# Patient Record
Sex: MALE | Race: WHITE | NOT HISPANIC OR LATINO | Employment: OTHER | URBAN - NONMETROPOLITAN AREA
[De-identification: names, ages, dates, MRNs, and addresses within clinical notes are randomized per-mention and may not be internally consistent; named-entity substitution may affect disease eponyms.]

---

## 2020-06-30 ENCOUNTER — HOSPITAL ENCOUNTER (EMERGENCY)
Facility: HOSPITAL | Age: 55
Discharge: HOME | End: 2020-06-30
Attending: EMERGENCY MEDICINE | Admitting: EMERGENCY MEDICINE
Payer: COMMERCIAL

## 2020-06-30 ENCOUNTER — APPOINTMENT (EMERGENCY)
Dept: RADIOLOGY | Facility: HOSPITAL | Age: 55
End: 2020-06-30

## 2020-06-30 VITALS
OXYGEN SATURATION: 97 % | TEMPERATURE: 97.3 F | HEIGHT: 70 IN | DIASTOLIC BLOOD PRESSURE: 96 MMHG | BODY MASS INDEX: 28.44 KG/M2 | SYSTOLIC BLOOD PRESSURE: 147 MMHG | RESPIRATION RATE: 18 BRPM | WEIGHT: 198.63 LBS | HEART RATE: 85 BPM

## 2020-06-30 DIAGNOSIS — M79.645 FINGER PAIN, LEFT: Primary | ICD-10-CM

## 2020-06-30 DIAGNOSIS — I73.00 RAYNAUD'S DISEASE: ICD-10-CM

## 2020-06-30 DIAGNOSIS — F17.200 SMOKING ADDICTION: ICD-10-CM

## 2020-06-30 DIAGNOSIS — M79.644 FINGER PAIN, RIGHT: ICD-10-CM

## 2020-06-30 PROCEDURE — 99283 EMERGENCY DEPT VISIT LOW MDM: CPT

## 2020-06-30 PROCEDURE — 73130 X-RAY EXAM OF HAND: CPT

## 2020-06-30 PROCEDURE — 99282 EMERGENCY DEPT VISIT SF MDM: CPT | Performed by: EMERGENCY MEDICINE

## 2020-06-30 RX ORDER — METOPROLOL SUCCINATE 25 MG/1
25 TABLET, EXTENDED RELEASE ORAL DAILY
COMMUNITY
Start: 2016-08-10

## 2020-06-30 RX ORDER — CAPSAICIN 0.025 %
CREAM (GRAM) TOPICAL
COMMUNITY
End: 2021-10-25 | Stop reason: CLARIF

## 2020-06-30 RX ORDER — HYDROXYZINE HYDROCHLORIDE 25 MG/1
TABLET, FILM COATED ORAL
COMMUNITY

## 2020-06-30 RX ORDER — LISINOPRIL 5 MG/1
5 TABLET ORAL DAILY
COMMUNITY
Start: 2018-01-08

## 2020-06-30 RX ORDER — GABAPENTIN 300 MG/1
CAPSULE ORAL
COMMUNITY
Start: 2017-02-07

## 2020-06-30 RX ORDER — CYCLOBENZAPRINE HCL 10 MG
TABLET ORAL
COMMUNITY
End: 2021-10-25 | Stop reason: CLARIF

## 2020-06-30 RX ORDER — GABAPENTIN 800 MG/1
TABLET ORAL
COMMUNITY
Start: 2018-01-08 | End: 2021-10-25 | Stop reason: CLARIF

## 2020-06-30 RX ORDER — AMITRIPTYLINE HYDROCHLORIDE 25 MG/1
25 TABLET, FILM COATED ORAL
COMMUNITY

## 2020-06-30 RX ORDER — ALBUTEROL SULFATE 2.5 MG/3ML
2.5 SOLUTION RESPIRATORY (INHALATION) EVERY 4 HOURS PRN
COMMUNITY

## 2020-06-30 RX ORDER — OMEPRAZOLE 40 MG/1
40 CAPSULE, DELAYED RELEASE ORAL DAILY
COMMUNITY

## 2020-06-30 RX ORDER — ALLOPURINOL 300 MG/1
300 TABLET ORAL DAILY
COMMUNITY

## 2020-06-30 RX ORDER — ALBUTEROL SULFATE 90 UG/1
2 AEROSOL, METERED RESPIRATORY (INHALATION) EVERY 4 HOURS PRN
COMMUNITY

## 2020-06-30 NOTE — ED PROVIDER NOTES
History  Chief Complaint   Patient presents with    Finger Pain     pt has calloused fingertips and picking at them  c/o bilateral index fingertip pain for past 2 weeks  denies fevers/travel     Chronic finger tip calluses, recently seen at [facility] emergency department and given topical antibacterial ointment for probable local infection, he thinks they are wrong  Notes he has been using a magnet and it feels like it tracks to his finger tips, is concerned maybe a metal foreign body  States he has history of Raynaud's with worsening circulation at fingers diffusely, instructed not to smoke cigarettes, but continues      Hand Pain   Location:  Left and right 2nd finger tips  Quality:  Sore  Severity:  Mild  Onset quality:  Gradual  Duration: Chronically  Timing:  Constant  Progression:  Worsening  Chronicity:  Chronic  Worsened by:  Picking, touching  Associated symptoms: no abdominal pain, no chest pain, no fever and no shortness of breath        Prior to Admission Medications   Prescriptions Last Dose Informant Patient Reported? Taking?    albuterol (2 5 mg/3 mL) 0 083 % nebulizer solution   Yes Yes   albuterol (Proventil HFA) 90 mcg/act inhaler   Yes Yes   Sig: Inhale   allopurinol (ZYLOPRIM) 300 mg tablet   Yes Yes   Sig: Take by mouth daily   amitriptyline (ELAVIL) 25 mg tablet   Yes Yes   Sig: Take by mouth   capsaicin (ZOSTRIX) 0 025 % cream   Yes Yes   cyclobenzaprine (FLEXERIL) 10 mg tablet   Yes Yes   Sig: Take by mouth   gabapentin (NEURONTIN) 300 mg capsule   Yes Yes   Sig: Take by mouth   gabapentin (NEURONTIN) 800 mg tablet   Yes Yes   Sig: Take by mouth   hydrOXYzine HCL (ATARAX) 25 mg tablet   Yes Yes   Sig: Take by mouth   lisinopril (ZESTRIL) 5 mg tablet   Yes Yes   Sig: Take 5 mg by mouth daily   metoprolol succinate (TOPROL-XL) 25 mg 24 hr tablet   Yes Yes   Sig: Take 25 mg by mouth daily   omeprazole (PriLOSEC) 40 MG capsule   Yes No   Sig: Take 40 mg by mouth daily Facility-Administered Medications: None       Past Medical History:   Diagnosis Date    Raynaud's disease        History reviewed  No pertinent surgical history  History reviewed  No pertinent family history  I have reviewed and agree with the history as documented  E-Cigarette/Vaping    E-Cigarette Use Never User      E-Cigarette/Vaping Substances     Social History     Tobacco Use    Smoking status: Current Every Day Smoker     Packs/day: 1 00     Types: Cigarettes    Smokeless tobacco: Never Used   Substance Use Topics    Alcohol use: Not Currently    Drug use: Yes     Types: Marijuana       Review of Systems   Constitutional: Negative for fever  Respiratory: Negative for shortness of breath  Cardiovascular: Negative for chest pain  Gastrointestinal: Negative for abdominal pain  All other systems reviewed and are negative  Physical Exam  Physical Exam   Constitutional:   Pleasant, comfortable-appearing   Eyes: Conjunctivae are normal    Neck: Neck supple  Cardiovascular: Normal rate and regular rhythm  Intact radial pulses bilaterally   Pulmonary/Chest: Effort normal and breath sounds normal    Abdominal: He exhibits no distension  Musculoskeletal:   Intact range of motion at wrists and hands/fingers bilaterally, intact strength   Skin: Skin is warm and dry  Hypopigmented patches/vitiligo type changes at face and extremities exposed    Bilateral 2nd fingertips calloused, thickened skin with central debrided areas without obvious foreign body are locally tender without cellulitic or purulent changes, all nail beds blanched, no capillary refill, finger pads minimal but better blanching/refill   Nursing note and vitals reviewed                Vital Signs  ED Triage Vitals [06/30/20 0930]   Temperature Pulse Respirations Blood Pressure SpO2   (!) 97 3 °F (36 3 °C) 86 18 165/97 97 %      Temp Source Heart Rate Source Patient Position - Orthostatic VS BP Location FiO2 (%) Temporal Monitor Lying Right arm --      Pain Score       5           Vitals:    06/30/20 0930 06/30/20 0945   BP: 165/97 165/97   Pulse: 86 78   Patient Position - Orthostatic VS: Lying          Visual Acuity      ED Medications  Medications - No data to display    Diagnostic Studies  Results Reviewed     None                 XR hand 3+ vw left    (Results Pending)   XR hand 3+ vw right    (Results Pending)              Procedures  Procedures         ED Course  ED Course as of Jun 30 1057   Tue Jun 30, 2020   1050 X-rays without obvious foreign bodies at distal 2nd digits bilaterally   We discussed close outpatient follow-up with family physician and agreeable, will discuss rheumatology evaluation as well as smoking cessation, daughter present          US AUDIT      Most Recent Value   Initial Alcohol Screen: US AUDIT-C    1  How often do you have a drink containing alcohol?  0 Filed at: 06/30/2020 0956   2  How many drinks containing alcohol do you have on a typical day you are drinking? 0 Filed at: 06/30/2020 0956   3a  Male UNDER 65: How often do you have five or more drinks on one occasion? 0 Filed at: 06/30/2020 0956   Audit-C Score  0 Filed at: 06/30/2020 9790                  SILVIA/DAST-10      Most Recent Value   How many times in the past year have you    Used an illegal drug or used a prescription medication for non-medical reasons? Monthly Filed at: 06/30/2020 0955   In the past 12 months      1  Have you used drugs other than those required for medical reasons? 0 Filed at: 06/30/2020 0955   2  Do you use more than one drug at a time? 0 Filed at: 06/30/2020 0955   3  Have you had medical problems as a result of your drug use (e g , memory loss, hepatitis, convulsions, bleeding, etc )? 0 Filed at: 06/30/2020 0955   4  Have you had "blackouts" or "flashbacks" as a result of drug use? YesNo  0 Filed at: 06/30/2020 0955   5  Do you ever feel bad or guilty about your drug use?   0 Filed at: 06/30/2020 0955   6  Does your spouse (or parent) ever complain about your involvement with drugs? 0 Filed at: 06/30/2020 0955   7  Have you neglected your family because of your use of drugs? 0 Filed at: 06/30/2020 0955   8  Have you engaged in illegal activities in order to obtain drugs? 0 Filed at: 06/30/2020 0955   9  Have you ever experienced withdrawal symptoms (felt sick) when you stopped taking drugs? 0 Filed at: 06/30/2020 0955   10  Are you always able to stop using drugs when you want to?  0 Filed at: 06/30/2020 0955   DAST-10 Score  0 Filed at: 06/30/2020 6841                                MDM      Disposition  Final diagnoses:   Finger pain, left   Finger pain, right   Raynaud's disease   Smoking addiction     Time reflects when diagnosis was documented in both MDM as applicable and the Disposition within this note     Time User Action Codes Description Comment    6/30/2020 10:53 AM Mary Hoot Add [T49 839] Finger pain, left     6/30/2020 10:53 AM Mary Hoot Add [K99 506] Finger pain, right     6/30/2020 10:53 AM Mary Hoot Add [I73 00] Raynaud's disease     6/30/2020 10:53 AM Mary Hoot Add [F17 200] Smoking addiction       ED Disposition     ED Disposition Condition Date/Time Comment    Discharge Stable Tue Jun 30, 2020 10:53 AM [facility] discharge to home/self care  Follow-up Information    None         Patient's Medications   Discharge Prescriptions    No medications on file     No discharge procedures on file      PDMP Review     None          ED Provider  Electronically Signed by           Salud Starr DO  06/30/20 1057

## 2020-06-30 NOTE — DISCHARGE INSTRUCTIONS
Return immediately if worse or any symptoms  Please discuss rheumatology evaluation with your family physician, call today for appointment within the next week

## 2020-07-29 ENCOUNTER — HOSPITAL ENCOUNTER (EMERGENCY)
Facility: HOSPITAL | Age: 55
Discharge: HOME | End: 2020-07-29
Attending: EMERGENCY MEDICINE | Admitting: EMERGENCY MEDICINE
Payer: COMMERCIAL

## 2020-07-29 VITALS
HEART RATE: 92 BPM | RESPIRATION RATE: 18 BRPM | WEIGHT: 199.96 LBS | BODY MASS INDEX: 28.69 KG/M2 | SYSTOLIC BLOOD PRESSURE: 170 MMHG | OXYGEN SATURATION: 99 % | TEMPERATURE: 98.4 F | DIASTOLIC BLOOD PRESSURE: 96 MMHG

## 2020-07-29 VITALS
DIASTOLIC BLOOD PRESSURE: 93 MMHG | WEIGHT: 199.96 LBS | SYSTOLIC BLOOD PRESSURE: 146 MMHG | TEMPERATURE: 97.8 F | OXYGEN SATURATION: 97 % | RESPIRATION RATE: 18 BRPM | BODY MASS INDEX: 28.69 KG/M2 | HEART RATE: 82 BPM

## 2020-07-29 DIAGNOSIS — H10.9 CONJUNCTIVITIS: Primary | ICD-10-CM

## 2020-07-29 DIAGNOSIS — F32.A DEPRESSION: ICD-10-CM

## 2020-07-29 DIAGNOSIS — S05.02XA ABRASION OF LEFT CORNEA, INITIAL ENCOUNTER: Primary | ICD-10-CM

## 2020-07-29 PROCEDURE — 99284 EMERGENCY DEPT VISIT MOD MDM: CPT | Performed by: EMERGENCY MEDICINE

## 2020-07-29 PROCEDURE — 99284 EMERGENCY DEPT VISIT MOD MDM: CPT | Performed by: PHYSICIAN ASSISTANT

## 2020-07-29 PROCEDURE — 99283 EMERGENCY DEPT VISIT LOW MDM: CPT

## 2020-07-29 RX ORDER — TETRACAINE HYDROCHLORIDE 5 MG/ML
1 SOLUTION OPHTHALMIC ONCE
Status: COMPLETED | OUTPATIENT
Start: 2020-07-29 | End: 2020-07-29

## 2020-07-29 RX ORDER — ERYTHROMYCIN 5 MG/G
0.5 OINTMENT OPHTHALMIC ONCE
Status: COMPLETED | OUTPATIENT
Start: 2020-07-29 | End: 2020-07-29

## 2020-07-29 RX ADMIN — ERYTHROMYCIN 0.5 INCH: 5 OINTMENT OPHTHALMIC at 06:53

## 2020-07-29 RX ADMIN — FLUORESCEIN SODIUM 1 STRIP: 1 STRIP OPHTHALMIC at 12:49

## 2020-07-29 RX ADMIN — FLUORESCEIN SODIUM 1 STRIP: 1 STRIP OPHTHALMIC at 06:18

## 2020-07-29 RX ADMIN — TETRACAINE HYDROCHLORIDE 1 DROP: 5 SOLUTION OPHTHALMIC at 12:48

## 2020-07-29 NOTE — ED PROVIDER NOTES
History  Chief Complaint   Patient presents with    Eye Pain     patient was just seen here this am and discharged, he came back thinking there is something in his eye  He slept outside last night and woke this am with pain and eye swelling  54-year-old male presents to the emergency department for evaluation of left eye discomfort  Patient was seen this morning for the same diagnosed with conjunctivitis and prescribed erythromycin ophthalmology appointment  Patient has ophthalmology appointment tomorrow  Patient reports he continues to feels as if something is in the eye  Patient notes he slept outside last evening due to being concerned there is bed bugs in his house  Patient feels is if there is a bed bug in his eye  He denies any visual changes  He reports eye discomfort and foreign body sensation  He reports purulent drainage from the eye  Patient denies fevers or chills  Patient reports he has been using the ointment as prescribed  Patient denies wearing contacts  Patient admits to light sensitivity  Note patient's daughter and patient became upset  Family and patient note recent family deaths including death of patient's twin brother  Patient is tearful discussing this  He denies any suicidal homicidal ideations  Patient denies any IV drug use admits to smoke marijuana denies any alcohol use  Prior to Admission Medications   Prescriptions Last Dose Informant Patient Reported? Taking?    albuterol (2 5 mg/3 mL) 0 083 % nebulizer solution   Yes No   albuterol (Proventil HFA) 90 mcg/act inhaler   Yes No   Sig: Inhale   allopurinol (ZYLOPRIM) 300 mg tablet   Yes No   Sig: Take by mouth daily   amitriptyline (ELAVIL) 25 mg tablet   Yes No   Sig: Take by mouth   capsaicin (ZOSTRIX) 0 025 % cream   Yes No   cyclobenzaprine (FLEXERIL) 10 mg tablet   Yes No   Sig: Take by mouth   gabapentin (NEURONTIN) 300 mg capsule   Yes No   Sig: Take by mouth   gabapentin (NEURONTIN) 800 mg tablet Yes No   Sig: Take by mouth   hydrOXYzine HCL (ATARAX) 25 mg tablet   Yes No   Sig: Take by mouth   lisinopril (ZESTRIL) 5 mg tablet   Yes No   Sig: Take 5 mg by mouth daily   metoprolol succinate (TOPROL-XL) 25 mg 24 hr tablet   Yes No   Sig: Take 25 mg by mouth daily   omeprazole (PriLOSEC) 40 MG capsule   Yes No   Sig: Take 40 mg by mouth daily      Facility-Administered Medications: None       Past Medical History:   Diagnosis Date    Emphysema of lung ([facility] [address] )     Gout     Neuropathy     Raynaud's disease        History reviewed  No pertinent surgical history  History reviewed  No pertinent family history  I have reviewed and agree with the history as documented  E-Cigarette/Vaping    E-Cigarette Use Never User      E-Cigarette/Vaping Substances     Social History     Tobacco Use    Smoking status: Current Every Day Smoker     Packs/day: 1 00     Types: Cigarettes    Smokeless tobacco: Never Used   Substance Use Topics    Alcohol use: Not Currently    Drug use: Yes     Types: Marijuana       Review of Systems   Constitutional: Negative  HENT: Negative  Eyes: Positive for photophobia, pain, discharge and redness  Negative for itching and visual disturbance  Respiratory: Negative  Cardiovascular: Negative  Gastrointestinal: Negative  Genitourinary: Negative  Musculoskeletal: Negative  Skin: Negative  Neurological: Negative  Psychiatric/Behavioral: Negative for agitation, behavioral problems, confusion, decreased concentration, dysphoric mood, hallucinations, self-injury, sleep disturbance and suicidal ideas  The patient is not nervous/anxious and is not hyperactive  Depressed mood   All other systems reviewed and are negative  Physical Exam  Physical Exam   Constitutional: He is oriented to person, place, and time  He appears well-developed and well-nourished  No distress  HENT:   Head: Normocephalic and atraumatic     Right Ear: External ear normal    Left Ear: External ear normal    Nose: Nose normal    Mouth/Throat: Oropharynx is clear and moist  No oropharyngeal exudate  Eyes: Pupils are equal, round, and reactive to light  Conjunctivae and lids are normal  Right eye exhibits no discharge  Left eye exhibits discharge (Purulent)  Right eye exhibits normal extraocular motion (no pain) and no nystagmus  Left eye exhibits normal extraocular motion (no pain) and no nystagmus  Slit lamp exam:       The left eye shows corneal abrasion  Anterior chamber clear   Neck: Normal range of motion  Neck supple  Lymphadenopathy:     He has no cervical adenopathy  Neurological: He is alert and oriented to person, place, and time  GCS eye subscore is 4  GCS verbal subscore is 5  GCS motor subscore is 6  Skin: Skin is warm and dry  Capillary refill takes less than 2 seconds  No erythema  Psychiatric: His speech is normal and behavior is normal  Judgment and thought content normal  His mood appears not anxious  His affect is not angry and not blunt  He is not actively hallucinating  Cognition and memory are normal  He exhibits a depressed mood  Tearful, depressed He is attentive  Nursing note and vitals reviewed        Vital Signs  ED Triage Vitals [07/29/20 1209]   Temperature Pulse Respirations Blood Pressure SpO2   98 4 °F (36 9 °C) 92 18 170/96 99 %      Temp Source Heart Rate Source Patient Position - Orthostatic VS BP Location FiO2 (%)   Temporal Monitor Sitting Right arm --      Pain Score       3           Vitals:    07/29/20 1209   BP: 170/96   Pulse: 92   Patient Position - Orthostatic VS: Sitting         Visual Acuity      ED Medications  Medications   fluorescein sodium sterile ophthalmic strip 1 strip (1 strip Left Eye Given 7/29/20 1249)   tetracaine 0 5 % ophthalmic solution 1 drop (1 drop Left Eye Given 7/29/20 1248)       Diagnostic Studies  Results Reviewed     None                 No orders to display              Procedures  Procedures ED Course       US AUDIT      Most Recent Value   Initial Alcohol Screen: US AUDIT-C    1  How often do you have a drink containing alcohol?  0 Filed at: 07/29/2020 1211   2  How many drinks containing alcohol do you have on a typical day you are drinking? 0 Filed at: 07/29/2020 1211   3a  Male UNDER 65: How often do you have five or more drinks on one occasion? 0 Filed at: 07/29/2020 1211   3b  FEMALE Any Age, or MALE 65+: How often do you have 4 or more drinks on one occassion? 0 Filed at: 07/29/2020 1211   Audit-C Score  0 Filed at: 07/29/2020 1211                  SILVIA/DAST-10      Most Recent Value   How many times in the past year have you    Used an illegal drug or used a prescription medication for non-medical reasons? Once or Twice [last used yesterday ] Filed at: 07/29/2020 1211   In the past 12 months      1  Have you used drugs other than those required for medical reasons? 0 Filed at: 07/29/2020 1211   2  Do you use more than one drug at a time? 0 Filed at: 07/29/2020 1211   3  Have you had medical problems as a result of your drug use (e g , memory loss, hepatitis, convulsions, bleeding, etc )? 0 Filed at: 07/29/2020 1211   4  Have you had "blackouts" or "flashbacks" as a result of drug use? YesNo  0 Filed at: 07/29/2020 1211   5  Do you ever feel bad or guilty about your drug use? 0 Filed at: 07/29/2020 1211   6  Does your spouse (or parent) ever complain about your involvement with drugs? 0 Filed at: 07/29/2020 1211   7  Have you neglected your family because of your use of drugs? 0 Filed at: 07/29/2020 1211   8  Have you engaged in illegal activities in order to obtain drugs? 0 Filed at: 07/29/2020 1211   9  Have you ever experienced withdrawal symptoms (felt sick) when you stopped taking drugs? 0 Filed at: 07/29/2020 1211   10   Are you always able to stop using drugs when you want to?  0 Filed at: 07/29/2020 1211   DAST-10 Score  0 Filed at: 07/29/2020 1216 MDM  Number of Diagnoses or Management Options  Abrasion of left cornea, initial encounter: new and requires workup  Depression: new and does not require workup  Diagnosis management comments: ddx includes but not limited to:  Corneal abrasion, foreign body, conjunctivitis  Vitals and medical record reviewed  Patient had corneal abrasion noted on woods lamp exam   There is no foreign body noted on exam   Unable to successfully flip eyelid  Patient was started on erythromycin ophthalmology ointment this morning which he reports he is using  He has an ophthalmology appointment scheduled for tomorrow  Patient was tearful and appeared depressed  He had no suicidal or homicidal ideations  Psychology phone number provided to patient  Patient and family were educated on results, findings, symptomatic treatment and symptoms that require prompt return to the ED for further evaluation and verbalized understanding  Patient remained well emergency department and was discharged home  Disposition  Final diagnoses:   Abrasion of left cornea, initial encounter   Depression     Time reflects when diagnosis was documented in both MDM as applicable and the Disposition within this note     Time User Action Codes Description Comment    7/29/2020  1:30 PM Robin Santos [[id number]  [id number]] Abrasion of left cornea, initial encounter     7/29/2020  1:31 PM Rice Fillers Add [F32 9] Depression       ED Disposition     ED Disposition Condition Date/Time Comment    Discharge Stable Wed Jul 29, 2020  1:30 PM [facility] discharge to home/self care              Follow-up Information     Follow up With Specialties Details Why [address] Referral Cardiology, Gastroenterology, General Surgery, Anesthesiology, Hematology and Oncology, Urology, Emergency Medicine, Hematology, Oncology In 1 week  [address] [facility] [address], [address]      Benigno Godinez MD Psychiatry, Behavioral Health In 3 days  [address] [facility]      Belinda Peguero MD Ophthalmology In 3 days  [address]  [facility]  [address] [address]  [phone]            Discharge Medication List as of 7/29/2020  1:34 PM      CONTINUE these medications which have NOT CHANGED    Details   albuterol (2 5 mg/3 mL) 0 083 % nebulizer solution Historical Med      albuterol (Proventil HFA) 90 mcg/act inhaler Inhale, Historical Med      allopurinol (ZYLOPRIM) 300 mg tablet Take by mouth daily, Historical Med      amitriptyline (ELAVIL) 25 mg tablet Take by mouth, Historical Med      capsaicin (ZOSTRIX) 0 025 % cream Historical Med      cyclobenzaprine (FLEXERIL) 10 mg tablet Take by mouth, Historical Med      gabapentin (NEURONTIN) 300 mg capsule Take by mouth, Starting Tue 2/7/2017, Historical Med      gabapentin (NEURONTIN) 800 mg tablet Take by mouth, Starting Mon 1/8/2018, Historical Med      hydrOXYzine HCL (ATARAX) 25 mg tablet Take by mouth, Historical Med      lisinopril (ZESTRIL) 5 mg tablet Take 5 mg by mouth daily, Starting Mon 1/8/2018, Historical Med      metoprolol succinate (TOPROL-XL) 25 mg 24 hr tablet Take 25 mg by mouth daily, Starting Wed 8/10/2016, Historical Med      omeprazole (PriLOSEC) 40 MG capsule Take 40 mg by mouth daily, Historical Med           No discharge procedures on file      PDMP Review     None          ED Provider  Electronically Signed by           Alison Galindo PA-C  07/29/20 0115

## 2020-07-29 NOTE — ED ATTENDING ATTESTATION
7/29/2020  Belinda Mackay DO, saw and evaluated the patient  I have discussed the patient with the resident/non-physician practitioner and agree with the resident's/non-physician practitioner's findings, Plan of Care, and MDM as documented in the resident's/non-physician practitioner's note, except where noted  All available labs and Radiology studies were reviewed  I was present for key portions of any procedure(s) performed by the resident/non-physician practitioner and I was immediately available to provide assistance  At this point I agree with the current assessment done in the Emergency Department  I have conducted an independent evaluation of this patient a history and physical is as follows:    ED Course      Patient was seen and evaluated with the physician's assistant  Patient was seen earlier in the emergency department for conjunctivitis  Patient returns today with increased discomfort in the eye  PA evaluated the eye with me  We did attempt to flip the tarsal plate but patient was not cooperative with this and was subsequently difficult to perform  His pupils equal and reactive to light  We did identify an increased fluorescein uptake extending from approximately the 10 o'clock to 1 o'clock position on the superior aspect of the OS  There was an ancillary complaint of the patient having some degree of depression and paranoia  We discussed this with the patient and his family  The patient denied any active suicidality  We offered the patient opportunity to have crisis evaluation he declined  This was further discussed with the patient's daughter she was not seeking to involuntarily commit the patient  Patient was subsequently discharged home has appointment for follow-up with ophthalmology tomorrow  He was advised to continue with the appointment that he was prescribed earlier  Please see physician's assistant's note for further information        Critical Care Time  Procedures

## 2020-07-29 NOTE — DISCHARGE INSTRUCTIONS
PLEASE USE THE OINTMENT AS PRESCRIBED TO YOU EARLIER THIS MORNING FOR YOUR EYE DISCOMFORT  PLEASE KEEP AND MAKE SURE TO ATTEND YOUR APPOINTMENT WITH THE EYE DOCTOR TOMORROW  THERE IS A NUMBER PROVIDED ABOVE FOR A PSYCHIATRIST FOR FUTURE COUNSELING  PLEASE CALL THIS NUMBER TO MAKE AN APPOINTMENT

## 2020-07-29 NOTE — ED PROVIDER NOTES
History  Chief Complaint   Patient presents with    Eye Pain     Patient stated he fell asleep outside and woke up with left eye pain and swelling  Left eye irritation after sleeping outside on the porch overnight, notes he has reflux and and sleeps upright in chair well  Unaware of foreign body impact  Does not were contact lenses  No vision loss      Eye Pain   Timing:  Constant  Progression:  Unchanged  Chronicity:  New  Associated symptoms: no congestion, no cough, no fever, no headaches, no rash and no sore throat        Prior to Admission Medications   Prescriptions Last Dose Informant Patient Reported? Taking? albuterol (2 5 mg/3 mL) 0 083 % nebulizer solution   Yes No   albuterol (Proventil HFA) 90 mcg/act inhaler   Yes No   Sig: Inhale   allopurinol (ZYLOPRIM) 300 mg tablet   Yes No   Sig: Take by mouth daily   amitriptyline (ELAVIL) 25 mg tablet   Yes No   Sig: Take by mouth   capsaicin (ZOSTRIX) 0 025 % cream   Yes No   cyclobenzaprine (FLEXERIL) 10 mg tablet   Yes No   Sig: Take by mouth   gabapentin (NEURONTIN) 300 mg capsule   Yes No   Sig: Take by mouth   gabapentin (NEURONTIN) 800 mg tablet   Yes No   Sig: Take by mouth   hydrOXYzine HCL (ATARAX) 25 mg tablet   Yes No   Sig: Take by mouth   lisinopril (ZESTRIL) 5 mg tablet   Yes No   Sig: Take 5 mg by mouth daily   metoprolol succinate (TOPROL-XL) 25 mg 24 hr tablet   Yes No   Sig: Take 25 mg by mouth daily   omeprazole (PriLOSEC) 40 MG capsule   Yes No   Sig: Take 40 mg by mouth daily      Facility-Administered Medications: None       Past Medical History:   Diagnosis Date    Raynaud's disease        History reviewed  No pertinent surgical history  History reviewed  No pertinent family history  I have reviewed and agree with the history as documented      E-Cigarette/Vaping    E-Cigarette Use Never User      E-Cigarette/Vaping Substances     Social History     Tobacco Use    Smoking status: Current Every Day Smoker     Packs/day: 1 00 Types: Cigarettes    Smokeless tobacco: Never Used   Substance Use Topics    Alcohol use: Not Currently    Drug use: Yes     Types: Marijuana       Review of Systems   Constitutional: Negative for fever  HENT: Negative for congestion and sore throat  Eyes: Positive for pain  Respiratory: Negative for cough  Skin: Negative for rash  Neurological: Negative for headaches  Physical Exam  Physical Exam   Constitutional: He is oriented to person, place, and time  Alert, comfortable appearing   HENT:   Head: Normocephalic and atraumatic  Eyes: Pupils are equal, round, and reactive to light  EOM are normal    Left eye vision 20/30    Left eye conjunctival injection with mucoid discharge, no foreign body, anterior chamber clear, no fluorescein uptake at cornea, no foreign body in upper lower eyelid   Neurological: He is alert and oriented to person, place, and time  Skin: Skin is warm and dry  Psychiatric: He has a normal mood and affect  His behavior is normal    Nursing note and vitals reviewed  Vital Signs  ED Triage Vitals [07/29/20 0614]   Temperature Pulse Respirations Blood Pressure SpO2   97 8 °F (36 6 °C) 85 18 147/85 98 %      Temp Source Heart Rate Source Patient Position - Orthostatic VS BP Location FiO2 (%)   Temporal Monitor Lying Right arm --      Pain Score       4           Vitals:    07/29/20 0614   BP: 147/85   Pulse: 85   Patient Position - Orthostatic VS: Lying         Visual Acuity      ED Medications  Medications   erythromycin (ILOTYCIN) 0 5 % ophthalmic ointment 0 5 inch (has no administration in time range)   fluorescein sodium sterile ophthalmic strip 1 strip (1 strip Left Eye Given 7/29/20 2782)       Diagnostic Studies  Results Reviewed     None                 No orders to display              Procedures  Procedures         ED Course       US AUDIT      Most Recent Value   Initial Alcohol Screen: US AUDIT-C    1   How often do you have a drink containing alcohol?  0 Filed at: 07/29/2020 0612   2  How many drinks containing alcohol do you have on a typical day you are drinking? 0 Filed at: 07/29/2020 0612   3a  Male UNDER 65: How often do you have five or more drinks on one occasion? 0 Filed at: 07/29/2020 0612   Audit-C Score  0 Filed at: 07/29/2020 4389                  SILVIA/DAST-10      Most Recent Value   How many times in the past year have you    Used an illegal drug or used a prescription medication for non-medical reasons? Never Filed at: 07/29/2020 8087                                MDM      Disposition  Final diagnoses:   Conjunctivitis     Time reflects when diagnosis was documented in both MDM as applicable and the Disposition within this note     Time User Action Codes Description Comment    7/29/2020  6:32 AM Raymundo Hives Add [H10 9] Conjunctivitis       ED Disposition     ED Disposition Condition Date/Time Comment    Discharge Stable Wed Jul 29, 2020  6:32 AM Domenica Almonte discharge to home/self care  Follow-up Information     Follow up With Specialties Details Why Marcia Goldmann, MD Ophthalmology Schedule an appointment as soon as possible for a visit in 1 day  [address]  [facility] [employer]  [address] [id number]  [address] [facility] Ophthalmology Schedule an appointment as soon as possible for a visit in 1 day  [address]  [address]  [phone]            Patient's Medications   Discharge Prescriptions    No medications on file     No discharge procedures on file      PDMP Review     None          ED Provider  Electronically Signed by           Yo Tinoco DO  07/29/20 1640

## 2020-07-29 NOTE — ED NOTES
While in room 5, woman heard yelling in pt's room, woman then at nurses desk asking to speak to physician because she believes the pt is using drugs, SUZIE Nice PA-C aware of same       Cyndie Vazquez RN  07/29/20 7195

## 2020-07-29 NOTE — ED NOTES
This RN in pt's room to scan medications for provider, asked pt if everything is ok and that I had heard yelling, pt states "no it's not!", pt began yelling and threw his sunglasses across room and yells "they all think [place] [employer] [place] Just send me to the n[place]ouse!", I asked pt to please not yell and that I am trying to help him, asked pt if there is anything I can do to help his situation, pt states "no"       Monserrat Staton, RN  07/29/20 0519

## 2020-07-29 NOTE — ED NOTES
Erythromycin not stocked in [address] [facility] Supervisor called in order to provide medication        Vicki Riggins [facility]  07/29/20 3009

## 2020-07-29 NOTE — DISCHARGE INSTRUCTIONS
Please call and arrange to see ophthalmologist with 1-2 days  Erythromycin ointment ribbon and lower lid every 6 hours and small amount as needed for comfort  Return immediately if worse or any new symptoms

## 2020-07-29 NOTE — ED NOTES
Patient's ride home is Mariela Christie, requesting to be called at [phone] when patient is discharged  Patient agreeable        Jeff Carter, [place]  07/29/20 0138

## 2021-04-02 DIAGNOSIS — I73.00 RAYNAUD'S SYNDROME WITHOUT GANGRENE: ICD-10-CM

## 2021-04-02 DIAGNOSIS — R06.00 DYSPNEA, UNSPECIFIED: ICD-10-CM

## 2021-04-02 DIAGNOSIS — J84.9 INTERSTITIAL PULMONARY DISEASE, UNSPECIFIED (HCC): ICD-10-CM

## 2021-04-02 DIAGNOSIS — R05.9 COUGH: ICD-10-CM

## 2021-04-02 DIAGNOSIS — M34.9 SYSTEMIC SCLEROSIS, UNSPECIFIED (HCC): ICD-10-CM

## 2021-04-02 DIAGNOSIS — F17.200 NICOTINE DEPENDENCE, UNSPECIFIED, UNCOMPLICATED: ICD-10-CM

## 2021-04-08 ENCOUNTER — TRANSCRIBE ORDERS (OUTPATIENT)
Dept: ADMINISTRATIVE | Facility: HOSPITAL | Age: 56
End: 2021-04-08

## 2021-04-08 DIAGNOSIS — R05.9 COUGH: ICD-10-CM

## 2021-04-08 DIAGNOSIS — I50.32 CHRONIC DIASTOLIC (CONGESTIVE) HEART FAILURE (HCC): Primary | ICD-10-CM

## 2021-04-08 DIAGNOSIS — I73.00 RAYNAUD'S SYNDROME WITHOUT GANGRENE: ICD-10-CM

## 2021-04-08 DIAGNOSIS — M34.9 SYSTEMIC SCLEROSIS, UNSPECIFIED (HCC): ICD-10-CM

## 2021-04-08 DIAGNOSIS — R06.00 DYSPNEA, UNSPECIFIED: ICD-10-CM

## 2021-04-08 DIAGNOSIS — J84.9 INTERSTITIAL PULMONARY DISEASE, UNSPECIFIED (HCC): ICD-10-CM

## 2021-04-08 DIAGNOSIS — F17.200 NICOTINE DEPENDENCE, UNSPECIFIED, UNCOMPLICATED: ICD-10-CM

## 2021-10-21 ENCOUNTER — HOSPITAL ENCOUNTER (EMERGENCY)
Facility: HOSPITAL | Age: 56
Discharge: HOME | End: 2021-10-21
Attending: EMERGENCY MEDICINE | Admitting: EMERGENCY MEDICINE
Payer: COMMERCIAL

## 2021-10-21 VITALS
OXYGEN SATURATION: 98 % | WEIGHT: 288 LBS | RESPIRATION RATE: 16 BRPM | DIASTOLIC BLOOD PRESSURE: 104 MMHG | BODY MASS INDEX: 40.32 KG/M2 | TEMPERATURE: 98.1 F | SYSTOLIC BLOOD PRESSURE: 150 MMHG | HEART RATE: 85 BPM | HEIGHT: 71 IN

## 2021-10-21 DIAGNOSIS — M34.9 SCLERODERMA (HCC): Primary | ICD-10-CM

## 2021-10-21 PROCEDURE — 99283 EMERGENCY DEPT VISIT LOW MDM: CPT

## 2021-10-21 PROCEDURE — 99284 EMERGENCY DEPT VISIT MOD MDM: CPT | Performed by: EMERGENCY MEDICINE

## 2021-10-21 RX ORDER — IBUPROFEN 600 MG/1
600 TABLET ORAL EVERY 6 HOURS PRN
Qty: 30 TABLET | Refills: 0 | Status: SHIPPED | OUTPATIENT
Start: 2021-10-21

## 2021-10-21 RX ORDER — OXYCODONE HYDROCHLORIDE AND ACETAMINOPHEN 5; 325 MG/1; MG/1
1 TABLET ORAL ONCE
Status: COMPLETED | OUTPATIENT
Start: 2021-10-21 | End: 2021-10-21

## 2021-10-21 RX ORDER — PREDNISONE 10 MG/1
TABLET ORAL
Qty: 30 TABLET | Refills: 0 | OUTPATIENT
Start: 2021-10-21 | End: 2021-10-25

## 2021-10-21 RX ADMIN — OXYCODONE AND ACETAMINOPHEN 1 TABLET: 5; 325 TABLET ORAL at 18:47

## 2021-10-21 RX ADMIN — PREDNISONE 50 MG: 20 TABLET ORAL at 18:47

## 2021-10-22 ENCOUNTER — HOSPITAL ENCOUNTER (EMERGENCY)
Facility: HOSPITAL | Age: 56
Discharge: HOME | End: 2021-10-22
Attending: EMERGENCY MEDICINE | Admitting: EMERGENCY MEDICINE
Payer: COMMERCIAL

## 2021-10-22 VITALS
RESPIRATION RATE: 18 BRPM | WEIGHT: 288 LBS | TEMPERATURE: 98.3 F | DIASTOLIC BLOOD PRESSURE: 80 MMHG | OXYGEN SATURATION: 99 % | HEART RATE: 85 BPM | BODY MASS INDEX: 40.17 KG/M2 | SYSTOLIC BLOOD PRESSURE: 165 MMHG

## 2021-10-22 DIAGNOSIS — M34.9 SCLERODERMA (HCC): Primary | ICD-10-CM

## 2021-10-22 PROCEDURE — 96372 THER/PROPH/DIAG INJ SC/IM: CPT

## 2021-10-22 PROCEDURE — 99284 EMERGENCY DEPT VISIT MOD MDM: CPT | Performed by: EMERGENCY MEDICINE

## 2021-10-22 PROCEDURE — 99283 EMERGENCY DEPT VISIT LOW MDM: CPT

## 2021-10-22 RX ORDER — KETOROLAC TROMETHAMINE 30 MG/ML
30 INJECTION, SOLUTION INTRAMUSCULAR; INTRAVENOUS ONCE
Status: COMPLETED | OUTPATIENT
Start: 2021-10-22 | End: 2021-10-22

## 2021-10-22 RX ORDER — OXYCODONE HYDROCHLORIDE AND ACETAMINOPHEN 5; 325 MG/1; MG/1
1 TABLET ORAL ONCE
Status: COMPLETED | OUTPATIENT
Start: 2021-10-22 | End: 2021-10-22

## 2021-10-22 RX ADMIN — OXYCODONE HYDROCHLORIDE AND ACETAMINOPHEN 1 TABLET: 5; 325 TABLET ORAL at 13:45

## 2021-10-22 RX ADMIN — KETOROLAC TROMETHAMINE 30 MG: 30 INJECTION, SOLUTION INTRAMUSCULAR at 13:44

## 2021-10-23 ENCOUNTER — HOSPITAL ENCOUNTER (EMERGENCY)
Facility: HOSPITAL | Age: 56
Discharge: HOME | End: 2021-10-23
Attending: EMERGENCY MEDICINE | Admitting: EMERGENCY MEDICINE
Payer: COMMERCIAL

## 2021-10-23 ENCOUNTER — APPOINTMENT (EMERGENCY)
Dept: RADIOLOGY | Facility: HOSPITAL | Age: 56
End: 2021-10-23
Payer: COMMERCIAL

## 2021-10-23 VITALS
TEMPERATURE: 97 F | DIASTOLIC BLOOD PRESSURE: 82 MMHG | BODY MASS INDEX: 22.53 KG/M2 | HEIGHT: 71 IN | HEART RATE: 88 BPM | RESPIRATION RATE: 16 BRPM | SYSTOLIC BLOOD PRESSURE: 150 MMHG | OXYGEN SATURATION: 98 % | WEIGHT: 160.94 LBS

## 2021-10-23 VITALS
OXYGEN SATURATION: 98 % | TEMPERATURE: 97.8 F | BODY MASS INDEX: 22.4 KG/M2 | HEIGHT: 71 IN | SYSTOLIC BLOOD PRESSURE: 154 MMHG | HEART RATE: 74 BPM | WEIGHT: 160 LBS | RESPIRATION RATE: 16 BRPM | DIASTOLIC BLOOD PRESSURE: 92 MMHG

## 2021-10-23 DIAGNOSIS — M86.9 OSTEOMYELITIS (HCC): Primary | ICD-10-CM

## 2021-10-23 DIAGNOSIS — M34.9 SCLERODERMA (HCC): ICD-10-CM

## 2021-10-23 DIAGNOSIS — M79.641 RIGHT HAND PAIN: ICD-10-CM

## 2021-10-23 DIAGNOSIS — R79.82 ELEVATED C-REACTIVE PROTEIN (CRP): ICD-10-CM

## 2021-10-23 DIAGNOSIS — R70.0 ELEVATED ERYTHROCYTE SEDIMENTATION RATE: ICD-10-CM

## 2021-10-23 LAB
ALBUMIN SERPL BCP-MCNC: 3.7 G/DL (ref 3.5–5)
ALP SERPL-CCNC: 125 U/L (ref 46–116)
ALT SERPL W P-5'-P-CCNC: 19 U/L (ref 12–78)
ANION GAP SERPL CALCULATED.3IONS-SCNC: 6 MMOL/L (ref 4–13)
AST SERPL W P-5'-P-CCNC: 18 U/L (ref 5–45)
BASOPHILS # BLD AUTO: 0.03 THOUSANDS/ΜL (ref 0–0.1)
BASOPHILS NFR BLD AUTO: 0 % (ref 0–1)
BILIRUB SERPL-MCNC: 0.29 MG/DL (ref 0.2–1)
BUN SERPL-MCNC: 18 MG/DL (ref 5–25)
CALCIUM SERPL-MCNC: 8.7 MG/DL (ref 8.3–10.1)
CHLORIDE SERPL-SCNC: 97 MMOL/L (ref 100–108)
CO2 SERPL-SCNC: 29 MMOL/L (ref 21–32)
CREAT SERPL-MCNC: 0.94 MG/DL (ref 0.6–1.3)
CRP SERPL QL: 12 MG/L
EOSINOPHIL # BLD AUTO: 0.07 THOUSAND/ΜL (ref 0–0.61)
EOSINOPHIL NFR BLD AUTO: 1 % (ref 0–6)
ERYTHROCYTE [DISTWIDTH] IN BLOOD BY AUTOMATED COUNT: 14.6 % (ref 11.6–15.1)
ERYTHROCYTE [SEDIMENTATION RATE] IN BLOOD: 38 MM/HOUR (ref 0–19)
GFR SERPL CREATININE-BSD FRML MDRD: 90 ML/MIN/1.73SQ M
GLUCOSE SERPL-MCNC: 136 MG/DL (ref 65–140)
HCT VFR BLD AUTO: 40.2 % (ref 36.5–49.3)
HGB BLD-MCNC: 13.2 G/DL (ref 12–17)
IMM GRANULOCYTES # BLD AUTO: 0.05 THOUSAND/UL (ref 0–0.2)
IMM GRANULOCYTES NFR BLD AUTO: 1 % (ref 0–2)
LACTATE SERPL-SCNC: 1.1 MMOL/L (ref 0.5–2)
LYMPHOCYTES # BLD AUTO: 1.62 THOUSANDS/ΜL (ref 0.6–4.47)
LYMPHOCYTES NFR BLD AUTO: 15 % (ref 14–44)
MCH RBC QN AUTO: 29.4 PG (ref 26.8–34.3)
MCHC RBC AUTO-ENTMCNC: 32.8 G/DL (ref 31.4–37.4)
MCV RBC AUTO: 90 FL (ref 82–98)
MONOCYTES # BLD AUTO: 1.16 THOUSAND/ΜL (ref 0.17–1.22)
MONOCYTES NFR BLD AUTO: 11 % (ref 4–12)
NEUTROPHILS # BLD AUTO: 8.01 THOUSANDS/ΜL (ref 1.85–7.62)
NEUTS SEG NFR BLD AUTO: 72 % (ref 43–75)
NRBC BLD AUTO-RTO: 0 /100 WBCS
PLATELET # BLD AUTO: 367 THOUSANDS/UL (ref 149–390)
PMV BLD AUTO: 9 FL (ref 8.9–12.7)
POTASSIUM SERPL-SCNC: 4.1 MMOL/L (ref 3.5–5.3)
PROT SERPL-MCNC: 7.7 G/DL (ref 6.4–8.2)
RBC # BLD AUTO: 4.49 MILLION/UL (ref 3.88–5.62)
SODIUM SERPL-SCNC: 132 MMOL/L (ref 136–145)
URATE SERPL-MCNC: 2.4 MG/DL (ref 4.2–8)
WBC # BLD AUTO: 10.94 THOUSAND/UL (ref 4.31–10.16)

## 2021-10-23 PROCEDURE — 85025 COMPLETE CBC W/AUTO DIFF WBC: CPT | Performed by: EMERGENCY MEDICINE

## 2021-10-23 PROCEDURE — 99285 EMERGENCY DEPT VISIT HI MDM: CPT | Performed by: EMERGENCY MEDICINE

## 2021-10-23 PROCEDURE — 86140 C-REACTIVE PROTEIN: CPT | Performed by: EMERGENCY MEDICINE

## 2021-10-23 PROCEDURE — 96365 THER/PROPH/DIAG IV INF INIT: CPT

## 2021-10-23 PROCEDURE — 96367 TX/PROPH/DG ADDL SEQ IV INF: CPT

## 2021-10-23 PROCEDURE — 96375 TX/PRO/DX INJ NEW DRUG ADDON: CPT

## 2021-10-23 PROCEDURE — 73130 X-RAY EXAM OF HAND: CPT

## 2021-10-23 PROCEDURE — 85652 RBC SED RATE AUTOMATED: CPT | Performed by: EMERGENCY MEDICINE

## 2021-10-23 PROCEDURE — 36415 COLL VENOUS BLD VENIPUNCTURE: CPT | Performed by: EMERGENCY MEDICINE

## 2021-10-23 PROCEDURE — 96374 THER/PROPH/DIAG INJ IV PUSH: CPT

## 2021-10-23 PROCEDURE — 96366 THER/PROPH/DIAG IV INF ADDON: CPT

## 2021-10-23 PROCEDURE — 99284 EMERGENCY DEPT VISIT MOD MDM: CPT | Performed by: EMERGENCY MEDICINE

## 2021-10-23 PROCEDURE — 87040 BLOOD CULTURE FOR BACTERIA: CPT | Performed by: EMERGENCY MEDICINE

## 2021-10-23 PROCEDURE — 80053 COMPREHEN METABOLIC PANEL: CPT | Performed by: EMERGENCY MEDICINE

## 2021-10-23 PROCEDURE — 84550 ASSAY OF BLOOD/URIC ACID: CPT | Performed by: EMERGENCY MEDICINE

## 2021-10-23 PROCEDURE — 83605 ASSAY OF LACTIC ACID: CPT | Performed by: EMERGENCY MEDICINE

## 2021-10-23 PROCEDURE — 99284 EMERGENCY DEPT VISIT MOD MDM: CPT

## 2021-10-23 RX ORDER — DEXAMETHASONE SODIUM PHOSPHATE 4 MG/ML
10 INJECTION, SOLUTION INTRA-ARTICULAR; INTRALESIONAL; INTRAMUSCULAR; INTRAVENOUS; SOFT TISSUE ONCE
Status: COMPLETED | OUTPATIENT
Start: 2021-10-23 | End: 2021-10-23

## 2021-10-23 RX ORDER — CEPHALEXIN 500 MG/1
500 CAPSULE ORAL EVERY 12 HOURS SCHEDULED
Qty: 14 CAPSULE | Refills: 0 | Status: SHIPPED | OUTPATIENT
Start: 2021-10-23 | End: 2021-10-30

## 2021-10-23 RX ORDER — OXYCODONE HYDROCHLORIDE AND ACETAMINOPHEN 5; 325 MG/1; MG/1
1 TABLET ORAL ONCE
Status: COMPLETED | OUTPATIENT
Start: 2021-10-23 | End: 2021-10-23

## 2021-10-23 RX ORDER — OXYCODONE HYDROCHLORIDE AND ACETAMINOPHEN 5; 325 MG/1; MG/1
1 TABLET ORAL EVERY 4 HOURS PRN
Qty: 6 TABLET | Refills: 0 | OUTPATIENT
Start: 2021-10-23 | End: 2021-10-25

## 2021-10-23 RX ORDER — KETOROLAC TROMETHAMINE 30 MG/ML
15 INJECTION, SOLUTION INTRAMUSCULAR; INTRAVENOUS ONCE
Status: COMPLETED | OUTPATIENT
Start: 2021-10-23 | End: 2021-10-23

## 2021-10-23 RX ORDER — CEFEPIME HYDROCHLORIDE 2 G/50ML
2000 INJECTION, SOLUTION INTRAVENOUS ONCE
Status: COMPLETED | OUTPATIENT
Start: 2021-10-23 | End: 2021-10-23

## 2021-10-23 RX ORDER — MORPHINE SULFATE 4 MG/ML
4 INJECTION, SOLUTION INTRAMUSCULAR; INTRAVENOUS ONCE
Status: COMPLETED | OUTPATIENT
Start: 2021-10-23 | End: 2021-10-23

## 2021-10-23 RX ADMIN — DEXAMETHASONE SODIUM PHOSPHATE 10 MG: 4 INJECTION, SOLUTION INTRAMUSCULAR; INTRAVENOUS at 09:22

## 2021-10-23 RX ADMIN — MORPHINE SULFATE 4 MG: 4 INJECTION INTRAVENOUS at 11:02

## 2021-10-23 RX ADMIN — SODIUM CHLORIDE 1000 ML: 0.9 INJECTION, SOLUTION INTRAVENOUS at 11:02

## 2021-10-23 RX ADMIN — KETOROLAC TROMETHAMINE 15 MG: 30 INJECTION, SOLUTION INTRAMUSCULAR at 10:32

## 2021-10-23 RX ADMIN — OXYCODONE HYDROCHLORIDE AND ACETAMINOPHEN 1 TABLET: 5; 325 TABLET ORAL at 09:17

## 2021-10-23 RX ADMIN — CEFEPIME HYDROCHLORIDE 2000 MG: 2 INJECTION, SOLUTION INTRAVENOUS at 11:07

## 2021-10-23 RX ADMIN — VANCOMYCIN HYDROCHLORIDE 1750 MG: 1 INJECTION, POWDER, LYOPHILIZED, FOR SOLUTION INTRAVENOUS at 11:45

## 2021-10-24 ENCOUNTER — APPOINTMENT (EMERGENCY)
Dept: CT IMAGING | Facility: HOSPITAL | Age: 56
End: 2021-10-24
Payer: COMMERCIAL

## 2021-10-24 ENCOUNTER — HOSPITAL ENCOUNTER (OUTPATIENT)
Facility: HOSPITAL | Age: 56
Setting detail: OBSERVATION
Discharge: HOME | End: 2021-10-25
Attending: EMERGENCY MEDICINE | Admitting: FAMILY MEDICINE
Payer: COMMERCIAL

## 2021-10-24 DIAGNOSIS — R55 NEAR SYNCOPE: ICD-10-CM

## 2021-10-24 DIAGNOSIS — M86.9 OSTEOMYELITIS (HCC): ICD-10-CM

## 2021-10-24 DIAGNOSIS — E87.1 HYPONATREMIA: ICD-10-CM

## 2021-10-24 DIAGNOSIS — L08.9 FINGER INFECTION: ICD-10-CM

## 2021-10-24 DIAGNOSIS — R29.90 STROKE-LIKE SYMPTOMS: ICD-10-CM

## 2021-10-24 DIAGNOSIS — R77.8 ELEVATED TROPONIN: ICD-10-CM

## 2021-10-24 DIAGNOSIS — R42 DIZZINESS: Primary | ICD-10-CM

## 2021-10-24 DIAGNOSIS — D72.829 LEUKOCYTOSIS: ICD-10-CM

## 2021-10-24 LAB
ANION GAP SERPL CALCULATED.3IONS-SCNC: 7 MMOL/L (ref 4–13)
APTT PPP: 28 SECONDS (ref 23–37)
ATRIAL RATE: 82 BPM
BUN SERPL-MCNC: 17 MG/DL (ref 5–25)
CALCIUM SERPL-MCNC: 8.8 MG/DL (ref 8.3–10.1)
CHLORIDE SERPL-SCNC: 97 MMOL/L (ref 100–108)
CO2 SERPL-SCNC: 27 MMOL/L (ref 21–32)
CREAT SERPL-MCNC: 1.01 MG/DL (ref 0.6–1.3)
ERYTHROCYTE [DISTWIDTH] IN BLOOD BY AUTOMATED COUNT: 14.6 % (ref 11.6–15.1)
GFR SERPL CREATININE-BSD FRML MDRD: 83 ML/MIN/1.73SQ M
GLUCOSE SERPL-MCNC: 113 MG/DL (ref 65–140)
GLUCOSE SERPL-MCNC: 115 MG/DL (ref 65–140)
HCT VFR BLD AUTO: 39.5 % (ref 36.5–49.3)
HGB BLD-MCNC: 13.2 G/DL (ref 12–17)
INR PPP: 0.85 (ref 0.84–1.19)
MCH RBC QN AUTO: 30 PG (ref 26.8–34.3)
MCHC RBC AUTO-ENTMCNC: 33.4 G/DL (ref 31.4–37.4)
MCV RBC AUTO: 90 FL (ref 82–98)
P AXIS: 64 DEGREES
PLATELET # BLD AUTO: 381 THOUSANDS/UL (ref 149–390)
PMV BLD AUTO: 8.8 FL (ref 8.9–12.7)
POTASSIUM SERPL-SCNC: 4.6 MMOL/L (ref 3.5–5.3)
PR INTERVAL: 176 MS
PROTHROMBIN TIME: 11.6 SECONDS (ref 11.6–14.5)
QRS AXIS: 64 DEGREES
QRSD INTERVAL: 172 MS
QT INTERVAL: 446 MS
QTC INTERVAL: 521 MS
RBC # BLD AUTO: 4.4 MILLION/UL (ref 3.88–5.62)
SODIUM SERPL-SCNC: 131 MMOL/L (ref 136–145)
T WAVE AXIS: 16 DEGREES
TROPONIN I SERPL-MCNC: 0.06 NG/ML
VENTRICULAR RATE: 82 BPM
WBC # BLD AUTO: 12.26 THOUSAND/UL (ref 4.31–10.16)

## 2021-10-24 PROCEDURE — 70496 CT ANGIOGRAPHY HEAD: CPT

## 2021-10-24 PROCEDURE — 36415 COLL VENOUS BLD VENIPUNCTURE: CPT | Performed by: PHYSICIAN ASSISTANT

## 2021-10-24 PROCEDURE — 99285 EMERGENCY DEPT VISIT HI MDM: CPT

## 2021-10-24 PROCEDURE — 93005 ELECTROCARDIOGRAM TRACING: CPT

## 2021-10-24 PROCEDURE — G0426 INPT/ED TELECONSULT50: HCPCS | Performed by: PSYCHIATRY & NEUROLOGY

## 2021-10-24 PROCEDURE — 85730 THROMBOPLASTIN TIME PARTIAL: CPT | Performed by: PHYSICIAN ASSISTANT

## 2021-10-24 PROCEDURE — 82948 REAGENT STRIP/BLOOD GLUCOSE: CPT

## 2021-10-24 PROCEDURE — 85027 COMPLETE CBC AUTOMATED: CPT | Performed by: PHYSICIAN ASSISTANT

## 2021-10-24 PROCEDURE — 84484 ASSAY OF TROPONIN QUANT: CPT | Performed by: PHYSICIAN ASSISTANT

## 2021-10-24 PROCEDURE — 99220 PR INITIAL OBSERVATION CARE/DAY 70 MINUTES: CPT | Performed by: NURSE PRACTITIONER

## 2021-10-24 PROCEDURE — 99285 EMERGENCY DEPT VISIT HI MDM: CPT | Performed by: PHYSICIAN ASSISTANT

## 2021-10-24 PROCEDURE — 85610 PROTHROMBIN TIME: CPT | Performed by: PHYSICIAN ASSISTANT

## 2021-10-24 PROCEDURE — 80048 BASIC METABOLIC PNL TOTAL CA: CPT | Performed by: PHYSICIAN ASSISTANT

## 2021-10-24 PROCEDURE — 70498 CT ANGIOGRAPHY NECK: CPT

## 2021-10-24 RX ORDER — ALLOPURINOL 100 MG/1
300 TABLET ORAL DAILY
Status: DISCONTINUED | OUTPATIENT
Start: 2021-10-25 | End: 2021-10-25 | Stop reason: HOSPADM

## 2021-10-24 RX ORDER — OXYCODONE HYDROCHLORIDE AND ACETAMINOPHEN 5; 325 MG/1; MG/1
1 TABLET ORAL EVERY 4 HOURS PRN
Status: DISCONTINUED | OUTPATIENT
Start: 2021-10-24 | End: 2021-10-25 | Stop reason: HOSPADM

## 2021-10-24 RX ORDER — GABAPENTIN 300 MG/1
600 CAPSULE ORAL 3 TIMES DAILY
Status: DISCONTINUED | OUTPATIENT
Start: 2021-10-24 | End: 2021-10-25 | Stop reason: HOSPADM

## 2021-10-24 RX ORDER — PANTOPRAZOLE SODIUM 40 MG/1
40 TABLET, DELAYED RELEASE ORAL
Status: DISCONTINUED | OUTPATIENT
Start: 2021-10-25 | End: 2021-10-25 | Stop reason: HOSPADM

## 2021-10-24 RX ORDER — LISINOPRIL 2.5 MG/1
5 TABLET ORAL DAILY
Status: DISCONTINUED | OUTPATIENT
Start: 2021-10-25 | End: 2021-10-25 | Stop reason: HOSPADM

## 2021-10-24 RX ORDER — METOPROLOL SUCCINATE 50 MG/1
25 TABLET, EXTENDED RELEASE ORAL DAILY
Status: DISCONTINUED | OUTPATIENT
Start: 2021-10-25 | End: 2021-10-25 | Stop reason: HOSPADM

## 2021-10-24 RX ORDER — CEPHALEXIN 250 MG/1
500 CAPSULE ORAL EVERY 12 HOURS SCHEDULED
Status: DISCONTINUED | OUTPATIENT
Start: 2021-10-24 | End: 2021-10-25

## 2021-10-24 RX ADMIN — CEPHALEXIN 500 MG: 250 CAPSULE ORAL at 23:11

## 2021-10-24 RX ADMIN — GABAPENTIN 600 MG: 300 CAPSULE ORAL at 23:11

## 2021-10-24 RX ADMIN — OXYCODONE HYDROCHLORIDE AND ACETAMINOPHEN 1 TABLET: 5; 325 TABLET ORAL at 23:11

## 2021-10-24 RX ADMIN — IOHEXOL 85 ML: 350 INJECTION, SOLUTION INTRAVENOUS at 19:47

## 2021-10-24 NOTE — ED PROVIDER NOTES
History  Chief Complaint   Patient presents with    Dizziness     Patient stated "I just dont feel right"  Patient complaing of dizziness and nausea  64year old male presented to the the ED for evaluation  States approximately 1 hour prior to arrival he had sudden onset of feeling dizzy and lightheaded  Reports he became nauseated and continues with mild double vision  He denies any headache  He denies any weakness or numbness  Reports speech seemed slurred  He denies facial droop  He denies any head trauma or injury  Denies history of anything similar  Denies history of TIA or CVA  Reports he recently started taking steroid for bone infection in the right hand  Denies any other new medications       History provided by:  Patient  CVA/TIA-like Symptoms  Presenting symptoms: visual change    Presenting symptoms: no change in level of consciousness, no confusion, no headaches, no disturbances in coordination, no language symptoms, no loss of balance, no focal sensory loss and no weakness    Date/time of last known well:  10/24/2021 6:00 PM  Onset quality:  Sudden  Timing:  Constant  Progression:  Partially resolved  Similar to previous episodes: no    Associated symptoms: dizziness    Associated symptoms: no chest pain, no trouble swallowing, no facial pain, no fall, no fever, no hearing loss, no bladder incontinence, no nausea, no neck pain, no paresthesias, no seizures, no tinnitus, no vertigo and no vomiting        Prior to Admission Medications   Prescriptions Last Dose Informant Patient Reported? Taking?    Diclofenac Sodium (VOLTAREN) 1 %   No No   Sig: Apply 2 g topically 4 (four) times a day   albuterol (2 5 mg/3 mL) 0 083 % nebulizer solution   Yes No   albuterol (Proventil HFA) 90 mcg/act inhaler   Yes No   Sig: Inhale   allopurinol (ZYLOPRIM) 300 mg tablet   Yes No   Sig: Take by mouth daily   amitriptyline (ELAVIL) 25 mg tablet   Yes No   Sig: Take by mouth   capsaicin (ZOSTRIX) 0 025 % cream Yes No   cephalexin (KEFLEX) 500 mg capsule   No No   Sig: Take 1 capsule (500 mg total) by mouth every 12 (twelve) hours for 7 days   cyclobenzaprine (FLEXERIL) 10 mg tablet   Yes No   Sig: Take by mouth   gabapentin (NEURONTIN) 300 mg capsule   Yes No   Sig: Take by mouth   gabapentin (NEURONTIN) 800 mg tablet   Yes No   Sig: Take by mouth   hydrOXYzine HCL (ATARAX) 25 mg tablet   Yes No   Sig: Take by mouth   ibuprofen (MOTRIN) 600 mg tablet   No No   Sig: Take 1 tablet (600 mg total) by mouth every 6 (six) hours as needed for moderate pain   lisinopril (ZESTRIL) 5 mg tablet   Yes No   Sig: Take 5 mg by mouth daily   metoprolol succinate (TOPROL-XL) 25 mg 24 hr tablet   Yes No   Sig: Take 25 mg by mouth daily   omeprazole (PriLOSEC) 40 MG capsule   Yes No   Sig: Take 40 mg by mouth daily   oxyCODONE-acetaminophen (Percocet) 5-325 mg per tablet   No No   Sig: Take 1 tablet by mouth every 4 (four) hours as needed for moderate pain for up to 10 daysMax Daily Amount: 6 tablets   predniSONE 10 mg tablet   No No   Sig: Take 4 tablets (40 mg total) by mouth daily for 3 days, THEN 3 tablets (30 mg total) daily for 3 days, THEN 2 tablets (20 mg total) daily for 3 days, THEN 1 tablet (10 mg total) daily for 3 days  Facility-Administered Medications: None       Past Medical History:   Diagnosis Date    Emphysema of lung ([facility] )     Gout     Neuropathy     Raynaud's disease     Scleroderma ([facility] )        History reviewed  No pertinent surgical history  History reviewed  No pertinent family history  I have reviewed and agree with the history as documented      E-Cigarette/Vaping    E-Cigarette Use Never User      E-Cigarette/Vaping Substances    Nicotine No     THC No     CBD No     Flavoring No      Social History     Tobacco Use    Smoking status: Current Every Day Smoker     Packs/day: 1 00     Types: Cigarettes    Smokeless tobacco: Never Used   Vaping Use    Vaping Use: Never used   Substance Use Topics  Alcohol use: Not Currently    Drug use: Yes     Types: Marijuana       Review of Systems   Constitutional: Negative  Negative for fever  HENT: Negative  Negative for hearing loss, tinnitus and trouble swallowing  Eyes: Positive for visual disturbance  Respiratory: Negative  Cardiovascular: Negative  Negative for chest pain  Gastrointestinal: Negative  Negative for nausea and vomiting  Genitourinary: Negative  Negative for bladder incontinence  Musculoskeletal: Negative  Negative for neck pain  Skin: Negative  Neurological: Positive for dizziness and speech difficulty  Negative for vertigo, seizures, weakness, headaches, disturbances in coordination, paresthesias and loss of balance  Psychiatric/Behavioral: Negative  Negative for confusion  All other systems reviewed and are negative  Physical Exam  Physical Exam  Vitals and nursing note reviewed  Constitutional:       General: He is not in acute distress  Appearance: Normal appearance  He is normal weight  He is not ill-appearing, toxic-appearing or diaphoretic  HENT:      Head: Normocephalic and atraumatic  Nose: Nose normal       Mouth/Throat:      Mouth: Mucous membranes are moist    Eyes:      Extraocular Movements: Extraocular movements intact  Conjunctiva/sclera: Conjunctivae normal       Pupils: Pupils are equal, round, and reactive to light  Cardiovascular:      Rate and Rhythm: Normal rate and regular rhythm  Pulses:           Radial pulses are 2+ on the right side and 2+ on the left side  Pulmonary:      Effort: Pulmonary effort is normal  No respiratory distress  Breath sounds: Normal breath sounds  No stridor  No wheezing, rhonchi or rales  Chest:      Chest wall: No tenderness  Abdominal:      General: Abdomen is flat  Bowel sounds are normal       Palpations: Abdomen is soft  Musculoskeletal:         General: Swelling and tenderness present  No deformity   Normal range of motion  Cervical back: Normal range of motion and neck supple  Comments: Rt 2nd finger is swollen with mild tenderess   Skin:     General: Skin is warm and dry  Capillary Refill: Capillary refill takes less than 2 seconds  Coloration: Skin is not jaundiced or pale  Findings: No bruising, erythema or rash  Neurological:      General: No focal deficit present  Mental Status: He is alert and oriented to person, place, and time  GCS: GCS eye subscore is 4  GCS verbal subscore is 5  GCS motor subscore is 6  Sensory: Sensation is intact  Motor: Motor function is intact  No weakness  Coordination: Coordination is intact  Deep Tendon Reflexes: Reflexes are normal and symmetric     Psychiatric:         Mood and Affect: Mood normal          Behavior: Behavior normal          Vital Signs  ED Triage Vitals [10/24/21 1856]   Temperature Pulse Respirations Blood Pressure SpO2   97 8 °F (36 6 °C) 85 18 123/78 96 %      Temp Source Heart Rate Source Patient Position - Orthostatic VS BP Location FiO2 (%)   Temporal Monitor Sitting Right arm --      Pain Score       Worst Possible Pain           Vitals:    10/24/21 1940 10/24/21 1955 10/24/21 2010 10/24/21 2101   BP: 131/74 136/79 125/79 120/76   Pulse: 94 87 81 74   Patient Position - Orthostatic VS:    Lying         Visual Acuity  Visual Acuity      Most Recent Value   L Pupil Size (mm)  3   R Pupil Size (mm)  3   L Pupil Shape  Round   R Pupil Shape  Round          ED Medications  Medications   allopurinol (ZYLOPRIM) tablet 300 mg (has no administration in time range)   cephalexin (KEFLEX) capsule 500 mg (500 mg Oral Given 10/24/21 2311)   oxyCODONE-acetaminophen (PERCOCET) 5-325 mg per tablet 1 tablet (1 tablet Oral Given 10/24/21 2311)   pantoprazole (PROTONIX) EC tablet 40 mg (has no administration in time range)   metoprolol succinate (TOPROL-XL) 24 hr tablet 25 mg (has no administration in time range)   lisinopril (ZESTRIL) tablet 5 mg (has no administration in time range)   gabapentin (NEURONTIN) capsule 600 mg (600 mg Oral Given 10/24/21 2311)   iohexol (OMNIPAQUE) 350 MG/ML injection (SINGLE-DOSE) 85 mL (85 mL Intravenous Given 10/24/21 1947)       Diagnostic Studies  Results Reviewed     Procedure Component Value Units Date/Time    Troponin I [[id number]]  (Abnormal) Collected: 10/24/21 1928    Lab Status: Final result Specimen: Blood from Arm, Right Updated: 10/24/21 2003     Troponin I 0 06 ng/mL     Basic metabolic panel [[id number]]  (Abnormal) Collected: 10/24/21 1928    Lab Status: Final result Specimen: Blood from Arm, Right Updated: 10/24/21 1958     Sodium 131 mmol/L      Potassium 4 6 mmol/L      Chloride 97 mmol/L      CO2 27 mmol/L      ANION GAP 7 mmol/L      BUN 17 mg/dL      Creatinine 1 01 mg/dL      Glucose 115 mg/dL      Calcium 8 8 mg/dL      eGFR 83 ml/min/1 73sq m     Narrative:      [place] guidelines for Chronic Kidney Disease (CKD):     Stage 1 with normal or high GFR (GFR > 90 mL/min/1 73 square meters)    Stage 2 Mild CKD (GFR = 60-89 mL/min/1 73 square meters)    Stage 3A Moderate CKD (GFR = 45-59 mL/min/1 73 square meters)    Stage 3B Moderate CKD (GFR = 30-44 mL/min/1 73 square meters)    Stage 4 Severe CKD (GFR = 15-29 mL/min/1 73 square meters)    Stage 5 End Stage CKD (GFR <15 mL/min/1 73 square meters)  Note: GFR calculation is accurate only with a steady state creatinine    Protime-INR [[id number]]  (Normal) Collected: 10/24/21 1928    Lab Status: Final result Specimen: Blood from Arm, Right Updated: 10/24/21 1946     Protime 11 6 seconds      INR 0 85    APTT [[id number]]  (Normal) Collected: 10/24/21 1928    Lab Status: Final result Specimen: Blood from Arm, Right Updated: 10/24/21 1946     PTT 28 seconds     CBC and Platelet [[id number]]  (Abnormal) Collected: 10/24/21 1928    Lab Status: Final result Specimen: Blood from Arm, Right Updated: 10/24/21 1932 WBC 12 26 Thousand/uL      RBC 4 40 Million/uL      Hemoglobin 13 2 g/dL      Hematocrit 39 5 %      MCV 90 fL      MCH 30 0 pg      MCHC 33 4 g/dL      RDW 14 6 %      Platelets 504 Thousands/uL      MPV 8 8 fL     Fingerstick Glucose (POCT) [[id number]]  (Normal) Collected: 10/24/21 1927    Lab Status: Final result Updated: 10/24/21 1929     POC Glucose 113 mg/dl                  CTA stroke alert (head/neck)   Final Result by Benjy Subramanian MD (10/24 1958)      No evidence of hemodynamic significant stenosis, aneurysm or dissection  I personally discussed this study with neurologist on 10/24/2021 at 7:49 PM                         Workstation performed: [id number]         CT stroke alert brain   Final Result by Benjy Subramanian MD (10/24 1949)      No acute intracranial abnormality  I personally discussed this study with neurologist on 10/24/2021 at 7:49 PM                 Workstation performed: [id number]                    Procedures  ECG 12 Lead Documentation Only    Date/Time: 10/24/2021 8:01 PM  Performed by: Celso Valente PA-C  Authorized by: Ceslo Valente PA-C     Patient location:  ED  Interpretation:     Interpretation: non-specific    Rate:     ECG rate:  82    ECG rate assessment: normal    Rhythm:     Rhythm: sinus rhythm    Ectopy:     Ectopy: none    QRS:     QRS axis:  Normal    QRS intervals:  Normal  Conduction:     Conduction: abnormal      Abnormal conduction: complete RBBB    ST segments:     ST segments:  Normal  T waves:     T waves: normal               ED Course  ED Course as of Oct 24 2332   Sun Oct 24, 2021   1928 Discussed with Neuro who recommended proceed with stroke workup      1933 WBC(!): 12 26 1943 Patient returned for CT scan  well device set up and neurology made aware      1952 CT head: No acute intracranial abnormality        2002 CTA: No evidence of hemodynamic significant stenosis, aneurysm or dissection      2002 Dr Jonathan Riddle feels symptoms unrelated to stroke  Recommends DC stroke pathway      2007 Troponin I(!): 0 06   2008 Sodium(!): 131   2008 WBC(!): 12 26   2012 Discussed results and findings with patient  He is agreeable to admission  Discussed with Medicine who accepted patient for admission                    Stroke Assessment     Row Name 10/24/21 1921             NIH Stroke Scale    Interval  Baseline      Level of Consciousness (1a )  0      LOC Questions (1b )  0      LOC Commands (1c )  0      Best Gaze (2 )  0      Visual (3 )  0      Facial Palsy (4 )  0      Motor Arm, Left (5a )  0      Motor Arm, Right (5b )  0      Motor Leg, Left (6a )  0      Motor Leg, Right (6b )  0      Limb Ataxia (7 )  0      Sensory (8 )  0      Best Language (9 )  0      Dysarthria (10 )  1      Extinction and Inattention (11 ) (Formerly Neglect)  0      Total  1                    SBIRT 22yo+      Most Recent Value   SBIRT (23 yo +)   In order to provide better care to our patients, we are screening all of our patients for alcohol and drug use  Would it be okay to ask you these screening questions? No Filed at: 10/24/2021 2101                    MDM  Number of Diagnoses or Management Options  Dizziness: new and requires workup  Elevated troponin: new and requires workup  Finger infection: new and requires workup  Hyponatremia: new and requires workup  Leukocytosis: new and requires workup  Near syncope: new and requires workup  Diagnosis management comments: 64year old male presented to the ED for evaluation near syncopal episode dizziness, lightheadedness, blurred vision prior to arrival   Vitals and medical record reviewed  On arrival stroke alert was called  Imaging was relatively unremarkable and reviewed by Neurology  Recommended DC stroke alert  However patient had increased white blood count from yesterday after being seen and diagnosed with osteomyelitis of the right 2nd digit and started on Keflex    Additionally he was found to be hyponatremic with elevated troponin  EKG was nonischemic  Patient denies any chest pain  Discussed all results and findings with the patient he is agreeable to admission  Patient was accepted by the medicine service  Amount and/or Complexity of Data Reviewed  Clinical lab tests: ordered and reviewed  Tests in the radiology section of CPT®: ordered and reviewed  Review and summarize past medical records: yes  Discuss the patient with other providers: yes  Independent visualization of images, tracings, or specimens: yes        Disposition  Final diagnoses:   Dizziness   Near syncope   Hyponatremia   Leukocytosis   Elevated troponin   Finger infection     Time reflects when diagnosis was documented in both MDM as applicable and the Disposition within this note     Time User Action Codes Description Comment    10/24/2021  7:16 PM Ruther Holding Add [R42] Dizziness     10/24/2021  8:24 PM Ruther Holding Add [R55] Near syncope     10/24/2021  8:24 PM Ruther Holding Add [E87 1] Hyponatremia     10/24/2021  8:24 PM Ruther Holding Add [D72 829] Leukocytosis     10/24/2021  8:24 PM Ruther Holding Add [R77 8] Elevated troponin     10/24/2021  8:24 PM Ruther Holding Add [L08 9] Finger infection       ED Disposition     ED Disposition Condition Date/Time Comment    Admit Stable Sun Oct 24, 2021  8:24 PM Case was discussed with Sandy Cleaning NP and the patient's admission status was agreed to be Admission Status: observation status to the service of Dr Rangel Gunn   Follow-up Information    None         Patient's Medications   Discharge Prescriptions    No medications on file     No discharge procedures on file      PDMP Review       Value Time User    PDMP Reviewed  Yes 10/23/2021  1:01 PM Candy Bonilla MD          ED Provider  Electronically Signed by           Obinna Cm PA-C  10/24/21 7269

## 2021-10-24 NOTE — ED ATTENDING ATTESTATION
10/24/2021  IMegan MD, saw and evaluated the patient  I have discussed the patient with the resident/non-physician practitioner and agree with the resident's/non-physician practitioner's findings, Plan of Care, and MDM as documented in the resident's/non-physician practitioner's note, except where noted  All available labs and Radiology studies were reviewed  I was present for key portions of any procedure(s) performed by the resident/non-physician practitioner and I was immediately available to provide assistance  At this point I agree with the current assessment done in the Emergency Department  I have conducted an independent evaluation of this patient a history and physical is as follows:    ED Course      Patient states he developed sudden onset of lightheadedness and his speech was slightly slurred  Symptoms seem to be resolving  Vision was blurry  That seems to be resolving but not back completely to normal     On exam patient awake alert  Motor strength is 5/5 in all 4  Heart is a regular rate and rhythm  Lungs are clear to auscultation  Abdomen soft nontender good bowel sounds        Critical Care Time  Procedures

## 2021-10-24 NOTE — TELEMEDICINE
TeleConsultation - Stroke   Jojo Kahn 64 y o  male MRN: [id number]  Unit/Bed#: ED 12 Encounter: [id number]        REQUIRED DOCUMENTATION:     1  This service was provided via Telemedicine  2  Provider located at home  3  TeleMed provider: Yisel Taylor DO   4  Identify all parties in room with patient during tele consult:  Patient only  5  Patient was then informed that this was a Telemedicine visit and that the exam was being conducted confidentially over secure lines  My office door was closed  No one else was in the room  Patient acknowledged consent and understanding of privacy and security of the Telemedicine visit, and gave us permission to have the assistant stay in the room in order to assist with the history and to conduct the exam   I informed the patient that I have reviewed their record in Epic and presented the opportunity for them to ask any questions regarding the visit today  The patient agreed to participate  Assessment/Plan   Assessment:Plan:   Suspect mild encephalopathy, likely related to finger osteomyelitis, possibly sepsis  - recommend additional toxic/metabolic/infectious screen     -blood cultures  - empiric IV antibiotics  Low suspicion for vascular etiology  - can discontinue stroke workup       TPA Decision: Patient not a TPA candidate  Symptoms resolved/clearly non disabling  low suspicion for vascular etiology    Recommendations for outpatient neurological follow up have yet to be determined  History of Present Illness     Reason for Consult / Principal Problem:  Stroke  Hx and PE limited by:  Not applicable  Patient last known well:  18 30  Stroke alert called:  19:24  Neurology time of arrival:  Immediate  HPI: Jojo Kahn is a 64 y o  right handed male with emphysema , scleroderma and neuropathy who presents with lightheadedness/dizziness, dysarthria and blurred vision starting around 630   Lightheaded, dizziness, blurry vision and dysarthria without any focal numbness tingling heaviness or weakness, dysarthria has resolved  The blurry vision is intermittent  Patient denies overt movement or vertigo  No real focal complaints  Admits to intermittent nausea and cold sweats (chills?)  - patient currently normotensive with systolics in the 340Y   - CT head was unremarkable for any acute contributory pathology  - CTA head and neck    Consult to Neurology  Consult performed by: Rosi Harrison DO  Consult ordered by: Melissa Santiago PA-C          Review of Systems   Constitutional: Positive for chills, diaphoresis and fatigue  HENT: Negative for hearing loss  Eyes: Negative for photophobia and visual disturbance  Respiratory: Negative for wheezing  Cardiovascular: Negative for chest pain and palpitations  Gastrointestinal: Positive for nausea  Genitourinary: Negative for dysuria and urgency  Neurological: Positive for speech difficulty and light-headedness  Negative for weakness, numbness and headaches  All other systems reviewed and are negative  Historical Information   Past Medical History:   Diagnosis Date    [facility]     Gout     Neuropathy     Raynaud's disease     Scleroderma ([facility] )      History reviewed  No pertinent surgical history  Social History   Social History     Substance and Sexual Activity   Alcohol Use Not Currently     Social History     Substance and Sexual Activity   Drug Use Yes    Types: Marijuana     E-Cigarette/Vaping    E-Cigarette Use Never User      E-Cigarette/Vaping Substances    Nicotine No     THC No     CBD No     Flavoring No      Social History     Tobacco Use   Smoking Status Current Every Day Smoker    Packs/day: 1 00    Types: Cigarettes   Smokeless Tobacco Never Used     Family History: History reviewed  No pertinent family history  Review of previous medical records was  completed       Meds/Allergies   current meds:   No current facility-administered medications for this encounter  Allergies   Allergen Reactions    Aspirin        Objective   Vitals:Blood pressure 126/84, pulse 87, temperature 97 8 °F (36 6 °C), temperature source Temporal, resp  rate 20, SpO2 97 %  ,There is no height or weight on file to calculate BMI  No intake or output data in the 24 hours ending 10/24/21 1939    Invasive Devices: Invasive Devices     Peripheral Intravenous Line            Peripheral IV 10/24/21 Right Antecubital <1 day                Physical Exam  Vitals reviewed  Constitutional:       General: He is not in acute distress  Appearance: Normal appearance  He is well-developed and normal weight  He is not ill-appearing, toxic-appearing or diaphoretic  HENT:      Head: Normocephalic and atraumatic  Eyes:      General: No scleral icterus  Right eye: No discharge  Left eye: No discharge  Extraocular Movements: Extraocular movements intact  Conjunctiva/sclera: Conjunctivae normal    Pulmonary:      Effort: Pulmonary effort is normal  No respiratory distress  Musculoskeletal:         General: Normal range of motion  Skin:     Coloration: Skin is not jaundiced or pale  Neurological:      Mental Status: He is alert  Psychiatric:         Mood and Affect: Mood normal          Behavior: Behavior normal        Neurologic Exam     Mental Status   Awake alert oriented x3  Attention appears normal and intact  Language is fluent, comprehension appears to be intact  No gross evidence of aphasia  Patient is interacting appropriately  Cranial Nerves   Pupils are equal and round  Extraocular muscles intact, gaze conjugate  Face appears symmetric with respect to motor  Tongue is midline  Shoulder shrug is symmetric  Motor Exam Patient moves all 4 extremities equally without drift    Bulk appears normal     Sensory Exam   Patient acknowledges sensation equally in all 4 extremities     Gait, Coordination, and Reflexes  no gross ataxia in any limb   Finger-to-nose was intact and symmetric  Gait was deferred       NIHSS:  1a Level of Consciousness: 0 = Alert   1b  LOC Questions: 0 = Answers both correctly   1c  LOC Commands: 0 = Obeys both correctly   2  Best Gaze: 0 = Normal   3  Visual: 0 = No visual field loss   4  Facial Palsy: 0=Normal symmetric movement   5a  Motor Right Arm: 0=No drift, limb holds 90 (or 45) degrees for full 10 seconds   5b  Motor Left Arm: 0=No drift, limb holds 90 (or 45) degrees for full 10 seconds   6a  Motor Right Le=No drift, limb holds 90 (or 45) degrees for full 10 seconds   6b  Motor Left Le=No drift, limb holds 90 (or 45) degrees for full 10 seconds   7  Limb Ataxia:  0=Absent   8  Sensory: 0=Normal; no sensory loss   9  Best Language:  0=No aphasia, normal   10  Dysarthria: 0=Normal articulation   11  Extinction and Inattention (formerly Neglect): 0=No abnormality   Total Score: 0     Time NIHSS was completed:  8:45 p m  Modified [place] Score:  0 (No baseline symptoms/disability)    Lab Results: I have personally reviewed pertinent reports      Imaging Studies: I have personally reviewed pertinent films in PACS I personally reviewed radiology reports as well   EKG, Pathology, and Other Studies: I have personally reviewed pertinent films in PACS  VTE Prophylaxis: Sequential compression device Bernice Florentino)     Code Status: No Order  Advance Directive and Living Will:      Power of :    POLST:

## 2021-10-25 ENCOUNTER — APPOINTMENT (OUTPATIENT)
Dept: MRI IMAGING | Facility: HOSPITAL | Age: 56
End: 2021-10-25
Payer: COMMERCIAL

## 2021-10-25 VITALS
HEIGHT: 71 IN | BODY MASS INDEX: 22.41 KG/M2 | OXYGEN SATURATION: 95 % | DIASTOLIC BLOOD PRESSURE: 90 MMHG | HEART RATE: 71 BPM | TEMPERATURE: 98 F | RESPIRATION RATE: 16 BRPM | SYSTOLIC BLOOD PRESSURE: 145 MMHG | WEIGHT: 160.05 LBS

## 2021-10-25 PROBLEM — G93.40 ACUTE ENCEPHALOPATHY: Status: ACTIVE | Noted: 2021-10-25

## 2021-10-25 PROBLEM — R29.90 STROKE-LIKE SYMPTOMS: Status: ACTIVE | Noted: 2021-10-25

## 2021-10-25 PROBLEM — R55 NEAR SYNCOPE: Status: ACTIVE | Noted: 2021-10-25

## 2021-10-25 PROBLEM — R79.89 ELEVATED TROPONIN: Status: ACTIVE | Noted: 2021-10-25

## 2021-10-25 PROBLEM — R77.8 ELEVATED TROPONIN: Status: ACTIVE | Noted: 2021-10-25

## 2021-10-25 PROBLEM — G93.41 ACUTE METABOLIC ENCEPHALOPATHY: Status: ACTIVE | Noted: 2021-10-25

## 2021-10-25 PROBLEM — M34.9 SCLERODERMA (HCC): Status: ACTIVE | Noted: 2021-10-25

## 2021-10-25 PROBLEM — M86.9 OSTEOMYELITIS (HCC): Status: ACTIVE | Noted: 2021-10-25

## 2021-10-25 LAB
CHOLEST SERPL-MCNC: 151 MG/DL (ref 50–200)
EST. AVERAGE GLUCOSE BLD GHB EST-MCNC: 123 MG/DL
HBA1C MFR BLD: 5.9 %
HDLC SERPL-MCNC: 57 MG/DL
LDLC SERPL CALC-MCNC: 72 MG/DL (ref 0–100)
TRIGL SERPL-MCNC: 110 MG/DL
TROPONIN I SERPL-MCNC: 0.04 NG/ML
TROPONIN I SERPL-MCNC: 0.04 NG/ML

## 2021-10-25 PROCEDURE — 99217 PR OBSERVATION CARE DISCHARGE MANAGEMENT: CPT | Performed by: INTERNAL MEDICINE

## 2021-10-25 PROCEDURE — 80061 LIPID PANEL: CPT | Performed by: NURSE PRACTITIONER

## 2021-10-25 PROCEDURE — 84484 ASSAY OF TROPONIN QUANT: CPT | Performed by: NURSE PRACTITIONER

## 2021-10-25 PROCEDURE — 36415 COLL VENOUS BLD VENIPUNCTURE: CPT | Performed by: NURSE PRACTITIONER

## 2021-10-25 PROCEDURE — G1004 CDSM NDSC: HCPCS

## 2021-10-25 PROCEDURE — NC001 PR NO CHARGE: Performed by: INTERNAL MEDICINE

## 2021-10-25 PROCEDURE — 83036 HEMOGLOBIN GLYCOSYLATED A1C: CPT | Performed by: NURSE PRACTITIONER

## 2021-10-25 PROCEDURE — 73218 MRI UPPER EXTREMITY W/O DYE: CPT

## 2021-10-25 RX ORDER — OXYCODONE HYDROCHLORIDE 10 MG/1
10 TABLET ORAL EVERY 6 HOURS PRN
Qty: 15 TABLET | Refills: 0 | Status: SHIPPED | OUTPATIENT
Start: 2021-10-25

## 2021-10-25 RX ORDER — OXYCODONE HYDROCHLORIDE 10 MG/1
10 TABLET ORAL EVERY 4 HOURS PRN
Status: DISCONTINUED | OUTPATIENT
Start: 2021-10-25 | End: 2021-10-25 | Stop reason: HOSPADM

## 2021-10-25 RX ORDER — ATORVASTATIN CALCIUM 40 MG/1
40 TABLET, FILM COATED ORAL EVERY EVENING
Status: DISCONTINUED | OUTPATIENT
Start: 2021-10-25 | End: 2021-10-25 | Stop reason: HOSPADM

## 2021-10-25 RX ORDER — AMITRIPTYLINE HYDROCHLORIDE 25 MG/1
25 TABLET, FILM COATED ORAL
Status: DISCONTINUED | OUTPATIENT
Start: 2021-10-25 | End: 2021-10-25 | Stop reason: HOSPADM

## 2021-10-25 RX ORDER — PREDNISONE 10 MG/1
10 TABLET ORAL DAILY
Status: DISCONTINUED | OUTPATIENT
Start: 2021-10-30 | End: 2021-10-25

## 2021-10-25 RX ORDER — PREDNISONE 20 MG/1
20 TABLET ORAL DAILY
Status: DISCONTINUED | OUTPATIENT
Start: 2021-10-27 | End: 2021-10-25

## 2021-10-25 RX ORDER — SODIUM CHLORIDE 9 MG/ML
75 INJECTION, SOLUTION INTRAVENOUS CONTINUOUS
Status: DISCONTINUED | OUTPATIENT
Start: 2021-10-25 | End: 2021-10-25 | Stop reason: HOSPADM

## 2021-10-25 RX ORDER — HYDROMORPHONE HCL/PF 1 MG/ML
0.5 SYRINGE (ML) INJECTION EVERY 4 HOURS PRN
Status: DISCONTINUED | OUTPATIENT
Start: 2021-10-25 | End: 2021-10-25 | Stop reason: HOSPADM

## 2021-10-25 RX ADMIN — LISINOPRIL 5 MG: 2.5 TABLET ORAL at 08:33

## 2021-10-25 RX ADMIN — GABAPENTIN 600 MG: 300 CAPSULE ORAL at 08:34

## 2021-10-25 RX ADMIN — PIPERACILLIN SODIUM AND TAZOBACTAM SODIUM 3.38 G: 36; 4.5 INJECTION, POWDER, FOR SOLUTION INTRAVENOUS at 08:36

## 2021-10-25 RX ADMIN — OXYCODONE HYDROCHLORIDE AND ACETAMINOPHEN 1 TABLET: 5; 325 TABLET ORAL at 08:35

## 2021-10-25 RX ADMIN — AMITRIPTYLINE HYDROCHLORIDE 25 MG: 25 TABLET, FILM COATED ORAL at 02:40

## 2021-10-25 RX ADMIN — PANTOPRAZOLE SODIUM 40 MG: 40 TABLET, DELAYED RELEASE ORAL at 05:37

## 2021-10-25 RX ADMIN — PIPERACILLIN SODIUM AND TAZOBACTAM SODIUM 3.38 G: 36; 4.5 INJECTION, POWDER, FOR SOLUTION INTRAVENOUS at 02:40

## 2021-10-25 RX ADMIN — PIPERACILLIN SODIUM AND TAZOBACTAM SODIUM 3.38 G: 36; 4.5 INJECTION, POWDER, FOR SOLUTION INTRAVENOUS at 14:51

## 2021-10-25 RX ADMIN — SODIUM CHLORIDE 75 ML/HR: 0.9 INJECTION, SOLUTION INTRAVENOUS at 01:01

## 2021-10-25 RX ADMIN — HYDROMORPHONE HYDROCHLORIDE 0.5 MG: 1 INJECTION, SOLUTION INTRAMUSCULAR; INTRAVENOUS; SUBCUTANEOUS at 00:57

## 2021-10-25 RX ADMIN — METOPROLOL SUCCINATE 25 MG: 50 TABLET, EXTENDED RELEASE ORAL at 08:35

## 2021-10-25 RX ADMIN — ATORVASTATIN CALCIUM 40 MG: 40 TABLET, FILM COATED ORAL at 00:57

## 2021-10-25 RX ADMIN — ALLOPURINOL 300 MG: 100 TABLET ORAL at 08:34

## 2021-10-25 NOTE — ED NOTES
Pt requesting to leave by 1500, pt made aware MRI results were still pending, Dr Alamo made aware     Bam Karu, RN  10/25/21 4990

## 2021-10-25 NOTE — CONSULTS
Consult received for stroke pathway, per neurology note mild encephlopathy likely due to osteomyelitis and possible sepsis, can discontinue stroke workup  Reviewed pt's labs, lipid panel WNL though A1C of 5 9  Reports good appetite currently and at baseline, eats 3 meals per day  Pt tries to avoid added sugars, "Unless it is already in the food " Says that he has a family hx of diabetes who have  from this  Discussed general healthful diet including whole grains/lean meats/fruits/vegetables to prevent progression to DM noting A1C of 5 9  Pt agreeable to adjusting food types though not portion sizes  General healthful diet ed information provided along with RD contact information  Can continue diet as ordered  During discussion, pt stated, "I'm going home today whether they want me to or not " Relayed to RN  Thank you for the consult

## 2021-10-25 NOTE — UTILIZATION REVIEW
Initial Clinical Review    Admission: Date/Time/Statement:   Admission Orders (From admission, onward)     Ordered        10/24/21 2025  Place in Observation  Once                   Orders Placed This Encounter   Procedures    Place in Observation     Standing Status:   Standing     Number of Occurrences:   1     Order Specific Question:   Level of Care     Answer:   Med Surg [16]     ED Arrival Information     Expected Arrival Acuity    - 10/24/2021 18:05 Urgent         Means of arrival Escorted by Service Admission type    Walk-In Family Member Hospitalist Urgent         Arrival complaint    Dizziness        Chief Complaint   Patient presents with    Dizziness     Patient stated "I just dont feel right"  Patient complaing of dizziness and nausea  Initial Presentation:64year old male to the ED from home with complaints of sudden onset dizziness, lightheadedness which started an hour prior  Admitted under observation for near syncope, osteomyelitis, elevated troponin  CTa and CT head show no acute abnormality  Check ECHO  Stroke alert in the ED  As per Neurology note, suspect mild encephalopathy likely related to finger osteomyelitis, possible sepsis  Blood cultures pending  Started on IV abx  GCS 15, NIHSS 0  Seen in ED 10/21, 22 and 23  Diagnosed with osteomyelitis of right index finger 10/23 and started on ab  Blood cultures pending  CHeck MRI hand  Troponin 0 06  Continue to trend  IV fluids started  Sodium 131  Recheck BMP, trend sodium  Date:  10/25    Day 2:    Possible OStemyelitis to right index finger  Continue with IV abx  WBC increased  Passed bedside dysphagia assessment  REgular diet       ED Triage Vitals [10/24/21 1856]   Temperature Pulse Respirations Blood Pressure SpO2   97 8 °F (36 6 °C) 85 18 123/78 96 %      Temp Source Heart Rate Source Patient Position - Orthostatic VS BP Location FiO2 (%)   Temporal Monitor Sitting Right arm --      Pain Score       Worst Possible Pain          Wt Readings from Last 1 Encounters:   10/24/21 72 6 kg (160 lb 0 9 oz)     Additional Vital Signs:   Time  Temp  Pulse  Resp  BP  MAP (mmHg)  SpO2  O2 Device  Patient Position - Orthostatic VS   10/25/21 0719  98 °F (36 7 °C)  71  16  134/91  110  95 %  None (Room air)  Sitting   10/25/21 0054  --  72  16  107/72  --  95 %  None (Room air)  Lying   10/24/21 2101  98 3 °F (36 8 °C)  74  20  120/76  --  95 %  --  Lying   10/24/21 2010  --  81  20  125/79  --  95 %  --  --   10/24/21 1955  --  87  20  136/79  --  97 %  --  --   10/24/21 1940  --  94  20  131/74  --  97 %  --  --   10/24/21 1935  --  87  20  126/84  --  97 %  --  --   10/24/21 1920  --  81  20  121/84  --  97 %  --  --   10/24/21 1856  97 8 °F (36 6 °C)  85  18  123/78               Pertinent Labs/Diagnostic Test Results:   10/24 EKG:Normal sinus rhythm  Right bundle branch block  Nonspecific T wave changes  ST elevation consider inferior injury or acute infarct  Abnormal ECG  No previous ECGs available  10/24 CTA head/neck:  No evidence of hemodynamic significant stenosis, aneurysm or dissection  10/24 CT head: No acute intracranial abnormality  10/23 Xray right hand: Abnormal osteolysis at the distal tuft of the 2nd digit with soft tissue gas suspicious for osteomyelitis     Results from last 7 days   Lab Units 10/24/21  1928 10/23/21  0924   WBC Thousand/uL 12 26* 10 94*   HEMOGLOBIN g/dL 13 2 13 2   HEMATOCRIT % 39 5 40 2   PLATELETS Thousands/uL 381 367   NEUTROS ABS Thousands/µL  --  8 01*         Results from last 7 days   Lab Units 10/24/21  1928 10/23/21  0924   SODIUM mmol/L 131* 132*   POTASSIUM mmol/L 4 6 4 1   CHLORIDE mmol/L 97* 97*   CO2 mmol/L 27 29   ANION GAP mmol/L 7 6   BUN mg/dL 17 18   CREATININE mg/dL 1 01 0 94   EGFR ml/min/1 73sq m 83 90   CALCIUM mg/dL 8 8 8 7     Results from last 7 days   Lab Units 10/23/21  0924   AST U/L 18   ALT U/L 19   ALK PHOS U/L 125*   TOTAL PROTEIN g/dL 7 7   ALBUMIN g/dL 3 7   TOTAL BILIRUBIN mg/dL 0 29     Results from last 7 days   Lab Units 10/24/21  1927   POC GLUCOSE mg/dl 113     Results from last 7 days   Lab Units 10/24/21  1928 10/23/21  0924   GLUCOSE RANDOM mg/dL 115 136                 Results from last 7 days   Lab Units 10/25/21  0243 10/25/21  0101 10/24/21  1928   TROPONIN I ng/mL 0 04 0 04 0 06*         Results from last 7 days   Lab Units 10/24/21  1928   PROTIME seconds 11 6   INR  0 85   PTT seconds 28             Results from last 7 days   Lab Units 10/23/21  1106   LACTIC ACID mmol/L 1 1       Results from last 7 days   Lab Units 10/23/21  0924   CRP mg/L 12 0*   SED RATE mm/hour 38*     Results from last 7 days   Lab Units 10/23/21  1106   BLOOD CULTURE  No Growth at 24 hrs  No Growth at 24 hrs             ED Treatment:   Medication Administration from 10/24/2021 1805 to 10/25/2021 0801       Date/Time Order Dose Route Action     10/24/2021 2311 cephalexin (KEFLEX) capsule 500 mg 500 mg Oral Given     10/24/2021 2311 oxyCODONE-acetaminophen (PERCOCET) 5-325 mg per tablet 1 tablet 1 tablet Oral Given     10/25/2021 0537 pantoprazole (PROTONIX) EC tablet 40 mg 40 mg Oral Given     10/24/2021 2311 gabapentin (NEURONTIN) capsule 600 mg 600 mg Oral Given     10/25/2021 0240 amitriptyline (ELAVIL) tablet 25 mg 25 mg Oral Given     10/25/2021 0057 atorvastatin (LIPITOR) tablet 40 mg 40 mg Oral Given     10/25/2021 0101 sodium chloride 0 9 % infusion 75 mL/hr Intravenous New Bag     10/25/2021 0057 HYDROmorphone (DILAUDID) injection 0 5 mg 0 5 mg Intravenous Given     10/25/2021 0240 piperacillin-tazobactam (ZOSYN) 3 375 g in sodium chloride 0 9 % 100 mL IVPB 3 375 g Intravenous New Bag        Past Medical History:   Diagnosis Date    Emphysema of lung ([address] )     Gout     Neuropathy     Raynaud's disease     Scleroderma ([facility])        Admitting Diagnosis: Dizziness [R42]  Age/Sex: 64 y o  male  Admission Orders:  Neuro checks: Every 1 hour x 4 hours, then every 2 hours x 8 hours, then every 4 hours x 72 hours  NIHSS every 24 hours  Vitals every 4 hours  Tele  MRI  HGB A1c  Speech eval, pt/ot  Scheduled Medications:  allopurinol, 300 mg, Oral, Daily  amitriptyline, 25 mg, Oral, HS  atorvastatin, 40 mg, Oral, QPM  gabapentin, 600 mg, Oral, TID  lisinopril, 5 mg, Oral, Daily  metoprolol succinate, 25 mg, Oral, Daily  pantoprazole, 40 mg, Oral, Early Morning  piperacillin-tazobactam, 3 375 g, Intravenous, Q6H      Continuous IV Infusions:  sodium chloride, 75 mL/hr, Intravenous, Continuous      PRN Meds:  HYDROmorphone, 0 5 mg, Intravenous, Q4H PRN  oxyCODONE, 10 mg, Oral, Q4H PRN  oxyCODONE-acetaminophen, 1 tablet, Oral, Q4H PRN        IP CONSULT TO NEUROLOGY  IP CONSULT TO NEUROLOGY  IP CONSULT TO CASE MANAGEMENT  IP CONSULT TO NUTRITION SERVICES    Network Utilization Review Department  ATTENTION: Please call with any questions or concerns to [phone] and carefully listen to the prompts so that you are directed to the right person  All voicemails are confidential   [facility] all requests for admission clinical reviews, approved or denied determinations and any other requests to dedicated fax number below belonging to the campus where the patient is receiving treatment   List of dedicated fax numbers for the Facilities:  [address] DENIALS (Administrative/Medical Necessity) [phone]   [address] (Maternity/NICU/Pediatrics) [phone]   [address] [place] [address] [address] [facility] [phone]   [place] [address] [phone]

## 2021-10-25 NOTE — ASSESSMENT & PLAN NOTE
· Non MI elevation of troponin    Results from last 7 days   Lab Units 10/25/21  0243 10/25/21  0101 10/24/21  1928   TROPONIN I ng/mL 0 04 0 04 0 06*

## 2021-10-25 NOTE — ED NOTES
Patient rang stating that finger was having increasing pain  Patient also stated it was bleeding  On assessing patient's finger, newly found thick, purulent discharge is draining from nail bed  Cleaned patient with sterile aquilino, and neena texted Franklin De La Rosa of the same  New orders to be placed for pain medicine        Karlie Ponce RN  10/25/21 7644

## 2021-10-25 NOTE — ASSESSMENT & PLAN NOTE
· Mild hyponatremia continue NSS recheck in a m      Results from last 7 days   Lab Units 10/24/21  1928 10/23/21  0924   SODIUM mmol/L 131* 132*

## 2021-10-25 NOTE — ASSESSMENT & PLAN NOTE
· Near syncope which patient reports has not been feeling well since finger infection  · Will DC telemetry

## 2021-10-25 NOTE — H&P
[address]  H&P- Mandy Hollins 1965, 64 y o  male MRN: [id number]  Unit/Bed#: ED 12 Encounter: [id number]  Primary Care Provider: Mahsa De Dios Referral   Date and time admitted to hospital: 10/24/2021  7:04 PM    * Near syncope  Assessment & Plan  · Presented for near-syncope, dizziness, "not feeling good "  · CT head stroke alert:  No acute intracranial abnormality  · CTA head neck stroke alert: No evidence of hemodynamic significant stenosis, aneurysm or dissection   · Telemetry observation  · Echocardiogram  · PT OT consult  · Low-salt cardiac diet  · Neurology consultation appreciated-per neurologist may discontinue stroke workup  · Patient is allergic to aspirin      Osteomyelitis ([facility] )  Assessment & Plan  · Right index finger osteomyelitis  · Was seen in the ER 10/23/2021 and discharged-per record review recommendations from consulting physician were outpatient MRI, Keflex, pain meds  · Will escalate antibiotics to Zosyn in the context of worsening infection as evidenced by new purulent drainage  · He was to follow-up with hand surgery outpatient-additional intervention pending clinical course  · MRI of hand  · Blood cultures collected 10/23/2021 at previous ER visit are pending    Acute encephalopathy  Assessment & Plan  · Per neurology treat suspected underlying cause of finger osteomyelitis  · Serial assessments    Elevated troponin  Assessment & Plan  · Troponin 0 06  · Trend troponins  · Telemetry monitoring  · Monitor for chest pain    Scleroderma ([facility] )  Assessment & Plan  · Was prescribed prednisone taper at ER visit-will discontinue this due to worsening infection  · Continue Neurontin    Hyponatremia  Assessment & Plan  · Sodium 131  · Normal saline at 75 mL/hr  · Recheck metabolic panel and trend sodium    VTE Prophylaxis: Pharmacologic VTE Prophylaxis contraindicated due to VTE score 2  / reason for no mechanical VTE prophylaxis VTE score 2   Code Status:  Full  POLST: POLST form is not discussed and not completed at this time  Discussion with family:  Patient    Anticipated Length of Stay:  Patient will be admitted on an Observation basis with an anticipated length of stay of  less than 2 midnights  Justification for Hospital Stay:  Per plan above    Total Time for Visit, including Counseling / Coordination of Care: 45 minutes  Greater than 50% of this total time spent on direct patient counseling and coordination of care  Chief Complaint:   Near-syncope, dizziness, "not feeling right"    History of Present Illness:    Ting Conner is a 64 y o  male who presents with near-syncope and dizziness He had been seen a few days ago for right 2nd finger infection, he was started on prednisone and Keflex  He presented today for, "just not feeling right " He also reports nausea and visual changes  The symptoms were of sudden onset and constant  He did not note any aggravating or relieving factors  Denies fever or chills, congestion, shortness of breath, chest pain, abdominal pain, vomiting, diarrhea, back pain, flank pain, dysuria, or focal weakness  Stroke alert was called in the ER, imaging was negative for acute findings, Neurology was consulted and feels that it is not vascular in nature and stroke workup could be discontinued  Per neurology note, he believes it is more likely due to infectious/toxic/metabolic etiology  Review of Systems:    Review of Systems   Constitutional: Positive for fatigue  Negative for chills and fever  Eyes: Positive for visual disturbance  Respiratory: Negative for cough and shortness of breath  Cardiovascular: Negative for chest pain, palpitations and leg swelling  Gastrointestinal: Positive for nausea  Negative for abdominal distention, abdominal pain, blood in stool, constipation, diarrhea and vomiting  Genitourinary: Negative for dysuria and flank pain     Musculoskeletal: Negative for arthralgias and myalgias  Skin: Negative for pallor and rash  Neurological: Positive for dizziness and syncope  Negative for seizures, speech difficulty, weakness, numbness and headaches  All other systems reviewed and are negative  Past Medical and Surgical History:     Past Medical History:   Diagnosis Date    Emphysema of lung ([facility] )     Gout     Neuropathy     Raynaud's disease     Scleroderma ([facility] )        History reviewed  No pertinent surgical history  Meds/Allergies:    Prior to Admission medications    Medication Sig Start Date End Date Taking?  Authorizing Provider   albuterol (2 5 mg/3 mL) 0 083 % nebulizer solution     Historical Provider, MD   albuterol (Proventil HFA) 90 mcg/act inhaler Inhale    Historical Provider, MD   allopurinol (ZYLOPRIM) 300 mg tablet Take by mouth daily    Historical Provider, MD   amitriptyline (ELAVIL) 25 mg tablet Take by mouth    Historical Provider, MD   capsaicin (ZOSTRIX) 0 025 % cream     Historical Provider, MD   cephalexin (KEFLEX) 500 mg capsule Take 1 capsule (500 mg total) by mouth every 12 (twelve) hours for 7 days 10/23/21 10/30/21  Lois Vela MD   cyclobenzaprine (FLEXERIL) 10 mg tablet Take by mouth    Historical Provider, MD   Diclofenac Sodium (VOLTAREN) 1 % Apply 2 g topically 4 (four) times a day 10/22/21   Corie Torres MD   gabapentin (NEURONTIN) 300 mg capsule Take by mouth 2/7/17   Historical Provider, MD   gabapentin (NEURONTIN) 800 mg tablet Take by mouth 1/8/18   Historical Provider, MD   hydrOXYzine HCL (ATARAX) 25 mg tablet Take by mouth    Historical Provider, MD   ibuprofen (MOTRIN) 600 mg tablet Take 1 tablet (600 mg total) by mouth every 6 (six) hours as needed for moderate pain 10/21/21   Corie Torres MD   lisinopril (ZESTRIL) 5 mg tablet Take 5 mg by mouth daily 1/8/18   Historical Provider, MD   metoprolol succinate (TOPROL-XL) 25 mg 24 hr tablet Take 25 mg by mouth daily 8/10/16   Historical Provider, MD   omeprazole (PriLOSEC) 40 MG capsule Take 40 mg by mouth daily    Historical Provider, MD   oxyCODONE-acetaminophen (Percocet) 5-325 mg per tablet Take 1 tablet by mouth every 4 (four) hours as needed for moderate pain for up to 10 daysMax Daily Amount: 6 tablets 10/23/21 11/2/21  Mandy Goddard MD   predniSONE 10 mg tablet Take 4 tablets (40 mg total) by mouth daily for 3 days, THEN 3 tablets (30 mg total) daily for 3 days, THEN 2 tablets (20 mg total) daily for 3 days, THEN 1 tablet (10 mg total) daily for 3 days  10/21/21 11/2/21  Chris Montero MD     I have reviewed home medications with patient personally  Allergies: Allergies   Allergen Reactions    Aspirin        Social History:     Marital Status:    Occupation:  Not discussed  Patient Pre-hospital Living Situation:  Home  Patient Pre-hospital Level of Mobility:  Independent  Patient Pre-hospital Diet Restrictions:  None  Substance Use History:   Social History     Substance and Sexual Activity   Alcohol Use Not Currently     Social History     Tobacco Use   Smoking Status Current Every Day Smoker    Packs/day: 1 00    Types: Cigarettes   Smokeless Tobacco Never Used     Social History     Substance and Sexual Activity   Drug Use Yes    Types: Marijuana       Family History:    non-contributory    Physical Exam:     Vitals:   Blood Pressure: 107/72 (10/25/21 0054)  Pulse: 72 (10/25/21 0054)  Temperature: 98 3 °F (36 8 °C) (10/24/21 2101)  Temp Source: Oral (10/24/21 2101)  Respirations: 16 (10/25/21 0054)  Height: 5' 11" (180 3 cm) (10/24/21 2101)  Weight - Scale: 72 6 kg (160 lb 0 9 oz) (10/24/21 2101)  SpO2: 95 % (10/25/21 0054)    Physical Exam  Vitals and nursing note reviewed  HENT:      Head: Normocephalic and atraumatic  Mouth/Throat:      Mouth: Mucous membranes are moist       Pharynx: Oropharynx is clear  Eyes:      Extraocular Movements: Extraocular movements intact        Pupils: Pupils are equal, round, and reactive to light  Cardiovascular:      Rate and Rhythm: Normal rate and regular rhythm  Pulses: Normal pulses  Heart sounds: Normal heart sounds  Pulmonary:      Effort: Pulmonary effort is normal       Breath sounds: Normal breath sounds  Abdominal:      General: Bowel sounds are normal       Palpations: Abdomen is soft  Tenderness: There is no abdominal tenderness  Musculoskeletal:         General: Normal range of motion  Cervical back: Normal range of motion and neck supple  Skin:     General: Skin is warm and dry  Capillary Refill: Capillary refill takes less than 2 seconds  Comments: Red rash to face, erythema, tenderness, and purulent drainage to right 2nd finger   Neurological:      General: No focal deficit present  Mental Status: He is alert and oriented to person, place, and time  Additional Data:     Lab Results: I have personally reviewed pertinent reports  Results from last 7 days   Lab Units 10/24/21  1928 10/23/21  0924   WBC Thousand/uL 12 26* 10 94*   HEMOGLOBIN g/dL 13 2 13 2   HEMATOCRIT % 39 5 40 2   PLATELETS Thousands/uL 381 367   NEUTROS PCT %  --  72   LYMPHS PCT %  --  15   MONOS PCT %  --  11   EOS PCT %  --  1     Results from last 7 days   Lab Units 10/24/21  1928 10/23/21  0924   SODIUM mmol/L 131* 132*   POTASSIUM mmol/L 4 6 4 1   CHLORIDE mmol/L 97* 97*   CO2 mmol/L 27 29   BUN mg/dL 17 18   CREATININE mg/dL 1 01 0 94   ANION GAP mmol/L 7 6   CALCIUM mg/dL 8 8 8 7   ALBUMIN g/dL  --  3 7   TOTAL BILIRUBIN mg/dL  --  0 29   ALK PHOS U/L  --  125*   ALT U/L  --  19   AST U/L  --  18   GLUCOSE RANDOM mg/dL 115 136     Results from last 7 days   Lab Units 10/24/21  1928   INR  0 85     Results from last 7 days   Lab Units 10/24/21  1927   POC GLUCOSE mg/dl 113         Results from last 7 days   Lab Units 10/23/21  1106   LACTIC ACID mmol/L 1 1       Imaging: I have personally reviewed pertinent reports        CTA stroke alert (head/neck) Final Result by Dayami Hanna MD (10/24 1958)      No evidence of hemodynamic significant stenosis, aneurysm or dissection  I personally discussed this study with neurologist on 10/24/2021 at 7:49 PM                         Workstation performed: [id number]         CT stroke alert brain   Final Result by Dayami Hanna MD (10/24 1949)      No acute intracranial abnormality  I personally discussed this study with neurologist on 10/24/2021 at 7:49 PM                 Workstation performed: [id number]         MRI Inpatient Order    (Results Pending)       EKG, Pathology, and Other Studies Reviewed on Admission:   · EKG:  NSR rate of 82    Allscripts / Epic Records Reviewed: Yes     ** Please Note: This note has been constructed using a voice recognition system   **

## 2021-10-25 NOTE — PROGRESS NOTES
[address]  Progress Note - Micki Uriarte 1965, 64 y o  male MRN: [id number]  Unit/Bed#: ED 12 Encounter: [id number]  Primary Care Provider: Mahsa De Dios Referral   Date and time admitted to hospital: 10/24/2021  7:04 PM    * Osteomyelitis [facility])  Assessment & Plan  · Right index finger infection with recent ED visit concerning for osteomyelitis  · MRI obtained in the ED  Awaiting final interpretation  · Currently on Zosyn  Will have orthopedics evaluate and transfer if patient needs I&D/hand surgery  · Follow-up on blood cultures          Near syncope  Assessment & Plan  · Near syncope which patient reports has not been feeling well since finger infection  · Will DC telemetry      Acute encephalopathy  Assessment & Plan  · Acute metabolic encephalopathy secondary to acute infection  Doubt stroke per neurology  Elevated troponin  Assessment & Plan  · Non MI elevation of troponin    Results from last 7 days   Lab Units 10/25/21  0243 10/25/21  0101 10/24/21  1928   TROPONIN I ng/mL 0 04 0 04 0 06*       Scleroderma (HCC)  Assessment & Plan  · Recently prescribed prednisone  Has been discontinued due to acute infection  · Continue gabapentin and amitriptyline    Hyponatremia  Assessment & Plan  · Mild hyponatremia continue NSS recheck in a m  Results from last 7 days   Lab Units 10/24/21  1928 10/23/21  0924   SODIUM mmol/L 131* 132*       VTE Pharmacologic Prophylaxis: VTE Score: 2 Low Risk (Score 0-2) - Encourage Ambulation  Patient Centered Rounds: I have performed bedside rounds with nursing staff today  Discussions with Specialists or Other Care Team Provider:     Education and Discussions with Family / Patient:     Time Spent for Care: 20 mins  More than 50% of total time spent on counseling and coordination of care as described above      Current Length of Stay: 0 day(s)  Current Patient Status: Observation   Certification Statement: The patient will continue to require additional inpatient hospital stay due to concerns of osteomyelitis of finger  Discharge Plan / Estimated Discharge Date: To be determined    Code Status: Level 1 - Full Code      Subjective:   Patient seen and examined  Still having finger pain    Objective:   Vitals: Blood pressure 145/90, pulse 71, temperature 98 °F (36 7 °C), temperature source Oral, resp  rate 16, height 5' 11" (1 803 m), weight 72 6 kg (160 lb 0 9 oz), SpO2 95 %  Physical Exam  Vitals reviewed  Constitutional:       General: He is not in acute distress  HENT:      Head: Atraumatic  Cardiovascular:      Rate and Rhythm: Regular rhythm  Heart sounds: Normal heart sounds  Pulmonary:      Effort: Pulmonary effort is normal       Breath sounds: No wheezing  Abdominal:      General: Bowel sounds are normal       Palpations: Abdomen is soft  Tenderness: There is no abdominal tenderness  There is no rebound  Musculoskeletal:         General: Swelling, tenderness and signs of injury present  Comments: Right index finger with purulent drainage   Skin:     General: Skin is warm and dry  Neurological:      General: No focal deficit present  Mental Status: He is alert and oriented to person, place, and time  Cranial Nerves: No cranial nerve deficit         Additional Data:   Labs:  Results from last 7 days   Lab Units 10/24/21  1928 10/23/21  0924   WBC Thousand/uL 12 26* 10 94*   HEMOGLOBIN g/dL 13 2 13 2   HEMATOCRIT % 39 5 40 2   MCV fL 90 90   PLATELETS Thousands/uL 381 367   INR  0 85  --      Results from last 7 days   Lab Units 10/24/21  1928 10/23/21  0924   SODIUM mmol/L 131* 132*   POTASSIUM mmol/L 4 6 4 1   CHLORIDE mmol/L 97* 97*   CO2 mmol/L 27 29   ANION GAP mmol/L 7 6   BUN mg/dL 17 18   CREATININE mg/dL 1 01 0 94   CALCIUM mg/dL 8 8 8 7   ALBUMIN g/dL  --  3 7   TOTAL BILIRUBIN mg/dL  --  0 29   ALK PHOS U/L  --  125*   ALT U/L  --  19   AST U/L  --  18   EGFR ml/min/1 73sq m 83 90   GLUCOSE RANDOM mg/dL 115 136         Results from last 7 days   Lab Units 10/25/21  0243 10/25/21  0101 10/24/21  1928   TROPONIN I ng/mL 0 04 0 04 0 06*          Results from last 7 days   Lab Units 10/23/21  1106   LACTIC ACID mmol/L 1 1     Results from last 7 days   Lab Units 10/24/21  1927   POC GLUCOSE mg/dl 113     Results from last 7 days   Lab Units 10/25/21  0537   HEMOGLOBIN A1C % 5 9*         * I Have Reviewed All Lab Data Listed Above  Cultures:   Results from last 7 days   Lab Units 10/23/21  1106   BLOOD CULTURE  No Growth at 24 hrs  No Growth at 24 hrs  Lines/Drains:  Invasive Devices     Peripheral Intravenous Line            Peripheral IV 10/24/21 Right Antecubital <1 day              Telemetry:   Telemetry Orders (From admission, onward)             24 Hour Telemetry Monitoring  Continuous x 24 Hours (Telem)     Question:  Reason for 24 Hour Telemetry  Answer:  Syncope suspected to be cardiac in origin                  Imaging:  Imaging Reports Reviewed Today Include:   CT stroke alert brain    Result Date: 10/24/2021  Impression: No acute intracranial abnormality  I personally discussed this study with neurologist on 10/24/2021 at 7:49 PM   Workstation performed: [id number]     CTA stroke alert (head/neck)    Result Date: 10/24/2021  Impression: No evidence of hemodynamic significant stenosis, aneurysm or dissection   I personally discussed this study with neurologist on 10/24/2021 at 7:49 PM  Workstation performed: [id number]       Scheduled Meds:  Current Facility-Administered Medications   Medication Dose Route Frequency Provider Last Rate    allopurinol  300 mg Oral Daily Dauna Anabelle, CRNP      amitriptyline  25 mg Oral HS Dauna Anabelle, CRNP      atorvastatin  40 mg Oral QPM Dauna Anabelle, CRNP      gabapentin  600 mg Oral TID Dauna Anabelle, CRNP      HYDROmorphone  0 5 mg Intravenous Q4H PRN Dauna Anabelle, CRNP      lisinopril  5 mg Oral Daily LUIS ANGEL Thurston      metoprolol succinate  25 mg Oral Daily LUIS ANGEL Thurston      oxyCODONE  10 mg Oral Q4H PRN LUIS ANGEL Thurston      oxyCODONE-acetaminophen  1 tablet Oral Q4H PRN Eddy Ohara, LUIS ANGEL      pantoprazole  40 mg Oral Early Morning LUIS ANGEL Thurston      piperacillin-tazobactam  3 375 g Intravenous Q6H LUIS ANGEL Thurston Stopped (10/25/21 4056)    sodium chloride  75 mL/hr Intravenous Continuous LUIS ANGEL Thurston 75 mL/hr (10/25/21 0101)       DO Dao Singh [facility] Internal Medicine  Hospitalist    ** Please Note: This note has been constructed using a voice recognition system   **

## 2021-10-25 NOTE — ASSESSMENT & PLAN NOTE
· Right index finger osteomyelitis  · Was seen in the ER 10/23/2021 and discharged-per record review recommendations from consulting physician were outpatient MRI, Keflex, pain meds  · Will escalate antibiotics to Zosyn in the context of worsening infection as evidenced by new purulent drainage  · He was to follow-up with hand surgery outpatient-additional intervention pending clinical course  · MRI of hand  · Blood cultures collected 10/23/2021 at previous ER visit are pending

## 2021-10-25 NOTE — SPEECH THERAPY NOTE
Consult received and chart reviewed  Per chart review and discussion with RN, pt passed RN dysphagia assessment and is tolerating regular diet with thin liquids with no s/s of aspiration  Speech/Language deficits also denied  Formal speech/swallow evaluation does not appear to be indicated at this time  If new concerns arise, please reconsult  Thank you      Shweta Hinojosa, SLP

## 2021-10-25 NOTE — ASSESSMENT & PLAN NOTE
· Right index finger infection with recent ED visit concerning for osteomyelitis  · MRI obtained in the ED  Awaiting final interpretation  · Currently on Zosyn    Will have orthopedics evaluate and transfer if patient needs I&D/hand surgery  · Follow-up on blood cultures

## 2021-10-25 NOTE — DISCHARGE SUMMARY
[address]  Discharge- Ophelia Massey 1965, 64 y o  male MRN: [id number]  Unit/Bed#: ED 12 Encounter: [id number]  Primary Care Provider: Mahsa De Dios Referral   Date and time admitted to hospital: 10/24/2021  7:04 PM    Admitting Provider:  Gladys Coronado MD  Discharge Provider:  Daniel Villalobos DO  Admission Date: 10/24/2021       Discharge Date: 10/25/21   LOS: 0  Primary Care Physician at Discharge: Mahsa De Dios Referral [phone]    DISCHARGE DIAGNOSES  * Osteomyelitis [facility])  Assessment & Plan  · Right index finger infection with recent ED visit concerning for osteomyelitis  · MRI obtained in the ED  Awaiting final interpretation  · Currently on Zosyn but given stability will be discharged to complete antibiotics as previously instructed  · Patient understands need to follow-up with hand surgery as soon as possible as he will need amputation of affected digit          Near syncope  Assessment & Plan  · Near syncope which patient reports has not been feeling well since finger infection    Acute encephalopathy  Assessment & Plan  · Acute metabolic encephalopathy secondary to acute infection  Doubt stroke per neurology  Elevated troponin  Assessment & Plan  · Non MI elevation of troponin    Results from last 7 days   Lab Units 10/25/21  0243 10/25/21  0101 10/24/21  1928   TROPONIN I ng/mL 0 04 0 04 0 06*       Scleroderma (HCC)  Assessment & Plan  · Recently prescribed prednisone  Has been discontinued due to acute infection  · Continue gabapentin and amitriptyline    Hyponatremia  Assessment & Plan  · Mild hyponatremia was on NSS    Results from last 7 days   Lab Units 10/24/21  1928 10/23/21  0924   SODIUM mmol/L 131* 132*     HOSPITAL COURSE:  Ophelia Massey is a 64 y o  male with a history of scleroderma who presented to hospital with dizziness  He did "not feel right " He was seen by neurology in the ER and thought not secondary to stroke  The patient reports he was more concerned about infection and spread to blood  His neurologic symptoms all resolved  He was started on zosyn  MRI of finger was obtained but interpretation is pending  The patient was offered further hospitalization but he has requested discharge understanding that blood cultures are still pending  Information for [facility]/Hand surgery has been provided for patient again to call for follow-up appointment  Of note during ED visit 10/2 /2021 the case was discussed with Dr Bradley Gore and given chronic osteomyelitis the patient was recommended close follow-up  Please see problem list listed above  CONSULTING PROVIDERS   IP CONSULT TO NEUROLOGY  IP CONSULT TO NEUROLOGY  IP CONSULT TO CASE MANAGEMENT  IP CONSULT TO NUTRITION SERVICES    PROCEDURES PERFORMED  * No surgery found *    RADIOLOGY RESULTS  CT stroke alert brain    Result Date: 10/24/2021  Impression: No acute intracranial abnormality  I personally discussed this study with neurologist on 10/24/2021 at 7:49 PM   Workstation performed: [id number]     CTA stroke alert (head/neck)    Result Date: 10/24/2021  Impression: No evidence of hemodynamic significant stenosis, aneurysm or dissection   I personally discussed this study with neurologist on 10/24/2021 at 7:49 PM  Workstation performed: [id number]       LABS  Results from last 7 days   Lab Units 10/24/21  1928 10/23/21  0924   WBC Thousand/uL 12 26* 10 94*   HEMOGLOBIN g/dL 13 2 13 2   HEMATOCRIT % 39 5 40 2   MCV fL 90 90   PLATELETS Thousands/uL 381 367   INR  0 85  --      Results from last 7 days   Lab Units 10/24/21  1928 10/23/21  0924   SODIUM mmol/L 131* 132*   POTASSIUM mmol/L 4 6 4 1   CHLORIDE mmol/L 97* 97*   CO2 mmol/L 27 29   BUN mg/dL 17 18   CREATININE mg/dL 1 01 0 94   CALCIUM mg/dL 8 8 8 7   ALBUMIN g/dL  --  3 7   TOTAL BILIRUBIN mg/dL  --  0 29   ALK PHOS U/L  --  125*   ALT U/L  --  19   AST U/L  --  18   EGFR ml/min/1 73sq m 83 90 GLUCOSE RANDOM mg/dL 115 136     Results from last 7 days   Lab Units 10/25/21  0243 10/25/21  0101 10/24/21  1928   TROPONIN I ng/mL 0 04 0 04 0 06*              Results from last 7 days   Lab Units 10/24/21  1927   POC GLUCOSE mg/dl 113     Results from last 7 days   Lab Units 10/25/21  0537   HEMOGLOBIN A1C % 5 9*         Results from last 7 days   Lab Units 10/23/21  1106   LACTIC ACID mmol/L 1 1     Results from last 7 days   Lab Units 10/25/21  0537   TRIGLYCERIDES mg/dL 110   CHOLESTEROL mg/dL 151   LDL CALC mg/dL 72   HDL mg/dL 57       Cultures:     Results from last 7 days   Lab Units 10/23/21  1106   BLOOD CULTURE  No Growth at 24 hrs  No Growth at 24 hrs  DISCHARGE DAY VISIT AND PHYSICAL EXAM:  Can be found on today's progress note  Planned Re-admission:  No  Discharge Disposition: Home/Self Care    Test Results Pending at Discharge:   Incidental findings:     Medications   · Discharge Medication List: See after visit summary for reconciled discharge medications  Oxycodone 10 mg 15 quantity  Diet restrictions:  Low-sodium diet   Activity restrictions: No strenuous activity  Discharge Condition: stable    Outpatient Follow-Up and Discharge Instructions  See after visit summary section titled Discharge Instructions for information provided to patient and family  Code Status: Level 1 - Full Code  Discharge Statement   I spent 30 minutes discharging the patient  This time was spent on the day of discharge  Greater than 50% of total time was spent with the patient and / or family counseling and / or coordination of care  ** Please Note: This note has been constructed using a voice recognition system   **

## 2021-10-25 NOTE — ASSESSMENT & PLAN NOTE
· Presented for near-syncope, dizziness, "not feeling good "  · CT head stroke alert:  No acute intracranial abnormality  · CTA head neck stroke alert: No evidence of hemodynamic significant stenosis, aneurysm or dissection   · Telemetry observation  · Echocardiogram  · PT OT consult  · Low-salt cardiac diet  · Neurology consultation appreciated-per neurologist may discontinue stroke workup  · Patient is allergic to aspirin

## 2021-10-25 NOTE — ASSESSMENT & PLAN NOTE
· Mild hyponatremia was on NSS    Results from last 7 days   Lab Units 10/24/21  1928 10/23/21  0924   SODIUM mmol/L 131* 132*

## 2021-10-25 NOTE — PHYSICAL THERAPY NOTE
Physical Therapy Screen    Patient Name: Alexx Banks    GLJRY'X Date: 10/25/2021     Problem List  Principal Problem:    Osteomyelitis [facility])  Active Problems:    Hyponatremia    Scleroderma ([facility] )    Near syncope    Elevated troponin    Acute encephalopathy       Past Medical History  Past Medical History:   Diagnosis Date    Emphysema of lung ([facility] )     Gout     Neuropathy     Raynaud's disease     Scleroderma ([facility] )         Past Surgical History  History reviewed  No pertinent surgical history  10/25/21 1200   PT Last Visit   PT Visit Date 10/25/21   Note Type   Note type Screen       Received order for PT consult  Chart reviewed  Pt admitted under observation due to osteomyelitis with c/o near syncope and dizziness  CT head negative for acute intracranial abnormality  Spoke with patient  Reports he is ambulating in room and hallways prn  Reports being at his PLOF of independent at this time with no concerns returning home  Pt observed completing bed mobility, transfers and ambulation without AD independently  No acute care PT services warranted, Pt with no further c/o dizziness  Will D/C PT services at this time  Should patient's status change, please re-consult       Wale Lanier, PT,DPT

## 2021-10-25 NOTE — ASSESSMENT & PLAN NOTE
· Was prescribed prednisone taper at ER visit-will discontinue this due to worsening infection  · Continue Neurontin

## 2021-10-25 NOTE — STROKE DOCUMENTATION
Patient refusing blood cultures, states they had blood cultures drawn yesterday  El Nevarez PA-C made aware  Verbal orders from provider to end stroke alert

## 2021-10-25 NOTE — ED NOTES
SLIM aware pt wants to leave, Neida  stated he would be down to see pt       Branden Fuentes RN  10/25/21 1522

## 2021-10-25 NOTE — ED NOTES
Patient rang call bell and stated IV hurt  Assessed IV and IV is patent without signs of infiltration  Assessed aptandrey's left arm for new IV placement per patient request, pt has severe ecchymosis and scar tissue throughout left arm  Explained to david that IV needed to be in placed for future CT scans and medication administration  Patient still yelling at staff and stating they would 'rip IV out " Patient told that staff will not tolerate being yelled to as we are only trying to help, again re-explained why IV needed to be continued  Patient stated "okay, whatever, I don't care " Staff left room, will continue to monitor        Vicki Riggins, RN  10/24/21 0284

## 2021-10-25 NOTE — OCCUPATIONAL THERAPY NOTE
Occupational Therapy Screen         Patient Name: Dilma Lowry  XTVJS'T Date: 10/25/2021  Problem List  Principal Problem:    Osteomyelitis [facility])  Active Problems:    Hyponatremia    Scleroderma ([facility] )    Near syncope    Elevated troponin    Acute encephalopathy          10/25/21 1201   Note Type   Note type Screen   Pain Assessment   Pain Assessment Tool 0-10   Pain Score No Pain       OT orders received  Chart review completed  Pt admitted to [address] [place] [address] 10/24/2021 under observation with Dx: osteomyelitis  Spoke with Pt this date who reports he has been completing ADLs and functional ambulation @ I with no difficulty noted  Pt reports he feels he is at his baseline and has no concerns for returning home with support from family PRN  Pt does not require any further OT services  D/c OT effective 10/25/2021  If new concerns arise, please re-consult         ANN MARIE العلي/JACK

## 2021-10-25 NOTE — ASSESSMENT & PLAN NOTE
· Right index finger infection with recent ED visit concerning for osteomyelitis  · MRI obtained in the ED  Awaiting final interpretation    · Currently on Zosyn but given stability will be discharged to complete antibiotics as previously instructed  · Patient understands need to follow-up with hand surgery as soon as possible as he will need amputation of affected digit

## 2021-10-25 NOTE — ASSESSMENT & PLAN NOTE
· Recently prescribed prednisone  Has been discontinued due to acute infection    · Continue gabapentin and amitriptyline

## 2021-10-28 LAB
BACTERIA BLD CULT: NORMAL
BACTERIA BLD CULT: NORMAL

## 2022-12-02 ENCOUNTER — HOSPITAL ENCOUNTER (OUTPATIENT)
Dept: NON INVASIVE DIAGNOSTICS | Facility: HOSPITAL | Age: 57
Discharge: HOME | End: 2022-12-02

## 2022-12-02 VITALS
HEIGHT: 69 IN | BODY MASS INDEX: 25.18 KG/M2 | WEIGHT: 170 LBS | SYSTOLIC BLOOD PRESSURE: 166 MMHG | DIASTOLIC BLOOD PRESSURE: 107 MMHG | HEART RATE: 91 BPM

## 2022-12-02 DIAGNOSIS — I42.2 HYPERTROPHIC NON-OBSTRUCTIVE CARDIOMYOPATHY (HCC): ICD-10-CM

## 2022-12-02 LAB
AORTIC ROOT: 3.3 CM
AORTIC VALVE MEAN VELOCITY: 7.5 M/S
APICAL FOUR CHAMBER EJECTION FRACTION: 24 %
ASCENDING AORTA: 3.7 CM
AV AREA BY CONTINUOUS VTI: 3.3 CM2
AV AREA PEAK VELOCITY: 3.3 CM2
AV LVOT MEAN GRADIENT: 1 MMHG
AV LVOT PEAK GRADIENT: 3 MMHG
AV MEAN GRADIENT: 3 MMHG
AV PEAK GRADIENT: 4 MMHG
AV VALVE AREA: 3.33 CM2
AV VELOCITY RATIO: 0.79
DOP CALC AO PEAK VEL: 1.01 M/S
DOP CALC AO VTI: 14.64 CM
DOP CALC LVOT AREA: 4.15 CM2
DOP CALC LVOT DIAMETER: 2.3 CM
DOP CALC LVOT PEAK VEL VTI: 11.74 CM
DOP CALC LVOT PEAK VEL: 0.8 M/S
DOP CALC LVOT STROKE INDEX: 25.9 ML/M2
DOP CALC LVOT STROKE VOLUME: 50
FRACTIONAL SHORTENING: 11 (ref 28–44)
INTERVENTRICULAR SEPTUM IN DIASTOLE (PARASTERNAL SHORT AXIS VIEW): 2 CM
INTERVENTRICULAR SEPTUM: 2 CM (ref 0.6–1.1)
LAAS-AP2: 33.5 CM2
LAAS-AP4: 29.8 CM2
LEFT ATRIUM SIZE: 5 CM
LEFT ATRIUM VOLUME INDEX (MOD BIPLANE): 61.1
LEFT INTERNAL DIMENSION IN SYSTOLE: 4.2 CM (ref 2.1–4)
LEFT VENTRICULAR INTERNAL DIMENSION IN DIASTOLE: 4.7 CM (ref 3.5–6)
LEFT VENTRICULAR POSTERIOR WALL IN END DIASTOLE: 2.1 CM
LEFT VENTRICULAR STROKE VOLUME: 27 ML
LVSV (TEICH): 27 ML
RIGHT ATRIAL 2D VOLUME: 99 ML
RIGHT ATRIUM AREA SYSTOLE A4C: 30 CM2
RIGHT VENTRICLE ID DIMENSION: 5.5 CM
SL CV LEFT ATRIUM LENGTH A2C: 6.8 CM
SL CV PED ECHO LEFT VENTRICLE DIASTOLIC VOLUME (MOD BIPLANE) 2D: 104 ML
SL CV PED ECHO LEFT VENTRICLE SYSTOLIC VOLUME (MOD BIPLANE) 2D: 77 ML
TR MAX PG: 33 MMHG
TR PEAK VELOCITY: 2.9 M/S
TRICUSPID VALVE PEAK REGURGITATION VELOCITY: 2.86 M/S

## 2023-02-20 ENCOUNTER — ANESTHESIA EVENT (OUTPATIENT)
Dept: PERIOP | Facility: HOSPITAL | Age: 58
End: 2023-02-20

## 2023-02-21 ENCOUNTER — APPOINTMENT (OUTPATIENT)
Dept: LAB | Facility: HOSPITAL | Age: 58
End: 2023-02-21

## 2023-02-21 DIAGNOSIS — Z01.818 PRE-OP TESTING: ICD-10-CM

## 2023-02-21 LAB
ANION GAP SERPL CALCULATED.3IONS-SCNC: 2 MMOL/L (ref 4–13)
BUN SERPL-MCNC: 22 MG/DL (ref 5–25)
CALCIUM SERPL-MCNC: 9.3 MG/DL (ref 8.4–10.2)
CHLORIDE SERPL-SCNC: 104 MMOL/L (ref 96–108)
CO2 SERPL-SCNC: 30 MMOL/L (ref 21–32)
CREAT SERPL-MCNC: 1.18 MG/DL (ref 0.6–1.3)
ERYTHROCYTE [DISTWIDTH] IN BLOOD BY AUTOMATED COUNT: 14.6 % (ref 11.6–15.1)
GFR SERPL CREATININE-BSD FRML MDRD: 68 ML/MIN/1.73SQ M
GLUCOSE P FAST SERPL-MCNC: 109 MG/DL (ref 65–99)
HCT VFR BLD AUTO: 46.3 % (ref 36.5–49.3)
HGB BLD-MCNC: 14.7 G/DL (ref 12–17)
MCH RBC QN AUTO: 29.1 PG (ref 26.8–34.3)
MCHC RBC AUTO-ENTMCNC: 31.7 G/DL (ref 31.4–37.4)
MCV RBC AUTO: 92 FL (ref 82–98)
PLATELET # BLD AUTO: 334 THOUSANDS/UL (ref 149–390)
PMV BLD AUTO: 9.3 FL (ref 8.9–12.7)
POTASSIUM SERPL-SCNC: 4.5 MMOL/L (ref 3.5–5.3)
RBC # BLD AUTO: 5.06 MILLION/UL (ref 3.88–5.62)
SODIUM SERPL-SCNC: 136 MMOL/L (ref 135–147)
WBC # BLD AUTO: 7.29 THOUSAND/UL (ref 4.31–10.16)

## 2023-02-22 RX ORDER — CARVEDILOL 6.25 MG/1
6.25 TABLET ORAL 2 TIMES DAILY WITH MEALS
COMMUNITY

## 2023-02-22 NOTE — PRE-PROCEDURE INSTRUCTIONS
Pre-Surgery Instructions:   Medication Instructions   • albuterol (2 5 mg/3 mL) 0 083 % nebulizer solution Uses PRN- OK to take day of surgery   • albuterol (PROVENTIL HFA,VENTOLIN HFA) 90 mcg/act inhaler Uses PRN- OK to take day of surgery Instructed to bring the DOS   • carvedilol (COREG) 6 25 mg tablet Take day of surgery     • lisinopril (ZESTRIL) 5 mg tablet Take night before surgery   See above    Pt was instructed to take am med with a small sip of water    Pt instructed to take tylenol and avoid NSAIDS for one week before sx    Pt is not able to afford his other medications at this time

## 2023-03-03 ENCOUNTER — HOSPITAL ENCOUNTER (OUTPATIENT)
Facility: HOSPITAL | Age: 58
Setting detail: OUTPATIENT SURGERY
Discharge: HOME | End: 2023-03-03
Attending: SURGERY | Admitting: SURGERY

## 2023-03-03 ENCOUNTER — ANESTHESIA (OUTPATIENT)
Dept: PERIOP | Facility: HOSPITAL | Age: 58
End: 2023-03-03

## 2023-03-03 VITALS
TEMPERATURE: 97.9 F | SYSTOLIC BLOOD PRESSURE: 132 MMHG | OXYGEN SATURATION: 95 % | HEIGHT: 69 IN | WEIGHT: 170 LBS | HEART RATE: 60 BPM | RESPIRATION RATE: 20 BRPM | DIASTOLIC BLOOD PRESSURE: 80 MMHG | BODY MASS INDEX: 25.18 KG/M2

## 2023-03-03 DIAGNOSIS — K40.90 RIGHT INGUINAL HERNIA: Primary | ICD-10-CM

## 2023-03-03 DIAGNOSIS — K40.90 UNILATERAL INGUINAL HERNIA, WITHOUT OBSTRUCTION OR GANGRENE, NOT SPECIFIED AS RECURRENT: ICD-10-CM

## 2023-03-03 LAB
ANION GAP SERPL CALCULATED.3IONS-SCNC: 3 MMOL/L (ref 4–13)
BASOPHILS # BLD AUTO: 0.04 THOUSANDS/ÂΜL (ref 0–0.1)
BASOPHILS NFR BLD AUTO: 1 % (ref 0–1)
BUN SERPL-MCNC: 15 MG/DL (ref 5–25)
CALCIUM SERPL-MCNC: 9.2 MG/DL (ref 8.4–10.2)
CHLORIDE SERPL-SCNC: 105 MMOL/L (ref 96–108)
CO2 SERPL-SCNC: 28 MMOL/L (ref 21–32)
CREAT SERPL-MCNC: 1.03 MG/DL (ref 0.6–1.3)
EOSINOPHIL # BLD AUTO: 0.15 THOUSAND/ÂΜL (ref 0–0.61)
EOSINOPHIL NFR BLD AUTO: 2 % (ref 0–6)
ERYTHROCYTE [DISTWIDTH] IN BLOOD BY AUTOMATED COUNT: 14.6 % (ref 11.6–15.1)
GFR SERPL CREATININE-BSD FRML MDRD: 80 ML/MIN/1.73SQ M
GLUCOSE P FAST SERPL-MCNC: 90 MG/DL (ref 65–99)
GLUCOSE SERPL-MCNC: 113 MG/DL (ref 65–140)
GLUCOSE SERPL-MCNC: 90 MG/DL (ref 65–140)
HCT VFR BLD AUTO: 46 % (ref 36.5–49.3)
HGB BLD-MCNC: 14.8 G/DL (ref 12–17)
IMM GRANULOCYTES # BLD AUTO: 0.02 THOUSAND/UL (ref 0–0.2)
IMM GRANULOCYTES NFR BLD AUTO: 0 % (ref 0–2)
LYMPHOCYTES # BLD AUTO: 1.79 THOUSANDS/ÂΜL (ref 0.6–4.47)
LYMPHOCYTES NFR BLD AUTO: 25 % (ref 14–44)
MCH RBC QN AUTO: 29.2 PG (ref 26.8–34.3)
MCHC RBC AUTO-ENTMCNC: 32.2 G/DL (ref 31.4–37.4)
MCV RBC AUTO: 91 FL (ref 82–98)
MONOCYTES # BLD AUTO: 0.77 THOUSAND/ÂΜL (ref 0.17–1.22)
MONOCYTES NFR BLD AUTO: 11 % (ref 4–12)
NEUTROPHILS # BLD AUTO: 4.33 THOUSANDS/ÂΜL (ref 1.85–7.62)
NEUTS SEG NFR BLD AUTO: 61 % (ref 43–75)
NRBC BLD AUTO-RTO: 0 /100 WBCS
PLATELET # BLD AUTO: 272 THOUSANDS/UL (ref 149–390)
PMV BLD AUTO: 9.1 FL (ref 8.9–12.7)
POTASSIUM SERPL-SCNC: 4.4 MMOL/L (ref 3.5–5.3)
RBC # BLD AUTO: 5.06 MILLION/UL (ref 3.88–5.62)
SODIUM SERPL-SCNC: 136 MMOL/L (ref 135–147)
WBC # BLD AUTO: 7.1 THOUSAND/UL (ref 4.31–10.16)

## 2023-03-03 DEVICE — BARD MESH PERFIX PLUG, MEDIUM
Type: IMPLANTABLE DEVICE | Site: INGUINAL | Status: FUNCTIONAL
Brand: BARD MESH PERFIX PLUG

## 2023-03-03 RX ORDER — PROPOFOL 10 MG/ML
INJECTION, EMULSION INTRAVENOUS AS NEEDED
Status: DISCONTINUED | OUTPATIENT
Start: 2023-03-03 | End: 2023-03-03

## 2023-03-03 RX ORDER — CEFAZOLIN SODIUM 2 G/50ML
2000 SOLUTION INTRAVENOUS
Status: COMPLETED | OUTPATIENT
Start: 2023-03-03 | End: 2023-03-03

## 2023-03-03 RX ORDER — LIDOCAINE HYDROCHLORIDE 10 MG/ML
0.5 INJECTION, SOLUTION EPIDURAL; INFILTRATION; INTRACAUDAL; PERINEURAL ONCE AS NEEDED
Status: COMPLETED | OUTPATIENT
Start: 2023-03-03 | End: 2023-03-03

## 2023-03-03 RX ORDER — HYDROMORPHONE HCL/PF 1 MG/ML
0.5 SYRINGE (ML) INJECTION
Status: DISCONTINUED | OUTPATIENT
Start: 2023-03-03 | End: 2023-03-03 | Stop reason: HOSPADM

## 2023-03-03 RX ORDER — MEPERIDINE HYDROCHLORIDE 25 MG/ML
25 INJECTION INTRAMUSCULAR; INTRAVENOUS; SUBCUTANEOUS ONCE AS NEEDED
Status: DISCONTINUED | OUTPATIENT
Start: 2023-03-03 | End: 2023-03-03 | Stop reason: HOSPADM

## 2023-03-03 RX ORDER — MIDAZOLAM HYDROCHLORIDE 2 MG/2ML
INJECTION, SOLUTION INTRAMUSCULAR; INTRAVENOUS AS NEEDED
Status: DISCONTINUED | OUTPATIENT
Start: 2023-03-03 | End: 2023-03-03

## 2023-03-03 RX ORDER — SODIUM CHLORIDE, SODIUM LACTATE, POTASSIUM CHLORIDE, CALCIUM CHLORIDE 600; 310; 30; 20 MG/100ML; MG/100ML; MG/100ML; MG/100ML
125 INJECTION, SOLUTION INTRAVENOUS CONTINUOUS
Status: DISCONTINUED | OUTPATIENT
Start: 2023-03-03 | End: 2023-03-03 | Stop reason: HOSPADM

## 2023-03-03 RX ORDER — FENTANYL CITRATE/PF 50 MCG/ML
50 SYRINGE (ML) INJECTION
Status: DISCONTINUED | OUTPATIENT
Start: 2023-03-03 | End: 2023-03-03 | Stop reason: HOSPADM

## 2023-03-03 RX ORDER — FENTANYL CITRATE 50 UG/ML
INJECTION, SOLUTION INTRAMUSCULAR; INTRAVENOUS AS NEEDED
Status: DISCONTINUED | OUTPATIENT
Start: 2023-03-03 | End: 2023-03-03

## 2023-03-03 RX ORDER — ONDANSETRON 2 MG/ML
INJECTION INTRAMUSCULAR; INTRAVENOUS AS NEEDED
Status: DISCONTINUED | OUTPATIENT
Start: 2023-03-03 | End: 2023-03-03

## 2023-03-03 RX ORDER — OXYCODONE HYDROCHLORIDE AND ACETAMINOPHEN 5; 325 MG/1; MG/1
1 TABLET ORAL EVERY 4 HOURS PRN
Qty: 20 TABLET | Refills: 0 | Status: SHIPPED | OUTPATIENT
Start: 2023-03-03

## 2023-03-03 RX ORDER — PHENYLEPHRINE HCL IN 0.9% NACL 1 MG/10 ML
SYRINGE (ML) INTRAVENOUS AS NEEDED
Status: DISCONTINUED | OUTPATIENT
Start: 2023-03-03 | End: 2023-03-03

## 2023-03-03 RX ORDER — METOPROLOL TARTRATE 5 MG/5ML
INJECTION INTRAVENOUS AS NEEDED
Status: DISCONTINUED | OUTPATIENT
Start: 2023-03-03 | End: 2023-03-03

## 2023-03-03 RX ORDER — DEXAMETHASONE SODIUM PHOSPHATE 10 MG/ML
INJECTION, SOLUTION INTRAMUSCULAR; INTRAVENOUS AS NEEDED
Status: DISCONTINUED | OUTPATIENT
Start: 2023-03-03 | End: 2023-03-03

## 2023-03-03 RX ORDER — BUPIVACAINE HYDROCHLORIDE AND EPINEPHRINE 2.5; 5 MG/ML; UG/ML
INJECTION, SOLUTION INFILTRATION; PERINEURAL AS NEEDED
Status: DISCONTINUED | OUTPATIENT
Start: 2023-03-03 | End: 2023-03-03 | Stop reason: HOSPADM

## 2023-03-03 RX ORDER — ROCURONIUM BROMIDE 10 MG/ML
INJECTION, SOLUTION INTRAVENOUS AS NEEDED
Status: DISCONTINUED | OUTPATIENT
Start: 2023-03-03 | End: 2023-03-03

## 2023-03-03 RX ORDER — METOCLOPRAMIDE HYDROCHLORIDE 5 MG/ML
10 INJECTION INTRAMUSCULAR; INTRAVENOUS ONCE AS NEEDED
Status: DISCONTINUED | OUTPATIENT
Start: 2023-03-03 | End: 2023-03-03 | Stop reason: HOSPADM

## 2023-03-03 RX ADMIN — Medication 100 MCG: at 08:19

## 2023-03-03 RX ADMIN — LIDOCAINE HYDROCHLORIDE 5 ML: 10 INJECTION, SOLUTION EPIDURAL; INFILTRATION; INTRACAUDAL at 07:30

## 2023-03-03 RX ADMIN — METOROPROLOL TARTRATE 1 MG: 5 INJECTION, SOLUTION INTRAVENOUS at 09:23

## 2023-03-03 RX ADMIN — ROCURONIUM BROMIDE 10 MG: 10 INJECTION, SOLUTION INTRAVENOUS at 08:26

## 2023-03-03 RX ADMIN — Medication 100 MCG: at 08:03

## 2023-03-03 RX ADMIN — PROPOFOL 150 MG: 10 INJECTION, EMULSION INTRAVENOUS at 07:30

## 2023-03-03 RX ADMIN — FENTANYL CITRATE 50 MCG: 50 INJECTION INTRAMUSCULAR; INTRAVENOUS at 08:06

## 2023-03-03 RX ADMIN — SODIUM CHLORIDE, SODIUM LACTATE, POTASSIUM CHLORIDE, AND CALCIUM CHLORIDE: .6; .31; .03; .02 INJECTION, SOLUTION INTRAVENOUS at 09:23

## 2023-03-03 RX ADMIN — ROCURONIUM BROMIDE 15 MG: 10 INJECTION, SOLUTION INTRAVENOUS at 07:35

## 2023-03-03 RX ADMIN — SODIUM CHLORIDE, SODIUM LACTATE, POTASSIUM CHLORIDE, AND CALCIUM CHLORIDE: .6; .31; .03; .02 INJECTION, SOLUTION INTRAVENOUS at 07:25

## 2023-03-03 RX ADMIN — ROCURONIUM BROMIDE 10 MG: 10 INJECTION, SOLUTION INTRAVENOUS at 08:49

## 2023-03-03 RX ADMIN — HYDROMORPHONE HYDROCHLORIDE 0.5 MG: 1 INJECTION, SOLUTION INTRAMUSCULAR; INTRAVENOUS; SUBCUTANEOUS at 09:41

## 2023-03-03 RX ADMIN — FENTANYL CITRATE 50 MCG: 50 INJECTION INTRAMUSCULAR; INTRAVENOUS at 10:30

## 2023-03-03 RX ADMIN — FENTANYL CITRATE 50 MCG: 50 INJECTION INTRAMUSCULAR; INTRAVENOUS at 07:30

## 2023-03-03 RX ADMIN — ROCURONIUM BROMIDE 10 MG: 10 INJECTION, SOLUTION INTRAVENOUS at 09:14

## 2023-03-03 RX ADMIN — MIDAZOLAM 2 MG: 1 INJECTION INTRAMUSCULAR; INTRAVENOUS at 07:23

## 2023-03-03 RX ADMIN — CEFAZOLIN SODIUM 2000 MG: 2 SOLUTION INTRAVENOUS at 07:29

## 2023-03-03 RX ADMIN — DEXAMETHASONE SODIUM PHOSPHATE 10 MG: 10 INJECTION, SOLUTION INTRAMUSCULAR; INTRAVENOUS at 07:30

## 2023-03-03 RX ADMIN — SUGAMMADEX 200 MG: 100 INJECTION, SOLUTION INTRAVENOUS at 09:49

## 2023-03-03 RX ADMIN — ROCURONIUM BROMIDE 35 MG: 10 INJECTION, SOLUTION INTRAVENOUS at 07:30

## 2023-03-03 RX ADMIN — ONDANSETRON 4 MG: 2 INJECTION INTRAMUSCULAR; INTRAVENOUS at 07:38

## 2023-03-03 NOTE — OP NOTE
OPERATIVE REPORT  PATIENT NAME: Coby Ny    :  1965  MRN: [id number]  Pt Location: OW OR ROOM 01    SURGERY DATE: 3/3/2023    Surgeon(s) and Role:     * Darcy Diaz DO - Primary     * Hank Palmer PA-C - Assisting    Preop Diagnosis:  Unilateral inguinal hernia, without obstruction or gangrene, not specified as recurrent [K40 90]    Post-Op Diagnosis Codes:     * Unilateral inguinal hernia, without obstruction or gangrene, not specified as recurrent [K40 90]    Procedure(s):  Right - OPEN INGUINAL HERNIA REPAIR WITH MESH    Specimen(s):  ID Type Source Tests Collected by Time Destination   1 : right inguinal hernia sac Tissue Hernia Sac, Right Inguinal TISSUE EXAM Darcy Diaz DO 3/3/2023  9:00 AM        Estimated Blood Loss:   Minimal    Drains:  * No LDAs found *    Anesthesia Type:   General    Operative Indications:  Unilateral inguinal hernia, without obstruction or gangrene, not specified as recurrent [K40 90]      Operative Findings:  Indirect right inguinal hernia  Complications:   None    Procedure and Technique:  The patient was taken to the operating room and correctly identified and the procedure was verified  He was placed in the supine position on the operating room table and general anesthesia was administered  He received pre-operative antibiotics  His right groin was prepped and draped in a sterile fashion  A time out was held and the above information was confirmed  afterin instillation of local anesthesia, a transverse incision was made in the right groin overlying the inguinal canal with a # 15 blade  The subcutaneous tissue was dissected down with electrocautery through campers and Brea's fascia  The external oblique was incised and opened with metzenbaum scissors through the external inguinal ring  Flaps were raised and the cord structures were circumferentially isolated with a penrose drain   Dissection of the hernia sac off of the cord structures then commenced using blunt dissection as well as electrocautery taking care to identify and protect the cord structures  The hernia sac was freed from the cremasteric fibers up to the internal inguinal ring  The hernia sac was then suture ligated and divided and sent as specimen  A medium mesh hernia plug was then placed in the hernia defect and sutured to the transversalis fascia with 2-0 prolene sutures circumferentially  A hernia patch was then placed in the inguinal floor and sutured to the conjoint tender, the transversalis fascia and the shelving edge of the inguinal floor  The wound was then irrigated  The internal oblique was closed with a 2-0 vicryl running suture, recreating the external inguinal ring  Scarps's fascia and the deep dermal layers were then closed with 3-0 vicryl suture  Additional local anesthesia was then infiltrated into the skin surrounding the wound  The skin was closed with 4-0 monocryl suture and dermabond was applied  This marked the end of the procedure  All needle, instrument and sponge counts were correct times two  The patient awoke from anesthesia and was taken to the recovery room in stable condition        I was present for the entire procedure and A physician assistant was required during the procedure for retraction tissue handling,dissection and suturing    Patient Disposition:  PACU         SIGNATURE: Julio Cesar Gordon DO  DATE: March 3, 2023  TIME: 9:51 AM

## 2023-03-03 NOTE — INTERVAL H&P NOTE
H&P reviewed  After examining the patient I find no changes in the patients condition since the H&P had been written      Vitals:    03/03/23 0635   BP: 136/97   Pulse: 73   Resp: 18   Temp: 98 4 °F (36 9 °C)   SpO2: 97%

## 2023-03-03 NOTE — ANESTHESIA POSTPROCEDURE EVALUATION
Post-Op Assessment Note    CV Status:  Stable  Pain Score: 3    Pain management: adequate     Mental Status:  Sleepy   Hydration Status:  Euvolemic and stable   PONV Controlled:  None   Airway Patency:  Patent   Two or more mitigation strategies used for obstructive sleep apnea   Post Op Vitals Reviewed: Yes      Staff: Anesthesiologist, CRNA         No notable events documented      /61 (03/03/23 1000)    Temp      Pulse 68 (03/03/23 1000)   Resp 18 (03/03/23 1000)    SpO2 93 % (03/03/23 1000)

## 2023-03-03 NOTE — ANESTHESIA PREPROCEDURE EVALUATION
Procedure:  OPEN INGUINAL HERNIA REPAIR WITH MESH (Right: Groin)    Relevant Problems   ANESTHESIA (within normal limits)      CARDIO  Echo: 30%EF, mod MR and TR, mild pulmo HTN   (+) Hypertension      GI/HEPATIC   (+) GERD (gastroesophageal reflux disease)      MUSCULOSKELETAL   (+) Gout      NEURO/PSYCH   (+) Anxiety   (+) Depression      PULMONARY   (+) Emphysema of lung (HCC)      Other   (+) Osteomyelitis (HCC)   (+) Scleroderma (HCC)        Physical Exam    Airway    Mallampati score: I  TM Distance: >3 FB  Neck ROM: full     Dental   upper dentures and lower dentures,     Cardiovascular      Pulmonary      Other Findings        Anesthesia Plan  ASA Score- 3     Anesthesia Type- general with ASA Monitors  Additional Monitors:   Airway Plan: ETT  Plan Factors-Exercise tolerance (METS): >4 METS  Chart reviewed  EKG reviewed  Existing labs reviewed  Patient summary reviewed  Patient is a current smoker  Patient smoked on day of surgery  Induction-     Postoperative Plan- Plan for postoperative opioid use  Informed Consent- Anesthetic plan and risks discussed with patient  I personally reviewed this patient with the CRNA  Discussed and agreed on the Anesthesia Plan with the CRNA  Tyrel Ponce

## 2023-03-03 NOTE — DISCHARGE INSTR - AVS FIRST PAGE
Discharge Date:  3/3/2023  Procedure:  Procedure(s):  OPEN INGUINAL HERNIA REPAIR WITH MESH  Surgeon:  Darcy Diaz, DO    Please contact Dr Alexandra Collado office at [phone] with any concerns or questions  The information below provides you with the instructions and the list of medications you need to be taking following discharge from the hospital   If you have any questions, please ask before leaving  Please carry this letter with you when you see your doctor in the clinic  If you have questions, you can reach us at the numbers above        Follow Up Appointments:  If not listed below, and one has not already been made for you, call the general surgery office at [phone]  Activity:  No lifting pushing or pulling greater than 10 lbs for 2 weeks post op or until instructed otherwise by provider at your post op visit  No lifting greater than 20 lbs until you are 6 weeks post op  Diet:  Regular diet as tolerated  Incision:   Your incisions are closed with absorbable suture and covered with surgical glue  The glue will fall off over the next couple of weeks  You may shower tomorrow but do not scrub the incisions  Allow warm soapy water to run over them  Do not submerge in water until after your follow up visit  Pain Control: You should apply ice for 20 minutes on then 20 minutes off to your incision(s) for pain relief for the first 72 hours after surgery  Ice will decrease the immediate postoperative inflammation  After the first 72 hours you should switch to a heating pad in the same time increments  The heat will continue to allow the swelling to go down and decrease your pain  You can try to take these following types of medications:  Tylenol: You may take over the counter Tylenol (Acetaminophen) 650 mg every 6 hours as needed  The maximum daily dose of Tylenol is 3000 mg  Avoid other medications with Tylenol  Motrin/Ibuprofen:  You may take Motrin (Ibuprofen) up to 800 mg every 8 hours as needed  If your pain is not as severe you can use less than 800mg (each over the counter tablet is 200mg)  The maximum daily dose of Ibuprofen is 3200mg  Avoid other NSAID medications while taking Ibuprofen  Do NOT take Motrin/Ibuprofen or any other NSAID drugs while taking Toradol, or if you are on Coumadin or any other blood thinner, or were instructed to avoid ibuprofen previously  Constipation: It is normal not to have a bowel movement for up to 3 days after surgery due to anesthesia  To prevent constipation, drink plenty of liquids and eat plenty of fiber which includes fruits & vegetables  After surgery for the regimen below for relief of constipation:    Recommend the following daily bowel regimen for constipation in order from 1 to 3:  1  Colace 100 mg capsules two-three times daily  2  Miralax 1 packet or capful (17 grams) in [facility] water daily to twice daily  3  Dulcolax suppository once daily as needed for constipation  If loose stools occur discontinue use of medications in reverse order (3,2,1)  Driving:  Don't drive until you are off narcotics for one full week and can walk normally and firmly apply the brake without pain  Date you may return to work or school: Will be discussed at your postop visit  Special Instructions:    Call if you have chills or a fever of greater than or equal to 100 5 degrees, any uncontrolled nausea, vomiting, bleeding, pain, diarrhea, constipation or shortness of breath  Regarding Anesthesia and Analgesia:  Do not drink alcoholic beverages, including beer, for 24 hours or while taking pain medication  Alcohol enhances the effects of anesthesia and sedation  Do not drive a motor vehicle, operate machinery or power tools for 24 hours  If a child, no bicycle riding, skateboards, gym sets, etc  For 24 hours  Do not make any important decisions or sign important papers for 24 hours    You may experience lightheadedness, dizziness and sleepiness following surgery/procedure  Please DO NOT STAY ALONE  A responsible adult should be with you for this 24 hour period  Rest at home with moderate activity as tolerated  It may be necessary to go to bed, however, it is important to rest for 24 hours  Progress slowly to a regular diet unless your physician has instructed you otherwise  Start with liquids such as soft drinks, then soup and crackers, gradually working up to solid foods  Certain anesthetics, pain medications and/or surgical procedures may produce nausea and vomitting in certain individuals  If nausea becomes a problem at home, call your physician  In the meantime, rest or sleep on our side to avoid accidentally inhaling material that you may vomit  Patients receiving general anesthesia may experience sore throat and general muscle aches, chest soreness  Should this occur, we recommend rest and liquids  These symptoms should disappear in approximately 24 hours  Patients having spinal anesthesia, we recommend drinking a lot of liquids containing caffeine, such as coffee, tea, cola, chocolate  If any symptoms persist, notify your physician  Incentive Spirometer Instructions:  Due to your surgery, it may be too painful to take deep breaths  You may also feel too weak to take deep breaths  When you do not breathe deeply enough, this can lead to your lungs not being completely inflated which in turn leads to shortness of breath, fever, and can increase your risk of postoperative pneumonia  An incentive spirometer is a device used to help you keep your lungs healthy while they are healing  The incentive spirometer teaches you how to take slow deep breaths  By using the incentive spirometer every 1 - 2 hours, or as directed by your nurse or doctor, you can take an active role in your recovery and keep your lungs healthy  How to Use an Incentive Spirometer   Sit up and hold the incentive spirometer upright     Place the mouthpiece of the incentive spirometer into your mouth  Make sure you make a good seal with your lips  Breathe out (exhale) normally  Breathe in SLOWLY (Inhale slowly)  A piece in the incentive spirometer will rise as you take a breath in  Try to get this piece to rise as high as you can  Usually, there is a marker placed by your health care provider that tells you how big of a breath you should take  Hold your breath for a few (3 - 5) seconds, then slowly release your breath and exhale  Repeat 10 - 15 breaths every 1 - 2 hours

## 2023-10-07 ENCOUNTER — HOSPITAL ENCOUNTER (INPATIENT)
Facility: HOSPITAL | Age: 58
LOS: 3 days | Discharge: HOME | DRG: 291 | End: 2023-10-10
Attending: EMERGENCY MEDICINE | Admitting: FAMILY MEDICINE
Payer: COMMERCIAL

## 2023-10-07 ENCOUNTER — APPOINTMENT (EMERGENCY)
Dept: RADIOLOGY | Facility: HOSPITAL | Age: 58
DRG: 291 | End: 2023-10-07
Payer: COMMERCIAL

## 2023-10-07 DIAGNOSIS — I50.23 ACUTE ON CHRONIC SYSTOLIC HEART FAILURE (HCC): ICD-10-CM

## 2023-10-07 DIAGNOSIS — I42.9 CARDIOMYOPATHY (HCC): ICD-10-CM

## 2023-10-07 DIAGNOSIS — N17.9 AKI (ACUTE KIDNEY INJURY) (HCC): ICD-10-CM

## 2023-10-07 DIAGNOSIS — R06.00 DYSPNEA: Primary | ICD-10-CM

## 2023-10-07 DIAGNOSIS — I21.A1 TYPE 2 MYOCARDIAL INFARCTION (HCC): ICD-10-CM

## 2023-10-07 DIAGNOSIS — I10 HYPERTENSION: ICD-10-CM

## 2023-10-07 DIAGNOSIS — I50.9 CHF (CONGESTIVE HEART FAILURE) (HCC): ICD-10-CM

## 2023-10-07 PROBLEM — I16.1 HYPERTENSIVE EMERGENCY: Status: ACTIVE | Noted: 2023-10-07

## 2023-10-07 PROBLEM — I50.43 ACUTE ON CHRONIC COMBINED SYSTOLIC AND DIASTOLIC CONGESTIVE HEART FAILURE (HCC): Status: ACTIVE | Noted: 2023-10-07

## 2023-10-07 LAB
2HR DELTA HS TROPONIN: 6 NG/L
4HR DELTA HS TROPONIN: -2 NG/L
ALBUMIN SERPL BCP-MCNC: 3.5 G/DL (ref 3.5–5)
ALP SERPL-CCNC: 115 U/L (ref 34–104)
ALT SERPL W P-5'-P-CCNC: 18 U/L (ref 7–52)
ANION GAP SERPL CALCULATED.3IONS-SCNC: 6 MMOL/L
APTT PPP: 31 SECONDS (ref 23–37)
AST SERPL W P-5'-P-CCNC: 26 U/L (ref 13–39)
BASOPHILS # BLD AUTO: 0.06 THOUSANDS/ÂΜL (ref 0–0.1)
BASOPHILS NFR BLD AUTO: 1 % (ref 0–1)
BILIRUB SERPL-MCNC: 0.47 MG/DL (ref 0.2–1)
BNP SERPL-MCNC: 1904 PG/ML (ref 0–100)
BUN SERPL-MCNC: 24 MG/DL (ref 5–25)
CALCIUM SERPL-MCNC: 9.1 MG/DL (ref 8.4–10.2)
CARDIAC TROPONIN I PNL SERPL HS: 115 NG/L
CARDIAC TROPONIN I PNL SERPL HS: 117 NG/L
CARDIAC TROPONIN I PNL SERPL HS: 123 NG/L
CHLORIDE SERPL-SCNC: 100 MMOL/L (ref 96–108)
CO2 SERPL-SCNC: 27 MMOL/L (ref 21–32)
CREAT SERPL-MCNC: 1.03 MG/DL (ref 0.6–1.3)
D DIMER PPP FEU-MCNC: 0.65 UG/ML FEU
EOSINOPHIL # BLD AUTO: 0.14 THOUSAND/ÂΜL (ref 0–0.61)
EOSINOPHIL NFR BLD AUTO: 2 % (ref 0–6)
ERYTHROCYTE [DISTWIDTH] IN BLOOD BY AUTOMATED COUNT: 14.7 % (ref 11.6–15.1)
FLUAV RNA RESP QL NAA+PROBE: NEGATIVE
FLUBV RNA RESP QL NAA+PROBE: NEGATIVE
GFR SERPL CREATININE-BSD FRML MDRD: 79 ML/MIN/1.73SQ M
GLUCOSE SERPL-MCNC: 100 MG/DL (ref 65–140)
HCT VFR BLD AUTO: 45.3 % (ref 36.5–49.3)
HGB BLD-MCNC: 14.6 G/DL (ref 12–17)
IMM GRANULOCYTES # BLD AUTO: 0.03 THOUSAND/UL (ref 0–0.2)
IMM GRANULOCYTES NFR BLD AUTO: 0 % (ref 0–2)
INR PPP: 0.97 (ref 0.84–1.19)
LYMPHOCYTES # BLD AUTO: 1.54 THOUSANDS/ÂΜL (ref 0.6–4.47)
LYMPHOCYTES NFR BLD AUTO: 20 % (ref 14–44)
MCH RBC QN AUTO: 29.7 PG (ref 26.8–34.3)
MCHC RBC AUTO-ENTMCNC: 32.2 G/DL (ref 31.4–37.4)
MCV RBC AUTO: 92 FL (ref 82–98)
MONOCYTES # BLD AUTO: 0.84 THOUSAND/ÂΜL (ref 0.17–1.22)
MONOCYTES NFR BLD AUTO: 11 % (ref 4–12)
NEUTROPHILS # BLD AUTO: 5.07 THOUSANDS/ÂΜL (ref 1.85–7.62)
NEUTS SEG NFR BLD AUTO: 66 % (ref 43–75)
NRBC BLD AUTO-RTO: 0 /100 WBCS
PLATELET # BLD AUTO: 304 THOUSANDS/UL (ref 149–390)
PMV BLD AUTO: 9.2 FL (ref 8.9–12.7)
POTASSIUM SERPL-SCNC: 4.3 MMOL/L (ref 3.5–5.3)
PROT SERPL-MCNC: 6.8 G/DL (ref 6.4–8.4)
PROTHROMBIN TIME: 13.2 SECONDS (ref 11.6–14.5)
RBC # BLD AUTO: 4.92 MILLION/UL (ref 3.88–5.62)
RSV RNA RESP QL NAA+PROBE: NEGATIVE
SARS-COV-2 RNA RESP QL NAA+PROBE: NEGATIVE
SODIUM SERPL-SCNC: 133 MMOL/L (ref 135–147)
WBC # BLD AUTO: 7.68 THOUSAND/UL (ref 4.31–10.16)

## 2023-10-07 PROCEDURE — 99291 CRITICAL CARE FIRST HOUR: CPT | Performed by: EMERGENCY MEDICINE

## 2023-10-07 PROCEDURE — G0008 ADMIN INFLUENZA VIRUS VAC: HCPCS | Performed by: FAMILY MEDICINE

## 2023-10-07 PROCEDURE — 94640 AIRWAY INHALATION TREATMENT: CPT

## 2023-10-07 PROCEDURE — 85610 PROTHROMBIN TIME: CPT

## 2023-10-07 PROCEDURE — 90686 IIV4 VACC NO PRSV 0.5 ML IM: CPT | Performed by: FAMILY MEDICINE

## 2023-10-07 PROCEDURE — 80053 COMPREHEN METABOLIC PANEL: CPT

## 2023-10-07 PROCEDURE — 85025 COMPLETE CBC W/AUTO DIFF WBC: CPT

## 2023-10-07 PROCEDURE — 85730 THROMBOPLASTIN TIME PARTIAL: CPT

## 2023-10-07 PROCEDURE — 83880 ASSAY OF NATRIURETIC PEPTIDE: CPT

## 2023-10-07 PROCEDURE — 0241U HB NFCT DS VIR RESP RNA 4 TRGT: CPT | Performed by: EMERGENCY MEDICINE

## 2023-10-07 PROCEDURE — 85379 FIBRIN DEGRADATION QUANT: CPT | Performed by: EMERGENCY MEDICINE

## 2023-10-07 PROCEDURE — 96375 TX/PRO/DX INJ NEW DRUG ADDON: CPT

## 2023-10-07 PROCEDURE — 84484 ASSAY OF TROPONIN QUANT: CPT

## 2023-10-07 PROCEDURE — 93005 ELECTROCARDIOGRAM TRACING: CPT

## 2023-10-07 PROCEDURE — 99285 EMERGENCY DEPT VISIT HI MDM: CPT

## 2023-10-07 PROCEDURE — 71045 X-RAY EXAM CHEST 1 VIEW: CPT

## 2023-10-07 PROCEDURE — 96374 THER/PROPH/DIAG INJ IV PUSH: CPT

## 2023-10-07 PROCEDURE — 99223 1ST HOSP IP/OBS HIGH 75: CPT | Performed by: FAMILY MEDICINE

## 2023-10-07 PROCEDURE — 36415 COLL VENOUS BLD VENIPUNCTURE: CPT

## 2023-10-07 RX ORDER — NITROGLYCERIN 0.4 MG/1
0.4 TABLET SUBLINGUAL ONCE
Status: COMPLETED | OUTPATIENT
Start: 2023-10-07 | End: 2023-10-07

## 2023-10-07 RX ORDER — ONDANSETRON 2 MG/ML
4 INJECTION INTRAMUSCULAR; INTRAVENOUS ONCE
Status: COMPLETED | OUTPATIENT
Start: 2023-10-07 | End: 2023-10-07

## 2023-10-07 RX ORDER — FUROSEMIDE 10 MG/ML
40 INJECTION INTRAMUSCULAR; INTRAVENOUS 2 TIMES DAILY
Status: DISCONTINUED | OUTPATIENT
Start: 2023-10-07 | End: 2023-10-08

## 2023-10-07 RX ORDER — IPRATROPIUM BROMIDE AND ALBUTEROL SULFATE 2.5; .5 MG/3ML; MG/3ML
3 SOLUTION RESPIRATORY (INHALATION) ONCE
Status: COMPLETED | OUTPATIENT
Start: 2023-10-07 | End: 2023-10-07

## 2023-10-07 RX ORDER — LISINOPRIL 5 MG/1
5 TABLET ORAL DAILY
Status: DISCONTINUED | OUTPATIENT
Start: 2023-10-07 | End: 2023-10-08

## 2023-10-07 RX ORDER — METOPROLOL TARTRATE 5 MG/5ML
5 INJECTION INTRAVENOUS ONCE
Status: COMPLETED | OUTPATIENT
Start: 2023-10-07 | End: 2023-10-07

## 2023-10-07 RX ORDER — ENOXAPARIN SODIUM 100 MG/ML
40 INJECTION SUBCUTANEOUS DAILY
Status: DISCONTINUED | OUTPATIENT
Start: 2023-10-07 | End: 2023-10-10 | Stop reason: HOSPADM

## 2023-10-07 RX ORDER — FUROSEMIDE 10 MG/ML
40 INJECTION INTRAMUSCULAR; INTRAVENOUS ONCE
Status: COMPLETED | OUTPATIENT
Start: 2023-10-07 | End: 2023-10-07

## 2023-10-07 RX ORDER — ALBUTEROL SULFATE 90 UG/1
2 AEROSOL, METERED RESPIRATORY (INHALATION) EVERY 4 HOURS PRN
Status: DISCONTINUED | OUTPATIENT
Start: 2023-10-07 | End: 2023-10-10 | Stop reason: HOSPADM

## 2023-10-07 RX ORDER — ACETAMINOPHEN 325 MG/1
650 TABLET ORAL EVERY 4 HOURS PRN
Status: DISCONTINUED | OUTPATIENT
Start: 2023-10-07 | End: 2023-10-10 | Stop reason: HOSPADM

## 2023-10-07 RX ORDER — NICOTINE 21 MG/24HR
1 PATCH, TRANSDERMAL 24 HOURS TRANSDERMAL DAILY
Status: DISCONTINUED | OUTPATIENT
Start: 2023-10-07 | End: 2023-10-10 | Stop reason: HOSPADM

## 2023-10-07 RX ORDER — CARVEDILOL 6.25 MG/1
6.25 TABLET ORAL 2 TIMES DAILY WITH MEALS
Status: DISCONTINUED | OUTPATIENT
Start: 2023-10-07 | End: 2023-10-10 | Stop reason: HOSPADM

## 2023-10-07 RX ADMIN — FUROSEMIDE 40 MG: 10 INJECTION, SOLUTION INTRAMUSCULAR; INTRAVENOUS at 09:06

## 2023-10-07 RX ADMIN — METOPROLOL TARTRATE 5 MG: 5 INJECTION INTRAVENOUS at 08:36

## 2023-10-07 RX ADMIN — LISINOPRIL 5 MG: 5 TABLET ORAL at 12:15

## 2023-10-07 RX ADMIN — CARVEDILOL 6.25 MG: 6.25 TABLET, FILM COATED ORAL at 12:15

## 2023-10-07 RX ADMIN — NICOTINE 1 PATCH: 14 PATCH, EXTENDED RELEASE TRANSDERMAL at 12:14

## 2023-10-07 RX ADMIN — INFLUENZA VIRUS VACCINE 0.5 ML: 15; 15; 15; 15 SUSPENSION INTRAMUSCULAR at 16:40

## 2023-10-07 RX ADMIN — FUROSEMIDE 40 MG: 10 INJECTION, SOLUTION INTRAMUSCULAR; INTRAVENOUS at 16:34

## 2023-10-07 RX ADMIN — ENOXAPARIN SODIUM 40 MG: 40 INJECTION SUBCUTANEOUS at 12:14

## 2023-10-07 RX ADMIN — NITROGLYCERIN 0.4 MG: 0.4 TABLET SUBLINGUAL at 09:09

## 2023-10-07 RX ADMIN — IPRATROPIUM BROMIDE AND ALBUTEROL SULFATE 3 ML: 2.5; .5 SOLUTION RESPIRATORY (INHALATION) at 08:36

## 2023-10-07 RX ADMIN — ONDANSETRON 4 MG: 2 INJECTION INTRAMUSCULAR; INTRAVENOUS at 09:30

## 2023-10-07 RX ADMIN — CARVEDILOL 6.25 MG: 6.25 TABLET, FILM COATED ORAL at 16:35

## 2023-10-07 NOTE — H&P
[facility]  H&P  Name: Halima Franz 62 y.o. male I MRN: [id number]  Unit/Bed#: -01 I Date of Admission: 10/7/2023   Date of Service: 10/7/2023  Hospital Day: 0      Assessment/Plan   * Acute on chronic combined systolic and diastolic congestive heart failure [facility])  Assessment & Plan  Wt Readings from Last 3 Encounters:   03/03/23 77.1 kg (170 lb)   12/02/22 77.1 kg (170 lb)   10/24/21 72.6 kg (160 lb 0.9 oz)   · Last echo done 2022 EF of 73% diastolic dysfunction. Follows with Dr. Lesley Salvador cardiology stated that he saw him last time 2 months ago and he was supposed to repeat an echocardiogram to discuss defibrillator. · Chest x-ray compliant with pulmonary edema elevation of proBNP orthopnea PND and symptoms he is not on diuretics at home he does eat a lot of salt he does have lower extremity edema  · He was given Lasix 40 mg IV x1 in the ER  · Continue Lasix 40 mg IV twice daily continue lisinopril 5 mg daily restart his beta-blocker he has not taking any of his blood pressure medications couple of days. ·  monitor his electrolytes. Repeated 2D echo as cardiology to evaluate  · 1.8 L fluid restriction  · Daily weights standings I's and O's          Hypertensive emergency  Assessment & Plan  · Blood pressure systolic is greater than 952 with evidence of CHF lowered slowly over 24 to 48 hours. Patient has not been taking any of his medications diuresed restart his Coreg and lisinopril    Emphysema of lung ([address])  Assessment & Plan  · Not in exacerbation we will place him back on his albuterol as needed    Elevated troponin  Assessment & Plan  · Suspect demand cardiology has agreed as discussed with ER physician and seen messages. There is no acute ischemic changes in the EKG does have a chronic right bundle branch block with secondary T wave changes.   Trend till peak repeated 2D echo diuresis neurology consultation placed on telemetry monitor    Scleroderma ([address])  Assessment & Plan  · Not on any medications       VTE Pharmacologic Prophylaxis: VTE Score: 3 Moderate Risk (Score 3-4) - Pharmacological DVT Prophylaxis Ordered: enoxaparin (Lovenox). Code Status: Level 1 - Full Code   Discussion with family: pt    Anticipated Length of Stay: Patient will be admitted on an inpatient basis with an anticipated length of stay of greater than 2 midnights secondary to Acute on chronic systolic and diastolic heart failure exacerbation. Total Time Spent on Date of Encounter in care of patient: >45 mins. This time was spent on one or more of the following: performing physical exam; counseling and coordination of care; obtaining or reviewing history; documenting in the medical record; reviewing/ordering tests, medications or procedures; communicating with other healthcare professionals and discussing with patient's family/caregivers. Chief Complaint: Shortness of breath    History of Present Illness:  Dony  is a 62 y.o. male with a PMH of COPD and CHF who presents with worsening shortness of breath orthopnea has baseline leg edema not on diuretics states eats a lot of food with salt does not drink a lot of water. Is following with [facility] cardiology last time seen was supposed to have an echo repeated for defibrillator placement as he does have an EF of 30% back in . Heart himself wheezing he is still a smoker. Has intermittent productive cough of green sputum. No fevers. No abdominal pain is hungry. positive for pnd  No chest pain     Review of Systems:  Review of Systems   Constitutional: Negative for chills and fever. HENT: Negative for ear pain and sore throat. Eyes: Negative for pain and visual disturbance. Respiratory: Positive for cough, shortness of breath and wheezing. Cardiovascular: Positive for leg swelling. Negative for chest pain and palpitations. Gastrointestinal: Negative for abdominal pain and vomiting. Genitourinary: Negative for dysuria and hematuria. Musculoskeletal: Negative for arthralgias and back pain. Skin: Negative for color change and rash. Neurological: Negative for seizures and syncope. All other systems reviewed and are negative. Past Medical and Surgical History:   Past Medical History:   Diagnosis Date   • Anxiety    • Depression    • Ejection fraction < 50%    • Emphysema of lung ([address])    • GERD (gastroesophageal reflux disease)    • Gout    • Hypertension    • Neuropathy    • Raynaud's disease    • Right inguinal hernia    • Scleroderma ([address])        Past Surgical History:   Procedure Laterality Date   • AMPUTATION Right     pt had tip of "pointer finger" d/t scleraderma   • HERNIA REPAIR Left     times 2   • NC RPR 1ST INGUN HRNA AGE 5 YRS/> REDUCIBLE Right 3/3/2023    Procedure: OPEN INGUINAL HERNIA REPAIR WITH MESH;  Surgeon: Tanika Zazueta DO;  Location:  MAIN OR;  Service: General       Meds/Allergies:  Prior to Admission medications    Medication Sig Start Date End Date Taking?  Authorizing Provider   albuterol (PROVENTIL HFA,VENTOLIN HFA) 90 mcg/act inhaler Inhale 2 puffs every 4 (four) hours as needed    Yes Historical Provider, MD   carvedilol (COREG) 6.25 mg tablet Take 6.25 mg by mouth 2 (two) times a day with meals   Yes Historical Provider, MD   lisinopril (ZESTRIL) 5 mg tablet Take 5 mg by mouth daily 1/8/18  Yes Historical Provider, MD   metoprolol succinate (TOPROL-XL) 25 mg 24 hr tablet Take 25 mg by mouth daily 8/10/16  Yes Historical Provider, MD   albuterol (2.5 mg/3 mL) 0.083 % nebulizer solution Take 2.5 mg by nebulization every 4 (four) hours as needed   Patient not taking: Reported on 10/7/2023    Historical Provider, MD   allopurinol (ZYLOPRIM) 300 mg tablet Take 300 mg by mouth daily  Patient not taking: Reported on 10/7/2023    Historical Provider, MD   amitriptyline (ELAVIL) 25 mg tablet Take 25 mg by mouth daily at bedtime  Patient not taking: Reported on 10/7/2023    Historical Provider, MD   Diclofenac Sodium (VOLTAREN) 1 % Apply 2 g topically 4 (four) times a day  Patient not taking: Reported on 10/7/2023 10/22/21   Melvi Hernandez MD   gabapentin (NEURONTIN) 300 mg capsule Take by mouth  Patient not taking: Reported on 10/7/2023 2/7/17   Historical Provider, MD   hydrOXYzine HCL (ATARAX) 25 mg tablet Take by mouth  Patient not taking: Reported on 10/7/2023    Historical Provider, MD   ibuprofen (MOTRIN) 600 mg tablet Take 1 tablet (600 mg total) by mouth every 6 (six) hours as needed for moderate pain  Patient not taking: Reported on 10/7/2023 10/21/21   Melvi Hernandez MD   omeprazole (PriLOSEC) 40 MG capsule Take 40 mg by mouth daily  Patient not taking: Reported on 10/7/2023    Historical Provider, MD   oxyCODONE-acetaminophen (Percocet) 5-325 mg per tablet Take 1 tablet by mouth every 4 (four) hours as needed for severe pain Max Daily Amount: 6 tablets  Patient not taking: Reported on 10/7/2023 3/3/23   Ysabel Jenkins DO     I have reviewed home medications with patient personally. Allergies: Allergies   Allergen Reactions   • Aspirin GI Intolerance       Social History:  Marital Status:    Substance Use History:   Social History     Substance and Sexual Activity   Alcohol Use Not Currently     Social History     Tobacco Use   Smoking Status Every Day   • Packs/day: 1.00   • Years: 20.00   • Total pack years: 20.00   • Types: Cigarettes   Smokeless Tobacco Never     Social History     Substance and Sexual Activity   Drug Use Yes   • Types: Marijuana    Comment: 1-2 times week       Family History:  History reviewed. No pertinent family history.     Physical Exam:     Vitals:   Blood Pressure: (!) 151/110 (10/07/23 0955)  Pulse: 73 (10/07/23 0955)  Temperature: 97.9 °F (36.6 °C) (10/07/23 0955)  Temp Source: Temporal (10/07/23 0756)  Respirations: 20 (10/07/23 0954)  Weight - Scale: 75.6 kg (166 lb 9.6 oz) (10/07/23 1100)  SpO2: 91 % (10/07/23 6283)    Physical Exam  Vitals and nursing note reviewed. Constitutional:       General: He is not in acute distress. Appearance: He is well-developed. HENT:      Head: Normocephalic and atraumatic. Eyes:      Conjunctiva/sclera: Conjunctivae normal.   Cardiovascular:      Rate and Rhythm: Normal rate and regular rhythm. Heart sounds: No murmur heard. Pulmonary:      Effort: Pulmonary effort is normal.      Breath sounds: No wheezing or rales. Comments: decreased  Abdominal:      Palpations: Abdomen is soft. Tenderness: There is no abdominal tenderness. Musculoskeletal:         General: Swelling (+1) present. Cervical back: Neck supple. Skin:     General: Skin is warm and dry. Capillary Refill: Capillary refill takes less than 2 seconds. Neurological:      Mental Status: He is alert.    Psychiatric:         Mood and Affect: Mood normal.          Additional Data:     Lab Results:  Results from last 7 days   Lab Units 10/07/23  0803   WBC Thousand/uL 7.68   HEMOGLOBIN g/dL 14.6   HEMATOCRIT % 45.3   PLATELETS Thousands/uL 304   NEUTROS PCT % 66   LYMPHS PCT % 20   MONOS PCT % 11   EOS PCT % 2     Results from last 7 days   Lab Units 10/07/23  0803   SODIUM mmol/L 133*   POTASSIUM mmol/L 4.3   CHLORIDE mmol/L 100   CO2 mmol/L 27   BUN mg/dL 24   CREATININE mg/dL 1.03   ANION GAP mmol/L 6   CALCIUM mg/dL 9.1   ALBUMIN g/dL 3.5   TOTAL BILIRUBIN mg/dL 0.47   ALK PHOS U/L 115*   ALT U/L 18   AST U/L 26   GLUCOSE RANDOM mg/dL 100     Results from last 7 days   Lab Units 10/07/23  0803   INR  0.97                   Lines/Drains:  Invasive Devices     Peripheral Intravenous Line  Duration           Peripheral IV 10/07/23 Right Antecubital <1 day                    Imaging: Reviewed radiology reports from this admission including: chest xray  XR chest 1 view portable   ED Interpretation by Matias Han DO (10/07 8826)   Pulmonary edema          EKG and Other Studies Reviewed on Admission:   · EKG: NSR. HR 87.    ** Please Note: This note has been constructed using a voice recognition system.  **

## 2023-10-07 NOTE — ED PROVIDER NOTES
History  Chief Complaint   Patient presents with   • Shortness of Breath     SOB starting Tuesday. 59-year-old male with a history of scleroderma, emphysema, hypertension, diminished ejection fraction, anxiety, depression, and GERD is presenting to the emergency room with chief complaint of shortness of breath since Tuesday. Getting progressively worse. Patient felt severely short of breath this morning and came to the emergency room for further evaluation. Patient reports that he has chronic discomfort and pain. No recent chest pressure or pain. No diaphoresis. No fevers. No chills. No cough. Patient does use tobacco.  Patient did not take his medication this morning. History provided by:  Patient  Shortness of Breath  Severity:  Severe  Onset quality:  Gradual  Timing:  Constant  Progression:  Worsening  Chronicity:  Recurrent  Worsened by: Activity and deep breathing  Ineffective treatments:  None tried  Associated symptoms: no abdominal pain, no chest pain, no cough, no diaphoresis, no fever, no headaches, no hemoptysis and no vomiting        Prior to Admission Medications   Prescriptions Last Dose Informant Patient Reported? Taking?    Diclofenac Sodium (VOLTAREN) 1 %   No No   Sig: Apply 2 g topically 4 (four) times a day   albuterol (2.5 mg/3 mL) 0.083 % nebulizer solution   Yes No   Sig: Take 2.5 mg by nebulization every 4 (four) hours as needed    albuterol (PROVENTIL HFA,VENTOLIN HFA) 90 mcg/act inhaler   Yes No   Sig: Inhale 2 puffs every 4 (four) hours as needed    allopurinol (ZYLOPRIM) 300 mg tablet   Yes No   Sig: Take 300 mg by mouth daily   amitriptyline (ELAVIL) 25 mg tablet   Yes No   Sig: Take 25 mg by mouth daily at bedtime   carvedilol (COREG) 6.25 mg tablet   Yes No   Sig: Take 6.25 mg by mouth 2 (two) times a day with meals   gabapentin (NEURONTIN) 300 mg capsule   Yes No   Sig: Take by mouth   hydrOXYzine HCL (ATARAX) 25 mg tablet   Yes No   Sig: Take by mouth ibuprofen (MOTRIN) 600 mg tablet   No No   Sig: Take 1 tablet (600 mg total) by mouth every 6 (six) hours as needed for moderate pain   lisinopril (ZESTRIL) 5 mg tablet   Yes No   Sig: Take 5 mg by mouth daily   metoprolol succinate (TOPROL-XL) 25 mg 24 hr tablet   Yes No   Sig: Take 25 mg by mouth daily   omeprazole (PriLOSEC) 40 MG capsule   Yes No   Sig: Take 40 mg by mouth daily   oxyCODONE-acetaminophen (Percocet) 5-325 mg per tablet   No No   Sig: Take 1 tablet by mouth every 4 (four) hours as needed for severe pain Max Daily Amount: 6 tablets      Facility-Administered Medications: None       Past Medical History:   Diagnosis Date   • Anxiety    • Depression    • Ejection fraction < 50%    • Emphysema of lung (HCC)    • GERD (gastroesophageal reflux disease)    • Gout    • Hypertension    • Neuropathy    • Raynaud's disease    • Right inguinal hernia    • Scleroderma ([address])        Past Surgical History:   Procedure Laterality Date   • 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 No pertinent family history. I have reviewed and agree with the history as documented. E-Cigarette/Vaping   • E-Cigarette Use Never User      E-Cigarette/Vaping Substances   • Nicotine No    • THC No    • CBD No    • Flavoring No      Social History     Tobacco Use   • Smoking status: Every Day     Packs/day: 1.00     Years: 20.00     Total pack years: 20.00     Types: Cigarettes   • Smokeless tobacco: Never   Vaping Use   • Vaping Use: Never used   Substance Use Topics   • Alcohol use: Not Currently   • Drug use: Yes     Types: Marijuana     Comment: 1-2 times week       Review of Systems   Constitutional: Negative for diaphoresis and fever. HENT: Negative. Respiratory: Positive for shortness of breath. Negative for cough and hemoptysis. Cardiovascular: Positive for leg swelling (Chronic). Negative for chest pain and palpitations. Gastrointestinal: Negative for abdominal pain, diarrhea, nausea and vomiting. Genitourinary: Negative. Neurological: Negative for headaches. All other systems reviewed and are negative. Physical Exam  Physical Exam  Vitals and nursing note reviewed. Constitutional:       General: He is in acute distress. Appearance: Normal appearance. He is well-developed. He is not ill-appearing or toxic-appearing. HENT:      Head: Normocephalic and atraumatic. Hair is normal.      Jaw: No pain on movement. Right Ear: External ear normal.      Left Ear: External ear normal.      Nose: Nose normal. No congestion. Mouth/Throat:      Mouth: Mucous membranes are moist.   Eyes:      General: Lids are normal. No scleral icterus. Extraocular Movements: Extraocular movements intact. Conjunctiva/sclera: Conjunctivae normal.      Pupils: Pupils are equal, round, and reactive to light. Cardiovascular:      Rate and Rhythm: Normal rate and regular rhythm. Heart sounds: Normal heart sounds. No murmur heard. Pulmonary:      Effort: Pulmonary effort is normal. No respiratory distress. Breath sounds: Decreased breath sounds present. No wheezing, rhonchi or rales. Abdominal:      General: Abdomen is flat. There is no distension. Palpations: Abdomen is soft. Abdomen is not rigid. Tenderness: There is no abdominal tenderness. There is no guarding or rebound. Musculoskeletal:         General: No swelling, tenderness, deformity or signs of injury. Normal range of motion. Cervical back: Normal range of motion and neck supple. Right lower leg: No edema. Left lower leg: No edema. Skin:     General: Skin is warm and dry. Coloration: Skin is not pale. Findings: No rash. Neurological:      General: No focal deficit present. Mental Status: He is alert and oriented to person, place, and time. Mental status is at baseline. Psychiatric:         Attention and Perception: Attention normal.         Mood and Affect: Mood normal.         Speech: Speech normal.         Behavior: Behavior normal.         Vital Signs  ED Triage Vitals   Temperature Pulse Respirations Blood Pressure SpO2   10/07/23 0756 10/07/23 0756 10/07/23 0756 10/07/23 0756 10/07/23 0756   (!) 97.2 °F (36.2 °C) 85 22 (!) 181/135 98 %      Temp Source Heart Rate Source Patient Position - Orthostatic VS BP Location FiO2 (%)   10/07/23 0756 10/07/23 0756 10/07/23 0756 10/07/23 0756 --   Temporal Monitor Lying Left arm       Pain Score       10/07/23 0908       No Pain           Vitals:    10/07/23 0756 10/07/23 0830 10/07/23 0908 10/07/23 0915   BP: (!) 181/135 (!) 146/117 (!) 151/120 127/98   Pulse: 85 83 73 74   Patient Position - Orthostatic VS: Lying            Visual Acuity      ED Medications  Medications   ipratropium-albuterol (DUO-NEB) 0.5-2.5 mg/3 mL inhalation solution 3 mL (3 mL Nebulization Given 10/7/23 0836)   metoprolol (LOPRESSOR) injection 5 mg (5 mg Intravenous Given 10/7/23 0836)   nitroglycerin (NITROSTAT) SL tablet 0.4 mg (0.4 mg Sublingual Given 10/7/23 0909)   furosemide (LASIX) injection 40 mg (40 mg Intravenous Given 10/7/23 0906)       Diagnostic Studies  Results Reviewed     Procedure Component Value Units Date/Time    B-Type Natriuretic Peptide(BNP) [[id number]]  (Abnormal) Collected: 10/07/23 0803    Lab Status: Final result Specimen: Blood from Arm, Right Updated: 10/07/23 0903     BNP 1,904 pg/mL     D-Dimer [[id number]]  (Abnormal) Collected: 10/07/23 0803    Lab Status: Final result Specimen: Blood from Arm, Right Updated: 10/07/23 0855     D-Dimer, Quant 0.65 ug/ml FEU     Narrative:       In the evaluation for possible pulmonary embolism, in the appropriate (Well's Score of 4 or less) patient, the age adjusted d-dimer cutoff for this patient can be calculated as:    Age x 0.01 (in ug/mL) for Age-adjusted D-dimer exclusion threshold for a patient over 50 years. FLU/RSV/COVID - if FLU/RSV clinically relevant [[id number]] Collected: 10/07/23 0836    Lab Status:  In process Specimen: Nares from Nasopharyngeal Swab Updated: 10/07/23 0842    HS Troponin 0hr (reflex protocol) [[id number]]  (Abnormal) Collected: 10/07/23 0803    Lab Status: Final result Specimen: Blood from Arm, Right Updated: 10/07/23 0837     hs TnI 0hr 117 ng/L     HS Troponin I 2hr [[id number]]     Lab Status: No result Specimen: Blood     HS Troponin I 4hr [[id number]]     Lab Status: No result Specimen: Blood     Comprehensive metabolic panel [[id number]]  (Abnormal) Collected: 10/07/23 0803    Lab Status: Final result Specimen: Blood from Arm, Right Updated: 10/07/23 0829     Sodium 133 mmol/L      Potassium 4.3 mmol/L      Chloride 100 mmol/L      CO2 27 mmol/L      ANION GAP 6 mmol/L      BUN 24 mg/dL      Creatinine 1.03 mg/dL      Glucose 100 mg/dL      Calcium 9.1 mg/dL      AST 26 U/L      ALT 18 U/L      Alkaline Phosphatase 115 U/L      Total Protein 6.8 g/dL      Albumin 3.5 g/dL      Total Bilirubin 0.47 mg/dL      eGFR 79 ml/min/1.73sq m     Narrative:      [facility] (CKD):   •  Stage 1 with normal or high GFR (GFR > 90 mL/min/1.73 square meters)  •  Stage 2 Mild CKD (GFR = 60-89 mL/min/1.73 square meters)  •  Stage 3A Moderate CKD (GFR = 45-59 mL/min/1.73 square meters)  •  Stage 3B Moderate CKD (GFR = 30-44 mL/min/1.73 square meters)  •  Stage 4 Severe CKD (GFR = 15-29 mL/min/1.73 square meters)  •  Stage 5 End Stage CKD (GFR <15 mL/min/1.73 square meters)  Note: GFR calculation is accurate only with a steady state creatinine    Protime-INR [[id number]]  (Normal) Collected: 10/07/23 0803    Lab Status: Final result Specimen: Blood from Arm, Right Updated: 10/07/23 0825     Protime 13.2 seconds      INR 0.97    APTT [[id number]]  (Normal) Collected: 10/07/23 0803    Lab Status: Final result Specimen: Blood from Arm, Right Updated: 10/07/23 0825     PTT 31 seconds     CBC and differential [[id number]] Collected: 10/07/23 0803    Lab Status: Final result Specimen: Blood from Arm, Right Updated: 10/07/23 0810     WBC 7.68 Thousand/uL      RBC 4.92 Million/uL      Hemoglobin 14.6 g/dL      Hematocrit 45.3 %      MCV 92 fL      MCH 29.7 pg      MCHC 32.2 g/dL      RDW 14.7 %      MPV 9.2 fL      Platelets 802 Thousands/uL      nRBC 0 /100 WBCs      Neutrophils Relative 66 %      Immat GRANS % 0 %      Lymphocytes Relative 20 %      Monocytes Relative 11 %      Eosinophils Relative 2 %      Basophils Relative 1 %      Neutrophils Absolute 5.07 Thousands/µL      Immature Grans Absolute 0.03 Thousand/uL      Lymphocytes Absolute 1.54 Thousands/µL      Monocytes Absolute 0.84 Thousand/µL      Eosinophils Absolute 0.14 Thousand/µL      Basophils Absolute 0.06 Thousands/µL                  XR chest 1 view portable   ED Interpretation by Whitney Morrison DO (10/07 8224)   Pulmonary edema                 Procedures  ECG 12 Lead Documentation Only    Date/Time: 10/7/2023 8:04 AM    Performed by: Whitney Morrison DO  Authorized by: Whitney Morrison DO    ECG reviewed by me, the ED Provider: yes    Patient location:  ED  Previous ECG:     Previous ECG:  Compared to current    Comparison ECG info:  No significant change in ST segment pattern from October 24, 2021    Similarity:  No change  Interpretation:     Interpretation: normal    Rate:     ECG rate assessment: normal    Rhythm:     Rhythm: sinus rhythm    Ectopy:     Ectopy: none    QRS:     QRS axis:  Normal  Conduction:     Conduction: abnormal      Abnormal conduction: complete RBBB    ST segments:     ST segments:  Abnormal    Depression:  V3, V4, V5 and V6  T waves:     T waves: inverted      Inverted:  V4, V3, V5 and V6 ED Course  ED Course as of 10/07/23 0922   Sat Oct 07, 2023   0844 hs TnI 0hr(!): [phone] Spoke with cardiology. Patient stable for admission to our facility.   0901 Discussed with medicine for admission. 3899 Updated family. Updated patient.   7831 Blood pressure improved. 7823 BNP(!): 1,904                               SBIRT 22yo+    Flowsheet Row Most Recent Value   Initial Alcohol Screen: US AUDIT-C     1. How often do you have a drink containing alcohol? 0 Filed at: 10/07/2023 0806   2. How many drinks containing alcohol do you have on a typical day you are drinking? 0 Filed at: 10/07/2023 0806   3a. Male UNDER 65: How often do you have five or more drinks on one occasion? 0 Filed at: 10/07/2023 0806   Audit-C Score 0 Filed at: 10/07/2023 0231   SILVIA: How many times in the past year have you. .. Used an illegal drug or used a prescription medication for non-medical reasons? Never Filed at: 10/07/2023 0508                    Medical Decision Making  Patient presented to the emergency department and a MSE was performed. The patient was evaluated for complaint related to acute shortness of breath. Patient is potentially at risk for, but not limited to, acute coronary syndrome, arrhythmia, myocardial infarction, pulmonary embolism, pneumothorax, infectious process of the lungs such as pneumonia, reactive airway disease, asthma, COPD exacerbation, cardiomyopathy, congestive heart failure or viral syndrome. Several of these diagnoses have been evaluated and ruled out by history and physical.  As needed, patient will be further evaluated with laboratory and imaging studies. Higher level diagnostics, such as CT imaging or ultrasound, may also be required. Please see work-up portion of the note for further evaluation of patient's risk. Socioeconomic factors were also considered as part of the decision-making process.   Unless otherwise stated in the chart or patient is admitted as elsewhere documented, any previously prescribed medications will be maintained. CHF (congestive heart failure) ([address]): chronic illness or injury with exacerbation, progression, or side effects of treatment that poses a threat to life or bodily functions  Dyspnea: acute illness or injury  Hypertension: chronic illness or injury with exacerbation, progression, or side effects of treatment  Type 2 myocardial infarction [facility]): complicated acute illness or injury with systemic symptoms that poses a threat to life or bodily functions  Amount and/or Complexity of Data Reviewed  External Data Reviewed: labs, radiology and ECG. Labs: ordered. Decision-making details documented in ED Course. Radiology: independent interpretation performed. ECG/medicine tests: ordered and independent interpretation performed. Decision-making details documented in ED Course. Discussion of management or test interpretation with external provider(s): Discussed case with cardiology for management. Risk  Prescription drug management. Decision regarding hospitalization. Risk Details: Patient presented to the emergency department and a MSE was performed. The patient was evaluated and diagnosed with acute exacerbation of dyspnea and congestive heart failure. This is an acute exacerbation of a chronic disease process that will require additional planned work-up and treatment in a hospitalized setting. As may have been required as part of this evaluation, clinical laboratory test, radiology imaging and medical testing (I.e. EKG) were ordered as necessitated by the patient's presentation. I independently reviewed these studies, imaging and testing. This patient's case is considered to be a considerable risk secondary to the above listed disease process and poses a threat to the patient's well-being and baseline function. Further in-patient diagnostic testing and management, which may include the administration of parenteral medications, is required. Patient presented to the ED and was found to be critically ill as demonstrated by the clinical history and primary physical evaluation. Pt had demonstrative findings and / or derangements of vital signs indicative for severe illness or injury. I personally performed bedside history and evaluation. Interventions to address these clinical needs were ordered/performed. These included, but not necessarily limited to, the ordering and subsequent review of lab studies, imaging and EKG. Please see chart with regards to specific resuscitative interventions and diagnostics. Due to a probability of clinically significant, life or limb threatening condition, the patient required my highest level of care, intervention and attention. I personally spent the documented time directly managing the patient. The critical care time included obtaining a history, examining the patient, ordering and review of studies, arranging urgent treatment with development of a management plan, evaluation of patient's response to treatment, reassessment, and, if warranted, discussions with other providers or consultants. Documentation to the medical record for continuity of care was also required. Patients records pertinent to the emergent presenting condition were reviewed as available. Family was updated as available and appropriate. This critical care time was performed to assess and manage the high probability of imminent, life-threatening deterioration that could result in multi-organ failure if not addressed. It was exclusive of separately billable procedures and treating other patients and teaching time. Please see MDM section and the rest of the note for further information on patient assessment, reassessment, interventions and treatment. Total time was 32 mins exclusive of separate billable procedures.           Disposition  Final diagnoses:   Dyspnea   Type 2 myocardial infarction [facility])   CHF (congestive heart failure) [facility])   Hypertension     Time reflects when diagnosis was documented in both MDM as applicable and the Disposition within this note     Time User Action Codes Description Comment    10/7/2023  8:51 AM Antonieta Guerra Add [R06.00] Dyspnea     10/7/2023  8:52 AM Fany Randolph Add [I21. A1] Type 2 myocardial infarction ([address])     10/7/2023  8:52 AM Fany Espinosa Add [I50.9] CHF (congestive heart failure) ([address])     10/7/2023  8:52 AM Antonieta Guerra Add [I10] Hypertension       ED Disposition     ED Disposition   Admit    Condition   Stable    Date/Time   Sat Oct 7, 2023  8:59 AM    Comment              Follow-up Information    None         Patient's Medications   Discharge Prescriptions    No medications on file       No discharge procedures on file.     PDMP Review       Value Time User    PDMP Reviewed  Yes 10/23/2021  1:01 PM Medardo Leal MD          ED Provider  Electronically Signed by           Zack Aldana DO  10/07/23 [address] [address],   10/07/23 6449

## 2023-10-07 NOTE — ASSESSMENT & PLAN NOTE
Wt Readings from Last 3 Encounters:   03/03/23 77.1 kg (170 lb)   12/02/22 77.1 kg (170 lb)   10/24/21 72.6 kg (160 lb 0.9 oz)   · Last echo done 2022 EF of 22% diastolic dysfunction. Follows with Dr. Brittaney Suazo cardiology stated that he saw him last time 2 months ago and he was supposed to repeat an echocardiogram to discuss defibrillator. · Chest x-ray compliant with pulmonary edema elevation of proBNP orthopnea PND and symptoms he is not on diuretics at home he does eat a lot of salt he does have lower extremity edema  · He was given Lasix 40 mg IV x1 in the ER  · Continue Lasix 40 mg IV twice daily continue lisinopril 5 mg daily restart his beta-blocker he has not taking any of his blood pressure medications couple of days. ·  monitor his electrolytes.   Repeated 2D echo as cardiology to evaluate  · 1.8 L fluid restriction  · Daily weights standings I's and O's

## 2023-10-07 NOTE — PLAN OF CARE
Problem: PAIN - ADULT  Goal: Verbalizes/displays adequate comfort level or baseline comfort level  Description: Interventions:  - Encourage patient to monitor pain and request assistance  - Assess pain using appropriate pain scale  - Administer analgesics based on type and severity of pain and evaluate response  - Implement non-pharmacological measures as appropriate and evaluate response  - Consider cultural and social influences on pain and pain management  - Notify physician/advanced practitioner if interventions unsuccessful or patient reports new pain  Outcome: Progressing     Problem: INFECTION - ADULT  Goal: Absence or prevention of progression during hospitalization  Description: INTERVENTIONS:  - Assess and monitor for signs and symptoms of infection  - Monitor lab/diagnostic results  - Monitor all insertion sites, i.e. indwelling lines, tubes, and drains  - Monitor endotracheal if appropriate and nasal secretions for changes in amount and color  - [place] appropriate cooling/warming therapies per order  - Administer medications as ordered  - Instruct and encourage patient and family to use good hand hygiene technique  - Identify and instruct in appropriate isolation precautions for identified infection/condition  Outcome: Progressing  Goal: Absence of fever/infection during neutropenic period  Description: INTERVENTIONS:  - Monitor WBC    Outcome: Progressing     Problem: SAFETY ADULT  Goal: Patient will remain free of falls  Description: INTERVENTIONS:  - Educate patient/family on patient safety including physical limitations  - Instruct patient to call for assistance with activity   - Consult OT/PT to assist with strengthening/mobility   - Keep Call bell within reach  - Keep bed low and locked with side rails adjusted as appropriate  - Keep care items and personal belongings within reach  - Initiate and maintain comfort rounds  - Make Fall Risk Sign visible to staff  - Offer Toileting every 2 Hours, in advance of need  - Initiate/Maintain alarm  - Obtain necessary fall risk management equipment  - Apply yellow socks and bracelet for high fall risk patients  - Consider moving patient to room near nurses station  Outcome: Progressing  Goal: Maintain or return to baseline ADL function  Description: INTERVENTIONS:  -  Assess patient's ability to carry out ADLs; assess patient's baseline for ADL function and identify physical deficits which impact ability to perform ADLs (bathing, care of mouth/teeth, toileting, grooming, dressing, etc.)  - Assess/evaluate cause of self-care deficits   - Assess range of motion  - Assess patient's mobility; develop plan if impaired  - Assess patient's need for assistive devices and provide as appropriate  - Encourage maximum independence but intervene and supervise when necessary  - Involve family in performance of ADLs  - Assess for home care needs following discharge   - Consider OT consult to assist with ADL evaluation and planning for discharge  - Provide patient education as appropriate  Outcome: Progressing  Goal: Maintains/Returns to pre admission functional level  Description: INTERVENTIONS:  - Perform BMAT or MOVE assessment daily.   - Set and communicate daily mobility goal to care team and patient/family/caregiver. - Collaborate with rehabilitation services on mobility goals if consulted  - Perform Range of Motion 3 times a day. - Reposition patient every 2 hours.   - Dangle patient 3 times a day  - Stand patient 3 times a day  - Ambulate patient 3 times a day  - Out of bed to chair 3 times a day   - Out of bed for meals 3 times a day  - Out of bed for toileting  - Record patient progress and toleration of activity level   Outcome: Progressing     Problem: DISCHARGE PLANNING  Goal: Discharge to home or other facility with appropriate resources  Description: INTERVENTIONS:  - Identify barriers to discharge w/patient and caregiver  - Arrange for needed discharge resources and transportation as appropriate  - Identify discharge learning needs (meds, wound care, etc.)  - Arrange for interpretive services to assist at discharge as needed  - Refer to Case Management Department for coordinating discharge planning if the patient needs post-hospital services based on physician/advanced practitioner order or complex needs related to functional status, cognitive ability, or social support system  Outcome: Progressing     Problem: Knowledge Deficit  Goal: Patient/family/caregiver demonstrates understanding of disease process, treatment plan, medications, and discharge instructions  Description: Complete learning assessment and assess knowledge base.   Interventions:  - Provide teaching at level of understanding  - Provide teaching via preferred learning methods  Outcome: Progressing

## 2023-10-07 NOTE — ASSESSMENT & PLAN NOTE
· Suspect demand cardiology has agreed as discussed with ER physician and seen messages. There is no acute ischemic changes in the EKG does have a chronic right bundle branch block with secondary T wave changes.   Trend till peak repeated 2D echo diuresis neurology consultation placed on telemetry monitor

## 2023-10-07 NOTE — ASSESSMENT & PLAN NOTE
· Blood pressure systolic is greater than 328 with evidence of CHF lowered slowly over 24 to 48 hours.   Patient has not been taking any of his medications diuresed restart his Coreg and lisinopril

## 2023-10-08 PROBLEM — N17.9 AKI (ACUTE KIDNEY INJURY) (HCC): Status: ACTIVE | Noted: 2023-10-08

## 2023-10-08 LAB
ALBUMIN SERPL BCP-MCNC: 3.3 G/DL (ref 3.5–5)
ALP SERPL-CCNC: 89 U/L (ref 34–104)
ALT SERPL W P-5'-P-CCNC: 15 U/L (ref 7–52)
AMPHETAMINES SERPL QL SCN: POSITIVE
ANION GAP SERPL CALCULATED.3IONS-SCNC: 7 MMOL/L
AST SERPL W P-5'-P-CCNC: 24 U/L (ref 13–39)
BARBITURATES UR QL: NEGATIVE
BENZODIAZ UR QL: NEGATIVE
BILIRUB SERPL-MCNC: 0.61 MG/DL (ref 0.2–1)
BUN SERPL-MCNC: 27 MG/DL (ref 5–25)
CALCIUM ALBUM COR SERPL-MCNC: 9.6 MG/DL (ref 8.3–10.1)
CALCIUM SERPL-MCNC: 9 MG/DL (ref 8.4–10.2)
CHLORIDE SERPL-SCNC: 100 MMOL/L (ref 96–108)
CO2 SERPL-SCNC: 27 MMOL/L (ref 21–32)
COCAINE UR QL: NEGATIVE
CREAT SERPL-MCNC: 1.42 MG/DL (ref 0.6–1.3)
GFR SERPL CREATININE-BSD FRML MDRD: 54 ML/MIN/1.73SQ M
GLUCOSE SERPL-MCNC: 102 MG/DL (ref 65–140)
MAGNESIUM SERPL-MCNC: 1.6 MG/DL (ref 1.9–2.7)
METHADONE UR QL: NEGATIVE
OPIATES UR QL SCN: NEGATIVE
OXYCODONE+OXYMORPHONE UR QL SCN: NEGATIVE
PCP UR QL: NEGATIVE
POTASSIUM SERPL-SCNC: 4.1 MMOL/L (ref 3.5–5.3)
PROT SERPL-MCNC: 6.4 G/DL (ref 6.4–8.4)
SODIUM SERPL-SCNC: 134 MMOL/L (ref 135–147)
T4 FREE SERPL-MCNC: 1.04 NG/DL (ref 0.61–1.12)
THC UR QL: POSITIVE
TSH SERPL DL<=0.05 MIU/L-ACNC: 8.13 UIU/ML (ref 0.45–4.5)

## 2023-10-08 PROCEDURE — 84443 ASSAY THYROID STIM HORMONE: CPT | Performed by: FAMILY MEDICINE

## 2023-10-08 PROCEDURE — 83735 ASSAY OF MAGNESIUM: CPT | Performed by: FAMILY MEDICINE

## 2023-10-08 PROCEDURE — 99232 SBSQ HOSP IP/OBS MODERATE 35: CPT | Performed by: FAMILY MEDICINE

## 2023-10-08 PROCEDURE — 84439 ASSAY OF FREE THYROXINE: CPT | Performed by: FAMILY MEDICINE

## 2023-10-08 PROCEDURE — 80307 DRUG TEST PRSMV CHEM ANLYZR: CPT | Performed by: NURSE PRACTITIONER

## 2023-10-08 PROCEDURE — 80053 COMPREHEN METABOLIC PANEL: CPT | Performed by: FAMILY MEDICINE

## 2023-10-08 RX ORDER — LORAZEPAM 2 MG/ML
1 INJECTION INTRAMUSCULAR ONCE
Status: DISCONTINUED | OUTPATIENT
Start: 2023-10-08 | End: 2023-10-09

## 2023-10-08 RX ORDER — MAGNESIUM SULFATE HEPTAHYDRATE 40 MG/ML
4 INJECTION, SOLUTION INTRAVENOUS ONCE
Status: COMPLETED | OUTPATIENT
Start: 2023-10-08 | End: 2023-10-08

## 2023-10-08 RX ORDER — FUROSEMIDE 10 MG/ML
40 INJECTION INTRAMUSCULAR; INTRAVENOUS DAILY
Status: DISCONTINUED | OUTPATIENT
Start: 2023-10-09 | End: 2023-10-09

## 2023-10-08 RX ADMIN — ENOXAPARIN SODIUM 40 MG: 40 INJECTION SUBCUTANEOUS at 08:42

## 2023-10-08 RX ADMIN — CARVEDILOL 6.25 MG: 6.25 TABLET, FILM COATED ORAL at 08:47

## 2023-10-08 RX ADMIN — NICOTINE 1 PATCH: 14 PATCH, EXTENDED RELEASE TRANSDERMAL at 08:39

## 2023-10-08 RX ADMIN — MAGNESIUM SULFATE HEPTAHYDRATE 4 G: 40 INJECTION, SOLUTION INTRAVENOUS at 10:21

## 2023-10-08 RX ADMIN — ACETAMINOPHEN 650 MG: 325 TABLET, FILM COATED ORAL at 19:52

## 2023-10-08 RX ADMIN — FUROSEMIDE 40 MG: 10 INJECTION, SOLUTION INTRAMUSCULAR; INTRAVENOUS at 08:42

## 2023-10-08 NOTE — UTILIZATION REVIEW
Initial Clinical Review    Admission: Date/Time/Statement:   Admission Orders (From admission, onward)     Ordered        10/07/23 0913  INPATIENT ADMISSION  Once                      Orders Placed This Encounter   Procedures   • INPATIENT ADMISSION     Standing Status:   Standing     Number of Occurrences:   1     Order Specific Question:   Level of Care     Answer:   Med Surg [16]     Order Specific Question:   Estimated length of stay     Answer:   More than 2 Midnights     Order Specific Question:   Certification     Answer:   I certify that inpatient services are medically necessary for this patient for a duration of greater than two midnights. See H&P and MD Progress Notes for additional information about the patient's course of treatment. ED Arrival Information     Expected   -    Arrival   10/7/2023 07:49    Acuity   Emergent            Means of arrival   Walk-In    Escorted by   Self    Service   Hospitalist    Admission type   Emergency            Arrival complaint   Sob           Chief Complaint   Patient presents with   • Shortness of Breath     SOB starting Tuesday. Initial Presentation: 62 y.o. male presents to ED from h ome with worsening shortness of breath, orthopnea. PMH  Is  COPD, CHF,  + smoker,   Leg edema at baseline, not on diuretics, eats hi salt  Foods. Has intermittent productive cough, wheezing. BP elevated on admission,  181/135. CXR  Shows pulmonary edema. Admit  Ip with  Acute/chronic combined systolic/diastolic CHF, Hypertensive emergency and plan is  IV lasix, fluid restrict, daily weight, monitor labs, 2 DE,  Monitor  BP,  Cardiology consult, tele  And nebulizers. Date:    10/8       Day 2:   Continue fluid restrict. Wait  2  DE. Continue  IV lasix,  Down 4 lbs but creatinine slightly up. Still with  LE  Edema, improved. Replace electrolytes. BP improved. Shortness of breath improved.     Date:    10/9    Day 3: Has surpassed a 2nd midnight with active treatments and services, which include . Cardiology consult  Transitioning to po lasix. 2 DE pending. Need strict  I & O/fluid restrict. Weight down total  8  Lbs. ED Triage Vitals   Temperature Pulse Respirations Blood Pressure SpO2   10/07/23 0756 10/07/23 0756 10/07/23 0756 10/07/23 0756 10/07/23 0756   (!) 97.2 °F (36.2 °C) 85 22 (!) 181/135 98 %      Temp Source Heart Rate Source Patient Position - Orthostatic VS BP Location FiO2 (%)   10/07/23 0756 10/07/23 0756 10/07/23 0756 10/07/23 0756 --   Temporal Monitor Lying Left arm       Pain Score       10/07/23 0908       No Pain          Wt Readings from Last 1 Encounters:   10/08/23 73.5 kg (162 lb)     Additional Vital Signs:   98.1 °F (36.7 °C) 62 17 -- 107 92 % -- --    10/08/23 0330 97.7 °F (36.5 °C) 83 18 115/92 1 Abnormal  93 % -- --   10/07/23 23:41:03 97.9 °F (36.6 °C) -- 16 129/89 102 -- -- --   10/07/23 2045 -- -- -- -- -- -- None (Room air) --   10/07/23 1530 98.6 °F (37 °C) 43 Abnormal  22 138/107 Abnormal  117 97 % -- --   10/07/23 12:17:37 -- 52 Abnormal  -- 153/104 Abnormal  120 93 % -- --   10/07/23 1215 -- -- -- 153/104 Abnormal  -- -- -- --   10/07/23 1100 -- -- -- -- -- 92 % None (Room air) --   10/07/23 09:55:11 97.9 °F (36.6 °C) 73 -- 151/110 Abnormal  124 91 % -- --   10/07/23 0955 97.9 °F (36.6 °C) 60 20 153/104 Abnormal  -- -- -- --   10/07/23 09:54:49 -- 74 20 151/110 Abnormal  124 95 % -- --   10/07/23 0930 -- 75 18 140/111 Abnormal  122 95 % None (Room air) --   10/07/23 0915 -- 74 18 127/98 108 93 % None (Room air) --   10/07/23 0908 -- 73 18 151/120 Abnormal  133 99 % None (Room air) --   10/07/23 0845 -- -- -- -- -- -- None (Room air) --   10/07/23 0830 -- 83 18 146/117 Abnormal  128 98 % -- --   10/07/23 0756 97.2 °F (36.2 °C) Abnormal  85 22 181/135 Abnormal  -- 98 % None (Room air) Lying       Pertinent Labs/Diagnostic Test Results:   EKG  No acute ischemic changes.     XR chest 1 view portable   ED Interpretation by Matias Han DO (10/07 1868)   Pulmonary edema      Final Result by Izzy Lindsey MD (10/07 1922)      Moderate cardiomegaly. Moderate pulmonary edema with trace left effusion.                Workstation performed: [id number]           Results from last 7 days   Lab Units 10/07/23  0836   SARS-COV-2  Negative     Results from last 7 days   Lab Units 10/07/23  0803   WBC Thousand/uL 7.68   HEMOGLOBIN g/dL 14.6   HEMATOCRIT % 45.3   PLATELETS Thousands/uL 304   NEUTROS ABS Thousands/µL 5.07         Results from last 7 days   Lab Units 10/08/23  0449 10/07/23  0803   SODIUM mmol/L 134* 133*   POTASSIUM mmol/L 4.1 4.3   CHLORIDE mmol/L 100 100   CO2 mmol/L 27 27   ANION GAP mmol/L 7 6   BUN mg/dL 27* 24   CREATININE mg/dL 1.42* 1.03   EGFR ml/min/1.73sq m 54 79   CALCIUM mg/dL 9.0 9.1   MAGNESIUM mg/dL 1.6*  --      Results from last 7 days   Lab Units 10/08/23  0449 10/07/23  0803   AST U/L 24 26   ALT U/L 15 18   ALK PHOS U/L 89 115*   TOTAL PROTEIN g/dL 6.4 6.8   ALBUMIN g/dL 3.3* 3.5   TOTAL BILIRUBIN mg/dL 0.61 0.47         Results from last 7 days   Lab Units 10/08/23  0449 10/07/23  0803   GLUCOSE RANDOM mg/dL 102 100           Results from last 7 days   Lab Units 10/07/23  1222 10/07/23  0929 10/07/23  0803   HS TNI 0HR ng/L  --   --  117*   HS TNI 2HR ng/L  --  123*  --    HSTNI D2 ng/L  --  6  --    HS TNI 4HR ng/L 115*  --   --    HSTNI D4 ng/L -2  --   --      Results from last 7 days   Lab Units 10/07/23  0803   D-DIMER QUANTITATIVE ug/ml FEU 0.65*     Results from last 7 days   Lab Units 10/07/23  0803   PROTIME seconds 13.2   INR  0.97   PTT seconds 31     Results from last 7 days   Lab Units 10/08/23  0449   TSH 3RD GENERATON uIU/mL 8.135*                     Results from last 7 days   Lab Units 10/07/23  0803   BNP pg/mL 1,904*               Results from last 7 days   Lab Units 10/07/23  0836   INFLUENZA A PCR  Negative   INFLUENZA B PCR  Negative   RSV PCR  Negative         ED Treatment: Medication Administration from 10/07/2023 0749 to 10/07/2023 0946       Date/Time Order Dose Route Action Comments     10/07/2023 0836 EDT ipratropium-albuterol (DUO-NEB) 0.5-2.5 mg/3 mL inhalation solution 3 mL 3 mL Nebulization Given --     10/07/2023 0836 EDT metoprolol (LOPRESSOR) injection 5 mg 5 mg Intravenous Given --     10/07/2023 0385 EDT nitroglycerin (NITROSTAT) SL tablet 0.4 mg 0.4 mg Sublingual Given --     10/07/2023 8534 EDT furosemide (LASIX) injection 40 mg 40 mg Intravenous Given --     10/07/2023 0930 EDT ondansetron (ZOFRAN) injection 4 mg 4 mg Intravenous Given --          Present on Admission:  • Elevated troponin  • Scleroderma (HCC)  • Emphysema of lung (HCC)      Admitting Diagnosis: CHF (congestive heart failure) ([facility]) [I50.9]  Dyspnea [R06.00]  SOB (shortness of breath) [R06.02]  Hypertension [I10]  Type 2 myocardial infarction ([address]) [I21. A1]  Age/Sex: 62 y.o. male  Admission Orders:  Scheduled Medications:  carvedilol, 6.25 mg, Oral, BID With Meals  enoxaparin, 40 mg, Subcutaneous, Daily  [START ON 10/9/2023] furosemide, 40 mg, Intravenous, Daily  magnesium sulfate, 4 g, Intravenous, Once  nicotine, 1 patch, Transdermal, Daily      Continuous IV Infusions:     PRN Meds:  acetaminophen, 650 mg, Oral, Q4H PRN  albuterol, 2 puff, Inhalation, Q4H PRN        IP CONSULT TO CARDIOLOGY  IP CONSULT TO NUTRITION SERVICES    Network Utilization Review Department  ATTENTION: Please call with any questions or concerns to [phone] and carefully listen to the prompts so that you are directed to the right person. All voicemails are confidential.   For Discharge needs, contact Care Management DC Support Team at [phone] opt. 2  Send all requests for admission clinical reviews, approved or denied determinations and any other requests to dedicated fax number below belonging to the campus where the patient is receiving treatment.  List of dedicated fax numbers for the Facilities:  [address] [place] [address] FAX NUMBER   ADMISSION DENIALS (Administrative/Medical Necessity) [phone]   DISCHARGE SUPPORT TEAM (NETWORK) [id number] [facility] (Maternity/NICU/Pediatrics) [address] [facility] [address] [facility] [phone] 1505 [facility] 207 [facility] 5220 [facility] [address] [address] [facility] [address] [place] [address] [facility] [phone]

## 2023-10-08 NOTE — ASSESSMENT & PLAN NOTE
· Blood pressure systolic is greater than 555 with evidence of CHF lowered slowly over 24 to 48 hours.   Patient has not been taking any of his medications diuresed restart his Coreg and lisinopril  · Resolved blood pressure stable secondary to acute kidney injury hold lisinopril

## 2023-10-08 NOTE — ASSESSMENT & PLAN NOTE
· Suspect demand cardiology has agreed as discussed with ER physician and seen messages. There is no acute ischemic changes in the EKG does have a chronic right bundle branch block with secondary T wave changes.   Trend till peak repeated 2D echo diuresis neurology consultation placed on telemetry monitor  · Troponin trended down

## 2023-10-08 NOTE — ASSESSMENT & PLAN NOTE
Wt Readings from Last 3 Encounters:   10/08/23 73.5 kg (162 lb)   03/03/23 77.1 kg (170 lb)   12/02/22 77.1 kg (170 lb)   · Last echo done 2022 EF of 33% diastolic dysfunction. Follows with Dr. Cong Milner cardiology stated that he saw him last time 2 months ago and he was supposed to repeat an echocardiogram to discuss defibrillator. · Chest x-ray compliant with pulmonary edema elevation of proBNP orthopnea PND and symptoms he is not on diuretics at home he does eat a lot of salt he does have lower extremity edema  ·  monitor his electrolytes. Repeated 2D echo as cardiology to evaluate  · 1.8 L fluid restriction  · Daily weights standings I's and O's  · Has been on Lasix 40 mg IV twice daily with a negative of 4.475 L and down 4 pounds creatinine slightly up will reduce down to Lasix 40 mg IV daily as evidence of pulmonary edema on exam is still there but the lower extremity edema is resolved and hold off on lisinopril.   Replace magnesium sulfate 4 g potassium is stable

## 2023-10-08 NOTE — ASSESSMENT & PLAN NOTE
· 0.3 from admission secondary to IV diuresis also being on lisinopril.   Reduce diuresis to daily and hold lisinopril repeat labs tomorrow

## 2023-10-08 NOTE — PROGRESS NOTES
[address] [facility]  Progress Note  Name: Zack Pearl  MRN: [id number]  Unit/Bed#: -01 I Date of Admission: 10/7/2023   Date of Service: 10/8/2023 I Hospital Day: 1    Assessment/Plan   * Acute on chronic combined systolic and diastolic congestive heart failure [facility])  Assessment & Plan  Wt Readings from Last 3 Encounters:   10/08/23 73.5 kg (162 lb)   03/03/23 77.1 kg (170 lb)   12/02/22 77.1 kg (170 lb)   · Last echo done 2022 EF of 92% diastolic dysfunction. Follows with Dr. Hermes Poole cardiology stated that he saw him last time 2 months ago and he was supposed to repeat an echocardiogram to discuss defibrillator. · Chest x-ray compliant with pulmonary edema elevation of proBNP orthopnea PND and symptoms he is not on diuretics at home he does eat a lot of salt he does have lower extremity edema  ·  monitor his electrolytes. Repeated 2D echo as cardiology to evaluate  · 1.8 L fluid restriction  · Daily weights standings I's and O's  · Has been on Lasix 40 mg IV twice daily with a negative of 4.475 L and down 4 pounds creatinine slightly up will reduce down to Lasix 40 mg IV daily as evidence of pulmonary edema on exam is still there but the lower extremity edema is resolved and hold off on lisinopril. Replace magnesium sulfate 4 g potassium is stable          IRINEO (acute kidney injury) ([address])  Assessment & Plan  · 0.3 from admission secondary to IV diuresis also being on lisinopril. Reduce diuresis to daily and hold lisinopril repeat labs tomorrow    Hypertensive emergency  Assessment & Plan  · Blood pressure systolic is greater than 667 with evidence of CHF lowered slowly over 24 to 48 hours.   Patient has not been taking any of his medications diuresed restart his Coreg and lisinopril  · Resolved blood pressure stable secondary to acute kidney injury hold lisinopril    Emphysema of lung ([address])  Assessment & Plan  · Not in exacerbation we will place him back on his albuterol as needed    Elevated troponin  Assessment & Plan  · Suspect demand cardiology has agreed as discussed with ER physician and seen messages. There is no acute ischemic changes in the EKG does have a chronic right bundle branch block with secondary T wave changes. Trend till peak repeated 2D echo diuresis neurology consultation placed on telemetry monitor  · Troponin trended down    Scleroderma (HCC)  Assessment & Plan  · Not on any medications               VTE Pharmacologic Prophylaxis: VTE Score: 3 Moderate Risk (Score 3-4) - Pharmacological DVT Prophylaxis Ordered: enoxaparin (Lovenox). Patient Centered Rounds: I performed bedside rounds with nursing staff today. Discussions with Specialists or Other Care Team Provider: discussed with cm     Education and Discussions with Family / Patient: pt will update family   Total Time Spent on Date of Encounter in care of patient: >35 mins. This time was spent on one or more of the following: performing physical exam; counseling and coordination of care; obtaining or reviewing history; documenting in the medical record; reviewing/ordering tests, medications or procedures; communicating with other healthcare professionals and discussing with patient's family/caregivers. Current Length of Stay: 1 day(s)  Current Patient Status: Inpatient   Certification Statement: The patient will continue to require additional inpatient hospital stay due to Acute kidney injury CHF  Discharge Plan: Anticipate discharge in 48 hrs to home. Code Status: Level 1 - Full Code    Subjective:   Patient seen and examined shortness of breath improved no chest pain    Objective:     Vitals:   Temp (24hrs), Av.1 °F (36.7 °C), Min:97.7 °F (36.5 °C), Max:98.6 °F (37 °C)    Temp:  [97.7 °F (36.5 °C)-98.6 °F (37 °C)] 98.1 °F (36.7 °C)  HR:  [43-83] 62  Resp:  [16-22] 17  BP: (115-153)/() 148/98  SpO2:  [92 %-97 %] 92 %  Body mass index is 23.92 kg/m².      Input and Output Summary (last 24 hours): Intake/Output Summary (Last 24 hours) at 10/8/2023 1108  Last data filed at 10/7/2023 2341  Gross per 24 hour   Intake 1080 ml   Output 3550 ml   Net -2470 ml       Physical Exam:   Physical Exam  Vitals and nursing note reviewed. Constitutional:       General: He is not in acute distress. Appearance: He is well-developed. HENT:      Head: Normocephalic and atraumatic. Eyes:      Conjunctiva/sclera: Conjunctivae normal.   Cardiovascular:      Rate and Rhythm: Normal rate and regular rhythm. Heart sounds: No murmur heard. Pulmonary:      Effort: Pulmonary effort is normal. No respiratory distress. Breath sounds: Rales present. No wheezing. Abdominal:      General: Bowel sounds are normal. There is no distension. Palpations: Abdomen is soft. Tenderness: There is no abdominal tenderness. Musculoskeletal:         General: No swelling. Cervical back: Neck supple. Skin:     General: Skin is warm and dry. Capillary Refill: Capillary refill takes less than 2 seconds. Neurological:      Mental Status: He is alert.    Psychiatric:         Mood and Affect: Mood normal.          Additional Data:     Labs:  Results from last 7 days   Lab Units 10/07/23  0803   WBC Thousand/uL 7.68   HEMOGLOBIN g/dL 14.6   HEMATOCRIT % 45.3   PLATELETS Thousands/uL 304   NEUTROS PCT % 66   LYMPHS PCT % 20   MONOS PCT % 11   EOS PCT % 2     Results from last 7 days   Lab Units 10/08/23  0449   SODIUM mmol/L 134*   POTASSIUM mmol/L 4.1   CHLORIDE mmol/L 100   CO2 mmol/L 27   BUN mg/dL 27*   CREATININE mg/dL 1.42*   ANION GAP mmol/L 7   CALCIUM mg/dL 9.0   ALBUMIN g/dL 3.3*   TOTAL BILIRUBIN mg/dL 0.61   ALK PHOS U/L 89   ALT U/L 15   AST U/L 24   GLUCOSE RANDOM mg/dL 102     Results from last 7 days   Lab Units 10/07/23  0803   INR  0.97                   Lines/Drains:  Invasive Devices     Peripheral Intravenous Line  Duration           Peripheral IV 10/07/23 Right Antecubital 1 day                  Telemetry:  Telemetry Orders (From admission, onward)             24 Hour Telemetry Monitoring  Continuous x 24 Hours (Telem)        Question:  Reason for 24 Hour Telemetry  Answer:  Decompensated CHF- and any one of the following: continuous diuretic infusion or total diuretic dose >200 mg daily, associated electrolyte derangement (I.e. K < 3.0), ionotropic drip (continuous infusion), hx of ventricular arrhythmia, or new EF < 35%                 Telemetry Reviewed: Normal Sinus Rhythm  Indication for Continued Telemetry Use: Acute CHF on >200 mg lasix/day or equivalent dose or with new reduced EF. Imaging: Reviewed radiology reports from this admission including: chest xray    Recent Cultures (last 7 days):         Last 24 Hours Medication List:   Current Facility-Administered Medications   Medication Dose Route Frequency Provider Last Rate   • acetaminophen  650 mg Oral Q4H PRN Estuardo Hall MD     • albuterol  2 puff Inhalation Q4H PRN Estuardo Hall MD     • carvedilol  6.25 mg Oral BID With Meals Estuardo Hall MD     • enoxaparin  40 mg Subcutaneous Daily Estuardo Hall MD     • [START ON 10/9/2023] furosemide  40 mg Intravenous Daily Estuardo Hall MD     • magnesium sulfate  4 g Intravenous Once Estuardo Hall MD 4 g (10/08/23 1021)   • nicotine  1 patch Transdermal Daily Estuardo Hall MD          Today, Patient Was Seen By: Estuardo Hall MD    **Please Note: This note may have been constructed using a voice recognition system. **

## 2023-10-09 ENCOUNTER — APPOINTMENT (INPATIENT)
Dept: NON INVASIVE DIAGNOSTICS | Facility: HOSPITAL | Age: 58
DRG: 291 | End: 2023-10-09
Payer: COMMERCIAL

## 2023-10-09 PROBLEM — I42.9 CARDIOMYOPATHY (HCC): Status: ACTIVE | Noted: 2023-10-09

## 2023-10-09 LAB
ANION GAP SERPL CALCULATED.3IONS-SCNC: 7 MMOL/L
AORTIC ROOT: 3.4 CM
AORTIC VALVE MEAN VELOCITY: 9.3 M/S
APICAL FOUR CHAMBER EJECTION FRACTION: 29 %
ASCENDING AORTA: 2.9 CM
ATRIAL RATE: 87 BPM
AV AREA BY CONTINUOUS VTI: 1.5 CM2
AV AREA PEAK VELOCITY: 1.4 CM2
AV LVOT MEAN GRADIENT: 1 MMHG
AV LVOT PEAK GRADIENT: 1 MMHG
AV MEAN GRADIENT: 4 MMHG
AV PEAK GRADIENT: 7 MMHG
AV VALVE AREA: 1.46 CM2
AV VELOCITY RATIO: 0.42
BUN SERPL-MCNC: 28 MG/DL (ref 5–25)
CALCIUM SERPL-MCNC: 9 MG/DL (ref 8.4–10.2)
CHLORIDE SERPL-SCNC: 99 MMOL/L (ref 96–108)
CO2 SERPL-SCNC: 27 MMOL/L (ref 21–32)
CREAT SERPL-MCNC: 1.22 MG/DL (ref 0.6–1.3)
DOP CALC AO PEAK VEL: 1.3 M/S
DOP CALC AO VTI: 23.44 CM
DOP CALC LVOT AREA: 3.46 CM2
DOP CALC LVOT CARDIAC INDEX: 1.02 L/MIN/M2
DOP CALC LVOT CARDIAC OUTPUT: 1.9 L/MIN
DOP CALC LVOT DIAMETER: 2.1 CM
DOP CALC LVOT PEAK VEL VTI: 9.89 CM
DOP CALC LVOT PEAK VEL: 0.54 M/S
DOP CALC LVOT STROKE INDEX: 17.6 ML/M2
DOP CALC LVOT STROKE VOLUME: 34.24 CM3
E WAVE DECELERATION TIME: 145 MS
E/A RATIO: 1.02
FRACTIONAL SHORTENING: 22 % (ref 28–44)
GFR SERPL CREATININE-BSD FRML MDRD: 64 ML/MIN/1.73SQ M
GLUCOSE SERPL-MCNC: 101 MG/DL (ref 65–140)
INTERVENTRICULAR SEPTUM IN DIASTOLE (PARASTERNAL SHORT AXIS VIEW): 1.3 CM
INTERVENTRICULAR SEPTUM: 1.3 CM (ref 0.6–1.1)
LAAS-AP2: 30.3 CM2
LAAS-AP4: 31.7 CM2
LEFT ATRIUM SIZE: 4.5 CM
LEFT ATRIUM VOLUME (MOD BIPLANE): 140 ML
LEFT ATRIUM VOLUME INDEX (MOD BIPLANE): 74.9 ML/M2
LEFT INTERNAL DIMENSION IN SYSTOLE: 3.8 CM (ref 2.1–4)
LEFT VENTRICLE DIASTOLIC VOLUME (MOD BIPLANE): 195 ML
LEFT VENTRICLE SYSTOLIC VOLUME (MOD BIPLANE): 148 ML
LEFT VENTRICULAR INTERNAL DIMENSION IN DIASTOLE: 4.9 CM (ref 3.5–6)
LEFT VENTRICULAR POSTERIOR WALL IN END DIASTOLE: 1.9 CM
LEFT VENTRICULAR STROKE VOLUME: 47 ML
LV EF: 24 %
LVSV (TEICH): 47 ML
MAGNESIUM SERPL-MCNC: 2.1 MG/DL (ref 1.9–2.7)
MV E'TISSUE VEL-LAT: 3 CM/S
MV E'TISSUE VEL-SEP: 4 CM/S
MV PEAK A VEL: 0.88 M/S
MV PEAK E VEL: 90 CM/S
MV STENOSIS PRESSURE HALF TIME: 42 MS
MV VALVE AREA P 1/2 METHOD: 5.24 CM2
P AXIS: 61 DEGREES
POTASSIUM SERPL-SCNC: 4.2 MMOL/L (ref 3.5–5.3)
PR INTERVAL: 170 MS
PV PEAK GRADIENT: 4 MMHG
QRS AXIS: 91 DEGREES
QRSD INTERVAL: 192 MS
QT INTERVAL: 474 MS
QTC INTERVAL: 570 MS
RA PRESSURE ESTIMATED: 8 MMHG
RIGHT VENTRICLE ID DIMENSION: 5.8 CM
RV PSP: 32 MMHG
SL CV LEFT ATRIUM LENGTH A2C: 5.5 CM
SL CV LV EF: 30
SL CV PED ECHO LEFT VENTRICLE DIASTOLIC VOLUME (MOD BIPLANE) 2D: 111 ML
SL CV PED ECHO LEFT VENTRICLE SYSTOLIC VOLUME (MOD BIPLANE) 2D: 64 ML
SODIUM SERPL-SCNC: 133 MMOL/L (ref 135–147)
T WAVE AXIS: -42 DEGREES
TR MAX PG: 24 MMHG
TR PEAK VELOCITY: 2.4 M/S
TRICUSPID VALVE PEAK REGURGITATION VELOCITY: 2.43 M/S
VENTRICULAR RATE: 87 BPM

## 2023-10-09 PROCEDURE — 99232 SBSQ HOSP IP/OBS MODERATE 35: CPT | Performed by: FAMILY MEDICINE

## 2023-10-09 PROCEDURE — 93306 TTE W/DOPPLER COMPLETE: CPT

## 2023-10-09 PROCEDURE — 83735 ASSAY OF MAGNESIUM: CPT | Performed by: FAMILY MEDICINE

## 2023-10-09 PROCEDURE — 99223 1ST HOSP IP/OBS HIGH 75: CPT | Performed by: STUDENT IN AN ORGANIZED HEALTH CARE EDUCATION/TRAINING PROGRAM

## 2023-10-09 PROCEDURE — 80048 BASIC METABOLIC PNL TOTAL CA: CPT | Performed by: FAMILY MEDICINE

## 2023-10-09 PROCEDURE — 93306 TTE W/DOPPLER COMPLETE: CPT | Performed by: STUDENT IN AN ORGANIZED HEALTH CARE EDUCATION/TRAINING PROGRAM

## 2023-10-09 RX ORDER — LOSARTAN POTASSIUM 50 MG/1
50 TABLET ORAL DAILY
Status: DISCONTINUED | OUTPATIENT
Start: 2023-10-10 | End: 2023-10-10 | Stop reason: HOSPADM

## 2023-10-09 RX ORDER — FUROSEMIDE 20 MG/1
20 TABLET ORAL DAILY
Status: DISCONTINUED | OUTPATIENT
Start: 2023-10-09 | End: 2023-10-10 | Stop reason: HOSPADM

## 2023-10-09 RX ORDER — LORAZEPAM 2 MG/ML
0.5 INJECTION INTRAMUSCULAR EVERY 6 HOURS PRN
Status: DISCONTINUED | OUTPATIENT
Start: 2023-10-09 | End: 2023-10-10 | Stop reason: HOSPADM

## 2023-10-09 RX ORDER — LISINOPRIL 2.5 MG/1
2.5 TABLET ORAL DAILY
Status: DISCONTINUED | OUTPATIENT
Start: 2023-10-09 | End: 2023-10-09

## 2023-10-09 RX ADMIN — CARVEDILOL 6.25 MG: 6.25 TABLET, FILM COATED ORAL at 08:30

## 2023-10-09 RX ADMIN — NICOTINE 1 PATCH: 14 PATCH, EXTENDED RELEASE TRANSDERMAL at 08:30

## 2023-10-09 RX ADMIN — LISINOPRIL 2.5 MG: 2.5 TABLET ORAL at 09:34

## 2023-10-09 RX ADMIN — ENOXAPARIN SODIUM 40 MG: 40 INJECTION SUBCUTANEOUS at 08:30

## 2023-10-09 RX ADMIN — CARVEDILOL 6.25 MG: 6.25 TABLET, FILM COATED ORAL at 16:47

## 2023-10-09 RX ADMIN — FUROSEMIDE 20 MG: 20 TABLET ORAL at 09:34

## 2023-10-09 NOTE — ASSESSMENT & PLAN NOTE
BP initially quite elevated at admission, which has improved with diuresis. Will resume lisinopril at 2.5 mg today and assess response  Continue carvedilol 6.25 mg BID.

## 2023-10-09 NOTE — ASSESSMENT & PLAN NOTE
Known cardiomyopathy with LVEF 30% 12/2022. Follows with [facility] for evaluation and management. GDMT with carvedilol 6.25 mg BID and lisinopril at home. Presents for heart failure in the setting of poor medication compliance. Patient with known meth use. Echocardiogram today pending. He admits to inconsistently taking his prescriptions and continued meth use. I urged medication adherence and complete cessation from meth. Currently has diuresed well. See discussion under CHF. Further recommendations pending today's echo.   Ischemic work up as per his primary cardiologist.

## 2023-10-09 NOTE — PROGRESS NOTES
[address] [facility]  Progress Note  Name: Wilmar Marsh  MRN: [id number]  Unit/Bed#: -01 I Date of Admission: 10/7/2023   Date of Service: 10/9/2023 I Hospital Day: 2    Assessment/Plan   * Acute on chronic combined systolic and diastolic congestive heart failure [facility])  Assessment & Plan  Wt Readings from Last 3 Encounters:   10/09/23 71.7 kg (158 lb)   03/03/23 77.1 kg (170 lb)   12/02/22 77.1 kg (170 lb)   · Last echo done 2022 EF of 71% diastolic dysfunction. Follows with Dr. Katty Vila cardiology stated that he saw him last time 2 months ago and he was supposed to repeat an echocardiogram to discuss defibrillator. · Chest x-ray compliant with pulmonary edema elevation of proBNP orthopnea PND and symptoms he is not on diuretics at home he does eat a lot of salt he does have lower extremity edema  ·  monitor his electrolytes. Repeated 2D echo as cardiology to evaluate  · 1.8 L fluid restriction  · Daily weights standings I's and O's  · Down 8 lb total lungs clear no edema sob resolved- cards saw- start lasix 20 mg po daily and lisinopril 2.5 mg po daily -          IRINEO (acute kidney injury) ([address])  Assessment & Plan  · 0.3 from admission secondary to IV diuresis also being on lisinopril. Improved . Hypertensive emergency  Assessment & Plan  · Resolved continue coreg, cards restart lisinopril at 2.5 mg po daily    Emphysema of lung ([address])  Assessment & Plan  · Not in exacerbation we will place him back on his albuterol as needed    Elevated troponin  Assessment & Plan  · Suspect demand cardiology has agreed as discussed with ER physician and seen messages. There is no acute ischemic changes in the EKG does have a chronic right bundle branch block with secondary T wave changes.   Trend till peak repeated 2D echo diuresis neurology consultation placed on telemetry monitor  · Troponin trended down    Scleroderma [facility])  Assessment & Plan  · Not on any medications               VTE Pharmacologic Prophylaxis: VTE Score: 3 Moderate Risk (Score 3-4) - Pharmacological DVT Prophylaxis Ordered: enoxaparin (Lovenox). Patient Centered Rounds: I performed bedside rounds with nursing staff today. Discussions with Specialists or Other Care Team Provider: discussed with cards    Education and Discussions with Family / Patient:pt will update family  Total Time Spent on Date of Encounter in care of patient: >35 mins. This time was spent on one or more of the following: performing physical exam; counseling and coordination of care; obtaining or reviewing history; documenting in the medical record; reviewing/ordering tests, medications or procedures; communicating with other healthcare professionals and discussing with patient's family/caregivers. Current Length of Stay: 2 day(s)  Current Patient Status: Inpatient   Certification Statement: The patient will continue to require additional inpatient hospital stay due to chf  Discharge Plan: Anticipate discharge later today or tomorrow to home. Code Status: Level 1 - Full Code    Subjective:   Seen and examined no cp or sob    Objective:     Vitals:   Temp (24hrs), Av.3 °F (36.8 °C), Min:97.7 °F (36.5 °C), Max:98.7 °F (37.1 °C)    Temp:  [97.7 °F (36.5 °C)-98.7 °F (37.1 °C)] 98.7 °F (37.1 °C)  HR:  [45-76] 71  Resp:  [17-18] 18  BP: (119-128)/(77-99) 124/99  SpO2:  [92 %-100 %] 93 %  Body mass index is 23.33 kg/m². Input and Output Summary (last 24 hours): Intake/Output Summary (Last 24 hours) at 10/9/2023 1118  Last data filed at 10/8/2023 2037  Gross per 24 hour   Intake 540 ml   Output 1400 ml   Net -860 ml       Physical Exam:   Physical Exam  Vitals and nursing note reviewed. Constitutional:       General: He is not in acute distress. Appearance: He is well-developed. HENT:      Head: Normocephalic and atraumatic.    Eyes:      Conjunctiva/sclera: Conjunctivae normal.   Cardiovascular:      Rate and Rhythm: Normal rate and regular rhythm. Heart sounds: No murmur heard. Pulmonary:      Effort: Pulmonary effort is normal. No respiratory distress. Breath sounds: Normal breath sounds. No wheezing or rales. Abdominal:      General: There is no distension. Palpations: Abdomen is soft. Tenderness: There is no abdominal tenderness. Musculoskeletal:         General: No swelling. Cervical back: Neck supple. Skin:     General: Skin is warm and dry. Capillary Refill: Capillary refill takes less than 2 seconds. Neurological:      Mental Status: He is alert and oriented to person, place, and time.    Psychiatric:         Mood and Affect: Mood normal.          Additional Data:     Labs:  Results from last 7 days   Lab Units 10/07/23  0803   WBC Thousand/uL 7.68   HEMOGLOBIN g/dL 14.6   HEMATOCRIT % 45.3   PLATELETS Thousands/uL 304   NEUTROS PCT % 66   LYMPHS PCT % 20   MONOS PCT % 11   EOS PCT % 2     Results from last 7 days   Lab Units 10/09/23  0458 10/08/23  0449   SODIUM mmol/L 133* 134*   POTASSIUM mmol/L 4.2 4.1   CHLORIDE mmol/L 99 100   CO2 mmol/L 27 27   BUN mg/dL 28* 27*   CREATININE mg/dL 1.22 1.42*   ANION GAP mmol/L 7 7   CALCIUM mg/dL 9.0 9.0   ALBUMIN g/dL  --  3.3*   TOTAL BILIRUBIN mg/dL  --  0.61   ALK PHOS U/L  --  89   ALT U/L  --  15   AST U/L  --  24   GLUCOSE RANDOM mg/dL 101 102     Results from last 7 days   Lab Units 10/07/23  0803   INR  0.97                   Lines/Drains:  Invasive Devices     Peripheral Intravenous Line  Duration           Peripheral IV 10/07/23 Right Antecubital 2 days                      Imaging: Reviewed radiology reports from this admission including: chest xray    Recent Cultures (last 7 days):         Last 24 Hours Medication List:   Current Facility-Administered Medications   Medication Dose Route Frequency Provider Last Rate   • acetaminophen  650 mg Oral Q4H PRN Lg Tracy MD     • albuterol  2 puff Inhalation Q4H PRN Isaias Self MD Wanda     • carvedilol  6.25 mg Oral BID With Meals Socorro Garcia MD     • enoxaparin  40 mg Subcutaneous Daily Socorro Garcia MD     • furosemide  20 mg Oral Daily LUIS ANGEL Valentine     • lisinopril  2.5 mg Oral Daily LUIS ANGEL Valentine     • nicotine  1 patch Transdermal Daily Socorro Garcia MD          Today, Patient Was Seen By: Socorro Garcia MD    **Please Note: This note may have been constructed using a voice recognition system. **

## 2023-10-09 NOTE — NURSING NOTE
Late entry due to pt care: At around 1915, pt ringing call bell and told PCA that if his IV was not disconnected, he was going to pull it out. Pt had taken masimo and tele monitor off at this point. PCA came to this RN and I went to talk with pt and he said that he needed to be disconnected from the IV so he could shower. Upon assessment, pt's eyes were blood shot, pt was shaking, and couldn't sit still in the bed. When asked why pt was shaking and not sitting still, he stated "because this f**justin IV was beeping all day and now I have a headache". Pt had stated to this RN in the morning during lab work that he has a history of meth use, but hasn't used drugs in "years". I asked the pt this evening if he had any visitors during the day he stated that his sister came to visit, "but she doesn't do drugs". Monse Cole was notified, UDS and 1x dose ativan ordered. Ativan not needed as pt had calmed down after shower. UDS + for meth. Pt is now sleeping comfortably in bed. Will continue to monitor.

## 2023-10-09 NOTE — ASSESSMENT & PLAN NOTE
Wt Readings from Last 3 Encounters:   10/09/23 71.7 kg (158 lb)   03/03/23 77.1 kg (170 lb)   12/02/22 77.1 kg (170 lb)     Admitted 10/7 with progressive shortness of breath. BNP 1900, chest xray with vascular congestion. He has diuresed very well with IV furosemide 40 mg BID and appears euvolemic today. Will start furosemide PO 20 mg daily today and assess response. Echocardiogram currently pending. See discussion under cardiomyopathy. Urged adherence to medication management and avoidance of meth. Strict I's/O's, sodium and fluid restriction reviewed. Optimize electrolytes for K+ >4, Mag >2.

## 2023-10-09 NOTE — CONSULTS
Consult received for CHF Ed. Pt reports not following a special diet at home "I didn't need to." Reports was eating a lot of hotdogs "I could have finished a pack of 12 no problem." Cooks his own meals, reports living with his brother. Does use salt shaker while cooking. Reports having issues with swallowing, reportedly relies on extra beverages to help facilitate swallowing. Says appetite is strong and would like additional portions here. Discussed low Na diet with pt including complete salt shaker avoidance and appropriate seasoning alternatives which pt is agreeable to. Pt says he is not willing to avoid high sodium foods entirely though willing to decrease. Discussed sodium content of hotdogs/bun with pt, says he is willing to decrease intake of them significantly. Discussed other low sodium meal options he could include at home. Reviewed current fluid restriction. Pt feels this will be problematic with reported swallowing concerns. Consider SLP anali, pt stating solid foods get stuck frequently. Discussed daily weights, pt does not have a weigh scale, provided with one. Encouraged pt to call his doctor if noting significant weight increase. Provided with HF Nutrition Therapy, Salt Free Seasoning Tips, Fluid Restriction Nutrition Therapy handouts. Pt asking for snack, provided with applesauce and boogie crackers with pb. Will add scheduled snacks in between meals and double PRO serving.

## 2023-10-09 NOTE — UTILIZATION REVIEW
NOTIFICATION OF INPATIENT ADMISSION   AUTHORIZATION REQUEST   SERVICING FACILITY:   [address]  Tax ID: [id number]  NPI: [id number] ATTENDING PROVIDER:  Attending Name and NPI#: Sofiya Ramírez Md [[id number]]  Address: [address] [place] [address]  Phone: [phone]   ADMISSION INFORMATION:  Place of Service: Inpatient [address]  Place of Service Code: 21  Inpatient Admission Date/Time: 10/7/23  9:13 AM  Discharge Date/Time: No discharge date for patient encounter. Admitting Diagnosis Code/Description:  CHF (congestive heart failure) ([facility]) [I50.9]  Dyspnea [R06.00]  SOB (shortness of breath) [R06.02]  Hypertension [I10]  Type 2 myocardial infarction ([facility]) [J33. A1]     UTILIZATION REVIEW CONTACT:  Eliazar Vo Utilization   Network Utilization Review Department  Phone: [phone]  Fax [phone]  Email: [email or web address]  Contact for approvals/pending authorizations, clinical reviews, and discharge. PHYSICIAN ADVISORY SERVICES:  Medical Necessity Denial & Ngtq-ae-Itmx Review  Phone: [phone]  Fax: [phone]  Email: [email or web address]     DISCHARGE SUPPORT TEAM:  For Patients Discharge Needs & Updates  Phone: [phone] opt. 2 Fax: [phone]  Email: [email or web address]

## 2023-10-09 NOTE — ASSESSMENT & PLAN NOTE
Wt Readings from Last 3 Encounters:   10/09/23 71.7 kg (158 lb)   03/03/23 77.1 kg (170 lb)   12/02/22 77.1 kg (170 lb)   · Last echo done 2022 EF of 56% diastolic dysfunction. Follows with Dr. Nevin Dyer cardiology stated that he saw him last time 2 months ago and he was supposed to repeat an echocardiogram to discuss defibrillator. · Chest x-ray compliant with pulmonary edema elevation of proBNP orthopnea PND and symptoms he is not on diuretics at home he does eat a lot of salt he does have lower extremity edema  ·  monitor his electrolytes.   Repeated 2D echo as cardiology to evaluate  · 1.8 L fluid restriction  · Daily weights standings I's and O's  · Down 8 lb total lungs clear no edema sob resolved- cards saw- start lasix 20 mg po daily and lisinopril 2.5 mg po daily -

## 2023-10-09 NOTE — ASSESSMENT & PLAN NOTE
HS trop mildly elevated but flat at admission at 117->123->115  He is without anginal complaints. No chest pain. Shortness of breath has resolved with diuresis. No acute ECG changes. This is consistent with nonischemic myocardial injury secondary to CHF exacerbation.

## 2023-10-09 NOTE — PLAN OF CARE
Problem: PAIN - ADULT  Goal: Verbalizes/displays adequate comfort level or baseline comfort level  Description: Interventions:  - Encourage patient to monitor pain and request assistance  - Assess pain using appropriate pain scale  - Administer analgesics based on type and severity of pain and evaluate response  - Implement non-pharmacological measures as appropriate and evaluate response  - Consider cultural and social influences on pain and pain management  - Notify physician/advanced practitioner if interventions unsuccessful or patient reports new pain  Outcome: Progressing     Problem: INFECTION - ADULT  Goal: Absence or prevention of progression during hospitalization  Description: INTERVENTIONS:  - Assess and monitor for signs and symptoms of infection  - Monitor lab/diagnostic results  - Monitor all insertion sites, i.e. indwelling lines, tubes, and drains  - Monitor endotracheal if appropriate and nasal secretions for changes in amount and color  - Bryant appropriate cooling/warming therapies per order  - Administer medications as ordered  - Instruct and encourage patient and family to use good hand hygiene technique  - Identify and instruct in appropriate isolation precautions for identified infection/condition  Outcome: Progressing  Goal: Absence of fever/infection during neutropenic period  Description: INTERVENTIONS:  - Monitor WBC    Outcome: Progressing     Problem: SAFETY ADULT  Goal: Patient will remain free of falls  Description: INTERVENTIONS:  - Educate patient/family on patient safety including physical limitations  - Instruct patient to call for assistance with activity   - Consult OT/PT to assist with strengthening/mobility   - Keep Call bell within reach  - Keep bed low and locked with side rails adjusted as appropriate  - Keep care items and personal belongings within reach  - Initiate and maintain comfort rounds  - Make Fall Risk Sign visible to staff  - Offer Toileting every    Hours, in advance of need  - Initiate/Maintain   alarm  - Obtain necessary fall risk management equipment:     - Apply yellow socks and bracelet for high fall risk patients  - Consider moving patient to room near nurses station  Outcome: Progressing  Goal: Maintain or return to baseline ADL function  Description: INTERVENTIONS:  -  Assess patient's ability to carry out ADLs; assess patient's baseline for ADL function and identify physical deficits which impact ability to perform ADLs (bathing, care of mouth/teeth, toileting, grooming, dressing, etc.)  - Assess/evaluate cause of self-care deficits   - Assess range of motion  - Assess patient's mobility; develop plan if impaired  - Assess patient's need for assistive devices and provide as appropriate  - Encourage maximum independence but intervene and supervise when necessary  - Involve family in performance of ADLs  - Assess for home care needs following discharge   - Consider OT consult to assist with ADL evaluation and planning for discharge  - Provide patient education as appropriate  Outcome: Progressing  Goal: Maintains/Returns to pre admission functional level  Description: INTERVENTIONS:  - Perform BMAT or MOVE assessment daily.   - Set and communicate daily mobility goal to care team and patient/family/caregiver. - Collaborate with rehabilitation services on mobility goals if consulted  - Perform Range of Motion    times a day. - Reposition patient every    hours.   - Dangle patient    times a day  - Stand patient    times a day  - Ambulate patient    times a day  - Out of bed to chair    times a day   - Out of bed for meals          times a day  - Out of bed for toileting  - Record patient progress and toleration of activity level   Outcome: Progressing     Problem: DISCHARGE PLANNING  Goal: Discharge to home or other facility with appropriate resources  Description: INTERVENTIONS:  - Identify barriers to discharge w/patient and caregiver  - Arrange for needed discharge resources and transportation as appropriate  - Identify discharge learning needs (meds, wound care, etc.)  - Arrange for interpretive services to assist at discharge as needed  - Refer to Case Management Department for coordinating discharge planning if the patient needs post-hospital services based on physician/advanced practitioner order or complex needs related to functional status, cognitive ability, or social support system  Outcome: Progressing     Problem: Knowledge Deficit  Goal: Patient/family/caregiver demonstrates understanding of disease process, treatment plan, medications, and discharge instructions  Description: Complete learning assessment and assess knowledge base.   Interventions:  - Provide teaching at level of understanding  - Provide teaching via preferred learning methods  Outcome: Progressing

## 2023-10-09 NOTE — CONSULTS
Consult Cardiology    Elena Henry 1965, 62 y.o. male MRN: [id number]    Unit/Bed#: -01 Encounter: [id number]    Attending Provider: Norha Horta MD   Primary Care Provider: [id number] Jenkins Referral   Date admitted to hospital: 10/7/2023       Consults    * Acute on chronic combined systolic and diastolic congestive heart failure [facility])  Assessment & Plan  Wt Readings from Last 3 Encounters:   10/09/23 71.7 kg (158 lb)   03/03/23 77.1 kg (170 lb)   12/02/22 77.1 kg (170 lb)     Admitted 10/7 with progressive shortness of breath. BNP 1900, chest xray with vascular congestion. He has diuresed very well with IV furosemide 40 mg BID and appears euvolemic today. Will start furosemide PO 20 mg daily today and assess response. Echocardiogram currently pending. See discussion under cardiomyopathy. Urged adherence to medication management and avoidance of meth. Strict I's/O's, sodium and fluid restriction reviewed. Optimize electrolytes for K+ >4, Mag >2. Cardiomyopathy [facility])  Assessment & Plan  Known cardiomyopathy with LVEF 30% 12/2022. Follows with [facility] for evaluation and management. GDMT with carvedilol 6.25 mg BID and lisinopril at home. Presents for heart failure in the setting of poor medication compliance. Patient with known meth use. Echocardiogram today pending. He admits to inconsistently taking his prescriptions and continued meth use. I urged medication adherence and complete cessation from meth. Currently has diuresed well. See discussion under CHF. Further recommendations pending today's echo. Ischemic work up as per his primary cardiologist.      IRINEO (acute kidney injury) [facility])  [facility] with diuresis    Hypertensive emergency  Assessment & Plan  BP initially quite elevated at admission, which has improved with diuresis.   Will resume lisinopril at 2.5 mg today and assess response  Continue carvedilol 6.25 mg BID.    Elevated troponin  Assessment & Plan  HS trop mildly elevated but flat at admission at 117->123->115  He is without anginal complaints. No chest pain. Shortness of breath has resolved with diuresis. No acute ECG changes. This is consistent with nonischemic myocardial injury secondary to CHF exacerbation. Physician Requesting Consult: Dago Pena MD    Reason for Consult / Principal Problem: Acute on chronic HFrEF      HPI: Jacquelin Meadows is a 62y.o. year old male who has a history of cardiomyopathy, + meth use and ongoing tobacco use, scleroderma, COPD who follows with [facility]. Patient presented to [facility] ER 10/7/2023 with shortness of breath which started on Tuesday and was progressing. Upon presentation he was hypertensive. ECG showed SR with nonspecific ST/T wave abnormality in the inferolateral leads, unchanged from prior ECG. HS trop mildly elevated but flat at 117->123->115. BNP elevated at 1900. Chest xray showed vascular congestion. He was started on IV furosemide. An updated echocardiogram and cardiology consult were ordered. At the time of my assessment he is resting comfortably in bed breathing well on room air. He is awaiting an echocardiogram. He asks about being able to go home. He has diuresed very well with the IV furosemide 40 mg BID. He reports complete resolution of his shortness of breath, no orthopnea, edema has completely resolved. He denies chest pain, lightheadedness. He admits that he is inconsistent with taking his medications at home because sometimes he cannot afford them. He is not on a diuretic at home. His UDS was positive for methamphetamines at admission, which we discussed today. He continues to use meth occasionally per his report. He smokes about 1 PPD. He reports rare ETOH. Review of Systems   Constitutional: Negative for chills and fever. HENT: Negative for ear pain and sore throat.     Eyes: Negative for pain and visual disturbance. Respiratory: Negative for cough and shortness of breath. Cardiovascular: Negative for chest pain, palpitations and leg swelling. Gastrointestinal: Negative for abdominal pain and vomiting. Genitourinary: Negative for dysuria and hematuria. Musculoskeletal: Negative for arthralgias and back pain. Skin: Negative for color change and rash. Neurological: Negative for seizures and syncope. All other systems reviewed and are negative.        Historical Information     Past Medical History:   Diagnosis Date   • Anxiety    • Cardiomyopathy ([address])    • Depression    • Emphysema of lung ([address])    • GERD (gastroesophageal reflux disease)    • Gout    • Hypertension    • Neuropathy    • Raynaud's disease    • Right inguinal hernia    • Scleroderma ([address])      Past Surgical History:   Procedure Laterality Date   • AMPUTATION Right     pt had tip of "pointer finger" d/t scleraderma   • HERNIA REPAIR Left     times 2   • DC RPR 1ST INGUN HRNA AGE 5 YRS/> REDUCIBLE Right 3/3/2023    Procedure: OPEN INGUINAL HERNIA REPAIR WITH MESH;  Surgeon: Bradley King DO;  Location:  MAIN OR;  Service: General     Social History     Substance and Sexual Activity   Alcohol Use Not Currently    Comment: occasional     Social History     Substance and Sexual Activity   Drug Use Yes   • Types: Marijuana, Methamphetamines    Comment: Hx meth use     Social History     Tobacco Use   Smoking Status Every Day   • Packs/day: 1.00   • Years: 20.00   • Total pack years: 20.00   • Types: Cigarettes   Smokeless Tobacco Never       Family History: non-contributory    Meds/Allergies     current meds:   Current Facility-Administered Medications   Medication Dose Route Frequency   • acetaminophen (TYLENOL) tablet 650 mg  650 mg Oral Q4H PRN   • albuterol (PROVENTIL HFA,VENTOLIN HFA) inhaler 2 puff  2 puff Inhalation Q4H PRN   • carvedilol (COREG) tablet 6.25 mg  6.25 mg Oral BID With Meals   • enoxaparin (LOVENOX) subcutaneous injection 40 mg  40 mg Subcutaneous Daily   • furosemide (LASIX) tablet 20 mg  20 mg Oral Daily   • lisinopril (ZESTRIL) tablet 2.5 mg  2.5 mg Oral Daily   • LORazepam (ATIVAN) injection 1 mg  1 mg Intravenous Once   • nicotine (NICODERM CQ) 14 mg/24hr TD 24 hr patch 1 patch  1 patch Transdermal Daily          Allergies   Allergen Reactions   • Aspirin GI Intolerance       Objective     Vitals: Blood pressure 124/99, pulse 71, temperature 98.7 °F (37.1 °C), temperature source Temporal, resp. rate 18, height 5' 9" (1.753 m), weight 71.7 kg (158 lb), SpO2 93 %. , Body mass index is 23.33 kg/m². Orthostatic Blood Pressures    Flowsheet Row Most Recent Value   Blood Pressure 124/99 filed at 10/09/2023 5617   Patient Position - Orthostatic VS Lying filed at 10/08/2023 5270          Systolic (83TJA), OVA:118 , Min:119 , ZZB:110     Diastolic (48KZL), CPQ:56, Min:77, Max:99        Intake/Output Summary (Last 24 hours) at 10/9/2023 1010  Last data filed at 10/8/2023 2037  Gross per 24 hour   Intake 540 ml   Output 3600 ml   Net -3060 ml       Weight (last 2 days)     Date/Time Weight    10/09/23 0600 71.7 (158)    10/09/23 0458 71.7 (158)    10/08/23 0600 73.5 (162)    10/08/23 0500 73.5 (162)    10/07/23 1100 75.6 (166.6)    10/07/23 0955 75.6 (166.6)          Invasive Devices     Peripheral Intravenous Line  Duration           Peripheral IV 10/07/23 Right Antecubital 2 days                Physical Exam  Vitals and nursing note reviewed. Constitutional:       General: He is not in acute distress. Appearance: He is well-developed. HENT:      Head: Normocephalic and atraumatic. Eyes:      Conjunctiva/sclera: Conjunctivae normal.   Neck:      Vascular: No JVD. Cardiovascular:      Rate and Rhythm: Normal rate and regular rhythm. Occasional Extrasystoles are present. Heart sounds: No murmur heard. Pulmonary:      Effort: Pulmonary effort is normal. No respiratory distress.       Breath sounds: Normal breath sounds. Comments: Breathing comfortably on room air  Abdominal:      Palpations: Abdomen is soft. Tenderness: There is no abdominal tenderness. Musculoskeletal:         General: No swelling. Cervical back: Neck supple. Right lower leg: No edema. Left lower leg: No edema. Skin:     General: Skin is warm and dry. Capillary Refill: Capillary refill takes less than 2 seconds. Comments: Scars on face   Neurological:      Mental Status: He is alert.    Psychiatric:         Mood and Affect: Mood normal.              Laboratory Results:          CBC with diff:   Results from last 7 days   Lab Units 10/07/23  0803   WBC Thousand/uL 7.68   HEMOGLOBIN g/dL 14.6   HEMATOCRIT % 45.3   MCV fL 92   PLATELETS Thousands/uL 304   RBC Million/uL 4.92   MCH pg 29.7   MCHC g/dL 32.2   RDW % 14.7   MPV fL 9.2   NRBC AUTO /100 WBCs 0         CMP:  Results from last 7 days   Lab Units 10/09/23  0458 10/08/23  0449 10/07/23  0803   POTASSIUM mmol/L 4.2 4.1 4.3   CHLORIDE mmol/L 99 100 100   CO2 mmol/L 27 27 27   BUN mg/dL 28* 27* 24   CREATININE mg/dL 1.22 1.42* 1.03   CALCIUM mg/dL 9.0 9.0 9.1   AST U/L  --  24 26   ALT U/L  --  15 18   ALK PHOS U/L  --  89 115*   EGFR ml/min/1.73sq m 64 54 79       BMP:  Results from last 7 days   Lab Units 10/09/23  0458 10/08/23  0449 10/07/23  0803   POTASSIUM mmol/L 4.2 4.1 4.3   CHLORIDE mmol/L 99 100 100   CO2 mmol/L 27 27 27   BUN mg/dL 28* 27* 24   CREATININE mg/dL 1.22 1.42* 1.03   CALCIUM mg/dL 9.0 9.0 9.1       BNP:    Recent Labs     10/07/23  0803   BNP 1,904*     HS troponin: 117->123->115    Magnesium:   Results from last 7 days   Lab Units 10/09/23  0458 10/08/23  0449   MAGNESIUM mg/dL 2.1 1.6*       Coags:   Results from last 7 days   Lab Units 10/07/23  0803   PTT seconds 31   INR  0.97       TSH:   8, free T4 1.04      Lipid Profile:   Lab Results   Component Value Date    CHOLESTEROL 151 10/25/2021     Lab Results   Component Value Date    HDL 57 10/25/2021     Lab Results   Component Value Date    TRIG 110 10/25/2021     No results found for: "[address]"     Cardiac testing:     Echocardiogram this admission pending. Reviewed echocardiogram dated 12/2022 showing LVEF 30% with moderate MR. Imaging: I have personally reviewed pertinent reports. XR chest 1 view portable    Result Date: 10/7/2023  Narrative: CHEST INDICATION:   SOB. COMPARISON: Chest CT 9/26/2022 and CXR 7/21/2020. EXAM PERFORMED/VIEWS:  XR CHEST PORTABLE. FINDINGS: Moderate cardiomegaly. Moderate pulmonary edema with trace left effusion. Upper abdomen normal. Bones normal for age. Impression: Moderate cardiomegaly. Moderate pulmonary edema with trace left effusion. Workstation performed: [id number]       EKG reviewed personally: EKG: Sinus rhythm with PVC, RBBB, inferolateral T wave inversions with nonspecific ST changes, appears unchanged since 2021. Telemetry: sinus rhythm with BBB, occasional PVC's, rate 80's      Counseling / Coordination of Care  Total floor / unit time spent today 45 minutes. Greater than 50% of total time was spent with the patient and / or family counseling and / or coordination of care. A description of the counseling / coordination of care: Discussed case with Dr. Valentin Mcdaniel.         Code Status: Level 1 - Full Code

## 2023-10-10 VITALS
HEART RATE: 50 BPM | TEMPERATURE: 97.9 F | DIASTOLIC BLOOD PRESSURE: 87 MMHG | SYSTOLIC BLOOD PRESSURE: 139 MMHG | WEIGHT: 159.4 LBS | BODY MASS INDEX: 23.61 KG/M2 | RESPIRATION RATE: 18 BRPM | HEIGHT: 69 IN | OXYGEN SATURATION: 96 %

## 2023-10-10 PROCEDURE — 99239 HOSP IP/OBS DSCHRG MGMT >30: CPT | Performed by: HOSPITALIST

## 2023-10-10 RX ORDER — SPIRONOLACTONE 25 MG/1
25 TABLET ORAL DAILY
Qty: 30 TABLET | Refills: 0 | Status: SHIPPED | OUTPATIENT
Start: 2023-10-10

## 2023-10-10 RX ORDER — LOSARTAN POTASSIUM 50 MG/1
50 TABLET ORAL DAILY
Qty: 30 TABLET | Refills: 0 | Status: SHIPPED | OUTPATIENT
Start: 2023-10-11

## 2023-10-10 RX ORDER — FUROSEMIDE 20 MG/1
20 TABLET ORAL DAILY
Qty: 30 TABLET | Refills: 0 | Status: SHIPPED | OUTPATIENT
Start: 2023-10-11

## 2023-10-10 RX ADMIN — CARVEDILOL 6.25 MG: 6.25 TABLET, FILM COATED ORAL at 08:49

## 2023-10-10 RX ADMIN — LOSARTAN POTASSIUM 50 MG: 50 TABLET, FILM COATED ORAL at 08:49

## 2023-10-10 RX ADMIN — NICOTINE 1 PATCH: 14 PATCH, EXTENDED RELEASE TRANSDERMAL at 08:52

## 2023-10-10 RX ADMIN — FUROSEMIDE 20 MG: 20 TABLET ORAL at 08:49

## 2023-10-10 RX ADMIN — LORAZEPAM 0.5 MG: 2 INJECTION INTRAMUSCULAR; INTRAVENOUS at 08:48

## 2023-10-10 RX ADMIN — ENOXAPARIN SODIUM 40 MG: 40 INJECTION SUBCUTANEOUS at 08:49

## 2023-10-10 NOTE — DISCHARGE SUMMARY
[address] [facility]  Discharge- Janki Schroeder 1965, 62 y.o. male MRN: [id number]  Unit/Bed#: -01 Encounter: [id number]  Primary Care Provider: Mike Jenkins Referral   Date and time admitted to hospital: 10/7/2023  7:50 AM    * Acute on chronic combined systolic and diastolic congestive heart failure [facility])  Assessment & Plan  Wt Readings from Last 3 Encounters:   10/10/23 72.3 kg (159 lb 6.4 oz)   03/03/23 77.1 kg (170 lb)   12/02/22 77.1 kg (170 lb)   · Last echo done 2022 EF of 07% diastolic dysfunction. Follows with [place] cardiology stated that he saw him last time 2 months ago and he was supposed to repeat an echocardiogram to discuss defibrillator. · Chest x-ray compliant with pulmonary edema elevation of proBNP orthopnea PND and symptoms he is not on diuretics at home he does eat a lot of salt he does have lower extremity edema  ·  monitor his electrolytes. Repeated 2D echo shows EF 25-30%  · Down 8 lb total lungs clear no edema sob resolved  · Cardiology restarted ARB and Lasix which he was previously recommended as well as spironolactone upon discharge   · He is indicated for AICD, however with ongoing drug use, not at this time  · Cardiology discussed with the patient regarding LifeVest, with current compliance issues he may not receive one and it would take further time to establish that.  Risks and benefits discussed between patient and Cardiology, he does not desire to wait for LifeVest evaluation  · Needs to follow up with Outpatient [place] [facility]  · He has stated he will stop street drugs, not interested in any form of Rehab          Cardiomyopathy [facility])  Assessment & Plan  See under acute on chronic CHF    Hypertensive emergency  Assessment & Plan  · Resolved continue coreg, ARB, diuretic, and reinitiate low dose spironolactone    IRINEO (acute kidney injury) ([address])  Assessment & Plan  · 0.3 from admission secondary to IV diuresis  · Has improved  · Follow up BMP with PCP as several medication re-initiated    Emphysema of lung ([address])  Assessment & Plan  · Not in exacerbation we will place him back on his albuterol as needed    Elevated troponin  Assessment & Plan  · Suspect demand cardiology has agreed as discussed with ER physician and seen messages. There is no acute ischemic changes in the EKG does have a chronic right bundle branch block with secondary T wave changes. · Troponin trended down  · Appreciate Cardiology evaluation    Scleroderma [facility])  Assessment & Plan  · Not on any medications        Medical Problems     Resolved Problems  Date Reviewed: 10/9/2023   None       Discharging Physician / Practitioner: Padmini Casey DO  PCP: Mike Jenkins Referral  Admission Date:   Admission Orders (From admission, onward)     Ordered        10/07/23 0913  INPATIENT ADMISSION  Once                      Discharge Date: 10/10/23    Consultations During Hospital Stay:  · Cardiology    Procedures Performed:   · none    Significant Findings / Test Results:   XR chest 1 view portable   ED Interpretation by Bradley Ortiz DO (10/07 2563)   Pulmonary edema      Final Result by Rogelio Buckley MD (10/07 1922)      Moderate cardiomegaly. Moderate pulmonary edema with trace left effusion. Workstation performed: [id number]         ·   Lab Results   Component Value Date    WBC 7.68 10/07/2023    HGB 14.6 10/07/2023    HCT 45.3 10/07/2023    MCV 92 10/07/2023     10/07/2023   ·   Lab Results   Component Value Date    SODIUM 133 (L) 10/09/2023    K 4.2 10/09/2023    CL 99 10/09/2023    CO2 27 10/09/2023    BUN 28 (H) 10/09/2023    CREATININE 1.22 10/09/2023    GLUC 101 10/09/2023    CALCIUM 9.0 10/09/2023   ·   · Echo 10/9  Left Ventricle: Left ventricular cavity size is normal. Wall thickness is increased. There is moderate asymmetric hypertrophy of the posterior wall.  The left ventricular ejection fraction is 25-30%. Systolic function is severely reduced. There is severe global hypokinesis with regional variation. Diastolic function is moderately abnormal, consistent with grade II (pseudonormal) relaxation. There is a "smoke" within LV cavity with no evidence of thrombus. •  IVS: There is abnormal septal motion consistent with left bundle branch block. •  Right Ventricle: Right ventricular cavity size is moderately dilated. Systolic function is moderately reduced. •  Left Atrium: The atrium is severely dilated. •  Right Atrium: The atrium is dilated. •  Aortic Valve: There is aortic valve sclerosis. •  Mitral Valve: There is mild regurgitation. •  Tricuspid Valve: There is mild regurgitation. The right ventricular systolic pressure is mildly elevated. The estimated right ventricular systolic pressure is 49.92 mmHg. •  Pericardium: There is a small pericardial effusion circumferential to the heart with a slightly larger pocket of fluid around right atria. The fluid exhibits no internal echoes. There is no echocardiographic evidence of tamponade. ·     Incidental Findings:   · See above   · I reviewed the above mentioned incidental findings with the patient and/or family and they expressed understanding. Test Results Pending at Discharge (will require follow up): · None     Outpatient Tests Requested:  · BMP to follow with PCP    Complications:  None    Reason for Admission: [address] Course:   Tesha Saucedo is a 62 y.o. male patient who originally presented to the hospital on 10/7/2023 due to acute on chronic congestive heart failure. Patient was treated with IV diuresis and clinically improved. He had a known ejection fraction of 30% previously and was following with outpatient cardiology. He had a repeat echo and was seen by cardiology, ejection fraction found to be 25 to 30% with significant dilated cardiomyopathy.   Patient was previously placed on medications by outpatient cardiology but was not taking them. He was found to have a positive drug screen of THC and methamphetamines. Previous discussions with outpatient cardiology regarding AICD, however unable at this time due to active drug use. Cardiology discussed with the patient regarding possible LifeVest, however patient did not want to wait for evaluation to be placed, and there was concerns for noncompliance. Patient was restarted on goal-directed medical therapy, and he is to follow-up with his outpatient cardiologist for further management. Please see above list of diagnoses and related plan for additional information. Condition at Discharge: good    Discharge Day Visit / Exam:   Subjective: Patient is anxious, wants to the hospital.  Denies any shortness of breath or chest pain. Vitals: Blood Pressure: 139/87 (10/10/23 6024)  Pulse: (!) 50 (10/10/23 4531)  Temperature: 97.9 °F (36.6 °C) (10/10/23 0632)  Temp Source: Temporal (10/10/23 9951)  Respirations: 18 (10/10/23 2921)  Height: 5' 9" (175.3 cm) (10/09/23 1135)  Weight - Scale: 72.3 kg (159 lb 6.4 oz) (10/10/23 0600)  SpO2: 96 % (10/10/23 0830)  Exam:   Physical Exam  Vitals and nursing note reviewed. Constitutional:       General: He is not in acute distress. Appearance: He is well-developed. HENT:      Head: Normocephalic and atraumatic. Eyes:      Conjunctiva/sclera: Conjunctivae normal.   Cardiovascular:      Rate and Rhythm: Normal rate and regular rhythm. Heart sounds: No murmur heard. Pulmonary:      Effort: Pulmonary effort is normal. No respiratory distress. Breath sounds: Normal breath sounds. Abdominal:      Palpations: Abdomen is soft. Tenderness: There is no abdominal tenderness. Musculoskeletal:         General: No swelling. Cervical back: Neck supple. Skin:     General: Skin is warm and dry. Capillary Refill: Capillary refill takes less than 2 seconds. Neurological:      Mental Status: He is alert. Psychiatric:      Comments: Anxious          Discussion with Family: Updated  (sister) at bedside. Discharge instructions/Information to patient and family:   See after visit summary for information provided to patient and family. Provisions for Follow-Up Care:  See after visit summary for information related to follow-up care and any pertinent home health orders. Disposition:   Home    Planned Readmission: no     Discharge Statement:  I spent 48 minutes discharging the patient. This time was spent on the day of discharge. I had direct contact with the patient on the day of discharge. Greater than 50% of the total time was spent examining patient, answering all patient questions, arranging and discussing plan of care with patient as well as directly providing post-discharge instructions. Additional time then spent on discharge activities. Discharge Medications:  See after visit summary for reconciled discharge medications provided to patient and/or family.       **Please Note: This note may have been constructed using a voice recognition system**

## 2023-10-10 NOTE — PLAN OF CARE
Problem: PAIN - ADULT  Goal: Verbalizes/displays adequate comfort level or baseline comfort level  Description: Interventions:  - Encourage patient to monitor pain and request assistance  - Assess pain using appropriate pain scale  - Administer analgesics based on type and severity of pain and evaluate response  - Implement non-pharmacological measures as appropriate and evaluate response  - Consider cultural and social influences on pain and pain management  - Notify physician/advanced practitioner if interventions unsuccessful or patient reports new pain  Outcome: Progressing     Problem: INFECTION - ADULT  Goal: Absence or prevention of progression during hospitalization  Description: INTERVENTIONS:  - Assess and monitor for signs and symptoms of infection  - Monitor lab/diagnostic results  - Monitor all insertion sites, i.e. indwelling lines, tubes, and drains  - Monitor endotracheal if appropriate and nasal secretions for changes in amount and color  - [place] appropriate cooling/warming therapies per order  - Administer medications as ordered  - Instruct and encourage patient and family to use good hand hygiene technique  - Identify and instruct in appropriate isolation precautions for identified infection/condition  Outcome: Progressing  Goal: Absence of fever/infection during neutropenic period  Description: INTERVENTIONS:  - Monitor WBC    Outcome: Progressing     Problem: SAFETY ADULT  Goal: Patient will remain free of falls  Description: INTERVENTIONS:  - Educate patient/family on patient safety including physical limitations  - Instruct patient to call for assistance with activity   - Consult OT/PT to assist with strengthening/mobility   - Keep Call bell within reach  - Keep bed low and locked with side rails adjusted as appropriate  - Keep care items and personal belongings within reach  - Initiate and maintain comfort rounds  - Make Fall Risk Sign visible to staff  - Apply yellow socks and bracelet for high fall risk patients  - Consider moving patient to room near nurses station  Outcome: Progressing  Goal: Maintain or return to baseline ADL function  Description: INTERVENTIONS:  -  Assess patient's ability to carry out ADLs; assess patient's baseline for ADL function and identify physical deficits which impact ability to perform ADLs (bathing, care of mouth/teeth, toileting, grooming, dressing, etc.)  - Assess/evaluate cause of self-care deficits   - Assess range of motion  - Assess patient's mobility; develop plan if impaired  - Assess patient's need for assistive devices and provide as appropriate  - Encourage maximum independence but intervene and supervise when necessary  - Involve family in performance of ADLs  - Assess for home care needs following discharge   - Consider OT consult to assist with ADL evaluation and planning for discharge  - Provide patient education as appropriate  Outcome: Progressing  Goal: Maintains/Returns to pre admission functional level  Description: INTERVENTIONS:  - Perform BMAT or MOVE assessment daily.   - Set and communicate daily mobility goal to care team and patient/family/caregiver.    - Collaborate with rehabilitation services on mobility goals if consulted  - Out of bed for toileting  - Record patient progress and toleration of activity level   Outcome: Progressing     Problem: DISCHARGE PLANNING  Goal: Discharge to home or other facility with appropriate resources  Description: INTERVENTIONS:  - Identify barriers to discharge w/patient and caregiver  - Arrange for needed discharge resources and transportation as appropriate  - Identify discharge learning needs (meds, wound care, etc.)  - Arrange for interpretive services to assist at discharge as needed  - Refer to Case Management Department for coordinating discharge planning if the patient needs post-hospital services based on physician/advanced practitioner order or complex needs related to functional status, cognitive ability, or social support system  Outcome: Progressing     Problem: Knowledge Deficit  Goal: Patient/family/caregiver demonstrates understanding of disease process, treatment plan, medications, and discharge instructions  Description: Complete learning assessment and assess knowledge base.   Interventions:  - Provide teaching at level of understanding  - Provide teaching via preferred learning methods  Outcome: Progressing

## 2023-10-10 NOTE — PLAN OF CARE
Problem: PAIN - ADULT  Goal: Verbalizes/displays adequate comfort level or baseline comfort level  Description: Interventions:  - Encourage patient to monitor pain and request assistance  - Assess pain using appropriate pain scale  - Administer analgesics based on type and severity of pain and evaluate response  - Implement non-pharmacological measures as appropriate and evaluate response  - Consider cultural and social influences on pain and pain management  - Notify physician/advanced practitioner if interventions unsuccessful or patient reports new pain  Outcome: Progressing     Problem: INFECTION - ADULT  Goal: Absence or prevention of progression during hospitalization  Description: INTERVENTIONS:  - Assess and monitor for signs and symptoms of infection  - Monitor lab/diagnostic results  - Monitor all insertion sites, i.e. indwelling lines, tubes, and drains  - Monitor endotracheal if appropriate and nasal secretions for changes in amount and color  - [place] appropriate cooling/warming therapies per order  - Administer medications as ordered  - Instruct and encourage patient and family to use good hand hygiene technique  - Identify and instruct in appropriate isolation precautions for identified infection/condition  Outcome: Progressing  Goal: Absence of fever/infection during neutropenic period  Description: INTERVENTIONS:  - Monitor WBC    Outcome: Progressing     Problem: SAFETY ADULT  Goal: Patient will remain free of falls  Description: INTERVENTIONS:  - Educate patient/family on patient safety including physical limitations  - Instruct patient to call for assistance with activity   - Consult OT/PT to assist with strengthening/mobility   - Keep Call bell within reach  - Keep bed low and locked with side rails adjusted as appropriate  - Keep care items and personal belongings within reach  - Initiate and maintain comfort rounds  - Make Fall Risk Sign visible to staff  - Apply yellow socks and bracelet for high fall risk patients  - Consider moving patient to room near nurses station  Outcome: Progressing  Goal: Maintain or return to baseline ADL function  Description: INTERVENTIONS:  -  Assess patient's ability to carry out ADLs; assess patient's baseline for ADL function and identify physical deficits which impact ability to perform ADLs (bathing, care of mouth/teeth, toileting, grooming, dressing, etc.)  - Assess/evaluate cause of self-care deficits   - Assess range of motion  - Assess patient's mobility; develop plan if impaired  - Assess patient's need for assistive devices and provide as appropriate  - Encourage maximum independence but intervene and supervise when necessary  - Involve family in performance of ADLs  - Assess for home care needs following discharge   - Consider OT consult to assist with ADL evaluation and planning for discharge  - Provide patient education as appropriate  Outcome: Progressing  Goal: Maintains/Returns to pre admission functional level  Description: INTERVENTIONS:  - Perform BMAT or MOVE assessment daily.   - Set and communicate daily mobility goal to care team and patient/family/caregiver.    - Collaborate with rehabilitation services on mobility goals if consulted  - Record patient progress and toleration of activity level   Outcome: Progressing     Problem: DISCHARGE PLANNING  Goal: Discharge to home or other facility with appropriate resources  Description: INTERVENTIONS:  - Identify barriers to discharge w/patient and caregiver  - Arrange for needed discharge resources and transportation as appropriate  - Identify discharge learning needs (meds, wound care, etc.)  - Arrange for interpretive services to assist at discharge as needed  - Refer to Case Management Department for coordinating discharge planning if the patient needs post-hospital services based on physician/advanced practitioner order or complex needs related to functional status, cognitive ability, or social support system  Outcome: Progressing     Problem: Knowledge Deficit  Goal: Patient/family/caregiver demonstrates understanding of disease process, treatment plan, medications, and discharge instructions  Description: Complete learning assessment and assess knowledge base.   Interventions:  - Provide teaching at level of understanding  - Provide teaching via preferred learning methods  Outcome: Progressing

## 2023-10-10 NOTE — QUICK NOTE
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 He is volume optimized and his GDMT is started with plan to continue uptitrating as outpatient. I had extensive discussion with patient encouraging him for drug cessation and compliance with medications as important management of cardiomyopathy. I explained him an indication for AICD as primary prevention for sudden cardiac death if LV function is not recovering after being on optimal medical therapy and abstinence from street drugs. I also explained him that in meantime LifeVest would be the option for prevention of sudden cardiac death and he will have to have it at least for 3 months while being on optimal medical therapy. I also discussed that he might need to have it for longer if he continues being noncompliant with medications, which would keep postponing candidacy for AICD in case if LV function is not recovering. He told me that he is willing to work on stopping street drugs although is not interested in any kind of rehabilitation program and he also will be trying to be compliant with his medications but he will not like to stay for the LifeVest to be requested. He understand his risk for sudden cardiac death but he would like to be discharged home today. He understand that he will need to follow-up with outpatient cardiology, be compliant with medications, stop street drug use.

## 2023-10-10 NOTE — ASSESSMENT & PLAN NOTE
Wt Readings from Last 3 Encounters:   10/10/23 72.3 kg (159 lb 6.4 oz)   03/03/23 77.1 kg (170 lb)   12/02/22 77.1 kg (170 lb)   · Last echo done 2022 EF of 56% diastolic dysfunction. Follows with [facility] cardiology stated that he saw him last time 2 months ago and he was supposed to repeat an echocardiogram to discuss defibrillator. · Chest x-ray compliant with pulmonary edema elevation of proBNP orthopnea PND and symptoms he is not on diuretics at home he does eat a lot of salt he does have lower extremity edema  ·  monitor his electrolytes. Repeated 2D echo shows EF 25-30%  · Down 8 lb total lungs clear no edema sob resolved  · Cardiology restarted ARB and Lasix which he was previously recommended as well as spironolactone upon discharge   · He is indicated for AICD, however with ongoing drug use, not at this time  · Cardiology discussed with the patient regarding LifeVest, with current compliance issues he may not receive one and it would take further time to establish that.  Risks and benefits discussed between patient and Cardiology, he does not desire to wait for LifeVest evaluation  · Needs to follow up with [address] [place] [address] Cardiology  · He has stated he will stop street drugs, not interested in any form of Rehab

## 2023-10-10 NOTE — ASSESSMENT & PLAN NOTE
· 0.3 from admission secondary to IV diuresis  · Has improved  · Follow up BMP with PCP as several medication re-initiated

## 2023-10-10 NOTE — ASSESSMENT & PLAN NOTE
· Suspect demand cardiology has agreed as discussed with ER physician and seen messages. There is no acute ischemic changes in the EKG does have a chronic right bundle branch block with secondary T wave changes.   · Troponin trended down  · Appreciate Cardiology evaluation

## 2023-10-11 NOTE — UTILIZATION REVIEW
NOTIFICATION OF ADMISSION DISCHARGE   This is a Notification of Discharge from [address] [facility]. Please be advised that this patient has been discharge from our facility. Below you will find the admission and discharge date and time including the patient’s disposition. UTILIZATION REVIEW CONTACT:  Amanda Dennis  Utilization   Network Utilization Review Department  Phone: [phone] x carefully listen to the prompts. All voicemails are confidential.  Email: [email or web address]     ADMISSION INFORMATION  PRESENTATION DATE: 10/7/2023  7:50 AM  OBERVATION ADMISSION DATE:   INPATIENT ADMISSION DATE: 10/7/23  9:13 AM   DISCHARGE DATE: 10/10/2023 10:51 AM   DISPOSITION:Home/Self Care    Network Utilization Review Department  ATTENTION: Please call with any questions or concerns to [phone] and carefully listen to the prompts so that you are directed to the right person. All voicemails are confidential.   For Discharge needs, contact Care Management DC Support Team at [phone] opt. 2  Send all requests for admission clinical reviews, approved or denied determinations and any other requests to dedicated fax number below belonging to the campus where the patient is receiving treatment.  List of dedicated fax numbers for the Facilities:  [place] DENIALS (Administrative/Medical Necessity) [phone]   DISCHARGE SUPPORT TEAM (Network) [phone] [facility]) [phone]   [address] [facility] [address] [place] [address] [address]  [facility] [phone]

## 2023-12-15 ENCOUNTER — APPOINTMENT (EMERGENCY)
Dept: RADIOLOGY | Facility: HOSPITAL | Age: 58
DRG: 291 | End: 2023-12-15
Payer: COMMERCIAL

## 2023-12-15 ENCOUNTER — HOSPITAL ENCOUNTER (EMERGENCY)
Facility: HOSPITAL | Age: 58
Discharge: HOME | DRG: 291 | End: 2023-12-15
Attending: EMERGENCY MEDICINE
Payer: COMMERCIAL

## 2023-12-15 VITALS
TEMPERATURE: 98 F | SYSTOLIC BLOOD PRESSURE: 145 MMHG | RESPIRATION RATE: 18 BRPM | HEART RATE: 60 BPM | BODY MASS INDEX: 23.54 KG/M2 | HEIGHT: 69 IN | DIASTOLIC BLOOD PRESSURE: 87 MMHG | OXYGEN SATURATION: 95 %

## 2023-12-15 DIAGNOSIS — M79.646 FINGER PAIN: ICD-10-CM

## 2023-12-15 DIAGNOSIS — M34.9 SCLERODERMA (HCC): Primary | ICD-10-CM

## 2023-12-15 LAB
ANION GAP SERPL CALCULATED.3IONS-SCNC: 8 MMOL/L
BASOPHILS # BLD AUTO: 0.03 THOUSANDS/ÂΜL (ref 0–0.1)
BASOPHILS NFR BLD AUTO: 0 % (ref 0–1)
BUN SERPL-MCNC: 22 MG/DL (ref 5–25)
CALCIUM SERPL-MCNC: 9.4 MG/DL (ref 8.4–10.2)
CHLORIDE SERPL-SCNC: 102 MMOL/L (ref 96–108)
CO2 SERPL-SCNC: 23 MMOL/L (ref 21–32)
CREAT SERPL-MCNC: 0.96 MG/DL (ref 0.6–1.3)
CRP SERPL QL: 8.8 MG/L
EOSINOPHIL # BLD AUTO: 0.06 THOUSAND/ÂΜL (ref 0–0.61)
EOSINOPHIL NFR BLD AUTO: 1 % (ref 0–6)
ERYTHROCYTE [DISTWIDTH] IN BLOOD BY AUTOMATED COUNT: 15.3 % (ref 11.6–15.1)
ERYTHROCYTE [SEDIMENTATION RATE] IN BLOOD: 31 MM/HOUR (ref 0–19)
GFR SERPL CREATININE-BSD FRML MDRD: 86 ML/MIN/1.73SQ M
GLUCOSE SERPL-MCNC: 95 MG/DL (ref 65–140)
HCT VFR BLD AUTO: 47.7 % (ref 36.5–49.3)
HGB BLD-MCNC: 15.4 G/DL (ref 12–17)
IMM GRANULOCYTES # BLD AUTO: 0.02 THOUSAND/UL (ref 0–0.2)
IMM GRANULOCYTES NFR BLD AUTO: 0 % (ref 0–2)
LYMPHOCYTES # BLD AUTO: 1.46 THOUSANDS/ÂΜL (ref 0.6–4.47)
LYMPHOCYTES NFR BLD AUTO: 21 % (ref 14–44)
MCH RBC QN AUTO: 29.5 PG (ref 26.8–34.3)
MCHC RBC AUTO-ENTMCNC: 32.3 G/DL (ref 31.4–37.4)
MCV RBC AUTO: 91 FL (ref 82–98)
MONOCYTES # BLD AUTO: 0.7 THOUSAND/ÂΜL (ref 0.17–1.22)
MONOCYTES NFR BLD AUTO: 10 % (ref 4–12)
NEUTROPHILS # BLD AUTO: 4.63 THOUSANDS/ÂΜL (ref 1.85–7.62)
NEUTS SEG NFR BLD AUTO: 68 % (ref 43–75)
NRBC BLD AUTO-RTO: 0 /100 WBCS
PLATELET # BLD AUTO: 264 THOUSANDS/UL (ref 149–390)
PMV BLD AUTO: 9 FL (ref 8.9–12.7)
POTASSIUM SERPL-SCNC: 4.6 MMOL/L (ref 3.5–5.3)
RBC # BLD AUTO: 5.22 MILLION/UL (ref 3.88–5.62)
SODIUM SERPL-SCNC: 133 MMOL/L (ref 135–147)
WBC # BLD AUTO: 6.9 THOUSAND/UL (ref 4.31–10.16)

## 2023-12-15 PROCEDURE — 85652 RBC SED RATE AUTOMATED: CPT | Performed by: EMERGENCY MEDICINE

## 2023-12-15 PROCEDURE — 99283 EMERGENCY DEPT VISIT LOW MDM: CPT

## 2023-12-15 PROCEDURE — 99284 EMERGENCY DEPT VISIT MOD MDM: CPT | Performed by: EMERGENCY MEDICINE

## 2023-12-15 PROCEDURE — 85025 COMPLETE CBC W/AUTO DIFF WBC: CPT | Performed by: EMERGENCY MEDICINE

## 2023-12-15 PROCEDURE — 86140 C-REACTIVE PROTEIN: CPT | Performed by: EMERGENCY MEDICINE

## 2023-12-15 PROCEDURE — 80048 BASIC METABOLIC PNL TOTAL CA: CPT | Performed by: EMERGENCY MEDICINE

## 2023-12-15 PROCEDURE — 73130 X-RAY EXAM OF HAND: CPT

## 2023-12-15 RX ORDER — ACETAMINOPHEN 325 MG/1
650 TABLET ORAL ONCE
Status: COMPLETED | OUTPATIENT
Start: 2023-12-15 | End: 2023-12-15

## 2023-12-15 RX ADMIN — ACETAMINOPHEN 650 MG: 325 TABLET, FILM COATED ORAL at 16:34

## 2023-12-15 NOTE — ED PROVIDER NOTES
History  Chief Complaint   Patient presents with    Hand Swelling     Pt reports R hand swelling w/ finger swelling; hx of CHF; reports chronic issue w/ swelling increasing over last three days       History provided by:  Medical records and patient  Medical Problem  Location:  Right middle finger pain  Severity:  Mild  Onset quality:  Gradual  Duration:  1 week  Timing:  Intermittent  Progression:  Waxing and waning  Chronicity:  Recurrent  Context:  History of scleroderma, states his hands and fingers have been hurting him throughout the last week due to the cold, over the last couple days has had more pain in his right middle finger. He does have a history of osteomyelitis of the tip of the right second digit, he is concerned this is similar. Relieved by:  Nothing  Worsened by:  Cold weather  Ineffective treatments:  None tried  Associated symptoms: no abdominal pain, no chest pain, no cough, no ear pain, no fatigue, no fever, no headaches, no nausea, no rash, no rhinorrhea, no shortness of breath, no sore throat and no vomiting        Prior to Admission Medications   Prescriptions Last Dose Informant Patient Reported? Taking? Diclofenac Sodium (VOLTAREN) 1 %   No No   Sig: Apply 2 g topically 4 (four) times a day   Patient not taking: Reported on 10/7/2023   albuterol (PROVENTIL HFA,VENTOLIN HFA) 90 mcg/act inhaler   Yes No   Sig: Inhale 2 puffs every 4 (four) hours as needed    allopurinol (ZYLOPRIM) 300 mg tablet   Yes No   Sig: Take 300 mg by mouth daily   Patient not taking: Reported on 10/7/2023   amitriptyline (ELAVIL) 25 mg tablet   Yes No   Sig: Take 25 mg by mouth daily at bedtime   Patient not taking: Reported on 10/7/2023   carvedilol (COREG) 6.25 mg tablet   Yes No   Sig: Take 6.25 mg by mouth 2 (two) times a day with meals   furosemide (LASIX) 20 mg tablet   No No   Sig: Take 1 tablet (20 mg total) by mouth daily Do not start before October 11, 2023.    gabapentin (NEURONTIN) 300 mg capsule   Yes No   Sig: Take by mouth   Patient not taking: Reported on 10/7/2023   hydrOXYzine HCL (ATARAX) 25 mg tablet   Yes No   Sig: Take by mouth   Patient not taking: Reported on 10/7/2023   losartan (COZAAR) 50 mg tablet   No No   Sig: Take 1 tablet (50 mg total) by mouth daily Do not start before October 11, 2023. omeprazole (PriLOSEC) 40 MG capsule   Yes No   Sig: Take 40 mg by mouth daily   spironolactone (ALDACTONE) 25 mg tablet   No No   Sig: Take 1 tablet (25 mg total) by mouth daily      Facility-Administered Medications: None       Past Medical History:   Diagnosis Date    Anxiety     Cardiomyopathy ([address])     Depression     Emphysema of lung (HCC)     GERD (gastroesophageal reflux disease)     Gout     Hypertension     Neuropathy     Raynaud's disease     Right inguinal hernia     Scleroderma ([address])        Past Surgical History:   Procedure Laterality Date    AMPUTATION Right     pt had tip of "pointer finger" d/t scleraderma    HERNIA REPAIR Left     times 2    RI RPR 1ST INGUN HRNA AGE 5 YRS/> REDUCIBLE Right 3/3/2023    Procedure: OPEN INGUINAL HERNIA REPAIR WITH MESH;  Surgeon: Jamel Acuña DO;  Location:  MAIN OR;  Service: General       History reviewed. No pertinent family history. I have reviewed and agree with the history as documented. E-Cigarette/Vaping    E-Cigarette Use Never User      E-Cigarette/Vaping Substances    Nicotine No     THC No     CBD No     Flavoring No      Social History     Tobacco Use    Smoking status: Every Day     Current packs/day: 1.00     Average packs/day: 1 pack/day for 20.0 years (20.0 ttl pk-yrs)     Types: Cigarettes    Smokeless tobacco: Never   Vaping Use    Vaping status: Never Used   Substance Use Topics    Alcohol use: Not Currently     Comment: occasional    Drug use: Yes     Types: Marijuana, Methamphetamines     Comment: Hx meth use       Review of Systems   Constitutional:  Negative for appetite change, chills, fatigue and fever.    HENT:  Negative for ear pain, rhinorrhea, sore throat and trouble swallowing. Eyes:  Negative for pain, discharge and visual disturbance. Respiratory:  Negative for cough, chest tightness and shortness of breath. Cardiovascular:  Negative for chest pain and palpitations. Gastrointestinal:  Negative for abdominal pain, nausea and vomiting. Endocrine: Negative for polydipsia, polyphagia and polyuria. Genitourinary:  Negative for difficulty urinating, dysuria, hematuria and testicular pain. Musculoskeletal:  Positive for arthralgias. Negative for back pain. Skin:  Negative for color change and rash. Allergic/Immunologic: Negative for immunocompromised state. Neurological:  Negative for dizziness, seizures, syncope, weakness and headaches. Hematological:  Negative for adenopathy. Psychiatric/Behavioral:  Negative for confusion and dysphoric mood. All other systems reviewed and are negative. Physical Exam  Physical Exam  Vitals and nursing note reviewed. Constitutional:       General: He is not in acute distress. Appearance: Normal appearance. He is not ill-appearing, toxic-appearing or diaphoretic. HENT:      Head: Normocephalic and atraumatic. Nose: Nose normal. No congestion or rhinorrhea. Mouth/Throat:      Mouth: Mucous membranes are moist.      Pharynx: Oropharynx is clear. No oropharyngeal exudate or posterior oropharyngeal erythema. Eyes:      General:         Right eye: No discharge. Left eye: No discharge. Cardiovascular:      Rate and Rhythm: Normal rate and regular rhythm. Pulses: Normal pulses. Heart sounds: Normal heart sounds. No murmur heard. No gallop. Pulmonary:      Effort: Pulmonary effort is normal. No respiratory distress. Breath sounds: Normal breath sounds. No stridor. No wheezing, rhonchi or rales. Chest:      Chest wall: No tenderness. Abdominal:      General: Bowel sounds are normal. There is no distension.       Palpations: Abdomen is soft. There is no mass. Tenderness: There is no abdominal tenderness. There is no right CVA tenderness, left CVA tenderness, guarding or rebound. Hernia: No hernia is present. Musculoskeletal:         General: Tenderness present. No swelling, deformity or signs of injury. Normal range of motion. Cervical back: Normal range of motion and neck supple. Comments: Mild tenderness to palpation of the right middle finger, no swelling, no erythema, full range of motion. Typical scleroderma changes to the bilateral hands and fingers. 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 Skin:     General: Skin is warm and dry. Capillary Refill: Capillary refill takes less than 2 seconds. Coloration: Skin is pale. Comments: The patient has some pallor to the right middle finger. Neurological:      General: No focal deficit present. Mental Status: He is alert and oriented to person, place, and time. Cranial Nerves: No cranial nerve deficit. Sensory: No sensory deficit. Motor: No weakness. Coordination: Coordination normal.      Gait: Gait normal.      Deep Tendon Reflexes: Reflexes normal.   Psychiatric:         Mood and Affect: Mood normal.         Behavior: Behavior normal.         Thought Content:  Thought content normal.         Judgment: Judgment normal.         Vital Signs  ED Triage Vitals [12/15/23 1521]   Temperature Pulse Respirations Blood Pressure SpO2   98 °F (36.7 °C) 60 18 145/87 95 %      Temp Source Heart Rate Source Patient Position - Orthostatic VS BP Location FiO2 (%)   Tympanic Monitor Sitting Left arm --      Pain Score       3           Vitals:    12/15/23 1521   BP: 145/87   Pulse: 60   Patient Position - Orthostatic VS: Sitting         Visual Acuity      ED Medications  Medications   acetaminophen (TYLENOL) tablet 650 mg (650 mg Oral Given 12/15/23 1634)       Diagnostic Studies  Results Reviewed       Procedure Component Value Units Date/Time    Basic metabolic panel [[id number]]  (Abnormal) Collected: 12/15/23 1554    Lab Status: Final result Specimen: Blood Updated: 12/15/23 1609     Sodium 133 mmol/L      Potassium 4.6 mmol/L      Chloride 102 mmol/L      CO2 23 mmol/L      ANION GAP 8 mmol/L      BUN 22 mg/dL      Creatinine 0.96 mg/dL      Glucose 95 mg/dL      Calcium 9.4 mg/dL      eGFR 86 ml/min/1.73sq m     Narrative:      [place] guidelines for Chronic Kidney Disease (CKD):     Stage 1 with normal or high GFR (GFR > 90 mL/min/1.73 square meters)    Stage 2 Mild CKD (GFR = 60-89 mL/min/1.73 square meters)    Stage 3A Moderate CKD (GFR = 45-59 mL/min/1.73 square meters)    Stage 3B Moderate CKD (GFR = 30-44 mL/min/1.73 square meters)    Stage 4 Severe CKD (GFR = 15-29 mL/min/1.73 square meters)    Stage 5 End Stage CKD (GFR <15 mL/min/1.73 square meters)  Note: GFR calculation is accurate only with a steady state creatinine    C-reactive protein [[id number]]  (Abnormal) Collected: 12/15/23 1554    Lab Status: Final result Specimen: Blood Updated: 12/15/23 1609     CRP 8.8 mg/L     Sedimentation rate, automated [[id number]]  (Abnormal) Collected: 12/15/23 1554    Lab Status: Final result Specimen: Blood Updated: 12/15/23 1558     Sed Rate 31 mm/hour     CBC and differential [[id number]]  (Abnormal) Collected: 12/15/23 1554    Lab Status: Final result Specimen: Blood Updated: 12/15/23 1556     WBC 6.90 Thousand/uL      RBC 5.22 Million/uL      Hemoglobin 15.4 g/dL      Hematocrit 47.7 %      MCV 91 fL      MCH 29.5 pg      MCHC 32.3 g/dL      RDW 15.3 %      MPV 9.0 fL      Platelets 289 Thousands/uL      nRBC 0 /100 WBCs      Neutrophils Relative 68 %      Immat GRANS % 0 %      Lymphocytes Relative 21 %      Monocytes Relative 10 %      Eosinophils Relative 1 %      Basophils Relative 0 %      Neutrophils Absolute 4.63 Thousands/µL      Immature Grans Absolute 0.02 Thousand/uL      Lymphocytes Absolute 1.46 Thousands/µL      Monocytes Absolute 0.70 Thousand/µL      Eosinophils Absolute 0.06 Thousand/µL      Basophils Absolute 0.03 Thousands/µL                    XR hand 3+ views RIGHT    (Results Pending)              Procedures  Procedures         ED Course                               SBIRT 20yo+      Flowsheet Row Most Recent Value   Initial Alcohol Screen: US AUDIT-C     1. How often do you have a drink containing alcohol? 0 Filed at: 12/15/2023 1521   2. How many drinks containing alcohol do you have on a typical day you are drinking? 0 Filed at: 12/15/2023 1521   3a. Male UNDER 65: How often do you have five or more drinks on one occasion? 0 Filed at: 12/15/2023 1521   3b. FEMALE Any Age, or MALE 65+: How often do you have 4 or more drinks on one occassion? 0 Filed at: 12/15/2023 1521   Audit-C Score 0 Filed at: 12/15/2023 1521   SILVIA: How many times in the past year have you. .. Used an illegal drug or used a prescription medication for non-medical reasons? Never Filed at: 12/15/2023 [address]  [address]: Patient appears chronically ill but in no acute distress. Patient has history of scleroderma. There is typical scleroderma findings on his hand exams. He is complaining of atraumatic right middle finger pain. There is no findings to suggest infection at this time. For reassurance, plan to complete basic labs including sed rate and CRP, check screening x-rays of the right finger. 1700: X-rays and labs reviewed. No findings of bony destruction of the distal phalanx. Symptomatic care, follow-up with PCP. Amount and/or Complexity of Data Reviewed  Labs: ordered. Radiology: ordered. Details: X-rays right hand no bony destruction    Risk  OTC drugs.              Disposition  Final diagnoses:   Scleroderma ([address])   Finger pain     Time reflects when diagnosis was documented in both MDM as applicable and the Disposition within this note       Time User Action Codes Description Comment    12/15/2023  4:16 PM Kyra Velez [M34.9] Scleroderma ([address])     12/15/2023  4:16 PM Shahram Bosch [Q07.092] Finger pain           ED Disposition       ED Disposition   Discharge    Condition   --    Date/Time   Fri Dec 15, 2023 [address] [facility] discharge to home/self care. Follow-up Information       Follow up With Specialties Details Why Contact Info    PCP  Schedule an appointment as soon as possible for a visit               Discharge Medication List as of 12/15/2023  4:17 PM        CONTINUE these medications which have NOT CHANGED    Details   albuterol (PROVENTIL HFA,VENTOLIN HFA) 90 mcg/act inhaler Inhale 2 puffs every 4 (four) hours as needed , Historical Med      allopurinol (ZYLOPRIM) 300 mg tablet Take 300 mg by mouth daily, Historical Med      amitriptyline (ELAVIL) 25 mg tablet Take 25 mg by mouth daily at bedtime, Historical Med      carvedilol (COREG) 6.25 mg tablet Take 6.25 mg by mouth 2 (two) times a day with meals, Historical Med      Diclofenac Sodium (VOLTAREN) 1 % Apply 2 g topically 4 (four) times a day, Starting Fri 10/22/2021, Normal      furosemide (LASIX) 20 mg tablet Take 1 tablet (20 mg total) by mouth daily Do not start before October 11, 2023., Starting Wed 10/11/2023, Normal      gabapentin (NEURONTIN) 300 mg capsule Take by mouth, Starting Tue 2/7/2017, Historical Med      hydrOXYzine HCL (ATARAX) 25 mg tablet Take by mouth, Historical Med      losartan (COZAAR) 50 mg tablet Take 1 tablet (50 mg total) by mouth daily Do not start before October 11, 2023., Starting Wed 10/11/2023, Normal      omeprazole (PriLOSEC) 40 MG capsule Take 40 mg by mouth daily, Historical Med      spironolactone (ALDACTONE) 25 mg tablet Take 1 tablet (25 mg total) by mouth daily, Starting Tue 10/10/2023, Normal             No discharge procedures on file.     PDMP Review         Value Time User    PDMP Reviewed  Yes 10/23/2021  1:01 PM Brody Thomas Matthew Olguin MD            ED Provider  Electronically Signed by             Sanjeev Miller MD  12/15/23 [phone]

## 2023-12-16 ENCOUNTER — APPOINTMENT (INPATIENT)
Dept: CT IMAGING | Facility: HOSPITAL | Age: 58
DRG: 291 | End: 2023-12-16
Payer: COMMERCIAL

## 2023-12-16 ENCOUNTER — APPOINTMENT (EMERGENCY)
Dept: RADIOLOGY | Facility: HOSPITAL | Age: 58
DRG: 291 | End: 2023-12-16
Payer: COMMERCIAL

## 2023-12-16 ENCOUNTER — HOSPITAL ENCOUNTER (INPATIENT)
Facility: HOSPITAL | Age: 58
LOS: 2 days | Discharge: HOME | DRG: 291 | End: 2023-12-18
Attending: EMERGENCY MEDICINE | Admitting: HOSPITALIST
Payer: COMMERCIAL

## 2023-12-16 DIAGNOSIS — I50.23 ACUTE ON CHRONIC SYSTOLIC CONGESTIVE HEART FAILURE (HCC): Primary | ICD-10-CM

## 2023-12-16 PROBLEM — Z72.0 TOBACCO ABUSE: Status: ACTIVE | Noted: 2023-12-16

## 2023-12-16 PROBLEM — R79.89 D-DIMER, ELEVATED: Status: ACTIVE | Noted: 2023-12-16

## 2023-12-16 PROBLEM — F15.10 METHAMPHETAMINE ABUSE (HCC): Status: ACTIVE | Noted: 2023-12-16

## 2023-12-16 LAB
2HR DELTA HS TROPONIN: -28 NG/L
4HR DELTA HS TROPONIN: -6 NG/L
ALBUMIN SERPL BCP-MCNC: 4 G/DL (ref 3.5–5)
ALP SERPL-CCNC: 110 U/L (ref 34–104)
ALT SERPL W P-5'-P-CCNC: 20 U/L (ref 7–52)
ANION GAP SERPL CALCULATED.3IONS-SCNC: 7 MMOL/L
APTT PPP: 34 SECONDS (ref 23–37)
AST SERPL W P-5'-P-CCNC: 31 U/L (ref 13–39)
BASOPHILS # BLD AUTO: 0.04 THOUSANDS/ÂΜL (ref 0–0.1)
BASOPHILS NFR BLD AUTO: 1 % (ref 0–1)
BILIRUB SERPL-MCNC: 0.78 MG/DL (ref 0.2–1)
BNP SERPL-MCNC: 3505 PG/ML (ref 0–100)
BUN SERPL-MCNC: 27 MG/DL (ref 5–25)
CALCIUM SERPL-MCNC: 9.3 MG/DL (ref 8.4–10.2)
CARDIAC TROPONIN I PNL SERPL HS: 125 NG/L
CARDIAC TROPONIN I PNL SERPL HS: 147 NG/L
CARDIAC TROPONIN I PNL SERPL HS: 153 NG/L
CHLORIDE SERPL-SCNC: 101 MMOL/L (ref 96–108)
CO2 SERPL-SCNC: 26 MMOL/L (ref 21–32)
CREAT SERPL-MCNC: 1.21 MG/DL (ref 0.6–1.3)
D DIMER PPP FEU-MCNC: 0.84 UG/ML FEU
EOSINOPHIL # BLD AUTO: 0.1 THOUSAND/ÂΜL (ref 0–0.61)
EOSINOPHIL NFR BLD AUTO: 2 % (ref 0–6)
ERYTHROCYTE [DISTWIDTH] IN BLOOD BY AUTOMATED COUNT: 15.7 % (ref 11.6–15.1)
FLUAV RNA RESP QL NAA+PROBE: NEGATIVE
FLUBV RNA RESP QL NAA+PROBE: NEGATIVE
GFR SERPL CREATININE-BSD FRML MDRD: 65 ML/MIN/1.73SQ M
GLUCOSE SERPL-MCNC: 125 MG/DL (ref 65–140)
HCT VFR BLD AUTO: 50.3 % (ref 36.5–49.3)
HGB BLD-MCNC: 15.8 G/DL (ref 12–17)
IMM GRANULOCYTES # BLD AUTO: 0.02 THOUSAND/UL (ref 0–0.2)
IMM GRANULOCYTES NFR BLD AUTO: 0 % (ref 0–2)
INR PPP: 1.02 (ref 0.84–1.19)
LACTATE SERPL-SCNC: 1.3 MMOL/L (ref 0.5–2)
LYMPHOCYTES # BLD AUTO: 1.9 THOUSANDS/ÂΜL (ref 0.6–4.47)
LYMPHOCYTES NFR BLD AUTO: 28 % (ref 14–44)
MCH RBC QN AUTO: 28.9 PG (ref 26.8–34.3)
MCHC RBC AUTO-ENTMCNC: 31.4 G/DL (ref 31.4–37.4)
MCV RBC AUTO: 92 FL (ref 82–98)
MONOCYTES # BLD AUTO: 0.67 THOUSAND/ÂΜL (ref 0.17–1.22)
MONOCYTES NFR BLD AUTO: 10 % (ref 4–12)
NEUTROPHILS # BLD AUTO: 4.13 THOUSANDS/ÂΜL (ref 1.85–7.62)
NEUTS SEG NFR BLD AUTO: 59 % (ref 43–75)
NRBC BLD AUTO-RTO: 0 /100 WBCS
PLATELET # BLD AUTO: 297 THOUSANDS/UL (ref 149–390)
PMV BLD AUTO: 9.5 FL (ref 8.9–12.7)
POTASSIUM SERPL-SCNC: 4.8 MMOL/L (ref 3.5–5.3)
PROT SERPL-MCNC: 7.5 G/DL (ref 6.4–8.4)
PROTHROMBIN TIME: 13.7 SECONDS (ref 11.6–14.5)
RBC # BLD AUTO: 5.46 MILLION/UL (ref 3.88–5.62)
RSV RNA RESP QL NAA+PROBE: NEGATIVE
SARS-COV-2 RNA RESP QL NAA+PROBE: NEGATIVE
SODIUM SERPL-SCNC: 134 MMOL/L (ref 135–147)
WBC # BLD AUTO: 6.86 THOUSAND/UL (ref 4.31–10.16)

## 2023-12-16 PROCEDURE — 99285 EMERGENCY DEPT VISIT HI MDM: CPT | Performed by: EMERGENCY MEDICINE

## 2023-12-16 PROCEDURE — 71045 X-RAY EXAM CHEST 1 VIEW: CPT

## 2023-12-16 PROCEDURE — 36415 COLL VENOUS BLD VENIPUNCTURE: CPT | Performed by: EMERGENCY MEDICINE

## 2023-12-16 PROCEDURE — 85610 PROTHROMBIN TIME: CPT | Performed by: EMERGENCY MEDICINE

## 2023-12-16 PROCEDURE — 83880 ASSAY OF NATRIURETIC PEPTIDE: CPT | Performed by: EMERGENCY MEDICINE

## 2023-12-16 PROCEDURE — 0241U HB NFCT DS VIR RESP RNA 4 TRGT: CPT | Performed by: EMERGENCY MEDICINE

## 2023-12-16 PROCEDURE — 94640 AIRWAY INHALATION TREATMENT: CPT

## 2023-12-16 PROCEDURE — 93005 ELECTROCARDIOGRAM TRACING: CPT

## 2023-12-16 PROCEDURE — 87040 BLOOD CULTURE FOR BACTERIA: CPT | Performed by: EMERGENCY MEDICINE

## 2023-12-16 PROCEDURE — 71275 CT ANGIOGRAPHY CHEST: CPT

## 2023-12-16 PROCEDURE — 84484 ASSAY OF TROPONIN QUANT: CPT | Performed by: EMERGENCY MEDICINE

## 2023-12-16 PROCEDURE — 84484 ASSAY OF TROPONIN QUANT: CPT | Performed by: HOSPITALIST

## 2023-12-16 PROCEDURE — 99223 1ST HOSP IP/OBS HIGH 75: CPT | Performed by: HOSPITALIST

## 2023-12-16 PROCEDURE — 85730 THROMBOPLASTIN TIME PARTIAL: CPT | Performed by: EMERGENCY MEDICINE

## 2023-12-16 PROCEDURE — 85025 COMPLETE CBC W/AUTO DIFF WBC: CPT | Performed by: EMERGENCY MEDICINE

## 2023-12-16 PROCEDURE — 85379 FIBRIN DEGRADATION QUANT: CPT | Performed by: EMERGENCY MEDICINE

## 2023-12-16 PROCEDURE — 83605 ASSAY OF LACTIC ACID: CPT | Performed by: EMERGENCY MEDICINE

## 2023-12-16 PROCEDURE — G1004 CDSM NDSC: HCPCS

## 2023-12-16 PROCEDURE — 80053 COMPREHEN METABOLIC PANEL: CPT | Performed by: EMERGENCY MEDICINE

## 2023-12-16 PROCEDURE — 94760 N-INVAS EAR/PLS OXIMETRY 1: CPT

## 2023-12-16 RX ORDER — FUROSEMIDE 10 MG/ML
40 INJECTION INTRAMUSCULAR; INTRAVENOUS ONCE
Status: COMPLETED | OUTPATIENT
Start: 2023-12-16 | End: 2023-12-16

## 2023-12-16 RX ORDER — DOCUSATE SODIUM 100 MG/1
100 CAPSULE, LIQUID FILLED ORAL 2 TIMES DAILY
Status: DISCONTINUED | OUTPATIENT
Start: 2023-12-16 | End: 2023-12-18 | Stop reason: HOSPADM

## 2023-12-16 RX ORDER — PREDNISONE 20 MG/1
40 TABLET ORAL DAILY
Qty: 10 TABLET | Refills: 0 | Status: DISCONTINUED | OUTPATIENT
Start: 2023-12-16 | End: 2023-12-18 | Stop reason: HOSPADM

## 2023-12-16 RX ORDER — CARVEDILOL 6.25 MG/1
6.25 TABLET ORAL 2 TIMES DAILY WITH MEALS
Status: DISCONTINUED | OUTPATIENT
Start: 2023-12-16 | End: 2023-12-18 | Stop reason: HOSPADM

## 2023-12-16 RX ORDER — CALCIUM CARBONATE 500 MG/1
1000 TABLET, CHEWABLE ORAL DAILY PRN
Status: DISCONTINUED | OUTPATIENT
Start: 2023-12-16 | End: 2023-12-18 | Stop reason: HOSPADM

## 2023-12-16 RX ORDER — ALBUTEROL SULFATE 2.5 MG/3ML
2.5 SOLUTION RESPIRATORY (INHALATION) EVERY 4 HOURS PRN
Status: DISCONTINUED | OUTPATIENT
Start: 2023-12-16 | End: 2023-12-18 | Stop reason: HOSPADM

## 2023-12-16 RX ORDER — ENOXAPARIN SODIUM 100 MG/ML
40 INJECTION SUBCUTANEOUS DAILY
Status: DISCONTINUED | OUTPATIENT
Start: 2023-12-16 | End: 2023-12-18 | Stop reason: HOSPADM

## 2023-12-16 RX ORDER — IPRATROPIUM BROMIDE AND ALBUTEROL SULFATE 2.5; .5 MG/3ML; MG/3ML
3 SOLUTION RESPIRATORY (INHALATION)
Status: DISCONTINUED | OUTPATIENT
Start: 2023-12-16 | End: 2023-12-16

## 2023-12-16 RX ORDER — FUROSEMIDE 10 MG/ML
20 INJECTION INTRAMUSCULAR; INTRAVENOUS DAILY
Status: DISCONTINUED | OUTPATIENT
Start: 2023-12-17 | End: 2023-12-17

## 2023-12-16 RX ORDER — OXYCODONE HYDROCHLORIDE 5 MG/1
5 TABLET ORAL EVERY 6 HOURS PRN
Status: DISCONTINUED | OUTPATIENT
Start: 2023-12-16 | End: 2023-12-18 | Stop reason: HOSPADM

## 2023-12-16 RX ORDER — LOSARTAN POTASSIUM 50 MG/1
50 TABLET ORAL DAILY
Status: DISCONTINUED | OUTPATIENT
Start: 2023-12-17 | End: 2023-12-18

## 2023-12-16 RX ORDER — AMITRIPTYLINE HYDROCHLORIDE 25 MG/1
25 TABLET, FILM COATED ORAL
Status: DISCONTINUED | OUTPATIENT
Start: 2023-12-16 | End: 2023-12-18 | Stop reason: HOSPADM

## 2023-12-16 RX ORDER — ONDANSETRON 2 MG/ML
4 INJECTION INTRAMUSCULAR; INTRAVENOUS EVERY 6 HOURS PRN
Status: DISCONTINUED | OUTPATIENT
Start: 2023-12-16 | End: 2023-12-18 | Stop reason: HOSPADM

## 2023-12-16 RX ORDER — NICOTINE 21 MG/24HR
1 PATCH, TRANSDERMAL 24 HOURS TRANSDERMAL DAILY
Status: DISCONTINUED | OUTPATIENT
Start: 2023-12-16 | End: 2023-12-18 | Stop reason: HOSPADM

## 2023-12-16 RX ADMIN — IOHEXOL 85 ML: 350 INJECTION, SOLUTION INTRAVENOUS at 19:54

## 2023-12-16 RX ADMIN — ENOXAPARIN SODIUM 40 MG: 40 INJECTION SUBCUTANEOUS at 15:12

## 2023-12-16 RX ADMIN — OXYCODONE HYDROCHLORIDE 5 MG: 5 TABLET ORAL at 22:50

## 2023-12-16 RX ADMIN — IPRATROPIUM BROMIDE AND ALBUTEROL SULFATE 3 ML: .5; 3 SOLUTION RESPIRATORY (INHALATION) at 14:55

## 2023-12-16 RX ADMIN — FUROSEMIDE 40 MG: 10 INJECTION, SOLUTION INTRAMUSCULAR; INTRAVENOUS at 11:43

## 2023-12-16 RX ADMIN — CARVEDILOL 6.25 MG: 6.25 TABLET, FILM COATED ORAL at 17:04

## 2023-12-16 RX ADMIN — PREDNISONE 40 MG: 20 TABLET ORAL at 15:12

## 2023-12-16 RX ADMIN — NICOTINE 1 PATCH: 21 PATCH, EXTENDED RELEASE TRANSDERMAL at 15:12

## 2023-12-16 RX ADMIN — OXYCODONE HYDROCHLORIDE 5 MG: 5 TABLET ORAL at 15:14

## 2023-12-16 RX ADMIN — DOCUSATE SODIUM 100 MG: 100 CAPSULE, LIQUID FILLED ORAL at 17:04

## 2023-12-16 RX ADMIN — AMITRIPTYLINE HYDROCHLORIDE 25 MG: 25 TABLET, FILM COATED ORAL at 20:51

## 2023-12-16 NOTE — NURSING NOTE
"Notified by Radiology at approximately 1518 that the CT could be completed at this time. Notified patient of same, however, patient declined CT at this time stating \"I'm not going anywhere or doing anything until I get to eat.\" Explained that CT is delayed at this time, it could potentially delay treatment if abnormality found on CT. Patient verbalized understanding, declining to be transported to CT until dinner tray is provided. Explained I could provide a snack until dinner tray arrives, patient agreeable to snack, however, continues to decline CT scan until dinner tray arrives. Radiology notified of same.  "

## 2023-12-16 NOTE — ASSESSMENT & PLAN NOTE
Chronic, with raynaulds as well of fingers, prior finger tip amputation  Reports right middle digit has some worsening pain  Slightly cool to touch  Will monitor clinically with management of other conditions  Pain meds 5mg Oxy PRN severe pain  Restarted his amitriptyline which he was not taking, suspect it is for neuropathic pain

## 2023-12-16 NOTE — ASSESSMENT & PLAN NOTE
Active smoker  Slight wheezing on exam  Does not use his home rescue inhaler, despite being symptomatic  Short course prednisone 40mg x5 days  ATC nebs

## 2023-12-16 NOTE — ASSESSMENT & PLAN NOTE
Significantly hypertensive on admission  Resolved with dose of diuretics and treatment in ED  Continue home ARB and BB

## 2023-12-16 NOTE — ASSESSMENT & PLAN NOTE
Likely due to CHF and underling Emphysema  However he has a vague CP, can not definitively rule out clinically and he is elevated risk  Chest CT PE

## 2023-12-16 NOTE — ED PROVIDER NOTES
History  Chief Complaint   Patient presents with    Shortness of Breath     Slight nasal congestion last few days.  Does smoke.  Has a history of scleroderma.  Now complaining of increasing shortness of breath over the last 1 to 2 days.  Increased leg swelling.  Has dyspnea on exertion.  Has orthopnea.  History of CHF.  Echo done this past year shows an EF of 25 to 30%.  Slight chest pain.  No rash.  No fevers or chills.      History provided by:  Patient   used: No    Shortness of Breath  Severity:  Moderate  Onset quality:  Gradual  Duration:  2 days  Timing:  Constant  Progression:  Worsening  Chronicity:  New  Context: URI    Context: not emotional upset and not occupational exposure    Relieved by:  Nothing  Worsened by:  Exertion  Ineffective treatments:  None tried  Associated symptoms: no abdominal pain, no chest pain, no claudication, no cough, no ear pain, no fever, no headaches, no neck pain, no rash, no sore throat, no sputum production, no swollen glands, no vomiting and no wheezing        Prior to Admission Medications   Prescriptions Last Dose Informant Patient Reported? Taking?   Diclofenac Sodium (VOLTAREN) 1 %   No No   Sig: Apply 2 g topically 4 (four) times a day   Patient not taking: Reported on 10/7/2023   albuterol (PROVENTIL HFA,VENTOLIN HFA) 90 mcg/act inhaler   Yes No   Sig: Inhale 2 puffs every 4 (four) hours as needed    allopurinol (ZYLOPRIM) 300 mg tablet   Yes No   Sig: Take 300 mg by mouth daily   Patient not taking: Reported on 10/7/2023   amitriptyline (ELAVIL) 25 mg tablet   Yes No   Sig: Take 25 mg by mouth daily at bedtime   Patient not taking: Reported on 10/7/2023   carvedilol (COREG) 6.25 mg tablet   Yes No   Sig: Take 6.25 mg by mouth 2 (two) times a day with meals   furosemide (LASIX) 20 mg tablet   No No   Sig: Take 1 tablet (20 mg total) by mouth daily Do not start before October 11, 2023.   gabapentin (NEURONTIN) 300 mg capsule   Yes No   Sig: Take by  "mouth   Patient not taking: Reported on 10/7/2023   hydrOXYzine HCL (ATARAX) 25 mg tablet   Yes No   Sig: Take by mouth   Patient not taking: Reported on 10/7/2023   losartan (COZAAR) 50 mg tablet   No No   Sig: Take 1 tablet (50 mg total) by mouth daily Do not start before October 11, 2023.   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  I have reviewed and agree with the history as documented.    E-Cigarette/Vaping    E-Cigarette Use Never User      E-Cigarette/Vaping Substances    Nicotine No     THC No     CBD No     Flavoring No      Social History     Tobacco Use    Smoking status: Every Day     Current packs/day: 1.00     Average packs/day: 1 pack/day for 20.0 years (20.0 ttl pk-yrs)     Types: Cigarettes    Smokeless tobacco: Never   Vaping Use    Vaping status: Never Used   Substance Use Topics    Alcohol use: Not Currently     Comment: occasional    Drug use: Yes     Types: Marijuana, Methamphetamines     Comment: Hx meth use       Review of Systems   Constitutional:  Negative for chills and fever.   HENT:  Negative for ear pain, hearing loss, sore throat, trouble swallowing and voice " change.    Eyes:  Negative for pain and discharge.   Respiratory:  Positive for shortness of breath. Negative for cough, sputum production and wheezing.    Cardiovascular:  Negative for chest pain, palpitations and claudication.   Gastrointestinal:  Negative for abdominal pain, blood in stool, constipation, diarrhea, nausea and vomiting.   Genitourinary:  Negative for dysuria, flank pain, frequency and hematuria.   Musculoskeletal:  Negative for joint swelling, neck pain and neck stiffness.   Skin:  Negative for rash and wound.   Neurological:  Negative for dizziness, seizures, syncope, facial asymmetry and headaches.   Psychiatric/Behavioral:  Negative for hallucinations, self-injury and suicidal ideas.    All other systems reviewed and are negative.      Physical Exam  Physical Exam  Vitals and nursing note reviewed.   Constitutional:       General: He is not in acute distress.     Appearance: He is well-developed.   HENT:      Head: Normocephalic and atraumatic.      Right Ear: External ear normal.      Left Ear: External ear normal.   Eyes:      General: No scleral icterus.        Right eye: No discharge.         Left eye: No discharge.      Extraocular Movements: Extraocular movements intact.      Conjunctiva/sclera: Conjunctivae normal.   Cardiovascular:      Rate and Rhythm: Normal rate and regular rhythm.      Heart sounds: Normal heart sounds. No murmur heard.  Pulmonary:      Effort: Tachypnea and accessory muscle usage present.      Breath sounds: Decreased breath sounds present. No wheezing or rales.   Abdominal:      General: Bowel sounds are normal. There is no distension.      Palpations: Abdomen is soft.      Tenderness: There is no abdominal tenderness. There is no guarding or rebound.   Musculoskeletal:         General: No deformity. Normal range of motion.      Cervical back: Normal range of motion and neck supple.      Right lower leg: Edema present.      Left lower leg: Edema present.       Comments: Fingers are dusky in appearance and cool to touch.  This is not unusual for the patient.  Patient has scleroderma and history of Raynaud's.  Significant other states it has been acting up more so since it has been cold.   Skin:     General: Skin is warm and dry.      Findings: No rash.   Neurological:      General: No focal deficit present.      Mental Status: He is alert and oriented to person, place, and time.      Cranial Nerves: No cranial nerve deficit.   Psychiatric:         Mood and Affect: Mood normal.         Behavior: Behavior normal.         Thought Content: Thought content normal.         Judgment: Judgment normal.         Vital Signs  ED Triage Vitals   Temperature Pulse Respirations Blood Pressure SpO2   12/16/23 1049 12/16/23 1050 12/16/23 1049 12/16/23 1050 12/16/23 1050   97.5 °F (36.4 °C) 64 20 (!) 189/126 97 %      Temp Source Heart Rate Source Patient Position - Orthostatic VS BP Location FiO2 (%)   12/16/23 1050 12/16/23 1049 12/16/23 1049 12/16/23 1049 --   Oral Monitor Sitting Right arm       Pain Score       12/16/23 1049       4           Vitals:    12/16/23 1049 12/16/23 1050 12/16/23 1155   BP:  (!) 189/126 (!) 163/105   Pulse:  64 90   Patient Position - Orthostatic VS: Sitting           Visual Acuity      ED Medications  Medications   furosemide (LASIX) injection 40 mg (40 mg Intravenous Given 12/16/23 1143)       Diagnostic Studies  Results Reviewed       Procedure Component Value Units Date/Time    HS Troponin I 4hr [[id number]]     Lab Status: No result Specimen: Blood     B-Type Natriuretic Peptide(BNP) [[id number]]  (Abnormal) Collected: 12/16/23 1108    Lab Status: Final result Specimen: Blood from Arm, Right Updated: 12/16/23 1203     BNP 3,505 pg/mL     FLU/RSV/COVID - if FLU/RSV clinically relevant [[id number]]  (Normal) Collected: 12/16/23 1108    Lab Status: Final result Specimen: Nares from Nasopharyngeal Swab Updated: 12/16/23 1154     SARS-CoV-2 Negative      INFLUENZA A PCR Negative     INFLUENZA B PCR Negative     RSV PCR Negative    Narrative:      FOR PEDIATRIC PATIENTS - copy/paste COVID Guidelines URL to browser: [email or web address]    SARS-CoV-2 assay is a Nucleic Acid Amplification assay intended for the  qualitative detection of nucleic acid from SARS-CoV-2 in nasopharyngeal  swabs. Results are for the presumptive identification of SARS-CoV-2 RNA.    Positive results are indicative of infection with SARS-CoV-2, the virus  causing COVID-19, but do not rule out bacterial infection or co-infection  with other viruses. Laboratories within the [place] and its  territories are required to report all positive results to the appropriate  public health authorities. Negative results do not preclude SARS-CoV-2  infection and should not be used as the sole basis for treatment or other  patient management decisions. Negative results must be combined with  clinical observations, patient history, and epidemiological information.  This test has not been FDA cleared or approved.    This test has been authorized by FDA under an Emergency Use Authorization  (EUA). This test is only authorized for the duration of time the  declaration that circumstances exist justifying the authorization of the  emergency use of an in vitro diagnostic tests for detection of SARS-CoV-2  virus and/or diagnosis of COVID-19 infection under section 564(b)(1) of  the Act, 21 U.S.C. 360bbb-3(b)(1), unless the authorization is terminated  or revoked sooner. The test has been validated but independent review by FDA  and CLIA is pending.    Test performed using [employer]: This RT-PCR assay targets N2,  a region unique to SARS-CoV-2. A conserved region in the E-gene was chosen  for pan-Sarbecovirus detection which includes SARS-CoV-2.    According to CMS-2020-01-R, this platform meets the definition of high-throughput technology.    HS  Troponin 0hr (reflex protocol) [[id number]]  (Abnormal) Collected: 12/16/23 1108    Lab Status: Final result Specimen: Blood from Arm, Right Updated: 12/16/23 1146     hs TnI 0hr 153 ng/L     HS Troponin I 2hr [[id number]]     Lab Status: No result Specimen: Blood     Comprehensive metabolic panel [[id number]]  (Abnormal) Collected: 12/16/23 1108    Lab Status: Final result Specimen: Blood from Arm, Right Updated: 12/16/23 1143     Sodium 134 mmol/L      Potassium 4.8 mmol/L      Chloride 101 mmol/L      CO2 26 mmol/L      ANION GAP 7 mmol/L      BUN 27 mg/dL      Creatinine 1.21 mg/dL      Glucose 125 mg/dL      Calcium 9.3 mg/dL      AST 31 U/L      ALT 20 U/L      Alkaline Phosphatase 110 U/L      Total Protein 7.5 g/dL      Albumin 4.0 g/dL      Total Bilirubin 0.78 mg/dL      eGFR 65 ml/min/1.73sq m     Narrative:      National Kidney Disease Foundation guidelines for Chronic Kidney Disease (CKD):     Stage 1 with normal or high GFR (GFR > 90 mL/min/1.73 square meters)    Stage 2 Mild CKD (GFR = 60-89 mL/min/1.73 square meters)    Stage 3A Moderate CKD (GFR = 45-59 mL/min/1.73 square meters)    Stage 3B Moderate CKD (GFR = 30-44 mL/min/1.73 square meters)    Stage 4 Severe CKD (GFR = 15-29 mL/min/1.73 square meters)    Stage 5 End Stage CKD (GFR <15 mL/min/1.73 square meters)  Note: GFR calculation is accurate only with a steady state creatinine    Lactic acid, plasma (w/reflex if result > 2.0) [[id number]]  (Normal) Collected: 12/16/23 1108    Lab Status: Final result Specimen: Blood from Arm, Right Updated: 12/16/23 1143     LACTIC ACID 1.3 mmol/L     Narrative:      Result may be elevated if tourniquet was used during collection.    D-Dimer [[id number]]  (Abnormal) Collected: 12/16/23 1108    Lab Status: Final result Specimen: Blood from Arm, Right Updated: 12/16/23 1142     D-Dimer, Quant 0.84 ug/ml FEU     Narrative:      In the evaluation for possible pulmonary embolism, in the appropriate (Well's Score of 4  or less) patient, the age adjusted d-dimer cutoff for this patient can be calculated as:    Age x 0.01 (in ug/mL) for Age-adjusted D-dimer exclusion threshold for a patient over 50 years.    Protime-INR [[id number]]  (Normal) Collected: 12/16/23 1108    Lab Status: Final result Specimen: Blood from Arm, Right Updated: 12/16/23 1141     Protime 13.7 seconds      INR 1.02    APTT [[id number]]  (Normal) Collected: 12/16/23 1108    Lab Status: Final result Specimen: Blood from Arm, Right Updated: 12/16/23 1141     PTT 34 seconds     CBC and differential [[id number]]  (Abnormal) Collected: 12/16/23 1108    Lab Status: Final result Specimen: Blood from Arm, Right Updated: 12/16/23 1116     WBC 6.86 Thousand/uL      RBC 5.46 Million/uL      Hemoglobin 15.8 g/dL      Hematocrit 50.3 %      MCV 92 fL      MCH 28.9 pg      MCHC 31.4 g/dL      RDW 15.7 %      MPV 9.5 fL      Platelets 297 Thousands/uL      nRBC 0 /100 WBCs      Neutrophils Relative 59 %      Immat GRANS % 0 %      Lymphocytes Relative 28 %      Monocytes Relative 10 %      Eosinophils Relative 2 %      Basophils Relative 1 %      Neutrophils Absolute 4.13 Thousands/µL      Immature Grans Absolute 0.02 Thousand/uL      Lymphocytes Absolute 1.90 Thousands/µL      Monocytes Absolute 0.67 Thousand/µL      Eosinophils Absolute 0.10 Thousand/µL      Basophils Absolute 0.04 Thousands/µL     Blood culture #2 [[id number]] Collected: 12/16/23 1108    Lab Status: In process Specimen: Blood from Arm, Left Updated: 12/16/23 1113    Blood culture #1 [[id number]] Collected: 12/16/23 1108    Lab Status: In process Specimen: Blood from Arm, Right Updated: 12/16/23 1112                   XR chest 1 view portable   ED Interpretation by Jonh Trimble MD (12/16 1131)   Increased pulmonary vasculature.                 Procedures  ECG 12 Lead Documentation Only    Date/Time: 12/16/2023 11:16 AM    Performed by: Jonh Trimble MD  Authorized by: Jonh Trimble MD    ECG reviewed  by me, the ED Provider: yes    Patient location:  ED  Previous ECG:     Previous ECG:  Compared to current    Similarity:  No change  Rate:     ECG rate:  100  Rhythm:     Rhythm: sinus rhythm    Ectopy:     Ectopy: PVCs    QRS:     QRS axis:  Normal           ED Course  ED Course as of 12/16/23 1249   Sat Dec 16, 2023   1145 D-Dimer, Quant(!): 0.84  Per years algorithm PE is excluded.   1156 hs TnI 0hr(!): 153  EKGs on last admission were also elevated in the 1 teens and 120s.                               SBIRT 22yo+      Flowsheet Row Most Recent Value   Initial Alcohol Screen: US AUDIT-C     1. How often do you have a drink containing alcohol? 0 Filed at: 12/16/2023 1049   2. How many drinks containing alcohol do you have on a typical day you are drinking?  0 Filed at: 12/16/2023 1049   3a. Male UNDER 65: How often do you have five or more drinks on one occasion? 0 Filed at: 12/16/2023 1049   3b. FEMALE Any Age, or MALE 65+: How often do you have 4 or more drinks on one occassion? 0 Filed at: 12/16/2023 1049   Audit-C Score 0 Filed at: 12/16/2023 1049   SILVIA: How many times in the past year have you...    Used an illegal drug or used a prescription medication for non-medical reasons? Never Filed at: 12/16/2023 1049                      Medical Decision Making  Amount and/or Complexity of Data Reviewed  Labs: ordered. Decision-making details documented in ED Course.  Radiology: ordered and independent interpretation performed. Decision-making details documented in ED Course.  ECG/medicine tests: ordered and independent interpretation performed. Decision-making details documented in ED Course.  Discussion of management or test interpretation with external provider(s): Differential diagnosis includes but not limited to STEMI, NSTEMI, PE, pneumonia, pneumothorax, musculoskeletal chest pain, costochondritis, gastritis, cholelithiasis, contusion, strain    Risk  Prescription drug management.  Decision regarding  hospitalization.             Disposition  Final diagnoses:   Acute on chronic systolic congestive heart failure (HCC)     Time reflects when diagnosis was documented in both MDM as applicable and the Disposition within this note       Time User Action Codes Description Comment    12/16/2023 12:05 PM Jonh Trimble Add [I50.23] Acute on chronic systolic congestive heart failure (HCC)           ED Disposition       ED Disposition   Admit    Condition   Stable    Date/Time   Sat Dec 16, 2023 1213    Comment   Case was discussed with Dr. Annita bishop and the patient's admission status was agreed to be Admission Status: inpatient status to the service of Dr. Casey.               Follow-up Information    None         Patient's Medications   Discharge Prescriptions    No medications on file       No discharge procedures on file.    PDMP Review         Value Time User    PDMP Reviewed  Yes 10/23/2021  1:01 PM Sulema Lomax MD            ED Provider  Electronically Signed by             Jonh Trimble MD  12/16/23 1213       Jonh Trimble MD  12/16/23 1249

## 2023-12-16 NOTE — ASSESSMENT & PLAN NOTE
Wt Readings from Last 3 Encounters:   12/16/23 70.4 kg (155 lb 3.3 oz)   10/10/23 72.3 kg (159 lb 6.4 oz)   03/03/23 77.1 kg (170 lb)       Presents SoB 3-4 days, elevated BNP  No pulm edema on CXR, no significant LE edema  Suspect symptoms mix of known cardiomyopathy and emphysema  October 2023 Echo: EF 30%  He also reports not compliant with sodium diet, and sometimes misses his diuretics, also ongoing drug use  Doubt this is a significant worsening in his cardiac function, given recent Echo, no need at this time  Home lasix 20mg PO ---> give IV lasix PO daily and transition back to PO when improved  Holding spironolactone while IV diuresis, can resume when PO meds resumed  I/O, daily weights  Tele monitoring x24 hr  Trend Cr with diuresis, monitor electrolyes  Cardiac diet

## 2023-12-16 NOTE — PLAN OF CARE
Problem: PAIN - ADULT  Goal: Verbalizes/displays adequate comfort level or baseline comfort level  Description: Interventions:  - Encourage patient to monitor pain and request assistance  - Assess pain using appropriate pain scale  - Administer analgesics based on type and severity of pain and evaluate response  - Implement non-pharmacological measures as appropriate and evaluate response  - Consider cultural and social influences on pain and pain management  - Notify physician/advanced practitioner if interventions unsuccessful or patient reports new pain  Outcome: Progressing     Problem: INFECTION - ADULT  Goal: Absence or prevention of progression during hospitalization  Description: INTERVENTIONS:  - Assess and monitor for signs and symptoms of infection  - Monitor lab/diagnostic results  - Monitor all insertion sites, i.e. indwelling lines, tubes, and drains  - Monitor endotracheal if appropriate and nasal secretions for changes in amount and color  - [place] appropriate cooling/warming therapies per order  - Administer medications as ordered  - Instruct and encourage patient and family to use good hand hygiene technique  - Identify and instruct in appropriate isolation precautions for identified infection/condition  Outcome: Progressing  Goal: Absence of fever/infection during neutropenic period  Description: INTERVENTIONS:  - Monitor WBC    Outcome: Progressing

## 2023-12-16 NOTE — ASSESSMENT & PLAN NOTE
Due to CHF, trended down  Pending 4hr trop will follow  EKG reviewed    He does report a vague CP, NON-anginal in description and correlates with coughing, likely MSK

## 2023-12-16 NOTE — H&P
[facility]  H&P  Name: Britton Batista 58 y.o. male I MRN: [id number]  Unit/Bed#: -[facility] I Date of Admission: 12/16/2023   Date of Service: 12/16/2023 I Hospital Day: 0      Assessment/Plan   * Acute on chronic combined systolic and diastolic congestive heart failure (HCC)  Assessment & Plan  Wt Readings from Last 3 Encounters:   12/16/23 70.4 kg (155 lb 3.3 oz)   10/10/23 72.3 kg (159 lb 6.4 oz)   03/03/23 77.1 kg (170 lb)       Presents SoB 3-4 days, elevated BNP  No pulm edema on CXR, no significant LE edema  Suspect symptoms mix of known cardiomyopathy and emphysema  October 2023 Echo: EF 30%  He also reports not compliant with sodium diet, and sometimes misses his diuretics, also ongoing drug use  Doubt this is a significant worsening in his cardiac function, given recent Echo, no need at this time  Home lasix 20mg PO ---> give IV lasix PO daily and transition back to PO when improved  Holding spironolactone while IV diuresis, can resume when PO meds resumed  I/O, daily weights  Tele monitoring x24 hr  Trend Cr with diuresis, monitor electrolyes  Cardiac diet        Emphysema of lung (HCC)  Assessment & Plan  Active smoker  Slight wheezing on exam  Does not use his home rescue inhaler, despite being symptomatic  Short course prednisone 40mg x5 days  ATC nebs    D-dimer, elevated  Assessment & Plan  Likely due to CHF and underling Emphysema  However he has a vague CP, can not definitively rule out clinically and he is elevated risk  Chest CT PE    Scleroderma (HCC)  Assessment & Plan  Chronic, with raynaulds as well of fingers, prior finger tip amputation  Reports right middle digit has some worsening pain  Slightly cool to touch  Will monitor clinically with management of other conditions  Pain meds 5mg Oxy PRN severe pain  Restarted his amitriptyline which he was not taking, suspect it is for neuropathic pain      Tobacco abuse  Assessment & Plan  1 PPD smoker  chronic  NRT  Encourage cessation    Methamphetamine abuse (HCC)  Assessment & Plan  History  Admitted to use 2 days prior to admission  No need for urine tox  Encourage cessation    Hypertension  Assessment & Plan  Significantly hypertensive on admission  Resolved with dose of diuretics and treatment in ED  Continue home ARB and BB    Elevated troponin  Assessment & Plan  Due to CHF, trended down  Pending 4hr trop will follow  EKG reviewed    He does report a vague CP, NON-anginal in description and correlates with coughing, likely MSK           VTE Pharmacologic Prophylaxis:   Moderate Risk (Score 3-4) - Pharmacological DVT Prophylaxis Ordered: enoxaparin (Lovenox).  Code Status: Level 1 - Full Code   Discussion with family: Patient declined call to .     Anticipated Length of Stay: Patient will be admitted on an inpatient basis with an anticipated length of stay of greater than 2 midnights secondary to chf exac.    Total Time Spent on Date of Encounter in care of patient: 52 mins. This time was spent on one or more of the following: performing physical exam; counseling and coordination of care; obtaining or reviewing history; documenting in the medical record; reviewing/ordering tests, medications or procedures; communicating with other healthcare professionals and discussing with patient's family/caregivers.    Chief Complaint: SoB    History of Present Illness:  Britton Batista is a 58 y.o. male with a PMH of scleroderma, chronic combined systolic and diastolic congestive heart failure with an ejection fraction most recently of 30%, emphysema and chronic tobacco abuse 1 pack/day, renal disease, hypertension, anxiety, GERD, gout, list substance abuse/methamphetamines who presents with SoB.  Patient reports that for the past 3 to 4 days he has felt short of breath and seem to worsen so presented to the hospital.  He states he is also had a cough which started only in the last day.  He did not  "particularly notice any lower extremity worsening edema.  He has had some vague chest pain today that only started after he started coughing was initially located in the left lower chest and now has some mild pain in the right upper chest, worsened with coughing.  Patient also complaining of some pain in his right middle finger, he has noticed some worsening swelling of his bilateral upper extremities, he has chronic scleroderma.  Patient does not take several of the medications he is prescribed, but does report he takes his diuretics most days but does miss them occasionally, he does not adhere to a low-sodium diet or watch his fluid intake.     Review of Systems:  Review of Systems   Constitutional:  Positive for chills. Negative for fever.   HENT:  Negative for ear pain and sore throat.    Eyes:  Negative for pain and visual disturbance.   Respiratory:  Positive for cough and shortness of breath.    Cardiovascular:  Positive for chest pain. Negative for palpitations.   Gastrointestinal:  Positive for abdominal pain and constipation. Negative for vomiting.   Genitourinary:  Negative for dysuria and hematuria.   Musculoskeletal:  Positive for back pain. Negative for arthralgias.   Skin:  Negative for color change and rash.   Neurological:  Negative for dizziness, seizures, syncope and headaches.   All other systems reviewed and are negative.      Past Medical and Surgical History:   Past Medical History:   Diagnosis Date    Anxiety     Cardiomyopathy (HCC)     Depression     Emphysema of lung (HCC)     GERD (gastroesophageal reflux disease)     Gout     Hypertension     Neuropathy     Raynaud's disease     Right inguinal hernia     Scleroderma (HCC)        Past Surgical History:   Procedure Laterality Date    AMPUTATION Right     pt had tip of \"pointer finger\" d/t scleraderma    HERNIA REPAIR Left     times 2    CT RPR 1ST INGUN HRNA AGE 5 YRS/> REDUCIBLE Right 3/3/2023    Procedure: OPEN INGUINAL HERNIA REPAIR " WITH MESH;  Surgeon: Temo Jackson DO;  Location:  MAIN OR;  Service: General       Meds/Allergies:  Prior to Admission medications    Medication Sig Start Date End Date Taking? Authorizing Provider   albuterol (PROVENTIL HFA,VENTOLIN HFA) 90 mcg/act inhaler Inhale 2 puffs every 4 (four) hours as needed    Yes Historical Provider, MD   carvedilol (COREG) 6.25 mg tablet Take 6.25 mg by mouth 2 (two) times a day with meals   Yes Historical Provider, MD   furosemide (LASIX) 20 mg tablet Take 1 tablet (20 mg total) by mouth daily Do not start before October 11, 2023. 10/11/23  Yes Jaron Casey DO   losartan (COZAAR) 50 mg tablet Take 1 tablet (50 mg total) by mouth daily Do not start before October 11, 2023. 10/11/23  Yes Jaron Casey DO   spironolactone (ALDACTONE) 25 mg tablet Take 1 tablet (25 mg total) by mouth daily 10/10/23  Yes Jaron Casey DO   allopurinol (ZYLOPRIM) 300 mg tablet Take 300 mg by mouth daily  Patient not taking: Reported on 10/7/2023    Historical Provider, MD   amitriptyline (ELAVIL) 25 mg tablet Take 25 mg by mouth daily at bedtime  Patient not taking: Reported on 10/7/2023    Historical Provider, MD   Diclofenac Sodium (VOLTAREN) 1 % Apply 2 g topically 4 (four) times a day  Patient not taking: Reported on 10/7/2023 10/22/21   Ynes Garrett MD   gabapentin (NEURONTIN) 300 mg capsule Take by mouth  Patient not taking: Reported on 10/7/2023 2/7/17   Historical Provider, MD   hydrOXYzine HCL (ATARAX) 25 mg tablet Take by mouth  Patient not taking: Reported on 10/7/2023    Historical Provider, MD   omeprazole (PriLOSEC) 40 MG capsule Take 40 mg by mouth daily  Patient not taking: Reported on 12/16/2023    Historical Provider, MD     I have reviewed home medications with patient personally.    Allergies:   Allergies   Allergen Reactions    Aspirin GI Intolerance       Social History:  Marital Status:    Occupation: n/a  Patient Pre-hospital Living Situation:  "Home  Patient Pre-hospital Level of Mobility: walks  Patient Pre-hospital Diet Restrictions: cardiac, supposed to be one doesn;t follow  Substance Use History:   Social History     Substance and Sexual Activity   Alcohol Use Not Currently    Comment: occasional     Social History     Tobacco Use   Smoking Status Every Day    Current packs/day: 1.00    Average packs/day: 1 pack/day for 20.0 years (20.0 ttl pk-yrs)    Types: Cigarettes   Smokeless Tobacco Never     Social History     Substance and Sexual Activity   Drug Use Yes    Types: Marijuana, Methamphetamines    Comment: Hx meth use       Family History:  No family history on file.    Physical Exam:     Vitals:   Blood Pressure: 115/83 (12/16/23 1347)  Pulse: 86 (12/16/23 1347)  Temperature: 98.2 °F (36.8 °C) (12/16/23 1347)  Temp Source: Temporal (12/16/23 1347)  Respirations: 22 (12/16/23 1347)  Height: 5' 9\" (175.3 cm) (12/16/23 1347)  Weight - Scale: 70.4 kg (155 lb 3.3 oz) (12/16/23 1347)  SpO2: 96 % (12/16/23 1347)    Physical Exam  Vitals and nursing note reviewed.   Constitutional:       General: He is not in acute distress.     Appearance: He is well-developed.   HENT:      Head: Normocephalic and atraumatic.   Eyes:      Conjunctiva/sclera: Conjunctivae normal.   Cardiovascular:      Rate and Rhythm: Normal rate and regular rhythm.      Pulses: Normal pulses.      Heart sounds:      Gallop present.   Pulmonary:      Effort: Pulmonary effort is normal. No respiratory distress.      Comments: Mildly reduced reduced bilaterally, some end expiratory wheezing in the right lower lobe  Abdominal:      Palpations: Abdomen is soft.      Tenderness: There is no abdominal tenderness.   Musculoskeletal:      Cervical back: Neck supple.      Comments: Bilateral hand swelling, his digits are mildly cool to touch, but his right middle digit is slightly cooler, not cyanotic appearing (known raynaulds/scleroderma)   Skin:     General: Skin is warm and dry. "   Neurological:      Mental Status: He is alert.   Psychiatric:         Mood and Affect: Mood normal.          Additional Data:     Lab Results:  Results from last 7 days   Lab Units 12/16/23  1108   WBC Thousand/uL 6.86   HEMOGLOBIN g/dL 15.8   HEMATOCRIT % 50.3*   PLATELETS Thousands/uL 297   NEUTROS PCT % 59   LYMPHS PCT % 28   MONOS PCT % 10   EOS PCT % 2     Results from last 7 days   Lab Units 12/16/23  1108   SODIUM mmol/L 134*   POTASSIUM mmol/L 4.8   CHLORIDE mmol/L 101   CO2 mmol/L 26   BUN mg/dL 27*   CREATININE mg/dL 1.21   ANION GAP mmol/L 7   CALCIUM mg/dL 9.3   ALBUMIN g/dL 4.0   TOTAL BILIRUBIN mg/dL 0.78   ALK PHOS U/L 110*   ALT U/L 20   AST U/L 31   GLUCOSE RANDOM mg/dL 125     Results from last 7 days   Lab Units 12/16/23  1108   INR  1.02             Results from last 7 days   Lab Units 12/16/23  1108   LACTIC ACID mmol/L 1.3       Lines/Drains:  Invasive Devices       Peripheral Intravenous Line  Duration             Peripheral IV 12/16/23 Right Antecubital <1 day                        Imaging: Personally reviewed the following imaging: chest xray  XR chest 1 view portable   ED Interpretation by Jonh Trimble MD (12/16 1131)   Increased pulmonary vasculature.      CTA chest pe study    (Results Pending)       EKG and Other Studies Reviewed on Admission:   EKG: NSR. .    ** Please Note: This note has been constructed using a voice recognition system. **

## 2023-12-17 PROBLEM — E83.42 HYPOMAGNESEMIA: Status: ACTIVE | Noted: 2023-12-17

## 2023-12-17 LAB
ANION GAP SERPL CALCULATED.3IONS-SCNC: 8 MMOL/L
BUN SERPL-MCNC: 23 MG/DL (ref 5–25)
CALCIUM SERPL-MCNC: 8.9 MG/DL (ref 8.4–10.2)
CARDIAC TROPONIN I PNL SERPL HS: 139 NG/L (ref 8–18)
CHLORIDE SERPL-SCNC: 101 MMOL/L (ref 96–108)
CO2 SERPL-SCNC: 24 MMOL/L (ref 21–32)
CREAT SERPL-MCNC: 1.16 MG/DL (ref 0.6–1.3)
GFR SERPL CREATININE-BSD FRML MDRD: 69 ML/MIN/1.73SQ M
GLUCOSE SERPL-MCNC: 107 MG/DL (ref 65–140)
MAGNESIUM SERPL-MCNC: 1.6 MG/DL (ref 1.9–2.7)
POTASSIUM SERPL-SCNC: 4.4 MMOL/L (ref 3.5–5.3)
SODIUM SERPL-SCNC: 133 MMOL/L (ref 135–147)

## 2023-12-17 PROCEDURE — 83735 ASSAY OF MAGNESIUM: CPT | Performed by: FAMILY MEDICINE

## 2023-12-17 PROCEDURE — 99232 SBSQ HOSP IP/OBS MODERATE 35: CPT | Performed by: FAMILY MEDICINE

## 2023-12-17 PROCEDURE — 84484 ASSAY OF TROPONIN QUANT: CPT | Performed by: FAMILY MEDICINE

## 2023-12-17 PROCEDURE — 80048 BASIC METABOLIC PNL TOTAL CA: CPT | Performed by: HOSPITALIST

## 2023-12-17 RX ORDER — SPIRONOLACTONE 25 MG/1
25 TABLET ORAL DAILY
Status: DISCONTINUED | OUTPATIENT
Start: 2023-12-17 | End: 2023-12-18

## 2023-12-17 RX ORDER — MAGNESIUM SULFATE HEPTAHYDRATE 40 MG/ML
4 INJECTION, SOLUTION INTRAVENOUS ONCE
Qty: 100 ML | Refills: 0 | Status: COMPLETED | OUTPATIENT
Start: 2023-12-17 | End: 2023-12-17

## 2023-12-17 RX ORDER — FUROSEMIDE 10 MG/ML
25 INJECTION INTRAMUSCULAR; INTRAVENOUS
Status: DISCONTINUED | OUTPATIENT
Start: 2023-12-17 | End: 2023-12-18

## 2023-12-17 RX ADMIN — AMITRIPTYLINE HYDROCHLORIDE 25 MG: 25 TABLET, FILM COATED ORAL at 21:05

## 2023-12-17 RX ADMIN — MAGNESIUM SULFATE HEPTAHYDRATE 4 G: 40 INJECTION, SOLUTION INTRAVENOUS at 13:43

## 2023-12-17 RX ADMIN — DOCUSATE SODIUM 100 MG: 100 CAPSULE, LIQUID FILLED ORAL at 08:14

## 2023-12-17 RX ADMIN — PREDNISONE 40 MG: 20 TABLET ORAL at 08:14

## 2023-12-17 RX ADMIN — FUROSEMIDE 25 MG: 10 INJECTION, SOLUTION INTRAMUSCULAR; INTRAVENOUS at 15:01

## 2023-12-17 RX ADMIN — CARVEDILOL 6.25 MG: 6.25 TABLET, FILM COATED ORAL at 08:14

## 2023-12-17 RX ADMIN — SPIRONOLACTONE 25 MG: 25 TABLET, FILM COATED ORAL at 13:44

## 2023-12-17 RX ADMIN — ENOXAPARIN SODIUM 40 MG: 40 INJECTION SUBCUTANEOUS at 15:05

## 2023-12-17 RX ADMIN — FUROSEMIDE 20 MG: 10 INJECTION, SOLUTION INTRAMUSCULAR; INTRAVENOUS at 08:14

## 2023-12-17 RX ADMIN — CARVEDILOL 6.25 MG: 6.25 TABLET, FILM COATED ORAL at 16:34

## 2023-12-17 RX ADMIN — NICOTINE 1 PATCH: 21 PATCH, EXTENDED RELEASE TRANSDERMAL at 15:05

## 2023-12-17 RX ADMIN — DOCUSATE SODIUM 100 MG: 100 CAPSULE, LIQUID FILLED ORAL at 17:33

## 2023-12-17 RX ADMIN — LOSARTAN POTASSIUM 50 MG: 50 TABLET, FILM COATED ORAL at 08:14

## 2023-12-17 RX ADMIN — OXYCODONE HYDROCHLORIDE 5 MG: 5 TABLET ORAL at 21:06

## 2023-12-17 NOTE — PROGRESS NOTES
7 run beat vtach noted on telemetry. Russell Munoz made aware. New orders received. Pt sleeping in bed with no complaints at this time.

## 2023-12-17 NOTE — ASSESSMENT & PLAN NOTE
Wt Readings from Last 3 Encounters:   12/17/23 74.5 kg (164 lb 3.2 oz)   10/10/23 72.3 kg (159 lb 6.4 oz)   03/03/23 77.1 kg (170 lb)       Presents SoB 3-4 days, elevated BNP  CTA has evidence of pulmonary edema he does have leg edema  Suspect symptoms mix of known cardiomyopathy and emphysema  October 2023 Echo: EF 30%  He also reports not compliant with sodium diet, and sometimes misses his diuretics, also ongoing drug use  Doubt this is a significant worsening in his cardiac function, given recent Echo, no need at this time  Home lasix 20mg PO ---> will increase his Lasix to 25 mg IV twice daily unknown what weight he was admitted but today at 164 he was discharged at a weight of 158.  He does have -2 L  Restart his Aldactone for better blood pressure control  I/O, daily weights  Tele monitoring x24 hr  Trend Cr with diuresis, monitor electrolyes  Cardiac diet

## 2023-12-17 NOTE — ASSESSMENT & PLAN NOTE
Due to CHF, trended down  Pending 4hr trop will follow it was elevated but delta is -6 will trend another 1 today  EKG reviewed    He does report a vague CP, NON-anginal in description and correlates with coughing, likely MSK

## 2023-12-17 NOTE — UTILIZATION REVIEW
NOTIFICATION OF INPATIENT ADMISSION   AUTHORIZATION REQUEST   SERVICING FACILITY:   [place] [address]  Tax ID: [id number]  NPI: [id number] ATTENDING PROVIDER:  Attending Name and NPI#: Guadalupe Muñoz Md [[id number]]  Address: [address] [place] [address]  Phone: [phone]   ADMISSION INFORMATION:  Place of Service: Inpatient [facility]  Place of Service Code: 21  Inpatient Admission Date/Time: 12/16/23 12:13 PM  Discharge Date/Time: No discharge date for patient encounter.  Admitting Diagnosis Code/Description:  SOB (shortness of breath) [R06.02]  Acute on chronic systolic congestive heart failure (HCC) [I50.23]     UTILIZATION REVIEW CONTACT:  Comfort Dennis Utilization   Network Utilization Review Department  Phone: [phone]  Fax [phone]  Email: [email or web address]  Contact for approvals/pending authorizations, clinical reviews, and discharge.     PHYSICIAN ADVISORY SERVICES:  Medical Necessity Denial & Diek-yh-Fdes Review  Phone: [phone]  Fax: [phone]  Email: [email or web address]     DISCHARGE SUPPORT TEAM:  For Patients Discharge Needs & Updates  Phone: [phone] opt. 2 Fax: [phone]  Email: [email or web address]

## 2023-12-17 NOTE — ASSESSMENT & PLAN NOTE
Significantly hypertensive on admission  Resolved with dose of diuretics and treatment in ED reoccurred  Continue home ARB and BB continue diuresis will increase the dose and restart Aldactone prior to raising the beta-blocker

## 2023-12-17 NOTE — ASSESSMENT & PLAN NOTE
Likely due to CHF and underling Emphysema  However he has a vague CP, can not definitively rule out clinically and he is elevated risk  CTA negative for acute pulmonary emboli

## 2023-12-17 NOTE — PROGRESS NOTES
[facility]  Progress Note  Name: Britton Batista I  MRN: [id number]  Unit/Bed#: -[facility] I Date of Admission: 12/16/2023   Date of Service: 12/17/2023 I Hospital Day: 1    Assessment/Plan   * Acute on chronic combined systolic and diastolic congestive heart failure (HCC)  Assessment & Plan  Wt Readings from Last 3 Encounters:   12/17/23 74.5 kg (164 lb 3.2 oz)   10/10/23 72.3 kg (159 lb 6.4 oz)   03/03/23 77.1 kg (170 lb)       Presents SoB 3-4 days, elevated BNP  CTA has evidence of pulmonary edema he does have leg edema  Suspect symptoms mix of known cardiomyopathy and emphysema  October 2023 Echo: EF 30%  He also reports not compliant with sodium diet, and sometimes misses his diuretics, also ongoing drug use  Doubt this is a significant worsening in his cardiac function, given recent Echo, no need at this time  Home lasix 20mg PO ---> will increase his Lasix to 25 mg IV twice daily unknown what weight he was admitted but today at 164 he was discharged at a weight of 158.  He does have -2 L  Restart his Aldactone for better blood pressure control  I/O, daily weights  Tele monitoring x24 hr  Trend Cr with diuresis, monitor electrolyes  Cardiac diet        Hypomagnesemia  Assessment & Plan  Replace and recheck level tomorrow    D-dimer, elevated  Assessment & Plan  Likely due to CHF and underling Emphysema  However he has a vague CP, can not definitively rule out clinically and he is elevated risk  CTA negative for acute pulmonary emboli    Tobacco abuse  Assessment & Plan  1 PPD smoker chronic  NRT  Encourage cessation    Methamphetamine abuse (HCC)  Assessment & Plan  History  Admitted to use 2 days prior to admission  No need for urine tox  Encourage cessation    Hypertensive urgency  Assessment & Plan  Significantly hypertensive on admission  Resolved with dose of diuretics and treatment in ED reoccurred  Continue home ARB and BB continue diuresis will increase the dose and restart  Aldactone prior to raising the beta-blocker    Emphysema of lung (HCC)  Assessment & Plan  Active smoker  Slight wheezing on exam although suspected more secondary to pulmonary edema.  Currently there is no wheezing  Does not use his home rescue inhaler, despite being symptomatic  Short course prednisone 40mg x5 days  ATC nebs    Elevated troponin  Assessment & Plan  Due to CHF, trended down  Pending 4hr trop will follow it was elevated but delta is -6 will trend another 1 today  EKG reviewed    He does report a vague CP, NON-anginal in description and correlates with coughing, likely MSK    Scleroderma ([facility])  Assessment & Plan  Chronic, with raynaulds as well of fingers, prior finger tip amputation  Reports right middle digit has some worsening pain  Slightly cool to touch  Will monitor clinically with management of other conditions  Pain meds 5mg Oxy PRN severe pain  Restarted his amitriptyline which he was not taking, suspect it is for neuropathic pain                   VTE Pharmacologic Prophylaxis:   Moderate Risk (Score 3-4) - Pharmacological DVT Prophylaxis Ordered: enoxaparin (Lovenox).    Mobility:   Basic Mobility Inpatient Raw Score: 24  JH-HLM Goal: 8: Walk 250 feet or more  JH-HLM Achieved: 7: Walk 25 feet or more  HLM Goal achieved. Continue to encourage appropriate mobility.    Patient Centered Rounds: I performed bedside rounds with nursing staff today.   Discussions with Specialists or Other Care Team Provider: none    Education and Discussions with Family / Patient: Patient will follow-up with family    Total Time Spent on Date of Encounter in care of patient: >35 mins. This time was spent on one or more of the following: performing physical exam; counseling and coordination of care; obtaining or reviewing history; documenting in the medical record; reviewing/ordering tests, medications or procedures; communicating with other healthcare professionals and discussing with patient's  family/caregivers.    Current Length of Stay: 1 day(s)  Current Patient Status: Inpatient   Certification Statement: The patient will continue to require additional inpatient hospital stay due to 2/2 chf  Discharge Plan: Anticipate discharge in 48 hrs to home.    Code Status: Level 1 - Full Code    Subjective:   And examined shortness of breath is improved no chest pain    Objective:     Vitals:   Temp (24hrs), Av.7 °F (36.5 °C), Min:97 °F (36.1 °C), Max:98.2 °F (36.8 °C)    Temp:  [97 °F (36.1 °C)-98.2 °F (36.8 °C)] 97.9 °F (36.6 °C)  HR:  [] 104  Resp:  [16-22] 16  BP: (115-163)/() 155/109  SpO2:  [94 %-98 %] 94 %  Body mass index is 24.25 kg/m².     Input and Output Summary (last 24 hours):     Intake/Output Summary (Last 24 hours) at 2023 1219  Last data filed at 2023 1101  Gross per 24 hour   Intake 120 ml   Output 3000 ml   Net -2880 ml       Physical Exam:   Physical Exam  Vitals and nursing note reviewed.   Constitutional:       General: He is not in acute distress.     Appearance: He is well-developed.   HENT:      Head: Normocephalic and atraumatic.   Eyes:      Conjunctiva/sclera: Conjunctivae normal.   Cardiovascular:      Rate and Rhythm: Normal rate and regular rhythm.      Heart sounds: No murmur heard.  Pulmonary:      Effort: Pulmonary effort is normal. No respiratory distress.      Breath sounds: Rales present.   Abdominal:      Palpations: Abdomen is soft.      Tenderness: There is no abdominal tenderness.   Musculoskeletal:         General: Swelling present.      Cervical back: Neck supple.   Skin:     General: Skin is warm and dry.      Capillary Refill: Capillary refill takes less than 2 seconds.   Neurological:      Mental Status: He is alert and oriented to person, place, and time.   Psychiatric:         Mood and Affect: Mood normal.          Additional Data:     Labs:  Results from last 7 days   Lab Units 23  1108   WBC Thousand/uL 6.86   HEMOGLOBIN g/dL  15.8   HEMATOCRIT % 50.3*   PLATELETS Thousands/uL 297   NEUTROS PCT % 59   LYMPHS PCT % 28   MONOS PCT % 10   EOS PCT % 2     Results from last 7 days   Lab Units 12/17/23  0501 12/16/23  1108   SODIUM mmol/L 133* 134*   POTASSIUM mmol/L 4.4 4.8   CHLORIDE mmol/L 101 101   CO2 mmol/L 24 26   BUN mg/dL 23 27*   CREATININE mg/dL 1.16 1.21   ANION GAP mmol/L 8 7   CALCIUM mg/dL 8.9 9.3   ALBUMIN g/dL  --  4.0   TOTAL BILIRUBIN mg/dL  --  0.78   ALK PHOS U/L  --  110*   ALT U/L  --  20   AST U/L  --  31   GLUCOSE RANDOM mg/dL 107 125     Results from last 7 days   Lab Units 12/16/23  1108   INR  1.02             Results from last 7 days   Lab Units 12/16/23  1108   LACTIC ACID mmol/L 1.3       Lines/Drains:  Invasive Devices       Peripheral Intravenous Line  Duration             Peripheral IV 12/16/23 Right Antecubital 1 day                          Imaging: Reviewed radiology reports from this admission including: chest CT scan    Recent Cultures (last 7 days):   Results from last 7 days   Lab Units 12/16/23  1108   BLOOD CULTURE  Received in Microbiology Lab. Culture in Progress.  Received in Microbiology Lab. Culture in Progress.       Last 24 Hours Medication List:   Current Facility-Administered Medications   Medication Dose Route Frequency Provider Last Rate    albuterol  2.5 mg Nebulization Q4H PRN Jaron Britton Counts, DO      amitriptyline  25 mg Oral HS Jaron Britton Counts, DO      calcium carbonate  1,000 mg Oral Daily PRN Jaron Britton Counts, DO      carvedilol  6.25 mg Oral BID With Meals Jaron Britton Counts, DO      docusate sodium  100 mg Oral BID Jaron Britton Counts, DO      enoxaparin  40 mg Subcutaneous Daily Jaron Britton Counts, DO      furosemide  25 mg Intravenous BID (diuretic) Guadlaupe Muñoz MD      losartan  50 mg Oral Daily Jaron Britton Counts, DO      magnesium sulfate  4 g Intravenous Once Guadalupe Muñoz MD      nicotine  1 patch Transdermal Daily Jaron Britton Counts, DO      ondansetron   4 mg Intravenous Q6H PRN Jaron Britton Counts, DO      oxyCODONE  5 mg Oral Q6H PRN Jaron Britton Counts, DO      predniSONE  40 mg Oral Daily Jaron Britton Counts, DO      spironolactone  25 mg Oral Daily Guadalupe Muñoz MD          Today, Patient Was Seen By: Guadalupe Muñoz MD    **Please Note: This note may have been constructed using a voice recognition system.**

## 2023-12-17 NOTE — PLAN OF CARE
Problem: PAIN - ADULT  Goal: Verbalizes/displays adequate comfort level or baseline comfort level  Description: Interventions:  - Encourage patient to monitor pain and request assistance  - Assess pain using appropriate pain scale  - Administer analgesics based on type and severity of pain and evaluate response  - Implement non-pharmacological measures as appropriate and evaluate response  - Consider cultural and social influences on pain and pain management  - Notify physician/advanced practitioner if interventions unsuccessful or patient reports new pain  Outcome: Progressing     Problem: DISCHARGE PLANNING  Goal: Discharge to home or other facility with appropriate resources  Description: INTERVENTIONS:  - Identify barriers to discharge w/patient and caregiver  - Arrange for needed discharge resources and transportation as appropriate  - Identify discharge learning needs (meds, wound care, etc.)  - Arrange for interpretive services to assist at discharge as needed  - Refer to Case Management Department for coordinating discharge planning if the patient needs post-hospital services based on physician/advanced practitioner order or complex needs related to functional status, cognitive ability, or social support system  Outcome: Progressing

## 2023-12-17 NOTE — ASSESSMENT & PLAN NOTE
Active smoker  Slight wheezing on exam although suspected more secondary to pulmonary edema.  Currently there is no wheezing  Does not use his home rescue inhaler, despite being symptomatic  Short course prednisone 40mg x5 days  ATC nebs

## 2023-12-18 VITALS
HEART RATE: 53 BPM | RESPIRATION RATE: 16 BRPM | OXYGEN SATURATION: 91 % | HEIGHT: 69 IN | DIASTOLIC BLOOD PRESSURE: 99 MMHG | WEIGHT: 162.8 LBS | BODY MASS INDEX: 24.11 KG/M2 | SYSTOLIC BLOOD PRESSURE: 142 MMHG | TEMPERATURE: 97.9 F

## 2023-12-18 LAB
ANION GAP SERPL CALCULATED.3IONS-SCNC: 7 MMOL/L
ATRIAL RATE: 102 BPM
ATRIAL RATE: 103 BPM
BUN SERPL-MCNC: 30 MG/DL (ref 5–25)
CALCIUM SERPL-MCNC: 8.8 MG/DL (ref 8.4–10.2)
CHLORIDE SERPL-SCNC: 100 MMOL/L (ref 96–108)
CO2 SERPL-SCNC: 26 MMOL/L (ref 21–32)
CREAT SERPL-MCNC: 1.36 MG/DL (ref 0.6–1.3)
GFR SERPL CREATININE-BSD FRML MDRD: 56 ML/MIN/1.73SQ M
GLUCOSE SERPL-MCNC: 106 MG/DL (ref 65–140)
MAGNESIUM SERPL-MCNC: 1.9 MG/DL (ref 1.9–2.7)
P AXIS: 60 DEGREES
P AXIS: 73 DEGREES
POTASSIUM SERPL-SCNC: 4.3 MMOL/L (ref 3.5–5.3)
PR INTERVAL: 244 MS
PR INTERVAL: 250 MS
QRS AXIS: 87 DEGREES
QRS AXIS: 92 DEGREES
QRSD INTERVAL: 170 MS
QRSD INTERVAL: 174 MS
QT INTERVAL: 390 MS
QT INTERVAL: 458 MS
QTC INTERVAL: 508 MS
QTC INTERVAL: 599 MS
SODIUM SERPL-SCNC: 133 MMOL/L (ref 135–147)
T WAVE AXIS: -29 DEGREES
T WAVE AXIS: -37 DEGREES
VENTRICULAR RATE: 102 BPM
VENTRICULAR RATE: 103 BPM

## 2023-12-18 PROCEDURE — 99239 HOSP IP/OBS DSCHRG MGMT >30: CPT | Performed by: FAMILY MEDICINE

## 2023-12-18 PROCEDURE — 80048 BASIC METABOLIC PNL TOTAL CA: CPT | Performed by: FAMILY MEDICINE

## 2023-12-18 PROCEDURE — 83735 ASSAY OF MAGNESIUM: CPT | Performed by: FAMILY MEDICINE

## 2023-12-18 RX ORDER — LOSARTAN POTASSIUM 50 MG/1
50 TABLET ORAL DAILY
Status: DISCONTINUED | OUTPATIENT
Start: 2023-12-19 | End: 2023-12-18 | Stop reason: HOSPADM

## 2023-12-18 RX ORDER — PREDNISONE 20 MG/1
40 TABLET ORAL DAILY
Qty: 4 TABLET | Refills: 0 | Status: SHIPPED | OUTPATIENT
Start: 2023-12-19 | End: 2023-12-21

## 2023-12-18 RX ADMIN — PREDNISONE 40 MG: 20 TABLET ORAL at 08:46

## 2023-12-18 RX ADMIN — FUROSEMIDE 25 MG: 10 INJECTION, SOLUTION INTRAMUSCULAR; INTRAVENOUS at 08:46

## 2023-12-18 RX ADMIN — CARVEDILOL 6.25 MG: 6.25 TABLET, FILM COATED ORAL at 08:46

## 2023-12-18 RX ADMIN — DOCUSATE SODIUM 100 MG: 100 CAPSULE, LIQUID FILLED ORAL at 08:46

## 2023-12-18 NOTE — PLAN OF CARE
Problem: PAIN - ADULT  Goal: Verbalizes/displays adequate comfort level or baseline comfort level  Description: Interventions:  - Encourage patient to monitor pain and request assistance  - Assess pain using appropriate pain scale  - Administer analgesics based on type and severity of pain and evaluate response  - Implement non-pharmacological measures as appropriate and evaluate response  - Consider cultural and social influences on pain and pain management  - Notify physician/advanced practitioner if interventions unsuccessful or patient reports new pain  Outcome: Progressing     Problem: INFECTION - ADULT  Goal: Absence or prevention of progression during hospitalization  Description: INTERVENTIONS:  - Assess and monitor for signs and symptoms of infection  - Monitor lab/diagnostic results  - Monitor all insertion sites, i.e. indwelling lines, tubes, and drains  - Monitor endotracheal if appropriate and nasal secretions for changes in amount and color  - [place] appropriate cooling/warming therapies per order  - Administer medications as ordered  - Instruct and encourage patient and family to use good hand hygiene technique  - Identify and instruct in appropriate isolation precautions for identified infection/condition  Outcome: Progressing  Goal: Absence of fever/infection during neutropenic period  Description: INTERVENTIONS:  - Monitor WBC    Outcome: Progressing     Problem: SAFETY ADULT  Goal: Patient will remain free of falls  Description: INTERVENTIONS:  - Educate patient/family on patient safety including physical limitations  - Instruct patient to call for assistance with activity   - Consult OT/PT to assist with strengthening/mobility   - Keep Call bell within reach  - Keep bed low and locked with side rails adjusted as appropriate  - Keep care items and personal belongings within reach  - Initiate and maintain comfort rounds  - Make Fall Risk Sign visible to staff  - Offer Toileting every 2 Hours,  in advance of need  - Initiate/Maintain alarm  - Obtain necessary fall risk management equipment  - Apply yellow socks and bracelet for high fall risk patients  - Consider moving patient to room near nurses station  Outcome: Progressing  Goal: Maintain or return to baseline ADL function  Description: INTERVENTIONS:  -  Assess patient's ability to carry out ADLs; assess patient's baseline for ADL function and identify physical deficits which impact ability to perform ADLs (bathing, care of mouth/teeth, toileting, grooming, dressing, etc.)  - Assess/evaluate cause of self-care deficits   - Assess range of motion  - Assess patient's mobility; develop plan if impaired  - Assess patient's need for assistive devices and provide as appropriate  - Encourage maximum independence but intervene and supervise when necessary  - Involve family in performance of ADLs  - Assess for home care needs following discharge   - Consider OT consult to assist with ADL evaluation and planning for discharge  - Provide patient education as appropriate  Outcome: Progressing  Goal: Maintains/Returns to pre admission functional level  Description: INTERVENTIONS:  - Perform AM-PAC 6 Click Basic Mobility/ Daily Activity assessment daily.  - Set and communicate daily mobility goal to care team and patient/family/caregiver.   - Collaborate with rehabilitation services on mobility goals if consulted  - Perform Range of Motion 3 times a day.  - Reposition patient every 2 hours.  - Dangle patient 3 times a day  - Stand patient 3 times a day  - Ambulate patient 3 times a day  - Out of bed to chair 3 times a day   - Out of bed for meals 3 times a day  - Out of bed for toileting  - Record patient progress and toleration of activity level   Outcome: Progressing     Problem: DISCHARGE PLANNING  Goal: Discharge to home or other facility with appropriate resources  Description: INTERVENTIONS:  - Identify barriers to discharge w/patient and caregiver  - Arrange  for needed discharge resources and transportation as appropriate  - Identify discharge learning needs (meds, wound care, etc.)  - Arrange for interpretive services to assist at discharge as needed  - Refer to Case Management Department for coordinating discharge planning if the patient needs post-hospital services based on physician/advanced practitioner order or complex needs related to functional status, cognitive ability, or social support system  Outcome: Progressing     Problem: Knowledge Deficit  Goal: Patient/family/caregiver demonstrates understanding of disease process, treatment plan, medications, and discharge instructions  Description: Complete learning assessment and assess knowledge base.  Interventions:  - Provide teaching at level of understanding  - Provide teaching via preferred learning methods  Outcome: Progressing

## 2023-12-18 NOTE — DISCHARGE SUMMARY
[facility]  Discharge- Britton Batista 1965, 58 y.o. male MRN: [id number]  Unit/Bed#: -[facility] Encounter: [id number]  Primary Care Provider: [facility] Everywhere Referral   Date and time admitted to hospital: 12/16/2023 10:44 AM    * Acute on chronic combined systolic and diastolic congestive heart failure (HCC)  Assessment & Plan  Wt Readings from Last 3 Encounters:   12/18/23 73.8 kg (162 lb 12.8 oz)   10/10/23 72.3 kg (159 lb 6.4 oz)   03/03/23 77.1 kg (170 lb)       Presents SoB 3-4 days, elevated BNP  CTA has evidence of pulmonary edema he does have leg edema  Suspect symptoms mix of known cardiomyopathy and emphysema  October 2023 Echo: EF 30%  He also reports not compliant with sodium diet, and sometimes misses his diuretics, also ongoing drug use  Doubt this is a significant worsening in his cardiac function, given recent Echo, no need at this time  Home lasix 20mg PO ---    I/O, daily weights  Tele monitoring x24 hr  Trend Cr with diuresis, monitor electrolyes  Cardiac diet  He is down to 162 lungs are clear leg edema resolved he has no shortness of breath would like to go home his creatinine slightly did go up therefore we will hold Aldactone and losartan till tomorrow as he did have IV contrast as well he can restart his oral Lasix in 24 hours that he did receive his IV 25 mg today and repeat a BMP on Thursday        Hypomagnesemia  Assessment & Plan  Solved    D-dimer, elevated  Assessment & Plan  Likely due to CHF and underling Emphysema  However he has a vague CP, can not definitively rule out clinically and he is elevated risk  CTA negative for acute pulmonary emboli    Tobacco abuse  Assessment & Plan  1 PPD smoker chronic  NRT  Encourage cessation    Methamphetamine abuse (HCC)  Assessment & Plan  History  Admitted to use 2 days prior to admission  No need for urine tox  Encourage cessation    Hypertensive urgency  Assessment & Plan  Significantly hypertensive  on admission  Resolved with dose of diuretics and treatment in ED reoccurred  Due to rising creatinine slightly today secondary to IV diuresis and having IV contrast 2 days ago restart his Aldactone and losartan in 24 hours repeat a BMP on Thursday did not meet criteria for IRINEO    Emphysema of lung (HCC)  Assessment & Plan  Active smoker  Slight wheezing on exam although suspected more secondary to pulmonary edema.  Currently there is no wheezing  Does not use his home rescue inhaler, despite being symptomatic  Short course prednisone 40mg x5 days  ATC nebs    Elevated troponin  Assessment & Plan  Due to CHF, trended down  EKG reviewed    He does report a vague CP, NON-anginal in description and correlates with coughing, likely MSK no chest pain    Scleroderma (HCC)  Assessment & Plan  Chronic, with raynaulds as well of fingers, prior finger tip amputation  Reports right middle digit has some worsening pain  Slightly cool to touch  Will monitor clinically with management of other conditions  Pain meds 5mg Oxy PRN severe pain  Restarted his amitriptyline which he was not taking, suspect it is for neuropathic pain            Medical Problems       Resolved Problems  Date Reviewed: 12/18/2023   None       Discharging Physician / Practitioner: Guadalupe Muñoz MD  PCP: [facility] Referral  Admission Date:   Admission Orders (From admission, onward)       Ordered        12/16/23 1213  INPATIENT ADMISSION  Once                          Discharge Date: 12/18/23    Consultations During Hospital Stay:  none    Procedures Performed:   none    Significant Findings / Test Results:   Cta pe study-No evidence for pulmonary embolism.     Small to moderate pericardial effusion appears slightly increased since the prior examination.     Cardiomegaly with very mild pulmonary edema and trace effusions.                 Workstation performed: [id number]       Incidental Findings:   none    Test Results Pending at Discharge  "(will require follow up):   none     Outpatient Tests Requested:  Bmp Thursday     Complications:  none    Reason for Admission: Acute on chronic systolic heart failure exacerbation    Hospital Course:   Britton Batista is a 58 y.o. male patient who originally presented to the hospital on 12/16/2023 due to acute on chronic heart failure exacerbation secondary to med noncompliance and still drug use.  Started on IV diuresis with increased dose and that has improved lungs clear leg edema resolved shortness of breath resolved troponin secondary to demand ischemia EKG nonischemic trended down without any chest pain.  Discussed with the patient compliance with medication and avoidance of drug.  Creatinine did go up secondary to diuresis IV contrast losartan and Aldactone but did not meet criteria for acute kidney injury creatinine 1.3 discussed with the patient to restart his oral Lasix Aldactone and losartan 24 hours and check a BMP on Thursday.        Please see above list of diagnoses and related plan for additional information.     Condition at Discharge: stable    Discharge Day Visit / Exam:   Subjective: Patient seen and examined denies any chest pain or shortness of breath he wants to go home  Vitals: Blood Pressure: 142/99 (12/18/23 1017)  Pulse: (!) 53 (12/18/23 1017)  Temperature: 97.9 °F (36.6 °C) (12/18/23 0700)  Temp Source: Temporal (12/16/23 1347)  Respirations: 16 (12/18/23 0700)  Height: 5' 9\" (175.3 cm) (12/16/23 1347)  Weight - Scale: 73.8 kg (162 lb 12.8 oz) (12/18/23 0538)  SpO2: 91 % (12/18/23 1017)  Exam:   Physical Exam  Vitals and nursing note reviewed.   Constitutional:       General: He is not in acute distress.     Appearance: He is well-developed.   HENT:      Head: Normocephalic and atraumatic.   Eyes:      Conjunctiva/sclera: Conjunctivae normal.   Cardiovascular:      Rate and Rhythm: Normal rate and regular rhythm.      Heart sounds: No murmur heard.  Pulmonary:      Effort: Pulmonary " effort is normal. No respiratory distress.      Breath sounds: Normal breath sounds. No wheezing or rales.   Abdominal:      General: Bowel sounds are normal. There is no distension.      Palpations: Abdomen is soft.      Tenderness: There is no abdominal tenderness.   Musculoskeletal:         General: No swelling.      Cervical back: Neck supple.   Skin:     General: Skin is warm and dry.      Capillary Refill: Capillary refill takes less than 2 seconds.   Neurological:      Mental Status: He is alert and oriented to person, place, and time.   Psychiatric:         Mood and Affect: Mood normal.          Discussion with Family: Updated  (sister) via phone.    Discharge instructions/Information to patient and family:   See after visit summary for information provided to patient and family.      Provisions for Follow-Up Care:  See after visit summary for information related to follow-up care and any pertinent home health orders.      Mobility at time of Discharge:   Basic Mobility Inpatient Raw Score: 24  JH-HLM Goal: 8: Walk 250 feet or more  JH-HLM Achieved: 7: Walk 25 feet or more  HLM Goal achieved. Continue to encourage appropriate mobility.     Disposition:   Home    Planned Readmission: no     Discharge Statement:  I spent >35 minutes discharging the patient. This time was spent on the day of discharge. I had direct contact with the patient on the day of discharge. Greater than 50% of the total time was spent examining patient, answering all patient questions, arranging and discussing plan of care with patient as well as directly providing post-discharge instructions.  Additional time then spent on discharge activities.    Discharge Medications:  See after visit summary for reconciled discharge medications provided to patient and/or family.      **Please Note: This note may have been constructed using a voice recognition system**

## 2023-12-18 NOTE — PLAN OF CARE
Problem: PAIN - ADULT  Goal: Verbalizes/displays adequate comfort level or baseline comfort level  Description: Interventions:  - Encourage patient to monitor pain and request assistance  - Assess pain using appropriate pain scale  - Administer analgesics based on type and severity of pain and evaluate response  - Implement non-pharmacological measures as appropriate and evaluate response  - Consider cultural and social influences on pain and pain management  - Notify physician/advanced practitioner if interventions unsuccessful or patient reports new pain  Outcome: Progressing     Problem: INFECTION - ADULT  Goal: Absence or prevention of progression during hospitalization  Description: INTERVENTIONS:  - Assess and monitor for signs and symptoms of infection  - Monitor lab/diagnostic results  - Monitor all insertion sites, i.e. indwelling lines, tubes, and drains  - Monitor endotracheal if appropriate and nasal secretions for changes in amount and color  - [employer] appropriate cooling/warming therapies per order  - Administer medications as ordered  - Instruct and encourage patient and family to use good hand hygiene technique  - Identify and instruct in appropriate isolation precautions for identified infection/condition  Outcome: Progressing     Problem: SAFETY ADULT  Goal: Patient will remain free of falls  Description: INTERVENTIONS:  - Educate patient/family on patient safety including physical limitations  - Instruct patient to call for assistance with activity   - Consult OT/PT to assist with strengthening/mobility   - Keep Call bell within reach  - Keep bed low and locked with side rails adjusted as appropriate  - Keep care items and personal belongings within reach  - Initiate and maintain comfort rounds  - Make Fall Risk Sign visible to staff  - Apply yellow socks and bracelet for high fall risk patients  - Consider moving patient to room near nurses station  Outcome: Progressing  Goal: Maintain or  return to baseline ADL function  Description: INTERVENTIONS:  -  Assess patient's ability to carry out ADLs; assess patient's baseline for ADL function and identify physical deficits which impact ability to perform ADLs (bathing, care of mouth/teeth, toileting, grooming, dressing, etc.)  - Assess/evaluate cause of self-care deficits   - Assess range of motion  - Assess patient's mobility; develop plan if impaired  - Assess patient's need for assistive devices and provide as appropriate  - Encourage maximum independence but intervene and supervise when necessary  - Involve family in performance of ADLs  - Assess for home care needs following discharge   - Consider OT consult to assist with ADL evaluation and planning for discharge  - Provide patient education as appropriate  Outcome: Progressing  Goal: Maintains/Returns to pre admission functional level  Description: INTERVENTIONS:  - Perform AM-PAC 6 Click Basic Mobility/ Daily Activity assessment daily.  - Set and communicate daily mobility goal to care team and patient/family/caregiver.   - Collaborate with rehabilitation services on mobility goals if consulted  - Out of bed for toileting  - Record patient progress and toleration of activity level   Outcome: Progressing     Problem: DISCHARGE PLANNING  Goal: Discharge to home or other facility with appropriate resources  Description: INTERVENTIONS:  - Identify barriers to discharge w/patient and caregiver  - Arrange for needed discharge resources and transportation as appropriate  - Identify discharge learning needs (meds, wound care, etc.)  - Arrange for interpretive services to assist at discharge as needed  - Refer to Case Management Department for coordinating discharge planning if the patient needs post-hospital services based on physician/advanced practitioner order or complex needs related to functional status, cognitive ability, or social support system  Outcome: Progressing     Problem: Knowledge  Deficit  Goal: Patient/family/caregiver demonstrates understanding of disease process, treatment plan, medications, and discharge instructions  Description: Complete learning assessment and assess knowledge base.  Interventions:  - Provide teaching at level of understanding  - Provide teaching via preferred learning methods  Outcome: Progressing

## 2023-12-18 NOTE — ASSESSMENT & PLAN NOTE
Significantly hypertensive on admission  Resolved with dose of diuretics and treatment in ED reoccurred  Due to rising creatinine slightly today secondary to IV diuresis and having IV contrast 2 days ago restart his Aldactone and losartan in 24 hours repeat a BMP on Thursday did not meet criteria for IRINEO

## 2023-12-18 NOTE — ASSESSMENT & PLAN NOTE
Due to CHF, trended down  EKG reviewed    He does report a vague CP, NON-anginal in description and correlates with coughing, likely MSK no chest pain

## 2023-12-18 NOTE — UTILIZATION REVIEW
Initial Clinical Review    Admission: Date/Time/Statement:   Admission Orders (From admission, onward)       Ordered        12/16/23 1213  INPATIENT ADMISSION  Once                          Orders Placed This Encounter   Procedures    INPATIENT ADMISSION     Standing Status:   Standing     Number of Occurrences:   1     Order Specific Question:   Level of Care     Answer:   Med Surg [16]     Order Specific Question:   Estimated length of stay     Answer:   More than 2 Midnights     Order Specific Question:   Certification     Answer:   I certify that inpatient services are medically necessary for this patient for a duration of greater than two midnights. See H&P and MD Progress Notes for additional information about the patient's course of treatment.     ED Arrival Information       Expected   -    Arrival   12/16/2023 10:42    Acuity   Emergent              Means of arrival   Walk-In    Escorted by   Family Member    Service   Hospitalist    Admission type   Emergency              Arrival complaint   sob             Chief Complaint   Patient presents with    Shortness of Breath       Initial Presentation: 58 y.o. male to ED via walk-in from home   Present to ED with worsening shortness of breath. He has had some vague chest pain today that only started after he started coughing was initially located in the left lower chest and now has some mild pain in the right upper chest, worsened with coughing.   PMHX scleroderma, chronic combined systolic and diastolic congestive heart failure with an ejection fraction most recently of 30%, emphysema and chronic tobacco abuse 1 pack/day, renal disease, hypertension, anxiety, GERD, gout  Admitted to [facility] with DX: Acute on chronic combined systolic and diastolic congestive heart failure   on exam: hypertensive; tachypnea - lungs with expiratory wheezing; elevated BNP   PLAN: cont iv lasix; monitor labs; fluid restriction; tele monitoring; trend Trops      Date: 12/17/23     Day  2  shortness of breath is improved no chest pain; Currently there is no wheezing ; elevated Troponin - elevated but delta is -6 will trend another 1 today; CTA has evidence of pulmonary edema he does have leg edema ; will increase his Lasix   Plan: cont iv lasix; monitor labs; fluid restriction; tele monitoring; trend Trops; rec'd Mg Sulf iv x1; Restart his Aldactone     Date: 12/18/23     Day 3: Has surpassed a 2nd midnight with active treatments and services, which include cont iv lasix; monitor labs.        ED Triage Vitals   Temperature Pulse Respirations Blood Pressure SpO2   12/16/23 1049 12/16/23 1050 12/16/23 1049 12/16/23 1050 12/16/23 1050   97.5 °F (36.4 °C) 64 20 (!) 189/126 97 %      Temp Source Heart Rate Source Patient Position - Orthostatic VS BP Location FiO2 (%)   12/16/23 1050 12/16/23 1049 12/16/23 1049 12/16/23 1049 --   Oral Monitor Sitting Right arm       Pain Score       12/16/23 1049       4          Wt Readings from Last 1 Encounters:   12/18/23 73.8 kg (162 lb 12.8 oz)     Wt Readings from Last 3 Encounters:   12/16/23 70.4 kg (155 lb 3.3 oz)   10/10/23 72.3 kg (159 lb 6.4 oz)   03/03/23 77.1 kg (170 lb)     Wt Readings from Last 3 Encounters:   12/17/23 74.5 kg (164 lb 3.2 oz)   10/10/23 72.3 kg (159 lb 6.4 oz)   03/03/23 77.1 kg (170 lb)       Additional Vital Signs:   Date/Time Temp Pulse Resp BP MAP (mmHg) SpO2 O2 Device Patient Position - Orthostatic VS   12/18/23 10:17:49 -- 53 Abnormal  -- 142/99 113 91 % -- --   12/18/23 0900 -- -- -- -- -- 92 % None (Room air) --   12/18/23 0700 97.9 °F (36.6 °C) -- 16 131/100 110 -- -- --   12/17/23 23:21:56 -- 87 18 158/104 Abnormal  122 93 % -- --   12/17/23 2224 -- -- -- -- -- 92 % None (Room air) --   12/17/23 19:21:01 98.1 °F (36.7 °C) 85 18 150/105 Abnormal  120 95 % -- --   12/17/23 16:35:20 -- 49 Abnormal  -- 160/115 Abnormal  130 93 % -- --   12/17/23 06:48:40 97.9 °F (36.6 °C) 104 16 155/109 Abnormal  124 94 % -- --   12/16/23 2055 --  -- -- -- -- -- None (Room air) --   23 18:24:53 97 °F (36.1 °C) Abnormal  74 18 147/110 Abnormal  122 96 % -- --   23 1503 -- -- -- -- -- 98 % None (Room air) --   23 1400 -- -- -- -- -- -- None (Room air) --   23 1347 98.2 °F (36.8 °C) 86 22 115/83 94 96 % None (Room air) Lying   23 1318 -- 94 18 163/105 Abnormal  -- 95 % -- Lying   23 1155 98 °F (36.7 °C) 90 18 163/105 Abnormal  -- 98 % None (Room air) --   23 1050 99.6 °F (37.6 °C) 64 22 189/126 Abnormal  -- 97 % None (Room air) --   23 1049 97.5 °F (36.4 °C) -- 20 -- -- -- -- Sitting       EK23 11:10   Sinus tachycardia with 1st degree A-V block with frequent Premature ventricular complexes  Right bundle branch block  T wave abnormality, consider inferolateral ischemia  Abnormal ECG  When compared with ECG of 07-OCT-2023 07:55,  Premature ventricular complexes are now Present  Premature atrial complexes are no longer Present  WV interval has increased  QT has shortened      23 11:11   Sinus tachycardia with 1st degree A-V block with occasional Premature ventricular complexes  Right bundle branch block  T wave abnormality, consider inferolateral ischemia  Abnormal ECG  When compared with ECG of 16-DEC-2023 11:10, (unconfirmed)  QT has lengthened    Pertinent Labs/Diagnostic Test Results:   CTA chest pe study   Final Result by Polo Tavarez MD ([id number])      No evidence for pulmonary embolism.      Small to moderate pericardial effusion appears slightly increased since the prior examination.      Cardiomegaly with very mild pulmonary edema and trace effusions.                  Workstation performed: [id number]         XR chest 1 view portable   ED Interpretation by Jonh Trimble MD ( 113)   Increased pulmonary vasculature.      Final Result by Sanjiv Wahl MD ([id number])      Stable cardiomegaly.                  Workstation performed: [id number]           Results from last 7 days   Lab  Units 12/16/23  1108   SARS-COV-2  Negative     Results from last 7 days   Lab Units 12/16/23  1108 12/15/23  1554   WBC Thousand/uL 6.86 6.90   HEMOGLOBIN g/dL 15.8 15.4   HEMATOCRIT % 50.3* 47.7   PLATELETS Thousands/uL 297 264   NEUTROS ABS Thousands/µL 4.13 4.63        Results from last 7 days   Lab Units 12/18/23  0441 12/17/23  0501 12/16/23  1108 12/15/23  1554   SODIUM mmol/L 133* 133* 134* 133*   POTASSIUM mmol/L 4.3 4.4 4.8 4.6   CHLORIDE mmol/L 100 101 101 102   CO2 mmol/L 26 24 26 23   ANION GAP mmol/L 7 8 7 8   BUN mg/dL 30* 23 27* 22   CREATININE mg/dL 1.36* 1.16 1.21 0.96   EGFR ml/min/1.73sq m 56 69 65 86   CALCIUM mg/dL 8.8 8.9 9.3 9.4   MAGNESIUM mg/dL 1.9 1.6*  --   --      Results from last 7 days   Lab Units 12/16/23  1108   AST U/L 31   ALT U/L 20   ALK PHOS U/L 110*   TOTAL PROTEIN g/dL 7.5   ALBUMIN g/dL 4.0   TOTAL BILIRUBIN mg/dL 0.78        Results from last 7 days   Lab Units 12/18/23  0441 12/17/23  0501 12/16/23  1108 12/15/23  1554   GLUCOSE RANDOM mg/dL 106 107 125 95        Results from last 7 days   Lab Units 12/16/23  1524 12/16/23  1321 12/16/23  1108   HS TNI 0HR ng/L  --   --  153*   HS TNI 2HR ng/L  --  125*  --    HSTNI D2 ng/L  --  -28  --    HS TNI 4HR ng/L 147*  --   --    HSTNI D4 ng/L -6  --   --      Results from last 7 days   Lab Units 12/16/23  1108   D-DIMER QUANTITATIVE ug/ml FEU 0.84*     Results from last 7 days   Lab Units 12/16/23  1108   PROTIME seconds 13.7   INR  1.02   PTT seconds 34        Results from last 7 days   Lab Units 12/16/23  1108   LACTIC ACID mmol/L 1.3        Results from last 7 days   Lab Units 12/16/23  1108   BNP pg/mL 3,505*        Results from last 7 days   Lab Units 12/15/23  1554   CRP mg/L 8.8*   SED RATE mm/hour 31*        Results from last 7 days   Lab Units 12/16/23  1108   INFLUENZA A PCR  Negative   INFLUENZA B PCR  Negative   RSV PCR  Negative        Results from last 7 days   Lab Units 12/16/23  1108   BLOOD CULTURE  No Growth at  24 hrs.  No Growth at 24 hrs.          ED Treatment:   Medication Administration from 12/16/2023 1041 to 12/16/2023 1341         Date/Time Order Dose Route Action     12/16/2023 1143 EST furosemide (LASIX) injection 40 mg 40 mg Intravenous Given            Present on Admission:   Acute on chronic combined systolic and diastolic congestive heart failure (HCC)   Elevated troponin   Emphysema of lung (HCC)   Hypertensive urgency   Scleroderma (HCC)   Methamphetamine abuse (HCC)   Tobacco abuse      Admitting Diagnosis: SOB (shortness of breath) [R06.02]  Acute on chronic systolic congestive heart failure (HCC) [I50.23]    Age/Sex: 58 y.o. male    Admission Orders: SCDs; I/O; Daily wts; cardiac diet; fluid restriction 1800    Scheduled Medications:  amitriptyline, 25 mg, Oral, HS  carvedilol, 6.25 mg, Oral, BID With Meals  docusate sodium, 100 mg, Oral, BID  enoxaparin, 40 mg, Subcutaneous, Daily  [START ON 12/19/2023] losartan, 50 mg, Oral, Daily  nicotine, 1 patch, Transdermal, Daily  predniSONE, 40 mg, Oral, Daily  furosemide (LASIX) injection 25 mg, Intravenous, BID    magnesium sulfate 4 g/100 mL IVPB (premix) 4 g  Dose: 4 g  Freq: Once Route: IV  Last Dose: 4 g (12/17/23 1343)  Start: 12/17/23 1300 End: 12/17/23 1743       Continuous IV Infusions: None     PRN Meds:  albuterol, 2.5 mg, Nebulization, Q4H PRN  calcium carbonate, 1,000 mg, Oral, Daily PRN  ondansetron, 4 mg, Intravenous, Q6H PRN  oxyCODONE, 5 mg, Oral, Q6H PRN  (12/16 rec'd x2)  (12/17 rec'd x1)         IP CONSULT TO NUTRITION SERVICES    Network Utilization Review Department  ATTENTION: Please call with any questions or concerns to [phone] and carefully listen to the prompts so that you are directed to the right person. All voicemails are confidential.   For Discharge needs, contact Care Management DC Support Team at [phone] opt. 2  Send all requests for admission clinical reviews, approved or denied determinations and any other requests  to dedicated fax number below belonging to the campus where the patient is receiving treatment. List of dedicated fax numbers for the Facilities:  FACILITY NAME UR FAX NUMBER   ADMISSION DENIALS (Administrative/Medical Necessity) [phone]   DISCHARGE SUPPORT TEAM (NETWORK) [phone]   PARENT CHILD HEALTH (Maternity/NICU/Pediatrics) [phone]   [facility] [phone]   [facility] [phone]   [facility] [phone]   [facility] [phone]   [facility] [phone]   [facility] [phone]   [facility] [phone]   [facility] [phone]   [facility] [phone]   [facility] [phone]   [facility] [phone]

## 2023-12-19 NOTE — UTILIZATION REVIEW
NOTIFICATION OF ADMISSION DISCHARGE   This is a Notification of Discharge from [facility]. Please be advised that this patient has been discharge from our facility. Below you will find the admission and discharge date and time including the patient’s disposition.   UTILIZATION REVIEW CONTACT:  Comfort Dennis  Utilization   Network Utilization Review Department  Phone: [phone] x carefully listen to the prompts. All voicemails are confidential.  Email: [email or web address]     ADMISSION INFORMATION  PRESENTATION DATE: 12/16/2023 10:44 AM  OBERVATION ADMISSION DATE:   INPATIENT ADMISSION DATE: 12/16/23 12:13 PM   DISCHARGE DATE: 12/18/2023 12:29 PM   DISPOSITION:Home/Self Care    Network Utilization Review Department  ATTENTION: Please call with any questions or concerns to [phone] and carefully listen to the prompts so that you are directed to the right person. All voicemails are confidential.   For Discharge needs, contact Care Management DC Support Team at [phone] opt. 2  Send all requests for admission clinical reviews, approved or denied determinations and any other requests to dedicated fax number below belonging to the campus where the patient is receiving treatment. List of dedicated fax numbers for the Facilities:  FACILITY NAME UR FAX NUMBER   ADMISSION DENIALS (Administrative/Medical Necessity) [phone]   DISCHARGE SUPPORT TEAM ([facility]) [phone]   PARENT CHILD HEALTH (Maternity/NICU/Pediatrics) [phone]   [facility] [phone]   [facility] [phone]   [facility] [phone]   [facility] [phone]   [facility] [phone]   [facility] [phone]   [facility] [phone]   [facility]  [phone]   [facility] [phone]   [facility] [phone]   [facility] [phone]

## 2023-12-20 ENCOUNTER — TELEPHONE (OUTPATIENT)
Dept: CARDIOLOGY CLINIC | Facility: CLINIC | Age: 58
End: 2023-12-20

## 2023-12-20 NOTE — TELEPHONE ENCOUNTER
Good morning,    Patient was discharged from the hospital on 12/18 and will need a new patient consult with a Cardiologist. There may be a referral that needs to be attached to this appointment. Please document conversation with patient.     Thank you,    Kyra

## 2023-12-21 LAB
BACTERIA BLD CULT: NORMAL
BACTERIA BLD CULT: NORMAL

## 2023-12-28 ENCOUNTER — APPOINTMENT (EMERGENCY)
Dept: RADIOLOGY | Facility: HOSPITAL | Age: 58
End: 2023-12-28
Payer: COMMERCIAL

## 2023-12-28 ENCOUNTER — HOSPITAL ENCOUNTER (EMERGENCY)
Facility: HOSPITAL | Age: 58
Discharge: HOME | End: 2023-12-28
Attending: EMERGENCY MEDICINE
Payer: COMMERCIAL

## 2023-12-28 VITALS
HEART RATE: 92 BPM | BODY MASS INDEX: 23.7 KG/M2 | RESPIRATION RATE: 22 BRPM | DIASTOLIC BLOOD PRESSURE: 113 MMHG | WEIGHT: 160 LBS | TEMPERATURE: 97.7 F | SYSTOLIC BLOOD PRESSURE: 175 MMHG | HEIGHT: 69 IN | OXYGEN SATURATION: 100 %

## 2023-12-28 DIAGNOSIS — M79.641 PAIN IN BOTH HANDS: Primary | ICD-10-CM

## 2023-12-28 DIAGNOSIS — I50.9 CHF (CONGESTIVE HEART FAILURE) (HCC): ICD-10-CM

## 2023-12-28 DIAGNOSIS — M79.642 PAIN IN BOTH HANDS: Primary | ICD-10-CM

## 2023-12-28 LAB
ANION GAP SERPL CALCULATED.3IONS-SCNC: 10 MMOL/L
ATRIAL RATE: 97 BPM
BASOPHILS # BLD AUTO: 0.06 THOUSANDS/ÂΜL (ref 0–0.1)
BASOPHILS NFR BLD AUTO: 1 % (ref 0–1)
BNP SERPL-MCNC: 2490 PG/ML (ref 0–100)
BUN SERPL-MCNC: 19 MG/DL (ref 5–25)
CALCIUM SERPL-MCNC: 9.3 MG/DL (ref 8.4–10.2)
CARDIAC TROPONIN I PNL SERPL HS: 131 NG/L
CHLORIDE SERPL-SCNC: 99 MMOL/L (ref 96–108)
CO2 SERPL-SCNC: 26 MMOL/L (ref 21–32)
CREAT SERPL-MCNC: 1.14 MG/DL (ref 0.6–1.3)
EOSINOPHIL # BLD AUTO: 0.12 THOUSAND/ÂΜL (ref 0–0.61)
EOSINOPHIL NFR BLD AUTO: 2 % (ref 0–6)
ERYTHROCYTE [DISTWIDTH] IN BLOOD BY AUTOMATED COUNT: 15.8 % (ref 11.6–15.1)
GFR SERPL CREATININE-BSD FRML MDRD: 70 ML/MIN/1.73SQ M
GLUCOSE SERPL-MCNC: 89 MG/DL (ref 65–140)
HCT VFR BLD AUTO: 49.2 % (ref 36.5–49.3)
HGB BLD-MCNC: 16 G/DL (ref 12–17)
IMM GRANULOCYTES # BLD AUTO: 0.01 THOUSAND/UL (ref 0–0.2)
IMM GRANULOCYTES NFR BLD AUTO: 0 % (ref 0–2)
LYMPHOCYTES # BLD AUTO: 2.16 THOUSANDS/ÂΜL (ref 0.6–4.47)
LYMPHOCYTES NFR BLD AUTO: 29 % (ref 14–44)
MCH RBC QN AUTO: 29.7 PG (ref 26.8–34.3)
MCHC RBC AUTO-ENTMCNC: 32.5 G/DL (ref 31.4–37.4)
MCV RBC AUTO: 91 FL (ref 82–98)
MONOCYTES # BLD AUTO: 0.91 THOUSAND/ÂΜL (ref 0.17–1.22)
MONOCYTES NFR BLD AUTO: 12 % (ref 4–12)
NEUTROPHILS # BLD AUTO: 4.33 THOUSANDS/ÂΜL (ref 1.85–7.62)
NEUTS SEG NFR BLD AUTO: 56 % (ref 43–75)
NRBC BLD AUTO-RTO: 0 /100 WBCS
P AXIS: -12 DEGREES
PLATELET # BLD AUTO: 311 THOUSANDS/UL (ref 149–390)
PMV BLD AUTO: 9 FL (ref 8.9–12.7)
POTASSIUM SERPL-SCNC: 4.8 MMOL/L (ref 3.5–5.3)
PR INTERVAL: 194 MS
QRS AXIS: -49 DEGREES
QRSD INTERVAL: 184 MS
QT INTERVAL: 436 MS
QTC INTERVAL: 553 MS
RBC # BLD AUTO: 5.39 MILLION/UL (ref 3.88–5.62)
SODIUM SERPL-SCNC: 135 MMOL/L (ref 135–147)
T WAVE AXIS: 92 DEGREES
VENTRICULAR RATE: 97 BPM
WBC # BLD AUTO: 7.59 THOUSAND/UL (ref 4.31–10.16)

## 2023-12-28 PROCEDURE — 83880 ASSAY OF NATRIURETIC PEPTIDE: CPT | Performed by: PHYSICIAN ASSISTANT

## 2023-12-28 PROCEDURE — 85025 COMPLETE CBC W/AUTO DIFF WBC: CPT | Performed by: PHYSICIAN ASSISTANT

## 2023-12-28 PROCEDURE — 99285 EMERGENCY DEPT VISIT HI MDM: CPT | Performed by: PHYSICIAN ASSISTANT

## 2023-12-28 PROCEDURE — 93005 ELECTROCARDIOGRAM TRACING: CPT

## 2023-12-28 PROCEDURE — 84484 ASSAY OF TROPONIN QUANT: CPT | Performed by: PHYSICIAN ASSISTANT

## 2023-12-28 PROCEDURE — 36415 COLL VENOUS BLD VENIPUNCTURE: CPT | Performed by: PHYSICIAN ASSISTANT

## 2023-12-28 PROCEDURE — 80048 BASIC METABOLIC PNL TOTAL CA: CPT | Performed by: PHYSICIAN ASSISTANT

## 2023-12-28 PROCEDURE — 71046 X-RAY EXAM CHEST 2 VIEWS: CPT

## 2023-12-28 RX ORDER — FUROSEMIDE 40 MG/1
20 TABLET ORAL ONCE
Status: COMPLETED | OUTPATIENT
Start: 2023-12-28 | End: 2023-12-28

## 2023-12-28 RX ORDER — KETOROLAC TROMETHAMINE 30 MG/ML
15 INJECTION, SOLUTION INTRAMUSCULAR; INTRAVENOUS ONCE
Status: COMPLETED | OUTPATIENT
Start: 2023-12-28 | End: 2023-12-28

## 2023-12-28 RX ORDER — KETOROLAC TROMETHAMINE 30 MG/ML
15 INJECTION, SOLUTION INTRAMUSCULAR; INTRAVENOUS ONCE
Status: DISCONTINUED | OUTPATIENT
Start: 2023-12-28 | End: 2023-12-28

## 2023-12-28 RX ORDER — NAPROXEN 500 MG/1
500 TABLET ORAL 2 TIMES DAILY WITH MEALS
Qty: 6 TABLET | Refills: 0 | Status: SHIPPED | OUTPATIENT
Start: 2023-12-28 | End: 2023-12-31

## 2023-12-28 RX ADMIN — KETOROLAC TROMETHAMINE 15 MG: 30 INJECTION, SOLUTION INTRAMUSCULAR at 10:11

## 2023-12-28 RX ADMIN — FUROSEMIDE 20 MG: 40 TABLET ORAL at 11:16

## 2023-12-28 NOTE — ED NOTES
Patient refusing IV at this time but agreeable to straight stick for labs. Dr. Trimble notified.      Citlalli Torre RN  12/28/23 5933

## 2023-12-28 NOTE — DISCHARGE INSTRUCTIONS
It is important that you continue to take your Lasix at home as prescribed to help with your shortness of breath.  Additionally I recommend that you follow-up with your PCP in the next several days for appropriate follow-up due to your hand pain and congestive heart failure.  Please return with any new or worsening symptoms.  Your x-ray was reviewed today in the emergency department and there was no obvious abnormalities found, however, the imaging will be reviewed by the radiologist later this evening or the following day and if there is any abnormalities found you will get a phone call with those results.

## 2023-12-28 NOTE — ED PROVIDER NOTES
"History  Chief Complaint   Patient presents with    Hand Pain     Patient states that he has a history of Raynauds disease and has been experiencing pain in his right fingers for the past few weeks. Also reports shortness of breath.      Shortness of Breath     58-year-old male presents to the emergency department for evaluation of bilateral hand pain worse in the right than the left which he reports has been ongoing for the last several weeks.  States he believes this is worsened due to the cold.  Has history of Raynaud's and scleroderma.  Patient also states he has history of osteomyelitis in the tip of the right second finger which had to be surgically removed.  He denies any fevers.  Patient was seen in the emergency department recently for similar.  There is no evidence of osteomyelitis at that time however patient was admitted for CHF.  He states he has been taking his Lasix as prescribed.  He denies any leg swelling.  Reports he believes his shortness of breath is due to the pain \"taking my breath away.\"  He denies chest pain or palpitations.        Prior to Admission Medications   Prescriptions Last Dose Informant Patient Reported? Taking?   albuterol (PROVENTIL HFA,VENTOLIN HFA) 90 mcg/act inhaler   Yes No   Sig: Inhale 2 puffs every 4 (four) hours as needed    carvedilol (COREG) 6.25 mg tablet   Yes No   Sig: Take 6.25 mg by mouth 2 (two) times a day with meals   furosemide (LASIX) 20 mg tablet   No No   Sig: Take 1 tablet (20 mg total) by mouth daily Do not start before October 11, 2023.   losartan (COZAAR) 50 mg tablet   No No   Sig: Take 1 tablet (50 mg total) by mouth daily Do not start before October 11, 2023.   spironolactone (ALDACTONE) 25 mg tablet   No No   Sig: Take 1 tablet (25 mg total) by mouth daily      Facility-Administered Medications: None       Past Medical History:   Diagnosis Date    Anxiety     Cardiomyopathy (HCC)     Depression     Emphysema of lung (HCC)     GERD (gastroesophageal " "reflux disease)     Gout     Hypertension     Neuropathy     Raynaud's disease     Right inguinal hernia     Scleroderma (HCC)        Past Surgical History:   Procedure Laterality Date    AMPUTATION Right     pt had tip of \"pointer finger\" d/t scleraderma    HERNIA REPAIR Left     times 2    VT RPR 1ST INGUN HRNA AGE 5 YRS/> REDUCIBLE Right 3/3/2023    Procedure: OPEN INGUINAL HERNIA REPAIR WITH MESH;  Surgeon: Temo Jackson DO;  Location:  MAIN OR;  Service: General       History reviewed. No pertinent family history.  I have reviewed and agree with the history as documented.    E-Cigarette/Vaping    E-Cigarette Use Never User      E-Cigarette/Vaping Substances    Nicotine No     THC No     CBD No     Flavoring No      Social History     Tobacco Use    Smoking status: Every Day     Current packs/day: 1.00     Average packs/day: 1 pack/day for 20.0 years (20.0 ttl pk-yrs)     Types: Cigarettes    Smokeless tobacco: Never   Vaping Use    Vaping status: Never Used   Substance Use Topics    Alcohol use: Not Currently     Comment: occasional    Drug use: Yes     Types: Marijuana, Methamphetamines     Comment: Hx meth use       Review of Systems   Constitutional:  Negative for appetite change, fatigue and fever.   Respiratory:  Positive for shortness of breath. Negative for cough, choking, wheezing and stridor.    Cardiovascular: Negative.    Gastrointestinal: Negative.    Musculoskeletal:         Hand pain   Skin:  Positive for color change.   Neurological: Negative.    All other systems reviewed and are negative.      Physical Exam  Physical Exam  Vitals and nursing note reviewed.   Constitutional:       General: He is not in acute distress.     Appearance: He is well-developed. He is not ill-appearing, toxic-appearing or diaphoretic.   HENT:      Head: Normocephalic.      Mouth/Throat:      Mouth: Mucous membranes are moist.   Eyes:      Pupils: Pupils are equal, round, and reactive to light.   Cardiovascular:      " Rate and Rhythm: Normal rate and regular rhythm.      Pulses:           Radial pulses are 2+ on the right side and 2+ on the left side.   Pulmonary:      Effort: Pulmonary effort is normal.      Breath sounds: No decreased breath sounds, wheezing, rhonchi or rales.   Chest:      Chest wall: No tenderness.   Abdominal:      Tenderness: There is no abdominal tenderness.   Musculoskeletal:         General: Tenderness present. Normal range of motion.      Cervical back: Normal range of motion.      Right lower leg: Edema (1+) present.      Left lower leg: Edema (1+) present.      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 Tenderness to palpation of bilateral hands, Right>left. No swelling, no erythema, full range of motion.  Typical scleroderma changes to the bilateral hands and fingers.     Skin:     Coloration: Skin is pale.      Comments: Hands pale bilaterally which patient states is typical for him   Neurological:      General: No focal deficit present.      Mental Status: He is alert.         Vital Signs  ED Triage Vitals   Temperature Pulse Respirations Blood Pressure SpO2   12/28/23 0934 12/28/23 0934 12/28/23 0934 12/28/23 0937 12/28/23 0934   97.7 °F (36.5 °C) 92 22 (!) 171/112 100 %      Temp Source Heart Rate Source Patient Position - Orthostatic VS BP Location FiO2 (%)   12/28/23 0934 12/28/23 0934 12/28/23 0934 12/28/23 0934 --   Temporal Monitor Lying Right arm       Pain Score       12/28/23 0934       10 - Worst Possible Pain           Vitals:    12/28/23 0934 12/28/23 0937 12/28/23 1114   BP:  (!) 171/112 (!) 175/113   Pulse: 92     Patient Position - Orthostatic VS: Lying           Visual Acuity      ED Medications  Medications   ketorolac (TORADOL) injection 15 mg (15 mg Intramuscular Given 12/28/23 1011)   furosemide (LASIX) tablet 20 mg (20 mg Oral Given 12/28/23 1116)       Diagnostic Studies  Results Reviewed       Procedure Component Value Units Date/Time    B-Type Natriuretic  Peptide(BNP) [[id number]]  (Abnormal) Collected: 12/28/23 1002    Lab Status: Final result Specimen: Blood from Arm, Left Updated: 12/28/23 1033     BNP 2,490 pg/mL     HS Troponin 0hr (reflex protocol) [[id number]]  (Abnormal) Collected: 12/28/23 1002    Lab Status: Final result Specimen: Blood from Arm, Left Updated: 12/28/23 1030     hs TnI 0hr 131 ng/L     Basic metabolic panel [[id number]] Collected: 12/28/23 1002    Lab Status: Final result Specimen: Blood from Arm, Left Updated: 12/28/23 1022     Sodium 135 mmol/L      Potassium 4.8 mmol/L      Chloride 99 mmol/L      CO2 26 mmol/L      ANION GAP 10 mmol/L      BUN 19 mg/dL      Creatinine 1.14 mg/dL      Glucose 89 mg/dL      Calcium 9.3 mg/dL      eGFR 70 ml/min/1.73sq m     Narrative:      National Kid[employer] guidelines for Chronic Kidney Disease (CKD):     Stage 1 with normal or high GFR (GFR > 90 mL/min/1.73 square meters)    Stage 2 Mild CKD (GFR = 60-89 mL/min/1.73 square meters)    Stage 3A Moderate CKD (GFR = 45-59 mL/min/1.73 square meters)    Stage 3B Moderate CKD (GFR = 30-44 mL/min/1.73 square meters)    Stage 4 Severe CKD (GFR = 15-29 mL/min/1.73 square meters)    Stage 5 End Stage CKD (GFR <15 mL/min/1.73 square meters)  Note: GFR calculation is accurate only with a steady state creatinine    CBC and differential [[id number]]  (Abnormal) Collected: 12/28/23 1002    Lab Status: Final result Specimen: Blood from Arm, Left Updated: 12/28/23 1006     WBC 7.59 Thousand/uL      RBC 5.39 Million/uL      Hemoglobin 16.0 g/dL      Hematocrit 49.2 %      MCV 91 fL      MCH 29.7 pg      MCHC 32.5 g/dL      RDW 15.8 %      MPV 9.0 fL      Platelets 311 Thousands/uL      nRBC 0 /100 WBCs      Neutrophils Relative 56 %      Immat GRANS % 0 %      Lymphocytes Relative 29 %      Monocytes Relative 12 %      Eosinophils Relative 2 %      Basophils Relative 1 %      Neutrophils Absolute 4.33 Thousands/µL      Immature Grans Absolute 0.01 Thousand/uL       Lymphocytes Absolute 2.16 Thousands/µL      Monocytes Absolute 0.91 Thousand/µL      Eosinophils Absolute 0.12 Thousand/µL      Basophils Absolute 0.06 Thousands/µL                    XR chest 2 views   Final Result by Nagi Matute MD (12/28 1306)      No acute cardiopulmonary disease.                  Workstation performed: [id number]                    Procedures  ECG 12 Lead Documentation Only    Date/Time: 12/28/2023 9:59 PM    Performed by: Yaneth Nice PA-C  Authorized by: Yaneth Nice PA-C    Patient location:  ED  Interpretation:     Interpretation: non-specific    Rate:     ECG rate:  97    ECG rate assessment: normal    Rhythm:     Rhythm: sinus rhythm    Ectopy:     Ectopy: none    Conduction:     Conduction: abnormal             ED Course  ED Course as of 12/28/23 1708   Thu Dec 28, 2023   0954 Patient refuses IV   1006 EKG: Normal sinus rhythm with PVCs.  Right bundle eileen block.  Ventricular rate 97 bpm.  No acute ischemic changes   1032 hs TnI 0hr(!): 131  Chronically elevated. Patient has no chest pain   1035 BNP(!): 2,490  Elevated however improved from previous 12 days ago.  Plan to give a dose of p.o. Lasix.  Will continue to encourage patient to take his Lasix at home.  And follow-up with PCP   1111 ED interpretation of chest x-ray noted for hyperinflation however no obvious infiltrate.   1114 Discussed all results and findings with the patient.  Again we discussed the importance of continue with Lasix at home.  He has a ASAP PCP referral previously placed.  We discussed symptomatic treatment for hand pain and strict return precautions and he verbalized understanding.                               SBIRT 20yo+      Flowsheet Row Most Recent Value   Initial Alcohol Screen: US AUDIT-C     1. How often do you have a drink containing alcohol? 0 Filed at: 12/28/2023 1058   2. How many drinks containing alcohol do you have on a typical day you are drinking?  0 Filed at: 12/28/2023 1058   3a.  Male UNDER 65: How often do you have five or more drinks on one occasion? 0 Filed at: 12/28/2023 1058   3b. FEMALE Any Age, or MALE 65+: How often do you have 4 or more drinks on one occassion? 0 Filed at: 12/28/2023 1058   Audit-C Score 0 Filed at: 12/28/2023 1058   SILVIA: How many times in the past year have you...    Used an illegal drug or used a prescription medication for non-medical reasons? Never Filed at: 12/28/2023 1058                      Medical Decision Making  58-year-old male presents emergency department for evaluation of bilateral hand pain and shortness of breath.  Vitals and medical record reviewed.  Patient at risk for the following but not limited to scleroderma, Raynaud's, osteomyelitis, CHF.  History and physical exam consistent with scleroderma versus Raynaud's.  We discussed symptomatic treatment and patient had significant improvement of symptoms with Toradol.  He was discharged home with naproxen.  He did have elevated BNP however improved from previous, chronically elevated troponin.  He had no chest pain and EKG was nonischemic, low concern for MI.  Was given extra dose of his Lasix and educated to continue his normally prescribed Lasix at home and follow-up with his PCP which she verbalized understanding.  Patient was clinically and hemodynamically stable for discharge    Problems Addressed:  CHF (congestive heart failure) (HCC): chronic illness or injury  Pain in both hands: acute illness or injury    Amount and/or Complexity of Data Reviewed  Labs: ordered. Decision-making details documented in ED Course.  Radiology: ordered.  ECG/medicine tests: ordered and independent interpretation performed.    Risk  Prescription drug management.             Disposition  Final diagnoses:   Pain in both hands   CHF (congestive heart failure) (HCC)     Time reflects when diagnosis was documented in both MDM as applicable and the Disposition within this note       Time User Action Codes Description  Comment    12/28/2023 11:12 AM Yanteh Nice [M79.641,  M79.642] Pain in both hands     12/28/2023 11:12 AM Yaneth Nice [I50.9] CHF (congestive heart failure) ([facility])           ED Disposition       ED Disposition   Discharge    Condition   Stable    Date/Time   Thu Dec 28, 2023 1112    Comment   Britton Batista discharge to home/self care.                   Follow-up Information       Follow up With Specialties Details Why Contact [facility] Everywhere Referral Cardiology, Gastroenterology, General Surgery, Anesthesiology, Hematology and Oncology, Urology, Emergency Medicine, Hematology, Oncology, Diabetes Services In 3 days  [address] [place], [place] [id number] [phone]              Discharge Medication List as of 12/28/2023 11:14 AM        CONTINUE these medications which have NOT CHANGED    Details   albuterol (PROVENTIL HFA,VENTOLIN HFA) 90 mcg/act inhaler Inhale 2 puffs every 4 (four) hours as needed , Historical Med      carvedilol (COREG) 6.25 mg tablet Take 6.25 mg by mouth 2 (two) times a day with meals, Historical Med      furosemide (LASIX) 20 mg tablet Take 1 tablet (20 mg total) by mouth daily Do not start before October 11, 2023., Starting Wed 10/11/2023, Normal      losartan (COZAAR) 50 mg tablet Take 1 tablet (50 mg total) by mouth daily Do not start before October 11, 2023., Starting Wed 10/11/2023, Normal      spironolactone (ALDACTONE) 25 mg tablet Take 1 tablet (25 mg total) by mouth daily, Starting Tue 10/10/2023, Normal             No discharge procedures on file.    PDMP Review         Value Time User    PDMP Reviewed  Yes 10/23/2021  1:01 PM Sulema Lomax MD            ED Provider  Electronically Signed by             Yaneth Nice PA-C  12/28/23 1512

## 2023-12-30 ENCOUNTER — APPOINTMENT (EMERGENCY)
Dept: RADIOLOGY | Facility: HOSPITAL | Age: 58
End: 2023-12-30
Payer: COMMERCIAL

## 2023-12-30 ENCOUNTER — HOSPITAL ENCOUNTER (EMERGENCY)
Facility: HOSPITAL | Age: 58
Discharge: HOME | End: 2023-12-30
Attending: EMERGENCY MEDICINE
Payer: COMMERCIAL

## 2023-12-30 VITALS
HEART RATE: 77 BPM | OXYGEN SATURATION: 99 % | HEIGHT: 69 IN | TEMPERATURE: 98.6 F | RESPIRATION RATE: 16 BRPM | DIASTOLIC BLOOD PRESSURE: 92 MMHG | SYSTOLIC BLOOD PRESSURE: 168 MMHG | WEIGHT: 160 LBS | BODY MASS INDEX: 23.7 KG/M2

## 2023-12-30 DIAGNOSIS — B34.9 VIRAL SYNDROME: Primary | ICD-10-CM

## 2023-12-30 LAB
ALBUMIN SERPL BCP-MCNC: 3.5 G/DL (ref 3.5–5)
ALP SERPL-CCNC: 101 U/L (ref 34–104)
ALT SERPL W P-5'-P-CCNC: 14 U/L (ref 7–52)
ANION GAP SERPL CALCULATED.3IONS-SCNC: 5 MMOL/L
APTT PPP: 32 SECONDS (ref 23–37)
AST SERPL W P-5'-P-CCNC: 22 U/L (ref 13–39)
BASOPHILS # BLD AUTO: 0.04 THOUSANDS/ÂΜL (ref 0–0.1)
BASOPHILS NFR BLD AUTO: 0 % (ref 0–1)
BILIRUB SERPL-MCNC: 0.37 MG/DL (ref 0.2–1)
BNP SERPL-MCNC: 2217 PG/ML (ref 0–100)
BUN SERPL-MCNC: 32 MG/DL (ref 5–25)
CALCIUM SERPL-MCNC: 9 MG/DL (ref 8.4–10.2)
CARDIAC TROPONIN I PNL SERPL HS: 140 NG/L
CHLORIDE SERPL-SCNC: 102 MMOL/L (ref 96–108)
CO2 SERPL-SCNC: 27 MMOL/L (ref 21–32)
CREAT SERPL-MCNC: 1.16 MG/DL (ref 0.6–1.3)
EOSINOPHIL # BLD AUTO: 0.11 THOUSAND/ÂΜL (ref 0–0.61)
EOSINOPHIL NFR BLD AUTO: 1 % (ref 0–6)
ERYTHROCYTE [DISTWIDTH] IN BLOOD BY AUTOMATED COUNT: 15.7 % (ref 11.6–15.1)
FLUAV RNA RESP QL NAA+PROBE: NEGATIVE
FLUBV RNA RESP QL NAA+PROBE: NEGATIVE
GFR SERPL CREATININE-BSD FRML MDRD: 69 ML/MIN/1.73SQ M
GLUCOSE SERPL-MCNC: 79 MG/DL (ref 65–140)
HCT VFR BLD AUTO: 46.2 % (ref 36.5–49.3)
HGB BLD-MCNC: 14.5 G/DL (ref 12–17)
IMM GRANULOCYTES # BLD AUTO: 0.04 THOUSAND/UL (ref 0–0.2)
IMM GRANULOCYTES NFR BLD AUTO: 0 % (ref 0–2)
INR PPP: 1.02 (ref 0.84–1.19)
LACTATE SERPL-SCNC: 1.4 MMOL/L (ref 0.5–2)
LIPASE SERPL-CCNC: 17 U/L (ref 11–82)
LYMPHOCYTES # BLD AUTO: 1.65 THOUSANDS/ÂΜL (ref 0.6–4.47)
LYMPHOCYTES NFR BLD AUTO: 14 % (ref 14–44)
MCH RBC QN AUTO: 29.3 PG (ref 26.8–34.3)
MCHC RBC AUTO-ENTMCNC: 31.4 G/DL (ref 31.4–37.4)
MCV RBC AUTO: 93 FL (ref 82–98)
MONOCYTES # BLD AUTO: 1.1 THOUSAND/ÂΜL (ref 0.17–1.22)
MONOCYTES NFR BLD AUTO: 10 % (ref 4–12)
NEUTROPHILS # BLD AUTO: 8.56 THOUSANDS/ÂΜL (ref 1.85–7.62)
NEUTS SEG NFR BLD AUTO: 75 % (ref 43–75)
NRBC BLD AUTO-RTO: 0 /100 WBCS
PLATELET # BLD AUTO: 289 THOUSANDS/UL (ref 149–390)
PMV BLD AUTO: 9.3 FL (ref 8.9–12.7)
POTASSIUM SERPL-SCNC: 4.4 MMOL/L (ref 3.5–5.3)
PROT SERPL-MCNC: 6.8 G/DL (ref 6.4–8.4)
PROTHROMBIN TIME: 13.7 SECONDS (ref 11.6–14.5)
RBC # BLD AUTO: 4.95 MILLION/UL (ref 3.88–5.62)
RSV RNA RESP QL NAA+PROBE: NEGATIVE
SARS-COV-2 RNA RESP QL NAA+PROBE: NEGATIVE
SODIUM SERPL-SCNC: 134 MMOL/L (ref 135–147)
WBC # BLD AUTO: 11.5 THOUSAND/UL (ref 4.31–10.16)

## 2023-12-30 PROCEDURE — 99285 EMERGENCY DEPT VISIT HI MDM: CPT

## 2023-12-30 PROCEDURE — 93005 ELECTROCARDIOGRAM TRACING: CPT

## 2023-12-30 PROCEDURE — 83880 ASSAY OF NATRIURETIC PEPTIDE: CPT | Performed by: EMERGENCY MEDICINE

## 2023-12-30 PROCEDURE — 83690 ASSAY OF LIPASE: CPT | Performed by: EMERGENCY MEDICINE

## 2023-12-30 PROCEDURE — 84484 ASSAY OF TROPONIN QUANT: CPT | Performed by: EMERGENCY MEDICINE

## 2023-12-30 PROCEDURE — 71045 X-RAY EXAM CHEST 1 VIEW: CPT

## 2023-12-30 PROCEDURE — 85025 COMPLETE CBC W/AUTO DIFF WBC: CPT | Performed by: EMERGENCY MEDICINE

## 2023-12-30 PROCEDURE — 80053 COMPREHEN METABOLIC PANEL: CPT | Performed by: EMERGENCY MEDICINE

## 2023-12-30 PROCEDURE — 99285 EMERGENCY DEPT VISIT HI MDM: CPT | Performed by: EMERGENCY MEDICINE

## 2023-12-30 PROCEDURE — 83605 ASSAY OF LACTIC ACID: CPT | Performed by: EMERGENCY MEDICINE

## 2023-12-30 PROCEDURE — 85730 THROMBOPLASTIN TIME PARTIAL: CPT | Performed by: EMERGENCY MEDICINE

## 2023-12-30 PROCEDURE — 36415 COLL VENOUS BLD VENIPUNCTURE: CPT | Performed by: EMERGENCY MEDICINE

## 2023-12-30 PROCEDURE — 0241U HB NFCT DS VIR RESP RNA 4 TRGT: CPT | Performed by: EMERGENCY MEDICINE

## 2023-12-30 PROCEDURE — 85610 PROTHROMBIN TIME: CPT | Performed by: EMERGENCY MEDICINE

## 2023-12-30 RX ORDER — ONDANSETRON 4 MG/1
4 TABLET, ORALLY DISINTEGRATING ORAL EVERY 6 HOURS PRN
Qty: 10 TABLET | Refills: 0 | Status: SHIPPED | OUTPATIENT
Start: 2023-12-30 | End: 2024-01-09

## 2023-12-30 NOTE — ED PROVIDER NOTES
History  Chief Complaint   Patient presents with    Shortness of Breath     Pt reports sob, vomiting x today     Patient with a history of cardiomyopathy with an EF of 25 to 30%.  Was seen here few days ago for shortness of breath.  States he just does not feel well.  No chest pain.  Has had congestion and cough.  Had nausea vomiting and diarrhea today.  Slight shortness of breath.  No leg swelling.  No fevers or chills.      History provided by:  Patient   used: No    Shortness of Breath  Severity:  Mild  Onset quality:  Gradual  Duration:  1 week  Timing:  Constant  Progression:  Unchanged  Chronicity:  Chronic  Context: URI    Context: not activity, not animal exposure, not pollens and not strong odors    Relieved by:  Nothing  Worsened by:  Nothing  Ineffective treatments:  None tried  Associated symptoms: cough and vomiting    Associated symptoms: no abdominal pain, no chest pain, no ear pain, no fever, no headaches, no neck pain, no rash, no sore throat, no syncope and no wheezing        Prior to Admission Medications   Prescriptions Last Dose Informant Patient Reported? Taking?   albuterol (PROVENTIL HFA,VENTOLIN HFA) 90 mcg/act inhaler   Yes No   Sig: Inhale 2 puffs every 4 (four) hours as needed    carvedilol (COREG) 6.25 mg tablet   Yes No   Sig: Take 6.25 mg by mouth 2 (two) times a day with meals   furosemide (LASIX) 20 mg tablet   No No   Sig: Take 1 tablet (20 mg total) by mouth daily Do not start before October 11, 2023.   losartan (COZAAR) 50 mg tablet   No No   Sig: Take 1 tablet (50 mg total) by mouth daily Do not start before October 11, 2023.   naproxen (NAPROSYN) 500 mg tablet   No No   Sig: Take 1 tablet (500 mg total) by mouth 2 (two) times a day with meals for 3 days   spironolactone (ALDACTONE) 25 mg tablet   No No   Sig: Take 1 tablet (25 mg total) by mouth daily      Facility-Administered Medications: None       Past Medical History:   Diagnosis Date    Anxiety      "Cardiomyopathy (HCC)     Depression     Emphysema of lung (HCC)     GERD (gastroesophageal reflux disease)     Gout     Hypertension     Neuropathy     Raynaud's disease     Right inguinal hernia     Scleroderma (HCC)        Past Surgical History:   Procedure Laterality Date    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  I have reviewed and agree with the history as documented.    E-Cigarette/Vaping    E-Cigarette Use Never User      E-Cigarette/Vaping Substances    Nicotine No     THC No     CBD No     Flavoring No      Social History     Tobacco Use    Smoking status: Every Day     Current packs/day: 1.00     Average packs/day: 1 pack/day for 20.0 years (20.0 ttl pk-yrs)     Types: Cigarettes    Smokeless tobacco: Never   Vaping Use    Vaping status: Never Used   Substance Use Topics    Alcohol use: Not Currently     Comment: occasional    Drug use: Yes     Types: Marijuana, Methamphetamines     Comment: Hx meth use       Review of Systems   Constitutional:  Negative for chills and fever.   HENT:  Negative for ear pain, hearing loss, sore throat, trouble swallowing and voice change.    Eyes:  Negative for pain and discharge.   Respiratory:  Positive for cough and shortness of breath. Negative for wheezing.    Cardiovascular:  Negative for chest pain, palpitations and syncope.   Gastrointestinal:  Positive for diarrhea, nausea and vomiting. Negative for abdominal pain, blood in stool and constipation.   Genitourinary:  Negative for dysuria, flank pain, frequency and hematuria.   Musculoskeletal:  Positive for myalgias. Negative for joint swelling, neck pain and neck stiffness.   Skin:  Negative for rash and wound.   Neurological:  Negative for " dizziness, seizures, syncope, facial asymmetry and headaches.   Psychiatric/Behavioral:  Negative for hallucinations, self-injury and suicidal ideas.    All other systems reviewed and are negative.      Physical Exam  Physical Exam  Vitals and nursing note reviewed.   Constitutional:       General: He is not in acute distress.     Appearance: He is well-developed.   HENT:      Head: Normocephalic and atraumatic.      Right Ear: External ear normal.      Left Ear: External ear normal.   Eyes:      General: No scleral icterus.        Right eye: No discharge.         Left eye: No discharge.      Extraocular Movements: Extraocular movements intact.      Conjunctiva/sclera: Conjunctivae normal.   Cardiovascular:      Rate and Rhythm: Normal rate and regular rhythm.      Heart sounds: Normal heart sounds. No murmur heard.  Pulmonary:      Effort: Pulmonary effort is normal.      Breath sounds: Normal breath sounds. No wheezing or rales.   Abdominal:      General: Bowel sounds are normal. There is no distension.      Palpations: Abdomen is soft.      Tenderness: There is no abdominal tenderness. There is no guarding or rebound.   Musculoskeletal:         General: No deformity. Normal range of motion.      Cervical back: Normal range of motion and neck supple.   Skin:     General: Skin is warm and dry.      Findings: No rash.   Neurological:      General: No focal deficit present.      Mental Status: He is alert and oriented to person, place, and time.      Cranial Nerves: No cranial nerve deficit.   Psychiatric:         Mood and Affect: Mood normal.         Behavior: Behavior normal.         Thought Content: Thought content normal.         Judgment: Judgment normal.         Vital Signs  ED Triage Vitals   Temperature Pulse Respirations Blood Pressure SpO2   12/30/23 1827 12/30/23 1827 12/30/23 1827 12/30/23 1830 12/30/23 1827   98.6 °F (37 °C) 78 18 (!) 177/116 97 %      Temp Source Heart Rate Source Patient Position -  Orthostatic VS BP Location FiO2 (%)   12/30/23 1827 12/30/23 1827 12/30/23 1827 12/30/23 1827 --   Temporal Monitor Sitting Left arm       Pain Score       12/30/23 1827       No Pain           Vitals:    12/30/23 1827 12/30/23 1830 12/30/23 1905   BP:  (!) 177/116 168/92   Pulse: 78  77   Patient Position - Orthostatic VS: Sitting           Visual Acuity      ED Medications  Medications - No data to display    Diagnostic Studies  Results Reviewed       Procedure Component Value Units Date/Time    FLU/RSV/COVID - if FLU/RSV clinically relevant [[id number]]  (Normal) Collected: 12/30/23 1905    Lab Status: Final result Specimen: Nares from Nose Updated: 12/30/23 2031     SARS-CoV-2 Negative     INFLUENZA A PCR Negative     INFLUENZA B PCR Negative     RSV PCR Negative    Narrative:      FOR PEDIATRIC PATIENTS - copy/paste COVID Guidelines URL to browser: [email or web address]    SARS-CoV-2 assay is a Nucleic Acid Amplification assay intended for the  qualitative detection of nucleic acid from SARS-CoV-2 in nasopharyngeal  swabs. Results are for the presumptive identification of SARS-CoV-2 RNA.    Positive results are indicative of infection with SARS-CoV-2, the virus  causing COVID-19, but do not rule out bacterial infection or co-infection  with other viruses. Laboratories within the [place] and its  territories are required to report all positive results to the appropriate  public health authorities. Negative results do not preclude SARS-CoV-2  infection and should not be used as the sole basis for treatment or other  patient management decisions. Negative results must be combined with  clinical observations, patient history, and epidemiological information.  This test has not been FDA cleared or approved.    This test has been authorized by FDA under an Emergency Use Authorization  (EUA). This test is only authorized for the duration of time  the  declaration that circumstances exist justifying the authorization of the  emergency use of an in vitro diagnostic tests for detection of SARS-CoV-2  virus and/or diagnosis of COVID-19 infection under section 564(b)(1) of  the Act, 21 U.S.C. 360bbb-3(b)(1), unless the authorization is terminated  or revoked sooner. The test has been validated but independent review by FDA  and CLIA is pending.    Test performed using [employer]: This RT-PCR assay targets N2,  a region unique to SARS-CoV-2. A conserved region in the E-gene was chosen  for pan-Sarbecovirus detection which includes SARS-CoV-2.    According to CMS-2020-01-R, this platform meets the definition of high-throughput technology.    Lipase [[id number]]  (Normal) Collected: 12/30/23 1909    Lab Status: Final result Specimen: Blood from Arm, Right Updated: 12/30/23 2013     Lipase 17 u/L     Comprehensive metabolic panel [[id number]]  (Abnormal) Collected: 12/30/23 1909    Lab Status: Final result Specimen: Blood from Arm, Right Updated: 12/30/23 2013     Sodium 134 mmol/L      Potassium 4.4 mmol/L      Chloride 102 mmol/L      CO2 27 mmol/L      ANION GAP 5 mmol/L      BUN 32 mg/dL      Creatinine 1.16 mg/dL      Glucose 79 mg/dL      Calcium 9.0 mg/dL      AST 22 U/L      ALT 14 U/L      Alkaline Phosphatase 101 U/L      Total Protein 6.8 g/dL      Albumin 3.5 g/dL      Total Bilirubin 0.37 mg/dL      eGFR 69 ml/min/1.73sq m     Narrative:      National Kidney Disease Foundation guidelines for Chronic Kidney Disease (CKD):     Stage 1 with normal or high GFR (GFR > 90 mL/min/1.73 square meters)    Stage 2 Mild CKD (GFR = 60-89 mL/min/1.73 square meters)    Stage 3A Moderate CKD (GFR = 45-59 mL/min/1.73 square meters)    Stage 3B Moderate CKD (GFR = 30-44 mL/min/1.73 square meters)    Stage 4 Severe CKD (GFR = 15-29 mL/min/1.73 square meters)    Stage 5 End Stage CKD (GFR <15 mL/min/1.73 square meters)  Note: GFR calculation is accurate only with a  steady state creatinine    HS Troponin 0hr (reflex protocol) [[id number]]  (Abnormal) Collected: 12/30/23 1909    Lab Status: Final result Specimen: Blood from Arm, Right Updated: 12/30/23 2001     hs TnI 0hr 140 ng/L     B-Type Natriuretic Peptide(BNP) [[id number]]  (Abnormal) Collected: 12/30/23 1909    Lab Status: Final result Specimen: Blood from Arm, Right Updated: 12/30/23 2000     BNP 2,217 pg/mL     Lactic acid, plasma (w/reflex if result > 2.0) [[id number]]  (Normal) Collected: 12/30/23 1909    Lab Status: Final result Specimen: Blood from Arm, Right Updated: 12/30/23 1959     LACTIC ACID 1.4 mmol/L     Narrative:      Result may be elevated if tourniquet was used during collection.    Protime-INR [[id number]]  (Normal) Collected: 12/30/23 1909    Lab Status: Final result Specimen: Blood from Arm, Right Updated: 12/30/23 1950     Protime 13.7 seconds      INR 1.02    APTT [[id number]]  (Normal) Collected: 12/30/23 1909    Lab Status: Final result Specimen: Blood from Arm, Right Updated: 12/30/23 1950     PTT 32 seconds     CBC and differential [[id number]]  (Abnormal) Collected: 12/30/23 1909    Lab Status: Final result Specimen: Blood from Arm, Right Updated: 12/30/23 1933     WBC 11.50 Thousand/uL      RBC 4.95 Million/uL      Hemoglobin 14.5 g/dL      Hematocrit 46.2 %      MCV 93 fL      MCH 29.3 pg      MCHC 31.4 g/dL      RDW 15.7 %      MPV 9.3 fL      Platelets 289 Thousands/uL      nRBC 0 /100 WBCs      Neutrophils Relative 75 %      Immat GRANS % 0 %      Lymphocytes Relative 14 %      Monocytes Relative 10 %      Eosinophils Relative 1 %      Basophils Relative 0 %      Neutrophils Absolute 8.56 Thousands/µL      Immature Grans Absolute 0.04 Thousand/uL      Lymphocytes Absolute 1.65 Thousands/µL      Monocytes Absolute 1.10 Thousand/µL      Eosinophils Absolute 0.11 Thousand/µL      Basophils Absolute 0.04 Thousands/µL                    XR chest 1 view   ED Interpretation by Jonh Trimble MD  (12/30 1846)   No change from previous chest x-ray.                 Procedures  ECG 12 Lead Documentation Only    Date/Time: 12/30/2023 6:31 PM    Performed by: Jonh Trimble MD  Authorized by: Jonh Trimble MD    ECG reviewed by me, the ED Provider: yes    Patient location:  ED  Previous ECG:     Previous ECG:  Compared to current    Similarity:  No change  Rate:     ECG rate:  90  Rhythm:     Rhythm: sinus rhythm    Ectopy:     Ectopy: none    QRS:     QRS axis:  Normal           ED Course  ED Course as of 12/30/23 2034   Sat Dec 30, 2023   2005 hs TnI 0hr(!): 140  Around baseline for the patient   2006 BNP(!): 2,217  Improved from 2 days ago.   2032 Patient with nausea vomiting diarrhea and myalgias today.  Most likely viral infection.   2033 Patient has baseline CHF.  BNP is improved.  No change from previous EKG.  Patient has a normal room air pulse ox.                               SBIRT 22yo+      Flowsheet Row Most Recent Value   Initial Alcohol Screen: US AUDIT-C     1. How often do you have a drink containing alcohol? 0 Filed at: 12/30/2023 1826   2. How many drinks containing alcohol do you have on a typical day you are drinking?  0 Filed at: 12/30/2023 1826   3a. Male UNDER 65: How often do you have five or more drinks on one occasion? 0 Filed at: 12/30/2023 1826   3b. FEMALE Any Age, or MALE 65+: How often do you have 4 or more drinks on one occassion? 0 Filed at: 12/30/2023 1826   Audit-C Score 0 Filed at: 12/30/2023 1826   SILVIA: How many times in the past year have you...    Used an illegal drug or used a prescription medication for non-medical reasons? Never Filed at: 12/30/2023 1826                      Medical Decision Making  Amount and/or Complexity of Data Reviewed  Labs: ordered. Decision-making details documented in ED Course.  Radiology: ordered and independent interpretation performed. Decision-making details documented in ED Course.  ECG/medicine tests: ordered and independent  interpretation performed. Decision-making details documented in ED Course.  Discussion of management or test interpretation with external provider(s): Differential diagnose includes not limited to COVID, flu, RSV, viral syndrome, CHF, electrolyte abnormality, pneumonia.    Risk  Prescription drug management.             Disposition  Final diagnoses:   Viral syndrome     Time reflects when diagnosis was documented in both MDM as applicable and the Disposition within this note       Time User Action Codes Description Comment    12/30/2023  8:33 PM Jonh Trimble Add [B34.9] Viral syndrome           ED Disposition       ED Disposition   Discharge    Condition   Stable    Date/Time   Sat Dec 30, 2023 2033    Comment   Britton Batista discharge to home/self care.                   Follow-up Information       Follow up With Specialties Details Why Contact Info    [facility] Everywhere Referral Cardiology, Gastroenterology, General Surgery, Anesthesiology, Hematology and Oncology, Urology, Emergency Medicine, Hematology, Oncology, Diabetes Services Call in 2 days  [address] [place] [address]  [phone]              Patient's Medications   Discharge Prescriptions    ONDANSETRON (ZOFRAN ODT) 4 MG DISINTEGRATING TABLET    Take 1 tablet (4 mg total) by mouth every 6 (six) hours as needed for nausea or vomiting for up to 10 days       Start Date: 12/30/2023End Date: 1/9/2024       Order Dose: 4 mg       Quantity: 10 tablet    Refills: 0       No discharge procedures on file.    PDMP Review         Value Time User    PDMP Reviewed  Yes 10/23/2021  1:01 PM Sulema Lomax MD            ED Provider  Electronically Signed by             Jonh Trimble MD  12/30/23 2034

## 2023-12-31 NOTE — ED NOTES
AVS reviewed with patient. Pt verbalized understanding of new medications and instruction. Pt ambulatory with steady gait instable condition.      Yary Groves RN  12/30/23 2038

## 2024-01-01 LAB
ATRIAL RATE: 91 BPM
P AXIS: -6 DEGREES
PR INTERVAL: 200 MS
QRS AXIS: -32 DEGREES
QRSD INTERVAL: 186 MS
QT INTERVAL: 456 MS
QTC INTERVAL: 560 MS
T WAVE AXIS: 94 DEGREES
VENTRICULAR RATE: 91 BPM

## 2024-01-09 ENCOUNTER — APPOINTMENT (EMERGENCY)
Dept: CT IMAGING | Facility: HOSPITAL | Age: 59
End: 2024-01-09
Payer: COMMERCIAL

## 2024-01-09 ENCOUNTER — HOSPITAL ENCOUNTER (EMERGENCY)
Facility: HOSPITAL | Age: 59
Discharge: HOME | End: 2024-01-09
Attending: EMERGENCY MEDICINE
Payer: COMMERCIAL

## 2024-01-09 VITALS
OXYGEN SATURATION: 99 % | TEMPERATURE: 97 F | WEIGHT: 161 LBS | DIASTOLIC BLOOD PRESSURE: 101 MMHG | HEART RATE: 89 BPM | HEIGHT: 69 IN | BODY MASS INDEX: 23.85 KG/M2 | RESPIRATION RATE: 18 BRPM | SYSTOLIC BLOOD PRESSURE: 139 MMHG

## 2024-01-09 DIAGNOSIS — M86.9 OSTEOMYELITIS OF FINGER OF RIGHT HAND (HCC): Primary | ICD-10-CM

## 2024-01-09 DIAGNOSIS — M34.9 SCLERODERMA (HCC): ICD-10-CM

## 2024-01-09 LAB
ALBUMIN SERPL BCP-MCNC: 3.7 G/DL (ref 3.5–5)
ALP SERPL-CCNC: 99 U/L (ref 34–104)
ALT SERPL W P-5'-P-CCNC: 12 U/L (ref 7–52)
ANION GAP SERPL CALCULATED.3IONS-SCNC: 7 MMOL/L
AST SERPL W P-5'-P-CCNC: 27 U/L (ref 13–39)
BASOPHILS # BLD AUTO: 0.05 THOUSANDS/ÂΜL (ref 0–0.1)
BASOPHILS NFR BLD AUTO: 1 % (ref 0–1)
BILIRUB SERPL-MCNC: 0.5 MG/DL (ref 0.2–1)
BUN SERPL-MCNC: 21 MG/DL (ref 5–25)
CALCIUM SERPL-MCNC: 9.4 MG/DL (ref 8.4–10.2)
CHLORIDE SERPL-SCNC: 100 MMOL/L (ref 96–108)
CO2 SERPL-SCNC: 25 MMOL/L (ref 21–32)
CREAT SERPL-MCNC: 1.19 MG/DL (ref 0.6–1.3)
EOSINOPHIL # BLD AUTO: 0.14 THOUSAND/ÂΜL (ref 0–0.61)
EOSINOPHIL NFR BLD AUTO: 2 % (ref 0–6)
ERYTHROCYTE [DISTWIDTH] IN BLOOD BY AUTOMATED COUNT: 15.8 % (ref 11.6–15.1)
GFR SERPL CREATININE-BSD FRML MDRD: 66 ML/MIN/1.73SQ M
GLUCOSE SERPL-MCNC: 74 MG/DL (ref 65–140)
HCT VFR BLD AUTO: 48.2 % (ref 36.5–49.3)
HGB BLD-MCNC: 14.9 G/DL (ref 12–17)
IMM GRANULOCYTES # BLD AUTO: 0.02 THOUSAND/UL (ref 0–0.2)
IMM GRANULOCYTES NFR BLD AUTO: 0 % (ref 0–2)
LACTATE SERPL-SCNC: 1.4 MMOL/L (ref 0.5–2)
LYMPHOCYTES # BLD AUTO: 1.92 THOUSANDS/ÂΜL (ref 0.6–4.47)
LYMPHOCYTES NFR BLD AUTO: 29 % (ref 14–44)
MCH RBC QN AUTO: 29.2 PG (ref 26.8–34.3)
MCHC RBC AUTO-ENTMCNC: 30.9 G/DL (ref 31.4–37.4)
MCV RBC AUTO: 95 FL (ref 82–98)
MONOCYTES # BLD AUTO: 0.89 THOUSAND/ÂΜL (ref 0.17–1.22)
MONOCYTES NFR BLD AUTO: 14 % (ref 4–12)
NEUTROPHILS # BLD AUTO: 3.59 THOUSANDS/ÂΜL (ref 1.85–7.62)
NEUTS SEG NFR BLD AUTO: 54 % (ref 43–75)
NRBC BLD AUTO-RTO: 0 /100 WBCS
PLATELET # BLD AUTO: 228 THOUSANDS/UL (ref 149–390)
PMV BLD AUTO: 9.4 FL (ref 8.9–12.7)
POTASSIUM SERPL-SCNC: 4.5 MMOL/L (ref 3.5–5.3)
PROT SERPL-MCNC: 7.2 G/DL (ref 6.4–8.4)
RBC # BLD AUTO: 5.1 MILLION/UL (ref 3.88–5.62)
SODIUM SERPL-SCNC: 132 MMOL/L (ref 135–147)
WBC # BLD AUTO: 6.61 THOUSAND/UL (ref 4.31–10.16)

## 2024-01-09 PROCEDURE — 83605 ASSAY OF LACTIC ACID: CPT | Performed by: EMERGENCY MEDICINE

## 2024-01-09 PROCEDURE — 99285 EMERGENCY DEPT VISIT HI MDM: CPT | Performed by: EMERGENCY MEDICINE

## 2024-01-09 PROCEDURE — 36415 COLL VENOUS BLD VENIPUNCTURE: CPT | Performed by: EMERGENCY MEDICINE

## 2024-01-09 PROCEDURE — 73201 CT UPPER EXTREMITY W/DYE: CPT

## 2024-01-09 PROCEDURE — 96365 THER/PROPH/DIAG IV INF INIT: CPT

## 2024-01-09 PROCEDURE — 85025 COMPLETE CBC W/AUTO DIFF WBC: CPT | Performed by: EMERGENCY MEDICINE

## 2024-01-09 PROCEDURE — 80053 COMPREHEN METABOLIC PANEL: CPT | Performed by: EMERGENCY MEDICINE

## 2024-01-09 PROCEDURE — 99285 EMERGENCY DEPT VISIT HI MDM: CPT

## 2024-01-09 PROCEDURE — 96367 TX/PROPH/DG ADDL SEQ IV INF: CPT

## 2024-01-09 PROCEDURE — 87040 BLOOD CULTURE FOR BACTERIA: CPT | Performed by: EMERGENCY MEDICINE

## 2024-01-09 PROCEDURE — G1004 CDSM NDSC: HCPCS

## 2024-01-09 RX ORDER — AMOXICILLIN AND CLAVULANATE POTASSIUM 875; 125 MG/1; MG/1
1 TABLET, FILM COATED ORAL EVERY 12 HOURS
Qty: 20 TABLET | Refills: 0 | Status: SHIPPED | OUTPATIENT
Start: 2024-01-09 | End: 2024-01-19

## 2024-01-09 RX ORDER — CEFEPIME HYDROCHLORIDE 2 G/50ML
2000 INJECTION, SOLUTION INTRAVENOUS ONCE
Status: COMPLETED | OUTPATIENT
Start: 2024-01-09 | End: 2024-01-09

## 2024-01-09 RX ORDER — VANCOMYCIN HYDROCHLORIDE 1 G/200ML
15 INJECTION, SOLUTION INTRAVENOUS ONCE
Status: COMPLETED | OUTPATIENT
Start: 2024-01-09 | End: 2024-01-09

## 2024-01-09 RX ADMIN — VANCOMYCIN HYDROCHLORIDE 1000 MG: 1 INJECTION, SOLUTION INTRAVENOUS at 22:22

## 2024-01-09 RX ADMIN — IOHEXOL 100 ML: 350 INJECTION, SOLUTION INTRAVENOUS at 21:33

## 2024-01-09 RX ADMIN — CEFEPIME HYDROCHLORIDE 2000 MG: 2 INJECTION, SOLUTION INTRAVENOUS at 21:51

## 2024-01-10 ENCOUNTER — TELEPHONE (OUTPATIENT)
Age: 59
End: 2024-01-10

## 2024-01-10 NOTE — TELEPHONE ENCOUNTER
Hello,  Please advise if the following patient can be forced onto the schedule:    Patient: Britton Batista 1965    : 1965    MRN: [id number]    Call back #: Rina Hinkle [phone]    Insurance: Kb     Reason for appointment:  Osteomyelitis of finger of right hand , middle finger.  ED said to follow up in 2 days.    Requested doctor/location: [facility] in [facility] or [place]      Thank you.

## 2024-01-10 NOTE — ED PROVIDER NOTES
History  Chief Complaint   Patient presents with    Finger Pain     Pt reports seeing his PCP today, told him to come to the ER to get his finger removed because the bone is infected. Pt's right third finger has scabbing and is painful. Has had another finger removed in the past. PMHx of raynauds.      Patient with history of scleroderma, Raynaud's disease, has required previous fingertip amputations, has a wound on the right third fingertip, seen today at the patient's, told he may have osteomyelitis of that finger and that he may require fingertip amputation, sent to the emergency room for evaluation.      History provided by:  Patient   used: No    Medical Problem  Location:  Right third finger tip  Quality:  Open wound with drainage, concern for osteomyelitis  Severity:  Moderate  Onset quality:  Unable to specify  Timing:  Constant  Progression:  Unchanged  Chronicity:  Chronic  Relieved by:  Nothing  Worsened by:  Nothing  Associated symptoms: no abdominal pain, no chest pain, no cough, no diarrhea, no ear pain, no fever, no headaches, no nausea, no rash, no shortness of breath, no sore throat, no vomiting and no wheezing        Prior to Admission Medications   Prescriptions Last Dose Informant Patient Reported? Taking?   albuterol (PROVENTIL HFA,VENTOLIN HFA) 90 mcg/act inhaler   Yes No   Sig: Inhale 2 puffs every 4 (four) hours as needed    carvedilol (COREG) 6.25 mg tablet   Yes No   Sig: Take 6.25 mg by mouth 2 (two) times a day with meals   furosemide (LASIX) 20 mg tablet   No No   Sig: Take 1 tablet (20 mg total) by mouth daily Do not start before October 11, 2023.   losartan (COZAAR) 50 mg tablet   No No   Sig: Take 1 tablet (50 mg total) by mouth daily Do not start before October 11, 2023.   naproxen (NAPROSYN) 500 mg tablet   No No   Sig: Take 1 tablet (500 mg total) by mouth 2 (two) times a day with meals for 3 days   ondansetron (Zofran ODT) 4 mg disintegrating tablet   No No  "  Sig: Take 1 tablet (4 mg total) by mouth every 6 (six) hours as needed for nausea or vomiting for up to 10 days   spironolactone (ALDACTONE) 25 mg tablet   No No   Sig: Take 1 tablet (25 mg total) by mouth daily      Facility-Administered Medications: None       Past Medical History:   Diagnosis Date    Anxiety     Cardiomyopathy (HCC)     Depression     Emphysema of lung (HCC)     GERD (gastroesophageal reflux disease)     Gout     Hypertension     Neuropathy     Raynaud's disease     Right inguinal hernia     Scleroderma (HCC)        Past Surgical History:   Procedure Laterality Date    AMPUTATION Right     pt had tip of \"pointer finger\" d/t scleraderma    HERNIA REPAIR Left     times 2    [place] [facility] 1ST INGUN HRNA AGE 5 YRS/> REDUCIBLE Right 3/3/2023    Procedure: OPEN INGUINAL HERNIA REPAIR WITH MESH;  Surgeon: Temo Jackson DO;  Location:  MAIN OR;  Service: General       History reviewed. No pertinent family history.  I have reviewed and agree with the history as documented.    E-Cigarette/Vaping    E-Cigarette Use Never User      E-Cigarette/Vaping Substances    Nicotine No     THC No     CBD No     Flavoring No      Social History     Tobacco Use    Smoking status: Every Day     Current packs/day: 1.00     Average packs/day: 1 pack/day for 20.0 years (20.0 ttl pk-yrs)     Types: Cigarettes    Smokeless tobacco: Never   Vaping Use    Vaping status: Never Used   Substance Use Topics    Alcohol use: Not Currently     Comment: occasional    Drug use: Yes     Types: Marijuana, Methamphetamines     Comment: Hx meth use       Review of Systems   Constitutional:  Negative for chills and fever.   HENT:  Negative for ear pain, hearing loss, sore throat, trouble swallowing and voice change.    Eyes:  Negative for pain and discharge.   Respiratory:  Negative for cough, shortness of breath and wheezing.    Cardiovascular:  Negative for chest pain and palpitations.   Gastrointestinal:  Negative for abdominal pain, blood " in stool, constipation, diarrhea, nausea and vomiting.   Genitourinary:  Negative for dysuria, flank pain, frequency and hematuria.   Musculoskeletal:  Negative for joint swelling, neck pain and neck stiffness.   Skin:  Negative for rash and wound.   Neurological:  Negative for dizziness, seizures, syncope, facial asymmetry and headaches.   Psychiatric/Behavioral:  Negative for hallucinations, self-injury and suicidal ideas.    All other systems reviewed and are negative.      Physical Exam  Physical Exam  Vitals and nursing note reviewed.   Constitutional:       General: He is not in acute distress.     Appearance: Normal appearance. He is well-developed. He is not ill-appearing or diaphoretic.   HENT:      Head: Normocephalic and atraumatic.      Right Ear: External ear normal.      Left Ear: External ear normal.   Eyes:      General: No scleral icterus.        Right eye: No discharge.         Left eye: No discharge.      Extraocular Movements: Extraocular movements intact.      Conjunctiva/sclera: Conjunctivae normal.   Pulmonary:      Effort: Pulmonary effort is normal. No respiratory distress.   Musculoskeletal:         General: No swelling or deformity. Normal range of motion.      Cervical back: Normal range of motion and neck supple.      Comments: Open wound at the right third fingertip with some drainage and some surrounding cellulitic change.   Skin:     General: Skin is dry.      Coloration: Skin is not jaundiced or pale.      Findings: No rash.   Neurological:      General: No focal deficit present.      Mental Status: He is alert and oriented to person, place, and time.      Cranial Nerves: No cranial nerve deficit.      Motor: No weakness.      Coordination: Coordination normal.      Gait: Gait normal.   Psychiatric:         Mood and Affect: Mood normal.         Behavior: Behavior normal.         Thought Content: Thought content normal.         Judgment: Judgment normal.         Vital Signs  ED Triage  Vitals [01/09/24 1951]   Temperature Pulse Respirations Blood Pressure SpO2   (!) 97 °F (36.1 °C) 89 18 (!) 139/101 99 %      Temp Source Heart Rate Source Patient Position - Orthostatic VS BP Location FiO2 (%)   Temporal Monitor Sitting Right arm --      Pain Score       6           Vitals:    01/09/24 1951   BP: (!) 139/101   Pulse: 89   Patient Position - Orthostatic VS: Sitting         Visual Acuity      ED Medications  Medications - No data to display    Diagnostic Studies  Results Reviewed       Procedure Component Value Units Date/Time    CBC and differential [[id number]] Collected: 01/09/24 2057    Lab Status: No result Specimen: Blood from Hand, Right     Comprehensive metabolic panel [[id number]] Collected: 01/09/24 2057    Lab Status: No result Specimen: Blood from Hand, Right     Lactic acid, plasma (w/reflex if result > 2.0) [[id number]] Collected: 01/09/24 2057    Lab Status: No result Specimen: Blood from Hand, Right     Blood culture #1 [[id number]] Collected: 01/09/24 2057    Lab Status: No result Specimen: Blood from Hand, Right     Blood culture #2 [[id number]] Collected: 01/09/24 2057    Lab Status: No result Specimen: Blood from Arm, Left                    CT upper extremity w contrast right    (Results Pending)              Procedures  Procedures         ED Course  ED Course as of 01/09/24 2102 Tue Jan 09, 2024 2057 Signed out to Dr Antonio pending labs and CT imaging                               SBIRT 22yo+      Flowsheet Row Most Recent Value   Initial Alcohol Screen: US AUDIT-C     1. How often do you have a drink containing alcohol? 0 Filed at: 01/09/2024 1956   2. How many drinks containing alcohol do you have on a typical day you are drinking?  0 Filed at: 01/09/2024 1956   3a. Male UNDER 65: How often do you have five or more drinks on one occasion? 0 Filed at: 01/09/2024 1956   Audit-C Score 0 Filed at: 01/09/2024 1956   SILVIA: How many times in the past year have you...    Used an  illegal drug or used a prescription medication for non-medical reasons? Never Filed at: 01/09/2024 1956                      Medical Decision Making  Based on the history and medical screening exam performed the diagnostic considerations include but are not limited to cellulitis, osteomyelitis, wound of finger.        Amount and/or Complexity of Data Reviewed  Labs: ordered. Decision-making details documented in ED Course.  Radiology: ordered and independent interpretation performed. Decision-making details documented in ED Course.             Disposition  Final diagnoses:   None     ED Disposition       None          Follow-up Information    None         Patient's Medications   Discharge Prescriptions    No medications on file       No discharge procedures on file.    PDMP Review         Value Time User    PDMP Reviewed  Yes 10/23/2021  1:01 PM Sulema Lomax MD            ED Provider  Electronically Signed by             Matt Arteaga MD  01/09/24 2149

## 2024-01-10 NOTE — ED CARE HANDOFF
Emergency Department Sign Out Note        Sign out and transfer of care from Dr. Arteaga. See Separate Emergency Department note.     The patient, Britton Batista, was evaluated by the previous provider for finger infection.    Workup Completed:  Labs Reviewed   CBC AND DIFFERENTIAL - Abnormal       Result Value Ref Range Status    WBC 6.61  4.31 - 10.16 Thousand/uL Final    RBC 5.10  3.88 - 5.62 Million/uL Final    Hemoglobin 14.9  12.0 - 17.0 g/dL Final    Hematocrit 48.2  36.5 - 49.3 % Final    MCV 95  82 - 98 fL Final    MCH 29.2  26.8 - 34.3 pg Final    MCHC 30.9 (*) 31.4 - 37.4 g/dL Final    RDW 15.8 (*) 11.6 - 15.1 % Final    MPV 9.4  8.9 - 12.7 fL Final    Platelets 228  149 - 390 Thousands/uL Final    nRBC 0  /100 WBCs Final    Neutrophils Relative 54  43 - 75 % Final    Immat GRANS % 0  0 - 2 % Final    Lymphocytes Relative 29  14 - 44 % Final    Monocytes Relative 14 (*) 4 - 12 % Final    Eosinophils Relative 2  0 - 6 % Final    Basophils Relative 1  0 - 1 % Final    Neutrophils Absolute 3.59  1.85 - 7.62 Thousands/µL Final    Immature Grans Absolute 0.02  0.00 - 0.20 Thousand/uL Final    Lymphocytes Absolute 1.92  0.60 - 4.47 Thousands/µL Final    Monocytes Absolute 0.89  0.17 - 1.22 Thousand/µL Final    Eosinophils Absolute 0.14  0.00 - 0.61 Thousand/µL Final    Basophils Absolute 0.05  0.00 - 0.10 Thousands/µL Final   COMPREHENSIVE METABOLIC PANEL - Abnormal    Sodium 132 (*) 135 - 147 mmol/L Final    Potassium 4.5  3.5 - 5.3 mmol/L Final    Chloride 100  96 - 108 mmol/L Final    CO2 25  21 - 32 mmol/L Final    ANION GAP 7  mmol/L Final    BUN 21  5 - 25 mg/dL Final    Creatinine 1.19  0.60 - 1.30 mg/dL Final    Comment: Standardized to IDMS reference method    Glucose 74  65 - 140 mg/dL Final    Comment: If the patient is fasting, the ADA then defines impaired fasting glucose as > 100 mg/dL and diabetes as > or equal to 123 mg/dL.    Calcium 9.4  8.4 - 10.2 mg/dL Final    AST 27  13 - 39 U/L Final     ALT 12  7 - 52 U/L Final    Comment: Specimen collection should occur prior to Sulfasalazine administration due to the potential for falsely depressed results.     Alkaline Phosphatase 99  34 - 104 U/L Final    Total Protein 7.2  6.4 - 8.4 g/dL Final    Albumin 3.7  3.5 - 5.0 g/dL Final    Total Bilirubin 0.50  0.20 - 1.00 mg/dL Final    Comment: Use of this assay is not recommended for patients undergoing treatment with eltrombopag due to the potential for falsely elevated results.  N-acetyl-p-benzoquinone imine (metabolite of Acetaminophen) will generate erroneously low results in samples for patients that have taken an overdose of Acetaminophen.    eGFR 66  ml/min/1.73sq m Final    Narrative:     National Kidney Disease Foundation guidelines for Chronic Kidney Disease (CKD):     Stage 1 with normal or high GFR (GFR > 90 mL/min/1.73 square meters)    Stage 2 Mild CKD (GFR = 60-89 mL/min/1.73 square meters)    Stage 3A Moderate CKD (GFR = 45-59 mL/min/1.73 square meters)    Stage 3B Moderate CKD (GFR = 30-44 mL/min/1.73 square meters)    Stage 4 Severe CKD (GFR = 15-29 mL/min/1.73 square meters)    Stage 5 End Stage CKD (GFR <15 mL/min/1.73 square meters)  Note: GFR calculation is accurate only with a steady state creatinine   LACTIC ACID, PLASMA (W/REFLEX IF RESULT > 2.0) - Normal    LACTIC ACID 1.4  0.5 - 2.0 mmol/L Final    Narrative:     Result may be elevated if tourniquet was used during collection.   BLOOD CULTURE   BLOOD CULTURE         ED Course / Workup Pending (followup):                                    ED Course as of 01/09/24 2219 Tue Jan 09, 2024 2113 Discussed and care assumed from Dr. Arteaga pending labs and imaging likely to require transfer vs admission for finger tip infection and suspected osteomyelitis.     2130 Reviewed x-ray findings on patient's MyChart x-ray shows osteomyelitis of the tuft of the third digit.     2144 Spoke with Dr. Price orthopedics on-call reviewed case and  findings in the emergency department for now he recommends treating patient with IV antibiotics 1 time in the ED and discharged on oral Augmentin for now with outpatient follow-up with hand surgery.     2210 Reviewed recommendation of orthopedics with patient and family they are satisfied and in agreement with the plan for oral antibiotics for now and outpatient follow-up with hand surgery for further evaluation and management of finger tip osteomylitis.  Patient understands he may need surgery and amputation and has been through this before.  There is no signs of severe sepsis or shock or systemic infection at this time strict return precautions were discussed with patient and family they will follow-up promptly as advised referral for hand surgery provided.       Procedures  Medical Decision Making  Patient remained clinically and hemodynamically stable in the emergency department he is afebrile nontoxic well-appearing reviewed case findings and recommendation with patient and family for now recommend oral antibiotics and outpatient follow-up with hand surgery for further evaluation management patient family understands instructions and agree and will follow-up promptly as advised. return precautions and anticipatory guidance discussed.      Problems Addressed:  Osteomyelitis of finger of right hand (HCC): acute illness or injury  Scleroderma (HCC): chronic illness or injury    Amount and/or Complexity of Data Reviewed  Labs: ordered. Decision-making details documented in ED Course.  Radiology: ordered. Decision-making details documented in ED Course.    Risk  Prescription drug management.            Disposition  Final diagnoses:   Osteomyelitis of finger of right hand (HCC) - right 3rd distal phalanx     Time reflects when diagnosis was documented in both MDM as applicable and the Disposition within this note       Time User Action Codes Description Comment    1/9/2024  9:33 PM Bhagrav Antonio Add [M86.9]  Osteomyelitis of finger of right hand (HCC)     1/9/2024  9:34 PM Bhargav Antonio [M86.9] Osteomyelitis of finger of right hand (HCC) right 3rd distal phalanx          ED Disposition       ED Disposition   Discharge    Condition   Stable    Date/Time   Tue Jan 9, 2024  9:59 PM    Comment   Britton Batista discharge to home/self care.                   Follow-up Information       Follow up With Specialties Details Why Contact Info    Rolan Guevara MD Orthopedic Surgery, Hand Surgery Schedule an appointment as soon as possible for a visit in 2 days right 3rd finger osteomylitis [address] [place]  [address]  [facility] [id number] [phone]      Joselyn Hyde MD Internal Medicine Schedule an appointment as soon as possible for a visit in 1 day For wound re-check [address] [facility] [address]  [phone]            Patient's Medications   Discharge Prescriptions    AMOXICILLIN-CLAVULANATE (AUGMENTIN) 875-125 MG PER TABLET    Take 1 tablet by mouth every 12 (twelve) hours for 10 days       Start Date: 1/9/2024  End Date: 1/19/2024       Order Dose: 1 tablet       Quantity: 20 tablet    Refills: 0            ED Provider  Electronically Signed by     Bhargav Antonio DO  01/09/24 7035

## 2024-01-11 ENCOUNTER — OFFICE VISIT (OUTPATIENT)
Dept: OBGYN CLINIC | Facility: CLINIC | Age: 59
End: 2024-01-11
Payer: COMMERCIAL

## 2024-01-11 ENCOUNTER — TELEPHONE (OUTPATIENT)
Age: 59
End: 2024-01-11

## 2024-01-11 VITALS — HEIGHT: 69 IN | WEIGHT: 161 LBS | BODY MASS INDEX: 23.85 KG/M2

## 2024-01-11 DIAGNOSIS — M86.9 OSTEOMYELITIS OF FINGER OF RIGHT HAND (HCC): ICD-10-CM

## 2024-01-11 PROCEDURE — 99204 OFFICE O/P NEW MOD 45 MIN: CPT | Performed by: SURGERY

## 2024-01-11 NOTE — TELEPHONE ENCOUNTER
Caller: Patient    Doctor: George    Reason for call:     Patient may be 5 minutes late, just making the office aware.    Call back#: n/a

## 2024-01-11 NOTE — H&P (VIEW-ONLY)
ORTHOPAEDIC HAND, WRIST, AND ELBOW OFFICE  VISIT       ASSESSMENT/PLAN:      58 y o male who presents with Right long finger osteomyelitis of distal phalanx    We discussed monitoring his symptoms as he subjectively states he is better. He is on antibiotics which can help his symptoms further and have no need for amputation.  Can prescribe antibiotics for a long period of time   We also discussed amputation of the distal phalanx of his finger. Intraoperative and postoperative care discussed  Patient elected for amputation  All of the risks and benefits of operative treatment were explained to the patient, as well as the risks and benefits of any alternative treatment options, including nonoperative care. This risks of surgery include, but are not limited to, infection, bleeding, blood clot, damage to nerves/arteries, need for further surgery, cardiovascular risk, anesthesia risk, and continued pain.  The patient understood this and elects to proceed forward with surgical intervention.  Consent signed- Amputation of Right long finger distal phalanx.  Discussed that wound healing can be an issue in this setting, and revision amputation is commong  Local block was recommended due to CHF  Discussed continuing to use antibiotics     The patient verbalized understanding of exam findings and treatment plan. We engaged in the shared decision-making process and treatment options were discussed at length with the patient. Surgical and conservative management discussed today along with risks and benefits.    Diagnoses and all orders for this visit:    Osteomyelitis of finger of right hand (HCC)  Comments:  right 3rd distal phalanx  Orders:  -     Ambulatory Referral to Hand Surgery  -     Case request operating room: AMPUTATION FINGER, RIGHT MIDDLE  CPT: 61275; Standing  -     Case request operating room: AMPUTATION FINGER, RIGHT MIDDLE  CPT: 19940        Follow Up:  Return for Post-Op.    To Do Next Visit:  Re-evaluation of  "current issue            ____________________________________________________________________________________________________________________________________________      CHIEF COMPLAINT:  Chief Complaint   Patient presents with    Right Hand - Pain       SUBJECTIVE:  Britton Batista is a 58 y.o. year old  male who presents for evaluation of Right long finger      Patient states he had a wound with scabbing and pain in his distal longer finger  He was seen in by his PCP on 1/8/2024 and they directed him to the ED for finger amputation due to possible osteomyelitis of his long finger. ED notes reviewed  Patient states his finger is not draining and has actually improved since his ED visit. He is on antibiotics    Patient has known Raynaud's, scleroderma. Has had past finger amputations    I have personally reviewed all the relevant PMH, PSH, SH, FH, Medications and allergies      PAST MEDICAL HISTORY:  Past Medical History:   Diagnosis Date    Anxiety     Cardiomyopathy (HCC)     Depression     Emphysema of lung (HCC)     GERD (gastroesophageal reflux disease)     Gout     Hypertension     Neuropathy     Raynaud's disease     Right inguinal hernia     Scleroderma (HCC)        PAST SURGICAL HISTORY:  Past Surgical History:   Procedure Laterality Date    AMPUTATION Right     pt had tip of \"pointer finger\" d/t scleraderma    HERNIA REPAIR Left     times 2    [place] RPR 1ST INGUN HRNA AGE 5 YRS/> REDUCIBLE Right 3/3/2023    Procedure: OPEN INGUINAL HERNIA REPAIR WITH MESH;  Surgeon: Temo Jackson DO;  Location:  MAIN OR;  Service: General       FAMILY HISTORY:  History reviewed. No pertinent family history.    SOCIAL HISTORY:  Social History     Tobacco Use    Smoking status: Every Day     Current packs/day: 1.00     Average packs/day: 1 pack/day for 20.0 years (20.0 ttl pk-yrs)     Types: Cigarettes    Smokeless tobacco: Never   Vaping Use    Vaping status: Never Used   Substance Use Topics    Alcohol use: Not Currently    "  Comment: occasional    Drug use: Yes     Types: Marijuana, Methamphetamines     Comment: Hx meth use       MEDICATIONS:    Current Outpatient Medications:     albuterol (PROVENTIL HFA,VENTOLIN HFA) 90 mcg/act inhaler, Inhale 2 puffs every 4 (four) hours as needed , Disp: , Rfl:     amoxicillin-clavulanate (AUGMENTIN) 875-125 mg per tablet, Take 1 tablet by mouth every 12 (twelve) hours for 10 days, Disp: 20 tablet, Rfl: 0    carvedilol (COREG) 6.25 mg tablet, Take 6.25 mg by mouth 2 (two) times a day with meals, Disp: , Rfl:     furosemide (LASIX) 20 mg tablet, Take 1 tablet (20 mg total) by mouth daily Do not start before October 11, 2023., Disp: 30 tablet, Rfl: 0    losartan (COZAAR) 50 mg tablet, Take 1 tablet (50 mg total) by mouth daily Do not start before October 11, 2023., Disp: 30 tablet, Rfl: 0    spironolactone (ALDACTONE) 25 mg tablet, Take 1 tablet (25 mg total) by mouth daily, Disp: 30 tablet, Rfl: 0    naproxen (NAPROSYN) 500 mg tablet, Take 1 tablet (500 mg total) by mouth 2 (two) times a day with meals for 3 days, Disp: 6 tablet, Rfl: 0    ondansetron (Zofran ODT) 4 mg disintegrating tablet, Take 1 tablet (4 mg total) by mouth every 6 (six) hours as needed for nausea or vomiting for up to 10 days, Disp: 10 tablet, Rfl: 0    ALLERGIES:  Allergies   Allergen Reactions    Aspirin GI Intolerance           REVIEW OF SYSTEMS:  Review of Systems   Constitutional:  Negative for chills and fever.   HENT:  Negative for ear pain and sore throat.    Eyes:  Negative for pain and visual disturbance.   Respiratory:  Negative for cough and shortness of breath.    Cardiovascular:  Negative for chest pain and palpitations.   Gastrointestinal:  Negative for abdominal pain and vomiting.   Genitourinary:  Negative for dysuria and hematuria.   Musculoskeletal:  Negative for arthralgias and back pain.   Skin:  Negative for color change and rash.   Neurological:  Negative for seizures and syncope.   All other systems  reviewed and are negative.      VITALS:  There were no vitals filed for this visit.    LABS:  HgA1c:   Lab Results   Component Value Date    HGBA1C 5.9 (H) 10/25/2021     BMP:   Lab Results   Component Value Date    CALCIUM 9.4 01/09/2024    K 4.5 01/09/2024    CO2 25 01/09/2024     01/09/2024    BUN 21 01/09/2024    CREATININE 1.19 01/09/2024       _____________________________________________________  PHYSICAL EXAMINATION:  General: well developed and well nourished, alert, oriented times 3, and appears comfortable  Psychiatric: Normal  HEENT: Normocephalic, Atraumatic Trachea Midline, No torticollis  Pulmonary: No audible wheezing or respiratory distress   Abdomen/GI: Non tender, non distended   Cardiovascular: No pitting edema, 2+ radial pulse   Skin: No masses, erythema, lacerations, fluctation, ulcerations  Neurovascular: Sensation Intact to the Median, Ulnar, Radial Nerve, Motor Intact to the Median, Ulnar, Radial Nerve, and Pulses Intact  Musculoskeletal: Normal, except as noted in detailed exam and in HPI.      MUSCULOSKELETAL EXAMINATION:  Right hand:  SILT  Scab noted on distal Right long finger. No drainage noted. Pallor color change noted in distal half of long finger    Distal phalnax amupation noted on index finger of Right hand    ___________________________________________________  STUDIES REVIEWED:  CT scan reviewed          PROCEDURES PERFORMED:  Procedures  No Procedures performed today    _____________________________________________________      Scribe Attestation      I,:  Jean English am acting as a scribe while in the presence of the attending physician.:       I,:  Pedro Vazquez MD personally performed the services described in this documentation    as scribed in my presence.:

## 2024-01-11 NOTE — PROGRESS NOTES
ORTHOPAEDIC HAND, WRIST, AND ELBOW OFFICE  VISIT       ASSESSMENT/PLAN:      58 y o male who presents with Right long finger osteomyelitis of distal phalanx    We discussed monitoring his symptoms as he subjectively states he is better. He is on antibiotics which can help his symptoms further and have no need for amputation.  Can prescribe antibiotics for a long period of time   We also discussed amputation of the distal phalanx of his finger. Intraoperative and postoperative care discussed  Patient elected for amputation  All of the risks and benefits of operative treatment were explained to the patient, as well as the risks and benefits of any alternative treatment options, including nonoperative care. This risks of surgery include, but are not limited to, infection, bleeding, blood clot, damage to nerves/arteries, need for further surgery, cardiovascular risk, anesthesia risk, and continued pain.  The patient understood this and elects to proceed forward with surgical intervention.  Consent signed- Amputation of Right long finger distal phalanx.  Discussed that wound healing can be an issue in this setting, and revision amputation is commong  Local block was recommended due to CHF  Discussed continuing to use antibiotics     The patient verbalized understanding of exam findings and treatment plan. We engaged in the shared decision-making process and treatment options were discussed at length with the patient. Surgical and conservative management discussed today along with risks and benefits.    Diagnoses and all orders for this visit:    Osteomyelitis of finger of right hand (HCC)  Comments:  right 3rd distal phalanx  Orders:  -     Ambulatory Referral to Hand Surgery  -     Case request operating room: AMPUTATION FINGER, RIGHT MIDDLE  CPT: 88018; Standing  -     Case request operating room: AMPUTATION FINGER, RIGHT MIDDLE  CPT: 16510        Follow Up:  Return for Post-Op.    To Do Next Visit:  Re-evaluation of  "current issue            ____________________________________________________________________________________________________________________________________________      CHIEF COMPLAINT:  Chief Complaint   Patient presents with    Right Hand - Pain       SUBJECTIVE:  Britton Batista is a 58 y.o. year old  male who presents for evaluation of Right long finger      Patient states he had a wound with scabbing and pain in his distal longer finger  He was seen in by his PCP on 1/8/2024 and they directed him to the ED for finger amputation due to possible osteomyelitis of his long finger. ED notes reviewed  Patient states his finger is not draining and has actually improved since his ED visit. He is on antibiotics    Patient has known Raynaud's, scleroderma. Has had past finger amputations    I have personally reviewed all the relevant PMH, PSH, SH, FH, Medications and allergies      PAST MEDICAL HISTORY:  Past Medical History:   Diagnosis Date    Anxiety     Cardiomyopathy (HCC)     Depression     Emphysema of lung (HCC)     GERD (gastroesophageal reflux disease)     Gout     Hypertension     Neuropathy     Raynaud's disease     Right inguinal hernia     Scleroderma (HCC)        PAST SURGICAL HISTORY:  Past Surgical History:   Procedure Laterality Date    AMPUTATION Right     pt had tip of \"pointer finger\" d/t scleraderma    HERNIA REPAIR Left     times 2    [place] RPR 1ST INGUN HRNA AGE 5 YRS/> REDUCIBLE Right 3/3/2023    Procedure: OPEN INGUINAL HERNIA REPAIR WITH MESH;  Surgeon: Temo Jakcson DO;  Location:  MAIN OR;  Service: General       FAMILY HISTORY:  History reviewed. No pertinent family history.    SOCIAL HISTORY:  Social History     Tobacco Use    Smoking status: Every Day     Current packs/day: 1.00     Average packs/day: 1 pack/day for 20.0 years (20.0 ttl pk-yrs)     Types: Cigarettes    Smokeless tobacco: Never   Vaping Use    Vaping status: Never Used   Substance Use Topics    Alcohol use: Not Currently    "  Comment: occasional    Drug use: Yes     Types: Marijuana, Methamphetamines     Comment: Hx meth use       MEDICATIONS:    Current Outpatient Medications:     albuterol (PROVENTIL HFA,VENTOLIN HFA) 90 mcg/act inhaler, Inhale 2 puffs every 4 (four) hours as needed , Disp: , Rfl:     amoxicillin-clavulanate (AUGMENTIN) 875-125 mg per tablet, Take 1 tablet by mouth every 12 (twelve) hours for 10 days, Disp: 20 tablet, Rfl: 0    carvedilol (COREG) 6.25 mg tablet, Take 6.25 mg by mouth 2 (two) times a day with meals, Disp: , Rfl:     furosemide (LASIX) 20 mg tablet, Take 1 tablet (20 mg total) by mouth daily Do not start before October 11, 2023., Disp: 30 tablet, Rfl: 0    losartan (COZAAR) 50 mg tablet, Take 1 tablet (50 mg total) by mouth daily Do not start before October 11, 2023., Disp: 30 tablet, Rfl: 0    spironolactone (ALDACTONE) 25 mg tablet, Take 1 tablet (25 mg total) by mouth daily, Disp: 30 tablet, Rfl: 0    naproxen (NAPROSYN) 500 mg tablet, Take 1 tablet (500 mg total) by mouth 2 (two) times a day with meals for 3 days, Disp: 6 tablet, Rfl: 0    ondansetron (Zofran ODT) 4 mg disintegrating tablet, Take 1 tablet (4 mg total) by mouth every 6 (six) hours as needed for nausea or vomiting for up to 10 days, Disp: 10 tablet, Rfl: 0    ALLERGIES:  Allergies   Allergen Reactions    Aspirin GI Intolerance           REVIEW OF SYSTEMS:  Review of Systems   Constitutional:  Negative for chills and fever.   HENT:  Negative for ear pain and sore throat.    Eyes:  Negative for pain and visual disturbance.   Respiratory:  Negative for cough and shortness of breath.    Cardiovascular:  Negative for chest pain and palpitations.   Gastrointestinal:  Negative for abdominal pain and vomiting.   Genitourinary:  Negative for dysuria and hematuria.   Musculoskeletal:  Negative for arthralgias and back pain.   Skin:  Negative for color change and rash.   Neurological:  Negative for seizures and syncope.   All other systems  reviewed and are negative.      VITALS:  There were no vitals filed for this visit.    LABS:  HgA1c:   Lab Results   Component Value Date    HGBA1C 5.9 (H) 10/25/2021     BMP:   Lab Results   Component Value Date    CALCIUM 9.4 01/09/2024    K 4.5 01/09/2024    CO2 25 01/09/2024     01/09/2024    BUN 21 01/09/2024    CREATININE 1.19 01/09/2024       _____________________________________________________  PHYSICAL EXAMINATION:  General: well developed and well nourished, alert, oriented times 3, and appears comfortable  Psychiatric: Normal  HEENT: Normocephalic, Atraumatic Trachea Midline, No torticollis  Pulmonary: No audible wheezing or respiratory distress   Abdomen/GI: Non tender, non distended   Cardiovascular: No pitting edema, 2+ radial pulse   Skin: No masses, erythema, lacerations, fluctation, ulcerations  Neurovascular: Sensation Intact to the Median, Ulnar, Radial Nerve, Motor Intact to the Median, Ulnar, Radial Nerve, and Pulses Intact  Musculoskeletal: Normal, except as noted in detailed exam and in HPI.      MUSCULOSKELETAL EXAMINATION:  Right hand:  SILT  Scab noted on distal Right long finger. No drainage noted. Pallor color change noted in distal half of long finger    Distal phalnax amupation noted on index finger of Right hand    ___________________________________________________  STUDIES REVIEWED:  CT scan reviewed          PROCEDURES PERFORMED:  Procedures  No Procedures performed today    _____________________________________________________      Scribe Attestation      I,:  Jean English am acting as a scribe while in the presence of the attending physician.:       I,:  Pedro Vazquez MD personally performed the services described in this documentation    as scribed in my presence.:

## 2024-01-14 LAB
BACTERIA BLD CULT: NORMAL
BACTERIA BLD CULT: NORMAL

## 2024-01-15 LAB
BACTERIA BLD CULT: NORMAL
BACTERIA BLD CULT: NORMAL

## 2024-01-19 ENCOUNTER — TELEPHONE (OUTPATIENT)
Age: 59
End: 2024-01-19

## 2024-01-19 DIAGNOSIS — M79.642 PAIN IN BOTH HANDS: ICD-10-CM

## 2024-01-19 DIAGNOSIS — M79.641 PAIN IN BOTH HANDS: ICD-10-CM

## 2024-01-19 RX ORDER — NAPROXEN 500 MG/1
500 TABLET ORAL 2 TIMES DAILY WITH MEALS
Qty: 14 TABLET | Refills: 0 | Status: SHIPPED | OUTPATIENT
Start: 2024-01-19 | End: 2024-01-24 | Stop reason: SDUPTHER

## 2024-01-19 NOTE — TELEPHONE ENCOUNTER
Caller: Arin, sister     Doctor: George    Reason for call: Patient is having a lot of pain the right long finger and is asking if something can be called in  to the pharmacy on file or something OTC can be suggested.      Call back#: [phone]-Arin

## 2024-01-19 NOTE — TELEPHONE ENCOUNTER
Spoke with patient's family member, Rx Naproxen sent to pharmacy on file.  No further action needed

## 2024-01-23 ENCOUNTER — HOSPITAL ENCOUNTER (OUTPATIENT)
Facility: HOSPITAL | Age: 59
Setting detail: OUTPATIENT SURGERY
Discharge: HOME | End: 2024-01-23
Attending: SURGERY | Admitting: SURGERY
Payer: COMMERCIAL

## 2024-01-23 VITALS
BODY MASS INDEX: 23.84 KG/M2 | HEART RATE: 78 BPM | DIASTOLIC BLOOD PRESSURE: 80 MMHG | OXYGEN SATURATION: 98 % | WEIGHT: 160.94 LBS | SYSTOLIC BLOOD PRESSURE: 138 MMHG | RESPIRATION RATE: 18 BRPM | TEMPERATURE: 97.8 F | HEIGHT: 69 IN

## 2024-01-23 DIAGNOSIS — M86.9 OSTEOMYELITIS OF FINGER OF RIGHT HAND (HCC): ICD-10-CM

## 2024-01-23 PROCEDURE — 87186 SC STD MICRODIL/AGAR DIL: CPT | Performed by: SURGERY

## 2024-01-23 PROCEDURE — 87076 CULTURE ANAEROBE IDENT EACH: CPT | Performed by: SURGERY

## 2024-01-23 PROCEDURE — 88305 TISSUE EXAM BY PATHOLOGIST: CPT | Performed by: STUDENT IN AN ORGANIZED HEALTH CARE EDUCATION/TRAINING PROGRAM

## 2024-01-23 PROCEDURE — 87147 CULTURE TYPE IMMUNOLOGIC: CPT | Performed by: SURGERY

## 2024-01-23 PROCEDURE — 87205 SMEAR GRAM STAIN: CPT | Performed by: SURGERY

## 2024-01-23 PROCEDURE — 26951 AMPUTATION OF FINGER/THUMB: CPT | Performed by: SURGERY

## 2024-01-23 PROCEDURE — 87070 CULTURE OTHR SPECIMN AEROBIC: CPT | Performed by: SURGERY

## 2024-01-23 PROCEDURE — 88311 DECALCIFY TISSUE: CPT | Performed by: STUDENT IN AN ORGANIZED HEALTH CARE EDUCATION/TRAINING PROGRAM

## 2024-01-23 PROCEDURE — 87075 CULTR BACTERIA EXCEPT BLOOD: CPT | Performed by: SURGERY

## 2024-01-23 RX ORDER — ACETAMINOPHEN AND CODEINE PHOSPHATE 300; 30 MG/1; MG/1
1 TABLET ORAL EVERY 12 HOURS PRN
Qty: 5 TABLET | Refills: 0 | Status: SHIPPED | OUTPATIENT
Start: 2024-01-23 | End: 2024-01-24 | Stop reason: ALTCHOICE

## 2024-01-23 RX ORDER — ACETAMINOPHEN 325 MG/1
650 TABLET ORAL EVERY 6 HOURS PRN
Status: DISCONTINUED | OUTPATIENT
Start: 2024-01-23 | End: 2024-01-23 | Stop reason: HOSPADM

## 2024-01-23 RX ORDER — SULFAMETHOXAZOLE AND TRIMETHOPRIM 800; 160 MG/1; MG/1
1 TABLET ORAL EVERY 12 HOURS SCHEDULED
Qty: 14 TABLET | Refills: 0 | Status: SHIPPED | OUTPATIENT
Start: 2024-01-23 | End: 2024-01-31

## 2024-01-23 RX ORDER — MAGNESIUM HYDROXIDE 1200 MG/15ML
LIQUID ORAL AS NEEDED
Status: DISCONTINUED | OUTPATIENT
Start: 2024-01-23 | End: 2024-01-23 | Stop reason: HOSPADM

## 2024-01-23 RX ORDER — OXYCODONE HYDROCHLORIDE 5 MG/1
5 TABLET ORAL EVERY 4 HOURS PRN
Status: DISCONTINUED | OUTPATIENT
Start: 2024-01-23 | End: 2024-01-23 | Stop reason: HOSPADM

## 2024-01-23 NOTE — INTERVAL H&P NOTE
H&P reviewed. After examining the patient I find no changes in the patients condition since the H&P had been written.    Vitals:    01/23/24 0614   BP: (!) 178/112   Pulse: 78   Resp: 16   Temp: 97.9 °F (36.6 °C)   SpO2: 92%

## 2024-01-23 NOTE — OP NOTE
OPERATIVE REPORT  PATIENT NAME: Britton Batista    :  1965  MRN: [id number]  Pt Location: [facility] OR ROOM 03    SURGERY DATE: 2024    Surgeons and Role:     * Pedro Vazquez MD - Primary    Preop Diagnosis:  Osteomyelitis of finger of right hand (HCC) [M86.9]    Post-Op Diagnosis Codes:     * Osteomyelitis of finger of right hand (HCC) [M86.9]    Procedure(s):  Right - AMPUTATION FINGER. RIGHT MIDDLE    Specimen(s):  ID Type Source Tests Collected by Time Destination   1 : Portion Right Middle Finger Tissue Finger, Right TISSUE EXAM Pedro Vazquez MD 2024 0807    A : Culture Right Middle Finger Tissue Joint, Right Finger ANAEROBIC CULTURE AND GRAM STAIN, CULTURE, TISSUE AND GRAM STAIN Pedro Vazquez MD 2024 0807        Estimated Blood Loss:   Minimal    Drains:  * No LDAs found *    Anesthesia Type:   Local    Operative Indications:  Osteomyelitis of finger of right hand (HCC) [M86.9]      Operative Findings:  Healthy tissue at amputation level; small amount of purulent fluid at tip of finger noted    Complications:   None    Procedure and Technique:  The right middle finger was cleansed alcohol and a digital block was performed with Marcaine 0.25% plain and lidocaine 1% plain in a 50-50 mixture.  The right upper extremity then prepped and draped in a sterile fashion.  A size 6 glove was used to fashion a tourniquet which placed over the middle finger down towards its base.  The interface between the healthy and necrotic tissue was incised at the tip of the finger and then extended along the edges of the nail fold to allow for excision of the nailbed nail plate and germinal matrix.  The tissue was then dissected free from the surrounding healthy tissue taking a cord around the bone itself and then disarticulating the DIP joint.  The neurovascular bundles were trimmed back sharply with a scalpel to keep them below the area of closure.  Small amount of purulent fluid was noted at the tip of the finger during  the initial preparation phase and cultures were taken of this fluid.  There is no additional purulence noted in the soft tissues more proximally.  The wound was then copiously irrigated with normal saline.  The skin was approximated with 4-0 Monocryl in interrupted simple fashion.  Xeroform was applied followed by bulky 2 x 2 gauze and a Daniela overwrap.  An Ace bandage was applied to hold the dressings in place.  Digital tourniquet was removed during application of the dressing.  The patient was then transferred to phase 2 recovery in stable condition.  It was discussed with the patient due to his poor vascular supply amputation sites can be higher risk particularly for need of further revision of amputation.   I was present for the entire procedure.    Patient Disposition:  PACU         SIGNATURE: Pedro Vazquez MD  DATE: January 23, 2024  TIME: 8:30 AM

## 2024-01-23 NOTE — DISCHARGE INSTR - AVS FIRST PAGE
Elevate hand above heart as much as possible to help pain and swelling.  May use hand for simple tasks, but no heavy lifting or tight squeezing x 4 weeks.  Keep operative bandage clean and dry. You may remove bandage in 4 days, and place a band-aid over the area.  You are permitted to shower after 4 days with dressing off.   Perform simple finger motion exercises: opening & closing fingers 10 x every hr.  Follow-up in the office 2/6/2024 per your scheduled appointment.

## 2024-01-24 ENCOUNTER — TELEPHONE (OUTPATIENT)
Dept: OBGYN CLINIC | Facility: HOSPITAL | Age: 59
End: 2024-01-24

## 2024-01-24 DIAGNOSIS — M79.642 PAIN IN BOTH HANDS: ICD-10-CM

## 2024-01-24 DIAGNOSIS — M79.641 PAIN IN BOTH HANDS: ICD-10-CM

## 2024-01-24 RX ORDER — OXYCODONE HYDROCHLORIDE 5 MG/1
5 TABLET ORAL EVERY 8 HOURS PRN
Qty: 12 TABLET | Refills: 0 | Status: SHIPPED | OUTPATIENT
Start: 2024-01-24 | End: 2024-01-31

## 2024-01-24 RX ORDER — NAPROXEN 500 MG/1
500 TABLET ORAL 2 TIMES DAILY WITH MEALS
Qty: 14 TABLET | Refills: 0 | Status: SHIPPED | OUTPATIENT
Start: 2024-01-24 | End: 2024-01-31

## 2024-01-24 NOTE — TELEPHONE ENCOUNTER
Caller: Patient's sister Blanche    Doctor: George    Reason for call: Patient's sister Blanche calling that Britton said the acetaminophen-codeine is not really helping with the pain and wondering if there is something else you can prescribe for the pain instead of that. Please call sister Blanche.    Call back#: [phone]

## 2024-01-24 NOTE — TELEPHONE ENCOUNTER
I discontinued the Tylenol 3 and sent a Rx of oxycodone to the pharmacy on file.  I also refilled the Naproxen as well.  Thank you

## 2024-01-25 LAB
BACTERIA TISS AEROBE CULT: ABNORMAL
GRAM STN SPEC: ABNORMAL
GRAM STN SPEC: ABNORMAL

## 2024-01-25 PROCEDURE — 88311 DECALCIFY TISSUE: CPT | Performed by: STUDENT IN AN ORGANIZED HEALTH CARE EDUCATION/TRAINING PROGRAM

## 2024-01-25 PROCEDURE — 88305 TISSUE EXAM BY PATHOLOGIST: CPT | Performed by: STUDENT IN AN ORGANIZED HEALTH CARE EDUCATION/TRAINING PROGRAM

## 2024-01-26 LAB — BACTERIA SPEC ANAEROBE CULT: ABNORMAL

## 2024-01-30 ENCOUNTER — HOSPITAL ENCOUNTER (EMERGENCY)
Facility: HOSPITAL | Age: 59
Discharge: HOME | End: 2024-01-30
Attending: EMERGENCY MEDICINE
Payer: COMMERCIAL

## 2024-01-30 ENCOUNTER — APPOINTMENT (EMERGENCY)
Dept: RADIOLOGY | Facility: HOSPITAL | Age: 59
End: 2024-01-30
Payer: COMMERCIAL

## 2024-01-30 VITALS
HEIGHT: 69 IN | BODY MASS INDEX: 23.7 KG/M2 | TEMPERATURE: 97.7 F | WEIGHT: 160 LBS | HEART RATE: 56 BPM | SYSTOLIC BLOOD PRESSURE: 162 MMHG | DIASTOLIC BLOOD PRESSURE: 115 MMHG | RESPIRATION RATE: 18 BRPM | OXYGEN SATURATION: 100 %

## 2024-01-30 DIAGNOSIS — M79.645 FINGER PAIN, LEFT: Primary | ICD-10-CM

## 2024-01-30 LAB
ALBUMIN SERPL BCP-MCNC: 3.8 G/DL (ref 3.5–5)
ALP SERPL-CCNC: 88 U/L (ref 34–104)
ALT SERPL W P-5'-P-CCNC: 12 U/L (ref 7–52)
ANION GAP SERPL CALCULATED.3IONS-SCNC: 2 MMOL/L
AST SERPL W P-5'-P-CCNC: 23 U/L (ref 13–39)
BASOPHILS # BLD AUTO: 0.04 THOUSANDS/ÂΜL (ref 0–0.1)
BASOPHILS NFR BLD AUTO: 1 % (ref 0–1)
BILIRUB SERPL-MCNC: 0.3 MG/DL (ref 0.2–1)
BUN SERPL-MCNC: 26 MG/DL (ref 5–25)
CALCIUM SERPL-MCNC: 9 MG/DL (ref 8.4–10.2)
CHLORIDE SERPL-SCNC: 105 MMOL/L (ref 96–108)
CO2 SERPL-SCNC: 23 MMOL/L (ref 21–32)
CREAT SERPL-MCNC: 1.41 MG/DL (ref 0.6–1.3)
EOSINOPHIL # BLD AUTO: 0.12 THOUSAND/ÂΜL (ref 0–0.61)
EOSINOPHIL NFR BLD AUTO: 2 % (ref 0–6)
ERYTHROCYTE [DISTWIDTH] IN BLOOD BY AUTOMATED COUNT: 15.4 % (ref 11.6–15.1)
GFR SERPL CREATININE-BSD FRML MDRD: 54 ML/MIN/1.73SQ M
GLUCOSE SERPL-MCNC: 86 MG/DL (ref 65–140)
HCT VFR BLD AUTO: 44.8 % (ref 36.5–49.3)
HGB BLD-MCNC: 14.3 G/DL (ref 12–17)
IMM GRANULOCYTES # BLD AUTO: 0.02 THOUSAND/UL (ref 0–0.2)
IMM GRANULOCYTES NFR BLD AUTO: 0 % (ref 0–2)
LACTATE SERPL-SCNC: 1.3 MMOL/L (ref 0.5–2)
LYMPHOCYTES # BLD AUTO: 1.31 THOUSANDS/ÂΜL (ref 0.6–4.47)
LYMPHOCYTES NFR BLD AUTO: 23 % (ref 14–44)
MCH RBC QN AUTO: 29.5 PG (ref 26.8–34.3)
MCHC RBC AUTO-ENTMCNC: 31.9 G/DL (ref 31.4–37.4)
MCV RBC AUTO: 93 FL (ref 82–98)
MONOCYTES # BLD AUTO: 0.69 THOUSAND/ÂΜL (ref 0.17–1.22)
MONOCYTES NFR BLD AUTO: 12 % (ref 4–12)
NEUTROPHILS # BLD AUTO: 3.41 THOUSANDS/ÂΜL (ref 1.85–7.62)
NEUTS SEG NFR BLD AUTO: 62 % (ref 43–75)
NRBC BLD AUTO-RTO: 0 /100 WBCS
PLATELET # BLD AUTO: 268 THOUSANDS/UL (ref 149–390)
PMV BLD AUTO: 9.4 FL (ref 8.9–12.7)
POTASSIUM SERPL-SCNC: 5.1 MMOL/L (ref 3.5–5.3)
PROT SERPL-MCNC: 7.1 G/DL (ref 6.4–8.4)
RBC # BLD AUTO: 4.84 MILLION/UL (ref 3.88–5.62)
SODIUM SERPL-SCNC: 130 MMOL/L (ref 135–147)
WBC # BLD AUTO: 5.59 THOUSAND/UL (ref 4.31–10.16)

## 2024-01-30 PROCEDURE — 83605 ASSAY OF LACTIC ACID: CPT | Performed by: EMERGENCY MEDICINE

## 2024-01-30 PROCEDURE — 99283 EMERGENCY DEPT VISIT LOW MDM: CPT

## 2024-01-30 PROCEDURE — 80053 COMPREHEN METABOLIC PANEL: CPT | Performed by: EMERGENCY MEDICINE

## 2024-01-30 PROCEDURE — 96365 THER/PROPH/DIAG IV INF INIT: CPT

## 2024-01-30 PROCEDURE — 99285 EMERGENCY DEPT VISIT HI MDM: CPT | Performed by: EMERGENCY MEDICINE

## 2024-01-30 PROCEDURE — 36415 COLL VENOUS BLD VENIPUNCTURE: CPT | Performed by: EMERGENCY MEDICINE

## 2024-01-30 PROCEDURE — 73140 X-RAY EXAM OF FINGER(S): CPT

## 2024-01-30 PROCEDURE — 96361 HYDRATE IV INFUSION ADD-ON: CPT

## 2024-01-30 PROCEDURE — 96375 TX/PRO/DX INJ NEW DRUG ADDON: CPT

## 2024-01-30 PROCEDURE — 87040 BLOOD CULTURE FOR BACTERIA: CPT | Performed by: EMERGENCY MEDICINE

## 2024-01-30 PROCEDURE — 85025 COMPLETE CBC W/AUTO DIFF WBC: CPT | Performed by: EMERGENCY MEDICINE

## 2024-01-30 RX ORDER — MORPHINE SULFATE 4 MG/ML
4 INJECTION, SOLUTION INTRAMUSCULAR; INTRAVENOUS ONCE
Status: COMPLETED | OUTPATIENT
Start: 2024-01-30 | End: 2024-01-30

## 2024-01-30 RX ORDER — VANCOMYCIN HYDROCHLORIDE 1 G/200ML
15 INJECTION, SOLUTION INTRAVENOUS ONCE
Status: DISCONTINUED | OUTPATIENT
Start: 2024-01-30 | End: 2024-01-30

## 2024-01-30 RX ORDER — OXYCODONE HYDROCHLORIDE AND ACETAMINOPHEN 5; 325 MG/1; MG/1
1 TABLET ORAL EVERY 4 HOURS PRN
Qty: 12 TABLET | Refills: 0 | Status: SHIPPED | OUTPATIENT
Start: 2024-01-30 | End: 2024-02-05 | Stop reason: SDUPTHER

## 2024-01-30 RX ORDER — OXYCODONE HYDROCHLORIDE AND ACETAMINOPHEN 5; 325 MG/1; MG/1
1 TABLET ORAL EVERY 4 HOURS PRN
Qty: 12 TABLET | Refills: 0 | Status: SHIPPED | OUTPATIENT
Start: 2024-01-30 | End: 2024-01-30

## 2024-01-30 RX ORDER — CEFEPIME HYDROCHLORIDE 2 G/50ML
2000 INJECTION, SOLUTION INTRAVENOUS ONCE
Status: COMPLETED | OUTPATIENT
Start: 2024-01-30 | End: 2024-01-30

## 2024-01-30 RX ADMIN — MORPHINE SULFATE 4 MG: 4 INJECTION INTRAVENOUS at 16:48

## 2024-01-30 RX ADMIN — CEFEPIME HYDROCHLORIDE 2000 MG: 2 INJECTION, SOLUTION INTRAVENOUS at 18:19

## 2024-01-30 RX ADMIN — SODIUM CHLORIDE 1000 ML: 0.9 INJECTION, SOLUTION INTRAVENOUS at 16:47

## 2024-01-30 NOTE — DISCHARGE INSTRUCTIONS
Continue antibiotics as prescribed.  Dr. Vazquez will call you in the morning to tell you where and when to meet for procedure to amputate the distal portion of your finger.  Return to the emergency department if symptoms worsen or any additional concerns.

## 2024-01-30 NOTE — ED PROVIDER NOTES
History  Chief Complaint   Patient presents with    Finger Pain     Patient sent here from pcp office for admission for IV antibiotics     Patient is a 58-year-old male with a history of Raynaud's disease and scleroderma, with previous fingertip amputation secondary to possible osteomyelitis, most recently a week ago with the right middle finger, states he was sent to the emergency department by PCP for possible osteomyelitis with pain to the left second finger, he denies any injury or trauma, no fever or chills, no abscess or drainage        Prior to Admission Medications   Prescriptions Last Dose Informant Patient Reported? Taking?   DULoxetine (CYMBALTA) 30 mg delayed release capsule   Yes No   Si mg daily   DULoxetine (CYMBALTA) 60 mg delayed release capsule   Yes No   Si mg daily   Entresto 24-26 MG TABS   Yes No   Sig: Take 1 tablet by mouth 2 (two) times a day   Farxiga 5 MG TABS   Yes No   Sig: Take 5 mg by mouth in the morning   NIFEdipine ER (ADALAT CC) 30 MG 24 hr tablet   Yes No   Sig: Take 30 mg by mouth daily   Spiriva Respimat 2.5 MCG/ACT AERS inhaler   Yes No   Sig: Inhale 2 puffs daily   albuterol (PROVENTIL HFA,VENTOLIN HFA) 90 mcg/act inhaler   Yes No   Sig: Inhale 2 puffs every 4 (four) hours as needed    carvedilol (COREG) 6.25 mg tablet   Yes No   Sig: Take 6.25 mg by mouth 2 (two) times a day with meals   furosemide (LASIX) 20 mg tablet   No No   Sig: Take 1 tablet (20 mg total) by mouth daily Do not start before 2023.   gabapentin (NEURONTIN) 300 mg capsule   Yes No   Sig: Take 300 mg by mouth 3 (three) times a day   lidocaine (LMX) 4 % cream   Yes No   Sig: Apply topically 2 (two) times a day   losartan (COZAAR) 50 mg tablet   No No   Sig: Take 1 tablet (50 mg total) by mouth daily Do not start before 2023.   meloxicam (MOBIC) 15 mg tablet   Yes No   Sig: Take 15 mg by mouth daily   naproxen (NAPROSYN) 500 mg tablet   No No   Sig: Take 1 tablet (500 mg  "total) by mouth 2 (two) times a day with meals for 7 days   ondansetron (Zofran ODT) 4 mg disintegrating tablet   No No   Sig: Take 1 tablet (4 mg total) by mouth every 6 (six) hours as needed for nausea or vomiting for up to 10 days   oxyCODONE (ROXICODONE) 5 immediate release tablet   No No   Sig: Take 1 tablet (5 mg total) by mouth every 8 (eight) hours as needed for severe pain Max Daily Amount: 15 mg   pramipexole (Mirapex) 0.25 mg tablet   Yes No   Sig: Take 0.25 mg by mouth 3 (three) times a day   spironolactone (ALDACTONE) 25 mg tablet   No No   Sig: Take 1 tablet (25 mg total) by mouth daily   sulfamethoxazole-trimethoprim (BACTRIM DS) 800-160 mg per tablet   No No   Sig: Take 1 tablet by mouth every 12 (twelve) hours for 7 days      Facility-Administered Medications: None       Past Medical History:   Diagnosis Date    Anxiety     Cardiomyopathy (HCC)     Depression     Emphysema of lung (HCC)     GERD (gastroesophageal reflux disease)     Gout     Hypertension     Neuropathy     Raynaud's disease     Right inguinal hernia     Scleroderma (HCC)        Past Surgical History:   Procedure Laterality Date    AMPUTATION Right     pt had tip of \"pointer finger\" d/t scleraderma    HERNIA REPAIR Left     times 2    MI AMP F/TH 1/2 JT/PHALANX W/NEURECT W/DIR CLSR Right 1/23/2024    Procedure: AMPUTATION FINGER, RIGHT MIDDLE;  Surgeon: Pedro Vazquez MD;  Location: [facility];  Service: Orthopedics    MI RPR 1ST INGUN HRNA AGE 5 YRS/> REDUCIBLE Right 3/3/2023    Procedure: OPEN INGUINAL HERNIA REPAIR WITH MESH;  Surgeon: Temo Jackson DO;  Location:  MAIN OR;  Service: General       History reviewed. No pertinent family history.  I have reviewed and agree with the history as documented.    E-Cigarette/Vaping    E-Cigarette Use Never User      E-Cigarette/Vaping Substances    Nicotine No     THC No     CBD No     Flavoring No      Social History     Tobacco Use    Smoking status: Every Day     Current packs/day: 1.00 "     Average packs/day: 1 pack/day for 20.0 years (20.0 ttl pk-yrs)     Types: Cigarettes    Smokeless tobacco: Never    Tobacco comments:     Last cigarette 0430   Vaping Use    Vaping status: Never Used   Substance Use Topics    Alcohol use: Not Currently     Comment: occasional    Drug use: Yes     Types: Marijuana, Methamphetamines     Comment: Hx meth use , last marijuana 1/21       Review of Systems   Constitutional: Negative.    HENT: Negative.     Eyes: Negative.    Respiratory: Negative.     Cardiovascular: Negative.    Gastrointestinal: Negative.    Endocrine: Negative.    Genitourinary: Negative.    Musculoskeletal:  Positive for arthralgias.   Skin: Negative.    Allergic/Immunologic: Negative.    Neurological: Negative.    Hematological: Negative.    Psychiatric/Behavioral: Negative.         Physical Exam  Physical Exam  Musculoskeletal:        Hands:       Comments: Right middle finger with healing surgical wound to the tip, clean dry and intact, distal tip of the left second finger tender to palpate, yellowish tobacco discoloration on the second and third fingers, tenderness to palpate, slight delayed capillary refill with pallor from known Raynaud's         Vital Signs  ED Triage Vitals   Temperature Pulse Respirations Blood Pressure SpO2   01/30/24 1634 01/30/24 1633 01/30/24 1633 01/30/24 1633 01/30/24 1633   97.7 °F (36.5 °C) 56 18 (!) 162/115 100 %      Temp Source Heart Rate Source Patient Position - Orthostatic VS BP Location FiO2 (%)   01/30/24 1634 01/30/24 1633 01/30/24 1633 01/30/24 1633 --   Oral Monitor Sitting Right arm       Pain Score       01/30/24 1632       7           Vitals:    01/30/24 1633   BP: (!) 162/115   Pulse: 56   Patient Position - Orthostatic VS: Sitting         Visual Acuity      ED Medications  Medications   sodium chloride 0.9 % bolus 1,000 mL (0 mL Intravenous Stopped 1/30/24 1838)   morphine injection 4 mg (4 mg Intravenous Given 1/30/24 1648)   cefepime (MAXIPIME)  IVPB (premix in dextrose) 2,000 mg 50 mL (0 mg Intravenous Stopped 1/30/24 1838)       Diagnostic Studies  Results Reviewed       Procedure Component Value Units Date/Time    Blood culture #1 [[id number]] Collected: 01/30/24 1655    Lab Status: Preliminary result Specimen: Blood from Arm, Right Updated: 01/30/24 2201     Blood Culture Received in Microbiology Lab. Culture in Progress.    Blood culture #2 [[id number]] Collected: 01/30/24 1647    Lab Status: Preliminary result Specimen: Blood from Arm, Left Updated: 01/30/24 2201     Blood Culture Received in Microbiology Lab. Culture in Progress.    Lactic acid, plasma (w/reflex if result > 2.0) [[id number]]  (Normal) Collected: 01/30/24 1647    Lab Status: Final result Specimen: Blood from Arm, Left Updated: 01/30/24 1709     LACTIC ACID 1.3 mmol/L     Narrative:      Result may be elevated if tourniquet was used during collection.    Comprehensive metabolic panel [[id number]]  (Abnormal) Collected: 01/30/24 1647    Lab Status: Final result Specimen: Blood from Arm, Left Updated: 01/30/24 1709     Sodium 130 mmol/L      Potassium 5.1 mmol/L      Chloride 105 mmol/L      CO2 23 mmol/L      ANION GAP 2 mmol/L      BUN 26 mg/dL      Creatinine 1.41 mg/dL      Glucose 86 mg/dL      Calcium 9.0 mg/dL      AST 23 U/L      ALT 12 U/L      Alkaline Phosphatase 88 U/L      Total Protein 7.1 g/dL      Albumin 3.8 g/dL      Total Bilirubin 0.30 mg/dL      eGFR 54 ml/min/1.73sq m     Narrative:      National Kidney [employer] Foundation guidelines for Chronic Kidney Disease (CKD):     Stage 1 with normal or high GFR (GFR > 90 mL/min/1.73 square meters)    Stage 2 Mild CKD (GFR = 60-89 mL/min/1.73 square meters)    Stage 3A Moderate CKD (GFR = 45-59 mL/min/1.73 square meters)    Stage 3B Moderate CKD (GFR = 30-44 mL/min/1.73 square meters)    Stage 4 Severe CKD (GFR = 15-29 mL/min/1.73 square meters)    Stage 5 End Stage CKD (GFR <15 mL/min/1.73 square meters)  Note: GFR calculation is  accurate only with a steady state creatinine    CBC and differential [[id number]]  (Abnormal) Collected: 01/30/24 1647    Lab Status: Final result Specimen: Blood from Arm, Left Updated: 01/30/24 1652     WBC 5.59 Thousand/uL      RBC 4.84 Million/uL      Hemoglobin 14.3 g/dL      Hematocrit 44.8 %      MCV 93 fL      MCH 29.5 pg      MCHC 31.9 g/dL      RDW 15.4 %      MPV 9.4 fL      Platelets 268 Thousands/uL      nRBC 0 /100 WBCs      Neutrophils Relative 62 %      Immat GRANS % 0 %      Lymphocytes Relative 23 %      Monocytes Relative 12 %      Eosinophils Relative 2 %      Basophils Relative 1 %      Neutrophils Absolute 3.41 Thousands/µL      Immature Grans Absolute 0.02 Thousand/uL      Lymphocytes Absolute 1.31 Thousands/µL      Monocytes Absolute 0.69 Thousand/µL      Eosinophils Absolute 0.12 Thousand/µL      Basophils Absolute 0.04 Thousands/µL                    XR finger second digit-index LEFT   ED Interpretation by Madeline Glez DO (01/30 2235)   Generation of distal phalanx of second finger, avascular necrosis versus osteomyelitis                 Procedures  Procedures         ED Course  ED Course as of 01/30/24 2235 Tue Jan 30, 2024 1829 [place] text to Dr. Pedro Vazquez, reviewed chart and patient's complaints, stable vital signs, no leukocytosis, reviewed images as well, states patient does not need IV antibiotics at this time, can see patient as outpatient for distal fingertip amputation, this plan was discussed with patient at bedside who is in agreement, Dr. Vazquez will call patient first thing tomorrow morning to arrange, in the meantime advised to continue his Bactrim as previously prescribed                               SBIRT 22yo+      Flowsheet Row Most Recent Value   Initial Alcohol Screen: US AUDIT-C     1. How often do you have a drink containing alcohol? 0 Filed at: 01/30/2024 1632   2. How many drinks containing alcohol do you have on a typical day you are drinking?  0 Filed at:  01/30/2024 1632   3a. Male UNDER 65: How often do you have five or more drinks on one occasion? 0 Filed at: 01/30/2024 1632   3b. FEMALE Any Age, or MALE 65+: How often do you have 4 or more drinks on one occassion? 0 Filed at: 01/30/2024 1632   Audit-C Score 0 Filed at: 01/30/2024 1632   SILVIA: How many times in the past year have you...    Used an illegal drug or used a prescription medication for non-medical reasons? Never Filed at: 01/30/2024 1632                      Medical Decision Making   Patient presents with left second finger pain.  Given history, exam and work-up, patient likely has osteomyelitis versus avascular necrosis from Raynaud's and scleroderma.  I have low suspicion for fracture, dislocation, significant ligamentous injury, septic arthritis, gout flare,  or gonococcal arthropathy.  Patient to continue p.o. antibiotics as previously prescribed and follow-up with hand surgeon for possible distal fingertip amputation as outpatient      Problems Addressed:  Finger pain, left: chronic illness or injury with exacerbation, progression, or side effects of treatment    Amount and/or Complexity of Data Reviewed  Labs: ordered. Decision-making details documented in ED Course.  Radiology: ordered and independent interpretation performed. Decision-making details documented in ED Course.    Risk  OTC drugs.  Prescription drug management.             Disposition  Final diagnoses:   Finger pain, left     Time reflects when diagnosis was documented in both MDM as applicable and the Disposition within this note       Time User Action Codes Description Comment    1/30/2024  6:39 PM Madeline Glez Add [M79.645] Finger pain, left           ED Disposition       ED Disposition   Discharge    Condition   Stable    Date/Time   Tue Jan 30, 2024 1839    Comment   Britton Batista discharge to home/self care.                   Follow-up Information       Follow up With Specialties Details Why Contact Info    Pedro Vazquez MD  Orthopedic Surgery, Hand Surgery Call   [address] [place] [address]  [address]  [facility] [id number]  [phone]              Discharge Medication List as of 1/30/2024  6:40 PM        CONTINUE these medications which have NOT CHANGED    Details   albuterol (PROVENTIL HFA,VENTOLIN HFA) 90 mcg/act inhaler Inhale 2 puffs every 4 (four) hours as needed , Historical Med      carvedilol (COREG) 6.25 mg tablet Take 6.25 mg by mouth 2 (two) times a day with meals, Historical Med      !! DULoxetine (CYMBALTA) 30 mg delayed release capsule 30 mg daily, Historical Med      !! DULoxetine (CYMBALTA) 60 mg delayed release capsule 60 mg daily, Historical Med      Entresto 24-26 MG TABS Take 1 tablet by mouth 2 (two) times a day, Starting Tue 1/16/2024, Historical Med      Farxiga 5 MG TABS Take 5 mg by mouth in the morning, Starting Wed 1/10/2024, Historical Med      furosemide (LASIX) 20 mg tablet Take 1 tablet (20 mg total) by mouth daily Do not start before October 11, 2023., Starting Wed 10/11/2023, Normal      gabapentin (NEURONTIN) 300 mg capsule Take 300 mg by mouth 3 (three) times a day, Historical Med      lidocaine (LMX) 4 % cream Apply topically 2 (two) times a day, Historical Med      losartan (COZAAR) 50 mg tablet Take 1 tablet (50 mg total) by mouth daily Do not start before October 11, 2023., Starting Wed 10/11/2023, Normal      meloxicam (MOBIC) 15 mg tablet Take 15 mg by mouth daily, Historical Med      naproxen (NAPROSYN) 500 mg tablet Take 1 tablet (500 mg total) by mouth 2 (two) times a day with meals for 7 days, Starting Wed 1/24/2024, Until Wed 1/31/2024, Normal      NIFEdipine ER (ADALAT CC) 30 MG 24 hr tablet Take 30 mg by mouth daily, Historical Med      ondansetron (Zofran ODT) 4 mg disintegrating tablet Take 1 tablet (4 mg total) by mouth every 6 (six) hours as needed for nausea or vomiting for up to 10 days, Starting Sat 12/30/2023, Until Tue 1/9/2024 at 2359, Normal      oxyCODONE (ROXICODONE) 5 immediate  release tablet Take 1 tablet (5 mg total) by mouth every 8 (eight) hours as needed for severe pain Max Daily Amount: 15 mg, Starting Wed 1/24/2024, Normal      pramipexole (Mirapex) 0.25 mg tablet Take 0.25 mg by mouth 3 (three) times a day, Historical Med      Spiriva Respimat 2.5 MCG/ACT AERS inhaler Inhale 2 puffs daily, Starting Fri 12/22/2023, Historical Med      spironolactone (ALDACTONE) 25 mg tablet Take 1 tablet (25 mg total) by mouth daily, Starting Tue 10/10/2023, Normal      sulfamethoxazole-trimethoprim (BACTRIM DS) 800-160 mg per tablet Take 1 tablet by mouth every 12 (twelve) hours for 7 days, Starting Tue 1/23/2024, Until Tue 1/30/2024, Normal       !! - Potential duplicate medications found. Please discuss with provider.          No discharge procedures on file.    PDMP Review         Value Time User    PDMP Reviewed  Yes 10/23/2021  1:01 PM Sulema Lomax MD            ED Provider  Electronically Signed by             Madeline Glez DO  01/30/24 2029

## 2024-01-31 ENCOUNTER — PREP FOR PROCEDURE (OUTPATIENT)
Dept: OTHER | Facility: HOSPITAL | Age: 59
End: 2024-01-31

## 2024-01-31 ENCOUNTER — HOSPITAL ENCOUNTER (OUTPATIENT)
Facility: HOSPITAL | Age: 59
Setting detail: OUTPATIENT SURGERY
Discharge: HOME | End: 2024-01-31
Attending: SURGERY | Admitting: SURGERY
Payer: COMMERCIAL

## 2024-01-31 VITALS
DIASTOLIC BLOOD PRESSURE: 85 MMHG | TEMPERATURE: 98.4 F | SYSTOLIC BLOOD PRESSURE: 145 MMHG | RESPIRATION RATE: 18 BRPM | OXYGEN SATURATION: 96 % | HEART RATE: 83 BPM

## 2024-01-31 DIAGNOSIS — Z48.89 AFTERCARE FOLLOWING SURGERY: Primary | ICD-10-CM

## 2024-01-31 DIAGNOSIS — M86.9 FINGER OSTEOMYELITIS, LEFT (HCC): Primary | ICD-10-CM

## 2024-01-31 DIAGNOSIS — M86.9 FINGER OSTEOMYELITIS, LEFT (HCC): ICD-10-CM

## 2024-01-31 PROCEDURE — 87147 CULTURE TYPE IMMUNOLOGIC: CPT | Performed by: SURGERY

## 2024-01-31 PROCEDURE — 26951 AMPUTATION OF FINGER/THUMB: CPT | Performed by: PHYSICIAN ASSISTANT

## 2024-01-31 PROCEDURE — 26951 AMPUTATION OF FINGER/THUMB: CPT | Performed by: SURGERY

## 2024-01-31 PROCEDURE — 88305 TISSUE EXAM BY PATHOLOGIST: CPT | Performed by: PATHOLOGY

## 2024-01-31 PROCEDURE — 87186 SC STD MICRODIL/AGAR DIL: CPT | Performed by: SURGERY

## 2024-01-31 PROCEDURE — NC001 PR NO CHARGE: Performed by: SURGERY

## 2024-01-31 PROCEDURE — 87077 CULTURE AEROBIC IDENTIFY: CPT | Performed by: SURGERY

## 2024-01-31 PROCEDURE — 87075 CULTR BACTERIA EXCEPT BLOOD: CPT | Performed by: SURGERY

## 2024-01-31 PROCEDURE — 88311 DECALCIFY TISSUE: CPT | Performed by: PATHOLOGY

## 2024-01-31 PROCEDURE — 87205 SMEAR GRAM STAIN: CPT | Performed by: SURGERY

## 2024-01-31 PROCEDURE — 87070 CULTURE OTHR SPECIMN AEROBIC: CPT | Performed by: SURGERY

## 2024-01-31 RX ORDER — BUPIVACAINE HYDROCHLORIDE 2.5 MG/ML
INJECTION, SOLUTION EPIDURAL; INFILTRATION; INTRACAUDAL AS NEEDED
Status: DISCONTINUED | OUTPATIENT
Start: 2024-01-31 | End: 2024-01-31 | Stop reason: HOSPADM

## 2024-01-31 RX ORDER — OXYCODONE HYDROCHLORIDE 5 MG/1
5 TABLET ORAL EVERY 6 HOURS PRN
Status: DISCONTINUED | OUTPATIENT
Start: 2024-01-31 | End: 2024-01-31 | Stop reason: HOSPADM

## 2024-01-31 RX ORDER — ACETAMINOPHEN 325 MG/1
650 TABLET ORAL EVERY 6 HOURS PRN
Status: DISCONTINUED | OUTPATIENT
Start: 2024-01-31 | End: 2024-01-31 | Stop reason: HOSPADM

## 2024-01-31 RX ORDER — MAGNESIUM HYDROXIDE 1200 MG/15ML
LIQUID ORAL AS NEEDED
Status: DISCONTINUED | OUTPATIENT
Start: 2024-01-31 | End: 2024-01-31 | Stop reason: HOSPADM

## 2024-01-31 RX ORDER — SULFAMETHOXAZOLE AND TRIMETHOPRIM 800; 160 MG/1; MG/1
1 TABLET ORAL EVERY 12 HOURS SCHEDULED
Qty: 14 TABLET | Refills: 0 | Status: SHIPPED | OUTPATIENT
Start: 2024-01-31 | End: 2024-02-07

## 2024-01-31 NOTE — OP NOTE
OPERATIVE REPORT  PATIENT NAME: Britton Batista    :  1965  MRN: [id number]  Pt Location:  OR ROOM 10    SURGERY DATE: 2024    Surgeons and Role:     * Pedro Vazquez MD - Primary     * Terrence Andrews PA-C - Assisting    Preop Diagnosis:  Finger osteomyelitis, left (HCC) [M86.9]    Post-Op Diagnosis Codes:     * Finger osteomyelitis, left (HCC) [M86.9]    Procedure(s):  Left - AMPUTATION FINGER. LEFT INDEX FINGER CPT: 52825    Specimen(s):  ID Type Source Tests Collected by Time Destination   2 : left index finger Tissue Finger, Left ANAEROBIC CULTURE AND GRAM STAIN, CULTURE, TISSUE AND GRAM STAIN, TISSUE EXAM Pedro Vazquez MD 2024 1209        Estimated Blood Loss:   Minimal    Drains:  * No LDAs found *    Anesthesia Type:   Local    Operative Indications:  Finger osteomyelitis, left (HCC) [M86.9]      Operative Findings:  Healthy tissue at amputation site; Small amount of purulence expressed from finger tip during preparation for sutery    Complications:   None    Procedure and Technique:  The left index finger was cleansed with alcohol and a digital block was performed with Marcaine 0.25% plain and lidocaine 1% plain in a 50-50 mixture.  The left upper extremity was then prepped and draped in a sterile fashion.  A digital tourniquet was fashioned from a size 6 glove and placed over the index finger down towards its base.  When this was performed there was noted to be a small amount of purulent fluid expressed from the very tip of the finger.  This fluid was cultured and sent for evaluation.  An incision was then fashioned including excision of the nailbed and germinal matrix along with some dorsal soft tissue the volar soft tissue was elevated off of the distal phalanx and was disarticulated at the DIP joint sharply with a scalpel.  Neurovascular bundles were divided in order to keep them below the level of the amputation site.  The head of the middle phalanx appeared healthy in nature and there  was no additional purulence noted at the proximal portion of the amputation site.  The wound was then irrigated copiously with normal saline.  The skin was approximated with 4-0 Monocryl in a simple interrupted fashion.  Xeroform is applied followed by bulky 4 x 4 gauze.  The digital tourniquet was removed during application of the dressing.  A Daniela overwrap was applied followed by an Ace bandage overwrap.  The patient was transferred to phase 2 recovery in stable condition.  Plan will be to continue with the Bactrim at the present time as this is covering the main bacteria that were noted on his right side, and will be adjusted as necessary pending the new cultures.   I was present for the entire procedure.    Patient Disposition:  PACU     My Assistant was necessary throughout the procedure(s) for retraction and positioning.    I understand that section 1842 (b)(7)(D) of the Social Security Act generally prohibits Medicare physician fee schedule payment for the services of assistants-at-surgery in teaching hospitals when qualified residents are available to furnish such services. I certify that the services for which payment is claimed were medically necessary, and that no qualified resident was available to perform the services. I further understand that these services are subject to post-payment review by the Medicare carrier.      SIGNATURE: Pedro Vazquez MD  DATE: January 31, 2024  TIME: 12:21 PM

## 2024-01-31 NOTE — DISCHARGE INSTR - AVS FIRST PAGE
Elevate hand above heart as much as possible to help pain and swelling.  May use hand for simple tasks, but no heavy lifting or tight squeezing x 4 weeks.  Keep operative bandage clean and dry. Leave dressing on until your post-op appointment.    Perform simple finger motion exercises: opening & closing fingers 10 x every hr.  An antibiotic prescription has been sent to your pharmacy (Bactrim), please start today.  You already have an active pain medication prescription (Percocet), which can be taken for moderate to severe pain.  Follow-up in the office 2/6/2024 per your scheduled appointment.

## 2024-01-31 NOTE — H&P
"Hand Surgery History and Physical    01/31/24  11:38 AM  [id number]  Britton Batista      HPI:  Pt with left index finger progressing pain with intermittent drainage.  He had plain films which indicate erosions of his left index finger distal phalanx.  He is presently on bactrim.  He is s/p right middle finger partial amputation on 1/23/24.  The right middle finger is doing well.      Past Medical History:   Diagnosis Date    Anxiety     Cardiomyopathy (HCC)     Depression     Emphysema of lung (HCC)     GERD (gastroesophageal reflux disease)     Gout     Hypertension     Neuropathy     Raynaud's disease     Right inguinal hernia     Scleroderma (HCC)        Past Surgical History:   Procedure Laterality Date    AMPUTATION Right     pt had tip of \"pointer finger\" d/t scleraderma    HERNIA REPAIR Left     times 2    IN AMP F/TH 1/2 JT/PHALANX W/NEURECT W/DIR CLSR Right 1/23/2024    Procedure: AMPUTATION FINGER, RIGHT MIDDLE;  Surgeon: Pedro Vazquez MD;  Location: [facility];  Service: Orthopedics    IN RPR 1ST INGUN HRNA AGE 5 YRS/> REDUCIBLE Right 3/3/2023    Procedure: OPEN INGUINAL HERNIA REPAIR WITH MESH;  Surgeon: Temo Jackson DO;  Location:  MAIN OR;  Service: General       History reviewed. No pertinent family history.    Review of Systems - General ROS: negative  Ophthalmic ROS: negative  ENT ROS: negative  Respiratory ROS: negative  Cardiovascular ROS: negative  Neurological ROS: negative  Dermatological ROS: negative    Vitals:    01/31/24 1040   BP: 146/85   Pulse: 61   Resp: 16   Temp: 97.5 °F (36.4 °C)   SpO2: 98%       Physical Examination:  General:  NAD  HEENT:  EOMI, perrla, normal ear morphology  Chest:  Unlabored breathing  Heart:  Regular pulse  Abd:  No distension  Extrem: LUE:  index finger with wound at tip, mild erythema, no exudate, tender on palpation, some scaling skin  RUE:  amputation site of middle finger healing well, no exudate, no erythema, tissue viable  Skin:  Dry, pink  Neuro:  " AAOx3     @DX@  No follow-ups on file.      A/P:  Pt is a 59 yo male with left index finger osteomyelitis.    -Recommend partial amputation.   -Consent obtained.   -Local.  -Discussed potential for revision amputation as needed due to underlying vascular insufficiency.

## 2024-01-31 NOTE — INTERVAL H&P NOTE
H&P reviewed. After examining the patient I find no changes in the patients condition since the H&P had been written.    Vitals:    01/31/24 1040   BP: 146/85   Pulse: 61   Resp: 16   Temp: 97.5 °F (36.4 °C)   SpO2: 98%

## 2024-02-02 LAB — BACTERIA SPEC ANAEROBE CULT: NORMAL

## 2024-02-04 LAB
BACTERIA BLD CULT: NORMAL
BACTERIA BLD CULT: NORMAL
BACTERIA TISS AEROBE CULT: ABNORMAL
GRAM STN SPEC: ABNORMAL

## 2024-02-05 DIAGNOSIS — M79.645 FINGER PAIN, LEFT: ICD-10-CM

## 2024-02-05 PROCEDURE — 88305 TISSUE EXAM BY PATHOLOGIST: CPT | Performed by: PATHOLOGY

## 2024-02-05 PROCEDURE — 88311 DECALCIFY TISSUE: CPT | Performed by: PATHOLOGY

## 2024-02-05 RX ORDER — OXYCODONE HYDROCHLORIDE AND ACETAMINOPHEN 5; 325 MG/1; MG/1
1 TABLET ORAL EVERY 8 HOURS PRN
Qty: 12 TABLET | Refills: 0 | Status: SHIPPED | OUTPATIENT
Start: 2024-02-05

## 2024-02-08 ENCOUNTER — OFFICE VISIT (OUTPATIENT)
Dept: OBGYN CLINIC | Facility: CLINIC | Age: 59
End: 2024-02-08

## 2024-02-08 VITALS — HEIGHT: 69 IN | BODY MASS INDEX: 23.7 KG/M2 | WEIGHT: 160 LBS

## 2024-02-08 DIAGNOSIS — M86.9 OSTEOMYELITIS OF FINGER OF RIGHT HAND (HCC): Primary | ICD-10-CM

## 2024-02-08 DIAGNOSIS — M86.9 FINGER OSTEOMYELITIS, LEFT (HCC): ICD-10-CM

## 2024-02-08 PROCEDURE — 99024 POSTOP FOLLOW-UP VISIT: CPT | Performed by: SURGERY

## 2024-02-08 NOTE — PROGRESS NOTES
[facility] Orthopedic Surgery  Patient Name:  Britton Batista  Surgery:  Left index finger partial amputation  Surgery Date:  1/31/2024      Subjective: Patient reports overall he is doing well.  He has hypersensitivity at the fingertip but no issues with the incision.  He has remained clean and dry.  He just remove the dressing today.  Finished Bactrim yesterday.      Objective:  Left index finger  Partially amputated fingertip incision area is healing well with scab.  No erythema or drainage.  There is mild swelling, no fusiform swelling  Just short of full composite fist, good passive range of motion MCP PIP joint.  Sensation intact to light touch  Good cap refill to the fingertip  Palpable radial pulse        Assessment:  S/p Left index finger partial amputation 1/31/2024 by Dr. Vazquez.  Culture + multiple organisms - patient finished course of Bactrim.      Plan:     Activities as tolerated, increase as able  No specific restrictions from our standpoint  No further oral antibiotics are necessary  Hypersensitivity will be present but should improve with time  Sutures and scabbed area will eventually fall off over time  May follow-up on an as-needed basis

## 2024-02-22 PROCEDURE — 99285 EMERGENCY DEPT VISIT HI MDM: CPT

## 2024-02-23 ENCOUNTER — APPOINTMENT (EMERGENCY)
Dept: RADIOLOGY | Facility: HOSPITAL | Age: 59
DRG: 291 | End: 2024-02-23
Payer: COMMERCIAL

## 2024-02-23 ENCOUNTER — HOSPITAL ENCOUNTER (INPATIENT)
Facility: HOSPITAL | Age: 59
LOS: 1 days | Discharge: HOME | DRG: 291 | End: 2024-02-24
Attending: EMERGENCY MEDICINE | Admitting: INTERNAL MEDICINE
Payer: COMMERCIAL

## 2024-02-23 DIAGNOSIS — I10 HTN (HYPERTENSION): ICD-10-CM

## 2024-02-23 DIAGNOSIS — R79.89 ELEVATED TROPONIN: ICD-10-CM

## 2024-02-23 DIAGNOSIS — I42.9 CARDIOMYOPATHY (HCC): ICD-10-CM

## 2024-02-23 DIAGNOSIS — I50.9 CHF (CONGESTIVE HEART FAILURE) (HCC): Primary | ICD-10-CM

## 2024-02-23 PROBLEM — M34.0: Status: ACTIVE | Noted: 2021-10-25

## 2024-02-23 LAB
2HR DELTA HS TROPONIN: -12 NG/L
2HR DELTA HS TROPONIN: -4 NG/L
4HR DELTA HS TROPONIN: -1 NG/L
4HR DELTA HS TROPONIN: -31 NG/L
ALBUMIN SERPL BCP-MCNC: 3.7 G/DL (ref 3.5–5)
ALBUMIN SERPL BCP-MCNC: 3.7 G/DL (ref 3.5–5)
ALP SERPL-CCNC: 84 U/L (ref 34–104)
ALP SERPL-CCNC: 87 U/L (ref 34–104)
ALT SERPL W P-5'-P-CCNC: 14 U/L (ref 7–52)
ALT SERPL W P-5'-P-CCNC: 15 U/L (ref 7–52)
ANION GAP SERPL CALCULATED.3IONS-SCNC: 6 MMOL/L
ANION GAP SERPL CALCULATED.3IONS-SCNC: 9 MMOL/L
APTT PPP: 33 SECONDS (ref 23–37)
AST SERPL W P-5'-P-CCNC: 22 U/L (ref 13–39)
AST SERPL W P-5'-P-CCNC: 25 U/L (ref 13–39)
ATRIAL RATE: 94 BPM
BASOPHILS # BLD AUTO: 0.05 THOUSANDS/ÂΜL (ref 0–0.1)
BASOPHILS NFR BLD AUTO: 1 % (ref 0–1)
BILIRUB SERPL-MCNC: 0.73 MG/DL (ref 0.2–1)
BILIRUB SERPL-MCNC: 0.85 MG/DL (ref 0.2–1)
BNP SERPL-MCNC: 3992 PG/ML (ref 0–100)
BUN SERPL-MCNC: 19 MG/DL (ref 5–25)
BUN SERPL-MCNC: 22 MG/DL (ref 5–25)
CALCIUM SERPL-MCNC: 9.2 MG/DL (ref 8.4–10.2)
CALCIUM SERPL-MCNC: 9.5 MG/DL (ref 8.4–10.2)
CARDIAC TROPONIN I PNL SERPL HS: 105 NG/L
CARDIAC TROPONIN I PNL SERPL HS: 116 NG/L
CARDIAC TROPONIN I PNL SERPL HS: 119 NG/L
CARDIAC TROPONIN I PNL SERPL HS: 120 NG/L
CARDIAC TROPONIN I PNL SERPL HS: 124 NG/L
CARDIAC TROPONIN I PNL SERPL HS: 136 NG/L
CHLORIDE SERPL-SCNC: 102 MMOL/L (ref 96–108)
CHLORIDE SERPL-SCNC: 94 MMOL/L (ref 96–108)
CO2 SERPL-SCNC: 24 MMOL/L (ref 21–32)
CO2 SERPL-SCNC: 27 MMOL/L (ref 21–32)
CREAT SERPL-MCNC: 1.1 MG/DL (ref 0.6–1.3)
CREAT SERPL-MCNC: 1.16 MG/DL (ref 0.6–1.3)
EOSINOPHIL # BLD AUTO: 0.16 THOUSAND/ÂΜL (ref 0–0.61)
EOSINOPHIL NFR BLD AUTO: 2 % (ref 0–6)
ERYTHROCYTE [DISTWIDTH] IN BLOOD BY AUTOMATED COUNT: 14.8 % (ref 11.6–15.1)
ERYTHROCYTE [DISTWIDTH] IN BLOOD BY AUTOMATED COUNT: 15.1 % (ref 11.6–15.1)
FLUAV RNA RESP QL NAA+PROBE: NEGATIVE
FLUBV RNA RESP QL NAA+PROBE: NEGATIVE
GFR SERPL CREATININE-BSD FRML MDRD: 69 ML/MIN/1.73SQ M
GFR SERPL CREATININE-BSD FRML MDRD: 73 ML/MIN/1.73SQ M
GLUCOSE SERPL-MCNC: 100 MG/DL (ref 65–140)
GLUCOSE SERPL-MCNC: 105 MG/DL (ref 65–140)
HCT VFR BLD AUTO: 41.1 % (ref 36.5–49.3)
HCT VFR BLD AUTO: 43.2 % (ref 36.5–49.3)
HGB BLD-MCNC: 13.5 G/DL (ref 12–17)
HGB BLD-MCNC: 14.5 G/DL (ref 12–17)
IMM GRANULOCYTES # BLD AUTO: 0.03 THOUSAND/UL (ref 0–0.2)
IMM GRANULOCYTES NFR BLD AUTO: 0 % (ref 0–2)
INR PPP: 1.01 (ref 0.84–1.19)
LYMPHOCYTES # BLD AUTO: 2.08 THOUSANDS/ÂΜL (ref 0.6–4.47)
LYMPHOCYTES NFR BLD AUTO: 24 % (ref 14–44)
MAGNESIUM SERPL-MCNC: 1.7 MG/DL (ref 1.9–2.7)
MCH RBC QN AUTO: 30.1 PG (ref 26.8–34.3)
MCH RBC QN AUTO: 30.3 PG (ref 26.8–34.3)
MCHC RBC AUTO-ENTMCNC: 32.8 G/DL (ref 31.4–37.4)
MCHC RBC AUTO-ENTMCNC: 33.6 G/DL (ref 31.4–37.4)
MCV RBC AUTO: 90 FL (ref 82–98)
MCV RBC AUTO: 92 FL (ref 82–98)
MONOCYTES # BLD AUTO: 1.05 THOUSAND/ÂΜL (ref 0.17–1.22)
MONOCYTES NFR BLD AUTO: 12 % (ref 4–12)
NEUTROPHILS # BLD AUTO: 5.4 THOUSANDS/ÂΜL (ref 1.85–7.62)
NEUTS SEG NFR BLD AUTO: 61 % (ref 43–75)
NRBC BLD AUTO-RTO: 0 /100 WBCS
P AXIS: 70 DEGREES
PLATELET # BLD AUTO: 290 THOUSANDS/UL (ref 149–390)
PLATELET # BLD AUTO: 296 THOUSANDS/UL (ref 149–390)
PMV BLD AUTO: 9.1 FL (ref 8.9–12.7)
PMV BLD AUTO: 9.3 FL (ref 8.9–12.7)
POTASSIUM SERPL-SCNC: 4.1 MMOL/L (ref 3.5–5.3)
POTASSIUM SERPL-SCNC: 4.6 MMOL/L (ref 3.5–5.3)
PR INTERVAL: 190 MS
PROT SERPL-MCNC: 7.2 G/DL (ref 6.4–8.4)
PROT SERPL-MCNC: 7.3 G/DL (ref 6.4–8.4)
PROTHROMBIN TIME: 13.6 SECONDS (ref 11.6–14.5)
QRS AXIS: 97 DEGREES
QRSD INTERVAL: 190 MS
QT INTERVAL: 446 MS
QTC INTERVAL: 557 MS
RBC # BLD AUTO: 4.46 MILLION/UL (ref 3.88–5.62)
RBC # BLD AUTO: 4.81 MILLION/UL (ref 3.88–5.62)
RSV RNA RESP QL NAA+PROBE: NEGATIVE
SARS-COV-2 RNA RESP QL NAA+PROBE: NEGATIVE
SODIUM SERPL-SCNC: 130 MMOL/L (ref 135–147)
SODIUM SERPL-SCNC: 132 MMOL/L (ref 135–147)
T WAVE AXIS: -44 DEGREES
VENTRICULAR RATE: 94 BPM
WBC # BLD AUTO: 7.12 THOUSAND/UL (ref 4.31–10.16)
WBC # BLD AUTO: 8.77 THOUSAND/UL (ref 4.31–10.16)

## 2024-02-23 PROCEDURE — 80053 COMPREHEN METABOLIC PANEL: CPT | Performed by: INTERNAL MEDICINE

## 2024-02-23 PROCEDURE — 80053 COMPREHEN METABOLIC PANEL: CPT | Performed by: EMERGENCY MEDICINE

## 2024-02-23 PROCEDURE — 94640 AIRWAY INHALATION TREATMENT: CPT

## 2024-02-23 PROCEDURE — 96374 THER/PROPH/DIAG INJ IV PUSH: CPT

## 2024-02-23 PROCEDURE — 99223 1ST HOSP IP/OBS HIGH 75: CPT | Performed by: INTERNAL MEDICINE

## 2024-02-23 PROCEDURE — 99285 EMERGENCY DEPT VISIT HI MDM: CPT | Performed by: EMERGENCY MEDICINE

## 2024-02-23 PROCEDURE — 71045 X-RAY EXAM CHEST 1 VIEW: CPT

## 2024-02-23 PROCEDURE — 36415 COLL VENOUS BLD VENIPUNCTURE: CPT | Performed by: EMERGENCY MEDICINE

## 2024-02-23 PROCEDURE — 85027 COMPLETE CBC AUTOMATED: CPT | Performed by: INTERNAL MEDICINE

## 2024-02-23 PROCEDURE — 85730 THROMBOPLASTIN TIME PARTIAL: CPT | Performed by: EMERGENCY MEDICINE

## 2024-02-23 PROCEDURE — 83880 ASSAY OF NATRIURETIC PEPTIDE: CPT | Performed by: EMERGENCY MEDICINE

## 2024-02-23 PROCEDURE — 83735 ASSAY OF MAGNESIUM: CPT | Performed by: NURSE PRACTITIONER

## 2024-02-23 PROCEDURE — 85025 COMPLETE CBC W/AUTO DIFF WBC: CPT | Performed by: EMERGENCY MEDICINE

## 2024-02-23 PROCEDURE — 85610 PROTHROMBIN TIME: CPT | Performed by: EMERGENCY MEDICINE

## 2024-02-23 PROCEDURE — 93005 ELECTROCARDIOGRAM TRACING: CPT

## 2024-02-23 PROCEDURE — 84484 ASSAY OF TROPONIN QUANT: CPT | Performed by: INTERNAL MEDICINE

## 2024-02-23 PROCEDURE — 0241U HB NFCT DS VIR RESP RNA 4 TRGT: CPT | Performed by: EMERGENCY MEDICINE

## 2024-02-23 PROCEDURE — 84484 ASSAY OF TROPONIN QUANT: CPT | Performed by: EMERGENCY MEDICINE

## 2024-02-23 RX ORDER — LOSARTAN POTASSIUM 50 MG/1
50 TABLET ORAL DAILY
Status: DISCONTINUED | OUTPATIENT
Start: 2024-02-23 | End: 2024-02-24 | Stop reason: HOSPADM

## 2024-02-23 RX ORDER — ACETAMINOPHEN 325 MG/1
650 TABLET ORAL EVERY 6 HOURS PRN
Status: DISCONTINUED | OUTPATIENT
Start: 2024-02-23 | End: 2024-02-24 | Stop reason: HOSPADM

## 2024-02-23 RX ORDER — FUROSEMIDE 10 MG/ML
40 INJECTION INTRAMUSCULAR; INTRAVENOUS ONCE
Status: COMPLETED | OUTPATIENT
Start: 2024-02-23 | End: 2024-02-23

## 2024-02-23 RX ORDER — OXYCODONE HYDROCHLORIDE AND ACETAMINOPHEN 5; 325 MG/1; MG/1
1 TABLET ORAL ONCE
Status: COMPLETED | OUTPATIENT
Start: 2024-02-23 | End: 2024-02-23

## 2024-02-23 RX ORDER — FUROSEMIDE 10 MG/ML
40 INJECTION INTRAMUSCULAR; INTRAVENOUS
Status: DISCONTINUED | OUTPATIENT
Start: 2024-02-23 | End: 2024-02-23

## 2024-02-23 RX ORDER — MAGNESIUM SULFATE HEPTAHYDRATE 40 MG/ML
2 INJECTION, SOLUTION INTRAVENOUS ONCE
Status: COMPLETED | OUTPATIENT
Start: 2024-02-23 | End: 2024-02-23

## 2024-02-23 RX ORDER — IPRATROPIUM BROMIDE AND ALBUTEROL SULFATE 2.5; .5 MG/3ML; MG/3ML
3 SOLUTION RESPIRATORY (INHALATION) ONCE
Status: COMPLETED | OUTPATIENT
Start: 2024-02-23 | End: 2024-02-23

## 2024-02-23 RX ORDER — SPIRONOLACTONE 25 MG/1
12.5 TABLET ORAL DAILY
Status: DISCONTINUED | OUTPATIENT
Start: 2024-02-23 | End: 2024-02-24 | Stop reason: HOSPADM

## 2024-02-23 RX ORDER — ENOXAPARIN SODIUM 100 MG/ML
40 INJECTION SUBCUTANEOUS DAILY
Status: DISCONTINUED | OUTPATIENT
Start: 2024-02-23 | End: 2024-02-24 | Stop reason: HOSPADM

## 2024-02-23 RX ORDER — IPRATROPIUM BROMIDE AND ALBUTEROL SULFATE 2.5; .5 MG/3ML; MG/3ML
3 SOLUTION RESPIRATORY (INHALATION) EVERY 6 HOURS PRN
Status: DISCONTINUED | OUTPATIENT
Start: 2024-02-23 | End: 2024-02-24 | Stop reason: HOSPADM

## 2024-02-23 RX ORDER — CARVEDILOL 6.25 MG/1
6.25 TABLET ORAL 2 TIMES DAILY WITH MEALS
Status: DISCONTINUED | OUTPATIENT
Start: 2024-02-23 | End: 2024-02-24 | Stop reason: HOSPADM

## 2024-02-23 RX ORDER — GABAPENTIN 400 MG/1
400 CAPSULE ORAL 3 TIMES DAILY
Status: DISCONTINUED | OUTPATIENT
Start: 2024-02-23 | End: 2024-02-24 | Stop reason: HOSPADM

## 2024-02-23 RX ORDER — OXYCODONE HYDROCHLORIDE 5 MG/1
5 TABLET ORAL EVERY 6 HOURS PRN
Status: DISCONTINUED | OUTPATIENT
Start: 2024-02-23 | End: 2024-02-24 | Stop reason: HOSPADM

## 2024-02-23 RX ORDER — ONDANSETRON 2 MG/ML
4 INJECTION INTRAMUSCULAR; INTRAVENOUS EVERY 4 HOURS PRN
Status: DISCONTINUED | OUTPATIENT
Start: 2024-02-23 | End: 2024-02-24 | Stop reason: HOSPADM

## 2024-02-23 RX ORDER — NICOTINE 21 MG/24HR
21 PATCH, TRANSDERMAL 24 HOURS TRANSDERMAL DAILY
Status: DISCONTINUED | OUTPATIENT
Start: 2024-02-23 | End: 2024-02-24 | Stop reason: HOSPADM

## 2024-02-23 RX ORDER — NIFEDIPINE 30 MG/1
30 TABLET, EXTENDED RELEASE ORAL ONCE
Status: COMPLETED | OUTPATIENT
Start: 2024-02-23 | End: 2024-02-23

## 2024-02-23 RX ADMIN — IPRATROPIUM BROMIDE AND ALBUTEROL SULFATE 3 ML: 2.5; .5 SOLUTION RESPIRATORY (INHALATION) at 00:51

## 2024-02-23 RX ADMIN — MAGNESIUM SULFATE HEPTAHYDRATE 2 G: 40 INJECTION, SOLUTION INTRAVENOUS at 06:25

## 2024-02-23 RX ADMIN — NICOTINE 21 MG: 21 PATCH, EXTENDED RELEASE TRANSDERMAL at 05:52

## 2024-02-23 RX ADMIN — ONDANSETRON 4 MG: 2 INJECTION INTRAMUSCULAR; INTRAVENOUS at 13:27

## 2024-02-23 RX ADMIN — GABAPENTIN 400 MG: 400 CAPSULE ORAL at 15:53

## 2024-02-23 RX ADMIN — FUROSEMIDE 40 MG: 10 INJECTION, SOLUTION INTRAMUSCULAR; INTRAVENOUS at 15:53

## 2024-02-23 RX ADMIN — ENOXAPARIN SODIUM 40 MG: 40 INJECTION SUBCUTANEOUS at 08:32

## 2024-02-23 RX ADMIN — SPIRONOLACTONE 12.5 MG: 25 TABLET ORAL at 08:30

## 2024-02-23 RX ADMIN — FUROSEMIDE 40 MG: 10 INJECTION, SOLUTION INTRAMUSCULAR; INTRAVENOUS at 01:09

## 2024-02-23 RX ADMIN — IPRATROPIUM BROMIDE AND ALBUTEROL SULFATE 3 ML: 2.5; .5 SOLUTION RESPIRATORY (INHALATION) at 13:26

## 2024-02-23 RX ADMIN — CARVEDILOL 6.25 MG: 6.25 TABLET, FILM COATED ORAL at 08:29

## 2024-02-23 RX ADMIN — GABAPENTIN 400 MG: 400 CAPSULE ORAL at 08:29

## 2024-02-23 RX ADMIN — LOSARTAN POTASSIUM 50 MG: 50 TABLET, FILM COATED ORAL at 08:32

## 2024-02-23 RX ADMIN — FUROSEMIDE 40 MG: 10 INJECTION, SOLUTION INTRAMUSCULAR; INTRAVENOUS at 08:34

## 2024-02-23 RX ADMIN — GABAPENTIN 400 MG: 400 CAPSULE ORAL at 20:55

## 2024-02-23 RX ADMIN — OXYCODONE HYDROCHLORIDE AND ACETAMINOPHEN 1 TABLET: 5; 325 TABLET ORAL at 02:34

## 2024-02-23 RX ADMIN — NIFEDIPINE 30 MG: 30 TABLET, FILM COATED, EXTENDED RELEASE ORAL at 02:26

## 2024-02-23 NOTE — PLAN OF CARE
Problem: Nutrition/Hydration-ADULT  Goal: Nutrient/Hydration intake appropriate for improving, restoring or maintaining nutritional needs  Description: Monitor and assess patient's nutrition/hydration status for malnutrition. Collaborate with interdisciplinary team and initiate plan and interventions as ordered.  Monitor patient's weight and dietary intake as ordered or per policy. Utilize nutrition screening tool and intervene as necessary. Determine patient's food preferences and provide high-protein, high-caloric foods as appropriate.     INTERVENTIONS:  - Monitor oral intake, urinary output, labs, and treatment plans  - Assess nutrition and hydration status and recommend course of action  - Evaluate amount of meals eaten  - Assist patient with eating if necessary   - Allow adequate time for meals  - Recommend/ encourage appropriate diets, oral nutritional supplements, and vitamin/mineral supplements  - Order, calculate, and assess calorie counts as needed  - Recommend, monitor, and adjust tube feedings and TPN/PPN based on assessed needs  - Assess need for intravenous fluids  - Provide specific nutrition/hydration education as appropriate  - Include patient/family/caregiver in decisions related to nutrition  Outcome: Progressing     Problem: PAIN - ADULT  Goal: Verbalizes/displays adequate comfort level or baseline comfort level  Description: Interventions:  - Encourage patient to monitor pain and request assistance  - Assess pain using appropriate pain scale  - Administer analgesics based on type and severity of pain and evaluate response  - Implement non-pharmacological measures as appropriate and evaluate response  - Consider cultural and social influences on pain and pain management  - Notify physician/advanced practitioner if interventions unsuccessful or patient reports new pain  Outcome: Progressing     Problem: DISCHARGE PLANNING  Goal: Discharge to home or other facility with appropriate  resources  Description: INTERVENTIONS:  - Identify barriers to discharge w/patient and caregiver  - Arrange for needed discharge resources and transportation as appropriate  - Identify discharge learning needs (meds, wound care, etc.)  - Arrange for interpretive services to assist at discharge as needed  - Refer to Case Management Department for coordinating discharge planning if the patient needs post-hospital services based on physician/advanced practitioner order or complex needs related to functional status, cognitive ability, or social support system  Outcome: Progressing     Problem: Knowledge Deficit  Goal: Patient/family/caregiver demonstrates understanding of disease process, treatment plan, medications, and discharge instructions  Description: Complete learning assessment and assess knowledge base.  Interventions:  - Provide teaching at level of understanding  - Provide teaching via preferred learning methods  Outcome: Progressing     Problem: CARDIOVASCULAR - ADULT  Goal: Maintains optimal cardiac output and hemodynamic stability  Description: INTERVENTIONS:  - Monitor I/O, vital signs and rhythm  - Monitor for S/S and trends of decreased cardiac output  - Administer and titrate ordered vasoactive medications to optimize hemodynamic stability  - Assess quality of pulses, skin color and temperature  - Assess for signs of decreased coronary artery perfusion  - Instruct patient to report change in severity of symptoms  Outcome: Progressing  Goal: Absence of cardiac dysrhythmias or at baseline rhythm  Description: INTERVENTIONS:  - Continuous cardiac monitoring, vital signs, obtain 12 lead EKG if ordered  - Administer antiarrhythmic and heart rate control medications as ordered  - Monitor electrolytes and administer replacement therapy as ordered  Outcome: Progressing     Problem: RESPIRATORY - ADULT  Goal: Achieves optimal ventilation and oxygenation  Description: INTERVENTIONS:  - Assess for changes in  respiratory status  - Assess for changes in mentation and behavior  - Position to facilitate oxygenation and minimize respiratory effort  - Oxygen administered by appropriate delivery if ordered  - Initiate smoking cessation education as indicated  - Encourage broncho-pulmonary hygiene including cough, deep breathe, Incentive Spirometry  - Assess the need for suctioning and aspirate as needed  - Assess and instruct to report SOB or any respiratory difficulty  - Respiratory Therapy support as indicated  Outcome: Progressing     Problem: METABOLIC, FLUID AND ELECTROLYTES - ADULT  Goal: Electrolytes maintained within normal limits  Description: INTERVENTIONS:  - Monitor labs and assess patient for signs and symptoms of electrolyte imbalances  - Administer electrolyte replacement as ordered  - Monitor response to electrolyte replacements, including repeat lab results as appropriate  - Instruct patient on fluid and nutrition as appropriate  Outcome: Progressing  Goal: Fluid balance maintained  Description: INTERVENTIONS:  - Monitor labs   - Monitor I/O and WT  - Instruct patient on fluid and nutrition as appropriate  - Assess for signs & symptoms of volume excess or deficit  Outcome: Progressing

## 2024-02-23 NOTE — CONSULTS
Consultation - Cardiology   Britton Batista 58 y.o. male MRN: [id number]  Unit/Bed#: ED 03 Encounter: [id number]    Assessment/Plan     Assessment:  Acute on chronic HFrEF- weight gain, edema, sob, elevated bnp  - on lasix 40 IV BID   Non ischemic cardiomyopathy  COPD  Scleroderma  Drug abuse   Smoking  Htn    Plan:  Continue diuresis  Monitor I and O  Daily standing weights  Monitor renal function and electrolytes  Will continue to follow     History of Present Illness   Physician Requesting Consult: Ravi Carroll DO  Reason for Consult / Principal Problem: CHFrEF  HPI: Britton Batista is a 58 y.o. year old male with history of non ischemic cardiomyopathy, CAD ruled out by [facility] 2020, COPD, scleroderma, drug abuse, CHFrEF is here for sob since yesterday. His bnp was elevated. He reported gaining 5 lbs in the last two days. He reports he wasn't peeing as much as he normally does. He reports compliance with his medications. He was started on lasix 40 mg IV BID. He reports his breathing is still not back to baseline. He has no significant chest pain.     Inpatient consult to Cardiology  Consult performed by: Veronika Ca PA-C  Consult ordered by: LUIS ANGEL Capellan        Review of Systems   Constitutional:  Positive for unexpected weight change. Negative for chills and fatigue.   Respiratory:  Positive for chest tightness and shortness of breath.    Cardiovascular:  Positive for leg swelling. Negative for chest pain and palpitations.   Gastrointestinal:  Negative for nausea.   Neurological:  Negative for dizziness and weakness.   Psychiatric/Behavioral:  Negative for agitation.        Historical Information   Past Medical History:   Diagnosis Date    Anxiety     Cardiomyopathy (HCC)     Depression     Emphysema of lung (HCC)     GERD (gastroesophageal reflux disease)     Gout     Hypertension     Neuropathy     Raynaud's disease     Right inguinal hernia     Scleroderma (HCC)      Past Surgical History:  "  Procedure Laterality Date    AMPUTATION Right     pt had tip of \"pointer finger\" d/t scleraderma    FINGER AMPUTATION Left 1/31/2024    Procedure: AMPUTATION FINGER, LEFT INDEX FINGER CPT: 81462;  Surgeon: Pedro Vazquez MD;  Location:  MAIN OR;  Service: Orthopedics    HERNIA REPAIR Left     times 2    IL AMP F/TH 1/2 JT/PHALANX W/NEURECT W/DIR CLSR Right 1/23/2024    Procedure: AMPUTATION FINGER, RIGHT MIDDLE;  Surgeon: Pedro Vazquez MD;  Location: [facility];  Service: Orthopedics    IL RPR 1ST INGUN HRNA AGE 5 YRS/> REDUCIBLE Right 3/3/2023    Procedure: OPEN INGUINAL HERNIA REPAIR WITH MESH;  Surgeon: Temo Jackson DO;  Location:  MAIN OR;  Service: General     Social History     Substance and Sexual Activity   Alcohol Use Not Currently    Comment: occasional     Social History     Substance and Sexual Activity   Drug Use Yes    Types: Marijuana, Methamphetamines    Comment: Hx meth use , last marijuana 1/21     E-Cigarette/Vaping    E-Cigarette Use Never User      E-Cigarette/Vaping Substances    Nicotine No     THC No     CBD No     Flavoring No      Social History     Tobacco Use   Smoking Status Every Day    Current packs/day: 1.00    Average packs/day: 1 pack/day for 20.0 years (20.0 ttl pk-yrs)    Types: Cigarettes   Smokeless Tobacco Never   Tobacco Comments    Last cigarette 0430     Family History: non-contributory    Meds/Allergies   all current active meds have been reviewed  Allergies   Allergen Reactions    Aspirin GI Intolerance       Objective   Vitals: Blood pressure 141/92, pulse 85, temperature (!) 97.3 °F (36.3 °C), temperature source Temporal, resp. rate 17, height 5' 9\" (1.753 m), weight 70.8 kg (156 lb), SpO2 95%.  Orthostatic Blood Pressures      Flowsheet Row Most Recent Value   Blood Pressure 141/92 filed at 02/23/2024 0815   Patient Position - Orthostatic VS Sitting filed at 02/23/2024 0815              Intake/Output Summary (Last 24 hours) at 2/23/2024 0901  Last data filed at " 2/23/2024 0752  Gross per 24 hour   Intake 50 ml   Output 750 ml   Net -700 ml       Invasive Devices       Peripheral Intravenous Line  Duration             Peripheral IV 02/23/24 Left;Upper;Ventral (anterior) Arm <1 day                    Physical Exam  Vitals and nursing note reviewed.   Constitutional:       Appearance: Normal appearance. He is not toxic-appearing.   HENT:      Head: Normocephalic and atraumatic.   Cardiovascular:      Rate and Rhythm: Normal rate.      Heart sounds: No murmur heard.  Pulmonary:      Effort: Pulmonary effort is normal.   Musculoskeletal:         General: Swelling present.   Neurological:      General: No focal deficit present.      Mental Status: He is alert.   Psychiatric:         Mood and Affect: Mood normal.         Thought Content: Thought content normal.         Lab Results: I have personally reviewed pertinent lab results.    CBC with diff:   Results from last 7 days   Lab Units 02/23/24  0116   WBC Thousand/uL 8.77   RBC Million/uL 4.46   HEMOGLOBIN g/dL 13.5   HEMATOCRIT % 41.1   MCV fL 92   MCH pg 30.3   MCHC g/dL 32.8   RDW % 15.1   MPV fL 9.1   PLATELETS Thousands/uL 290     CMP:   Results from last 7 days   Lab Units 02/23/24  0116   SODIUM mmol/L 132*   CHLORIDE mmol/L 102   CO2 mmol/L 24   BUN mg/dL 22   CREATININE mg/dL 1.16   CALCIUM mg/dL 9.2   AST U/L 25   ALT U/L 14   ALK PHOS U/L 84   EGFR ml/min/1.73sq m 69     HS Troponin:   0   Lab Value Date/Time    HSTNI 139 (H) 12/17/2023 1220    HSTNI0 136 (H) 02/23/2024 0116    HSTNI2 124 (H) 02/23/2024 0324    HSTNI4 105 (H) 02/23/2024 0510    HSTNI4 147 (H) 12/16/2023 1524    HSTNI4 115 (H) 10/07/2023 1222     BNP:   Results from last 7 days   Lab Units 02/23/24  0116   POTASSIUM mmol/L 4.6   CHLORIDE mmol/L 102   CO2 mmol/L 24   BUN mg/dL 22   CREATININE mg/dL 1.16   CALCIUM mg/dL 9.2   EGFR ml/min/1.73sq m 69     Coags:   Results from last 7 days   Lab Units 02/23/24  0116   PTT seconds 33   INR  1.01     TSH:    "  Magnesium:   Results from last 7 days   Lab Units 02/23/24  0116   MAGNESIUM mg/dL 1.7*     Lipid Profile:     Imaging: I have personally reviewed pertinent reports.      Echo 10/2023:    Left Ventricle: Left ventricular cavity size is normal. Wall thickness is increased. There is moderate asymmetric hypertrophy of the posterior wall. The left ventricular ejection fraction is 25-30%. Systolic function is severely reduced. There is severe global hypokinesis with regional variation. Diastolic function is moderately abnormal, consistent with grade II (pseudonormal) relaxation. There is a \"smoke\" within LV cavity with no evidence of thrombus.    IVS: There is abnormal septal motion consistent with left bundle branch block.    Right Ventricle: Right ventricular cavity size is moderately dilated. Systolic function is moderately reduced.    Left Atrium: The atrium is severely dilated.    Right Atrium: The atrium is dilated.    Aortic Valve: There is aortic valve sclerosis.    Mitral Valve: There is mild regurgitation.    Tricuspid Valve: There is mild regurgitation. The right ventricular systolic pressure is mildly elevated. The estimated right ventricular systolic pressure is 32.00 mmHg.    Pericardium: There is a small pericardial effusion circumferential to the heart with a slightly larger pocket of fluid around right atria. The fluid exhibits no internal echoes. There is no echocardiographic evidence of tamponade.     EKG:         Narrative & Impression    Sinus rhythm with frequent Premature ventricular complexes  Right bundle branch block  T wave abnormality, consider inferolateral ischemia  When compared with ECG of 30-DEC-2023 18:24,  T wave inversion now evident in Inferior leads  T wave inversion less evident in Lateral leads  Confirmed by Michael Wolff ([id number]) on 2/23/2024 8:23:56 AM             "

## 2024-02-23 NOTE — ED PROVIDER NOTES
History  Chief Complaint   Patient presents with    Shortness of Breath     Pt reports sob that started earlier today, hx of chf     Patient is a 58-year-old male with a history of CHF and Raynaud's disease and scleroderma and cardiomyopathy presents to the emergency department complaining of shortness of breath no cough no fevers or chills no chest pain symptoms started yesterday still present.  Patient does smoke.  Patient reports compliance with home medications and diuretic.        History provided by:  Patient  Shortness of Breath  Severity:  Mild  Onset quality:  Gradual  Duration:  1 day  Timing:  Constant  Progression:  Worsening  Chronicity:  Recurrent  Associated symptoms: wheezing    Associated symptoms: no abdominal pain, no chest pain, no cough, no ear pain, no fever, no headaches, no rash, no sore throat and no vomiting        Prior to Admission Medications   Prescriptions Last Dose Informant Patient Reported? Taking?   DULoxetine (CYMBALTA) 30 mg delayed release capsule   Yes No   Si mg daily   DULoxetine (CYMBALTA) 60 mg delayed release capsule   Yes No   Si mg daily   Entresto 24-26 MG TABS   Yes No   Sig: Take 1 tablet by mouth 2 (two) times a day   Farxiga 5 MG TABS   Yes No   Sig: Take 5 mg by mouth in the morning   NIFEdipine ER (ADALAT CC) 30 MG 24 hr tablet   Yes No   Sig: Take 30 mg by mouth daily   Spiriva Respimat 2.5 MCG/ACT AERS inhaler   Yes No   Sig: Inhale 2 puffs daily   Patient not taking: Reported on 2024   albuterol (PROVENTIL HFA,VENTOLIN HFA) 90 mcg/act inhaler   Yes No   Sig: Inhale 2 puffs every 4 (four) hours as needed    carvedilol (COREG) 6.25 mg tablet   Yes No   Sig: Take 6.25 mg by mouth 2 (two) times a day with meals   furosemide (LASIX) 20 mg tablet   No No   Sig: Take 1 tablet (20 mg total) by mouth daily Do not start before 2023.   gabapentin (NEURONTIN) 300 mg capsule   Yes No   Sig: Take 300 mg by mouth 3 (three) times a day   lidocaine  "(LMX) 4 % cream   Yes No   Sig: Apply topically 2 (two) times a day   losartan (COZAAR) 50 mg tablet   No No   Sig: Take 1 tablet (50 mg total) by mouth daily Do not start before October 11, 2023.   meloxicam (MOBIC) 15 mg tablet   Yes No   Sig: Take 15 mg by mouth daily   ondansetron (Zofran ODT) 4 mg disintegrating tablet   No No   Sig: Take 1 tablet (4 mg total) by mouth every 6 (six) hours as needed for nausea or vomiting for up to 10 days   oxyCODONE-acetaminophen (Percocet) 5-325 mg per tablet   No No   Sig: Take 1 tablet by mouth every 8 (eight) hours as needed for severe pain Max Daily Amount: 3 tablets   pramipexole (Mirapex) 0.25 mg tablet   Yes No   Sig: Take 0.25 mg by mouth 3 (three) times a day   spironolactone (ALDACTONE) 25 mg tablet   No No   Sig: Take 1 tablet (25 mg total) by mouth daily   Patient not taking: Reported on 2/8/2024      Facility-Administered Medications: None       Past Medical History:   Diagnosis Date    Anxiety     Cardiomyopathy (HCC)     Depression     Emphysema of lung (HCC)     GERD (gastroesophageal reflux disease)     Gout     Hypertension     Neuropathy     Raynaud's disease     Right inguinal hernia     Scleroderma (HCC)        Past Surgical History:   Procedure Laterality Date    AMPUTATION Right     pt had tip of \"pointer finger\" d/t scleraderma    FINGER AMPUTATION Left 1/31/2024    Procedure: AMPUTATION FINGER, LEFT INDEX FINGER CPT: 98482;  Surgeon: Pedro Vazquez MD;  Location:  MAIN OR;  Service: Orthopedics    HERNIA REPAIR Left     times 2    OH AMP F/TH 1/2 JT/PHALANX W/NEURECT W/DIR CLSR Right 1/23/2024    Procedure: AMPUTATION FINGER, RIGHT MIDDLE;  Surgeon: Pedro Vazquez MD;  Location: [facility];  Service: Orthopedics    OH RPR 1ST INGUN HRNA AGE 5 YRS/> REDUCIBLE Right 3/3/2023    Procedure: OPEN INGUINAL HERNIA REPAIR WITH MESH;  Surgeon: Temo Jackson DO;  Location:  MAIN OR;  Service: General       History reviewed. No pertinent family history.  I have " reviewed and agree with the history as documented.    E-Cigarette/Vaping    E-Cigarette Use Never User      E-Cigarette/Vaping Substances    Nicotine No     THC No     CBD No     Flavoring No      Social History     Tobacco Use    Smoking status: Every Day     Current packs/day: 1.00     Average packs/day: 1 pack/day for 20.0 years (20.0 ttl pk-yrs)     Types: Cigarettes    Smokeless tobacco: Never    Tobacco comments:     Last cigarette 0430   Vaping Use    Vaping status: Never Used   Substance Use Topics    Alcohol use: Not Currently     Comment: occasional    Drug use: Yes     Types: Marijuana, Methamphetamines     Comment: Hx meth use , last marijuana 1/21       Review of Systems   Constitutional:  Negative for activity change, appetite change, chills, fatigue and fever.   HENT:  Negative for congestion, ear pain, rhinorrhea and sore throat.    Eyes:  Negative for discharge, redness and visual disturbance.   Respiratory:  Positive for chest tightness, shortness of breath and wheezing. Negative for cough.    Cardiovascular:  Negative for chest pain and palpitations.   Gastrointestinal:  Negative for abdominal pain, constipation, diarrhea, nausea and vomiting.   Endocrine: Negative for polydipsia and polyuria.   Genitourinary:  Negative for difficulty urinating, dysuria, frequency, hematuria and urgency.   Musculoskeletal:  Negative for arthralgias and myalgias.   Skin:  Negative for color change, pallor and rash.   Neurological:  Negative for dizziness, weakness, light-headedness, numbness and headaches.   Hematological:  Negative for adenopathy. Does not bruise/bleed easily.   All other systems reviewed and are negative.      Physical Exam  Physical Exam  Vitals and nursing note reviewed.   Constitutional:       Appearance: He is well-developed.   HENT:      Head: Normocephalic and atraumatic.      Right Ear: External ear normal.      Left Ear: External ear normal.      Nose: Nose normal.   Eyes:       Conjunctiva/sclera: Conjunctivae normal.      Pupils: Pupils are equal, round, and reactive to light.   Cardiovascular:      Rate and Rhythm: Normal rate and regular rhythm.      Heart sounds: Normal heart sounds.   Pulmonary:      Effort: Pulmonary effort is normal. No respiratory distress.      Breath sounds: Examination of the right-lower field reveals decreased breath sounds. Examination of the left-lower field reveals decreased breath sounds. Decreased breath sounds present. No wheezing, rhonchi or rales.   Chest:      Chest wall: No tenderness.   Abdominal:      General: Bowel sounds are normal. There is no distension.      Palpations: Abdomen is soft.      Tenderness: There is no abdominal tenderness. There is no guarding.   Musculoskeletal:         General: Normal range of motion.      Cervical back: Normal range of motion and neck supple.   Skin:     General: Skin is warm and dry.   Neurological:      Mental Status: He is alert and oriented to person, place, and time.      Cranial Nerves: No cranial nerve deficit.      Sensory: No sensory deficit.         Vital Signs  ED Triage Vitals   Temperature Pulse Respirations Blood Pressure SpO2   02/22/24 2333 02/22/24 2333 02/22/24 2333 02/22/24 2333 02/22/24 2339   (!) 97.3 °F (36.3 °C) 72 12 (!) 145/116 99 %      Temp Source Heart Rate Source Patient Position - Orthostatic VS BP Location FiO2 (%)   02/22/24 2333 02/22/24 2333 02/22/24 2333 02/22/24 2333 --   Temporal Monitor Sitting Left arm       Pain Score       02/22/24 2333       No Pain           Vitals:    02/23/24 0115 02/23/24 0130 02/23/24 0145 02/23/24 0200   BP:   (!) 156/113 (!) 157/120   Pulse: 96 98 94 97   Patient Position - Orthostatic VS:   Sitting Sitting         Visual Acuity      ED Medications  Medications   NIFEdipine (PROCARDIA XL) 24 hr tablet 30 mg (has no administration in time range)   ipratropium-albuterol (DUO-NEB) 0.5-2.5 mg/3 mL inhalation solution 3 mL (3 mL Nebulization Given  2/23/24 0051)   furosemide (LASIX) injection 40 mg (40 mg Intravenous Given 2/23/24 0109)       Diagnostic Studies  Results Reviewed       Procedure Component Value Units Date/Time    B-Type Natriuretic Peptide(BNP) [[id number]]  (Abnormal) Collected: 02/23/24 0116    Lab Status: Final result Specimen: Blood from Arm, Right Updated: 02/23/24 0209     BNP 3,992 pg/mL     HS Troponin I 2hr [[id number]]     Lab Status: No result Specimen: Blood     HS Troponin 0hr (reflex protocol) [[id number]]  (Abnormal) Collected: 02/23/24 0116    Lab Status: Final result Specimen: Blood from Arm, Right Updated: 02/23/24 0152     hs TnI 0hr 136 ng/L     Comprehensive metabolic panel [[id number]]  (Abnormal) Collected: 02/23/24 0116    Lab Status: Final result Specimen: Blood from Arm, Right Updated: 02/23/24 0148     Sodium 132 mmol/L      Potassium 4.6 mmol/L      Chloride 102 mmol/L      CO2 24 mmol/L      ANION GAP 6 mmol/L      BUN 22 mg/dL      Creatinine 1.16 mg/dL      Glucose 100 mg/dL      Calcium 9.2 mg/dL      AST 25 U/L      ALT 14 U/L      Alkaline Phosphatase 84 U/L      Total Protein 7.2 g/dL      Albumin 3.7 g/dL      Total Bilirubin 0.73 mg/dL      eGFR 69 ml/min/1.73sq m     Narrative:      National Kidney Disease Foundation guidelines for Chronic Kidney Disease (CKD):     Stage 1 with normal or high GFR (GFR > 90 mL/min/1.73 square meters)    Stage 2 Mild CKD (GFR = 60-89 mL/min/1.73 square meters)    Stage 3A Moderate CKD (GFR = 45-59 mL/min/1.73 square meters)    Stage 3B Moderate CKD (GFR = 30-44 mL/min/1.73 square meters)    Stage 4 Severe CKD (GFR = 15-29 mL/min/1.73 square meters)    Stage 5 End Stage CKD (GFR <15 mL/min/1.73 square meters)  Note: GFR calculation is accurate only with a steady state creatinine    Protime-INR [[id number]]  (Normal) Collected: 02/23/24 0116    Lab Status: Final result Specimen: Blood from Arm, Right Updated: 02/23/24 0141     Protime 13.6 seconds      INR 1.01    APTT [[id number]]   (Normal) Collected: 02/23/24 0116    Lab Status: Final result Specimen: Blood from Arm, Right Updated: 02/23/24 0141     PTT 33 seconds     FLU/RSV/COVID - if FLU/RSV clinically relevant [[id number]]  (Normal) Collected: 02/23/24 0052    Lab Status: Final result Specimen: Nares from Nose Updated: 02/23/24 0137     SARS-CoV-2 Negative     INFLUENZA A PCR Negative     INFLUENZA B PCR Negative     RSV PCR Negative    Narrative:      FOR PEDIATRIC PATIENTS - copy/paste COVID Guidelines URL to browser: [email or web address]    SARS-CoV-2 assay is a Nucleic Acid Amplification assay intended for the  qualitative detection of nucleic acid from SARS-CoV-2 in nasopharyngeal  swabs. Results are for the presumptive identification of SARS-CoV-2 RNA.    Positive results are indicative of infection with SARS-CoV-2, the virus  causing COVID-19, but do not rule out bacterial infection or co-infection  with other viruses. Laboratories within the [place] and its  territories are required to report all positive results to the appropriate  public health authorities. Negative results do not preclude SARS-CoV-2  infection and should not be used as the sole basis for treatment or other  patient management decisions. Negative results must be combined with  clinical observations, patient history, and epidemiological information.  This test has not been FDA cleared or approved.    This test has been authorized by FDA under an Emergency Use Authorization  (EUA). This test is only authorized for the duration of time the  declaration that circumstances exist justifying the authorization of the  emergency use of an in vitro diagnostic tests for detection of SARS-CoV-2  virus and/or diagnosis of COVID-19 infection under section 564(b)(1) of  the Act, 21 U.S.C. 360bbb-3(b)(1), unless the authorization is terminated  or revoked sooner. The test has been validated but independent review by  FDA  and CLIA is pending.    Test performed using [employer] GeneXpert: This RT-PCR assay targets N2,  a region unique to SARS-CoV-2. A conserved region in the E-gene was chosen  for pan-Sarbecovirus detection which includes SARS-CoV-2.    According to CMS-2020-01-R, this platform meets the definition of high-throughput technology.    CBC and differential [[id number]] Collected: 02/23/24 0116    Lab Status: Final result Specimen: Blood from Arm, Right Updated: 02/23/24 0127     WBC 8.77 Thousand/uL      RBC 4.46 Million/uL      Hemoglobin 13.5 g/dL      Hematocrit 41.1 %      MCV 92 fL      MCH 30.3 pg      MCHC 32.8 g/dL      RDW 15.1 %      MPV 9.1 fL      Platelets 290 Thousands/uL      nRBC 0 /100 WBCs      Neutrophils Relative 61 %      Immat GRANS % 0 %      Lymphocytes Relative 24 %      Monocytes Relative 12 %      Eosinophils Relative 2 %      Basophils Relative 1 %      Neutrophils Absolute 5.40 Thousands/µL      Immature Grans Absolute 0.03 Thousand/uL      Lymphocytes Absolute 2.08 Thousands/µL      Monocytes Absolute 1.05 Thousand/µL      Eosinophils Absolute 0.16 Thousand/µL      Basophils Absolute 0.05 Thousands/µL                    XR chest 1 view portable   ED Interpretation by Bhargav Antonio DO (02/23 0047)   Cardiomegaly with mild pulmonary vascular congestion                 Procedures  ECG 12 Lead Documentation Only    Date/Time: 2/23/2024 12:34 AM    Performed by: Bhargav Antonio DO  Authorized by: Bhargav Antonio DO    ECG reviewed by me, the ED Provider: yes    Patient location:  ED  Previous ECG:     Previous ECG:  Compared to current    Similarity:  No change    Comparison to cardiac monitor: Yes    Interpretation:     Interpretation: normal    Quality:     Tracing quality:  Limited by artifact  Rate:     ECG rate:  94    ECG rate assessment: normal    Rhythm:     Rhythm: sinus rhythm    Ectopy:     Ectopy: PVCs    QRS:     QRS intervals:  Wide  Conduction:     Conduction: abnormal       Abnormal conduction: complete RBBB and non-specific intraventricular conduction delay    ST segments:     ST segments:  Non-specific  T waves:     T waves: non-specific             ED Course  ED Course as of 02/23/24 0222 Fri Feb 23, 2024   0031 When hospitalized in December for CHF was 162 pounds currently 156 pounds.     0221 Spoke with Dr. Rodriguez cardiology on-call reviewed case and findings in the emergency department and management thus far recommends admission trend troponin diuresis and cardiology can see for further evaluation.     0221 Spoke with [facility] advanced practitioner on-call reviewed case and findings in the emergency department management thus far and cardiology recommendations accepts for admission.                                               Medical Decision Making  Problems Addressed:  CHF (congestive heart failure) (HCC): acute illness or injury  Elevated troponin: acute illness or injury  HTN (hypertension): acute illness or injury    Amount and/or Complexity of Data Reviewed  External Data Reviewed: labs, radiology, ECG and notes.  Labs: ordered. Decision-making details documented in ED Course.  Radiology: ordered and independent interpretation performed. Decision-making details documented in ED Course.  ECG/medicine tests: ordered and independent interpretation performed. Decision-making details documented in ED Course.    Risk  Prescription drug management.             Disposition  Final diagnoses:   CHF (congestive heart failure) (HCC)   Elevated troponin   HTN (hypertension)     Time reflects when diagnosis was documented in both MDM as applicable and the Disposition within this note       Time User Action Codes Description Comment    2/23/2024  2:09 AM Bhargav Antonio Add [I50.9] CHF (congestive heart failure) (HCC)     2/23/2024  2:10 AM Bhargav Antonio Add [R79.89] Elevated troponin     2/23/2024  2:10 AM Bhargav Antonio Add [I10] HTN (hypertension)           ED Disposition        ED Disposition   Admit    Condition   Stable    Date/Time   Fri Feb 23, 2024  2:22 AM    Comment   Case was discussed with Maria Del Rosario and the patient's admission status was agreed to be Admission Status: inpatient status to the service of Dr. Rushing.               Follow-up Information    None         Patient's Medications   Discharge Prescriptions    No medications on file       No discharge procedures on file.    PDMP Review         Value Time User    PDMP Reviewed  Yes 2/5/2024  8:49 AM Terrence Andrews PA-C            ED Provider  Electronically Signed by             Bhargav Antonio DO  02/23/24 0222

## 2024-02-23 NOTE — CASE MANAGEMENT
Case Management Assessment & Discharge Planning Note    Patient name Britton Batista  Location /-01 MRN [id number]  : 1965 Date 2024       Current Admission Date: 2024  Current Admission Diagnosis:Acute on chronic combined systolic and diastolic congestive heart failure (HCC)   Patient Active Problem List    Diagnosis Date Noted    Hypomagnesemia 2023    Methamphetamine abuse ([facility]) 2023    Tobacco abuse 2023    D-dimer, elevated 2023    Cardiomyopathy ([facility]) 10/09/2023    IRINEO (acute kidney injury) ([facility]) 10/08/2023    Acute on chronic combined systolic and diastolic congestive heart failure ([facility]) 10/07/2023    Hypertensive emergency 10/07/2023    Anxiety     Depression     Emphysema of lung ([facility])     GERD (gastroesophageal reflux disease)     Gout     Hypertensive urgency     Neuropathy     Raynaud's disease     Unilateral inguinal hernia, without obstruction or gangrene, not specified as recurrent     PSS (progressive systemic sclerosis) ([facility]) 10/25/2021    Near syncope 10/25/2021    Osteomyelitis ([facility]) 10/25/2021    Elevated troponin 10/25/2021    Acute encephalopathy 10/25/2021    Hyponatremia 10/24/2021      LOS (days): 0  Geometric Mean LOS (GMLOS) (days):   Days to GMLOS:     OBJECTIVE:    Risk of Unplanned Readmission Score: 23.4      Current admission status: Inpatient    Preferred Pharmacy:   [facility] - [place], [place] - [address]  [address] [facility] [address]  Phone: [phone] Fax: [phone]    [facility]  [place] [facility] - [address],  [address],  [address] [place] [facility] [place] [address]  Phone: [phone] Fax: [phone]    Primary Care Provider: [facility] Everywhere Referral    Primary Insurance: [facility] REP  Secondary Insurance:     ASSESSMENT:  Active Health Care Proxies       Arin Batista [facility] Representative - [facility]   Primary Phone: [phone]  (Mobile)                 Advance Directives  Does patient have a Health Care POA?: Yes  Does patient have Advance Directives?: No  Was patient offered paperwork?: Yes  Primary Contact: sister Hinkle, phone [phone]    Readmission Root Cause  30 Day Readmission: No    Patient Information  Admitted from:: Home  Mental Status: Alert  During Assessment patient was accompanied by: Not accompanied during assessment  Assessment information provided by:: Patient  Primary Caregiver: Self  Support Systems: Family members  [place] Residence[place]  What city do you live in?: [place]  Home entry access options. Select all that apply.: Stairs  Number of steps to enter home.: One Flight  Type of Current Residence: Apartment  Floor Level: 2  Upon entering residence, is there a bedroom on the main floor (no further steps)?: Yes  Upon entering residence, is there a bathroom on the main floor (no further steps)?: Yes  Living Arrangements: Other (Comment) (brother)  Is patient a ?: No    Activities of Daily Living Prior to Admission  Functional Status: Independent  Completes ADLs independently?: Yes  Ambulates independently?: Yes  Does patient use assisted devices?: No  Does patient currently own DME?: Yes  What DME does the patient currently own?: Straight Cane  Does patient have a history of Outpatient Therapy (PT/OT)?: No  Does patient have a history of HHC?: No  Does patient currently have HHC?: No         Patient Information Continued  Income Source: SSI/SSD  Does patient have prescription coverage?: Yes  Does patient receive dialysis treatments?: No  Does patient have a history of substance abuse?: Yes  Historical substance use preference: Methamphetamines, Marijuana  Is patient currently in treatment for substance abuse?: No. Patient declined treatment information.  Does patient have a history of Mental Health Diagnosis?: Yes  Has patient received inpatient treatment related to mental health in the last  2 years?: No    Social Determinants of Health (SDOH)      Flowsheet Row Most Recent Value   Housing Stability    In the last 12 months, was there a time when you were not able to pay the mortgage or rent on time? N   In the last 12 months, how many places have you lived? 1   In the last 12 months, was there a time when you did not have a steady place to sleep or slept in a shelter (including now)? N   Transportation Needs    In the past 12 months, has lack of transportation kept you from medical appointments or from getting medications? no   In the past 12 months, has lack of transportation kept you from meetings, work, or from getting things needed for daily living? No   Food Insecurity    Within the past 12 months, you worried that your food would run out before you got the money to buy more. Never true   Within the past 12 months, the food you bought just didn't last and you didn't have money to get more. Never true   Utilities    In the past 12 months has the electric, gas, oil, or water company threatened to shut off services in your home? No            DISCHARGE DETAILS:      Discharge planning discussed with:: Patient   CM contacted family/caregiver?: No- see comments (Patient reports sister pays bills, helps with coordinating care.  Provides permission to contact sister as needed.)  Contacts  Patient Contacts: Arin Batista phone [phone]  Relationship to Patient:: Family       Britton Batista is a 58 y.o. male with a PMH of HFrEF, PSS, Raynaud's, Buerger's disease, depression, emphysema, tobacco abuse, finger amputations who presents with dyspnea.     Brief assessment at bedside.  CM will need to follow for dc needs and contact sister Arin as needed.  Patient reports living in second story apt with brother.  Pt has cane for ambulation, no other reported DME.  No hx of HH or SNF.      Patient reports CM Samantha with [employer] assists with coordinating care in the community.  Sister Arin has her phone number.   May be beneficial to obtain this information for post acute coordination.      DCP: Pending clinical course, likely home with family support.  CM following for any dc needs.

## 2024-02-23 NOTE — ASSESSMENT & PLAN NOTE
Mild acute exacerbation, continued tobacco abuse  Wheezing on exam without respiratory distress  Continue nebulized bronchodilators as needed

## 2024-02-23 NOTE — ED NOTES
Patient ambulated to restroom at this time with a steady gait. No complaints of SOB or dizziness at this time.     Rk Phan RN  02/23/24 0618    
Patient given snack at this time.      Rk Phan RN  02/23/24 6639    
Per Dr Carroll he will order ulysses for pt.     Kyra Perera, RN  02/23/24 4337    
Provider made aware of patients hypertension at this time. No new orders.      Rk Phan RN  02/23/24 0205    
Pt sitting at bedside eating breakfast. Denies having any pain at this time.     Kyra Perera, CALRISSE  02/23/24 5725    
Pt states he has nausea, SOB and no appetite. Pt denies pain. Pt resting quietly. Pulse ox on RA 96%. Tiger text sent to admitting provider Dr Ravi Carroll; awaiting further orders at this time.      Kyra Perera, CLARISSE  02/23/24 0577    
Pts sister called for update. Sister aware of pt status. Primary RN aware.      Sherri Penaloza, RN  02/23/24 1945    
Received report from CLARISSE Beckman. Took over care for pt.     Kyra Perera RN  02/23/24 1537    
Yes

## 2024-02-23 NOTE — ASSESSMENT & PLAN NOTE
Elevated troponin without chest pain  EKG without ST elevation or depression  Secondary to CHF exacerbation

## 2024-02-23 NOTE — ASSESSMENT & PLAN NOTE
Wt Readings from Last 3 Encounters:   02/22/24 70.8 kg (156 lb)   02/08/24 72.6 kg (160 lb)   01/30/24 72.6 kg (160 lb)     Shortness of breath, weight gain, peripheral edema  Reports compliance with furosemide but has been eating salty foods  IV diuresis with good urine output  Continue PTA spironolactone, carvedilol and losartan  Follows with [facility] cardiology-we are unable to see their records in care everywhere  2 g sodium diet with fluid restriction  Intake output, daily weights  Echocardiogram EF 24% in past 6 months

## 2024-02-23 NOTE — QUICK NOTE
Patient with variable heart rate, ranging upper 30s to 80.    Check EKG, check stat labs, trend troponin.      Patient with ongoing nausea, very limited p.o. intake today, patient with dry mucous membranes, will hold additional dose of Lasix today, reevaluate in a.m.

## 2024-02-23 NOTE — H&P
[facility]  H&P  Name: Britton Batista 58 y.o. male I MRN: [id number]  Unit/Bed#: ED 03 I Date of Admission: 2/23/2024   Date of Service: 2/23/2024 I Hospital Day: 0      Assessment/Plan   * Acute on chronic combined systolic and diastolic congestive heart failure (HCC)  Assessment & Plan  Wt Readings from Last 3 Encounters:   02/22/24 70.8 kg (156 lb)   02/08/24 72.6 kg (160 lb)   01/30/24 72.6 kg (160 lb)     Shortness of breath, weight gain, peripheral edema  Reports compliance with furosemide but has been eating salty foods  IV diuresis with good urine output  Continue PTA spironolactone, carvedilol and losartan  Follows with [facility] cardiology-we are unable to see their records in care everywhere  2 g sodium diet with fluid restriction  Intake output, daily weights  Echocardiogram EF 24% in past 6 months      Hypomagnesemia  Assessment & Plan  Magnesium 1.7  Replete and recheck    Tobacco abuse  Assessment & Plan  1/2 pack/day  Nicotine patch  Cessation counseling    Emphysema of lung ([facility])  Assessment & Plan  Mild acute exacerbation, continued tobacco abuse  Wheezing on exam without respiratory distress  Continue nebulized bronchodilators as needed    Elevated troponin  Assessment & Plan  Elevated troponin without chest pain  EKG without ST elevation or depression  Secondary to CHF exacerbation    PSS (progressive systemic sclerosis) (Former[facility])  Assessment & Plan  Followed by [facility] rheumatology  PSS/ILD/raynauds /recent critical digital ischemia s/p partial amputations distal fingers left index and right third    Hyponatremia  Assessment & Plan  Sodium 132  Baseline sodium 135  Hypervolemic hyponatremia  IV diuresis  Recheck in the a.m.           VTE Pharmacologic Prophylaxis:   High Risk (Score >/= 5) - Pharmacological DVT Prophylaxis Ordered: enoxaparin (Lovenox). Sequential Compression Devices Ordered.  Code Status: Level 1 - Full Code   Discussion with family: Updated   (sister) via phone.    Anticipated Length of Stay: Patient will be admitted on an inpatient basis with an anticipated length of stay of greater than 2 midnights secondary to CHF.    Total Time Spent on Date of Encounter in care of patient: 45 mins. This time was spent on one or more of the following: performing physical exam; counseling and coordination of care; obtaining or reviewing history; documenting in the medical record; reviewing/ordering tests, medications or procedures; communicating with other healthcare professionals and discussing with patient's family/caregivers.    Chief Complaint: Dyspnea    History of Present Illness:  Britton Batista is a 58 y.o. male with a PMH of HFrEF, PSS, Raynaud's, Buerger's disease, depression, emphysema, tobacco abuse, finger amputations who presents with dyspnea.  Patient reports compliance with his diuretics but has been eating salty foods and feels that he is edematous and dyspneic with volume overload.  Responded nicely to IV diuresis.  Troponin elevated without ischemic changes on EKG. Case was discussed with cardiologist on-call by ED attending who recommended admission to this facility.    Review of Systems:  Review of Systems   Constitutional:  Negative for chills and fever.   HENT:  Negative for ear pain and sore throat.    Eyes:  Negative for pain and visual disturbance.   Respiratory:  Positive for cough, shortness of breath and wheezing.    Cardiovascular:  Positive for leg swelling. Negative for chest pain and palpitations.   Gastrointestinal:  Negative for abdominal pain and vomiting.   Genitourinary:  Negative for dysuria and hematuria.   Musculoskeletal:  Negative for arthralgias and back pain.   Skin:  Negative for color change and rash.   Neurological:  Negative for seizures and syncope.   All other systems reviewed and are negative.      Past Medical and Surgical History:   Past Medical History:   Diagnosis Date    Anxiety     Cardiomyopathy (HCC)      "Depression     Emphysema of lung (HCC)     GERD (gastroesophageal reflux disease)     Gout     Hypertension     Neuropathy     Raynaud's disease     Right inguinal hernia     Scleroderma (HCC)        Past Surgical History:   Procedure Laterality Date    AMPUTATION Right     pt had tip of \"pointer finger\" d/t scleraderma    FINGER AMPUTATION Left 1/31/2024    Procedure: AMPUTATION FINGER, LEFT INDEX FINGER CPT: 21014;  Surgeon: Pedro Vazquez MD;  Location:  MAIN OR;  Service: Orthopedics    HERNIA REPAIR Left     times 2    OR AMP F/TH 1/2 JT/PHALANX W/NEURECT W/DIR CLSR Right 1/23/2024    Procedure: AMPUTATION FINGER, RIGHT MIDDLE;  Surgeon: Pedro Vazquez MD;  Location: [facility];  Service: Orthopedics    OR RPR 1ST INGUN HRNA AGE 5 YRS/> REDUCIBLE Right 3/3/2023    Procedure: OPEN INGUINAL HERNIA REPAIR WITH MESH;  Surgeon: Temo Jackson DO;  Location:  MAIN OR;  Service: General       Meds/Allergies:  Prior to Admission medications    Medication Sig Start Date End Date Taking? Authorizing Provider   spironolactone (ALDACTONE) 25 mg tablet Take 1 tablet (25 mg total) by mouth daily  Patient taking differently: Take 12.5 mg by mouth daily 10/10/23  Yes Jaron Casey DO   albuterol (PROVENTIL HFA,VENTOLIN HFA) 90 mcg/act inhaler Inhale 2 puffs every 4 (four) hours as needed     Historical Provider, MD   carvedilol (COREG) 6.25 mg tablet Take 6.25 mg by mouth 2 (two) times a day with meals    Historical Provider, MD   Farxiga 5 MG TABS Take 5 mg by mouth in the morning 1/10/24   Historical Provider, MD   furosemide (LASIX) 20 mg tablet Take 1 tablet (20 mg total) by mouth daily Do not start before October 11, 2023. 10/11/23   Jaron Casey DO   gabapentin (NEURONTIN) 400 mg capsule Take 400 mg by mouth 3 (three) times a day    Historical Provider, MD   lidocaine (LMX) 4 % cream Apply topically 2 (two) times a day    Historical Provider, MD   losartan (COZAAR) 50 mg tablet Take 1 tablet (50 mg total) by " mouth daily Do not start before October 11, 2023. 10/11/23   Jaron Casey, DO   meloxicam (MOBIC) 15 mg tablet Take 15 mg by mouth daily    Historical Provider, MD   ondansetron (Zofran ODT) 4 mg disintegrating tablet Take 1 tablet (4 mg total) by mouth every 6 (six) hours as needed for nausea or vomiting for up to 10 days 12/30/23 1/9/24  Jonh Trimble MD   oxyCODONE-acetaminophen (Percocet) 5-325 mg per tablet Take 1 tablet by mouth every 8 (eight) hours as needed for severe pain Max Daily Amount: 3 tablets 2/5/24   Terrence Andrews PA-C   DULoxetine (CYMBALTA) 30 mg delayed release capsule 30 mg daily  2/23/24  Historical Provider, MD   DULoxetine (CYMBALTA) 60 mg delayed release capsule 60 mg daily  2/23/24  Historical Provider, MD   Entresto 24-26 MG TABS Take 1 tablet by mouth 2 (two) times a day 1/16/24 2/23/24  Historical Provider, MD   NIFEdipine ER (ADALAT CC) 30 MG 24 hr tablet Take 30 mg by mouth daily  2/23/24  Historical Provider, MD   pramipexole (Mirapex) 0.25 mg tablet Take 0.25 mg by mouth 3 (three) times a day  2/23/24  Historical Provider, MD   Spiriva Respimat 2.5 MCG/ACT AERS inhaler Inhale 2 puffs daily  Patient not taking: Reported on 2/8/2024 12/22/23 2/23/24  Historical Provider, MD     I have reviewed home medications with patient family member.    Allergies:   Allergies   Allergen Reactions    Aspirin GI Intolerance       Social History:  Marital Status:   Patient Pre-hospital Living Situation: Home  Patient Pre-hospital Level of Mobility: walks  Patient Pre-hospital Diet Restrictions: none  Substance Use History:   Social History     Substance and Sexual Activity   Alcohol Use Not Currently    Comment: occasional     Social History     Tobacco Use   Smoking Status Every Day    Current packs/day: 1.00    Average packs/day: 1 pack/day for 20.0 years (20.0 ttl pk-yrs)    Types: Cigarettes   Smokeless Tobacco Never   Tobacco Comments    Last cigarette 0430     Social  "History     Substance and Sexual Activity   Drug Use Yes    Types: Marijuana, Methamphetamines    Comment: Hx meth use , last marijuana 1/21       Family History:  History reviewed. No pertinent family history.    Physical Exam:     Vitals:   Blood Pressure: (!) 134/109 (02/23/24 0600)  Pulse: 100 (02/23/24 0600)  Temperature: (!) 97.3 °F (36.3 °C) (02/22/24 2333)  Temp Source: Temporal (02/22/24 2333)  Respirations: 18 (02/23/24 0600)  Height: 5' 9\" (175.3 cm) (02/22/24 2333)  Weight - Scale: 70.8 kg (156 lb) (02/22/24 2333)  SpO2: 94 % (02/23/24 0600)    Physical Exam  Vitals and nursing note reviewed.   Constitutional:       General: He is not in acute distress.     Appearance: He is well-developed. He is ill-appearing.   HENT:      Head: Normocephalic and atraumatic.   Eyes:      Conjunctiva/sclera: Conjunctivae normal.   Cardiovascular:      Rate and Rhythm: Normal rate and regular rhythm.      Heart sounds: No murmur heard.  Pulmonary:      Effort: Pulmonary effort is normal. No respiratory distress.      Breath sounds: Wheezing and rales present.   Abdominal:      Palpations: Abdomen is soft.      Tenderness: There is no abdominal tenderness.   Musculoskeletal:         General: Swelling and deformity present.      Cervical back: Neck supple.      Right lower leg: Edema present.      Left lower leg: Edema present.      Comments: Amputation distal fingers left index and right third   Skin:     General: Skin is warm and dry.      Capillary Refill: Capillary refill takes less than 2 seconds.   Neurological:      General: No focal deficit present.      Mental Status: He is alert and oriented to person, place, and time.   Psychiatric:         Mood and Affect: Mood normal.         Behavior: Behavior normal.         Additional Data:     Lab Results:  Results from last 7 days   Lab Units 02/23/24  0116   WBC Thousand/uL 8.77   HEMOGLOBIN g/dL 13.5   HEMATOCRIT % 41.1   PLATELETS Thousands/uL 290   NEUTROS PCT % 61 "   LYMPHS PCT % 24   MONOS PCT % 12   EOS PCT % 2     Results from last 7 days   Lab Units 02/23/24  0116   SODIUM mmol/L 132*   POTASSIUM mmol/L 4.6   CHLORIDE mmol/L 102   CO2 mmol/L 24   BUN mg/dL 22   CREATININE mg/dL 1.16   ANION GAP mmol/L 6   CALCIUM mg/dL 9.2   ALBUMIN g/dL 3.7   TOTAL BILIRUBIN mg/dL 0.73   ALK PHOS U/L 84   ALT U/L 14   AST U/L 25   GLUCOSE RANDOM mg/dL 100     Results from last 7 days   Lab Units 02/23/24  0116   INR  1.01                   Lines/Drains:  Invasive Devices       Peripheral Intravenous Line  Duration             Peripheral IV 02/23/24 Left;Upper;Ventral (anterior) Arm <1 day                        Imaging: Reviewed radiology reports from this admission including: chest xray  XR chest 1 view portable   ED Interpretation by Bhargav Antonio DO (02/23 0047)   Cardiomegaly with mild pulmonary vascular congestion          EKG and Other Studies Reviewed on Admission:   EKG: NSR. HR 94.    ** Please Note: This note has been constructed using a voice recognition system. **

## 2024-02-23 NOTE — ASSESSMENT & PLAN NOTE
Followed by [facility] rheumatology  PSS/ILD/raynauds /recent critical digital ischemia s/p partial amputations distal fingers left index and right third

## 2024-02-24 VITALS
SYSTOLIC BLOOD PRESSURE: 127 MMHG | WEIGHT: 157.2 LBS | OXYGEN SATURATION: 97 % | HEIGHT: 69 IN | RESPIRATION RATE: 20 BRPM | HEART RATE: 50 BPM | DIASTOLIC BLOOD PRESSURE: 88 MMHG | BODY MASS INDEX: 23.28 KG/M2 | TEMPERATURE: 98.2 F

## 2024-02-24 PROBLEM — E87.1 HYPONATREMIA: Status: RESOLVED | Noted: 2021-10-24 | Resolved: 2024-02-24

## 2024-02-24 PROBLEM — E83.42 HYPOMAGNESEMIA: Status: RESOLVED | Noted: 2023-12-17 | Resolved: 2024-02-24

## 2024-02-24 LAB
ALBUMIN SERPL BCP-MCNC: 3.5 G/DL (ref 3.5–5)
ALP SERPL-CCNC: 102 U/L (ref 34–104)
ALT SERPL W P-5'-P-CCNC: 13 U/L (ref 7–52)
ANION GAP SERPL CALCULATED.3IONS-SCNC: 8 MMOL/L
AST SERPL W P-5'-P-CCNC: 20 U/L (ref 13–39)
BASOPHILS # BLD AUTO: 0.03 THOUSANDS/ÂΜL (ref 0–0.1)
BASOPHILS NFR BLD AUTO: 1 % (ref 0–1)
BILIRUB SERPL-MCNC: 0.6 MG/DL (ref 0.2–1)
BUN SERPL-MCNC: 23 MG/DL (ref 5–25)
CALCIUM SERPL-MCNC: 9.1 MG/DL (ref 8.4–10.2)
CHLORIDE SERPL-SCNC: 95 MMOL/L (ref 96–108)
CO2 SERPL-SCNC: 28 MMOL/L (ref 21–32)
CREAT SERPL-MCNC: 1.34 MG/DL (ref 0.6–1.3)
EOSINOPHIL # BLD AUTO: 0.12 THOUSAND/ÂΜL (ref 0–0.61)
EOSINOPHIL NFR BLD AUTO: 2 % (ref 0–6)
ERYTHROCYTE [DISTWIDTH] IN BLOOD BY AUTOMATED COUNT: 14.8 % (ref 11.6–15.1)
GFR SERPL CREATININE-BSD FRML MDRD: 57 ML/MIN/1.73SQ M
GLUCOSE SERPL-MCNC: 109 MG/DL (ref 65–140)
HCT VFR BLD AUTO: 44.6 % (ref 36.5–49.3)
HGB BLD-MCNC: 14.8 G/DL (ref 12–17)
IMM GRANULOCYTES # BLD AUTO: 0.02 THOUSAND/UL (ref 0–0.2)
IMM GRANULOCYTES NFR BLD AUTO: 0 % (ref 0–2)
LYMPHOCYTES # BLD AUTO: 1.73 THOUSANDS/ÂΜL (ref 0.6–4.47)
LYMPHOCYTES NFR BLD AUTO: 27 % (ref 14–44)
MAGNESIUM SERPL-MCNC: 1.6 MG/DL (ref 1.9–2.7)
MCH RBC QN AUTO: 29.6 PG (ref 26.8–34.3)
MCHC RBC AUTO-ENTMCNC: 33.2 G/DL (ref 31.4–37.4)
MCV RBC AUTO: 89 FL (ref 82–98)
MONOCYTES # BLD AUTO: 0.85 THOUSAND/ÂΜL (ref 0.17–1.22)
MONOCYTES NFR BLD AUTO: 13 % (ref 4–12)
NEUTROPHILS # BLD AUTO: 3.77 THOUSANDS/ÂΜL (ref 1.85–7.62)
NEUTS SEG NFR BLD AUTO: 57 % (ref 43–75)
NRBC BLD AUTO-RTO: 0 /100 WBCS
PHOSPHATE SERPL-MCNC: 4.8 MG/DL (ref 2.7–4.5)
PLATELET # BLD AUTO: 296 THOUSANDS/UL (ref 149–390)
PMV BLD AUTO: 9.8 FL (ref 8.9–12.7)
POTASSIUM SERPL-SCNC: 3.8 MMOL/L (ref 3.5–5.3)
PROT SERPL-MCNC: 6.9 G/DL (ref 6.4–8.4)
RBC # BLD AUTO: 5 MILLION/UL (ref 3.88–5.62)
SODIUM SERPL-SCNC: 131 MMOL/L (ref 135–147)
WBC # BLD AUTO: 6.52 THOUSAND/UL (ref 4.31–10.16)

## 2024-02-24 PROCEDURE — 80053 COMPREHEN METABOLIC PANEL: CPT | Performed by: INTERNAL MEDICINE

## 2024-02-24 PROCEDURE — 85025 COMPLETE CBC W/AUTO DIFF WBC: CPT | Performed by: INTERNAL MEDICINE

## 2024-02-24 PROCEDURE — 84100 ASSAY OF PHOSPHORUS: CPT | Performed by: INTERNAL MEDICINE

## 2024-02-24 PROCEDURE — 99239 HOSP IP/OBS DSCHRG MGMT >30: CPT | Performed by: INTERNAL MEDICINE

## 2024-02-24 PROCEDURE — 83735 ASSAY OF MAGNESIUM: CPT | Performed by: INTERNAL MEDICINE

## 2024-02-24 RX ORDER — FUROSEMIDE 20 MG/1
20 TABLET ORAL DAILY
Start: 2024-02-25

## 2024-02-24 RX ORDER — CALCIUM CARBONATE 500 MG/1
500 TABLET, CHEWABLE ORAL DAILY PRN
Status: DISCONTINUED | OUTPATIENT
Start: 2024-02-24 | End: 2024-02-24 | Stop reason: HOSPADM

## 2024-02-24 RX ORDER — MAGNESIUM SULFATE HEPTAHYDRATE 40 MG/ML
2 INJECTION, SOLUTION INTRAVENOUS ONCE
Status: COMPLETED | OUTPATIENT
Start: 2024-02-24 | End: 2024-02-24

## 2024-02-24 RX ADMIN — CARVEDILOL 6.25 MG: 6.25 TABLET, FILM COATED ORAL at 08:35

## 2024-02-24 RX ADMIN — SPIRONOLACTONE 12.5 MG: 25 TABLET ORAL at 08:35

## 2024-02-24 RX ADMIN — CALCIUM CARBONATE (ANTACID) CHEW TAB 500 MG 500 MG: 500 CHEW TAB at 13:26

## 2024-02-24 RX ADMIN — LOSARTAN POTASSIUM 50 MG: 50 TABLET, FILM COATED ORAL at 08:35

## 2024-02-24 RX ADMIN — GABAPENTIN 400 MG: 400 CAPSULE ORAL at 08:35

## 2024-02-24 RX ADMIN — NICOTINE 21 MG: 21 PATCH, EXTENDED RELEASE TRANSDERMAL at 08:38

## 2024-02-24 RX ADMIN — MAGNESIUM SULFATE HEPTAHYDRATE 2 G: 40 INJECTION, SOLUTION INTRAVENOUS at 11:55

## 2024-02-24 RX ADMIN — ENOXAPARIN SODIUM 40 MG: 40 INJECTION SUBCUTANEOUS at 08:35

## 2024-02-24 NOTE — NURSING NOTE
Ambulated patient around the entire unit x2. Arleyo reading inaccurate due to toe placement. Patient sat remained 95%-96% on portable pulse ox, patient denied any dyspnea.

## 2024-02-24 NOTE — PLAN OF CARE
Problem: Nutrition/Hydration-ADULT  Goal: Nutrient/Hydration intake appropriate for improving, restoring or maintaining nutritional needs  Description: Monitor and assess patient's nutrition/hydration status for malnutrition. Collaborate with interdisciplinary team and initiate plan and interventions as ordered.  Monitor patient's weight and dietary intake as ordered or per policy. Utilize nutrition screening tool and intervene as necessary. Determine patient's food preferences and provide high-protein, high-caloric foods as appropriate.     INTERVENTIONS:  - Monitor oral intake, urinary output, labs, and treatment plans  - Assess nutrition and hydration status and recommend course of action  - Evaluate amount of meals eaten  - Assist patient with eating if necessary   - Allow adequate time for meals  - Recommend/ encourage appropriate diets, oral nutritional supplements, and vitamin/mineral supplements  - Order, calculate, and assess calorie counts as needed  - Recommend, monitor, and adjust tube feedings and TPN/PPN based on assessed needs  - Assess need for intravenous fluids  - Provide specific nutrition/hydration education as appropriate  - Include patient/family/caregiver in decisions related to nutrition  2/24/2024 0733 by Clarissa Ferguson RN  Outcome: Progressing  2/23/2024 1837 by Clarissa Ferguson RN  Outcome: Progressing     Problem: PAIN - ADULT  Goal: Verbalizes/displays adequate comfort level or baseline comfort level  Description: Interventions:  - Encourage patient to monitor pain and request assistance  - Assess pain using appropriate pain scale  - Administer analgesics based on type and severity of pain and evaluate response  - Implement non-pharmacological measures as appropriate and evaluate response  - Consider cultural and social influences on pain and pain management  - Notify physician/advanced practitioner if interventions unsuccessful or patient reports new pain  2/24/2024 0733 by Clarissa Ferguson  RN  Outcome: Progressing  2/23/2024 1837 by Clarissa Ferguson RN  Outcome: Progressing     Problem: DISCHARGE PLANNING  Goal: Discharge to home or other facility with appropriate resources  Description: INTERVENTIONS:  - Identify barriers to discharge w/patient and caregiver  - Arrange for needed discharge resources and transportation as appropriate  - Identify discharge learning needs (meds, wound care, etc.)  - Arrange for interpretive services to assist at discharge as needed  - Refer to Case Management Department for coordinating discharge planning if the patient needs post-hospital services based on physician/advanced practitioner order or complex needs related to functional status, cognitive ability, or social support system  2/24/2024 0733 by Clarissa Ferguson RN  Outcome: Progressing  2/23/2024 1837 by Clarissa Ferguson RN  Outcome: Progressing     Problem: Knowledge Deficit  Goal: Patient/family/caregiver demonstrates understanding of disease process, treatment plan, medications, and discharge instructions  Description: Complete learning assessment and assess knowledge base.  Interventions:  - Provide teaching at level of understanding  - Provide teaching via preferred learning methods  2/24/2024 0733 by Clarissa Ferguson RN  Outcome: Progressing  2/23/2024 1837 by Clarissa Ferguson RN  Outcome: Progressing     Problem: CARDIOVASCULAR - ADULT  Goal: Maintains optimal cardiac output and hemodynamic stability  Description: INTERVENTIONS:  - Monitor I/O, vital signs and rhythm  - Monitor for S/S and trends of decreased cardiac output  - Administer and titrate ordered vasoactive medications to optimize hemodynamic stability  - Assess quality of pulses, skin color and temperature  - Assess for signs of decreased coronary artery perfusion  - Instruct patient to report change in severity of symptoms  2/24/2024 0733 by Clarissa Ferguson RN  Outcome: Progressing  2/23/2024 1837 by Clarissa Ferguson RN  Outcome: Progressing  Goal: Absence of  cardiac dysrhythmias or at baseline rhythm  Description: INTERVENTIONS:  - Continuous cardiac monitoring, vital signs, obtain 12 lead EKG if ordered  - Administer antiarrhythmic and heart rate control medications as ordered  - Monitor electrolytes and administer replacement therapy as ordered  2/24/2024 0733 by Clarissa Ferguson RN  Outcome: Progressing  2/23/2024 1837 by Clarissa Ferguson RN  Outcome: Progressing     Problem: RESPIRATORY - ADULT  Goal: Achieves optimal ventilation and oxygenation  Description: INTERVENTIONS:  - Assess for changes in respiratory status  - Assess for changes in mentation and behavior  - Position to facilitate oxygenation and minimize respiratory effort  - Oxygen administered by appropriate delivery if ordered  - Initiate smoking cessation education as indicated  - Encourage broncho-pulmonary hygiene including cough, deep breathe, Incentive Spirometry  - Assess the need for suctioning and aspirate as needed  - Assess and instruct to report SOB or any respiratory difficulty  - Respiratory Therapy support as indicated  2/24/2024 0733 by Clarissa Ferguson RN  Outcome: Progressing  2/23/2024 1837 by Clarissa Ferguson RN  Outcome: Progressing     Problem: METABOLIC, FLUID AND ELECTROLYTES - ADULT  Goal: Electrolytes maintained within normal limits  Description: INTERVENTIONS:  - Monitor labs and assess patient for signs and symptoms of electrolyte imbalances  - Administer electrolyte replacement as ordered  - Monitor response to electrolyte replacements, including repeat lab results as appropriate  - Instruct patient on fluid and nutrition as appropriate  2/24/2024 0733 by Clarissa Ferguson RN  Outcome: Progressing  2/23/2024 1837 by Clarissa Ferguson RN  Outcome: Progressing  Goal: Fluid balance maintained  Description: INTERVENTIONS:  - Monitor labs   - Monitor I/O and WT  - Instruct patient on fluid and nutrition as appropriate  - Assess for signs & symptoms of volume excess or deficit  2/24/2024 0733 by Clarissa  CLARISSE Ferguson  Outcome: Progressing  2/23/2024 1837 by Clarissa Ferguson RN  Outcome: Progressing

## 2024-02-24 NOTE — NURSING NOTE
Reviewed AVS with patient including s/s to monitor for, medications, reviewed hypomagnesemia and congestive heart failure education. Patient stated he would like to quit smoking, RN provided smoking cessation education. IV catheter removed and intact. Patient escorted out by PCA via wheelchair.

## 2024-02-24 NOTE — DISCHARGE SUMMARY
DISCHARGE SUMMARY  [facility]       Britton Batista   58 y.o. male  MRN: [id number]  Room/Bed: /-01     [facility]    Encounter: [id number]    Principal Problem:    Acute on chronic combined systolic and diastolic congestive heart failure (HCC)  Active Problems:    Hyponatremia    PSS (progressive systemic sclerosis) ([facility])    Elevated troponin    Emphysema of lung (HCC)    Tobacco abuse    Hypomagnesemia      * Acute on chronic combined systolic and diastolic congestive heart failure (HCC)  Assessment & Plan  Presented with worsening shortness of breath, weight gain, bilateral lower extremity edema likely due to dietary noncompliance.  History of systolic diastolic CHF with EF 24% last reported 6 months ago.  Was treated with IV diuretic with significant improvement.  Was appearing euvolemic on discharge.  Follows up with [facility] cardiology.  Saturating well on room air.    Continue home Lasix, losartan, Aldactone, Coreg  Follow low-sodium diet was highly encouraged  Follow-up with cardiology as routine    Hypomagnesemia  Assessment & Plan  Replete and recheck    Tobacco abuse  Assessment & Plan  1/2 pack/day  Nicotine patch  Cessation counseling    Emphysema of lung ([facility])  Assessment & Plan  Mild acute exacerbation, continued tobacco abuse  Wheezing on exam without respiratory distress  Continue nebulized bronchodilators as needed    Elevated troponin  Assessment & Plan  Mildly elevated flat troponin likely in setting of CHF exacerbation.      PSS (progressive systemic sclerosis) ([facility])  Assessment & Plan  Followed by [facility] rheumatology  PSS/ILD/raynauds /recent critical digital ischemia s/p partial amputations distal fingers left index and right third    Hyponatremia  Assessment & Plan  Low sodium likely in setting of CHF.  Continue to monitor outpatient.        DETAILS OF HOSPITAL COURSE     Reason for Admission: CHF exacerbation    Patient  with acute on chronic systolic diastolic CHF exacerbation on presentation due to lower extremity edema, shortness of breath, weight gain.  Likely due to dietary noncompliance.  Treated with IV diuretic, low-salt diet, daily weight, I's and O's.  Significant improvement.  Was tolerating medication well on discharge.  Tolerating diet well.  Hemodynamically stable.  Saturating well on room air.  Ambulating oxygen requirement was measured with nurses showing patient saturating well above 95% on ambulation without any symptoms.    Rest of the details as per assessment and plan.    DISCHARGE INFORMATION     PCP at Discharge: [facility] Referral      Admitting Provider: Anna Rushing MD  Admission Date: 2/23/2024    Discharge Provider: No att. providers found  Discharge Date: 2/24/2024    Discharge Disposition: Home/Self Care  Discharge Condition: good    Test Results Pending at Discharge: None    Outpatient Tests Requested: None    Discharge Diagnoses:  Principal Problem:    Acute on chronic combined systolic and diastolic congestive heart failure (HCC)  Active Problems:    Hyponatremia    PSS (progressive systemic sclerosis) (HCC)    Elevated troponin    Emphysema of lung (HCC)    Tobacco abuse    Hypomagnesemia  Resolved Problems:    * No resolved hospital problems. *      Consultations During Hospital Stay:  Cardiology    Diagnostic & Therapeutic Procedures Performed:  XR chest 1 view portable    Result Date: 2/23/2024  Impression: Stable cardiomegaly with mild pulmonary edema. Resident: SAHIL AGUILAR I, the attending radiologist, have reviewed the images and agree with the final report above. Workstation performed: [id number]       Code Status: Level 1 - Full Code  Advance Directive & Living Will: <no information>  Power of :    POLST:      Medications:  Current Discharge Medication List        STOP taking these medications       lidocaine (LMX) 4 % cream Comments:   Reason for Stopping:       "   meloxicam (MOBIC) 15 mg tablet Comments:   Reason for Stopping:         ondansetron (Zofran ODT) 4 mg disintegrating tablet Comments:   Reason for Stopping:             Current Discharge Medication List        Current Discharge Medication List        CONTINUE these medications which have NOT CHANGED    Details   albuterol (PROVENTIL HFA,VENTOLIN HFA) 90 mcg/act inhaler Inhale 2 puffs every 4 (four) hours as needed     Comments: <!--EPICS-->Substitution to a formulary equivalent within the same pharmaceutical class is authorized.<!--EPICE-->      carvedilol (COREG) 6.25 mg tablet Take 6.25 mg by mouth 2 (two) times a day with meals      gabapentin (NEURONTIN) 400 mg capsule Take 400 mg by mouth 3 (three) times a day      losartan (COZAAR) 50 mg tablet Take 1 tablet (50 mg total) by mouth daily Do not start before October 11, 2023.  Qty: 30 tablet, Refills: 0    Associated Diagnoses: Cardiomyopathy (HCC)      spironolactone (ALDACTONE) 25 mg tablet Take 1 tablet (25 mg total) by mouth daily  Qty: 30 tablet, Refills: 0    Associated Diagnoses: Cardiomyopathy (HCC)      Farxiga 5 MG TABS Take 5 mg by mouth in the morning      oxyCODONE-acetaminophen (Percocet) 5-325 mg per tablet Take 1 tablet by mouth every 8 (eight) hours as needed for severe pain Max Daily Amount: 3 tablets  Qty: 12 tablet, Refills: 0    Comments: Continuation of treatment  Associated Diagnoses: Finger pain, left             Allergies:  Allergies   Allergen Reactions    Aspirin GI Intolerance       Discharge Day Visit / Exam:     Subjective: Patient was seen and examined by me.  Communicated clearly.  No particular overnight event reported.  Hemodynamically stable and afebrile.  Patient feels better overall.    Vitals: Blood Pressure: 127/88 (02/24/24 1114)  Pulse: (!) 50 (02/24/24 1114)  Temperature: 98.2 °F (36.8 °C) (02/24/24 0717)  Temp Source: Temporal (02/24/24 0717)  Respirations: 20 (02/24/24 0717)  Height: 5' 9\" (175.3 cm) (02/23/24 " 3163)  Weight - Scale: 71.3 kg (157 lb 3.2 oz) (02/24/24 0600)  SpO2: 97 % (02/24/24 1114)  Exam:   Physical Exam  Vitals reviewed.   Constitutional:       General: He is not in acute distress.     Appearance: He is well-developed.   HENT:      Head: Normocephalic and atraumatic.   Eyes:      General: No scleral icterus.        Right eye: No discharge.         Left eye: No discharge.   Cardiovascular:      Rate and Rhythm: Normal rate and regular rhythm.      Pulses: Normal pulses.      Heart sounds: Normal heart sounds.   Pulmonary:      Effort: Pulmonary effort is normal. No respiratory distress.      Breath sounds: Normal breath sounds. No wheezing or rales.   Chest:      Chest wall: No tenderness.   Abdominal:      General: Bowel sounds are normal. There is no distension.      Palpations: Abdomen is soft.      Tenderness: There is no abdominal tenderness.   Musculoskeletal:         General: No swelling or tenderness. Normal range of motion.      Cervical back: Normal range of motion.      Right lower leg: No edema.      Left lower leg: No edema.   Skin:     General: Skin is warm.      Coloration: Skin is not pale.   Neurological:      General: No focal deficit present.      Mental Status: He is alert and oriented to person, place, and time. Mental status is at baseline.      Cranial Nerves: No cranial nerve deficit.      Sensory: No sensory deficit.      Motor: No weakness.   Psychiatric:         Mood and Affect: Mood normal.         Behavior: Behavior normal.         Discussion with Family: None  Discharge instructions/Information to patient and family:   See after visit summary for information provided to patient and family.      Provisions for Follow-Up Care:  See after visit summary for information related to follow-up care and any pertinent home health orders.       Discharge Medications:  See after visit summary for reconciled discharge medications provided to patient and family.    Discharge Statement:  "  I spent 40 minutes minutes discharging the patient. This time was spent on the day of discharge. I had direct contact with the patient on the day of discharge. Additional documentation is required if more than 30 minutes were spent on discharge.    Portions of the record may have been created with voice recognition software.  Occasional wrong word or \"sound a like\" substitutions may have occurred due to the inherent limitations of voice recognition software.  Read the chart carefully and recognize, using context, where substitutions have occurred.  "

## 2024-02-24 NOTE — ASSESSMENT & PLAN NOTE
Presented with worsening shortness of breath, weight gain, bilateral lower extremity edema likely due to dietary noncompliance.  History of systolic diastolic CHF with EF 24% last reported 6 months ago.  Was treated with IV diuretic with significant improvement.  Was appearing euvolemic on discharge.  Follows up with [facility] cardiology.  Saturating well on room air.    Continue home Lasix, losartan, Aldactone, Coreg  Follow low-sodium diet was highly encouraged  Follow-up with cardiology as routine

## 2024-02-24 NOTE — UTILIZATION REVIEW
NOTIFICATION OF INPATIENT ADMISSION   AUTHORIZATION REQUEST   SERVICING FACILITY:   [place] [address]  Tax ID: [id number]  NPI: [id number] ATTENDING PROVIDER:  Attending Name and NPI#: Ravi Carroll Do [[id number]]  Address: [address] [place] [address]  Phone: [phone]   ADMISSION INFORMATION:  Place of Service: Inpatient [facility]  Place of Service Code: 21  Inpatient Admission Date/Time: 2/23/24  2:18 AM  Discharge Date/Time: No discharge date for patient encounter.  Admitting Diagnosis Code/Description:  CHF (congestive heart failure) (HCC) [I50.9]  HTN (hypertension) [I10]  SOB (shortness of breath) [R06.02]  Elevated troponin [R79.89]     UTILIZATION REVIEW CONTACT:  Comfort Dennis, Utilization   Network Utilization Review Department  Phone: [phone]  Fax [phone]  Email: [email or web address]  Contact for approvals/pending authorizations, clinical reviews, and discharge.     PHYSICIAN ADVISORY SERVICES:  Medical Necessity Denial & Zydl-mp-Waqp Review  Phone: [phone]  Fax: [phone]  Email: [email or web address]     DISCHARGE SUPPORT TEAM:  For Patients Discharge Needs & Updates  Phone: [phone] opt. 2 Fax: [phone]  Email: [email or web address]

## 2024-02-24 NOTE — PLAN OF CARE
Problem: Knowledge Deficit  Goal: Patient/family/caregiver demonstrates understanding of disease process, treatment plan, medications, and discharge instructions  Description: Complete learning assessment and assess knowledge base.  Interventions:  - Provide teaching at level of understanding  - Provide teaching via preferred learning methods  Outcome: Progressing     Problem: CARDIOVASCULAR - ADULT  Goal: Maintains optimal cardiac output and hemodynamic stability  Description: INTERVENTIONS:  - Monitor I/O, vital signs and rhythm  - Monitor for S/S and trends of decreased cardiac output  - Administer and titrate ordered vasoactive medications to optimize hemodynamic stability  - Assess quality of pulses, skin color and temperature  - Assess for signs of decreased coronary artery perfusion  - Instruct patient to report change in severity of symptoms  Outcome: Progressing  Goal: Absence of cardiac dysrhythmias or at baseline rhythm  Description: INTERVENTIONS:  - Continuous cardiac monitoring, vital signs, obtain 12 lead EKG if ordered  - Administer antiarrhythmic and heart rate control medications as ordered  - Monitor electrolytes and administer replacement therapy as ordered  Outcome: Progressing     Problem: RESPIRATORY - ADULT  Goal: Achieves optimal ventilation and oxygenation  Description: INTERVENTIONS:  - Assess for changes in respiratory status  - Assess for changes in mentation and behavior  - Position to facilitate oxygenation and minimize respiratory effort  - Oxygen administered by appropriate delivery if ordered  - Initiate smoking cessation education as indicated  - Encourage broncho-pulmonary hygiene including cough, deep breathe, Incentive Spirometry  - Assess the need for suctioning and aspirate as needed  - Assess and instruct to report SOB or any respiratory difficulty  - Respiratory Therapy support as indicated  Outcome: Progressing     Problem: METABOLIC, FLUID AND ELECTROLYTES - ADULT  Goal:  Electrolytes maintained within normal limits  Description: INTERVENTIONS:  - Monitor labs and assess patient for signs and symptoms of electrolyte imbalances  - Administer electrolyte replacement as ordered  - Monitor response to electrolyte replacements, including repeat lab results as appropriate  - Instruct patient on fluid and nutrition as appropriate  Outcome: Progressing  Goal: Fluid balance maintained  Description: INTERVENTIONS:  - Monitor labs   - Monitor I/O and WT  - Instruct patient on fluid and nutrition as appropriate  - Assess for signs & symptoms of volume excess or deficit  Outcome: Progressing

## 2024-02-25 LAB
ATRIAL RATE: 85 BPM
P AXIS: 70 DEGREES
PR INTERVAL: 186 MS
QRS AXIS: 86 DEGREES
QRSD INTERVAL: 200 MS
QT INTERVAL: 516 MS
QTC INTERVAL: 614 MS
T WAVE AXIS: -31 DEGREES
VENTRICULAR RATE: 85 BPM

## 2024-02-25 PROCEDURE — 93010 ELECTROCARDIOGRAM REPORT: CPT | Performed by: INTERNAL MEDICINE

## 2024-02-26 NOTE — UTILIZATION REVIEW
NOTIFICATION OF ADMISSION DISCHARGE   This is a Notification of Discharge from [facility]. Please be advised that this patient has been discharge from our facility. Below you will find the admission and discharge date and time including the patient’s disposition.   UTILIZATION REVIEW CONTACT:  Comfort Dennis  Utilization   Network Utilization Review Department  Phone: [phone] x carefully listen to the prompts. All voicemails are confidential.  Email: [email or web address]     ADMISSION INFORMATION  PRESENTATION DATE: 2/23/2024 12:20 AM  OBERVATION ADMISSION DATE:   INPATIENT ADMISSION DATE: 2/23/24  2:18 AM   DISCHARGE DATE: 2/24/2024  2:14 PM   DISPOSITION:Home/Self Care    Network Utilization Review Department  ATTENTION: Please call with any questions or concerns to [phone] and carefully listen to the prompts so that you are directed to the right person. All voicemails are confidential.   For Discharge needs, contact Care Management DC Support Team at [phone] opt. 2  Send all requests for admission clinical reviews, approved or denied determinations and any other requests to dedicated fax number below belonging to the campus where the patient is receiving treatment. List of dedicated fax numbers for the Facilities:  FACILITY NAME UR FAX NUMBER   ADMISSION DENIALS (Administrative/Medical Necessity) [phone]   DISCHARGE SUPPORT TEAM ([facility]) [phone]   PARENT CHILD HEALTH (Maternity/NICU/Pediatrics) [phone]   [facility] [phone]   [facility] [phone]   [facility] [phone]   General acute hospital [phone]   [facility] [phone]   [facility] [phone]   [facility] [phone]   [facility]  [phone]   [facility] [phone]   [facility] [phone]   [facility] [phone]   [facility] [phone]

## 2024-02-28 ENCOUNTER — HOSPITAL ENCOUNTER (OUTPATIENT)
Dept: RADIOLOGY | Facility: CLINIC | Age: 59
Discharge: HOME | End: 2024-02-28
Payer: COMMERCIAL

## 2024-02-28 ENCOUNTER — OFFICE VISIT (OUTPATIENT)
Dept: PODIATRY | Age: 59
End: 2024-02-28
Payer: COMMERCIAL

## 2024-02-28 VITALS
WEIGHT: 161 LBS | HEIGHT: 69 IN | HEART RATE: 55 BPM | BODY MASS INDEX: 23.85 KG/M2 | SYSTOLIC BLOOD PRESSURE: 162 MMHG | OXYGEN SATURATION: 99 % | DIASTOLIC BLOOD PRESSURE: 80 MMHG

## 2024-02-28 DIAGNOSIS — M20.42 HAMMERTOE OF LEFT FOOT: ICD-10-CM

## 2024-02-28 DIAGNOSIS — M79.672 LEFT FOOT PAIN: Primary | ICD-10-CM

## 2024-02-28 DIAGNOSIS — M79.672 LEFT FOOT PAIN: ICD-10-CM

## 2024-02-28 DIAGNOSIS — I73.00 RAYNAUD'S DISEASE WITHOUT GANGRENE: ICD-10-CM

## 2024-02-28 DIAGNOSIS — Z72.0 TOBACCO ABUSE: ICD-10-CM

## 2024-02-28 DIAGNOSIS — M19.079 ARTHRITIS OF METATARSOPHALANGEAL (MTP) JOINT OF GREAT TOE: ICD-10-CM

## 2024-02-28 PROCEDURE — 73630 X-RAY EXAM OF FOOT: CPT

## 2024-02-28 PROCEDURE — 99203 OFFICE O/P NEW LOW 30 MIN: CPT | Performed by: STUDENT IN AN ORGANIZED HEALTH CARE EDUCATION/TRAINING PROGRAM

## 2024-02-28 NOTE — PATIENT INSTRUCTIONS
Dancers pad or silicone hammer toe crest. Check frequently to make sure crest is not tight.     Wear supportive shoes at all times. Avoid flip-flops, flat sandals, barefoot walking (never walk barefoot, even at home). Generally avoid shoes that are too flexible and bend in the arch. Your shoes should only slightly bend in the toe area, not the middle. Running sneakers are often the best choice.  Supportive sneaker brands: Kaur, On Cloud, Dre, Altra, New Balance, Asics, Mizuno  [employer] (discount if mention Dr referred)  Fleet Feet [place]  [facility] [place]  Supportive daily shoe brands: Vionic, Orthofeet, Carola, Dansko, Chacos, Brower, Teva, Birkenstock  Supportive home shoes: Dre La (recovery slides)  Look in to over the counter shoe inserts/arch supports such as Dananberg arch supports. These are all available on [employer].com as well as their individual website's.   [employer]: Vasyli+Dananberg 1st Ray Orthotic, Medium, 1st Ray Function, Medium Density, Full-Length Insole, Heat Molding Optional, Best All Around Orthotic, Functional Biomechanical Control for Pain Relief, Black Yellow (80732) : Health & Household

## 2024-02-28 NOTE — PROGRESS NOTES
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  Significant 1st MTPJ arthritic changes with nearly absent joint space, periarticular osteophytic lipping. Elevated 1st met.      IMPRESSION:  Left foot 2nd toe distal tip pain with HT   1st MTPJ hallux rigidus, nonpainful   H/o Raynauds  Tobacco abuse     PLAN:  I reviewed clinical exam and radiographic imaging (XR) with patient in detail today. I have discussed with the patient the pathophysiology of this diagnosis and reviewed how the examination correlates with this diagnosis.  I reviewed hospital stay notes from Feb 2024  Patient has extensive 1st MTPJ arthritis with adjacent 2nd toe overload, elongation and hammering causing pressure to distal tip and pain. Raynauds/smoking may be adding to his pain. There is no ulceration or callus formation.    I applied a silicone toe crest and dancers pad to offload, patient may due the same but check hourly to make sure the crest is not too tight as he is at risk for vascular compromise from smoking and Raynauds  I recommend stiff bottomed sneakers/shoes (ex Asics, Vionic, New balance, Kaur, etc) for daily use and [employer]of[employer]re law (recovery slides) for in home use.   I advised pt to obtain OTC dananberg arch supports  RCT PRN    Subjective:      Patient ID: Britton Batista is a 58 y.o. male.    Britton presents to clinic today concerning left 2nd toe pain. Pain with pressure to tip when ambulating. H/o Raynauds and current smoking.          The following portions of the patient's history were reviewed and updated as appropriate:  "allergies, current medications, past family history, past medical history, past social history, past surgical history, and problem list.    Review of Systems   Constitutional:  Negative for activity change, chills and fever.   HENT: Negative.     Respiratory:  Negative for cough, chest tightness and shortness of breath.    Cardiovascular:  Negative for chest pain and leg swelling.   Endocrine: Negative.    Genitourinary: Negative.    Neurological: Negative.  Negative for numbness.   Psychiatric/Behavioral: Negative.  Negative for agitation and behavioral problems.          Objective:      /80   Pulse 55   Ht 5' 9\" (1.753 m)   Wt 73 kg (161 lb)   SpO2 99%   BMI 23.78 kg/m²          Physical Exam  Constitutional:       General: He is not in acute distress.     Appearance: Normal appearance. He is not ill-appearing.   Cardiovascular:      Comments: Bilateral DP and PT pulses are palpable 1/4. Pedal hair is diminished. Legs to toes warm to cool.   Pulmonary:      Effort: No respiratory distress.   Musculoskeletal:         General: Tenderness (left distal 2nd toe tip) and deformity (left 2-5 HTs) present.      Comments: Absent ROM left 1st MTPJ without pain.    Skin:     Capillary Refill: Capillary refill takes less than 2 seconds.      Findings: No lesion.      Comments: B/L LE skin is atrophic - thin, dry and shiny in appearance. No open wounds noted.   Toenails x10 are elongated, dystrophic, discolored with thickening and subungual debris.  Toes are cool to touch    Neurological:      General: No focal deficit present.      Mental Status: He is alert and oriented to person, place, and time.      Comments: Gross sensation to feet intact.    Psychiatric:         Mood and Affect: Mood normal.         Behavior: Behavior normal.           "

## 2024-03-17 ENCOUNTER — HOSPITAL ENCOUNTER (EMERGENCY)
Facility: HOSPITAL | Age: 59
Discharge: HOME | End: 2024-03-17
Attending: EMERGENCY MEDICINE
Payer: COMMERCIAL

## 2024-03-17 VITALS
RESPIRATION RATE: 16 BRPM | HEIGHT: 69 IN | HEART RATE: 66 BPM | BODY MASS INDEX: 23.25 KG/M2 | DIASTOLIC BLOOD PRESSURE: 93 MMHG | TEMPERATURE: 97.3 F | SYSTOLIC BLOOD PRESSURE: 169 MMHG | WEIGHT: 157 LBS | OXYGEN SATURATION: 94 %

## 2024-03-17 DIAGNOSIS — Z51.89 WOUND CHECK, ABSCESS: Primary | ICD-10-CM

## 2024-03-17 PROCEDURE — 99284 EMERGENCY DEPT VISIT MOD MDM: CPT

## 2024-03-17 PROCEDURE — 99284 EMERGENCY DEPT VISIT MOD MDM: CPT | Performed by: EMERGENCY MEDICINE

## 2024-03-17 RX ORDER — CLINDAMYCIN HYDROCHLORIDE 150 MG/1
300 CAPSULE ORAL ONCE
Status: COMPLETED | OUTPATIENT
Start: 2024-03-17 | End: 2024-03-17

## 2024-03-17 RX ORDER — CLINDAMYCIN HYDROCHLORIDE 150 MG/1
300 CAPSULE ORAL EVERY 6 HOURS
Qty: 80 CAPSULE | Refills: 0 | Status: SHIPPED | OUTPATIENT
Start: 2024-03-17 | End: 2024-03-27

## 2024-03-17 RX ADMIN — CLINDAMYCIN HYDROCHLORIDE 300 MG: 150 CAPSULE ORAL at 09:20

## 2024-03-17 NOTE — ED NOTES
Pt in no acute distress. Ambulates with a steady gait. Verbalizes understanding of discharge instructions       Geetha Babcock RN  03/17/24 4530

## 2024-03-17 NOTE — ED PROVIDER NOTES
"History  Chief Complaint   Patient presents with    Wound Check     Patient states he noticed yesterday that his right middle finger was infected. Reports prior amputation \"awhile ago\".      Patient with a history of a right third finger partial amputation.  Now with a scab and draining pus.  No fevers or chills.  No nausea vomiting or diarrhea.  Does follow-up with hand surgery.      History provided by:  Patient   used: No    Wound Check   He was treated in the ED Today. Prior ED Treatment: No treatment. There has been no treatment since the wound repair. There has been bloody discharge from the wound. There is no redness present. There is no swelling present. There is no pain present.       Prior to Admission Medications   Prescriptions Last Dose Informant Patient Reported? Taking?   Farxiga 5 MG TABS   Yes No   Sig: Take 5 mg by mouth in the morning   Patient not taking: Reported on 2/28/2024   albuterol (PROVENTIL HFA,VENTOLIN HFA) 90 mcg/act inhaler   Yes No   Sig: Inhale 2 puffs every 4 (four) hours as needed    carvedilol (COREG) 6.25 mg tablet   Yes No   Sig: Take 6.25 mg by mouth 2 (two) times a day with meals   furosemide (LASIX) 20 mg tablet   No No   Sig: Take 1 tablet (20 mg total) by mouth daily Do not start before February 25, 2024.   gabapentin (NEURONTIN) 400 mg capsule   Yes No   Sig: Take 400 mg by mouth 3 (three) times a day   losartan (COZAAR) 50 mg tablet   No No   Sig: Take 1 tablet (50 mg total) by mouth daily Do not start before October 11, 2023.   oxyCODONE-acetaminophen (Percocet) 5-325 mg per tablet   No No   Sig: Take 1 tablet by mouth every 8 (eight) hours as needed for severe pain Max Daily Amount: 3 tablets   Patient not taking: Reported on 2/23/2024   spironolactone (ALDACTONE) 25 mg tablet   No No   Sig: Take 1 tablet (25 mg total) by mouth daily   Patient not taking: Reported on 2/28/2024      Facility-Administered Medications: None       Past Medical History: " "  Diagnosis Date    Anxiety     Cardiomyopathy (HCC)     Depression     Emphysema of lung (HCC)     GERD (gastroesophageal reflux disease)     Gout     Hypertension     Neuropathy     Raynaud's disease     Right inguinal hernia     Scleroderma (HCC)        Past Surgical History:   Procedure Laterality Date    AMPUTATION Right     pt had tip of \"pointer finger\" d/t scleraderma    FINGER AMPUTATION Left 1/31/2024    Procedure: AMPUTATION FINGER, LEFT INDEX FINGER CPT: 92838;  Surgeon: Pedro Vazquez MD;  Location:  MAIN OR;  Service: Orthopedics    HERNIA REPAIR Left     times 2    MT AMP F/TH 1/2 JT/PHALANX W/NEURECT W/DIR CLSR Right 1/23/2024    Procedure: AMPUTATION FINGER, RIGHT MIDDLE;  Surgeon: Pedro Vazquez MD;  Location[facility];  Service: Orthopedics    MT RPR 1ST INGUN HRNA AGE 5 YRS/> REDUCIBLE Right 3/3/2023    Procedure: OPEN INGUINAL HERNIA REPAIR WITH MESH;  Surgeon: Temo Jackson DO;  Location:  MAIN OR;  Service: General       History reviewed. No pertinent family history.  I have reviewed and agree with the history as documented.    E-Cigarette/Vaping    E-Cigarette Use Never User      E-Cigarette/Vaping Substances    Nicotine No     THC No     CBD No     Flavoring No      Social History     Tobacco Use    Smoking status: Every Day     Current packs/day: 1.00     Average packs/day: 1 pack/day for 20.0 years (20.0 ttl pk-yrs)     Types: Cigarettes    Smokeless tobacco: Never    Tobacco comments:     Last cigarette 0430   Vaping Use    Vaping status: Never Used   Substance Use Topics    Alcohol use: Not Currently     Comment: occasional    Drug use: Yes     Types: Marijuana, Methamphetamines     Comment: Hx meth use , last marijuana 1/21       Review of Systems   Constitutional:  Negative for chills and fever.   HENT:  Negative for ear pain, hearing loss, sore throat, trouble swallowing and voice change.    Eyes:  Negative for pain and discharge.   Respiratory:  Negative for cough, shortness of " breath and wheezing.    Cardiovascular:  Negative for chest pain and palpitations.   Gastrointestinal:  Negative for abdominal pain, blood in stool, constipation, diarrhea, nausea and vomiting.   Genitourinary:  Negative for dysuria, flank pain, frequency and hematuria.   Musculoskeletal:  Negative for joint swelling, neck pain and neck stiffness.   Skin:  Negative for rash and wound.   Neurological:  Negative for dizziness, seizures, syncope, facial asymmetry and headaches.   Psychiatric/Behavioral:  Negative for hallucinations, self-injury and suicidal ideas.    All other systems reviewed and are negative.      Physical Exam  Physical Exam  Constitutional:       General: He is not in acute distress.     Appearance: Normal appearance. He is not ill-appearing.   HENT:      Head: Normocephalic and atraumatic.      Right Ear: External ear normal.      Left Ear: External ear normal.      Nose: Nose normal.      Mouth/Throat:      Mouth: Mucous membranes are moist.   Eyes:      Extraocular Movements: Extraocular movements intact.      Pupils: Pupils are equal, round, and reactive to light.   Cardiovascular:      Rate and Rhythm: Normal rate and regular rhythm.   Pulmonary:      Effort: Pulmonary effort is normal. No respiratory distress.      Breath sounds: Normal breath sounds.   Abdominal:      General: Abdomen is flat. Bowel sounds are normal. There is no distension.      Palpations: Abdomen is soft.      Tenderness: There is no abdominal tenderness.   Musculoskeletal:         General: No swelling or tenderness.      Cervical back: Normal range of motion and neck supple.      Comments: Right index finger wound scabbed over.  No surrounding erythema or warmth.  Fingers are dusky which are normal.  No fluctuance or discharge.   Skin:     General: Skin is warm and dry.      Capillary Refill: Capillary refill takes less than 2 seconds.   Neurological:      General: No focal deficit present.      Mental Status: He is  alert and oriented to person, place, and time.   Psychiatric:         Mood and Affect: Mood normal.         Behavior: Behavior normal.         Vital Signs  ED Triage Vitals [03/17/24 0909]   Temperature Pulse Respirations Blood Pressure SpO2   (!) 97.3 °F (36.3 °C) 66 16 169/93 94 %      Temp Source Heart Rate Source Patient Position - Orthostatic VS BP Location FiO2 (%)   Temporal Monitor Lying Right arm --      Pain Score       5           Vitals:    03/17/24 0909   BP: 169/93   Pulse: 66   Patient Position - Orthostatic VS: Lying         Visual Acuity      ED Medications  Medications - No data to display    Diagnostic Studies  Results Reviewed       None                   No orders to display              Procedures  Procedures         ED Course                               SBIRT 22yo+      Flowsheet Row Most Recent Value   Initial Alcohol Screen: US AUDIT-C     1. How often do you have a drink containing alcohol? 0 Filed at: 03/17/2024 0912   2. How many drinks containing alcohol do you have on a typical day you are drinking?  0 Filed at: 03/17/2024 0912   3a. Male UNDER 65: How often do you have five or more drinks on one occasion? 0 Filed at: 03/17/2024 0912   Audit-C Score 0 Filed at: 03/17/2024 0912   SILVIA: How many times in the past year have you...    Used an illegal drug or used a prescription medication for non-medical reasons? Never Filed at: 03/17/2024 0912                      Medical Decision Making           Disposition  Final diagnoses:   None     ED Disposition       None          Follow-up Information    None         Patient's Medications   Discharge Prescriptions    No medications on file       No discharge procedures on file.    PDMP Review         Value Time User    PDMP Reviewed  Yes 2/5/2024  8:49 AM Terrence Andrews PA-C            ED Provider  Electronically Signed by             Jonh Trimble MD  03/17/24 0918

## 2024-03-20 ENCOUNTER — TELEPHONE (OUTPATIENT)
Age: 59
End: 2024-03-20

## 2024-03-20 DIAGNOSIS — M79.642 PAIN IN BOTH HANDS: ICD-10-CM

## 2024-03-20 DIAGNOSIS — M86.9 OSTEOMYELITIS OF FINGER OF RIGHT HAND (HCC): Primary | ICD-10-CM

## 2024-03-20 DIAGNOSIS — M79.641 PAIN IN BOTH HANDS: ICD-10-CM

## 2024-03-20 RX ORDER — TRAMADOL HYDROCHLORIDE 50 MG/1
50 TABLET ORAL EVERY 12 HOURS PRN
Qty: 10 TABLET | Refills: 0 | Status: ON HOLD | OUTPATIENT
Start: 2024-03-20 | End: 2024-03-26

## 2024-03-20 NOTE — TELEPHONE ENCOUNTER
Caller: patient sister Arin    Doctor: George    Reason for call: patient taking acetaminophen for pain, asking for something stronger.  Scheduled to be seen 3/21    Preferred Pharmacy is [facility] in [place]     Call back#: [phone]

## 2024-03-26 ENCOUNTER — APPOINTMENT (EMERGENCY)
Dept: RADIOLOGY | Facility: HOSPITAL | Age: 59
End: 2024-03-26
Payer: COMMERCIAL

## 2024-03-26 ENCOUNTER — APPOINTMENT (OUTPATIENT)
Dept: NON INVASIVE DIAGNOSTICS | Facility: HOSPITAL | Age: 59
End: 2024-03-26
Payer: COMMERCIAL

## 2024-03-26 ENCOUNTER — HOSPITAL ENCOUNTER (OUTPATIENT)
Facility: HOSPITAL | Age: 59
Setting detail: OBSERVATION
Discharge: HOME | End: 2024-03-26
Attending: EMERGENCY MEDICINE | Admitting: FAMILY MEDICINE
Payer: COMMERCIAL

## 2024-03-26 VITALS
DIASTOLIC BLOOD PRESSURE: 89 MMHG | BODY MASS INDEX: 24.23 KG/M2 | SYSTOLIC BLOOD PRESSURE: 121 MMHG | TEMPERATURE: 96.6 F | WEIGHT: 163.58 LBS | HEIGHT: 69 IN | HEART RATE: 73 BPM | OXYGEN SATURATION: 92 % | RESPIRATION RATE: 18 BRPM

## 2024-03-26 DIAGNOSIS — F32.A DEPRESSION, UNSPECIFIED DEPRESSION TYPE: ICD-10-CM

## 2024-03-26 DIAGNOSIS — F41.9 ANXIETY: ICD-10-CM

## 2024-03-26 DIAGNOSIS — M79.641 PAIN IN BOTH HANDS: ICD-10-CM

## 2024-03-26 DIAGNOSIS — M79.642 PAIN IN BOTH HANDS: ICD-10-CM

## 2024-03-26 DIAGNOSIS — R79.89 ELEVATED TROPONIN: Primary | ICD-10-CM

## 2024-03-26 DIAGNOSIS — R07.89 CHEST WALL PAIN: ICD-10-CM

## 2024-03-26 DIAGNOSIS — M86.9 OSTEOMYELITIS OF FINGER OF RIGHT HAND (HCC): ICD-10-CM

## 2024-03-26 DIAGNOSIS — I42.9 CARDIOMYOPATHY (HCC): ICD-10-CM

## 2024-03-26 DIAGNOSIS — R07.9 CHEST PAIN: ICD-10-CM

## 2024-03-26 DIAGNOSIS — I50.22 CHRONIC HFREF (HEART FAILURE WITH REDUCED EJECTION FRACTION) (HCC): ICD-10-CM

## 2024-03-26 PROBLEM — I10 PRIMARY HYPERTENSION: Status: ACTIVE | Noted: 2024-03-26

## 2024-03-26 LAB
2HR DELTA HS TROPONIN: 20 NG/L
4HR DELTA HS TROPONIN: 34 NG/L
ALBUMIN SERPL BCP-MCNC: 4 G/DL (ref 3.5–5)
ALP SERPL-CCNC: 92 U/L (ref 34–104)
ALT SERPL W P-5'-P-CCNC: 10 U/L (ref 7–52)
AMPHETAMINES SERPL QL SCN: NEGATIVE
ANION GAP SERPL CALCULATED.3IONS-SCNC: 8 MMOL/L (ref 4–13)
ANION GAP SERPL CALCULATED.3IONS-SCNC: 9 MMOL/L (ref 4–13)
AORTIC ROOT: 3.6 CM
AORTIC VALVE MEAN VELOCITY: 10 M/S
APICAL FOUR CHAMBER EJECTION FRACTION: 44 %
ASCENDING AORTA: 3 CM
AST SERPL W P-5'-P-CCNC: 20 U/L (ref 13–39)
ATRIAL RATE: 72 BPM
ATRIAL RATE: 82 BPM
ATRIAL RATE: 83 BPM
ATRIAL RATE: 88 BPM
AV AREA BY CONTINUOUS VTI: 2.1 CM2
AV AREA PEAK VELOCITY: 2 CM2
AV LVOT MEAN GRADIENT: 1 MMHG
AV LVOT PEAK GRADIENT: 3 MMHG
AV MEAN GRADIENT: 5 MMHG
AV PEAK GRADIENT: 10 MMHG
AV VALVE AREA: 2.07 CM2
AV VELOCITY RATIO: 0.54
BARBITURATES UR QL: NEGATIVE
BASOPHILS # BLD AUTO: 0.04 THOUSANDS/ÂΜL (ref 0–0.1)
BASOPHILS NFR BLD AUTO: 1 % (ref 0–1)
BENZODIAZ UR QL: NEGATIVE
BILIRUB SERPL-MCNC: 0.73 MG/DL (ref 0.2–1)
BNP SERPL-MCNC: 3666 PG/ML (ref 0–100)
BSA FOR ECHO PROCEDURE: 1.9 M2
BUN SERPL-MCNC: 13 MG/DL (ref 5–25)
BUN SERPL-MCNC: 14 MG/DL (ref 5–25)
CALCIUM SERPL-MCNC: 9.5 MG/DL (ref 8.4–10.2)
CALCIUM SERPL-MCNC: 9.9 MG/DL (ref 8.4–10.2)
CARDIAC TROPONIN I PNL SERPL HS: 100 NG/L
CARDIAC TROPONIN I PNL SERPL HS: 114 NG/L
CARDIAC TROPONIN I PNL SERPL HS: 118 NG/L (ref 8–18)
CARDIAC TROPONIN I PNL SERPL HS: 80 NG/L
CHLORIDE SERPL-SCNC: 99 MMOL/L (ref 96–108)
CHLORIDE SERPL-SCNC: 99 MMOL/L (ref 96–108)
CO2 SERPL-SCNC: 26 MMOL/L (ref 21–32)
CO2 SERPL-SCNC: 27 MMOL/L (ref 21–32)
COCAINE UR QL: NEGATIVE
CREAT SERPL-MCNC: 1.06 MG/DL (ref 0.6–1.3)
CREAT SERPL-MCNC: 1.12 MG/DL (ref 0.6–1.3)
DOP CALC AO PEAK VEL: 1.58 M/S
DOP CALC AO VTI: 24.38 CM
DOP CALC LVOT AREA: 3.8 CM2
DOP CALC LVOT CARDIAC INDEX: 1.5 L/MIN/M2
DOP CALC LVOT CARDIAC OUTPUT: 2.85 L/MIN
DOP CALC LVOT DIAMETER: 2.2 CM
DOP CALC LVOT PEAK VEL VTI: 13.29 CM
DOP CALC LVOT PEAK VEL: 0.85 M/S
DOP CALC LVOT STROKE INDEX: 26.3 ML/M2
DOP CALC LVOT STROKE VOLUME: 50
E WAVE DECELERATION TIME: 260 MS
E/A RATIO: 0.81
EOSINOPHIL # BLD AUTO: 0.1 THOUSAND/ÂΜL (ref 0–0.61)
EOSINOPHIL NFR BLD AUTO: 1 % (ref 0–6)
ERYTHROCYTE [DISTWIDTH] IN BLOOD BY AUTOMATED COUNT: 14.4 % (ref 11.6–15.1)
ERYTHROCYTE [DISTWIDTH] IN BLOOD BY AUTOMATED COUNT: 14.5 % (ref 11.6–15.1)
FRACTIONAL SHORTENING: 14 (ref 28–44)
GFR SERPL CREATININE-BSD FRML MDRD: 72 ML/MIN/1.73SQ M
GFR SERPL CREATININE-BSD FRML MDRD: 76 ML/MIN/1.73SQ M
GLUCOSE SERPL-MCNC: 110 MG/DL (ref 65–140)
GLUCOSE SERPL-MCNC: 112 MG/DL (ref 65–140)
HCT VFR BLD AUTO: 47.9 % (ref 36.5–49.3)
HCT VFR BLD AUTO: 48.6 % (ref 36.5–49.3)
HGB BLD-MCNC: 15.8 G/DL (ref 12–17)
HGB BLD-MCNC: 15.8 G/DL (ref 12–17)
IMM GRANULOCYTES # BLD AUTO: 0.01 THOUSAND/UL (ref 0–0.2)
IMM GRANULOCYTES NFR BLD AUTO: 0 % (ref 0–2)
INTERVENTRICULAR SEPTUM IN DIASTOLE (PARASTERNAL SHORT AXIS VIEW): 1.3 CM
INTERVENTRICULAR SEPTUM: 1.3 CM (ref 0.6–1.1)
LAAS-AP2: 36.9 CM2
LAAS-AP4: 35.8 CM2
LACTATE SERPL-SCNC: 1.7 MMOL/L (ref 0.5–2)
LEFT ATRIUM SIZE: 4.5 CM
LEFT ATRIUM VOLUME (MOD BIPLANE): 147 ML
LEFT ATRIUM VOLUME INDEX (MOD BIPLANE): 77.4 ML/M2
LEFT INTERNAL DIMENSION IN SYSTOLE: 4.4 CM (ref 2.1–4)
LEFT VENTRICLE DIASTOLIC VOLUME (MOD BIPLANE): 215 ML
LEFT VENTRICLE DIASTOLIC VOLUME INDEX (MOD BIPLANE): 113.2 ML/M2
LEFT VENTRICLE SYSTOLIC VOLUME (MOD BIPLANE): 145 ML
LEFT VENTRICLE SYSTOLIC VOLUME INDEX (MOD BIPLANE): 76.3 ML/M2
LEFT VENTRICULAR INTERNAL DIMENSION IN DIASTOLE: 5.1 CM (ref 3.5–6)
LEFT VENTRICULAR POSTERIOR WALL IN END DIASTOLE: 1.3 CM
LEFT VENTRICULAR STROKE VOLUME: 36 ML
LIPASE SERPL-CCNC: 120 U/L (ref 11–82)
LV EF: 33 %
LVSV (TEICH): 36 ML
LYMPHOCYTES # BLD AUTO: 2.05 THOUSANDS/ÂΜL (ref 0.6–4.47)
LYMPHOCYTES NFR BLD AUTO: 26 % (ref 14–44)
MAGNESIUM SERPL-MCNC: 1.6 MG/DL (ref 1.9–2.7)
MCH RBC QN AUTO: 29.9 PG (ref 26.8–34.3)
MCH RBC QN AUTO: 30 PG (ref 26.8–34.3)
MCHC RBC AUTO-ENTMCNC: 32.5 G/DL (ref 31.4–37.4)
MCHC RBC AUTO-ENTMCNC: 33 G/DL (ref 31.4–37.4)
MCV RBC AUTO: 91 FL (ref 82–98)
MCV RBC AUTO: 92 FL (ref 82–98)
METHADONE UR QL: NEGATIVE
MONOCYTES # BLD AUTO: 0.7 THOUSAND/ÂΜL (ref 0.17–1.22)
MONOCYTES NFR BLD AUTO: 9 % (ref 4–12)
MV E'TISSUE VEL-LAT: 6 CM/S
MV E'TISSUE VEL-SEP: 4 CM/S
MV PEAK A VEL: 1.06 M/S
MV PEAK E VEL: 86 CM/S
MV STENOSIS PRESSURE HALF TIME: 75 MS
MV VALVE AREA P 1/2 METHOD: 2.9
NEUTROPHILS # BLD AUTO: 4.88 THOUSANDS/ÂΜL (ref 1.85–7.62)
NEUTS SEG NFR BLD AUTO: 63 % (ref 43–75)
NRBC BLD AUTO-RTO: 0 /100 WBCS
OPIATES UR QL SCN: POSITIVE
OXYCODONE+OXYMORPHONE UR QL SCN: NEGATIVE
P AXIS: 63 DEGREES
P AXIS: 67 DEGREES
P AXIS: 75 DEGREES
P AXIS: 75 DEGREES
PCP UR QL: NEGATIVE
PHOSPHATE SERPL-MCNC: 3.6 MG/DL (ref 2.7–4.5)
PLATELET # BLD AUTO: 314 THOUSANDS/UL (ref 149–390)
PLATELET # BLD AUTO: 322 THOUSANDS/UL (ref 149–390)
PMV BLD AUTO: 10 FL (ref 8.9–12.7)
PMV BLD AUTO: 9.8 FL (ref 8.9–12.7)
POTASSIUM SERPL-SCNC: 4.4 MMOL/L (ref 3.5–5.3)
POTASSIUM SERPL-SCNC: 4.6 MMOL/L (ref 3.5–5.3)
PR INTERVAL: 184 MS
PR INTERVAL: 190 MS
PR INTERVAL: 192 MS
PR INTERVAL: 194 MS
PROT SERPL-MCNC: 8 G/DL (ref 6.4–8.4)
QRS AXIS: 75 DEGREES
QRS AXIS: 76 DEGREES
QRS AXIS: 82 DEGREES
QRS AXIS: 91 DEGREES
QRSD INTERVAL: 174 MS
QRSD INTERVAL: 184 MS
QRSD INTERVAL: 186 MS
QRSD INTERVAL: 204 MS
QT INTERVAL: 460 MS
QT INTERVAL: 468 MS
QT INTERVAL: 478 MS
QT INTERVAL: 510 MS
QTC INTERVAL: 553 MS
QTC INTERVAL: 556 MS
QTC INTERVAL: 558 MS
QTC INTERVAL: 558 MS
RA PRESSURE ESTIMATED: 5 MMHG
RBC # BLD AUTO: 5.26 MILLION/UL (ref 3.88–5.62)
RBC # BLD AUTO: 5.28 MILLION/UL (ref 3.88–5.62)
RIGHT ATRIUM AREA SYSTOLE A4C: 25.5 CM2
RIGHT VENTRICLE ID DIMENSION: 6 CM
RV PSP: 17 MMHG
SL CV LEFT ATRIUM LENGTH A2C: 7.4 CM
SL CV LV EF: 25
SL CV PED ECHO LEFT VENTRICLE DIASTOLIC VOLUME (MOD BIPLANE) 2D: 125 ML
SL CV PED ECHO LEFT VENTRICLE SYSTOLIC VOLUME (MOD BIPLANE) 2D: 89 ML
SODIUM SERPL-SCNC: 133 MMOL/L (ref 135–147)
SODIUM SERPL-SCNC: 135 MMOL/L (ref 135–147)
T WAVE AXIS: -50 DEGREES
T WAVE AXIS: -51 DEGREES
T WAVE AXIS: -58 DEGREES
T WAVE AXIS: -68 DEGREES
THC UR QL: POSITIVE
TR MAX PG: 12 MMHG
TR PEAK VELOCITY: 1.7 M/S
TRICUSPID ANNULAR PLANE SYSTOLIC EXCURSION: 2.2 CM
TRICUSPID VALVE PEAK REGURGITATION VELOCITY: 1.71 M/S
VENTRICULAR RATE: 72 BPM
VENTRICULAR RATE: 82 BPM
VENTRICULAR RATE: 84 BPM
VENTRICULAR RATE: 88 BPM
WBC # BLD AUTO: 7.78 THOUSAND/UL (ref 4.31–10.16)
WBC # BLD AUTO: 8.6 THOUSAND/UL (ref 4.31–10.16)

## 2024-03-26 PROCEDURE — 83880 ASSAY OF NATRIURETIC PEPTIDE: CPT | Performed by: EMERGENCY MEDICINE

## 2024-03-26 PROCEDURE — 99204 OFFICE O/P NEW MOD 45 MIN: CPT | Performed by: STUDENT IN AN ORGANIZED HEALTH CARE EDUCATION/TRAINING PROGRAM

## 2024-03-26 PROCEDURE — 80048 BASIC METABOLIC PNL TOTAL CA: CPT | Performed by: FAMILY MEDICINE

## 2024-03-26 PROCEDURE — 84484 ASSAY OF TROPONIN QUANT: CPT | Performed by: FAMILY MEDICINE

## 2024-03-26 PROCEDURE — 93005 ELECTROCARDIOGRAM TRACING: CPT

## 2024-03-26 PROCEDURE — 71045 X-RAY EXAM CHEST 1 VIEW: CPT

## 2024-03-26 PROCEDURE — 84100 ASSAY OF PHOSPHORUS: CPT | Performed by: FAMILY MEDICINE

## 2024-03-26 PROCEDURE — 96374 THER/PROPH/DIAG INJ IV PUSH: CPT

## 2024-03-26 PROCEDURE — 83690 ASSAY OF LIPASE: CPT | Performed by: FAMILY MEDICINE

## 2024-03-26 PROCEDURE — 93010 ELECTROCARDIOGRAM REPORT: CPT | Performed by: STUDENT IN AN ORGANIZED HEALTH CARE EDUCATION/TRAINING PROGRAM

## 2024-03-26 PROCEDURE — 93010 ELECTROCARDIOGRAM REPORT: CPT | Performed by: INTERNAL MEDICINE

## 2024-03-26 PROCEDURE — 93306 TTE W/DOPPLER COMPLETE: CPT | Performed by: STUDENT IN AN ORGANIZED HEALTH CARE EDUCATION/TRAINING PROGRAM

## 2024-03-26 PROCEDURE — 99285 EMERGENCY DEPT VISIT HI MDM: CPT

## 2024-03-26 PROCEDURE — 84484 ASSAY OF TROPONIN QUANT: CPT | Performed by: EMERGENCY MEDICINE

## 2024-03-26 PROCEDURE — 80053 COMPREHEN METABOLIC PANEL: CPT | Performed by: EMERGENCY MEDICINE

## 2024-03-26 PROCEDURE — 96375 TX/PRO/DX INJ NEW DRUG ADDON: CPT

## 2024-03-26 PROCEDURE — 99222 1ST HOSP IP/OBS MODERATE 55: CPT | Performed by: FAMILY MEDICINE

## 2024-03-26 PROCEDURE — 83605 ASSAY OF LACTIC ACID: CPT | Performed by: EMERGENCY MEDICINE

## 2024-03-26 PROCEDURE — 99239 HOSP IP/OBS DSCHRG MGMT >30: CPT

## 2024-03-26 PROCEDURE — 83735 ASSAY OF MAGNESIUM: CPT | Performed by: FAMILY MEDICINE

## 2024-03-26 PROCEDURE — 93306 TTE W/DOPPLER COMPLETE: CPT

## 2024-03-26 PROCEDURE — 85027 COMPLETE CBC AUTOMATED: CPT | Performed by: FAMILY MEDICINE

## 2024-03-26 PROCEDURE — 36415 COLL VENOUS BLD VENIPUNCTURE: CPT | Performed by: EMERGENCY MEDICINE

## 2024-03-26 PROCEDURE — 99285 EMERGENCY DEPT VISIT HI MDM: CPT | Performed by: EMERGENCY MEDICINE

## 2024-03-26 PROCEDURE — 84484 ASSAY OF TROPONIN QUANT: CPT

## 2024-03-26 PROCEDURE — 96376 TX/PRO/DX INJ SAME DRUG ADON: CPT

## 2024-03-26 PROCEDURE — 85025 COMPLETE CBC W/AUTO DIFF WBC: CPT | Performed by: EMERGENCY MEDICINE

## 2024-03-26 PROCEDURE — 80307 DRUG TEST PRSMV CHEM ANLYZR: CPT | Performed by: FAMILY MEDICINE

## 2024-03-26 RX ORDER — CARVEDILOL 6.25 MG/1
6.25 TABLET ORAL 2 TIMES DAILY WITH MEALS
Qty: 60 TABLET | Refills: 0 | Status: SHIPPED | OUTPATIENT
Start: 2024-03-26

## 2024-03-26 RX ORDER — CARVEDILOL 6.25 MG/1
6.25 TABLET ORAL 2 TIMES DAILY WITH MEALS
Status: DISCONTINUED | OUTPATIENT
Start: 2024-03-26 | End: 2024-03-26 | Stop reason: HOSPADM

## 2024-03-26 RX ORDER — FUROSEMIDE 20 MG/1
20 TABLET ORAL DAILY
Status: DISCONTINUED | OUTPATIENT
Start: 2024-03-26 | End: 2024-03-26 | Stop reason: HOSPADM

## 2024-03-26 RX ORDER — LOSARTAN POTASSIUM 50 MG/1
50 TABLET ORAL DAILY
Qty: 30 TABLET | Refills: 0 | Status: SHIPPED | OUTPATIENT
Start: 2024-03-26

## 2024-03-26 RX ORDER — FUROSEMIDE 10 MG/ML
60 INJECTION INTRAMUSCULAR; INTRAVENOUS ONCE
Status: COMPLETED | OUTPATIENT
Start: 2024-03-26 | End: 2024-03-26

## 2024-03-26 RX ORDER — TRAMADOL HYDROCHLORIDE 50 MG/1
50 TABLET ORAL EVERY 6 HOURS PRN
Status: DISCONTINUED | OUTPATIENT
Start: 2024-03-26 | End: 2024-03-26 | Stop reason: HOSPADM

## 2024-03-26 RX ORDER — TRAMADOL HYDROCHLORIDE 50 MG/1
50 TABLET ORAL EVERY 12 HOURS PRN
Qty: 6 TABLET | Refills: 0 | Status: SHIPPED | OUTPATIENT
Start: 2024-03-26 | End: 2024-03-29

## 2024-03-26 RX ORDER — MORPHINE SULFATE 4 MG/ML
4 INJECTION, SOLUTION INTRAMUSCULAR; INTRAVENOUS ONCE
Status: COMPLETED | OUTPATIENT
Start: 2024-03-26 | End: 2024-03-26

## 2024-03-26 RX ORDER — ALBUTEROL SULFATE 90 UG/1
2 AEROSOL, METERED RESPIRATORY (INHALATION) EVERY 4 HOURS PRN
Status: DISCONTINUED | OUTPATIENT
Start: 2024-03-26 | End: 2024-03-26 | Stop reason: HOSPADM

## 2024-03-26 RX ORDER — GABAPENTIN 400 MG/1
400 CAPSULE ORAL 3 TIMES DAILY
Qty: 90 CAPSULE | Refills: 0 | Status: SHIPPED | OUTPATIENT
Start: 2024-03-26

## 2024-03-26 RX ORDER — ENOXAPARIN SODIUM 100 MG/ML
40 INJECTION SUBCUTANEOUS DAILY
Status: DISCONTINUED | OUTPATIENT
Start: 2024-03-26 | End: 2024-03-26 | Stop reason: HOSPADM

## 2024-03-26 RX ORDER — MAGNESIUM SULFATE HEPTAHYDRATE 40 MG/ML
2 INJECTION, SOLUTION INTRAVENOUS ONCE
Qty: 50 ML | Refills: 0 | Status: COMPLETED | OUTPATIENT
Start: 2024-03-26 | End: 2024-03-26

## 2024-03-26 RX ORDER — TRAMADOL HYDROCHLORIDE 50 MG/1
50 TABLET ORAL EVERY 12 HOURS PRN
Status: DISCONTINUED | OUTPATIENT
Start: 2024-03-26 | End: 2024-03-26

## 2024-03-26 RX ORDER — ACETAMINOPHEN 325 MG/1
650 TABLET ORAL EVERY 4 HOURS PRN
Status: DISCONTINUED | OUTPATIENT
Start: 2024-03-26 | End: 2024-03-26 | Stop reason: HOSPADM

## 2024-03-26 RX ORDER — GABAPENTIN 400 MG/1
400 CAPSULE ORAL 3 TIMES DAILY
Status: DISCONTINUED | OUTPATIENT
Start: 2024-03-26 | End: 2024-03-26 | Stop reason: HOSPADM

## 2024-03-26 RX ORDER — LOSARTAN POTASSIUM 50 MG/1
50 TABLET ORAL DAILY
Status: DISCONTINUED | OUTPATIENT
Start: 2024-03-26 | End: 2024-03-26 | Stop reason: HOSPADM

## 2024-03-26 RX ORDER — CLINDAMYCIN HYDROCHLORIDE 150 MG/1
300 CAPSULE ORAL
Qty: 8 CAPSULE | Refills: 0 | Status: DISCONTINUED | OUTPATIENT
Start: 2024-03-26 | End: 2024-03-26 | Stop reason: HOSPADM

## 2024-03-26 RX ORDER — HYDRALAZINE HYDROCHLORIDE 20 MG/ML
5 INJECTION INTRAMUSCULAR; INTRAVENOUS EVERY 6 HOURS PRN
Status: DISCONTINUED | OUTPATIENT
Start: 2024-03-26 | End: 2024-03-26 | Stop reason: HOSPADM

## 2024-03-26 RX ORDER — FUROSEMIDE 20 MG/1
20 TABLET ORAL DAILY
Qty: 30 TABLET | Refills: 0 | Status: SHIPPED | OUTPATIENT
Start: 2024-03-26

## 2024-03-26 RX ADMIN — GABAPENTIN 400 MG: 400 CAPSULE ORAL at 08:41

## 2024-03-26 RX ADMIN — CLINDAMYCIN HYDROCHLORIDE 300 MG: 150 CAPSULE ORAL at 13:03

## 2024-03-26 RX ADMIN — ENOXAPARIN SODIUM 40 MG: 40 INJECTION SUBCUTANEOUS at 08:42

## 2024-03-26 RX ADMIN — FUROSEMIDE 60 MG: 10 INJECTION, SOLUTION INTRAMUSCULAR; INTRAVENOUS at 04:11

## 2024-03-26 RX ADMIN — TRAMADOL HYDROCHLORIDE 50 MG: 50 TABLET, COATED ORAL at 14:07

## 2024-03-26 RX ADMIN — LOSARTAN POTASSIUM 50 MG: 50 TABLET, FILM COATED ORAL at 08:41

## 2024-03-26 RX ADMIN — CLINDAMYCIN HYDROCHLORIDE 300 MG: 150 CAPSULE ORAL at 07:39

## 2024-03-26 RX ADMIN — TRAMADOL HYDROCHLORIDE 50 MG: 50 TABLET, COATED ORAL at 07:38

## 2024-03-26 RX ADMIN — CARVEDILOL 6.25 MG: 6.25 TABLET, FILM COATED ORAL at 07:39

## 2024-03-26 RX ADMIN — FUROSEMIDE 20 MG: 20 TABLET ORAL at 08:41

## 2024-03-26 RX ADMIN — MAGNESIUM SULFATE HEPTAHYDRATE 2 G: 40 INJECTION, SOLUTION INTRAVENOUS at 08:42

## 2024-03-26 RX ADMIN — MORPHINE SULFATE 4 MG: 4 INJECTION INTRAVENOUS at 02:48

## 2024-03-26 RX ADMIN — MORPHINE SULFATE 4 MG: 4 INJECTION INTRAVENOUS at 04:10

## 2024-03-26 NOTE — DISCHARGE INSTR - AVS FIRST PAGE
Dear Britton Batista,     It was our pleasure to care for you here at [facility]. For follow up as well as any medication refills, we recommend that you follow up with your primary care physician. Here are the most important instructions/ recommendations at discharge:     Notable Medication Adjustments -   Continue taking medications as prescribed.  Testing Required after Discharge -   Further testing with cardiology outpatient as recommended  Important follow up information -   Follow up with PCP in 1 week  Follow up cardiology  Follow up with orthopedics outpatient next week  Other Instructions -   Please continue taking medications as prescribed. If you have any new or worsening symptoms, please return for evaluation  Please avoid using THC products.   Your sutures are dissolvable and will dissolve per orthopedics, please follow up with them for further recommendations.   Please review this entire after visit summary as additional general instructions including medication list, appointments, activity, diet, any pertinent wound care, and other additional recommendations from your care team that may be provided for you.      Sincerely,     Citlalli Cannon PA-C

## 2024-03-26 NOTE — CONSULTS
PROGRESS NOTE - Cardiology  Britton Batista 58 y.o. male MRN: [id number]  Unit/Bed#: -01 Encounter: [id number]    Assessment/Plan   Active Problems:    Non-MI tporonin elevation    Methamphetamine abuse (HCC)    Chest wall pain    Chronic HFrEF (heart failure with reduced ejection fraction) (Formerly [facility])    Primary hypertension    Patient has history of methamphetamine use, reports not using for 2 month but still continuing use cannabis. Urine drug screen was positive for THC and opiate. He has a history of medication non-compliance and not following up with outpatient providers. Troponins were elevated but lower than his baseline and EKG was with no new ischemic changes. He had LHC in 2020, details unknown but per [facility] records no CAD was noted. Thus most likely non cardiac in origin and tropoinin elevation was non MI-related. He has received IV lasix initially due to slightly elevated BNP (similar to the previous one) and currently his home medications have been restarted inclufing GDMT: coreg 6.25 mg bid, losartan 50 gm qd, furosemide 20 gm qd. He does not take Farxiga as it is unaffordable. He can restart spironolactone as outpatient. He follows with [facility] cardiology. Arrangement is made for him to have a follow up appointment. I strongly encouraged him to follow up with that appointment. Also discussed cessation of illicit drug use. TTE during this admission was similar to previous TTE with severe LV dysfunction. Previously discussed AICD indication such as being on optimal GDMT for 3 month with no improvement in LV function. With him not being on optimal GDMT at any continuous period of time due to non compliance and persistent drug use at this time not candidate for AICD. We previously also discussed life vest indication. Due to extended history of non adherence to medical recommendations and decline of Live vest in the past, Life vest wont be beneficial.     HPI: 57 yo man with PMHx of HFrEF/ NICM,  "HTN, drug abuse ( history of methamphetamine and cannabis use), current smoker presented with transient chest pain that started several days ago, no triggers, no radiation. Denies trauma to chest.     Review of Systems  ROS as noted above, otherwise 12 point review of systems was performed and is negative.     Historical Information   Past Medical History:   Diagnosis Date    Anxiety     Cardiomyopathy (HCC)     CHF (congestive heart failure) (HCC)     Depression     Emphysema of lung (HCC)     GERD (gastroesophageal reflux disease)     Gout     Hypertension     Neuropathy     Raynaud's disease     Right inguinal hernia     Scleroderma (HCC)      Past Surgical History:   Procedure Laterality Date    AMPUTATION Right     pt had tip of \"pointer finger\" d/t scleraderma    FINGER AMPUTATION Left 1/31/2024    Procedure: AMPUTATION FINGER, LEFT INDEX FINGER CPT: 50947;  Surgeon: Pedro Vazquez MD;  Location:  MAIN OR;  Service: Orthopedics    HERNIA REPAIR Left     times 2    [place] AMP F/TH 1/2 JT/PHALANX W/NEURECT W/DIR CLSR Right 1/23/2024    Procedure: AMPUTATION FINGER, RIGHT MIDDLE;  Surgeon: Pedro Vazquez MD;  [facility];  Service: Orthopedics    AZ RPR 1ST INGUN HRNA AGE 5 YRS/> REDUCIBLE Right 3/3/2023    Procedure: OPEN INGUINAL HERNIA REPAIR WITH MESH;  Surgeon: Temo Jackson DO;  Location:  MAIN OR;  Service: General     Social History     Substance and Sexual Activity   Alcohol Use Not Currently    Comment: occasional     Social History     Substance and Sexual Activity   Drug Use Yes    Types: Marijuana, Methamphetamines    Comment: Hx meth use     Social History     Tobacco Use   Smoking Status Every Day    Current packs/day: 1.00    Average packs/day: 1 pack/day for 44.2 years (44.2 ttl pk-yrs)    Types: Cigarettes    Start date: 2000   Smokeless Tobacco Never   Tobacco Comments    Last cigarette 0430     Meds/Allergies   Hospital Medications:   Current Facility-Administered Medications   Medication " "Dose Route Frequency    acetaminophen (TYLENOL) tablet 650 mg  650 mg Oral Q4H PRN    albuterol (PROVENTIL HFA,VENTOLIN HFA) inhaler 2 puff  2 puff Inhalation Q4H PRN    [START ON 3/27/2024] aspirin (ECOTRIN LOW STRENGTH) EC tablet 81 mg  81 mg Oral Daily    carvedilol (COREG) tablet 6.25 mg  6.25 mg Oral BID With Meals    clindamycin (CLEOCIN) capsule 300 mg  300 mg Oral 4x Daily (with meals and at bedtime)    enoxaparin (LOVENOX) subcutaneous injection 40 mg  40 mg Subcutaneous Daily    furosemide (LASIX) tablet 20 mg  20 mg Oral Daily    gabapentin (NEURONTIN) capsule 400 mg  400 mg Oral TID    hydrALAZINE (APRESOLINE) injection 5 mg  5 mg Intravenous Q6H PRN    losartan (COZAAR) tablet 50 mg  50 mg Oral Daily    morphine injection 2 mg  2 mg Intravenous Q4H PRN    traMADol (ULTRAM) tablet 50 mg  50 mg Oral Q12H PRN     Home Medications:   Medications Prior to Admission   Medication    albuterol (PROVENTIL HFA,VENTOLIN HFA) 90 mcg/act inhaler    carvedilol (COREG) 6.25 mg tablet    clindamycin (CLEOCIN) 150 mg capsule    Farxiga 5 MG TABS    furosemide (LASIX) 20 mg tablet    gabapentin (NEURONTIN) 400 mg capsule    losartan (COZAAR) 50 mg tablet    oxyCODONE-acetaminophen (Percocet) 5-325 mg per tablet    spironolactone (ALDACTONE) 25 mg tablet    traMADol (Ultram) 50 mg tablet     Allergies   Allergen Reactions    Aspirin GI Intolerance     Objective   Vitals: Blood pressure 112/70, pulse 68, temperature 97.7 °F (36.5 °C), resp. rate 18, height 5' 9\" (1.753 m), weight 74.2 kg (163 lb 9.3 oz), SpO2 92%.  Orthostatic Blood Pressures      Flowsheet Row Most Recent Value   Blood Pressure 112/70 filed at 03/26/2024 0924   Patient Position - Orthostatic VS Lying filed at 03/26/2024 0924            Intake/Output Summary (Last 24 hours) at 3/26/2024 1248  Last data filed at 3/26/2024 0842  Gross per 24 hour   Intake 50 ml   Output --   Net 50 ml     Invasive Devices       Peripheral Intravenous Line  Duration          "    Peripheral IV 03/26/24 Distal;Right;Upper;Ventral (anterior) Arm <1 day                  Physical Exam   GEN: NAD, alert and oriented  SKIN: dry without significant lesions or rashes  HEENT: NCAT, PERRL, EOMs intact  NECK: No JVD or carotid bruits appreciated  CARDIOVASCULAR: RRR, normal S1, S2 without murmurs, rubs, or gallops appreciated  LUNGS: Clear to auscultation bilaterally without wheezes, rhonchi, or rales  ABDOMEN: Soft, nontender, nondistended  EXTREMITIES/VASCULAR: perfused without clubbing, cyanosis, or edema b/l  PSYCH: Normal mood and affect    Lab Results: I have personally reviewed pertinent lab results.    Results from last 7 days   Lab Units 03/26/24  0635 03/26/24  0239   WBC Thousand/uL 8.60 7.78   HEMOGLOBIN g/dL 15.8 15.8   HEMATOCRIT % 47.9 48.6   PLATELETS Thousands/uL 314 322     Results from last 7 days   Lab Units 03/26/24  0635 03/26/24  0239   POTASSIUM mmol/L 4.4 4.6   CHLORIDE mmol/L 99 99   CO2 mmol/L 26 27   BUN mg/dL 14 13   CREATININE mg/dL 1.06 1.12   CALCIUM mg/dL 9.9 9.5         Results from last 7 days   Lab Units 03/26/24  0635   MAGNESIUM mg/dL 1.6*     Imaging: I have personally reviewed pertinent reports.

## 2024-03-26 NOTE — QUICK NOTE
RN informed that patient had sutures present in the left 2nd finger incision from 01/31/24. Reached out to patient's orthopedic provider, Dr. Terrence Andrews, and stated that the sutures will dissolve and fall out, can be trimmed or cut if prominent or bothering him. Discussed with patient and no problems with the sutures, he will follow up with orthopedics outpatient.     Citlalli Cannon PA-C

## 2024-03-26 NOTE — NURSING NOTE
Jah Cannon sent a new prescription to [facility]'s pharmacy for the PRN tramadol. Patient said he did not have anymore at home. Patient was made aware to  this med at [facility]'s pharmacy. He verbalized understanding.

## 2024-03-26 NOTE — DISCHARGE SUMMARY
"[facility]  Discharge- Britton Batista 1965, 58 y.o. male MRN: [id number]  Unit/Bed#: MS 330Fercho Encounter: [id number]  Primary Care Provider: [facility] Everywhere Referral   Date and time admitted to hospital: 3/26/2024  2:12 AM    * Chest wall pain  Assessment & Plan  Patient presents today (3/26) with complaints of sternal chest pain unaffected by rest or exertion for several days.  Patient describes sensation as \"pain\" notes worse with deep breathing and movement, nonradiating, associated with shortness of breath.  Denies similar symptoms in the past  Pain is reproducible on exam  Presenting symptoms and timeline atypical for coronary chest pain  In ED, elevation of cardiac enzymes (however, lower than baseline elevation)  EKG without ischemic changes beyond baseline  Patient hemodynamically stable  Patient in absence of SIRS criteria upon presentation  Patient without oxygen requirement upon presentation  Index of suspicion for musculoskeletal chest pain, however, given elevated heart score and risk factors, observe for coronary syndrome rule out  Trend troponin with serial EKG assess for significant delta or ischemic EKG changes  Patient with known history of cardiomyopathy-significantly reduced ejection fraction of 20 to 25% (see below)  Daily aspirin  Patient maintained on beta-blocker and ACE inhibitor in the outpatient setting with questionable compliance-continue  Patient would likely benefit from high intensity statin  Did complain of further chest pain 8/10, repeat ECG and troponins ordered, cardiology said if normal, still able to be discharged.   Cardiology consult: continue outpatient follow up, continue GDMT: coreg 6.25 mg bid, losartan 50 gm qd, furosemide 20 gm qd. He does not take Farxiga as it is unaffordable. He can restart spironolactone as outpatient. He follows with [facility] cardiology. Arrangement is made for him to have a follow up appointment. " "    Primary hypertension  Assessment & Plan  Patient presents with hypertensive urgency with systolic blood pressure greater than 170  Will administer IV hydralazine for now and reinitiate home regimen  Raises concern over compliance with outpatient antihypertensive regimen  BP improved after medications.   Continue outpatient meds.     Chronic HFrEF (heart failure with reduced ejection fraction) (HCC)  Assessment & Plan  Wt Readings from Last 3 Encounters:   03/26/24 74.2 kg (163 lb 9.3 oz)   03/17/24 71.2 kg (157 lb)   02/28/24 73 kg (161 lb)       Patient recently hospitalized at our institution for exacerbation of heart failure reduced ejection fraction  At that time, 2D echocardiogram performed which revealed significantly reduced ejection fraction of 20 to 25%  Goal-directed medical therapy was initiated including ACE inhibitor, beta-blocker, MRA, SGLT2  Life vest recommended at that time however patient resistant and furthermore perhaps an inappropriate candidate secondary to history of noncompliance  Patient has not been taking SGLT2 inhibitor secondary to cost  Patient tells me MRA was discontinued however I cannot find documentation of this-has not been taking  Patient reports compliance with diuretic, ACE inhibitor and beta-blocker however does admit to \"missing some doses here and there\"  Patient appears euvolemic upon presentation  Echo: Left ventricular cavity size is dilated. Wall thickness is mildly increased. There is eccentric hypertrophy. The left ventricular ejection fraction is 25-30%. Systolic function is severely reduced. There is severe global hypokinesis. Diastolic function is severely abnormal, consistent with ungraded restrictive relaxation.  Continue outpatient follow up. Stressed medication compliance.     Methamphetamine abuse (HCC)  Assessment & Plan  Patient reports he has been abstinent from methamphetamines for greater than 4 months however continues to smoke marijuana  UDS positive " THC and opiates (did receive morphine in the ED, patient denies using any opioid medications).    Encourage cessation of THC outpatient.     Elevated troponin  Assessment & Plan  As stated, it appears the patient's baseline troponin upon review of trend is greater than 100  Troponin elevated upon presentation at 80  Assess for significant delta or evidence of ischemic EKG change indicating underlying coronary syndrome  At baseline per cardiology  Outpatient cardiology follow up      Medical Problems       Resolved Problems  Date Reviewed: 3/26/2024   None       Discharging Physician / Practitioner: Citlalli Cannon PA-C  PCP: [facility] Referral  Admission Date:   Admission Orders (From admission, onward)       Ordered        03/26/24 0450  Place in Observation  Once                          Discharge Date: 03/26/24    Consultations During Hospital Stay:  Cardiology    Procedures Performed:   none    Significant Findings / Test Results:   XR chest portable   Final Result by Fran Llamas MD (03/26 1183)      Cardiomegaly. Clear lungs            Workstation performed: [id number]           UDS: opiate positive, THC positive  Troponin: 80>100>114  BNP: 3666  Lactic normal  Echo: Left Ventricle: Left ventricular cavity size is dilated. Wall thickness is mildly increased. There is eccentric hypertrophy. The left ventricular ejection fraction is 25-30%. Systolic function is severely reduced. There is severe global hypokinesis. Diastolic function is severely abnormal, consistent with ungraded restrictive relaxation.    Right Ventricle: Right ventricular cavity size is severely dilated. Systolic function is mildly reduced.    Left Atrium: The atrium is severely dilated.    Right Atrium: The atrium is dilated.    Aortic Valve: The aortic valve is probably trileaflet. The leaflets are moderately calcified. The leaflets exhibit normal mobility. There is aortic valve sclerosis.    Mitral Valve: There  is moderate calcification. The leaflets exhibit normal mobility. There is mild annular calcification. There is mild subvalvular calcification. There is mild regurgitation.    Tricuspid Valve: There is mild regurgitation.    Pulmonic Valve: There is mild regurgitation.    Aorta: The aortic root is mildly dilated. The ascending aorta is normal in size. The aortic root is 3.60 cm.    Incidental Findings:   none     Test Results Pending at Discharge (will require follow up):   none     Outpatient Tests Requested:  Follow up with PCP in 1 week  Follow up with cardiology outpatient  Follow up with orthopedics in 1-2 weeks    Complications:  none    Reason for Admission: chest pain    Hospital Course:   Britton Batista is a 58 y.o. male patient who originally presented to the hospital on 3/26/2024 due to chest pain for several days. Admitted under obs for chest pain. Does have baseline of elevated troponins and also elevated BNP. Admitted for worsening chest pain and cardiology evaluation. Had some elevated troponins, but at baseline does have elevated troponin, no ECG changes from previously. Repeat ECHO done and similar to prior. Cardiology saw patient and stated that troponins were lower than patient's baseline and ECG with no new ischemic changes. Suspect this is more likely non cardiac origin and troponin elevations were not MI related. BNP slightly elevated, but no evidence of active heart failure exacerbation. Discussed that echo was previous to similar one. Not candidate for AICD due to not being on GDMT for 3 months and due to non compliance. Needs strict drug usage cessation. No further cardiac testing indicated at this time and stable for discharge from cardiology stance, nothing acute currently. Should follow up with PCP in 1 week. Should follow up with cardiology outpatient. Follow up with orthopedics outpatient as well.     Please see above list of diagnoses and related plan for additional information.  "    Condition at Discharge: fair    Discharge Day Visit / Exam:   Subjective:  Patient seen and examined today. He is feeling well. States that he feels better than when he came in. Chest pain is reproducible. Pain resolves with medications. No nausea, vomiting, diarrhea, constipation, abdominal pain, SOB, fever, chills.   Vitals: Blood Pressure: 121/89 (03/26/24 1300)  Pulse: 73 (03/26/24 1500)  Temperature: (!) 96.6 °F (35.9 °C) (03/26/24 1300)  Temp Source: Temporal (03/26/24 1300)  Respirations: 18 (03/26/24 1300)  Height: 5' 9\" (175.3 cm) (03/26/24 0802)  Weight - Scale: 74.2 kg (163 lb 9.3 oz) (03/26/24 0930)  SpO2: 92 % (03/26/24 0920)  Exam:   Physical Exam  Vitals reviewed.   Constitutional:       General: He is not in acute distress.     Appearance: He is not ill-appearing or toxic-appearing.   HENT:      Head: Normocephalic and atraumatic.      Mouth/Throat:      Mouth: Mucous membranes are moist.   Cardiovascular:      Rate and Rhythm: Normal rate and regular rhythm.      Heart sounds: No murmur heard.  Pulmonary:      Effort: No respiratory distress.      Breath sounds: No stridor. No wheezing.   Chest:      Chest wall: Tenderness (reproducible chest tenderness) present.   Abdominal:      General: Bowel sounds are normal. There is no distension.      Palpations: Abdomen is soft. There is no mass.      Tenderness: There is no abdominal tenderness.   Musculoskeletal:      Right lower leg: No edema.      Left lower leg: No edema.   Skin:     General: Skin is warm and dry.      Comments: Scabbed area on the 3rd right finger and sutures in place on the left 2nd finger. No active drainage present   Neurological:      General: No focal deficit present.      Mental Status: He is alert and oriented to person, place, and time.   Psychiatric:         Mood and Affect: Mood normal.         Behavior: Behavior normal.          Discussion with Family: Updated  (sister) via phone.    Discharge " instructions/Information to patient and family:   See after visit summary for information provided to patient and family.      Provisions for Follow-Up Care:  See after visit summary for information related to follow-up care and any pertinent home health orders.      Mobility at time of Discharge:   Basic Mobility Inpatient Raw Score: 24  JH-HLM Goal: 8: Walk 250 feet or more  JH-HLM Achieved: 8: Walk 250 feet ot more  HLM Goal achieved. Continue to encourage appropriate mobility.     Disposition:   Home    Planned Readmission: no     Discharge Statement:  I spent 45 minutes discharging the patient. This time was spent on the day of discharge. I had direct contact with the patient on the day of discharge. Greater than 50% of the total time was spent examining patient, answering all patient questions, arranging and discussing plan of care with patient as well as directly providing post-discharge instructions.  Additional time then spent on discharge activities.    Discharge Medications:  See after visit summary for reconciled discharge medications provided to patient and/or family.      **Please Note: This note may have been constructed using a voice recognition system**

## 2024-03-26 NOTE — PLAN OF CARE
Problem: PAIN - ADULT  Goal: Verbalizes/displays adequate comfort level or baseline comfort level  Description: Interventions:  - Encourage patient to monitor pain and request assistance  - Assess pain using appropriate pain scale  - Administer analgesics based on type and severity of pain and evaluate response  - Implement non-pharmacological measures as appropriate and evaluate response  - Consider cultural and social influences on pain and pain management  - Notify physician/advanced practitioner if interventions unsuccessful or patient reports new pain  3/26/2024 1554 by Ratna Engel RN  Outcome: Adequate for Discharge  3/26/2024 0807 by Ratna Engel RN  Outcome: Progressing     Problem: INFECTION - ADULT  Goal: Absence or prevention of progression during hospitalization  Description: INTERVENTIONS:  - Assess and monitor for signs and symptoms of infection  - Monitor lab/diagnostic results  - Monitor all insertion sites, i.e. indwelling lines, tubes, and drains  - Monitor endotracheal if appropriate and nasal secretions for changes in amount and color  - [place] appropriate cooling/warming therapies per order  - Administer medications as ordered  - Instruct and encourage patient and family to use good hand hygiene technique  - Identify and instruct in appropriate isolation precautions for identified infection/condition  3/26/2024 1554 by Ratna Engel RN  Outcome: Adequate for Discharge  3/26/2024 0807 by Ratna Engel RN  Outcome: Progressing     Problem: SAFETY ADULT  Goal: Patient will remain free of falls  Description: INTERVENTIONS:  - Educate patient/family on patient safety including physical limitations  - Instruct patient to call for assistance with activity   - Consult OT/PT to assist with strengthening/mobility   - Keep Call bell within reach  - Keep bed low and locked with side rails adjusted as appropriate  - Keep care items and personal belongings within reach  - Initiate and  maintain comfort rounds  - Make Fall Risk Sign visible to staff    - Apply yellow socks and bracelet for high fall risk patients  - Consider moving patient to room near nurses station  3/26/2024 1554 by Ratna Engel RN  Outcome: Adequate for Discharge  3/26/2024 0807 by Ratna Engel RN  Outcome: Progressing  Goal: Maintain or return to baseline ADL function  Description: INTERVENTIONS:  -  Assess patient's ability to carry out ADLs; assess patient's baseline for ADL function and identify physical deficits which impact ability to perform ADLs (bathing, care of mouth/teeth, toileting, grooming, dressing, etc.)  - Assess/evaluate cause of self-care deficits   - Assess range of motion  - Assess patient's mobility; develop plan if impaired  - Assess patient's need for assistive devices and provide as appropriate  - Encourage maximum independence but intervene and supervise when necessary  - Involve family in performance of ADLs  - Assess for home care needs following discharge   - Consider OT consult to assist with ADL evaluation and planning for discharge  - Provide patient education as appropriate  3/26/2024 1554 by Ratna Engel RN  Outcome: Adequate for Discharge  3/26/2024 0807 by Ratna Engel RN  Outcome: Progressing  Goal: Maintains/Returns to pre admission functional level  Description: INTERVENTIONS:  - Perform AM-PAC 6 Click Basic Mobility/ Daily Activity assessment daily.  - Set and communicate daily mobility goal to care team and patient/family/caregiver.   - Collaborate with rehabilitation services on mobility goals if consulted    - Out of bed for toileting  - Record patient progress and toleration of activity level   3/26/2024 1554 by Ratna Engel RN  Outcome: Adequate for Discharge  3/26/2024 0807 by Ratna Engel RN  Outcome: Progressing     Problem: DISCHARGE PLANNING  Goal: Discharge to home or other facility with appropriate resources  Description: INTERVENTIONS:  - Identify  barriers to discharge w/patient and caregiver  - Arrange for needed discharge resources and transportation as appropriate  - Identify discharge learning needs (meds, wound care, etc.)  - Arrange for interpretive services to assist at discharge as needed  - Refer to Case Management Department for coordinating discharge planning if the patient needs post-hospital services based on physician/advanced practitioner order or complex needs related to functional status, cognitive ability, or social support system  3/26/2024 1554 by Ratna Engel RN  Outcome: Adequate for Discharge  3/26/2024 0807 by Ratna Engel RN  Outcome: Progressing     Problem: Knowledge Deficit  Goal: Patient/family/caregiver demonstrates understanding of disease process, treatment plan, medications, and discharge instructions  Description: Complete learning assessment and assess knowledge base.  Interventions:  - Provide teaching at level of understanding  - Provide teaching via preferred learning methods  3/26/2024 1554 by Ratna Engel RN  Outcome: Adequate for Discharge  3/26/2024 0807 by Ratna Engel RN  Outcome: Progressing     Problem: CARDIOVASCULAR - ADULT  Goal: Maintains optimal cardiac output and hemodynamic stability  Description: INTERVENTIONS:  - Monitor I/O, vital signs and rhythm  - Monitor for S/S and trends of decreased cardiac output  - Administer and titrate ordered vasoactive medications to optimize hemodynamic stability  - Assess quality of pulses, skin color and temperature  - Assess for signs of decreased coronary artery perfusion  - Instruct patient to report change in severity of symptoms  3/26/2024 1554 by Ratna Engel RN  Outcome: Adequate for Discharge  3/26/2024 0807 by Ratna Engel RN  Outcome: Progressing  Goal: Absence of cardiac dysrhythmias or at baseline rhythm  Description: INTERVENTIONS:  - Continuous cardiac monitoring, vital signs, obtain 12 lead EKG if ordered  - Administer antiarrhythmic  and heart rate control medications as ordered  - Monitor electrolytes and administer replacement therapy as ordered  3/26/2024 1554 by Ratna Engel RN  Outcome: Adequate for Discharge  3/26/2024 0807 by Ratna Engel RN  Outcome: Progressing     Problem: MOBILITY - ADULT  Goal: Maintain or return to baseline ADL function  Description: INTERVENTIONS:  -  Assess patient's ability to carry out ADLs; assess patient's baseline for ADL function and identify physical deficits which impact ability to perform ADLs (bathing, care of mouth/teeth, toileting, grooming, dressing, etc.)  - Assess/evaluate cause of self-care deficits   - Assess range of motion  - Assess patient's mobility; develop plan if impaired  - Assess patient's need for assistive devices and provide as appropriate  - Encourage maximum independence but intervene and supervise when necessary  - Involve family in performance of ADLs  - Assess for home care needs following discharge   - Consider OT consult to assist with ADL evaluation and planning for discharge  - Provide patient education as appropriate  3/26/2024 1554 by Ratna Engel RN  Outcome: Adequate for Discharge  3/26/2024 0807 by Ratna Engel RN  Outcome: Progressing  Goal: Maintains/Returns to pre admission functional level  Description: INTERVENTIONS:  - Perform AM-PAC 6 Click Basic Mobility/ Daily Activity assessment daily.  - Set and communicate daily mobility goal to care team and patient/family/caregiver.   - Collaborate with rehabilitation services on mobility goals if consulted    - Out of bed for toileting  - Record patient progress and toleration of activity level   3/26/2024 1554 by Ratna Engel RN  Outcome: Adequate for Discharge  3/26/2024 0807 by Ratna Engel RN  Outcome: Progressing     Problem: Nutrition/Hydration-ADULT  Goal: Nutrient/Hydration intake appropriate for improving, restoring or maintaining nutritional needs  Description: Monitor and assess patient's  nutrition/hydration status for malnutrition. Collaborate with interdisciplinary team and initiate plan and interventions as ordered.  Monitor patient's weight and dietary intake as ordered or per policy. Utilize nutrition screening tool and intervene as necessary. Determine patient's food preferences and provide high-protein, high-caloric foods as appropriate.     INTERVENTIONS:  - Monitor oral intake, urinary output, labs, and treatment plans  - Assess nutrition and hydration status and recommend course of action  - Evaluate amount of meals eaten  - Assist patient with eating if necessary   - Allow adequate time for meals  - Recommend/ encourage appropriate diets, oral nutritional supplements, and vitamin/mineral supplements  - Order, calculate, and assess calorie counts as needed  - Recommend, monitor, and adjust tube feedings and TPN/PPN based on assessed needs  - Assess need for intravenous fluids  - Provide specific nutrition/hydration education as appropriate  - Include patient/family/caregiver in decisions related to nutrition  Outcome: Adequate for Discharge

## 2024-03-26 NOTE — NURSING NOTE
There are still sutures noted in the left 2nd finger incision from 01/31/24.Note from 02/08/24 by Dr. Pedro Vazquez states the sutures and scabbed areas will eventually fall off over time.Will make Jah Cannon aware sutures still noted in the finger incision with scabs.

## 2024-03-26 NOTE — ASSESSMENT & PLAN NOTE
As stated, it appears the patient's baseline troponin upon review of trend is greater than 100  Troponin elevated upon presentation at 80  Assess for significant delta or evidence of ischemic EKG change indicating underlying coronary syndrome  At baseline per cardiology  Outpatient cardiology follow up

## 2024-03-26 NOTE — ASSESSMENT & PLAN NOTE
"-Patient presents today (3/26) with complaints of sternal chest pain unaffected by rest or exertion for several days.  -Patient describes sensation as \"pain\" notes worse with deep breathing and movement, nonradiating, associated with shortness of breath.  Denies similar symptoms in the past  -Pain is reproducible on exam  -Presenting symptoms and timeline atypical for coronary chest pain  -In ED, elevation of cardiac enzymes (however, lower than baseline elevation)  -EKG without ischemic changes beyond baseline  -Patient hemodynamically stable  -Patient in absence of SIRS criteria upon presentation  -Patient without oxygen requirement upon presentation  -Index of suspicion for musculoskeletal chest pain, however, given elevated heart score and risk factors, observe for coronary syndrome rule out    Plan:  -Place under observation-hospitalist service  -Cardiology consult  -Trend troponin with serial EKG assess for significant delta or ischemic EKG changes  -Patient with known history of cardiomyopathy-significantly reduced ejection fraction of 20 to 25% (see below)  -Last echocardiogram was almost 6 months [id number] for significant change in ejection fraction or wall motion indicating underlying coronary disease  -Daily aspirin  -Patient maintained on beta-blocker and ACE inhibitor in the outpatient setting with questionable compliance-continue  -Patient would likely benefit from high intensity statin  -Low index of suspicion for pulmonary embolus however remains in differential-consider CT-PE  "

## 2024-03-26 NOTE — ASSESSMENT & PLAN NOTE
-As stated, it appears the patient's baseline troponin upon review of trend is greater than 100  -Troponin elevated upon presentation at 80  -Assess for significant delta or evidence of ischemic EKG change indicating underlying coronary syndrome

## 2024-03-26 NOTE — ASSESSMENT & PLAN NOTE
"Patient presents today (3/26) with complaints of sternal chest pain unaffected by rest or exertion for several days.  Patient describes sensation as \"pain\" notes worse with deep breathing and movement, nonradiating, associated with shortness of breath.  Denies similar symptoms in the past  Pain is reproducible on exam  Presenting symptoms and timeline atypical for coronary chest pain  In ED, elevation of cardiac enzymes (however, lower than baseline elevation)  EKG without ischemic changes beyond baseline  Patient hemodynamically stable  Patient in absence of SIRS criteria upon presentation  Patient without oxygen requirement upon presentation  Index of suspicion for musculoskeletal chest pain, however, given elevated heart score and risk factors, observe for coronary syndrome rule out  Trend troponin with serial EKG assess for significant delta or ischemic EKG changes  Patient with known history of cardiomyopathy-significantly reduced ejection fraction of 20 to 25% (see below)  Daily aspirin  Patient maintained on beta-blocker and ACE inhibitor in the outpatient setting with questionable compliance-continue  Patient would likely benefit from high intensity statin  Did complain of further chest pain 8/10, repeat ECG and troponins ordered, cardiology said if normal, still able to be discharged.   Cardiology consult: continue outpatient follow up, continue GDMT: coreg 6.25 mg bid, losartan 50 gm qd, furosemide 20 gm qd. He does not take Farxiga as it is unaffordable. He can restart spironolactone as outpatient. He follows with [facility] cardiology. Arrangement is made for him to have a follow up appointment.   "

## 2024-03-26 NOTE — ASSESSMENT & PLAN NOTE
"Wt Readings from Last 3 Encounters:   03/26/24 74.2 kg (163 lb 9.3 oz)   03/17/24 71.2 kg (157 lb)   02/28/24 73 kg (161 lb)       Patient recently hospitalized at our institution for exacerbation of heart failure reduced ejection fraction  At that time, 2D echocardiogram performed which revealed significantly reduced ejection fraction of 20 to 25%  Goal-directed medical therapy was initiated including ACE inhibitor, beta-blocker, MRA, SGLT2  Life vest recommended at that time however patient resistant and furthermore perhaps an inappropriate candidate secondary to history of noncompliance  Patient has not been taking SGLT2 inhibitor secondary to cost  Patient tells me MRA was discontinued however I cannot find documentation of this-has not been taking  Patient reports compliance with diuretic, ACE inhibitor and beta-blocker however does admit to \"missing some doses here and there\"  Patient appears euvolemic upon presentation  Echo: Left ventricular cavity size is dilated. Wall thickness is mildly increased. There is eccentric hypertrophy. The left ventricular ejection fraction is 25-30%. Systolic function is severely reduced. There is severe global hypokinesis. Diastolic function is severely abnormal, consistent with ungraded restrictive relaxation.  Continue outpatient follow up. Stressed medication compliance.   "

## 2024-03-26 NOTE — ASSESSMENT & PLAN NOTE
Patient reports he has been abstinent from methamphetamines for greater than 4 months however continues to smoke marijuana  UDS positive THC and opiates (did receive morphine in the ED, patient denies using any opioid medications).    Encourage cessation of THC outpatient.

## 2024-03-26 NOTE — ASSESSMENT & PLAN NOTE
Patient presents with hypertensive urgency with systolic blood pressure greater than 170  Will administer IV hydralazine for now and reinitiate home regimen  Raises concern over compliance with outpatient antihypertensive regimen  BP improved after medications.   Continue outpatient meds.

## 2024-03-26 NOTE — ASSESSMENT & PLAN NOTE
-Patient reports he has been abstinent from methamphetamines for greater than 4 months however continues to smoke marijuana  -Check UDS

## 2024-03-26 NOTE — H&P
"[facility]  H&P  Name: Britton Batista 58 y.o. male I MRN: [id number]  Unit/Bed#: ED 09 I Date of Admission: 3/26/2024   Date of Service: 3/26/2024 I Hospital Day: 0      Assessment/Plan   Chest wall pain  Assessment & Plan  -Patient presents today (3/26) with complaints of sternal chest pain unaffected by rest or exertion for several days.  -Patient describes sensation as \"pain\" notes worse with deep breathing and movement, nonradiating, associated with shortness of breath.  Denies similar symptoms in the past  -Pain is reproducible on exam  -Presenting symptoms and timeline atypical for coronary chest pain  -In ED, elevation of cardiac enzymes (however, lower than baseline elevation)  -EKG without ischemic changes beyond baseline  -Patient hemodynamically stable  -Patient in absence of SIRS criteria upon presentation  -Patient without oxygen requirement upon presentation  -Index of suspicion for musculoskeletal chest pain, however, given elevated heart score and risk factors, observe for coronary syndrome rule out    Plan:  -Place under observation-hospitalist service  -Cardiology consult  -Trend troponin with serial EKG assess for significant delta or ischemic EKG changes  -Patient with known history of cardiomyopathy-significantly reduced ejection fraction of 20 to 25% (see below)  -Last echocardiogram was almost 6 months [id number] for significant change in ejection fraction or wall motion indicating underlying coronary disease  -Daily aspirin  -Patient maintained on beta-blocker and ACE inhibitor in the outpatient setting with questionable compliance-continue  -Patient would likely benefit from high intensity statin  -Low index of suspicion for pulmonary embolus however remains in differential-consider CT-PE    Elevated troponin  Assessment & Plan  -As stated, it appears the patient's baseline troponin upon review of trend is greater than 100  -Troponin elevated upon " "presentation at 80  -Assess for significant delta or evidence of ischemic EKG change indicating underlying coronary syndrome    Chronic HFrEF (heart failure with reduced ejection fraction) (HCC)  Assessment & Plan  Wt Readings from Last 3 Encounters:   03/26/24 74.2 kg (163 lb 9.3 oz)   03/17/24 71.2 kg (157 lb)   02/28/24 73 kg (161 lb)       -Patient recently hospitalized at our institution for exacerbation of heart failure reduced ejection fraction  -At that time, 2D echocardiogram performed which revealed significantly reduced ejection fraction of 20 to 25%  -Goal-directed medical therapy was initiated including ACE inhibitor, beta-blocker, MRA, SGLT2  -Life vest recommended at that time however patient resistant and furthermore perhaps an inappropriate candidate secondary to history of noncompliance  -Patient has not been taking SGLT2 inhibitor secondary to cost  -Patient tells me MRA was discontinued however I cannot find documentation of this-has not been taking  -Patient reports compliance with diuretic, ACE inhibitor and beta-blocker however does admit to \"missing some doses here and there\"  Patient appears euvolemic upon presentation  -Will check repeat TTE as it has been almost 6 months since initiation of goal-directed medical therapy, furthermore, to assess for any new wall motion abnormality or further decrease in ejection fraction which may indicate underlying coronary disease (given presentation)        Primary hypertension  Assessment & Plan  -Patient presents with hypertensive urgency with systolic blood pressure greater than 170  -Will administer IV hydralazine for now and reinitiate home regimen  -Raises concern over compliance with outpatient antihypertensive regimen    Methamphetamine abuse (HCC)  Assessment & Plan  -Patient reports he has been abstinent from methamphetamines for greater than 4 months however continues to smoke marijuana  -Check UDS         VTE Prophylaxis: Enoxaparin (Lovenox) " " / sequential compression device   Code Status: Full  POLST: There is no POLST form on file for this patient (pre-hospital)    Anticipated Length of Stay:  Patient will be admitted on an Observation basis with an anticipated length of stay of  < 2 midnights.   Justification for Hospital Stay:     Total Time for Visit, including Counseling / Coordination of Care: 45 minutes.  Greater than 50% of this total time spent on direct patient counseling and coordination of care.    Chief Complaint:   Chest pain    History of Present Illness:    Britton Batista is a 58 y.o. male with history of cardiomyopathy, most recent ejection fraction 20 to 25%, reluctance to LifeVest therapy, hypertension, methamphetamine use, who presents this evening with complaints of \"several\" days of chest/chest wall pain.    Patient is somewhat of a poor historian however reports for the past week or so he has been experiencing chest \"pain\".  He describes a sensation in the center of his chest and notes worsening with breathing and movement.  However, states that it is constant at rest.  Sensation has persisted over the past several days prompting presentation.    Patient denies having symptoms like this in the past.  Notes associated shortness of breath.  Denies diaphoresis or lightheadedness.  Denies palpitations.      Review of Systems:    Review of Systems   Constitutional:  Positive for fatigue. Negative for activity change, appetite change, chills, diaphoresis, fever and unexpected weight change.   HENT: Negative.     Eyes: Negative.    Respiratory:  Positive for chest tightness and shortness of breath.    Cardiovascular:  Positive for chest pain. Negative for palpitations.   Gastrointestinal: Negative.    Genitourinary: Negative.    Musculoskeletal:  Positive for arthralgias.   Skin: Negative.    Allergic/Immunologic: Negative.    Neurological:  Positive for weakness.   Hematological: Negative.    Psychiatric/Behavioral: Negative.   " "      Past Medical and Surgical History:     Past Medical History:   Diagnosis Date    Anxiety     Cardiomyopathy (HCC)     CHF (congestive heart failure) (HCC)     Depression     Emphysema of lung (HCC)     GERD (gastroesophageal reflux disease)     Gout     Hypertension     Neuropathy     Raynaud's disease     Right inguinal hernia     Scleroderma (HCC)        Past Surgical History:   Procedure Laterality Date    AMPUTATION Right     pt had tip of \"pointer finger\" d/t scleraderma    FINGER AMPUTATION Left 1/31/2024    Procedure: AMPUTATION FINGER, LEFT INDEX FINGER CPT: 92117;  Surgeon: Pedro Vazquez MD;  Location:  MAIN OR;  Service: Orthopedics    HERNIA REPAIR Left     times 2    TN AMP F/TH 1/2 JT/PHALANX W/NEURECT W/DIR CLSR Right 1/23/2024    Procedure: AMPUTATION FINGER, RIGHT MIDDLE;  Surgeon: Pedro Vazquez MD;  Location: [facility];  Service: Orthopedics    TN RPR 1ST INGUN HRNA AGE 5 YRS/> REDUCIBLE Right 3/3/2023    Procedure: OPEN INGUINAL HERNIA REPAIR WITH MESH;  Surgeon: Temo Jackson DO;  Location:  MAIN OR;  Service: General       Meds/Allergies:    Prior to Admission medications    Medication Sig Start Date End Date Taking? Authorizing Provider   albuterol (PROVENTIL HFA,VENTOLIN HFA) 90 mcg/act inhaler Inhale 2 puffs every 4 (four) hours as needed     Historical Provider, MD   carvedilol (COREG) 6.25 mg tablet Take 6.25 mg by mouth 2 (two) times a day with meals    Historical Provider, MD   clindamycin (CLEOCIN) 150 mg capsule Take 2 capsules (300 mg total) by mouth every 6 (six) hours for 10 days 3/17/24 3/27/24  Jonh Trimble MD   Farxiga 5 MG TABS Take 5 mg by mouth in the morning  Patient not taking: Reported on 2/28/2024 1/10/24   Historical Provider, MD   furosemide (LASIX) 20 mg tablet Take 1 tablet (20 mg total) by mouth daily Do not start before February 25, 2024. 2/25/24   Akbar Munoz MD   gabapentin (NEURONTIN) 400 mg capsule Take 400 mg by mouth 3 (three) times a day    " "Historical Provider, MD   losartan (COZAAR) 50 mg tablet Take 1 tablet (50 mg total) by mouth daily Do not start before October 11, 2023. 10/11/23   Jaron Casey, DO   oxyCODONE-acetaminophen (Percocet) 5-325 mg per tablet Take 1 tablet by mouth every 8 (eight) hours as needed for severe pain Max Daily Amount: 3 tablets  Patient not taking: Reported on 2/23/2024 2/5/24   Terrence Andrews PA-C   spironolactone (ALDACTONE) 25 mg tablet Take 1 tablet (25 mg total) by mouth daily  Patient not taking: Reported on 2/28/2024 10/10/23   Jaron Casey, DO   traMADol (Ultram) 50 mg tablet Take 1 tablet (50 mg total) by mouth every 12 (twelve) hours as needed for severe pain 3/20/24   Terrence Andrews PA-C     I have reviewed home medications with a medical source (PCP, Pharmacy, other).    Allergies:   Allergies   Allergen Reactions    Aspirin GI Intolerance       Social History:       Social History     Substance and Sexual Activity   Alcohol Use Not Currently    Comment: occasional     Social History     Tobacco Use   Smoking Status Every Day    Current packs/day: 1.00    Average packs/day: 1 pack/day for 20.0 years (20.0 ttl pk-yrs)    Types: Cigarettes   Smokeless Tobacco Never   Tobacco Comments    Last cigarette 0430     Social History     Substance and Sexual Activity   Drug Use Yes    Types: Marijuana, Methamphetamines    Comment: Hx meth use       Family History:    non-contributory    Physical Exam:     Vitals:   Blood Pressure: (!) 175/135 (03/26/24 0315)  Pulse: 92 (03/26/24 0315)  Temperature: 98.4 °F (36.9 °C) (03/26/24 0216)  Temp Source: Temporal (03/26/24 0216)  Respirations: 16 (03/26/24 0315)  Height: 5' 9\" (175.3 cm) (03/26/24 0216)  Weight - Scale: 74.2 kg (163 lb 9.3 oz) (03/26/24 0216)  SpO2: 96 % (03/26/24 0315)    Physical Exam  Constitutional:       General: He is not in acute distress.     Appearance: He is underweight. He is not ill-appearing, toxic-appearing or diaphoretic.   HENT: "      Right Ear: External ear normal.      Left Ear: External ear normal.      Nose: Nose normal.      Mouth/Throat:      Mouth: Mucous membranes are dry.      Pharynx: Oropharynx is clear.   Eyes:      Conjunctiva/sclera: Conjunctivae normal.   Cardiovascular:      Rate and Rhythm: Normal rate and regular rhythm.      Pulses: Normal pulses.      Heart sounds: Murmur heard.   Pulmonary:      Effort: Pulmonary effort is normal.      Breath sounds: No stridor. Wheezing present. No rhonchi.   Chest:      Chest wall: Tenderness present.   Abdominal:      General: Abdomen is flat. Bowel sounds are normal.   Musculoskeletal:         General: Normal range of motion.      Cervical back: Normal range of motion.   Skin:     General: Skin is warm.      Capillary Refill: Capillary refill takes less than 2 seconds.   Neurological:      General: No focal deficit present.      Mental Status: He is alert and easily aroused. Mental status is at baseline.   Psychiatric:         Mood and Affect: Mood normal.         Behavior: Behavior is cooperative.         Thought Content: Thought content normal.         Judgment: Judgment normal.             Additional Data:     Lab Results: I have personally reviewed pertinent reports.      Results from last 7 days   Lab Units 03/26/24  0239   WBC Thousand/uL 7.78   HEMOGLOBIN g/dL 15.8   HEMATOCRIT % 48.6   PLATELETS Thousands/uL 322   NEUTROS PCT % 63   LYMPHS PCT % 26   MONOS PCT % 9   EOS PCT % 1     Results from last 7 days   Lab Units 03/26/24  0239   POTASSIUM mmol/L 4.6   CHLORIDE mmol/L 99   CO2 mmol/L 27   BUN mg/dL 13   CREATININE mg/dL 1.12   CALCIUM mg/dL 9.5   ALK PHOS U/L 92   ALT U/L 10   AST U/L 20           Imaging: I have personally reviewed pertinent reports.      X-ray foot left 3+ views    Result Date: 2/28/2024  Narrative: LEFT FOOT INDICATION:   Pain in left foot. COMPARISON:  None. VIEWS:  XR FOOT 3+ VW LEFT FINDINGS: There is no acute fracture or dislocation. Severe/end  stage osteoarthritis at the first metatarsophalangeal joint No lytic or blastic osseous lesion. Soft tissues are unremarkable.     Impression: No acute osseous abnormality. 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 **

## 2024-03-26 NOTE — ASSESSMENT & PLAN NOTE
"Wt Readings from Last 3 Encounters:   03/26/24 74.2 kg (163 lb 9.3 oz)   03/17/24 71.2 kg (157 lb)   02/28/24 73 kg (161 lb)       -Patient recently hospitalized at our institution for exacerbation of heart failure reduced ejection fraction  -At that time, 2D echocardiogram performed which revealed significantly reduced ejection fraction of 20 to 25%  -Goal-directed medical therapy was initiated including ACE inhibitor, beta-blocker, MRA, SGLT2  -Life vest recommended at that time however patient resistant and furthermore perhaps an inappropriate candidate secondary to history of noncompliance  -Patient has not been taking SGLT2 inhibitor secondary to cost  -Patient tells me MRA was discontinued however I cannot find documentation of this-has not been taking  -Patient reports compliance with diuretic, ACE inhibitor and beta-blocker however does admit to \"missing some doses here and there\"  Patient appears euvolemic upon presentation  -Will check repeat TTE as it has been almost 6 months since initiation of goal-directed medical therapy, furthermore, to assess for any new wall motion abnormality or further decrease in ejection fraction which may indicate underlying coronary disease (given presentation)      "

## 2024-03-26 NOTE — PLAN OF CARE
Problem: PAIN - ADULT  Goal: Verbalizes/displays adequate comfort level or baseline comfort level  Description: Interventions:  - Encourage patient to monitor pain and request assistance  - Assess pain using appropriate pain scale  - Administer analgesics based on type and severity of pain and evaluate response  - Implement non-pharmacological measures as appropriate and evaluate response  - Consider cultural and social influences on pain and pain management  - Notify physician/advanced practitioner if interventions unsuccessful or patient reports new pain  Outcome: Progressing     Problem: INFECTION - ADULT  Goal: Absence or prevention of progression during hospitalization  Description: INTERVENTIONS:  - Assess and monitor for signs and symptoms of infection  - Monitor lab/diagnostic results  - Monitor all insertion sites, i.e. indwelling lines, tubes, and drains  - Monitor endotracheal if appropriate and nasal secretions for changes in amount and color  - [place] appropriate cooling/warming therapies per order  - Administer medications as ordered  - Instruct and encourage patient and family to use good hand hygiene technique  - Identify and instruct in appropriate isolation precautions for identified infection/condition  Outcome: Progressing     Problem: SAFETY ADULT  Goal: Patient will remain free of falls  Description: INTERVENTIONS:  - Educate patient/family on patient safety including physical limitations  - Instruct patient to call for assistance with activity   - Consult OT/PT to assist with strengthening/mobility   - Keep Call bell within reach  - Keep bed low and locked with side rails adjusted as appropriate  - Keep care items and personal belongings within reach  - Initiate and maintain comfort rounds  - Make Fall Risk Sign visible to staff    - Apply yellow socks and bracelet for high fall risk patients  - Consider moving patient to room near nurses station  Outcome: Progressing  Goal: Maintain or  return to baseline ADL function  Description: INTERVENTIONS:  -  Assess patient's ability to carry out ADLs; assess patient's baseline for ADL function and identify physical deficits which impact ability to perform ADLs (bathing, care of mouth/teeth, toileting, grooming, dressing, etc.)  - Assess/evaluate cause of self-care deficits   - Assess range of motion  - Assess patient's mobility; develop plan if impaired  - Assess patient's need for assistive devices and provide as appropriate  - Encourage maximum independence but intervene and supervise when necessary  - Involve family in performance of ADLs  - Assess for home care needs following discharge   - Consider OT consult to assist with ADL evaluation and planning for discharge  - Provide patient education as appropriate  Outcome: Progressing  Goal: Maintains/Returns to pre admission functional level  Description: INTERVENTIONS:  - Perform AM-PAC 6 Click Basic Mobility/ Daily Activity assessment daily.  - Set and communicate daily mobility goal to care team and patient/family/caregiver.   - Collaborate with rehabilitation services on mobility goals if consulted    - Out of bed for toileting  - Record patient progress and toleration of activity level   Outcome: Progressing     Problem: DISCHARGE PLANNING  Goal: Discharge to home or other facility with appropriate resources  Description: INTERVENTIONS:  - Identify barriers to discharge w/patient and caregiver  - Arrange for needed discharge resources and transportation as appropriate  - Identify discharge learning needs (meds, wound care, etc.)  - Arrange for interpretive services to assist at discharge as needed  - Refer to Case Management Department for coordinating discharge planning if the patient needs post-hospital services based on physician/advanced practitioner order or complex needs related to functional status, cognitive ability, or social support system  Outcome: Progressing     Problem: Knowledge  Deficit  Goal: Patient/family/caregiver demonstrates understanding of disease process, treatment plan, medications, and discharge instructions  Description: Complete learning assessment and assess knowledge base.  Interventions:  - Provide teaching at level of understanding  - Provide teaching via preferred learning methods  Outcome: Progressing     Problem: CARDIOVASCULAR - ADULT  Goal: Maintains optimal cardiac output and hemodynamic stability  Description: INTERVENTIONS:  - Monitor I/O, vital signs and rhythm  - Monitor for S/S and trends of decreased cardiac output  - Administer and titrate ordered vasoactive medications to optimize hemodynamic stability  - Assess quality of pulses, skin color and temperature  - Assess for signs of decreased coronary artery perfusion  - Instruct patient to report change in severity of symptoms  Outcome: Progressing  Goal: Absence of cardiac dysrhythmias or at baseline rhythm  Description: INTERVENTIONS:  - Continuous cardiac monitoring, vital signs, obtain 12 lead EKG if ordered  - Administer antiarrhythmic and heart rate control medications as ordered  - Monitor electrolytes and administer replacement therapy as ordered  Outcome: Progressing     Problem: MOBILITY - ADULT  Goal: Maintain or return to baseline ADL function  Description: INTERVENTIONS:  -  Assess patient's ability to carry out ADLs; assess patient's baseline for ADL function and identify physical deficits which impact ability to perform ADLs (bathing, care of mouth/teeth, toileting, grooming, dressing, etc.)  - Assess/evaluate cause of self-care deficits   - Assess range of motion  - Assess patient's mobility; develop plan if impaired  - Assess patient's need for assistive devices and provide as appropriate  - Encourage maximum independence but intervene and supervise when necessary  - Involve family in performance of ADLs  - Assess for home care needs following discharge   - Consider OT consult to assist with ADL  evaluation and planning for discharge  - Provide patient education as appropriate  Outcome: Progressing  Goal: Maintains/Returns to pre admission functional level  Description: INTERVENTIONS:  - Perform AM-PAC 6 Click Basic Mobility/ Daily Activity assessment daily.  - Set and communicate daily mobility goal to care team and patient/family/caregiver.   - Collaborate with rehabilitation services on mobility goals if consulted    - Out of bed for toileting  - Record patient progress and toleration of activity level   Outcome: Progressing

## 2024-03-26 NOTE — ASSESSMENT & PLAN NOTE
-Patient presents with hypertensive urgency with systolic blood pressure greater than 170  -Will administer IV hydralazine for now and reinitiate home regimen  -Raises concern over compliance with outpatient antihypertensive regimen

## 2024-04-02 NOTE — ED PROVIDER NOTES
"History  Chief Complaint   Patient presents with    Chest Pain     Chest pain ongoing for \" a couple of days\". Pt reports shortness of breath with movement      58 year old male accompanied by sister describes intermittent chest pain, dyspnea and weight gain over the past few days. Also, has chronic right hand/finger chronic osteomyelitis, continues to smoke and has outpatient care arranged.      History provided by:  Patient and caregiver  Chest Pain  Pain location:  Substernal area, L chest, R chest, L lateral chest and R lateral chest  Pain quality: sharp    Pain radiates to:  Does not radiate  Pain radiates to the back: no    Pain severity:  Mild  Onset quality:  Unable to specify  Duration:  3 days  Timing:  Intermittent  Progression:  Waxing and waning  Chronicity:  Chronic  Context: breathing and movement    Context: no trauma    Relieved by:  Nothing  Worsened by:  Movement  Ineffective treatments:  None tried  Associated symptoms: shortness of breath    Associated symptoms: no abdominal pain    Risk factors: smoking        Prior to Admission Medications   Prescriptions Last Dose Informant Patient Reported? Taking?   albuterol (PROVENTIL HFA,VENTOLIN HFA) 90 mcg/act inhaler Past Week  Yes Yes   Sig: Inhale 2 puffs every 4 (four) hours as needed    carvedilol (COREG) 6.25 mg tablet 3/25/2024  Yes Yes   Sig: Take 6.25 mg by mouth 2 (two) times a day with meals   clindamycin (CLEOCIN) 150 mg capsule 3/25/2024  No Yes   Sig: Take 2 capsules (300 mg total) by mouth every 6 (six) hours for 10 days   furosemide (LASIX) 20 mg tablet 3/25/2024  No Yes   Sig: Take 1 tablet (20 mg total) by mouth daily Do not start before February 25, 2024.   gabapentin (NEURONTIN) 400 mg capsule 3/25/2024  Yes Yes   Sig: Take 400 mg by mouth 3 (three) times a day   losartan (COZAAR) 50 mg tablet 3/25/2024  No Yes   Sig: Take 1 tablet (50 mg total) by mouth daily Do not start before October 11, 2023.   traMADol (Ultram) 50 mg tablet " "3/25/2024  No Yes   Sig: Take 1 tablet (50 mg total) by mouth every 12 (twelve) hours as needed for severe pain      Facility-Administered Medications: None       Past Medical History:   Diagnosis Date    Anxiety     Cardiomyopathy (HCC)     CHF (congestive heart failure) (HCC)     Depression     Emphysema of lung (HCC)     GERD (gastroesophageal reflux disease)     Gout     Hypertension     Neuropathy     Raynaud's disease     Right inguinal hernia     Scleroderma (HCC)        Past Surgical History:   Procedure Laterality Date    AMPUTATION Right     pt had tip of \"pointer finger\" d/t scleraderma    FINGER AMPUTATION Left 1/31/2024    Procedure: AMPUTATION FINGER, LEFT INDEX FINGER CPT: 26849;  Surgeon: Pedro Vazquez MD;  Location:  MAIN OR;  Service: Orthopedics    HERNIA REPAIR Left     times 2    OK AMP F/TH 1/2 JT/PHALANX W/NEURECT W/DIR CLSR Right 1/23/2024    Procedure: AMPUTATION FINGER, RIGHT MIDDLE;  Surgeon: Pedro Vazquez MD;  Location: [facility];  Service: Orthopedics    OK RPR 1ST INGUN HRNA AGE 5 YRS/> REDUCIBLE Right 3/3/2023    Procedure: OPEN INGUINAL HERNIA REPAIR WITH MESH;  Surgeon: Temo Jackson DO;  Location:  MAIN OR;  Service: General       History reviewed. No pertinent family history.  I have reviewed and agree with the history as documented.    E-Cigarette/Vaping    E-Cigarette Use Never User      E-Cigarette/Vaping Substances    Nicotine No     THC No     CBD No     Flavoring No      Social History     Tobacco Use    Smoking status: Every Day     Current packs/day: 1.00     Average packs/day: 1 pack/day for 44.3 years (44.3 ttl pk-yrs)     Types: Cigarettes     Start date: 2000    Smokeless tobacco: Never    Tobacco comments:     Last cigarette 0430   Vaping Use    Vaping status: Never Used   Substance Use Topics    Alcohol use: Not Currently     Comment: occasional    Drug use: Yes     Types: Marijuana, Methamphetamines     Comment: Hx meth use       Review of Systems   Respiratory:  " Positive for shortness of breath.    Cardiovascular:  Positive for chest pain.   Gastrointestinal:  Negative for abdominal pain.   All other systems reviewed and are negative.      Physical Exam  Physical Exam  Vitals and nursing note reviewed.   Constitutional:       General: He is not in acute distress.     Comments: Pleasant, comfortable-appearing   HENT:      Head: Normocephalic and atraumatic.      Mouth/Throat:      Mouth: Mucous membranes are moist.      Pharynx: Oropharynx is clear.   Eyes:      Conjunctiva/sclera: Conjunctivae normal.      Pupils: Pupils are equal, round, and reactive to light.   Neck:      Vascular: No hepatojugular reflux.   Cardiovascular:      Rate and Rhythm: Normal rate and regular rhythm.      Pulses:           Radial pulses are 2+ on the right side and 2+ on the left side.      Heart sounds: Murmur heard.   Pulmonary:      Effort: Pulmonary effort is normal. No tachypnea.      Breath sounds: Decreased breath sounds present. No wheezing, rhonchi or rales.   Chest:      Chest wall: Tenderness present.   Abdominal:      General: Bowel sounds are normal. There is no distension.      Palpations: Abdomen is soft.      Tenderness: There is no abdominal tenderness.   Musculoskeletal:         General: No deformity.      Cervical back: Neck supple.      Right lower leg: No edema.      Left lower leg: No edema.   Skin:     General: Skin is warm and dry.      Comments: Right 3rd finger chronic wound, nontender, no disproportionate swelling   Neurological:      General: No focal deficit present.      Mental Status: He is alert and oriented to person, place, and time.      Cranial Nerves: No cranial nerve deficit.      Coordination: Coordination normal.   Psychiatric:         Mood and Affect: Mood is not anxious.         Behavior: Behavior normal.         Thought Content: Thought content normal.         Judgment: Judgment normal.         Vital Signs  ED Triage Vitals   Temperature Pulse  Respirations Blood Pressure SpO2   03/26/24 0216 03/26/24 0216 03/26/24 0216 03/26/24 0300 03/26/24 0216   98.4 °F (36.9 °C) (!) 54 16 (!) 170/113 96 %      Temp Source Heart Rate Source Patient Position - Orthostatic VS BP Location FiO2 (%)   03/26/24 0216 03/26/24 0216 03/26/24 0216 03/26/24 0216 --   Temporal Monitor Sitting Left arm       Pain Score       03/26/24 0216       8           Vitals:    03/26/24 0920 03/26/24 0924 03/26/24 1300 03/26/24 1500   BP: 117/80 112/70 121/89    Pulse: 68   73   Patient Position - Orthostatic VS: Lying Lying Lying          Visual Acuity      ED Medications  Medications   morphine injection 4 mg (4 mg Intravenous Given 3/26/24 0248)   furosemide (LASIX) injection 60 mg (60 mg Intravenous Given 3/26/24 0411)   morphine injection 4 mg (4 mg Intravenous Given 3/26/24 0410)   magnesium sulfate 2 g/50 mL IVPB (premix) 2 g (2 g Intravenous New Bag 3/26/24 0842)       Diagnostic Studies  Results Reviewed       Procedure Component Value Units Date/Time    HS Troponin I 4hr [[id number]]  (Abnormal) Collected: 03/26/24 0635    Lab Status: Final result Specimen: Blood from Arm, Right Updated: 03/26/24 0724     hs TnI 4hr 114 ng/L      Delta 4hr hsTnI 34 ng/L     Basic metabolic panel [[id number]]  (Abnormal) Collected: 03/26/24 0635    Lab Status: Final result Specimen: Blood from Hand, Right Updated: 03/26/24 0715     Sodium 133 mmol/L      Potassium 4.4 mmol/L      Chloride 99 mmol/L      CO2 26 mmol/L      ANION GAP 8 mmol/L      BUN 14 mg/dL      Creatinine 1.06 mg/dL      Glucose 112 mg/dL      Calcium 9.9 mg/dL      eGFR 76 ml/min/1.73sq m     Narrative:      National Kidney Disease Foundation guidelines for Chronic Kidney Disease (CKD):     Stage 1 with normal or high GFR (GFR > 90 mL/min/1.73 square meters)    Stage 2 Mild CKD (GFR = 60-89 mL/min/1.73 square meters)    Stage 3A Moderate CKD (GFR = 45-59 mL/min/1.73 square meters)    Stage 3B Moderate CKD (GFR = 30-44 mL/min/1.73  square meters)    Stage 4 Severe CKD (GFR = 15-29 mL/min/1.73 square meters)    Stage 5 End Stage CKD (GFR <15 mL/min/1.73 square meters)  Note: GFR calculation is accurate only with a steady state creatinine    Magnesium [[id number]]  (Abnormal) Collected: 03/26/24 0635    Lab Status: Final result Specimen: Blood from Hand, Right Updated: 03/26/24 0715     Magnesium 1.6 mg/dL     Phosphorus [[id number]]  (Normal) Collected: 03/26/24 0635    Lab Status: Final result Specimen: Blood from Hand, Right Updated: 03/26/24 0715     Phosphorus 3.6 mg/dL     Lipase [[id number]]  (Abnormal) Collected: 03/26/24 0635    Lab Status: Final result Specimen: Blood from Hand, Right Updated: 03/26/24 0715     Lipase 120 u/L     Rapid drug screen, urine [[id number]]  (Abnormal) Collected: 03/26/24 0650    Lab Status: Final result Specimen: Urine, Clean Catch Updated: 03/26/24 0714     Amph/Meth UR Negative     Barbiturate Ur Negative     Benzodiazepine Urine Negative     Cocaine Urine Negative     Methadone Urine Negative     Opiate Urine Positive     PCP Ur Negative     THC Urine Positive     Oxycodone Urine Negative    Narrative:      Presumptive report. If requested, specimen will be sent to reference lab for confirmation.  FOR MEDICAL PURPOSES ONLY.   IF CONFIRMATION NEEDED PLEASE CONTACT THE LAB WITHIN 5 DAYS.    Drug Screen Cutoff Levels:  AMPHETAMINE/METHAMPHETAMINES  1000 ng/mL  BARBITURATES     200 ng/mL  BENZODIAZEPINES     200 ng/mL  COCAINE      300 ng/mL  METHADONE      300 ng/mL  OPIATES      300 ng/mL  PHENCYCLIDINE     25 ng/mL  THC       50 ng/mL  OXYCODONE      100 ng/mL    CBC (With Platelets) [[id number]]  (Normal) Collected: 03/26/24 0635    Lab Status: Final result Specimen: Blood from Hand, Right Updated: 03/26/24 0701     WBC 8.60 Thousand/uL      RBC 5.26 Million/uL      Hemoglobin 15.8 g/dL      Hematocrit 47.9 %      MCV 91 fL      MCH 30.0 pg      MCHC 33.0 g/dL      RDW 14.4 %      Platelets 314 Thousands/uL       MPV 9.8 fL     HS Troponin I 2hr [[id number]]  (Abnormal) Collected: 03/26/24 0413    Lab Status: Final result Specimen: Blood from Arm, Left Updated: 03/26/24 0442     hs TnI 2hr 100 ng/L      Delta 2hr hsTnI 20 ng/L     HS Troponin 0hr (reflex protocol) [[id number]]  (Abnormal) Collected: 03/26/24 0239    Lab Status: Final result Specimen: Blood from Arm, Left Updated: 03/26/24 0319     hs TnI 0hr 80 ng/L     B-Type Natriuretic Peptide(BNP) [[id number]]  (Abnormal) Collected: 03/26/24 0239    Lab Status: Final result Specimen: Blood from Arm, Right Updated: 03/26/24 0319     BNP 3,666 pg/mL     Lactic acid, plasma (w/reflex if result > 2.0) [[id number]]  (Normal) Collected: 03/26/24 0239    Lab Status: Final result Specimen: Blood from Arm, Right Updated: 03/26/24 0315     LACTIC ACID 1.7 mmol/L     Narrative:      Result may be elevated if tourniquet was used during collection.    Comprehensive metabolic panel [[id number]] Collected: 03/26/24 0239    Lab Status: Final result Specimen: Blood from Arm, Right Updated: 03/26/24 0312     Sodium 135 mmol/L      Potassium 4.6 mmol/L      Chloride 99 mmol/L      CO2 27 mmol/L      ANION GAP 9 mmol/L      BUN 13 mg/dL      Creatinine 1.12 mg/dL      Glucose 110 mg/dL      Calcium 9.5 mg/dL      AST 20 U/L      ALT 10 U/L      Alkaline Phosphatase 92 U/L      Total Protein 8.0 g/dL      Albumin 4.0 g/dL      Total Bilirubin 0.73 mg/dL      eGFR 72 ml/min/1.73sq m     Narrative:      National Kidney Disease Foundation guidelines for Chronic Kidney Disease (CKD):     Stage 1 with normal or high GFR (GFR > 90 mL/min/1.73 square meters)    Stage 2 Mild CKD (GFR = 60-89 mL/min/1.73 square meters)    Stage 3A Moderate CKD (GFR = 45-59 mL/min/1.73 square meters)    Stage 3B Moderate CKD (GFR = 30-44 mL/min/1.73 square meters)    Stage 4 Severe CKD (GFR = 15-29 mL/min/1.73 square meters)    Stage 5 End Stage CKD (GFR <15 mL/min/1.73 square meters)  Note: GFR calculation is accurate  only with a steady state creatinine    CBC and differential [[id number]] Collected: 03/26/24 0239    Lab Status: Final result Specimen: Blood from Arm, Right Updated: 03/26/24 0253     WBC 7.78 Thousand/uL      RBC 5.28 Million/uL      Hemoglobin 15.8 g/dL      Hematocrit 48.6 %      MCV 92 fL      MCH 29.9 pg      MCHC 32.5 g/dL      RDW 14.5 %      MPV 10.0 fL      Platelets 322 Thousands/uL      nRBC 0 /100 WBCs      Neutrophils Relative 63 %      Immature Grans % 0 %      Lymphocytes Relative 26 %      Monocytes Relative 9 %      Eosinophils Relative 1 %      Basophils Relative 1 %      Neutrophils Absolute 4.88 Thousands/µL      Absolute Immature Grans 0.01 Thousand/uL      Absolute Lymphocytes 2.05 Thousands/µL      Absolute Monocytes 0.70 Thousand/µL      Eosinophils Absolute 0.10 Thousand/µL      Basophils Absolute 0.04 Thousands/µL                    XR chest portable   Final Result by Fran Llamas MD (03/26 1424)      Cardiomegaly. Clear lungs            Workstation performed: [id number]                    Procedures  Procedures         ED Course  ED Course as of 04/02/24 1321   Tue Mar 26, 2024   0236 EKG 2:19 AM normal sinus rhythm rate 88 right axis normal WI right bundle branch block QTc 556 anterolateral ST depression similar compared to prior interpreted by me   0342 hs TnI 0hr(!): 80   0342 BNP(!): 3,666   0342 LACTIC ACID: 1.7   0342 WBC: 7.78   0342 XR chest portable  Cardiomegaly, mild CHF appears similar to prior   Tue Apr 02, 2024   1316 Patient and sibling informed of results and likely chronic issues. We discussed options including outpatient f/u and patient agreeable with hospital observation                               SBIRT 20yo+      Flowsheet Row Most Recent Value   Initial Alcohol Screen: US AUDIT-C     1. How often do you have a drink containing alcohol? 0 Filed at: 03/26/2024 0215   2. How many drinks containing alcohol do you have on a typical day you are drinking?  0  Filed at: 03/26/2024 0215   3a. Male UNDER 65: How often do you have five or more drinks on one occasion? 0 Filed at: 03/26/2024 0215   3b. FEMALE Any Age, or MALE 65+: How often do you have 4 or more drinks on one occassion? 0 Filed at: 03/26/2024 0215   Audit-C Score 0 Filed at: 03/26/2024 0215   SILVIA: How many times in the past year have you...    Used an illegal drug or used a prescription medication for non-medical reasons? Never Filed at: 03/26/2024 0215                      Medical Decision Making  This patient presents with chest pain.   Diagnostic considerations include ACS, CHF, pneumonia, PE. See ED Course.       Amount and/or Complexity of Data Reviewed  Labs: ordered. Decision-making details documented in ED Course.  Radiology: ordered and independent interpretation performed. Decision-making details documented in ED Course.  ECG/medicine tests: ordered and independent interpretation performed. Decision-making details documented in ED Course.    Risk  Prescription drug management.  Decision regarding hospitalization.             Disposition  Final diagnoses:   Elevated troponin - chronic   Chest pain     Time reflects when diagnosis was documented in both MDM as applicable and the Disposition within this note       Time User Action Codes Description Comment    3/26/2024  4:47 AM Peyman Brady Add [R79.89] Elevated troponin     3/26/2024  3:33 PM Gnall, Citlalli Add [R07.89] Chest wall pain     3/26/2024  3:33 PM Gnall, Citlalli Add [I50.22] Chronic HFrEF (heart failure with reduced ejection fraction) (Formerly [facility])     3/26/2024  4:10 PM Gnall, Citlalli Add [I42.9] Cardiomyopathy (Former[facility])     3/26/2024  4:11 PM Gnall, Citlalli Add [F41.9] Anxiety     3/26/2024  4:11 PM Gnall, Citlalli Add [F32.A] Depression, unspecified depression type     3/26/2024  4:12 PM Gnall, Citlalli Add [M86.9] Osteomyelitis of finger of right hand (Former[facility])     3/26/2024  4:12 PM Gnall, Citlalli Add [M79.641,  M79.642] Pain in both hands      4/2/2024  1:16 PM Akhil Razo Add [R07.9] Chest pain     4/2/2024  1:17 PM Akhil Razo Modify [R79.89] Elevated troponin     4/2/2024  1:17 PM Akhil Razo Add [I50.9] CHF (congestive heart failure) (HCC)     4/2/2024  1:17 PM Akhil Razo Modify [R79.89] Elevated troponin chronic    4/2/2024  1:18 PM Akhil Razo Remove [I50.9] CHF (congestive heart failure) (HCC)           ED Disposition       ED Disposition   Admit    Condition   Stable    Date/Time   Tue Apr 2, 2024 1316    Comment   Case was discussed with Melida and the patient's admission status was agreed to be Admission Status: observation status to the service of Dr. Montez .               Follow-up Information       Follow up With Specialties Details Why Contact Info    [facility] Everywhere Referral Cardiology, Gastroenterology, General Surgery, Anesthesiology, Hematology and Oncology, Urology, Emergency Medicine, Hematology, Oncology, Diabetes Services Follow up in 1 week(s)  [address] [facility] [address]  [phone]      Terrence Andrews PA-C Orthopedic Surgery, Physician Assistant Follow up  [address] [facility] [address]  [phone]      Veronika Ca PA-C Cardiology, Physician Assistant Follow up  [address] [facility] [address]  [phone]              Discharge Medication List as of 3/26/2024  3:55 PM        START taking these medications    Details   aspirin (ECOTRIN LOW STRENGTH) 81 mg EC tablet Take 1 tablet (81 mg total) by mouth daily Do not start before March 27, 2024., Starting Wed 3/27/2024, Normal           CONTINUE these medications which have NOT CHANGED    Details   albuterol (PROVENTIL HFA,VENTOLIN HFA) 90 mcg/act inhaler Inhale 2 puffs every 4 (four) hours as needed , Historical Med      clindamycin (CLEOCIN) 150 mg capsule Take 2 capsules (300 mg total) by mouth every 6 (six) hours for 10 days, Starting Sun 3/17/2024, Until Wed 3/27/2024, Normal      carvedilol  (COREG) 6.25 mg tablet Take 6.25 mg by mouth 2 (two) times a day with meals, Historical Med      furosemide (LASIX) 20 mg tablet Take 1 tablet (20 mg total) by mouth daily Do not start before February 25, 2024., Starting Sun 2/25/2024, No Print      gabapentin (NEURONTIN) 400 mg capsule Take 400 mg by mouth 3 (three) times a day, Historical Med      losartan (COZAAR) 50 mg tablet Take 1 tablet (50 mg total) by mouth daily Do not start before October 11, 2023., Starting Wed 10/11/2023, Normal      traMADol (Ultram) 50 mg tablet Take 1 tablet (50 mg total) by mouth every 12 (twelve) hours as needed for severe pain, Starting Wed 3/20/2024, Normal             Outpatient Discharge Orders   Discharge Diet       PDMP Review         Value Time User    PDMP Reviewed  Yes 3/26/2024  4:12 PM Citlalli Cannon PA-C            ED Provider  Electronically Signed by             Akhil Razo DO  04/02/24 1318       Akhil Razo DO  04/02/24 1326

## 2024-05-28 ENCOUNTER — HOSPITAL ENCOUNTER (EMERGENCY)
Facility: HOSPITAL | Age: 59
Discharge: HOME | End: 2024-05-28
Attending: STUDENT IN AN ORGANIZED HEALTH CARE EDUCATION/TRAINING PROGRAM
Payer: COMMERCIAL

## 2024-05-28 ENCOUNTER — APPOINTMENT (EMERGENCY)
Dept: RADIOLOGY | Facility: HOSPITAL | Age: 59
End: 2024-05-28
Payer: COMMERCIAL

## 2024-05-28 VITALS
HEART RATE: 96 BPM | WEIGHT: 163.58 LBS | BODY MASS INDEX: 24.16 KG/M2 | OXYGEN SATURATION: 99 % | RESPIRATION RATE: 18 BRPM | TEMPERATURE: 98.2 F | DIASTOLIC BLOOD PRESSURE: 77 MMHG | SYSTOLIC BLOOD PRESSURE: 122 MMHG

## 2024-05-28 DIAGNOSIS — M25.571 PAIN AND SWELLING OF RIGHT ANKLE: Primary | ICD-10-CM

## 2024-05-28 DIAGNOSIS — M25.471 PAIN AND SWELLING OF RIGHT ANKLE: Primary | ICD-10-CM

## 2024-05-28 DIAGNOSIS — M10.9 GOUT FLARE: ICD-10-CM

## 2024-05-28 PROCEDURE — 73610 X-RAY EXAM OF ANKLE: CPT

## 2024-05-28 PROCEDURE — 99284 EMERGENCY DEPT VISIT MOD MDM: CPT

## 2024-05-28 PROCEDURE — 99284 EMERGENCY DEPT VISIT MOD MDM: CPT | Performed by: STUDENT IN AN ORGANIZED HEALTH CARE EDUCATION/TRAINING PROGRAM

## 2024-05-28 RX ORDER — ACETAMINOPHEN 325 MG/1
975 TABLET ORAL ONCE
Status: COMPLETED | OUTPATIENT
Start: 2024-05-28 | End: 2024-05-28

## 2024-05-28 RX ORDER — PREDNISONE 10 MG/1
TABLET ORAL
Qty: 30 TABLET | Refills: 0 | Status: SHIPPED | OUTPATIENT
Start: 2024-05-28

## 2024-05-28 RX ORDER — NAPROXEN 250 MG/1
500 TABLET ORAL ONCE
Status: DISCONTINUED | OUTPATIENT
Start: 2024-05-28 | End: 2024-05-28 | Stop reason: HOSPADM

## 2024-05-28 RX ADMIN — ACETAMINOPHEN 325MG 975 MG: 325 TABLET ORAL at 09:09

## 2024-05-28 NOTE — DISCHARGE INSTRUCTIONS
You are being prescribed a course of prednisone for treatment of a presumed gout flare.  Please take as directed.  In addition, you can take Tylenol 1000 mg every 6 hours.  Continue all other prescribed medications.    If you do not notice improvement of your symptoms within the next 48 to 72 hours or you develop worsening symptoms/fevers/chills--seek re-evaluation in the emergency room.

## 2024-05-28 NOTE — ED PROVIDER NOTES
"History  Chief Complaint   Patient presents with    Ankle Pain     Pt reports right ankle pain. Denies known injury.        History provided by:  Patient    59 year old M. Presents with right ankle pain/swelling/redness. The patient states that he noticed the aforementioned symptoms last night. Denies known injury. States that he was playing cards at a [employer] when he noticed that his right ankle was itchy. Did not take anything for pain PTA. Denies all other injuries. Per chart review, the patient has a hx of gout. Denies fevers/chills.     Prior to Admission Medications   Prescriptions Last Dose Informant Patient Reported? Taking?   albuterol (PROVENTIL HFA,VENTOLIN HFA) 90 mcg/act inhaler   Yes No   Sig: Inhale 2 puffs every 4 (four) hours as needed    aspirin (ECOTRIN LOW STRENGTH) 81 mg EC tablet   No No   Sig: Take 1 tablet (81 mg total) by mouth daily Do not start before March 27, 2024.   carvedilol (COREG) 6.25 mg tablet   No No   Sig: Take 1 tablet (6.25 mg total) by mouth 2 (two) times a day with meals   furosemide (LASIX) 20 mg tablet   No No   Sig: Take 1 tablet (20 mg total) by mouth daily   gabapentin (NEURONTIN) 400 mg capsule   No No   Sig: Take 1 capsule (400 mg total) by mouth 3 (three) times a day   losartan (COZAAR) 50 mg tablet   No No   Sig: Take 1 tablet (50 mg total) by mouth daily      Facility-Administered Medications: None       Past Medical History:   Diagnosis Date    Anxiety     Cardiomyopathy (HCC)     CHF (congestive heart failure) (HCC)     Depression     Emphysema of lung (HCC)     GERD (gastroesophageal reflux disease)     Gout     Hypertension     Neuropathy     Raynaud's disease     Right inguinal hernia     Scleroderma (HCC)        Past Surgical History:   Procedure Laterality Date    AMPUTATION Right     pt had tip of \"pointer finger\" d/t scleraderma    CARDIAC PACEMAKER PLACEMENT      FINGER AMPUTATION Left 01/31/2024    Procedure: AMPUTATION FINGER, LEFT INDEX FINGER CPT: " 92611;  Surgeon: Pedro Vazquez MD;  Location:  MAIN OR;  Service: Orthopedics    HERNIA REPAIR Left     times 2    TX AMP F/TH 1/2 JT/PHALANX W/NEURECT W/DIR CLSR Right 01/23/2024    Procedure: AMPUTATION FINGER, RIGHT MIDDLE;  Surgeon: Pedro Vazquez MD;  Location: [facility];  Service: Orthopedics    TX RPR 1ST INGUN HRNA AGE 5 YRS/> REDUCIBLE Right 03/03/2023    Procedure: OPEN INGUINAL HERNIA REPAIR WITH MESH;  Surgeon: Temo Jackson DO;  Location:  MAIN OR;  Service: General       History reviewed. No pertinent family history.  I have reviewed and agree with the history as documented.    E-Cigarette/Vaping    E-Cigarette Use Never User      E-Cigarette/Vaping Substances    Nicotine No     THC No     CBD No     Flavoring No      Social History     Tobacco Use    Smoking status: Every Day     Current packs/day: 1.00     Average packs/day: 1 pack/day for 44.4 years (44.4 ttl pk-yrs)     Types: Cigarettes     Start date: 2000    Smokeless tobacco: Never    Tobacco comments:     Last cigarette 0430   Vaping Use    Vaping status: Never Used   Substance Use Topics    Alcohol use: Not Currently     Comment: occasional    Drug use: Yes     Types: Marijuana, Methamphetamines     Comment: Hx meth use       Review of Systems   Constitutional:  Negative for activity change, appetite change, chills and fever.   Musculoskeletal:  Positive for arthralgias, gait problem and joint swelling. Negative for myalgias.   Skin:  Negative for color change, pallor, rash and wound.   All other systems reviewed and are negative.    Physical Exam  Physical Exam  Vitals and nursing note reviewed.   Constitutional:       General: He is not in acute distress.     Appearance: He is not ill-appearing or toxic-appearing.   HENT:      Head: Normocephalic and atraumatic.      Right Ear: External ear normal.      Left Ear: External ear normal.   Eyes:      Extraocular Movements: Extraocular movements intact.      Conjunctiva/sclera: Conjunctivae  normal.   Cardiovascular:      Rate and Rhythm: Normal rate and regular rhythm.      Pulses: Normal pulses.      Heart sounds: Normal heart sounds. No murmur heard.  Pulmonary:      Effort: Pulmonary effort is normal. No respiratory distress.      Breath sounds: Normal breath sounds. No stridor. No wheezing, rhonchi or rales.   Chest:      Chest wall: No tenderness.   Abdominal:      General: Bowel sounds are normal.      Palpations: Abdomen is soft.      Tenderness: There is no abdominal tenderness. There is no guarding or rebound.   Musculoskeletal:         General: Swelling and tenderness present. No deformity or signs of injury.      Comments: TTP along the right lateral malleolus and along the surround soft tissue just inferior to the lateral malleolus. The area is mildly erythematous and edematous. Mild edema tracking into the dorsum of the foot. No open wounds.   Skin:     General: Skin is warm and dry.      Findings: Erythema and rash present. No bruising.   Neurological:      General: No focal deficit present.      Mental Status: He is alert and oriented to person, place, and time.   Psychiatric:         Mood and Affect: Mood normal.         Behavior: Behavior normal.         Vital Signs  ED Triage Vitals   Temperature Pulse Respirations Blood Pressure SpO2   05/28/24 0829 05/28/24 0829 05/28/24 0829 05/28/24 0829 05/28/24 0829   98.2 °F (36.8 °C) 96 18 122/77 99 %      Temp Source Heart Rate Source Patient Position - Orthostatic VS BP Location FiO2 (%)   05/28/24 0829 05/28/24 0829 05/28/24 0829 05/28/24 0829 --   Temporal Monitor Sitting Left arm       Pain Score       05/28/24 0909       7           Vitals:    05/28/24 0829   BP: 122/77   Pulse: 96   Patient Position - Orthostatic VS: Sitting         Visual Acuity      ED Medications  Medications   naproxen (NAPROSYN) tablet 500 mg (500 mg Oral Not Given 5/28/24 0908)   acetaminophen (TYLENOL) tablet 975 mg (975 mg Oral Given 5/28/24 0909)        Diagnostic Studies  Results Reviewed       None                   XR ankle 3+ views RIGHT   ED Interpretation by Gary Nguyen DO (05/28 0949)   No acute findings.                 Procedures  Procedures         ED Course  ED Course as of 05/28/24 1040   Tue May 28, 2024   0922 Vital signs reviewed. Patient presents with increased pain/swelling/redness/itchiness along the right lateral ankle.  There is localized tenderness to palpation along the right lateral malleolus.  No signs of close deformity.  The patient denies trauma.  Per chart review, the patient has a history of gout. Denies fevers/chills. Will obtain XR imaging and administer a dose of Naproxen/Tylenol.    0949 X-ray imaging interpreted by me.  No acute osseous findings.   0953 Upon re-evaluation, the patient states that his right ankle pain is mildly improved with Tylenol. Declined Naproxen. Likely gout flare/inflammatory process rather than infectious process/cellulitis/osteomyelitis. Will prescribe a course of prednisone. Holding off on abx at this time.  Instruction/strict return precautions were discussed with the patient.  He expressed understanding.  All questions addressed.  Stable for discharge.                               SBIRT 22yo+      Flowsheet Row Most Recent Value   Initial Alcohol Screen: US AUDIT-C     1. How often do you have a drink containing alcohol? 0 Filed at: 05/28/2024 0831   2. How many drinks containing alcohol do you have on a typical day you are drinking?  0 Filed at: 05/28/2024 0831   3a. Male UNDER 65: How often do you have five or more drinks on one occasion? 0 Filed at: 05/28/2024 0831   Audit-C Score 0 Filed at: 05/28/2024 0831   SILVIA: How many times in the past year have you...    Used an illegal drug or used a prescription medication for non-medical reasons? Never Filed at: 05/28/2024 0831          Medical Decision Making  This patient presents with pain/swelling of the right ankle.   Diagnostic  considerations include cellulitis, osteomyelitis, insect bite, gout flare. See ED Course.         Problems Addressed:  Gout flare: acute illness or injury  Pain and swelling of right ankle: acute illness or injury    Amount and/or Complexity of Data Reviewed  External Data Reviewed: notes.     Details: PMHx from OP PCP encounters were reviewed.   Radiology: ordered and independent interpretation performed. Decision-making details documented in ED Course.    Risk  OTC drugs.  Prescription drug management.        Disposition  Final diagnoses:   Pain and swelling of right ankle   Gout flare     Time reflects when diagnosis was documented in both MDM as applicable and the Disposition within this note       Time User Action Codes Description Comment    5/28/2024  9:55 AM Gary Nguyen [M25.571,  M25.471] Pain and swelling of right ankle     5/28/2024  9:57 AM Gary Nguyen [M10.9] Gout flare           ED Disposition       ED Disposition   Discharge    Condition   Stable    Date/Time   Tue May 28, 2024 0955    Comment   [place] discharge to home/self care.                   Follow-up Information    None         Discharge Medication List as of 5/28/2024  9:58 AM        START taking these medications    Details   predniSONE 10 mg tablet Take 4 tablets daily for 3 days; then take 3 tablets daily for 3 days; then take 2 tablets daily for 3 days; then take 1 tablet daily for 3 days., Normal           CONTINUE these medications which have NOT CHANGED    Details   albuterol (PROVENTIL HFA,VENTOLIN HFA) 90 mcg/act inhaler Inhale 2 puffs every 4 (four) hours as needed , Historical Med      aspirin (ECOTRIN LOW STRENGTH) 81 mg EC tablet Take 1 tablet (81 mg total) by mouth daily Do not start before March 27, 2024., Starting Wed 3/27/2024, Normal      carvedilol (COREG) 6.25 mg tablet Take 1 tablet (6.25 mg total) by mouth 2 (two) times a day with meals, Starting Tue 3/26/2024, Normal      furosemide (LASIX) 20 mg  tablet Take 1 tablet (20 mg total) by mouth daily, Starting Tue 3/26/2024, Normal      gabapentin (NEURONTIN) 400 mg capsule Take 1 capsule (400 mg total) by mouth 3 (three) times a day, Starting Tue 3/26/2024, Normal      losartan (COZAAR) 50 mg tablet Take 1 tablet (50 mg total) by mouth daily, Starting Tue 3/26/2024, Normal             No discharge procedures on file.    PDMP Review         Value Time User    PDMP Reviewed  Yes 3/26/2024  4:12 PM Citlalli Cannon PA-C            ED Provider  Electronically Signed by             Gary Nguyen DO  05/28/24 5863

## 2024-08-12 ENCOUNTER — HOSPITAL ENCOUNTER (OUTPATIENT)
Dept: NON INVASIVE DIAGNOSTICS | Facility: HOSPITAL | Age: 59
Discharge: HOME | End: 2024-08-12
Attending: INTERNAL MEDICINE
Payer: COMMERCIAL

## 2024-08-12 VITALS
WEIGHT: 163 LBS | SYSTOLIC BLOOD PRESSURE: 122 MMHG | DIASTOLIC BLOOD PRESSURE: 70 MMHG | BODY MASS INDEX: 24.14 KG/M2 | HEART RATE: 80 BPM | HEIGHT: 69 IN

## 2024-08-12 DIAGNOSIS — I42.0 DILATED CARDIOMYOPATHY (HCC): ICD-10-CM

## 2024-08-12 LAB
AORTIC ROOT: 3.2 CM
APICAL FOUR CHAMBER EJECTION FRACTION: 36 %
ASCENDING AORTA: 3.4 CM
BSA FOR ECHO PROCEDURE: 1.89 M2
DOP CALC MV VTI: 42.52 CM
E WAVE DECELERATION TIME: 271 MS
E/A RATIO: 0.59
FRACTIONAL SHORTENING: 30 (ref 28–44)
INTERVENTRICULAR SEPTUM IN DIASTOLE (PARASTERNAL SHORT AXIS VIEW): 1.2 CM
INTERVENTRICULAR SEPTUM: 1.2 CM (ref 0.6–1.1)
LAAS-AP2: 27.3 CM2
LAAS-AP4: 27.9 CM2
LEFT ATRIUM SIZE: 5.3 CM
LEFT ATRIUM VOLUME (MOD BIPLANE): 109 ML
LEFT ATRIUM VOLUME INDEX (MOD BIPLANE): 57.4 ML/M2
LEFT INTERNAL DIMENSION IN SYSTOLE: 4.2 CM (ref 2.1–4)
LEFT VENTRICLE DIASTOLIC VOLUME (MOD BIPLANE): 190 ML
LEFT VENTRICLE DIASTOLIC VOLUME INDEX (MOD BIPLANE): 100.5 ML/M2
LEFT VENTRICLE SYSTOLIC VOLUME (MOD BIPLANE): 118 ML
LEFT VENTRICLE SYSTOLIC VOLUME INDEX (MOD BIPLANE): 62.4 ML/M2
LEFT VENTRICULAR INTERNAL DIMENSION IN DIASTOLE: 6 CM (ref 3.5–6)
LEFT VENTRICULAR POSTERIOR WALL IN END DIASTOLE: 1.2 CM
LEFT VENTRICULAR STROKE VOLUME: 99 ML
LV EF: 38 %
LVSV (TEICH): 99 ML
MV E'TISSUE VEL-LAT: 6 CM/S
MV E'TISSUE VEL-SEP: 5 CM/S
MV MEAN GRADIENT: 3 MMHG
MV PEAK A VEL: 1.27 M/S
MV PEAK E VEL: 75 CM/S
MV PEAK GRADIENT: 7 MMHG
MV STENOSIS PRESSURE HALF TIME: 79 MS
MV VALVE AREA P 1/2 METHOD: 2.78
RIGHT ATRIUM AREA SYSTOLE A4C: 19.7 CM2
RIGHT VENTRICLE ID DIMENSION: 5.6 CM
SL CV LEFT ATRIUM LENGTH A2C: 5.6 CM
SL CV PED ECHO LEFT VENTRICLE DIASTOLIC VOLUME (MOD BIPLANE) 2D: 178 ML
SL CV PED ECHO LEFT VENTRICLE SYSTOLIC VOLUME (MOD BIPLANE) 2D: 79 ML
TR MAX PG: 25 MMHG
TR PEAK VELOCITY: 2.5 M/S
TRICUSPID ANNULAR PLANE SYSTOLIC EXCURSION: 1.7 CM
TRICUSPID VALVE PEAK REGURGITATION VELOCITY: 2.51 M/S

## 2024-08-12 PROCEDURE — 93306 TTE W/DOPPLER COMPLETE: CPT

## 2024-08-12 PROCEDURE — 93306 TTE W/DOPPLER COMPLETE: CPT | Performed by: INTERNAL MEDICINE

## 2024-09-18 ENCOUNTER — APPOINTMENT (EMERGENCY)
Dept: RADIOLOGY | Facility: HOSPITAL | Age: 59
DRG: 309 | End: 2024-09-18
Payer: COMMERCIAL

## 2024-09-18 ENCOUNTER — HOSPITAL ENCOUNTER (INPATIENT)
Facility: HOSPITAL | Age: 59
LOS: 1 days | Discharge: LEFT BEFORE DISCHARGE | DRG: 309 | End: 2024-09-19
Attending: EMERGENCY MEDICINE | Admitting: FAMILY MEDICINE
Payer: COMMERCIAL

## 2024-09-18 ENCOUNTER — APPOINTMENT (EMERGENCY)
Dept: CT IMAGING | Facility: HOSPITAL | Age: 59
DRG: 309 | End: 2024-09-18
Payer: COMMERCIAL

## 2024-09-18 DIAGNOSIS — I21.4 NSTEMI (NON-ST ELEVATED MYOCARDIAL INFARCTION) (HCC): Primary | ICD-10-CM

## 2024-09-18 DIAGNOSIS — I48.91 ATRIAL FIBRILLATION (HCC): ICD-10-CM

## 2024-09-18 DIAGNOSIS — R55 NEAR SYNCOPE: ICD-10-CM

## 2024-09-18 LAB
2HR DELTA HS TROPONIN: 8 NG/L
4HR DELTA HS TROPONIN: -3 NG/L
ALBUMIN SERPL BCG-MCNC: 3.6 G/DL (ref 3.5–5)
ALP SERPL-CCNC: 90 U/L (ref 34–104)
ALT SERPL W P-5'-P-CCNC: 13 U/L (ref 7–52)
ANION GAP SERPL CALCULATED.3IONS-SCNC: 4 MMOL/L (ref 4–13)
APTT PPP: 32 SECONDS (ref 23–34)
AST SERPL W P-5'-P-CCNC: 22 U/L (ref 13–39)
B-OH-BUTYR SERPL-MCNC: <0.05 MMOL/L (ref 0.02–0.27)
BACTERIA UR QL AUTO: NORMAL /HPF
BASE EX.OXY STD BLDV CALC-SCNC: 51.8 % (ref 60–80)
BASE EXCESS BLDV CALC-SCNC: -3 MMOL/L
BASOPHILS # BLD AUTO: 0.03 THOUSANDS/ΜL (ref 0–0.1)
BASOPHILS NFR BLD AUTO: 1 % (ref 0–1)
BILIRUB SERPL-MCNC: 0.66 MG/DL (ref 0.2–1)
BILIRUB UR QL STRIP: NEGATIVE
BNP SERPL-MCNC: 707 PG/ML (ref 0–100)
BUN SERPL-MCNC: 20 MG/DL (ref 5–25)
CALCIUM SERPL-MCNC: 8.9 MG/DL (ref 8.4–10.2)
CARDIAC TROPONIN I PNL SERPL HS: 111 NG/L
CARDIAC TROPONIN I PNL SERPL HS: 114 NG/L
CARDIAC TROPONIN I PNL SERPL HS: 122 NG/L
CHLORIDE SERPL-SCNC: 103 MMOL/L (ref 96–108)
CK SERPL-CCNC: 65 U/L (ref 39–308)
CLARITY UR: CLEAR
CO2 SERPL-SCNC: 26 MMOL/L (ref 21–32)
COLOR UR: YELLOW
CREAT SERPL-MCNC: 1.57 MG/DL (ref 0.6–1.3)
D DIMER PPP FEU-MCNC: 0.92 UG/ML FEU
EOSINOPHIL # BLD AUTO: 0.12 THOUSAND/ΜL (ref 0–0.61)
EOSINOPHIL NFR BLD AUTO: 2 % (ref 0–6)
ERYTHROCYTE [DISTWIDTH] IN BLOOD BY AUTOMATED COUNT: 14.9 % (ref 11.6–15.1)
FLUAV AG UPPER RESP QL IA.RAPID: NEGATIVE
FLUBV AG UPPER RESP QL IA.RAPID: NEGATIVE
GFR SERPL CREATININE-BSD FRML MDRD: 47 ML/MIN/1.73SQ M
GLUCOSE SERPL-MCNC: 169 MG/DL (ref 65–140)
GLUCOSE SERPL-MCNC: 31 MG/DL (ref 65–140)
GLUCOSE SERPL-MCNC: 41 MG/DL (ref 65–140)
GLUCOSE SERPL-MCNC: 45 MG/DL (ref 65–140)
GLUCOSE SERPL-MCNC: 82 MG/DL (ref 65–140)
GLUCOSE SERPL-MCNC: 83 MG/DL (ref 65–140)
GLUCOSE SERPL-MCNC: 93 MG/DL (ref 65–140)
GLUCOSE UR STRIP-MCNC: ABNORMAL MG/DL
HCO3 BLDV-SCNC: 23.8 MMOL/L (ref 24–30)
HCT VFR BLD AUTO: 39.3 % (ref 36.5–49.3)
HGB BLD-MCNC: 12.6 G/DL (ref 12–17)
HGB UR QL STRIP.AUTO: NEGATIVE
IMM GRANULOCYTES # BLD AUTO: 0.02 THOUSAND/UL (ref 0–0.2)
IMM GRANULOCYTES NFR BLD AUTO: 0 % (ref 0–2)
INR PPP: 1.01 (ref 0.85–1.19)
KETONES UR STRIP-MCNC: NEGATIVE MG/DL
LACTATE SERPL-SCNC: 1.7 MMOL/L (ref 0.5–2)
LEUKOCYTE ESTERASE UR QL STRIP: NEGATIVE
LYMPHOCYTES # BLD AUTO: 1.55 THOUSANDS/ΜL (ref 0.6–4.47)
LYMPHOCYTES NFR BLD AUTO: 24 % (ref 14–44)
MAGNESIUM SERPL-MCNC: 1.8 MG/DL (ref 1.9–2.7)
MCH RBC QN AUTO: 29.4 PG (ref 26.8–34.3)
MCHC RBC AUTO-ENTMCNC: 32.1 G/DL (ref 31.4–37.4)
MCV RBC AUTO: 92 FL (ref 82–98)
MONOCYTES # BLD AUTO: 0.91 THOUSAND/ΜL (ref 0.17–1.22)
MONOCYTES NFR BLD AUTO: 14 % (ref 4–12)
NEUTROPHILS # BLD AUTO: 3.86 THOUSANDS/ΜL (ref 1.85–7.62)
NEUTS SEG NFR BLD AUTO: 59 % (ref 43–75)
NITRITE UR QL STRIP: NEGATIVE
NON-SQ EPI CELLS URNS QL MICRO: NORMAL /HPF
NRBC BLD AUTO-RTO: 0 /100 WBCS
O2 CT BLDV-SCNC: 10.9 ML/DL
PCO2 BLDV: 49.3 MM HG (ref 42–50)
PH BLDV: 7.3 [PH] (ref 7.3–7.4)
PH UR STRIP.AUTO: 6 [PH]
PHOSPHATE SERPL-MCNC: 3.4 MG/DL (ref 2.7–4.5)
PLATELET # BLD AUTO: 240 THOUSANDS/UL (ref 149–390)
PMV BLD AUTO: 9.9 FL (ref 8.9–12.7)
PO2 BLDV: 31 MM HG (ref 35–45)
POTASSIUM SERPL-SCNC: 4.6 MMOL/L (ref 3.5–5.3)
PROT SERPL-MCNC: 6.9 G/DL (ref 6.4–8.4)
PROT UR STRIP-MCNC: NEGATIVE MG/DL
PROTHROMBIN TIME: 13.7 SECONDS (ref 12.3–15)
RBC # BLD AUTO: 4.29 MILLION/UL (ref 3.88–5.62)
RBC #/AREA URNS AUTO: NORMAL /HPF
SARS-COV+SARS-COV-2 AG RESP QL IA.RAPID: NEGATIVE
SODIUM SERPL-SCNC: 133 MMOL/L (ref 135–147)
SP GR UR STRIP.AUTO: <=1.005 (ref 1–1.03)
TSH SERPL DL<=0.05 MIU/L-ACNC: 2.28 UIU/ML (ref 0.45–4.5)
UROBILINOGEN UR QL STRIP.AUTO: 0.2 E.U./DL
WBC # BLD AUTO: 6.49 THOUSAND/UL (ref 4.31–10.16)
WBC #/AREA URNS AUTO: NORMAL /HPF

## 2024-09-18 PROCEDURE — 85025 COMPLETE CBC W/AUTO DIFF WBC: CPT | Performed by: EMERGENCY MEDICINE

## 2024-09-18 PROCEDURE — 85610 PROTHROMBIN TIME: CPT | Performed by: EMERGENCY MEDICINE

## 2024-09-18 PROCEDURE — 80053 COMPREHEN METABOLIC PANEL: CPT | Performed by: EMERGENCY MEDICINE

## 2024-09-18 PROCEDURE — 87811 SARS-COV-2 COVID19 W/OPTIC: CPT | Performed by: EMERGENCY MEDICINE

## 2024-09-18 PROCEDURE — 82948 REAGENT STRIP/BLOOD GLUCOSE: CPT

## 2024-09-18 PROCEDURE — 96361 HYDRATE IV INFUSION ADD-ON: CPT

## 2024-09-18 PROCEDURE — 83735 ASSAY OF MAGNESIUM: CPT | Performed by: EMERGENCY MEDICINE

## 2024-09-18 PROCEDURE — 83880 ASSAY OF NATRIURETIC PEPTIDE: CPT | Performed by: EMERGENCY MEDICINE

## 2024-09-18 PROCEDURE — 96374 THER/PROPH/DIAG INJ IV PUSH: CPT

## 2024-09-18 PROCEDURE — 36415 COLL VENOUS BLD VENIPUNCTURE: CPT | Performed by: EMERGENCY MEDICINE

## 2024-09-18 PROCEDURE — 70450 CT HEAD/BRAIN W/O DYE: CPT

## 2024-09-18 PROCEDURE — 85730 THROMBOPLASTIN TIME PARTIAL: CPT | Performed by: EMERGENCY MEDICINE

## 2024-09-18 PROCEDURE — 96375 TX/PRO/DX INJ NEW DRUG ADDON: CPT

## 2024-09-18 PROCEDURE — 85379 FIBRIN DEGRADATION QUANT: CPT | Performed by: EMERGENCY MEDICINE

## 2024-09-18 PROCEDURE — 87804 INFLUENZA ASSAY W/OPTIC: CPT | Performed by: EMERGENCY MEDICINE

## 2024-09-18 PROCEDURE — 82550 ASSAY OF CK (CPK): CPT | Performed by: EMERGENCY MEDICINE

## 2024-09-18 PROCEDURE — 81001 URINALYSIS AUTO W/SCOPE: CPT | Performed by: EMERGENCY MEDICINE

## 2024-09-18 PROCEDURE — 82805 BLOOD GASES W/O2 SATURATION: CPT | Performed by: EMERGENCY MEDICINE

## 2024-09-18 PROCEDURE — 99222 1ST HOSP IP/OBS MODERATE 55: CPT | Performed by: FAMILY MEDICINE

## 2024-09-18 PROCEDURE — 84484 ASSAY OF TROPONIN QUANT: CPT | Performed by: EMERGENCY MEDICINE

## 2024-09-18 PROCEDURE — 93005 ELECTROCARDIOGRAM TRACING: CPT

## 2024-09-18 PROCEDURE — 84443 ASSAY THYROID STIM HORMONE: CPT | Performed by: EMERGENCY MEDICINE

## 2024-09-18 PROCEDURE — 71275 CT ANGIOGRAPHY CHEST: CPT

## 2024-09-18 PROCEDURE — 82010 KETONE BODYS QUAN: CPT | Performed by: EMERGENCY MEDICINE

## 2024-09-18 PROCEDURE — 83605 ASSAY OF LACTIC ACID: CPT | Performed by: EMERGENCY MEDICINE

## 2024-09-18 PROCEDURE — 80307 DRUG TEST PRSMV CHEM ANLYZR: CPT | Performed by: EMERGENCY MEDICINE

## 2024-09-18 PROCEDURE — 71045 X-RAY EXAM CHEST 1 VIEW: CPT

## 2024-09-18 PROCEDURE — 84100 ASSAY OF PHOSPHORUS: CPT | Performed by: EMERGENCY MEDICINE

## 2024-09-18 PROCEDURE — 99285 EMERGENCY DEPT VISIT HI MDM: CPT

## 2024-09-18 RX ORDER — CARVEDILOL 6.25 MG/1
6.25 TABLET ORAL 2 TIMES DAILY WITH MEALS
Status: DISCONTINUED | OUTPATIENT
Start: 2024-09-18 | End: 2024-09-19 | Stop reason: HOSPADM

## 2024-09-18 RX ORDER — ACETAMINOPHEN 325 MG/1
650 TABLET ORAL EVERY 6 HOURS PRN
Status: DISCONTINUED | OUTPATIENT
Start: 2024-09-18 | End: 2024-09-19 | Stop reason: HOSPADM

## 2024-09-18 RX ORDER — GABAPENTIN 400 MG/1
400 CAPSULE ORAL 3 TIMES DAILY
Status: DISCONTINUED | OUTPATIENT
Start: 2024-09-18 | End: 2024-09-19 | Stop reason: HOSPADM

## 2024-09-18 RX ORDER — NICOTINE 21 MG/24HR
1 PATCH, TRANSDERMAL 24 HOURS TRANSDERMAL DAILY
Status: DISCONTINUED | OUTPATIENT
Start: 2024-09-19 | End: 2024-09-19

## 2024-09-18 RX ORDER — FUROSEMIDE 20 MG
20 TABLET ORAL DAILY
Status: DISCONTINUED | OUTPATIENT
Start: 2024-09-19 | End: 2024-09-19 | Stop reason: HOSPADM

## 2024-09-18 RX ORDER — HEPARIN SODIUM 1000 [USP'U]/ML
4000 INJECTION, SOLUTION INTRAVENOUS; SUBCUTANEOUS ONCE
Status: COMPLETED | OUTPATIENT
Start: 2024-09-18 | End: 2024-09-18

## 2024-09-18 RX ORDER — MAGNESIUM SULFATE HEPTAHYDRATE 40 MG/ML
2 INJECTION, SOLUTION INTRAVENOUS ONCE
Status: COMPLETED | OUTPATIENT
Start: 2024-09-18 | End: 2024-09-18

## 2024-09-18 RX ORDER — DEXTROSE MONOHYDRATE 25 G/50ML
25 INJECTION, SOLUTION INTRAVENOUS ONCE
Status: COMPLETED | OUTPATIENT
Start: 2024-09-18 | End: 2024-09-18

## 2024-09-18 RX ORDER — HEPARIN SODIUM 10000 [USP'U]/100ML
3-20 INJECTION, SOLUTION INTRAVENOUS
Status: DISCONTINUED | OUTPATIENT
Start: 2024-09-18 | End: 2024-09-19

## 2024-09-18 RX ORDER — HEPARIN SODIUM 1000 [USP'U]/ML
4000 INJECTION, SOLUTION INTRAVENOUS; SUBCUTANEOUS EVERY 6 HOURS PRN
Status: DISCONTINUED | OUTPATIENT
Start: 2024-09-18 | End: 2024-09-19

## 2024-09-18 RX ORDER — ALBUTEROL SULFATE 90 UG/1
2 INHALANT RESPIRATORY (INHALATION) EVERY 4 HOURS PRN
Status: CANCELLED | OUTPATIENT
Start: 2024-09-18

## 2024-09-18 RX ORDER — HEPARIN SODIUM 1000 [USP'U]/ML
2000 INJECTION, SOLUTION INTRAVENOUS; SUBCUTANEOUS EVERY 6 HOURS PRN
Status: DISCONTINUED | OUTPATIENT
Start: 2024-09-18 | End: 2024-09-19

## 2024-09-18 RX ORDER — CARVEDILOL 6.25 MG/1
6.25 TABLET ORAL 2 TIMES DAILY WITH MEALS
Status: CANCELLED | OUTPATIENT
Start: 2024-09-18

## 2024-09-18 RX ORDER — LOSARTAN POTASSIUM 50 MG/1
50 TABLET ORAL DAILY
Status: CANCELLED | OUTPATIENT
Start: 2024-09-19

## 2024-09-18 RX ORDER — ASPIRIN 81 MG/1
324 TABLET, CHEWABLE ORAL ONCE
Status: DISCONTINUED | OUTPATIENT
Start: 2024-09-18 | End: 2024-09-18

## 2024-09-18 RX ORDER — LOSARTAN POTASSIUM 50 MG/1
50 TABLET ORAL DAILY
Status: DISCONTINUED | OUTPATIENT
Start: 2024-09-19 | End: 2024-09-19 | Stop reason: HOSPADM

## 2024-09-18 RX ADMIN — CARVEDILOL 6.25 MG: 6.25 TABLET, FILM COATED ORAL at 18:44

## 2024-09-18 RX ADMIN — HEPARIN SODIUM 4000 UNITS: 1000 INJECTION INTRAVENOUS; SUBCUTANEOUS at 18:08

## 2024-09-18 RX ADMIN — IOHEXOL 85 ML: 350 INJECTION, SOLUTION INTRAVENOUS at 17:47

## 2024-09-18 RX ADMIN — DEXTROSE MONOHYDRATE 25 ML: 25 INJECTION, SOLUTION INTRAVENOUS at 16:55

## 2024-09-18 RX ADMIN — SODIUM CHLORIDE 1000 ML: 0.9 INJECTION, SOLUTION INTRAVENOUS at 16:42

## 2024-09-18 RX ADMIN — MAGNESIUM SULFATE HEPTAHYDRATE 2 G: 40 INJECTION, SOLUTION INTRAVENOUS at 18:45

## 2024-09-18 RX ADMIN — HEPARIN SODIUM 12 UNITS/KG/HR: 10000 INJECTION, SOLUTION INTRAVENOUS at 18:10

## 2024-09-18 RX ADMIN — GABAPENTIN 400 MG: 400 CAPSULE ORAL at 21:27

## 2024-09-18 NOTE — ED PROVIDER NOTES
1. NSTEMI (non-ST elevated myocardial infarction) (HCC)      ED Disposition       ED Disposition   Admit    Condition   Stable    Date/Time   Wed Sep 18, 2024  6:16 PM    Comment                  Assessment & Plan       Medical Decision Making  59-year-old male with past medical history of Raynaud's, scleroderma, presents to the emergency department with lightheadedness, dizziness, presyncopal event, differential diagnosis include ACS, pulmonary embolism, pneumonia, dehydration, electrolyte normality, will perform CT PE study, EKG, cardiac enzymes, interrogate pacemaker, and reassess.    Discussed case with cardiology, hospital medicine, CT PE study pending, patient signed out to oncoming provider, anticipate admission for further evaluation.    Amount and/or Complexity of Data Reviewed  Labs: ordered.  Radiology: ordered. Decision-making details documented in ED Course.    Risk  Prescription drug management.  Decision regarding hospitalization.            HEART Risk Score      Flowsheet Row Most Recent Value   Heart Score Risk Calculator    History 1 Filed at: 09/18/2024 1658   ECG 2 Filed at: 09/18/2024 1658   Age 1 Filed at: 09/18/2024 1658   Risk Factors 2 Filed at: 09/18/2024 1658   Troponin 0 Filed at: 09/18/2024 1658   HEART Score 6 Filed at: 09/18/2024 1658               ED Course as of 09/18/24 1818   Wed Sep 18, 2024   1608 EKG performed at 1608, atrial fibrillation, right bundle branch block, left posterior fascicular block, bifascicular block, rate of 78, T wave inversions in leads V1, V2, V3, and V4, more pronounced than previous EKGs.  Patient is entirely asymptomatic at this time, has no chest pain, shortness of breath, with normal vitals.  Secure chat message sent to cardiology for consult, anticipate admission for EKG changes.  Cardiac enzymes and CT scan of head ordered in context of nonspecific dizziness/lightheadedness.  Will also interrogate pacemaker.   1655 Nursing staff report the patient was  hypoglycemic with a sugar of 33, will give patient juice, and amp of dextrose.  Patient states that he is prediabetic.   1725 CT head without contrast  IMPRESSION:     No acute intracranial abnormality.        1742 Patient refused ASA, states has allergy   1746 Discussed case with cardiology, will have patient started on heparin infusion, and admit for further evaluation.   1751 Call into [employer], waiting for callback from rep regarding any potential events over the past 3 days.   1803 Discussed case with [employer] rep, they state no episodes of V. tach or V-fib.  Patient had 1 episode of SVT, and 1 episode of A-fib which resolved.   1813 Patient signed out to oncoming provider for results of PE study and admission.        Medications   heparin (porcine) 25,000 units in 0.45% NaCl 250 mL infusion (premix) (12 Units/kg/hr × 75 kg (Order-Specific) Intravenous New Bag 9/18/24 1810)   heparin (porcine) injection 4,000 Units (has no administration in time range)   heparin (porcine) injection 2,000 Units (has no administration in time range)   magnesium sulfate 2 g/50 mL IVPB (premix) 2 g (has no administration in time range)   sodium chloride 0.9 % bolus 1,000 mL (0 mL Intravenous Stopped 9/18/24 1813)   dextrose 50 % IV solution 25 mL (25 mL Intravenous Given 9/18/24 1655)   iohexol (OMNIPAQUE) 350 MG/ML injection (MULTI-DOSE) 85 mL (85 mL Intravenous Given 9/18/24 1747)   heparin (porcine) injection 4,000 Units (4,000 Units Intravenous Given 9/18/24 1808)       History of Present Illness       59-year-old male with past medical history of Raynaud's disease, hypertensive emergency in the past, methamphetamine abuse in the past, chronic heart failure with reduced EF, hypertension, GERD, emphysema, pacemaker/defibrillator, presents to the emergency department with complaints of lightheadedness, and nonspecific dizziness over the past 3 days.  Patient states that he has been walking, and feeling lightheaded like he is  going to pass out.  Patient states that it was today where he had the symptoms while sitting down.  He denies any vision changes, focal weakness, changes in sensation, slurring of his words.  He denies any chest pain, shortness of breath, abdominal pain, constipation, diarrhea, bloody stool, bloody urine, pain with urination, discharge, frequency, urgency.  On initial evaluation, patient entirely asymptomatic with no complaints, hemodynamically stable with normal vitals.    EKG shows atrial fibrillation, right bundle branch block, left posterior fascicular block, T wave inversions in anterior leads.  More pronounced than previous EKG compared to.  Patient remains asymptomatic.  Patient was found to be hypoglycemic with a fingerstick blood glucose of 33.  Will give dextrose, and juice.  Patient states that he is prediabetic.          Dizziness  Associated symptoms: no chest pain, no palpitations, no shortness of breath and no vomiting              Review of Systems   Constitutional:  Negative for chills and fever.   HENT:  Negative for ear pain and sore throat.    Eyes:  Negative for pain and visual disturbance.   Respiratory:  Negative for cough and shortness of breath.    Cardiovascular:  Negative for chest pain and palpitations.   Gastrointestinal:  Negative for abdominal pain and vomiting.   Genitourinary:  Negative for dysuria and hematuria.   Musculoskeletal:  Negative for arthralgias and back pain.   Skin:  Negative for color change and rash.   Neurological:  Positive for dizziness and light-headedness. Negative for seizures and syncope.   All other systems reviewed and are negative.          Objective     ED Triage Vitals [09/18/24 1601]   Temperature Pulse Blood Pressure Respirations SpO2 Patient Position - Orthostatic VS   98.4 °F (36.9 °C) 67 123/75 18 98 % Sitting      Temp src Heart Rate Source BP Location FiO2 (%) Pain Score    -- Monitor Left arm -- No Pain        Physical Exam  Vitals and nursing note  reviewed.   Constitutional:       General: He is not in acute distress.     Appearance: He is well-developed.   HENT:      Head: Normocephalic and atraumatic.   Eyes:      Conjunctiva/sclera: Conjunctivae normal.   Cardiovascular:      Rate and Rhythm: Normal rate and regular rhythm.      Heart sounds: No murmur heard.  Pulmonary:      Effort: Pulmonary effort is normal. No respiratory distress.      Breath sounds: Normal breath sounds.   Abdominal:      Palpations: Abdomen is soft.      Tenderness: There is no abdominal tenderness.   Musculoskeletal:         General: No swelling.      Cervical back: Neck supple.   Skin:     General: Skin is warm and dry.      Capillary Refill: Capillary refill takes less than 2 seconds.      Comments: Multiple finger amputations due to Raynaud's/scleroderma.   Neurological:      Mental Status: He is alert.   Psychiatric:         Mood and Affect: Mood normal.         Labs Reviewed   CBC AND DIFFERENTIAL - Abnormal       Result Value    WBC 6.49      RBC 4.29      Hemoglobin 12.6      Hematocrit 39.3      MCV 92      MCH 29.4      MCHC 32.1      RDW 14.9      MPV 9.9      Platelets 240      nRBC 0      Segmented % 59      Immature Grans % 0      Lymphocytes % 24      Monocytes % 14 (*)     Eosinophils Relative 2      Basophils Relative 1      Absolute Neutrophils 3.86      Absolute Immature Grans 0.02      Absolute Lymphocytes 1.55      Absolute Monocytes 0.91      Eosinophils Absolute 0.12      Basophils Absolute 0.03     COMPREHENSIVE METABOLIC PANEL - Abnormal    Sodium 133 (*)     Potassium 4.6      Chloride 103      CO2 26      ANION GAP 4      BUN 20      Creatinine 1.57 (*)     Glucose 83      Calcium 8.9      AST 22      ALT 13      Alkaline Phosphatase 90      Total Protein 6.9      Albumin 3.6      Total Bilirubin 0.66      eGFR 47      Narrative:     National Kidney Disease Foundation guidelines for Chronic Kidney Disease (CKD):     Stage 1 with normal or high GFR (GFR >  90 mL/min/1.73 square meters)    Stage 2 Mild CKD (GFR = 60-89 mL/min/1.73 square meters)    Stage 3A Moderate CKD (GFR = 45-59 mL/min/1.73 square meters)    Stage 3B Moderate CKD (GFR = 30-44 mL/min/1.73 square meters)    Stage 4 Severe CKD (GFR = 15-29 mL/min/1.73 square meters)    Stage 5 End Stage CKD (GFR <15 mL/min/1.73 square meters)  Note: GFR calculation is accurate only with a steady state creatinine   HS TROPONIN I 0HR - Abnormal    hs TnI 0hr 114 (*)    B-TYPE NATRIURETIC PEPTIDE (BNP) - Abnormal     (*)    D-DIMER, QUANTITATIVE - Abnormal    D-Dimer, Quant 0.92 (*)     Narrative:     In the evaluation for possible pulmonary embolism, in the appropriate (Well's Score of 4 or less) patient, the age adjusted d-dimer cutoff for this patient can be calculated as:    Age x 0.01 (in ug/mL) for Age-adjusted D-dimer exclusion threshold for a patient over 50 years.   BLOOD GAS, VENOUS - Abnormal    pH, Chuck 7.302      pCO2, Chuck 49.3      pO2, Chuck 31.0 (*)     HCO3, Chuck 23.8 (*)     Base Excess, Chuck -3.0      O2 Content, Chuck 10.9      O2 HGB, VENOUS 51.8 (*)    MAGNESIUM - Abnormal    Magnesium 1.8 (*)    HS TROPONIN I 2HR - Abnormal    hs TnI 2hr 122 (*)     Delta 2hr hsTnI 8     POCT GLUCOSE - Abnormal    POC Glucose 31 (*)    COVID-19/INFLUENZA A/B RAPID ANTIGEN (30 MIN.TAT) - Normal    SARS COV Rapid Antigen Negative      Influenza A Rapid Antigen Negative      Influenza B Rapid Antigen Negative      Narrative:     This test has been performed using the Quidel Inez 2 FLU+SARS Antigen test under the Emergency Use Authorization (EUA). This test has been validated by the  and verified by the performing laboratory. The Inez uses lateral flow immunofluorescent sandwich assay to detect SARS-COV, Influenza A and Influenza B Antigen.     The Quidel Inez 2 SARS Antigen test does not differentiate between SARS-CoV and SARS-CoV-2.     Negative results are presumptive and may be confirmed with a  molecular assay, if necessary, for patient management. Negative results do not rule out SARS-CoV-2 or influenza infection and should not be used as the sole basis for treatment or patient management decisions. A negative test result may occur if the level of antigen in a sample is below the limit of detection of this test.     Positive results are indicative of the presence of viral antigens, but do not rule out bacterial infection or co-infection with other viruses.     All test results should be used as an adjunct to clinical observations and other information available to the provider.    FOR PEDIATRIC PATIENTS - copy/paste COVID Guidelines URL to browser: [email or web address]   TSH, 3RD GENERATION - Normal    TSH 3RD GENERATON 2.276     BETA HYDROXYBUTYRATE - Normal    Beta- Hydroxybutyrate <0.05     LACTIC ACID, PLASMA (W/REFLEX IF RESULT > 2.0) - Normal    LACTIC ACID 1.7      Narrative:     Result may be elevated if tourniquet was used during collection.   PHOSPHORUS - Normal    Phosphorus 3.4     CK - Normal    Total CK 65     APTT - Normal    PTT 32     PROTIME-INR - Normal    Protime 13.7      INR 1.01      Narrative:     INR Therapeutic Range    Indication                                             INR Range      Atrial Fibrillation                                               2.0-3.0  Hypercoagulable State                                    2.0.2.3  Left Ventricular Asist Device                            2.0-3.0  Mechanical Heart Valve                                  -    Aortic(with afib, MI, embolism, HF, LA enlargement,    and/or coagulopathy)                                     2.0-3.0 (2.5-3.5)     Mitral                                                             2.5-3.5  Prosthetic/Bioprosthetic Heart Valve               2.0-3.0  Venous thromboembolism (VTE: VT, PE        2.0-3.0   POCT GLUCOSE - Normal    POC Glucose 93     RAPID DRUG  SCREEN, URINE   UA W REFLEX TO MICROSCOPIC WITH REFLEX TO CULTURE     XR chest 1 view portable   Final Interpretation by Fritz Dunham MD (09/18 1658)      No acute cardiopulmonary disease.            Workstation performed: [id number]         CT head without contrast   Final Interpretation by Dewayne Celis MD (09/18 1650)      No acute intracranial abnormality.                  Workstation performed: [id number]         CTA ED chest PE Study    (Results Pending)       Procedures       Mando Pelletier,   09/18/24 8916

## 2024-09-18 NOTE — ASSESSMENT & PLAN NOTE
Wt Readings from Last 3 Encounters:   09/18/24 75.3 kg (166 lb)   08/12/24 73.9 kg (163 lb)   05/28/24 74.2 kg (163 lb 9.3 oz)     Currently euvolemic not in heart failure.

## 2024-09-18 NOTE — ED NOTES
Secure chat sent to 2M charge RN. Report was given. 2M charge RN requested abed change, waiting for reassignment, then pt to be transported to unit, No s/s of distress, VS stable, A&Ox4.      Kim Martinez RN  09/18/24 0494

## 2024-09-18 NOTE — ED NOTES
Pt was provided with and finished 1 large cup of orange juice     Kim Martinez RN  09/18/24 9241

## 2024-09-18 NOTE — ASSESSMENT & PLAN NOTE
POA with complaints of lightheadedness, dizziness x 3 days. Symptoms were when he was walking, today felt them when he was sitting down. No chest pain  In the ED, ECG showed atrial fibrillation, RBBB, T wave inversions in anterior leads nonspecific ischemic changes noted  Troponin elevation at 114 initial, 122 second troponin.  ED reached out to cardiology, recommending to admit on heparin gtt. also placed on telemetry to monitor due to syncopal episodes.  08/24 2D echo showed EF of 30%.  Patient allergic to aspirin, no aspirin ordered  Cardiology consult  Patient has pacemaker defibrillator will need interrogation.  May need further ischemic workup.

## 2024-09-18 NOTE — H&P
H&P - Hospitalist   Name: Britton Batista 59 y.o. male I MRN: [id number]  Unit/Bed#: ED 03 I Date of Admission: 9/18/2024   Date of Service: 9/18/2024 I Hospital Day: 0     Assessment & Plan  Syncope  POA with complaints of lightheadedness, dizziness x 3 days. Symptoms were when he was walking, today felt them when he was sitting down. No chest pain  In the ED, ECG showed atrial fibrillation, RBBB, T wave inversions in anterior leads nonspecific ischemic changes noted  Troponin elevation at 114 initial, 122 second troponin.  ED reached out to cardiology, recommending to admit on heparin gtt. also placed on telemetry to monitor due to syncopal episodes.  08/24 2D echo showed EF of 30%.  Patient allergic to aspirin, no aspirin ordered  Cardiology consult  Patient has pacemaker defibrillator will need interrogation.  May need further ischemic workup.  Raynaud's disease  Outpatient follow-up  Chronic HFrEF (heart failure with reduced ejection fraction) ([facility])  Wt Readings from Last 3 Encounters:   09/18/24 75.3 kg (166 lb)   08/12/24 73.9 kg (163 lb)   05/28/24 74.2 kg (163 lb 9.3 oz)     Currently euvolemic not in heart failure.        Primary hypertension  History of htn on coreg 6.25 mg bid, lasix 20 mg qd, losartan 50 mg qd resume home meds    VTE Pharmacologic Prophylaxis:   Moderate Risk (Score 3-4) - Pharmacological DVT Prophylaxis Ordered: heparin drip.  Code Status: Level 1 - Full Code as per patient  Discussion with family: Updated  (sister) at bedside.    Anticipated Length of Stay: Patient will be admitted on an inpatient basis with an anticipated length of stay of greater than 2 midnights secondary to presyncope elevated troponins.    History of Present Illness   Chief Complaint: Presyncope    Britton Batista is a 59 y.o. male with a PMH of chronic systolic CHF who presents with presyncope.  Patient had 3 episodes where he almost passed out over the last 3 days.  He denies any chest pain or  "shortness of breath however states he just does not feel right denies any fevers or chills came to the ED to be evaluated denies any falls or head trauma.  Denies any shocks from his device.    Review of Systems   Constitutional:  Negative for appetite change, chills, fatigue and fever.   HENT:  Negative for hearing loss, sore throat and trouble swallowing.    Eyes:  Negative for photophobia, discharge and visual disturbance.   Respiratory:  Negative for chest tightness and shortness of breath.    Cardiovascular:  Negative for chest pain and palpitations.   Gastrointestinal:  Negative for abdominal pain, blood in stool and vomiting.   Endocrine: Negative for polydipsia and polyuria.   Genitourinary:  Negative for difficulty urinating, dysuria, flank pain and hematuria.   Musculoskeletal:  Negative for back pain and gait problem.   Skin:  Negative for rash.   Allergic/Immunologic: Negative for environmental allergies and food allergies.   Neurological:  Positive for syncope. Negative for dizziness, seizures and headaches.   Hematological:  Does not bruise/bleed easily.   Psychiatric/Behavioral:  Negative for behavioral problems.    All other systems reviewed and are negative.      I have reviewed the patient's PMH, PSH, Social History, Family History, Meds, and Allergies  Social History:  Marital Status:      Objective     Vitals:   Blood Pressure: 133/88 (09/18/24 1815)  Pulse: 69 (09/18/24 1815)  Temperature: 98.4 °F (36.9 °C) (09/18/24 1601)  Respirations: 17 (09/18/24 1815)  Height: 5' 9\" (175.3 cm) (09/18/24 1601)  Weight - Scale: 75.3 kg (166 lb) (09/18/24 1601)  SpO2: 97 % (09/18/24 1815)    Physical Exam  Vitals and nursing note reviewed.   Constitutional:       Appearance: He is well-developed.   HENT:      Head: Normocephalic and atraumatic.      Right Ear: External ear normal.      Left Ear: External ear normal.      Nose: Nose normal.      Mouth/Throat:      Mouth: Mucous membranes are moist. "   Eyes:      Conjunctiva/sclera: Conjunctivae normal.      Pupils: Pupils are equal, round, and reactive to light.   Cardiovascular:      Rate and Rhythm: Normal rate and regular rhythm.      Heart sounds: Normal heart sounds.   Pulmonary:      Effort: Pulmonary effort is normal.      Breath sounds: Normal breath sounds.   Abdominal:      General: Bowel sounds are normal.      Palpations: Abdomen is soft. There is no mass.      Tenderness: There is no abdominal tenderness. There is no guarding or rebound.   Genitourinary:     Comments: deferred  Musculoskeletal:         General: Normal range of motion.      Cervical back: Normal range of motion and neck supple.   Skin:     General: Skin is warm and dry.      Findings: No rash.   Neurological:      Mental Status: He is alert and oriented to person, place, and time.      Deep Tendon Reflexes: Reflexes are normal and symmetric.      Comments: Cranial nerves 2-12 are normal.  Normal neurological exam         Lines/Drains:  Lines/Drains/Airways       Active Status       None                        Additional Data:   Lab Results: I have reviewed the following results:   Results from last 7 days   Lab Units 09/18/24  1638   WBC Thousand/uL 6.49   HEMOGLOBIN g/dL 12.6   HEMATOCRIT % 39.3   PLATELETS Thousands/uL 240   SEGS PCT % 59   LYMPHO PCT % 24   MONO PCT % 14*   EOS PCT % 2     Results from last 7 days   Lab Units 09/18/24  1638   SODIUM mmol/L 133*   POTASSIUM mmol/L 4.6   CHLORIDE mmol/L 103   CO2 mmol/L 26   BUN mg/dL 20   CREATININE mg/dL 1.57*   ANION GAP mmol/L 4   CALCIUM mg/dL 8.9   ALBUMIN g/dL 3.6   TOTAL BILIRUBIN mg/dL 0.66   ALK PHOS U/L 90   ALT U/L 13   AST U/L 22   GLUCOSE RANDOM mg/dL 83     Results from last 7 days   Lab Units 09/18/24  1632   INR  1.01     Results from last 7 days   Lab Units 09/18/24  1727 09/18/24  1652   POC GLUCOSE mg/dl 93 31*     Lab Results   Component Value Date    HGBA1C 5.9 (H) 10/25/2021     Results from last 7 days   Lab  Units 09/18/24  1737   LACTIC ACID mmol/L 1.7       Imaging Review: Reviewed radiology reports from this admission including: CT chest and Echocardiogram.  Other Studies: EKG was reviewed.     Administrative Statements       ** Please Note: This note has been constructed using a voice recognition system. **

## 2024-09-19 VITALS
TEMPERATURE: 98.2 F | SYSTOLIC BLOOD PRESSURE: 110 MMHG | RESPIRATION RATE: 18 BRPM | OXYGEN SATURATION: 95 % | BODY MASS INDEX: 24.62 KG/M2 | DIASTOLIC BLOOD PRESSURE: 78 MMHG | HEIGHT: 69 IN | WEIGHT: 166.23 LBS | HEART RATE: 58 BPM

## 2024-09-19 LAB
ALBUMIN SERPL BCG-MCNC: 3.4 G/DL (ref 3.5–5)
ALP SERPL-CCNC: 91 U/L (ref 34–104)
ALT SERPL W P-5'-P-CCNC: 12 U/L (ref 7–52)
AMPHETAMINES SERPL QL SCN: NEGATIVE
ANION GAP SERPL CALCULATED.3IONS-SCNC: 4 MMOL/L (ref 4–13)
APTT PPP: 49 SECONDS (ref 23–34)
APTT PPP: 64 SECONDS (ref 23–34)
AST SERPL W P-5'-P-CCNC: 19 U/L (ref 13–39)
BARBITURATES UR QL: NEGATIVE
BENZODIAZ UR QL: NEGATIVE
BILIRUB SERPL-MCNC: 0.58 MG/DL (ref 0.2–1)
BUN SERPL-MCNC: 22 MG/DL (ref 5–25)
CALCIUM ALBUM COR SERPL-MCNC: 9.4 MG/DL (ref 8.3–10.1)
CALCIUM SERPL-MCNC: 8.9 MG/DL (ref 8.4–10.2)
CHLORIDE SERPL-SCNC: 104 MMOL/L (ref 96–108)
CHOLEST SERPL-MCNC: 121 MG/DL
CO2 SERPL-SCNC: 26 MMOL/L (ref 21–32)
COCAINE UR QL: NEGATIVE
CREAT SERPL-MCNC: 1.33 MG/DL (ref 0.6–1.3)
ERYTHROCYTE [DISTWIDTH] IN BLOOD BY AUTOMATED COUNT: 14.8 % (ref 11.6–15.1)
FENTANYL UR QL SCN: NEGATIVE
GFR SERPL CREATININE-BSD FRML MDRD: 58 ML/MIN/1.73SQ M
GLUCOSE SERPL-MCNC: 105 MG/DL (ref 65–140)
GLUCOSE SERPL-MCNC: 110 MG/DL (ref 65–140)
GLUCOSE SERPL-MCNC: 96 MG/DL (ref 65–140)
GLUCOSE SERPL-MCNC: 98 MG/DL (ref 65–140)
HCT VFR BLD AUTO: 39.3 % (ref 36.5–49.3)
HDLC SERPL-MCNC: 38 MG/DL
HGB BLD-MCNC: 12.8 G/DL (ref 12–17)
HYDROCODONE UR QL SCN: NEGATIVE
LDLC SERPL CALC-MCNC: 72 MG/DL (ref 0–100)
MCH RBC QN AUTO: 29.7 PG (ref 26.8–34.3)
MCHC RBC AUTO-ENTMCNC: 32.6 G/DL (ref 31.4–37.4)
MCV RBC AUTO: 91 FL (ref 82–98)
METHADONE UR QL: NEGATIVE
NONHDLC SERPL-MCNC: 83 MG/DL
OPIATES UR QL SCN: NEGATIVE
OXYCODONE+OXYMORPHONE UR QL SCN: NEGATIVE
PCP UR QL: NEGATIVE
PLATELET # BLD AUTO: 233 THOUSANDS/UL (ref 149–390)
PMV BLD AUTO: 9.5 FL (ref 8.9–12.7)
POTASSIUM SERPL-SCNC: 4.7 MMOL/L (ref 3.5–5.3)
PROT SERPL-MCNC: 6.8 G/DL (ref 6.4–8.4)
QRS AXIS: 111 DEGREES
QRSD INTERVAL: 188 MS
QT INTERVAL: 498 MS
QTC INTERVAL: 567 MS
RBC # BLD AUTO: 4.31 MILLION/UL (ref 3.88–5.62)
SODIUM SERPL-SCNC: 134 MMOL/L (ref 135–147)
T WAVE AXIS: 102 DEGREES
THC UR QL: NEGATIVE
TRIGL SERPL-MCNC: 55 MG/DL
TSH SERPL DL<=0.05 MIU/L-ACNC: 1.98 UIU/ML (ref 0.45–4.5)
VENTRICULAR RATE: 78 BPM
WBC # BLD AUTO: 6.74 THOUSAND/UL (ref 4.31–10.16)

## 2024-09-19 PROCEDURE — 85730 THROMBOPLASTIN TIME PARTIAL: CPT | Performed by: EMERGENCY MEDICINE

## 2024-09-19 PROCEDURE — 82948 REAGENT STRIP/BLOOD GLUCOSE: CPT

## 2024-09-19 PROCEDURE — 85027 COMPLETE CBC AUTOMATED: CPT | Performed by: FAMILY MEDICINE

## 2024-09-19 PROCEDURE — 84443 ASSAY THYROID STIM HORMONE: CPT | Performed by: FAMILY MEDICINE

## 2024-09-19 PROCEDURE — 80053 COMPREHEN METABOLIC PANEL: CPT | Performed by: FAMILY MEDICINE

## 2024-09-19 PROCEDURE — 80061 LIPID PANEL: CPT | Performed by: FAMILY MEDICINE

## 2024-09-19 PROCEDURE — 99239 HOSP IP/OBS DSCHRG MGMT >30: CPT

## 2024-09-19 RX ORDER — NICOTINE 21 MG/24HR
1 PATCH, TRANSDERMAL 24 HOURS TRANSDERMAL
Status: DISCONTINUED | OUTPATIENT
Start: 2024-09-19 | End: 2024-09-19 | Stop reason: HOSPADM

## 2024-09-19 RX ADMIN — CARVEDILOL 6.25 MG: 6.25 TABLET, FILM COATED ORAL at 09:03

## 2024-09-19 RX ADMIN — HEPARIN SODIUM 2000 UNITS: 1000 INJECTION INTRAVENOUS; SUBCUTANEOUS at 00:40

## 2024-09-19 RX ADMIN — LOSARTAN POTASSIUM 50 MG: 50 TABLET, FILM COATED ORAL at 09:07

## 2024-09-19 RX ADMIN — GABAPENTIN 400 MG: 400 CAPSULE ORAL at 09:07

## 2024-09-19 RX ADMIN — NICOTINE 1 PATCH: 14 PATCH, EXTENDED RELEASE TRANSDERMAL at 00:15

## 2024-09-19 RX ADMIN — FUROSEMIDE 20 MG: 20 TABLET ORAL at 09:07

## 2024-09-19 NOTE — CONSULTS
Assessment  Dizziness   - ICD interrogation showing episode of afib when he was symptomatic  Newly recognized afib - currently in nsr    - elevated chadvasc, requires anticoagulation  Non ischemic cardiomyopathy- LVEF 30%  CHFrEF- euvolemic  Scleroderma  Drug abuse  ICD (medtronic)    Plan  Medtronic interrogation performed by myself. Patient noted to be in afib two days ago. Is currently A pacing. Symptoms correlate with episode of PAF.  Add Eliquis 5 mg PO BID  Continue BB  Coordinate OP fu with EP who manages his ICD  No further changes in plan    History of Present Illness   Britton Batista is a 59 y.o. male history of non ischemic cardiomyopathy, CAD ruled out by [facility] 2020, COPD, scleroderma, drug abuse, CHFrEF presented to the hospital for some dizziness for a few days. On admission found to have elevated troponin but flat. No active chest pain. Did not pass out. Had no ICD shocks. Just saw OP cardiology a few weeks ago. No edema or orthopnea. No weight gain.     Review of Systems   Constitutional:  Negative for chills, fatigue and unexpected weight change.   Respiratory:  Negative for chest tightness and shortness of breath.    Cardiovascular:  Negative for leg swelling.   Skin:  Negative for color change.   Neurological:  Positive for dizziness, weakness and light-headedness.   Psychiatric/Behavioral:  Negative for agitation.      I have reviewed the patient's PMH, PSH, Social History, Family History, Meds, and Allergies    Objective      Temp:  [97.9 °F (36.6 °C)-98.4 °F (36.9 °C)] 98.2 °F (36.8 °C)  HR:  [58-77] 58  Resp:  [16-24] 20  BP: (102-146)/() 113/78  O2 Device: None (Room air)   Vitals:    09/18/24 1601 09/18/24 2353   Weight: 75.3 kg (166 lb) 75.4 kg (166 lb 3.6 oz)     Orthostatic Blood Pressures      Flowsheet Row Most Recent Value   Blood Pressure 113/78 filed at 09/19/2024 0905   Patient Position - Orthostatic VS Lying filed at 09/19/2024 0736             I/O last 24 hours:  In: 1598  [P.O.:598; IV Piggyback:1000]  Out: 1050 [Urine:1050]  Lines/Drains/Airways       Active Status       None                  Physical Exam  Vitals and nursing note reviewed.   Constitutional:       Appearance: Normal appearance.   HENT:      Head: Normocephalic and atraumatic.   Cardiovascular:      Rate and Rhythm: Normal rate.      Heart sounds: No murmur heard.  Pulmonary:      Effort: Pulmonary effort is normal.   Musculoskeletal:         General: No swelling.      Cervical back: Neck supple.   Skin:     General: Skin is warm.      Capillary Refill: Capillary refill takes less than 2 seconds.   Neurological:      Mental Status: He is alert.   Psychiatric:         Mood and Affect: Mood normal.         Thought Content: Thought content normal.          Lab Results: I have reviewed the following results: CBC/BMP:   .     09/18/24  1737 09/19/24  0606   WBC  --  6.74   HGB  --  12.8   HCT  --  39.3   PLT  --  233   SODIUM  --  134*   K  --  4.7   CL  --  104   CO2  --  26   BUN  --  22   CREATININE  --  1.33*   GLUC  --  96   MG 1.8*  --    PHOS 3.4  --         Echo 8/12/2024:    Frequent ventricular ectopy is noted.    Left Ventricle: Left ventricle is mildly dilated in size with moderate to severely depressed systolic function.  Estimated LVEF is 30%.  Dyssynchronous LV contractility is noted.  Wall thickness is mildly increased. Diastolic function is mildly abnormal, consistent with grade I (abnormal) relaxation.    Mitral Valve: There is mild to moderate regurgitation with an eccentrically directed jet.    Tricuspid Valve: Tricuspid valve structurally normal.  Mild tricuspid regurgitation.  RVSP is 30 mmHg.    Compared to March 2024 study report, mitral regurgitation seems to be worse and LV function is similar.

## 2024-09-19 NOTE — NURSING NOTE
"Patient expressed being hungry. Reasoning for NPO explained to him however I did reach out to provider and JADIEL Reis ordered diet order for patient at 1233. Dietary called to get patient food tray, came up to get patient's order at approx. 1240 in which patient became verbally aggressive and angry to dietary staff and told PCA \"they have 5 minutes to get me food before I rip all these lines out and leave\". This LPN went into room to tell patient they were bringing a tray for him in which patient then jumped out of bed and started taking everything off including gown, telemetry monitor, and IV with heparin infusing, stating that he was leaving and continuing to scream at staff and being verbally aggressive. This LPN assisted patient with IV's(2) while LAURA Momin RN contacted provider that patient needed AMA paperwork. Patient proceeded to leave AMA.   "

## 2024-09-19 NOTE — ASSESSMENT & PLAN NOTE
Wt Readings from Last 3 Encounters:   09/18/24 75.4 kg (166 lb 3.6 oz)   08/12/24 73.9 kg (163 lb)   05/28/24 74.2 kg (163 lb 9.3 oz)     Currently euvolemic not in heart failure.

## 2024-09-19 NOTE — CASE MANAGEMENT
Case Management Discharge Planning Note    Patient name Britton Batista  Location /-01 MRN [id number]  : 1965 Date 2024       Current Admission Date: 2024  Current Admission Diagnosis:Syncope   Patient Active Problem List    Diagnosis Date Noted Date Diagnosed    Syncope 2024     Chest wall pain 2024     Chronic HFrEF (heart failure with reduced ejection fraction) ([facility]) 2024     Primary hypertension 2024     Hypomagnesemia 2023     Methamphetamine abuse ([facility]) 2023     Tobacco abuse 2023     D-dimer, elevated 2023     Cardiomyopathy ([facility]) 10/09/2023     IRINEO (acute kidney injury) ([facility]) 10/08/2023     Acute on chronic combined systolic and diastolic congestive heart failure ([facility]) 10/07/2023     Hypertensive emergency 10/07/2023     Anxiety      Depression      Emphysema of lung ([facility])      GERD (gastroesophageal reflux disease)      Gout      Hypertensive urgency      Neuropathy      Raynaud's disease      Unilateral inguinal hernia, without obstruction or gangrene, not specified as recurrent      PSS (progressive systemic sclerosis) ([facility]) 10/25/2021     Near syncope 10/25/2021     Osteomyelitis ([facility]) 10/25/2021     Elevated troponin 10/25/2021     Acute encephalopathy 10/25/2021     Hyponatremia 10/24/2021       LOS (days): 1  Geometric Mean LOS (GMLOS) (days): 3  Days to GMLOS:2.2     OBJECTIVE:  Risk of Unplanned Readmission Score: 26.96         Current admission status: Inpatient   Preferred Pharmacy:   [facility] - [place], [place] - [address]  [address] [address]  Phone: [phone] Fax: [phone]    [facility] [place] [address]  [address]  [address] [facility] [address]  Phone: [phone] Fax: [phone]    [id number] [place], [place] - [address] [address]  Phone: [phone] Fax:  [phone]    Primary Care Provider: [facility] Everywhere Referral    Primary Insurance: [facility] REP  Secondary Insurance:     DISCHARGE DETAILS:     Per provider pt left AMA and pt will need Eliquis for dc.  CM sent over the 0 copay to [facility] for the first Month.

## 2024-09-19 NOTE — NURSING NOTE
2016 Rechecked patients blood glucose, result 45. Patient given OJ and food, rechecked for glucose of 82 at 2020, Provider TELMA Goodman notified. Will continue to monitor patient closely.

## 2024-09-19 NOTE — DISCHARGE SUMMARY
Discharge Summary - Hospitalist   Name: Britton Batista 59 y.o. male I MRN: [id number]  Unit/Bed#: -01 I Date of Admission: 9/18/2024   Date of Service: 9/19/2024 I Hospital Day: 1       Assessment & Plan  Syncope  POA with complaints of lightheadedness, dizziness x 3 days. Symptoms were when he was walking, today felt them when he was sitting down. No chest pain  In the ED, ECG showed atrial fibrillation, RBBB, T wave inversions in anterior leads nonspecific ischemic changes noted  Troponin elevation at 114 initial, 122 second troponin.  ED reached out to cardiology, recommending to admit on heparin gtt. also placed on telemetry to monitor due to syncopal episodes.    08/24 2D echo showed EF of 30%.  Patient allergic to aspirin, no aspirin ordered  Cardiology consult -start Eliquis 5 mg p.o. twice daily.  Follow-up outpatient with EP  Patient has pacemaker defibrillator -interrogated by cardiology and showed A-fib 2 days ago and currently a pacing.  A-fib correlating with dizziness outpatient  Patient signed out AMA on 9/19.  Educated on staying in the hospital for further workup, eliquis pricing. Patient agitated that he did not have a diet this morning and is hungry. Explained to patient that we can get him food but patient refused. Signed ama form and educated on risks of cardiac arrest, heart failure, death. AMA form placed in chart   Eliquis 5mg po bid sent to pharmacy  Raynaud's disease  Outpatient follow-up  Chronic HFrEF (heart failure with reduced ejection fraction) ([facility])  Wt Readings from Last 3 Encounters:   09/18/24 75.4 kg (166 lb 3.6 oz)   08/12/24 73.9 kg (163 lb)   05/28/24 74.2 kg (163 lb 9.3 oz)     Currently euvolemic not in heart failure.  Primary hypertension  History of htn on coreg 6.25 mg bid, lasix 20 mg qd, losartan 50 mg qd resume home meds     Medical Problems       Resolved Problems  Date Reviewed: 9/18/2024   None       Discharging Physician / Practitioner: Cheryl Sin,  NOE  PCP: [facility] Referral  Admission Date:   Admission Orders (From admission, onward)       Ordered        09/18/24 1819  Inpatient Admission  Once            Pending  Inpatient Admission  Once                          Discharge Date: 09/19/24    Consultations During Hospital Stay:  Cardiology     Procedures Performed:   None     Significant Findings / Test Results:   CTA ED chest PE Study: No pulmonary embolism., Pulmonary nodules as described, including 7 mm left lower lobe nodule which is stable in comparison to September 2022 exam., Considering stability, likely benign, no specific follow-up recommended., Interval increase in right greater than left bibasilar interstitial thickening., A stacked appearance of subpleural small cystic spaces particularly on the right is consistent with developing fibrotic disease., Pulmonary consultation recommended., Nonspecific mediastinal and bilateral hilar adenopathy, stable from December 2023 exam., Follow-up chest CT in 6 months could be considered., Fluid-filled mildly distended loops of small bowel identified in the left upper abdomen., Findings represent a diarrheal GI illness versus mild enteritis., Stable marked cardiomegaly.     Incidental Findings:   CTA ED chest PE Study: No pulmonary embolism., Pulmonary nodules as described, including 7 mm left lower lobe nodule which is stable in comparison to September 2022 exam., Considering stability, likely benign, no specific follow-up recommended., Interval increase in right greater than left bibasilar interstitial thickening., A stacked appearance of subpleural small cystic spaces particularly on the right is consistent with developing fibrotic disease., Pulmonary consultation recommended., Nonspecific mediastinal and bilateral hilar adenopathy, stable from December 2023 exam., Follow-up chest CT in 6 months could be considered., Fluid-filled mildly distended loops of small bowel identified in the left  upper abdomen., Findings represent a diarrheal GI illness versus mild enteritis., Stable marked cardiomegaly.    I reviewed the above mentioned incidental findings with the patient and/or family and they expressed understanding.    Test Results Pending at Discharge (will require follow up):   none     Outpatient Tests Requested:  None     Complications:  none     Reason for Admission: syncope     Hospital Course:   Britton Batista is a 59 y.o. male patient who originally presented to the hospital on 9/18/2024 due to dizziness and near syncope.  Patient was treated with cardiology consult, telemetry, and lab workup.  After interrogation with pacemaker, found that patient was in A-fib 2 days ago which aligns with his dizziness symptoms.  Cardiology recommending starting Eliquis 5 mg p.o. twice daily.  Patient feeling fine at time of discharge.  Patient signed out AMA due to frustration with not getting diet within 5 minutes of ordering it.  Patient educated on risks of signing out AMA which include but are not limited to cardiac arrest, cardiogenic shock, heart failure, and death.  Patient signed AMA form and was placed in chart.  IV was removed by nurse.  Eliquis was sent to pharmacy for patient safety and to prevent readmission.    Please see above list of diagnoses and related plan for additional information.     Condition at Discharge: good    Discharge Day Visit / Exam:   Subjective: Patient stating that he is frustrated because he did not get a diet within 5 minutes of ordering it.  Patient signing out AMA despite education of risk of cardiac arrest, cardiogenic shock, heart failure and death.  IV removed by nurse and AMA form signed and placed in patient's chart.  Sent Eliquis to pharmacy to prevent readmission for patient safety.  Vitals: Blood Pressure: 110/78 (09/19/24 1133)  Pulse: 58 (09/19/24 0905)  Temperature: 98.2 °F (36.8 °C) (09/19/24 1133)  Temp Source: Temporal (09/19/24 0736)  Respirations: 18  "(09/19/24 1133)  Height: 5' 9\" (175.3 cm) (09/18/24 2353)  Weight - Scale: 75.4 kg (166 lb 3.6 oz) (09/18/24 2353)  SpO2: 95 % (09/19/24 0905)  Exam:   Physical Exam  Vitals reviewed.   Constitutional:       General: He is not in acute distress.     Appearance: Normal appearance. He is not ill-appearing.   HENT:      Head: Normocephalic and atraumatic.      Nose: Nose normal.      Mouth/Throat:      Mouth: Mucous membranes are moist.      Pharynx: Oropharynx is clear.   Eyes:      Conjunctiva/sclera: Conjunctivae normal.   Cardiovascular:      Rate and Rhythm: Normal rate and regular rhythm.      Pulses: Normal pulses.      Heart sounds: Normal heart sounds. No murmur heard.  Pulmonary:      Effort: Pulmonary effort is normal. No respiratory distress.      Breath sounds: Normal breath sounds. No wheezing.   Abdominal:      General: Abdomen is flat. Bowel sounds are normal. There is no distension.      Palpations: Abdomen is soft.      Tenderness: There is no abdominal tenderness.   Musculoskeletal:         General: Normal range of motion.      Cervical back: Normal range of motion.      Right lower leg: No edema.      Left lower leg: No edema.   Skin:     General: Skin is warm.   Neurological:      General: No focal deficit present.      Mental Status: He is alert and oriented to person, place, and time. Mental status is at baseline.      Cranial Nerves: No cranial nerve deficit.      Motor: No weakness.   Psychiatric:      Comments: Appears agitated verbally aggressive          Discussion with Family: Patient declined call to .     Discharge instructions/Information to patient and family:   See after visit summary for information provided to patient and family.      Provisions for Follow-Up Care:  See after visit summary for information related to follow-up care and any pertinent home health orders.      Mobility at time of Discharge:   Basic Mobility Inpatient Raw Score: 24  -[facility] Goal: 8: Walk 250 " feet or more  [facility]-HLM Achieved: 6: Walk 10 steps or more  HLM Goal achieved. Continue to encourage appropriate mobility.     Disposition:   Home    Planned Readmission: none    Discharge Medications:  See after visit summary for reconciled discharge medications provided to patient and/or family.      Administrative Statements   Discharge Statement:  I have spent a total time of 45 minutes in caring for this patient on the day of the visit/encounter. .    **Please Note: This note may have been constructed using a voice recognition system**

## 2024-09-19 NOTE — UTILIZATION REVIEW
Initial Clinical Review    Admission: Date/Time/Statement:   Admission Orders (From admission, onward)       Ordered        09/18/24 1819  Inpatient Admission  Once                          Orders Placed This Encounter   Procedures    Inpatient Admission     Standing Status:   Standing     Number of Occurrences:   1     Order Specific Question:   Level of Care     Answer:   Med Surg [16]     Order Specific Question:   Estimated length of stay     Answer:   More than 2 Midnights     Order Specific Question:   Certification     Answer:   I certify that inpatient services are medically necessary for this patient for a duration of greater than two midnights. See H&P and MD Progress Notes for additional information about the patient's course of treatment.     Comments:   syncope     ED Arrival Information       Expected   -    Arrival   9/18/2024 15:48    Acuity   Emergent              Means of arrival   Walk-In    Escorted by   Family Member    Service   Hospitalist    Admission type   Emergency              Arrival complaint   near syncope episodes             Chief Complaint   Patient presents with    Dizziness     Pt presented to this ED with family c/o intermittent episodes dizziness w/sob over past 3 days. Recently started on new bp med. Pt has pacer/defib. Denies travel/fevers/cough/n/v/d       Initial Presentation: 59 y.o. male to ED via walk-in from home  Present to ED with complaints of lightheadedness, dizziness x 3 days. Symptoms were when he was walking, today felt them when he was sitting down. states he just does not feel right. Denies any shocks from his device. ED reached out to cardiology, recommending to admit on heparin gtt. also placed on telemetry to monitor due to syncopal episodes.  08/24 2D echo showed EF of 30%.  PMHX: chronic systolic CHF   Admitted to [facility] with DX: Syncope  on exam: tachypnea; hypertensive; Na 133; Cr 1.57; Gluc 31; Troponin elevation at 114 initial, 122 second troponin.  ECG  showed atrial fibrillation, RBBB, T wave inversions in anterior leads nonspecific ischemic changes noted   PLAN: cont heparin gtt; tele monitoring; monitor labs; f/u orthstatics; cardiology consult; pacemaker defibrillator will need interrogation.       Anticipated Length of Stay/Certification Statement: Patient will be admitted on an inpatient basis with an anticipated length of stay of greater than 2 midnights secondary to presyncope elevated troponins.       Date: 9/19/24       Day 2   CARDIOLOGY CONSULT   Assessment: Dizziness - ICD interrogation showing episode of afib when he was symptomatic. Newly recognized afib - currently in nsr. elevated chadvasc, requires anticoagulation. Non ischemic cardiomyopathy- LVEF 30%. CHFrEF- euvolemic. Scleroderma. Drug abuse. ICD (medtronic)  Plan: Medtronic interrogation performed by myself. Patient noted to be in afib two days ago. Is currently A pacing. Symptoms correlate with episode of PAF. Add Eliquis 5 mg PO BID. Continue BB. Coordinate OP fu with EP who manages his ICD    Discharge Summary   Hospital Course:   Britton Batista is a 59 y.o. male patient who originally presented to the hospital on 9/18/2024 due to dizziness and near syncope.  Patient was treated with cardiology consult, telemetry, and lab workup.  After interrogation with pacemaker, found that patient was in A-fib 2 days ago which aligns with his dizziness symptoms.  Cardiology recommending starting Eliquis 5 mg p.o. twice daily.  Patient feeling fine at time of discharge.  Patient signed out AMA due to frustration with not getting diet within 5 minutes of ordering it.  Patient educated on risks of signing out AMA which include but are not limited to cardiac arrest, cardiogenic shock, heart failure, and death.  Patient signed AMA form and was placed in chart.  IV was removed by nurse.  Eliquis was sent to pharmacy for patient safety and to prevent readmission.    Disposition: Home / self       ED Triage  Vitals [09/18/24 1601]   Temperature Pulse Respirations Blood Pressure SpO2 Pain Score   98.4 °F (36.9 °C) 67 18 123/75 98 % No Pain     Weight (last 2 days) before discharge       Date/Time Weight    09/18/24 23:53:14 75.4 (166.23)    09/18/24 1601 75.3 (166)            Vital Signs (last 3 days) before discharge       Date/Time Temp Pulse Resp BP MAP (mmHg) SpO2 O2 Device Patient Position - Orthostatic VS Keily Coma Scale Score Pain    09/19/24 11:33:04 98.2 °F (36.8 °C) -- 18 110/78 89 -- -- -- -- --    09/19/24 09:05:41 -- 58 -- 113/78 90 95 % -- -- -- --    09/19/24 0900 -- -- -- -- -- 97 % None (Room air) -- 15 No Pain    09/19/24 07:36:42 98.2 °F (36.8 °C) 68 20 102/69 80 97 % None (Room air) Lying -- --    09/19/24 03:01:22 98.2 °F (36.8 °C) 71 16 108/74 85 98 % -- -- -- --    09/18/24 23:53:14 97.9 °F (36.6 °C) 72 16 103/68 80 96 % -- Lying -- --    09/18/24 2000 -- 70 -- -- -- 87 % None (Room air) -- 12 No Pain    09/18/24 19:40:43 98.1 °F (36.7 °C) 77 -- 146/104 118 82 % -- -- -- --    09/18/24 1844 -- 71 -- 125/92 -- -- -- -- -- --    09/18/24 1830 -- 71 16 125/92 104 99 % -- -- -- --    09/18/24 1815 -- 69 17 133/88 104 97 % -- -- -- --    09/18/24 1800 -- 70 20 144/96 114 100 % -- -- -- --    09/18/24 1745 -- -- -- 135/76 96 -- -- -- -- --    09/18/24 1730 -- 76 24 135/76 -- 91 % -- -- -- --    09/18/24 1715 -- 73 21 -- -- 96 % -- -- -- --    09/18/24 1700 -- 73 20 -- -- 99 % -- -- -- --    09/18/24 1630 -- 73 20 102/81 88 97 % -- -- -- --    09/18/24 1615 -- 74 20 106/73 84 98 % -- -- -- --    09/18/24 1609 -- -- -- -- -- -- None (Room air) -- 15 No Pain    09/18/24 1601 98.4 °F (36.9 °C) 67 18 123/75 -- 98 % None (Room air) Sitting -- No Pain              Pertinent Labs/Diagnostic Test Results:   Radiology:  CTA ED chest PE Study   Final Interpretation by Jorge Luna MD (09/18 1926)      No pulmonary embolism.      Pulmonary nodules as described, including 7 mm left lower lobe nodule which  is stable in comparison to September 2022 exam.   Considering stability, likely benign, no specific follow-up recommended.      Interval increase in right greater than left bibasilar interstitial thickening.   A stacked appearance of subpleural small cystic spaces particularly on the right is consistent with developing fibrotic disease.   Pulmonary consultation recommended.      Nonspecific mediastinal and bilateral hilar adenopathy, stable from December 2023 exam.   Follow-up chest CT in 6 months could be considered.      Fluid-filled mildly distended loops of small bowel identified in the left upper abdomen.   Findings represent a diarrheal GI illness versus mild enteritis.      Stable marked cardiomegaly.      The study was marked in EPIC for immediate notification.      Workstation performed: [id number]         XR chest 1 view portable   Final Interpretation by Fritz Dunham MD (09/18 1658)      No acute cardiopulmonary disease.            Workstation performed: [id number]         CT head without contrast   Final Interpretation by Dewayne Celis MD (09/18 1650)      No acute intracranial abnormality.                  Workstation performed: [id number]           Cardiology:  ECG 12 lead   Final Result by Juan Leger Jr., MD (09/19 2492)   Sinus rhythm with frequent Premature atrial complexes and demand atrial    pacemaker   Right bundle branch block   Left posterior fascicular block   Bifascicular block   Inferior infarct , age undetermined   T wave abnormality, consider lateral ischemia   Abnormal ECG   When compared with ECG of 26-MAR-2024 14:02,   Inferior infarct is now Present   T wave inversion less evident in Inferior leads   T wave inversion more evident in Anterior leads   Reconfirmed by Juan Leger ([id number]) on 9/19/2024 11:56:27 AM           Results from last 7 days   Lab Units 09/19/24  0606 09/18/24  1638   WBC Thousand/uL 6.74 6.49   HEMOGLOBIN g/dL 12.8 12.6   HEMATOCRIT % 39.3 39.3    PLATELETS Thousands/uL 233 240   TOTAL NEUT ABS Thousands/µL  --  3.86        Results from last 7 days   Lab Units 09/19/24  0606 09/18/24  1737 09/18/24  1638   SODIUM mmol/L 134*  --  133*   POTASSIUM mmol/L 4.7  --  4.6   CHLORIDE mmol/L 104  --  103   CO2 mmol/L 26  --  26   ANION GAP mmol/L 4  --  4   BUN mg/dL 22  --  20   CREATININE mg/dL 1.33*  --  1.57*   EGFR ml/min/1.73sq m 58  --  47   CALCIUM mg/dL 8.9  --  8.9   MAGNESIUM mg/dL  --  1.8*  --    PHOSPHORUS mg/dL  --  3.4  --      Results from last 7 days   Lab Units 09/19/24  0606 09/18/24  1638   AST U/L 19 22   ALT U/L 12 13   ALK PHOS U/L 91 90   TOTAL PROTEIN g/dL 6.8 6.9   ALBUMIN g/dL 3.4* 3.6   TOTAL BILIRUBIN mg/dL 0.58 0.66     Results from last 7 days   Lab Units 09/19/24  0736 09/19/24  0555 09/19/24  0259 09/18/24 2123 09/18/24 2020 09/18/24 2016 09/18/24  1928 09/18/24  1727 09/18/24  1652   POC GLUCOSE mg/dl 110 98 105 169* 82 41* 45* 93 31*     Results from last 7 days   Lab Units 09/19/24 0606 09/18/24  1638   GLUCOSE RANDOM mg/dL 96 83        Beta- Hydroxybutyrate   Date Value Ref Range Status   09/18/2024 <0.05 0.02 - 0.27 mmol/L Final         Results from last 7 days   Lab Units 09/18/24  1737   PH SHAHANA  7.302   PCO2 SHAHANA mm Hg 49.3   PO2 SHAHANA mm Hg 31.0*   HCO3 SHAHANA mmol/L 23.8*   BASE EXC SHAHANA mmol/L -3.0   O2 CONTENT SHAHANA ml/dL 10.9   O2 HGB, VENOUS % 51.8*        Results from last 7 days   Lab Units 09/18/24  1737   CK TOTAL U/L 65     Results from last 7 days   Lab Units 09/18/24 2126 09/18/24 1737 09/18/24  1638   HS TNI 0HR ng/L  --   --  114*   HS TNI 2HR ng/L  --  122*  --    HSTNI D2 ng/L  --  8  --    HS TNI 4HR ng/L 111*  --   --    HSTNI D4 ng/L -3  --   --      Results from last 7 days   Lab Units 09/18/24  1638   D-DIMER QUANTITATIVE ug/ml FEU 0.92*     Results from last 7 days   Lab Units 09/19/24  0606 09/19/24  0001 09/18/24  1632   PROTIME seconds  --   --  13.7   INR   --   --  1.01   PTT seconds 64* 49* 32      Results from last 7 days   Lab Units 09/19/24  0606 09/18/24  1638   TSH 3RD GENERATON uIU/mL 1.976 2.276        Results from last 7 days   Lab Units 09/18/24  1737   LACTIC ACID mmol/L 1.7        Results from last 7 days   Lab Units 09/18/24  1638   BNP pg/mL 707*        Results from last 7 days   Lab Units 09/18/24  2308 09/18/24  2308   CLARITY UA   --  Clear   COLOR UA   --  Yellow   SPEC GRAV UA   --  <=1.005   PH UA   --  6.0   GLUCOSE UA mg/dl  --  >=1000 (1%)*   KETONES UA mg/dl  --  Negative   BLOOD UA   --  Negative   PROTEIN UA mg/dl  --  Negative   NITRITE UA   --  Negative   BILIRUBIN UA   --  Negative   UROBILINOGEN UA E.U./dl  --  0.2   LEUKOCYTES UA   --  Negative   WBC UA /hpf 0-1  --    RBC UA /hpf 0-1  --    BACTERIA UA /hpf Occasional  --    EPITHELIAL CELLS WET PREP /hpf Occasional  --         Results from last 7 days   Lab Units 09/18/24  2137   AMPH/METH  Negative   BARBITURATE UR  Negative   BENZODIAZEPINE UR  Negative   COCAINE UR  Negative   METHADONE URINE  Negative   OPIATE UR  Negative   PCP UR  Negative   THC UR  Negative          ED Treatment-Medication Administration from 09/18/2024 1547 to 09/18/2024 1922         Date/Time Order Dose Route Action     09/18/2024 1642 sodium chloride 0.9 % bolus 1,000 mL 1,000 mL Intravenous New Bag     09/18/2024 1655 dextrose 50 % IV solution 25 mL 25 mL Intravenous Given     09/18/2024 1747 iohexol (OMNIPAQUE) 350 MG/ML injection (MULTI-DOSE) 85 mL 85 mL Intravenous Given     09/18/2024 1808 heparin (porcine) injection 4,000 Units 4,000 Units Intravenous Given     09/18/2024 1810 heparin (porcine) 25,000 units in 0.45% NaCl 250 mL infusion (premix) 12 Units/kg/hr Intravenous New Bag     09/18/2024 1845 magnesium sulfate 2 g/50 mL IVPB (premix) 2 g 2 g Intravenous New Bag     09/18/2024 1844 carvedilol (COREG) tablet 6.25 mg 6.25 mg Oral Given            Past Medical History:   Diagnosis Date    Anxiety     Cardiomyopathy (HCC)     CHF  (congestive heart failure) ([facility])     Depression     Emphysema of lung (HCC)     GERD (gastroesophageal reflux disease)     Gout     Hypertension     Neuropathy     Raynaud's disease     Right inguinal hernia     Scleroderma ([facility])      Present on Admission:   Raynaud's disease   Primary hypertension   Chronic HFrEF (heart failure with reduced ejection fraction) ([facility])      Admitting Diagnosis: Dizziness [R42]  NSTEMI (non-ST elevated myocardial infarction) ([facility]) [I21.4]  Near syncope [R55]    Age/Sex: 59 y.o. male    Admission Orders: SCDs; I/O; tele monitoring; orthostatics ;cardiac diet     Scheduled Medications:  apixaban, 5 mg, Oral, BID  carvedilol, 6.25 mg, Oral, BID With Meals  furosemide, 20 mg, Oral, Daily  gabapentin, 400 mg, Oral, TID  losartan, 50 mg, Oral, Daily  nicotine, 1 patch, Transdermal, HS      Continuous IV Infusions: heparin (porcine) 25,000 units in 0.45% NaCl 250 mL infusion (premix)         PRN Meds:  acetaminophen, 650 mg, Oral, Q6H PRN        IP CONSULT TO CARDIOLOGY    Network Utilization Review Department  ATTENTION: Please call with any questions or concerns to [phone] and carefully listen to the prompts so that you are directed to the right person. All voicemails are confidential.   For Discharge needs, contact Care Management DC Support Team at [phone] opt. 2  Send all requests for admission clinical reviews, approved or denied determinations and any other requests to dedicated fax number below belonging to the campus where the patient is receiving treatment. List of dedicated fax numbers for the Facilities:  FACILITY NAME UR FAX NUMBER   ADMISSION DENIALS (Administrative/Medical Necessity) [phone]   DISCHARGE SUPPORT TEAM (NETWORK) [phone]   PARENT CHILD HEALTH (Maternity/NICU/Pediatrics) [phone]   [facility] [phone]   [facility] [phone]   [facility] [phone]   [facility] [phone]   [facility] [phone]   [facility] [phone]   [facility] [phone]   [facility] [phone]   [facility] [phone]   [facility] [phone]   [facility] [phone]   [facility] [phone]

## 2024-09-19 NOTE — INCIDENTAL FINDINGS
The following findings require follow up:  Radiographic finding   Finding: CTA ED chest PE Study: No pulmonary embolism., Pulmonary nodules as described, including 7 mm left lower lobe nodule which is stable in comparison to September 2022 exam., Considering stability, likely benign, no specific follow-up recommended., Interval increase in right greater than left bibasilar interstitial thickening., A stacked appearance of subpleural small cystic spaces particularly on the right is consistent with developing fibrotic disease., Pulmonary consultation recommended., Nonspecific mediastinal and bilateral hilar adenopathy, stable from December 2023 exam., Follow-up chest CT in 6 months could be considered., Fluid-filled mildly distended loops of small bowel identified in the left upper abdomen., Findings represent a diarrheal GI illness versus mild enteritis., Stable marked cardiomegaly.    Follow up required: pcp   Follow up should be done within 6 month(s)    Please notify the following clinician to assist with the follow up:   PCP    Incidental finding results were discussed with the Patient by Cheryl Sin PA-C on 09/19/24.   They expressed understanding and all questions answered.

## 2024-09-19 NOTE — ASSESSMENT & PLAN NOTE
POA with complaints of lightheadedness, dizziness x 3 days. Symptoms were when he was walking, today felt them when he was sitting down. No chest pain  In the ED, ECG showed atrial fibrillation, RBBB, T wave inversions in anterior leads nonspecific ischemic changes noted  Troponin elevation at 114 initial, 122 second troponin.  ED reached out to cardiology, recommending to admit on heparin gtt. also placed on telemetry to monitor due to syncopal episodes.    08/24 2D echo showed EF of 30%.  Patient allergic to aspirin, no aspirin ordered  Cardiology consult -start Eliquis 5 mg p.o. twice daily.  Follow-up outpatient with EP  Patient has pacemaker defibrillator -interrogated by cardiology and showed A-fib 2 days ago and currently a pacing.  A-fib correlating with dizziness outpatient  Patient signed out AMA on 9/19.  Educated on staying in the hospital for further workup, eliquis pricing. Patient agitated that he did not have a diet this morning and is hungry. Explained to patient that we can get him food but patient refused. Signed ama form and educated on risks of cardiac arrest, heart failure, death. AMA form placed in chart   Eliquis 5mg po bid sent to pharmacy

## 2024-09-19 NOTE — PLAN OF CARE

## 2024-09-20 NOTE — UTILIZATION REVIEW
NOTIFICATION OF ADMISSION DISCHARGE   This is a Notification of Discharge from [facility]. Please be advised that this patient has been discharge from our facility. Below you will find the admission and discharge date and time including the patient’s disposition.   UTILIZATION REVIEW CONTACT:  Comfort Dennis  Utilization   Network Utilization Review Department  Phone: [phone] x carefully listen to the prompts. All voicemails are confidential.  Email: [email or web address]     ADMISSION INFORMATION  PRESENTATION DATE: 9/18/2024  4:04 PM  OBERVATION ADMISSION DATE: N/A  INPATIENT ADMISSION DATE: 9/18/24  6:19 PM   DISCHARGE DATE: 9/19/2024  1:07 PM   DISPOSITION:Left against medical advice or discontinued care    Network Utilization Review Department  ATTENTION: Please call with any questions or concerns to [phone] and carefully listen to the prompts so that you are directed to the right person. All voicemails are confidential.   For Discharge needs, contact Care Management DC Support Team at [phone] opt. 2  Send all requests for admission clinical reviews, approved or denied determinations and any other requests to dedicated fax number below belonging to the campus where the patient is receiving treatment. List of dedicated fax numbers for the Facilities:  FACILITY NAME UR FAX NUMBER   ADMISSION DENIALS (Administrative/Medical Necessity) [phone]   DISCHARGE SUPPORT TEAM ([facility]) [phone]   PARENT CHILD HEALTH (Maternity/NICU/Pediatrics) [phone]   [facility] [phone]   [facility] [phone]   [facility] [phone]   [facility] [phone]   [facility] [phone]   [facility] [phone]   General acute hospital [phone]   [facility]  [facility] [phone]   [facility] [phone]   [facility] [phone]   [facility] [phone]   [facility] [phone]

## 2024-10-10 ENCOUNTER — ANESTHESIA (EMERGENCY)
Dept: PERIOP | Facility: HOSPITAL | Age: 59
End: 2024-10-10
Payer: COMMERCIAL

## 2024-10-10 ENCOUNTER — HOSPITAL ENCOUNTER (EMERGENCY)
Facility: HOSPITAL | Age: 59
Discharge: HOME | End: 2024-10-10
Attending: EMERGENCY MEDICINE
Payer: COMMERCIAL

## 2024-10-10 ENCOUNTER — ANESTHESIA EVENT (EMERGENCY)
Dept: PERIOP | Facility: HOSPITAL | Age: 59
End: 2024-10-10
Payer: COMMERCIAL

## 2024-10-10 ENCOUNTER — APPOINTMENT (EMERGENCY)
Dept: PERIOP | Facility: HOSPITAL | Age: 59
End: 2024-10-10
Attending: HOSPITALIST
Payer: COMMERCIAL

## 2024-10-10 VITALS
OXYGEN SATURATION: 97 % | TEMPERATURE: 97.9 F | SYSTOLIC BLOOD PRESSURE: 133 MMHG | RESPIRATION RATE: 17 BRPM | DIASTOLIC BLOOD PRESSURE: 91 MMHG | HEART RATE: 80 BPM

## 2024-10-10 DIAGNOSIS — T18.128A ESOPHAGEAL OBSTRUCTION DUE TO FOOD IMPACTION: Primary | ICD-10-CM
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
## 2024-10-10 DIAGNOSIS — K21.9 GASTROESOPHAGEAL REFLUX DISEASE WITHOUT ESOPHAGITIS: ICD-10-CM

## 2024-10-10 LAB
ANION GAP SERPL CALCULATED.3IONS-SCNC: 7 MMOL/L (ref 4–13)
BASOPHILS # BLD AUTO: 0.04 THOUSANDS/ΜL (ref 0–0.1)
BASOPHILS NFR BLD AUTO: 1 % (ref 0–1)
BUN SERPL-MCNC: 20 MG/DL (ref 5–25)
CALCIUM SERPL-MCNC: 9.6 MG/DL (ref 8.4–10.2)
CHLORIDE SERPL-SCNC: 100 MMOL/L (ref 96–108)
CO2 SERPL-SCNC: 28 MMOL/L (ref 21–32)
CREAT SERPL-MCNC: 1.08 MG/DL (ref 0.6–1.3)
EOSINOPHIL # BLD AUTO: 0.15 THOUSAND/ΜL (ref 0–0.61)
EOSINOPHIL NFR BLD AUTO: 2 % (ref 0–6)
ERYTHROCYTE [DISTWIDTH] IN BLOOD BY AUTOMATED COUNT: 14.9 % (ref 11.6–15.1)
GFR SERPL CREATININE-BSD FRML MDRD: 74 ML/MIN/1.73SQ M
GLUCOSE SERPL-MCNC: 94 MG/DL (ref 65–140)
HCT VFR BLD AUTO: 43.6 % (ref 36.5–49.3)
HGB BLD-MCNC: 14 G/DL (ref 12–17)
IMM GRANULOCYTES # BLD AUTO: 0.02 THOUSAND/UL (ref 0–0.2)
IMM GRANULOCYTES NFR BLD AUTO: 0 % (ref 0–2)
LYMPHOCYTES # BLD AUTO: 1.81 THOUSANDS/ΜL (ref 0.6–4.47)
LYMPHOCYTES NFR BLD AUTO: 27 % (ref 14–44)
MCH RBC QN AUTO: 29.4 PG (ref 26.8–34.3)
MCHC RBC AUTO-ENTMCNC: 32.1 G/DL (ref 31.4–37.4)
MCV RBC AUTO: 91 FL (ref 82–98)
MONOCYTES # BLD AUTO: 0.77 THOUSAND/ΜL (ref 0.17–1.22)
MONOCYTES NFR BLD AUTO: 11 % (ref 4–12)
NEUTROPHILS # BLD AUTO: 4.05 THOUSANDS/ΜL (ref 1.85–7.62)
NEUTS SEG NFR BLD AUTO: 59 % (ref 43–75)
NRBC BLD AUTO-RTO: 0 /100 WBCS
PLATELET # BLD AUTO: 299 THOUSANDS/UL (ref 149–390)
PMV BLD AUTO: 9.3 FL (ref 8.9–12.7)
POTASSIUM SERPL-SCNC: 4.6 MMOL/L (ref 3.5–5.3)
RBC # BLD AUTO: 4.77 MILLION/UL (ref 3.88–5.62)
SODIUM SERPL-SCNC: 135 MMOL/L (ref 135–147)
WBC # BLD AUTO: 6.84 THOUSAND/UL (ref 4.31–10.16)

## 2024-10-10 PROCEDURE — 36415 COLL VENOUS BLD VENIPUNCTURE: CPT | Performed by: EMERGENCY MEDICINE

## 2024-10-10 PROCEDURE — 99283 EMERGENCY DEPT VISIT LOW MDM: CPT

## 2024-10-10 PROCEDURE — 80048 BASIC METABOLIC PNL TOTAL CA: CPT | Performed by: EMERGENCY MEDICINE

## 2024-10-10 PROCEDURE — 99284 EMERGENCY DEPT VISIT MOD MDM: CPT | Performed by: EMERGENCY MEDICINE

## 2024-10-10 PROCEDURE — 85025 COMPLETE CBC W/AUTO DIFF WBC: CPT | Performed by: EMERGENCY MEDICINE

## 2024-10-10 RX ORDER — SODIUM CHLORIDE, SODIUM LACTATE, POTASSIUM CHLORIDE, CALCIUM CHLORIDE 600; 310; 30; 20 MG/100ML; MG/100ML; MG/100ML; MG/100ML
75 INJECTION, SOLUTION INTRAVENOUS CONTINUOUS
Status: DISCONTINUED | OUTPATIENT
Start: 2024-10-10 | End: 2024-10-10 | Stop reason: HOSPADM

## 2024-10-10 RX ORDER — ONDANSETRON 2 MG/ML
INJECTION INTRAMUSCULAR; INTRAVENOUS AS NEEDED
Status: DISCONTINUED | OUTPATIENT
Start: 2024-10-10 | End: 2024-10-10

## 2024-10-10 RX ORDER — FENTANYL CITRATE/PF 50 MCG/ML
50 SYRINGE (ML) INJECTION
Status: DISCONTINUED | OUTPATIENT
Start: 2024-10-10 | End: 2024-10-10 | Stop reason: HOSPADM

## 2024-10-10 RX ORDER — DEXMEDETOMIDINE HYDROCHLORIDE 4 UG/ML
INJECTION, SOLUTION INTRAVENOUS AS NEEDED
Status: DISCONTINUED | OUTPATIENT
Start: 2024-10-10 | End: 2024-10-10

## 2024-10-10 RX ORDER — PROMETHAZINE HYDROCHLORIDE 25 MG/ML
12.5 INJECTION, SOLUTION INTRAMUSCULAR; INTRAVENOUS ONCE AS NEEDED
Status: DISCONTINUED | OUTPATIENT
Start: 2024-10-10 | End: 2024-10-10 | Stop reason: HOSPADM

## 2024-10-10 RX ORDER — FENTANYL CITRATE 50 UG/ML
INJECTION, SOLUTION INTRAMUSCULAR; INTRAVENOUS AS NEEDED
Status: DISCONTINUED | OUTPATIENT
Start: 2024-10-10 | End: 2024-10-10

## 2024-10-10 RX ORDER — SUCRALFATE 1 G/1
1 TABLET ORAL 4 TIMES DAILY
Qty: 42 TABLET | Refills: 0 | Status: SHIPPED | OUTPATIENT
Start: 2024-10-10 | End: 2024-10-24

## 2024-10-10 RX ORDER — LIDOCAINE HYDROCHLORIDE 10 MG/ML
INJECTION, SOLUTION EPIDURAL; INFILTRATION; INTRACAUDAL; PERINEURAL AS NEEDED
Status: DISCONTINUED | OUTPATIENT
Start: 2024-10-10 | End: 2024-10-10

## 2024-10-10 RX ORDER — ONDANSETRON 2 MG/ML
4 INJECTION INTRAMUSCULAR; INTRAVENOUS ONCE AS NEEDED
Status: DISCONTINUED | OUTPATIENT
Start: 2024-10-10 | End: 2024-10-10 | Stop reason: HOSPADM

## 2024-10-10 RX ORDER — MIDAZOLAM HYDROCHLORIDE 2 MG/2ML
INJECTION, SOLUTION INTRAMUSCULAR; INTRAVENOUS AS NEEDED
Status: DISCONTINUED | OUTPATIENT
Start: 2024-10-10 | End: 2024-10-10

## 2024-10-10 RX ORDER — DEXAMETHASONE SODIUM PHOSPHATE 10 MG/ML
INJECTION, SOLUTION INTRAMUSCULAR; INTRAVENOUS AS NEEDED
Status: DISCONTINUED | OUTPATIENT
Start: 2024-10-10 | End: 2024-10-10

## 2024-10-10 RX ORDER — SODIUM CHLORIDE, SODIUM LACTATE, POTASSIUM CHLORIDE, CALCIUM CHLORIDE 600; 310; 30; 20 MG/100ML; MG/100ML; MG/100ML; MG/100ML
INJECTION, SOLUTION INTRAVENOUS CONTINUOUS PRN
Status: DISCONTINUED | OUTPATIENT
Start: 2024-10-10 | End: 2024-10-10

## 2024-10-10 RX ORDER — HYDROMORPHONE HCL IN WATER/PF 6 MG/30 ML
0.2 PATIENT CONTROLLED ANALGESIA SYRINGE INTRAVENOUS
Status: DISCONTINUED | OUTPATIENT
Start: 2024-10-10 | End: 2024-10-10 | Stop reason: HOSPADM

## 2024-10-10 RX ORDER — PANTOPRAZOLE SODIUM 40 MG/1
40 TABLET, DELAYED RELEASE ORAL 2 TIMES DAILY
Qty: 180 TABLET | Refills: 0 | Status: SHIPPED | OUTPATIENT
Start: 2024-10-10 | End: 2025-01-08

## 2024-10-10 RX ORDER — ETOMIDATE 2 MG/ML
INJECTION INTRAVENOUS AS NEEDED
Status: DISCONTINUED | OUTPATIENT
Start: 2024-10-10 | End: 2024-10-10

## 2024-10-10 RX ORDER — PROPOFOL 10 MG/ML
INJECTION, EMULSION INTRAVENOUS AS NEEDED
Status: DISCONTINUED | OUTPATIENT
Start: 2024-10-10 | End: 2024-10-10

## 2024-10-10 RX ADMIN — ONDANSETRON 4 MG: 2 INJECTION INTRAMUSCULAR; INTRAVENOUS at 17:31

## 2024-10-10 RX ADMIN — PROPOFOL 100 MG: 10 INJECTION, EMULSION INTRAVENOUS at 17:33

## 2024-10-10 RX ADMIN — DEXMEDETOMIDINE HYDROCHLORIDE 904.8 MCG: 400 INJECTION INTRAVENOUS at 17:32

## 2024-10-10 RX ADMIN — ETOMIDATE 10 MG: 20 INJECTION, SOLUTION INTRAVENOUS at 17:33

## 2024-10-10 RX ADMIN — SODIUM CHLORIDE, SODIUM LACTATE, POTASSIUM CHLORIDE, AND CALCIUM CHLORIDE: .6; .31; .03; .02 INJECTION, SOLUTION INTRAVENOUS at 17:31

## 2024-10-10 RX ADMIN — Medication 40 MG: at 17:47

## 2024-10-10 RX ADMIN — LIDOCAINE HYDROCHLORIDE 50 MG: 10 INJECTION, SOLUTION EPIDURAL; INFILTRATION; INTRACAUDAL; PERINEURAL at 17:33

## 2024-10-10 RX ADMIN — PROPOFOL 50 MG: 10 INJECTION, EMULSION INTRAVENOUS at 17:34

## 2024-10-10 RX ADMIN — DEXAMETHASONE SODIUM PHOSPHATE 10 MG: 10 INJECTION, SOLUTION INTRAMUSCULAR; INTRAVENOUS at 17:35

## 2024-10-10 RX ADMIN — NOREPINEPHRINE BITARTRATE 8 MCG: 1 SOLUTION INTRAVENOUS at 17:50

## 2024-10-10 RX ADMIN — NOREPINEPHRINE BITARTRATE 16 MCG: 1 SOLUTION INTRAVENOUS at 17:56

## 2024-10-10 RX ADMIN — FENTANYL CITRATE 100 MCG: 50 INJECTION INTRAMUSCULAR; INTRAVENOUS at 17:33

## 2024-10-10 RX ADMIN — MIDAZOLAM 2 MG: 1 INJECTION INTRAMUSCULAR; INTRAVENOUS at 17:32

## 2024-10-10 NOTE — ANESTHESIA POSTPROCEDURE EVALUATION
Post-Op Assessment Note    CV Status:  Stable  Pain Score: 0    Pain management: adequate       Mental Status:  Awake and sleepy   Hydration Status:  Stable   PONV Controlled:  Controlled   Airway Patency:  Patent     Post Op Vitals Reviewed: Yes    No anethesia notable event occurred.    Staff: CRNA           Last Filed PACU Vitals:  Vitals Value Taken Time   Temp 97.7 °F (36.5 °C) 10/10/24 1804   Pulse 76 10/10/24 1806   /78 10/10/24 1805   Resp 21 10/10/24 1806   SpO2 97 % 10/10/24 1806   Vitals shown include unfiled device data.    Modified Corey:  Activity: 0 (10/10/2024  6:04 PM)  Respiration: 2 (10/10/2024  6:04 PM)  Circulation: 1 (10/10/2024  6:04 PM)  Consciousness: 0 (10/10/2024  6:04 PM)  Oxygen Saturation: 1 (10/10/2024  6:04 PM)  Modified Corey Score: 4 (10/10/2024  6:04 PM)

## 2024-10-10 NOTE — ANESTHESIA PREPROCEDURE EVALUATION
Procedure:  EGD    Relevant Problems   CARDIO   (+) Acute on chronic combined systolic and diastolic congestive heart failure (HCC)   (+) Chest wall pain   (+) Hypertensive emergency   (+) Hypertensive urgency   (+) Primary hypertension   (+) Raynaud's disease      GI/HEPATIC   (+) GERD (gastroesophageal reflux disease)      /RENAL   (+) IRINEO (acute kidney injury) (HCC)      MUSCULOSKELETAL   (+) Gout      NEURO/PSYCH   (+) Anxiety   (+) Depression      PULMONARY   (+) Emphysema of lung (HCC)      Cardiovascular/Peripheral Vascular   (+) Cardiomyopathy (HCC)   (+) Chronic HFrEF (heart failure with reduced ejection fraction) (HCC)      Other   (+) Osteomyelitis (HCC)        Physical Exam    Airway    Mallampati score: III  TM Distance: >3 FB  Neck ROM: full     Dental    upper dentures and lower dentures,     Cardiovascular  Rhythm: irregular    Pulmonary   No wheezes    Other Findings      Anesthesia Plan  ASA Score- 4 Emergent    Anesthesia Type- general with ASA Monitors.         Additional Monitors:     Airway Plan: ETT.           Plan Factors-    Chart reviewed. EKG reviewed. Imaging results reviewed. Existing labs reviewed. Patient summary reviewed.    Patient is a current smoker.  Patient smoked on day of surgery.            Induction- intravenous.    Postoperative Plan- . Planned trial extubation    Perioperative Resuscitation Plan - Level 1 - Full Code.       Informed Consent- Anesthetic plan and risks discussed with patient.  I personally reviewed this patient with the CRNA. Discussed and agreed on the Anesthesia Plan with the CRNA..      VITALS  BP (!) 154/111   Pulse 82   Temp 98.4 °F (36.9 °C) (Temporal)   Resp (!) 25   SpO2 99%  BP Readings from Last 3 Encounters:   10/10/24 (!) 154/111   09/19/24 110/78   08/12/24 122/70        LABS  Results from Last 12 Months   Lab Units 10/10/24  1651 09/19/24  0606   WBC Thousand/uL 6.84 6.74   HEMOGLOBIN g/dL 14.0 12.8   HEMATOCRIT % 43.6 39.3   PLATELETS  Thousands/uL 299 233     Results from Last 12 Months   Lab Units 09/19/24  0606 09/18/24  1737 09/18/24  1638 03/26/24  0635   SODIUM mmol/L 134*  --  133* 133*   POTASSIUM mmol/L 4.7  --  4.6 4.4   CHLORIDE mmol/L 104  --  103 99   CO2 mmol/L 26  --  26 26   ANION GAP mmol/L 4  --  4 8   BUN mg/dL 22  --  20 14   CREATININE mg/dL 1.33*  --  1.57* 1.06   CALCIUM mg/dL 8.9  --  8.9 9.9   GLUCOSE RANDOM mg/dL 96  --  83 112   PHOSPHORUS mg/dL  --  3.4  --  3.6   MAGNESIUM mg/dL  --  1.8*  --  1.6*     Results from Last 12 Months   Lab Units 09/19/24  0606 09/19/24  0001 09/18/24  1632 02/23/24  0116   INR   --   --  1.01 1.01   PTT seconds 64* 49* 32 33       ECG      ECHOCARDIOGRAPHY OR OTHER TESTING/IMAGING  Encounter Date: 09/18/24   ECG 12 lead   Result Value    Ventricular Rate 78    Atrial Rate     KY Interval     QRSD Interval 188    QT Interval 498    QTC Interval 567    P Axis     QRS Axis 111    T Wave Axis 102    Narrative    Sinus rhythm with frequent Premature atrial complexes and demand atrial pacemaker  Right bundle branch block  Left posterior fascicular block   Bifascicular block   Inferior infarct , age undetermined  T wave abnormality, consider lateral ischemia  Abnormal ECG  When compared with ECG of 26-MAR-2024 14:02,  Inferior infarct is now Present  T wave inversion less evident in Inferior leads  T wave inversion more evident in Anterior leads  Reconfirmed by Juan Leger ([id number]) on 9/19/2024 11:56:27 AM     No results found for this or any previous visit. History    Cardiomyopathy, ICD, CHF, arrythmia     Interpretation Summary         Frequent ventricular ectopy is noted.    Left Ventricle: Left ventricle is mildly dilated in size with moderate to severely depressed systolic function.  Estimated LVEF is 30%.  Dyssynchronous LV contractility is noted.  Wall thickness is mildly increased. Diastolic function is mildly abnormal, consistent with grade I (abnormal) relaxation.    Mitral Valve:  There is mild to moderate regurgitation with an eccentrically directed jet.    Tricuspid Valve: Tricuspid valve structurally normal.  Mild tricuspid regurgitation.  RVSP is 30 mmHg.    Compared to March 2024 study report, mitral regurgitation seems to be worse and LV function is similar.     ------- ANESTHESIA RISK-BENEFIT DISCUSSION -------  BENEFITS OF A SPECIALIZED ANESTHESIA TEAM INCLUDE (NBK [id number], PMID [id number]):  (1) Reduce mortality and morbidity for major surgeries/procedures. (2) The team provides analgesia/sedation/amnesia/akinesia as safely as possible. (3) The team strives to reduce discomfort as safely as possible.    RISKS, AND PLANS TO MITIGATE RISKS, INCLUDE:    - Neurologic system: IntraOp awareness (Risk is ~1:1,000 - 1:14,000; PMID 08437714), Stroke (Risk ~<0.1-2% for most cases; PMID 76698559), nerve injury, vision loss, and POCD.     - Airway and Pulmonary system: Dental or mouth injury, throat pain, critical hypoxia, pneumothorax, prolonged intubation, post-op respiratory compromise.  Airway/Intubation risks and prior data:  Mask Ventilation: Ventilated by mask with oral airway (2); Technique: Direct laryngoscopy; Type: Cuffed, Oral, Inflated; Tube Size: 8 mm; Laryngoscope: Isbell; Blade Size: 2; Location: Oral; Grade View: 2a; Insertion Attempts: 1; Placement Verification: End tidal CO2, Symmetrical chest wall movement  Major ARISCAT risk factors for pulmonary complications include: Other factors/Clinical gestalt: 1 pt, yielding a score of 0-1= Low risk, 1.6%.  - Cardiovascular system: Hypotension, arrhythmias, cardiac injury or arrest, blood clots, bleeding, infection, vascular injuries.  Amilcar's RCRI score items: ICM and CHF, yielding an RCRI Score of 2 --> 7% MACE.  Are dhara-op or intra-op beta blockers indicated? (PMID [id number]): no, patient has already taken recently.  - FEN/GI system: Aspiration risk (~0.5% per PMC 0941093) and PONV (10-80% per Apfel score) especially if the patient has  not fasted.  ASA NPO guideline compliance?: N/A; RSI is indicated due to clinical necessity regardless NPO status.  - Medication risk assessment: Allergic reactions, excessive bleeding with anticoagulant use, overdoses, drug-drug interactions, injury to a fetus or  in pregnant or breastfeeding patients, dhara-procedural sedation including while driving/operating machinery.  Recent relevant medications: none  Personal or family history of anesthesia complications: no  Pregnancy Status: N/A  - Estimate mortality risks associated with anesthesia based on ASA-PS (PMID 53000470): ASA-PS IV: risk 1:1,800 for recent NSTEMI

## 2024-10-10 NOTE — ANESTHESIA POSTPROCEDURE EVALUATION
Post-Op Assessment Note    CV Status:  Stable    Pain management: adequate       Mental Status:  Awake and alert   Hydration Status:  Stable   PONV Controlled:  None   Airway Patency:  Patent     Post Op Vitals Reviewed: Yes    No anethesia notable event occurred.    Staff: Anesthesiologist           Last Filed PACU Vitals:  Vitals Value Taken Time   Temp 97.9 °F (36.6 °C) 10/10/24 1834   Pulse 81 10/10/24 1834   /88 10/10/24 1834   Resp 21 10/10/24 1834   SpO2 91 % 10/10/24 1834       Modified Corey:  Activity: 2 (10/10/2024  6:49 PM)  Respiration: 2 (10/10/2024  6:49 PM)  Circulation: 2 (10/10/2024  6:49 PM)  Consciousness: 2 (10/10/2024  6:49 PM)  Oxygen Saturation: 2 (10/10/2024  6:49 PM)  Modified Corey Score: 10 (10/10/2024  6:49 PM)

## 2024-10-10 NOTE — H&P
Procedure(s):    Esophagogastroduodenuoscopy with the indication(s) of    HPI:  59-year-old male patient with history of scleroderma, cardiomyopathy status post pacemaker and defibrillator as well as A-fib on anticoagulation and esophageal dysphagia and recurrent presentation with food boluses in the past return to the emergency room complaining of sensation of food getting stuck in his esophagus.  He ate a burger sandwich and he has been throwing up since then.  His last EGD was done with Dr. Brewer in 2018 and there was evidence of benign stricture in the distal esophagus.  Stricture was dilated using a balloon dilator up to 20 mm.    Vitals:    10/10/24 1551   BP: (!) 154/109   Pulse: (!) 47   Resp: 18   Temp: 97.5 °F (36.4 °C)   SpO2: 98%       Physical Exam:  Physical Exam  HENT:      Nose: Nose normal.   Eyes:      Conjunctiva/sclera: Conjunctivae normal.   Cardiovascular:      Rate and Rhythm: Normal rate.   Pulmonary:      Effort: Pulmonary effort is normal.   Abdominal:      Palpations: Abdomen is soft.   Neurological:      General: No focal deficit present.      Mental Status: He is alert.   Psychiatric:         Mood and Affect: Mood normal.              Endoscopy Pre-Procedure Assessment:  Prior to the procedure, the patient is identified.  The patient's history, medications, and allergies have been reviewed.  The patient is competent.     Consent:    We have discussed the procedure in detail. We reviewed risks, benefits and alternative as well as potentional complications including and not limited to medication side effect , infection, bleeding, perforation and the potential need for surgery, ICU admission, CPR, as well as the need for blood product transfusion. Patient verbalized understanding and agreement. All patient questions were answered.     After reviewed the risks and benefits, the patient is deemed in satisfactory condition to undergo the procedure.        10/10/24

## 2024-10-10 NOTE — ED PROVIDER NOTES
"Time reflects when diagnosis was documented in both MDM as applicable and the Disposition within this note       Time User Action Codes Description Comment    10/10/2024  4:03 PM Francisca Sanjiv Add [T18.128A,  W44.F3XA] Esophageal obstruction due to food impaction     10/10/2024  4:16 PM Armida Coppola Add [K21.9] Gastroesophageal reflux disease without esophagitis           ED Disposition       ED Disposition   Discharge    Condition   Stable    Date/Time   u Oct 10, 2024  7:13 PM    Comment   Britton Batista discharge to home/self care.                   Assessment & Plan       Medical Decision Making  Problems Addressed:  Esophageal obstruction due to food impaction: acute illness or injury with systemic symptoms     Details: Referred to gastroenterology and anesthesiology.  They performed the procedure and discharge instructions were provided by us for them.    Amount and/or Complexity of Data Reviewed  Labs: ordered.    Risk  Decision regarding hospitalization.             Medications       ED Risk Strat Scores                                               History of Present Illness       Chief Complaint   Patient presents with    Foreign Body in Throat     Patient thinks he either has a piece of hamburger or cauliflower stuck in throat since last night. Patient able to speak and maintain saliva. Patient denies difficulty breathing at this time.        Past Medical History:   Diagnosis Date    Anxiety     Cardiomyopathy (HCC)     CHF (congestive heart failure) (HCC)     Depression     Emphysema of lung (HCC)     GERD (gastroesophageal reflux disease)     Gout     Hypertension     Neuropathy     Raynaud's disease     Right inguinal hernia     Scleroderma (HCC)       Past Surgical History:   Procedure Laterality Date    AMPUTATION Right     pt had tip of \"pointer finger\" d/t scleraderma    CARDIAC PACEMAKER PLACEMENT      FINGER AMPUTATION Left 01/31/2024    Procedure: AMPUTATION FINGER, LEFT INDEX FINGER CPT: " 71798;  Surgeon: Pedro Vazquez MD;  Location:  MAIN OR;  Service: Orthopedics    HERNIA REPAIR Left     times 2    IL AMP F/TH 1/2 JT/PHALANX W/NEURECT W/DIR CLSR Right 01/23/2024    Procedure: AMPUTATION FINGER, RIGHT MIDDLE;  Surgeon: Pedro Vazquez MD;  Location: [facility];  Service: Orthopedics    IL RPR 1ST INGUN HRNA AGE 5 YRS/> REDUCIBLE Right 03/03/2023    Procedure: OPEN INGUINAL HERNIA REPAIR WITH MESH;  Surgeon: Temo Jackson DO;  Location:  MAIN OR;  Service: General      Family History   Problem Relation Age of Onset    Hypertension Father       Social History     Tobacco Use    Smoking status: Every Day     Current packs/day: 1.00     Average packs/day: 1 pack/day for 44.8 years (44.8 ttl pk-yrs)     Types: Cigarettes     Start date: 2000    Smokeless tobacco: Never    Tobacco comments:     Last cigarette 0430   Vaping Use    Vaping status: Never Used   Substance Use Topics    Alcohol use: Not Currently     Comment: occasional    Drug use: Yes     Types: Marijuana, Methamphetamines     Comment: Hx meth use      E-Cigarette/Vaping    E-Cigarette Use Never User       E-Cigarette/Vaping Substances    Nicotine No     THC No     CBD No     Flavoring No       I have reviewed and agree with the history as documented.     59-year-old male with a history of scleroderma and previous episodes of esophageal food impaction presenting to the emergency room reporting that yesterday he was eating cauliflower and hamburger and felt the food product become lodged midesophagus.  He reports that since that time he has been unable to tolerate p.o. intake.  He cannot take p.o. solids.  He has tried some liquids with minimal success.  Reports that he feels the blockage in the midsection of his chest.  No respiratory distress.  Patient reports that this has occurred in the past and he has required removal of the food product.      Foreign Body in Throat  Associated symptoms: nausea, sore throat and trouble swallowing     Associated symptoms: no voice change        Review of Systems   Constitutional:  Negative for fever.   HENT:  Positive for sore throat and trouble swallowing. Negative for voice change.    Respiratory:  Negative for chest tightness, shortness of breath and wheezing.    Cardiovascular:  Negative for chest pain and palpitations.   Gastrointestinal:  Positive for nausea.           Objective       ED Triage Vitals   Temperature Pulse Blood Pressure Respirations SpO2 Patient Position - Orthostatic VS   10/10/24 1551 10/10/24 1551 10/10/24 1551 10/10/24 1551 10/10/24 1551 10/10/24 1551   97.5 °F (36.4 °C) (!) 47 (!) 154/109 18 98 % Lying      Temp Source Heart Rate Source BP Location FiO2 (%) Pain Score    10/10/24 1551 10/10/24 1551 10/10/24 1551 -- 10/10/24 1819    Temporal Monitor Right arm  No Pain      Vitals      Date and Time Temp Pulse SpO2 Resp BP Pain Score FACES Pain Rating User   10/10/24 1904 97.9 °F (36.6 °C) 79 95 % 17 116/76 No Pain -- [place]   10/10/24 1849 -- 76 94 % 21 110/67 No Pain -- [place]   10/10/24 1834 97.9 °F (36.6 °C) 81 91 % 21 124/88 No Pain -- [place]   10/10/24 1819 -- 76 99 % 15 131/95 No Pain -- [place]   10/10/24 1804 97.7 °F (36.5 °C) 74 91 % 16 118/79 -- -- [place]   10/10/24 1658 98.4 °F (36.9 °C) 82 99 % 25 154/111 -- -- [place]   10/10/24 1551 97.5 °F (36.4 °C) 47 98 % 18 154/109 -- -- AFG            Physical Exam  Vitals and nursing note reviewed.   Constitutional:       General: He is in acute distress.      Appearance: He is normal weight. He is not ill-appearing or toxic-appearing.   HENT:      Head: Normocephalic and atraumatic.      Right Ear: External ear normal.      Left Ear: External ear normal.      Nose: Nose normal.      Mouth/Throat:      Mouth: Mucous membranes are moist.   Cardiovascular:      Rate and Rhythm: Normal rate.   Pulmonary:      Effort: Pulmonary effort is normal. No respiratory distress.      Breath sounds: No stridor. No wheezing.   Abdominal:      General: Abdomen is flat.  There is no distension.      Tenderness: There is no abdominal tenderness.   Musculoskeletal:         General: No signs of injury.   Skin:     Coloration: Skin is not pale.      Findings: Rash (Chronic) present.   Neurological:      General: No focal deficit present.      Mental Status: He is alert.   Psychiatric:         Mood and Affect: Mood is anxious.         Results Reviewed       Procedure Component Value Units Date/Time    Basic metabolic panel [[id number]] Collected: 10/10/24 1651    Lab Status: Final result Specimen: Blood from Arm, Right Updated: 10/10/24 1718     Sodium 135 mmol/L      Potassium 4.6 mmol/L      Chloride 100 mmol/L      CO2 28 mmol/L      ANION GAP 7 mmol/L      BUN 20 mg/dL      Creatinine 1.08 mg/dL      Glucose 94 mg/dL      Calcium 9.6 mg/dL      eGFR 74 ml/min/1.73sq m     Narrative:      National Kid[employer] guidelines for Chronic Kidney Disease (CKD):     Stage 1 with normal or high GFR (GFR > 90 mL/min/1.73 square meters)    Stage 2 Mild CKD (GFR = 60-89 mL/min/1.73 square meters)    Stage 3A Moderate CKD (GFR = 45-59 mL/min/1.73 square meters)    Stage 3B Moderate CKD (GFR = 30-44 mL/min/1.73 square meters)    Stage 4 Severe CKD (GFR = 15-29 mL/min/1.73 square meters)    Stage 5 End Stage CKD (GFR <15 mL/min/1.73 square meters)  Note: GFR calculation is accurate only with a steady state creatinine    CBC and differential [[id number]] Collected: 10/10/24 1651    Lab Status: Final result Specimen: Blood from Arm, Right Updated: 10/10/24 1700     WBC 6.84 Thousand/uL      RBC 4.77 Million/uL      Hemoglobin 14.0 g/dL      Hematocrit 43.6 %      MCV 91 fL      MCH 29.4 pg      MCHC 32.1 g/dL      RDW 14.9 %      MPV 9.3 fL      Platelets 299 Thousands/uL      nRBC 0 /100 WBCs      Segmented % 59 %      Immature Grans % 0 %      Lymphocytes % 27 %      Monocytes % 11 %      Eosinophils Relative 2 %      Basophils Relative 1 %      Absolute Neutrophils 4.05 Thousands/µL       Absolute Immature Grans 0.02 Thousand/uL      Absolute Lymphocytes 1.81 Thousands/µL      Absolute Monocytes 0.77 Thousand/µL      Eosinophils Absolute 0.15 Thousand/µL      Basophils Absolute 0.04 Thousands/µL             No orders to display       Procedures    ED Medication and Procedure Management   Prior to Admission Medications   Prescriptions Last Dose Informant Patient Reported? Taking?   albuterol (PROVENTIL HFA,VENTOLIN HFA) 90 mcg/act inhaler More than a month  Yes No   Sig: Inhale 2 puffs every 4 (four) hours as needed    apixaban (ELIQUIS) 5 mg 10/9/2024  No Yes   Sig: Take 1 tablet (5 mg total) by mouth 2 (two) times a day   carvedilol (COREG) 6.25 mg tablet 10/9/2024  No Yes   Sig: Take 1 tablet (6.25 mg total) by mouth 2 (two) times a day with meals   furosemide (LASIX) 20 mg tablet More than a month  No No   Sig: Take 1 tablet (20 mg total) by mouth daily   losartan (COZAAR) 50 mg tablet 10/9/2024  No Yes   Sig: Take 1 tablet (50 mg total) by mouth daily      Facility-Administered Medications: None     Patient's Medications   Discharge Prescriptions    PANTOPRAZOLE (PROTONIX) 40 MG TABLET    Take 1 tablet (40 mg total) by mouth 2 (two) times a day       Start Date: 10/10/2024End Date: 1/8/2025       Order Dose: 40 mg       Quantity: 180 tablet    Refills: 0    SUCRALFATE (CARAFATE) 1 G TABLET    Take 1 tablet (1 g total) by mouth 4 (four) times a day for 14 days       Start Date: 10/10/2024End Date: 10/24/2024       Order Dose: 1 g       Quantity: 42 tablet    Refills: 0       ED SEPSIS DOCUMENTATION   Time reflects when diagnosis was documented in both MDM as applicable and the Disposition within this note       Time User Action Codes Description Comment    10/10/2024  4:03 PM Sanjiv Espinosa Add [T18.128A,  W44.F3XA] Esophageal obstruction due to food impaction     10/10/2024  4:16 PM Armida Coppola Add [K21.9] Gastroesophageal reflux disease without esophagitis                  Sanjiv Espinosa,  DO  10/10/24 1654       Sanjiv Espinosa, DO  10/10/24 1915

## 2025-02-04 ENCOUNTER — HOSPITAL ENCOUNTER (INPATIENT)
Facility: HOSPITAL | Age: 60
LOS: 2 days | Discharge: LEFT BEFORE DISCHARGE | DRG: 291 | End: 2025-02-06
Attending: EMERGENCY MEDICINE | Admitting: FAMILY MEDICINE
Payer: COMMERCIAL

## 2025-02-04 ENCOUNTER — APPOINTMENT (EMERGENCY)
Dept: RADIOLOGY | Facility: HOSPITAL | Age: 60
DRG: 291 | End: 2025-02-04
Payer: COMMERCIAL

## 2025-02-04 DIAGNOSIS — I50.9 CONGESTIVE HEART FAILURE, UNSPECIFIED HF CHRONICITY, UNSPECIFIED HEART FAILURE TYPE (HCC): Primary | ICD-10-CM

## 2025-02-04 DIAGNOSIS — N17.9 AKI (ACUTE KIDNEY INJURY) (HCC): ICD-10-CM

## 2025-02-04 DIAGNOSIS — I50.9 ACUTE EXACERBATION OF CHF (CONGESTIVE HEART FAILURE) (HCC): ICD-10-CM

## 2025-02-04 PROBLEM — I48.20 CHRONIC ATRIAL FIBRILLATION (HCC): Status: ACTIVE | Noted: 2025-02-04

## 2025-02-04 LAB
2HR DELTA HS TROPONIN: 18 NG/L
ALBUMIN SERPL BCG-MCNC: 3.7 G/DL (ref 3.5–5)
ALP SERPL-CCNC: 110 U/L (ref 34–104)
ALT SERPL W P-5'-P-CCNC: 31 U/L (ref 7–52)
ANION GAP SERPL CALCULATED.3IONS-SCNC: 5 MMOL/L (ref 4–13)
APTT PPP: 33 SECONDS (ref 23–34)
AST SERPL W P-5'-P-CCNC: 49 U/L (ref 13–39)
BASOPHILS # BLD AUTO: 0.05 THOUSANDS/ΜL (ref 0–0.1)
BASOPHILS NFR BLD AUTO: 1 % (ref 0–1)
BILIRUB SERPL-MCNC: 0.67 MG/DL (ref 0.2–1)
BNP SERPL-MCNC: 2143 PG/ML (ref 0–100)
BUN SERPL-MCNC: 34 MG/DL (ref 5–25)
CALCIUM SERPL-MCNC: 9.2 MG/DL (ref 8.4–10.2)
CARDIAC TROPONIN I PNL SERPL HS: 193 NG/L (ref ?–50)
CARDIAC TROPONIN I PNL SERPL HS: 211 NG/L (ref ?–50)
CHLORIDE SERPL-SCNC: 104 MMOL/L (ref 96–108)
CO2 SERPL-SCNC: 26 MMOL/L (ref 21–32)
CREAT SERPL-MCNC: 1.63 MG/DL (ref 0.6–1.3)
EOSINOPHIL # BLD AUTO: 0.04 THOUSAND/ΜL (ref 0–0.61)
EOSINOPHIL NFR BLD AUTO: 1 % (ref 0–6)
ERYTHROCYTE [DISTWIDTH] IN BLOOD BY AUTOMATED COUNT: 15.4 % (ref 11.6–15.1)
FLUAV AG UPPER RESP QL IA.RAPID: NEGATIVE
FLUBV AG UPPER RESP QL IA.RAPID: NEGATIVE
GFR SERPL CREATININE-BSD FRML MDRD: 45 ML/MIN/1.73SQ M
GLUCOSE SERPL-MCNC: 139 MG/DL (ref 65–140)
HCT VFR BLD AUTO: 44 % (ref 36.5–49.3)
HGB BLD-MCNC: 14.1 G/DL (ref 12–17)
IMM GRANULOCYTES # BLD AUTO: 0.02 THOUSAND/UL (ref 0–0.2)
IMM GRANULOCYTES NFR BLD AUTO: 0 % (ref 0–2)
INR PPP: 1.24 (ref 0.85–1.19)
LYMPHOCYTES # BLD AUTO: 1.22 THOUSANDS/ΜL (ref 0.6–4.47)
LYMPHOCYTES NFR BLD AUTO: 22 % (ref 14–44)
MCH RBC QN AUTO: 29 PG (ref 26.8–34.3)
MCHC RBC AUTO-ENTMCNC: 32 G/DL (ref 31.4–37.4)
MCV RBC AUTO: 90 FL (ref 82–98)
MONOCYTES # BLD AUTO: 0.78 THOUSAND/ΜL (ref 0.17–1.22)
MONOCYTES NFR BLD AUTO: 14 % (ref 4–12)
NEUTROPHILS # BLD AUTO: 3.34 THOUSANDS/ΜL (ref 1.85–7.62)
NEUTS SEG NFR BLD AUTO: 62 % (ref 43–75)
NRBC BLD AUTO-RTO: 0 /100 WBCS
PLATELET # BLD AUTO: 262 THOUSANDS/UL (ref 149–390)
PMV BLD AUTO: 9.5 FL (ref 8.9–12.7)
POTASSIUM SERPL-SCNC: 4.7 MMOL/L (ref 3.5–5.3)
PROT SERPL-MCNC: 6.9 G/DL (ref 6.4–8.4)
PROTHROMBIN TIME: 16 SECONDS (ref 12.3–15)
RBC # BLD AUTO: 4.87 MILLION/UL (ref 3.88–5.62)
SARS-COV+SARS-COV-2 AG RESP QL IA.RAPID: NEGATIVE
SODIUM SERPL-SCNC: 135 MMOL/L (ref 135–147)
WBC # BLD AUTO: 5.45 THOUSAND/UL (ref 4.31–10.16)

## 2025-02-04 PROCEDURE — 85610 PROTHROMBIN TIME: CPT | Performed by: EMERGENCY MEDICINE

## 2025-02-04 PROCEDURE — 96374 THER/PROPH/DIAG INJ IV PUSH: CPT

## 2025-02-04 PROCEDURE — 83880 ASSAY OF NATRIURETIC PEPTIDE: CPT | Performed by: EMERGENCY MEDICINE

## 2025-02-04 PROCEDURE — 99285 EMERGENCY DEPT VISIT HI MDM: CPT | Performed by: EMERGENCY MEDICINE

## 2025-02-04 PROCEDURE — 87811 SARS-COV-2 COVID19 W/OPTIC: CPT | Performed by: EMERGENCY MEDICINE

## 2025-02-04 PROCEDURE — 99285 EMERGENCY DEPT VISIT HI MDM: CPT

## 2025-02-04 PROCEDURE — 87804 INFLUENZA ASSAY W/OPTIC: CPT | Performed by: EMERGENCY MEDICINE

## 2025-02-04 PROCEDURE — 85730 THROMBOPLASTIN TIME PARTIAL: CPT | Performed by: EMERGENCY MEDICINE

## 2025-02-04 PROCEDURE — 94640 AIRWAY INHALATION TREATMENT: CPT

## 2025-02-04 PROCEDURE — 85025 COMPLETE CBC W/AUTO DIFF WBC: CPT | Performed by: EMERGENCY MEDICINE

## 2025-02-04 PROCEDURE — 80053 COMPREHEN METABOLIC PANEL: CPT | Performed by: EMERGENCY MEDICINE

## 2025-02-04 PROCEDURE — 93005 ELECTROCARDIOGRAM TRACING: CPT

## 2025-02-04 PROCEDURE — 84484 ASSAY OF TROPONIN QUANT: CPT | Performed by: EMERGENCY MEDICINE

## 2025-02-04 PROCEDURE — 71045 X-RAY EXAM CHEST 1 VIEW: CPT

## 2025-02-04 PROCEDURE — 84484 ASSAY OF TROPONIN QUANT: CPT

## 2025-02-04 PROCEDURE — 36415 COLL VENOUS BLD VENIPUNCTURE: CPT | Performed by: EMERGENCY MEDICINE

## 2025-02-04 RX ORDER — FUROSEMIDE 10 MG/ML
40 INJECTION INTRAMUSCULAR; INTRAVENOUS 2 TIMES DAILY
Status: DISCONTINUED | OUTPATIENT
Start: 2025-02-05 | End: 2025-02-05

## 2025-02-04 RX ORDER — CARVEDILOL 6.25 MG/1
6.25 TABLET ORAL 2 TIMES DAILY WITH MEALS
Status: DISCONTINUED | OUTPATIENT
Start: 2025-02-05 | End: 2025-02-06 | Stop reason: HOSPADM

## 2025-02-04 RX ORDER — ACETAMINOPHEN 325 MG/1
650 TABLET ORAL EVERY 4 HOURS PRN
Status: DISCONTINUED | OUTPATIENT
Start: 2025-02-04 | End: 2025-02-06 | Stop reason: HOSPADM

## 2025-02-04 RX ORDER — NICOTINE 21 MG/24HR
1 PATCH, TRANSDERMAL 24 HOURS TRANSDERMAL DAILY
Status: DISCONTINUED | OUTPATIENT
Start: 2025-02-05 | End: 2025-02-04

## 2025-02-04 RX ORDER — ALBUTEROL SULFATE 90 UG/1
2 INHALANT RESPIRATORY (INHALATION) EVERY 4 HOURS PRN
Status: DISCONTINUED | OUTPATIENT
Start: 2025-02-04 | End: 2025-02-06 | Stop reason: HOSPADM

## 2025-02-04 RX ORDER — ONDANSETRON 2 MG/ML
4 INJECTION INTRAMUSCULAR; INTRAVENOUS EVERY 6 HOURS PRN
Status: DISCONTINUED | OUTPATIENT
Start: 2025-02-04 | End: 2025-02-04

## 2025-02-04 RX ORDER — LOSARTAN POTASSIUM 50 MG/1
50 TABLET ORAL DAILY
Status: DISCONTINUED | OUTPATIENT
Start: 2025-02-05 | End: 2025-02-05

## 2025-02-04 RX ORDER — FUROSEMIDE 20 MG/1
20 TABLET ORAL DAILY
Status: DISCONTINUED | OUTPATIENT
Start: 2025-02-05 | End: 2025-02-04

## 2025-02-04 RX ORDER — NICOTINE 21 MG/24HR
1 PATCH, TRANSDERMAL 24 HOURS TRANSDERMAL DAILY
Status: DISCONTINUED | OUTPATIENT
Start: 2025-02-04 | End: 2025-02-06 | Stop reason: HOSPADM

## 2025-02-04 RX ORDER — IPRATROPIUM BROMIDE AND ALBUTEROL SULFATE 2.5; .5 MG/3ML; MG/3ML
3 SOLUTION RESPIRATORY (INHALATION) ONCE
Status: COMPLETED | OUTPATIENT
Start: 2025-02-04 | End: 2025-02-04

## 2025-02-04 RX ORDER — FUROSEMIDE 10 MG/ML
40 INJECTION INTRAMUSCULAR; INTRAVENOUS ONCE
Status: COMPLETED | OUTPATIENT
Start: 2025-02-04 | End: 2025-02-04

## 2025-02-04 RX ORDER — PANTOPRAZOLE SODIUM 40 MG/1
40 TABLET, DELAYED RELEASE ORAL 2 TIMES DAILY
Status: DISCONTINUED | OUTPATIENT
Start: 2025-02-04 | End: 2025-02-06

## 2025-02-04 RX ADMIN — FUROSEMIDE 40 MG: 10 INJECTION, SOLUTION INTRAMUSCULAR; INTRAVENOUS at 19:16

## 2025-02-04 RX ADMIN — NICOTINE 1 PATCH: 21 PATCH, EXTENDED RELEASE TRANSDERMAL at 23:12

## 2025-02-04 RX ADMIN — IPRATROPIUM BROMIDE AND ALBUTEROL SULFATE 3 ML: .5; 3 SOLUTION RESPIRATORY (INHALATION) at 19:20

## 2025-02-04 RX ADMIN — PANTOPRAZOLE SODIUM 40 MG: 40 TABLET, DELAYED RELEASE ORAL at 23:12

## 2025-02-05 LAB
4HR DELTA HS TROPONIN: 2 NG/L
ANION GAP SERPL CALCULATED.3IONS-SCNC: 7 MMOL/L (ref 4–13)
ATRIAL RATE: 92 BPM
ATRIAL RATE: 94 BPM
BACTERIA UR QL AUTO: ABNORMAL /HPF
BILIRUB UR QL STRIP: NEGATIVE
BUN SERPL-MCNC: 33 MG/DL (ref 5–25)
CALCIUM SERPL-MCNC: 8.9 MG/DL (ref 8.4–10.2)
CARDIAC TROPONIN I PNL SERPL HS: 195 NG/L (ref ?–50)
CHLORIDE SERPL-SCNC: 104 MMOL/L (ref 96–108)
CLARITY UR: CLEAR
CO2 SERPL-SCNC: 24 MMOL/L (ref 21–32)
COLOR UR: YELLOW
CREAT SERPL-MCNC: 1.7 MG/DL (ref 0.6–1.3)
ERYTHROCYTE [DISTWIDTH] IN BLOOD BY AUTOMATED COUNT: 15.3 % (ref 11.6–15.1)
GFR SERPL CREATININE-BSD FRML MDRD: 43 ML/MIN/1.73SQ M
GLUCOSE SERPL-MCNC: 99 MG/DL (ref 65–140)
GLUCOSE UR STRIP-MCNC: NEGATIVE MG/DL
HCT VFR BLD AUTO: 41.7 % (ref 36.5–49.3)
HGB BLD-MCNC: 13.5 G/DL (ref 12–17)
HGB UR QL STRIP.AUTO: NEGATIVE
KETONES UR STRIP-MCNC: NEGATIVE MG/DL
LEUKOCYTE ESTERASE UR QL STRIP: NEGATIVE
MCH RBC QN AUTO: 28.8 PG (ref 26.8–34.3)
MCHC RBC AUTO-ENTMCNC: 32.4 G/DL (ref 31.4–37.4)
MCV RBC AUTO: 89 FL (ref 82–98)
NITRITE UR QL STRIP: NEGATIVE
NON-SQ EPI CELLS URNS QL MICRO: ABNORMAL /HPF
P AXIS: 48 DEGREES
P AXIS: 64 DEGREES
PH UR STRIP.AUTO: 6.5 [PH]
PLATELET # BLD AUTO: 256 THOUSANDS/UL (ref 149–390)
PMV BLD AUTO: 9.7 FL (ref 8.9–12.7)
POTASSIUM SERPL-SCNC: 4.2 MMOL/L (ref 3.5–5.3)
PR INTERVAL: 124 MS
PR INTERVAL: 150 MS
PROT UR STRIP-MCNC: ABNORMAL MG/DL
QRS AXIS: 179 DEGREES
QRS AXIS: 190 DEGREES
QRSD INTERVAL: 172 MS
QRSD INTERVAL: 180 MS
QT INTERVAL: 436 MS
QT INTERVAL: 444 MS
QTC INTERVAL: 545 MS
QTC INTERVAL: 552 MS
RBC # BLD AUTO: 4.69 MILLION/UL (ref 3.88–5.62)
RBC #/AREA URNS AUTO: ABNORMAL /HPF
SODIUM SERPL-SCNC: 135 MMOL/L (ref 135–147)
SODIUM UR-SCNC: 123 MMOL/L
SP GR UR STRIP.AUTO: 1.01 (ref 1–1.03)
T WAVE AXIS: 255 DEGREES
T WAVE AXIS: 8 DEGREES
UROBILINOGEN UR QL STRIP.AUTO: 0.2 E.U./DL
VENTRICULAR RATE: 93 BPM
VENTRICULAR RATE: 94 BPM
WBC # BLD AUTO: 6.01 THOUSAND/UL (ref 4.31–10.16)
WBC #/AREA URNS AUTO: ABNORMAL /HPF

## 2025-02-05 PROCEDURE — 85027 COMPLETE CBC AUTOMATED: CPT

## 2025-02-05 PROCEDURE — 81001 URINALYSIS AUTO W/SCOPE: CPT | Performed by: INTERNAL MEDICINE

## 2025-02-05 PROCEDURE — 82570 ASSAY OF URINE CREATININE: CPT | Performed by: INTERNAL MEDICINE

## 2025-02-05 PROCEDURE — 82043 UR ALBUMIN QUANTITATIVE: CPT | Performed by: INTERNAL MEDICINE

## 2025-02-05 PROCEDURE — 84300 ASSAY OF URINE SODIUM: CPT | Performed by: INTERNAL MEDICINE

## 2025-02-05 PROCEDURE — 99223 1ST HOSP IP/OBS HIGH 75: CPT | Performed by: INTERNAL MEDICINE

## 2025-02-05 PROCEDURE — 80048 BASIC METABOLIC PNL TOTAL CA: CPT

## 2025-02-05 PROCEDURE — 99223 1ST HOSP IP/OBS HIGH 75: CPT | Performed by: FAMILY MEDICINE

## 2025-02-05 RX ORDER — FUROSEMIDE 10 MG/ML
40 INJECTION INTRAMUSCULAR; INTRAVENOUS 2 TIMES DAILY
Status: DISCONTINUED | OUTPATIENT
Start: 2025-02-05 | End: 2025-02-06 | Stop reason: HOSPADM

## 2025-02-05 RX ORDER — OXYCODONE HYDROCHLORIDE 5 MG/1
2.5 TABLET ORAL ONCE
Refills: 0 | Status: COMPLETED | OUTPATIENT
Start: 2025-02-05 | End: 2025-02-05

## 2025-02-05 RX ADMIN — PANTOPRAZOLE SODIUM 40 MG: 40 TABLET, DELAYED RELEASE ORAL at 09:06

## 2025-02-05 RX ADMIN — FUROSEMIDE 40 MG: 10 INJECTION, SOLUTION INTRAMUSCULAR; INTRAVENOUS at 09:06

## 2025-02-05 RX ADMIN — APIXABAN 5 MG: 5 TABLET, FILM COATED ORAL at 17:51

## 2025-02-05 RX ADMIN — APIXABAN 5 MG: 5 TABLET, FILM COATED ORAL at 09:06

## 2025-02-05 RX ADMIN — PANTOPRAZOLE SODIUM 40 MG: 40 TABLET, DELAYED RELEASE ORAL at 17:51

## 2025-02-05 RX ADMIN — OXYCODONE HYDROCHLORIDE 2.5 MG: 5 TABLET ORAL at 16:16

## 2025-02-05 RX ADMIN — CARVEDILOL 6.25 MG: 6.25 TABLET, FILM COATED ORAL at 09:06

## 2025-02-05 RX ADMIN — NICOTINE 1 PATCH: 21 PATCH, EXTENDED RELEASE TRANSDERMAL at 09:06

## 2025-02-05 NOTE — ASSESSMENT & PLAN NOTE
Wt Readings from Last 3 Encounters:   02/04/25 75.6 kg (166 lb 9.6 oz)   09/18/24 75.4 kg (166 lb 3.6 oz)   08/12/24 73.9 kg (163 lb)     Presents to ED with shortness of breath, exertional dyspnea, wheezing, BLE pitting edema.   BNP is in the 2000's. I provided patient with DuoNeb, Lasix.   ED diuresed with Lasix 40 mg IV  Take Lasix 20 mg PO for diuresis as outpatient   CXR consistent with acute CHF  BNP 2,143  ECHO from August 2024 revealed:  Frequent ventricular ectopy is noted.  Left ventricle is mildly dilated in size with moderate to severely depressed systolic function.  Estimated LVEF is 30%.   Dyssynchronous LV contractility is noted.  Wall thickness is mildly increased. Diastolic function is mildly abnormal, consistent with grade I (abnormal) relaxation.  Mitral Valve: There is mild to moderate regurgitation with an eccentrically directed jet.  Tricuspid Valve: Tricuspid valve structurally normal.  Mild tricuspid regurgitation.  RVSP is 30 mmHg.  Continue diuresis with IV Lasix 40 mg per CHF protocol algorithm   Daily weights, Strict I & Os  Cardiac diet, low sodium <2g, fluid restriction <1500

## 2025-02-05 NOTE — UTILIZATION REVIEW
Initial Clinical Review    Admission: Date/Time/Statement:   Admission Orders (From admission, onward)       Ordered        02/04/25 2028  INPATIENT ADMISSION  Once                          Orders Placed This Encounter   Procedures    INPATIENT ADMISSION     Standing Status:   Standing     Number of Occurrences:   1     Level of Care:   Med Surg [16]     Estimated length of stay:   More than 2 Midnights     Certification:   I certify that inpatient services are medically necessary for this patient for a duration of greater than two midnights. See H&P and MD Progress Notes for additional information about the patient's course of treatment.     ED Arrival Information       Expected   -    Arrival   2/4/2025 18:30    Acuity   Urgent              Means of arrival   Walk-In    Escorted by   Family Member    Service   Hospitalist    Admission type   Emergency              Arrival complaint   sob             Chief Complaint   Patient presents with    Shortness of Breath     Shortness of breath and a cough for 3 days        Initial Presentation: 59 y.o. male to ED via walk-in from home  Present to ED with shortness of breath. Endorses lower extremity edema, shortness of breath, and dyspnea on exertion for the past several days.   PMHX scleroderma, chronic combined systolic and diastolic congestive heart failure with an ejection fraction most recently of 30%, emphysema and chronic tobacco abuse 1 pack/day, renal disease, hypertension, anxiety, GERD, gout, substance abuse/methamphetamines   Admitted to [facility] with DX: Acute on chronic combined systolic and diastolic congestive heart failure   on exam: hypertensive; tachypnea; lungs with rales; B/L LE pitting edema; BNP 2,143; Cr 1.63 (baseline 1.08)  CXR  pulmonary venous congestion.   PLAN: cont iv lasix; monitor labs; fluid / Na restriction; nephrology consult       Anticipated Length of Stay/Certification Statement: Patient will be admitted on an inpatient basis with an  anticipated length of stay of greater than 2 midnights secondary to chf/irineo.       Date: 2/5/25      Day 2   shortness of breath with improvement with diuresis. lungs with rales; +1 pitting edema bilateral lower extremities;  Daily weights his down 3 pounds in -1.3 L. hold his Cozaar until creatinine improves   Plan: cont iv lasix; monitor labs; fluid / Na restriction      NEPHROLOGY CONSULT  IRINEO (acute kidney injury): History of scleroderma-patient without severe hypertension without rapid decline in renal function, no signs of a microangiopathic hemolytic anemia so less likely a scleroderma renal crisis. okay for creatinine to rise with diuresis if clinically improving   Plan: f/u urinalysis and an albumin to creatinine ratio; f/u renal U/S      Date: 2/6/25     Day 3: Has surpassed a 2nd midnight with active treatments and services. Require additional inpatient hospital stay due to secondary to IRINEO and CHF   Leg edema is resolved shortness of breath is resolved lungs sound better discussed with nephrology will continue IV diuresis for 1 more day and then switch to p.o. I/O net -285. Lungs Rales (Mild bibasilar Rales)   Plan: cont iv lasix; monitor labs; fluid / Na restriction; nephrology following      ED Treatment-Medication Administration from 02/04/2025 1830 to 02/04/2025 2115         Date/Time Order Dose Route Action     02/04/2025 1916 furosemide (LASIX) injection 40 mg 40 mg Intravenous Given     02/04/2025 1920 ipratropium-albuterol (DUO-NEB) 0.5-2.5 mg/3 mL inhalation solution 3 mL 3 mL Nebulization Given            Scheduled Medications:  apixaban, 5 mg, Oral, BID  carvedilol, 6.25 mg, Oral, BID With Meals  furosemide, 40 mg, Intravenous, BID  nicotine, 1 patch, Transdermal, Daily  pantoprazole, 40 mg, Oral, BID      Continuous IV Infusions: None         PRN Meds:  acetaminophen, 650 mg, Oral, Q4H PRN  albuterol, 2 puff, Inhalation, Q4H PRN  trimethobenzamide, 200 mg, Intramuscular, Q6H PRN      ED  Triage Vitals [02/04/25 1852]   Temperature Pulse Respirations Blood Pressure SpO2 Pain Score   97.9 °F (36.6 °C) 60 20 (!) 142/111 98 % No Pain     Weight (last 2 days)       Date/Time Weight    02/05/25 0600 74.1 (163.4)    02/04/25 2244 75.6 (166.6)    02/04/25 2137 75.6 (166.6)            Vital Signs (last 3 days)       Date/Time Temp Pulse Resp BP MAP (mmHg) SpO2 O2 Device Patient Position - Orthostatic VS Pain    02/05/25 07:36:35 -- 60 18 127/91 103 98 % -- -- --    02/04/25 21:24:45 97.9 °F (36.6 °C) 62 18 131/93 106 98 % None (Room air) -- --    02/04/25 2120 -- -- -- -- -- -- -- -- No Pain    02/04/25 2030 -- 93 22 155/95 122 99 % None (Room air) Sitting --    02/04/25 2000 -- 95 22 170/132 145 99 % None (Room air) Sitting --    02/04/25 1930 -- 95 13 132/90 104 91 % None (Room air) Sitting --    02/04/25 1918 -- 52 20 133/100 -- 99 % None (Room air) -- --    02/04/25 1852 97.9 °F (36.6 °C) 60 20 142/111 -- 98 % None (Room air) Sitting No Pain              Pertinent Labs/Diagnostic Test Results:   Radiology:  X-ray chest 1 view portable   Final Interpretation by Marian Verde MD (02/05 0713)      Mild pulmonary venous congestion.            Workstation performed: [id number]           Cardiology:  ECG 12 lead   Final Result by Praful Penaloza DO (02/05 0902)   atrial sensed, v-paceing with PVC and PACs   Abnormal ECG   Confirmed by Praful Penaloza ([id number]) on 2/5/2025 9:02:42 AM      ECG 12 lead   Final Result by Praful Penaloza DO (02/05 0902)   Atrial-sensed ventricular-paced rhythm with occasional Premature    ventricular complexes   Abnormal ECG   Confirmed by Praful Penaloza ([id number]) on 2/5/2025 9:02:56 AM           Results from last 7 days   Lab Units 02/05/25  0438 02/04/25  1909   WBC Thousand/uL 6.01 5.45   HEMOGLOBIN g/dL 13.5 14.1   HEMATOCRIT % 41.7 44.0   PLATELETS Thousands/uL 256 262   TOTAL NEUT ABS Thousands/µL  --  3.34        Results from last 7 days   Lab Units 02/05/25  0438  02/04/25  1909   SODIUM mmol/L 135 135   POTASSIUM mmol/L 4.2 4.7   CHLORIDE mmol/L 104 104   CO2 mmol/L 24 26   ANION GAP mmol/L 7 5   BUN mg/dL 33* 34*   CREATININE mg/dL 1.70* 1.63*   EGFR ml/min/1.73sq m 43 45   CALCIUM mg/dL 8.9 9.2     Results from last 7 days   Lab Units 02/04/25  1909   AST U/L 49*   ALT U/L 31   ALK PHOS U/L 110*   TOTAL PROTEIN g/dL 6.9   ALBUMIN g/dL 3.7   TOTAL BILIRUBIN mg/dL 0.67        Results from last 7 days   Lab Units 02/05/25  0438 02/04/25  1909   GLUCOSE RANDOM mg/dL 99 139        Beta- Hydroxybutyrate   Date Value Ref Range Status   09/18/2024 <0.05 0.02 - 0.27 mmol/L Final         Results from last 7 days   Lab Units 02/04/25  2332 02/04/25  2102 02/04/25  1909   HS TNI 0HR ng/L  --   --  193*   HS TNI 2HR ng/L  --  211*  --    HSTNI D2 ng/L  --  18  --    HS TNI 4HR ng/L 195*  --   --    HSTNI D4 ng/L 2  --   --         Results from last 7 days   Lab Units 02/04/25  1909   PROTIME seconds 16.0*   INR  1.24*   PTT seconds 33        Results from last 7 days   Lab Units 02/04/25  1909   BNP pg/mL 2,143*          Past Medical History:   Diagnosis Date    Anxiety     Cardiomyopathy (HCC)     CHF (congestive heart failure) (HCC)     Depression     Emphysema of lung (HCC)     GERD (gastroesophageal reflux disease)     Gout     Hypertension     Neuropathy     Raynaud's disease     Right inguinal hernia     Scleroderma (HCC)      Present on Admission:   Acute on chronic combined systolic and diastolic congestive heart failure (HCC)   IRINEO (acute kidney injury) (HCC)   Primary hypertension   Chronic atrial fibrillation (HCC)   GERD (gastroesophageal reflux disease)   Tobacco abuse      Admitting Diagnosis: SOB (shortness of breath) [R06.02]  Congestive heart failure, unspecified HF chronicity, unspecified heart failure type (HCC) [I50.9]  Age/Sex: 59 y.o. male    Network Utilization Review Department  ATTENTION: Please call with any questions or concerns to [phone] and carefully  listen to the prompts so that you are directed to the right person. All voicemails are confidential.   For Discharge needs, contact Care Management DC Support Team at [phone] opt. [phone]  Send all requests for admission clinical reviews, approved or denied determinations and any other requests to dedicated fax number below belonging to the campus where the patient is receiving treatment. List of dedicated fax numbers for the Facilities:  FACILITY NAME UR FAX NUMBER   ADMISSION DENIALS (Administrative/Medical Necessity) [phone]   DISCHARGE SUPPORT TEAM (NETWORK) [phone]   PARENT CHILD HEALTH (Maternity/NICU/Pediatrics) [phone]   [facility] [phone]   [facility] [phone]   [facility] [phone]   [facility] [phone]   [facility] [phone]   [facility] [phone]   [facility] [phone]   [facility] [address] [phone]   [facility] [phone]   [facility] [phone]   [facility] [phone]   [facility] [phone]

## 2025-02-05 NOTE — ASSESSMENT & PLAN NOTE
Wt Readings from Last 3 Encounters:   02/05/25 74.1 kg (163 lb 6.4 oz)   09/18/24 75.4 kg (166 lb 3.6 oz)   08/12/24 73.9 kg (163 lb)   Volume: BNP increased at 2100, chest x-ray shows mild pulmonary venous congestion, positive lower extremity edema, symptomatic with shortness of breath with improvement with diuresis  Continue furosemide 40 mg IV twice daily  Continue beta-blocker 6.25 mg 2 times daily  Continue losartan as an outpatient, add Jardiance as an outpatient  Consider nonmineralocorticoid receptor antagonist as an outpatient as well  Strict intakes and outputs  Daily weights   okay for creatinine to rise with diuresis if clinically improving  Urine sodium

## 2025-02-05 NOTE — CONSULTS
Administrative Statements   VIRTUAL CARE DOCUMENTATION:     1. This service was provided via Telemedicine using [employer]     2. Parties in the room with patient during teleconsult Patient only    3. Confidentiality My office door was closed     4. Participants No one else was in the room    5. Patient acknowledged consent and understanding of privacy and security of the  Telemedicine consult. I informed the patient that I have reviewed their record in Epic and presented the opportunity for them to ask any questions regarding the visit today.  The patient agreed to participate.    6. I have spent a total time of 45 minutes in caring for this patient on the day of the visit/encounter including Diagnostic results, Prognosis, Risks and benefits of tx options, and Instructions for management, not including the time spent for establishing the audio/video connection.         Consultation - Nephrology   Name: Britton Batista 59 y.o. male I MRN: [id number]  Unit/Bed#: -01 I Date of Admission: 2/4/2025   Date of Service: 2/5/2025 I Hospital Day: 1   Inpatient consult to Nephrology  Consult performed by: Art Blake DO  Consult ordered by: Guadalupe Muñoz MD            Physician Requesting Evaluation: Guadalupe Muñoz MD   Reason for Evaluation / Principal Problem: IRINEO      59-year-old male with a history of scleroderma, chronic combined systolic and diastolic congestive heart failure with a EF of 30%, COPD with chronic tobacco abuse, hypertension, GERD who presents with increased shortness of breath lower extremity edema and dyspnea on exertion  Assessment & Plan  Acute on chronic combined systolic and diastolic congestive heart failure (HCC)  Wt Readings from Last 3 Encounters:   02/05/25 74.1 kg (163 lb 6.4 oz)   09/18/24 75.4 kg (166 lb 3.6 oz)   08/12/24 73.9 kg (163 lb)   Volume: BNP increased at 2100, chest x-ray shows mild pulmonary venous congestion, positive lower extremity edema, symptomatic with  shortness of breath with improvement with diuresis  Continue furosemide 40 mg IV twice daily  Continue beta-blocker 6.25 mg 2 times daily  Continue losartan as an outpatient, add Jardiance as an outpatient  Consider nonmineralocorticoid receptor antagonist as an outpatient as well  Strict intakes and outputs  Daily weights   okay for creatinine to rise with diuresis if clinically improving  Urine sodium    GERD (gastroesophageal reflux disease)  Continue PPI for now  IRINEO (acute kidney injury) (HCC)  Check urinalysis and an albumin to creatinine ratio  History of scleroderma-patient without severe hypertension without rapid decline in renal function, no signs of a microangiopathic hemolytic anemia so less likely a scleroderma renal crisis  Will check a renal ultrasound  Would be on losartan long-term  Tobacco abuse  Continue efforts with smoking cessation  Primary hypertension  Avoid hypotension while diuresing  Chronic atrial fibrillation (HCC)  On anticoagulation, on beta-blocker    Please contact the SecureChat role for the Nephrology service with any questions/concerns.    I have reviewed the nephrology recommendations including diuresis, with Slim, and we are in agreement with renal plan including the information outlined above.     History of Present Illness   Britton Batista is a 59 y.o. male who was admitted to [facility] after presenting with shortness of breath. A renal consultation is requested today for assistance in the management of acute kidney injury.    The patient has a diastolic and systolic congestive heart failure with an EF of 30%, has COPD and is actively smoking, presented with increased dyspnea on exertion as well as lower extremity edema, high BNP, elevated creatinine.  Patient's creatinine does fluctuate between 1.1-1.7, patient did respond to Lasix 40 mg IV twice daily.  He overall has been feeling better today.  He denies any acute fevers chills.  Still short of breath.  Resting  "comfortably.    Review of Systems   Constitutional:  Negative for chills and fever.   HENT:  Negative for ear pain and sore throat.    Eyes:  Negative for pain and visual disturbance.   Respiratory:  Negative for cough and shortness of breath.    Cardiovascular:  Negative for chest pain and palpitations.   Gastrointestinal:  Negative for abdominal pain and vomiting.   Genitourinary:  Negative for dysuria and hematuria.   Musculoskeletal:  Negative for arthralgias and back pain.   Skin:  Negative for color change and rash.   Neurological:  Negative for seizures and syncope.   All other systems reviewed and are negative.    I have reviewed the patient's PMH, PSH, Social History, Family History, Meds, and Allergies  Historical Information   Past Medical History:   Diagnosis Date    Anxiety     Cardiomyopathy (HCC)     CHF (congestive heart failure) (HCC)     Depression     Emphysema of lung (HCC)     GERD (gastroesophageal reflux disease)     Gout     Hypertension     Neuropathy     Raynaud's disease     Right inguinal hernia     Scleroderma (HCC)      Past Surgical History:   Procedure Laterality Date    AMPUTATION Right     pt had tip of \"pointer finger\" d/t scleraderma    CARDIAC PACEMAKER PLACEMENT      FINGER AMPUTATION Left 01/31/2024    Procedure: AMPUTATION FINGER, LEFT INDEX FINGER CPT: 66633;  Surgeon: Pedro Vazquez MD;  Location:  MAIN OR;  Service: Orthopedics    HERNIA REPAIR Left     times 2    KS AMP F/TH 1/2 JT/PHALANX W/NEURECT W/DIR CLSR Right 01/23/2024    Procedure: AMPUTATION FINGER, RIGHT MIDDLE;  Surgeon: Pedro Vazquez MD;  Location: [facility];  Service: Orthopedics    KS RPR 1ST INGUN HRNA AGE 5 YRS/> REDUCIBLE Right 03/03/2023    Procedure: OPEN INGUINAL HERNIA REPAIR WITH MESH;  Surgeon: Temo Jackson DO;  Location:  MAIN OR;  Service: General     Social History     Tobacco Use    Smoking status: Every Day     Current packs/day: 1.00     Average packs/day: 1 pack/day for 45.1 years (45.1 ttl " pk-yrs)     Types: Cigarettes     Start date: 2000    Smokeless tobacco: Never    Tobacco comments:     Last cigarette 0430   Vaping Use    Vaping status: Never Used   Substance and Sexual Activity    Alcohol use: Not Currently     Comment: occasional    Drug use: Yes     Types: Marijuana, Methamphetamines     Comment: Hx meth use    Sexual activity: Not Currently     Comment: defer     E-Cigarette/Vaping    E-Cigarette Use Never User      E-Cigarette/Vaping Substances    Nicotine No     THC No     CBD No     Flavoring No      Family History   Problem Relation Age of Onset    Hypertension Father      Social History     Tobacco Use    Smoking status: Every Day     Current packs/day: 1.00     Average packs/day: 1 pack/day for 45.1 years (45.1 ttl pk-yrs)     Types: Cigarettes     Start date: 2000    Smokeless tobacco: Never    Tobacco comments:     Last cigarette 0430   Vaping Use    Vaping status: Never Used   Substance and Sexual Activity    Alcohol use: Not Currently     Comment: occasional    Drug use: Yes     Types: Marijuana, Methamphetamines     Comment: Hx meth use    Sexual activity: Not Currently     Comment: defer       Current Facility-Administered Medications:     acetaminophen (TYLENOL) tablet 650 mg, Q4H PRN    albuterol (PROVENTIL HFA,VENTOLIN HFA) inhaler 2 puff, Q4H PRN    apixaban (ELIQUIS) tablet 5 mg, BID    carvedilol (COREG) tablet 6.25 mg, BID With Meals    furosemide (LASIX) injection 40 mg, BID    nicotine (NICODERM CQ) 21 mg/24 hr TD 24 hr patch 1 patch, Daily    pantoprazole (PROTONIX) EC tablet 40 mg, BID    trimethobenzamide (TIGAN) IM injection 200 mg, Q6H PRN  Prior to Admission Medications   Prescriptions Last Dose Informant Patient Reported? Taking?   albuterol (PROVENTIL HFA,VENTOLIN HFA) 90 mcg/act inhaler   Yes No   Sig: Inhale 2 puffs every 4 (four) hours as needed    apixaban (ELIQUIS) 5 mg   No No   Sig: Take 1 tablet (5 mg total) by mouth 2 (two) times a day   carvedilol  (COREG) 6.25 mg tablet   No No   Sig: Take 1 tablet (6.25 mg total) by mouth 2 (two) times a day with meals   furosemide (LASIX) 20 mg tablet   No No   Sig: Take 1 tablet (20 mg total) by mouth daily   losartan (COZAAR) 50 mg tablet   No No   Sig: Take 1 tablet (50 mg total) by mouth daily   pantoprazole (PROTONIX) 40 mg tablet   No No   Sig: Take 1 tablet (40 mg total) by mouth 2 (two) times a day   sucralfate (CARAFATE) 1 g tablet   No No   Sig: Take 1 tablet (1 g total) by mouth 4 (four) times a day for 14 days      Facility-Administered Medications: None     Aspirin    Objective :  Temp:  [97.9 °F (36.6 °C)] 97.9 °F (36.6 °C)  HR:  [52-95] 60  BP: (127-170)/() 127/91  Resp:  [13-22] 18  SpO2:  [91 %-99 %] 98 %  O2 Device: None (Room air)    Current Weight: Weight - Scale: 74.1 kg (163 lb 6.4 oz)  First Weight: Weight - Scale: 75.6 kg (166 lb 9.6 oz)  I/O         02/03 0701  02/04 0700 02/04 0701  02/05 0700 02/05 0701  02/06 0700    P.O.  300     Total Intake(mL/kg)  300 (4)     Urine (mL/kg/hr)  1775 500 (2.2)    Total Output  1775 500    Net  -1475 -500           Unmeasured Urine Occurrence  1 x           Physical Exam  Vitals and nursing note reviewed.   Constitutional:       General: He is not in acute distress.     Appearance: He is well-developed.   HENT:      Head: Normocephalic and atraumatic.   Eyes:      Conjunctiva/sclera: Conjunctivae normal.   Cardiovascular:      Rate and Rhythm: Normal rate and regular rhythm.   Pulmonary:      Effort: Pulmonary effort is normal. No respiratory distress.      Breath sounds: Normal breath sounds.   Abdominal:      Palpations: Abdomen is soft.      Tenderness: There is no abdominal tenderness.   Musculoskeletal:         General: No swelling.      Cervical back: Neck supple.      Right lower leg: Edema present.      Left lower leg: Edema present.   Skin:     General: Skin is warm and dry.      Capillary Refill: Capillary refill takes less than 2 seconds.  "  Neurological:      General: No focal deficit present.      Mental Status: He is alert and oriented to person, place, and time.   Psychiatric:         Mood and Affect: Mood normal.       Medications:    Current Facility-Administered Medications:     acetaminophen (TYLENOL) tablet 650 mg, 650 mg, Oral, Q4H PRN, Robby Isbell PA-C    albuterol (PROVENTIL HFA,VENTOLIN HFA) inhaler 2 puff, 2 puff, Inhalation, Q4H PRN, Robby Isbell PA-C    apixaban (ELIQUIS) tablet 5 mg, 5 mg, Oral, BID, Robby Isbell PA-C, 5 mg at 02/05/25 0906    carvedilol (COREG) tablet 6.25 mg, 6.25 mg, Oral, BID With Meals, Robby Isbell PA-C, 6.25 mg at 02/05/25 0906    furosemide (LASIX) injection 40 mg, 40 mg, Intravenous, BID, Robby Isbell PA-C, 40 mg at 02/05/25 0906    nicotine (NICODERM CQ) 21 mg/24 hr TD 24 hr patch 1 patch, 1 patch, Transdermal, Daily, Robby Isbell PA-C, 1 patch at 02/05/25 0906    pantoprazole (PROTONIX) EC tablet 40 mg, 40 mg, Oral, BID, Robby Isbell PA-C, 40 mg at 02/05/25 0906    trimethobenzamide (TIGAN) IM injection 200 mg, 200 mg, Intramuscular, Q6H PRN, Robby Isbell PA-C      Lab Results: I have reviewed the following results:  Results from last 7 days   Lab Units 02/05/25  0438 02/04/25  1909   WBC Thousand/uL 6.01 5.45   HEMOGLOBIN g/dL 13.5 14.1   HEMATOCRIT % 41.7 44.0   PLATELETS Thousands/uL 256 262   POTASSIUM mmol/L 4.2 4.7   CHLORIDE mmol/L 104 104   CO2 mmol/L 24 26   BUN mg/dL 33* 34*   CREATININE mg/dL 1.70* 1.63*   CALCIUM mg/dL 8.9 9.2   ALBUMIN g/dL  --  3.7       Administrative Statements     Portions of the record may have been created with voice recognition software. Occasional wrong word or \"sound a like\" substitutions may have occurred due to the inherent limitations of voice recognition software. Read the chart carefully and recognize, using context, where substitutions have occurred.If you have any questions, please contact the dictating provider.  "

## 2025-02-05 NOTE — ED PROVIDER NOTES
Time reflects when diagnosis was documented in both MDM as applicable and the Disposition within this note       Time User Action Codes Description Comment    2/4/2025  8:28 PM Mando Pelletier Add [I50.9] Congestive heart failure, unspecified HF chronicity, unspecified heart failure type (HCC)           ED Disposition       ED Disposition   Admit    Condition   Stable    Date/Time   Tue Feb 4, 2025  8:28 PM    Comment   Case was discussed with hospital medicine and the patient's admission status was agreed to be Admission Status: inpatient status to the service of Dr. Corrigan .               Assessment & Plan       Medical Decision Making  59-year-old male presents to the emergency department with complaint of shortness of breath, differential diagnosis include COVID, flu, RSV, pneumonia, congestive heart failure, COPD exacerbation, fluid overload, pleural effusion, ACS, electrolyte abnormality, will perform chest x-ray, cardiopulmonary workup, BNP, provide patient with breathing treatment, DuoNebs, and reassess.    Discussed case with hospital medicine, patient will be admitted for further evaluation.    Amount and/or Complexity of Data Reviewed  Labs: ordered.  Radiology: ordered.    Risk  Prescription drug management.  Decision regarding hospitalization.             Medications   furosemide (LASIX) injection 40 mg (40 mg Intravenous Given 2/4/25 1916)   ipratropium-albuterol (DUO-NEB) 0.5-2.5 mg/3 mL inhalation solution 3 mL (3 mL Nebulization Given 2/4/25 1920)       ED Risk Strat Scores                          SBIRT 22yo+      Flowsheet Row Most Recent Value   Initial Alcohol Screen: US AUDIT-C     1. How often do you have a drink containing alcohol? 0 Filed at: 02/04/2025 1851   2. How many drinks containing alcohol do you have on a typical day you are drinking?  0 Filed at: 02/04/2025 1851   3a. Male UNDER 65: How often do you have five or more drinks on one occasion? 0 Filed at: 02/04/2025 1851   3b. FEMALE  "Any Age, or MALE 65+: How often do you have 4 or more drinks on one occassion? 0 Filed at: 02/04/2025 1851   Audit-C Score 0 Filed at: 02/04/2025 1851   SILVIA: How many times in the past year have you...    Used an illegal drug or used a prescription medication for non-medical reasons? Never Filed at: 02/04/2025 1851                            History of Present Illness       Chief Complaint   Patient presents with    Shortness of Breath     Shortness of breath and a cough for 3 days        Past Medical History:   Diagnosis Date    Anxiety     Cardiomyopathy (HCC)     CHF (congestive heart failure) (HCC)     Depression     Emphysema of lung (HCC)     GERD (gastroesophageal reflux disease)     Gout     Hypertension     Neuropathy     Raynaud's disease     Right inguinal hernia     Scleroderma (HCC)       Past Surgical History:   Procedure Laterality Date    AMPUTATION Right     pt had tip of \"pointer finger\" d/t scleraderma    CARDIAC PACEMAKER PLACEMENT      FINGER AMPUTATION Left 01/31/2024    Procedure: AMPUTATION FINGER, LEFT INDEX FINGER CPT: 84489;  Surgeon: Pedro Vazquez MD;  Location:  MAIN OR;  Service: Orthopedics    HERNIA REPAIR Left     times 2    RI AMP F/TH 1/2 JT/PHALANX W/NEURECT W/DIR CLSR Right 01/23/2024    Procedure: AMPUTATION FINGER, RIGHT MIDDLE;  Surgeon: Pedro Vazquez MD;  Location: [facility];  Service: Orthopedics    RI RPR 1ST INGUN HRNA AGE 5 YRS/> REDUCIBLE Right 03/03/2023    Procedure: OPEN INGUINAL HERNIA REPAIR WITH MESH;  Surgeon: Temo Jackson DO;  Location:  MAIN OR;  Service: General      Family History   Problem Relation Age of Onset    Hypertension Father       Social History     Tobacco Use    Smoking status: Every Day     Current packs/day: 1.00     Average packs/day: 1 pack/day for 45.1 years (45.1 ttl pk-yrs)     Types: Cigarettes     Start date: 2000    Smokeless tobacco: Never    Tobacco comments:     Last cigarette 0430   Vaping Use    Vaping status: Never Used "   Substance Use Topics    Alcohol use: Not Currently     Comment: occasional    Drug use: Yes     Types: Marijuana, Methamphetamines     Comment: Hx meth use      E-Cigarette/Vaping    E-Cigarette Use Never User       E-Cigarette/Vaping Substances    Nicotine No     THC No     CBD No     Flavoring No       I have reviewed and agree with the history as documented.     59-year-old male with past medical history of congestive heart failure, hypertension, presents to the emergency department with complaint of exertional dyspnea, and shortness of breath going on for the past couple of days.  Patient also has bilateral lower extremity pitting edema.  Patient states that he feels like he is fluid overloaded.  He denies any chills, fevers, sweats, chest pain, abdominal pain, bloody stool, bloody urine, or pain with urination.      Shortness of Breath  Associated symptoms: no abdominal pain, no chest pain, no cough, no ear pain, no fever, no rash, no sore throat and no vomiting        Review of Systems   Constitutional:  Negative for chills and fever.   HENT:  Negative for ear pain and sore throat.    Eyes:  Negative for pain and visual disturbance.   Respiratory:  Positive for shortness of breath. Negative for cough.    Cardiovascular:  Negative for chest pain and palpitations.   Gastrointestinal:  Negative for abdominal pain and vomiting.   Genitourinary:  Negative for dysuria and hematuria.   Musculoskeletal:  Negative for arthralgias and back pain.   Skin:  Negative for color change and rash.   Neurological:  Negative for seizures and syncope.   All other systems reviewed and are negative.          Objective       ED Triage Vitals [02/04/25 1852]   Temperature Pulse Blood Pressure Respirations SpO2 Patient Position - Orthostatic VS   97.9 °F (36.6 °C) 60 (!) 142/111 20 98 % Sitting      Temp Source Heart Rate Source BP Location FiO2 (%) Pain Score    Temporal Monitor Left arm -- No Pain      Vitals      Date and Time  Temp Pulse SpO2 Resp BP Pain Score FACES Pain Rating User   02/04/25 2124 97.9 °F (36.6 °C) 62 98 % 18 131/93 -- -- DII   02/04/25 2120 -- -- -- -- -- No Pain -- GH   02/04/25 2030 -- 93 99 % 22 155/95 -- -- TH   02/04/25 2000 -- 95 99 % 22 170/132 -- -- TH   02/04/25 1930 -- 95 91 % 13 132/90 -- -- TH   02/04/25 1918 -- 52 99 % 20 133/100 -- -- NM   02/04/25 1852 97.9 °F (36.6 °C) 60 98 % 20 142/111 No Pain -- AD            Physical Exam  Vitals and nursing note reviewed.   Constitutional:       General: He is not in acute distress.     Appearance: He is well-developed.   HENT:      Head: Normocephalic and atraumatic.   Eyes:      Conjunctiva/sclera: Conjunctivae normal.   Cardiovascular:      Rate and Rhythm: Normal rate and regular rhythm.      Heart sounds: No murmur heard.  Pulmonary:      Effort: Pulmonary effort is normal. No respiratory distress.      Breath sounds: Decreased breath sounds and wheezing present.   Abdominal:      Palpations: Abdomen is soft.      Tenderness: There is no abdominal tenderness.   Musculoskeletal:         General: No swelling.      Cervical back: Neck supple.      Right lower leg: Edema present.   Skin:     General: Skin is warm and dry.      Capillary Refill: Capillary refill takes less than 2 seconds.   Neurological:      Mental Status: He is alert.   Psychiatric:         Mood and Affect: Mood normal.         Results Reviewed       Procedure Component Value Units Date/Time    HS Troponin I 2hr [[id number]]  (Abnormal) Collected: 02/04/25 2102    Lab Status: Final result Specimen: Blood from Arm, Right Updated: 02/04/25 2128     hs TnI 2hr 211 ng/L      Delta 2hr hsTnI 18 ng/L     APTT [[id number]]  (Normal) Collected: 02/04/25 1909    Lab Status: Final result Specimen: Blood from Arm, Right Updated: 02/04/25 2038     PTT 33 seconds     Protime-INR [[id number]]  (Abnormal) Collected: 02/04/25 1909    Lab Status: Final result Specimen: Blood from Arm, Right Updated: 02/04/25 2038      Protime 16.0 seconds      INR 1.24    Narrative:      INR Therapeutic Range    Indication                                             INR Range      Atrial Fibrillation                                               2.0-3.0  Hypercoagulable State                                    2.0.2.3  Left Ventricular Asist Device                            2.0-3.0  Mechanical Heart Valve                                  -    Aortic(with afib, MI, embolism, HF, LA enlargement,    and/or coagulopathy)                                     2.0-3.0 (2.5-3.5)     Mitral                                                             2.5-3.5  Prosthetic/Bioprosthetic Heart Valve               2.0-3.0  Venous thromboembolism (VTE: VT, PE        2.0-3.0    HS Troponin I 4hr [[id number]]     Lab Status: No result Specimen: Blood     B-Type Natriuretic Peptide(BNP) [[id number]]  (Abnormal) Collected: 02/04/25 1909    Lab Status: Final result Specimen: Blood from Arm, Right Updated: 02/04/25 2002     BNP 2,143 pg/mL     HS Troponin 0hr (reflex protocol) [[id number]]  (Abnormal) Collected: 02/04/25 1909    Lab Status: Final result Specimen: Blood from Arm, Right Updated: 02/04/25 1954     hs TnI 0hr 193 ng/L     Comprehensive metabolic panel [[id number]]  (Abnormal) Collected: 02/04/25 1909    Lab Status: Final result Specimen: Blood from Arm, Right Updated: 02/04/25 1948     Sodium 135 mmol/L      Potassium 4.7 mmol/L      Chloride 104 mmol/L      CO2 26 mmol/L      ANION GAP 5 mmol/L      BUN 34 mg/dL      Creatinine 1.63 mg/dL      Glucose 139 mg/dL      Calcium 9.2 mg/dL      AST 49 U/L      ALT 31 U/L      Alkaline Phosphatase 110 U/L      Total Protein 6.9 g/dL      Albumin 3.7 g/dL      Total Bilirubin 0.67 mg/dL      eGFR 45 ml/min/1.73sq m     Narrative:      [employer] guidelines for Chronic Kidney Disease (CKD):     Stage 1 with normal or high GFR (GFR > 90 mL/min/1.73 square meters)    Stage 2 Mild CKD (GFR =  60-89 mL/min/1.73 square meters)    Stage 3A Moderate CKD (GFR = 45-59 mL/min/1.73 square meters)    Stage 3B Moderate CKD (GFR = 30-44 mL/min/1.73 square meters)    Stage 4 Severe CKD (GFR = 15-29 mL/min/1.73 square meters)    Stage 5 End Stage CKD (GFR <15 mL/min/1.73 square meters)  Note: GFR calculation is accurate only with a steady state creatinine    FLU/COVID Rapid Antigen (30 min. TAT) - Preferred screening test in ED [[id number]]  (Normal) Collected: 02/04/25 1909    Lab Status: Final result Specimen: Nares from Nose Updated: 02/04/25 1943     SARS COV Rapid Antigen Negative     Influenza A Rapid Antigen Negative     Influenza B Rapid Antigen Negative    Narrative:      This test has been performed using the Quidel Inez 2 FLU+SARS Antigen test under the Emergency Use Authorization (EUA). This test has been validated by the  and verified by the performing laboratory. The Inez uses lateral flow immunofluorescent sandwich assay to detect SARS-COV, Influenza A and Influenza B Antigen.     The Quidel Inez 2 SARS Antigen test does not differentiate between SARS-CoV and SARS-CoV-2.     Negative results are presumptive and may be confirmed with a molecular assay, if necessary, for patient management. Negative results do not rule out SARS-CoV-2 or influenza infection and should not be used as the sole basis for treatment or patient management decisions. A negative test result may occur if the level of antigen in a sample is below the limit of detection of this test.     Positive results are indicative of the presence of viral antigens, but do not rule out bacterial infection or co-infection with other viruses.     All test results should be used as an adjunct to clinical observations and other information available to the provider.    FOR PEDIATRIC PATIENTS - copy/paste COVID Guidelines URL to browser: [email or web address]    CBC and differential  [[id number]]  (Abnormal) Collected: 02/04/25 1909    Lab Status: Final result Specimen: Blood from Arm, Right Updated: 02/04/25 1929     WBC 5.45 Thousand/uL      RBC 4.87 Million/uL      Hemoglobin 14.1 g/dL      Hematocrit 44.0 %      MCV 90 fL      MCH 29.0 pg      MCHC 32.0 g/dL      RDW 15.4 %      MPV 9.5 fL      Platelets 262 Thousands/uL      nRBC 0 /100 WBCs      Segmented % 62 %      Immature Grans % 0 %      Lymphocytes % 22 %      Monocytes % 14 %      Eosinophils Relative 1 %      Basophils Relative 1 %      Absolute Neutrophils 3.34 Thousands/µL      Absolute Immature Grans 0.02 Thousand/uL      Absolute Lymphocytes 1.22 Thousands/µL      Absolute Monocytes 0.78 Thousand/µL      Eosinophils Absolute 0.04 Thousand/µL      Basophils Absolute 0.05 Thousands/µL             X-ray chest 1 view portable    (Results Pending)       Procedures    ED Medication and Procedure Management   Prior to Admission Medications   Prescriptions Last Dose Informant Patient Reported? Taking?   albuterol (PROVENTIL HFA,VENTOLIN HFA) 90 mcg/act inhaler   Yes No   Sig: Inhale 2 puffs every 4 (four) hours as needed    apixaban (ELIQUIS) 5 mg   No No   Sig: Take 1 tablet (5 mg total) by mouth 2 (two) times a day   carvedilol (COREG) 6.25 mg tablet   No No   Sig: Take 1 tablet (6.25 mg total) by mouth 2 (two) times a day with meals   furosemide (LASIX) 20 mg tablet   No No   Sig: Take 1 tablet (20 mg total) by mouth daily   losartan (COZAAR) 50 mg tablet   No No   Sig: Take 1 tablet (50 mg total) by mouth daily   pantoprazole (PROTONIX) 40 mg tablet   No No   Sig: Take 1 tablet (40 mg total) by mouth 2 (two) times a day   sucralfate (CARAFATE) 1 g tablet   No No   Sig: Take 1 tablet (1 g total) by mouth 4 (four) times a day for 14 days      Facility-Administered Medications: None     Current Discharge Medication List        CONTINUE these medications which have NOT CHANGED    Details   albuterol (PROVENTIL HFA,VENTOLIN HFA) 90  mcg/act inhaler Inhale 2 puffs every 4 (four) hours as needed     Comments: <!--EPICS-->Substitution to a formulary equivalent within the same pharmaceutical class is authorized.<!--EPICE-->      apixaban (ELIQUIS) 5 mg Take 1 tablet (5 mg total) by mouth 2 (two) times a day  Qty: 60 tablet, Refills: 0    Associated Diagnoses: Atrial fibrillation (HCC)      carvedilol (COREG) 6.25 mg tablet Take 1 tablet (6.25 mg total) by mouth 2 (two) times a day with meals  Qty: 60 tablet, Refills: 0    Associated Diagnoses: Chronic HFrEF (heart failure with reduced ejection fraction) (HCC)      furosemide (LASIX) 20 mg tablet Take 1 tablet (20 mg total) by mouth daily  Qty: 30 tablet, Refills: 0    Associated Diagnoses: Cardiomyopathy (HCC)      losartan (COZAAR) 50 mg tablet Take 1 tablet (50 mg total) by mouth daily  Qty: 30 tablet, Refills: 0    Associated Diagnoses: Cardiomyopathy (HCC)      pantoprazole (PROTONIX) 40 mg tablet Take 1 tablet (40 mg total) by mouth 2 (two) times a day  Qty: 180 tablet, Refills: 0    Associated Diagnoses: Esophageal obstruction due to food impaction; Gastroesophageal reflux disease without esophagitis      sucralfate (CARAFATE) 1 g tablet Take 1 tablet (1 g total) by mouth 4 (four) times a day for 14 days  Qty: 42 tablet, Refills: 0    Associated Diagnoses: Esophageal obstruction due to food impaction; Gastroesophageal reflux disease without esophagitis           No discharge procedures on file.  ED SEPSIS DOCUMENTATION   Time reflects when diagnosis was documented in both MDM as applicable and the Disposition within this note       Time User Action Codes Description Comment    2/4/2025  8:28 PM Mando Pelletier Add [I50.9] Congestive heart failure, unspecified HF chronicity, unspecified heart failure type (HCC)                  Mando Pelletier DO  02/04/25 9452

## 2025-02-05 NOTE — H&P
H&P - Hospitalist   Name: Britton Batista 59 y.o. male I MRN: [id number]  Unit/Bed#: -01 I Date of Admission: 2/4/2025   Date of Service: 2/5/2025 I Hospital Day: 1     Assessment & Plan  Acute on chronic combined systolic and diastolic congestive heart failure (HCC)  Wt Readings from Last 3 Encounters:   02/04/25 75.6 kg (166 lb 9.6 oz)   09/18/24 75.4 kg (166 lb 3.6 oz)   08/12/24 73.9 kg (163 lb)     Presents to ED with shortness of breath, exertional dyspnea, wheezing, BLE pitting edema.   BNP is in the 2000's. I provided patient with DuoNeb, Lasix.   ED diuresed with Lasix 40 mg IV  Take Lasix 20 mg PO for diuresis as outpatient   CXR consistent with acute CHF  BNP 2,143  ECHO from August 2024 revealed:  Frequent ventricular ectopy is noted.  Left ventricle is mildly dilated in size with moderate to severely depressed systolic function.  Estimated LVEF is 30%.   Dyssynchronous LV contractility is noted.  Wall thickness is mildly increased. Diastolic function is mildly abnormal, consistent with grade I (abnormal) relaxation.  Mitral Valve: There is mild to moderate regurgitation with an eccentrically directed jet.  Tricuspid Valve: Tricuspid valve structurally normal.  Mild tricuspid regurgitation.  RVSP is 30 mmHg.  Continue diuresis with IV Lasix 40 mg per CHF protocol algorithm   Daily weights, Strict I & Os  Cardiac diet, low sodium <2g, fluid restriction <1500   IRINEO (acute kidney injury) (HCC)    Lab Results   Component Value Date    CREATININE 1.63 (H) 02/04/2025    CREATININE 1.08 10/10/2024     Creatine on admission 1.63, Baseline creatinine 1.08  Narrowly meets IRINEO criteria.  Will withhold fluids for now in setting of decompensated heart failure.  Hold losartan  Avoid hypotension, nephrotoxins, and NSAIDS if possible    Strict I & Os  Nephrology consult; recommendations appreciated      GERD (gastroesophageal reflux disease)  Continue PPI therapy.  Primary hypertension  Hold losartan in setting of  IRINEO.  Monitor.  Chronic atrial fibrillation (HCC)  On Eliquis and carvedilol.  Continue.  Tobacco abuse  Reportedly smokes 0.5 to 1 pack/day.  NRT offered.  Cessation advised.      VTE Pharmacologic Prophylaxis: VTE Score: 3 Moderate Risk (Score 3-4) - Pharmacological DVT Prophylaxis Ordered: apixaban (Eliquis).  Code Status: Level 1 - Full Code   Discussion with family: Patient declined call to .     Anticipated Length of Stay: Patient will be admitted on an inpatient basis with an anticipated length of stay of greater than 2 midnights secondary to chf/irineo.    History of Present Illness   Chief Complaint: BARTOLO Batista is a 59 y.o. male with a PMH of  scleroderma, chronic combined systolic and diastolic congestive heart failure with an ejection fraction most recently of 30%, emphysema and chronic tobacco abuse 1 pack/day, renal disease, hypertension, anxiety, GERD, gout, substance abuse/methamphetamines  who presents with shortness of breath.  Patient states he has had lower extremity edema, shortness of breath, and dyspnea on exertion for the past several days.  He states this has happened before and he needed to have the fluid removed with IV medications at the hospital.  He denies chest pain, palpitations, diaphoresis, fever, chills, nausea, vomiting, diarrhea, lightheadedness, dizziness.    Review of Systems   Constitutional:  Negative for chills and fever.   HENT:  Negative for ear pain and sore throat.    Eyes:  Negative for pain and visual disturbance.   Respiratory:  Positive for shortness of breath. Negative for cough.    Cardiovascular:  Positive for leg swelling. Negative for chest pain and palpitations.   Gastrointestinal:  Negative for abdominal pain and vomiting.   Genitourinary:  Negative for dysuria and hematuria.   Musculoskeletal:  Negative for arthralgias and back pain.   Skin:  Negative for color change and rash.   Neurological:  Negative for seizures and syncope.   All  "other systems reviewed and are negative.      Historical Information   Past Medical History:   Diagnosis Date    Anxiety     Cardiomyopathy (HCC)     CHF (congestive heart failure) (HCC)     Depression     Emphysema of lung (HCC)     GERD (gastroesophageal reflux disease)     Gout     Hypertension     Neuropathy     Raynaud's disease     Right inguinal hernia     Scleroderma (HCC)      Past Surgical History:   Procedure Laterality Date    AMPUTATION Right     pt had tip of \"pointer finger\" d/t scleraderma    CARDIAC PACEMAKER PLACEMENT      FINGER AMPUTATION Left 01/31/2024    Procedure: AMPUTATION FINGER, LEFT INDEX FINGER CPT: 62099;  Surgeon: Pedro Vazquez MD;  Location:  MAIN OR;  Service: Orthopedics    HERNIA REPAIR Left     times 2    NV AMP F/TH 1/2 JT/PHALANX W/NEURECT W/DIR CLSR Right 01/23/2024    Procedure: AMPUTATION FINGER, RIGHT MIDDLE;  Surgeon: Pedro Vazquez MD;  Location: [facility];  Service: Orthopedics    NV RPR 1ST INGUN HRNA AGE 5 YRS/> REDUCIBLE Right 03/03/2023    Procedure: OPEN INGUINAL HERNIA REPAIR WITH MESH;  Surgeon: Temo Jackson DO;  Location:  MAIN OR;  Service: General     Social History     Tobacco Use    Smoking status: Every Day     Current packs/day: 1.00     Average packs/day: 1 pack/day for 45.1 years (45.1 ttl pk-yrs)     Types: Cigarettes     Start date: 2000    Smokeless tobacco: Never    Tobacco comments:     Last cigarette 0430   Vaping Use    Vaping status: Never Used   Substance and Sexual Activity    Alcohol use: Not Currently     Comment: occasional    Drug use: Yes     Types: Marijuana, Methamphetamines     Comment: Hx meth use    Sexual activity: Not Currently     Comment: defer     E-Cigarette/Vaping    E-Cigarette Use Never User      E-Cigarette/Vaping Substances    Nicotine No     THC No     CBD No     Flavoring No      Family History   Problem Relation Age of Onset    Hypertension Father      Social History:  Marital Status:    Occupation:   Patient " Pre-hospital Living Situation: Home, With other family member: Brother  Patient Pre-hospital Level of Mobility: walks  Patient Pre-hospital Diet Restrictions: N/A    Meds/Allergies   I have reviewed home medications with patient personally.  Prior to Admission medications    Medication Sig Start Date End Date Taking? Authorizing Provider   albuterol (PROVENTIL HFA,VENTOLIN HFA) 90 mcg/act inhaler Inhale 2 puffs every 4 (four) hours as needed     Historical Provider, MD   apixaban (ELIQUIS) 5 mg Take 1 tablet (5 mg total) by mouth 2 (two) times a day 9/19/24   Cheryl Sin PA-C   carvedilol (COREG) 6.25 mg tablet Take 1 tablet (6.25 mg total) by mouth 2 (two) times a day with meals 3/26/24   Citlalli Cannon PA-C   furosemide (LASIX) 20 mg tablet Take 1 tablet (20 mg total) by mouth daily 3/26/24   Citlalli Cannon PA-C   losartan (COZAAR) 50 mg tablet Take 1 tablet (50 mg total) by mouth daily 3/26/24   Citlalli Cannon PA-C   pantoprazole (PROTONIX) 40 mg tablet Take 1 tablet (40 mg total) by mouth 2 (two) times a day 10/10/24 1/8/25  Armida Coppola MD   sucralfate (CARAFATE) 1 g tablet Take 1 tablet (1 g total) by mouth 4 (four) times a day for 14 days 10/10/24 10/24/24  Armida Coppola MD     Allergies   Allergen Reactions    Aspirin GI Intolerance       Objective :  Temp:  [97.9 °F (36.6 °C)] 97.9 °F (36.6 °C)  HR:  [52-95] 62  BP: (131-170)/() 131/93  Resp:  [13-22] 18  SpO2:  [91 %-99 %] 98 %  O2 Device: None (Room air)    Physical Exam  Vitals and nursing note reviewed.   Constitutional:       General: He is not in acute distress.     Appearance: He is well-developed.   HENT:      Head: Normocephalic and atraumatic.   Eyes:      Conjunctiva/sclera: Conjunctivae normal.   Cardiovascular:      Rate and Rhythm: Normal rate and regular rhythm.      Heart sounds: No murmur heard.     Gallop present.   Pulmonary:      Effort: Pulmonary effort is normal. No respiratory distress.      Breath sounds:  Rales present.   Abdominal:      Palpations: Abdomen is soft.      Tenderness: There is no abdominal tenderness.   Musculoskeletal:         General: Swelling present.      Cervical back: Neck supple.   Skin:     General: Skin is warm and dry.      Capillary Refill: Capillary refill takes less than 2 seconds.   Neurological:      Mental Status: He is alert.   Psychiatric:         Mood and Affect: Mood normal.          Lines/Drains:            Lab Results: I have reviewed the following results:  Results from last 7 days   Lab Units 02/04/25  1909   WBC Thousand/uL 5.45   HEMOGLOBIN g/dL 14.1   HEMATOCRIT % 44.0   PLATELETS Thousands/uL 262   SEGS PCT % 62   LYMPHO PCT % 22   MONO PCT % 14*   EOS PCT % 1     Results from last 7 days   Lab Units 02/04/25  1909   SODIUM mmol/L 135   POTASSIUM mmol/L 4.7   CHLORIDE mmol/L 104   CO2 mmol/L 26   BUN mg/dL 34*   CREATININE mg/dL 1.63*   ANION GAP mmol/L 5   CALCIUM mg/dL 9.2   ALBUMIN g/dL 3.7   TOTAL BILIRUBIN mg/dL 0.67   ALK PHOS U/L 110*   ALT U/L 31   AST U/L 49*   GLUCOSE RANDOM mg/dL 139     Results from last 7 days   Lab Units 02/04/25  1909   INR  1.24*         Lab Results   Component Value Date    HGBA1C 5.9 (H) 10/25/2021           Imaging Results Review: I reviewed radiology reports from this admission including: chest xray.      Administrative Statements   I have spent a total time of 75 minutes in caring for this patient on the day of the visit/encounter including Diagnostic results, Patient and family education, Importance of tx compliance, Documenting in the medical record, Reviewing / ordering tests, medicine, procedures  , and Obtaining or reviewing history  .    ** Please Note: This note has been constructed using a voice recognition system. **

## 2025-02-05 NOTE — PLAN OF CARE
Problem: PAIN - ADULT  Goal: Verbalizes/displays adequate comfort level or baseline comfort level  Description: Interventions:  - Encourage patient to monitor pain and request assistance  - Assess pain using appropriate pain scale  - Administer analgesics based on type and severity of pain and evaluate response  - Implement non-pharmacological measures as appropriate and evaluate response  - Consider cultural and social influences on pain and pain management  - Notify physician/advanced practitioner if interventions unsuccessful or patient reports new pain  Outcome: Progressing     Problem: INFECTION - ADULT  Goal: Absence or prevention of progression during hospitalization  Description: INTERVENTIONS:  - Assess and monitor for signs and symptoms of infection  - Monitor lab/diagnostic results  - Monitor all insertion sites, i.e. indwelling lines, tubes, and drains  - Monitor endotracheal if appropriate and nasal secretions for changes in amount and color  - [place] appropriate cooling/warming therapies per order  - Administer medications as ordered  - Instruct and encourage patient and family to use good hand hygiene technique  - Identify and instruct in appropriate isolation precautions for identified infection/condition  Outcome: Progressing  Goal: Absence of fever/infection during neutropenic period  Description: INTERVENTIONS:  - Monitor WBC    Outcome: Progressing     Problem: SAFETY ADULT  Goal: Patient will remain free of falls  Description: INTERVENTIONS:  - Educate patient/family on patient safety including physical limitations  - Instruct patient to call for assistance with activity   - Consult OT/PT to assist with strengthening/mobility   - Keep Call bell within reach  - Keep bed low and locked with side rails adjusted as appropriate  - Keep care items and personal belongings within reach  - Initiate and maintain comfort rounds  - Make Fall Risk Sign visible to staff  - Offer Toileting every    Hours,  in advance of need  - Initiate/Maintain   alarm  - Obtain necessary fall risk management equipment:           - Apply yellow socks and bracelet for high fall risk patients  - Consider moving patient to room near nurses station  Outcome: Progressing  Goal: Maintain or return to baseline ADL function  Description: INTERVENTIONS:  -  Assess patient's ability to carry out ADLs; assess patient's baseline for ADL function and identify physical deficits which impact ability to perform ADLs (bathing, care of mouth/teeth, toileting, grooming, dressing, etc.)  - Assess/evaluate cause of self-care deficits   - Assess range of motion  - Assess patient's mobility; develop plan if impaired  - Assess patient's need for assistive devices and provide as appropriate  - Encourage maximum independence but intervene and supervise when necessary  - Involve family in performance of ADLs  - Assess for home care needs following discharge   - Consider OT consult to assist with ADL evaluation and planning for discharge  - Provide patient education as appropriate  Outcome: Progressing  Goal: Maintains/Returns to pre admission functional level  Description: INTERVENTIONS:  - Perform AM-PAC 6 Click Basic Mobility/ Daily Activity assessment daily.  - Set and communicate daily mobility goal to care team and patient/family/caregiver.   - Collaborate with rehabilitation services on mobility goals if consulted  - Perform Range of Motion    times a day.  - Reposition patient every    hours.  - Dangle patient    times a day  - Stand patient    times a day  - Ambulate patient    times a day  - Out of bed to chair    times a day   - Out of bed for meals    times a day  - Out of bed for toileting  - Record patient progress and toleration of activity level   Outcome: Progressing     Problem: DISCHARGE PLANNING  Goal: Discharge to home or other facility with appropriate resources  Description: INTERVENTIONS:  - Identify barriers to discharge w/patient and  caregiver  - Arrange for needed discharge resources and transportation as appropriate  - Identify discharge learning needs (meds, wound care, etc.)  - Arrange for interpretive services to assist at discharge as needed  - Refer to Case Management Department for coordinating discharge planning if the patient needs post-hospital services based on physician/advanced practitioner order or complex needs related to functional status, cognitive ability, or social support system  Outcome: Progressing     Problem: Knowledge Deficit  Goal: Patient/family/caregiver demonstrates understanding of disease process, treatment plan, medications, and discharge instructions  Description: Complete learning assessment and assess knowledge base.  Interventions:  - Provide teaching at level of understanding  - Provide teaching via preferred learning methods  Outcome: Progressing

## 2025-02-05 NOTE — ASSESSMENT & PLAN NOTE
Check urinalysis and an albumin to creatinine ratio  History of scleroderma-patient without severe hypertension without rapid decline in renal function, no signs of a microangiopathic hemolytic anemia so less likely a scleroderma renal crisis  Will check a renal ultrasound  Would be on losartan long-term

## 2025-02-05 NOTE — ASSESSMENT & PLAN NOTE
Lab Results   Component Value Date    CREATININE 1.63 (H) 02/04/2025    CREATININE 1.08 10/10/2024     Creatine on admission 1.63, Baseline creatinine 1.08  Narrowly meets IRINEO criteria.  Will withhold fluids for now in setting of decompensated heart failure.  Hold losartan  Avoid hypotension, nephrotoxins, and NSAIDS if possible    Strict I & Os  Nephrology consult; recommendations appreciated

## 2025-02-06 ENCOUNTER — APPOINTMENT (INPATIENT)
Dept: ULTRASOUND IMAGING | Facility: HOSPITAL | Age: 60
DRG: 291 | End: 2025-02-06
Payer: COMMERCIAL

## 2025-02-06 VITALS
SYSTOLIC BLOOD PRESSURE: 114 MMHG | DIASTOLIC BLOOD PRESSURE: 79 MMHG | BODY MASS INDEX: 24.05 KG/M2 | HEART RATE: 78 BPM | RESPIRATION RATE: 18 BRPM | HEIGHT: 69 IN | TEMPERATURE: 97.5 F | OXYGEN SATURATION: 98 % | WEIGHT: 162.4 LBS

## 2025-02-06 LAB
ANION GAP SERPL CALCULATED.3IONS-SCNC: 5 MMOL/L (ref 4–13)
BUN SERPL-MCNC: 27 MG/DL (ref 5–25)
C3 SERPL-MCNC: 114 MG/DL (ref 87–200)
C4 SERPL-MCNC: 22 MG/DL (ref 19–52)
CALCIUM SERPL-MCNC: 8.4 MG/DL (ref 8.4–10.2)
CHLORIDE SERPL-SCNC: 100 MMOL/L (ref 96–108)
CO2 SERPL-SCNC: 29 MMOL/L (ref 21–32)
CREAT SERPL-MCNC: 1.74 MG/DL (ref 0.6–1.3)
CREAT UR-MCNC: 17.6 MG/DL
GFR SERPL CREATININE-BSD FRML MDRD: 41 ML/MIN/1.73SQ M
GLUCOSE SERPL-MCNC: 124 MG/DL (ref 65–140)
HIV 1+2 AB+HIV1 P24 AG SERPL QL IA: NORMAL
HIV1 P24 AG SER QL: NORMAL
MICROALBUMIN UR-MCNC: 183.6 MG/L
MICROALBUMIN/CREAT 24H UR: 1043 MG/G CREATININE (ref 0–30)
POTASSIUM SERPL-SCNC: 4.2 MMOL/L (ref 3.5–5.3)
SODIUM SERPL-SCNC: 134 MMOL/L (ref 135–147)

## 2025-02-06 PROCEDURE — NC001 PR NO CHARGE: Performed by: FAMILY MEDICINE

## 2025-02-06 PROCEDURE — 86225 DNA ANTIBODY NATIVE: CPT | Performed by: INTERNAL MEDICINE

## 2025-02-06 PROCEDURE — 87806 HIV AG W/HIV1&2 ANTB W/OPTIC: CPT | Performed by: INTERNAL MEDICINE

## 2025-02-06 PROCEDURE — 86334 IMMUNOFIX E-PHORESIS SERUM: CPT | Performed by: INTERNAL MEDICINE

## 2025-02-06 PROCEDURE — 99239 HOSP IP/OBS DSCHRG MGMT >30: CPT | Performed by: FAMILY MEDICINE

## 2025-02-06 PROCEDURE — 80048 BASIC METABOLIC PNL TOTAL CA: CPT | Performed by: FAMILY MEDICINE

## 2025-02-06 PROCEDURE — 84165 PROTEIN E-PHORESIS SERUM: CPT | Performed by: INTERNAL MEDICINE

## 2025-02-06 PROCEDURE — 86037 ANCA TITER EACH ANTIBODY: CPT | Performed by: INTERNAL MEDICINE

## 2025-02-06 PROCEDURE — 83520 IMMUNOASSAY QUANT NOS NONAB: CPT | Performed by: INTERNAL MEDICINE

## 2025-02-06 PROCEDURE — 99233 SBSQ HOSP IP/OBS HIGH 50: CPT | Performed by: INTERNAL MEDICINE

## 2025-02-06 PROCEDURE — 86160 COMPLEMENT ANTIGEN: CPT | Performed by: INTERNAL MEDICINE

## 2025-02-06 PROCEDURE — 82595 ASSAY OF CRYOGLOBULIN: CPT | Performed by: INTERNAL MEDICINE

## 2025-02-06 PROCEDURE — 80074 ACUTE HEPATITIS PANEL: CPT | Performed by: INTERNAL MEDICINE

## 2025-02-06 PROCEDURE — 76775 US EXAM ABDO BACK WALL LIM: CPT

## 2025-02-06 PROCEDURE — 86430 RHEUMATOID FACTOR TEST QUAL: CPT | Performed by: INTERNAL MEDICINE

## 2025-02-06 RX ORDER — GUAIFENESIN/DEXTROMETHORPHAN 100-10MG/5
10 SYRUP ORAL EVERY 4 HOURS PRN
Status: DISCONTINUED | OUTPATIENT
Start: 2025-02-06 | End: 2025-02-06 | Stop reason: HOSPADM

## 2025-02-06 RX ORDER — PANTOPRAZOLE SODIUM 40 MG/1
40 TABLET, DELAYED RELEASE ORAL
Status: DISCONTINUED | OUTPATIENT
Start: 2025-02-07 | End: 2025-02-06 | Stop reason: HOSPADM

## 2025-02-06 RX ADMIN — FUROSEMIDE 40 MG: 10 INJECTION, SOLUTION INTRAMUSCULAR; INTRAVENOUS at 06:36

## 2025-02-06 RX ADMIN — APIXABAN 5 MG: 5 TABLET, FILM COATED ORAL at 08:47

## 2025-02-06 RX ADMIN — CARVEDILOL 6.25 MG: 6.25 TABLET, FILM COATED ORAL at 08:47

## 2025-02-06 RX ADMIN — PANTOPRAZOLE SODIUM 40 MG: 40 TABLET, DELAYED RELEASE ORAL at 06:36

## 2025-02-06 RX ADMIN — NICOTINE 1 PATCH: 21 PATCH, EXTENDED RELEASE TRANSDERMAL at 08:47

## 2025-02-06 NOTE — ASSESSMENT & PLAN NOTE
Lab Results   Component Value Date    CREATININE 1.74 (H) 02/06/2025    CREATININE 1.70 (H) 02/05/2025     Creatine on admission 1.63, Baseline creatinine 1.08  Hold losartan essentially stable today expect a slight elevation with diuresis on Lasix 40 mg IV twice daily I did ask nephrology to see do not think is scleroderma renal crisis at this time.  Will get an ultrasound of the kidney and bladder he is diuresing well will check a BMP tomorrow discussed with nephrology that if creatinine is stable may restart outpatient Jardiance for the heart failure and losartan.  UA negative for infection

## 2025-02-06 NOTE — ASSESSMENT & PLAN NOTE
Wt Readings from Last 3 Encounters:   02/06/25 73.7 kg (162 lb 6.4 oz)   09/18/24 75.4 kg (166 lb 3.6 oz)   08/12/24 73.9 kg (163 lb)     Presents to ED with shortness of breath, exertional dyspnea, wheezing, BLE pitting edema.   BNP is in the 2000's. I provided patient with DuoNeb, Lasix.   ED diuresed with Lasix 40 mg IV  Take Lasix 20 mg PO for diuresis as outpatient states takes Lasix as needed  CXR consistent with acute CHF  BNP 2,143  ECHO from August 2024 revealed:  Frequent ventricular ectopy is noted.  Left ventricle is mildly dilated in size with moderate to severely depressed systolic function.  Estimated LVEF is 30%.   Dyssynchronous LV contractility is noted.  Wall thickness is mildly increased. Diastolic function is mildly abnormal, consistent with grade I (abnormal) relaxation.  Mitral Valve: There is mild to moderate regurgitation with an eccentrically directed jet.  Tricuspid Valve: Tricuspid valve structurally normal.  Mild tricuspid regurgitation.  RVSP is 30 mmHg.  Continue diuresis with IV Lasix 40 mg per CHF protocol algorithm   Daily weights, Strict I & Os  Cardiac diet, low sodium <2g, fluid restriction <1500   He is down actually 3 pounds from today in the morning reweigh.  On Lasix 40 mg IV twice daily creatinine essentially stable expect to rise while on diuresis.  Nephrology is following.  Leg edema is resolved shortness of breath is resolved lungs sound better discussed with nephrology will continue IV diuresis for 1 more day and then switch to p.o.

## 2025-02-06 NOTE — ASSESSMENT & PLAN NOTE
Wt Readings from Last 3 Encounters:   02/06/25 73.7 kg (162 lb 6.4 oz)   09/18/24 75.4 kg (166 lb 3.6 oz)   08/12/24 73.9 kg (163 lb)   Volume: BNP increased at 2100, chest x-ray shows mild pulmonary venous congestion, positive lower extremity edema, symptomatic with shortness of breath with improvement with diuresis  Weight improving, creatinine 1.7 diuresing with urine sodium above 100  Continue beta-blocker 6.25 mg 2 times daily  Continue losartan as an outpatient,  Add jardiance as outpatient  Consider nonmineralocorticoid receptor antagonist as an outpatient as well  Strict intakes and outputs  Daily weights   okay for creatinine to rise with diuresis if clinically improving  Urine sodium indicates adequate diuresis  Continue IV lasix for one more day then transition to po  Consider derease PPI to daily form BID if no need     temporal

## 2025-02-06 NOTE — ASSESSMENT & PLAN NOTE
Continue PPI therapy.  Decrease down to PPI daily he was given twice daily as looks like when he was here for esophageal obstruction but secondary to kidney disease will lowered to daily

## 2025-02-06 NOTE — DISCHARGE SUMMARY
Discharge Summary - Hospitalist   Name: Britton Batista 59 y.o. male I MRN: [id number]  Unit/Bed#: -01 I Date of Admission: 2/4/2025   Date of Service: 2/6/2025 I Hospital Day: 2     Assessment & Plan  Acute on chronic combined systolic and diastolic congestive heart failure (HCC)  Wt Readings from Last 3 Encounters:   02/06/25 73.7 kg (162 lb 6.4 oz)   09/18/24 75.4 kg (166 lb 3.6 oz)   08/12/24 73.9 kg (163 lb)     Presents to ED with shortness of breath, exertional dyspnea, wheezing, BLE pitting edema.   BNP is in the 2000's. I provided patient with DuoNeb, Lasix.   ED diuresed with Lasix 40 mg IV  Take Lasix 20 mg PO for diuresis as outpatient states takes Lasix as needed  CXR consistent with acute CHF  BNP 2,143  ECHO from August 2024 revealed:  Frequent ventricular ectopy is noted.  Left ventricle is mildly dilated in size with moderate to severely depressed systolic function.  Estimated LVEF is 30%.   Dyssynchronous LV contractility is noted.  Wall thickness is mildly increased. Diastolic function is mildly abnormal, consistent with grade I (abnormal) relaxation.  Mitral Valve: There is mild to moderate regurgitation with an eccentrically directed jet.  Tricuspid Valve: Tricuspid valve structurally normal.  Mild tricuspid regurgitation.  RVSP is 30 mmHg.  Continue diuresis with IV Lasix 40 mg per CHF protocol algorithm   Daily weights, Strict I & Os  Cardiac diet, low sodium <2g, fluid restriction <1500   He is down actually 3 pounds from today in the morning reweigh.  On Lasix 40 mg IV twice daily creatinine essentially stable expect to rise while on diuresis.  Nephrology is following.  Leg edema is resolved shortness of breath is resolved lungs sound better discussed with nephrology will continue IV diuresis for 1 more day and then switch to p.o.  Patient decided to leave AGAINST MEDICAL ADVICE risks of leaving against AMA were spoken to the patient and he signed note and left  IRINEO (acute kidney  injury) (HCC)    Lab Results   Component Value Date    CREATININE 1.74 (H) 02/06/2025    CREATININE 1.70 (H) 02/05/2025     Creatine on admission 1.63, Baseline creatinine 1.08  Hold losartan essentially stable today expect a slight elevation with diuresis on Lasix 40 mg IV twice daily I did ask nephrology to see do not think is scleroderma renal crisis at this time.  Will get an ultrasound of the kidney and bladder he is diuresing well will check a BMP tomorrow discussed with nephrology that if creatinine is stable may restart outpatient Jardiance for the heart failure and losartan.  UA negative for infection     GERD (gastroesophageal reflux disease)  Continue PPI therapy.  Decrease down to PPI daily he was given twice daily as looks like when he was here for esophageal obstruction but secondary to kidney disease will lowered to daily  Primary hypertension  Hold losartan in setting of IRINEO.  Continue Coreg  Chronic atrial fibrillation (HCC)  On Eliquis and carvedilol.  Continue.  Tobacco abuse  Reportedly smokes 0.5 to 1 pack/day.  NRT offered.  Cessation advised.       Medical Problems       Resolved Problems  Date Reviewed: 2/6/2025   None         MESSAGE TO PCP ([facility] Everywhere Referral) FOR FOLLOW UP:   Thank you for allowing us to participate in the care of your patient, Britton Batista, who was hospitalized from 2/4/2025 through 02/06/25 with the admitting diagnosis of acute on chronic systolic heart failure exacerbation IRINEO.      Medication Changes:  Left AGAINST MEDICAL ADVICE  Outpatient testing recommended:  Left AGAINST MEDICAL ADVICE  If you have any additional questions or would like to discuss further, please feel free to contact me.  Guadalupe Muñoz MD  [facility], Hospitalist, [phone]     Admission Date:   Admission Orders (From admission, onward)       Ordered        02/04/25 2028  INPATIENT ADMISSION  Once                          Discharge Date:  02/06/25    Consultations During Hospital Stay:  Nephrology    Procedures Performed:   none    Significant Findings / Test Results:   Chest x-ray is mild pulmonary venous congestion  Ultrasound of the kidney and bladder-o hydronephrosis or sonographically apparent stones.  Trace bilateral perinephric fluid.  Bladder diverticulum    Incidental Findings:   none    Test Results Pending at Discharge (will require follow up):   none     Complications:  none    Reason for Admission: Acute on chronic combined systolic and diastolic heart failure exacerbation IRINEO    Hospital Course:   Britton Batista is a 59 y.o. male patient who originally presented to the hospital on 2/4/2025 due to acute on chronic combined systolic and diastolic heart failure exacerbation with IRINEO he was started on IV diuretics with consultation to nephrology diuresed quite well creatinine essentially stayed stable but patient was not happy that he is not receiving enough food secondary to his restriction in sodium despite receiving the maximum amount he has previously happened on previous hospitalization stated that he is can leave AGAINST MEDICAL ADVICE he has somebody taking him up risks of leaving AGAINST MEDICAL ADVICE explained and he signed the paper and left.          Please see above list of diagnoses and related plan for additional information.     Condition at Discharge: stable    Discharge Day Visit / Exam:   * Please refer to separate progress note for these details *    Discussion with Family: Patient declined call to .     Discharge instructions/Information to patient and family:   See after visit summary for information provided to patient and family.      Provisions for Follow-Up Care:  See after visit summary for information related to follow-up care and any pertinent home health orders.      Mobility at time of Discharge:   Basic Mobility Inpatient Raw Score: 24  JH-HLM Goal: 8: Walk 250 feet or more  JH-HLM Achieved: 8:  Walk 250 feet ot more  HLM Goal achieved. Continue to encourage appropriate mobility.     Disposition:   Other: AMA    Planned Readmission: no    Discharge Medications:  See after visit summary for reconciled discharge medications provided to patient and/or family.      Administrative Statements   Discharge Statement:  I have spent a total time of >35 minutes in caring for this patient on the day of the visit/encounter. >30 minutes of time was spent on: Documenting in the medical record, Reviewing / ordering tests, medicine, procedures  , and Communicating with other healthcare professionals .    **Please Note: This note may have been constructed using a voice recognition system**

## 2025-02-06 NOTE — ASSESSMENT & PLAN NOTE
Wt Readings from Last 3 Encounters:   02/06/25 73.7 kg (162 lb 6.4 oz)   09/18/24 75.4 kg (166 lb 3.6 oz)   08/12/24 73.9 kg (163 lb)     Presents to ED with shortness of breath, exertional dyspnea, wheezing, BLE pitting edema.   BNP is in the 2000's. I provided patient with DuoNeb, Lasix.   ED diuresed with Lasix 40 mg IV  Take Lasix 20 mg PO for diuresis as outpatient states takes Lasix as needed  CXR consistent with acute CHF  BNP 2,143  ECHO from August 2024 revealed:  Frequent ventricular ectopy is noted.  Left ventricle is mildly dilated in size with moderate to severely depressed systolic function.  Estimated LVEF is 30%.   Dyssynchronous LV contractility is noted.  Wall thickness is mildly increased. Diastolic function is mildly abnormal, consistent with grade I (abnormal) relaxation.  Mitral Valve: There is mild to moderate regurgitation with an eccentrically directed jet.  Tricuspid Valve: Tricuspid valve structurally normal.  Mild tricuspid regurgitation.  RVSP is 30 mmHg.  Continue diuresis with IV Lasix 40 mg per CHF protocol algorithm   Daily weights, Strict I & Os  Cardiac diet, low sodium <2g, fluid restriction <1500   He is down actually 3 pounds from today in the morning reweigh.  On Lasix 40 mg IV twice daily creatinine essentially stable expect to rise while on diuresis.  Nephrology is following.  Leg edema is resolved shortness of breath is resolved lungs sound better discussed with nephrology will continue IV diuresis for 1 more day and then switch to p.o.  Patient decided to leave AGAINST MEDICAL ADVICE risks of leaving against AMA were spoken to the patient and he signed note and left

## 2025-02-06 NOTE — PROGRESS NOTES
Progress Note - Nephrology   Name: Britton Batista 59 y.o. male I MRN: [id number]  Unit/Bed#: -01 I Date of Admission: 2/4/2025   Date of Service: 2/6/2025 I Hospital Day: 2    Assessment & Plan  Acute on chronic combined systolic and diastolic congestive heart failure (HCC)  Wt Readings from Last 3 Encounters:   02/06/25 73.7 kg (162 lb 6.4 oz)   09/18/24 75.4 kg (166 lb 3.6 oz)   08/12/24 73.9 kg (163 lb)   Volume: BNP increased at 2100, chest x-ray shows mild pulmonary venous congestion, positive lower extremity edema, symptomatic with shortness of breath with improvement with diuresis  Weight improving, creatinine 1.7 diuresing with urine sodium above 100  Continue beta-blocker 6.25 mg 2 times daily  Continue losartan as an outpatient,  Add jardiance as outpatient  Consider nonmineralocorticoid receptor antagonist as an outpatient as well  Strict intakes and outputs  Daily weights   okay for creatinine to rise with diuresis if clinically improving  Urine sodium indicates adequate diuresis  Continue IV lasix for one more day then transition to po  Consider derease PPI to daily form BID if no need    GERD (gastroesophageal reflux disease)  Continue PPI for now  IRINEO (acute kidney injury) (HCC)  Check urinalysis and an albumin to creatinine ratio  History of scleroderma-patient without severe hypertension without rapid decline in renal function, no signs of a microangiopathic hemolytic anemia so less likely a scleroderma renal crisis  Will check a renal ultrasound  Would be on losartan long-term  Proteinuria of 1 gram likely with scleroderma will send serologies, long term RAASi, ace may be better.  Tobacco abuse  Continue efforts with smoking cessation  Primary hypertension  Avoid hypotension while diuresing  Chronic atrial fibrillation (HCC)  On anticoagulation, on beta-blocker      I have reviewed the nephrology recommendations including jardiance lasix 40 mg iv bid, with Dr. Whitman, and we are in  agreement with renal plan including the information outlined above. Please contact the SecureChat role for the Nephrology service with any questions/concerns.    Subjective     Patient was seen today  Feeling better  No acute complaints    Objective :  Temp:  [97.5 °F (36.4 °C)-97.7 °F (36.5 °C)] 97.5 °F (36.4 °C)  HR:  [71-93] 78  BP: (107-126)/(75-89) 114/79  Resp:  [17-18] 18  SpO2:  [98 %-99 %] 98 %  O2 Device: None (Room air)    Current Weight: Weight - Scale: 73.7 kg (162 lb 6.4 oz)  First Weight: Weight - Scale: 75.6 kg (166 lb 9.6 oz)  I/O         02/04 0701  02/05 0700 02/05 0701  02/06 0700 02/06 0701  02/07 0700    P.O. 300 880 240    Total Intake(mL/kg) 300 (4) 880 (11.8) 240 (3.3)    Urine (mL/kg/hr) 1775 2525 (1.4)     Total Output 1775 2525     Net -1475 -1645 +240           Unmeasured Urine Occurrence 1 x            Physical Exam  Vitals and nursing note reviewed.   Constitutional:       General: He is not in acute distress.     Appearance: He is well-developed.   HENT:      Head: Normocephalic and atraumatic.   Eyes:      Conjunctiva/sclera: Conjunctivae normal.   Cardiovascular:      Rate and Rhythm: Normal rate and regular rhythm.      Heart sounds: No murmur heard.  Pulmonary:      Effort: Pulmonary effort is normal. No respiratory distress.      Breath sounds: Normal breath sounds.   Abdominal:      Palpations: Abdomen is soft.      Tenderness: There is no abdominal tenderness.   Musculoskeletal:         General: No swelling.      Cervical back: Neck supple.   Skin:     General: Skin is warm and dry.      Capillary Refill: Capillary refill takes less than 2 seconds.   Neurological:      Mental Status: He is alert.   Psychiatric:         Mood and Affect: Mood normal.         Medications:    Current Facility-Administered Medications:     acetaminophen (TYLENOL) tablet 650 mg, 650 mg, Oral, Q4H PRN, Robby Isbell PA-C    albuterol (PROVENTIL HFA,VENTOLIN HFA) inhaler 2 puff, 2 puff, Inhalation,  Q4H PRN, Robby Isbell PA-C    apixaban (ELIQUIS) tablet 5 mg, 5 mg, Oral, BID, Robby Isbell PA-C, 5 mg at 02/06/25 0847    carvedilol (COREG) tablet 6.25 mg, 6.25 mg, Oral, BID With Meals, Robby Isbell PA-C, 6.25 mg at 02/06/25 0847    dextromethorphan-guaiFENesin (ROBITUSSIN DM) oral syrup 10 mL, 10 mL, Oral, Q4H PRN, LUIS ANGEL Buckner    furosemide (LASIX) injection 40 mg, 40 mg, Intravenous, BID, Robby Isbell PA-C, 40 mg at 02/06/25 0636    nicotine (NICODERM CQ) 21 mg/24 hr TD 24 hr patch 1 patch, 1 patch, Transdermal, Daily, Robby Isbell PA-C, 1 patch at 02/06/25 0847    pantoprazole (PROTONIX) EC tablet 40 mg, 40 mg, Oral, BID, Robby Isbell PA-C, 40 mg at 02/06/25 0636    trimethobenzamide (TIGAN) IM injection 200 mg, 200 mg, Intramuscular, Q6H PRN, Robby Isbell PA-C      Lab Results: I have reviewed the following results:  Results from last 7 days   Lab Units 02/06/25  0534 02/05/25  0438 02/04/25  1909   WBC Thousand/uL  --  6.01 5.45   HEMOGLOBIN g/dL  --  13.5 14.1   HEMATOCRIT %  --  41.7 44.0   PLATELETS Thousands/uL  --  256 262   POTASSIUM mmol/L 4.2 4.2 4.7   CHLORIDE mmol/L 100 104 104   CO2 mmol/L 29 24 26   BUN mg/dL 27* 33* 34*   CREATININE mg/dL 1.74* 1.70* 1.63*   CALCIUM mg/dL 8.4 8.9 9.2   ALBUMIN g/dL  --   --  3.7       Administrative Statements     Administrative Statements   VIRTUAL CARE DOCUMENTATION:     1. This service was provided via Telemedicine using [employer]     2. Parties in the room with patient during teleconsult Patient only    3. Confidentiality My office door was closed     4. Participants No one else was in the room    5. Patient acknowledged consent and understanding of privacy and security of the  Telemedicine consult. I informed the patient that I have reviewed their record in Epic and presented the opportunity for them to ask any questions regarding the visit today.  The patient agreed to participate.    6. I have spent a total time  "of 30 minutes in caring for this patient on the day of the visit/encounter including Diagnostic results, Prognosis, Risks and benefits of tx options, Instructions for management, Patient and family education, Importance of tx compliance, Risk factor reductions, Impressions, Counseling / Coordination of care, Documenting in the medical record, Reviewing / ordering tests, medicine, procedures  , Obtaining or reviewing history  , and Communicating with other healthcare professionals , not including the time spent for establishing the audio/video connection.      Portions of the record may have been created with voice recognition software. Occasional wrong word or \"sound a like\" substitutions may have occurred due to the inherent limitations of voice recognition software. Read the chart carefully and recognize, using context, where substitutions have occurred.If you have any questions, please contact the dictating provider.  "

## 2025-02-06 NOTE — PROGRESS NOTES
Patient requesting to leave against medical advice. Dr. Muñoz made aware. This PCM explained the risks of leaving against medical advice, which included but is not limited to cardiac arrest, cardiac arrhythmias, kidney failure, respiratory distress, respiratory arrest, and hypertension. Mr. Batista expressed that he understood these risks and still wanted to sign out against medical advice. AMA paperwork signed and placed in the patient's medical record. Patient escorted out by this PCM. Mr. Batista stated his ride would be here in a few minutes.

## 2025-02-06 NOTE — UTILIZATION REVIEW
NOTIFICATION OF INPATIENT ADMISSION   AUTHORIZATION REQUEST   SERVICING FACILITY:   [place] [address]  Tax ID: [id number]  NPI: [id number] ATTENDING PROVIDER:  Attending Name and NPI#: Guadalupe Muñoz Md [[id number]]  Address: [address] [place] [address]  Phone: [phone]   ADMISSION INFORMATION:  Place of Service: Inpatient [facility]  Place of Service Code: 21  Inpatient Admission Date/Time: 2/4/25  8:28 PM  Discharge Date/Time: 2/6/2025  2:51 PM  Admitting Diagnosis Code/Description:  SOB (shortness of breath) [R06.02]  Congestive heart failure, unspecified HF chronicity, unspecified heart failure type (HCC) [I50.9]     UTILIZATION REVIEW CONTACT:  Comfort Dennis, Utilization   Network Utilization Review Department  Phone: [phone]  Fax [phone]  Email: [email or web address]  Contact for approvals/pending authorizations, clinical reviews, and discharge.     PHYSICIAN ADVISORY SERVICES:  Medical Necessity Denial & [email or web address] Review  Phone: [phone]  Fax: [phone]  Email: [email or web address]     DISCHARGE SUPPORT TEAM:  For Patients Discharge Needs & Updates  Phone: [phone] opt. [phone] Fax: [phone]  Email: [email or web address]

## 2025-02-06 NOTE — PROGRESS NOTES
Progress Note - Hospitalist   Name: Britton Batista 59 y.o. male I MRN: [id number]  Unit/Bed#: -01 I Date of Admission: 2/4/2025   Date of Service: 2/6/2025 I Hospital Day: 2    Assessment & Plan  Acute on chronic combined systolic and diastolic congestive heart failure (HCC)  Wt Readings from Last 3 Encounters:   02/06/25 73.7 kg (162 lb 6.4 oz)   09/18/24 75.4 kg (166 lb 3.6 oz)   08/12/24 73.9 kg (163 lb)     Presents to ED with shortness of breath, exertional dyspnea, wheezing, BLE pitting edema.   BNP is in the 2000's. I provided patient with DuoNeb, Lasix.   ED diuresed with Lasix 40 mg IV  Take Lasix 20 mg PO for diuresis as outpatient states takes Lasix as needed  CXR consistent with acute CHF  BNP 2,143  ECHO from August 2024 revealed:  Frequent ventricular ectopy is noted.  Left ventricle is mildly dilated in size with moderate to severely depressed systolic function.  Estimated LVEF is 30%.   Dyssynchronous LV contractility is noted.  Wall thickness is mildly increased. Diastolic function is mildly abnormal, consistent with grade I (abnormal) relaxation.  Mitral Valve: There is mild to moderate regurgitation with an eccentrically directed jet.  Tricuspid Valve: Tricuspid valve structurally normal.  Mild tricuspid regurgitation.  RVSP is 30 mmHg.  Continue diuresis with IV Lasix 40 mg per CHF protocol algorithm   Daily weights, Strict I & Os  Cardiac diet, low sodium <2g, fluid restriction <1500   He is down actually 3 pounds from today in the morning reweigh.  On Lasix 40 mg IV twice daily creatinine essentially stable expect to rise while on diuresis.  Nephrology is following.  Leg edema is resolved shortness of breath is resolved lungs sound better discussed with nephrology will continue IV diuresis for 1 more day and then switch to p.o.  IRINEO (acute kidney injury) ([facility])    Lab Results   Component Value Date    CREATININE 1.74 (H) 02/06/2025    CREATININE 1.70 (H) 02/05/2025     Creatine on  admission 1.63, Baseline creatinine 1.08  Hold losartan essentially stable today expect a slight elevation with diuresis on Lasix 40 mg IV twice daily I did ask nephrology to see do not think is scleroderma renal crisis at this time.  Will get an ultrasound of the kidney and bladder he is diuresing well will check a BMP tomorrow discussed with nephrology that if creatinine is stable may restart outpatient Jardiance for the heart failure and losartan.  UA negative for infection     GERD (gastroesophageal reflux disease)  Continue PPI therapy.  Decrease down to PPI daily he was given twice daily as looks like when he was here for esophageal obstruction but secondary to kidney disease will lowered to daily  Primary hypertension  Hold losartan in setting of IRINEO.  Continue Coreg  Chronic atrial fibrillation (HCC)  On Eliquis and carvedilol.  Continue.  Tobacco abuse  Reportedly smokes 0.5 to 1 pack/day.  NRT offered.  Cessation advised.    VTE Pharmacologic Prophylaxis: VTE Score: 3 Moderate Risk (Score 3-4) - Pharmacological DVT Prophylaxis Ordered: apixaban (Eliquis).    Mobility:   Basic Mobility Inpatient Raw Score: 24  JH-HLM Goal: 8: Walk 250 feet or more  JH-HLM Achieved: 8: Walk 250 feet ot more  JH-HLM Goal achieved. Continue to encourage appropriate mobility.    Patient Centered Rounds: I performed bedside rounds with nursing staff today.   Discussions with Specialists or Other Care Team Provider: Discussed with nephrology    Education and Discussions with Family / Patient: Patient will discuss with sister    Current Length of Stay: 2 day(s)  Current Patient Status: Inpatient   Certification Statement: The patient will continue to require additional inpatient hospital stay due to secondary to IRINEO and CHF  Discharge Plan: Anticipate discharge in 24-48 hrs to home.    Code Status: Level 1 - Full Code    Subjective   Seen examined denies shortness of breath    Objective :  Temp:  [97.5 °F (36.4 °C)-97.7 °F (36.5  °C)] 97.5 °F (36.4 °C)  HR:  [71-93] 78  BP: (107-126)/(75-89) 114/79  Resp:  [17-18] 18  SpO2:  [98 %-99 %] 98 %  O2 Device: None (Room air)    Body mass index is 23.98 kg/m².     Input and Output Summary (last 24 hours):     Intake/Output Summary (Last 24 hours) at 2/6/2025 1208  Last data filed at 2/6/2025 0849  Gross per 24 hour   Intake 940 ml   Output 1225 ml   Net -285 ml       Physical Exam  Vitals and nursing note reviewed.   Constitutional:       General: He is not in acute distress.     Appearance: He is well-developed.   HENT:      Head: Normocephalic and atraumatic.   Eyes:      Conjunctiva/sclera: Conjunctivae normal.   Cardiovascular:      Rate and Rhythm: Normal rate and regular rhythm.      Heart sounds: No murmur heard.  Pulmonary:      Effort: Pulmonary effort is normal. No respiratory distress.      Breath sounds: Rales (Mild bibasilar Rales) present. No wheezing.   Abdominal:      Palpations: Abdomen is soft.      Tenderness: There is no abdominal tenderness.   Musculoskeletal:         General: No swelling.      Cervical back: Neck supple.   Skin:     General: Skin is warm and dry.      Capillary Refill: Capillary refill takes less than 2 seconds.   Neurological:      Mental Status: He is alert and oriented to person, place, and time.   Psychiatric:         Mood and Affect: Mood normal.           Lines/Drains:              Lab Results: I have reviewed the following results:   Results from last 7 days   Lab Units 02/05/25 0438 02/04/25  1909   WBC Thousand/uL 6.01 5.45   HEMOGLOBIN g/dL 13.5 14.1   HEMATOCRIT % 41.7 44.0   PLATELETS Thousands/uL 256 262   SEGS PCT %  --  62   LYMPHO PCT %  --  22   MONO PCT %  --  14*   EOS PCT %  --  1     Results from last 7 days   Lab Units 02/06/25  0534 02/05/25  0438 02/04/25  1909   SODIUM mmol/L 134*   < > 135   POTASSIUM mmol/L 4.2   < > 4.7   CHLORIDE mmol/L 100   < > 104   CO2 mmol/L 29   < > 26   BUN mg/dL 27*   < > 34*   CREATININE mg/dL 1.74*   < >  1.63*   ANION GAP mmol/L 5   < > 5   CALCIUM mg/dL 8.4   < > 9.2   ALBUMIN g/dL  --   --  3.7   TOTAL BILIRUBIN mg/dL  --   --  0.67   ALK PHOS U/L  --   --  110*   ALT U/L  --   --  31   AST U/L  --   --  49*   GLUCOSE RANDOM mg/dL 124   < > 139    < > = values in this interval not displayed.     Results from last 7 days   Lab Units 02/04/25  1909   INR  1.24*                   Recent Cultures (last 7 days):         Imaging Results Review: No pertinent imaging studies reviewed.  Other Study Results Review: No additional pertinent studies reviewed.    Last 24 Hours Medication List:     Current Facility-Administered Medications:     acetaminophen (TYLENOL) tablet 650 mg, Q4H PRN    albuterol (PROVENTIL HFA,VENTOLIN HFA) inhaler 2 puff, Q4H PRN    apixaban (ELIQUIS) tablet 5 mg, BID    carvedilol (COREG) tablet 6.25 mg, BID With Meals    dextromethorphan-guaiFENesin (ROBITUSSIN DM) oral syrup 10 mL, Q4H PRN    furosemide (LASIX) injection 40 mg, BID    nicotine (NICODERM CQ) 21 mg/24 hr TD 24 hr patch 1 patch, Daily    [START ON 2/7/2025] pantoprazole (PROTONIX) EC tablet 40 mg, Early Morning    trimethobenzamide (TIGAN) IM injection 200 mg, Q6H PRN    Administrative Statements   Today, Patient Was Seen By: Guadalupe Muñoz MD  I have spent a total time of >35 minutes in caring for this patient on the day of the visit/encounter including Patient and family education, Documenting in the medical record, Reviewing / ordering tests, medicine, procedures  , and Communicating with other healthcare professionals .    **Please Note: This note may have been constructed using a voice recognition system.**

## 2025-02-06 NOTE — PLAN OF CARE
Problem: PAIN - ADULT  Goal: Verbalizes/displays adequate comfort level or baseline comfort level  Description: Interventions:  - Encourage patient to monitor pain and request assistance  - Assess pain using appropriate pain scale  - Administer analgesics based on type and severity of pain and evaluate response  - Implement non-pharmacological measures as appropriate and evaluate response  - Consider cultural and social influences on pain and pain management  - Notify physician/advanced practitioner if interventions unsuccessful or patient reports new pain  Outcome: Progressing     Problem: INFECTION - ADULT  Goal: Absence or prevention of progression during hospitalization  Description: INTERVENTIONS:  - Assess and monitor for signs and symptoms of infection  - Monitor lab/diagnostic results  - Monitor all insertion sites, i.e. indwelling lines, tubes, and drains  - Monitor endotracheal if appropriate and nasal secretions for changes in amount and color  - [place] appropriate cooling/warming therapies per order  - Administer medications as ordered  - Instruct and encourage patient and family to use good hand hygiene technique  - Identify and instruct in appropriate isolation precautions for identified infection/condition  Outcome: Progressing  Goal: Absence of fever/infection during neutropenic period  Description: INTERVENTIONS:  - Monitor WBC    Outcome: Progressing     Problem: SAFETY ADULT  Goal: Patient will remain free of falls  Description: INTERVENTIONS:  - Educate patient/family on patient safety including physical limitations  - Instruct patient to call for assistance with activity   - Consult OT/PT to assist with strengthening/mobility   - Keep Call bell within reach  - Keep bed low and locked with side rails adjusted as appropriate  - Keep care items and personal belongings within reach  - Initiate and maintain comfort rounds  - Make Fall Risk Sign visible to staff  - Offer Toileting every    Hours,  in advance of need  - Initiate/Maintain   alarm  - Obtain necessary fall risk management equipment:           - Apply yellow socks and bracelet for high fall risk patients  - Consider moving patient to room near nurses station  Outcome: Progressing  Goal: Maintain or return to baseline ADL function  Description: INTERVENTIONS:  -  Assess patient's ability to carry out ADLs; assess patient's baseline for ADL function and identify physical deficits which impact ability to perform ADLs (bathing, care of mouth/teeth, toileting, grooming, dressing, etc.)  - Assess/evaluate cause of self-care deficits   - Assess range of motion  - Assess patient's mobility; develop plan if impaired  - Assess patient's need for assistive devices and provide as appropriate  - Encourage maximum independence but intervene and supervise when necessary  - Involve family in performance of ADLs  - Assess for home care needs following discharge   - Consider OT consult to assist with ADL evaluation and planning for discharge  - Provide patient education as appropriate  Outcome: Progressing  Goal: Maintains/Returns to pre admission functional level  Description: INTERVENTIONS:  - Perform AM-PAC 6 Click Basic Mobility/ Daily Activity assessment daily.  - Set and communicate daily mobility goal to care team and patient/family/caregiver.   - Collaborate with rehabilitation services on mobility goals if consulted  - Perform Range of Motion    times a day.  - Reposition patient every    hours.  - Dangle patient    times a day  - Stand patient    times a day  - Ambulate patient    times a day  - Out of bed to chair    times a day   - Out of bed for meals    times a day  - Out of bed for toileting  - Record patient progress and toleration of activity level   Outcome: Progressing     Problem: DISCHARGE PLANNING  Goal: Discharge to home or other facility with appropriate resources  Description: INTERVENTIONS:  - Identify barriers to discharge w/patient and  caregiver  - Arrange for needed discharge resources and transportation as appropriate  - Identify discharge learning needs (meds, wound care, etc.)  - Arrange for interpretive services to assist at discharge as needed  - Refer to Case Management Department for coordinating discharge planning if the patient needs post-hospital services based on physician/advanced practitioner order or complex needs related to functional status, cognitive ability, or social support system  Outcome: Progressing

## 2025-02-06 NOTE — ASSESSMENT & PLAN NOTE
Check urinalysis and an albumin to creatinine ratio  History of scleroderma-patient without severe hypertension without rapid decline in renal function, no signs of a microangiopathic hemolytic anemia so less likely a scleroderma renal crisis  Will check a renal ultrasound  Would be on losartan long-term  Proteinuria of 1 gram likely with scleroderma will send serologies, long term RAASi, ace may be better.

## 2025-02-07 LAB
HAV IGM SER QL: NORMAL
HBV CORE IGM SER QL: NORMAL
HBV SURFACE AG SER QL: NORMAL
HCV AB SER QL: NORMAL
RHEUMATOID FACT SER QL LA: NEGATIVE

## 2025-02-07 NOTE — UTILIZATION REVIEW
NOTIFICATION OF ADMISSION DISCHARGE   This is a Notification of Discharge from [facility]. Please be advised that this patient has been discharge from our facility. Below you will find the admission and discharge date and time including the patient’s disposition.   UTILIZATION REVIEW CONTACT:  Judith Sanchez  Utilization   Network Utilization Review Department  Phone: [phone] x carefully listen to the prompts. All voicemails are confidential.  Email: [email or web address]     ADMISSION INFORMATION  PRESENTATION DATE: 2/4/2025  6:47 PM  OBERVATION ADMISSION DATE: N/A  INPATIENT ADMISSION DATE: 2/4/25  8:28 PM   DISCHARGE DATE: 2/6/2025  2:51 PM   DISPOSITION:Left against medical advice or discontinued care    Network Utilization Review Department  ATTENTION: Please call with any questions or concerns to [phone] and carefully listen to the prompts so that you are directed to the right person. All voicemails are confidential.   For Discharge needs, contact Care Management DC Support Team at [phone] opt. 2  Send all requests for admission clinical reviews, approved or denied determinations and any other requests to dedicated fax number below belonging to the campus where the patient is receiving treatment. List of dedicated fax numbers for the Facilities:  FACILITY NAME UR FAX NUMBER   ADMISSION DENIALS (Administrative/Medical Necessity) [phone]   DISCHARGE SUPPORT TEAM ([facility]) [phone]   PARENT CHILD HEALTH (Maternity/NICU/Pediatrics) [phone]   [facility] [phone]   [facility] [phone]   Critical access hospital [phone]   [facility] [phone]   [facility] [phone]   [facility] [phone]   [facility] [phone]   [facility]  [facility] [phone]   [facility] [phone]   [facility] [phone]   [facility] [phone]   [facility] [phone]

## 2025-02-10 LAB
CRYOGLOB SER QL 1D COLD INC: NORMAL
GBM AB SER IA-ACNC: <0.2 UNITS (ref 0–0.9)

## 2025-02-11 LAB
ALBUMIN SERPL ELPH-MCNC: 3.2 G/DL (ref 3.2–5.1)
ALBUMIN SERPL ELPH-MCNC: 50.8 % (ref 48–70)
ALPHA1 GLOB SERPL ELPH-MCNC: 0.31 G/DL (ref 0.15–0.47)
ALPHA1 GLOB SERPL ELPH-MCNC: 4.9 % (ref 1.8–7)
ALPHA2 GLOB SERPL ELPH-MCNC: 0.67 G/DL (ref 0.42–1.04)
ALPHA2 GLOB SERPL ELPH-MCNC: 10.6 % (ref 5.9–14.9)
BETA GLOB ABNORMAL SERPL ELPH-MCNC: 0.45 G/DL (ref 0.31–0.57)
BETA1 GLOB SERPL ELPH-MCNC: 7.1 % (ref 4.7–7.7)
BETA2 GLOB SERPL ELPH-MCNC: 7.1 % (ref 3.1–7.9)
BETA2+GAMMA GLOB SERPL ELPH-MCNC: 0.45 G/DL (ref 0.2–0.58)
C-ANCA TITR SER IF: NORMAL TITER
GAMMA GLOB ABNORMAL SERPL ELPH-MCNC: 1.23 G/DL (ref 0.4–1.66)
GAMMA GLOB SERPL ELPH-MCNC: 19.5 % (ref 6.9–22.3)
IGG/ALB SER: 1.03 {RATIO} (ref 1.1–1.8)
INTERPRETATION UR IFE-IMP: NORMAL
MYELOPEROXIDASE AB SER IA-ACNC: <0.2 UNITS (ref 0–0.9)
P-ANCA ATYPICAL TITR SER IF: NORMAL TITER
P-ANCA TITR SER IF: NORMAL TITER
PROT PATTERN SERPL ELPH-IMP: ABNORMAL
PROT SERPL-MCNC: 6.3 G/DL (ref 6.4–8.2)
PROTEINASE3 AB SER IA-ACNC: <0.2 UNITS (ref 0–0.9)

## 2025-02-11 PROCEDURE — 86334 IMMUNOFIX E-PHORESIS SERUM: CPT | Performed by: STUDENT IN AN ORGANIZED HEALTH CARE EDUCATION/TRAINING PROGRAM

## 2025-02-11 PROCEDURE — 84165 PROTEIN E-PHORESIS SERUM: CPT | Performed by: STUDENT IN AN ORGANIZED HEALTH CARE EDUCATION/TRAINING PROGRAM

## 2025-02-12 ENCOUNTER — RESULTS FOLLOW-UP (OUTPATIENT)
Dept: NEPHROLOGY | Facility: CLINIC | Age: 60
End: 2025-02-12

## 2025-02-12 LAB — DSDNA IGG SERPL IA-ACNC: 6.1 IU/ML (ref ?–15)

## 2025-02-12 NOTE — TELEPHONE ENCOUNTER
M for pt's sister in regards to the following message      Art Blake, DO  I saw him in [facility].  He ended up signing out AMA.  Can you call him and let him know he needs more of a workup for the abnormal SPEP  You can schedule him with our office or if it is too far, a provider near him to review.  Let me know what he decides.   Thanks. Dr. Wanda DE DIOS     Asked pt's sister to please give us a call back to schedule an appointment.

## 2025-03-22 ENCOUNTER — APPOINTMENT (INPATIENT)
Dept: RADIOLOGY | Facility: HOSPITAL | Age: 60
End: 2025-03-22
Payer: COMMERCIAL

## 2025-03-22 ENCOUNTER — HOSPITAL ENCOUNTER (INPATIENT)
Facility: HOSPITAL | Age: 60
LOS: 6 days | Discharge: HOME | End: 2025-03-28
Attending: EMERGENCY MEDICINE | Admitting: ANESTHESIOLOGY
Payer: COMMERCIAL

## 2025-03-22 ENCOUNTER — APPOINTMENT (EMERGENCY)
Dept: CT IMAGING | Facility: HOSPITAL | Age: 60
End: 2025-03-22
Payer: COMMERCIAL

## 2025-03-22 ENCOUNTER — APPOINTMENT (EMERGENCY)
Dept: RADIOLOGY | Facility: HOSPITAL | Age: 60
End: 2025-03-22
Payer: COMMERCIAL

## 2025-03-22 DIAGNOSIS — I48.91 ATRIAL FIBRILLATION WITH RVR (HCC): Primary | ICD-10-CM

## 2025-03-22 DIAGNOSIS — I50.22 CHRONIC HFREF (HEART FAILURE WITH REDUCED EJECTION FRACTION) (HCC): ICD-10-CM

## 2025-03-22 DIAGNOSIS — Z72.0 TOBACCO ABUSE: ICD-10-CM

## 2025-03-22 DIAGNOSIS — Z13.9 ENCOUNTER FOR SCREENING INVOLVING SOCIAL DETERMINANTS OF HEALTH (SDOH): ICD-10-CM

## 2025-03-22 DIAGNOSIS — R00.0 TACHYCARDIA: ICD-10-CM

## 2025-03-22 DIAGNOSIS — I42.9 CARDIOMYOPATHY, UNSPECIFIED TYPE (HCC): ICD-10-CM

## 2025-03-22 DIAGNOSIS — N18.9 CKD (CHRONIC KIDNEY DISEASE): ICD-10-CM

## 2025-03-22 DIAGNOSIS — J43.8 OTHER EMPHYSEMA (HCC): ICD-10-CM

## 2025-03-22 DIAGNOSIS — E87.1 HYPONATREMIA: ICD-10-CM

## 2025-03-22 DIAGNOSIS — I50.9 CHF EXACERBATION (HCC): ICD-10-CM

## 2025-03-22 DIAGNOSIS — R79.89 ELEVATED TROPONIN: ICD-10-CM

## 2025-03-22 DIAGNOSIS — E83.42 HYPOMAGNESEMIA: ICD-10-CM

## 2025-03-22 PROBLEM — G93.41 METABOLIC ENCEPHALOPATHY: Status: ACTIVE | Noted: 2025-03-22

## 2025-03-22 PROBLEM — R57.9 SHOCK (HCC): Status: ACTIVE | Noted: 2025-03-22

## 2025-03-22 PROBLEM — N18.31 STAGE 3A CHRONIC KIDNEY DISEASE (CKD) (HCC): Status: ACTIVE | Noted: 2025-03-22

## 2025-03-22 PROBLEM — E87.5 HYPERKALEMIA: Status: ACTIVE | Noted: 2025-03-22

## 2025-03-22 LAB
2HR DELTA HS TROPONIN: -62 NG/L
2HR DELTA HS TROPONIN: 22 NG/L
4HR DELTA HS TROPONIN: -54 NG/L
4HR DELTA HS TROPONIN: 24 NG/L
ALBUMIN SERPL BCG-MCNC: 3.6 G/DL (ref 3.5–5)
ALBUMIN SERPL BCG-MCNC: 3.6 G/DL (ref 3.5–5)
ALP SERPL-CCNC: 105 U/L (ref 34–104)
ALP SERPL-CCNC: 90 U/L (ref 34–104)
ALT SERPL W P-5'-P-CCNC: 15 U/L (ref 7–52)
ALT SERPL W P-5'-P-CCNC: 18 U/L (ref 7–52)
AMPHETAMINES SERPL QL SCN: POSITIVE
ANION GAP SERPL CALCULATED.3IONS-SCNC: 6 MMOL/L (ref 4–13)
ANION GAP SERPL CALCULATED.3IONS-SCNC: 7 MMOL/L (ref 4–13)
ANION GAP SERPL CALCULATED.3IONS-SCNC: 7 MMOL/L (ref 4–13)
ANION GAP SERPL CALCULATED.3IONS-SCNC: 8 MMOL/L (ref 4–13)
APTT PPP: 32 SECONDS (ref 23–34)
ARTERIAL PATENCY WRIST A: YES
AST SERPL W P-5'-P-CCNC: 29 U/L (ref 13–39)
AST SERPL W P-5'-P-CCNC: 38 U/L (ref 13–39)
BACTERIA UR QL AUTO: ABNORMAL /HPF
BARBITURATES UR QL: NEGATIVE
BASE EX.OXY STD BLDV CALC-SCNC: 37.9 % (ref 60–80)
BASE EX.OXY STD BLDV CALC-SCNC: 61.4 % (ref 60–80)
BASE EX.OXY STD BLDV CALC-SCNC: 75.7 % (ref 60–80)
BASE EXCESS BLDA CALC-SCNC: -3.5 MMOL/L
BASE EXCESS BLDV CALC-SCNC: -0.9 MMOL/L
BASE EXCESS BLDV CALC-SCNC: -3.2 MMOL/L
BASE EXCESS BLDV CALC-SCNC: 0.5 MMOL/L
BASOPHILS # BLD AUTO: 0.04 THOUSANDS/ÂΜL (ref 0–0.1)
BASOPHILS # BLD AUTO: 0.05 THOUSANDS/ÂΜL (ref 0–0.1)
BASOPHILS NFR BLD AUTO: 1 % (ref 0–1)
BASOPHILS NFR BLD AUTO: 1 % (ref 0–1)
BENZODIAZ UR QL: NEGATIVE
BILIRUB SERPL-MCNC: 1.09 MG/DL (ref 0.2–1)
BILIRUB SERPL-MCNC: 1.36 MG/DL (ref 0.2–1)
BILIRUB UR QL STRIP: NEGATIVE
BNP SERPL-MCNC: 2870 PG/ML (ref 0–100)
BODY TEMPERATURE: 97.7 DEGREES FEHRENHEIT
BUN SERPL-MCNC: 21 MG/DL (ref 5–25)
BUN SERPL-MCNC: 22 MG/DL (ref 5–25)
BUN SERPL-MCNC: 22 MG/DL (ref 5–25)
BUN SERPL-MCNC: 27 MG/DL (ref 5–25)
CALCIUM SERPL-MCNC: 9 MG/DL (ref 8.4–10.2)
CALCIUM SERPL-MCNC: 9 MG/DL (ref 8.4–10.2)
CALCIUM SERPL-MCNC: 9.1 MG/DL (ref 8.4–10.2)
CALCIUM SERPL-MCNC: 9.2 MG/DL (ref 8.4–10.2)
CARDIAC TROPONIN I PNL SERPL HS: 130 NG/L (ref ?–50)
CARDIAC TROPONIN I PNL SERPL HS: 137 NG/L (ref 8–18)
CARDIAC TROPONIN I PNL SERPL HS: 138 NG/L (ref ?–50)
CARDIAC TROPONIN I PNL SERPL HS: 138 NG/L (ref ?–50)
CARDIAC TROPONIN I PNL SERPL HS: 160 NG/L (ref ?–50)
CARDIAC TROPONIN I PNL SERPL HS: 162 NG/L (ref ?–50)
CARDIAC TROPONIN I PNL SERPL HS: 192 NG/L (ref ?–50)
CHLORIDE SERPL-SCNC: 100 MMOL/L (ref 96–108)
CHLORIDE SERPL-SCNC: 101 MMOL/L (ref 96–108)
CHLORIDE SERPL-SCNC: 102 MMOL/L (ref 96–108)
CHLORIDE SERPL-SCNC: 102 MMOL/L (ref 96–108)
CHOLEST SERPL-MCNC: 138 MG/DL (ref ?–200)
CLARITY UR: CLEAR
CO2 SERPL-SCNC: 21 MMOL/L (ref 21–32)
CO2 SERPL-SCNC: 26 MMOL/L (ref 21–32)
CO2 SERPL-SCNC: 26 MMOL/L (ref 21–32)
CO2 SERPL-SCNC: 28 MMOL/L (ref 21–32)
COCAINE UR QL: NEGATIVE
COLOR UR: YELLOW
CREAT SERPL-MCNC: 1.33 MG/DL (ref 0.6–1.3)
CREAT SERPL-MCNC: 1.37 MG/DL (ref 0.6–1.3)
CREAT SERPL-MCNC: 1.47 MG/DL (ref 0.6–1.3)
CREAT SERPL-MCNC: 1.58 MG/DL (ref 0.6–1.3)
EOSINOPHIL # BLD AUTO: 0.14 THOUSAND/ÂΜL (ref 0–0.61)
EOSINOPHIL # BLD AUTO: 0.19 THOUSAND/ÂΜL (ref 0–0.61)
EOSINOPHIL NFR BLD AUTO: 2 % (ref 0–6)
EOSINOPHIL NFR BLD AUTO: 3 % (ref 0–6)
ERYTHROCYTE [DISTWIDTH] IN BLOOD BY AUTOMATED COUNT: 15.9 % (ref 11.6–15.1)
ERYTHROCYTE [DISTWIDTH] IN BLOOD BY AUTOMATED COUNT: 15.9 % (ref 11.6–15.1)
FENTANYL UR QL SCN: NEGATIVE
FERRITIN SERPL-MCNC: 36 NG/ML (ref 24–336)
FLUAV AG UPPER RESP QL IA.RAPID: NEGATIVE
FLUBV AG UPPER RESP QL IA.RAPID: NEGATIVE
GFR SERPL CREATININE-BSD FRML MDRD: 47 ML/MIN/1.73SQ M
GFR SERPL CREATININE-BSD FRML MDRD: 51 ML/MIN/1.73SQ M
GFR SERPL CREATININE-BSD FRML MDRD: 56 ML/MIN/1.73SQ M
GFR SERPL CREATININE-BSD FRML MDRD: 58 ML/MIN/1.73SQ M
GLUCOSE SERPL-MCNC: 100 MG/DL (ref 65–140)
GLUCOSE SERPL-MCNC: 102 MG/DL (ref 65–140)
GLUCOSE SERPL-MCNC: 103 MG/DL (ref 65–140)
GLUCOSE SERPL-MCNC: 119 MG/DL (ref 65–140)
GLUCOSE SERPL-MCNC: 126 MG/DL (ref 65–140)
GLUCOSE SERPL-MCNC: 134 MG/DL (ref 65–140)
GLUCOSE SERPL-MCNC: 96 MG/DL (ref 65–140)
GLUCOSE UR STRIP-MCNC: ABNORMAL MG/DL
HCO3 BLDA-SCNC: 18.1 MMOL/L (ref 22–28)
HCO3 BLDV-SCNC: 22.4 MMOL/L (ref 24–30)
HCO3 BLDV-SCNC: 23.8 MMOL/L (ref 24–30)
HCO3 BLDV-SCNC: 26.7 MMOL/L (ref 24–30)
HCT VFR BLD AUTO: 47.9 % (ref 36.5–49.3)
HCT VFR BLD AUTO: 48.2 % (ref 36.5–49.3)
HDLC SERPL-MCNC: 44 MG/DL
HGB BLD-MCNC: 15 G/DL (ref 12–17)
HGB BLD-MCNC: 15.3 G/DL (ref 12–17)
HGB UR QL STRIP.AUTO: NEGATIVE
HYALINE CASTS #/AREA URNS LPF: ABNORMAL /LPF
HYDROCODONE UR QL SCN: NEGATIVE
IMM GRANULOCYTES # BLD AUTO: 0.02 THOUSAND/UL (ref 0–0.2)
IMM GRANULOCYTES # BLD AUTO: 0.03 THOUSAND/UL (ref 0–0.2)
IMM GRANULOCYTES NFR BLD AUTO: 0 % (ref 0–2)
IMM GRANULOCYTES NFR BLD AUTO: 0 % (ref 0–2)
INR PPP: 1.13 (ref 0.85–1.19)
KETONES UR STRIP-MCNC: NEGATIVE MG/DL
LACTATE SERPL-SCNC: 1.7 MMOL/L (ref 0.5–2)
LACTATE SERPL-SCNC: 1.8 MMOL/L (ref 0.5–2)
LACTATE SERPL-SCNC: 2.4 MMOL/L (ref 0.5–2)
LDLC SERPL CALC-MCNC: 77 MG/DL (ref 0–100)
LEUKOCYTE ESTERASE UR QL STRIP: ABNORMAL
LYMPHOCYTES # BLD AUTO: 1.34 THOUSANDS/ÂΜL (ref 0.6–4.47)
LYMPHOCYTES # BLD AUTO: 2.55 THOUSANDS/ÂΜL (ref 0.6–4.47)
LYMPHOCYTES NFR BLD AUTO: 15 % (ref 14–44)
LYMPHOCYTES NFR BLD AUTO: 33 % (ref 14–44)
MAGNESIUM SERPL-MCNC: 1.8 MG/DL (ref 1.9–2.7)
MAGNESIUM SERPL-MCNC: 1.9 MG/DL (ref 1.9–2.7)
MCH RBC QN AUTO: 29.1 PG (ref 26.8–34.3)
MCH RBC QN AUTO: 29.2 PG (ref 26.8–34.3)
MCHC RBC AUTO-ENTMCNC: 31.1 G/DL (ref 31.4–37.4)
MCHC RBC AUTO-ENTMCNC: 31.9 G/DL (ref 31.4–37.4)
MCV RBC AUTO: 91 FL (ref 82–98)
MCV RBC AUTO: 93 FL (ref 82–98)
METHADONE UR QL: NEGATIVE
MONOCYTES # BLD AUTO: 1.03 THOUSAND/ÂΜL (ref 0.17–1.22)
MONOCYTES # BLD AUTO: 1.15 THOUSAND/ÂΜL (ref 0.17–1.22)
MONOCYTES NFR BLD AUTO: 12 % (ref 4–12)
MONOCYTES NFR BLD AUTO: 15 % (ref 4–12)
NASAL CANNULA: 6
NASAL CANNULA: 6
NEUTROPHILS # BLD AUTO: 3.67 THOUSANDS/ÂΜL (ref 1.85–7.62)
NEUTROPHILS # BLD AUTO: 6.13 THOUSANDS/ÂΜL (ref 1.85–7.62)
NEUTS SEG NFR BLD AUTO: 48 % (ref 43–75)
NEUTS SEG NFR BLD AUTO: 70 % (ref 43–75)
NITRITE UR QL STRIP: NEGATIVE
NON-SQ EPI CELLS URNS QL MICRO: ABNORMAL /HPF
NRBC BLD AUTO-RTO: 0 /100 WBCS
NRBC BLD AUTO-RTO: 0 /100 WBCS
O2 CT BLDA-SCNC: 21.1 ML/DL (ref 16–23)
O2 CT BLDV-SCNC: 12.5 ML/DL
O2 CT BLDV-SCNC: 15.8 ML/DL
O2 CT BLDV-SCNC: 7.7 ML/DL
OPIATES UR QL SCN: NEGATIVE
OXYCODONE+OXYMORPHONE UR QL SCN: NEGATIVE
OXYHGB MFR BLDA: 97.7 % (ref 94–97)
PCO2 BLD: 41.2 MM HG (ref 42–50)
PCO2 BLDA: 25 MM HG (ref 36–44)
PCO2 BLDV: 39.7 MM HG (ref 42–50)
PCO2 BLDV: 42.1 MM HG (ref 42–50)
PCO2 BLDV: 48.8 MM HG (ref 42–50)
PCP UR QL: NEGATIVE
PH BLD: 7.35 [PH] (ref 7.3–7.4)
PH BLDA: 7.48 [PH] (ref 7.35–7.45)
PH BLDV: 7.34 [PH] (ref 7.3–7.4)
PH BLDV: 7.36 [PH] (ref 7.3–7.4)
PH BLDV: 7.4 [PH] (ref 7.3–7.4)
PH UR STRIP.AUTO: 6 [PH]
PLATELET # BLD AUTO: 236 THOUSANDS/UL (ref 149–390)
PLATELET # BLD AUTO: 242 THOUSANDS/UL (ref 149–390)
PMV BLD AUTO: 9.1 FL (ref 8.9–12.7)
PMV BLD AUTO: 9.4 FL (ref 8.9–12.7)
PO2 BLDA: 141.4 MM HG (ref 75–129)
PO2 BLDV: 26.8 MM HG (ref 35–45)
PO2 BLDV: 36.3 MM HG (ref 35–45)
PO2 BLDV: 45.2 MM HG (ref 35–45)
PO2 VENOUS TEMP CORRECTED: 35 MM HG (ref 35–45)
POTASSIUM SERPL-SCNC: 4 MMOL/L (ref 3.5–5.3)
POTASSIUM SERPL-SCNC: 4.1 MMOL/L (ref 3.5–5.3)
POTASSIUM SERPL-SCNC: 4.4 MMOL/L (ref 3.5–5.3)
POTASSIUM SERPL-SCNC: 5.6 MMOL/L (ref 3.5–5.3)
PROCALCITONIN SERPL-MCNC: 0.08 NG/ML
PROT SERPL-MCNC: 7 G/DL (ref 6.4–8.4)
PROT SERPL-MCNC: 7 G/DL (ref 6.4–8.4)
PROT UR STRIP-MCNC: ABNORMAL MG/DL
PROTHROMBIN TIME: 14.9 SECONDS (ref 12.3–15)
RBC # BLD AUTO: 5.16 MILLION/UL (ref 3.88–5.62)
RBC # BLD AUTO: 5.24 MILLION/UL (ref 3.88–5.62)
RBC #/AREA URNS AUTO: ABNORMAL /HPF
SARS-COV+SARS-COV-2 AG RESP QL IA.RAPID: NEGATIVE
SODIUM SERPL-SCNC: 131 MMOL/L (ref 135–147)
SODIUM SERPL-SCNC: 133 MMOL/L (ref 135–147)
SODIUM SERPL-SCNC: 135 MMOL/L (ref 135–147)
SODIUM SERPL-SCNC: 135 MMOL/L (ref 135–147)
SP GR UR STRIP.AUTO: 1.02 (ref 1–1.03)
SPECIMEN SOURCE: ABNORMAL
T4 FREE SERPL-MCNC: 1.19 NG/DL (ref 0.61–1.12)
THC UR QL: POSITIVE
TRIGL SERPL-MCNC: 87 MG/DL (ref ?–150)
TSH SERPL DL<=0.05 MIU/L-ACNC: 8.37 UIU/ML (ref 0.45–4.5)
UROBILINOGEN UR QL STRIP.AUTO: 0.2 E.U./DL
WBC # BLD AUTO: 7.63 THOUSAND/UL (ref 4.31–10.16)
WBC # BLD AUTO: 8.71 THOUSAND/UL (ref 4.31–10.16)
WBC #/AREA URNS AUTO: ABNORMAL /HPF

## 2025-03-22 PROCEDURE — 4A133B1 MONITORING OF ARTERIAL PRESSURE, PERIPHERAL, PERCUTANEOUS APPROACH: ICD-10-PCS | Performed by: ANESTHESIOLOGY

## 2025-03-22 PROCEDURE — NC001 PR NO CHARGE: Performed by: ANESTHESIOLOGY

## 2025-03-22 PROCEDURE — 82728 ASSAY OF FERRITIN: CPT

## 2025-03-22 PROCEDURE — 80061 LIPID PANEL: CPT

## 2025-03-22 PROCEDURE — 96374 THER/PROPH/DIAG INJ IV PUSH: CPT

## 2025-03-22 PROCEDURE — 71250 CT THORAX DX C-: CPT

## 2025-03-22 PROCEDURE — 82805 BLOOD GASES W/O2 SATURATION: CPT | Performed by: ANESTHESIOLOGY

## 2025-03-22 PROCEDURE — 71045 X-RAY EXAM CHEST 1 VIEW: CPT

## 2025-03-22 PROCEDURE — 84484 ASSAY OF TROPONIN QUANT: CPT

## 2025-03-22 PROCEDURE — 76937 US GUIDE VASCULAR ACCESS: CPT | Performed by: ANESTHESIOLOGY

## 2025-03-22 PROCEDURE — 82948 REAGENT STRIP/BLOOD GLUCOSE: CPT

## 2025-03-22 PROCEDURE — 83605 ASSAY OF LACTIC ACID: CPT

## 2025-03-22 PROCEDURE — 84439 ASSAY OF FREE THYROXINE: CPT

## 2025-03-22 PROCEDURE — NC001 PR NO CHARGE

## 2025-03-22 PROCEDURE — 85730 THROMBOPLASTIN TIME PARTIAL: CPT | Performed by: EMERGENCY MEDICINE

## 2025-03-22 PROCEDURE — 82805 BLOOD GASES W/O2 SATURATION: CPT

## 2025-03-22 PROCEDURE — 93005 ELECTROCARDIOGRAM TRACING: CPT

## 2025-03-22 PROCEDURE — 36415 COLL VENOUS BLD VENIPUNCTURE: CPT | Performed by: EMERGENCY MEDICINE

## 2025-03-22 PROCEDURE — 85025 COMPLETE CBC W/AUTO DIFF WBC: CPT

## 2025-03-22 PROCEDURE — 99223 1ST HOSP IP/OBS HIGH 75: CPT | Performed by: FAMILY MEDICINE

## 2025-03-22 PROCEDURE — 80048 BASIC METABOLIC PNL TOTAL CA: CPT

## 2025-03-22 PROCEDURE — 84484 ASSAY OF TROPONIN QUANT: CPT | Performed by: EMERGENCY MEDICINE

## 2025-03-22 PROCEDURE — 83735 ASSAY OF MAGNESIUM: CPT

## 2025-03-22 PROCEDURE — 87811 SARS-COV-2 COVID19 W/OPTIC: CPT | Performed by: EMERGENCY MEDICINE

## 2025-03-22 PROCEDURE — 36620 INSERTION CATHETER ARTERY: CPT | Performed by: ANESTHESIOLOGY

## 2025-03-22 PROCEDURE — 03HY32Z INSERTION OF MONITORING DEVICE INTO UPPER ARTERY, PERCUTANEOUS APPROACH: ICD-10-PCS | Performed by: ANESTHESIOLOGY

## 2025-03-22 PROCEDURE — 02HV33Z INSERTION OF INFUSION DEVICE INTO SUPERIOR VENA CAVA, PERCUTANEOUS APPROACH: ICD-10-PCS | Performed by: ANESTHESIOLOGY

## 2025-03-22 PROCEDURE — 84484 ASSAY OF TROPONIN QUANT: CPT | Performed by: NURSE PRACTITIONER

## 2025-03-22 PROCEDURE — 87804 INFLUENZA ASSAY W/OPTIC: CPT | Performed by: EMERGENCY MEDICINE

## 2025-03-22 PROCEDURE — 4A133J1 MONITORING OF ARTERIAL PULSE, PERIPHERAL, PERCUTANEOUS APPROACH: ICD-10-PCS | Performed by: ANESTHESIOLOGY

## 2025-03-22 PROCEDURE — 99291 CRITICAL CARE FIRST HOUR: CPT | Performed by: ANESTHESIOLOGY

## 2025-03-22 PROCEDURE — 83880 ASSAY OF NATRIURETIC PEPTIDE: CPT | Performed by: EMERGENCY MEDICINE

## 2025-03-22 PROCEDURE — 99285 EMERGENCY DEPT VISIT HI MDM: CPT | Performed by: EMERGENCY MEDICINE

## 2025-03-22 PROCEDURE — 80053 COMPREHEN METABOLIC PANEL: CPT

## 2025-03-22 PROCEDURE — 96375 TX/PRO/DX INJ NEW DRUG ADDON: CPT

## 2025-03-22 PROCEDURE — 84443 ASSAY THYROID STIM HORMONE: CPT

## 2025-03-22 PROCEDURE — 82805 BLOOD GASES W/O2 SATURATION: CPT | Performed by: NURSE PRACTITIONER

## 2025-03-22 PROCEDURE — 80053 COMPREHEN METABOLIC PANEL: CPT | Performed by: EMERGENCY MEDICINE

## 2025-03-22 PROCEDURE — 83735 ASSAY OF MAGNESIUM: CPT | Performed by: EMERGENCY MEDICINE

## 2025-03-22 PROCEDURE — 80307 DRUG TEST PRSMV CHEM ANLYZR: CPT

## 2025-03-22 PROCEDURE — 80048 BASIC METABOLIC PNL TOTAL CA: CPT | Performed by: NURSE PRACTITIONER

## 2025-03-22 PROCEDURE — 99285 EMERGENCY DEPT VISIT HI MDM: CPT

## 2025-03-22 PROCEDURE — 85610 PROTHROMBIN TIME: CPT | Performed by: EMERGENCY MEDICINE

## 2025-03-22 PROCEDURE — 36556 INSERT NON-TUNNEL CV CATH: CPT | Performed by: ANESTHESIOLOGY

## 2025-03-22 PROCEDURE — 81001 URINALYSIS AUTO W/SCOPE: CPT | Performed by: EMERGENCY MEDICINE

## 2025-03-22 PROCEDURE — 83605 ASSAY OF LACTIC ACID: CPT | Performed by: EMERGENCY MEDICINE

## 2025-03-22 PROCEDURE — 84145 PROCALCITONIN (PCT): CPT | Performed by: EMERGENCY MEDICINE

## 2025-03-22 PROCEDURE — 85025 COMPLETE CBC W/AUTO DIFF WBC: CPT | Performed by: EMERGENCY MEDICINE

## 2025-03-22 RX ORDER — LORAZEPAM 2 MG/ML
1 INJECTION INTRAMUSCULAR ONCE
Status: COMPLETED | OUTPATIENT
Start: 2025-03-22 | End: 2025-03-22

## 2025-03-22 RX ORDER — MAGNESIUM SULFATE 1 G/100ML
1 INJECTION INTRAVENOUS ONCE
Status: COMPLETED | OUTPATIENT
Start: 2025-03-22 | End: 2025-03-22

## 2025-03-22 RX ORDER — DILTIAZEM HYDROCHLORIDE 5 MG/ML
15 INJECTION INTRAVENOUS ONCE
Status: COMPLETED | OUTPATIENT
Start: 2025-03-22 | End: 2025-03-22

## 2025-03-22 RX ORDER — METOPROLOL TARTRATE 1 MG/ML
5 INJECTION, SOLUTION INTRAVENOUS EVERY 6 HOURS PRN
Status: DISCONTINUED | OUTPATIENT
Start: 2025-03-22 | End: 2025-03-23

## 2025-03-22 RX ORDER — DILTIAZEM HYDROCHLORIDE 5 MG/ML
10 INJECTION INTRAVENOUS ONCE
Status: COMPLETED | OUTPATIENT
Start: 2025-03-22 | End: 2025-03-22

## 2025-03-22 RX ORDER — ACETAMINOPHEN 325 MG/1
650 TABLET ORAL EVERY 6 HOURS PRN
Status: DISCONTINUED | OUTPATIENT
Start: 2025-03-22 | End: 2025-03-28 | Stop reason: HOSPADM

## 2025-03-22 RX ORDER — FUROSEMIDE 10 MG/ML
40 INJECTION INTRAMUSCULAR; INTRAVENOUS ONCE
Status: COMPLETED | OUTPATIENT
Start: 2025-03-22 | End: 2025-03-22

## 2025-03-22 RX ORDER — MILRINONE LACTATE 0.2 MG/ML
0.13 INJECTION, SOLUTION INTRAVENOUS CONTINUOUS
Status: DISCONTINUED | OUTPATIENT
Start: 2025-03-22 | End: 2025-03-24

## 2025-03-22 RX ORDER — CARVEDILOL 6.25 MG/1
6.25 TABLET ORAL 2 TIMES DAILY WITH MEALS
Status: DISCONTINUED | OUTPATIENT
Start: 2025-03-22 | End: 2025-03-22

## 2025-03-22 RX ORDER — PRAMIPEXOLE DIHYDROCHLORIDE 0.25 MG/1
0.25 TABLET ORAL
Status: DISCONTINUED | OUTPATIENT
Start: 2025-03-22 | End: 2025-03-22

## 2025-03-22 RX ORDER — NICOTINE 21 MG/24HR
1 PATCH, TRANSDERMAL 24 HOURS TRANSDERMAL DAILY
Status: DISCONTINUED | OUTPATIENT
Start: 2025-03-22 | End: 2025-03-28 | Stop reason: HOSPADM

## 2025-03-22 RX ORDER — DIGOXIN 0.25 MG/ML
250 INJECTION INTRAMUSCULAR; INTRAVENOUS ONCE
Status: COMPLETED | OUTPATIENT
Start: 2025-03-22 | End: 2025-03-22

## 2025-03-22 RX ORDER — LORAZEPAM 1 MG/1
1 TABLET ORAL EVERY 8 HOURS PRN
Status: DISCONTINUED | OUTPATIENT
Start: 2025-03-22 | End: 2025-03-22

## 2025-03-22 RX ORDER — SACUBITRIL AND VALSARTAN 49; 51 MG/1; MG/1
1 TABLET, FILM COATED ORAL 2 TIMES DAILY
COMMUNITY
Start: 2025-02-26 | End: 2025-03-28

## 2025-03-22 RX ORDER — PANTOPRAZOLE SODIUM 40 MG/1
40 TABLET, DELAYED RELEASE ORAL 2 TIMES DAILY
Status: DISCONTINUED | OUTPATIENT
Start: 2025-03-22 | End: 2025-03-28 | Stop reason: HOSPADM

## 2025-03-22 RX ORDER — EMPAGLIFLOZIN 10 MG/1
10 TABLET, FILM COATED ORAL EVERY MORNING
COMMUNITY
Start: 2025-02-26 | End: 2025-03-28

## 2025-03-22 RX ORDER — PRAMIPEXOLE DIHYDROCHLORIDE 0.25 MG/1
0.25 TABLET ORAL
COMMUNITY
End: 2025-03-28

## 2025-03-22 RX ORDER — DEXTROSE MONOHYDRATE 25 G/50ML
25 INJECTION, SOLUTION INTRAVENOUS ONCE
Status: COMPLETED | OUTPATIENT
Start: 2025-03-22 | End: 2025-03-22

## 2025-03-22 RX ORDER — AMLODIPINE BESYLATE 2.5 MG/1
2.5 TABLET ORAL EVERY MORNING
COMMUNITY
End: 2025-03-28

## 2025-03-22 RX ORDER — METOPROLOL TARTRATE 25 MG/1
25 TABLET, FILM COATED ORAL EVERY 6 HOURS
Status: DISCONTINUED | OUTPATIENT
Start: 2025-03-22 | End: 2025-03-22

## 2025-03-22 RX ORDER — METOPROLOL TARTRATE 1 MG/ML
2.5 INJECTION, SOLUTION INTRAVENOUS ONCE
Status: COMPLETED | OUTPATIENT
Start: 2025-03-22 | End: 2025-03-22

## 2025-03-22 RX ORDER — FUROSEMIDE 10 MG/ML
40 INJECTION INTRAMUSCULAR; INTRAVENOUS
Status: DISCONTINUED | OUTPATIENT
Start: 2025-03-22 | End: 2025-03-23

## 2025-03-22 RX ORDER — TIOTROPIUM BROMIDE INHALATION SPRAY 3.12 UG/1
2 SPRAY, METERED RESPIRATORY (INHALATION) DAILY
Status: ON HOLD | COMMUNITY
Start: 2024-12-31 | End: 2025-03-22 | Stop reason: ALTCHOICE

## 2025-03-22 RX ORDER — METOPROLOL TARTRATE 1 MG/ML
5 INJECTION, SOLUTION INTRAVENOUS ONCE
Status: COMPLETED | OUTPATIENT
Start: 2025-03-22 | End: 2025-03-22

## 2025-03-22 RX ORDER — SACUBITRIL AND VALSARTAN 24; 26 MG/1; MG/1
1 TABLET, FILM COATED ORAL 2 TIMES DAILY
Status: ON HOLD | COMMUNITY
Start: 2024-12-31 | End: 2025-03-22 | Stop reason: ALTCHOICE

## 2025-03-22 RX ADMIN — MAGNESIUM SULFATE HEPTAHYDRATE 1 G: 1 INJECTION, SOLUTION INTRAVENOUS at 05:19

## 2025-03-22 RX ADMIN — SODIUM CHLORIDE 8 UNITS: 9 INJECTION, SOLUTION INTRAMUSCULAR; INTRAVENOUS; SUBCUTANEOUS at 07:52

## 2025-03-22 RX ADMIN — FUROSEMIDE 40 MG: 10 INJECTION, SOLUTION INTRAVENOUS at 22:46

## 2025-03-22 RX ADMIN — APIXABAN 5 MG: 5 TABLET, FILM COATED ORAL at 19:14

## 2025-03-22 RX ADMIN — METOPROLOL TARTRATE 2.5 MG: 5 INJECTION INTRAVENOUS at 22:23

## 2025-03-22 RX ADMIN — FUROSEMIDE 40 MG: 10 INJECTION, SOLUTION INTRAVENOUS at 04:23

## 2025-03-22 RX ADMIN — METOPROLOL TARTRATE 2.5 MG: 5 INJECTION INTRAVENOUS at 21:41

## 2025-03-22 RX ADMIN — APIXABAN 5 MG: 5 TABLET, FILM COATED ORAL at 07:56

## 2025-03-22 RX ADMIN — METOPROLOL TARTRATE 25 MG: 25 TABLET, FILM COATED ORAL at 10:28

## 2025-03-22 RX ADMIN — METOPROLOL TARTRATE 5 MG: 5 INJECTION INTRAVENOUS at 11:19

## 2025-03-22 RX ADMIN — METOROPROLOL TARTRATE 5 MG: 5 INJECTION, SOLUTION INTRAVENOUS at 07:40

## 2025-03-22 RX ADMIN — MILRINONE LACTATE IN DEXTROSE 0.25 MCG/KG/MIN: 200 INJECTION, SOLUTION INTRAVENOUS at 15:25

## 2025-03-22 RX ADMIN — PANTOPRAZOLE SODIUM 40 MG: 40 TABLET, DELAYED RELEASE ORAL at 20:02

## 2025-03-22 RX ADMIN — DEXTROSE MONOHYDRATE 25 ML: 25 INJECTION, SOLUTION INTRAVENOUS at 07:27

## 2025-03-22 RX ADMIN — NOREPINEPHRINE BITARTRATE 2 MCG/MIN: 1 SOLUTION INTRAVENOUS at 14:36

## 2025-03-22 RX ADMIN — DILTIAZEM HYDROCHLORIDE 10 MG: 5 INJECTION, SOLUTION INTRAVENOUS at 05:22

## 2025-03-22 RX ADMIN — NICOTINE 1 PATCH: 21 PATCH, EXTENDED RELEASE TRANSDERMAL at 07:57

## 2025-03-22 RX ADMIN — DIGOXIN 250 MCG: 0.25 INJECTION INTRAMUSCULAR; INTRAVENOUS at 22:45

## 2025-03-22 RX ADMIN — CARVEDILOL 6.25 MG: 6.25 TABLET, FILM COATED ORAL at 07:55

## 2025-03-22 RX ADMIN — LORAZEPAM 1 MG: 2 INJECTION INTRAMUSCULAR; INTRAVENOUS at 11:52

## 2025-03-22 RX ADMIN — DILTIAZEM HYDROCHLORIDE 15 MG: 5 INJECTION, SOLUTION INTRAVENOUS at 03:56

## 2025-03-22 NOTE — ASSESSMENT & PLAN NOTE
Presented to [facility] on 3/21 for A-fib RVR and was recommended admission at that time, but patient refused and stated that he wanted to be closer to home.  Complains of shortness of breath at home  Telemetry with A-fib RVR 140s  Questionable V. tach on telemetry.  Rapid response was called at time of admission for this.  Likely all underlying A-fib per cardiology.  Was given IV Cardizem and Lopressor  ICD interrogated which only observed A-fib  TSH elevated, t4 pending   Has not been taking eliquis - was not able to afford it   Cardiology recommending switch from coreg to Lopressor 25 mg every 6 hours and titrate  Monitor on telemetry  N.p.o. for Monday morning for cardioversion per cardiology recommendations  As needed Lopressor for AF RVR

## 2025-03-22 NOTE — ED PROVIDER NOTES
Time reflects when diagnosis was documented in both MDM as applicable and the Disposition within this note       Time User Action Codes Description Comment    3/22/2025  4:04 AM Remaley, Bhargav Add [R00.0] Tachycardia     3/22/2025  4:04 AM Remaley, Bhargav Add [I50.9] CHF exacerbation (HCC)     3/22/2025  4:35 AM Remaley, Bhargav Add [R79.89] Elevated troponin     3/22/2025  4:46 AM Remaley, Bhargav Add [E87.1] Hyponatremia     3/22/2025  4:46 AM Remaley, Bhargav Add [E83.42] Hypomagnesemia     3/22/2025  4:46 AM Remaley, Bhargav Add [N18.9] CKD (chronic kidney disease)           ED Disposition       None          Assessment & Plan       Medical Decision Making  Problems Addressed:  CHF exacerbation (HCC): acute illness or injury  CKD (chronic kidney disease): chronic illness or injury  Elevated troponin: chronic illness or injury  Hypomagnesemia: acute illness or injury  Hyponatremia: acute illness or injury  Tachycardia: acute illness or injury    Amount and/or Complexity of Data Reviewed  Labs: ordered. Decision-making details documented in ED Course.  Radiology: ordered and independent interpretation performed. Decision-making details documented in ED Course.  ECG/medicine tests: ordered and independent interpretation performed. Decision-making details documented in ED Course.    Risk  Prescription drug management.        ED Course as of 03/22/25 0449   Sat Mar 22, 2025   0445 Potassium(!): 5.6  Noted specimen hemolyzed likely reflects normal potassium level.     0448 Spoke with hospitalist advanced practitioner on-call Howe reviewed case and findings in the emergency department management thus far accepts for admission.         Medications   magnesium sulfate IVPB (premix) SOLN 1 g (has no administration in time range)   diltiazem (CARDIZEM) injection 15 mg (15 mg Intravenous Given 3/22/25 9426)   furosemide (LASIX) injection 40 mg (40 mg Intravenous Given 3/22/25 3143)       ED Risk Strat Scores     "                        SBIRT 20yo+      Flowsheet Row Most Recent Value   Initial Alcohol Screen: US AUDIT-C     1. How often do you have a drink containing alcohol? 1 Filed at: 03/22/2025 0345   2. How many drinks containing alcohol do you have on a typical day you are drinking?  0 Filed at: 03/22/2025 0345   3a. Male UNDER 65: How often do you have five or more drinks on one occasion? 0 Filed at: 03/22/2025 0345   Audit-C Score 1 Filed at: 03/22/2025 0345   SILVIA: How many times in the past year have you...    Used an illegal drug or used a prescription medication for non-medical reasons? Never Filed at: 03/22/2025 0345                            History of Present Illness       Chief Complaint   Patient presents with    Shortness of Breath     Pt complains of not being able to breathe right at home, recently seen in the ER at a separate hospital but pt refused.       Past Medical History:   Diagnosis Date    Anxiety     Cardiomyopathy (HCC)     CHF (congestive heart failure) (HCC)     Depression     Emphysema of lung (HCC)     GERD (gastroesophageal reflux disease)     Gout     Hypertension     Neuropathy     Raynaud's disease     Right inguinal hernia     Scleroderma (HCC)       Past Surgical History:   Procedure Laterality Date    AMPUTATION Right     pt had tip of \"pointer finger\" d/t scleraderma    CARDIAC PACEMAKER PLACEMENT      FINGER AMPUTATION Left 01/31/2024    Procedure: AMPUTATION FINGER, LEFT INDEX FINGER CPT: 42960;  Surgeon: Pedro Vazquez MD;  Location:  MAIN OR;  Service: Orthopedics    HERNIA REPAIR Left     times 2    MO AMP F/TH 1/2 JT/PHALANX W/NEURECT W/DIR CLSR Right 01/23/2024    Procedure: AMPUTATION FINGER, RIGHT MIDDLE;  Surgeon: Pedro Vazquez MD;  Location: [facility];  Service: Orthopedics    MO RPR 1ST INGUN HRNA AGE 5 YRS/> REDUCIBLE Right 03/03/2023    Procedure: OPEN INGUINAL HERNIA REPAIR WITH MESH;  Surgeon: Temo Jackson DO;  Location:  MAIN OR;  Service: General      Family " History   Problem Relation Age of Onset    Hypertension Father       Social History     Tobacco Use    Smoking status: Every Day     Current packs/day: 1.00     Average packs/day: 1 pack/day for 45.2 years (45.2 ttl pk-yrs)     Types: Cigarettes     Start date: 2000    Smokeless tobacco: Never    Tobacco comments:     Last cigarette 0430   Vaping Use    Vaping status: Never Used   Substance Use Topics    Alcohol use: Not Currently     Comment: occasional    Drug use: Yes     Types: Marijuana, Methamphetamines     Comment: Hx meth use      E-Cigarette/Vaping    E-Cigarette Use Never User       E-Cigarette/Vaping Substances    Nicotine No     THC No     CBD No     Flavoring No       I have reviewed and agree with the history as documented.     Patient is a 59-year-old male with history of atrial fibrillation hypertension congestive heart failure presents the emergency department complaining of generalized weakness fatigue leg swelling and shortness of breath elevated heart rate and not feeling well symptoms started several days ago was seen by cardiology group at [facility] and was advised to come to this hospital and be admitted yesterday.        History provided by:  Patient and spouse  Shortness of Breath  Severity:  Moderate  Onset quality:  Gradual  Duration:  3 days  Timing:  Constant  Progression:  Worsening  Chronicity:  Recurrent  Associated symptoms: no abdominal pain, no chest pain, no fever and no vomiting        Review of Systems   Constitutional:  Positive for fatigue. Negative for chills and fever.   Respiratory:  Positive for shortness of breath.    Cardiovascular:  Positive for palpitations and leg swelling. Negative for chest pain.   Gastrointestinal:  Negative for abdominal pain, nausea and vomiting.   All other systems reviewed and are negative.          Objective       ED Triage Vitals [03/22/25 0415]   Temp Pulse Blood Pressure Respirations SpO2 Patient Position - Orthostatic VS   -- 103 102/59  20 96 % Lying      Temp src Heart Rate Source BP Location FiO2 (%) Pain Score    -- Monitor Left arm -- No Pain      Vitals      Date and Time Temp Pulse SpO2 Resp BP Pain Score FACES Pain Rating User   03/22/25 0430 -- 109 99 % 24 98/62 No Pain -- AR   03/22/25 0415 -- 103 96 % 20 102/59 No Pain -- AR            Physical Exam  Vitals and nursing note reviewed.   Constitutional:       General: He is not in acute distress.     Appearance: Normal appearance.   HENT:      Head: Normocephalic and atraumatic.      Nose: Nose normal.   Eyes:      Conjunctiva/sclera: Conjunctivae normal.   Cardiovascular:      Rate and Rhythm: Tachycardia present. Rhythm irregular.   Pulmonary:      Effort: Pulmonary effort is normal. Tachypnea present. No respiratory distress.      Breath sounds: Examination of the right-lower field reveals decreased breath sounds. Examination of the left-lower field reveals decreased breath sounds. Decreased breath sounds present.   Skin:     General: Skin is dry.   Neurological:      General: No focal deficit present.      Mental Status: He is alert and oriented to person, place, and time.         Results Reviewed       Procedure Component Value Units Date/Time    Comprehensive metabolic panel [[id number]]  (Abnormal) Collected: 03/22/25 0353    Lab Status: Final result Specimen: Blood from Arm, Right Updated: 03/22/25 0444     Sodium 131 mmol/L      Potassium 5.6 mmol/L      Chloride 102 mmol/L      CO2 21 mmol/L      ANION GAP 8 mmol/L      BUN 21 mg/dL      Creatinine 1.33 mg/dL      Glucose 103 mg/dL      Calcium 9.0 mg/dL      AST 38 U/L      ALT 15 U/L      Alkaline Phosphatase 90 U/L      Total Protein 7.0 g/dL      Albumin 3.6 g/dL      Total Bilirubin 1.09 mg/dL      eGFR 58 ml/min/1.73sq m     Narrative:      National Kidney Disease Foundation guidelines for Chronic Kidney Disease (CKD):     Stage 1 with normal or high GFR (GFR > 90 mL/min/1.73 square meters)    Stage 2 Mild CKD (GFR = 60-89  mL/min/1.73 square meters)    Stage 3A Moderate CKD (GFR = 45-59 mL/min/1.73 square meters)    Stage 3B Moderate CKD (GFR = 30-44 mL/min/1.73 square meters)    Stage 4 Severe CKD (GFR = 15-29 mL/min/1.73 square meters)    Stage 5 End Stage CKD (GFR <15 mL/min/1.73 square meters)  Note: GFR calculation is accurate only with a steady state creatinine    Magnesium [[id number]]  (Abnormal) Collected: 03/22/25 0353    Lab Status: Final result Specimen: Blood from Arm, Right Updated: 03/22/25 0444     Magnesium 1.8 mg/dL     Lactic acid, plasma (w/reflex if result > 2.0) [[id number]]  (Normal) Collected: 03/22/25 0353    Lab Status: Final result Specimen: Blood from Arm, Right Updated: 03/22/25 0443     LACTIC ACID 1.8 mmol/L     Narrative:      Result may be elevated if tourniquet was used during collection.    B-Type Natriuretic Peptide(BNP) [[id number]]  (Abnormal) Collected: 03/22/25 0353    Lab Status: Final result Specimen: Blood from Arm, Right Updated: 03/22/25 0440     BNP 2,870 pg/mL     HS Troponin 0hr (reflex protocol) [[id number]]  (Abnormal) Collected: 03/22/25 0353    Lab Status: Final result Specimen: Blood from Arm, Right Updated: 03/22/25 0434     hs TnI 0hr 138 ng/L     Procalcitonin [[id number]]  (Normal) Collected: 03/22/25 0353    Lab Status: Final result Specimen: Blood from Arm, Right Updated: 03/22/25 0434     Procalcitonin 0.08 ng/ml     HS Troponin I 4hr [[id number]]     Lab Status: No result Specimen: Blood     HS Troponin I 2hr [[id number]]     Lab Status: No result Specimen: Blood     FLU/COVID Rapid Antigen (30 min. TAT) - Preferred screening test in ED [[id number]]  (Normal) Collected: 03/22/25 0353    Lab Status: Final result Specimen: Nares from Nose Updated: 03/22/25 0422     SARS COV Rapid Antigen Negative     Influenza A Rapid Antigen Negative     Influenza B Rapid Antigen Negative    Narrative:      This test has been performed using the Quidel Inez 2 FLU+SARS Antigen test under the  Emergency Use Authorization (EUA). This test has been validated by the  and verified by the performing laboratory. The Inez uses lateral flow immunofluorescent sandwich assay to detect SARS-COV, Influenza A and Influenza B Antigen.     The Timelyidel Inez 2 SARS Antigen test does not differentiate between SARS-CoV and SARS-CoV-2.     Negative results are presumptive and may be confirmed with a molecular assay, if necessary, for patient management. Negative results do not rule out SARS-CoV-2 or influenza infection and should not be used as the sole basis for treatment or patient management decisions. A negative test result may occur if the level of antigen in a sample is below the limit of detection of this test.     Positive results are indicative of the presence of viral antigens, but do not rule out bacterial infection or co-infection with other viruses.     All test results should be used as an adjunct to clinical observations and other information available to the provider.    FOR PEDIATRIC PATIENTS - copy/paste COVID Guidelines URL to browser: [email or web address]    Protime-INR [[id number]]  (Normal) Collected: 03/22/25 0353    Lab Status: Final result Specimen: Blood from Arm, Right Updated: 03/22/25 0419     Protime 14.9 seconds      INR 1.13    Narrative:      INR Therapeutic Range    Indication                                             INR Range      Atrial Fibrillation                                               2.0-3.0  Hypercoagulable State                                    2.0.2.3  Left Ventricular Asist Device                            2.0-3.0  Mechanical Heart Valve                                  -    Aortic(with afib, MI, embolism, HF, LA enlargement,    and/or coagulopathy)                                     2.0-3.0 (2.5-3.5)     Mitral                                                              2.5-3.5  Prosthetic/Bioprosthetic Heart Valve               2.0-3.0  Venous thromboembolism (VTE: VT, PE        2.0-3.0    APTT [[id number]]  (Normal) Collected: 03/22/25 0353    Lab Status: Final result Specimen: Blood from Arm, Right Updated: 03/22/25 0419     PTT 32 seconds     CBC and differential [[id number]]  (Abnormal) Collected: 03/22/25 0353    Lab Status: Final result Specimen: Blood from Arm, Right Updated: 03/22/25 0404     WBC 7.63 Thousand/uL      RBC 5.16 Million/uL      Hemoglobin 15.0 g/dL      Hematocrit 48.2 %      MCV 93 fL      MCH 29.1 pg      MCHC 31.1 g/dL      RDW 15.9 %      MPV 9.4 fL      Platelets 236 Thousands/uL      nRBC 0 /100 WBCs      Segmented % 48 %      Immature Grans % 0 %      Lymphocytes % 33 %      Monocytes % 15 %      Eosinophils Relative 3 %      Basophils Relative 1 %      Absolute Neutrophils 3.67 Thousands/µL      Absolute Immature Grans 0.02 Thousand/uL      Absolute Lymphocytes 2.55 Thousands/µL      Absolute Monocytes 1.15 Thousand/µL      Eosinophils Absolute 0.19 Thousand/µL      Basophils Absolute 0.05 Thousands/µL     UA w Reflex to Microscopic w Reflex to Culture [[id number]]     Lab Status: No result Specimen: Urine             XR chest 1 view portable   ED Interpretation by Bhargav Antonio DO (03/22 0413)   Cardiomegaly and pulmonary vascular congestion      CT chest wo contrast    (Results Pending)       ECG 12 Lead Documentation Only    Date/Time: 3/22/2025 3:52 AM    Performed by: Bhargav Antonio DO  Authorized by: Bhargav Antonio DO    ECG reviewed by me, the ED Provider: yes    Patient location:  ED  Previous ECG:     Comparison to cardiac monitor: Yes    Quality:     Tracing quality:  Limited by artifact  Rate:     ECG rate:  127    ECG rate assessment: tachycardic    Rhythm:     Rhythm: paced    Pacing:     Type of pacing:  Ventricular (Atrial sensed ventricular pacing with PVCs)  QRS:     QRS axis:  Right    QRS intervals:  Wide  Conduction:      Conduction: abnormal      Abnormal conduction: complete RBBB    ST segments:     ST segments:  Non-specific  T waves:     T waves: non-specific        ED Medication and Procedure Management   Prior to Admission Medications   Prescriptions Last Dose Informant Patient Reported? Taking?   albuterol (PROVENTIL HFA,VENTOLIN HFA) 90 mcg/act inhaler   Yes No   Sig: Inhale 2 puffs every 4 (four) hours as needed    apixaban (ELIQUIS) 5 mg   No No   Sig: Take 1 tablet (5 mg total) by mouth 2 (two) times a day   carvedilol (COREG) 6.25 mg tablet   No No   Sig: Take 1 tablet (6.25 mg total) by mouth 2 (two) times a day with meals   furosemide (LASIX) 20 mg tablet   No No   Sig: Take 1 tablet (20 mg total) by mouth daily   losartan (COZAAR) 50 mg tablet   No No   Sig: Take 1 tablet (50 mg total) by mouth daily   pantoprazole (PROTONIX) 40 mg tablet   No No   Sig: Take 1 tablet (40 mg total) by mouth 2 (two) times a day   sucralfate (CARAFATE) 1 g tablet   No No   Sig: Take 1 tablet (1 g total) by mouth 4 (four) times a day for 14 days      Facility-Administered Medications: None     Patient's Medications   Discharge Prescriptions    No medications on file     No discharge procedures on file.  ED SEPSIS DOCUMENTATION   Time reflects when diagnosis was documented in both MDM as applicable and the Disposition within this note       Time User Action Codes Description Comment    3/22/2025  4:04 AM Bhargav Antonio [R00.0] Tachycardia     3/22/2025  4:04 AM Bhargav Antonio [I50.9] CHF exacerbation (HCC)     3/22/2025  4:35 AM Bhargav Antonio [R79.89] Elevated troponin     3/22/2025  4:46 AM Bhargav Antonio [E87.1] Hyponatremia     3/22/2025  4:46 AM Bhargav Antonio [E83.42] Hypomagnesemia     3/22/2025  4:46 AM Bhargav Antonio [N18.9] CKD (chronic kidney disease)                  Bhargav Antonio DO  03/22/25 0449

## 2025-03-22 NOTE — SEPSIS NOTE
Sepsis Note   Britton Batista 59 y.o. male MRN: [id number]  Unit/Bed#: -01 Encounter: [id number]    SIRS in the setting of NICM w/ suspected cardiogenic shock due to methamphetamine use a/e/b hypotension, reduced UOP, lactic 2.4, ScVo2 61. No source of infection suspected. Monitor off Abx.       Initial Sepsis Screening       Row Name 03/22/25 1700                Is the patient's history suggestive of a new or worsening infection? No  -JE                  User Key  (r) = Recorded By, (t) = Taken By, (c) = Cosigned By      Initials Name Provider Type    KATYA Servin PA-C Physician Assistant                      Body mass index is 24.93 kg/m².  Wt Readings from Last 1 Encounters:   03/22/25 76.6 kg (168 lb 12.8 oz)     IBW (Ideal Body Weight): 70.7 kg    Ideal body weight: 70.7 kg (155 lb 13.8 oz)  Adjusted ideal body weight: 73 kg (161 lb 0.6 oz)

## 2025-03-22 NOTE — ASSESSMENT & PLAN NOTE
Lab Results   Component Value Date    EGFR 47 03/22/2025    EGFR 56 03/22/2025    EGFR 58 03/22/2025    CREATININE 1.58 (H) 03/22/2025    CREATININE 1.37 (H) 03/22/2025    CREATININE 1.33 (H) 03/22/2025   Baseline Cr 1.2-1.3  3/22 Lasix 20mg IV, diuresed ~ 1.3L  Continue edmondson for accurate I&O  Trend BMP

## 2025-03-22 NOTE — NURSING NOTE
Patient's oxygen level and arterial line blood pressure readings are not accurate due to patient moving around in the bed and using his left arm.

## 2025-03-22 NOTE — NURSING NOTE
RN entered room and found patient to be diaphoretic and restless. EKG obtained and this RN attempted to get an automatic BP but was unsuccessful. Manual BP 80/60. Critical care made aware.

## 2025-03-22 NOTE — PROCEDURES
Central Line Insertion    Date/Time: 3/22/2025 3:53 PM    Performed by: Jhon Servin PA-C  Authorized by: Jhon Servin PA-C    Patient location:  Bedside and ICU  Other Assisting Provider: Yes (comment) (Estephania Myrick)    Consent:     Consent obtained:  Emergent situation  Universal protocol:     Patient identity confirmed:  Hospital-assigned identification number and arm band  Pre-procedure details:     Hand hygiene: Hand hygiene performed prior to insertion      Sterile barrier technique: All elements of maximal sterile technique followed      Skin preparation:  2% chlorhexidine    Skin preparation agent: Skin preparation agent completely dried prior to procedure    Indications:     Central line indications: medications requiring central line    Procedure details:     Location:  Right internal jugular    Vessel type: vein      Laterality:  Right    Approach: percutaneous technique used      Patient position:  Trendelenburg    Catheter type:  Triple lumen 16cm    Catheter size:  7 Fr    Landmarks identified: yes      Ultrasound guidance: yes      Ultrasound image availability:  Images available in PACS    Sterile ultrasound techniques: Sterile gel and sterile probe covers were used      Number of attempts:  2    Successful placement: yes      Catheter tip vessel location: superior vena cava    Post-procedure details:     Post-procedure:  Dressing applied and line sutured    Assessment:  Blood return through all ports, no pneumothorax on x-ray, placement verified by x-ray and free fluid flow    Post-procedure complications: none      Patient tolerance of procedure:  Tolerated well, no immediate complications  Comments:      2 attempts were made on the left and blood return obtained both times but unable to pass guidewire past 20cm. Pt was re-prepped and redraped in a sterile fashion and 1 successful attempt was made on the right side without complication.

## 2025-03-22 NOTE — NURSING NOTE
Reviewed with pharmacist at [facility] pharmacy patient medication list, per patient the only medication he has not taken was elequis due to financial reasons: cost of medication

## 2025-03-22 NOTE — ASSESSMENT & PLAN NOTE
Wt Readings from Last 3 Encounters:   03/22/25 76.6 kg (168 lb 12.8 oz)   02/06/25 73.7 kg (162 lb 6.4 oz)   09/18/24 75.4 kg (166 lb 3.6 oz)     Poor compliance.  Managed at home on Entresto, furosemide, Jardiance, losartan and carvedilol  Holding jardiance, entresto and losartan at this time while adjusting medications for a fib. Will resume when able   BNP 2870 on admission  CT chest with marked cardiomegaly, no pulmonary edema.  Possible third spacing in the abdomen  CHF likely driving a fib  Will check venous duplex to rule out DVT since patient was not taking AC   Hold home Lasix, start Lasix 40 mg IV twice daily  Monitor daily weights, intake and output

## 2025-03-22 NOTE — ASSESSMENT & PLAN NOTE
5.6 on admission  Given dextrose, Lasix, and insulin  Resolved on repeat bmp   Monitoring on telemetry

## 2025-03-22 NOTE — ASSESSMENT & PLAN NOTE
Likely cardiogenic I/S/O NICM due to methamphetamine use  No suspected source of infection, will hold on IVF resuscitation due to severely reduced EF and concern for volume overload  Prior cardiac cath in 2017 and 2020 unremarkable   POC Cardiac US today showing severely reduced EF w/ global hypokinesis  A/e/b: decreasing urine output, tachycardia, hypotension, lactic acidosis, altered mental status  ScVo2 61  Continue milrinone 0.25 and levophed for MAP > 65  Trend lactic, UOP, ScVO2  Repeat formal echo   Cardiology consult

## 2025-03-22 NOTE — PLAN OF CARE
Problem: CARDIOVASCULAR - ADULT  Goal: Maintains optimal cardiac output and hemodynamic stability  Description: INTERVENTIONS:- Monitor I/O, vital signs and rhythm- Monitor for S/S and trends of decreased cardiac output- Administer and titrate ordered vasoactive medications to optimize hemodynamic stability- Assess quality of pulses, skin color and temperature- Assess for signs of decreased coronary artery perfusion- Instruct patient to report change in severity of symptoms monitor for chest pain , BLACK, SOB palpatations  Outcome: Progressing

## 2025-03-22 NOTE — RAPID RESPONSE
Rapid Response Note  Britton Batista 59 y.o. male MRN: [id number]  Unit/Bed#: -01 Encounter: [id number]    Rapid Response Notification(s):   Response called date/time:  3/22/2025 6:48 AM  Response team arrival date/time:  3/22/2025 6:52 AM  Response end date/time:  3/22/2025 7:15 AM  Level of care:  Medr  [facility]  Primary reason for rapid response call:  Acute change in heart rhythm    Rapid Response Intervention(s):   Airway:  None  Breathing:  None  Circulation:  None  Fluids administered:  None  Medications administered:  None       Assessment:   RRT for suspected vtach which was afib RBR on 12 lead EKD    Plan:   Check troponin, EKG as needed      Rapid Response Outcome:   Transfer:  Remain on floor  Code Status: Level 1 (Full Code)      Family notified: Primary team to notify Family Member       Background/Situation:   Britton Batista is a 59 y.o. male hx afib (noncompliant w/ anticoagulation), NICM (methamphetamine induced) and HFrEF (last echo 8/'24 LVEF 30% G1DD), current methamphetamine use, HTN, CKD 3a who presented 3/22 w/ SOB and was admitted w/ Afib RVR. RRT called for EKG reading vtach which was afib RVR when interpreted. Denies chest pain. Check troponin.     Review of Systems   Constitutional: Negative.    Respiratory:  Positive for shortness of breath.    Cardiovascular: Negative.    Gastrointestinal: Negative.    Musculoskeletal: Negative.    Neurological: Negative.    Psychiatric/Behavioral: Negative.         Objective:   Vitals:    03/22/25 0654 03/22/25 0657 03/22/25 0740 03/22/25 0805   BP:   131/97 129/96   BP Location:       Pulse: (!) 43 (!) 43 66 78   Resp:       Temp:       TempSrc:       SpO2: 97% 95% 96% 93%   Weight:       Height:         Physical Exam  Constitutional:       Appearance: Normal appearance.   HENT:      Head: Normocephalic and atraumatic.   Cardiovascular:      Rate and Rhythm: Tachycardia present. Rhythm irregular.   Pulmonary:       Effort: Pulmonary effort is normal.      Breath sounds: Normal breath sounds.   Abdominal:      General: Abdomen is flat.      Palpations: Abdomen is soft.   Skin:     General: Skin is warm and dry.      Capillary Refill: Capillary refill takes less than 2 seconds.   Neurological:      General: No focal deficit present.      Mental Status: He is alert and oriented to person, place, and time. Mental status is at baseline.

## 2025-03-22 NOTE — CONSULTS
Consultation - Critical Care/ICU   Name: Britton Batista 59 y.o. male I MRN: [id number]  Unit/Bed#: -01 I Date of Admission: 3/22/2025   Date of Service: 3/22/2025 I Hospital Day: 0   Consults  Reason for Evaluation / Principal Problem: Shock state      Assessment & Plan  Shock (HCC)  Likely cardiogenic I/S/O NICM due to methamphetamine use  No suspected source of infection, will hold on IVF resuscitation due to severely reduced EF and concern for volume overload  Prior cardiac cath in 2017 and 2020 unremarkable   POC Cardiac US today showing severely reduced EF w/ global hypokinesis  A/e/b: decreasing urine output, tachycardia, hypotension, lactic acidosis, altered mental status  ScVo2 61  Continue milrinone 0.25 and levophed for MAP > 65  Trend lactic, UOP, ScVO2  Repeat formal echo   Cardiology consult   Acute on chronic combined systolic and diastolic congestive heart failure (HCC)  Wt Readings from Last 3 Encounters:   03/22/25 76.6 kg (168 lb 12.8 oz)   02/06/25 73.7 kg (162 lb 6.4 oz)   09/18/24 75.4 kg (166 lb 3.6 oz)     Last echo 8/2024: LVEF 30%, grade 1 diastolic dysfunction  Point-of-care cardiac ultrasound 3/22 showing severely reduced EF, global hypokinesis  Troponin fluctuating, EKG appear non-ischemic  Hold diuresis at time of ICU admission  Daily weights  Zazueta for accurate I&O  Follow-up TTE  Cardiology following  Methamphetamine abuse (HCC)  Prior hx of and UDS positive on admission for methamphetamines  Primary hypertension  Hold home losartan and amlodipine  Atrial fibrillation with RVR (HCC)  Presented to [facility] on 3/21 for A-fib RVR and was recommended admission at that time, but patient refused  S/p IV Cardizem and lopressor on admission  ICD interrogated: Confirmed A-fib  Has not been compliant with outpatient Eliqu  Cardiology recommended switching from Coreg to Lopressor 25 mg every 6 hours  Cardiology following, tentatively planned for cardioversion Monday  3/24  Continue Eliquis  Stage 3a chronic kidney disease (CKD) (HCC)  Lab Results   Component Value Date    EGFR 47 03/22/2025    EGFR 56 03/22/2025    EGFR 58 03/22/2025    CREATININE 1.58 (H) 03/22/2025    CREATININE 1.37 (H) 03/22/2025    CREATININE 1.33 (H) 03/22/2025   Baseline Cr 1.2-1.3  3/22 Lasix 20mg IV, diuresed ~ 1.3L  Continue edmondson for accurate I&O  Trend BMP  Metabolic encephalopathy  In the setting of shock state   Improving w/ MAP > 65   Delirium precautions  Frequent neuro exams    Disposition: Critical care    History of Present Illness   Britton Batista is a 59 y.o. male hx afib (noncompliant w/ anticoagulation), NICM (methamphetamine use) HFrEF (last echo 8/'24 LVEF 30% G1DD), current methamphetamine use, HTN, CKD 3a who presented 3/22 w/ SOB and was admitted w/ Afib RVR. Initially admitted to [facility], he was rate controlled w/ IV lopressor and cardizem. RRT 3/22 early AM for concern for vtach on EKG which was afib RVR when interpreted. RRT again for hypotension and emesis later in the day, transferred to ICU SD2. ~1400 pt became hypotension, diaphoretic, cool extremities, altered mental status. Codi and central line placed. Started on levophed and milrinone for concern of cardiogenic shock. Lactic 2.4 and ScVO2 61. After addition of vasopressors pt's mentation and endpoints improved.       History obtained from sibling and chart review.  Review of Systems: Review of Systems not obtainable due to Clinical Condition, Altered mental status    Historical Information   Past Medical History:  No date: Anxiety  No date: Cardiomyopathy (HCC)  No date: CHF (congestive heart failure) (HCC)  No date: Depression  No date: Emphysema of lung (HCC)  No date: GERD (gastroesophageal reflux disease)  No date: Gout  No date: Hypertension  No date: Neuropathy  No date: Raynaud's disease  No date: Right inguinal hernia  No date: Scleroderma (HCC) Past Surgical History:  No date: AMPUTATION; Right      Comment:  pt had  "tip of \"pointer finger\" d/t scleraderma  No date: CARDIAC PACEMAKER PLACEMENT  01/31/2024: FINGER AMPUTATION; Left      Comment:  Procedure: AMPUTATION FINGER, LEFT INDEX FINGER CPT:                40207;  Surgeon: Pedro Vazquez MD;  Location:  MAIN OR;                 Service: Orthopedics  No date: HERNIA REPAIR; Left      Comment:  times 2  01/23/2024: FL AMP F/TH 1/2 JT/PHALANX W/NEURECT W/DIR CLSR; Right      Comment:  Procedure: AMPUTATION FINGER, RIGHT MIDDLE;  Surgeon:                Pedro Vazquez MD;  [facility];  Service:                Orthopedics  03/03/2023: FL RPR 1ST INGUN HRNA AGE 5 YRS/> REDUCIBLE; Right      Comment:  Procedure: OPEN INGUINAL HERNIA REPAIR WITH MESH;                 Surgeon: Temo Jackson DO;  Location:  MAIN OR;                 Service: General   Current Outpatient Medications   Medication Instructions    amLODIPine (NORVASC) 2.5 mg, Every morning    apixaban (ELIQUIS) 5 mg, Oral, 2 times daily    carvedilol (COREG) 6.25 mg, Oral, 2 times daily with meals    Entresto 49-51 MG TABS 1 tablet, 2 times daily    furosemide (LASIX) 20 mg, Oral, Daily    Jardiance 10 mg, Every morning    pantoprazole (PROTONIX) 40 mg, Oral, 2 times daily    pramipexole (MIRAPEX) 0.25 mg, Daily at bedtime    sucralfate (CARAFATE) 1 g, Oral, 4 times daily    Allergies   Allergen Reactions    Aspirin GI Intolerance      Social History     Tobacco Use    Smoking status: Every Day     Current packs/day: 1.00     Average packs/day: 1 pack/day for 45.2 years (45.2 ttl pk-yrs)     Types: Cigarettes     Start date: 2000    Smokeless tobacco: Never    Tobacco comments:     Last cigarette 0430   Vaping Use    Vaping status: Never Used   Substance Use Topics    Alcohol use: Not Currently     Comment: occasional    Drug use: Yes     Types: Marijuana, Methamphetamines     Comment: Hx meth use    Family History   Problem Relation Age of Onset    Hypertension Father           Objective :                 "   Vitals I/O      Most Recent Min/Max in 24hrs   Temp 98.1 °F (36.7 °C) Temp  Min: 96.1 °F (35.6 °C)  Max: 98.4 °F (36.9 °C)   Pulse (!) 126 Pulse  Min: 43  Max: 143   Resp 13 Resp  Min: 10  Max: 49   /77 BP  Min: 80/60  Max: 136/84   O2 Sat 90 % SpO2  Min: 59 %  Max: 99 %      Intake/Output Summary (Last 24 hours) at 3/22/2025 1919  Last data filed at 3/22/2025 1607  Gross per 24 hour   Intake 28.17 ml   Output 1395 ml   Net -1366.83 ml       Diet Cardiovascular; Cardiac  Diet NPO    Invasive Monitoring   Arterial Line  [place] /67  Arterial Line BP  Min: -55/-55  Max: 117/72   MAP 79 mmHg  Arterial Line MAP (mmHg)  Min: 73 mmHg  Max: 97 mmHg           Physical Exam   Physical Exam  Eyes:      Pupils: Pupils are equal, round, and reactive to light.   Skin:     General: Skin is cool.   HENT:      Head: Normocephalic and atraumatic.   Cardiovascular:      Rate and Rhythm: Tachycardia present. Rhythm irregular.      Heart sounds: Normal heart sounds.   Musculoskeletal:      Right lower leg: No edema.      Left lower leg: No edema.   Abdominal: General: There is no distension.      Palpations: Abdomen is soft.      Tenderness: There is no abdominal tenderness.   Constitutional:       Appearance: He is well-developed. He is ill-appearing and diaphoretic.   Pulmonary:      Effort: Pulmonary effort is normal.      Breath sounds: Normal breath sounds.   Neurological:      General: No focal deficit present.      Mental Status: He is alert and oriented to person, place and time. He is calm.      Comments: Global weakness in UE and LE   Genitourinary/Anorectal:  Zazueta present.        Diagnostic Studies        Lab Results: I have reviewed the following results:     Medications:  Scheduled PRN   apixaban, 5 mg, BID  [Held by provider] furosemide, 40 mg, BID (diuretic)  nicotine, 1 patch, Daily  pantoprazole, 40 mg, BID      acetaminophen, 650 mg, Q6H PRN  metoprolol, 5 mg, Q6H PRN  trimethobenzamide, 200 mg, Q6H  PRN       Continuous    milrinone (Primacor) infusion, 0.25 mcg/kg/min, Last Rate: 0.25 mcg/kg/min (03/22/25 1525)  norepinephrine, 1-30 mcg/min, Last Rate: Stopped (03/22/25 1620)         Labs:   CBC    Recent Labs     03/22/25  0353 03/22/25  0601   WBC 7.63 8.71   HGB 15.0 15.3   HCT 48.2 47.9    242     BMP    Recent Labs     03/22/25  0955 03/22/25  1236   SODIUM 135 133*   K 4.1 4.0    100   CO2 26 26   AGAP 7 7   BUN 22 22   CREATININE 1.37* 1.58*   CALCIUM 9.1 9.2       Coags    Recent Labs     03/22/25  0353   INR 1.13   PTT 32        Additional Electrolytes  Recent Labs     03/22/25  0353 03/22/25  0601   MG 1.8* 1.9          Blood Gas    Recent Labs     03/22/25  1451   PHART 7.478*   [id number] 25.0*   PO2ART 141.4*   ZCD7GDM 18.1*   BEART -3.5   SOURCE Line, Arterial     Recent Labs     03/22/25  1451 03/22/25  1537   PHVEN  --  7.343   XET4EMQ  --  42.1   PO2VEN  --  36.3   FTA4IQW  --  22.4*   BEVEN  --  -3.2   [id number]  --  61.4   SOURCE Line, Arterial  --     LFTs  Recent Labs     03/22/25  0353 03/22/25  1236   ALT 15 18   AST 38 29   ALKPHOS 90 105*   ALB 3.6 3.6   TBILI 1.09* 1.36*       Infectious  Recent Labs     03/22/25  0353   PROCALCITONI 0.08     Glucose  Recent Labs     03/22/25  0353 03/22/25  0955 03/22/25  1236   GLUC 103 96 126

## 2025-03-22 NOTE — RAPID RESPONSE
Rapid Response Note  Britton Batista 59 y.o. male MRN: [id number]  Unit/Bed#: -01 Encounter: [id number]    Rapid Response Notification(s):   Response called date/time:  3/22/2025 12:00 PM  Response team arrival date/time:  3/22/2025 12:00 PM  Response end date/time:  3/22/2025 12:23 PM  Level of care:  Medr  [facility]  Primary reason for rapid response call:  Other (comment) and acute change in neuro status (emesis)    Rapid Response Intervention(s):   Airway:  None  Breathing:  Oxygen  Circulation:  None (Fluids started, but stopped)  Fluids administered:  Normal saline  Medications administered:  None       Assessment:   RRT hypotension and emesis in the setting of afib RVR.     Plan:   POCUS showing severely reduced EF and global hypokinesis   Troponin, CMP, lactic  Upgrade SD2     Rapid Response Outcome:   Transfer:  Transfer to stepdown 2  Primary service notified of transfer: Yes    Code Status: Level 1 (Full Code)      Family notified: Primary team to notify Family Member       Background/Situation:   Britton Batista is a 59 y.o. male hx afib (noncompliant w/ anticoagulation), HFrEF (last echo 8/'24 LVEF 30% G1DD), methamphetamine use, HTN, CKD 3a who presented 3/22 w/ SOB and was admitted w/ Afib RVR. He was as RRT earlier today due to suspected vtach which was afib RVR on 12 lead EKG. Prior to this second rapid pt had just received ativan 1mg for nausea. On returning from the bathroom he was diaphoretic, vomited, and had an episode of hypotension. Administered ~250mL IVF and BP began improving. POC cardiac US showed severely reduced EF and global hypokinesis. He was upgraded to SD2 level of care and later accepted to ICU service. Please see ICU note for further details.     Review of Systems   Constitutional:  Positive for activity change, diaphoresis and fatigue.   Respiratory:  Positive for shortness of breath.    Cardiovascular: Negative.    Gastrointestinal:   Positive for nausea and vomiting.   Genitourinary: Negative.    Musculoskeletal: Negative.    Skin:  Positive for color change and pallor.   Neurological:  Positive for weakness.   Psychiatric/Behavioral: Negative.         Objective:   Vitals:    03/22/25 1207 03/22/25 1207 03/22/25 1207 03/22/25 1210   BP: 121/88 121/88 121/88    BP Location:       Pulse: 100   (!) 46   Resp: 18      Temp: (!) 97.3 °F (36.3 °C)      TempSrc: Temporal      SpO2:    (!) 78%   Weight:       Height:         Physical Exam  Constitutional:       Appearance: Normal appearance.   HENT:      Head: Normocephalic and atraumatic.   Cardiovascular:      Rate and Rhythm: Normal rate. Rhythm irregular.      Heart sounds: Normal heart sounds.   Pulmonary:      Effort: Pulmonary effort is normal. No respiratory distress.      Breath sounds: No wheezing, rhonchi or rales.   Neurological:      General: No focal deficit present.      Comments: Globally weak

## 2025-03-22 NOTE — ASSESSMENT & PLAN NOTE
Presented to [facility] on 3/21 for A-fib RVR and was recommended admission at that time, but patient refused  S/p IV Cardizem and lopressor on admission  ICD interrogated: Confirmed A-fib  Has not been compliant with outpatient Eliquis  Cardiology recommended switching from Coreg to Lopressor 25 mg every 6 hours  Cardiology following, tentatively planned for cardioversion Monday 3/24  Continue Eliquis

## 2025-03-22 NOTE — H&P
H&P - Hospitalist   Name: Britotn Batista 59 y.o. male I MRN: [id number]  Unit/Bed#: -01 I Date of Admission: 3/22/2025   Date of Service: 3/22/2025 I Hospital Day: 0     Assessment & Plan  Atrial fibrillation with RVR (HCC)  Presented to [facility] on 3/21 for A-fib RVR and was recommended admission at that time, but patient refused and stated that he wanted to be closer to home.  Complains of shortness of breath at home  Telemetry with A-fib RVR 140s  Questionable V. tach on telemetry.  Rapid response was called at time of admission for this.  Likely all underlying A-fib per cardiology.  Was given IV Cardizem and Lopressor  ICD interrogated which only observed A-fib  TSH elevated, t4 pending   Has not been taking eliquis - was not able to afford it   Cardiology recommending switch from coreg to Lopressor 25 mg every 6 hours and titrate  Monitor on telemetry  N.p.o. for Monday morning for cardioversion per cardiology recommendations  As needed Lopressor for AF RVR  Acute on chronic combined systolic and diastolic congestive heart failure (HCC)  Wt Readings from Last 3 Encounters:   03/22/25 76.6 kg (168 lb 12.8 oz)   02/06/25 73.7 kg (162 lb 6.4 oz)   09/18/24 75.4 kg (166 lb 3.6 oz)     Poor compliance.  Managed at home on Entresto, furosemide, Jardiance, losartan and carvedilol  Holding jardiance, entresto and losartan at this time while adjusting medications for a fib. Will resume when able   BNP 2870 on admission  CT chest with marked cardiomegaly, no pulmonary edema.  Possible third spacing in the abdomen  CHF likely driving a fib  Will check venous duplex to rule out DVT since patient was not taking AC   Hold home Lasix, start Lasix 40 mg IV twice daily  Monitor daily weights, intake and output  Methamphetamine abuse (HCC)  UDS + for methamphetamines and THC  Patient declining methamphetamine use   Primary hypertension  Managed on amlodipine and losartan  Holding while adjusting  "metoprolol, resume when able   Hyperkalemia  5.6 on admission  Given dextrose, Lasix, and insulin  Resolved on repeat bmp   Monitoring on telemetry  Stage 3a chronic kidney disease (CKD) (HCC)  Lab Results   Component Value Date    EGFR 56 03/22/2025    EGFR 58 03/22/2025    EGFR 41 02/06/2025    CREATININE 1.37 (H) 03/22/2025    CREATININE 1.33 (H) 03/22/2025    CREATININE 1.74 (H) 02/06/2025   Baseline around 1.3  Continue to trend daily bmp  Avoid nephrotoxic agents and hypotension       VTE Pharmacologic Prophylaxis:   High Risk (Score >/= 5) - Pharmacological DVT Prophylaxis Ordered: apixaban (Eliquis). Sequential Compression Devices Ordered.  Code Status: Level 1 - Full Code   Discussion with family: Updated  (sister) at bedside.    Anticipated Length of Stay: Patient will be admitted on an inpatient basis with an anticipated length of stay of greater than 2 midnights secondary to chf exacerbation and a fib rvr requiring cardioversion, iv lasix and rate control.    History of Present Illness   Chief Complaint: \"I couldn't breathe at home\"    Britton Batista is a 59 y.o. male with a PMH of A-fib, hypertension, cardiomyopathy, heart failure, methamphetamine abuse who presents with shortness of breath. Patient states that he has been getting more short of breath for the last 24-48 hours. States that he has been taking all of his medications. Sister at bedside who states that patient has not been taking eliquis since he could not afford it. UDS positive for THC and methamphetamine, admits to THC use only. Patient was just at [facility] for events seen on his ICD, where they recommended that he be admitted for cardioversion but patient refused and stated that he would come to [facility] instead since it is closer to home.     Review of Systems   Constitutional:  Negative for fatigue and fever.   HENT:  Negative for congestion and rhinorrhea.    Respiratory:  Positive for cough and shortness of breath. Negative for " "chest tightness and wheezing.    Cardiovascular:  Positive for leg swelling. Negative for chest pain and palpitations.   Gastrointestinal:  Negative for abdominal distention, abdominal pain, constipation, diarrhea, nausea and vomiting.   Genitourinary:  Negative for difficulty urinating and dysuria.   Musculoskeletal:  Negative for arthralgias and back pain.   Skin:  Negative for wound.   Neurological:  Negative for dizziness and headaches.   Psychiatric/Behavioral:  Negative for agitation, behavioral problems and confusion.        Historical Information   Past Medical History:   Diagnosis Date    Anxiety     Cardiomyopathy (HCC)     CHF (congestive heart failure) (HCC)     Depression     Emphysema of lung (HCC)     GERD (gastroesophageal reflux disease)     Gout     Hypertension     Neuropathy     Raynaud's disease     Right inguinal hernia     Scleroderma (HCC)      Past Surgical History:   Procedure Laterality Date    AMPUTATION Right     pt had tip of \"pointer finger\" d/t scleraderma    CARDIAC PACEMAKER PLACEMENT      FINGER AMPUTATION Left 01/31/2024    Procedure: AMPUTATION FINGER, LEFT INDEX FINGER CPT: 06755;  Surgeon: Pedro Vazquez MD;  Location:  MAIN OR;  Service: Orthopedics    HERNIA REPAIR Left     times 2    FL AMP F/TH 1/2 JT/PHALANX W/NEURECT W/DIR CLSR Right 01/23/2024    Procedure: AMPUTATION FINGER, RIGHT MIDDLE;  Surgeon: Pedro Vazquez MD;  Location[facility];  Service: Orthopedics    FL RPR 1ST INGUN HRNA AGE 5 YRS/> REDUCIBLE Right 03/03/2023    Procedure: OPEN INGUINAL HERNIA REPAIR WITH MESH;  Surgeon: Temo Jackson DO;  Location:  MAIN OR;  Service: General     Social History     Tobacco Use    Smoking status: Every Day     Current packs/day: 1.00     Average packs/day: 1 pack/day for 45.2 years (45.2 ttl pk-yrs)     Types: Cigarettes     Start date: 2000    Smokeless tobacco: Never    Tobacco comments:     Last cigarette 0430   Vaping Use    Vaping status: Never Used   Substance and " Sexual Activity    Alcohol use: Not Currently     Comment: occasional    Drug use: Yes     Types: Marijuana, Methamphetamines     Comment: Hx meth use    Sexual activity: Not Currently     Comment: defer     E-Cigarette/Vaping    E-Cigarette Use Never User      E-Cigarette/Vaping Substances    Nicotine No     THC No     CBD No     Flavoring No      Family History   Problem Relation Age of Onset    Hypertension Father      Social History:  Marital Status:    Patient Pre-hospital Living Situation: Home  Patient Pre-hospital Level of Mobility: walks  Patient Pre-hospital Diet Restrictions: none    Meds/Allergies   Nursing calling pharmacy to confirm medication list .   Prior to Admission medications    Medication Sig Start Date End Date Taking? Authorizing Provider   Spiriva Respimat 2.5 MCG/ACT AERS inhaler Inhale 2 puffs daily 12/31/24  Yes Historical Provider, MD   albuterol (PROVENTIL HFA,VENTOLIN HFA) 90 mcg/act inhaler Inhale 2 puffs every 4 (four) hours as needed   Patient not taking: Reported on 3/22/2025    Historical Provider, MD   amLODIPine (NORVASC) 2.5 mg tablet Take 2.5 mg by mouth every morning    Historical Provider, MD   apixaban (ELIQUIS) 5 mg Take 1 tablet (5 mg total) by mouth 2 (two) times a day 9/19/24   Cheryl Sin PA-C   carvedilol (COREG) 6.25 mg tablet Take 1 tablet (6.25 mg total) by mouth 2 (two) times a day with meals 3/26/24   Citlalli Cannon PA-C   Entresto 24-26 MG TABS Take 1 tablet by mouth 2 (two) times a day 12/31/24   Historical Provider, MD   Entresto 49-51 MG TABS Take 1 tablet by mouth 2 (two) times a day 2/26/25   Historical Provider, MD   furosemide (LASIX) 20 mg tablet Take 1 tablet (20 mg total) by mouth daily 3/26/24   Citlalli Cannon PA-C   Jardiance 10 MG TABS tablet Take 10 mg by mouth every morning 2/26/25   Historical Provider, MD   losartan (COZAAR) 50 mg tablet Take 1 tablet (50 mg total) by mouth daily 3/26/24   Citlalli Cannon PA-C    pantoprazole (PROTONIX) 40 mg tablet Take 1 tablet (40 mg total) by mouth 2 (two) times a day 10/10/24 1/8/25  Armida Coppola MD   pramipexole (MIRAPEX) 0.25 mg tablet Take 0.25 mg by mouth daily at bedtime    Historical Provider, MD   sucralfate (CARAFATE) 1 g tablet Take 1 tablet (1 g total) by mouth 4 (four) times a day for 14 days 10/10/24 10/24/24  Armida Coppola MD     Allergies   Allergen Reactions    Aspirin GI Intolerance       Objective :  Temp:  [97.5 °F (36.4 °C)] 97.5 °F (36.4 °C)  HR:  [] 78  BP: ()/(59-97) 129/96  Resp:  [12-24] 16  SpO2:  [93 %-99 %] 93 %  O2 Device: None (Room air)    Physical Exam  Vitals reviewed.   Constitutional:       General: He is not in acute distress.     Appearance: Normal appearance. He is ill-appearing.   HENT:      Head: Normocephalic and atraumatic.      Nose: Nose normal.      Mouth/Throat:      Mouth: Mucous membranes are moist.      Pharynx: Oropharynx is clear.   Eyes:      Extraocular Movements: Extraocular movements intact.      Conjunctiva/sclera: Conjunctivae normal.   Cardiovascular:      Rate and Rhythm: Tachycardia present. Rhythm irregular.      Pulses: Normal pulses.      Heart sounds: Normal heart sounds. No murmur heard.  Pulmonary:      Effort: Pulmonary effort is normal. No respiratory distress.      Breath sounds: Rales present. No wheezing.      Comments: Wet cough on exam  Abdominal:      General: Abdomen is flat. Bowel sounds are normal. There is no distension.      Palpations: Abdomen is soft.      Tenderness: There is no abdominal tenderness. There is no guarding.   Musculoskeletal:         General: Normal range of motion.      Cervical back: Normal range of motion.      Right lower leg: Edema present.      Left lower leg: Edema present.   Skin:     General: Skin is warm.   Neurological:      General: No focal deficit present.      Mental Status: He is alert and oriented to person, place, and time. Mental status is at baseline.       Motor: No weakness.   Psychiatric:         Mood and Affect: Mood normal.         Behavior: Behavior normal.         Thought Content: Thought content normal.         Judgment: Judgment normal.         Lines/Drains:      Lab Results: I have reviewed the following results:  Results from last 7 days   Lab Units 03/22/25  0601   WBC Thousand/uL 8.71   HEMOGLOBIN g/dL 15.3   HEMATOCRIT % 47.9   PLATELETS Thousands/uL 242   SEGS PCT % 70   LYMPHO PCT % 15   MONO PCT % 12   EOS PCT % 2     Results from last 7 days   Lab Units 03/22/25  0955 03/22/25  0353   SODIUM mmol/L 135 131*   POTASSIUM mmol/L 4.1 5.6*   CHLORIDE mmol/L 102 102   CO2 mmol/L 26 21   BUN mg/dL 22 21   CREATININE mg/dL 1.37* 1.33*   ANION GAP mmol/L 7 8   CALCIUM mg/dL 9.1 9.0   ALBUMIN g/dL  --  3.6   TOTAL BILIRUBIN mg/dL  --  1.09*   ALK PHOS U/L  --  90   ALT U/L  --  15   AST U/L  --  38   GLUCOSE RANDOM mg/dL 96 103     Results from last 7 days   Lab Units 03/22/25  0353   INR  1.13     Results from last 7 days   Lab Units 03/22/25  0803 03/22/25  0650   POC GLUCOSE mg/dl 134 100     Lab Results   Component Value Date    HGBA1C 5.9 (H) 10/25/2021     Results from last 7 days   Lab Units 03/22/25  0353   LACTIC ACID mmol/L 1.8   PROCALCITONIN ng/ml 0.08       Imaging Results Review: I reviewed radiology reports from this admission including: CT chest.  Other Study Results Review: EKG was reviewed.     Administrative Statements       ** Please Note: This note has been constructed using a voice recognition system. **

## 2025-03-22 NOTE — NURSING NOTE
Patient's pulse ox readings inaccurate due to diaphoresis and inability to get a accurate reading. When accurate reading is obtained, oxygen saturation  is in the low 90's

## 2025-03-22 NOTE — ASSESSMENT & PLAN NOTE
Wt Readings from Last 3 Encounters:   03/22/25 76.6 kg (168 lb 12.8 oz)   02/06/25 73.7 kg (162 lb 6.4 oz)   09/18/24 75.4 kg (166 lb 3.6 oz)     Last echo 8/2024: LVEF 30%, grade 1 diastolic dysfunction  Point-of-care cardiac ultrasound 3/22 showing severely reduced EF, global hypokinesis  Troponin fluctuating, EKG appear non-ischemic  Hold diuresis at time of ICU admission  Daily weights  Zazueta for accurate I&O  Follow-up TTE  Cardiology following

## 2025-03-22 NOTE — CASE MANAGEMENT
Case Management Assessment & Discharge Planning Note    Patient name Britton Batista  Location /-01 MRN [id number]  : 1965 Date 3/22/2025       Current Admission Date: 3/22/2025  Current Admission Diagnosis:Atrial fibrillation with RVR ([facility])   Patient Active Problem List    Diagnosis Date Noted Date Diagnosed    Hyperkalemia 2025     Stage 3a chronic kidney disease (CKD) ([facility]) 2025     Atrial fibrillation with RVR ([facility]) 2025     Syncope 2024     Chest wall pain 2024     Chronic HFrEF (heart failure with reduced ejection fraction) ([facility]) 2024     Primary hypertension 2024     Hypomagnesemia 2023     Methamphetamine abuse ([facility]) 2023     Tobacco abuse 2023     D-dimer, elevated 2023     Cardiomyopathy ([facility]) 10/09/2023     IRINEO (acute kidney injury) ([facility]) 10/08/2023     Acute on chronic combined systolic and diastolic congestive heart failure ([facility]) 10/07/2023     Hypertensive emergency 10/07/2023     Anxiety      Depression      Emphysema of lung ([facility])      GERD (gastroesophageal reflux disease)      Gout      Hypertensive urgency      Neuropathy      Raynaud's disease      Unilateral inguinal hernia, without obstruction or gangrene, not specified as recurrent      PSS (progressive systemic sclerosis) ([facility]) 10/25/2021     Near syncope 10/25/2021     Osteomyelitis ([facility]) 10/25/2021     Elevated troponin 10/25/2021     Acute encephalopathy 10/25/2021     Hyponatremia 10/24/2021       LOS (days): 0  Geometric Mean LOS (GMLOS) (days):   Days to GMLOS:     OBJECTIVE:    Risk of Unplanned Readmission Score: 24.46         Current admission status: Inpatient  Referral Reason: Information    Preferred Pharmacy:   [facility] - [place], [place] - [address]  [address] [facility]  [place] [address]  Phone: [phone] Fax: [phone]    [facility] - [place] - [address]  [facility],  [address]  [facility]  Floor [facility] [address]  Phone: [phone] Fax: [phone]    [id number] - [place], [place] - [address] [place] [address]  Phone: [phone] Fax: [phone]    Primary Care Provider: [facility] Everywhere Referral    Primary Insurance: [facility]  Secondary Insurance:     ASSESSMENT:  Active Health Care Proxies       Arin Batista Health Care Representative - Sister   Primary Phone: [phone] (Mobile)                 Advance Directives  Does patient have a Health Care POA?: No  Was patient offered paperwork?: Yes  Does patient currently have a Health Care decision maker?: Yes, please see Health Care Proxy section  Does patient have Advance Directives?: No  Was patient offered paperwork?: Yes  Primary Contact: Arin (Sister)         Readmission Root Cause  30 Day Readmission: No    Patient Information  Admitted from:: Home  Mental Status: Other (Comment) (sedated with medications)  During Assessment patient was accompanied by: Not accompanied during assessment  Assessment information provided by::   Primary Caregiver: Self  Support Systems: Family members  County [facility] Residence: [facility]  What [facility] do you live in?: [facility]  Home entry access options. Select all that apply.: Stairs  Number of steps to enter home.: One Flight  Do the steps have railings?: Yes  Type of Current Residence: Apartment  Floor Level: 2  Upon entering residence, is there a bedroom on the main floor (no further steps)?: Yes  Upon entering residence, is there a bathroom on the main floor (no further steps)?: Yes  Living Arrangements: Lives w/ Family members  Is patient a ?: No    Activities of Daily Living Prior to Admission  Functional Status: Independent  Completes ADLs independently?: Yes  Ambulates independently?: Yes  Does patient use assisted devices?: No  Does patient currently own DME?: Yes  What DME does the patient currently own?: Straight  [employer]pan  Does patient have a history of Outpatient Therapy (PT/OT)?: No  Does the patient have a history of Short-Term Rehab?: No  Does patient have a history of HHC?: No  Does patient currently have HHC?: No         Patient Information Continued  Income Source: SSI/SSD  Does patient have prescription coverage?: Yes  Can the patient afford their medications and any related supplies (such as glucometers or test strips)?: No  Does patient receive dialysis treatments?: No  Does patient have a history of substance abuse?: Yes, Currently using  Current substance use preference: Methamphetamines, Marijuana  Historical substance use preference: Methamphetamines, Marijuana, Alcohol/ETOH, IV  History of Withdrawal Symptoms: Denies past symptoms  Is patient currently in treatment for substance abuse?: Not appropriate at this time to discuss.  Does patient have a history of Mental Health Diagnosis?: Yes (Depression/anxiety)  Is patient receiving treatment for mental health?: Yes (med mgt PCP)  Has patient received inpatient treatment related to mental health in the last 2 years?: No         Means of Transportation  Means of Transport to [facility]:: Family transport          DISCHARGE DETAILS:    Discharge planning discussed with:: Patient's sister  Freedom of Choice: Yes  Comments - Freedom of Choice: aware not appropriate to talk with patient at this time - will follow up about discharge planning when medically appropraite  CM contacted family/caregiver?: Yes  Were Treatment Team discharge recommendations reviewed with patient/caregiver?: Yes  Did patient/caregiver verbalize understanding of patient care needs?: Yes  Were patient/caregiver advised of the risks associated with not following Treatment Team discharge recommendations?: Yes    Contacts  Patient Contacts: Arin Batista (Sister)  Relationship to Patient:: Family  Contact Method: Phone  Phone Number: [phone]  Reason/Outcome: Continuity of Care, Discharge  Planning    Requested Home Health Care         Is the patient interested in HHC at discharge?: No    DME Referral Provided  Referral made for DME?: No    Other Referral/Resources/Interventions Provided:  Interventions: None Indicated    Would you like to participate in our [facility] service program?  : No - Declined    Treatment Team Recommendation: Home  Discharge Destination Plan:: Home  Transport at Discharge : Family       CM met with pt's sister by phone to review role of CM and discuss any supports needed upon discharge. Baseline information obtained, pt lives at home with his brother Bhaskar in a 2nd floor apartment with 1 flight of [place]. Pt is independent with ADLs, able to ambulate without assistive devices, independent with medication management, drives an electric bike to appointments. Pt indicates his sister is able to assist with transportation and assist with meals and taking pt food shopping.   Patient has d/a hx - starting in his late teens/early 20s of meth, pot, alcohol - Patient's sister denies any recent use she is aware of other than marijuana.   CM reviewed discharge planning - CM to follow up with patient when more alert and able to discuss. She did admit that patient struggles to meet cost of medications. Nav is over 100 and he has not been taking it.    CM will continue to follow and address any discharge needs.

## 2025-03-22 NOTE — ASSESSMENT & PLAN NOTE
Lab Results   Component Value Date    EGFR 56 03/22/2025    EGFR 58 03/22/2025    EGFR 41 02/06/2025    CREATININE 1.37 (H) 03/22/2025    CREATININE 1.33 (H) 03/22/2025    CREATININE 1.74 (H) 02/06/2025   Baseline around 1.3  Continue to trend daily bmp  Avoid nephrotoxic agents and hypotension

## 2025-03-22 NOTE — ASSESSMENT & PLAN NOTE
In the setting of shock state   Improving w/ MAP > 65   Delirium precautions  Frequent neuro exams

## 2025-03-22 NOTE — NURSING NOTE
Patient c/o SOB l L nasal cannula 97% , requested to use bathroom, moved bowels urinated, explained could not get up from toilet, needed assistance back to bed, denies ,CP palpitations, returned to bed diaphoretic c/o nausea/vomited explained not feeling well , ativan 1 mg IV given for vomiting,  ,notified Cheryl Sin NP RR called vs 97.3,100,18,121/88

## 2025-03-22 NOTE — PROCEDURES
Arterial Line Insertion    Date/Time: 3/22/2025 3:50 PM    Performed by: Jhon Servin PA-C  Authorized by: Jhon Servin PA-C    Patient location:  ICU and bedside  Consent:     Consent obtained:  Emergent situation  Universal protocol:     Patient identity confirmed:  Hospital-assigned identification number and arm band  Indications:     Indications: hemodynamic monitoring and continuous blood pressure monitoring    Pre-procedure details:     Skin preparation:  Chlorhexidine    Preparation: Patient was prepped and draped in sterile fashion    Anesthesia (see MAR for exact dosages):     Anesthesia method:  Local infiltration    Local anesthetic:  Lidocaine 1% w/o epi  Procedure details:     Location / Tip of Catheter:  Radial    Laterality:  Left    Needle gauge:  20 G    Placement technique:  Percutaneous and ultrasound guided    Ultrasound image availability:  Not obtained due to urgency    Sterile ultrasound techniques: Sterile gel and sterile probe covers were used      Number of attempts:  1    Successful placement: yes      Transducer: waveform confirmed    Post-procedure details:     Post-procedure:  Sterile dressing applied and sutured    Patient tolerance of procedure:  Tolerated well, no immediate complications

## 2025-03-22 NOTE — NURSING NOTE
Patient in vtach since arrival to unit from ED. Provider made aware, ECG ordered and completed. Patient states no new symptoms, he is oriented and resting, other vitals are stable. Will continue to monitor for symptoms.

## 2025-03-23 PROBLEM — M34.9 SCLERODERMA (HCC): Status: ACTIVE | Noted: 2025-03-23

## 2025-03-23 PROBLEM — R57.0 CARDIOGENIC SHOCK (HCC): Status: ACTIVE | Noted: 2025-03-22

## 2025-03-23 LAB
ALBUMIN SERPL BCG-MCNC: 3.4 G/DL (ref 3.5–5)
ALP SERPL-CCNC: 102 U/L (ref 34–104)
ALT SERPL W P-5'-P-CCNC: 29 U/L (ref 7–52)
ANION GAP SERPL CALCULATED.3IONS-SCNC: 8 MMOL/L (ref 4–13)
ANION GAP SERPL CALCULATED.3IONS-SCNC: 9 MMOL/L (ref 4–13)
ARTERIAL PATENCY WRIST A: YES
AST SERPL W P-5'-P-CCNC: 39 U/L (ref 13–39)
BASE EX.OXY STD BLDV CALC-SCNC: 64.9 % (ref 60–80)
BASE EX.OXY STD BLDV CALC-SCNC: 70.4 % (ref 60–80)
BASE EXCESS BLDV CALC-SCNC: 0 MMOL/L
BASE EXCESS BLDV CALC-SCNC: 4.1 MMOL/L
BILIRUB SERPL-MCNC: 1.15 MG/DL (ref 0.2–1)
BUN SERPL-MCNC: 28 MG/DL (ref 5–25)
BUN SERPL-MCNC: 29 MG/DL (ref 5–25)
CA-I BLD-SCNC: 1.1 MMOL/L (ref 1.12–1.32)
CA-I BLD-SCNC: 1.13 MMOL/L (ref 1.12–1.32)
CALCIUM ALBUM COR SERPL-MCNC: 9.5 MG/DL (ref 8.3–10.1)
CALCIUM SERPL-MCNC: 8.8 MG/DL (ref 8.4–10.2)
CALCIUM SERPL-MCNC: 9 MG/DL (ref 8.4–10.2)
CHLORIDE SERPL-SCNC: 95 MMOL/L (ref 96–108)
CHLORIDE SERPL-SCNC: 99 MMOL/L (ref 96–108)
CO2 SERPL-SCNC: 27 MMOL/L (ref 21–32)
CO2 SERPL-SCNC: 28 MMOL/L (ref 21–32)
CREAT SERPL-MCNC: 1.51 MG/DL (ref 0.6–1.3)
CREAT SERPL-MCNC: 1.53 MG/DL (ref 0.6–1.3)
ERYTHROCYTE [DISTWIDTH] IN BLOOD BY AUTOMATED COUNT: 15.8 % (ref 11.6–15.1)
GFR SERPL CREATININE-BSD FRML MDRD: 49 ML/MIN/1.73SQ M
GFR SERPL CREATININE-BSD FRML MDRD: 49 ML/MIN/1.73SQ M
GLUCOSE SERPL-MCNC: 106 MG/DL (ref 65–140)
GLUCOSE SERPL-MCNC: 88 MG/DL (ref 65–140)
HCO3 BLDV-SCNC: 25.7 MMOL/L (ref 24–30)
HCO3 BLDV-SCNC: 29.9 MMOL/L (ref 24–30)
HCT VFR BLD AUTO: 42.4 % (ref 36.5–49.3)
HGB BLD-MCNC: 14 G/DL (ref 12–17)
MAGNESIUM SERPL-MCNC: 1.7 MG/DL (ref 1.9–2.7)
MAGNESIUM SERPL-MCNC: 1.8 MG/DL (ref 1.9–2.7)
MCH RBC QN AUTO: 29.9 PG (ref 26.8–34.3)
MCHC RBC AUTO-ENTMCNC: 33 G/DL (ref 31.4–37.4)
MCV RBC AUTO: 90 FL (ref 82–98)
O2 CT BLDV-SCNC: 13.2 ML/DL
O2 CT BLDV-SCNC: 15.2 ML/DL
PCO2 BLDV: 45.4 MM HG (ref 42–50)
PCO2 BLDV: 48.7 MM HG (ref 42–50)
PH BLDV: 7.37 [PH] (ref 7.3–7.4)
PH BLDV: 7.41 [PH] (ref 7.3–7.4)
PHOSPHATE SERPL-MCNC: 4.4 MG/DL (ref 2.7–4.5)
PLATELET # BLD AUTO: 236 THOUSANDS/UL (ref 149–390)
PMV BLD AUTO: 9.3 FL (ref 8.9–12.7)
PO2 BLDV: 39.4 MM HG (ref 35–45)
PO2 BLDV: 39.8 MM HG (ref 35–45)
POTASSIUM SERPL-SCNC: 3.9 MMOL/L (ref 3.5–5.3)
POTASSIUM SERPL-SCNC: 4.2 MMOL/L (ref 3.5–5.3)
PROT SERPL-MCNC: 6.5 G/DL (ref 6.4–8.4)
RBC # BLD AUTO: 4.69 MILLION/UL (ref 3.88–5.62)
SODIUM SERPL-SCNC: 131 MMOL/L (ref 135–147)
SODIUM SERPL-SCNC: 135 MMOL/L (ref 135–147)
WBC # BLD AUTO: 7.81 THOUSAND/UL (ref 4.31–10.16)

## 2025-03-23 PROCEDURE — 82805 BLOOD GASES W/O2 SATURATION: CPT | Performed by: NURSE PRACTITIONER

## 2025-03-23 PROCEDURE — 99233 SBSQ HOSP IP/OBS HIGH 50: CPT | Performed by: ANESTHESIOLOGY

## 2025-03-23 PROCEDURE — 82330 ASSAY OF CALCIUM: CPT | Performed by: NURSE PRACTITIONER

## 2025-03-23 PROCEDURE — 80048 BASIC METABOLIC PNL TOTAL CA: CPT

## 2025-03-23 PROCEDURE — 80053 COMPREHEN METABOLIC PANEL: CPT | Performed by: NURSE PRACTITIONER

## 2025-03-23 PROCEDURE — 82330 ASSAY OF CALCIUM: CPT

## 2025-03-23 PROCEDURE — 85027 COMPLETE CBC AUTOMATED: CPT | Performed by: NURSE PRACTITIONER

## 2025-03-23 PROCEDURE — 82805 BLOOD GASES W/O2 SATURATION: CPT

## 2025-03-23 PROCEDURE — 84100 ASSAY OF PHOSPHORUS: CPT | Performed by: NURSE PRACTITIONER

## 2025-03-23 PROCEDURE — 83735 ASSAY OF MAGNESIUM: CPT

## 2025-03-23 PROCEDURE — 93308 TTE F-UP OR LMTD: CPT | Performed by: ANESTHESIOLOGY

## 2025-03-23 PROCEDURE — 83735 ASSAY OF MAGNESIUM: CPT | Performed by: NURSE PRACTITIONER

## 2025-03-23 RX ORDER — GABAPENTIN 100 MG/1
100 CAPSULE ORAL 3 TIMES DAILY PRN
Status: DISCONTINUED | OUTPATIENT
Start: 2025-03-23 | End: 2025-03-28 | Stop reason: HOSPADM

## 2025-03-23 RX ORDER — FUROSEMIDE 10 MG/ML
40 INJECTION INTRAMUSCULAR; INTRAVENOUS DAILY
Status: DISCONTINUED | OUTPATIENT
Start: 2025-03-23 | End: 2025-03-24

## 2025-03-23 RX ORDER — DIGOXIN 125 MCG
125 TABLET ORAL DAILY
Status: DISCONTINUED | OUTPATIENT
Start: 2025-03-23 | End: 2025-03-25

## 2025-03-23 RX ORDER — FUROSEMIDE 10 MG/ML
40 INJECTION INTRAMUSCULAR; INTRAVENOUS DAILY
Status: DISCONTINUED | OUTPATIENT
Start: 2025-03-23 | End: 2025-03-23

## 2025-03-23 RX ORDER — GABAPENTIN 100 MG/1
100 CAPSULE ORAL 3 TIMES DAILY
Status: DISCONTINUED | OUTPATIENT
Start: 2025-03-23 | End: 2025-03-23

## 2025-03-23 RX ORDER — MAGNESIUM SULFATE HEPTAHYDRATE 40 MG/ML
2 INJECTION, SOLUTION INTRAVENOUS ONCE
Status: COMPLETED | OUTPATIENT
Start: 2025-03-23 | End: 2025-03-23

## 2025-03-23 RX ORDER — METOPROLOL TARTRATE 1 MG/ML
2.5 INJECTION, SOLUTION INTRAVENOUS EVERY 6 HOURS PRN
Status: DISCONTINUED | OUTPATIENT
Start: 2025-03-23 | End: 2025-03-28 | Stop reason: HOSPADM

## 2025-03-23 RX ORDER — CALCIUM GLUCONATE 20 MG/ML
2 INJECTION, SOLUTION INTRAVENOUS ONCE
Status: COMPLETED | OUTPATIENT
Start: 2025-03-23 | End: 2025-03-24

## 2025-03-23 RX ORDER — DIGOXIN 0.25 MG/ML
250 INJECTION INTRAMUSCULAR; INTRAVENOUS ONCE
Status: COMPLETED | OUTPATIENT
Start: 2025-03-23 | End: 2025-03-23

## 2025-03-23 RX ORDER — PRAMIPEXOLE DIHYDROCHLORIDE 0.5 MG/1
0.25 TABLET ORAL
Status: DISCONTINUED | OUTPATIENT
Start: 2025-03-23 | End: 2025-03-28 | Stop reason: HOSPADM

## 2025-03-23 RX ORDER — MAGNESIUM SULFATE HEPTAHYDRATE 40 MG/ML
2 INJECTION, SOLUTION INTRAVENOUS ONCE
Status: COMPLETED | OUTPATIENT
Start: 2025-03-23 | End: 2025-03-24

## 2025-03-23 RX ADMIN — MILRINONE LACTATE IN DEXTROSE 0.13 MCG/KG/MIN: 200 INJECTION, SOLUTION INTRAVENOUS at 22:00

## 2025-03-23 RX ADMIN — Medication 12.5 MG: at 21:01

## 2025-03-23 RX ADMIN — PANTOPRAZOLE SODIUM 40 MG: 40 TABLET, DELAYED RELEASE ORAL at 21:01

## 2025-03-23 RX ADMIN — FUROSEMIDE 40 MG: 10 INJECTION, SOLUTION INTRAVENOUS at 09:53

## 2025-03-23 RX ADMIN — DIGOXIN 250 MCG: 0.25 INJECTION INTRAMUSCULAR; INTRAVENOUS at 04:04

## 2025-03-23 RX ADMIN — DIGOXIN 125 MCG: 125 TABLET ORAL at 09:53

## 2025-03-23 RX ADMIN — APIXABAN 5 MG: 5 TABLET, FILM COATED ORAL at 17:54

## 2025-03-23 RX ADMIN — METOROPROLOL TARTRATE 2.5 MG: 5 INJECTION, SOLUTION INTRAVENOUS at 11:50

## 2025-03-23 RX ADMIN — Medication 3 MG: at 22:00

## 2025-03-23 RX ADMIN — CALCIUM GLUCONATE 2 G: 20 INJECTION, SOLUTION INTRAVENOUS at 18:14

## 2025-03-23 RX ADMIN — PRAMIPEXOLE DIHYDROCHLORIDE 0.25 MG: 0.5 TABLET ORAL at 22:00

## 2025-03-23 RX ADMIN — Medication 12.5 MG: at 10:56

## 2025-03-23 RX ADMIN — PANTOPRAZOLE SODIUM 40 MG: 40 TABLET, DELAYED RELEASE ORAL at 09:01

## 2025-03-23 RX ADMIN — MAGNESIUM SULFATE HEPTAHYDRATE 2 G: 40 INJECTION, SOLUTION INTRAVENOUS at 05:27

## 2025-03-23 RX ADMIN — NICOTINE 1 PATCH: 21 PATCH, EXTENDED RELEASE TRANSDERMAL at 09:01

## 2025-03-23 RX ADMIN — APIXABAN 5 MG: 5 TABLET, FILM COATED ORAL at 09:01

## 2025-03-23 RX ADMIN — MILRINONE LACTATE IN DEXTROSE 0.25 MCG/KG/MIN: 200 INJECTION, SOLUTION INTRAVENOUS at 03:42

## 2025-03-23 RX ADMIN — MAGNESIUM SULFATE HEPTAHYDRATE 2 G: 40 INJECTION, SOLUTION INTRAVENOUS at 19:49

## 2025-03-23 NOTE — ASSESSMENT & PLAN NOTE
In the setting of shock state   Improving w/ MAP > 65   Delirium precautions  Frequent neuro exams

## 2025-03-23 NOTE — ASSESSMENT & PLAN NOTE
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 Follow up on repeat ScVo2 in 4 hours, if not improving then would increased milrinone to 0.38.  Lasix 40 mg IV given 3/22 for low UOP  Trend lactic, UOP, ScVO2  Repeat formal echo   Cardiology consult   Levophed off since 1620 on 3/22. Goal MAP > 65.

## 2025-03-23 NOTE — ASSESSMENT & PLAN NOTE
Presented to [facility] on 3/21 for A-fib RVR and was recommended admission at that time, but patient refused  S/p IV Cardizem and lopressor on admission  Would not use Cardizem given reduced LVEF  Avoid amiodarone given non-compliance of AC  ICD interrogated: Confirmed A-fib  Has not been compliant with outpatient Eliquis due to cost issues.   Eliquis restarted on admission - continue  Cardiology recommended switching from Coreg to Lopressor 25 mg every 6 hours  Cardiology following, NO plans for Cardioversion today. Will resume diet.   Metoprolol 12.5mg q12hrs started 3/23  Digoxin 250mcg daily carly.  Lab Results   Component Value Date    DIGOXIN 0.9 03/24/2025

## 2025-03-23 NOTE — CASE MANAGEMENT
Case Management Discharge Planning Note    Patient name Britton Batista  Location /-01 MRN [id number]  : 1965 Date 3/23/2025       Current Admission Date: 3/22/2025  Current Admission Diagnosis:Shock ([facility])   Patient Active Problem List    Diagnosis Date Noted Date Diagnosed    Scleroderma ([facility]) 2025     Hyperkalemia 2025     Stage 3a chronic kidney disease (CKD) ([facility]) 2025     Shock ([facility]) 2025     Metabolic encephalopathy 2025     Atrial fibrillation with RVR ([facility]) 2025     Syncope 2024     Chest wall pain 2024     Chronic HFrEF (heart failure with reduced ejection fraction) ([facility]) 2024     Primary hypertension 2024     Hypomagnesemia 2023     Methamphetamine abuse ([facility]) 2023     Tobacco abuse 2023     D-dimer, elevated 2023     Cardiomyopathy ([facility]) 10/09/2023     IRINEO (acute kidney injury) ([facility]) 10/08/2023     Acute on chronic combined systolic and diastolic congestive heart failure ([facility]) 10/07/2023     Hypertensive emergency 10/07/2023     Anxiety      Depression      Emphysema of lung ([facility])      GERD (gastroesophageal reflux disease)      Gout      Hypertensive urgency      Neuropathy      Raynaud's disease      Unilateral inguinal hernia, without obstruction or gangrene, not specified as recurrent      PSS (progressive systemic sclerosis) ([facility]) 10/25/2021     Near syncope 10/25/2021     Osteomyelitis ([facility]) 10/25/2021     Elevated troponin 10/25/2021     Acute encephalopathy 10/25/2021     Hyponatremia 10/24/2021       LOS (days): 1  Geometric Mean LOS (GMLOS) (days): 4.9  Days to GMLOS:3.5     OBJECTIVE:  Risk of Unplanned Readmission Score: 30.3         Current admission status: Inpatient   Preferred Pharmacy:   [facility] - [place], [place] - [address]  [address]  [place] [address]  Phone: [phone] Fax: [phone]    [facility]  [place] [address]   [facility],  [address]  [facility],  First Floor [facility] [address]  Phone: [phone] Fax: [phone]    [id number] - [place], [place] - [address] [address]  Phone: [phone] Fax: [phone]    Primary Care Provider: [facility] Everywhere Referral    Primary Insurance: [facility]  Secondary Insurance:     DISCHARGE DETAILS:     CM met with patient to discuss discharge planning. CM provided patient [employer] resources regarding transportation and food per SDOH consult.   Patient reported he cannot afford his elliquis monthly (around 140) - Cm reviewed not many options to change this and CM can make the medical team aware of patients struggles to assist with alternative options such as working with cardiologist for samples to off set the costs. Provider made aware  CM reviewed +DT - patient declines use, declined wanting to talk about it and declined any resources from CM.   CM encouraged - should anything change to reach out.    CM to follow patient's care and discharge needs.

## 2025-03-23 NOTE — ASSESSMENT & PLAN NOTE
Presented to [facility] on 3/21 for A-fib RVR and was recommended admission at that time, but patient refused  S/p IV Cardizem and lopressor on admission  Would not use Cardizem given reduced LVEF  Avoid amiodarone given non-compliance of AC  ICD interrogated: Confirmed A-fib  Has not been compliant with outpatient Eliquis  Eliquis restarted on admission - continue  Cardiology recommended switching from Coreg to Lopressor 25 mg every 6 hours  Cardiology following, tentatively planned for cardioversion Monday 3/24  Digoxin 250 mcg IV x1 given 3/22, repeat dose given 6 hours later on 3/24

## 2025-03-23 NOTE — ASSESSMENT & PLAN NOTE
Lab Results   Component Value Date    EGFR 51 03/22/2025    EGFR 47 03/22/2025    EGFR 56 03/22/2025    CREATININE 1.47 (H) 03/22/2025    CREATININE 1.58 (H) 03/22/2025    CREATININE 1.37 (H) 03/22/2025   Baseline Cr 1.2-1.3  3/22 AM Lasix 40mg IV, repeat dose given PM  Continue edmondson for accurate I&O  Trend BMP

## 2025-03-23 NOTE — ASSESSMENT & PLAN NOTE
Suspect I/S/O NICM due to methamphetamine use vs. IV cardizem bolus  No suspected source of infection, will hold on IVF resuscitation due to severely reduced EF and concern for volume overload  Lheart cath 2020 unremarkable for CAD/ischemia   POC Cardiac US 3/22 showing severely reduced EF w/ global hypokinesis  A/e/b: decreasing urine output, tachycardia, hypotension, lactic acidosis, altered mental status  Lactic cleared, ScVo2 61 (baseline), repeat improving to 64 w/ addition of milrinone  Daily lasix 40mg   Trend lactic, UOP, ScVO2  Recent Labs     03/22/25  0353 03/22/25  1236 03/22/25  1537   LACTICACID 1.8 2.4* 1.7   3/24/25: SVO2 73.9 percent.Improved.   Repeat formal echo   Cardiology consult   Levophed off since 1620 on 3/22. Goal MAP > 65.

## 2025-03-23 NOTE — ASSESSMENT & PLAN NOTE
Follows [facility] Rheumatology (last visit 9/5/2024)  Cutaneous manifestations: hyper/hypopigmentation  Digital vasculopathy (Raynaud's phenomenon)   Pulmonary manifestations: pulm HTN on prior echo PASP 42, ILD

## 2025-03-23 NOTE — PROGRESS NOTES
Progress Note - Critical Care/ICU   Name: Britton Batista 59 y.o. male I MRN: [id number]  Unit/Bed#: -01 I Date of Admission: 3/22/2025   Date of Service: 3/23/2025 I Hospital Day: 1      Assessment & Plan  Shock (HCC)  Likely cardiogenic I/S/O NICM due to methamphetamine use  No suspected source of infection, will hold on IVF resuscitation due to severely reduced EF and concern for volume overload  Prior cardiac cath in 2017 and 2020 unremarkable   POC Cardiac US today showing severely reduced EF w/ global hypokinesis  A/e/b: decreasing urine output, tachycardia, hypotension, lactic acidosis, altered mental status  ScVo2 61 (baseline), repeat 38  Continue milrinone 0.25. Follow up on repeat ScVo2 in 4 hours, if not improving then would increased milrinone to 0.38.  Lasix 40 mg IV given 3/22 for low UOP  Trend lactic, UOP, ScVO2  Repeat formal echo   Cardiology consult   Levophed off since 1620 on 3/22. Goal MAP > 65.  Acute on chronic combined systolic and diastolic congestive heart failure (HCC)  Wt Readings from Last 3 Encounters:   03/22/25 76.6 kg (168 lb 12.8 oz)   02/06/25 73.7 kg (162 lb 6.4 oz)   09/18/24 75.4 kg (166 lb 3.6 oz)     Last echo 8/2024: LVEF 30%, grade 1 diastolic dysfunction  Point-of-care cardiac ultrasound 3/22 showing severely reduced EF, global hypokinesis  Troponin fluctuating, EKG appear non-ischemic  Hold diuresis at time of ICU admission  Lasix 40 mg IV given 3/22, then held due to hypotension  Lasix 40 mg IV given 3/22 evening due to UOP declining  Daily weights  [place] for accurate I&O  Follow-up TTE  Cardiology following  Methamphetamine abuse (HCC)  Prior hx of and UDS positive on admission for methamphetamines  Primary hypertension  Hold home losartan and amlodipine  Atrial fibrillation with RVR (HCC)  Presented to [facility] on 3/21 for A-fib RVR and was recommended admission at that time, but patient refused  S/p IV Cardizem and lopressor on admission  Would  not use Cardizem given reduced LVEF  Avoid amiodarone given non-compliance of AC  ICD interrogated: Confirmed A-fib  Has not been compliant with outpatient Eliquis  Eliquis restarted on admission - continue  Cardiology recommended switching from Coreg to Lopressor 25 mg every 6 hours  Cardiology following, tentatively planned for cardioversion Monday 3/24  Digoxin 250 mcg IV x1 given 3/22, repeat dose given 6 hours later on 3/24  Stage 3a chronic kidney disease (CKD) (HCC)  Lab Results   Component Value Date    EGFR 51 03/22/2025    EGFR 47 03/22/2025    EGFR 56 03/22/2025    CREATININE 1.47 (H) 03/22/2025    CREATININE 1.58 (H) 03/22/2025    CREATININE 1.37 (H) 03/22/2025   Baseline Cr 1.2-1.3  3/22 AM Lasix 40mg IV, repeat dose given PM  Continue edmondson for accurate I&O  Trend BMP  Metabolic encephalopathy  In the setting of shock state   Improving w/ MAP > 65   Delirium precautions  Frequent neuro exams  Disposition: Critical care    ICU Core Measures     A: Assess, Prevent, and Manage Pain Has pain been assessed? Yes  Need for changes to pain regimen? No   B: Both SAT/SAT  N/A   C: Choice of Sedation RASS Goal: N/A patient not on sedation  Need for changes to sedation or analgesia regimen? No   D: Delirium CAM-ICU: Negative   E: Early Mobility  Plan for early mobility? Yes   F: Family Engagement Plan for family engagement today? Yes       Review of Invasive Devices:    [place] Plan: Continue for accurate I/O monitoring for 48 hours  Central access plan: Hemodynamic monitoring  [place] Plan: Keep arterial line for hemodynamic monitoring, frequent ABGs, and frequent labs    Prophylaxis:  VTE VTE covered by:  apixaban, Oral, 5 mg at 03/22/25 1914       Stress Ulcer  covered bypantoprazole (PROTONIX) 40 mg tablet [[id number]] (Long-Term Med), pantoprazole (PROTONIX) EC tablet 40 mg [[id number]]         24 Hour Events : Digoxin 250 mcg IV x2 given for sustained/elevated HR; Afib RVR. Lasix 40 mg IV given last evening  (3/22). Repeat ScVo2 38, continued milrinone at 0.25. Repeat ScVo2 due at approximately 0400.     Subjective   Review of Systems: Review of Systems   Unable to perform ROS: Mental status change       Objective :                   Vitals I/O      Most Recent Min/Max in 24hrs   Temp 98.8 °F (37.1 °C) Temp  Min: 96.1 °F (35.6 °C)  Max: 99.1 °F (37.3 °C)   Pulse (!) 131 Pulse  Min: 43  Max: 143   Resp (!) 23 Resp  Min: 10  Max: 49   /80 BP  Min: 80/60  Max: 146/92   O2 Sat 96 % SpO2  Min: 59 %  Max: 99 %      Intake/Output Summary (Last 24 hours) at 3/23/2025 0236  Last data filed at 3/23/2025 0100  Gross per 24 hour   Intake 58.17 ml   Output 2120 ml   Net -2061.83 ml       Diet NPO    Invasive Monitoring   Arterial Line  Pinon /76  Arterial Line BP  Min: -55/-55  Max: 129/74   MAP 89 mmHg  Arterial Line MAP (mmHg)  Min: 72 mmHg  Max: 99 mmHg           Physical Exam   Physical Exam  Vitals reviewed.   Eyes:      Conjunctiva/sclera: Conjunctivae normal.      Pupils: Pupils are equal, round, and reactive to light.   Skin:     General: Skin is cool.      Capillary Refill: Capillary refill takes less than 2 seconds.   HENT:      Head: Normocephalic and atraumatic.      Mouth/Throat:      Mouth: Mucous membranes are moist.   Cardiovascular:      Rate and Rhythm: Rhythm irregularly irregular.      Pulses: Normal pulses.   Musculoskeletal:      Right lower leg: No edema.      Left lower leg: No edema.   Abdominal: General: Bowel sounds are normal.      Palpations: Abdomen is soft.      Tenderness: There is no abdominal tenderness.   Constitutional:       General: He is not in acute distress.     Appearance: He is ill-appearing.   Pulmonary:      Effort: Pulmonary effort is normal. No respiratory distress.      Breath sounds: Normal breath sounds.   Neurological:      General: No focal deficit present.      Mental Status: He is lethargic.      GCS: GCS eye subscore is 3. GCS verbal subscore is 4. GCS motor subscore  is 5.      Motor: Weakness.   Genitourinary/Anorectal:  Zazueta present.        Diagnostic Studies        Lab Results: I have reviewed the following results:     Medications:  Scheduled PRN   apixaban, 5 mg, BID  digoxin, 250 mcg, Once  [Held by provider] furosemide, 40 mg, BID (diuretic)  nicotine, 1 patch, Daily  pantoprazole, 40 mg, BID      acetaminophen, 650 mg, Q6H PRN  metoprolol, 5 mg, Q6H PRN  trimethobenzamide, 200 mg, Q6H PRN       Continuous    milrinone (Primacor) infusion, 0.25 mcg/kg/min, Last Rate: 0.25 mcg/kg/min (03/22/25 1525)  norepinephrine, 1-30 mcg/min, Last Rate: Stopped (03/22/25 1620)         Labs:   CBC    Recent Labs     03/22/25  0353 03/22/25  0601   WBC 7.63 8.71   HGB 15.0 15.3   HCT 48.2 47.9    242     BMP    Recent Labs     03/22/25  1236 03/22/25  2153   SODIUM 133* 135   K 4.0 4.4    101   CO2 26 28   AGAP 7 6   BUN 22 27*   CREATININE 1.58* 1.47*   CALCIUM 9.2 9.0       Coags    Recent Labs     03/22/25  0353   INR 1.13   PTT 32        Additional Electrolytes  Recent Labs     03/22/25  0353 03/22/25  0601   MG 1.8* 1.9          Blood Gas    Recent Labs     03/22/25  1451   PHART 7.478*   YHO7NLJ 25.0*   PO2ART 141.4*   OOB9FVN 18.1*   BEART -3.5   SOURCE Line, Arterial     Recent Labs     03/22/25  1451 03/22/25  1537 03/22/25  2153   PHVEN  --    < > 7.356   ZZC4CII  --    < > 48.8   PO2VEN  --    < > 26.8*   CIQ7UTS  --    < > 26.7   BEVEN  --    < > 0.5   U2RTMTL  --    < > 37.9*   SOURCE Line, Arterial  --   --     < > = values in this interval not displayed.    LFTs  Recent Labs     03/22/25  0353 03/22/25  1236   ALT 15 18   AST 38 29   ALKPHOS 90 105*   ALB 3.6 3.6   TBILI 1.09* 1.36*       Infectious  Recent Labs     03/22/25  0353   PROCALCITONI 0.08     Glucose  Recent Labs     03/22/25  0353 03/22/25  0955 03/22/25  1236 03/22/25  2153   GLUC 103 96 126 119

## 2025-03-23 NOTE — PROCEDURES
POC Cardiac US    Date/Time: 3/23/2025 7:50 AM    Performed by: Jhon Servin PA-C  Authorized by: Jhon Servin PA-C    Patient location:  ICU  Procedure details:     Exam Type:  Diagnostic    Indications: hypotension and suspected volume depletion      Assessment / Evaluation for: cardiac function, inferior vena cava for fluid responsiveness and intravascular volume status      Exam Type: initial exam      Image quality: limited diagnostic      Image availability:  Images available in PACS  Patient Details:     Cardiac Rhythm:  Irregular    Medications comment:  Milirinone    Mechanical ventilation: No    Cardiac findings:     Echo technique: limited 2D and M-mode      Views obtained: parasternal long axis, parasternal short axis, subcostal and apical      Pericardial effusion: trace      Pericardial effusion comment:  7mm    Wall motion: hypodynamic      LV systolic function: depressed    IVC findings:     IVC Size: dilated      IVC Inspiratory Collapse: absent

## 2025-03-23 NOTE — ASSESSMENT & PLAN NOTE
Wt Readings from Last 3 Encounters:   03/22/25 76.6 kg (168 lb 12.8 oz)   02/06/25 73.7 kg (162 lb 6.4 oz)   09/18/24 75.4 kg (166 lb 3.6 oz)     Last echo 8/2024: LVEF 30%, grade 1 diastolic dysfunction  Point-of-care cardiac ultrasound 3/22 showing severely reduced EF, global hypokinesis  Troponin fluctuating, EKG appear non-ischemic  Hold diuresis at time of ICU admission  Lasix 40 mg IV given 3/22, then held due to hypotension  Lasix 40 mg IV given 3/22 evening due to UOP declining  Daily weights  Marbin for accurate I&O  Follow-up TTE  Cardiology following

## 2025-03-23 NOTE — ASSESSMENT & PLAN NOTE
Wt Readings from Last 3 Encounters:   03/24/25 76 kg (167 lb 8.8 oz)   02/06/25 73.7 kg (162 lb 6.4 oz)   09/18/24 75.4 kg (166 lb 3.6 oz)     NICM due to methamphetamine use  Last echo 8/2024: LVEF 30%, grade 1 diastolic dysfunction  Lheart cath 2020 showing no ischemia or CAD  Home GDTM: Jardiance, coreg, lasix, entresto  Point-of-care cardiac ultrasound 3/22 showing severely reduced EF, global hypokinesis  Troponin fluctuating, EKG appear non-ischemic  Continue Lasix 40mg daily I/O last 3 completed shifts:  In: 1891.3 [P.O.:1590; I.V.:151.3; IV Piggyback:150]  Out: 4525 [Urine:4525]  No intake/output data recorded.   Holding jardiance, coreg, and entresto. He will need alternate prescriptions due to cost issues. Will send in a prescription to check the prices. CM involved.   metoprolol 12.5mg BID. Blood Pressure: (!) 131/101   Daily weights Weight change: -2.2 kg (-4 lb 13.6 oz) .corin Zazueta for accurate I&O  Follow-up TTE  Cardiology following. NO plans for Cardioversion today. Will resume diet.

## 2025-03-23 NOTE — PLAN OF CARE
Problem: CARDIOVASCULAR - ADULT  Goal: Maintains optimal cardiac output and hemodynamic stability  Description: INTERVENTIONS:- Monitor I/O, vital signs and rhythm- Monitor for S/S and trends of decreased cardiac output- Administer and titrate ordered vasoactive medications to optimize hemodynamic stability- Assess quality of pulses, skin color and temperature- Assess for signs of decreased coronary artery perfusion- Instruct patient to report change in severity of symptoms monitor for chest pain , BLACK, SOB palpatations  Outcome: Progressing  Goal: Absence of cardiac dysrhythmias or at baseline rhythm  Description: INTERVENTIONS:- Continuous cardiac monitoring, vital signs, obtain 12 lead EKG if ordered- Administer antiarrhythmic and heart rate control medications as ordered- Monitor electrolytes and administer replacement therapy as ordered  Outcome: Progressing     Problem: PAIN - ADULT  Goal: Verbalizes/displays adequate comfort level or baseline comfort level  Description: Interventions:- Encourage patient to monitor pain and request assistance- Assess pain using appropriate pain scale- Administer analgesics based on type and severity of pain and evaluate response- Implement non-pharmacological measures as appropriate and evaluate response- Consider cultural and social influences on pain and pain management- Notify physician/advanced practitioner if interventions unsuccessful or patient reports new pain  Outcome: Progressing     Problem: INFECTION - ADULT  Goal: Absence or prevention of progression during hospitalization  Description: INTERVENTIONS:- Assess and monitor for signs and symptoms of infection- Monitor lab/diagnostic results- Monitor all insertion sites, i.e. indwelling lines, tubes, and drains- Monitor endotracheal if appropriate and nasal secretions for changes in amount and color- [place] appropriate cooling/warming therapies per order- Administer medications as ordered- Instruct and encourage  patient and family to use good hand hygiene technique- Identify and instruct in appropriate isolation precautions for identified infection/condition  Outcome: Progressing  Goal: Absence of fever/infection during neutropenic period  Description: INTERVENTIONS:- Monitor WBC  Outcome: Progressing     Problem: SAFETY ADULT  Goal: Patient will remain free of falls  Description: INTERVENTIONS:- Educate patient/family on patient safety including physical limitations- Instruct patient to call for assistance with activity - Consult OT/PT to assist with strengthening/mobility - Keep Call bell within reach- Keep bed low and locked with side rails adjusted as appropriate- Keep care items and personal belongings within reach- Initiate and maintain comfort rounds- Make Fall Risk Sign visible to staff- Offer Toileting every 2 Hours, in advance of need- Initiate/Maintain bed/chair alarm - Apply yellow socks and bracelet for high fall risk patients- Consider moving patient to room near nurses station  Outcome: Progressing  Goal: Maintain or return to baseline ADL function  Description: INTERVENTIONS:-  Assess patient's ability to carry out ADLs; assess patient's baseline for ADL function and identify physical deficits which impact ability to perform ADLs (bathing, care of mouth/teeth, toileting, grooming, dressing, etc.)- Assess/evaluate cause of self-care deficits - Assess range of motion- Assess patient's mobility; develop plan if impaired- Assess patient's need for assistive devices and provide as appropriate- Encourage maximum independence but intervene and supervise when necessary- Involve family in performance of ADLs- Assess for home care needs following discharge - Consider OT consult to assist with ADL evaluation and planning for discharge- Provide patient education as appropriate  Outcome: Progressing  Goal: Maintains/Returns to pre admission functional level  Description: INTERVENTIONS:- Perform AM-PAC 6 Click Basic  Mobility/ Daily Activity assessment daily.- Set and communicate daily mobility goal to care team and patient/family/caregiver. - Collaborate with rehabilitation services on mobility goals if consulted- Perform Range of Motion 3 times a day.- Reposition patient every 2 hours if unable to reposition self.- Dangle patient 3 times a day- Stand patient 3 times a day- Ambulate patient 3 times a day- Out of bed to chair 2 times a day -  Out of bed for toileting- Record patient progress and toleration of activity level   Outcome: Progressing     Problem: DISCHARGE PLANNING  Goal: Discharge to home or other facility with appropriate resources  Description: INTERVENTIONS:- Identify barriers to discharge w/patient and caregiver- Arrange for needed discharge resources and transportation as appropriate- Identify discharge learning needs (meds, wound care, etc.)- Arrange for interpretive services to assist at discharge as needed- Refer to Case Management Department for coordinating discharge planning if the patient needs post-hospital services based on physician/advanced practitioner order or complex needs related to functional status, cognitive ability, or social support system  Outcome: Progressing     Problem: Knowledge Deficit  Goal: Patient/family/caregiver demonstrates understanding of disease process, treatment plan, medications, and discharge instructions  Description: Complete learning assessment and assess knowledge base.Interventions:- Provide teaching at level of understanding- Provide teaching via preferred learning methods  Outcome: Progressing

## 2025-03-23 NOTE — HOSPITAL COURSE
Britton Batista is a 59 y.o. male hx afib (noncompliant w/ anticoagulation), NICM (methamphetamine use), HFrEF (last echo 8/'24 LVEF 30% G1DD), current methamphetamine use, HTN, CKD 3a, scleroderma (raynaud's, ILD, hyperpigmentation) who presented 3/22 w/ SOB and was admitted w/ Afib RVR. Initially on SLIM he was managed w/ IV lopressor and cardizem. RRT x2 for tachyarrhythmia and hypotension respectively. POC cardiac US showing severely reduced EF and global hypokinesis. Upgraded to ICU on 3/22 w/ concern for cardiogenic shock (worsening mentation, decreased UOP, lactic acidosis, cool extremities, hypotension). Started levophed and milrinone. Levophed weaned off 3/22 PM and milrinone remains at 0.25. Continuing w/ lasix 40 daily. Cardiology consult placed and awaiting formal TTE 3/24.

## 2025-03-23 NOTE — PLAN OF CARE
Problem: CARDIOVASCULAR - ADULT  Goal: Maintains optimal cardiac output and hemodynamic stability  Description: INTERVENTIONS:- Monitor I/O, vital signs and rhythm- Monitor for S/S and trends of decreased cardiac output- Administer and titrate ordered vasoactive medications to optimize hemodynamic stability- Assess quality of pulses, skin color and temperature- Assess for signs of decreased coronary artery perfusion- Instruct patient to report change in severity of symptoms monitor for chest pain , BLACK, SOB palpatations  Outcome: Progressing  Goal: Absence of cardiac dysrhythmias or at baseline rhythm  Description: INTERVENTIONS:- Continuous cardiac monitoring, vital signs, obtain 12 lead EKG if ordered- Administer antiarrhythmic and heart rate control medications as ordered- Monitor electrolytes and administer replacement therapy as ordered  Outcome: Progressing     Problem: PAIN - ADULT  Goal: Verbalizes/displays adequate comfort level or baseline comfort level  Description: Interventions:- Encourage patient to monitor pain and request assistance- Assess pain using appropriate pain scale- Administer analgesics based on type and severity of pain and evaluate response- Implement non-pharmacological measures as appropriate and evaluate response- Consider cultural and social influences on pain and pain management- Notify physician/advanced practitioner if interventions unsuccessful or patient reports new pain  Outcome: Progressing     Problem: INFECTION - ADULT  Goal: Absence or prevention of progression during hospitalization  Description: INTERVENTIONS:- Assess and monitor for signs and symptoms of infection- Monitor lab/diagnostic results- Monitor all insertion sites, i.e. indwelling lines, tubes, and drains- Monitor endotracheal if appropriate and nasal secretions for changes in amount and color- [place] appropriate cooling/warming therapies per order- Administer medications as ordered- Instruct and encourage  patient and family to use good hand hygiene technique- Identify and instruct in appropriate isolation precautions for identified infection/condition  Outcome: Progressing  Goal: Absence of fever/infection during neutropenic period  Description: INTERVENTIONS:- Monitor WBC  Outcome: Progressing

## 2025-03-24 ENCOUNTER — APPOINTMENT (INPATIENT)
Dept: NON INVASIVE DIAGNOSTICS | Facility: HOSPITAL | Age: 60
End: 2025-03-24
Payer: COMMERCIAL

## 2025-03-24 PROBLEM — G93.41 METABOLIC ENCEPHALOPATHY: Status: RESOLVED | Noted: 2025-03-22 | Resolved: 2025-03-24

## 2025-03-24 LAB
ALBUMIN SERPL BCG-MCNC: 3.2 G/DL (ref 3.5–5)
ALP SERPL-CCNC: 104 U/L (ref 34–104)
ALT SERPL W P-5'-P-CCNC: 32 U/L (ref 7–52)
ANION GAP SERPL CALCULATED.3IONS-SCNC: 4 MMOL/L (ref 4–13)
ARTERIAL PATENCY WRIST A: YES
AST SERPL W P-5'-P-CCNC: 34 U/L (ref 13–39)
ATRIAL RATE: 107 BPM
ATRIAL RATE: 115 BPM
ATRIAL RATE: 144 BPM
ATRIAL RATE: 36 BPM
BASE EX.OXY STD BLDV CALC-SCNC: 55.7 % (ref 60–80)
BASE EX.OXY STD BLDV CALC-SCNC: 57.6 % (ref 60–80)
BASE EX.OXY STD BLDV CALC-SCNC: 73.9 % (ref 60–80)
BASE EXCESS BLDV CALC-SCNC: 1.9 MMOL/L
BASE EXCESS BLDV CALC-SCNC: 3.1 MMOL/L
BASE EXCESS BLDV CALC-SCNC: 4.6 MMOL/L
BILIRUB SERPL-MCNC: 0.78 MG/DL (ref 0.2–1)
BSA FOR ECHO PROCEDURE: 1.91 M2
BUN SERPL-MCNC: 29 MG/DL (ref 5–25)
CA-I BLD-SCNC: 1.11 MMOL/L (ref 1.12–1.32)
CALCIUM ALBUM COR SERPL-MCNC: 9.3 MG/DL (ref 8.3–10.1)
CALCIUM SERPL-MCNC: 8.7 MG/DL (ref 8.4–10.2)
CHLORIDE SERPL-SCNC: 96 MMOL/L (ref 96–108)
CO2 SERPL-SCNC: 31 MMOL/L (ref 21–32)
CREAT SERPL-MCNC: 1.5 MG/DL (ref 0.6–1.3)
DIGOXIN SERPL-MCNC: 0.9 NG/ML (ref 0.8–2)
DOP CALC LVOT AREA: 3.14 CM2
DOP CALC LVOT DIAMETER: 2 CM
E WAVE DECELERATION TIME: 149 MS
ERYTHROCYTE [DISTWIDTH] IN BLOOD BY AUTOMATED COUNT: 15.2 % (ref 11.6–15.1)
FRACTIONAL SHORTENING: 18 (ref 28–44)
GFR SERPL CREATININE-BSD FRML MDRD: 50 ML/MIN/1.73SQ M
GLUCOSE SERPL-MCNC: 114 MG/DL (ref 65–140)
HCO3 BLDV-SCNC: 27.9 MMOL/L (ref 24–30)
HCO3 BLDV-SCNC: 29.1 MMOL/L (ref 24–30)
HCO3 BLDV-SCNC: 31.3 MMOL/L (ref 24–30)
HCT VFR BLD AUTO: 43.4 % (ref 36.5–49.3)
HGB BLD-MCNC: 14 G/DL (ref 12–17)
INTERVENTRICULAR SEPTUM IN DIASTOLE (PARASTERNAL SHORT AXIS VIEW): 1.4 CM
INTERVENTRICULAR SEPTUM: 1.4 CM (ref 0.6–1.1)
LAAS-AP2: 42.6 CM2
LAAS-AP4: 32.8 CM2
LEFT ATRIUM AREA SYSTOLE SINGLE PLANE A4C: 32 CM2
LEFT ATRIUM SIZE: 5.3 CM
LEFT ATRIUM VOLUME (MOD BIPLANE): 164 ML
LEFT ATRIUM VOLUME INDEX (MOD BIPLANE): 85.4 ML/M2
LEFT INTERNAL DIMENSION IN SYSTOLE: 4.9 CM (ref 2.1–4)
LEFT VENTRICLE DIASTOLIC VOLUME (MOD BIPLANE): 290 ML
LEFT VENTRICLE DIASTOLIC VOLUME INDEX (MOD BIPLANE): 151.8 ML/M2
LEFT VENTRICLE SYSTOLIC VOLUME (MOD BIPLANE): 201 ML
LEFT VENTRICLE SYSTOLIC VOLUME INDEX (MOD BIPLANE): 105.2 ML/M2
LEFT VENTRICULAR INTERNAL DIMENSION IN DIASTOLE: 6 CM (ref 3.5–6)
LEFT VENTRICULAR POSTERIOR WALL IN END DIASTOLE: 1.5 CM
LEFT VENTRICULAR STROKE VOLUME: 64 ML
LV EF BIPLANE MOD: 31 %
LV EF US.2D.A4C+ESTIMATED: 27 %
LVSV (TEICH): 64 ML
MAGNESIUM SERPL-MCNC: 1.9 MG/DL (ref 1.9–2.7)
MCH RBC QN AUTO: 28.7 PG (ref 26.8–34.3)
MCHC RBC AUTO-ENTMCNC: 32.3 G/DL (ref 31.4–37.4)
MCV RBC AUTO: 89 FL (ref 82–98)
MV PEAK E VEL: 117 CM/S
MV STENOSIS PRESSURE HALF TIME: 43 MS
MV VALVE AREA P 1/2 METHOD: 5.12
O2 CT BLDV-SCNC: 11.6 ML/DL
O2 CT BLDV-SCNC: 12.6 ML/DL
O2 CT BLDV-SCNC: 15.3 ML/DL
PCO2 BLDV: 48.7 MM HG (ref 42–50)
PCO2 BLDV: 49.4 MM HG (ref 42–50)
PCO2 BLDV: 54.2 MM HG (ref 42–50)
PH BLDV: 7.38 [PH] (ref 7.3–7.4)
PH BLDV: 7.38 [PH] (ref 7.3–7.4)
PH BLDV: 7.39 [PH] (ref 7.3–7.4)
PHOSPHATE SERPL-MCNC: 3.5 MG/DL (ref 2.7–4.5)
PLATELET # BLD AUTO: 230 THOUSANDS/UL (ref 149–390)
PMV BLD AUTO: 9.3 FL (ref 8.9–12.7)
PO2 BLDV: 32.5 MM HG (ref 35–45)
PO2 BLDV: 32.9 MM HG (ref 35–45)
PO2 BLDV: 43.7 MM HG (ref 35–45)
POTASSIUM SERPL-SCNC: 3.7 MMOL/L (ref 3.5–5.3)
PROT SERPL-MCNC: 6.1 G/DL (ref 6.4–8.4)
QRS AXIS: 103 DEGREES
QRS AXIS: 110 DEGREES
QRS AXIS: 112 DEGREES
QRS AXIS: 157 DEGREES
QRS AXIS: 176 DEGREES
QRSD INTERVAL: 168 MS
QRSD INTERVAL: 170 MS
QRSD INTERVAL: 176 MS
QRSD INTERVAL: 186 MS
QRSD INTERVAL: 198 MS
QT INTERVAL: 384 MS
QT INTERVAL: 410 MS
QT INTERVAL: 422 MS
QT INTERVAL: 432 MS
QT INTERVAL: 478 MS
QTC INTERVAL: 576 MS
QTC INTERVAL: 591 MS
QTC INTERVAL: 594 MS
QTC INTERVAL: 595 MS
QTC INTERVAL: 604 MS
RBC # BLD AUTO: 4.88 MILLION/UL (ref 3.88–5.62)
RIGHT ATRIUM AREA SYSTOLE A4C: 31.2 CM2
RIGHT VENTRICLE ID DIMENSION: 5.4 CM
SL CV LEFT ATRIUM LENGTH A2C: 6.8 CM
SL CV LV EF: 25
SL CV PED ECHO LEFT VENTRICLE DIASTOLIC VOLUME (MOD BIPLANE) 2D: 177 ML
SL CV PED ECHO LEFT VENTRICLE SYSTOLIC VOLUME (MOD BIPLANE) 2D: 113 ML
SODIUM SERPL-SCNC: 131 MMOL/L (ref 135–147)
T WAVE AXIS: -37 DEGREES
T WAVE AXIS: -47 DEGREES
T WAVE AXIS: -51 DEGREES
T WAVE AXIS: 11 DEGREES
T WAVE AXIS: 3 DEGREES
TR MAX PG: 26 MMHG
TR PEAK VELOCITY: 2.5 M/S
TRICUSPID ANNULAR PLANE SYSTOLIC EXCURSION: 1.5 CM
TRICUSPID VALVE PEAK REGURGITATION VELOCITY: 2.54 M/S
VENTRICULAR RATE: 107 BPM
VENTRICULAR RATE: 123 BPM
VENTRICULAR RATE: 127 BPM
VENTRICULAR RATE: 144 BPM
VENTRICULAR RATE: 92 BPM
WBC # BLD AUTO: 8.02 THOUSAND/UL (ref 4.31–10.16)

## 2025-03-24 PROCEDURE — 93306 TTE W/DOPPLER COMPLETE: CPT | Performed by: INTERNAL MEDICINE

## 2025-03-24 PROCEDURE — 82330 ASSAY OF CALCIUM: CPT

## 2025-03-24 PROCEDURE — 94640 AIRWAY INHALATION TREATMENT: CPT

## 2025-03-24 PROCEDURE — 80162 ASSAY OF DIGOXIN TOTAL: CPT

## 2025-03-24 PROCEDURE — 94664 DEMO&/EVAL PT USE INHALER: CPT

## 2025-03-24 PROCEDURE — 83735 ASSAY OF MAGNESIUM: CPT

## 2025-03-24 PROCEDURE — 93010 ELECTROCARDIOGRAM REPORT: CPT | Performed by: INTERNAL MEDICINE

## 2025-03-24 PROCEDURE — 85027 COMPLETE CBC AUTOMATED: CPT

## 2025-03-24 PROCEDURE — 94760 N-INVAS EAR/PLS OXIMETRY 1: CPT

## 2025-03-24 PROCEDURE — 82805 BLOOD GASES W/O2 SATURATION: CPT

## 2025-03-24 PROCEDURE — C8929 TTE W OR WO FOL WCON,DOPPLER: HCPCS

## 2025-03-24 PROCEDURE — 82805 BLOOD GASES W/O2 SATURATION: CPT | Performed by: NURSE PRACTITIONER

## 2025-03-24 PROCEDURE — 99223 1ST HOSP IP/OBS HIGH 75: CPT | Performed by: INTERNAL MEDICINE

## 2025-03-24 PROCEDURE — 99233 SBSQ HOSP IP/OBS HIGH 50: CPT | Performed by: STUDENT IN AN ORGANIZED HEALTH CARE EDUCATION/TRAINING PROGRAM

## 2025-03-24 PROCEDURE — 80053 COMPREHEN METABOLIC PANEL: CPT

## 2025-03-24 PROCEDURE — 84100 ASSAY OF PHOSPHORUS: CPT

## 2025-03-24 RX ORDER — BUDESONIDE 0.5 MG/2ML
0.5 INHALANT ORAL
Status: DISCONTINUED | OUTPATIENT
Start: 2025-03-24 | End: 2025-03-27

## 2025-03-24 RX ORDER — POTASSIUM CHLORIDE 14.9 MG/ML
20 INJECTION INTRAVENOUS ONCE
Status: COMPLETED | OUTPATIENT
Start: 2025-03-24 | End: 2025-03-24

## 2025-03-24 RX ORDER — ENOXAPARIN SODIUM 100 MG/ML
1 INJECTION SUBCUTANEOUS EVERY 12 HOURS SCHEDULED
Status: DISCONTINUED | OUTPATIENT
Start: 2025-03-24 | End: 2025-03-25

## 2025-03-24 RX ORDER — MAGNESIUM SULFATE HEPTAHYDRATE 40 MG/ML
2 INJECTION, SOLUTION INTRAVENOUS ONCE
Status: DISCONTINUED | OUTPATIENT
Start: 2025-03-24 | End: 2025-03-24

## 2025-03-24 RX ORDER — CALCIUM GLUCONATE 20 MG/ML
1 INJECTION, SOLUTION INTRAVENOUS ONCE
Status: COMPLETED | OUTPATIENT
Start: 2025-03-24 | End: 2025-03-24

## 2025-03-24 RX ORDER — WARFARIN SODIUM 5 MG/1
5 TABLET ORAL
Status: DISCONTINUED | OUTPATIENT
Start: 2025-03-24 | End: 2025-03-25

## 2025-03-24 RX ORDER — METOPROLOL TARTRATE 25 MG/1
25 TABLET, FILM COATED ORAL EVERY 12 HOURS SCHEDULED
Status: DISCONTINUED | OUTPATIENT
Start: 2025-03-24 | End: 2025-03-25

## 2025-03-24 RX ORDER — IPRATROPIUM BROMIDE AND ALBUTEROL SULFATE 2.5; .5 MG/3ML; MG/3ML
3 SOLUTION RESPIRATORY (INHALATION)
Status: DISCONTINUED | OUTPATIENT
Start: 2025-03-24 | End: 2025-03-24

## 2025-03-24 RX ORDER — IPRATROPIUM BROMIDE AND ALBUTEROL SULFATE 2.5; .5 MG/3ML; MG/3ML
3 SOLUTION RESPIRATORY (INHALATION) 4 TIMES DAILY PRN
Status: DISCONTINUED | OUTPATIENT
Start: 2025-03-24 | End: 2025-03-25

## 2025-03-24 RX ORDER — FUROSEMIDE 40 MG/1
40 TABLET ORAL
Status: DISCONTINUED | OUTPATIENT
Start: 2025-03-24 | End: 2025-03-26

## 2025-03-24 RX ORDER — MAGNESIUM SULFATE HEPTAHYDRATE 40 MG/ML
2 INJECTION, SOLUTION INTRAVENOUS ONCE
Status: COMPLETED | OUTPATIENT
Start: 2025-03-24 | End: 2025-03-24

## 2025-03-24 RX ADMIN — METOPROLOL TARTRATE 25 MG: 25 TABLET, FILM COATED ORAL at 09:38

## 2025-03-24 RX ADMIN — PERFLUTREN 0.4 ML/MIN: 6.52 INJECTION, SUSPENSION INTRAVENOUS at 09:05

## 2025-03-24 RX ADMIN — METOPROLOL TARTRATE 25 MG: 25 TABLET, FILM COATED ORAL at 21:00

## 2025-03-24 RX ADMIN — APIXABAN 5 MG: 5 TABLET, FILM COATED ORAL at 09:38

## 2025-03-24 RX ADMIN — POTASSIUM CHLORIDE 20 MEQ: 14.9 INJECTION, SOLUTION INTRAVENOUS at 06:18

## 2025-03-24 RX ADMIN — Medication 3 MG: at 21:00

## 2025-03-24 RX ADMIN — CALCIUM GLUCONATE 1 G: 20 INJECTION, SOLUTION INTRAVENOUS at 05:22

## 2025-03-24 RX ADMIN — ENOXAPARIN SODIUM 80 MG: 80 INJECTION SUBCUTANEOUS at 17:36

## 2025-03-24 RX ADMIN — DIGOXIN 125 MCG: 125 TABLET ORAL at 09:38

## 2025-03-24 RX ADMIN — FUROSEMIDE 40 MG: 40 TABLET ORAL at 09:38

## 2025-03-24 RX ADMIN — MAGNESIUM SULFATE HEPTAHYDRATE 2 G: 40 INJECTION, SOLUTION INTRAVENOUS at 05:41

## 2025-03-24 RX ADMIN — IPRATROPIUM BROMIDE AND ALBUTEROL SULFATE 3 ML: 2.5; .5 SOLUTION RESPIRATORY (INHALATION) at 08:02

## 2025-03-24 RX ADMIN — PANTOPRAZOLE SODIUM 40 MG: 40 TABLET, DELAYED RELEASE ORAL at 21:00

## 2025-03-24 RX ADMIN — PRAMIPEXOLE DIHYDROCHLORIDE 0.25 MG: 0.5 TABLET ORAL at 21:00

## 2025-03-24 RX ADMIN — PANTOPRAZOLE SODIUM 40 MG: 40 TABLET, DELAYED RELEASE ORAL at 09:38

## 2025-03-24 RX ADMIN — NICOTINE 1 PATCH: 21 PATCH, EXTENDED RELEASE TRANSDERMAL at 09:38

## 2025-03-24 RX ADMIN — BUDESONIDE INHALATION 0.5 MG: 0.5 SUSPENSION RESPIRATORY (INHALATION) at 08:02

## 2025-03-24 RX ADMIN — WARFARIN SODIUM 5 MG: 5 TABLET ORAL at 17:36

## 2025-03-24 RX ADMIN — FUROSEMIDE 40 MG: 40 TABLET ORAL at 16:30

## 2025-03-24 NOTE — ASSESSMENT & PLAN NOTE
Known cardiomyopathy with LVEF 30%   Follows with [facility] for evaluation and management.  LHC in 2020 ruled out CAD.  S/p AICD at [facility] in 2024  GDMT with carvedilol 6.25 mg BID and lisinopril at home.  Presents for heart failure and rapid a fib, + UDS for amphetamines  Patient with known meth use.  Echo 3/24/2025 shows LVEF 25% with severe global hypokinesis.  He admits to inconsistently taking his prescriptions and continued meth use. I urged medication adherence and complete cessation from meth.  Currently on metoprolol tartrate. Transition to carvedilol 6.25 mg BID and titrate as able.  If BP stable will resume lisinopril 2.5 mg daily and titrate as able.  Unable to afford Entresto/Jardiance.  Currently has diuresed well. See discussion under CHF.

## 2025-03-24 NOTE — QUICK NOTE
His Jardiance, entresto, eliquis are costing him more than 200 dollars in total. Pt can't afford these meds. I offered an alternative Warfarin for AC. he is willing to start warfarin. His sister is willing to bring him to [facility] for INR checks weekly. Sister said that she will work on adding another insurance until then he will benefit from individual meds than entresto, jardiance and eliquis. Once he is ready from vitals standpoint cardiology plans to start him on his home med- carvedilol and lisinopril from tomorrow.   He will need to continue digoxin 125 mcg 3 times weekly due to CKD.  [facility] outpatient  will need to manage the warfarin-.  Though it is risky given his history of poor adherence we have no other options for AC given high cost of meds.   He will need to be bridged with Lovenox, which we doubt he will be able to afford so this will  be done in the hospital. INR goal will be 2-3.       Plan:  we start with 1mg/kg SC every 12 hrly until INR is >2.0 for atleast 24 hrs and start warfarin 5 mg same time when we start lovenox.   INR daily.   Recent Labs     03/22/25  0353   INR 1.13

## 2025-03-24 NOTE — CASE MANAGEMENT
Case Management Discharge Planning Note    Patient name Britton Batista  Location /-01 MRN [id number]  : 1965 Date 3/24/2025       Current Admission Date: 3/22/2025  Current Admission Diagnosis:Cardiogenic shock ([facility])   Patient Active Problem List    Diagnosis Date Noted Date Diagnosed    Scleroderma ([facility]) 2025     Hyperkalemia 2025     Stage 3a chronic kidney disease (CKD) ([facility]) 2025     Cardiogenic shock ([facility]) 2025     Atrial fibrillation with RVR ([facility]) 2025     Syncope 2024     Chest wall pain 2024     Chronic HFrEF (heart failure with reduced ejection fraction) ([facility]) 2024     Primary hypertension 2024     Hypomagnesemia 2023     Methamphetamine abuse ([facility]) 2023     Tobacco abuse 2023     D-dimer, elevated 2023     Cardiomyopathy ([facility]) 10/09/2023     IRINEO (acute kidney injury) ([facility]) 10/08/2023     Acute on chronic combined systolic and diastolic congestive heart failure ([facility]) 10/07/2023     Hypertensive emergency 10/07/2023     Anxiety      Depression      Other emphysema ([facility])      GERD (gastroesophageal reflux disease)      Gout      Hypertensive urgency      Neuropathy      Raynaud's disease      Unilateral inguinal hernia, without obstruction or gangrene, not specified as recurrent      PSS (progressive systemic sclerosis) ([facility]) 10/25/2021     Near syncope 10/25/2021     Osteomyelitis ([facility]) 10/25/2021     Elevated troponin 10/25/2021     Acute encephalopathy 10/25/2021     Hyponatremia 10/24/2021       LOS (days): 2  Geometric Mean LOS (GMLOS) (days): 4.9  Days to GMLOS:2.7     OBJECTIVE:  Risk of Unplanned Readmission Score: 31.14         Current admission status: Inpatient   Preferred Pharmacy:   [facility] - [place], [place] - [address]  [address] [facility]  [place] [address]  Phone: [phone] Fax: [phone]    [facility]  JAD[place] Murillo - [address] [place] [address]   [facility],  First Floor [facility] [address]  Phone: [phone] Fax: [phone]    [id number] - [place], [place] - [address] [place] [address]  Phone: [phone] Fax: [phone]    Primary Care Provider: Juan Galindo DO    Primary Insurance: [employer] [facility]  Secondary Insurance:     DISCHARGE DETAILS:        Spoke to provider in the ICU:    1- Patient was not receptive to substance management as SW did see him.  Resources for MH and Substance abuse was placed on the AVS.     2: Discussed PCP follow up for dc:  Called [facility] and identified his PCP is now Dr Juan Galindo, he was last seen there in Jan and has an appointment in July. CM will make call and make an appointment closer to dc.is     3. Provider concern that pt can not afford his medications.  - not eligible for pace or pacenet, as pt is not 65  -  unsure if pt can apply for MA, information on the welfare office was placed on AVS.    4. Medications: Did check [facility] and the cost of eliquis is $140  Need to follow up with cost of jaurdice/ enestro as pharmacy was busy.    5. Referral placed to OP CM at [facility] Office to follow and transition post dc.      6. Anticipate changing to Coumadin, and pt can be referred to the OP [facility] for this.    Cost of entesto is $168 and Jaurdiane is $150.     DC plan is home at this time upon stability.

## 2025-03-24 NOTE — CASE MANAGEMENT
Case Management Discharge Planning Note    Patient name Britton Batista  Location /-01 MRN [id number]  : 1965 Date 3/24/2025       Current Admission Date: 3/22/2025  Current Admission Diagnosis:Cardiogenic shock ([facility])   Patient Active Problem List    Diagnosis Date Noted Date Diagnosed    Scleroderma ([facility]) 2025     Hyperkalemia 2025     Stage 3a chronic kidney disease (CKD) ([facility]) 2025     Cardiogenic shock ([facility]) 2025     Atrial fibrillation with RVR ([facility]) 2025     Syncope 2024     Chest wall pain 2024     Chronic HFrEF (heart failure with reduced ejection fraction) ([facility]) 2024     Primary hypertension 2024     Hypomagnesemia 2023     Methamphetamine abuse ([facility]) 2023     Tobacco abuse 2023     D-dimer, elevated 2023     Cardiomyopathy ([facility]) 10/09/2023     IRINEO (acute kidney injury) ([facility]) 10/08/2023     Acute on chronic combined systolic and diastolic congestive heart failure ([facility]) 10/07/2023     Hypertensive emergency 10/07/2023     Anxiety      Depression      Other emphysema ([facility])      GERD (gastroesophageal reflux disease)      Gout      Hypertensive urgency      Neuropathy      Raynaud's disease      Unilateral inguinal hernia, without obstruction or gangrene, not specified as recurrent      PSS (progressive systemic sclerosis) ([facility]) 10/25/2021     Near syncope 10/25/2021     Osteomyelitis ([facility]) 10/25/2021     Elevated troponin 10/25/2021     Acute encephalopathy 10/25/2021     Hyponatremia 10/24/2021       LOS (days): 2  Geometric Mean LOS (GMLOS) (days): 4.9  Days to GMLOS:2.5     OBJECTIVE:  Risk of Unplanned Readmission Score: 31.47         Current admission status: Inpatient   Preferred Pharmacy:   [facility] - [place] - [address]  [address] [facility]  [place] [address]  Phone: [phone] Fax: [phone]    [facility]  [place] - [address] [place] [address]   [facility],  First Floor [facility] [address]  Phone: [phone] Fax: [phone]    [id number] [place], [place] - [address] [place] [address]  Phone: [phone] Fax: [phone]    Primary Care Provider: Juan Galindo DO    Primary Insurance: [employer]  Secondary Insurance:     DISCHARGE DETAILS:        Looked into transportation to and from his PCP.  Called [facility] and he can get vouchers for $1.50 and he would need 2 to get him to and from the PCP office.  Looked into medical transportation as well.    Called and spoke to sister,  Arin, transportation for lab work and to the PCP is not an issue as long as appointment is after 3 PM- Arin understand [facility] PCP office can manage the INR/Coumadin.  Per Arin there is no reason for CM to investigate transportation any further.       Would appreciate a PCP appointment after 3 PM upon dc.

## 2025-03-24 NOTE — ASSESSMENT & PLAN NOTE
Lab Results   Component Value Date    EGFR 50 03/24/2025    EGFR 49 03/23/2025    EGFR 49 03/23/2025    CREATININE 1.50 (H) 03/24/2025    CREATININE 1.53 (H) 03/23/2025    CREATININE 1.51 (H) 03/23/2025   Baseline Cr 1.2-1.3  3/22 AM Lasix 40mg IV, repeat dose given PM  Continue edmondson for accurate I&O  Trend BMP

## 2025-03-24 NOTE — ASSESSMENT & PLAN NOTE
Wt Readings from Last 3 Encounters:   03/25/25 73.1 kg (161 lb 3.2 oz)   02/06/25 73.7 kg (162 lb 6.4 oz)   09/18/24 75.4 kg (166 lb 3.6 oz)     Acute on chronic HFrEF in the setting of known substance abuse with medication non-adherence, EF about 30% on serial echocardiograms.  Echo 3/24/25 with LVEF 25% and mod MR, TR.  Appears euvolemic on furosemide 40 mg BID.  Optimize electrolytes for K+ > 4, Mag >2

## 2025-03-24 NOTE — PLAN OF CARE
Problem: CARDIOVASCULAR - ADULT  Goal: Maintains optimal cardiac output and hemodynamic stability  Description: INTERVENTIONS:- Monitor I/O, vital signs and rhythm- Monitor for S/S and trends of decreased cardiac output- Administer and titrate ordered vasoactive medications to optimize hemodynamic stability- Assess quality of pulses, skin color and temperature- Assess for signs of decreased coronary artery perfusion- Instruct patient to report change in severity of symptoms monitor for chest pain , BLACK, SOB palpatations  Outcome: Progressing  Goal: Absence of cardiac dysrhythmias or at baseline rhythm  Description: INTERVENTIONS:- Continuous cardiac monitoring, vital signs, obtain 12 lead EKG if ordered- Administer antiarrhythmic and heart rate control medications as ordered- Monitor electrolytes and administer replacement therapy as ordered  Outcome: Progressing     Problem: PAIN - ADULT  Goal: Verbalizes/displays adequate comfort level or baseline comfort level  Description: Interventions:- Encourage patient to monitor pain and request assistance- Assess pain using appropriate pain scale- Administer analgesics based on type and severity of pain and evaluate response- Implement non-pharmacological measures as appropriate and evaluate response- Consider cultural and social influences on pain and pain management- Notify physician/advanced practitioner if interventions unsuccessful or patient reports new pain  Outcome: Progressing     Problem: INFECTION - ADULT  Goal: Absence or prevention of progression during hospitalization  Description: INTERVENTIONS:- Assess and monitor for signs and symptoms of infection- Monitor lab/diagnostic results- Monitor all insertion sites, i.e. indwelling lines, tubes, and drains- Monitor endotracheal if appropriate and nasal secretions for changes in amount and color- [place] appropriate cooling/warming therapies per order- Administer medications as ordered- Instruct and encourage  patient and family to use good hand hygiene technique- Identify and instruct in appropriate isolation precautions for identified infection/condition  Outcome: Progressing  Goal: Absence of fever/infection during neutropenic period  Description: INTERVENTIONS:- Monitor WBC  Outcome: Progressing

## 2025-03-24 NOTE — CONSULTS
Consult Cardiology    Britton Batista 1965, 59 y.o. male MRN: [id number]    Unit/Bed#: -01 Encounter: [id number]    Attending Provider: Terrence Fitzgerald DO   Primary Care Provider: Juan Galindo DO   Date admitted to hospital: 3/22/2025       Inpatient consult to Cardiology  Consult performed by: LUIS ANGEL Barrientos  Consult ordered by: Cheryl Sin PA-C          Cardiomyopathy (HCC)  Assessment & Plan  Known cardiomyopathy with LVEF 30%   Follows with [facility] for evaluation and management.  LHC in 2020 ruled out CAD.  S/p AICD at [facility] in 2024  GDMT with carvedilol 6.25 mg BID and lisinopril at home.  Presents for heart failure and rapid a fib, + UDS for amphetamines  Patient with known meth use.  Echo today shows LVEF 25% with severe global hypokinesis.  He admits to inconsistently taking his prescriptions and continued meth use. I urged medication adherence and complete cessation from meth.  Currently on metoprolol tartrate. Plan to transition to metoprolol succinate tomorrow. Price check Entresto and Jardiance.  Currently has diuresed well. See discussion under CHF.    Acute on chronic combined systolic and diastolic congestive heart failure (HCC)  Assessment & Plan  Wt Readings from Last 3 Encounters:   03/24/25 75.8 kg (167 lb)   02/06/25 73.7 kg (162 lb 6.4 oz)   09/18/24 75.4 kg (166 lb 3.6 oz)     Acute on chronic HFrEF in the setting of known substance abuse with medication non-adherence, EF about 30% on serial echocardiograms.  Echo today with LVEF 25% and mod MR, TR.  Currently responding very well to IV furosemide. Agree with transition to PO furosemide, monitor response and adjust accordingly.  Optimize electrolytes for K+ > 4, Mag >2    Atrial fibrillation with RVR ([facility])  Assessment & Plan  History of PAF detected on ICD. Prescribed Eliquis 5 mg BID but admits he is not taking this due to cost.  Request price check for Eliquis.  Currently in rate controlled a fib on digoxin  125 mcg daily. Recommend 3 time weekly dosing with underlying CKD.  Titrate metoprolol tartrate to 25 mg BID and plan to transition to metoprolol succinate to optimize GDMT tomorrow.    Primary hypertension  Assessment & Plan  Hypotensive this admission. Will monitor with resumption of GDMT    Methamphetamine abuse (HCC)  Assessment & Plan  + UDS, admits to ongoing use and declines resources when offered    * Cardiogenic shock (HCC)  Assessment & Plan  Resolved today. SPO2 73 on VBG this am.  Secondary to acute on chronic HFrEF and rapid a fib.  HR's improved this am.  Milrinone weaned and to be stopped this morning.  See discussion under cardiomyopathy, CHF, and a fib                  Physician Requesting Consult: Terrence Fitzgerald DO    Reason for Consult / Principal Problem: A fib RVR, acute on chronic heart failure with reduced ejection fraction    HPI: Britton Batista is a 59 y.o. year old male who has a history of non-ischemic cardiomyopathy s/p ICD (CAD ruled out by [facility] 2020), HFrEF, paroxysmal atrial fibrillation, ongoing methamphetamine use and tobacco use, stage 3 CKD, COPD, scleroderma who follows with [facility].    Patient presented to [facility] ER 3/22/2025 with shortness of breath. He had been admitted to [facility] 2/4-2/6/25 with CHF and left AMA. He was placed under ICU service for management of cardiogenic shock. Started on norepi and milrinone. Digoxin was started with improved HR control. He was also started on IV furosemide with excellent urine output.    At the time of my evaluation the echo tech is at bedside. He states he does feel better since arrival. He was threatening to leave AMA yesterday but not this morning. When questioned about his medications he tells me to talk to his sister. He does state that he cannot afford his Eliquis and has not been taking it. Lab work this am shows SPO2 is improved from 37.9 to 73.9. Dig level 0.9. Mag 1.9 and K 3.7. Creatinine is at baseline of 1.5. His extremities  "are warm. Apparently per chart review amlodipine was recently added by rheumatology to help treat Raynaud's symptoms. UDS was positive for amphetamines.      Review of Systems   Constitutional:  Negative for chills and fever.   HENT:  Negative for ear pain and sore throat.    Eyes:  Negative for pain and visual disturbance.   Respiratory:  Negative for cough and shortness of breath.    Cardiovascular:  Negative for chest pain and palpitations.   Gastrointestinal:  Negative for abdominal pain and vomiting.   Genitourinary:  Negative for dysuria and hematuria.   Musculoskeletal:  Negative for arthralgias and back pain.   Skin:  Negative for color change and rash.   Neurological:  Negative for seizures and syncope.   All other systems reviewed and are negative.       Historical Information     Past Medical History:   Diagnosis Date    Anxiety     Cardiomyopathy (HCC)     CHF (congestive heart failure) (HCC)     Depression     Emphysema of lung (HCC)     GERD (gastroesophageal reflux disease)     Gout     Hypertension     Metabolic encephalopathy 03/22/2025    Neuropathy     Raynaud's disease     Right inguinal hernia     Scleroderma (HCC)      Past Surgical History:   Procedure Laterality Date    AMPUTATION Right     pt had tip of \"pointer finger\" d/t scleraderma    CARDIAC PACEMAKER PLACEMENT      FINGER AMPUTATION Left 01/31/2024    Procedure: AMPUTATION FINGER, LEFT INDEX FINGER CPT: 05318;  Surgeon: Pedro Vazquez MD;  Location:  MAIN OR;  Service: Orthopedics    HERNIA REPAIR Left     times 2    NH AMP F/TH 1/2 JT/PHALANX W/NEURECT W/DIR CLSR Right 01/23/2024    Procedure: AMPUTATION FINGER, RIGHT MIDDLE;  Surgeon: Pedro Vazquez MD;  Location: [facility];  Service: Orthopedics    NH RPR 1ST INGUN HRNA AGE 5 YRS/> REDUCIBLE Right 03/03/2023    Procedure: OPEN INGUINAL HERNIA REPAIR WITH MESH;  Surgeon: Temo Jackson DO;  Location:  MAIN OR;  Service: General     Social History     Substance and Sexual Activity " "  Alcohol Use Not Currently    Comment: occasional     Social History     Substance and Sexual Activity   Drug Use Yes    Types: Marijuana, Methamphetamines    Comment: Hx meth use     Social History     Tobacco Use   Smoking Status Every Day    Current packs/day: 1.00    Average packs/day: 1 pack/day for 45.2 years (45.2 ttl pk-yrs)    Types: Cigarettes    Start date:    Smokeless Tobacco Never   Tobacco Comments    Last cigarette 043       Family History:   Family History   Problem Relation Age of Onset    Hypertension Father        Meds/Allergies     current meds:   Current Facility-Administered Medications:     acetaminophen (TYLENOL) tablet 650 mg, Q6H PRN    apixaban (ELIQUIS) tablet 5 mg, BID    budesonide (PULMICORT) inhalation solution 0.5 mg, Q12H    digoxin (LANOXIN) tablet 125 mcg, Daily    furosemide (LASIX) tablet 40 mg, BID (diuretic)    gabapentin (NEURONTIN) capsule 100 mg, TID PRN    ipratropium-albuterol (DUO-NEB) 0.5-2.5 mg/3 mL inhalation solution 3 mL, Q6H    melatonin tablet 3 mg, HS    metoprolol (LOPRESSOR) injection 2.5 mg, Q6H PRN    metoprolol tartrate (LOPRESSOR) tablet 25 mg, Q12H JESUS    nicotine (NICODERM CQ) 21 mg/24 hr TD 24 hr patch 1 patch, Daily    pantoprazole (PROTONIX) EC tablet 40 mg, BID    pramipexole (MIRAPEX) tablet 0.25 mg, HS    trimethobenzamide (TIGAN) IM injection 200 mg, Q6H PRN         Allergies   Allergen Reactions    Aspirin GI Intolerance       Objective     Vitals: Blood pressure 113/82, pulse 86, temperature 97.9 °F (36.6 °C), resp. rate 17, height 5' 9\" (1.753 m), weight 75.8 kg (167 lb), SpO2 100%., Body mass index is 24.66 kg/m².    Orthostatic Blood Pressures      Flowsheet Row Most Recent Value   Blood Pressure 113/82 filed at 2025 1100   Patient Position - Orthostatic VS Lying filed at 2025 0900            Systolic (24hrs), Av , Min:106 , Max:167     Diastolic (24hrs), Av, Min:68, Max:108        Intake/Output Summary (Last 24 " hours) at 3/24/2025 1223  Last data filed at 3/24/2025 0930  Gross per 24 hour   Intake 2069.89 ml   Output 3000 ml   Net -930.11 ml       Weight (last 2 days)       Date/Time Weight    03/24/25 0830 75.8 (167)    03/24/25 0600 76 (167.55)    03/23/25 0530 78.2 (172.4)    03/22/25 0551 76.6 (168.8)    03/22/25 0538 76.6 (168.8)            Invasive Devices       Central Venous Catheter Line  Duration             CVC Central Lines 03/22/25 Triple 16cm 1 day              Peripheral Intravenous Line  Duration             Peripheral IV 03/22/25 Dorsal (posterior);Right Wrist 2 days    Peripheral IV 03/22/25 Right Antecubital 1 day              Arterial Line  Duration             Arterial Line 03/22/25 Radial 1 day              Drain  Duration             Urethral Catheter Temperature probe 16 Fr. 1 day                    Physical Exam  Vitals and nursing note reviewed.   Constitutional:       General: He is not in acute distress.     Appearance: He is well-developed. He is ill-appearing.   HENT:      Head: Normocephalic and atraumatic.   Eyes:      Conjunctiva/sclera: Conjunctivae normal.   Neck:      Vascular: No JVD.   Cardiovascular:      Rate and Rhythm: Normal rate. Rhythm irregularly irregular.      Heart sounds: No murmur heard.  Pulmonary:      Effort: Pulmonary effort is normal. No respiratory distress.      Breath sounds: Normal breath sounds.      Comments: 1 L NC  Abdominal:      Palpations: Abdomen is soft.      Tenderness: There is no abdominal tenderness.   Musculoskeletal:         General: No swelling.      Cervical back: Neck supple.      Right lower leg: No edema.      Left lower leg: No edema.   Skin:     General: Skin is warm and dry.      Capillary Refill: Capillary refill takes less than 2 seconds.   Neurological:      Mental Status: He is alert.   Psychiatric:         Mood and Affect: Mood normal.              Laboratory Results:          CBC with diff:   Results from last 7 days   Lab Units  03/24/25  0436 03/23/25  0410 03/22/25  0601 03/22/25  0353   WBC Thousand/uL 8.02 7.81 8.71 7.63   HEMOGLOBIN g/dL 14.0 14.0 15.3 15.0   HEMATOCRIT % 43.4 42.4 47.9 48.2   MCV fL 89 90 91 93   PLATELETS Thousands/uL 230 236 242 236   RBC Million/uL 4.88 4.69 5.24 5.16   MCH pg 28.7 29.9 29.2 29.1   MCHC g/dL 32.3 33.0 31.9 31.1*   RDW % 15.2* 15.8* 15.9* 15.9*   MPV fL 9.3 9.3 9.1 9.4   NRBC AUTO /100 WBCs  --   --  0 0         CMP:  Results from last 7 days   Lab Units 03/24/25  0436 03/23/25  1753 03/23/25  0410 03/22/25  2153 03/22/25  1236   POTASSIUM mmol/L 3.7 3.9 4.2   < > 4.0   CHLORIDE mmol/L 96 95* 99   < > 100   CO2 mmol/L 31 28 27   < > 26   BUN mg/dL 29* 29* 28*   < > 22   CREATININE mg/dL 1.50* 1.53* 1.51*   < > 1.58*   CALCIUM mg/dL 8.7 8.8 9.0   < > 9.2   AST U/L 34  --  39  --  29   ALT U/L 32  --  29  --  18   ALK PHOS U/L 104  --  102  --  105*   EGFR ml/min/1.73sq m 50 49 49   < > 47    < > = values in this interval not displayed.       BMP:  Results from last 7 days   Lab Units 03/24/25  0436 03/23/25  1753 03/23/25  0410   POTASSIUM mmol/L 3.7 3.9 4.2   CHLORIDE mmol/L 96 95* 99   CO2 mmol/L 31 28 27   BUN mg/dL 29* 29* 28*   CREATININE mg/dL 1.50* 1.53* 1.51*   CALCIUM mg/dL 8.7 8.8 9.0       BNP:    Recent Labs     03/22/25  0353   BNP 2,870*     HS trop: 138-> 160-> 162 -> 192-> 130    Magnesium:   Results from last 7 days   Lab Units 03/24/25  0436 03/23/25  1753 03/23/25  0410   MAGNESIUM mg/dL 1.9 1.8* 1.7*       Coags:   Results from last 7 days   Lab Units 03/22/25  0353   PTT seconds 32   INR  1.13       TSH:    8.365, free T4 1.19    Lipid Profile:   Lab Results   Component Value Date    CHOLESTEROL 138 03/22/2025    CHOLESTEROL 121 09/19/2024    CHOLESTEROL 151 10/25/2021     Lab Results   Component Value Date    HDL 44 03/22/2025    HDL 38 (L) 09/19/2024    HDL 57 10/25/2021     Lab Results   Component Value Date    TRIG 87 03/22/2025    TRIG 55 09/19/2024    TRIG 110 10/25/2021      Lab Results   Component [facility] Date    [facility] 09/[facility]        Cardiac testing:     Unable to view [facility] records.  Echocardiogram this am pending.  Reviewed echocardiograms done at our campus, including echo dated 8/12/2024        Imaging: Results Review Statement: I reviewed radiology reports from this admission including: chest xray and CT chest.    US bedside procedure  Result Date: 3/24/2025  Narrative: [id number]    XR chest portable ICU  Result Date: 3/23/2025  Narrative: XR CHEST PORTABLE ICU INDICATION: RIJ. COMPARISON: CXR and chest CT 3/22/2025. FINDINGS: Right jugular catheter in upper SVC. Mild pulmonary venous congestion. A thin linear opacity over the right apex is not thought to represent a pneumothorax. No effusion. Severe cardiomegaly with left subclavian ICD leads in right atrium, right ventricle, and traversing the coronary sinus. Bones are unremarkable for age. Normal upper abdomen.     Impression: Right jugular catheter in upper SVC with no pneumothorax. Mild pulmonary venous congestion. Workstation performed: [id number]     XR chest 1 view portable  Result Date: 3/22/2025  Narrative: XR CHEST PORTABLE INDICATION: sob. COMPARISON: CXR 2/4/2025, chest CT 9/18/2024. FINDINGS: Mild pulmonary venous congestion. No pneumothorax or pleural effusion. Moderate cardiomegaly with left subclavian ICD leads in the right atrium, right ventricle, traversing the coronary sinus. Bones are unremarkable for age. Normal upper abdomen.     Impression: Mild pulmonary venous congestion. Workstation performed: [id number]     CT chest wo contrast  Result Date: 3/22/2025  Narrative: CT CHEST WITHOUT IV CONTRAST INDICATION: pulmonary vascular congestion. COMPARISON: CT PE 9/18/24 TECHNIQUE: CT examination of the chest was performed without intravenous contrast. Multiplanar 2D reformatted images were created from the source data. This examination, like all CT scans performed in  the [facility], was performed utilizing techniques to minimize radiation dose exposure, including the use of iterative reconstruction and automated exposure control. Radiation dose length product (DLP) for this visit: 290 mGy-cm FINDINGS: LUNGS: Central airways are patent. No large left-sided opacity. Redemonstrated subpleural reticulations at the bilateral lung bases, which are nonspecific, but could be seen in setting of interstitial lung disease. Moderate centrilobular and biapical paraseptal emphysema. Redemonstrated 7 mm solid pulmonary nodule in the left lower lobe (series 4, image 91), unchanged from prior exam. Redemonstrated 4 mm pulmonary nodule along the major fissure on the left (series 4, image 69), unchanged. No new solid pulmonary nodules visualized. No pulmonary edema. There is mild nonspecific bronchial wall thickening, can be seen in setting of bronchitis. PLEURA: No pleural effusion. No pneumothorax. HEART/GREAT VESSELS: Cardiomegaly. Left-sided triple lead cardiac device in place. Trace pericardial effusion. Moderate coronary artery calcifications. There is poor visualization of the interventricular septum no thoracic aortic aneurysm. MEDIASTINUM AND OCTAVIA: New mediastinal lymphadenopathy, for example a 1.4 cm paratracheal lymph node (series 2, image 53). This is nonspecific and may be reactive. CHEST WALL AND LOWER NECK: Unremarkable. VISUALIZED STRUCTURES IN THE UPPER ABDOMEN: Visualized upper abdomen is notable for mesenteric and periportal edema that is new compared to 9/18/2024. There is also perihepatic fluid. These findings are nonspecific and may be secondary to third spacing. Consider further evaluation with dedicated CT of the abdomen and pelvis for more definitive characterization. OSSEOUS STRUCTURES: Chronic appearing superior endplate compression deformity at L1. Odontogenic disease with dental caries and periapical lucencies (series 602, image 132)     Impression: 1.  No acute intrathoracic abnormality. 2.Redemonstrated marked cardiomegaly. No pulmonary edema. 3. Stable pulmonary nodules measuring up to 7 mm in the left lower lobe. 4. New mediastinal lymphadenopathy, nonspecific and may be reactive. 5. New mesenteric and periportal edema, as well as perihepatic fluid. These findings are nonspecific and may be secondary to third spacing. Consider further evaluation with a dedicated CT of the abdomen and pelvis for more definitive characterization. Workstation performed: [id number]       EKG reviewed personally: EKG: Atrial fibrillation with RVR, RBBB. LPFB .     Telemetry: atrial fibrillation with intermittent ventricular paced beats. Rates 's. Brief NSVT vs aberrancy       Code Status: Level 1 - Full Code

## 2025-03-24 NOTE — ASSESSMENT & PLAN NOTE
History of PAF detected on ICD. Prescribed Eliquis 5 mg BID but admits he is not taking this due to cost.  Now bridging to warfarin with goal INR 2-3. Continue Lovenox bridge until therapeutic for 24 hours  Currently in rate controlled a fib on digoxin 125 mcg daily. Recommend 3 time weekly dosing with underlying CKD.  Transition to carvedilol 6.25 mg BID this am and will titrate as needed for heart rate control.

## 2025-03-24 NOTE — UTILIZATION REVIEW
Initial Clinical Review    Admission: Date/Time/Statement:   Admission Orders (From admission, onward)       Ordered        03/22/25 0448  INPATIENT ADMISSION  Once                          Orders Placed This Encounter   Procedures    INPATIENT ADMISSION     Standing Status:   Standing     Number of Occurrences:   1     Level of Care:   Med Surg [16]     Estimated length of stay:   More than 2 Midnights     Certification:   I certify that inpatient services are medically necessary for this patient for a duration of greater than two midnights. See H&P and MD Progress Notes for additional information about the patient's course of treatment.     ED Arrival Information       Expected   -    Arrival   3/22/2025 03:39    Acuity   Emergent              Means of arrival   Wheelchair    Escorted by   Family Member    Service   Critical Care/ICU    Admission type   Emergency              Arrival complaint   rapid heart rate             Chief Complaint   Patient presents with    Shortness of Breath     Pt complains of not being able to breathe right at home, recently seen in the ER at a separate hospital but pt refused.       Initial Presentation: 59 y.o. male to ED via WC from home  Present to ED with worsening shortness of breath. found to be in A-fib with RVR and noted to be having palpitations. Has not been taking eliquis - was not able to afford it   PMHX A-fib, hypertension, cardiomyopathy, heart failure, methamphetamine abuse   Admitted to [facility] to ICU LOC with DX: Cardiogenic shock   on exam: hypotensive; tachy; tachypnea; lungs with rales; (+) wet cough; B/L LE edema; Na 131; K 5.6; Cr 1.37 (baseline 1.3); BNP 2870    CT pulmonary venous congestion   PLAN: start levophed gtt; start primacor gtt; recd D50 iv x1; recd Digoxin iv x1; recd lasix iv x1; recd lopressor iv x4; cont iv protonix; monitor labs; Cardiopulmonary monitoring; fluid / Na restriction; f/u TSH / t4; CM consulted; f/u venous dopplers; Cardiology consult      Anticipated Length of Stay/Certification Statement: Patient will be admitted on an inpatient basis with an anticipated length of stay of greater than 2 midnights secondary to chf exacerbation and a fib rvr requiring cardioversion, iv lasix and rate control.       Date: 3/23/25    Day 2   Lethargic / weak; hypertensive; tachy; tachypnea; O2 @ 2L via nc; TTE with severe LV dysfunction- EF ~10%. Prior LVEF 30%. L radial A-line day 2. RIJ CVC day 2.  [place] day 2; I/O net -2061; Mg 1.8  Plan: cont levophed gtt; cont primacor gtt; recd Digoxin iv x1; recd lasix iv x1; recd lopressor iv x1; recd Mg iv x2; cont iv protonix; monitor labs; Cardiopulmonary monitoring; fluid / Na restriction; f/u TSH / t4; CM consulted; f/u venous dopplers      Date: 3/24/25     Day 3: Has surpassed a 2nd midnight with active treatments and services.  Cardiogneic shock resolved. Most recent SCVO2 was 73.9. Remains in Afib with HR between .  Repeat ECHO shows an EF of 25% with a moderately dilated left atrium. Exam: hypertensive; tachy; tachypnea; I/O Net -1412; Mg 1.7  Plan: start DuoNebs; recd Ca Gluc iv x1; recd KCL iv x1; recd Mg iv x1; cont iv protonix; monitor labs; Cardiopulmonary monitoring; fluid / Na restriction; f/u TSH / t4; CM consulted; f/u venous dopplers      ED Treatment-Medication Administration from 03/22/2025 0338 to 03/22/2025 0534         Date/Time Order Dose Route Action     03/22/2025 0356 diltiazem (CARDIZEM) injection 15 mg 15 mg Intravenous Given     03/22/2025 0423 furosemide (LASIX) injection 40 mg 40 mg Intravenous Given     03/22/2025 0519 magnesium sulfate IVPB (premix) SOLN 1 g 1 g Intravenous New Bag     03/22/2025 0522 diltiazem (CARDIZEM) injection 10 mg 10 mg Intravenous Given            Scheduled Medications:  apixaban, 5 mg, Oral, BID  budesonide, 0.5 mg, Nebulization, Q12H  digoxin, 125 mcg, Oral, Daily  furosemide, 40 mg, Oral, BID (diuretic)  ipratropium-albuterol, 3 mL, Nebulization, Q6H   (started 3/24)  melatonin, 3 mg, Oral, HS  metoprolol tartrate, 25 mg, Oral, Q12H JEUSS  nicotine, 1 patch, Transdermal, Daily  pantoprazole, 40 mg, Oral, BID  pramipexole, 0.25 mg, Oral, HS    furosemide (LASIX) injection 40 mg  Dose: 40 mg  Freq: Once Route: IV  Start: 03/22/25 2245 End: 03/22/25 2246    metoprolol (LOPRESSOR) injection 5 mg  Dose: 5 mg  Freq: Once Route: IV  Start: 03/22/25 1115 End: 03/22/25 1119    metoprolol (LOPRESSOR) injection 2.5 mg  Dose: 2.5 mg  Freq: Once Route: IV  Start: 03/22/25 2145 End: 03/22/25 2141    metoprolol (LOPRESSOR) injection 2.5 mg  Dose: 2.5 mg  Freq: Once Route: IV  Start: 03/22/25 2230 End: 03/22/25 2223      digoxin (LANOXIN) injection 250 mcg  Dose: 250 mcg  Freq: Once Route: IV  Start: 03/22/25 2245 End: 03/22/25 2245    dextrose 50 % IV solution 25 mL  Dose: 25 mL  Freq: Once Route: IV  Start: 03/22/25 0715 End: 03/22/25 0727    furosemide (LASIX) injection 40 mg  Dose: 40 mg  Freq: Daily Route: IV  Start: 03/23/25 0942 End: 03/24/25 0923    calcium gluconate 2 g in sodium chloride 0.9% 100 mL (premix)  Dose: 2 g  Freq: Once Route: IV  Last Dose: Stopped (03/24/25 0930)  Start: 03/23/25 1815 End: 03/24/25 0930    digoxin (LANOXIN) injection 250 mcg  Dose: 250 mcg  Freq: Once Route: IV  Start: 03/23/25 0430 End: 03/23/25 0404    magnesium sulfate 2 g/50 mL IVPB (premix) 2 g  Dose: 2 g  Freq: Once Route: IV  Last Dose: Stopped (03/24/25 0930)  Start: 03/23/25 1915 End: 03/24/25 0930    magnesium sulfate 2 g/50 mL IVPB (premix) 2 g  Dose: 2 g  Freq: Once Route: IV  Last Dose: Stopped (03/23/25 0942)  Start: 03/23/25 0445 End: 03/23/25 0942    calcium gluconate 1 g in sodium chloride 0.9% 50 mL (premix)  Dose: 1 g  Freq: Once Route: IV  Last Dose: Stopped (03/24/25 0930)  Start: 03/24/25 0500 End: 03/24/25 0930    magnesium sulfate 2 g/50 mL IVPB (premix) 2 g  Dose: 2 g  Freq: Once Route: IV  Last Dose: Stopped (03/24/25 0930)  Start: 03/24/25 0530 End: 03/24/25  0930    potassium chloride 20 mEq IVPB (premix)  Dose: 20 mEq  Freq: Once Route: IV  Last Dose: Stopped (03/24/25 0930)  Start: 03/24/25 0530 End: 03/24/25 0930      Continuous IV Infusions:   milrinone (PRIMACOR) 20 mg in 100 mL infusion (premix)  Rate: 3 mL/hr Dose: 0.13 mcg/kg/min  Weight Dosing Info: 76.6 kg  Freq: Continuous Route: IV  Last Dose: Stopped (03/24/25 0950)  Start: 03/22/25 1515 End: 03/24/25 0923    NOREPINEPHRINE 4 MG  ML NSS (CMPD ORDER) infusion  Rate: 3.8-112.5 mL/hr Dose: 1-30 mcg/min  Freq: Titrated Route: IV  Last Dose: Stopped (03/22/25 1620)  Start: 03/22/25 1500 End: 03/23/25 0635         PRN Meds:  acetaminophen, 650 mg, Oral, Q6H PRN  gabapentin, 100 mg, Oral, TID PRN  metoprolol, 2.5 mg, Intravenous, Q6H PRN: 3/22 x1; 3/23 x1  trimethobenzamide, 200 mg, Intramuscular, Q6H PRN    LORazepam (ATIVAN) injection 1 mg  Dose: 1 mg  Freq: Once Route: IV  Start: 03/22/25 1145 End: 03/22/25 1152      ED Triage Vitals   Temperature Pulse Respirations Blood Pressure SpO2 Pain Score   03/22/25 0538 03/22/25 0415 03/22/25 0415 03/22/25 0415 03/22/25 0415 03/22/25 0415   97.5 °F (36.4 °C) 103 20 102/59 96 % No Pain     Weight (last 2 days)       Date/Time Weight    03/24/25 0830 75.8 (167)    03/24/25 0600 76 (167.55)    03/23/25 0530 78.2 (172.4)    03/22/25 0551 76.6 (168.8)    03/22/25 0538 76.6 (168.8)            Vital Signs (last 3 days)       Date/Time Temp Pulse Resp BP MAP (mmHg) Arterial Line BP MAP SpO2 Calculated FIO2 (%) - Nasal Cannula Nasal Cannula O2 Flow Rate (L/min) O2 Device Patient Position - Orthostatic VS [place] Coma Scale Score Pain    03/24/25 0900 97 °F (36.1 °C) 88 17 134/102 108 124/76 90 mmHg 99 % -- -- None (Room air) Lying 15 No Pain    03/24/25 0830 97.2 °F (36.2 °C) 110 23 131/101 95 110/70 78 mmHg 100 % -- -- None (Room air) Lying -- --    03/24/25 0804 -- -- -- -- -- -- -- 99 % 24 1 L/min Nasal cannula -- -- --    03/24/25 0800 97.2 °F (36.2 °C) 88 16 -- --  99/69 75 mmHg 99 % -- -- None (Room air) -- -- --    03/24/25 0700 97.3 °F (36.3 °C) 104 30 131/101 113 121/75 91 mmHg 98 % -- -- None (Room air) -- -- --    03/24/25 0630 97.2 °F (36.2 °C) 82 17 142/94 110 117/74 89 mmHg 99 % -- -- -- -- -- --    03/24/25 0615 97 °F (36.1 °C) 89 18 -- -- 116/72 87 mmHg 100 % -- -- -- -- -- --    03/24/25 0600 97.2 °F (36.2 °C) 99 0 138/73 92 113/69 83 mmHg 100 % -- -- -- -- -- --    03/24/25 0545 97.2 °F (36.2 °C) 95 19 -- -- 108/61 76 mmHg 97 % -- -- -- -- -- --    03/24/25 0530 97.2 °F (36.2 °C) 80 19 106/86 93 108/66 79 mmHg 98 % -- -- -- -- -- --    03/24/25 0515 -- -- -- -- -- 114/70 85 mmHg -- -- -- -- -- -- --    03/24/25 0500 97.2 °F (36.2 °C) 90 16 110/80 89 106/64 74 mmHg -- -- -- -- -- -- --    03/24/25 0445 -- -- -- -- -- 111/69 83 mmHg -- -- -- -- -- -- --    03/24/25 0430 97.2 °F (36.2 °C) 85 17 149/90 112 91/60 68 mmHg 97 % -- -- -- -- -- --    03/24/25 0415 -- -- -- -- -- 111/67 82 mmHg -- -- -- -- -- -- --    03/24/25 0400 97.2 °F (36.2 °C) 86 16 136/91 108 105/67 76 mmHg 98 % -- -- None (Room air) Lying 15 No Pain    03/24/25 0345 -- -- -- -- -- 114/68 84 mmHg -- -- -- -- -- -- --    03/24/25 0330 97.2 °F (36.2 °C) 101 12 142/104 119 127/74 93 mmHg 98 % -- -- -- -- -- --    03/24/25 0315 -- -- -- -- -- 121/70 86 mmHg -- -- -- -- -- -- --    03/24/25 0300 97.3 °F (36.3 °C) 97 21 121/93 105 113/71 85 mmHg 98 % -- -- -- -- -- --    03/24/25 0245 -- -- -- -- -- 107/64 79 mmHg -- -- -- -- -- -- --    03/24/25 0230 97.5 °F (36.4 °C) 93 17 124/83 93 76/59 63 mmHg 96 % -- -- -- -- -- --    03/24/25 0215 -- -- -- -- -- 112/66 81 mmHg -- -- -- -- -- -- --    03/24/25 0200 97.5 °F (36.4 °C) 89 19 139/80 101 71/52 56 mmHg 97 % -- -- -- -- -- --    03/24/25 0145 -- -- -- -- -- 128/78 94 mmHg -- -- -- -- -- -- --    03/24/25 0130 97.5 °F (36.4 °C) 121 26 155/107 125 167/101 118 mmHg 90 % -- -- -- -- -- --    03/24/25 0115 -- -- -- -- -- 122/77 92 mmHg -- -- -- -- -- -- --     03/24/25 0100 97.5 °F (36.4 °C) 87 17 124/92 105 127/76 91 mmHg 96 % -- -- -- -- -- --    03/24/25 0030 97.7 °F (36.5 °C) 85 0 112/68 85 113/72 86 mmHg 98 % -- -- -- -- -- --    03/24/25 0000 97.9 °F (36.6 °C) 86 15 113/81 94 118/75 89 mmHg 99 % -- -- None (Room air) Lying 15 --    03/23/25 2330 98.1 °F (36.7 °C) 83 16 109/80 90 108/69 81 mmHg 98 % -- -- -- -- -- --    03/23/25 2300 98.2 °F (36.8 °C) 85 17 128/93 105 106/72 82 mmHg 98 % -- -- -- -- -- --    03/23/25 2230 98.6 °F (37 °C) 85 16 145/89 100 109/69 80 mmHg 98 % -- -- -- -- -- --    03/23/25 2200 99 °F (37.2 °C) 99 25 141/95 114 144/85 104 mmHg 97 % -- -- -- -- -- --    03/23/25 2145 -- -- -- -- -- 150/94 110 mmHg -- -- -- -- -- -- --    03/23/25 2130 98.6 °F (37 °C) 87 16 134/94 111 132/81 96 mmHg 98 % -- -- -- -- -- --    03/23/25 2115 98.8 °F (37.1 °C) 90 51 131/85 102 134/79 96 mmHg 99 % -- -- -- -- -- --    03/23/25 2100 -- -- -- -- -- 164/98 116 mmHg -- -- -- -- -- -- --    03/23/25 2045 -- -- -- -- -- 138/84 100 mmHg -- -- -- -- -- -- --    03/23/25 2030 98.4 °F (36.9 °C) 86 16 124/91 103 128/73 91 mmHg 92 % -- -- -- -- -- --    03/23/25 2015 -- -- -- -- -- 133/86 100 mmHg -- -- -- -- -- -- --    03/23/25 2000 98.6 °F (37 °C) 99 46 134/100 113 133/81 98 mmHg 92 % -- -- None (Room air) Lying 15 No Pain    03/23/25 1945 -- -- -- -- -- 130/80 96 mmHg -- -- -- -- -- -- --    03/23/25 1930 98.4 °F (36.9 °C) 95 17 135/108 119 150/86 103 mmHg 93 % -- -- -- -- -- --    03/23/25 1915 -- -- -- -- -- 136/77 94 mmHg -- -- -- -- -- -- --    03/23/25 1900 98.4 °F (36.9 °C) 87 19 135/97 113 148/87 105 mmHg 95 % -- -- -- -- -- --    03/23/25 1700 98.4 °F (36.9 °C) 98 22 111/85 95 136/87 102 mmHg 97 % -- -- None (Room air) Lying -- --    03/23/25 1600 98.2 °F (36.8 °C) 94 36 159/105 125 139/84 99 mmHg 92 % -- -- None (Room air) Lying 15 --    03/23/25 1500 98.1 °F (36.7 °C) 92 20 167/78 111 121/72 85 mmHg 95 % -- -- None (Room air) Lying -- --    03/23/25 1400 98.2  °F (36.8 °C) 93 16 109/76 89 120/66 82 mmHg 93 % -- -- None (Room air) Lying -- --    03/23/25 1300 98.2 °F (36.8 °C) 96 16 166/91 112 124/73 86 mmHg 95 % -- -- None (Room air) Lying -- --    03/23/25 1200 97.9 °F (36.6 °C) 112 42 -- -- 135/83 97 mmHg 93 % -- -- None (Room air) Lying 15 --    03/23/25 1100 97.5 °F (36.4 °C) 132 19 158/107 123 137/86 101 mmHg 94 % 28 2 L/min Nasal cannula Lying -- --    03/23/25 1000 97.5 °F (36.4 °C) 106 23 107/78 87 112/68 81 mmHg 95 % 28 2 L/min Nasal cannula Lying -- --    03/23/25 0900 98.1 °F (36.7 °C) 120 29 116/98 105 137/81 97 mmHg 95 % 28 2 L/min Nasal cannula Lying 15 No Pain    03/23/25 0800 97.7 °F (36.5 °C) 95 20 133/91 109 149/89 105 mmHg 90 % 28 2 L/min Nasal cannula Lying -- --    03/23/25 0716 98.1 °F (36.7 °C) 121 16 138/83 106 134/83 98 mmHg 96 % 28 2 L/min Nasal cannula Lying -- --    03/23/25 0600 98.1 °F (36.7 °C) 107 17 124/91 101 -- -- 95 % -- -- -- -- -- --    03/23/25 0545 98.1 °F (36.7 °C) 118 16 125/101 106 118/71 85 mmHg 96 % -- -- -- -- -- --    03/23/25 0530 98.2 °F (36.8 °C) 110 18 -- -- 99/63 75 mmHg 95 % -- -- -- -- -- --    03/23/25 0515 98.4 °F (36.9 °C) 141 33 -- -- 108/73 82 mmHg 98 % -- -- -- -- -- --    03/23/25 0500 98.6 °F (37 °C) 133 23 118/72 86 118/73 86 mmHg 98 % -- -- -- -- -- --    03/23/25 0445 98.4 °F (36.9 °C) 128 16 -- -- 124/75 89 mmHg 89 % -- -- -- -- -- --    03/23/25 0430 98.6 °F (37 °C) 120 23 107/80 91 148/91 106 mmHg 94 % -- -- -- -- -- --    03/23/25 0415 98.4 °F (36.9 °C) 118 19 -- -- 121/70 83 mmHg 94 % -- -- -- -- -- --    03/23/25 0400 98.6 °F (37 °C) 146 23 105/71 82 98/65 76 mmHg 95 % 28 2 L/min Nasal cannula Lying -- No Pain    03/23/25 0345 98.6 °F (37 °C) 129 20 -- -- 116/69 80 mmHg 96 % -- -- -- -- -- --    03/23/25 0330 98.6 °F (37 °C) 130 18 99/77 84 113/71 84 mmHg 96 % -- -- -- -- -- --    03/23/25 0315 98.6 °F (37 °C) 126 17 -- -- 125/78 92 mmHg 97 % -- -- -- -- -- --    03/23/25 0300 98.6 °F (37 °C) 126 19  118/73 90 123/78 91 mmHg 96 % -- -- -- -- -- --    03/23/25 0245 98.6 °F (37 °C) 121 18 123/80 87 106/69 80 mmHg 97 % -- -- -- -- -- --    03/23/25 0230 -- -- -- -- -- 118/71 81 mmHg -- -- -- -- -- -- --    03/23/25 0215 98.8 °F (37.1 °C) 112 21 -- -- 124/75 89 mmHg -- -- -- -- -- -- --    03/23/25 0200 99 °F (37.2 °C) 118 19 112/82 94 116/72 84 mmHg 99 % -- -- -- -- -- --    03/23/25 0130 99 °F (37.2 °C) 134 15 119/91 102 134/90 104 mmHg 94 % -- -- -- -- -- --    03/23/25 0115 98.8 °F (37.1 °C) 131 23 -- -- 125/76 89 mmHg 96 % -- -- -- -- -- --    03/23/25 0100 98.8 °F (37.1 °C) 113 23 114/80 89 129/74 89 mmHg 95 % -- -- -- -- -- --    03/23/25 0045 98.8 °F (37.1 °C) 118 20 -- -- 119/75 87 mmHg 96 % -- -- -- -- -- --    03/23/25 0030 98.8 °F (37.1 °C) 141 18 129/83 100 117/75 87 mmHg 96 % -- -- -- -- -- --    03/23/25 0015 98.8 °F (37.1 °C) 133 19 -- -- 118/73 86 mmHg 96 % -- -- -- -- -- --    03/23/25 0000 99 °F (37.2 °C) 141 18 92/72 77 112/72 84 mmHg 97 % 28 2 L/min Nasal cannula Lying -- No Pain    03/22/25 2330 99.1 °F (37.3 °C) 121 20 85/63 70 123/79 92 mmHg 95 % -- -- -- -- -- --    03/22/25 2315 99.1 °F (37.3 °C) 122 21 -- -- 128/86 99 mmHg 92 % -- -- -- -- -- --    03/22/25 2300 99.1 °F (37.3 °C) 126 21 100/73 83 121/88 99 mmHg 80 % -- -- -- -- -- --    03/22/25 2245 99.1 °F (37.3 °C) 123 13 -- -- 99/66 76 mmHg 95 % -- -- -- -- -- --    03/22/25 2230 99.1 °F (37.3 °C) 136 13 108/76 88 107/73 83 mmHg 94 % 28 2 L/min Nasal cannula Lying -- --    03/22/25 2215 99 °F (37.2 °C) 133 10 -- -- 98/68 79 mmHg 93 % -- -- -- -- -- --    03/22/25 2200 99 °F (37.2 °C) 113 16 96/66 76 97/71 79 mmHg 89 % -- -- -- -- -- --    03/22/25 2145 99 °F (37.2 °C) 133 24 -- -- 107/75 87 mmHg 90 % -- -- -- -- -- --    03/22/25 2130 99 °F (37.2 °C) 137 26 110/76 84 115/79 89 mmHg 96 % -- -- -- -- -- --    03/22/25 2115 98.8 °F (37.1 °C) 134 24 -- -- 116/77 89 mmHg 88 % -- -- -- -- -- --    03/22/25 2100 98.2 °F (36.8 °C) 134 10 90/56  67 100/71 79 mmHg 92 % -- -- -- -- -- --    03/22/25 2045 98.2 °F (36.8 °C) 125 13 92/60 71 127/81 94 mmHg 93 % -- -- -- -- -- --    03/22/25 2000 98.4 °F (36.9 °C) 134 12 129/85 92 118/75 88 mmHg 96 % 28 2 L/min Nasal cannula Lying -- No Pain    03/22/25 1930 98.4 °F (36.9 °C) 130 10 146/92 111 87/67 72 mmHg 98 % -- -- -- Lying -- No Pain    03/22/25 1915 -- -- -- -- -- 117/76 89 mmHg -- -- -- -- -- -- --    03/22/25 1900 98.1 °F (36.7 °C) 120 21 138/81 105 100/70 78 mmHg 89 % -- -- -- -- -- --    03/22/25 1845 -- -- -- -- -- 108/69 83 mmHg -- -- -- -- -- -- --    03/22/25 1837 98.1 °F (36.7 °C) 126 13 128/77 98 105/67 79 mmHg 90 % 28 2 L/min Nasal cannula Lying -- --    03/22/25 1830 98.1 °F (36.7 °C) 130 20 88/71 76 -- -- 90 % -- -- None (Room air) Lying -- --    03/22/25 1800 98.1 °F (36.7 °C) 125 27 -- -- 76/58 74 mmHg 97 % -- -- -- -- -- --    03/22/25 1745 97.5 °F (36.4 °C) 121 30 -- -- 103/69 83 mmHg 88 % -- -- -- -- -- --    03/22/25 1730 97.7 °F (36.5 °C) 131 19 -- -- 93/69 73 mmHg 87 % -- -- -- -- -- --    03/22/25 1715 96.6 °F (35.9 °C) 119 29 -- -- 104/75 86 mmHg 97 % -- -- -- -- -- --    03/22/25 1700 96.6 °F (35.9 °C) 122 28 115/66 94 103/74 83 mmHg 89 % -- -- -- -- -- --    03/22/25 1645 96.8 °F (36 °C) 125 26 -- -- 100/71 82 mmHg 98 % -- -- -- -- -- --    03/22/25 1630 96.1 °F (35.6 °C) 127 27 103/82 89 103/73 84 mmHg 96 % 28 2 L/min Nasal cannula -- -- --    03/22/25 1620 96.8 °F (36 °C) 126 11 112/76 88 113/77 89 mmHg 99 % -- -- -- -- -- --    03/22/25 1615 98.1 °F (36.7 °C) 124 20 136/84 102 115/78 91 mmHg 99 % -- -- -- -- -- --    03/22/25 1608 98.2 °F (36.8 °C) -- -- -- -- -- -- -- -- -- -- -- -- No Pain    03/22/25 1607 98.2 °F (36.8 °C) 121 10 136/84 103 108/73 85 mmHg 99 % 32 3 L/min Nasal cannula -- -- --    03/22/25 1600 97.3 °F (36.3 °C) 115 32 112/63 80 117/72 90 mmHg 98 % -- -- -- -- -- --    03/22/25 1545 97.5 °F (36.4 °C) 109 14 104/61 76 102/65 83 mmHg 88 % -- -- -- -- -- --     03/22/25 1530 98.4 °F (36.9 °C) 110 23 104/75 86 103/78 88 mmHg 70 % -- -- -- -- -- --    03/22/25 1515 98.2 °F (36.8 °C) 110 49 -- -- 104/78 88 mmHg 95 % -- -- -- -- -- --    03/22/25 1500 97.9 °F (36.6 °C) 117 25 -- -- 106/80 89 mmHg 68 % -- -- -- -- -- --    03/22/25 1459 -- 112 -- 90/50 -- -- -- -- -- -- -- -- -- --    03/22/25 1450 97.3 °F (36.3 °C) 116 23 -- -- 113/80 97 mmHg 84 % -- -- -- -- -- --    03/22/25 1440 -- 108 -- -- -- -55/-55 -- 86 % -- -- -- -- -- --    03/22/25 1436 -- 118 -- 120/80 93 -19/-26 -- 59 % -- -- -- -- -- --    03/22/25 1430 -- 109 20 -- -- -- -- 94 % -- -- -- -- -- --    03/22/25 1425 -- 105 18 80/60 -- -- -- 90 % -- -- -- Lying -- --    03/22/25 1420 -- 109 32 -- -- -- -- 89 % -- -- -- -- -- --    03/22/25 1410 -- 110 16 -- -- -- -- 97 % -- -- -- -- -- --    03/22/25 1400 -- 110 10 -- -- -- -- 96 % -- -- -- -- -- --    03/22/25 1300 98.3 °F (36.8 °C) -- -- -- -- -- -- -- -- -- -- -- -- --    03/22/25 12:10:20 -- 46 -- -- -- -- -- 78 % -- -- -- -- -- --    03/22/25 1210 -- -- -- -- -- -- -- -- -- -- -- -- -- No Pain    03/22/25 12:07:44 -- -- -- 121/88 -- -- -- -- -- -- -- -- -- --    03/22/25 12:07:28 -- -- -- 121/88 -- -- -- -- -- -- -- -- -- --    03/22/25 1207 97.3 °F (36.3 °C) 100 18 121/88 -- -- -- -- -- -- -- -- -- --    03/22/25 12:02:50 -- -- -- 86/62 -- -- -- -- -- -- -- -- -- --    03/22/25 12:01:20 -- 80 -- -- -- -- -- 95 % -- -- -- -- -- --    03/22/25 11:21:21 97.2 °F (36.2 °C) 130 18 123/92 102 -- -- 95 % -- -- -- Lying -- --    03/22/25 0900 -- -- -- -- -- -- -- -- 24 1 L/min Nasal cannula -- -- No Pain    03/22/25 08:05:31 -- 78 -- 129/96 107 -- -- 93 % -- -- -- -- -- --    03/22/25 07:40:13 -- 66 -- 131/97 108 -- -- 96 % -- -- -- -- -- --    03/22/25 06:57:20 -- 43 -- -- -- -- -- 95 % -- -- -- -- -- --    03/22/25 06:54:20 -- 43 -- -- -- -- -- 97 % -- -- -- -- -- --    03/22/25 06:51:43 -- -- -- -- -- -- -- 94 % -- -- -- -- -- --    03/22/25 06:51:29 -- -- -- 118/62  -- -- -- -- -- -- -- -- -- --    03/22/25 06:51:20 -- 45 -- -- -- -- -- 95 % -- -- -- -- -- --    03/22/25 06:50:22 -- 143 -- -- -- -- -- -- -- -- -- -- -- --    03/22/25 06:48:20 -- 49 -- -- -- -- -- 95 % -- -- -- -- -- --    03/22/25 0603 -- -- -- -- -- -- -- 94 % -- -- None (Room air) -- -- No Pain    03/22/25 05:53:15 -- 105 -- 105/74 84 -- -- 96 % -- -- -- Standing - Orthostatic VS -- --    03/22/25 05:51:48 -- 52 -- 105/74 84 -- -- 95 % -- -- -- Sitting - Orthostatic VS -- --    03/22/25 05:49:47 -- 54 -- 106/88 94 -- -- 96 % -- -- -- -- -- --    03/22/25 0538 97.5 °F (36.4 °C) 105 16 106/88 94 -- -- 96 % -- -- -- Lying -- --    03/22/25 0445 -- 121 12 103/81 88 -- -- 96 % -- -- None (Room air) -- -- No Pain    03/22/25 0430 -- 109 24 98/62 74 -- -- 99 % -- -- None (Room air) Lying -- No Pain    03/22/25 0415 -- 103 20 102/59 75 -- -- 96 % -- -- None (Room air) Lying -- No Pain              Pertinent Labs/Diagnostic Test Results:   Radiology:  XR chest portable ICU   Final Interpretation by Marian Verde MD (03/23 0650)      Right jugular catheter in upper SVC with no pneumothorax.      Mild pulmonary venous congestion.            Workstation performed: [id number]         CT chest wo contrast   Final Interpretation by Nigel Price MD (03/22 0554)      1. No acute intrathoracic abnormality.      2.Redemonstrated marked cardiomegaly. No pulmonary edema.      3. Stable pulmonary nodules measuring up to 7 mm in the left lower lobe.      4. New mediastinal lymphadenopathy, nonspecific and may be reactive.      5. New mesenteric and periportal edema, as well as perihepatic fluid. These findings are nonspecific and may be secondary to third spacing. Consider further evaluation with a dedicated CT of the abdomen and pelvis for more definitive characterization.               Workstation performed: [id number]         XR chest 1 view portable   ED Interpretation by Bhargav Antonio DO (03/22 7296)   Cardiomegaly  and pulmonary vascular congestion      Final Interpretation by Marian Verde MD (03/22 8462)      Mild pulmonary venous congestion.            Workstation performed: [id number]           Cardiology:  Echo complete w/ contrast if indicated   Final Result by John Hwang MD (03/24 1714)        Patient appears to be in atrial fibrillation.     Left Ventricle: Left ventricular cavity size is moderately dilated.    There is moderate concentric hypertrophy. There is concentric remodeling.    The left ventricular ejection fraction is 25%. Systolic function is    severely reduced.  Dyssynchronous LV contractility is noted.  Global    longitudinal strain is significantly reduced at -5.2% There is severe    global hypokinesis.     Right Ventricle: Systolic function is mildly reduced.     Left Atrium: The atrium is moderately dilated.     Right Atrium: The atrium is moderately dilated.     Mitral Valve: There is mild thickening. The leaflets exhibit normal    mobility. There is mild annular calcification. There is moderate    regurgitation with an eccentrically directed jet.     Tricuspid Valve: There is moderate regurgitation.  RVSP is 30 to 35    mmHg.     Pericardium: There is a trivial pericardial effusion posterior to the    heart.     Compared to August 2024 study report, LV function remains severely    depressed and mitral and tricuspid regurgitation are at least moderate.         ECG 12 lead   Final Result by John Hwang MD (03/24 3278)   Undetermined rhythm   RBBB with ventricular paced beats   Abnormal ECG   When compared with ECG of 22-Mar-2025 14:15, (unconfirmed)   No significant change since prior tracing   Confirmed by John Hwang ([id number]) on 3/24/2025 8:16:32 AM      ECG 12 lead   Final Result by John Hwang MD (03/24 2848)   Underlying rhythm appears erickson A-Fib with RBBB and intermittent    ventricular paced beats   Abnormal ECG   When compared with ECG of 22-Mar-2025 12:01, (unconfirmed)   No  significant change since prior tracing   Confirmed by John Hwang ([id number]) on 3/24/2025 8:18:08 AM      ECG 12 lead   Final Result by John Hwang MD (03/24 0820)   Underlying rhythm is likely A-Fib with RBBB with intermittent ventricular    paced beats   Abnormal ECG   When compared with ECG of 22-Mar-2025 06:48, (unconfirmed)   INtermittent ventricular paced beats are noted.    Confirmed by John Hwang ([id number]) on 3/24/2025 8:20:05 AM      ECG 12 lead   Final Result by John Hwang MD (03/24 0822)   ** ** Critical Test Result: High HR   Atrial fibrillation with rapid ventricular response   Right bundle branch block   Left posterior fascicular block   ** ** Bifascicular block ** **   T wave abnormality, consider inferolateral ischemia   Abnormal ECG   When compared with ECG of 22-Mar-2025 03:48, (unconfirmed)   Previous ECG has undetermined rhythm, needs review   Confirmed by John Hwang ([id number]) on 3/24/2025 8:22:28 AM      ECG 12 lead   Final Result by John Hwang MD (03/24 0824)   Atrial fibrillation   intermittent ventricular paced beats   Right bundle branch block   T wave abnormality, consider inferolateral ischemia   Abnormal ECG   When compared with ECG of 04-Feb-2025 19:23,   Rhythm has changed.   Confirmed by John Hwang ([id number]) on 3/24/2025 8:24:17 AM          Results from last 7 days   Lab Units 03/24/25  0436 03/23/25  0410 03/22/25  0601 03/22/25  0353   WBC Thousand/uL 8.02 7.81 8.71 7.63   HEMOGLOBIN g/dL 14.0 14.0 15.3 15.0   HEMATOCRIT % 43.4 42.4 47.9 48.2   PLATELETS Thousands/uL 230 236 242 236   TOTAL NEUT ABS Thousands/µL  --   --  6.13 3.67        Results from last 7 days   Lab Units 03/24/25  0436 03/23/25  1753 03/23/25  0410 03/22/25  2153 03/22/25  1236 03/22/25  0955 03/22/25  0601 03/22/25  0353   SODIUM mmol/L 131* 131* 135 135 133*   < >  --  131*   POTASSIUM mmol/L 3.7 3.9 4.2 4.4 4.0   < >  --  5.6*   CHLORIDE mmol/L 96 95* 99 101 100   < >  --  102   CO2 mmol/L 31 28 27 28 26    < >  --  21   ANION GAP mmol/L 4 8 9 6 7   < >  --  8   BUN mg/dL 29* 29* 28* 27* 22   < >  --  21   CREATININE mg/dL 1.50* 1.53* 1.51* 1.47* 1.58*   < >  --  1.33*   EGFR ml/min/1.73sq m 50 49 49 51 47   < >  --  58   CALCIUM mg/dL 8.7 8.8 9.0 9.0 9.2   < >  --  9.0   CALCIUM, IONIZED mmol/L 1.11* 1.10* 1.13  --   --   --   --   --    MAGNESIUM mg/dL 1.9 1.8* 1.7*  --   --   --  1.9 1.8*   PHOSPHORUS mg/dL 3.5  --  4.4  --   --   --   --   --     < > = values in this interval not displayed.     Results from last 7 days   Lab Units 03/24/25  0436 03/23/25  0410 03/22/25  1236 03/22/25  0353   AST U/L 34 39 29 38   ALT U/L 32 29 18 15   ALK PHOS U/L 104 102 105* 90   TOTAL PROTEIN g/dL 6.1* 6.5 7.0 7.0   ALBUMIN g/dL 3.2* 3.4* 3.6 3.6   TOTAL BILIRUBIN mg/dL 0.78 1.15* 1.36* 1.09*     Results from last 7 days   Lab Units 03/22/25  1203 03/22/25  0803 03/22/25  0650   POC GLUCOSE mg/dl 102 134 100     Results from last 7 days   Lab Units 03/24/25  0436 03/23/25  1753 03/23/25  0410 03/22/25  2153 03/22/25  1236 03/22/25  0955 03/22/25  0353   GLUCOSE RANDOM mg/dL 114 106 88 119 126 96 103        Beta- Hydroxybutyrate   Date Value Ref Range Status   09/18/2024 <0.05 0.02 - 0.27 mmol/L Final      Results from last 7 days   Lab Units 03/22/25  1451   PH ART  7.478*   PCO2 ART mm Hg 25.0*   PO2 ART mm Hg 141.4*   HCO3 ART mmol/L 18.1*   BASE EXC ART mmol/L -3.5   O2 CONTENT ART mL/dL 21.1   O2 HGB, ARTERIAL % 97.7*   ABG SOURCE  Line, Arterial     Results from last 7 days   Lab Units 03/24/25  0436 03/23/25  1753 03/23/25  0410   PH SHAHANA  7.376 7.406* 7.370   PCO2 SHAHANA mm Hg 48.7 48.7 45.4   PO2 SHAHANA mm Hg 43.7 39.8 39.4   HCO3 SHAHANA mmol/L 27.9 29.9 25.7   BASE EXC SHAHANA mmol/L 1.9 4.1 0.0   O2 CONTENT SHAHANA ml/dL 15.3 15.2 13.2   O2 HGB, VENOUS % 73.9 70.4 64.9        Results from last 7 days   Lab Units 03/22/25  1638 03/22/25  1425 03/22/25  1236 03/22/25  0732 03/22/25  0601 03/22/25  0353   HS TNI 0HR ng/L  --   --  192*  --    --  138*   HS TNI 2HR ng/L  --  130*  --   --  160*  --    HSTNI D2 ng/L  --  -62  --   --  22*  --    HS TNI 4HR ng/L 138*  --   --  162*  --   --    HSTNI D4 ng/L -54  --   --  24*  --   --         Results from last 7 days   Lab Units 03/22/25  0353   PROTIME seconds 14.9   INR  1.13   PTT seconds 32     Results from last 7 days   Lab Units 03/22/25  0353   TSH 3RD GENERATON uIU/mL 8.365*     Results from last 7 days   Lab Units 03/22/25  0353   PROCALCITONIN ng/ml 0.08     Results from last 7 days   Lab Units 03/22/25  1537 03/22/25  1236 03/22/25  0353   LACTIC ACID mmol/L 1.7 2.4* 1.8        Results from last 7 days   Lab Units 03/24/25  0436   DIGOXIN LVL ng/mL 0.9     Results from last 7 days   Lab Units 03/22/25  0353   BNP pg/mL 2,870*     Results from last 7 days   Lab Units 03/22/25  0601   FERRITIN ng/mL 36        Results from last 7 days   Lab Units 03/22/25  0519   CLARITY UA  Clear   COLOR UA  Yellow   SPEC GRAV UA  1.025   PH UA  6.0   GLUCOSE UA mg/dl >=1000 (1%)*   KETONES UA mg/dl Negative   BLOOD UA  Negative   PROTEIN UA mg/dl 100 (2+)*   NITRITE UA  Negative   BILIRUBIN UA  Negative   UROBILINOGEN UA E.U./dl 0.2   LEUKOCYTES UA  Elevated glucose may cause decreased leukocyte values. See urine microscopic for UWBC result*   WBC UA /hpf 0-1   RBC UA /hpf 0-1   BACTERIA UA /hpf Occasional   EPITHELIAL CELLS WET PREP /hpf Occasional        Results from last 7 days   Lab Units 03/22/25  0352   AMPH/METH  Positive*   BARBITURATE UR  Negative   BENZODIAZEPINE UR  Negative   COCAINE UR  Negative   METHADONE URINE  Negative   OPIATE UR  Negative   PCP UR  Negative   THC UR  Positive*          Past Medical History:   Diagnosis Date    Anxiety     Cardiomyopathy (HCC)     CHF (congestive heart failure) (HCC)     Depression     Emphysema of lung (HCC)     GERD (gastroesophageal reflux disease)     Gout     Hypertension     Metabolic encephalopathy 03/22/2025    Neuropathy     Raynaud's disease     Right  inguinal hernia     Scleroderma (HCC)      Present on Admission:   Acute on chronic combined systolic and diastolic congestive heart failure (HCC)   Primary hypertension   Atrial fibrillation with RVR (HCC)   Methamphetamine abuse (HCC)   Other emphysema (HCC)      Admitting Diagnosis: Hypomagnesemia [E83.42]  Hyponatremia [E87.1]  SOB (shortness of breath) [R06.02]  Tachycardia [R00.0]  CKD (chronic kidney disease) [N18.9]  Elevated troponin [R79.89]  CHF exacerbation (HCC) [I50.9]  Age/Sex: 59 y.o. male    Network Utilization Review Department  ATTENTION: Please call with any questions or concerns to [phone] and carefully listen to the prompts so that you are directed to the right person. All voicemails are confidential.   For Discharge needs, contact Care Management DC Support Team at [phone] opt. [phone]  Send all requests for admission clinical reviews, approved or denied determinations and any other requests to dedicated fax number below belonging to the campus where the patient is receiving treatment. List of dedicated fax numbers for the Facilities:  FACILITY NAME UR FAX NUMBER   ADMISSION DENIALS (Administrative/Medical Necessity) [phone]   DISCHARGE SUPPORT TEAM (NETWORK) [phone]   PARENT CHILD HEALTH (Maternity/NICU/Pediatrics) [phone]   [facility] [phone]   [facility] [phone]   [facility] [phone]   [facility] [phone]   [facility] [phone]   [facility] [phone]   [facility] [phone]   [facility] [phone]   [facility] [phone]   [facility] [phone]   [facility] [phone]   [facility] [phone]

## 2025-03-24 NOTE — DISCHARGE INSTR - OTHER ORDERS
Drug and Alcohol providers                                                                                                  Please call to schedule an appointment.  [facility] Phone: [phone]      [facility] Phone: [phone]     [facility] Phone: [phone]     [facility] [place] Phone: [phone]     [facility] Phone: [phone]     [facility] Phone: [phone]    Mental Health Providers                                                                                            Please call to schedule an appointment.   [facility] [phone]   [facility] [phone]   [facility] [phone]   [facility] [phone]  [facility] [phone]  [facility]) [phone]  [facility] [phone]  [facility] [phone]  [facility] [phone]    Mental Health Case Management Support  Walk in hours M-F 9am-1pm  Address: [place] [address]    Phone [phone]      [facility]  Call: [phone]   Text: [phone]   Website with chat feature: [email or web address]                                      Services are available for anyone who calls, chats, texts, walks in, or requests a Crisis Specialist to come to their home, school, or community.

## 2025-03-24 NOTE — ASSESSMENT & PLAN NOTE
3/22 CT chest:   Redemonstrated subpleural reticulations at the bilateral lung bases, which are nonspecific, but could be seen in setting of interstitial lung disease. Moderate centrilobular and biapical paraseptal emphysema. Redemonstrated 7 mm solid pulmonary nodule in the left lower lobe (series 4, image 91), unchanged from prior exam. Redemonstrated 4 mm pulmonary nodule along the major fissure on the left (series 4, image 69), unchanged. No new   solid pulmonary nodules visualized.  No pulmonary edema. There is mild nonspecific bronchial wall thickening, can be seen in setting of bronchitis.    Plan:  Smokes 1 pack a day. Not in acute exacerbation.   Duo nebs and Budesonide Nebs as needed.

## 2025-03-24 NOTE — PROGRESS NOTES
Pastoral Care Progress Note  CH attempted support visit, pt asleep.  CH remains available.         03/24/25 1200   Clinical Encounter Type   Visited With Patient not available

## 2025-03-24 NOTE — PLAN OF CARE
Problem: CARDIOVASCULAR - ADULT  Goal: Maintains optimal cardiac output and hemodynamic stability  Description: INTERVENTIONS:- Monitor I/O, vital signs and rhythm- Monitor for S/S and trends of decreased cardiac output- Administer and titrate ordered vasoactive medications to optimize hemodynamic stability- Assess quality of pulses, skin color and temperature- Assess for signs of decreased coronary artery perfusion- Instruct patient to report change in severity of symptoms monitor for chest pain , BLACK, SOB palpatations  Outcome: Progressing  Goal: Absence of cardiac dysrhythmias or at baseline rhythm  Description: INTERVENTIONS:- Continuous cardiac monitoring, vital signs, obtain 12 lead EKG if ordered- Administer antiarrhythmic and heart rate control medications as ordered- Monitor electrolytes and administer replacement therapy as ordered  Outcome: Progressing     Problem: PAIN - ADULT  Goal: Verbalizes/displays adequate comfort level or baseline comfort level  Description: Interventions:- Encourage patient to monitor pain and request assistance- Assess pain using appropriate pain scale- Administer analgesics based on type and severity of pain and evaluate response- Implement non-pharmacological measures as appropriate and evaluate response- Consider cultural and social influences on pain and pain management- Notify physician/advanced practitioner if interventions unsuccessful or patient reports new pain  Outcome: Progressing     Problem: INFECTION - ADULT  Goal: Absence or prevention of progression during hospitalization  Description: INTERVENTIONS:- Assess and monitor for signs and symptoms of infection- Monitor lab/diagnostic results- Monitor all insertion sites, i.e. indwelling lines, tubes, and drains- Monitor endotracheal if appropriate and nasal secretions for changes in amount and color- [place] appropriate cooling/warming therapies per order- Administer medications as ordered- Instruct and encourage  patient and family to use good hand hygiene technique- Identify and instruct in appropriate isolation precautions for identified infection/condition  Outcome: Progressing  Goal: Absence of fever/infection during neutropenic period  Description: INTERVENTIONS:- Monitor WBC  Outcome: Progressing     Problem: SAFETY ADULT  Goal: Patient will remain free of falls  Description: INTERVENTIONS:- Educate patient/family on patient safety including physical limitations- Instruct patient to call for assistance with activity - Consult OT/PT to assist with strengthening/mobility - Keep Call bell within reach- Keep bed low and locked with side rails adjusted as appropriate- Keep care items and personal belongings within reach- Initiate and maintain comfort rounds- Make Fall Risk Sign visible to staff- Offer Toileting every 2 Hours, in advance of need- Initiate/Maintain bed/chair alarm - Apply yellow socks and bracelet for high fall risk patients- Consider moving patient to room near nurses station  Outcome: Progressing  Goal: Maintain or return to baseline ADL function  Description: INTERVENTIONS:-  Assess patient's ability to carry out ADLs; assess patient's baseline for ADL function and identify physical deficits which impact ability to perform ADLs (bathing, care of mouth/teeth, toileting, grooming, dressing, etc.)- Assess/evaluate cause of self-care deficits - Assess range of motion- Assess patient's mobility; develop plan if impaired- Assess patient's need for assistive devices and provide as appropriate- Encourage maximum independence but intervene and supervise when necessary- Involve family in performance of ADLs- Assess for home care needs following discharge - Consider OT consult to assist with ADL evaluation and planning for discharge- Provide patient education as appropriate  Outcome: Progressing  Goal: Maintains/Returns to pre admission functional level  Description: INTERVENTIONS:- Perform AM-PAC 6 Click Basic  Mobility/ Daily Activity assessment daily.- Set and communicate daily mobility goal to care team and patient/family/caregiver. - Collaborate with rehabilitation services on mobility goals if consulted- Perform Range of Motion 3 times a day.- Reposition patient every 2 hours if unable to reposition self.- Dangle patient 3 times a day- Stand patient 3 times a day- Ambulate patient 3 times a day- Out of bed to chair 2 times a day -  Out of bed for toileting- Record patient progress and toleration of activity level   Outcome: Progressing     Problem: DISCHARGE PLANNING  Goal: Discharge to home or other facility with appropriate resources  Description: INTERVENTIONS:- Identify barriers to discharge w/patient and caregiver- Arrange for needed discharge resources and transportation as appropriate- Identify discharge learning needs (meds, wound care, etc.)- Arrange for interpretive services to assist at discharge as needed- Refer to Case Management Department for coordinating discharge planning if the patient needs post-hospital services based on physician/advanced practitioner order or complex needs related to functional status, cognitive ability, or social support system  Outcome: Progressing     Problem: Prexisting or High Potential for Compromised Skin Integrity  Goal: Skin integrity is maintained or improved  Description: INTERVENTIONS:- Identify patients at risk for skin breakdown- Assess and monitor skin integrity- Assess and monitor nutrition and hydration status- Monitor labs - Assess for incontinence - Turn and reposition patient- Assist with mobility/ambulation- Relieve pressure over bony prominences- Avoid friction and shearing- Provide appropriate hygiene as needed including keeping skin clean and dry- Evaluate need for skin moisturizer/barrier cream- Collaborate with interdisciplinary team - Patient/family teaching- Consider wound care consult   Outcome: Progressing     Problem: Knowledge Deficit  Goal:  Patient/family/caregiver demonstrates understanding of disease process, treatment plan, medications, and discharge instructions  Description: Complete learning assessment and assess knowledge base.Interventions:- Provide teaching at level of understanding- Provide teaching via preferred learning methods  Outcome: Progressing

## 2025-03-24 NOTE — PROGRESS NOTES
Progress Note - Critical Care/ICU   Name: Britton Batista 59 y.o. male I MRN: [id number]  Unit/Bed#: -01 I Date of Admission: 3/22/2025   Date of Service: 3/24/2025 I Hospital Day: 2      Assessment & Plan  Cardiogenic shock (HCC)  Suspect I/S/O NICM due to methamphetamine use vs. IV cardizem bolus  No suspected source of infection, will hold on IVF resuscitation due to severely reduced EF and concern for volume overload  Lheart cath 2020 unremarkable for CAD/ischemia   POC Cardiac US 3/22 showing severely reduced EF w/ global hypokinesis  A/e/b: decreasing urine output, tachycardia, hypotension, lactic acidosis, altered mental status  Lactic cleared, ScVo2 61 (baseline), repeat improving to 64 w/ addition of milrinone  Daily lasix 40mg   Trend lactic, UOP, ScVO2  Recent Labs     03/22/25  0353 03/22/25  1236 03/22/25  1537   LACTICACID 1.8 2.4* 1.7   3/24/25: SVO2 73.9 percent.Improved.   Repeat formal echo   Cardiology consult   Levophed off since 1620 on 3/22. Goal MAP > 65.  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   Holding jardiance, coreg, and entresto. He will need alternate prescriptions due to cost issues. Will send in a prescription to check the prices. CM involved.   metoprolol 12.5mg BID. Blood Pressure: (!) 131/101   Daily weights Weight  change: -2.2 kg (-4 lb 13.6 oz) .corin Edmondson for accurate I&O  Follow-up TTE  Cardiology following. NO plans for Cardioversion today. Will resume diet.  Methamphetamine abuse (HCC)  Prior hx of and UDS positive on admission for methamphetamines  Primary hypertension  Hold home amlodipine  Atrial fibrillation with RVR ([facility])  Presented to [facility] on 3/21 for A-fib RVR and was recommended admission at that time, but patient refused  S/p IV Cardizem and lopressor on admission  Would not use Cardizem given reduced LVEF  Avoid amiodarone given non-compliance of AC  ICD interrogated: Confirmed A-fib  Has not been compliant with outpatient Eliquis due to cost issues.   Eliquis restarted on admission - continue  Cardiology recommended switching from Coreg to Lopressor 25 mg every 6 hours  Cardiology following, NO plans for Cardioversion today. Will resume diet.   Metoprolol 12.5mg q12hrs started 3/23  Digoxin 250mcg daily [facility].  Lab Results   Component Value Date    DIGOXIN 0.9 03/24/2025      Stage 3a chronic kidney disease (CKD) ([facility])  Lab Results   Component Value Date    EGFR 50 03/24/2025    EGFR 49 03/23/2025    EGFR 49 03/23/2025    CREATININE 1.50 (H) 03/24/2025    CREATININE 1.53 (H) 03/23/2025    CREATININE 1.51 (H) 03/23/2025   Baseline Cr 1.2-1.3  3/22 AM Lasix 40mg IV, repeat dose given PM  aTte edmondson for accurate I&O  Trend BMP  Metabolic encephalopathy (Resolved: 3/24/2025)  In the setting of shock state   Improving w/ MAP > 65   Delirium precautions  Frequent neuro exams  Scleroderma (HCC)  Follows [facility]ology (last visit 9/5/2024)  Cutaneous manifestations: hyper/hypopigmentation  Digital vasculopathy (Raynaud's phenomenon)   Pulmonary manifestations: pulm HTN on prior echo PASP 42, ILD  Other emphysema ([facility])  3/22 CT chest:   Redemonstrated subpleural reticulations at the bilateral lung bases, which are nonspecific, but could be seen in setting of interstitial lung disease. Moderate  centrilobular and biapical paraseptal emphysema. Redemonstrated 7 mm solid pulmonary nodule in the left lower lobe (series 4, image 91), unchanged from prior exam. Redemonstrated 4 mm pulmonary nodule along the major fissure on the left (series 4, image 69), unchanged. No new   solid pulmonary nodules visualized.  No pulmonary edema. There is mild nonspecific bronchial wall thickening, can be seen in setting of bronchitis.    Plan:  Smokes 1 pack a day. Not in acute exacerbation.   Duo nebs and Budesonide Nebs as needed.   Disposition: Critical care      Review of Invasive Devices:    Zazueta Plan: Continue for accurate I/O monitoring for 48 hours  Central access plan: Medications requiring central line  Codi Plan: Keep arterial line for hemodynamic monitoring and frequent labs    Prophylaxis:  VTE VTE covered by:  apixaban, Oral, 5 mg at 03/23/25 1754       Stress Ulcer  covered bypantoprazole (PROTONIX) 40 mg tablet [[id number]] (Long-Term Med), pantoprazole (PROTONIX) EC tablet 40 mg [[id number]]         24 Hour Events : No overnight events.   Subjective   Review of Systems: See HPI for Review of Systems    Objective :                   Vitals I/O      Most Recent Min/Max in 24hrs   Temp (!) 97.3 °F (36.3 °C) Temp  Min: 97 °F (36.1 °C)  Max: 99 °F (37.2 °C)   Pulse 104 Pulse  Min: 80  Max: 132   Resp (!) 30 Resp  Min: 0  Max: 51   BP (!) 131/101 BP  Min: 106/86  Max: 167/78   O2 Sat 99 % SpO2  Min: 90 %  Max: 100 %      Intake/Output Summary (Last 24 hours) at 3/24/2025 0901  Last data filed at 3/24/2025 0600  Gross per 24 hour   Intake 1712.54 ml   Output 3125 ml   Net -1412.46 ml       Diet Cardiovascular; Cardiac; Fluid Restriction 2000 ML    Invasive Monitoring   Arterial Line  [place] /74  Arterial Line BP  Min: 71/52  Max: 167/101   MAP 89 mmHg  Arterial Line MAP (mmHg)  Min: 56 mmHg  Max: 118 mmHg           Physical Exam   Physical Exam  Vitals reviewed.   Eyes:      Conjunctiva/sclera: Conjunctivae  normal.   Skin:     General: Skin is warm.   Cardiovascular:      Rate and Rhythm: Rhythm irregular.      Pulses: Normal pulses.      Heart sounds: Normal heart sounds.   Musculoskeletal:         General: Normal range of motion.   Abdominal:      Palpations: Abdomen is soft.   Constitutional:       Appearance: He is well-developed.   Pulmonary:      Effort: Pulmonary effort is normal.   Neurological:      General: No focal deficit present.      Mental Status: He is alert.   Genitourinary/Anorectal:  Zazueta present.        Diagnostic Studies        Lab Results: I have reviewed the following results:     Medications:  Scheduled PRN   apixaban, 5 mg, BID  budesonide, 0.5 mg, Q12H  digoxin, 125 mcg, Daily  furosemide, 40 mg, Daily  ipratropium-albuterol, 3 mL, Q6H  melatonin, 3 mg, HS  metoprolol tartrate, 12.5 mg, Q12H JESUS  nicotine, 1 patch, Daily  pantoprazole, 40 mg, BID  pramipexole, 0.25 mg, HS      acetaminophen, 650 mg, Q6H PRN  gabapentin, 100 mg, TID PRN  metoprolol, 2.5 mg, Q6H PRN  trimethobenzamide, 200 mg, Q6H PRN       Continuous    milrinone (Primacor) infusion, 0.13 mcg/kg/min, Last Rate: 0.13 mcg/kg/min (03/23/25 2200)         Labs:   CBC    Recent Labs     03/23/25  0410 03/24/25  0436   WBC 7.81 8.02   HGB 14.0 14.0   HCT 42.4 43.4    230     BMP    Recent Labs     03/23/25  1753 03/24/25  0436   SODIUM 131* 131*   K 3.9 3.7   CL 95* 96   CO2 28 31   AGAP 8 4   BUN 29* 29*   CREATININE 1.53* 1.50*   CALCIUM 8.8 8.7       Coags    No recent results     Additional Electrolytes  Recent Labs     03/23/25  0410 03/23/25  1753 03/24/25  0436   MG 1.7* 1.8* 1.9   PHOS 4.4  --  3.5   CAIONIZED 1.13 1.10* 1.11*          Blood Gas    Recent Labs     03/22/25  1451   PHART 7.478*   BLR4SGB 25.0*   PO2ART 141.4*   GYT8HJR 18.1*   BEART -3.5   SOURCE Line, Arterial     Recent Labs     03/22/25  1451 03/22/25  1537 03/24/25  0436   PHVEN  --    < > 7.376   SFW3QTQ  --    < > 48.7   PO2VEN  --    < > 43.7    SWI8PIP  --    < > 27.9   BEVEN  --    < > 1.9   [id number]  --    < > 73.9   SOURCE Line, Arterial  --   --     < > = values in this interval not displayed.    LFTs  Recent Labs     03/23/25  0410 03/24/25  0436   ALT 29 32   AST 39 34   ALKPHOS 102 104   ALB 3.4* 3.2*   TBILI 1.15* 0.78       Infectious  No recent results  Glucose  Recent Labs     03/22/25  2153 03/23/25  0410 03/23/25  1753 03/24/25  0436   GLUC 119 88 106 114

## 2025-03-24 NOTE — ASSESSMENT & PLAN NOTE
Critical care on board.  Secondary to acute on chronic HFrEF and rapid a fib.  Off milrinone for 24 hours.  See discussion under cardiomyopathy, CHF, and a fib

## 2025-03-24 NOTE — RESPIRATORY THERAPY NOTE
RT Protocol Note  Britton Batista 59 y.o. male MRN: [id number]  Unit/Bed#: -01 Encounter: [id number]    Assessment    Principal Problem:    Cardiogenic shock (HCC)  Active Problems:    Other emphysema (HCC)    Acute on chronic combined systolic and diastolic congestive heart failure (HCC)    Cardiomyopathy (HCC)    Methamphetamine abuse (HCC)    Primary hypertension    Atrial fibrillation with RVR (HCC)    Stage 3a chronic kidney disease (CKD) (HCC)    Scleroderma (HCC)      Home Pulmonary Medications:         Past Medical History:   Diagnosis Date    Anxiety     Cardiomyopathy (HCC)     CHF (congestive heart failure) (HCC)     Depression     Emphysema of lung (HCC)     GERD (gastroesophageal reflux disease)     Gout     Hypertension     Metabolic encephalopathy 03/22/2025    Neuropathy     Raynaud's disease     Right inguinal hernia     Scleroderma (HCC)      Social History     Socioeconomic History    Marital status:      Spouse name: None    Number of children: None    Years of education: None    Highest education level: None   Occupational History    None   Tobacco Use    Smoking status: Every Day     Current packs/day: 1.00     Average packs/day: 1 pack/day for 45.2 years (45.2 ttl pk-yrs)     Types: Cigarettes     Start date: 2000    Smokeless tobacco: Never    Tobacco comments:     Last cigarette 0430   Vaping Use    Vaping status: Never Used   Substance and Sexual Activity    Alcohol use: Not Currently     Comment: occasional    Drug use: Yes     Types: Marijuana, Methamphetamines     Comment: Hx meth use    Sexual activity: Not Currently     Comment: defer   Other Topics Concern    None   Social History Narrative    None     Social Drivers of Health     Financial Resource Strain: Not on file   Food Insecurity: Food Insecurity Present (3/22/2025)    Nursing - Inadequate Food Risk Classification     Worried About Running Out of Food in the Last Year: Never true     Ran Out of Food in the Last  "Year: Never true     Ran Out of Food in the Last Year: Sometimes true   Transportation Needs: Unmet Transportation Needs (3/22/2025)    Nursing - Transportation Risk Classification     Lack of Transportation: Not on file     Lack of Transportation: Yes   Physical Activity: Not on file   Stress: Not on file   Social Connections: Not on file   Intimate Partner Violence: Unknown (3/22/2025)    Nursing IPS     Feels Physically and Emotionally Safe: Not on file     Physically Hurt by Someone: Not on file     Humiliated or Emotionally Abused by Someone: Not on file     Physically Hurt by Someone: No     Hurt or Threatened by Someone: No   Housing Stability: Unknown (3/22/2025)    Nursing: Inadequate Housing Risk Classification     Has Housing: Not on file     Worried About Losing Housing: Not on file     Unable to Get Utilities: Not on file     Unable to Pay for Housing in the Last Year: No     Has Housin       Subjective         Objective    Physical Exam:   Assessment Type: Assess only  General Appearance: Awake  Respiratory Pattern: Dyspnea with exertion  Chest Assessment: Chest expansion symmetrical  Bilateral Breath Sounds: Clear, Diminished  O2 Device: RA    Vitals:  Blood pressure 120/89, pulse 75, temperature 97.7 °F (36.5 °C), temperature source Bladder, resp. rate (!) 23, height 5' 9\" (1.753 m), weight 75.8 kg (167 lb), SpO2 98%.    Results from last 7 days   Lab Units 25  0436 25  2153 25  1451   PH ART   --   --  7.478*   PCO2 ART mm Hg  --   --  25.0*   PO2 ART mm Hg  --   --  141.4*   HCO3 ART mmol/L  --   --  18.1*   BASE EXC ART mmol/L  --   --  -3.5   O2 CONTENT ART mL/dL  --   --  21.1   O2 HGB, ARTERIAL %  --   --  97.7*   ABG SOURCE   --   --  Line, Arterial   ANDREINA TEST  Yes   < >  --     < > = values in this interval not displayed.       Imaging and other studies: Results Review Statement: No pertinent imaging studies reviewed.    O2 Device: RA     Plan    Respiratory Plan: No " distress/Pulmonary history        Resp Comments: Pt admitted w/ cardogenic shock   smokes a ppd. Denies any SOB or use of resp meds stable on RA   changing to prn  pt aware he can call if he needs

## 2025-03-25 LAB
ALBUMIN SERPL BCG-MCNC: 3.2 G/DL (ref 3.5–5)
ALP SERPL-CCNC: 105 U/L (ref 34–104)
ALT SERPL W P-5'-P-CCNC: 29 U/L (ref 7–52)
ANION GAP SERPL CALCULATED.3IONS-SCNC: 5 MMOL/L (ref 4–13)
ANION GAP SERPL CALCULATED.3IONS-SCNC: 6 MMOL/L (ref 4–13)
AST SERPL W P-5'-P-CCNC: 28 U/L (ref 13–39)
BASE EX.OXY STD BLDV CALC-SCNC: 53.5 % (ref 60–80)
BASE EXCESS BLDV CALC-SCNC: 6.6 MMOL/L
BILIRUB SERPL-MCNC: 0.57 MG/DL (ref 0.2–1)
BUN SERPL-MCNC: 22 MG/DL (ref 5–25)
BUN SERPL-MCNC: 26 MG/DL (ref 5–25)
CA-I BLD-SCNC: 1.08 MMOL/L (ref 1.12–1.32)
CA-I BLD-SCNC: 1.18 MMOL/L (ref 1.12–1.32)
CALCIUM ALBUM COR SERPL-MCNC: 8.9 MG/DL (ref 8.3–10.1)
CALCIUM SERPL-MCNC: 8.3 MG/DL (ref 8.4–10.2)
CALCIUM SERPL-MCNC: 9.4 MG/DL (ref 8.4–10.2)
CHLORIDE SERPL-SCNC: 95 MMOL/L (ref 96–108)
CHLORIDE SERPL-SCNC: 95 MMOL/L (ref 96–108)
CO2 SERPL-SCNC: 32 MMOL/L (ref 21–32)
CO2 SERPL-SCNC: 33 MMOL/L (ref 21–32)
CREAT SERPL-MCNC: 1.23 MG/DL (ref 0.6–1.3)
CREAT SERPL-MCNC: 1.38 MG/DL (ref 0.6–1.3)
ERYTHROCYTE [DISTWIDTH] IN BLOOD BY AUTOMATED COUNT: 15.5 % (ref 11.6–15.1)
GFR SERPL CREATININE-BSD FRML MDRD: 55 ML/MIN/1.73SQ M
GFR SERPL CREATININE-BSD FRML MDRD: 63 ML/MIN/1.73SQ M
GLUCOSE SERPL-MCNC: 87 MG/DL (ref 65–140)
GLUCOSE SERPL-MCNC: 95 MG/DL (ref 65–140)
HCO3 BLDV-SCNC: 33.2 MMOL/L (ref 24–30)
HCT VFR BLD AUTO: 44.1 % (ref 36.5–49.3)
HGB BLD-MCNC: 14.3 G/DL (ref 12–17)
INR PPP: 1.18 (ref 0.85–1.19)
INR PPP: 1.21 (ref 0.85–1.19)
MAGNESIUM SERPL-MCNC: 1.6 MG/DL (ref 1.9–2.7)
MAGNESIUM SERPL-MCNC: 2 MG/DL (ref 1.9–2.7)
MCH RBC QN AUTO: 29.1 PG (ref 26.8–34.3)
MCHC RBC AUTO-ENTMCNC: 32.4 G/DL (ref 31.4–37.4)
MCV RBC AUTO: 90 FL (ref 82–98)
O2 CT BLDV-SCNC: 11.3 ML/DL
PCO2 BLDV: 55.4 MM HG (ref 42–50)
PH BLDV: 7.4 [PH] (ref 7.3–7.4)
PLATELET # BLD AUTO: 237 THOUSANDS/UL (ref 149–390)
PMV BLD AUTO: 9.9 FL (ref 8.9–12.7)
PO2 BLDV: 31.2 MM HG (ref 35–45)
POTASSIUM SERPL-SCNC: 3.6 MMOL/L (ref 3.5–5.3)
POTASSIUM SERPL-SCNC: 4.1 MMOL/L (ref 3.5–5.3)
PROT SERPL-MCNC: 6.5 G/DL (ref 6.4–8.4)
PROTHROMBIN TIME: 15.4 SECONDS (ref 12.3–15)
PROTHROMBIN TIME: 15.7 SECONDS (ref 12.3–15)
RBC # BLD AUTO: 4.91 MILLION/UL (ref 3.88–5.62)
SODIUM SERPL-SCNC: 132 MMOL/L (ref 135–147)
SODIUM SERPL-SCNC: 134 MMOL/L (ref 135–147)
WBC # BLD AUTO: 6.76 THOUSAND/UL (ref 4.31–10.16)

## 2025-03-25 PROCEDURE — 82805 BLOOD GASES W/O2 SATURATION: CPT | Performed by: NURSE PRACTITIONER

## 2025-03-25 PROCEDURE — 85027 COMPLETE CBC AUTOMATED: CPT | Performed by: NURSE PRACTITIONER

## 2025-03-25 PROCEDURE — 99233 SBSQ HOSP IP/OBS HIGH 50: CPT | Performed by: STUDENT IN AN ORGANIZED HEALTH CARE EDUCATION/TRAINING PROGRAM

## 2025-03-25 PROCEDURE — 83735 ASSAY OF MAGNESIUM: CPT | Performed by: NURSE PRACTITIONER

## 2025-03-25 PROCEDURE — 85610 PROTHROMBIN TIME: CPT

## 2025-03-25 PROCEDURE — NC001 PR NO CHARGE: Performed by: STUDENT IN AN ORGANIZED HEALTH CARE EDUCATION/TRAINING PROGRAM

## 2025-03-25 PROCEDURE — 94760 N-INVAS EAR/PLS OXIMETRY 1: CPT

## 2025-03-25 PROCEDURE — 80053 COMPREHEN METABOLIC PANEL: CPT | Performed by: NURSE PRACTITIONER

## 2025-03-25 PROCEDURE — 94640 AIRWAY INHALATION TREATMENT: CPT

## 2025-03-25 PROCEDURE — 82330 ASSAY OF CALCIUM: CPT | Performed by: NURSE PRACTITIONER

## 2025-03-25 PROCEDURE — 80048 BASIC METABOLIC PNL TOTAL CA: CPT | Performed by: NURSE PRACTITIONER

## 2025-03-25 PROCEDURE — 99232 SBSQ HOSP IP/OBS MODERATE 35: CPT | Performed by: INTERNAL MEDICINE

## 2025-03-25 RX ORDER — CARVEDILOL 6.25 MG/1
6.25 TABLET ORAL ONCE
Status: COMPLETED | OUTPATIENT
Start: 2025-03-25 | End: 2025-03-25

## 2025-03-25 RX ORDER — CARVEDILOL 6.25 MG/1
6.25 TABLET ORAL 2 TIMES DAILY WITH MEALS
Status: DISCONTINUED | OUTPATIENT
Start: 2025-03-25 | End: 2025-03-25

## 2025-03-25 RX ORDER — WARFARIN SODIUM 5 MG/1
5 TABLET ORAL
Status: DISCONTINUED | OUTPATIENT
Start: 2025-03-25 | End: 2025-03-25

## 2025-03-25 RX ORDER — POTASSIUM CHLORIDE 1500 MG/1
40 TABLET, EXTENDED RELEASE ORAL ONCE
Status: COMPLETED | OUTPATIENT
Start: 2025-03-25 | End: 2025-03-25

## 2025-03-25 RX ORDER — ENOXAPARIN SODIUM 100 MG/ML
1 INJECTION SUBCUTANEOUS EVERY 12 HOURS SCHEDULED
Status: DISCONTINUED | OUTPATIENT
Start: 2025-03-25 | End: 2025-03-28

## 2025-03-25 RX ORDER — IPRATROPIUM BROMIDE AND ALBUTEROL SULFATE 2.5; .5 MG/3ML; MG/3ML
3 SOLUTION RESPIRATORY (INHALATION)
Status: DISCONTINUED | OUTPATIENT
Start: 2025-03-25 | End: 2025-03-25

## 2025-03-25 RX ORDER — LANOLIN ALCOHOL/MO/W.PET/CERES
400 CREAM (GRAM) TOPICAL ONCE
Status: COMPLETED | OUTPATIENT
Start: 2025-03-25 | End: 2025-03-25

## 2025-03-25 RX ORDER — CARVEDILOL 12.5 MG/1
12.5 TABLET ORAL 2 TIMES DAILY WITH MEALS
Status: DISCONTINUED | OUTPATIENT
Start: 2025-03-25 | End: 2025-03-28 | Stop reason: HOSPADM

## 2025-03-25 RX ORDER — IPRATROPIUM BROMIDE AND ALBUTEROL SULFATE 2.5; .5 MG/3ML; MG/3ML
3 SOLUTION RESPIRATORY (INHALATION)
Status: DISCONTINUED | OUTPATIENT
Start: 2025-03-25 | End: 2025-03-27

## 2025-03-25 RX ORDER — WARFARIN SODIUM 5 MG/1
5 TABLET ORAL
Status: DISCONTINUED | OUTPATIENT
Start: 2025-03-26 | End: 2025-03-27

## 2025-03-25 RX ORDER — CALCIUM GLUCONATE 20 MG/ML
2 INJECTION, SOLUTION INTRAVENOUS ONCE
Status: COMPLETED | OUTPATIENT
Start: 2025-03-25 | End: 2025-03-25

## 2025-03-25 RX ORDER — DIGOXIN 125 MCG
125 TABLET ORAL 3 TIMES WEEKLY
Status: DISCONTINUED | OUTPATIENT
Start: 2025-03-26 | End: 2025-03-28 | Stop reason: HOSPADM

## 2025-03-25 RX ORDER — MAGNESIUM SULFATE HEPTAHYDRATE 40 MG/ML
2 INJECTION, SOLUTION INTRAVENOUS ONCE
Status: COMPLETED | OUTPATIENT
Start: 2025-03-25 | End: 2025-03-25

## 2025-03-25 RX ORDER — CARVEDILOL 12.5 MG/1
12.5 TABLET ORAL 2 TIMES DAILY WITH MEALS
Status: DISCONTINUED | OUTPATIENT
Start: 2025-03-25 | End: 2025-03-25

## 2025-03-25 RX ADMIN — MAGNESIUM SULFATE HEPTAHYDRATE 2 G: 40 INJECTION, SOLUTION INTRAVENOUS at 05:58

## 2025-03-25 RX ADMIN — IPRATROPIUM BROMIDE AND ALBUTEROL SULFATE 3 ML: .5; 3 SOLUTION RESPIRATORY (INHALATION) at 13:33

## 2025-03-25 RX ADMIN — CALCIUM GLUCONATE 2 G: 20 INJECTION, SOLUTION INTRAVENOUS at 04:49

## 2025-03-25 RX ADMIN — FUROSEMIDE 40 MG: 40 TABLET ORAL at 08:40

## 2025-03-25 RX ADMIN — BUDESONIDE INHALATION 0.5 MG: 0.5 SUSPENSION RESPIRATORY (INHALATION) at 08:17

## 2025-03-25 RX ADMIN — CALCIUM GLUCONATE 2 G: 20 INJECTION, SOLUTION INTRAVENOUS at 05:58

## 2025-03-25 RX ADMIN — BUDESONIDE INHALATION 0.5 MG: 0.5 SUSPENSION RESPIRATORY (INHALATION) at 20:20

## 2025-03-25 RX ADMIN — IPRATROPIUM BROMIDE AND ALBUTEROL SULFATE 3 ML: 2.5; .5 SOLUTION RESPIRATORY (INHALATION) at 20:20

## 2025-03-25 RX ADMIN — MAGNESIUM OXIDE TAB 400 MG (241.3 MG ELEMENTAL MG) 400 MG: 400 (241.3 MG) TAB at 06:27

## 2025-03-25 RX ADMIN — PRAMIPEXOLE DIHYDROCHLORIDE 0.25 MG: 0.5 TABLET ORAL at 21:42

## 2025-03-25 RX ADMIN — CARVEDILOL 12.5 MG: 12.5 TABLET, FILM COATED ORAL at 19:27

## 2025-03-25 RX ADMIN — FUROSEMIDE 40 MG: 40 TABLET ORAL at 15:17

## 2025-03-25 RX ADMIN — CARVEDILOL 6.25 MG: 6.25 TABLET, FILM COATED ORAL at 15:16

## 2025-03-25 RX ADMIN — POTASSIUM CHLORIDE 40 MEQ: 1500 TABLET, EXTENDED RELEASE ORAL at 05:56

## 2025-03-25 RX ADMIN — NICOTINE 1 PATCH: 21 PATCH, EXTENDED RELEASE TRANSDERMAL at 08:39

## 2025-03-25 RX ADMIN — ENOXAPARIN SODIUM 80 MG: 80 INJECTION SUBCUTANEOUS at 21:42

## 2025-03-25 RX ADMIN — WARFARIN SODIUM 5 MG: 5 TABLET ORAL at 06:27

## 2025-03-25 RX ADMIN — PANTOPRAZOLE SODIUM 40 MG: 40 TABLET, DELAYED RELEASE ORAL at 21:42

## 2025-03-25 RX ADMIN — PANTOPRAZOLE SODIUM 40 MG: 40 TABLET, DELAYED RELEASE ORAL at 08:40

## 2025-03-25 RX ADMIN — CARVEDILOL 6.25 MG: 6.25 TABLET, FILM COATED ORAL at 08:40

## 2025-03-25 RX ADMIN — ENOXAPARIN SODIUM 80 MG: 80 INJECTION SUBCUTANEOUS at 08:40

## 2025-03-25 RX ADMIN — Medication 3 MG: at 21:42

## 2025-03-25 NOTE — RESPIRATORY THERAPY NOTE
03/25/25 1334   Respiratory Assessment   Chest Assessment Chest expansion symmetrical   Bilateral Breath Sounds Clear;Diminished   Resp Comments pt in no distress on RA. neb order changed by physician.   O2 Device RA   Additional Assessments   SpO2 98 %

## 2025-03-25 NOTE — ASSESSMENT & PLAN NOTE
Presented to [facility] on 3/21 for A-fib RVR and was recommended admission at that time, but patient refused  S/p IV Cardizem and lopressor on admission  Would not use Cardizem given reduced LVEF  Avoid amiodarone given non-compliance of AC  ICD interrogated: Confirmed A-fib  Has not been compliant with outpatient Eliquis due to cost issues.   Eliquis restarted on admission - continue  Cardiology recommended switching from Coreg to Lopressor 25 mg every 6 hours  Cardiology following, NO plans for Cardioversion this admission.. Will resume diet.   Metoprolol 12.5mg q12hrs started 3/23  Digoxin 250mcg daily [facility].  Lab Results   Component Value Date    DIGOXIN 0.9 03/24/2025

## 2025-03-25 NOTE — ASSESSMENT & PLAN NOTE
Lab Results   Component Value Date    EGFR 55 03/25/2025    EGFR 50 03/24/2025    EGFR 49 03/23/2025    CREATININE 1.38 (H) 03/25/2025    CREATININE 1.50 (H) 03/24/2025    CREATININE 1.53 (H) 03/23/2025

## 2025-03-25 NOTE — PLAN OF CARE
Problem: CARDIOVASCULAR - ADULT  Goal: Maintains optimal cardiac output and hemodynamic stability  Description: INTERVENTIONS:- Monitor I/O, vital signs and rhythm- Monitor for S/S and trends of decreased cardiac output- Administer and titrate ordered vasoactive medications to optimize hemodynamic stability- Assess quality of pulses, skin color and temperature- Assess for signs of decreased coronary artery perfusion- Instruct patient to report change in severity of symptoms monitor for chest pain , BLACK, SOB palpatations  Outcome: Progressing  Goal: Absence of cardiac dysrhythmias or at baseline rhythm  Description: INTERVENTIONS:- Continuous cardiac monitoring, vital signs, obtain 12 lead EKG if ordered- Administer antiarrhythmic and heart rate control medications as ordered- Monitor electrolytes and administer replacement therapy as ordered  Outcome: Progressing     Problem: PAIN - ADULT  Goal: Verbalizes/displays adequate comfort level or baseline comfort level  Description: Interventions:- Encourage patient to monitor pain and request assistance- Assess pain using appropriate pain scale- Administer analgesics based on type and severity of pain and evaluate response- Implement non-pharmacological measures as appropriate and evaluate response- Consider cultural and social influences on pain and pain management- Notify physician/advanced practitioner if interventions unsuccessful or patient reports new pain  Outcome: Progressing     Problem: INFECTION - ADULT  Goal: Absence or prevention of progression during hospitalization  Description: INTERVENTIONS:- Assess and monitor for signs and symptoms of infection- Monitor lab/diagnostic results- Monitor all insertion sites, i.e. indwelling lines, tubes, and drains- Monitor endotracheal if appropriate and nasal secretions for changes in amount and color- [place] appropriate cooling/warming therapies per order- Administer medications as ordered- Instruct and encourage  patient and family to use good hand hygiene technique- Identify and instruct in appropriate isolation precautions for identified infection/condition  Outcome: Progressing  Goal: Absence of fever/infection during neutropenic period  Description: INTERVENTIONS:- Monitor WBC  Outcome: Progressing     Problem: SAFETY ADULT  Goal: Patient will remain free of falls  Description: INTERVENTIONS:- Educate patient/family on patient safety including physical limitations- Instruct patient to call for assistance with activity - Consult OT/PT to assist with strengthening/mobility - Keep Call bell within reach- Keep bed low and locked with side rails adjusted as appropriate- Keep care items and personal belongings within reach- Initiate and maintain comfort rounds- Make Fall Risk Sign visible to staff- Offer Toileting every 2 Hours, in advance of need- Initiate/Maintain bed/chair alarm - Apply yellow socks and bracelet for high fall risk patients- Consider moving patient to room near nurses station  Outcome: Progressing  Goal: Maintain or return to baseline ADL function  Description: INTERVENTIONS:-  Assess patient's ability to carry out ADLs; assess patient's baseline for ADL function and identify physical deficits which impact ability to perform ADLs (bathing, care of mouth/teeth, toileting, grooming, dressing, etc.)- Assess/evaluate cause of self-care deficits - Assess range of motion- Assess patient's mobility; develop plan if impaired- Assess patient's need for assistive devices and provide as appropriate- Encourage maximum independence but intervene and supervise when necessary- Involve family in performance of ADLs- Assess for home care needs following discharge - Consider OT consult to assist with ADL evaluation and planning for discharge- Provide patient education as appropriate  Outcome: Progressing  Goal: Maintains/Returns to pre admission functional level  Description: INTERVENTIONS:- Perform AM-PAC 6 Click Basic  Mobility/ Daily Activity assessment daily.- Set and communicate daily mobility goal to care team and patient/family/caregiver. - Collaborate with rehabilitation services on mobility goals if consulted- Perform Range of Motion 3 times a day.- Reposition patient every 2 hours if unable to reposition self.- Dangle patient 3 times a day- Stand patient 3 times a day- Ambulate patient 3 times a day- Out of bed to chair 2 times a day -  Out of bed for toileting- Record patient progress and toleration of activity level   Outcome: Progressing     Problem: DISCHARGE PLANNING  Goal: Discharge to home or other facility with appropriate resources  Description: INTERVENTIONS:- Identify barriers to discharge w/patient and caregiver- Arrange for needed discharge resources and transportation as appropriate- Identify discharge learning needs (meds, wound care, etc.)- Arrange for interpretive services to assist at discharge as needed- Refer to Case Management Department for coordinating discharge planning if the patient needs post-hospital services based on physician/advanced practitioner order or complex needs related to functional status, cognitive ability, or social support system  Outcome: Progressing     Problem: Knowledge Deficit  Goal: Patient/family/caregiver demonstrates understanding of disease process, treatment plan, medications, and discharge instructions  Description: Complete learning assessment and assess knowledge base.Interventions:- Provide teaching at level of understanding- Provide teaching via preferred learning methods  Outcome: Progressing     Problem: Prexisting or High Potential for Compromised Skin Integrity  Goal: Skin integrity is maintained or improved  Description: INTERVENTIONS:- Identify patients at risk for skin breakdown- Assess and monitor skin integrity- Assess and monitor nutrition and hydration status- Monitor labs - Assess for incontinence - Turn and reposition patient- Assist with  mobility/ambulation- Relieve pressure over bony prominences- Avoid friction and shearing- Provide appropriate hygiene as needed including keeping skin clean and dry- Evaluate need for skin moisturizer/barrier cream- Collaborate with interdisciplinary team - Patient/family teaching- Consider wound care consult   Outcome: Progressing

## 2025-03-25 NOTE — PROGRESS NOTES
Progress Note - Critical Care/ICU   Name: Britton Batista 59 y.o. male I MRN: [id number]  Unit/Bed#: -01 I Date of Admission: 3/22/2025   Date of Service: 3/25/2025 I Hospital Day: 3      Assessment & Plan  Cardiogenic shock (HCC)  Suspect I/S/O NICM due to methamphetamine use vs. IV cardizem bolus  No suspected source of infection, will hold on IVF resuscitation due to severely reduced EF and concern for volume overload  Lheart cath 2020 unremarkable for CAD/ischemia   POC Cardiac US 3/22 showing severely reduced EF w/ global hypokinesis  A/e/b: decreasing urine output, tachycardia, hypotension, lactic acidosis, altered mental status  Lactic cleared, ScVo2 61 (baseline), repeat improving to 64 w/ addition of milrinone  Daily lasix 40mg   Trend lactic, UOP, ScVO2  Recent Labs     03/22/25  1236 03/22/25  1537   LACTICACID 2.4* 1.7   Levophed off since 1620 on 3/22. Goal MAP > 65.  3/24/25: SVO2 73.9 percent.Improved.   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   Holding jardiance, coreg, and entresto. He will need alternate prescriptions due to cost issues. Sent in a prescription to check the prices. It costs more than 300 dollars. CM involved.   metoprolol 25 mg BID. Blood Pressure: (!) 159/107    Daily weights Weight change: -0.249 kg (-8.8 oz)   Edmondson for accurate I&O  Follow-up TTE  Cardiology following. NO plans for Cardioversion this admission. Resume diet.  Methamphetamine abuse (HCC)  Prior hx of and UDS positive on admission for methamphetamines  Primary hypertension  Hold home amlodipine  Blood Pressure: (!) 159/107    Atrial fibrillation with RVR ([facility])  Presented to [facility] on 3/21 for A-fib RVR and was recommended admission at that time, but patient refused  S/p IV Cardizem and lopressor on admission  Would not use Cardizem given reduced LVEF  Avoid amiodarone given non-compliance of AC  ICD interrogated: Confirmed A-fib  Has not been compliant with outpatient Eliquis due to cost issues.   Eliquis restarted on admission - continue  Cardiology recommended switching from Coreg to Lopressor 25 mg every 6 hours  Cardiology following, NO plans for Cardioversion this admission.. Will resume diet.   Metoprolol 12.5mg q12hrs started 3/23  Digoxin 250mcg daily [facility].  Lab Results   Component Value Date    DIGOXIN 0.9 03/24/2025      Stage 3a chronic kidney disease (CKD) ([facility])  Lab Results   Component Value Date    EGFR 55 03/25/2025    EGFR 50 03/24/2025    EGFR 49 03/23/2025    CREATININE 1.38 (H) 03/25/2025    CREATININE 1.50 (H) 03/24/2025    CREATININE 1.53 (H) 03/23/2025   Baseline Cr 1.2-1.3  3/22 AM Lasix 40mg IV, repeat dose given PM  Continue edmondson for accurate I&O  Trend BMP  Scleroderma ([facility])  Follows [facility] (last visit 9/5/2024)  Cutaneous manifestations: hyper/hypopigmentation  Digital vasculopathy (Raynaud's phenomenon)   Pulmonary manifestations: pulm HTN on prior echo PASP 42, ILD  Other emphysema ([facility])  3/22 CT chest:   Redemonstrated subpleural reticulations at the bilateral lung bases, which are nonspecific, but could be seen in setting of interstitial lung disease. Moderate centrilobular and biapical paraseptal emphysema. Redemonstrated 7 mm solid pulmonary  nodule in the left lower lobe (series 4, image 91), unchanged from prior exam. Redemonstrated 4 mm pulmonary nodule along the major fissure on the left (series 4, image 69), unchanged. No new   solid pulmonary nodules visualized.  No pulmonary edema. There is mild nonspecific bronchial wall thickening, can be seen in setting of bronchitis.    Plan:  Smokes 1 pack a day. Not in acute exacerbation.   Duo nebs and Budesonide Nebs as needed.   Cardiomyopathy (HCC)    Disposition: Critical care      Review of Invasive Devices:      Central access plan: Hemodynamic monitoring      Prophylaxis:  VTE VTE covered by:  enoxaparin, Subcutaneous, 80 mg at 03/24/25 1736  warfarin, Oral, 5 mg at 03/25/25 0627       Stress Ulcer  covered bypantoprazole (PROTONIX) 40 mg tablet [[id number]] (Long-Term Med), pantoprazole (PROTONIX) EC tablet 40 mg [[id number]]         24 Hour Events : No overnight events.   Subjective   Review of Systems: See HPI for Review of Systems    Objective :                   Vitals I/O      Most Recent Min/Max in 24hrs   Temp 97.9 °F (36.6 °C) Temp  Min: 97 °F (36.1 °C)  Max: 98.5 °F (36.9 °C)   Pulse 90 Pulse  Min: 75  Max: 110   Resp 18 Resp  Min: 15  Max: 28   BP (!) 159/107 BP  Min: 92/69  Max: 168/107   O2 Sat 98 % SpO2  Min: 96 %  Max: 100 %      Intake/Output Summary (Last 24 hours) at 3/25/2025 0736  Last data filed at 3/25/2025 0457  Gross per 24 hour   Intake 1515.8 ml   Output 3225 ml   Net -1709.2 ml       Diet Cardiovascular; Cardiac; Fluid Restriction 2000 ML    Invasive Monitoring           Physical Exam   Physical Exam  Vitals reviewed.   Eyes:      Conjunctiva/sclera: Conjunctivae normal.   HENT:      Mouth/Throat:      Mouth: Mucous membranes are dry.   Cardiovascular:      Rate and Rhythm: Normal rate and regular rhythm.      Pulses: Normal pulses.   Abdominal: General: Bowel sounds are normal.   Pulmonary:      Effort: Pulmonary effort is normal.      Breath sounds: Wheezing present.    Neurological:      Mental Status: He is alert and oriented to person, place and time.          Diagnostic Studies        Lab Results: I have reviewed the following results:     Medications:  Scheduled PRN   budesonide, 0.5 mg, Q12H  digoxin, 125 mcg, Daily  enoxaparin, 1 mg/kg, Q12H JESUS  furosemide, 40 mg, BID (diuretic)  magnesium sulfate, 2 g, Once  melatonin, 3 mg, HS  metoprolol tartrate, 25 mg, Q12H JESUS  nicotine, 1 patch, Daily  pantoprazole, 40 mg, BID  pramipexole, 0.25 mg, HS  warfarin, 5 mg, Daily (warfarin)      acetaminophen, 650 mg, Q6H PRN  gabapentin, 100 mg, TID PRN  ipratropium-albuterol, 3 mL, 4x Daily PRN  metoprolol, 2.5 mg, Q6H PRN  trimethobenzamide, 200 mg, Q6H PRN       Continuous          Labs:   CBC    Recent Labs     03/24/25 0436 03/25/25 0422   WBC 8.02 6.76   HGB 14.0 14.3   HCT 43.4 44.1    237     BMP    Recent Labs     03/24/25 0436 03/25/25 0422   SODIUM 131* 134*   K 3.7 3.6   CL 96 95*   CO2 31 33*   AGAP 4 6   BUN 29* 26*   CREATININE 1.50* 1.38*   CALCIUM 8.7 8.3*       Coags    Recent Labs     03/25/25 0422   INR 1.18        Additional Electrolytes  Recent Labs     03/24/25 0436 03/25/25 0422   MG 1.9 1.6*   PHOS 3.5  --    CAIONIZED 1.11* 1.08*          Blood Gas    No recent results  Recent Labs     03/25/25 0422   PHVEN 7.396   PJF0SMH 55.4*   PO2VEN 31.2*   ZVT9PBG 33.2*   BEVEN 6.6   C9NRAEH 53.5*    LFTs  Recent Labs     03/24/25 0436 03/25/25 0422   ALT 32 29   AST 34 28   ALKPHOS 104 105*   ALB 3.2* 3.2*   TBILI 0.78 0.57       Infectious  No recent results  Glucose  Recent Labs     03/23/25  1753 03/24/25 0436 03/25/25 0422   GLUC 106 114 95

## 2025-03-25 NOTE — PROGRESS NOTES
Progress Note - Cardiology   Name: Britton Batista 59 y.o. male I MRN: [id number]  Unit/Bed#: -01 I Date of Admission: 3/22/2025   Date of Service: 3/25/2025 I Hospital Day: 3     Assessment & Plan  Cardiogenic shock (HCC)  Critical care on board.  Secondary to acute on chronic HFrEF and rapid a fib.  Off milrinone for 24 hours.  See discussion under cardiomyopathy, CHF, and a fib      Acute on chronic combined systolic and diastolic congestive heart failure (HCC)  Wt Readings from Last 3 Encounters:   03/25/25 73.1 kg (161 lb 3.2 oz)   02/06/25 73.7 kg (162 lb 6.4 oz)   09/18/24 75.4 kg (166 lb 3.6 oz)     Acute on chronic HFrEF in the setting of known substance abuse with medication non-adherence, EF about 30% on serial echocardiograms.  Echo 3/24/25 with LVEF 25% and mod MR, TR.  Appears euvolemic on furosemide 40 mg BID.  Optimize electrolytes for K+ > 4, Mag >2  Cardiomyopathy (HCC)  Known cardiomyopathy with LVEF 30%   Follows with [facility] for evaluation and management.  LHC in 2020 ruled out CAD.  S/p AICD at [facility] in 2024  GDMT with carvedilol 6.25 mg BID and lisinopril at home.  Presents for heart failure and rapid a fib, + UDS for amphetamines  Patient with known meth use.  Echo 3/24/2025 shows LVEF 25% with severe global hypokinesis.  He admits to inconsistently taking his prescriptions and continued meth use. I urged medication adherence and complete cessation from meth.  Currently on metoprolol tartrate. Transition to carvedilol 6.25 mg BID and titrate as able.  If BP stable will resume lisinopril 2.5 mg daily and titrate as able.  Unable to afford Entresto/Jardiance.  Currently has diuresed well. See discussion under CHF.  Other emphysema (HCC)    Methamphetamine abuse (HCC)  + UDS, admits to ongoing use and declines resources when offered  Primary hypertension  Hypotensive this admission. Will monitor with resumption of GDMT  Atrial fibrillation with RVR ([facility])  History of PAF  detected on ICD. Prescribed Eliquis 5 mg BID but admits he is not taking this due to cost.  Now bridging to warfarin with goal INR 2-3. Continue Lovenox bridge until therapeutic for 24 hours  Currently in rate controlled a fib on digoxin 125 mcg daily. Recommend 3 time weekly dosing with underlying CKD.  Transition to carvedilol 6.25 mg BID this am and will titrate as needed for heart rate control.  Stage 3a chronic kidney disease (CKD) ([facility])  Lab Results   Component Value Date    EGFR 55 03/25/2025    EGFR 50 03/24/2025    EGFR 49 03/23/2025    CREATININE 1.38 (H) 03/25/2025    CREATININE 1.50 (H) 03/24/2025    CREATININE 1.53 (H) 03/23/2025     Scleroderma ([facility])      Subjective   Chief Complaint: shortness of breath, weakness   Aiden is resting in bed. He denies complaints this morning and says he wants to go home.    Objective :  Temp:  [97.7 °F (36.5 °C)-98.5 °F (36.9 °C)] 97.9 °F (36.6 °C)  HR:  [] 90  BP: ()/() 159/107  Resp:  [17-28] 18  SpO2:  [96 %-100 %] 98 %  O2 Device: None (Room air)  Nasal Cannula O2 Flow Rate (L/min):  [1 L/min] 1 L/min  Orthostatic Blood Pressures      Flowsheet Row Most Recent Value   Blood Pressure 159/107 filed at 03/25/2025 0700   Patient Position - Orthostatic VS Lying filed at 03/25/2025 0700          First Weight: Weight - Scale: 76.6 kg (168 lb 12.8 oz) (03/22/25 0538)  Vitals:    03/25/25 0438 03/25/25 0600   Weight: 73.1 kg (161 lb 3.2 oz) 73.1 kg (161 lb 3.2 oz)     Physical Exam  Vitals and nursing note reviewed.   Constitutional:       General: He is not in acute distress.     Appearance: He is well-developed.      Comments: Appears chronically ill and older than stated age   HENT:      Head: Normocephalic and atraumatic.   Eyes:      Conjunctiva/sclera: Conjunctivae normal.   Neck:      Vascular: No JVD.   Cardiovascular:      Rate and Rhythm: Normal rate. Rhythm irregular.      Heart sounds: No murmur heard.     Comments: Feet are warm and well  perfused this am  Pulmonary:      Effort: Pulmonary effort is normal. No respiratory distress.      Breath sounds: Normal breath sounds.      Comments: Breathing treatment in progress  Abdominal:      Palpations: Abdomen is soft.      Tenderness: There is no abdominal tenderness.   Musculoskeletal:         General: No swelling.      Cervical back: Neck supple.      Right lower leg: No edema.      Left lower leg: No edema.   Skin:     General: Skin is warm and dry.      Capillary Refill: Capillary refill takes less than 2 seconds.   Neurological:      Mental Status: He is alert.   Psychiatric:         Mood and Affect: Mood normal.         Lab Results: I have reviewed the following results:CBC/BMP:   .     03/25/25 0422   WBC 6.76   HGB 14.3   HCT 44.1      SODIUM 134*   K 3.6   CL 95*   CO2 33*   BUN 26*   CREATININE 1.38*   GLUC 95   CAIONIZED 1.08*   MG 1.6*      Results from last 7 days   Lab Units 03/25/25  0422 03/24/25  0436 03/23/25  0410   WBC Thousand/uL 6.76 8.02 7.81   HEMOGLOBIN g/dL 14.3 14.0 14.0   HEMATOCRIT % 44.1 43.4 42.4   PLATELETS Thousands/uL 237 230 236     Results from last 7 days   Lab Units 03/25/25  0422 03/24/25  0436 03/23/25  1753   POTASSIUM mmol/L 3.6 3.7 3.9   CHLORIDE mmol/L 95* 96 95*   CO2 mmol/L 33* 31 28   BUN mg/dL 26* 29* 29*   CREATININE mg/dL 1.38* 1.50* 1.53*   CALCIUM mg/dL 8.3* 8.7 8.8     Results from last 7 days   Lab Units 03/25/25  0422 03/22/25  0353   INR  1.18 1.13   PTT seconds  --  32     Lab Results   Component Value Date    HGBA1C 5.9 (H) 10/25/2021     Lab Results   Component Value Date    CKTOTAL 65 09/18/2024    TROPONINI 0.04 10/25/2021       Imaging Results Review: I reviewed radiology reports from this admission including: chest xray, CT chest, and Echocardiogram.  Other Study Results Review: EKG was reviewed.     Telemetry: atrial fibrillation and intermittent ventricular pacing. Rate

## 2025-03-25 NOTE — CASE MANAGEMENT
Case Management Discharge Planning Note    Patient name Britton Batista  Location /-01 MRN [id number]  : 1965 Date 3/25/2025       Current Admission Date: 3/22/2025  Current Admission Diagnosis:Cardiogenic shock ([facility])   Patient Active Problem List    Diagnosis Date Noted Date Diagnosed    Scleroderma ([facility]) 2025     Hyperkalemia 2025     Stage 3a chronic kidney disease (CKD) ([facility]) 2025     Cardiogenic shock ([facility]) 2025     Atrial fibrillation with RVR ([facility]) 2025     Syncope 2024     Chest wall pain 2024     Chronic HFrEF (heart failure with reduced ejection fraction) ([facility]) 2024     Primary hypertension 2024     Hypomagnesemia 2023     Methamphetamine abuse ([facility]) 2023     Tobacco abuse 2023     D-dimer, elevated 2023     Cardiomyopathy ([facility]) 10/09/2023     IRINEO (acute kidney injury) ([facility]) 10/08/2023     Acute on chronic combined systolic and diastolic congestive heart failure ([facility]) 10/07/2023     Hypertensive emergency 10/07/2023     Anxiety      Depression      Other emphysema ([facility])      GERD (gastroesophageal reflux disease)      Gout      Hypertensive urgency      Neuropathy      Raynaud's disease      Unilateral inguinal hernia, without obstruction or gangrene, not specified as recurrent      PSS (progressive systemic sclerosis) ([facility]) 10/25/2021     Near syncope 10/25/2021     Osteomyelitis ([facility]) 10/25/2021     Elevated troponin 10/25/2021     Acute encephalopathy 10/25/2021     Hyponatremia 10/24/2021       LOS (days): 3  Geometric Mean LOS (GMLOS) (days): 4.9  Days to GMLOS:1.5     OBJECTIVE:  Risk of Unplanned Readmission Score: 35.56         Current admission status: Inpatient   Preferred Pharmacy:   [facility] - [place], [place] - [address]  [address]  [place] [address]  Phone: [phone] Fax: [phone]    [facility]  [place] - [address] [place] [address]   [facility],  First Floor [facility] [address]  Phone: [phone] Fax: [phone]    [facility]/pharmacy #[id number] [place], [place] - [address] [place] [id number]  Phone: [phone] Fax: [phone]    Primary Care Provider: Juan Galindo DO    Primary Insurance: [employer] REP  Secondary Insurance:     DISCHARGE DETAILS:     OP CM did see Britton today.  Patient will be set up for Coumadin Management at the [facility].  PCP appointment was made with Dr. Herron on 4/1 @ 3:40pm this was placed on the AVS for patient.  DC plan remains home.

## 2025-03-25 NOTE — PROGRESS NOTES
Progress Note - Critical Care/ICU   Name: Britton Batista 59 y.o. male I MRN: [id number]  Unit/Bed#: -01 I Date of Admission: 3/22/2025   Date of Service: 3/25/2025 I Hospital Day: 3      Critical Care Interval Transfer Note:    Brief Hospital Summary: Britton Batista is a 59 y.o. male hx afib (noncompliant w/ anticoagulation), NICM (methamphetamine use), HFrEF (last echo 8/'24 LVEF 30% G1DD), current methamphetamine use, HTN, CKD 3a, scleroderma (raynaud's, ILD, hyperpigmentation) who presented 3/22 w/ SOB and was admitted w/ Afib RVR. Initially on SLIM he was managed w/ IV lopressor and cardizem. RRT x2 for tachyarrhythmia and hypotension respectively. POC cardiac US showing severely reduced EF and global hypokinesis. Upgraded to ICU on 3/22 w/ concern for cardiogenic shock (worsening mentation, decreased UOP, lactic acidosis, cool extremities, hypotension). Started levophed and milrinone. Levophed weaned off 3/22 PM and started on milrinone, Continue'd w/ lasix 40 daily. Cardiology consult placed, repeat ECHO 25 %EF, Recommend changing metoprolol tartrate to metoprolol succinate, in view of borderline blood pressure. Overnight Tele had 4-5 runs of V Tach with , as his SVO2 Improved and BP stable we d/naren Milrinone. Increased lasix 40 mg daily yo BID, Metoprolol 12.5 mg BID to 25 BID.   Discussions made with CM, pt unable to afford 200-300 dollars worth on monthly supply of entresto, jardiance and eliquis. Hence planned to bridge him with Lovenox and switch to warfarin. Starting Bridging and warfarin. Last dose was yesterday evening at 6 pm. goal 2-3. Continue Lovenox until INR is 2-3. Check my quick note.    Today morning is second dose of warfarin, awaiting repeat INR. Goal 2-3. Sister willing to drive him to [facility] for INR weekly checks. We just removed central line. Cardiology wanted to hold metoprolol and start coreg and see how his BP varies. Of note yesterday night he had 4 freq runs of V  tach, he is asymptomatic. Controlled well with metoprolol. He is back in NSR. 3/25: cardiology held his metoprolol and switched to Coreg 6.25 mg BID. He wanted to go for a walk in hospital. I just let him walk around in unit with Nursing assistance. Otherwise he doesnot req ICU care at this time. Pt is accepted to [facility] with Tele.     Hospital course discussed with Dr. Malagon     Barriers to discharge:   Warfarin Bridging.      Consults: IP CONSULT TO CASE MANAGEMENT  IP CONSULT TO CARDIOLOGY    Recommended to review admission imaging for incidental findings and document in discharge navigator: Chart reviewed, no known incidental findings noted at this time.      Discharge Plan: Anticipate discharge in 48-72 hrs to TBD      Patient seen and evaluated by Critical Care today and deemed to be appropriate for transfer to [facility] with Telemetry. Spoke to Dr. Malagon  from [facility]  to accept transfer. Critical care can be contacted via SecureChat with any questions or concerns. Please use the Critical Care AP Role in Secure Chat for any provider inquires until the patient is transferred out of the ICU or until tomorrow at 0600.

## 2025-03-25 NOTE — PLAN OF CARE
Problem: CARDIOVASCULAR - ADULT  Goal: Maintains optimal cardiac output and hemodynamic stability  Description: INTERVENTIONS:- Monitor I/O, vital signs and rhythm- Monitor for S/S and trends of decreased cardiac output- Administer and titrate ordered vasoactive medications to optimize hemodynamic stability- Assess quality of pulses, skin color and temperature- Assess for signs of decreased coronary artery perfusion- Instruct patient to report change in severity of symptoms monitor for chest pain , BLACK, SOB palpatations  Outcome: Progressing  Goal: Absence of cardiac dysrhythmias or at baseline rhythm  Description: INTERVENTIONS:- Continuous cardiac monitoring, vital signs, obtain 12 lead EKG if ordered- Administer antiarrhythmic and heart rate control medications as ordered- Monitor electrolytes and administer replacement therapy as ordered  Outcome: Progressing     Problem: PAIN - ADULT  Goal: Verbalizes/displays adequate comfort level or baseline comfort level  Description: Interventions:- Encourage patient to monitor pain and request assistance- Assess pain using appropriate pain scale- Administer analgesics based on type and severity of pain and evaluate response- Implement non-pharmacological measures as appropriate and evaluate response- Consider cultural and social influences on pain and pain management- Notify physician/advanced practitioner if interventions unsuccessful or patient reports new pain  Outcome: Progressing     Problem: INFECTION - ADULT  Goal: Absence or prevention of progression during hospitalization  Description: INTERVENTIONS:- Assess and monitor for signs and symptoms of infection- Monitor lab/diagnostic results- Monitor all insertion sites, i.e. indwelling lines, tubes, and drains- Monitor endotracheal if appropriate and nasal secretions for changes in amount and color- [place] appropriate cooling/warming therapies per order- Administer medications as ordered- Instruct and encourage  patient and family to use good hand hygiene technique- Identify and instruct in appropriate isolation precautions for identified infection/condition  Outcome: Progressing  Goal: Absence of fever/infection during neutropenic period  Description: INTERVENTIONS:- Monitor WBC  Outcome: Progressing     Problem: SAFETY ADULT  Goal: Patient will remain free of falls  Description: INTERVENTIONS:- Educate patient/family on patient safety including physical limitations- Instruct patient to call for assistance with activity - Consult OT/PT to assist with strengthening/mobility - Keep Call bell within reach- Keep bed low and locked with side rails adjusted as appropriate- Keep care items and personal belongings within reach- Initiate and maintain comfort rounds- Make Fall Risk Sign visible to staff- Offer Toileting every 2 Hours, in advance of need- Initiate/Maintain bed/chair alarm - Apply yellow socks and bracelet for high fall risk patients- Consider moving patient to room near nurses station  Outcome: Progressing  Goal: Maintain or return to baseline ADL function  Description: INTERVENTIONS:-  Assess patient's ability to carry out ADLs; assess patient's baseline for ADL function and identify physical deficits which impact ability to perform ADLs (bathing, care of mouth/teeth, toileting, grooming, dressing, etc.)- Assess/evaluate cause of self-care deficits - Assess range of motion- Assess patient's mobility; develop plan if impaired- Assess patient's need for assistive devices and provide as appropriate- Encourage maximum independence but intervene and supervise when necessary- Involve family in performance of ADLs- Assess for home care needs following discharge - Consider OT consult to assist with ADL evaluation and planning for discharge- Provide patient education as appropriate  Outcome: Progressing  Goal: Maintains/Returns to pre admission functional level  Description: INTERVENTIONS:- Perform AM-PAC 6 Click Basic  Mobility/ Daily Activity assessment daily.- Set and communicate daily mobility goal to care team and patient/family/caregiver. - Collaborate with rehabilitation services on mobility goals if consulted- Perform Range of Motion 3 times a day.- Reposition patient every 2 hours if unable to reposition self.- Dangle patient 3 times a day- Stand patient 3 times a day- Ambulate patient 3 times a day- Out of bed to chair 2 times a day -  Out of bed for toileting- Record patient progress and toleration of activity level   Outcome: Progressing

## 2025-03-25 NOTE — ASSESSMENT & PLAN NOTE
Lab Results   Component Value Date    EGFR 55 03/25/2025    EGFR 50 03/24/2025    EGFR 49 03/23/2025    CREATININE 1.38 (H) 03/25/2025    CREATININE 1.50 (H) 03/24/2025    CREATININE 1.53 (H) 03/23/2025   Baseline Cr 1.2-1.3  3/22 AM Lasix 40mg IV, repeat dose given PM  Continue edmondson for accurate I&O  Trend BMP

## 2025-03-25 NOTE — ASSESSMENT & PLAN NOTE
Wt Readings from Last 3 Encounters:   03/25/25 73.1 kg (161 lb 3.2 oz)   02/06/25 73.7 kg (162 lb 6.4 oz)   09/18/24 75.4 kg (166 lb 3.6 oz)     NICM due to methamphetamine use  Last echo 8/2024: LVEF 30%, grade 1 diastolic dysfunction  Lheart cath 2020 showing no ischemia or CAD  Home GDTM: Jardiance, coreg, lasix, entresto  Point-of-care cardiac ultrasound 3/22 showing severely reduced EF, global hypokinesis  Troponin fluctuating, EKG appear non-ischemic  Continue Lasix 40mg daily I/O last 3 completed shifts:  In: 2045.8 [P.O.:1800; I.V.:45.8; IV Piggyback:200]  Out: 3925 [Urine:3925]  No intake/output data recorded.   Holding jardiance, coreg, and entresto. He will need alternate prescriptions due to cost issues. Sent in a prescription to check the prices. It costs more than 300 dollars. CM involved.   metoprolol 25 mg BID. Blood Pressure: (!) 159/107   Daily weights Weight change: -0.249 kg (-8.8 oz)   [place] for accurate I&O  Follow-up TTE  Cardiology following. NO plans for Cardioversion this admission. Resume diet.

## 2025-03-26 LAB
ANION GAP SERPL CALCULATED.3IONS-SCNC: 6 MMOL/L (ref 4–13)
ANION GAP SERPL CALCULATED.3IONS-SCNC: 7 MMOL/L (ref 4–13)
BUN SERPL-MCNC: 26 MG/DL (ref 5–25)
BUN SERPL-MCNC: 30 MG/DL (ref 5–25)
CA-I BLD-SCNC: 1.07 MMOL/L (ref 1.12–1.32)
CALCIUM SERPL-MCNC: 8.8 MG/DL (ref 8.4–10.2)
CALCIUM SERPL-MCNC: 9 MG/DL (ref 8.4–10.2)
CHLORIDE SERPL-SCNC: 93 MMOL/L (ref 96–108)
CHLORIDE SERPL-SCNC: 95 MMOL/L (ref 96–108)
CO2 SERPL-SCNC: 30 MMOL/L (ref 21–32)
CO2 SERPL-SCNC: 32 MMOL/L (ref 21–32)
CORTIS AM PEAK SERPL-MCNC: 9.3 UG/DL (ref 6.7–22.6)
CREAT SERPL-MCNC: 1.4 MG/DL (ref 0.6–1.3)
CREAT SERPL-MCNC: 1.41 MG/DL (ref 0.6–1.3)
ERYTHROCYTE [DISTWIDTH] IN BLOOD BY AUTOMATED COUNT: 15.7 % (ref 11.6–15.1)
GFR SERPL CREATININE-BSD FRML MDRD: 54 ML/MIN/1.73SQ M
GFR SERPL CREATININE-BSD FRML MDRD: 54 ML/MIN/1.73SQ M
GLUCOSE SERPL-MCNC: 120 MG/DL (ref 65–140)
GLUCOSE SERPL-MCNC: 84 MG/DL (ref 65–140)
HCT VFR BLD AUTO: 45 % (ref 36.5–49.3)
HGB BLD-MCNC: 14.5 G/DL (ref 12–17)
INR PPP: 1.61 (ref 0.85–1.19)
MAGNESIUM SERPL-MCNC: 1.6 MG/DL (ref 1.9–2.7)
MAGNESIUM SERPL-MCNC: 2 MG/DL (ref 1.9–2.7)
MCH RBC QN AUTO: 28.9 PG (ref 26.8–34.3)
MCHC RBC AUTO-ENTMCNC: 32.2 G/DL (ref 31.4–37.4)
MCV RBC AUTO: 90 FL (ref 82–98)
PHOSPHATE SERPL-MCNC: 3.8 MG/DL (ref 2.7–4.5)
PLATELET # BLD AUTO: 261 THOUSANDS/UL (ref 149–390)
PMV BLD AUTO: 9.2 FL (ref 8.9–12.7)
POTASSIUM SERPL-SCNC: 3.9 MMOL/L (ref 3.5–5.3)
POTASSIUM SERPL-SCNC: 4.1 MMOL/L (ref 3.5–5.3)
PROTHROMBIN TIME: 19.4 SECONDS (ref 12.3–15)
RBC # BLD AUTO: 5.02 MILLION/UL (ref 3.88–5.62)
SODIUM SERPL-SCNC: 131 MMOL/L (ref 135–147)
SODIUM SERPL-SCNC: 132 MMOL/L (ref 135–147)
WBC # BLD AUTO: 6.29 THOUSAND/UL (ref 4.31–10.16)

## 2025-03-26 PROCEDURE — 83735 ASSAY OF MAGNESIUM: CPT

## 2025-03-26 PROCEDURE — 84100 ASSAY OF PHOSPHORUS: CPT

## 2025-03-26 PROCEDURE — 94640 AIRWAY INHALATION TREATMENT: CPT

## 2025-03-26 PROCEDURE — 99232 SBSQ HOSP IP/OBS MODERATE 35: CPT

## 2025-03-26 PROCEDURE — 82533 TOTAL CORTISOL: CPT

## 2025-03-26 PROCEDURE — 80048 BASIC METABOLIC PNL TOTAL CA: CPT

## 2025-03-26 PROCEDURE — 82330 ASSAY OF CALCIUM: CPT

## 2025-03-26 PROCEDURE — 99232 SBSQ HOSP IP/OBS MODERATE 35: CPT | Performed by: INTERNAL MEDICINE

## 2025-03-26 PROCEDURE — 94760 N-INVAS EAR/PLS OXIMETRY 1: CPT

## 2025-03-26 PROCEDURE — 85610 PROTHROMBIN TIME: CPT

## 2025-03-26 PROCEDURE — 85027 COMPLETE CBC AUTOMATED: CPT

## 2025-03-26 RX ORDER — SPIRONOLACTONE 25 MG/1
12.5 TABLET ORAL DAILY
Status: DISCONTINUED | OUTPATIENT
Start: 2025-03-26 | End: 2025-03-28 | Stop reason: HOSPADM

## 2025-03-26 RX ORDER — HYDROXYZINE HYDROCHLORIDE 25 MG/1
25 TABLET, FILM COATED ORAL EVERY 6 HOURS PRN
Status: DISCONTINUED | OUTPATIENT
Start: 2025-03-26 | End: 2025-03-27

## 2025-03-26 RX ORDER — MAGNESIUM SULFATE HEPTAHYDRATE 40 MG/ML
2 INJECTION, SOLUTION INTRAVENOUS ONCE
Status: COMPLETED | OUTPATIENT
Start: 2025-03-26 | End: 2025-03-26

## 2025-03-26 RX ORDER — FUROSEMIDE 40 MG/1
40 TABLET ORAL DAILY
Status: DISCONTINUED | OUTPATIENT
Start: 2025-03-27 | End: 2025-03-28 | Stop reason: HOSPADM

## 2025-03-26 RX ORDER — LANOLIN ALCOHOL/MO/W.PET/CERES
400 CREAM (GRAM) TOPICAL DAILY
Status: DISCONTINUED | OUTPATIENT
Start: 2025-03-26 | End: 2025-03-28 | Stop reason: HOSPADM

## 2025-03-26 RX ORDER — LISINOPRIL 2.5 MG/1
2.5 TABLET ORAL DAILY
Status: DISCONTINUED | OUTPATIENT
Start: 2025-03-26 | End: 2025-03-28 | Stop reason: HOSPADM

## 2025-03-26 RX ADMIN — BUDESONIDE INHALATION 0.5 MG: 0.5 SUSPENSION RESPIRATORY (INHALATION) at 08:31

## 2025-03-26 RX ADMIN — CARVEDILOL 12.5 MG: 12.5 TABLET, FILM COATED ORAL at 08:03

## 2025-03-26 RX ADMIN — HYDROXYZINE HYDROCHLORIDE 25 MG: 25 TABLET ORAL at 11:16

## 2025-03-26 RX ADMIN — PANTOPRAZOLE SODIUM 40 MG: 40 TABLET, DELAYED RELEASE ORAL at 20:26

## 2025-03-26 RX ADMIN — DIGOXIN 125 MCG: 125 TABLET ORAL at 08:03

## 2025-03-26 RX ADMIN — PANTOPRAZOLE SODIUM 40 MG: 40 TABLET, DELAYED RELEASE ORAL at 08:03

## 2025-03-26 RX ADMIN — Medication 3 MG: at 22:00

## 2025-03-26 RX ADMIN — ENOXAPARIN SODIUM 80 MG: 80 INJECTION SUBCUTANEOUS at 08:03

## 2025-03-26 RX ADMIN — CARVEDILOL 12.5 MG: 12.5 TABLET, FILM COATED ORAL at 17:05

## 2025-03-26 RX ADMIN — ENOXAPARIN SODIUM 80 MG: 80 INJECTION SUBCUTANEOUS at 20:26

## 2025-03-26 RX ADMIN — MAGNESIUM SULFATE HEPTAHYDRATE 2 G: 40 INJECTION, SOLUTION INTRAVENOUS at 07:58

## 2025-03-26 RX ADMIN — IPRATROPIUM BROMIDE AND ALBUTEROL SULFATE 3 ML: 2.5; .5 SOLUTION RESPIRATORY (INHALATION) at 13:52

## 2025-03-26 RX ADMIN — MAGNESIUM OXIDE TAB 400 MG (241.3 MG ELEMENTAL MG) 400 MG: 400 (241.3 MG) TAB at 12:19

## 2025-03-26 RX ADMIN — NICOTINE 1 PATCH: 21 PATCH, EXTENDED RELEASE TRANSDERMAL at 08:03

## 2025-03-26 RX ADMIN — SPIRONOLACTONE 12.5 MG: 25 TABLET ORAL at 17:05

## 2025-03-26 RX ADMIN — WARFARIN SODIUM 5 MG: 5 TABLET ORAL at 17:05

## 2025-03-26 RX ADMIN — IPRATROPIUM BROMIDE AND ALBUTEROL SULFATE 3 ML: 2.5; .5 SOLUTION RESPIRATORY (INHALATION) at 20:31

## 2025-03-26 RX ADMIN — LISINOPRIL 2.5 MG: 2.5 TABLET ORAL at 12:19

## 2025-03-26 RX ADMIN — PRAMIPEXOLE DIHYDROCHLORIDE 0.25 MG: 0.5 TABLET ORAL at 22:00

## 2025-03-26 RX ADMIN — TRIMETHOBENZAMIDE HYDROCHLORIDE 200 MG: 100 INJECTION INTRAMUSCULAR at 20:28

## 2025-03-26 RX ADMIN — IPRATROPIUM BROMIDE AND ALBUTEROL SULFATE 3 ML: 2.5; .5 SOLUTION RESPIRATORY (INHALATION) at 08:31

## 2025-03-26 RX ADMIN — FUROSEMIDE 40 MG: 40 TABLET ORAL at 08:03

## 2025-03-26 RX ADMIN — BUDESONIDE INHALATION 0.5 MG: 0.5 SUSPENSION RESPIRATORY (INHALATION) at 20:31

## 2025-03-26 NOTE — RESPIRATORY THERAPY NOTE
03/26/25 1352   Inhalation Therapy Tx   Resp Comments Stable on RA  no distress clear BS bilaterally

## 2025-03-26 NOTE — ASSESSMENT & PLAN NOTE
Wt Readings from Last 3 Encounters:   03/26/25 72.7 kg (160 lb 3.2 oz)   02/06/25 73.7 kg (162 lb 6.4 oz)   09/18/24 75.4 kg (166 lb 3.6 oz)     Secondary to nonischemic cardiomyopathy due to methamphetamine use and nonadherence to medication regimen  Patient noncompliant with home GDMT secondary to financial issues: Was prescribed Jardiance, Coreg, Lasix and Entresto -unable to afford Jardiance and Entresto  Cardiology following  Monitor daily weights/intake and output  TTE with LVEF 25%  Current GDMT: Changed to Lasix 40 mg once daily, continue Coreg 12.5 mg twice daily, add lisinopril 2.5 mg and spironolactone 12.5 mg today

## 2025-03-26 NOTE — ASSESSMENT & PLAN NOTE
S/p IV Cardizem and Lopressor on admission  Avoid further Cardizem with reduced LVEF  Avoid amiodarone given noncompliance with AC  Had rapid response thought to possible V-tach-ICD interrogated: Confirmed A-fib no vtach  Initially endorsed compliance with Eliquis-confirmed has not been taking due to cost  Rate control switch to Coreg 12.5 mg twice daily, digoxin  Cardiology following  Being bridged to Coumadin -cardiology recommends continued Lovenox bridge until therapeutic x 24 hours  Lab Results   Component Value Date    INR 1.61 (H) 03/26/2025    INR 1.21 (H) 03/25/2025    INR 1.18 03/25/2025    INR 1.13 03/22/2025

## 2025-03-26 NOTE — ASSESSMENT & PLAN NOTE
Lab Results   Component Value Date    EGFR 54 03/26/2025    EGFR 63 03/25/2025    EGFR 55 03/25/2025    CREATININE 1.41 (H) 03/26/2025    CREATININE 1.23 03/25/2025    CREATININE 1.38 (H) 03/25/2025   Baseline around 1.3  Continue to trend daily bmp  Avoid nephrotoxic agents and hypotension

## 2025-03-26 NOTE — ASSESSMENT & PLAN NOTE
Resolved.  Secondary to acute on chronic HFrEF and rapid a fib.  See discussion under cardiomyopathy, CHF, and a fib       Tenisha Portillo

## 2025-03-26 NOTE — ASSESSMENT & PLAN NOTE
Secondary to acute on chronic HFrEF and rapid A-fib  No suspected source of infection -hold IVF resuscitation with severely reduced EF and concern for volume overload  POC cardiac US 3/22 -severely reduced EF with global hypokinesis  Lactic acid initially elevated-cleared  Milrinone/Levophed weaned off ICU  TTE 3/24 -EF 25%  Cardiology following  Cardiogenic shock resolved

## 2025-03-26 NOTE — PLAN OF CARE
Problem: CARDIOVASCULAR - ADULT  Goal: Maintains optimal cardiac output and hemodynamic stability  Description: INTERVENTIONS:- Monitor I/O, vital signs and rhythm- Monitor for S/S and trends of decreased cardiac output- Administer and titrate ordered vasoactive medications to optimize hemodynamic stability- Assess quality of pulses, skin color and temperature- Assess for signs of decreased coronary artery perfusion- Instruct patient to report change in severity of symptoms monitor for chest pain , BLACK, SOB palpatations  Outcome: Progressing  Goal: Absence of cardiac dysrhythmias or at baseline rhythm  Description: INTERVENTIONS:- Continuous cardiac monitoring, vital signs, obtain 12 lead EKG if ordered- Administer antiarrhythmic and heart rate control medications as ordered- Monitor electrolytes and administer replacement therapy as ordered  Outcome: Progressing     Problem: PAIN - ADULT  Goal: Verbalizes/displays adequate comfort level or baseline comfort level  Description: Interventions:- Encourage patient to monitor pain and request assistance- Assess pain using appropriate pain scale- Administer analgesics based on type and severity of pain and evaluate response- Implement non-pharmacological measures as appropriate and evaluate response- Consider cultural and social influences on pain and pain management- Notify physician/advanced practitioner if interventions unsuccessful or patient reports new pain  Outcome: Progressing     Problem: INFECTION - ADULT  Goal: Absence or prevention of progression during hospitalization  Description: INTERVENTIONS:- Assess and monitor for signs and symptoms of infection- Monitor lab/diagnostic results- Monitor all insertion sites, i.e. indwelling lines, tubes, and drains- Monitor endotracheal if appropriate and nasal secretions for changes in amount and color- [place] appropriate cooling/warming therapies per order- Administer medications as ordered- Instruct and encourage  patient and family to use good hand hygiene technique- Identify and instruct in appropriate isolation precautions for identified infection/condition  Outcome: Progressing  Goal: Absence of fever/infection during neutropenic period  Description: INTERVENTIONS:- Monitor WBC  Outcome: Progressing     Problem: SAFETY ADULT  Goal: Patient will remain free of falls  Description: INTERVENTIONS:- Educate patient/family on patient safety including physical limitations- Instruct patient to call for assistance with activity - Consult OT/PT to assist with strengthening/mobility - Keep Call bell within reach- Keep bed low and locked with side rails adjusted as appropriate- Keep care items and personal belongings within reach- Initiate and maintain comfort rounds- Make Fall Risk Sign visible to staff- Offer Toileting every 2 Hours, in advance of need- Initiate/Maintain bed/chair alarm - Apply yellow socks and bracelet for high fall risk patients- Consider moving patient to room near nurses station  Outcome: Progressing  Goal: Maintain or return to baseline ADL function  Description: INTERVENTIONS:-  Assess patient's ability to carry out ADLs; assess patient's baseline for ADL function and identify physical deficits which impact ability to perform ADLs (bathing, care of mouth/teeth, toileting, grooming, dressing, etc.)- Assess/evaluate cause of self-care deficits - Assess range of motion- Assess patient's mobility; develop plan if impaired- Assess patient's need for assistive devices and provide as appropriate- Encourage maximum independence but intervene and supervise when necessary- Involve family in performance of ADLs- Assess for home care needs following discharge - Consider OT consult to assist with ADL evaluation and planning for discharge- Provide patient education as appropriate  Outcome: Progressing  Goal: Maintains/Returns to pre admission functional level  Description: INTERVENTIONS:- Perform AM-PAC 6 Click Basic  Mobility/ Daily Activity assessment daily.- Set and communicate daily mobility goal to care team and patient/family/caregiver. - Collaborate with rehabilitation services on mobility goals if consulted- Perform Range of Motion 3 times a day.- Reposition patient every 2 hours if unable to reposition self.- Dangle patient 3 times a day- Stand patient 3 times a day- Ambulate patient 3 times a day- Out of bed to chair 2 times a day -  Out of bed for toileting- Record patient progress and toleration of activity level   Outcome: Progressing     Problem: DISCHARGE PLANNING  Goal: Discharge to home or other facility with appropriate resources  Description: INTERVENTIONS:- Identify barriers to discharge w/patient and caregiver- Arrange for needed discharge resources and transportation as appropriate- Identify discharge learning needs (meds, wound care, etc.)- Arrange for interpretive services to assist at discharge as needed- Refer to Case Management Department for coordinating discharge planning if the patient needs post-hospital services based on physician/advanced practitioner order or complex needs related to functional status, cognitive ability, or social support system  Outcome: Progressing     Problem: Knowledge Deficit  Goal: Patient/family/caregiver demonstrates understanding of disease process, treatment plan, medications, and discharge instructions  Description: Complete learning assessment and assess knowledge base.Interventions:- Provide teaching at level of understanding- Provide teaching via preferred learning methods  Outcome: Progressing     Problem: Prexisting or High Potential for Compromised Skin Integrity  Goal: Skin integrity is maintained or improved  Description: INTERVENTIONS:- Identify patients at risk for skin breakdown- Assess and monitor skin integrity- Assess and monitor nutrition and hydration status- Monitor labs - Assess for incontinence - Turn and reposition patient- Assist with  mobility/ambulation- Relieve pressure over bony prominences- Avoid friction and shearing- Provide appropriate hygiene as needed including keeping skin clean and dry- Evaluate need for skin moisturizer/barrier cream- Collaborate with interdisciplinary team - Patient/family teaching- Consider wound care consult   Outcome: Progressing

## 2025-03-26 NOTE — ASSESSMENT & PLAN NOTE
Managed on amlodipine and losartan  Current inpatient regimen of Lasix 40 mg twice daily and Coreg 12.5 mg twice daily  Changed to Lasix 40 mg once daily, continue Coreg 12.5 mg twice daily, add lisinopril 2.5 mg and spironolactone 12.5 mg today  Monitor blood pressure

## 2025-03-26 NOTE — ASSESSMENT & PLAN NOTE
Presented to [facility] on 3/21 for A-fib RVR and was recommended admission at that time, but patient refused and stated that he wanted to be closer to home.  Complains of shortness of breath at home  Telemetry with A-fib RVR 140s  Questionable V. tach on telemetry.  Rapid response was called at time of admission for this.  Likely all underlying A-fib per cardiology.  Was given IV Cardizem and Lopressor  ICD interrogated which only observed A-fib  TSH elevated, t4 pending   Has not been taking eliquis - was not able to afford it   Cardiology recommending switch from coreg to Lopressor 25 mg every 6 hours and titrate  Monitor on telemetry  N.p.o. for Monday morning for cardioversion per cardiology recommendations  As needed Lopressor for AF RVR

## 2025-03-26 NOTE — ASSESSMENT & PLAN NOTE
History of PAF detected on ICD. Prescribed Eliquis 5 mg BID but admits he is not taking this due to cost.  Now bridging to warfarin with goal INR 2-3. Continue Lovenox bridge until therapeutic for 24 hours  Currently in rate controlled a fib on digoxin 125 mcg daily. Recommend 3 time weekly dosing with underlying CKD.  Now on carvedilol 12.5 mg BID. Will titrate as able for heart rate control.

## 2025-03-26 NOTE — ASSESSMENT & PLAN NOTE
Known cardiomyopathy with LVEF 30%   Follows with [facility] for evaluation and management.  LHC in 2020 ruled out CAD.  S/p AICD at [facility] in 2024  GDMT with carvedilol 6.25 mg BID and lisinopril at home.  Presents for heart failure and rapid a fib, + UDS for amphetamines  Patient with known meth use.  Echo 3/24/2025 shows LVEF 25% with severe global hypokinesis.  He admits to inconsistently taking his prescriptions and continued meth use. I urged medication adherence and complete cessation from meth.  Transitioned to carvedilol 6.25 mg BID, now increased to 12.5 mg BID.  Resume lisinopril 2.5 mg daily and if BP stable add spironolactone 12.5 mg daily.  Unable to afford Entresto/Jardiance.  Currently has diuresed well. See discussion under CHF.

## 2025-03-26 NOTE — ASSESSMENT & PLAN NOTE
Wt Readings from Last 3 Encounters:   03/26/25 72.7 kg (160 lb 3.2 oz)   02/06/25 73.7 kg (162 lb 6.4 oz)   09/18/24 75.4 kg (166 lb 3.6 oz)     Poor compliance.  Managed at home on Entresto, furosemide, Jardiance, losartan and carvedilol  Holding jardiance, entresto and losartan at this time while adjusting medications for a fib. Will resume when able   BNP 2870 on admission  CT chest with marked cardiomegaly, no pulmonary edema.  Possible third spacing in the abdomen  CHF likely driving a fib  Will check venous duplex to rule out DVT since patient was not taking AC   Hold home Lasix, start Lasix 40 mg IV twice daily  Monitor daily weights, intake and output

## 2025-03-26 NOTE — PROGRESS NOTES
Progress Note - Hospitalist   Name: Britton Batista 59 y.o. male I MRN: [id number]  Unit/Bed#: -01 I Date of Admission: 3/22/2025   Date of Service: 3/26/2025 I Hospital Day: 4    Assessment & Plan  Cardiogenic shock (HCC)  Secondary to acute on chronic HFrEF and rapid A-fib  No suspected source of infection -hold IVF resuscitation with severely reduced EF and concern for volume overload  POC cardiac US 3/22 -severely reduced EF with global hypokinesis  Lactic acid initially elevated-cleared  Milrinone/Levophed weaned off ICU  TTE 3/24 -EF 25%  Cardiology following  Cardiogenic shock resolved  Atrial fibrillation with RVR ([facility])  S/p IV Cardizem and Lopressor on admission  Avoid further Cardizem with reduced LVEF  Avoid amiodarone given noncompliance with AC  Had rapid response thought to possible V-tach-ICD interrogated: Confirmed A-fib no vtach  Initially endorsed compliance with Eliquis-confirmed has not been taking due to cost  Rate control switch to Coreg 12.5 mg twice daily, digoxin  Cardiology following  Being bridged to Coumadin -cardiology recommends continued Lovenox bridge until therapeutic x 24 hours  Lab Results   Component Value Date    INR 1.61 (H) 03/26/2025    INR 1.21 (H) 03/25/2025    INR 1.18 03/25/2025    INR 1.13 03/22/2025     Acute on chronic combined systolic and diastolic congestive heart failure (HCC)  Wt Readings from Last 3 Encounters:   03/26/25 72.7 kg (160 lb 3.2 oz)   02/06/25 73.7 kg (162 lb 6.4 oz)   09/18/24 75.4 kg (166 lb 3.6 oz)     Secondary to nonischemic cardiomyopathy due to methamphetamine use and nonadherence to medication regimen  Patient noncompliant with home GDMT secondary to financial issues: Was prescribed Jardiance, Coreg, Lasix and Entresto -unable to afford Jardiance and Entresto  Cardiology following  Monitor daily weights/intake and output  TTE with LVEF 25%  Current GDMT: Changed to Lasix 40 mg once daily, continue Coreg 12.5 mg twice daily, add  lisinopril 2.5 mg and spironolactone 12.5 mg today  Methamphetamine abuse ([facility])  UDS + for methamphetamines and THC  Patient declining methamphetamine use   Primary hypertension  Managed on amlodipine and losartan  Current inpatient regimen of Lasix 40 mg twice daily and Coreg 12.5 mg twice daily  Changed to Lasix 40 mg once daily, continue Coreg 12.5 mg twice daily, add lisinopril 2.5 mg and spironolactone 12.5 mg today  Monitor blood pressure  Stage 3a chronic kidney disease (CKD) ([facility])  Lab Results   Component Value Date    EGFR 54 03/26/2025    EGFR 63 03/25/2025    EGFR 55 03/25/2025    CREATININE 1.41 (H) 03/26/2025    CREATININE 1.23 03/25/2025    CREATININE 1.38 (H) 03/25/2025   Baseline around 1.3  Continue to trend daily bmp  Avoid nephrotoxic agents and hypotension   Recheck BMP and magnesium this afternoon with changes to medications  Other emphysema ([facility])  Smokes 1 pack a day  Not in acute exacerbation  DuoNebs and budesonide nebs     Scleroderma ([facility])  Follows with [facility] rheumatology  Cutaneous manifestations: hyper/hypopigmentation  Digital vasculopathy (Raynaud's phenomenon)   Pulmonary manifestations: pulm HTN on prior echo PASP 42, ILD    VTE Pharmacologic Prophylaxis:   Moderate Risk (Score 3-4) - Pharmacological DVT Prophylaxis Ordered: enoxaparin (Lovenox).    Mobility:   Basic Mobility Inpatient Raw Score: 22  JH-HLM Goal: 7: Walk 25 feet or more  JH-HLM Achieved: 8: Walk 250 feet ot more  JH-HLM Goal achieved. Continue to encourage appropriate mobility.    Patient Centered Rounds: I performed bedside rounds with nursing staff today.   Discussions with Specialists or Other Care Team Provider: CM, cardiology     Education and Discussions with Family / Patient: Patient    Current Length of Stay: 4 day(s)  Current Patient Status: Inpatient   Certification Statement: The patient will continue to require additional inpatient hospital stay due to monitoring INR and blood pressure and response to  titration of medication  Discharge Plan: Anticipate discharge in 48 hrs to home.    Code Status: Level 1 - Full Code    Subjective   Patient seen this morning.  Initially he was resting in bed, woken by voice.  Stating that he is feeling fine, no chest pain shortness of breath or palpitations.  Felt fine when ambulating in the hallways yesterday.  Went and told that he needs to remain in the hospital for medication management and monitoring his INR until therapeutic patient became acutely agitated getting out of bed asking for someone to come take his IVs out.  Patient helps to the bathroom and then proceeded to rip off his telemetry leads and throw his water cup onto the table smashing it.  Exam unable to be completed due to patient's acute agitation.     Objective :  Temp:  [98 °F (36.7 °C)-98.3 °F (36.8 °C)] 98.1 °F (36.7 °C)  HR:  [] 110  BP: (112-136)/(77-91) 112/83  Resp:  [18-20] 18  SpO2:  [95 %-98 %] 98 %  O2 Device: None (Room air)    Body mass index is 23.66 kg/m².     Input and Output Summary (last 24 hours):     Intake/Output Summary (Last 24 hours) at 3/26/2025 0836  Last data filed at 3/26/2025 0701  Gross per 24 hour   Intake 1693.33 ml   Output 3400 ml   Net -1706.67 ml       Physical Exam  Vitals and nursing note reviewed.   Constitutional:       General: He is not in acute distress.     Appearance: Normal appearance. He is not ill-appearing.   HENT:      Head: Normocephalic and atraumatic.      Nose: No congestion.      Mouth/Throat:      Mouth: Mucous membranes are moist.   Eyes:      Conjunctiva/sclera: Conjunctivae normal.   Cardiovascular:      Rate and Rhythm: Normal rate. Rhythm irregular.      Comments: Rate viewed on telemetry prior to leads being removed  Pulmonary:      Effort: Pulmonary effort is normal. No respiratory distress.   Musculoskeletal:      Right lower leg: No edema.      Left lower leg: No edema.      Comments: Patient visualized ambulating without abnormal gait    Skin:     General: Skin is warm and dry.   Neurological:      Mental Status: He is alert and oriented to person, place, and time.   Psychiatric:         Mood and Affect: Mood normal. Affect is blunt and angry.         Behavior: Behavior is uncooperative and agitated.           Lines/Drains:        Telemetry:  Telemetry Orders (From admission, onward)               24 Hour Telemetry Monitoring  Continuous x 24 Hours (Telem)        Expiring   Question:  Reason for 24 Hour Telemetry  Answer:  Arrhythmias requiring acute medical intervention / PPM or ICD malfunction                     Telemetry Reviewed:  A-fib with left bundle branch block and intermittent V pacing  Indication for Continued Telemetry Use: Arrthymias requiring medical therapy               Lab Results: I have reviewed the following results:   Results from last 7 days   Lab Units 03/26/25  0541 03/23/25  0410 03/22/25  0601   WBC Thousand/uL 6.29   < > 8.71   HEMOGLOBIN g/dL 14.5   < > 15.3   HEMATOCRIT % 45.0   < > 47.9   PLATELETS Thousands/uL 261   < > 242   SEGS PCT %  --   --  70   LYMPHO PCT %  --   --  15   MONO PCT %  --   --  12   EOS PCT %  --   --  2    < > = values in this interval not displayed.     Results from last 7 days   Lab Units 03/26/25  0541 03/25/25  1156 03/25/25  0422   SODIUM mmol/L 132*   < > 134*   POTASSIUM mmol/L 3.9   < > 3.6   CHLORIDE mmol/L 95*   < > 95*   CO2 mmol/L 30   < > 33*   BUN mg/dL 30*   < > 26*   CREATININE mg/dL 1.41*   < > 1.38*   ANION GAP mmol/L 7   < > 6   CALCIUM mg/dL 8.8   < > 8.3*   ALBUMIN g/dL  --   --  3.2*   TOTAL BILIRUBIN mg/dL  --   --  0.57   ALK PHOS U/L  --   --  105*   ALT U/L  --   --  29   AST U/L  --   --  28   GLUCOSE RANDOM mg/dL 120   < > 95    < > = values in this interval not displayed.     Results from last 7 days   Lab Units 03/26/25  0541   INR  1.61*     Results from last 7 days   Lab Units 03/22/25  1203 03/22/25  0803 03/22/25  0650   POC GLUCOSE mg/dl 102 134 100          Results from last 7 days   Lab Units 03/22/25  1537 03/22/25  1236 03/22/25  0353   LACTIC ACID mmol/L 1.7 2.4* 1.8   PROCALCITONIN ng/ml  --   --  0.08       Recent Cultures (last 7 days):               Last 24 Hours Medication List:     Current Facility-Administered Medications:     acetaminophen (TYLENOL) tablet 650 mg, Q6H PRN    budesonide (PULMICORT) inhalation solution 0.5 mg, Q12H    carvedilol (COREG) tablet 12.5 mg, BID With Meals    digoxin (LANOXIN) tablet 125 mcg, Once per day on Monday Wednesday Friday    enoxaparin (LOVENOX) subcutaneous injection 80 mg, Q12H JESUS    furosemide (LASIX) tablet 40 mg, BID (diuretic)    gabapentin (NEURONTIN) capsule 100 mg, TID PRN    ipratropium-albuterol (DUO-NEB) 0.5-2.5 mg/3 mL inhalation solution 3 mL, TID    magnesium sulfate 2 g/50 mL IVPB (premix) 2 g, Once, Last Rate: 2 g (03/26/25 0758)    melatonin tablet 3 mg, HS    metoprolol (LOPRESSOR) injection 2.5 mg, Q6H PRN    nicotine (NICODERM CQ) 21 mg/24 hr TD 24 hr patch 1 patch, Daily    pantoprazole (PROTONIX) EC tablet 40 mg, BID    pramipexole (MIRAPEX) tablet 0.25 mg, HS    trimethobenzamide (TIGAN) IM injection 200 mg, Q6H PRN    warfarin (COUMADIN) tablet 5 mg, Daily (warfarin)    Administrative Statements   Today, Patient Was Seen By: Lin Escobedo PA-C      **Please Note: This note may have been constructed using a voice recognition system.**

## 2025-03-26 NOTE — ASSESSMENT & PLAN NOTE
Follows with [facility] rheumatology  Cutaneous manifestations: hyper/hypopigmentation  Digital vasculopathy (Raynaud's phenomenon)   Pulmonary manifestations: pulm HTN on prior echo PASP 42, ILD

## 2025-03-26 NOTE — PLAN OF CARE
Problem: CARDIOVASCULAR - ADULT  Goal: Maintains optimal cardiac output and hemodynamic stability  Description: INTERVENTIONS:- Monitor I/O, vital signs and rhythm- Monitor for S/S and trends of decreased cardiac output- Administer and titrate ordered vasoactive medications to optimize hemodynamic stability- Assess quality of pulses, skin color and temperature- Assess for signs of decreased coronary artery perfusion- Instruct patient to report change in severity of symptoms monitor for chest pain , BLACK, SOB palpatations  Outcome: Progressing  Goal: Absence of cardiac dysrhythmias or at baseline rhythm  Description: INTERVENTIONS:- Continuous cardiac monitoring, vital signs, obtain 12 lead EKG if ordered- Administer antiarrhythmic and heart rate control medications as ordered- Monitor electrolytes and administer replacement therapy as ordered  Outcome: Progressing     Problem: PAIN - ADULT  Goal: Verbalizes/displays adequate comfort level or baseline comfort level  Description: Interventions:- Encourage patient to monitor pain and request assistance- Assess pain using appropriate pain scale- Administer analgesics based on type and severity of pain and evaluate response- Implement non-pharmacological measures as appropriate and evaluate response- Consider cultural and social influences on pain and pain management- Notify physician/advanced practitioner if interventions unsuccessful or patient reports new pain  Outcome: Progressing     Problem: INFECTION - ADULT  Goal: Absence or prevention of progression during hospitalization  Description: INTERVENTIONS:- Assess and monitor for signs and symptoms of infection- Monitor lab/diagnostic results- Monitor all insertion sites, i.e. indwelling lines, tubes, and drains- Monitor endotracheal if appropriate and nasal secretions for changes in amount and color- [place] appropriate cooling/warming therapies per order- Administer medications as ordered- Instruct and encourage  patient and family to use good hand hygiene technique- Identify and instruct in appropriate isolation precautions for identified infection/condition  Outcome: Progressing  Goal: Absence of fever/infection during neutropenic period  Description: INTERVENTIONS:- Monitor WBC  Outcome: Progressing     Problem: SAFETY ADULT  Goal: Patient will remain free of falls  Description: INTERVENTIONS:- Educate patient/family on patient safety including physical limitations- Instruct patient to call for assistance with activity - Consult OT/PT to assist with strengthening/mobility - Keep Call bell within reach- Keep bed low and locked with side rails adjusted as appropriate- Keep care items and personal belongings within reach- Initiate and maintain comfort rounds- Make Fall Risk Sign visible to staff- Offer Toileting every 2 Hours, in advance of need- Initiate/Maintain bed/chair alarm - Apply yellow socks and bracelet for high fall risk patients- Consider moving patient to room near nurses station  Outcome: Progressing  Goal: Maintain or return to baseline ADL function  Description: INTERVENTIONS:-  Assess patient's ability to carry out ADLs; assess patient's baseline for ADL function and identify physical deficits which impact ability to perform ADLs (bathing, care of mouth/teeth, toileting, grooming, dressing, etc.)- Assess/evaluate cause of self-care deficits - Assess range of motion- Assess patient's mobility; develop plan if impaired- Assess patient's need for assistive devices and provide as appropriate- Encourage maximum independence but intervene and supervise when necessary- Involve family in performance of ADLs- Assess for home care needs following discharge - Consider OT consult to assist with ADL evaluation and planning for discharge- Provide patient education as appropriate  Outcome: Progressing  Goal: Maintains/Returns to pre admission functional level  Description: INTERVENTIONS:- Perform AM-PAC 6 Click Basic  Mobility/ Daily Activity assessment daily.- Set and communicate daily mobility goal to care team and patient/family/caregiver. - Collaborate with rehabilitation services on mobility goals if consulted- Perform Range of Motion 3 times a day.- Reposition patient every 2 hours if unable to reposition self.- Dangle patient 3 times a day- Stand patient 3 times a day- Ambulate patient 3 times a day- Out of bed to chair 2 times a day -  Out of bed for toileting- Record patient progress and toleration of activity level   Outcome: Progressing     Problem: Knowledge Deficit  Goal: Patient/family/caregiver demonstrates understanding of disease process, treatment plan, medications, and discharge instructions  Description: Complete learning assessment and assess knowledge base.Interventions:- Provide teaching at level of understanding- Provide teaching via preferred learning methods  Outcome: Progressing     Problem: Prexisting or High Potential for Compromised Skin Integrity  Goal: Skin integrity is maintained or improved  Description: INTERVENTIONS:- Identify patients at risk for skin breakdown- Assess and monitor skin integrity- Assess and monitor nutrition and hydration status- Monitor labs - Assess for incontinence - Turn and reposition patient- Assist with mobility/ambulation- Relieve pressure over bony prominences- Avoid friction and shearing- Provide appropriate hygiene as needed including keeping skin clean and dry- Evaluate need for skin moisturizer/barrier cream- Collaborate with interdisciplinary team - Patient/family teaching- Consider wound care consult   Outcome: Progressing     Problem: DISCHARGE PLANNING  Goal: Discharge to home or other facility with appropriate resources  Description: INTERVENTIONS:- Identify barriers to discharge w/patient and caregiver- Arrange for needed discharge resources and transportation as appropriate- Identify discharge learning needs (meds, wound care, etc.)- Arrange for  interpretive services to assist at discharge as needed- Refer to Case Management Department for coordinating discharge planning if the patient needs post-hospital services based on physician/advanced practitioner order or complex needs related to functional status, cognitive ability, or social support system  Outcome: Progressing

## 2025-03-26 NOTE — ASSESSMENT & PLAN NOTE
Hypotensive this admission. Will monitor with resumption of GDMT   September 13, 2023     Patient: Johnice Libman  YOB: 2012  Date of Visit: 9/13/2023      To Whom it May Concern:    Johnice Libman is under my professional care. Dianna Benton was seen in my office on 9/13/2023. The patient may return to school. Please allow for patient to use wheel chair. Please allow extra time in between classes for transport, a friend to help with books and use of school elevator. Please allow patient to use ibuprofen/Advil/Motrin as needed for pain. If you have any questions or concerns, please don't hesitate to call.          Sincerely,          Le Aguilar MD        CC: No Recipients

## 2025-03-26 NOTE — ASSESSMENT & PLAN NOTE
Lab Results   Component Value Date    EGFR 54 03/26/2025    EGFR 63 03/25/2025    EGFR 55 03/25/2025    CREATININE 1.41 (H) 03/26/2025    CREATININE 1.23 03/25/2025    CREATININE 1.38 (H) 03/25/2025

## 2025-03-26 NOTE — NURSING NOTE
"Patient removed telemetry while provider was in room due to frustration over being told he is not being discharged today. Provider agreeable to leave telemetry off until the patient calms down. Patient also smashed water cup all over the floor but yelled, \"Don't come in here\" when staff attempted to enter the room.   "

## 2025-03-26 NOTE — ASSESSMENT & PLAN NOTE
Lab Results   Component Value Date    EGFR 54 03/26/2025    EGFR 63 03/25/2025    EGFR 55 03/25/2025    CREATININE 1.41 (H) 03/26/2025    CREATININE 1.23 03/25/2025    CREATININE 1.38 (H) 03/25/2025   Baseline around 1.3  Continue to trend daily bmp  Avoid nephrotoxic agents and hypotension   Recheck BMP and magnesium this afternoon with changes to medications

## 2025-03-26 NOTE — ASSESSMENT & PLAN NOTE
Wt Readings from Last 3 Encounters:   03/26/25 72.7 kg (160 lb 3.2 oz)   02/06/25 73.7 kg (162 lb 6.4 oz)   09/18/24 75.4 kg (166 lb 3.6 oz)     Acute on chronic HFrEF in the setting of known substance abuse with medication non-adherence, EF about 30% on serial echocardiograms.  Echo 3/24/25 with LVEF 25% and mod MR, TR.  Appears euvolemic on furosemide 40 mg BID, reduce to 40 mg once daily and add spironolactone 12.5 mg daily today.   Optimize electrolytes for K+ > 4, Mag >2

## 2025-03-26 NOTE — PROGRESS NOTES
Progress Note - Cardiology   Name: Britton Batista 59 y.o. male I MRN: [id number]  Unit/Bed#: -01 I Date of Admission: 3/22/2025   Date of Service: 3/26/2025 I Hospital Day: 4     Assessment & Plan  Cardiogenic shock (HCC)  Resolved.  Secondary to acute on chronic HFrEF and rapid a fib.  See discussion under cardiomyopathy, CHF, and a fib      Acute on chronic combined systolic and diastolic congestive heart failure (HCC)  Wt Readings from Last 3 Encounters:   03/26/25 72.7 kg (160 lb 3.2 oz)   02/06/25 73.7 kg (162 lb 6.4 oz)   09/18/24 75.4 kg (166 lb 3.6 oz)     Acute on chronic HFrEF in the setting of known substance abuse with medication non-adherence, EF about 30% on serial echocardiograms.  Echo 3/24/25 with LVEF 25% and mod MR, TR.  Appears euvolemic on furosemide 40 mg BID, reduce to 40 mg once daily and add spironolactone 12.5 mg daily today.   Optimize electrolytes for K+ > 4, Mag >2  Cardiomyopathy (HCC)  Known cardiomyopathy with LVEF 30%   Follows with [facility] for evaluation and management.  LHC in 2020 ruled out CAD.  S/p AICD at [facility] in 2024  GDMT with carvedilol 6.25 mg BID and lisinopril at home.  Presents for heart failure and rapid a fib, + UDS for amphetamines  Patient with known meth use.  Echo 3/24/2025 shows LVEF 25% with severe global hypokinesis.  He admits to inconsistently taking his prescriptions and continued meth use. I urged medication adherence and complete cessation from meth.  Transitioned to carvedilol 6.25 mg BID, now increased to 12.5 mg BID.  Resume lisinopril 2.5 mg daily and if BP stable add spironolactone 12.5 mg daily.  Unable to afford Entresto/Jardiance.  Currently has diuresed well. See discussion under CHF.  Other emphysema (HCC)    Methamphetamine abuse (HCC)  + UDS, admits to ongoing use and declines resources when offered  Primary hypertension  Hypotensive this admission. Will monitor with resumption of GDMT  Atrial fibrillation with RVR  (Formerly [facility])  History of PAF detected on ICD. Prescribed Eliquis 5 mg BID but admits he is not taking this due to cost.  Now bridging to warfarin with goal INR 2-3. Continue Lovenox bridge until therapeutic for 24 hours  Currently in rate controlled a fib on digoxin 125 mcg daily. Recommend 3 time weekly dosing with underlying CKD.  Now on carvedilol 12.5 mg BID. Will titrate as able for heart rate control.  Stage 3a chronic kidney disease (CKD) (Formerly [facility])  Lab Results   Component Value Date    EGFR 54 03/26/2025    EGFR 63 03/25/2025    EGFR 55 03/25/2025    CREATININE 1.41 (H) 03/26/2025    CREATININE 1.23 03/25/2025    CREATININE 1.38 (H) 03/25/2025     Scleroderma (Formerly [facility])      Subjective   Chief Complaint: shortness of breath, resolved  Agitated this morning, threw a cup of water and ripped off telemetry. Now calmer since his sister visited. INR remains sub-therapeutic. Euvolemic on exam. Breathing quite comfortably on room air lying flat. Denies any complaints today.     Objective :  Temp:  [97.6 °F (36.4 °C)-98.2 °F (36.8 °C)] 97.6 °F (36.4 °C)  HR:  [] 110  BP: (112-132)/(79-91) 112/83  Resp:  [18] 18  SpO2:  [95 %-98 %] 98 %  O2 Device: None (Room air)  Orthostatic Blood Pressures      Flowsheet Row Most Recent Value   Blood Pressure 112/83 filed at 03/26/2025 0757   Patient Position - Orthostatic VS Sitting filed at 03/26/2025 0757          First Weight: Weight - Scale: 76.6 kg (168 lb 12.8 oz) (03/22/25 0538)  Vitals:    03/25/25 0600 03/26/25 0537   Weight: 73.1 kg (161 lb 3.2 oz) 72.7 kg (160 lb 3.2 oz)     Physical Exam  Vitals and nursing note reviewed.   Constitutional:       General: He is not in acute distress.     Appearance: He is well-developed.      Comments: Appears chronically ill and older than stated age   HENT:      Head: Normocephalic and atraumatic.   Eyes:      Conjunctiva/sclera: Conjunctivae normal.   Neck:      Vascular: No JVD.   Cardiovascular:      Rate and Rhythm: Normal rate. Rhythm  irregular.      Comments: Extremities are warm with palpable pulses  Pulmonary:      Effort: Pulmonary effort is normal. No respiratory distress.      Breath sounds: Wheezing present.      Comments: Breathing comfortably on room air  Abdominal:      Palpations: Abdomen is soft.      Tenderness: There is no abdominal tenderness.   Musculoskeletal:         General: No swelling.      Cervical back: Neck supple.      Right lower leg: No edema.      Left lower leg: No edema.   Skin:     General: Skin is warm and dry.      Capillary Refill: Capillary refill takes less than 2 seconds.   Neurological:      Mental Status: He is alert.   Psychiatric:         Mood and Affect: Mood normal.         Lab Results: I have reviewed the following results:CBC/BMP:   .     03/26/25  0541   WBC 6.29   HGB 14.5   HCT 45.0      SODIUM 132*   K 3.9   CL 95*   CO2 30   BUN 30*   CREATININE 1.41*   GLUC 120   CAIONIZED 1.07*   MG 1.6*   PHOS 3.8      Results from last 7 days   Lab Units 03/26/25  0541 03/25/25  0422 03/24/25  0436   WBC Thousand/uL 6.29 6.76 8.02   HEMOGLOBIN g/dL 14.5 14.3 14.0   HEMATOCRIT % 45.0 44.1 43.4   PLATELETS Thousands/uL 261 237 230     Results from last 7 days   Lab Units 03/26/25  0541 03/25/25  1156 03/25/25  0422   POTASSIUM mmol/L 3.9 4.1 3.6   CHLORIDE mmol/L 95* 95* 95*   CO2 mmol/L 30 32 33*   BUN mg/dL 30* 22 26*   CREATININE mg/dL 1.41* 1.23 1.38*   CALCIUM mg/dL 8.8 9.4 8.3*     Results from last 7 days   Lab Units 03/26/25  0541 03/25/25  1516 03/25/25  0422 03/22/25  0353   INR  1.61* 1.21* 1.18 1.13   PTT seconds  --   --   --  32     Lab Results   Component Value Date    HGBA1C 5.9 (H) 10/25/2021     Lab Results   Component Value Date    CKTOTAL 65 09/18/2024    TROPONINI 0.04 10/25/2021       Imaging Results Review: I reviewed radiology reports from this admission including: chest xray and Echocardiogram.  Other Study Results Review: EKG was reviewed.   Telemetry: atrial fibrillation with  BBB and occasional ventricular pacing, rate 's

## 2025-03-27 LAB
ANION GAP SERPL CALCULATED.3IONS-SCNC: 8 MMOL/L (ref 4–13)
BUN SERPL-MCNC: 25 MG/DL (ref 5–25)
CALCIUM SERPL-MCNC: 8.8 MG/DL (ref 8.4–10.2)
CHLORIDE SERPL-SCNC: 95 MMOL/L (ref 96–108)
CO2 SERPL-SCNC: 29 MMOL/L (ref 21–32)
CREAT SERPL-MCNC: 1.3 MG/DL (ref 0.6–1.3)
DIGOXIN SERPL-MCNC: 0.6 NG/ML (ref 0.8–2)
ERYTHROCYTE [DISTWIDTH] IN BLOOD BY AUTOMATED COUNT: 15.8 % (ref 11.6–15.1)
GFR SERPL CREATININE-BSD FRML MDRD: 59 ML/MIN/1.73SQ M
GLUCOSE SERPL-MCNC: 109 MG/DL (ref 65–140)
HCT VFR BLD AUTO: 47.2 % (ref 36.5–49.3)
HGB BLD-MCNC: 15.4 G/DL (ref 12–17)
INR PPP: 1.72 (ref 0.85–1.19)
MAGNESIUM SERPL-MCNC: 1.9 MG/DL (ref 1.9–2.7)
MCH RBC QN AUTO: 28.8 PG (ref 26.8–34.3)
MCHC RBC AUTO-ENTMCNC: 32.6 G/DL (ref 31.4–37.4)
MCV RBC AUTO: 88 FL (ref 82–98)
PLATELET # BLD AUTO: 263 THOUSANDS/UL (ref 149–390)
PMV BLD AUTO: 9.2 FL (ref 8.9–12.7)
POTASSIUM SERPL-SCNC: 4.2 MMOL/L (ref 3.5–5.3)
PROTHROMBIN TIME: 20.4 SECONDS (ref 12.3–15)
RBC # BLD AUTO: 5.35 MILLION/UL (ref 3.88–5.62)
SODIUM SERPL-SCNC: 132 MMOL/L (ref 135–147)
WBC # BLD AUTO: 7.29 THOUSAND/UL (ref 4.31–10.16)

## 2025-03-27 PROCEDURE — 94760 N-INVAS EAR/PLS OXIMETRY 1: CPT

## 2025-03-27 PROCEDURE — 99232 SBSQ HOSP IP/OBS MODERATE 35: CPT | Performed by: INTERNAL MEDICINE

## 2025-03-27 PROCEDURE — 83735 ASSAY OF MAGNESIUM: CPT

## 2025-03-27 PROCEDURE — 94640 AIRWAY INHALATION TREATMENT: CPT

## 2025-03-27 PROCEDURE — 85610 PROTHROMBIN TIME: CPT

## 2025-03-27 PROCEDURE — 80162 ASSAY OF DIGOXIN TOTAL: CPT | Performed by: NURSE PRACTITIONER

## 2025-03-27 PROCEDURE — 99232 SBSQ HOSP IP/OBS MODERATE 35: CPT | Performed by: FAMILY MEDICINE

## 2025-03-27 PROCEDURE — 80048 BASIC METABOLIC PNL TOTAL CA: CPT

## 2025-03-27 PROCEDURE — 85027 COMPLETE CBC AUTOMATED: CPT

## 2025-03-27 PROCEDURE — 94664 DEMO&/EVAL PT USE INHALER: CPT

## 2025-03-27 RX ORDER — ALPRAZOLAM 0.5 MG
1 TABLET ORAL 3 TIMES DAILY PRN
Status: DISCONTINUED | OUTPATIENT
Start: 2025-03-27 | End: 2025-03-28 | Stop reason: HOSPADM

## 2025-03-27 RX ORDER — WARFARIN SODIUM 7.5 MG/1
7.5 TABLET ORAL
Status: DISCONTINUED | OUTPATIENT
Start: 2025-03-27 | End: 2025-03-28

## 2025-03-27 RX ORDER — IPRATROPIUM BROMIDE AND ALBUTEROL SULFATE 2.5; .5 MG/3ML; MG/3ML
3 SOLUTION RESPIRATORY (INHALATION) EVERY 6 HOURS PRN
Status: DISCONTINUED | OUTPATIENT
Start: 2025-03-27 | End: 2025-03-28 | Stop reason: HOSPADM

## 2025-03-27 RX ADMIN — PANTOPRAZOLE SODIUM 40 MG: 40 TABLET, DELAYED RELEASE ORAL at 20:58

## 2025-03-27 RX ADMIN — ENOXAPARIN SODIUM 80 MG: 80 INJECTION SUBCUTANEOUS at 20:58

## 2025-03-27 RX ADMIN — NICOTINE 1 PATCH: 21 PATCH, EXTENDED RELEASE TRANSDERMAL at 08:22

## 2025-03-27 RX ADMIN — LISINOPRIL 2.5 MG: 2.5 TABLET ORAL at 08:22

## 2025-03-27 RX ADMIN — PANTOPRAZOLE SODIUM 40 MG: 40 TABLET, DELAYED RELEASE ORAL at 08:22

## 2025-03-27 RX ADMIN — MAGNESIUM OXIDE TAB 400 MG (241.3 MG ELEMENTAL MG) 400 MG: 400 (241.3 MG) TAB at 08:22

## 2025-03-27 RX ADMIN — Medication 3 MG: at 20:57

## 2025-03-27 RX ADMIN — SPIRONOLACTONE 12.5 MG: 25 TABLET ORAL at 08:22

## 2025-03-27 RX ADMIN — FUROSEMIDE 40 MG: 40 TABLET ORAL at 08:21

## 2025-03-27 RX ADMIN — IPRATROPIUM BROMIDE AND ALBUTEROL SULFATE 3 ML: 2.5; .5 SOLUTION RESPIRATORY (INHALATION) at 14:32

## 2025-03-27 RX ADMIN — CARVEDILOL 12.5 MG: 12.5 TABLET, FILM COATED ORAL at 08:21

## 2025-03-27 RX ADMIN — WARFARIN SODIUM 7.5 MG: 7.5 TABLET ORAL at 17:13

## 2025-03-27 RX ADMIN — ALPRAZOLAM 1 MG: 0.5 TABLET ORAL at 09:32

## 2025-03-27 RX ADMIN — HYDROXYZINE HYDROCHLORIDE 25 MG: 25 TABLET ORAL at 07:52

## 2025-03-27 RX ADMIN — ALPRAZOLAM 1 MG: 0.5 TABLET ORAL at 17:13

## 2025-03-27 RX ADMIN — ENOXAPARIN SODIUM 80 MG: 80 INJECTION SUBCUTANEOUS at 08:21

## 2025-03-27 RX ADMIN — PRAMIPEXOLE DIHYDROCHLORIDE 0.25 MG: 0.5 TABLET ORAL at 20:58

## 2025-03-27 NOTE — PROGRESS NOTES
Progress Note - Cardiology   Name: Britton Batista 59 y.o. male I MRN: [id number]  Unit/Bed#: -01 I Date of Admission: 3/22/2025   Date of Service: 3/27/2025 I Hospital Day: 5     Assessment & Plan  Cardiogenic shock (HCC)  Resolved.  Secondary to acute on chronic HFrEF and rapid a fib.  See discussion under cardiomyopathy, CHF, and a fib  Acute on chronic combined systolic and diastolic congestive heart failure (HCC)  Wt Readings from Last 3 Encounters:   03/27/25 72.6 kg (160 lb)   02/06/25 73.7 kg (162 lb 6.4 oz)   09/18/24 75.4 kg (166 lb 3.6 oz)     Acute on chronic HFrEF in the setting of known substance abuse with medication non-adherence, EF about 30% on serial echocardiograms.  Echo 3/24/25 with LVEF 25% and mod MR, TR.  Appears euvolemic on furosemide 40 mg once daily and spironolactone 12.5 mg daily.  Optimize electrolytes for K+ > 4, Mag >2  Cardiomyopathy (HCC)  Known cardiomyopathy with LVEF 30%   Follows with [facility] for evaluation and management.  LHC in 2020 ruled out CAD.  S/p AICD at [facility] in 2024  GDMT with carvedilol 6.25 mg BID and lisinopril at home.  Presents for heart failure and rapid a fib, + UDS for amphetamines  Patient with known meth use.  Echo 3/24/2025 shows LVEF 25% with severe global hypokinesis.  He admits to inconsistently taking his prescriptions and continued meth use. I urged medication adherence and complete cessation from meth.  Transitioned to carvedilol 12.5 mg BID, lisinopril 2.5 mg daily and spironolactone 12.5 mg daily.  Unable to afford Entresto/Jardiance.  Currently has diuresed well. See discussion under CHF.  Other emphysema (HCC)    Methamphetamine abuse (HCC)  + UDS, admits to ongoing use and declines resources when offered  Primary hypertension  Hypotensive this admission. Will monitor with resumption of GDMT  Atrial fibrillation with RVR (HCC)  History of PAF detected on ICD. Prescribed Eliquis 5 mg BID but admits he is not taking this due to  cost.  Now bridging to warfarin with goal INR 2-3. Continue Lovenox bridge until therapeutic for 24 hours  Currently in rate controlled a fib on digoxin 125 mcg 3 times weekly. Check dig level today.   Now on carvedilol 12.5 mg BID.   Stage 3a chronic kidney disease (CKD) (HCC)  Lab Results   Component Value Date    EGFR 59 03/27/2025    EGFR 54 03/26/2025    EGFR 54 03/26/2025    CREATININE 1.30 03/27/2025    CREATININE 1.40 (H) 03/26/2025    CREATININE 1.41 (H) 03/26/2025     Scleroderma (HCC)      Subjective   Chief Complaint: weakness, shortness of breath  Aiden was initially lying in bed, he became agitated while discussing his plan of care and quickly left the room walking toward the elevators. Security was called.     Objective :  Temp:  [97.6 °F (36.4 °C)-99 °F (37.2 °C)] 98.1 °F (36.7 °C)  HR:  [] 106  BP: ()/() 130/85  Resp:  [18-19] 19  SpO2:  [89 %-100 %] 100 %  O2 Device: None (Room air)  Orthostatic Blood Pressures      Flowsheet Row Most Recent Value   Blood Pressure 130/85 filed at 03/27/2025 0732   Patient Position - Orthostatic VS Lying filed at 03/27/2025 0732          First Weight: Weight - Scale: 76.6 kg (168 lb 12.8 oz) (03/22/25 0538)  Vitals:    03/26/25 0537 03/27/25 0517   Weight: 72.7 kg (160 lb 3.2 oz) 72.6 kg (160 lb)     Physical Exam  Vitals and nursing note reviewed.   Constitutional:       General: He is not in acute distress.     Appearance: He is well-developed.   HENT:      Head: Normocephalic and atraumatic.   Eyes:      Conjunctiva/sclera: Conjunctivae normal.   Neck:      Vascular: No JVD.   Cardiovascular:      Rate and Rhythm: Tachycardia present. Rhythm irregular.      Comments: Feet warm  Pulmonary:      Effort: Pulmonary effort is normal. No respiratory distress.      Breath sounds: Wheezing present.   Abdominal:      Palpations: Abdomen is soft.      Tenderness: There is no abdominal tenderness.   Musculoskeletal:         General: No swelling.       Cervical back: Neck supple.      Right lower leg: No edema.      Left lower leg: No edema.   Skin:     General: Skin is warm and dry.      Capillary Refill: Capillary refill takes less than 2 seconds.   Neurological:      Mental Status: He is alert.   Psychiatric:         Mood and Affect: Affect is angry.         Behavior: Behavior is agitated and aggressive.         Lab Results: I have reviewed the following results:CBC/BMP:   .     03/27/25  0602   WBC 7.29   HGB 15.4   HCT 47.2      SODIUM 132*   K 4.2   CL 95*   CO2 29   BUN 25   CREATININE 1.30   GLUC 109   MG 1.9      Results from last 7 days   Lab Units 03/27/25  0602 03/26/25  0541 03/25/25  0422   WBC Thousand/uL 7.29 6.29 6.76   HEMOGLOBIN g/dL 15.4 14.5 14.3   HEMATOCRIT % 47.2 45.0 44.1   PLATELETS Thousands/uL 263 261 237     Results from last 7 days   Lab Units 03/27/25  0602 03/26/25  1546 03/26/25  0541   POTASSIUM mmol/L 4.2 4.1 3.9   CHLORIDE mmol/L 95* 93* 95*   CO2 mmol/L 29 32 30   BUN mg/dL 25 26* 30*   CREATININE mg/dL 1.30 1.40* 1.41*   CALCIUM mg/dL 8.8 9.0 8.8     Results from last 7 days   Lab Units 03/27/25  0602 03/26/25  0541 03/25/25  1516 03/25/25  0422 03/22/25  0353   INR  1.72* 1.61* 1.21*   < > 1.13   PTT seconds  --   --   --   --  32    < > = values in this interval not displayed.     Lab Results   Component Value Date    HGBA1C 5.9 (H) 10/25/2021     Lab Results   Component Value Date    CKTOTAL 65 09/18/2024    TROPONINI 0.04 10/25/2021       Imaging Results Review: I reviewed radiology reports from this admission including: chest xray and Echocardiogram.  Other Study Results Review: EKG was reviewed.     Telemetry: atrial fibrillation with underlying bundle branch block, intermittent ventricular pacing. Rate , up to 140 with ambulation

## 2025-03-27 NOTE — ASSESSMENT & PLAN NOTE
Lab Results   Component Value Date    EGFR 59 03/27/2025    EGFR 54 03/26/2025    EGFR 54 03/26/2025    CREATININE 1.30 03/27/2025    CREATININE 1.40 (H) 03/26/2025    CREATININE 1.41 (H) 03/26/2025

## 2025-03-27 NOTE — PROGRESS NOTES
Progress Note - Hospitalist   Name: Britton Batista 59 y.o. male I MRN: [id number]  Unit/Bed#: -01 I Date of Admission: 3/22/2025   Date of Service: 3/27/2025 I Hospital Day: 5    Assessment & Plan  Atrial fibrillation with RVR (HCC)  S/p IV Cardizem and Lopressor on admission  Avoid further Cardizem with reduced LVEF  Avoid amiodarone given noncompliance with AC  Had rapid response thought to possible V-tach-ICD interrogated: Confirmed A-fib no vtach  Initially endorsed compliance with Eliquis-confirmed has not been taking due to cost  Rate control switch to Coreg 12.5 mg twice daily, digoxin  Cardiology following  Being bridged to Coumadin -cardiology recommends continued Lovenox bridge until therapeutic x 24 hours  Unable to send patient home with bridging therapy due to history of substance abuse  Lab Results   Component Value Date    INR 1.72 (H) 03/27/2025    INR 1.61 (H) 03/26/2025    INR 1.21 (H) 03/25/2025    INR 1.18 03/25/2025     Cardiogenic shock (HCC)  Secondary to acute on chronic HFrEF and rapid A-fib  No suspected source of infection -hold IVF resuscitation with severely reduced EF and concern for volume overload  POC cardiac US 3/22 -severely reduced EF with global hypokinesis  Lactic acid initially elevated-cleared  Milrinone/Levophed weaned off ICU  TTE 3/24 -EF 25%  Shock resolved  Continue current treatment.  Acute on chronic combined systolic and diastolic congestive heart failure (HCC)  Wt Readings from Last 3 Encounters:   03/27/25 72.6 kg (160 lb)   02/06/25 73.7 kg (162 lb 6.4 oz)   09/18/24 75.4 kg (166 lb 3.6 oz)     Secondary to nonischemic cardiomyopathy due to methamphetamine use and nonadherence to medication regimen  Patient noncompliant with home GDMT secondary to financial issues: Was prescribed Jardiance, Coreg, Lasix and Entresto -unable to afford Jardiance and Entresto  Cardiology following  Monitor daily weights/intake and output  TTE with LVEF 25%  Current GDMT:  Changed to Lasix 40 mg once daily, continue Coreg 12.5 mg twice daily, add lisinopril 2.5 mg and spironolactone 12.5 mg today  Methamphetamine abuse (Formerly [facility])  UDS + for methamphetamines and THC  Patient declining methamphetamine use   Primary hypertension  Managed on amlodipine and losartan  Current inpatient regimen of Lasix 40 mg twice daily and Coreg 12.5 mg twice daily  Changed to Lasix 40 mg once daily, continue Coreg 12.5 mg twice daily, add lisinopril 2.5 mg and spironolactone 12.5 mg today  Monitor blood pressure  Stage 3a chronic kidney disease (CKD) (Formerly [facility])  Lab Results   Component Value Date    EGFR 59 03/27/2025    EGFR 54 03/26/2025    EGFR 54 03/26/2025    CREATININE 1.30 03/27/2025    CREATININE 1.40 (H) 03/26/2025    CREATININE 1.41 (H) 03/26/2025   Baseline around 1.3  Continue to trend daily bmp  Avoid nephrotoxic agents and hypotension   Recheck BMP and magnesium this afternoon with changes to medications  Other emphysema (Formerly [facility])  Smokes 1 pack a day  Not in acute exacerbation  DuoNebs and budesonide nebs     Scleroderma (Former[facility])  Follows with [facility] rheumatology  Cutaneous manifestations: hyper/hypopigmentation  Digital vasculopathy (Raynaud's phenomenon)   Pulmonary manifestations: pulm HTN on prior echo PASP 42, ILD    VTE Pharmacologic Prophylaxis:   Moderate Risk (Score 3-4) - Pharmacological DVT Prophylaxis Ordered: enoxaparin (Lovenox).    Mobility:   Basic Mobility Inpatient Raw Score: 22  JH-HLM Goal: 7: Walk 25 feet or more  JH-HLM Achieved: 8: Walk 250 feet ot more  JH-HLM Goal achieved. Continue to encourage appropriate mobility.    Patient Centered Rounds: I performed bedside rounds with nursing staff today.   Discussions with Specialists or Other Care Team Provider: Cardiology    Education and Discussions with Family / Patient: Updated  (daughter) via phone.    Current Length of Stay: 5 day(s)  Current Patient Status: Inpatient   Certification Statement:  Monitored  overnight  Discharge Plan: Anticipate discharge in 24-48 hrs to home.    Code Status: Level 1 - Full Code    Subjective   Seen and evaluated during the event.no significant issues other than mood fluctuations    Objective :  Temp:  [97.6 °F (36.4 °C)-99 °F (37.2 °C)] 98.1 °F (36.7 °C)  HR:  [] 106  BP: ()/() 130/85  Resp:  [18-19] 19  SpO2:  [89 %-100 %] 100 %  O2 Device: None (Room air)    Body mass index is 23.63 kg/m².     Input and Output Summary (last 24 hours):     Intake/Output Summary (Last 24 hours) at 3/27/2025 0819  Last data filed at 3/27/2025 0732  Gross per 24 hour   Intake 960 ml   Output --   Net 960 ml       Physical Exam  Vitals and nursing note reviewed.   Constitutional:       Appearance: He is not ill-appearing or diaphoretic.   HENT:      Nose: Nose normal. No congestion.      Mouth/Throat:      Mouth: Mucous membranes are moist.   Eyes:      General: No scleral icterus.        Left eye: No discharge.      Extraocular Movements: Extraocular movements intact.      Conjunctiva/sclera: Conjunctivae normal.      Pupils: Pupils are equal, round, and reactive to light.   Cardiovascular:      Rate and Rhythm: Tachycardia present. Rhythm irregular.      Heart sounds: No murmur heard.     No friction rub. No gallop.   Pulmonary:      Effort: No respiratory distress.      Breath sounds: No stridor. No wheezing or rhonchi.   Abdominal:      General: There is no distension.      Palpations: There is no mass.      Tenderness: There is no abdominal tenderness.      Hernia: No hernia is present.   Musculoskeletal:      Right lower leg: No edema.      Left lower leg: No edema.   Neurological:      Mental Status: He is alert and oriented to person, place, and time.      Cranial Nerves: No cranial nerve deficit.      Sensory: No sensory deficit.      Motor: No weakness.      Coordination: Coordination normal.           Lines/Drains:        Telemetry:  Telemetry Orders (From admission, onward)                24 Hour Telemetry Monitoring  Continuous x 24 Hours (Telem)        Expiring   Question:  Reason for 24 Hour Telemetry  Answer:  Arrhythmias requiring acute medical intervention / PPM or ICD malfunction                     Telemetry Reviewed: Atrial fibrillation. HR averaging variable heart rate  Indication for Continued Telemetry Use: Arrthymias requiring medical therapy               Lab Results: I have reviewed the following results:   Results from last 7 days   Lab Units 03/27/25  0602 03/23/25  0410 03/22/25  0601   WBC Thousand/uL 7.29   < > 8.71   HEMOGLOBIN g/dL 15.4   < > 15.3   HEMATOCRIT % 47.2   < > 47.9   PLATELETS Thousands/uL 263   < > 242   SEGS PCT %  --   --  70   LYMPHO PCT %  --   --  15   MONO PCT %  --   --  12   EOS PCT %  --   --  2    < > = values in this interval not displayed.     Results from last 7 days   Lab Units 03/27/25  0602 03/25/25  1156 03/25/25  0422   SODIUM mmol/L 132*   < > 134*   POTASSIUM mmol/L 4.2   < > 3.6   CHLORIDE mmol/L 95*   < > 95*   CO2 mmol/L 29   < > 33*   BUN mg/dL 25   < > 26*   CREATININE mg/dL 1.30   < > 1.38*   ANION GAP mmol/L 8   < > 6   CALCIUM mg/dL 8.8   < > 8.3*   ALBUMIN g/dL  --   --  3.2*   TOTAL BILIRUBIN mg/dL  --   --  0.57   ALK PHOS U/L  --   --  105*   ALT U/L  --   --  29   AST U/L  --   --  28   GLUCOSE RANDOM mg/dL 109   < > 95    < > = values in this interval not displayed.     Results from last 7 days   Lab Units 03/27/25  0602   INR  1.72*     Results from last 7 days   Lab Units 03/22/25  1203 03/22/25  0803 03/22/25  0650   POC GLUCOSE mg/dl 102 134 100         Results from last 7 days   Lab Units 03/22/25  1537 03/22/25  1236 03/22/25  0353   LACTIC ACID mmol/L 1.7 2.4* 1.8   PROCALCITONIN ng/ml  --   --  0.08       Recent Cultures (last 7 days):         Imaging Results Review: No pertinent imaging studies reviewed.  Other Study Results Review: No additional pertinent studies reviewed.    Last 24 Hours Medication List:      Current Facility-Administered Medications:     acetaminophen (TYLENOL) tablet 650 mg, Q6H PRN    budesonide (PULMICORT) inhalation solution 0.5 mg, Q12H    carvedilol (COREG) tablet 12.5 mg, BID With Meals    digoxin (LANOXIN) tablet 125 mcg, Once per day on Monday Wednesday Friday    enoxaparin (LOVENOX) subcutaneous injection 80 mg, Q12H JESUS    furosemide (LASIX) tablet 40 mg, Daily    gabapentin (NEURONTIN) capsule 100 mg, TID PRN    hydrOXYzine HCL (ATARAX) tablet 25 mg, Q6H PRN    ipratropium-albuterol (DUO-NEB) 0.5-2.5 mg/3 mL inhalation solution 3 mL, TID    lisinopril (ZESTRIL) tablet 2.5 mg, Daily    magnesium Oxide (MAG-OX) tablet 400 mg, Daily    melatonin tablet 3 mg, HS    metoprolol (LOPRESSOR) injection 2.5 mg, Q6H PRN    nicotine (NICODERM CQ) 21 mg/24 hr TD 24 hr patch 1 patch, Daily    pantoprazole (PROTONIX) EC tablet 40 mg, BID    pramipexole (MIRAPEX) tablet 0.25 mg, HS    spironolactone (ALDACTONE) tablet 12.5 mg, Daily    trimethobenzamide (TIGAN) IM injection 200 mg, Q6H PRN    warfarin (COUMADIN) tablet 5 mg, Daily (warfarin)    Administrative Statements   Today, Patient Was Seen By: Conrad Bynum MD      **Please Note: This note may have been constructed using a voice recognition system.**

## 2025-03-27 NOTE — ASSESSMENT & PLAN NOTE
Resolved.  Secondary to acute on chronic HFrEF and rapid a fib.  See discussion under cardiomyopathy, CHF, and a fib

## 2025-03-27 NOTE — NURSING NOTE
"Pt yelling and cursing at dietary aide because he \"didn't get the right fucking breafast\"  found screaming at the dietary aide to \"get the fuck out of my room\" while aggressively jumping off bed and coming towards the hallway at the aide. Pt able to be redirected and calmed with removal of staff member and therapeutic communication. Pt continues to threaten to leave AMA and throwing tele box in bed. Pt allowed to put tele back on. Dr. Bynum notified of pts behavior and aggression. Pt med with PRN atarax. Dietary called to bring new breakfast tray with eggs. Pt warned he can not threaten staff or become violent, verbally agreed to not attack staff.   "

## 2025-03-27 NOTE — PLAN OF CARE
Problem: CARDIOVASCULAR - ADULT  Goal: Maintains optimal cardiac output and hemodynamic stability  Description: INTERVENTIONS:- Monitor I/O, vital signs and rhythm- Monitor for S/S and trends of decreased cardiac output- Administer and titrate ordered vasoactive medications to optimize hemodynamic stability- Assess quality of pulses, skin color and temperature- Assess for signs of decreased coronary artery perfusion- Instruct patient to report change in severity of symptoms monitor for chest pain , BLACK, SOB palpatations  Outcome: Progressing  Goal: Absence of cardiac dysrhythmias or at baseline rhythm  Description: INTERVENTIONS:- Continuous cardiac monitoring, vital signs, obtain 12 lead EKG if ordered- Administer antiarrhythmic and heart rate control medications as ordered- Monitor electrolytes and administer replacement therapy as ordered  Outcome: Progressing     Problem: PAIN - ADULT  Goal: Verbalizes/displays adequate comfort level or baseline comfort level  Description: Interventions:- Encourage patient to monitor pain and request assistance- Assess pain using appropriate pain scale- Administer analgesics based on type and severity of pain and evaluate response- Implement non-pharmacological measures as appropriate and evaluate response- Consider cultural and social influences on pain and pain management- Notify physician/advanced practitioner if interventions unsuccessful or patient reports new pain  Outcome: Progressing     Problem: INFECTION - ADULT  Goal: Absence or prevention of progression during hospitalization  Description: INTERVENTIONS:- Assess and monitor for signs and symptoms of infection- Monitor lab/diagnostic results- Monitor all insertion sites, i.e. indwelling lines, tubes, and drains- Monitor endotracheal if appropriate and nasal secretions for changes in amount and color- [place] appropriate cooling/warming therapies per order- Administer medications as ordered- Instruct and encourage  patient and family to use good hand hygiene technique- Identify and instruct in appropriate isolation precautions for identified infection/condition  Outcome: Progressing  Goal: Absence of fever/infection during neutropenic period  Description: INTERVENTIONS:- Monitor WBC  Outcome: Progressing     Problem: SAFETY ADULT  Goal: Patient will remain free of falls  Description: INTERVENTIONS:- Educate patient/family on patient safety including physical limitations- Instruct patient to call for assistance with activity - Consult OT/PT to assist with strengthening/mobility - Keep Call bell within reach- Keep bed low and locked with side rails adjusted as appropriate- Keep care items and personal belongings within reach- Initiate and maintain comfort rounds- Make Fall Risk Sign visible to staff- Offer Toileting every 2 Hours, in advance of need- Initiate/Maintain bed/chair alarm - Apply yellow socks and bracelet for high fall risk patients- Consider moving patient to room near nurses station  Outcome: Progressing  Goal: Maintain or return to baseline ADL function  Description: INTERVENTIONS:-  Assess patient's ability to carry out ADLs; assess patient's baseline for ADL function and identify physical deficits which impact ability to perform ADLs (bathing, care of mouth/teeth, toileting, grooming, dressing, etc.)- Assess/evaluate cause of self-care deficits - Assess range of motion- Assess patient's mobility; develop plan if impaired- Assess patient's need for assistive devices and provide as appropriate- Encourage maximum independence but intervene and supervise when necessary- Involve family in performance of ADLs- Assess for home care needs following discharge - Consider OT consult to assist with ADL evaluation and planning for discharge- Provide patient education as appropriate  Outcome: Progressing  Goal: Maintains/Returns to pre admission functional level  Description: INTERVENTIONS:- Perform AM-PAC 6 Click Basic  Mobility/ Daily Activity assessment daily.- Set and communicate daily mobility goal to care team and patient/family/caregiver. - Collaborate with rehabilitation services on mobility goals if consulted- Perform Range of Motion 3 times a day.- Reposition patient every 2 hours if unable to reposition self.- Dangle patient 3 times a day- Stand patient 3 times a day- Ambulate patient 3 times a day- Out of bed to chair 2 times a day -  Out of bed for toileting- Record patient progress and toleration of activity level   Outcome: Progressing     Problem: INFECTION - ADULT  Goal: Absence or prevention of progression during hospitalization  Description: INTERVENTIONS:- Assess and monitor for signs and symptoms of infection- Monitor lab/diagnostic results- Monitor all insertion sites, i.e. indwelling lines, tubes, and drains- Monitor endotracheal if appropriate and nasal secretions for changes in amount and color- [place] appropriate cooling/warming therapies per order- Administer medications as ordered- Instruct and encourage patient and family to use good hand hygiene technique- Identify and instruct in appropriate isolation precautions for identified infection/condition  Outcome: Progressing  Goal: Absence of fever/infection during neutropenic period  Description: INTERVENTIONS:- Monitor WBC  Outcome: Progressing     Problem: SAFETY ADULT  Goal: Patient will remain free of falls  Description: INTERVENTIONS:- Educate patient/family on patient safety including physical limitations- Instruct patient to call for assistance with activity - Consult OT/PT to assist with strengthening/mobility - Keep Call bell within reach- Keep bed low and locked with side rails adjusted as appropriate- Keep care items and personal belongings within reach- Initiate and maintain comfort rounds- Make Fall Risk Sign visible to staff- Offer Toileting every 2 Hours, in advance of need- Initiate/Maintain bed/chair alarm - Apply yellow socks and  bracelet for high fall risk patients- Consider moving patient to room near nurses station  Outcome: Progressing  Goal: Maintain or return to baseline ADL function  Description: INTERVENTIONS:-  Assess patient's ability to carry out ADLs; assess patient's baseline for ADL function and identify physical deficits which impact ability to perform ADLs (bathing, care of mouth/teeth, toileting, grooming, dressing, etc.)- Assess/evaluate cause of self-care deficits - Assess range of motion- Assess patient's mobility; develop plan if impaired- Assess patient's need for assistive devices and provide as appropriate- Encourage maximum independence but intervene and supervise when necessary- Involve family in performance of ADLs- Assess for home care needs following discharge - Consider OT consult to assist with ADL evaluation and planning for discharge- Provide patient education as appropriate  Outcome: Progressing  Goal: Maintains/Returns to pre admission functional level  Description: INTERVENTIONS:- Perform AM-PAC 6 Click Basic Mobility/ Daily Activity assessment daily.- Set and communicate daily mobility goal to care team and patient/family/caregiver. - Collaborate with rehabilitation services on mobility goals if consulted- Perform Range of Motion 3 times a day.- Reposition patient every 2 hours if unable to reposition self.- Dangle patient 3 times a day- Stand patient 3 times a day- Ambulate patient 3 times a day- Out of bed to chair 2 times a day -  Out of bed for toileting- Record patient progress and toleration of activity level   Outcome: Progressing     Problem: DISCHARGE PLANNING  Goal: Discharge to home or other facility with appropriate resources  Description: INTERVENTIONS:- Identify barriers to discharge w/patient and caregiver- Arrange for needed discharge resources and transportation as appropriate- Identify discharge learning needs (meds, wound care, etc.)- Arrange for interpretive services to assist at  discharge as needed- Refer to Case Management Department for coordinating discharge planning if the patient needs post-hospital services based on physician/advanced practitioner order or complex needs related to functional status, cognitive ability, or social support system  Outcome: Progressing     Problem: Knowledge Deficit  Goal: Patient/family/caregiver demonstrates understanding of disease process, treatment plan, medications, and discharge instructions  Description: Complete learning assessment and assess knowledge base.Interventions:- Provide teaching at level of understanding- Provide teaching via preferred learning methods  Outcome: Progressing     Problem: Prexisting or High Potential for Compromised Skin Integrity  Goal: Skin integrity is maintained or improved  Description: INTERVENTIONS:- Identify patients at risk for skin breakdown- Assess and monitor skin integrity- Assess and monitor nutrition and hydration status- Monitor labs - Assess for incontinence - Turn and reposition patient- Assist with mobility/ambulation- Relieve pressure over bony prominences- Avoid friction and shearing- Provide appropriate hygiene as needed including keeping skin clean and dry- Evaluate need for skin moisturizer/barrier cream- Collaborate with interdisciplinary team - Patient/family teaching- Consider wound care consult   Outcome: Progressing

## 2025-03-27 NOTE — ASSESSMENT & PLAN NOTE
Lab Results   Component Value Date    EGFR 59 03/27/2025    EGFR 54 03/26/2025    EGFR 54 03/26/2025    CREATININE 1.30 03/27/2025    CREATININE 1.40 (H) 03/26/2025    CREATININE 1.41 (H) 03/26/2025   Baseline around 1.3  Continue to trend daily bmp  Avoid nephrotoxic agents and hypotension   Recheck BMP and magnesium this afternoon with changes to medications

## 2025-03-27 NOTE — NURSING NOTE
Pt more calm, encouraged to not yell at or threaten staff. Verbally agreed to same. Medicated with xanax 1mg po, given extra pudding and tv guide. Remains cooperative with this RN. Encouraged to rest and try watching TV. Instructed to not leave the unit without a staff member. Verbally agreed to same.

## 2025-03-27 NOTE — ASSESSMENT & PLAN NOTE
Wt Readings from Last 3 Encounters:   03/27/25 72.6 kg (160 lb)   02/06/25 73.7 kg (162 lb 6.4 oz)   09/18/24 75.4 kg (166 lb 3.6 oz)     Acute on chronic HFrEF in the setting of known substance abuse with medication non-adherence, EF about 30% on serial echocardiograms.  Echo 3/24/25 with LVEF 25% and mod MR, TR.  Appears euvolemic on furosemide 40 mg once daily and spironolactone 12.5 mg daily.  Optimize electrolytes for K+ > 4, Mag >2

## 2025-03-27 NOTE — ASSESSMENT & PLAN NOTE
History of PAF detected on ICD. Prescribed Eliquis 5 mg BID but admits he is not taking this due to cost.  Now bridging to warfarin with goal INR 2-3. Continue Lovenox bridge until therapeutic for 24 hours  Currently in rate controlled a fib on digoxin 125 mcg 3 times weekly. Check dig level today.   Now on carvedilol 12.5 mg BID.

## 2025-03-27 NOTE — PLAN OF CARE
Problem: CARDIOVASCULAR - ADULT  Goal: Maintains optimal cardiac output and hemodynamic stability  Description: INTERVENTIONS:- Monitor I/O, vital signs and rhythm- Monitor for S/S and trends of decreased cardiac output- Administer and titrate ordered vasoactive medications to optimize hemodynamic stability- Assess quality of pulses, skin color and temperature- Assess for signs of decreased coronary artery perfusion- Instruct patient to report change in severity of symptoms monitor for chest pain , BLACK, SOB palpatations  Outcome: Progressing  Goal: Absence of cardiac dysrhythmias or at baseline rhythm  Description: INTERVENTIONS:- Continuous cardiac monitoring, vital signs, obtain 12 lead EKG if ordered- Administer antiarrhythmic and heart rate control medications as ordered- Monitor electrolytes and administer replacement therapy as ordered  Outcome: Progressing     Problem: PAIN - ADULT  Goal: Verbalizes/displays adequate comfort level or baseline comfort level  Description: Interventions:- Encourage patient to monitor pain and request assistance- Assess pain using appropriate pain scale- Administer analgesics based on type and severity of pain and evaluate response- Implement non-pharmacological measures as appropriate and evaluate response- Consider cultural and social influences on pain and pain management- Notify physician/advanced practitioner if interventions unsuccessful or patient reports new pain  Outcome: Progressing     Problem: INFECTION - ADULT  Goal: Absence or prevention of progression during hospitalization  Description: INTERVENTIONS:- Assess and monitor for signs and symptoms of infection- Monitor lab/diagnostic results- Monitor all insertion sites, i.e. indwelling lines, tubes, and drains- Monitor endotracheal if appropriate and nasal secretions for changes in amount and color- [place] appropriate cooling/warming therapies per order- Administer medications as ordered- Instruct and encourage  patient and family to use good hand hygiene technique- Identify and instruct in appropriate isolation precautions for identified infection/condition  Outcome: Progressing  Goal: Absence of fever/infection during neutropenic period  Description: INTERVENTIONS:- Monitor WBC  Outcome: Progressing     Problem: SAFETY ADULT  Goal: Patient will remain free of falls  Description: INTERVENTIONS:- Educate patient/family on patient safety including physical limitations- Instruct patient to call for assistance with activity - Consult OT/PT to assist with strengthening/mobility - Keep Call bell within reach- Keep bed low and locked with side rails adjusted as appropriate- Keep care items and personal belongings within reach- Initiate and maintain comfort rounds- Make Fall Risk Sign visible to staff- Offer Toileting every 2 Hours, in advance of need- Initiate/Maintain bed/chair alarm - Apply yellow socks and bracelet for high fall risk patients- Consider moving patient to room near nurses station  Outcome: Progressing  Goal: Maintain or return to baseline ADL function  Description: INTERVENTIONS:-  Assess patient's ability to carry out ADLs; assess patient's baseline for ADL function and identify physical deficits which impact ability to perform ADLs (bathing, care of mouth/teeth, toileting, grooming, dressing, etc.)- Assess/evaluate cause of self-care deficits - Assess range of motion- Assess patient's mobility; develop plan if impaired- Assess patient's need for assistive devices and provide as appropriate- Encourage maximum independence but intervene and supervise when necessary- Involve family in performance of ADLs- Assess for home care needs following discharge - Consider OT consult to assist with ADL evaluation and planning for discharge- Provide patient education as appropriate  Outcome: Progressing  Goal: Maintains/Returns to pre admission functional level  Description: INTERVENTIONS:- Perform AM-PAC 6 Click Basic  Mobility/ Daily Activity assessment daily.- Set and communicate daily mobility goal to care team and patient/family/caregiver. - Collaborate with rehabilitation services on mobility goals if consulted- Perform Range of Motion 3 times a day.- Reposition patient every 2 hours if unable to reposition self.- Dangle patient 3 times a day- Stand patient 3 times a day- Ambulate patient 3 times a day- Out of bed to chair 2 times a day -  Out of bed for toileting- Record patient progress and toleration of activity level   Outcome: Progressing     Problem: DISCHARGE PLANNING  Goal: Discharge to home or other facility with appropriate resources  Description: INTERVENTIONS:- Identify barriers to discharge w/patient and caregiver- Arrange for needed discharge resources and transportation as appropriate- Identify discharge learning needs (meds, wound care, etc.)- Arrange for interpretive services to assist at discharge as needed- Refer to Case Management Department for coordinating discharge planning if the patient needs post-hospital services based on physician/advanced practitioner order or complex needs related to functional status, cognitive ability, or social support system  Outcome: Progressing     Problem: Knowledge Deficit  Goal: Patient/family/caregiver demonstrates understanding of disease process, treatment plan, medications, and discharge instructions  Description: Complete learning assessment and assess knowledge base.Interventions:- Provide teaching at level of understanding- Provide teaching via preferred learning methods  Outcome: Progressing     Problem: Prexisting or High Potential for Compromised Skin Integrity  Goal: Skin integrity is maintained or improved  Description: INTERVENTIONS:- Identify patients at risk for skin breakdown- Assess and monitor skin integrity- Assess and monitor nutrition and hydration status- Monitor labs - Assess for incontinence - Turn and reposition patient- Assist with  mobility/ambulation- Relieve pressure over bony prominences- Avoid friction and shearing- Provide appropriate hygiene as needed including keeping skin clean and dry- Evaluate need for skin moisturizer/barrier cream- Collaborate with interdisciplinary team - Patient/family teaching- Consider wound care consult   Outcome: Progressing

## 2025-03-27 NOTE — NURSING NOTE
"Dr. Bynum in to see patient. Pt sitting in recliner, remains agitated and asking for medication for his \"nerves\" Dr. Bynum offered AMA papers for discharge. Pt stated he \"knows what that is and has to think about it\". More calm after time to rest and therapeutic communication.   "

## 2025-03-27 NOTE — ASSESSMENT & PLAN NOTE
Secondary to acute on chronic HFrEF and rapid A-fib  No suspected source of infection -hold IVF resuscitation with severely reduced EF and concern for volume overload  POC cardiac US 3/22 -severely reduced EF with global hypokinesis  Lactic acid initially elevated-cleared  Milrinone/Levophed weaned off ICU  TTE 3/24 -EF 25%  Shock resolved  Continue current treatment.

## 2025-03-27 NOTE — NURSING NOTE
"Cardiology in to see pt. Pt again became agitated about needing to stay until INR therapeutic. Verbally aggressive with staff. Pt walked out of room and off unit. Security and nursing supervisor called. Pt found at elevator by staff. Escorted outside for \"breath of fresh air\" by security and nursing supervisor. Pt returned to room. Remains agitated and kicking furniture and aggressively pushing bedside table. Dr. Bynum notified of patient behavior. Coming to talk with patient.   "

## 2025-03-27 NOTE — ASSESSMENT & PLAN NOTE
S/p IV Cardizem and Lopressor on admission  Avoid further Cardizem with reduced LVEF  Avoid amiodarone given noncompliance with AC  Had rapid response thought to possible V-tach-ICD interrogated: Confirmed A-fib no vtach  Initially endorsed compliance with Eliquis-confirmed has not been taking due to cost  Rate control switch to Coreg 12.5 mg twice daily, digoxin  Cardiology following  Being bridged to Coumadin -cardiology recommends continued Lovenox bridge until therapeutic x 24 hours  Unable to send patient home with bridging therapy due to history of substance abuse  Lab Results   Component Value Date    INR 1.72 (H) 03/27/2025    INR 1.61 (H) 03/26/2025    INR 1.21 (H) 03/25/2025    INR 1.18 03/25/2025

## 2025-03-27 NOTE — ASSESSMENT & PLAN NOTE
Known cardiomyopathy with LVEF 30%   Follows with [facility] for evaluation and management.  LHC in 2020 ruled out CAD.  S/p AICD at [facility] in 2024  GDMT with carvedilol 6.25 mg BID and lisinopril at home.  Presents for heart failure and rapid a fib, + UDS for amphetamines  Patient with known meth use.  Echo 3/24/2025 shows LVEF 25% with severe global hypokinesis.  He admits to inconsistently taking his prescriptions and continued meth use. I urged medication adherence and complete cessation from meth.  Transitioned to carvedilol 12.5 mg BID, lisinopril 2.5 mg daily and spironolactone 12.5 mg daily.  Unable to afford Entresto/Jardiance.  Currently has diuresed well. See discussion under CHF.

## 2025-03-27 NOTE — ASSESSMENT & PLAN NOTE
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

## 2025-03-28 VITALS
OXYGEN SATURATION: 100 % | HEART RATE: 92 BPM | WEIGHT: 159.4 LBS | HEIGHT: 69 IN | SYSTOLIC BLOOD PRESSURE: 91 MMHG | DIASTOLIC BLOOD PRESSURE: 74 MMHG | TEMPERATURE: 96.2 F | BODY MASS INDEX: 23.61 KG/M2 | RESPIRATION RATE: 16 BRPM

## 2025-03-28 LAB
ALBUMIN SERPL BCG-MCNC: 3.4 G/DL (ref 3.5–5)
ALP SERPL-CCNC: 95 U/L (ref 34–104)
ALT SERPL W P-5'-P-CCNC: 26 U/L (ref 7–52)
ANION GAP SERPL CALCULATED.3IONS-SCNC: 7 MMOL/L (ref 4–13)
AST SERPL W P-5'-P-CCNC: 36 U/L (ref 13–39)
BASOPHILS # BLD AUTO: 0.06 THOUSANDS/ÂΜL (ref 0–0.1)
BASOPHILS NFR BLD AUTO: 1 % (ref 0–1)
BILIRUB SERPL-MCNC: 0.57 MG/DL (ref 0.2–1)
BUN SERPL-MCNC: 28 MG/DL (ref 5–25)
CALCIUM ALBUM COR SERPL-MCNC: 9.1 MG/DL (ref 8.3–10.1)
CALCIUM SERPL-MCNC: 8.6 MG/DL (ref 8.4–10.2)
CHLORIDE SERPL-SCNC: 97 MMOL/L (ref 96–108)
CO2 SERPL-SCNC: 31 MMOL/L (ref 21–32)
CREAT SERPL-MCNC: 1.51 MG/DL (ref 0.6–1.3)
EOSINOPHIL # BLD AUTO: 0.17 THOUSAND/ÂΜL (ref 0–0.61)
EOSINOPHIL NFR BLD AUTO: 2 % (ref 0–6)
ERYTHROCYTE [DISTWIDTH] IN BLOOD BY AUTOMATED COUNT: 16.1 % (ref 11.6–15.1)
GFR SERPL CREATININE-BSD FRML MDRD: 49 ML/MIN/1.73SQ M
GLUCOSE SERPL-MCNC: 107 MG/DL (ref 65–140)
HCT VFR BLD AUTO: 47.4 % (ref 36.5–49.3)
HGB BLD-MCNC: 15.4 G/DL (ref 12–17)
IMM GRANULOCYTES # BLD AUTO: 0.03 THOUSAND/UL (ref 0–0.2)
IMM GRANULOCYTES NFR BLD AUTO: 0 % (ref 0–2)
INR PPP: 2.57 (ref 0.85–1.19)
LYMPHOCYTES # BLD AUTO: 1.74 THOUSANDS/ÂΜL (ref 0.6–4.47)
LYMPHOCYTES NFR BLD AUTO: 25 % (ref 14–44)
MCH RBC QN AUTO: 29.2 PG (ref 26.8–34.3)
MCHC RBC AUTO-ENTMCNC: 32.5 G/DL (ref 31.4–37.4)
MCV RBC AUTO: 90 FL (ref 82–98)
MONOCYTES # BLD AUTO: 1 THOUSAND/ÂΜL (ref 0.17–1.22)
MONOCYTES NFR BLD AUTO: 14 % (ref 4–12)
NEUTROPHILS # BLD AUTO: 4.03 THOUSANDS/ÂΜL (ref 1.85–7.62)
NEUTS SEG NFR BLD AUTO: 58 % (ref 43–75)
NRBC BLD AUTO-RTO: 0 /100 WBCS
PLATELET # BLD AUTO: 267 THOUSANDS/UL (ref 149–390)
PMV BLD AUTO: 9.9 FL (ref 8.9–12.7)
POTASSIUM SERPL-SCNC: 4 MMOL/L (ref 3.5–5.3)
PROT SERPL-MCNC: 6.7 G/DL (ref 6.4–8.4)
PROTHROMBIN TIME: 27.6 SECONDS (ref 12.3–15)
RBC # BLD AUTO: 5.28 MILLION/UL (ref 3.88–5.62)
SODIUM SERPL-SCNC: 135 MMOL/L (ref 135–147)
WBC # BLD AUTO: 7.03 THOUSAND/UL (ref 4.31–10.16)

## 2025-03-28 PROCEDURE — 80053 COMPREHEN METABOLIC PANEL: CPT | Performed by: FAMILY MEDICINE

## 2025-03-28 PROCEDURE — 85025 COMPLETE CBC W/AUTO DIFF WBC: CPT | Performed by: FAMILY MEDICINE

## 2025-03-28 PROCEDURE — 99239 HOSP IP/OBS DSCHRG MGMT >30: CPT | Performed by: FAMILY MEDICINE

## 2025-03-28 PROCEDURE — 85610 PROTHROMBIN TIME: CPT

## 2025-03-28 RX ORDER — LANOLIN ALCOHOL/MO/W.PET/CERES
400 CREAM (GRAM) TOPICAL DAILY
Qty: 30 TABLET | Refills: 0 | Status: SHIPPED | OUTPATIENT
Start: 2025-03-28

## 2025-03-28 RX ORDER — LISINOPRIL 2.5 MG/1
2.5 TABLET ORAL DAILY
Qty: 30 TABLET | Refills: 0 | Status: SHIPPED | OUTPATIENT
Start: 2025-03-28

## 2025-03-28 RX ORDER — WARFARIN SODIUM 5 MG/1
TABLET ORAL
Qty: 30 TABLET | Refills: 0 | Status: SHIPPED | OUTPATIENT
Start: 2025-03-28

## 2025-03-28 RX ORDER — DIGOXIN 125 MCG
125 TABLET ORAL 3 TIMES WEEKLY
Qty: 30 TABLET | Refills: 0 | Status: SHIPPED | OUTPATIENT
Start: 2025-03-28

## 2025-03-28 RX ORDER — CARVEDILOL 12.5 MG/1
12.5 TABLET ORAL 2 TIMES DAILY WITH MEALS
Qty: 60 TABLET | Refills: 1 | Status: SHIPPED | OUTPATIENT
Start: 2025-03-28 | End: 2025-05-27

## 2025-03-28 RX ORDER — ALBUTEROL SULFATE 90 UG/1
2 INHALANT RESPIRATORY (INHALATION) EVERY 6 HOURS PRN
Qty: 8.5 G | Refills: 0 | Status: SHIPPED | OUTPATIENT
Start: 2025-03-28

## 2025-03-28 RX ORDER — SPIRONOLACTONE 25 MG/1
12.5 TABLET ORAL DAILY
Qty: 30 TABLET | Refills: 0 | Status: SHIPPED | OUTPATIENT
Start: 2025-03-28

## 2025-03-28 RX ORDER — NICOTINE 21 MG/24HR
1 PATCH, TRANSDERMAL 24 HOURS TRANSDERMAL DAILY
Qty: 28 PATCH | Refills: 0 | Status: SHIPPED | OUTPATIENT
Start: 2025-03-28

## 2025-03-28 RX ORDER — FUROSEMIDE 40 MG/1
40 TABLET ORAL DAILY
Qty: 30 TABLET | Refills: 0 | Status: SHIPPED | OUTPATIENT
Start: 2025-03-28 | End: 2025-04-27

## 2025-03-28 RX ORDER — WARFARIN SODIUM 5 MG/1
5 TABLET ORAL
Status: DISCONTINUED | OUTPATIENT
Start: 2025-03-28 | End: 2025-03-28 | Stop reason: HOSPADM

## 2025-03-28 RX ADMIN — NICOTINE 1 PATCH: 21 PATCH, EXTENDED RELEASE TRANSDERMAL at 08:58

## 2025-03-28 RX ADMIN — CARVEDILOL 12.5 MG: 12.5 TABLET, FILM COATED ORAL at 07:46

## 2025-03-28 RX ADMIN — DIGOXIN 125 MCG: 125 TABLET ORAL at 08:59

## 2025-03-28 RX ADMIN — PANTOPRAZOLE SODIUM 40 MG: 40 TABLET, DELAYED RELEASE ORAL at 08:59

## 2025-03-28 RX ADMIN — MAGNESIUM OXIDE TAB 400 MG (241.3 MG ELEMENTAL MG) 400 MG: 400 (241.3 MG) TAB at 08:59

## 2025-03-28 NOTE — CASE MANAGEMENT
Case Management Discharge Planning Note    Patient name Birtton Batista  Location /-01 MRN [id number]  : 1965 Date 3/28/2025       Current Admission Date: 3/22/2025  Current Admission Diagnosis:Cardiogenic shock ([facility])   Patient Active Problem List    Diagnosis Date Noted Date Diagnosed    Scleroderma ([facility]) 2025     Hyperkalemia 2025     Stage 3a chronic kidney disease (CKD) ([facility]) 2025     Cardiogenic shock ([facility]) 2025     Atrial fibrillation with RVR ([facility]) 2025     Syncope 2024     Chest wall pain 2024     Chronic HFrEF (heart failure with reduced ejection fraction) ([facility]) 2024     Primary hypertension 2024     Hypomagnesemia 2023     Methamphetamine abuse ([facility]) 2023     Tobacco abuse 2023     D-dimer, elevated 2023     Cardiomyopathy ([facility]) 10/09/2023     IRINEO (acute kidney injury) ([facility]) 10/08/2023     Acute on chronic combined systolic and diastolic congestive heart failure ([facility]) 10/07/2023     Hypertensive emergency 10/07/2023     Anxiety      Depression      Other emphysema ([facility])      GERD (gastroesophageal reflux disease)      Gout      Hypertensive urgency      Neuropathy      Raynaud's disease      Unilateral inguinal hernia, without obstruction or gangrene, not specified as recurrent      PSS (progressive systemic sclerosis) ([facility]) 10/25/2021     Near syncope 10/25/2021     Osteomyelitis ([facility]) 10/25/2021     Elevated troponin 10/25/2021     Acute encephalopathy 10/25/2021     Hyponatremia 10/24/2021       LOS (days): 6  Geometric Mean LOS (GMLOS) (days): 4.9  Days to GMLOS:-1.4     OBJECTIVE:  Risk of Unplanned Readmission Score: 35.05         Current admission status: Inpatient   Preferred Pharmacy:   [facility] - [place] - [address]  [address] [address]  [place] [address]  Phone: [phone] Fax: [phone]    [facility]  JADI[place] Murillo - [address] [place] [address]   [facility],  First [facility] [address]  Phone: [phone] Fax: [phone]    [id number] [place], [place] - [address] [place] [id number]  Phone: [phone] Fax: [phone]    Primary Care Provider: Juan Galindo DO    Primary Insurance: [employer]  Secondary Insurance:     DISCHARGE DETAILS:     CM spoke with staff at [facility], who sts pt will be followed by his PCP (Dr Herron) for his INR and Coumadin dosing.

## 2025-03-28 NOTE — NURSING NOTE
Pt belongings packed and brother in law at bedside to take patient home. Discharge instructions and pt teaching booklets for chf and copd given to patient. Verbalized understanding but lack of attention present. Pt taken via w/c to discharge home with family. Pt provided list of agencies for drug, alcohol and mental health assistance.

## 2025-03-28 NOTE — NURSING NOTE
Pt s sister Arin called and reviewed discharge instructions including CHF and COPD teaching. Verbalized understanding. Reviewed discharge medications, prescriptions and where to  medications. Coumadin teaching done. Reviewed discharge appointments and verbalized will help patient make needed appointments and drive him there. Given ICU phone number to call if after reviewing materials sent home with patient she could call.

## 2025-03-28 NOTE — TREATMENT PLAN
Telemetry review: ventricular paced rhythm with rates  bpm.  INR now therapeutic at 2.5.  Discharge home on current regimen. Needs BMP within 3-5 days. INR draw is scheduled with [facility]. I will reach out to his primary cardiologist through [facility] to arrange for close hospital follow up.  Nichole PLASENCIA

## 2025-03-28 NOTE — ASSESSMENT & PLAN NOTE
S/p IV Cardizem and Lopressor on admission  Avoid further Cardizem with reduced LVEF  Avoid amiodarone given noncompliance with AC  Had rapid response thought to possible V-tach-ICD interrogated: Confirmed A-fib no vtach  Initially endorsed compliance with Eliquis-confirmed has not been taking due to cost  Rate control switch to Coreg 12.5 mg twice daily, digoxin  INR is subtherapeutic, on discharge date, INR is 2.57, patient is discharging with Coumadin 5 mg with follow-up with primary cardiology for further recommendations.  Bleeding precaution provided

## 2025-03-28 NOTE — ASSESSMENT & PLAN NOTE
Managed on amlodipine and losartan  Current inpatient regimen of Lasix 40 mg twice daily and Coreg 12.5 mg twice daily  Changed to Lasix 40 mg once daily, continue Coreg 12.5 mg twice daily, add lisinopril 2.5 mg and spironolactone 12.5 mg today

## 2025-03-28 NOTE — DISCHARGE SUMMARY
Discharge Summary - Hospitalist   Name: Britton Batista 59 y.o. male I MRN: [id number]  Unit/Bed#: -01 I Date of Admission: 3/22/2025   Date of Service: 3/28/2025 I Hospital Day: 6     Assessment & Plan  Atrial fibrillation with RVR (HCC)  S/p IV Cardizem and Lopressor on admission  Avoid further Cardizem with reduced LVEF  Avoid amiodarone given noncompliance with AC  Had rapid response thought to possible V-tach-ICD interrogated: Confirmed A-fib no vtach  Initially endorsed compliance with Eliquis-confirmed has not been taking due to cost  Rate control switch to Coreg 12.5 mg twice daily, digoxin  INR is subtherapeutic, on discharge date, INR is 2.57, patient is discharging with Coumadin 5 mg with follow-up with primary cardiology for further recommendations.  Bleeding precaution provided    Cardiogenic shock (HCC)  Secondary to acute on chronic HFrEF and rapid A-fib  No suspected source of infection -hold IVF resuscitation with severely reduced EF and concern for volume overload  POC cardiac US 3/22 -severely reduced EF with global hypokinesis  Lactic acid initially elevated-cleared  Milrinone/Levophed weaned off ICU  TTE 3/24 -EF 25%  Shock resolved  Continue current treatment.  Acute on chronic combined systolic and diastolic congestive heart failure (HCC)  Wt Readings from Last 3 Encounters:   03/28/25 72.3 kg (159 lb 6.4 oz)   02/06/25 73.7 kg (162 lb 6.4 oz)   09/18/24 75.4 kg (166 lb 3.6 oz)     Secondary to nonischemic cardiomyopathy due to methamphetamine use and nonadherence to medication regimen  Patient noncompliant with home GDMT secondary to financial issues: Was prescribed Jardiance, Coreg, Lasix and Entresto -unable to afford Jardiance and Entresto  TTE with LVEF 25%  Current GDMT: Changed to Lasix 40 mg once daily, continue Coreg 12.5 mg twice daily, add lisinopril 2.5 mg and spironolactone 12.5 mg today  Methamphetamine abuse (HCC)  UDS + for methamphetamines and THC  Patient declining  methamphetamine use   Primary hypertension  Managed on amlodipine and losartan  Current inpatient regimen of Lasix 40 mg twice daily and Coreg 12.5 mg twice daily  Changed to Lasix 40 mg once daily, continue Coreg 12.5 mg twice daily, add lisinopril 2.5 mg and spironolactone 12.5 mg today  Stage 3a chronic kidney disease (CKD) (HCC)  Lab Results   Component Value Date    EGFR 49 03/28/2025    EGFR 59 03/27/2025    EGFR 54 03/26/2025    CREATININE 1.51 (H) 03/28/2025    CREATININE 1.30 03/27/2025    CREATININE 1.40 (H) 03/26/2025   Baseline around 1.3  Patient remain on diuretics, monitor BMP in 2 to 3 days  Other emphysema (HCC)  Smokes 1 pack a day  Not in acute exacerbation  DuoNebs and budesonide nebs     Scleroderma (HCC)  Follows with [facility] rheumatology  Cutaneous manifestations: hyper/hypopigmentation  Digital vasculopathy (Raynaud's phenomenon)   Pulmonary manifestations: pulm HTN on prior echo PASP 42, ILD     Discharging Physician / Practitioner: Conrad Bynum MD  PCP: Juan Galindo DO  Admission Date:   Admission Orders (From admission, onward)       Ordered        03/22/25 0448  INPATIENT ADMISSION  Once                          Discharge Date: 03/28/25    Medical Problems       Resolved Problems  Date Reviewed: 3/24/2025          Resolved    Metabolic encephalopathy 3/24/2025     Resolved by  Susana Ibarra MD          Consultations During Hospital Stay:  Critical care, cardiology    Procedures Performed:   XR chest portable ICU   Final Result by Marian Verde MD (03/23 0609)      Right jugular catheter in upper SVC with no pneumothorax.      Mild pulmonary venous congestion.            Workstation performed: [id number]         CT chest wo contrast   Final Result by Nigel Price MD (03/22 8609)      1. No acute intrathoracic abnormality.      2.Redemonstrated marked cardiomegaly. No pulmonary edema.      3. Stable pulmonary nodules measuring up to 7 mm in the left lower lobe.       4. New mediastinal lymphadenopathy, nonspecific and may be reactive.      5. New mesenteric and periportal edema, as well as perihepatic fluid. These findings are nonspecific and may be secondary to third spacing. Consider further evaluation with a dedicated CT of the abdomen and pelvis for more definitive characterization.               Workstation performed: [id number]         XR chest 1 view portable   ED Interpretation by Bhargav Antonio DO (03/22 1306)   Cardiomegaly and pulmonary vascular congestion      Final Result by Marian Verde MD (03/22 3416)      Mild pulmonary venous congestion.            Workstation performed: [id number]           ECHO:   Patient appears to be in atrial fibrillation.    Left Ventricle: Left ventricular cavity size is moderately dilated. There is moderate concentric hypertrophy. There is concentric remodeling. The left ventricular ejection fraction is 25%. Systolic function is severely reduced.  Dyssynchronous LV contractility is noted.  Global longitudinal strain is significantly reduced at -5.2% There is severe global hypokinesis.    Right Ventricle: Systolic function is mildly reduced.    Left Atrium: The atrium is moderately dilated.    Right Atrium: The atrium is moderately dilated.    Mitral Valve: There is mild thickening. The leaflets exhibit normal mobility. There is mild annular calcification. There is moderate regurgitation with an eccentrically directed jet.    Tricuspid Valve: There is moderate regurgitation.  RVSP is 30 to 35 mmHg.    Pericardium: There is a trivial pericardial effusion posterior to the heart.    Compared to August 2024 study report, LV function remains severely depressed and mitral and tricuspid regurgitation are at least moderate.    Significant Findings / Test Results:   Lab Results   Component Value Date    WBC 7.03 03/28/2025    HGB 15.4 03/28/2025    HCT 47.4 03/28/2025    MCV 90 03/28/2025     03/28/2025     Lab Results   Component  Value Date    SODIUM 135 03/28/2025    K 4.0 03/28/2025    CL 97 03/28/2025    CO2 31 03/28/2025    AGAP 7 03/28/2025    BUN 28 (H) 03/28/2025    CREATININE 1.51 (H) 03/28/2025    GLUC 107 03/28/2025    GLUF 90 03/03/2023    CALCIUM 8.6 03/28/2025    AST 36 03/28/2025    ALT 26 03/28/2025    ALKPHOS 95 03/28/2025    TP 6.7 03/28/2025    TBILI 0.57 03/28/2025    EGFR 49 03/28/2025         Incidental Findings:   As mentioned above    Test Results Pending at Discharge (will require follow up):   none     Outpatient Tests Requested:  BMP and INR in 2 to 3 days with PCP/primary cardiology    Complications:  none    Reason for Admission: Shortness of breath    Hospital Course:     Britton Batista is a 59 y.o. male patient who originally presented to the hospital on 3/22/2025 due to shortness of breath secondary to CHF.  Patient initially came with CHF exacerbation, went to cardiogenic shock, level of care updated to critical care, received vasopressor for diuresis with the treatment, condition improved, patient downgraded to medical service.  Patient also evaluated by cardiology, patient has significantly reduced ejection fraction.  Patient also has some insurance issue for which patient is not able to afford Eliquis Entresto and Jardiance.  For that reason, patient is discharging home with carvedilol, lisinopril, spironolactone and warfarin.  On discharge date, INR remained therapeutic.  Bleeding precaution provided.  All lab results commending findings, treatment plan option discussed in details with patient and his sister over the phone.  Verbalized understanding agrees.    Patient advised to follow-up with his PCP as well as primary cardiologist outpatient basis.      Please see above list of diagnoses and related plan for additional information.     Condition at Discharge: stable     Discharge Day Visit / Exam:     Subjective: Evaluated during the run.  Resting comfortably but denies any significant  "complaint.  Vitals: Blood Pressure: 100/66 (03/28/25 0700)  Pulse: 86 (03/28/25 0700)  Temperature: (!) 96.2 °F (35.7 °C) (03/28/25 0659)  Temp Source: Temporal (03/28/25 0659)  Respirations: 16 (03/28/25 0659)  Height: 5' 9\" (175.3 cm) (03/24/25 0830)  Weight - Scale: 72.3 kg (159 lb 6.4 oz) (03/28/25 0500)  SpO2: 100 % (03/28/25 0659)  Exam:   Physical Exam  Vitals and nursing note reviewed.   Constitutional:       Appearance: Normal appearance. He is not ill-appearing or diaphoretic.   HENT:      Nose: Nose normal. No congestion.      Mouth/Throat:      Mouth: Mucous membranes are moist.      Pharynx: No oropharyngeal exudate or posterior oropharyngeal erythema.   Eyes:      General: No scleral icterus.        Left eye: No discharge.      Extraocular Movements: Extraocular movements intact.      Conjunctiva/sclera: Conjunctivae normal.      Pupils: Pupils are equal, round, and reactive to light.   Cardiovascular:      Rate and Rhythm: Normal rate.      Heart sounds:      No friction rub. No gallop.   Pulmonary:      Effort: Pulmonary effort is normal. No respiratory distress.      Breath sounds: No stridor. No wheezing or rhonchi.   Abdominal:      General: There is no distension.      Palpations: There is no mass.      Tenderness: There is no abdominal tenderness.      Hernia: No hernia is present.   Musculoskeletal:      Right lower leg: No edema.      Left lower leg: No edema.   Neurological:      Mental Status: He is alert and oriented to person, place, and time.      Cranial Nerves: No cranial nerve deficit.      Sensory: No sensory deficit.      Motor: No weakness.      Coordination: Coordination normal.         Discussion with Family: Sister    Discharge instructions/Information to patient and family:   See after visit summary for information provided to patient and family.      Provisions for Follow-Up Care:  See after visit summary for information related to follow-up care and any pertinent home health " orders.      Disposition:     Home    For Discharges to [facility] SNF:   Not Applicable to this Patient - Not Applicable to this Patient    Planned Readmission: If condition get worse     Discharge Statement:   Greater than 50% of the total time was spent examining patient, answering all patient questions, arranging and discussing plan of care with patient as well as directly providing post-discharge instructions.  Additional time then spent on discharge activities.    Discharge Medications:  See after visit summary for reconciled discharge medications provided to patient and family.      ** Please Note: This note has been constructed using a voice recognition system **

## 2025-03-28 NOTE — ASSESSMENT & PLAN NOTE
Lab Results   Component Value Date    EGFR 49 03/28/2025    EGFR 59 03/27/2025    EGFR 54 03/26/2025    CREATININE 1.51 (H) 03/28/2025    CREATININE 1.30 03/27/2025    CREATININE 1.40 (H) 03/26/2025   Baseline around 1.3  Patient remain on diuretics, monitor BMP in 2 to 3 days

## 2025-03-28 NOTE — PLAN OF CARE
Problem: CARDIOVASCULAR - ADULT  Goal: Maintains optimal cardiac output and hemodynamic stability  Description: INTERVENTIONS:- Monitor I/O, vital signs and rhythm- Monitor for S/S and trends of decreased cardiac output- Administer and titrate ordered vasoactive medications to optimize hemodynamic stability- Assess quality of pulses, skin color and temperature- Assess for signs of decreased coronary artery perfusion- Instruct patient to report change in severity of symptoms monitor for chest pain , BLACK, SOB palpatations  Outcome: Progressing  Goal: Absence of cardiac dysrhythmias or at baseline rhythm  Description: INTERVENTIONS:- Continuous cardiac monitoring, vital signs, obtain 12 lead EKG if ordered- Administer antiarrhythmic and heart rate control medications as ordered- Monitor electrolytes and administer replacement therapy as ordered  Outcome: Progressing     Problem: PAIN - ADULT  Goal: Verbalizes/displays adequate comfort level or baseline comfort level  Description: Interventions:- Encourage patient to monitor pain and request assistance- Assess pain using appropriate pain scale- Administer analgesics based on type and severity of pain and evaluate response- Implement non-pharmacological measures as appropriate and evaluate response- Consider cultural and social influences on pain and pain management- Notify physician/advanced practitioner if interventions unsuccessful or patient reports new pain  Outcome: Progressing     Problem: INFECTION - ADULT  Goal: Absence or prevention of progression during hospitalization  Description: INTERVENTIONS:- Assess and monitor for signs and symptoms of infection- Monitor lab/diagnostic results- Monitor all insertion sites, i.e. indwelling lines, tubes, and drains- Monitor endotracheal if appropriate and nasal secretions for changes in amount and color- Crocker appropriate cooling/warming therapies per order- Administer medications as ordered- Instruct and encourage  patient and family to use good hand hygiene technique- Identify and instruct in appropriate isolation precautions for identified infection/condition  Outcome: Progressing  Goal: Absence of fever/infection during neutropenic period  Description: INTERVENTIONS:- Monitor WBC  Outcome: Progressing     Problem: SAFETY ADULT  Goal: Patient will remain free of falls  Description: INTERVENTIONS:- Educate patient/family on patient safety including physical limitations- Instruct patient to call for assistance with activity - Consult OT/PT to assist with strengthening/mobility - Keep Call bell within reach- Keep bed low and locked with side rails adjusted as appropriate- Keep care items and personal belongings within reach- Initiate and maintain comfort rounds- Make Fall Risk Sign visible to staff- Offer Toileting every 2 Hours, in advance of need- Initiate/Maintain bed/chair alarm - Apply yellow socks and bracelet for high fall risk patients- Consider moving patient to room near nurses station  Outcome: Progressing  Goal: Maintain or return to baseline ADL function  Description: INTERVENTIONS:-  Assess patient's ability to carry out ADLs; assess patient's baseline for ADL function and identify physical deficits which impact ability to perform ADLs (bathing, care of mouth/teeth, toileting, grooming, dressing, etc.)- Assess/evaluate cause of self-care deficits - Assess range of motion- Assess patient's mobility; develop plan if impaired- Assess patient's need for assistive devices and provide as appropriate- Encourage maximum independence but intervene and supervise when necessary- Involve family in performance of ADLs- Assess for home care needs following discharge - Consider OT consult to assist with ADL evaluation and planning for discharge- Provide patient education as appropriate  Outcome: Progressing  Goal: Maintains/Returns to pre admission functional level  Description: INTERVENTIONS:- Perform AM-PAC 6 Click Basic  Mobility/ Daily Activity assessment daily.- Set and communicate daily mobility goal to care team and patient/family/caregiver. - Collaborate with rehabilitation services on mobility goals if consulted- Perform Range of Motion 3 times a day.- Reposition patient every 2 hours if unable to reposition self.- Dangle patient 3 times a day- Stand patient 3 times a day- Ambulate patient 3 times a day- Out of bed to chair 2 times a day -  Out of bed for toileting- Record patient progress and toleration of activity level   Outcome: Progressing     Problem: DISCHARGE PLANNING  Goal: Discharge to home or other facility with appropriate resources  Description: INTERVENTIONS:- Identify barriers to discharge w/patient and caregiver- Arrange for needed discharge resources and transportation as appropriate- Identify discharge learning needs (meds, wound care, etc.)- Arrange for interpretive services to assist at discharge as needed- Refer to Case Management Department for coordinating discharge planning if the patient needs post-hospital services based on physician/advanced practitioner order or complex needs related to functional status, cognitive ability, or social support system  Outcome: Progressing     Problem: Knowledge Deficit  Goal: Patient/family/caregiver demonstrates understanding of disease process, treatment plan, medications, and discharge instructions  Description: Complete learning assessment and assess knowledge base.Interventions:- Provide teaching at level of understanding- Provide teaching via preferred learning methods  Outcome: Progressing     Problem: Prexisting or High Potential for Compromised Skin Integrity  Goal: Skin integrity is maintained or improved  Description: INTERVENTIONS:- Identify patients at risk for skin breakdown- Assess and monitor skin integrity- Assess and monitor nutrition and hydration status- Monitor labs - Assess for incontinence - Turn and reposition patient- Assist with  mobility/ambulation- Relieve pressure over bony prominences- Avoid friction and shearing- Provide appropriate hygiene as needed including keeping skin clean and dry- Evaluate need for skin moisturizer/barrier cream- Collaborate with interdisciplinary team - Patient/family teaching- Consider wound care consult   Outcome: Progressing

## 2025-03-28 NOTE — ASSESSMENT & PLAN NOTE
Wt Readings from Last 3 Encounters:   03/28/25 72.3 kg (159 lb 6.4 oz)   02/06/25 73.7 kg (162 lb 6.4 oz)   09/18/24 75.4 kg (166 lb 3.6 oz)     Secondary to nonischemic cardiomyopathy due to methamphetamine use and nonadherence to medication regimen  Patient noncompliant with home GDMT secondary to financial issues: Was prescribed Jardiance, Coreg, Lasix and Entresto -unable to afford Jardiance and Entresto  TTE with LVEF 25%  Current GDMT: Changed to Lasix 40 mg once daily, continue Coreg 12.5 mg twice daily, add lisinopril 2.5 mg and spironolactone 12.5 mg today

## 2025-03-28 NOTE — PLAN OF CARE
Problem: CARDIOVASCULAR - ADULT  Goal: Maintains optimal cardiac output and hemodynamic stability  Description: INTERVENTIONS:- Monitor I/O, vital signs and rhythm- Monitor for S/S and trends of decreased cardiac output- Administer and titrate ordered vasoactive medications to optimize hemodynamic stability- Assess quality of pulses, skin color and temperature- Assess for signs of decreased coronary artery perfusion- Instruct patient to report change in severity of symptoms monitor for chest pain , BLACK, SOB palpatations  Outcome: Progressing  Goal: Absence of cardiac dysrhythmias or at baseline rhythm  Description: INTERVENTIONS:- Continuous cardiac monitoring, vital signs, obtain 12 lead EKG if ordered- Administer antiarrhythmic and heart rate control medications as ordered- Monitor electrolytes and administer replacement therapy as ordered  Outcome: Progressing     Problem: PAIN - ADULT  Goal: Verbalizes/displays adequate comfort level or baseline comfort level  Description: Interventions:- Encourage patient to monitor pain and request assistance- Assess pain using appropriate pain scale- Administer analgesics based on type and severity of pain and evaluate response- Implement non-pharmacological measures as appropriate and evaluate response- Consider cultural and social influences on pain and pain management- Notify physician/advanced practitioner if interventions unsuccessful or patient reports new pain  Outcome: Progressing     Problem: INFECTION - ADULT  Goal: Absence or prevention of progression during hospitalization  Description: INTERVENTIONS:- Assess and monitor for signs and symptoms of infection- Monitor lab/diagnostic results- Monitor all insertion sites, i.e. indwelling lines, tubes, and drains- Monitor endotracheal if appropriate and nasal secretions for changes in amount and color- [place] appropriate cooling/warming therapies per order- Administer medications as ordered- Instruct and encourage  patient and family to use good hand hygiene technique- Identify and instruct in appropriate isolation precautions for identified infection/condition  Outcome: Progressing  Goal: Absence of fever/infection during neutropenic period  Description: INTERVENTIONS:- Monitor WBC  Outcome: Progressing     Problem: SAFETY ADULT  Goal: Patient will remain free of falls  Description: INTERVENTIONS:- Educate patient/family on patient safety including physical limitations- Instruct patient to call for assistance with activity - Consult OT/PT to assist with strengthening/mobility - Keep Call bell within reach- Keep bed low and locked with side rails adjusted as appropriate- Keep care items and personal belongings within reach- Initiate and maintain comfort rounds- Make Fall Risk Sign visible to staff- Offer Toileting every 2 Hours, in advance of need- Initiate/Maintain bed/chair alarm - Apply yellow socks and bracelet for high fall risk patients- Consider moving patient to room near nurses station  Outcome: Progressing  Goal: Maintain or return to baseline ADL function  Description: INTERVENTIONS:-  Assess patient's ability to carry out ADLs; assess patient's baseline for ADL function and identify physical deficits which impact ability to perform ADLs (bathing, care of mouth/teeth, toileting, grooming, dressing, etc.)- Assess/evaluate cause of self-care deficits - Assess range of motion- Assess patient's mobility; develop plan if impaired- Assess patient's need for assistive devices and provide as appropriate- Encourage maximum independence but intervene and supervise when necessary- Involve family in performance of ADLs- Assess for home care needs following discharge - Consider OT consult to assist with ADL evaluation and planning for discharge- Provide patient education as appropriate  Outcome: Progressing  Goal: Maintains/Returns to pre admission functional level  Description: INTERVENTIONS:- Perform AM-PAC 6 Click Basic  Mobility/ Daily Activity assessment daily.- Set and communicate daily mobility goal to care team and patient/family/caregiver. - Collaborate with rehabilitation services on mobility goals if consulted- Perform Range of Motion 3 times a day.- Reposition patient every 2 hours if unable to reposition self.- Dangle patient 3 times a day- Stand patient 3 times a day- Ambulate patient 3 times a day- Out of bed to chair 2 times a day -  Out of bed for toileting- Record patient progress and toleration of activity level   Outcome: Progressing     Problem: DISCHARGE PLANNING  Goal: Discharge to home or other facility with appropriate resources  Description: INTERVENTIONS:- Identify barriers to discharge w/patient and caregiver- Arrange for needed discharge resources and transportation as appropriate- Identify discharge learning needs (meds, wound care, etc.)- Arrange for interpretive services to assist at discharge as needed- Refer to Case Management Department for coordinating discharge planning if the patient needs post-hospital services based on physician/advanced practitioner order or complex needs related to functional status, cognitive ability, or social support system  Outcome: Progressing     Problem: Knowledge Deficit  Goal: Patient/family/caregiver demonstrates understanding of disease process, treatment plan, medications, and discharge instructions  Description: Complete learning assessment and assess knowledge base.Interventions:- Provide teaching at level of understanding- Provide teaching via preferred learning methods  Outcome: Progressing     Problem: Prexisting or High Potential for Compromised Skin Integrity  Goal: Skin integrity is maintained or improved  Description: INTERVENTIONS:- Identify patients at risk for skin breakdown- Assess and monitor skin integrity- Assess and monitor nutrition and hydration status- Monitor labs - Assess for incontinence - Turn and reposition patient- Assist with  mobility/ambulation- Relieve pressure over bony prominences- Avoid friction and shearing- Provide appropriate hygiene as needed including keeping skin clean and dry- Evaluate need for skin moisturizer/barrier cream- Collaborate with interdisciplinary team - Patient/family teaching- Consider wound care consult   Outcome: Progressing

## 2025-03-31 NOTE — UTILIZATION REVIEW
NOTIFICATION OF ADMISSION DISCHARGE   This is a Notification of Discharge from [facility]. Please be advised that this patient has been discharge from our facility. Below you will find the admission and discharge date and time including the patient’s disposition.   UTILIZATION REVIEW CONTACT:  Utilization Review Assistants  Network Utilization Review Department  Phone: [phone] x carefully listen to the prompts. All voicemails are confidential.  Email: [email or web address]     ADMISSION INFORMATION  PRESENTATION DATE: 3/22/2025  3:39 AM  OBERVATION ADMISSION DATE: N/A  INPATIENT ADMISSION DATE: 3/22/25  4:48 AM   DISCHARGE DATE: 3/28/2025 12:53 PM   DISPOSITION:Home/Self Care    Network Utilization Review Department  ATTENTION: Please call with any questions or concerns to [phone] and carefully listen to the prompts so that you are directed to the right person. All voicemails are confidential.   For Discharge needs, contact Care Management DC Support Team at [phone] opt. 2  Send all requests for admission clinical reviews, approved or denied determinations and any other requests to dedicated fax number below belonging to the campus where the patient is receiving treatment. List of dedicated fax numbers for the Facilities:  FACILITY NAME UR FAX NUMBER   ADMISSION DENIALS (Administrative/Medical Necessity) [phone]   DISCHARGE SUPPORT TEAM ([facility]) [phone]   PARENT CHILD HEALTH (Maternity/NICU/Pediatrics) [phone]   [facility] [phone]   [facility] [phone]   [facility] [phone]   [facility] [phone]   [facility] [phone]   [facility] [phone]   [facility] [phone]   [facility] [phone]   [facility]  [facility] [phone]   [facility] [phone]   [facility] [phone]   [facility] [phone]

## 2025-04-08 ENCOUNTER — APPOINTMENT (EMERGENCY)
Dept: CT IMAGING | Facility: HOSPITAL | Age: 60
DRG: 291 | End: 2025-04-08
Payer: COMMERCIAL

## 2025-04-08 ENCOUNTER — HOSPITAL ENCOUNTER (EMERGENCY)
Facility: HOSPITAL | Age: 60
Discharge: HOME | DRG: 291 | End: 2025-04-08
Attending: EMERGENCY MEDICINE
Payer: COMMERCIAL

## 2025-04-08 VITALS
DIASTOLIC BLOOD PRESSURE: 99 MMHG | OXYGEN SATURATION: 95 % | TEMPERATURE: 97.1 F | HEIGHT: 69 IN | SYSTOLIC BLOOD PRESSURE: 119 MMHG | HEART RATE: 126 BPM | RESPIRATION RATE: 20 BRPM | WEIGHT: 177.69 LBS | BODY MASS INDEX: 26.32 KG/M2

## 2025-04-08 DIAGNOSIS — I50.9 CHF EXACERBATION (HCC): Primary | ICD-10-CM

## 2025-04-08 LAB
ALBUMIN SERPL BCG-MCNC: 3.6 G/DL (ref 3.5–5)
ALP SERPL-CCNC: 110 U/L (ref 34–104)
ALT SERPL W P-5'-P-CCNC: 24 U/L (ref 7–52)
ANION GAP SERPL CALCULATED.3IONS-SCNC: 5 MMOL/L (ref 4–13)
APTT PPP: 42 SECONDS (ref 23–34)
AST SERPL W P-5'-P-CCNC: 31 U/L (ref 13–39)
ATRIAL RATE: 288 BPM
BASE EX.OXY STD BLDV CALC-SCNC: 47.9 % (ref 60–80)
BASE EXCESS BLDV CALC-SCNC: -4 MMOL/L
BASOPHILS # BLD AUTO: 0.05 THOUSANDS/ÂΜL (ref 0–0.1)
BASOPHILS NFR BLD AUTO: 1 % (ref 0–1)
BILIRUB SERPL-MCNC: 0.52 MG/DL (ref 0.2–1)
BNP SERPL-MCNC: 1698 PG/ML (ref 0–100)
BUN SERPL-MCNC: 35 MG/DL (ref 5–25)
CALCIUM SERPL-MCNC: 9.2 MG/DL (ref 8.4–10.2)
CARDIAC TROPONIN I PNL SERPL HS: 94 NG/L (ref ?–50)
CHLORIDE SERPL-SCNC: 104 MMOL/L (ref 96–108)
CO2 SERPL-SCNC: 25 MMOL/L (ref 21–32)
CREAT SERPL-MCNC: 1.68 MG/DL (ref 0.6–1.3)
EOSINOPHIL # BLD AUTO: 0.14 THOUSAND/ÂΜL (ref 0–0.61)
EOSINOPHIL NFR BLD AUTO: 2 % (ref 0–6)
ERYTHROCYTE [DISTWIDTH] IN BLOOD BY AUTOMATED COUNT: 16.9 % (ref 11.6–15.1)
FLUAV AG UPPER RESP QL IA.RAPID: NEGATIVE
FLUBV AG UPPER RESP QL IA.RAPID: NEGATIVE
GFR SERPL CREATININE-BSD FRML MDRD: 43 ML/MIN/1.73SQ M
GLUCOSE SERPL-MCNC: 155 MG/DL (ref 65–140)
HCO3 BLDV-SCNC: 23.4 MMOL/L (ref 24–30)
HCT VFR BLD AUTO: 44.1 % (ref 36.5–49.3)
HGB BLD-MCNC: 13.7 G/DL (ref 12–17)
IMM GRANULOCYTES # BLD AUTO: 0.03 THOUSAND/UL (ref 0–0.2)
IMM GRANULOCYTES NFR BLD AUTO: 0 % (ref 0–2)
INR PPP: 2.99 (ref 0.85–1.19)
LACTATE SERPL-SCNC: 2.3 MMOL/L (ref 0.5–2)
LIPASE SERPL-CCNC: 11 U/L (ref 11–82)
LYMPHOCYTES # BLD AUTO: 1.33 THOUSANDS/ÂΜL (ref 0.6–4.47)
LYMPHOCYTES NFR BLD AUTO: 16 % (ref 14–44)
MAGNESIUM SERPL-MCNC: 1.9 MG/DL (ref 1.9–2.7)
MCH RBC QN AUTO: 28.7 PG (ref 26.8–34.3)
MCHC RBC AUTO-ENTMCNC: 31.1 G/DL (ref 31.4–37.4)
MCV RBC AUTO: 92 FL (ref 82–98)
MONOCYTES # BLD AUTO: 0.99 THOUSAND/ÂΜL (ref 0.17–1.22)
MONOCYTES NFR BLD AUTO: 12 % (ref 4–12)
NEUTROPHILS # BLD AUTO: 5.72 THOUSANDS/ÂΜL (ref 1.85–7.62)
NEUTS SEG NFR BLD AUTO: 69 % (ref 43–75)
NRBC BLD AUTO-RTO: 0 /100 WBCS
O2 CT BLDV-SCNC: 9.9 ML/DL
PCO2 BLDV: 51.7 MM HG (ref 42–50)
PH BLDV: 7.27 [PH] (ref 7.3–7.4)
PLATELET # BLD AUTO: 311 THOUSANDS/UL (ref 149–390)
PMV BLD AUTO: 9.3 FL (ref 8.9–12.7)
PO2 BLDV: 31.1 MM HG (ref 35–45)
POTASSIUM SERPL-SCNC: 5.4 MMOL/L (ref 3.5–5.3)
PROCALCITONIN SERPL-MCNC: 0.12 NG/ML
PROT SERPL-MCNC: 6.9 G/DL (ref 6.4–8.4)
PROTHROMBIN TIME: 31 SECONDS (ref 12.3–15)
QRS AXIS: 164 DEGREES
QRSD INTERVAL: 158 MS
QT INTERVAL: 382 MS
QTC INTERVAL: 507 MS
RBC # BLD AUTO: 4.78 MILLION/UL (ref 3.88–5.62)
SARS-COV+SARS-COV-2 AG RESP QL IA.RAPID: NEGATIVE
SODIUM SERPL-SCNC: 134 MMOL/L (ref 135–147)
T WAVE AXIS: 12 DEGREES
VENTRICULAR RATE: 106 BPM
WBC # BLD AUTO: 8.26 THOUSAND/UL (ref 4.31–10.16)

## 2025-04-08 PROCEDURE — 87804 INFLUENZA ASSAY W/OPTIC: CPT | Performed by: EMERGENCY MEDICINE

## 2025-04-08 PROCEDURE — 99285 EMERGENCY DEPT VISIT HI MDM: CPT | Performed by: EMERGENCY MEDICINE

## 2025-04-08 PROCEDURE — 99285 EMERGENCY DEPT VISIT HI MDM: CPT

## 2025-04-08 PROCEDURE — 85610 PROTHROMBIN TIME: CPT | Performed by: EMERGENCY MEDICINE

## 2025-04-08 PROCEDURE — 83735 ASSAY OF MAGNESIUM: CPT | Performed by: EMERGENCY MEDICINE

## 2025-04-08 PROCEDURE — 94640 AIRWAY INHALATION TREATMENT: CPT

## 2025-04-08 PROCEDURE — 83690 ASSAY OF LIPASE: CPT | Performed by: EMERGENCY MEDICINE

## 2025-04-08 PROCEDURE — 83880 ASSAY OF NATRIURETIC PEPTIDE: CPT | Performed by: EMERGENCY MEDICINE

## 2025-04-08 PROCEDURE — 84484 ASSAY OF TROPONIN QUANT: CPT | Performed by: EMERGENCY MEDICINE

## 2025-04-08 PROCEDURE — 71275 CT ANGIOGRAPHY CHEST: CPT

## 2025-04-08 PROCEDURE — 74177 CT ABD & PELVIS W/CONTRAST: CPT

## 2025-04-08 PROCEDURE — 82805 BLOOD GASES W/O2 SATURATION: CPT | Performed by: EMERGENCY MEDICINE

## 2025-04-08 PROCEDURE — 87811 SARS-COV-2 COVID19 W/OPTIC: CPT | Performed by: EMERGENCY MEDICINE

## 2025-04-08 PROCEDURE — 96374 THER/PROPH/DIAG INJ IV PUSH: CPT

## 2025-04-08 PROCEDURE — 93010 ELECTROCARDIOGRAM REPORT: CPT | Performed by: INTERNAL MEDICINE

## 2025-04-08 PROCEDURE — 80053 COMPREHEN METABOLIC PANEL: CPT | Performed by: EMERGENCY MEDICINE

## 2025-04-08 PROCEDURE — 93005 ELECTROCARDIOGRAM TRACING: CPT

## 2025-04-08 PROCEDURE — 85025 COMPLETE CBC W/AUTO DIFF WBC: CPT | Performed by: EMERGENCY MEDICINE

## 2025-04-08 PROCEDURE — 84145 PROCALCITONIN (PCT): CPT | Performed by: EMERGENCY MEDICINE

## 2025-04-08 PROCEDURE — 83605 ASSAY OF LACTIC ACID: CPT | Performed by: EMERGENCY MEDICINE

## 2025-04-08 PROCEDURE — 85730 THROMBOPLASTIN TIME PARTIAL: CPT | Performed by: EMERGENCY MEDICINE

## 2025-04-08 PROCEDURE — 36415 COLL VENOUS BLD VENIPUNCTURE: CPT | Performed by: EMERGENCY MEDICINE

## 2025-04-08 PROCEDURE — 87040 BLOOD CULTURE FOR BACTERIA: CPT | Performed by: EMERGENCY MEDICINE

## 2025-04-08 RX ORDER — IPRATROPIUM BROMIDE AND ALBUTEROL SULFATE 2.5; .5 MG/3ML; MG/3ML
3 SOLUTION RESPIRATORY (INHALATION) ONCE
Status: COMPLETED | OUTPATIENT
Start: 2025-04-08 | End: 2025-04-08

## 2025-04-08 RX ORDER — FUROSEMIDE 10 MG/ML
40 INJECTION INTRAMUSCULAR; INTRAVENOUS ONCE
Status: COMPLETED | OUTPATIENT
Start: 2025-04-08 | End: 2025-04-08

## 2025-04-08 RX ADMIN — IPRATROPIUM BROMIDE AND ALBUTEROL SULFATE 3 ML: .5; 3 SOLUTION RESPIRATORY (INHALATION) at 13:32

## 2025-04-08 RX ADMIN — IOHEXOL 75 ML: 350 INJECTION, SOLUTION INTRAVENOUS at 14:09

## 2025-04-08 RX ADMIN — FUROSEMIDE 40 MG: 10 INJECTION, SOLUTION INTRAMUSCULAR; INTRAVENOUS at 14:32

## 2025-04-08 NOTE — ED PROVIDER NOTES
"Time reflects when diagnosis was documented in both MDM as applicable and the Disposition within this note       Time User Action Codes Description Comment    4/8/2025  3:28 PM Mando Pelletier Add [I50.9] CHF exacerbation (HCC)           ED Disposition       ED Disposition   AMA    Condition   --    Date/Time   Tue Apr 8, 2025  3:28 PM    Comment   Date: 4/8/2025  Patient: Britton Batista  Admitted: 4/8/2025  1:00 PM  Attending Provider: Mando Blount*    Britton Batista or his authorized caregiver has made the decision for the patient to leave the emergency department against the advi ce of his attending physician. He or his authorized caregiver has been informed and understands the inherent risks, including death, and permanent disability.  He or his authorized caregiver has decided to accept the responsibility for this decision.  Britton Batista and all necessary parties have been advised that he may return for further evaluation or treatment. His condition at time of discharge was stable.  Britton Batista had current vital signs as follows:  BP (!) 130/103 (BP Location: Right  arm)   Pulse (!) 124   Temp (!) 97.1 °F (36.2 °C) (Temporal)   Resp 17   Ht 5' 9\" (1.753 m)   Wt 80.6 kg (177 lb 11.1 oz)                Assessment & Plan       Medical Decision Making  59-year-old male presents to the emergency department with shortness of breath, differential diagnosis include acute decompensated heart failure, patient does have history of cardiomyopathy, A-fib, patient's lungs are clear, concern he may be retaining fluid due to your regular rhythm, patient has no wheezes, however presentation may be still consistent with a COPD/emphysema, will also evaluate for pulmonary embolism.  EKG shows A-fib with RVR.  Other differentials include pneumonia, COVID, flu, sepsis.  Provide patient with breathing treatment, and reassess.    Discussed the results with the patient, and discussed the need for him to be admitted. "  Workup revealed decompensated congestive heart failure, A-fib with RVR, BNP was significantly elevated, patient was counseled extensively on the seriousness of his condition and the high risk of morbidity and mortality without inpatient management.  Had discussion with the patient regarding his condition, and explained that he is high risk for progression to cardiogenic shock, arrhythmia related complications, or death if not treated appropriately with rate control, diuresis, and cardiac monitoring.  Admission to the hospital was strongly recommended.  The patient was educated in detail about the critical nature of his condition.  Despite understanding these risks and clear recommendation for admission, the patient verbally refused admission and stated intent to leave AGAINST MEDICAL ADVICE.  The patient verbalized understanding the diagnosis and its severity, the risks and potential consequences of leaving, the treatment options being offered and declined.  The patient was alert, oriented, and demonstrated capacity to make medical decisions.  No signs of intoxication, confusion, or psychiatric instability.  The patient was offered prescription for diuretics, he has left the emergency department AMA.    Amount and/or Complexity of Data Reviewed  Labs: ordered. Decision-making details documented in ED Course.  Radiology: ordered.    Risk  Prescription drug management.        ED Course as of 04/08/25 1532   Tue Apr 08, 2025   1425 BNP(!): 1,698   1425 Creatinine(!): 1.68   1425 BUN(!): 35   1425 hs TnI 0hr(!): 94   1453    No definite pulmonary embolism. Probable mixing artifact in the right interlobar artery on image 301/143.     Stable background emphysema and interstitial lung disease as described consistent with combined pulmonary fibrosis and emphysema.     Stable 7 mm left lower lobe pulmonary nodule.     Stable massive cardiomegaly.     Gallbladder wall thickening. This may be secondary to adjacent ascites. No  radiopaque stones identified. If there is concern for acute cholecystitis, consider ultrasound.     Small volume ascites throughout the abdomen and pelvis.         Medications   ipratropium-albuterol (DUO-NEB) 0.5-2.5 mg/3 mL inhalation solution 3 mL (3 mL Nebulization Given 4/8/25 1332)   iohexol (OMNIPAQUE) 350 MG/ML injection (MULTI-DOSE) 75 mL (75 mL Intravenous Given 4/8/25 1409)   furosemide (LASIX) injection 40 mg (40 mg Intravenous Given 4/8/25 1432)       ED Risk Strat Scores   HEART Risk Score      Flowsheet Row Most Recent Value   Heart Score Risk Calculator    History 0 Filed at: 04/08/2025 1527   ECG 1 Filed at: 04/08/2025 1527   Age 1 Filed at: 04/08/2025 1527   Risk Factors 2 Filed at: 04/08/2025 1527   Troponin 1 Filed at: 04/08/2025 1527   HEART Score 5 Filed at: 04/08/2025 1527          HEART Risk Score      Flowsheet Row Most Recent Value   Heart Score Risk Calculator    History 0 Filed at: 04/08/2025 1527   ECG 1 Filed at: 04/08/2025 1527   Age 1 Filed at: 04/08/2025 1527   Risk Factors 2 Filed at: 04/08/2025 1527   Troponin 1 Filed at: 04/08/2025 1527   HEART Score 5 Filed at: 04/08/2025 1527                      No data recorded        SBIRT 20yo+      Flowsheet Row Most Recent Value   Initial Alcohol Screen: US AUDIT-C     1. How often do you have a drink containing alcohol? 0 Filed at: 04/08/2025 1322   2. How many drinks containing alcohol do you have on a typical day you are drinking?  0 Filed at: 04/08/2025 1322   3a. Male UNDER 65: How often do you have five or more drinks on one occasion? 0 Filed at: 04/08/2025 1322   Audit-C Score 0 Filed at: 04/08/2025 1322   SILVIA: How many times in the past year have you...    Used an illegal drug or used a prescription medication for non-medical reasons? Never Filed at: 04/08/2025 1322                            History of Present Illness       Chief Complaint   Patient presents with    Shortness of Breath     SOB starting yesterday. Reports heart  "problems       Past Medical History:   Diagnosis Date    Anxiety     Cardiomyopathy (HCC)     CHF (congestive heart failure) (HCC)     Depression     Emphysema of lung (HCC)     GERD (gastroesophageal reflux disease)     Gout     Hypertension     Metabolic encephalopathy 03/22/2025    Neuropathy     Raynaud's disease     Right inguinal hernia     Scleroderma (HCC)       Past Surgical History:   Procedure Laterality Date    AMPUTATION Right     pt had tip of \"pointer finger\" d/t scleraderma    CARDIAC PACEMAKER PLACEMENT      FINGER AMPUTATION Left 01/31/2024    Procedure: AMPUTATION FINGER, LEFT INDEX FINGER CPT: 87476;  Surgeon: Pedro Vazquez MD;  Location:  MAIN OR;  Service: Orthopedics    HERNIA REPAIR Left     times 2    IN AMP F/TH 1/2 JT/PHALANX W/NEURECT W/DIR CLSR Right 01/23/2024    Procedure: AMPUTATION FINGER, RIGHT MIDDLE;  Surgeon: Pedro Vazquez MD;  Location: [facility];  Service: Orthopedics    IN RPR 1ST INGUN HRNA AGE 5 YRS/> REDUCIBLE Right 03/03/2023    Procedure: OPEN INGUINAL HERNIA REPAIR WITH MESH;  Surgeon: Temo Jackson DO;  Location:  MAIN OR;  Service: General      Family History   Problem Relation Age of Onset    Hypertension Father       Social History     Tobacco Use    Smoking status: Every Day     Current packs/day: 1.00     Average packs/day: 1 pack/day for 45.3 years (45.3 ttl pk-yrs)     Types: Cigarettes     Start date: 2000    Smokeless tobacco: Never    Tobacco comments:     Last cigarette 0430   Vaping Use    Vaping status: Never Used   Substance Use Topics    Alcohol use: Not Currently     Comment: occasional    Drug use: Yes     Types: Marijuana, Methamphetamines     Comment: Hx meth use      E-Cigarette/Vaping    E-Cigarette Use Never User       E-Cigarette/Vaping Substances    Nicotine No     THC No     CBD No     Flavoring No       I have reviewed and agree with the history as documented.     59-year-old male with past medical history of anxiety, cardiomyopathy, " congestive heart failure, depression, emphysema, GERD, hypertension, metabolic encephalopathy in the past, atrial fibrillation, presents to the emergency department with complaint of shortness of breath.  Patient states that over the past couple of days, he has had exertional dyspnea, orthopnea, and now shortness of breath at rest.  Patient denies any chills, fevers, chest pain, nausea, vomiting.  Patient states that he has had lower abdominal discomfort.  He denies any bloody stool, bloody urine, or pain with urination.  Patient states he supposed to get an ablation for his A-fib.  Patient recently had his INR checked, he is on Coumadin.  Patient states that he was at 2.5.       Shortness of Breath  Associated symptoms: no abdominal pain, no chest pain, no cough, no ear pain, no fever, no rash, no sore throat and no vomiting        Review of Systems   Constitutional:  Negative for chills and fever.   HENT:  Negative for ear pain and sore throat.    Eyes:  Negative for pain and visual disturbance.   Respiratory:  Positive for shortness of breath. Negative for cough.    Cardiovascular:  Negative for chest pain and palpitations.   Gastrointestinal:  Negative for abdominal pain and vomiting.   Genitourinary:  Negative for dysuria and hematuria.   Musculoskeletal:  Negative for arthralgias and back pain.   Skin:  Negative for color change and rash.   Neurological:  Negative for seizures and syncope.   All other systems reviewed and are negative.          Objective       ED Triage Vitals   Temperature Pulse Blood Pressure Respirations SpO2 Patient Position - Orthostatic VS   04/08/25 1305 04/08/25 1305 04/08/25 1306 04/08/25 1305 04/08/25 1306 04/08/25 1305   (!) 97.1 °F (36.2 °C) (!) 126 110/86 20 (S) (!) 79 % Lying      Temp Source Heart Rate Source BP Location FiO2 (%) Pain Score    04/08/25 1305 04/08/25 1305 04/08/25 1305 -- 04/08/25 1315    Temporal Monitor Left arm  No Pain      Vitals      Date and Time Temp  Pulse SpO2 Resp BP Pain Score FACES Pain Rating User   04/08/25 1515 -- 124 93 % 17 130/103 -- -- RR   04/08/25 1445 -- 118 97 % 20 -- -- -- RR   04/08/25 1315 -- 125 75 % on his ear, hands and fingers are purple from HX 17 110/86 No Pain -- AR   04/08/25 1306 -- --  79 % -- 110/86 -- -- SL   04/08/25 1305 97.1 °F (36.2 °C) 126 -- 20 -- -- -- SL            Physical Exam  Vitals and nursing note reviewed.   Constitutional:       General: He is not in acute distress.     Appearance: He is well-developed.   HENT:      Head: Normocephalic and atraumatic.   Eyes:      Conjunctiva/sclera: Conjunctivae normal.   Cardiovascular:      Rate and Rhythm: Tachycardia present. Rhythm irregular.      Heart sounds: No murmur heard.  Pulmonary:      Effort: Pulmonary effort is normal. No respiratory distress.      Breath sounds: Normal breath sounds.   Abdominal:      Palpations: Abdomen is soft.      Tenderness: There is no abdominal tenderness.   Musculoskeletal:         General: No swelling.      Cervical back: Neck supple.   Skin:     General: Skin is warm and dry.      Capillary Refill: Capillary refill takes less than 2 seconds.   Neurological:      Mental Status: He is alert.   Psychiatric:         Mood and Affect: Mood normal.         Results Reviewed       Procedure Component Value Units Date/Time    Protime-INR [[id number]]  (Abnormal) Collected: 04/08/25 0120    Lab Status: Final result Specimen: Blood from Arm, Left Updated: 04/08/25 1505     Protime 31.0 seconds      INR 2.99    Narrative:      INR Therapeutic Range    Indication                                             INR Range      Atrial Fibrillation                                               2.0-3.0  Hypercoagulable State                                    2.0.2.3  Left Ventricular Asist Device                            2.0-3.0  Mechanical Heart Valve                                  -    Aortic(with afib, MI, embolism, HF, LA enlargement,    and/or  coagulopathy)                                     2.0-3.0 (2.5-3.5)     Mitral                                                             2.5-3.5  Prosthetic/Bioprosthetic Heart Valve               2.0-3.0  Venous thromboembolism (VTE: VT, PE        2.0-3.0    APTT [[id number]]  (Abnormal) Collected: 04/08/25 0120    Lab Status: Final result Specimen: Blood from Arm, Left Updated: 04/08/25 1505     PTT 42 seconds     HS Troponin I 2hr [[id number]]     Lab Status: No result Specimen: Blood     B-Type Natriuretic Peptide(BNP) [[id number]]  (Abnormal) Collected: 04/08/25 0120    Lab Status: Final result Specimen: Blood from Arm, Left Updated: 04/08/25 1409     BNP 1,698 pg/mL     HS Troponin I 4hr [[id number]]     Lab Status: No result Specimen: Blood     Procalcitonin [[id number]]  (Normal) Collected: 04/08/25 0120    Lab Status: Final result Specimen: Blood from Arm, Left Updated: 04/08/25 1409     Procalcitonin 0.12 ng/ml     HS Troponin 0hr (reflex protocol) [[id number]]  (Abnormal) Collected: 04/08/25 0120    Lab Status: Final result Specimen: Blood from Arm, Left Updated: 04/08/25 1409     hs TnI 0hr 94 ng/L     Comprehensive metabolic panel [[id number]]  (Abnormal) Collected: 04/08/25 0120    Lab Status: Final result Specimen: Blood from Arm, Left Updated: 04/08/25 1359     Sodium 134 mmol/L      Potassium 5.4 mmol/L      Chloride 104 mmol/L      CO2 25 mmol/L      ANION GAP 5 mmol/L      BUN 35 mg/dL      Creatinine 1.68 mg/dL      Glucose 155 mg/dL      Calcium 9.2 mg/dL      AST 31 U/L      ALT 24 U/L      Alkaline Phosphatase 110 U/L      Total Protein 6.9 g/dL      Albumin 3.6 g/dL      Total Bilirubin 0.52 mg/dL      eGFR 43 ml/min/1.73sq m     Narrative:      National Kid[employer] Disease Foundation guidelines for Chronic Kidney Disease (CKD):     Stage 1 with normal or high GFR (GFR > 90 mL/min/1.73 square meters)    Stage 2 Mild CKD (GFR = 60-89 mL/min/1.73 square meters)    Stage 3A Moderate CKD (GFR = 45-59  mL/min/1.73 square meters)    Stage 3B Moderate CKD (GFR = 30-44 mL/min/1.73 square meters)    Stage 4 Severe CKD (GFR = 15-29 mL/min/1.73 square meters)    Stage 5 End Stage CKD (GFR <15 mL/min/1.73 square meters)  Note: GFR calculation is accurate only with a steady state creatinine    Lipase [[id number]]  (Normal) Collected: 04/08/25 0120    Lab Status: Final result Specimen: Blood from Arm, Left Updated: 04/08/25 1359     Lipase 11 u/L     Magnesium [[id number]]  (Normal) Collected: 04/08/25 0120    Lab Status: Final result Specimen: Blood from Arm, Left Updated: 04/08/25 1359     Magnesium 1.9 mg/dL     Lactic acid [[id number]]  (Abnormal) Collected: 04/08/25 0120    Lab Status: Final result Specimen: Blood from Arm, Left Updated: 04/08/25 1358     LACTIC ACID 2.3 mmol/L     Narrative:      Result may be elevated if tourniquet was used during collection.    Lactic acid 2 Hours [[id number]]     Lab Status: No result Specimen: Blood     FLU/COVID Rapid Antigen (30 min. TAT) - Preferred screening test in ED [[id number]]  (Normal) Collected: 04/08/25 0120    Lab Status: Final result Specimen: Nares from Nose Updated: 04/08/25 1356     SARS COV Rapid Antigen Negative     Influenza A Rapid Antigen Negative     Influenza B Rapid Antigen Negative    Narrative:      This test has been performed using the [employer] Inze 2 FLU+SARS Antigen test under the Emergency Use Authorization (EUA). This test has been validated by the  and verified by the performing laboratory. The Inez uses lateral flow immunofluorescent sandwich assay to detect SARS-COV, Influenza A and Influenza B Antigen.     The [employer] Inez 2 SARS Antigen test does not differentiate between SARS-CoV and SARS-CoV-2.     Negative results are presumptive and may be confirmed with a molecular assay, if necessary, for patient management. Negative results do not rule out SARS-CoV-2 or influenza infection and should not be used as the sole basis for treatment  or patient management decisions. A negative test result may occur if the level of antigen in a sample is below the limit of detection of this test.     Positive results are indicative of the presence of viral antigens, but do not rule out bacterial infection or co-infection with other viruses.     All test results should be used as an adjunct to clinical observations and other information available to the provider.    FOR PEDIATRIC PATIENTS - copy/paste COVID Guidelines URL to browser: [email or web address]    Blood gas, Venous [[id number]]  (Abnormal) Collected: 04/08/25 0120    Lab Status: Final result Specimen: Blood from Arm, Left Updated: 04/08/25 1342     pH, Chuck 7.274     pCO2, Chuck 51.7 mm Hg      pO2, Chuck 31.1 mm Hg      HCO3, Chuck 23.4 mmol/L      Base Excess, Chuck -4.0 mmol/L      O2 Content, Chuck 9.9 ml/dL      O2 HGB, VENOUS 47.9 %     CBC and differential [[id number]]  (Abnormal) Collected: 04/08/25 0120    Lab Status: Final result Specimen: Blood from Arm, Left Updated: 04/08/25 1339     WBC 8.26 Thousand/uL      RBC 4.78 Million/uL      Hemoglobin 13.7 g/dL      Hematocrit 44.1 %      MCV 92 fL      MCH 28.7 pg      MCHC 31.1 g/dL      RDW 16.9 %      MPV 9.3 fL      Platelets 311 Thousands/uL      nRBC 0 /100 WBCs      Segmented % 69 %      Immature Grans % 0 %      Lymphocytes % 16 %      Monocytes % 12 %      Eosinophils Relative 2 %      Basophils Relative 1 %      Absolute Neutrophils 5.72 Thousands/µL      Absolute Immature Grans 0.03 Thousand/uL      Absolute Lymphocytes 1.33 Thousands/µL      Absolute Monocytes 0.99 Thousand/µL      Eosinophils Absolute 0.14 Thousand/µL      Basophils Absolute 0.05 Thousands/µL     Blood culture #1 [[id number]] Collected: 04/08/25 0120    Lab Status: In process Specimen: Blood from Arm, Left Updated: 04/08/25 1336    Blood culture #2 [[id number]] Collected: 04/08/25 0120    Lab Status: In process Specimen: Blood  from Arm, Right Updated: 04/08/25 1336    UA w Reflex to Microscopic w Reflex to Culture [[id number]]     Lab Status: No result Specimen: Urine             CT pe study w abdomen pelvis w contrast   Final Interpretation by Dewayne Celis MD (04/08 7985)      No definite pulmonary embolism. Probable mixing artifact in the right interlobar artery on image 301/143.      Stable background emphysema and interstitial lung disease as described consistent with combined pulmonary fibrosis and emphysema.      Stable 7 mm left lower lobe pulmonary nodule.      Stable massive cardiomegaly.      Gallbladder wall thickening. This may be secondary to adjacent ascites. No radiopaque stones identified. If there is concern for acute cholecystitis, consider ultrasound.      Small volume ascites throughout the abdomen and pelvis.                     Workstation performed: [id number]             Procedures    ED Medication and Procedure Management   Prior to Admission Medications   Prescriptions Last Dose Informant Patient Reported? Taking?   albuterol (ProAir HFA) 90 mcg/act inhaler   No No   Sig: Inhale 2 puffs every 6 (six) hours as needed for wheezing   carvedilol (COREG) 12.5 mg tablet   No No   Sig: Take 1 tablet (12.5 mg total) by mouth 2 (two) times a day with meals   digoxin (LANOXIN) 0.125 mg tablet   No No   Sig: Take 1 tablet (125 mcg total) by mouth 3 (three) times a week   furosemide (LASIX) 40 mg tablet   No No   Sig: Take 1 tablet (40 mg total) by mouth daily   lisinopril (ZESTRIL) 2.5 mg tablet   No No   Sig: Take 1 tablet (2.5 mg total) by mouth daily   magnesium Oxide (MAG-OX) 400 mg TABS   No No   Sig: Take 1 tablet (400 mg total) by mouth daily   nicotine (NICODERM CQ) 21 mg/24 hr TD 24 hr patch   No No   Sig: Place 1 patch on the skin over 24 hours daily   pantoprazole (PROTONIX) 40 mg tablet  Self No No   Sig: Take 1 tablet (40 mg total) by mouth 2 (two) times a day   spironolactone (ALDACTONE) 25 mg tablet   No No    Sig: Take 0.5 tablets (12.5 mg total) by mouth daily   sucralfate (CARAFATE) 1 g tablet   No No   Sig: Take 1 tablet (1 g total) by mouth 4 (four) times a day for 14 days   warfarin (COUMADIN) 5 mg tablet   No No   Sig: Target INR range is between 2-3      Facility-Administered Medications: None     Patient's Medications   Discharge Prescriptions    No medications on file     No discharge procedures on file.  ED SEPSIS DOCUMENTATION   Time reflects when diagnosis was documented in both MDM as applicable and the Disposition within this note       Time User Action Codes Description Comment    4/8/2025  3:28 PM Mando Pelletier Add [I50.9] CHF exacerbation (HCC)                  Mando Pelletier DO  04/08/25 1532

## 2025-04-09 ENCOUNTER — APPOINTMENT (EMERGENCY)
Dept: RADIOLOGY | Facility: HOSPITAL | Age: 60
DRG: 291 | End: 2025-04-09
Payer: COMMERCIAL

## 2025-04-09 ENCOUNTER — HOSPITAL ENCOUNTER (INPATIENT)
Facility: HOSPITAL | Age: 60
LOS: 1 days | Discharge: HOME | DRG: 291 | End: 2025-04-10
Attending: STUDENT IN AN ORGANIZED HEALTH CARE EDUCATION/TRAINING PROGRAM | Admitting: FAMILY MEDICINE
Payer: COMMERCIAL

## 2025-04-09 DIAGNOSIS — I50.22 CHRONIC HFREF (HEART FAILURE WITH REDUCED EJECTION FRACTION) (HCC): ICD-10-CM

## 2025-04-09 DIAGNOSIS — I42.7 CARDIOMYOPATHY SECONDARY TO DRUG (HCC): ICD-10-CM

## 2025-04-09 DIAGNOSIS — I42.9 CARDIOMYOPATHY, UNSPECIFIED TYPE (HCC): ICD-10-CM

## 2025-04-09 DIAGNOSIS — I48.91 ATRIAL FIBRILLATION WITH RVR (HCC): ICD-10-CM

## 2025-04-09 DIAGNOSIS — I21.4 NSTEMI (NON-ST ELEVATED MYOCARDIAL INFARCTION) (HCC): ICD-10-CM

## 2025-04-09 DIAGNOSIS — I50.43 ACUTE ON CHRONIC COMBINED SYSTOLIC AND DIASTOLIC HEART FAILURE (HCC): Primary | ICD-10-CM

## 2025-04-09 PROBLEM — I50.23 ACUTE ON CHRONIC SYSTOLIC CONGESTIVE HEART FAILURE (HCC): Status: ACTIVE | Noted: 2024-03-26

## 2025-04-09 PROBLEM — I48.11 LONGSTANDING PERSISTENT ATRIAL FIBRILLATION (HCC): Status: ACTIVE | Noted: 2025-04-09

## 2025-04-09 LAB
2HR DELTA HS TROPONIN: -17 NG/L
4HR DELTA HS TROPONIN: -14 NG/L
ALBUMIN SERPL BCG-MCNC: 3.5 G/DL (ref 3.5–5)
ALP SERPL-CCNC: 110 U/L (ref 34–104)
ALT SERPL W P-5'-P-CCNC: 24 U/L (ref 7–52)
AMPHETAMINES SERPL QL SCN: POSITIVE
ANION GAP SERPL CALCULATED.3IONS-SCNC: 9 MMOL/L (ref 4–13)
AST SERPL W P-5'-P-CCNC: 30 U/L (ref 13–39)
ATRIAL RATE: 144 BPM
ATRIAL RATE: 43 BPM
BARBITURATES UR QL: NEGATIVE
BASE EX.OXY STD BLDV CALC-SCNC: 92.7 % (ref 60–80)
BASE EXCESS BLDV CALC-SCNC: -3.2 MMOL/L
BASOPHILS # BLD AUTO: 0.05 THOUSANDS/ÂΜL (ref 0–0.1)
BASOPHILS NFR BLD AUTO: 1 % (ref 0–1)
BENZODIAZ UR QL: NEGATIVE
BILIRUB DIRECT SERPL-MCNC: 0.16 MG/DL (ref 0–0.2)
BILIRUB SERPL-MCNC: 0.45 MG/DL (ref 0.2–1)
BNP SERPL-MCNC: 2132 PG/ML (ref 0–100)
BUN SERPL-MCNC: 37 MG/DL (ref 5–25)
CALCIUM SERPL-MCNC: 9.3 MG/DL (ref 8.4–10.2)
CARDIAC TROPONIN I PNL SERPL HS: 104 NG/L (ref ?–50)
CARDIAC TROPONIN I PNL SERPL HS: 107 NG/L (ref ?–50)
CARDIAC TROPONIN I PNL SERPL HS: 121 NG/L (ref ?–50)
CHLORIDE SERPL-SCNC: 103 MMOL/L (ref 96–108)
CO2 SERPL-SCNC: 23 MMOL/L (ref 21–32)
COCAINE UR QL: NEGATIVE
CREAT SERPL-MCNC: 1.68 MG/DL (ref 0.6–1.3)
DIGOXIN SERPL-MCNC: 0.3 NG/ML (ref 0.8–2)
EOSINOPHIL # BLD AUTO: 0.17 THOUSAND/ÂΜL (ref 0–0.61)
EOSINOPHIL NFR BLD AUTO: 2 % (ref 0–6)
ERYTHROCYTE [DISTWIDTH] IN BLOOD BY AUTOMATED COUNT: 16.8 % (ref 11.6–15.1)
FENTANYL UR QL SCN: NEGATIVE
GFR SERPL CREATININE-BSD FRML MDRD: 43 ML/MIN/1.73SQ M
GLUCOSE SERPL-MCNC: 129 MG/DL (ref 65–140)
HCO3 BLDV-SCNC: 21 MMOL/L (ref 24–30)
HCT VFR BLD AUTO: 41.1 % (ref 36.5–49.3)
HGB BLD-MCNC: 13.2 G/DL (ref 12–17)
HYDROCODONE UR QL SCN: NEGATIVE
IMM GRANULOCYTES # BLD AUTO: 0.03 THOUSAND/UL (ref 0–0.2)
IMM GRANULOCYTES NFR BLD AUTO: 0 % (ref 0–2)
INR PPP: 2.74 (ref 0.85–1.19)
LACTATE SERPL-SCNC: 1.9 MMOL/L (ref 0.5–2)
LIPASE SERPL-CCNC: 13 U/L (ref 11–82)
LYMPHOCYTES # BLD AUTO: 1.23 THOUSANDS/ÂΜL (ref 0.6–4.47)
LYMPHOCYTES NFR BLD AUTO: 15 % (ref 14–44)
MAGNESIUM SERPL-MCNC: 1.7 MG/DL (ref 1.9–2.7)
MCH RBC QN AUTO: 28.8 PG (ref 26.8–34.3)
MCHC RBC AUTO-ENTMCNC: 32.1 G/DL (ref 31.4–37.4)
MCV RBC AUTO: 90 FL (ref 82–98)
METHADONE UR QL: NEGATIVE
MONOCYTES # BLD AUTO: 0.75 THOUSAND/ÂΜL (ref 0.17–1.22)
MONOCYTES NFR BLD AUTO: 9 % (ref 4–12)
NEUTROPHILS # BLD AUTO: 6 THOUSANDS/ÂΜL (ref 1.85–7.62)
NEUTS SEG NFR BLD AUTO: 73 % (ref 43–75)
NRBC BLD AUTO-RTO: 0 /100 WBCS
O2 CT BLDV-SCNC: 18.3 ML/DL
OPIATES UR QL SCN: NEGATIVE
OXYCODONE+OXYMORPHONE UR QL SCN: NEGATIVE
PCO2 BLDV: 35.1 MM HG (ref 42–50)
PCP UR QL: NEGATIVE
PH BLDV: 7.39 [PH] (ref 7.3–7.4)
PLATELET # BLD AUTO: 297 THOUSANDS/UL (ref 149–390)
PMV BLD AUTO: 9 FL (ref 8.9–12.7)
PO2 BLDV: 77.4 MM HG (ref 35–45)
POTASSIUM SERPL-SCNC: 4.4 MMOL/L (ref 3.5–5.3)
PROT SERPL-MCNC: 6.9 G/DL (ref 6.4–8.4)
PROTHROMBIN TIME: 29 SECONDS (ref 12.3–15)
QRS AXIS: 107 DEGREES
QRS AXIS: 189 DEGREES
QRSD INTERVAL: 158 MS
QRSD INTERVAL: 186 MS
QT INTERVAL: 302 MS
QT INTERVAL: 420 MS
QTC INTERVAL: 466 MS
QTC INTERVAL: 539 MS
RBC # BLD AUTO: 4.58 MILLION/UL (ref 3.88–5.62)
SODIUM SERPL-SCNC: 135 MMOL/L (ref 135–147)
T WAVE AXIS: -15 DEGREES
T WAVE AXIS: -70 DEGREES
THC UR QL: POSITIVE
VENTRICULAR RATE: 143 BPM
VENTRICULAR RATE: 99 BPM
WBC # BLD AUTO: 8.23 THOUSAND/UL (ref 4.31–10.16)

## 2025-04-09 PROCEDURE — 93005 ELECTROCARDIOGRAM TRACING: CPT

## 2025-04-09 PROCEDURE — 83605 ASSAY OF LACTIC ACID: CPT | Performed by: STUDENT IN AN ORGANIZED HEALTH CARE EDUCATION/TRAINING PROGRAM

## 2025-04-09 PROCEDURE — 80162 ASSAY OF DIGOXIN TOTAL: CPT | Performed by: FAMILY MEDICINE

## 2025-04-09 PROCEDURE — 83735 ASSAY OF MAGNESIUM: CPT | Performed by: STUDENT IN AN ORGANIZED HEALTH CARE EDUCATION/TRAINING PROGRAM

## 2025-04-09 PROCEDURE — 82805 BLOOD GASES W/O2 SATURATION: CPT | Performed by: STUDENT IN AN ORGANIZED HEALTH CARE EDUCATION/TRAINING PROGRAM

## 2025-04-09 PROCEDURE — 85610 PROTHROMBIN TIME: CPT | Performed by: STUDENT IN AN ORGANIZED HEALTH CARE EDUCATION/TRAINING PROGRAM

## 2025-04-09 PROCEDURE — 99223 1ST HOSP IP/OBS HIGH 75: CPT | Performed by: INTERNAL MEDICINE

## 2025-04-09 PROCEDURE — 93010 ELECTROCARDIOGRAM REPORT: CPT | Performed by: INTERNAL MEDICINE

## 2025-04-09 PROCEDURE — 80307 DRUG TEST PRSMV CHEM ANLYZR: CPT | Performed by: FAMILY MEDICINE

## 2025-04-09 PROCEDURE — 80048 BASIC METABOLIC PNL TOTAL CA: CPT | Performed by: STUDENT IN AN ORGANIZED HEALTH CARE EDUCATION/TRAINING PROGRAM

## 2025-04-09 PROCEDURE — 96374 THER/PROPH/DIAG INJ IV PUSH: CPT

## 2025-04-09 PROCEDURE — 83880 ASSAY OF NATRIURETIC PEPTIDE: CPT | Performed by: STUDENT IN AN ORGANIZED HEALTH CARE EDUCATION/TRAINING PROGRAM

## 2025-04-09 PROCEDURE — 36415 COLL VENOUS BLD VENIPUNCTURE: CPT | Performed by: STUDENT IN AN ORGANIZED HEALTH CARE EDUCATION/TRAINING PROGRAM

## 2025-04-09 PROCEDURE — 99285 EMERGENCY DEPT VISIT HI MDM: CPT

## 2025-04-09 PROCEDURE — 85025 COMPLETE CBC W/AUTO DIFF WBC: CPT | Performed by: STUDENT IN AN ORGANIZED HEALTH CARE EDUCATION/TRAINING PROGRAM

## 2025-04-09 PROCEDURE — 80076 HEPATIC FUNCTION PANEL: CPT | Performed by: STUDENT IN AN ORGANIZED HEALTH CARE EDUCATION/TRAINING PROGRAM

## 2025-04-09 PROCEDURE — 99285 EMERGENCY DEPT VISIT HI MDM: CPT | Performed by: STUDENT IN AN ORGANIZED HEALTH CARE EDUCATION/TRAINING PROGRAM

## 2025-04-09 PROCEDURE — 71045 X-RAY EXAM CHEST 1 VIEW: CPT

## 2025-04-09 PROCEDURE — 84484 ASSAY OF TROPONIN QUANT: CPT | Performed by: STUDENT IN AN ORGANIZED HEALTH CARE EDUCATION/TRAINING PROGRAM

## 2025-04-09 PROCEDURE — 99223 1ST HOSP IP/OBS HIGH 75: CPT | Performed by: FAMILY MEDICINE

## 2025-04-09 PROCEDURE — 83690 ASSAY OF LIPASE: CPT | Performed by: STUDENT IN AN ORGANIZED HEALTH CARE EDUCATION/TRAINING PROGRAM

## 2025-04-09 PROCEDURE — 84484 ASSAY OF TROPONIN QUANT: CPT | Performed by: FAMILY MEDICINE

## 2025-04-09 PROCEDURE — 96375 TX/PRO/DX INJ NEW DRUG ADDON: CPT

## 2025-04-09 RX ORDER — LANOLIN ALCOHOL/MO/W.PET/CERES
400 CREAM (GRAM) TOPICAL DAILY
Status: DISCONTINUED | OUTPATIENT
Start: 2025-04-09 | End: 2025-04-10 | Stop reason: HOSPADM

## 2025-04-09 RX ORDER — ALBUTEROL SULFATE 90 UG/1
2 INHALANT RESPIRATORY (INHALATION) EVERY 6 HOURS PRN
Status: DISCONTINUED | OUTPATIENT
Start: 2025-04-09 | End: 2025-04-10 | Stop reason: HOSPADM

## 2025-04-09 RX ORDER — METOPROLOL TARTRATE 1 MG/ML
5 INJECTION, SOLUTION INTRAVENOUS ONCE
Status: COMPLETED | OUTPATIENT
Start: 2025-04-09 | End: 2025-04-09

## 2025-04-09 RX ORDER — LISINOPRIL 2.5 MG/1
2.5 TABLET ORAL DAILY
Status: DISCONTINUED | OUTPATIENT
Start: 2025-04-09 | End: 2025-04-10 | Stop reason: HOSPADM

## 2025-04-09 RX ORDER — MAGNESIUM SULFATE HEPTAHYDRATE 40 MG/ML
2 INJECTION, SOLUTION INTRAVENOUS ONCE
Status: COMPLETED | OUTPATIENT
Start: 2025-04-09 | End: 2025-04-09

## 2025-04-09 RX ORDER — NICOTINE 21 MG/24HR
1 PATCH, TRANSDERMAL 24 HOURS TRANSDERMAL DAILY
Status: DISCONTINUED | OUTPATIENT
Start: 2025-04-09 | End: 2025-04-10 | Stop reason: HOSPADM

## 2025-04-09 RX ORDER — NICOTINE 21 MG/24HR
1 PATCH, TRANSDERMAL 24 HOURS TRANSDERMAL DAILY
Status: DISCONTINUED | OUTPATIENT
Start: 2025-04-09 | End: 2025-04-09

## 2025-04-09 RX ORDER — CARVEDILOL 12.5 MG/1
12.5 TABLET ORAL 2 TIMES DAILY WITH MEALS
Status: DISCONTINUED | OUTPATIENT
Start: 2025-04-09 | End: 2025-04-10 | Stop reason: HOSPADM

## 2025-04-09 RX ORDER — FUROSEMIDE 10 MG/ML
60 INJECTION INTRAMUSCULAR; INTRAVENOUS ONCE
Status: DISCONTINUED | OUTPATIENT
Start: 2025-04-09 | End: 2025-04-09

## 2025-04-09 RX ORDER — FUROSEMIDE 10 MG/ML
50 INJECTION INTRAMUSCULAR; INTRAVENOUS 2 TIMES DAILY
Status: DISCONTINUED | OUTPATIENT
Start: 2025-04-09 | End: 2025-04-10

## 2025-04-09 RX ORDER — METOPROLOL TARTRATE 1 MG/ML
2.5 INJECTION, SOLUTION INTRAVENOUS EVERY 6 HOURS PRN
Status: DISCONTINUED | OUTPATIENT
Start: 2025-04-09 | End: 2025-04-10 | Stop reason: HOSPADM

## 2025-04-09 RX ORDER — FUROSEMIDE 10 MG/ML
50 INJECTION INTRAMUSCULAR; INTRAVENOUS 2 TIMES DAILY
Status: DISCONTINUED | OUTPATIENT
Start: 2025-04-09 | End: 2025-04-09

## 2025-04-09 RX ORDER — SPIRONOLACTONE 25 MG/1
12.5 TABLET ORAL DAILY
Status: DISCONTINUED | OUTPATIENT
Start: 2025-04-09 | End: 2025-04-10 | Stop reason: HOSPADM

## 2025-04-09 RX ORDER — PANTOPRAZOLE SODIUM 40 MG/1
40 TABLET, DELAYED RELEASE ORAL 2 TIMES DAILY
Status: DISCONTINUED | OUTPATIENT
Start: 2025-04-09 | End: 2025-04-10 | Stop reason: HOSPADM

## 2025-04-09 RX ORDER — ONDANSETRON 2 MG/ML
4 INJECTION INTRAMUSCULAR; INTRAVENOUS EVERY 6 HOURS PRN
Status: DISCONTINUED | OUTPATIENT
Start: 2025-04-09 | End: 2025-04-09

## 2025-04-09 RX ORDER — ACETAMINOPHEN 325 MG/1
650 TABLET ORAL EVERY 4 HOURS PRN
Status: DISCONTINUED | OUTPATIENT
Start: 2025-04-09 | End: 2025-04-10 | Stop reason: HOSPADM

## 2025-04-09 RX ORDER — WARFARIN SODIUM 5 MG/1
5 TABLET ORAL
Status: DISCONTINUED | OUTPATIENT
Start: 2025-04-09 | End: 2025-04-10 | Stop reason: HOSPADM

## 2025-04-09 RX ORDER — DIGOXIN 125 MCG
125 TABLET ORAL 3 TIMES WEEKLY
Status: DISCONTINUED | OUTPATIENT
Start: 2025-04-09 | End: 2025-04-10 | Stop reason: HOSPADM

## 2025-04-09 RX ADMIN — LISINOPRIL 2.5 MG: 2.5 TABLET ORAL at 13:57

## 2025-04-09 RX ADMIN — CARVEDILOL 12.5 MG: 12.5 TABLET, FILM COATED ORAL at 17:26

## 2025-04-09 RX ADMIN — DIGOXIN 125 MCG: 125 TABLET ORAL at 13:57

## 2025-04-09 RX ADMIN — PANTOPRAZOLE SODIUM 40 MG: 40 TABLET, DELAYED RELEASE ORAL at 13:57

## 2025-04-09 RX ADMIN — NICOTINE 1 PATCH: 21 PATCH, EXTENDED RELEASE TRANSDERMAL at 13:57

## 2025-04-09 RX ADMIN — SPIRONOLACTONE 12.5 MG: 25 TABLET, FILM COATED ORAL at 13:57

## 2025-04-09 RX ADMIN — WARFARIN SODIUM 5 MG: 5 TABLET ORAL at 17:26

## 2025-04-09 RX ADMIN — FUROSEMIDE 50 MG: 10 INJECTION, SOLUTION INTRAMUSCULAR; INTRAVENOUS at 17:26

## 2025-04-09 RX ADMIN — PANTOPRAZOLE SODIUM 40 MG: 40 TABLET, DELAYED RELEASE ORAL at 22:40

## 2025-04-09 RX ADMIN — MAGNESIUM OXIDE TAB 400 MG (241.3 MG ELEMENTAL MG) 400 MG: 400 (241.3 MG) TAB at 13:57

## 2025-04-09 RX ADMIN — METOROPROLOL TARTRATE 5 MG: 5 INJECTION, SOLUTION INTRAVENOUS at 10:16

## 2025-04-09 RX ADMIN — MAGNESIUM SULFATE HEPTAHYDRATE 2 G: 40 INJECTION, SOLUTION INTRAVENOUS at 10:59

## 2025-04-09 RX ADMIN — FUROSEMIDE 50 MG: 10 INJECTION, SOLUTION INTRAMUSCULAR; INTRAVENOUS at 13:57

## 2025-04-09 NOTE — PLAN OF CARE
Problem: Potential for Falls  Goal: Patient will remain free of falls  Description: INTERVENTIONS:- Educate patient/family on patient safety including physical limitations- Instruct patient to call for assistance with activity - Consult OT/PT to assist with strengthening/mobility - Keep Call bell within reach- Keep bed low and locked with side rails adjusted as appropriate- Keep care items and personal belongings within reach- Initiate and maintain comfort rounds- Make Fall Risk Sign visible to staff- Offer Toileting every  Hours, in advance of need- Initiate/Maintain alarm- Obtain necessary fall risk management equipment - Apply yellow socks and bracelet for high fall risk patients- Consider moving patient to room near nurses station  Outcome: Progressing     Problem: DISCHARGE PLANNING  Goal: Discharge to home or other facility with appropriate resources  Description: INTERVENTIONS:- Identify barriers to discharge w/patient and caregiver- Arrange for needed discharge resources and transportation as appropriate- Identify discharge learning needs (meds, wound care, etc.)- Arrange for interpretive services to assist at discharge as needed- Refer to Case Management Department for coordinating discharge planning if the patient needs post-hospital services based on physician/advanced practitioner order or complex needs related to functional status, cognitive ability, or social support system  Outcome: Progressing     Problem: Knowledge Deficit  Goal: Patient/family/caregiver demonstrates understanding of disease process, treatment plan, medications, and discharge instructions  Description: Complete learning assessment and assess knowledge base.Interventions:- Provide teaching at level of understanding- Provide teaching via preferred learning methods  Outcome: Progressing

## 2025-04-09 NOTE — ASSESSMENT & PLAN NOTE
Wt Readings from Last 3 Encounters:   04/09/25 76.8 kg (169 lb 5 oz)   04/08/25 80.6 kg (177 lb 11.1 oz)   03/28/25 72.3 kg (159 lb 6.4 oz)   Agree with IV diuretics and resumption of his GDMT for HFrEF.  Unfortunately due to cost issues, he cannot afford SGLT2 inhibitors or Entresto.

## 2025-04-09 NOTE — CASE MANAGEMENT
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[facility]) 2025     Scleroderma (Former[facility]) 2025     Hyperkalemia 2025     Stage 3a chronic kidney disease (CKD) (Formerly [facility]) 2025     Cardiogenic shock (Formerly [facility]) 2025     Atrial fibrillation with RVR (Formerly [facility]) 2025     Syncope 2024     Chest wall pain 2024     Acute on chronic systolic congestive heart failure (Formerly [facility]) 2024     Primary hypertension 2024     Hypomagnesemia 2023     Methamphetamine abuse (Formerly [facility]) 2023     Tobacco abuse 2023     D-dimer, elevated 2023     Cardiomyopathy (Formerly [facility]) 10/09/2023     IRINEO (acute kidney injury) (Formerly [facility]) 10/08/2023     Acute on chronic combined systolic and diastolic congestive heart failure (Former[facility]) 10/07/2023     Hypertensive emergency 10/07/2023     Anxiety      Depression      Other emphysema ([facility])      GERD (gastroesophageal reflux disease)      Gout      Hypertensive urgency      Neuropathy      Raynaud's disease      Unilateral inguinal hernia, without obstruction or gangrene, not specified as recurrent      PSS (progressive systemic sclerosis) (Formerly [facility]) 10/25/2021     Near syncope 10/25/2021     Osteomyelitis (Former[facility]) 10/25/2021     Elevated troponin 10/25/2021     Acute encephalopathy 10/25/2021     Hyponatremia 10/24/2021       LOS (days): 0  Geometric Mean LOS (GMLOS) (days):   Days to GMLOS:     OBJECTIVE:  Risk of Unplanned Readmission Score: 31.79         Current admission status: Inpatient   Preferred Pharmacy:   [facility] - [place], [place] - [address]  [address] [facility]  [place] [address]  Phone: [phone] Fax: [phone]    [facility]  [place] - [address]  [facility],  [address]  [facility] [address] [facility],  First Floor [facility] [place] [address]  Phone: [phone] Fax: [phone]    [id number] - [place], [place] - [address] [address]  Phone: [phone] Fax: [phone]    Primary Care Provider: Juan Galindo DO    Primary Insurance: [employer] ADELINA KOENIG  Secondary Insurance:     DISCHARGE DETAILS:           As per financial counselors, [employer] [place] is 83%

## 2025-04-09 NOTE — ASSESSMENT & PLAN NOTE
Patient remains in longstanding persistent atrial fibrillation, however he is intermittently paced and his ventricular rate is reasonable.  Patient states that he has been scheduled for elective cardioversion next week by his cardiology team from [facility].  We will defer that decision to them.  He is certainly a less than optimal candidate for cardioversion due to his noncompliance.

## 2025-04-09 NOTE — ASSESSMENT & PLAN NOTE
Continue Coumadin, beta-blocker, digoxin  Continue potassium more than 4, magnesium more than 2  Cardiology recommendation.

## 2025-04-09 NOTE — ASSESSMENT & PLAN NOTE
Wt Readings from Last 3 Encounters:   04/09/25 76.8 kg (169 lb 5 oz)   04/08/25 80.6 kg (177 lb 11.1 oz)   03/28/25 72.3 kg (159 lb 6.4 oz)     Tired, fatigued, short of breath with exertion  Chest x-ray shows:Marked cardiomegaly with mild vascular and interstitial prominence. No focal consolidation in the lung identified at this time   Elevated proBNP patient recently discharged from this hospital after receiving treatment for CHF and A-fib  Last echocardiogram shows:Patient appears to be in atrial fibrillation.    Left Ventricle: Left ventricular cavity size is moderately dilated. There is moderate concentric hypertrophy. There is concentric remodeling. The left ventricular ejection fraction is 25%. Systolic function is severely reduced.  Dyssynchronous LV contractility is noted.  Global longitudinal strain is significantly reduced at -5.2% There is severe global hypokinesis.    Right Ventricle: Systolic function is mildly reduced.    Left Atrium: The atrium is moderately dilated.    Right Atrium: The atrium is moderately dilated.    Mitral Valve: There is mild thickening. The leaflets exhibit normal mobility. There is mild annular calcification. There is moderate regurgitation with an eccentrically directed jet.    Tricuspid Valve: There is moderate regurgitation.  RVSP is 30 to 35 mmHg.    Pericardium: There is a trivial pericardial effusion posterior to the heart.    Compared to August 2024 study report, LV function remains severely depressed and mitral and tricuspid regurgitation are at least moderate.    Does follows up with [facility] cardiology, last note reviewed, per chart review, patient is being evaluated for outpatient cardioversion  Patient also came with A-fib with RVR, however patient has intermittently paced rhythm remained reasonable.  With cardiology, recommending conservative management intake and output, standing height.  Due to cost of medication, patient is unable to afford Entresto or  SGLT2

## 2025-04-09 NOTE — ASSESSMENT & PLAN NOTE
ECHO:Patient appears to be in atrial fibrillation.    Left Ventricle: Left ventricular cavity size is moderately dilated. There is moderate concentric hypertrophy. There is concentric remodeling. The left ventricular ejection fraction is 25%. Systolic function is severely reduced.  Dyssynchronous LV contractility is noted.  Global longitudinal strain is significantly reduced at -5.2% There is severe global hypokinesis.    Right Ventricle: Systolic function is mildly reduced.    Left Atrium: The atrium is moderately dilated.    Right Atrium: The atrium is moderately dilated.    Mitral Valve: There is mild thickening. The leaflets exhibit normal mobility. There is mild annular calcification. There is moderate regurgitation with an eccentrically directed jet.    Tricuspid Valve: There is moderate regurgitation.  RVSP is 30 to 35 mmHg.    Pericardium: There is a trivial pericardial effusion posterior to the heart.    Compared to August 2024 study report, LV function remains severely depressed and mitral and tricuspid regurgitation are at least moderate.

## 2025-04-09 NOTE — CONSULTS
Patients Name: Britton Batista   YOB: 1965   [id number]   [facility] Cardiology Consult    ASSESSMENT AND RECOMMENDATIONS:    Assessment & Plan  Acute on chronic systolic congestive heart failure (HCC)  Wt Readings from Last 3 Encounters:   04/09/25 76.8 kg (169 lb 5 oz)   04/08/25 80.6 kg (177 lb 11.1 oz)   03/28/25 72.3 kg (159 lb 6.4 oz)   Agree with IV diuretics and resumption of his GDMT for HFrEF.  Unfortunately due to cost issues, he cannot afford SGLT2 inhibitors or Entresto.  Longstanding persistent atrial fibrillation (HCC)  Patient remains in longstanding persistent atrial fibrillation, however he is intermittently paced and his ventricular rate is reasonable.  Patient states that he has been scheduled for elective cardioversion next week by his cardiology team from [facility].  We will defer that decision to them.  He is certainly a less than optimal candidate for cardioversion due to his noncompliance.   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  Methamphetamine abuse (HCC)  Patient has been extensively counseled to completely stop methamphetamine use.    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  He was advised hospitalization but signed AMA, however returned back to the ER after a few hours and agreed to be hospitalized this time.   Review of record reveals that patient was hospitalized for similar heart failure complaints on March 24 and required intravenous inotropes for cardiac shock and discharged on March 28, 2025.  He was also  "admitted earlier in February 2025 for heart failure and eventually left AMA.  Patient underwent cardiac catheterization at [facility] in 2020 which showed no evidence of obstructive coronary artery disease.  He underwent ICD implantation at [facility] as well in 2024.         Past Medical History:   Diagnosis Date    Anxiety     Cardiomyopathy (HCC)     CHF (congestive heart failure) (HCC)     Depression     Emphysema of lung (HCC)     GERD (gastroesophageal reflux disease)     Gout     Hypertension     Metabolic encephalopathy 03/22/2025    Neuropathy     Raynaud's disease     Right inguinal hernia     Scleroderma (HCC)        Past Surgical History:   Procedure Laterality Date    AMPUTATION Right     pt had tip of \"pointer finger\" d/t scleraderma    CARDIAC PACEMAKER PLACEMENT      FINGER AMPUTATION Left 01/31/2024    Procedure: AMPUTATION FINGER, LEFT INDEX FINGER CPT: 15469;  Surgeon: Pedro Vazquez MD;  Location:  MAIN OR;  Service: Orthopedics    HERNIA REPAIR Left     times 2    AL AMP F/TH 1/2 JT/PHALANX W/NEURECT W/DIR CLSR Right 01/23/2024    Procedure: AMPUTATION FINGER, RIGHT MIDDLE;  Surgeon: Pedro Vazquez MD;  Location[facility];  Service: Orthopedics    AL RPR 1ST INGUN HRNA AGE 5 YRS/> REDUCIBLE Right 03/03/2023    Procedure: OPEN INGUINAL HERNIA REPAIR WITH MESH;  Surgeon: Temo Jackson DO;  Location:  MAIN OR;  Service: General        Social History     Tobacco Use    Smoking status: Every Day     Current packs/day: 1.00     Average packs/day: 1 pack/day for 45.3 years (45.3 ttl pk-yrs)     Types: Cigarettes     Start date: 2000    Smokeless tobacco: Never    Tobacco comments:     Last cigarette 0430   Vaping Use    Vaping status: Never Used   Substance Use Topics    Alcohol use: Not Currently     Comment: occasional    Drug use: Yes     Types: Marijuana, Methamphetamines     Comment: Hx meth use        Allergies   Allergen Reactions    Aspirin GI Intolerance        Family History   Problem Relation " Age of Onset    Hypertension Father           Current Facility-Administered Medications:     acetaminophen (TYLENOL) tablet 650 mg, 650 mg, Oral, Q4H PRN, Conrad Bynum MD    albuterol (PROVENTIL HFA,VENTOLIN HFA) inhaler 2 puff, 2 puff, Inhalation, Q6H PRN, Conrad Bynum MD    carvedilol (COREG) tablet 12.5 mg, 12.5 mg, Oral, BID With Meals, Conrad Bynum MD    digoxin (LANOXIN) tablet 125 mcg, 125 mcg, Oral, Once per day on Monday Wednesday Friday, Conrad Bynum MD    furosemide (LASIX) injection 50 mg, 50 mg, Intravenous, BID, Conrad Bynum MD    lisinopril (ZESTRIL) tablet 2.5 mg, 2.5 mg, Oral, Daily, Conrad Bynum MD    magnesium Oxide (MAG-OX) tablet 400 mg, 400 mg, Oral, Daily, Conrad Bynum MD    metoprolol (LOPRESSOR) injection 2.5 mg, 2.5 mg, Intravenous, Q6H PRN, Conrad Bynum MD    nicotine (NICODERM CQ) 14 mg/24hr TD 24 hr patch 1 patch, 1 patch, Transdermal, Daily, Conrad Bynum MD    nicotine (NICODERM CQ) 21 mg/24 hr TD 24 hr patch 1 patch, 1 patch, Transdermal, Daily, Conrad Bynum MD    ondansetron (ZOFRAN) injection 4 mg, 4 mg, Intravenous, Q6H PRN, Conrad Bynum MD    pantoprazole (PROTONIX) EC tablet 40 mg, 40 mg, Oral, BID, Conrad Bynum MD    spironolactone (ALDACTONE) tablet 12.5 mg, 12.5 mg, Oral, Daily, Conrad Bynum MD    warfarin (COUMADIN) tablet 5 mg, 5 mg, Oral, Daily (warfarin), Conrad Bynum MD     Lab Results   Component Value Date    LDLCALC 77 03/22/2025    HDL 44 03/22/2025    TRIG 87 03/22/2025    CHOLESTEROL 138 03/22/2025    CREATININE 1.68 (H) 04/09/2025    SODIUM 135 04/09/2025    K 4.4 04/09/2025    HSTNI 137 (H) 03/22/2025         No results found for this visit on 04/09/25.    I have personally reviewed the ECG from April 8, 2025 which showed ventricular paced rhythm with intermittent atrial fibrillation and baseline right bundle branch block      REVIEW OF SYSTEMS   Positive for: Shortness of breath  Negative for:  All remaining as reviewed below and in HPI.    SYSTEM SYMPTOMS REVIEWED:  General--weight change, fever, night sweats  Respiratory--cough, wheezing, shortness of breath, sputum production  Cardiovascular--chest pain, syncope, dyspnea on exertion, edema, decline in exercise tolerance, claudication   Gastrointestinal--persistent vomiting, diarrhea, abdominal distention, blood in stool   Muscular or skeletal--joint pain or swelling   Neurologic--headaches, syncope, abnormal movement  Hematologic--history of easy bruising and bleeding   Endocrine--thyroid enlargement, heat or cold intolerance, polyuria   Psychiatric--anxiety, depression     General physical examination:    General appearance: Alert, mild respiratory distress, appears older than stated age, normal weight.  Poor general hygiene  HEENT: Mucous membranes are moist.  Multiple missing teeth  Neck: Supple with no lymphadenopathy.  No JVD.  Carotid pulses are intact.  No carotid bruit.  Cardiovascular system: Regular rhythm.  Distant heart sounds.  No murmurs.  No rubs or gallops. Extremities: No edema. No cyanosis.  Pulmonary: Respirations unlabored.  Reduced air entry bilaterally.  Poor air entry bilaterally without any wheezing or crackles.    Gastrointestinal: Abdomen is soft and nontender.  Bowel sounds are positive.  Musculoskeletal: Upper Extremities: Normal upper motor strength. Lower Extremity: Normal motor strength.   Skin: Skin is warm. No rashes or lesions.  Neurological: Patient is alert and oriented with no gross motor deficits.  Psychiatric: Mood is normal.  Behavior is normal.    Vitals:    04/09/25 1100 04/09/25 1115 04/09/25 1130 04/09/25 1211   BP: 108/62 97/69 103/74 119/81   BP Location: Right arm Right arm Right arm Right arm   Pulse: (!) 108 (!) 109 (!) 110    Resp: 20 18 (!) 25 20   Temp:       TempSrc:       SpO2: 95% 97% 95%    Weight:       Height:           Body mass index is 25 kg/m².     No intake or output data in the 24 hours  "ending 04/09/25 8680          Thank you for this consult. Please call for any further questions.  John Hwang MD, FACC, MAIAE  Attending Cardiologist    Portions of the record  have been created with voice recognition software.  Occasional grammatical mistakes or wrong word or \"sound alike\" substitutions may have occurred due to the inherent limitations of voice recognition software. Please reach out to me directly for any clarifications.     "

## 2025-04-09 NOTE — ED PROVIDER NOTES
Time reflects when diagnosis was documented in both MDM as applicable and the Disposition within this note       Time User Action Codes Description Comment    4/9/2025 11:07 AM Gary Nguyen [I50.43] Acute on chronic combined systolic and diastolic heart failure (HCC)     4/9/2025 11:08 AM Gary Nguyen [I48.91] Atrial fibrillation with RVR (HCC)     4/9/2025 11:08 AM Gary Nguyen [I21.4] NSTEMI (non-ST elevated myocardial infarction) ([facility])           ED Disposition       ED Disposition   Admit    Condition   Stable    Date/Time   Wed Apr 9, 2025 11:08 AM    Comment   --             Assessment & Plan       Medical Decision Making  58 yo M. The patient's presentation is consistent atrial fibrillation with RVR likely due to decompensated HF. HR improved with IV Lopressor; holding off on CCB due to clinical presentation of CHF exacerbation. Elevated troponin/NSTEMI likely due to decompensated HF/AF. Unlikely Type I NSTEMI. Low suspicion for PE given therapeutic INR, negative CT PE yesterday.  Low suspicion for aortic dissection, PNA given the patient's presentation, work up. IV Lasix administered. Admission accepted by hospital medicine.     Problems Addressed:  Acute on chronic combined systolic and diastolic heart failure (HCC): chronic illness or injury with exacerbation, progression, or side effects of treatment that poses a threat to life or bodily functions  Atrial fibrillation with RVR (HCC): chronic illness or injury with exacerbation, progression, or side effects of treatment  NSTEMI (non-ST elevated myocardial infarction) ([facility]): acute illness or injury    Amount and/or Complexity of Data Reviewed  Labs: ordered.  Radiology: ordered and independent interpretation performed.     Details: Chest x-ray interpreted by me.  Cardiomegaly noted.  Signs of mild pulmonary vascular congestion.  ECG/medicine tests: ordered and independent interpretation performed.     Details: ECG interpreted by me.   "Atrial fibrillation with RVR.  Heart rate 142 bpm.  Right bundle branch block.  Rightward axis.  T wave inversions in the inferolateral leads. No significant ST changes.   11:05 AM  Repeat ECG interpreted by me.  Ventricular paced rhythm.  Heart rate 100 bpm.  Infrequent PVC noted.  No significant ST changes.  Discussion of management or test interpretation with external provider(s): 1055--The case was discussed with hospital medicine. Admitted accepted.     Risk  Prescription drug management.  Decision regarding hospitalization.      Medications   furosemide (LASIX) injection 60 mg (0 mg Intravenous Hold 4/9/25 1105)   magnesium sulfate 2 g/50 mL IVPB (premix) 2 g (2 g Intravenous New Bag 4/9/25 1059)   metoprolol (LOPRESSOR) injection 5 mg (5 mg Intravenous Given 4/9/25 1016)     ED Risk Strat Scores      No data recorded    History of Present Illness       Chief Complaint   Patient presents with    Shortness of Breath     Patient reports \"I'm filling up with fluid and I didn't piss at all last night\". Here yesterday for same and refused admission.      Past Medical History:   Diagnosis Date    Anxiety     Cardiomyopathy (HCC)     CHF (congestive heart failure) (HCC)     Depression     Emphysema of lung (HCC)     GERD (gastroesophageal reflux disease)     Gout     Hypertension     Metabolic encephalopathy 03/22/2025    Neuropathy     Raynaud's disease     Right inguinal hernia     Scleroderma (HCC)       Past Surgical History:   Procedure Laterality Date    AMPUTATION Right     pt had tip of \"pointer finger\" d/t scleraderma    CARDIAC PACEMAKER PLACEMENT      FINGER AMPUTATION Left 01/31/2024    Procedure: AMPUTATION FINGER, LEFT INDEX FINGER CPT: 50278;  Surgeon: Pedro Vazquez MD;  Location: [facility] OR;  Service: Orthopedics    HERNIA REPAIR Left     times 2    SC AMP F/TH 1/2 JT/PHALANX W/NEURECT W/DIR CLSR Right 01/23/2024    Procedure: AMPUTATION FINGER, RIGHT MIDDLE;  Surgeon: Pedro Vazquez MD;  Location: [facility] " OR;  Service: Orthopedics    OK RPR 1ST INGUN HRNA AGE 5 YRS/> REDUCIBLE Right 03/03/2023    Procedure: OPEN INGUINAL HERNIA REPAIR WITH MESH;  Surgeon: Temo Jackson DO;  Location: [facility];  Service: General      Family History   Problem Relation Age of Onset    Hypertension Father       Social History     Tobacco Use    Smoking status: Every Day     Current packs/day: 1.00     Average packs/day: 1 pack/day for 45.3 years (45.3 ttl pk-yrs)     Types: Cigarettes     Start date: 2000    Smokeless tobacco: Never    Tobacco comments:     Last cigarette 0430   Vaping Use    Vaping status: Never Used   Substance Use Topics    Alcohol use: Not Currently     Comment: occasional    Drug use: Yes     Types: Marijuana, Methamphetamines     Comment: Hx meth use      E-Cigarette/Vaping    E-Cigarette Use Never User       E-Cigarette/Vaping Substances    Nicotine No     THC No     CBD No     Flavoring No       I have reviewed and agree with the history as documented.     Patient presents with worsening shortness of breath. Hx of CHF, COPD. Active smoker. Presents with worsening shortness of breath, orthopnea. Was evaluated in the ED yesterday. Ultimately signed out AGAINST MEDICAL ADVICE. Patient expressing worsening shortness of breath over the last 12 hours. CTA obtained + emphysema/pulmonary fibrosis. No signs of pulmonary edema. Patient states that he has been compliant with all medications, including warfarin/Lasix.      History provided by:  Patient, medical records and relative  Shortness of Breath  Severity:  Moderate  Onset quality:  Gradual  Timing:  Constant  Progression:  Worsening  Chronicity:  New  Worsened by:  Activity, coughing, deep breathing, exertion and movement  Associated symptoms: abdominal pain, chest pain and cough    Associated symptoms: no fever, no sputum production, no vomiting and no wheezing    Risk factors: tobacco use    Risk factors: no hx of PE/DVT      Review of Systems   Constitutional:   Positive for activity change, appetite change and fatigue. Negative for fever.   Respiratory:  Positive for cough, chest tightness and shortness of breath. Negative for sputum production and wheezing.    Cardiovascular:  Positive for chest pain. Negative for palpitations and leg swelling.   Gastrointestinal:  Positive for abdominal pain. Negative for diarrhea, nausea and vomiting.   Musculoskeletal:  Negative for arthralgias and back pain.   All other systems reviewed and are negative.    Objective       ED Triage Vitals [04/09/25 0951]   Temperature Pulse Blood Pressure Respirations SpO2 Patient Position - Orthostatic VS   97.9 °F (36.6 °C) 61 132/96 22 98 % Lying      Temp Source Heart Rate Source BP Location FiO2 (%) Pain Score    Temporal Monitor Right arm -- No Pain      Vitals      Date and Time Temp Pulse SpO2 Resp BP Pain Score FACES Pain Rating User   04/09/25 1100 -- 108 95 % 20 108/62 No Pain -- SB   04/09/25 1045 -- 108 95 % 20 106/90 -- -- SB   04/09/25 1030 -- 114 98 % 22 111/96 -- -- SB   04/09/25 1015 -- 137 98 % 20 123/105 -- -- SB   04/09/25 0951 97.9 °F (36.6 °C) 61 98 % 22 132/96 No Pain -- SBE            Physical Exam  Vitals and nursing note reviewed.   Constitutional:       General: He is in acute distress.      Appearance: He is ill-appearing. He is not toxic-appearing.   HENT:      Head: Normocephalic and atraumatic.      Mouth/Throat:      Comments: Dry mucous membranes  Cardiovascular:      Rate and Rhythm: Tachycardia present. Rhythm irregular.      Pulses: Normal pulses.      Heart sounds: No murmur heard.  Pulmonary:      Effort: Tachypnea and respiratory distress present.      Breath sounds: Decreased breath sounds present. No wheezing, rhonchi or rales.   Chest:      Chest wall: No tenderness.   Abdominal:      General: Bowel sounds are normal.      Palpations: Abdomen is soft.      Tenderness: There is abdominal tenderness.      Comments: Mild tenderness to palpation along the  upper/lower abdomen.  No rebound/guarding.   Musculoskeletal:      Cervical back: Neck supple.      Right lower leg: No tenderness. Edema present.      Left lower leg: No tenderness. Edema present.      Comments: Trace bilateral lower extremity edema.   Skin:     General: Skin is warm and dry.      Coloration: Skin is cyanotic.      Findings: Rash present.      Comments: Facial rash noted.  History of scleroderma.   Cyanotic fingers noted.  History of Raynaud's.   Neurological:      Mental Status: He is alert and oriented to person, place, and time.       Results Reviewed       Procedure Component Value Units Date/Time    HS Troponin 0hr (reflex protocol) [[id number]]  (Abnormal) Collected: 04/09/25 1016    Lab Status: Final result Specimen: Blood from Arm, Left Updated: 04/09/25 1048     hs TnI 0hr 121 ng/L     HS Troponin I 2hr [[id number]]     Lab Status: No result Specimen: Blood     B-Type Natriuretic Peptide(BNP) [[id number]]  (Abnormal) Collected: 04/09/25 1016    Lab Status: Final result Specimen: Blood from Arm, Left Updated: 04/09/25 1047     BNP 2,132 pg/mL     Basic metabolic panel [[id number]]  (Abnormal) Collected: 04/09/25 1016    Lab Status: Final result Specimen: Blood from Arm, Left Updated: 04/09/25 1044     Sodium 135 mmol/L      Potassium 4.4 mmol/L      Chloride 103 mmol/L      CO2 23 mmol/L      ANION GAP 9 mmol/L      BUN 37 mg/dL      Creatinine 1.68 mg/dL      Glucose 129 mg/dL      Calcium 9.3 mg/dL      eGFR 43 ml/min/1.73sq m     Narrative:      [employer] guidelines for Chronic Kidney Disease (CKD):     Stage 1 with normal or high GFR (GFR > 90 mL/min/1.73 square meters)    Stage 2 Mild CKD (GFR = 60-89 mL/min/1.73 square meters)    Stage 3A Moderate CKD (GFR = 45-59 mL/min/1.73 square meters)    Stage 3B Moderate CKD (GFR = 30-44 mL/min/1.73 square meters)    Stage 4 Severe CKD (GFR = 15-29 mL/min/1.73 square meters)    Stage 5 End Stage CKD (GFR <15 mL/min/1.73  square meters)  Note: GFR calculation is accurate only with a steady state creatinine    Hepatic function panel [[id number]]  (Abnormal) Collected: 04/09/25 1016    Lab Status: Final result Specimen: Blood from Arm, Left Updated: 04/09/25 1044     Total Bilirubin 0.45 mg/dL      Bilirubin, Direct 0.16 mg/dL      Alkaline Phosphatase 110 U/L      AST 30 U/L      ALT 24 U/L      Total Protein 6.9 g/dL      Albumin 3.5 g/dL     Lipase [[id number]]  (Normal) Collected: 04/09/25 1016    Lab Status: Final result Specimen: Blood from Arm, Left Updated: 04/09/25 1044     Lipase 13 u/L     Magnesium [[id number]]  (Abnormal) Collected: 04/09/25 1016    Lab Status: Final result Specimen: Blood from Arm, Left Updated: 04/09/25 1044     Magnesium 1.7 mg/dL     Lactic acid, plasma (w/reflex if result > 2.0) [[id number]]  (Normal) Collected: 04/09/25 1016    Lab Status: Final result Specimen: Blood from Arm, Left Updated: 04/09/25 1043     LACTIC ACID 1.9 mmol/L     Narrative:      Result may be elevated if tourniquet was used during collection.    Protime-INR [[id number]]  (Abnormal) Collected: 04/09/25 1016    Lab Status: Final result Specimen: Blood from Arm, Left Updated: 04/09/25 1041     Protime 29.0 seconds      INR 2.74    Narrative:      INR Therapeutic Range    Indication                                             INR Range      Atrial Fibrillation                                               2.0-3.0  Hypercoagulable State                                    2.0.2.3  Left Ventricular Asist Device                            2.0-3.0  Mechanical Heart Valve                                  -    Aortic(with afib, MI, embolism, HF, LA enlargement,    and/or coagulopathy)                                     2.0-3.0 (2.5-3.5)     Mitral                                                             2.5-3.5  Prosthetic/Bioprosthetic Heart Valve               2.0-3.0  Venous thromboembolism (VTE: VT, PE        2.0-3.0    Blood gas,  venous [[id number]]  (Abnormal) Collected: 04/09/25 1016    Lab Status: Final result Specimen: Blood from Arm, Left Updated: 04/09/25 1025     pH, Chuck 7.395     pCO2, Chuck 35.1 mm Hg      pO2, Chuck 77.4 mm Hg      HCO3, Chuck 21.0 mmol/L      Base Excess, Chuck -3.2 mmol/L      O2 Content, Chuck 18.3 ml/dL      O2 HGB, VENOUS 92.7 %     CBC and differential [[id number]]  (Abnormal) Collected: 04/09/25 1016    Lab Status: Final result Specimen: Blood from Arm, Left Updated: 04/09/25 1022     WBC 8.23 Thousand/uL      RBC 4.58 Million/uL      Hemoglobin 13.2 g/dL      Hematocrit 41.1 %      MCV 90 fL      MCH 28.8 pg      MCHC 32.1 g/dL      RDW 16.8 %      MPV 9.0 fL      Platelets 297 Thousands/uL      nRBC 0 /100 WBCs      Segmented % 73 %      Immature Grans % 0 %      Lymphocytes % 15 %      Monocytes % 9 %      Eosinophils Relative 2 %      Basophils Relative 1 %      Absolute Neutrophils 6.00 Thousands/µL      Absolute Immature Grans 0.03 Thousand/uL      Absolute Lymphocytes 1.23 Thousands/µL      Absolute Monocytes 0.75 Thousand/µL      Eosinophils Absolute 0.17 Thousand/µL      Basophils Absolute 0.05 Thousands/µL             XR chest 1 view portable   ED Interpretation by Gary Nguyen DO (04/09 1033)   Cardiomegaly. Mild pulmonary vascular congestion.       Final Interpretation by Fran Llamas MD (04/09 1046)      Marked cardiomegaly with mild vascular and interstitial prominence. No focal consolidation in the lung identified at this time.            Workstation performed: [id number]             Procedures    ED Medication and Procedure Management   Prior to Admission Medications   Prescriptions Last Dose Informant Patient Reported? Taking?   albuterol (ProAir HFA) 90 mcg/act inhaler   No No   Sig: Inhale 2 puffs every 6 (six) hours as needed for wheezing   carvedilol (COREG) 12.5 mg tablet   No No   Sig: Take 1 tablet (12.5 mg total) by mouth 2 (two) times a day with meals   digoxin (LANOXIN) 0.125  mg tablet   No No   Sig: Take 1 tablet (125 mcg total) by mouth 3 (three) times a week   furosemide (LASIX) 40 mg tablet   No No   Sig: Take 1 tablet (40 mg total) by mouth daily   lisinopril (ZESTRIL) 2.5 mg tablet   No No   Sig: Take 1 tablet (2.5 mg total) by mouth daily   magnesium Oxide (MAG-OX) 400 mg TABS   No No   Sig: Take 1 tablet (400 mg total) by mouth daily   nicotine (NICODERM CQ) 21 mg/24 hr TD 24 hr patch   No No   Sig: Place 1 patch on the skin over 24 hours daily   pantoprazole (PROTONIX) 40 mg tablet  Self No No   Sig: Take 1 tablet (40 mg total) by mouth 2 (two) times a day   spironolactone (ALDACTONE) 25 mg tablet   No No   Sig: Take 0.5 tablets (12.5 mg total) by mouth daily   sucralfate (CARAFATE) 1 g tablet   No No   Sig: Take 1 tablet (1 g total) by mouth 4 (four) times a day for 14 days   warfarin (COUMADIN) 5 mg tablet   No No   Sig: Target INR range is between 2-3      Facility-Administered Medications: None     Patient's Medications   Discharge Prescriptions    No medications on file     No discharge procedures on file.  ED SEPSIS DOCUMENTATION   Time reflects when diagnosis was documented in both MDM as applicable and the Disposition within this note       Time User Action Codes Description Comment    4/9/2025 11:07 AM Gary Nguyen [I50.43] Acute on chronic combined systolic and diastolic heart failure ([facility])     4/9/2025 11:08 AM Gary Nguyen [I48.91] Atrial fibrillation with RVR ([facility])     4/9/2025 11:08 AM Gary Nguyen [I21.4] NSTEMI (non-ST elevated myocardial infarction) ([facility])                  Gary Nguyen DO  04/09/25 1152

## 2025-04-09 NOTE — H&P
H&P - Hospitalist   Name: Britton Batista 59 y.o. male I MRN: [id number]  Unit/Bed#: -01 I Date of Admission: 4/9/2025   Date of Service: 4/9/2025 I Hospital Day: 0     Assessment & Plan  Acute on chronic systolic congestive heart failure (HCC)  Wt Readings from Last 3 Encounters:   04/09/25 76.8 kg (169 lb 5 oz)   04/08/25 80.6 kg (177 lb 11.1 oz)   03/28/25 72.3 kg (159 lb 6.4 oz)     Tired, fatigued, short of breath with exertion  Chest x-ray shows:Marked cardiomegaly with mild vascular and interstitial prominence. No focal consolidation in the lung identified at this time   Elevated proBNP patient recently discharged from this hospital after receiving treatment for CHF and A-fib  Last echocardiogram shows:Patient appears to be in atrial fibrillation.    Left Ventricle: Left ventricular cavity size is moderately dilated. There is moderate concentric hypertrophy. There is concentric remodeling. The left ventricular ejection fraction is 25%. Systolic function is severely reduced.  Dyssynchronous LV contractility is noted.  Global longitudinal strain is significantly reduced at -5.2% There is severe global hypokinesis.    Right Ventricle: Systolic function is mildly reduced.    Left Atrium: The atrium is moderately dilated.    Right Atrium: The atrium is moderately dilated.    Mitral Valve: There is mild thickening. The leaflets exhibit normal mobility. There is mild annular calcification. There is moderate regurgitation with an eccentrically directed jet.    Tricuspid Valve: There is moderate regurgitation.  RVSP is 30 to 35 mmHg.    Pericardium: There is a trivial pericardial effusion posterior to the heart.    Compared to August 2024 study report, LV function remains severely depressed and mitral and tricuspid regurgitation are at least moderate.    Does follows up with [facility] cardiology, last note reviewed, per chart review, patient is being evaluated for outpatient cardioversion  Patient also came with  A-fib with RVR, however patient has intermittently paced rhythm remained reasonable.  With cardiology, recommending conservative management intake and output, standing height.  Due to cost of medication, patient is unable to afford Entresto or SGLT2        Longstanding persistent atrial fibrillation (HCC)  Continue Coumadin, beta-blocker, digoxin  Continue potassium more than 4, magnesium more than 2  Cardiology recommendation.  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    Left Ventricle: Left ventricular cavity size is moderately dilated. There is moderate concentric hypertrophy. There is concentric remodeling. The left ventricular ejection fraction is 25%. Systolic function is severely reduced.  Dyssynchronous LV contractility is noted.  Global longitudinal strain is significantly reduced at -5.2% There is severe global hypokinesis.    Right Ventricle: Systolic function is mildly reduced.    Left Atrium: The atrium is moderately dilated.    Right Atrium: The atrium is moderately dilated.    Mitral Valve: There is mild thickening. The leaflets exhibit normal mobility. There is mild annular calcification. There is moderate regurgitation with an eccentrically directed jet.    Tricuspid Valve: There is moderate regurgitation.  RVSP is 30 to 35 mmHg.    Pericardium: There is a trivial pericardial effusion posterior to the heart.    Compared to August 2024 study report, LV function remains severely depressed and mitral and tricuspid regurgitation are at least moderate.      VTE Pharmacologic Prophylaxis: VTE Score: 6 High Risk (Score >/= 5) - Pharmacological DVT Prophylaxis Ordered: warfarin (Coumadin). Sequential Compression Devices Ordered.  Code Status: Level 1 - Full Code the patient  Discussion with family: Updated  (sister) at bedside.    Anticipated Length of Stay:  "Patient will be admitted on an inpatient basis with an anticipated length of stay of greater than 2 midnights secondary to monitorable conditions.    History of Present Illness   Chief Complaint: Shortness of breath    Britton Batista is a 59 y.o. male with a PMH of, A-fib, cardiomyopathy, substance abuse who presents with shortness of breath, dyspnea, fatigue and tiredness.  Patient recently got discharged from this hospital after receiving treatment for CHF and A-fib.  Patient reports he was compliant in taking all the medication but for the last 2 days, he is having increased short of breath, denies any chest pain, dizziness, lightheadedness..    Review of Systems   Constitutional:  Positive for activity change and fatigue. Negative for fever and unexpected weight change.   HENT:  Negative for congestion and dental problem.    Respiratory:  Positive for shortness of breath. Negative for apnea, wheezing and stridor.    Cardiovascular:  Negative for chest pain, palpitations and leg swelling.   Gastrointestinal:  Negative for abdominal distention, abdominal pain and nausea.   Neurological:  Negative for dizziness, tremors, facial asymmetry and weakness.   Hematological:  Negative for adenopathy. Does not bruise/bleed easily.   All other systems reviewed and are negative.      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    CHF (congestive heart failure) (HCC)     Depression     Emphysema of lung (HCC)     GERD (gastroesophageal reflux disease)     Gout     Hypertension     Metabolic encephalopathy 03/22/2025    Neuropathy     Raynaud's disease     Right inguinal hernia     Scleroderma (HCC)      Past Surgical History:   Procedure Laterality Date    AMPUTATION Right     pt had tip of \"pointer finger\" d/t scleraderma    CARDIAC PACEMAKER PLACEMENT      FINGER AMPUTATION Left 01/31/2024    Procedure: AMPUTATION " FINGER, LEFT INDEX FINGER CPT: 03595;  Surgeon: Pedro Vazquez MD;  Location:  MAIN OR;  Service: Orthopedics    HERNIA REPAIR Left     times 2    NY AMP F/TH 1/2 JT/PHALANX W/NEURECT W/DIR CLSR Right 01/23/2024    Procedure: AMPUTATION FINGER, RIGHT MIDDLE;  Surgeon: Pedro Vazquez MD;  Location: [facility];  Service: Orthopedics    NY RPR 1ST INGUN HRNA AGE 5 YRS/> REDUCIBLE Right 03/03/2023    Procedure: OPEN INGUINAL HERNIA REPAIR WITH MESH;  Surgeon: Temo Jackson DO;  Location:  MAIN OR;  Service: General     Social History     Tobacco Use    Smoking status: Every Day     Current packs/day: 1.00     Average packs/day: 1 pack/day for 45.3 years (45.3 ttl pk-yrs)     Types: Cigarettes     Start date: 2000    Smokeless tobacco: Never    Tobacco comments:     Last cigarette 0430   Vaping Use    Vaping status: Never Used   Substance and Sexual Activity    Alcohol use: Not Currently     Comment: occasional    Drug use: Yes     Types: Marijuana, Methamphetamines     Comment: Hx meth use    Sexual activity: Not Currently     Comment: defer     E-Cigarette/Vaping    E-Cigarette Use Never User      E-Cigarette/Vaping Substances    Nicotine No     THC No     CBD No     Flavoring No      Family History   Problem Relation Age of Onset    Hypertension Father      Social History:  Marital Status:    Occupation: Unknown  Patient Pre-hospital Living Situation: Home  Patient Pre-hospital Level of Mobility: walks  Patient Pre-hospital Diet Restrictions: Cardiac diet    Meds/Allergies   I have reviewed home medications with patient personally.  Prior to Admission medications    Medication Sig Start Date End Date Taking? Authorizing Provider   albuterol (ProAir HFA) 90 mcg/act inhaler Inhale 2 puffs every 6 (six) hours as needed for wheezing 3/28/25  Yes Conrad Bynum MD   carvedilol (COREG) 12.5 mg tablet Take 1 tablet (12.5 mg total) by mouth 2 (two) times a day with meals 3/28/25 5/27/25 Yes Conrad Bynum MD    digoxin (LANOXIN) 0.125 mg tablet Take 1 tablet (125 mcg total) by mouth 3 (three) times a week  Patient taking differently: Take 125 mcg by mouth 3 (three) times a week [id number] 3/28/25  Yes Conrad Bynum MD   furosemide (LASIX) 40 mg tablet Take 1 tablet (40 mg total) by mouth daily 3/28/25 4/27/25 Yes Conrad Bynum MD   lisinopril (ZESTRIL) 2.5 mg tablet Take 1 tablet (2.5 mg total) by mouth daily 3/28/25  Yes Conrad Bynum MD   magnesium Oxide (MAG-OX) 400 mg TABS Take 1 tablet (400 mg total) by mouth daily 3/28/25  Yes Conrad Bynum MD   nicotine (NICODERM CQ) 21 mg/24 hr TD 24 hr patch Place 1 patch on the skin over 24 hours daily 3/28/25  Yes Conrad Bynum MD   pantoprazole (PROTONIX) 40 mg tablet Take 1 tablet (40 mg total) by mouth 2 (two) times a day 10/10/24 4/9/25 Yes Armida Coppola MD   spironolactone (ALDACTONE) 25 mg tablet Take 0.5 tablets (12.5 mg total) by mouth daily 3/28/25  Yes Conrad Bynum MD   warfarin (COUMADIN) 5 mg tablet Target INR range is between 2-3 3/28/25  Yes Conrad Bynum MD   sucralfate (CARAFATE) 1 g tablet Take 1 tablet (1 g total) by mouth 4 (four) times a day for 14 days  Patient not taking: Reported on 4/9/2025 10/10/24 10/24/24  Armida Coppola MD     Allergies   Allergen Reactions    Aspirin GI Intolerance       Objective :  Temp:  [97.9 °F (36.6 °C)] 97.9 °F (36.6 °C)  HR:  [] 69  BP: ()/() 133/105  Resp:  [18-25] 20  SpO2:  [95 %-98 %] 95 %  O2 Device: None (Room air)    Physical Exam  Vitals and nursing note reviewed.   Constitutional:       Appearance: Normal appearance. He is ill-appearing. He is not diaphoretic.   HENT:      Mouth/Throat:      Mouth: Mucous membranes are moist.      Pharynx: No oropharyngeal exudate.   Eyes:      General: No scleral icterus.        Left eye: No discharge.      Extraocular Movements: Extraocular movements intact.      Pupils: Pupils are equal, round, and reactive to light.    Cardiovascular:      Rate and Rhythm: Tachycardia present. Rhythm irregular.      Heart sounds: Murmur heard.      No friction rub. No gallop.   Pulmonary:      Effort: No respiratory distress.      Breath sounds: No stridor. No wheezing or rhonchi.   Abdominal:      General: There is no distension.      Palpations: There is no mass.      Tenderness: There is no abdominal tenderness.      Hernia: No hernia is present.   Musculoskeletal:      Right lower leg: No edema.      Left lower leg: No edema.      Comments: Amputation of the fingers in both hand-patient has mild pale discoloration on the tip of the fingers of the hand, has palpable radial and ulnar pulses bilaterally.   Neurological:      Mental Status: He is alert and oriented to person, place, and time.      Cranial Nerves: No cranial nerve deficit.      Sensory: No sensory deficit.      Motor: No weakness.          Lines/Drains:            Lab Results: I have reviewed the following results:  Results from last 7 days   Lab Units 04/09/25  1016   WBC Thousand/uL 8.23   HEMOGLOBIN g/dL 13.2   HEMATOCRIT % 41.1   PLATELETS Thousands/uL 297   SEGS PCT % 73   LYMPHO PCT % 15   MONO PCT % 9   EOS PCT % 2     Results from last 7 days   Lab Units 04/09/25  1016   SODIUM mmol/L 135   POTASSIUM mmol/L 4.4   CHLORIDE mmol/L 103   CO2 mmol/L 23   BUN mg/dL 37*   CREATININE mg/dL 1.68*   ANION GAP mmol/L 9   CALCIUM mg/dL 9.3   ALBUMIN g/dL 3.5   TOTAL BILIRUBIN mg/dL 0.45   ALK PHOS U/L 110*   ALT U/L 24   AST U/L 30   GLUCOSE RANDOM mg/dL 129     Results from last 7 days   Lab Units 04/09/25  1016   INR  2.74*         Lab Results   Component Value Date    HGBA1C 5.9 (H) 10/25/2021     Results from last 7 days   Lab Units 04/09/25  1016 04/08/25  0120   LACTIC ACID mmol/L 1.9 2.3*   PROCALCITONIN ng/ml  --  0.12       Imaging Results Review: I reviewed radiology reports from this admission including: chest xray.  Other Study Results Review: EKG was reviewed.      Administrative Statements       ** Please Note: This note has been constructed using a voice recognition system. **

## 2025-04-09 NOTE — CASE MANAGEMENT
Case Management Assessment & Discharge Planning Note    Patient name Britton Batista  Location /-01 MRN [id number]  : 1965 Date 2025       Current Admission Date: 2025  Current Admission Diagnosis:Acute on chronic systolic congestive heart failure (HCC)   Patient Active Problem List    Diagnosis Date Noted Date Diagnosed    Longstanding persistent atrial fibrillation ([facility]) 2025     Cardiomyopathy secondary to drug ([facility]) 2025     Scleroderma ([facility]) 2025     Hyperkalemia 2025     Stage 3a chronic kidney disease (CKD) ([facility]) 2025     Cardiogenic shock ([facility]) 2025     Atrial fibrillation with RVR ([facility]) 2025     Syncope 2024     Chest wall pain 2024     Acute on chronic systolic congestive heart failure ([facility]) 2024     Primary hypertension 2024     Hypomagnesemia 2023     Methamphetamine abuse ([facility]) 2023     Tobacco abuse 2023     D-dimer, elevated 2023     Cardiomyopathy ([facility]) 10/09/2023     IRINEO (acute kidney injury) ([facility]) 10/08/2023     Acute on chronic combined systolic and diastolic congestive heart failure ([facility]) 10/07/2023     Hypertensive emergency 10/07/2023     Anxiety      Depression      Other emphysema ([facility])      GERD (gastroesophageal reflux disease)      Gout      Hypertensive urgency      Neuropathy      Raynaud's disease      Unilateral inguinal hernia, without obstruction or gangrene, not specified as recurrent      PSS (progressive systemic sclerosis) ([facility]) 10/25/2021     Near syncope 10/25/2021     Osteomyelitis ([facility]) 10/25/2021     Elevated troponin 10/25/2021     Acute encephalopathy 10/25/2021     Hyponatremia 10/24/2021       LOS (days): 0  Geometric Mean LOS (GMLOS) (days): 3.9  Days to GMLOS:3.7     OBJECTIVE:  PATIENT READMITTED TO HOSPITAL  Risk of Unplanned Readmission Score: 31.79         Current admission status: Inpatient       Preferred Pharmacy:   [facility] -  [place], [place] - [address]  [address]   [place] [address]  Phone: [phone] Fax: [phone]    [facility]  [place] - [address]  [facility],  [address]  [facility],  [address] [facility] [place] [address]  Phone: [phone] Fax: [phone]    [id number] - [place], [place] - [address] [address]  Phone: [phone] Fax: [phone]    Primary Care Provider: Juan Galindo DO    Primary Insurance: [employer] REP  Secondary Insurance:     ASSESSMENT:  Active Health Care Proxies       Arin Batista Health Care Representative - Sister   Primary Phone: [phone] (Mobile)                 Advance Directives  Does patient have a Health Care POA?: No  Was patient offered paperwork?: Yes (declined)  Does patient currently have a Health Care decision maker?: No  Does patient have Advance Directives?: No  Was patient offered paperwork?: Yes (declined)  Primary Contact: Arin tom         Readmission Root Cause  30 Day Readmission: Yes  During your hospital stay, did someone (provider, nurse, ) explain your care to you in a way you could understand?: Yes  Did you feel medically stable to leave the hospital?: Yes  Were you able to pay for your medication at the pharmacy?: Yes  Did you have reliable transportation to take you to your appointments?: Yes  During previous admission, was a post-acute recommendation made?: No  Patient was readmitted due to: CHF, afib  Action Plan: pt is non compliant with medicaitons, lab draws and drs appointment, also takes meth.   will try to look into pt assistance program thru [employer], could take up to 8 weeks to get approved.   referred to Nory at [facility] office OP    Patient Information  Admitted from:: Home  Mental Status: Alert  During Assessment patient was accompanied by: Not accompanied during assessment  Assessment information provided by::  Patient  Primary Caregiver: Self  Support Systems: Family members  County of Residence: [facility]  Home entry access options. Select all that apply.: Stairs  Number of steps to enter home.: One Flight  Type of Current Residence: Apartment  Floor Level: 2  Upon entering residence, is there a bedroom on the main floor (no further steps)?: Yes  Upon entering residence, is there a bathroom on the main floor (no further steps)?: Yes  Living Arrangements: Lives w/ Family members    Activities of Daily Living Prior to Admission  Functional Status: Independent  Completes ADLs independently?: Yes  Ambulates independently?: Yes  Does patient use assisted devices?: No  Does patient currently own DME?: Yes  What DME does the patient currently own?: [employer]  Does patient have a history of Outpatient Therapy (PT/OT)?: No  Does the patient have a history of Short-Term Rehab?: No  Does patient have a history of HHC?: No  Does patient currently have HHC?: No         Patient Information Continued  Income Source: SSI/SSD  Does patient have prescription coverage?: Yes  Can the patient afford their medications and any related supplies (such as glucometers or test strips)?: No  Does patient receive dialysis treatments?: No  Does patient have a history of substance abuse?: Yes  Historical substance use preference: Alcohol/ETOH, Marijuana, Methamphetamines  History of Withdrawal Symptoms: Denies past symptoms  Is patient currently in treatment for substance abuse?: No. Patient declined treatment information. (pt will not discuss drug use at this time)  Does patient have a history of Mental Health Diagnosis?: Yes (anxiety/dep)  Is patient receiving treatment for mental health?: Yes (pcp)  Has patient received inpatient treatment related to mental health in the last 2 years?: No         Means of Transportation  [facility]          DISCHARGE DETAILS:    Discharge planning discussed with::  "patient  Freedom of Choice: Yes     CM contacted family/caregiver?: No- see comments (declined)             Contacts  Patient Contacts: Arin Batista (Sister)  Relationship to Patient:: Family                   Would you like to participate in our [facility] service program?  : No - Declined        Pt from home, lives with brother in a 2nd story apartment with full flight of steps to enter.  Pt sts he was IPTA, uses no AD.    CM asking pt is he has had any OP lab draws or follow up with PCP since his last admission, pt sts \"I dont know, I think they cancelled my appointment, and they herbert blood here.\"  CM asking pt is he has been compliant with his coumadin, if he  medications at time of last discharge at pharmacy, pt sts \"yeah, Miriam been taking it.:\"  Pt UDS + for meth and THC, CM asking pt is he would like to speak to an intake person from [facility] to discuss drug use, pt declining.      Cm referring pt to [facility] via email  CM to follow for discharge needs                                                              "

## 2025-04-10 VITALS
DIASTOLIC BLOOD PRESSURE: 74 MMHG | OXYGEN SATURATION: 92 % | TEMPERATURE: 97.3 F | HEIGHT: 69 IN | SYSTOLIC BLOOD PRESSURE: 120 MMHG | WEIGHT: 163.4 LBS | BODY MASS INDEX: 24.2 KG/M2 | RESPIRATION RATE: 20 BRPM | HEART RATE: 50 BPM

## 2025-04-10 LAB
BASOPHILS # BLD AUTO: 0.03 THOUSANDS/ÂΜL (ref 0–0.1)
BASOPHILS NFR BLD AUTO: 0 % (ref 0–1)
EOSINOPHIL # BLD AUTO: 0.17 THOUSAND/ÂΜL (ref 0–0.61)
EOSINOPHIL NFR BLD AUTO: 2 % (ref 0–6)
ERYTHROCYTE [DISTWIDTH] IN BLOOD BY AUTOMATED COUNT: 16.7 % (ref 11.6–15.1)
HCT VFR BLD AUTO: 40.7 % (ref 36.5–49.3)
HGB BLD-MCNC: 13.3 G/DL (ref 12–17)
IMM GRANULOCYTES # BLD AUTO: 0.02 THOUSAND/UL (ref 0–0.2)
IMM GRANULOCYTES NFR BLD AUTO: 0 % (ref 0–2)
LYMPHOCYTES # BLD AUTO: 1.24 THOUSANDS/ÂΜL (ref 0.6–4.47)
LYMPHOCYTES NFR BLD AUTO: 15 % (ref 14–44)
MAGNESIUM SERPL-MCNC: 1.8 MG/DL (ref 1.9–2.7)
MCH RBC QN AUTO: 29.4 PG (ref 26.8–34.3)
MCHC RBC AUTO-ENTMCNC: 32.7 G/DL (ref 31.4–37.4)
MCV RBC AUTO: 90 FL (ref 82–98)
MONOCYTES # BLD AUTO: 0.97 THOUSAND/ÂΜL (ref 0.17–1.22)
MONOCYTES NFR BLD AUTO: 12 % (ref 4–12)
NEUTROPHILS # BLD AUTO: 5.92 THOUSANDS/ÂΜL (ref 1.85–7.62)
NEUTS SEG NFR BLD AUTO: 71 % (ref 43–75)
NRBC BLD AUTO-RTO: 0 /100 WBCS
PLATELET # BLD AUTO: 293 THOUSANDS/UL (ref 149–390)
PMV BLD AUTO: 9.3 FL (ref 8.9–12.7)
RBC # BLD AUTO: 4.52 MILLION/UL (ref 3.88–5.62)
T4 FREE SERPL-MCNC: 0.94 NG/DL (ref 0.61–1.12)
TSH SERPL DL<=0.05 MIU/L-ACNC: 5.83 UIU/ML (ref 0.45–4.5)
WBC # BLD AUTO: 8.35 THOUSAND/UL (ref 4.31–10.16)

## 2025-04-10 PROCEDURE — 84443 ASSAY THYROID STIM HORMONE: CPT | Performed by: FAMILY MEDICINE

## 2025-04-10 PROCEDURE — 99239 HOSP IP/OBS DSCHRG MGMT >30: CPT | Performed by: FAMILY MEDICINE

## 2025-04-10 PROCEDURE — 99232 SBSQ HOSP IP/OBS MODERATE 35: CPT | Performed by: INTERNAL MEDICINE

## 2025-04-10 PROCEDURE — 83735 ASSAY OF MAGNESIUM: CPT | Performed by: FAMILY MEDICINE

## 2025-04-10 PROCEDURE — 84439 ASSAY OF FREE THYROXINE: CPT | Performed by: FAMILY MEDICINE

## 2025-04-10 PROCEDURE — 85025 COMPLETE CBC W/AUTO DIFF WBC: CPT | Performed by: FAMILY MEDICINE

## 2025-04-10 RX ORDER — METOPROLOL TARTRATE 1 MG/ML
2.5 INJECTION, SOLUTION INTRAVENOUS ONCE
Status: COMPLETED | OUTPATIENT
Start: 2025-04-10 | End: 2025-04-10

## 2025-04-10 RX ORDER — FUROSEMIDE 10 MG/ML
20 INJECTION INTRAMUSCULAR; INTRAVENOUS 2 TIMES DAILY
Status: DISCONTINUED | OUTPATIENT
Start: 2025-04-10 | End: 2025-04-10 | Stop reason: HOSPADM

## 2025-04-10 RX ORDER — MAGNESIUM SULFATE HEPTAHYDRATE 40 MG/ML
2 INJECTION, SOLUTION INTRAVENOUS ONCE
Status: COMPLETED | OUTPATIENT
Start: 2025-04-10 | End: 2025-04-10

## 2025-04-10 RX ORDER — CARVEDILOL 12.5 MG/1
6.25 TABLET ORAL 2 TIMES DAILY WITH MEALS
Qty: 60 TABLET | Refills: 0 | Status: SHIPPED | OUTPATIENT
Start: 2025-04-10 | End: 2025-04-20

## 2025-04-10 RX ORDER — OXYCODONE HYDROCHLORIDE 5 MG/1
5 TABLET ORAL ONCE
Refills: 0 | Status: COMPLETED | OUTPATIENT
Start: 2025-04-10 | End: 2025-04-10

## 2025-04-10 RX ADMIN — MAGNESIUM SULFATE HEPTAHYDRATE 2 G: 40 INJECTION, SOLUTION INTRAVENOUS at 09:41

## 2025-04-10 RX ADMIN — ACETAMINOPHEN 650 MG: 325 TABLET ORAL at 03:31

## 2025-04-10 RX ADMIN — OXYCODONE HYDROCHLORIDE 5 MG: 5 TABLET ORAL at 06:01

## 2025-04-10 RX ADMIN — NICOTINE 1 PATCH: 21 PATCH, EXTENDED RELEASE TRANSDERMAL at 08:11

## 2025-04-10 RX ADMIN — FUROSEMIDE 50 MG: 10 INJECTION, SOLUTION INTRAMUSCULAR; INTRAVENOUS at 08:11

## 2025-04-10 RX ADMIN — METOROPROLOL TARTRATE 2.5 MG: 5 INJECTION, SOLUTION INTRAVENOUS at 06:35

## 2025-04-10 RX ADMIN — PANTOPRAZOLE SODIUM 40 MG: 40 TABLET, DELAYED RELEASE ORAL at 08:12

## 2025-04-10 RX ADMIN — MAGNESIUM OXIDE TAB 400 MG (241.3 MG ELEMENTAL MG) 400 MG: 400 (241.3 MG) TAB at 08:12

## 2025-04-10 NOTE — PLAN OF CARE
Problem: Potential for Falls  Goal: Patient will remain free of falls  Description: INTERVENTIONS:- Educate patient/family on patient safety including physical limitations- Instruct patient to call for assistance with activity - Consult OT/PT to assist with strengthening/mobility - Keep Call bell within reach- Keep bed low and locked with side rails adjusted as appropriate- Keep care items and personal belongings within reach- Initiate and maintain comfort rounds- Make Fall Risk Sign visible to staff- Apply yellow socks and bracelet for high fall risk patients- Consider moving patient to room near nurses station  Outcome: Progressing     Problem: DISCHARGE PLANNING  Goal: Discharge to home or other facility with appropriate resources  Description: INTERVENTIONS:- Identify barriers to discharge w/patient and caregiver- Arrange for needed discharge resources and transportation as appropriate- Identify discharge learning needs (meds, wound care, etc.)- Arrange for interpretive services to assist at discharge as needed- Refer to Case Management Department for coordinating discharge planning if the patient needs post-hospital services based on physician/advanced practitioner order or complex needs related to functional status, cognitive ability, or social support system  Outcome: Progressing     Problem: Knowledge Deficit  Goal: Patient/family/caregiver demonstrates understanding of disease process, treatment plan, medications, and discharge instructions  Description: Complete learning assessment and assess knowledge base.Interventions:- Provide teaching at level of understanding- Provide teaching via preferred learning methods  Outcome: Progressing

## 2025-04-10 NOTE — ASSESSMENT & PLAN NOTE
History of substance abuse, check urine drug screen  Urine drug screen still showing positive for meth/THC

## 2025-04-10 NOTE — ASSESSMENT & PLAN NOTE
His cardiomyopathy is primarily due to drug abuse and he continues to use methamphetamine.  Urine drug screen from yesterday was positive for methamphetamine as well as THC.

## 2025-04-10 NOTE — ASSESSMENT & PLAN NOTE
Continue Coumadin, beta-blocker, digoxin  Continue potassium more than 4, magnesium more than 2  Cardiology recommendation.  Due to soft blood pressure, Coreg dose reduced as above.

## 2025-04-10 NOTE — PROGRESS NOTES
Progress Note - Cardiology   Name: Britton Batista 59 y.o. male I MRN: [id number]  Unit/Bed#: -01 I Date of Admission: 4/9/2025   Date of Service: 4/10/2025 I Hospital Day: 1     Assessment & Plan  Acute on chronic systolic congestive heart failure (HCC)  Wt Readings from Last 3 Encounters:   04/10/25 74.1 kg (163 lb 6.4 oz)   04/08/25 80.6 kg (177 lb 11.1 oz)   03/28/25 72.3 kg (159 lb 6.4 oz)   Patient is about 3.9 L negative on IV diuretics.  He looks like he is back to his baseline.  I think he can be discharged home today on his outpatient GDMT for HFrEF.  Unfortunately due to cost issues, he cannot afford SGLT2 inhibitors or Entresto.  Longstanding persistent atrial fibrillation (HCC)  Patient remains in longstanding persistent atrial fibrillation, however he is intermittently paced and his ventricular rate is reasonable.  Patient states that he has been scheduled for elective cardioversion next week by his cardiology team from [facility].  We will defer that decision to them.    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  Urine drug screen from yesterday was positive for methamphetamine as well as THC.  Methamphetamine abuse (HCC)  Patient has been extensively counseled to completely stop methamphetamine use.    Subjective   Chief Complaint: Shortness of breath.  Patient states that he feels much better and back to his baseline.  He denies any orthopnea, chest pain or syncope.    Objective :  Temp:  [97.2 °F (36.2 °C)-97.9 °F (36.6 °C)] 97.3 °F (36.3 °C)  HR:  [] 50  BP: ()/() 120/74  Resp:  [17-25] 20  SpO2:  [91 %-98 %] 91 %  O2 Device: Nasal cannula  Nasal Cannula O2 Flow Rate (L/min):  [2 L/min] 2 L/min  Orthostatic Blood Pressures      Flowsheet Row Most Recent Value   Blood Pressure 120/74 filed at 04/10/2025 0903   Patient Position - Orthostatic VS Lying filed at 04/10/2025 0617          First  Weight: Weight - Scale: 76.8 kg (169 lb 5 oz) (04/09/25 0951)  Vitals:    04/09/25 0951 04/10/25 0606   Weight: 76.8 kg (169 lb 5 oz) 74.1 kg (163 lb 6.4 oz)     Physical Exam  General appearance: Alert, no respiratory distress, appears older than stated age, normal weight.  Poor general hygiene  HEENT: Mucous membranes are moist.  Multiple missing teeth  Neck: Supple with no lymphadenopathy.  No JVD.  Carotid pulses are intact.  No carotid bruit.  Cardiovascular system: Regular rhythm.  Distant heart sounds.  No murmurs.  No rubs or gallops. Extremities: No edema. No cyanosis.  Pulmonary: Respirations unlabored. Poor air entry bilaterally without any wheezing or crackles.    Gastrointestinal: Abdomen is soft and nontender.  Bowel sounds are positive.  Musculoskeletal: Upper Extremities: Normal upper motor strength. Lower Extremity: Normal motor strength.   Skin: Skin is warm. No rashes or lesions.  Neurological: Patient is alert and oriented with no gross motor deficits.  Psychiatric: Mood is normal.  Behavior is normal    Lab Results: I have reviewed the following results:  Results from last 7 days   Lab Units 04/10/25  0504 04/09/25  1016 04/08/25  0120   WBC Thousand/uL 8.35 8.23 8.26   HEMOGLOBIN g/dL 13.3 13.2 13.7   HEMATOCRIT % 40.7 41.1 44.1   PLATELETS Thousands/uL 293 297 311     Results from last 7 days   Lab Units 04/09/25  1016 04/08/25  0120   POTASSIUM mmol/L 4.4 5.4*   CHLORIDE mmol/L 103 104   CO2 mmol/L 23 25   BUN mg/dL 37* 35*   CREATININE mg/dL 1.68* 1.68*   CALCIUM mg/dL 9.3 9.2     Results from last 7 days   Lab Units 04/09/25  1016 04/08/25  0120   INR  2.74* 2.99*   PTT seconds  --  42*     Lab Results   Component Value Date    HGBA1C 5.9 (H) 10/25/2021     Lab Results   Component Value Date    CKTOTAL 65 09/18/2024    TROPONINI 0.04 10/25/2021

## 2025-04-10 NOTE — ASSESSMENT & PLAN NOTE
Patient remains in longstanding persistent atrial fibrillation, however he is intermittently paced and his ventricular rate is reasonable.  Patient states that he has been scheduled for elective cardioversion next week by his cardiology team from [facility].  We will defer that decision to them.

## 2025-04-10 NOTE — ASSESSMENT & PLAN NOTE
Wt Readings from Last 3 Encounters:   04/10/25 74.1 kg (163 lb 6.4 oz)   04/08/25 80.6 kg (177 lb 11.1 oz)   03/28/25 72.3 kg (159 lb 6.4 oz)     Tired, fatigued, short of breath with exertion  Chest x-ray shows:Marked cardiomegaly with mild vascular and interstitial prominence. No focal consolidation in the lung identified at this time   Elevated proBNP patient recently discharged from this hospital after receiving treatment for CHF and A-fib  Last echocardiogram shows:Patient appears to be in atrial fibrillation.    Left Ventricle: Left ventricular cavity size is moderately dilated. There is moderate concentric hypertrophy. There is concentric remodeling. The left ventricular ejection fraction is 25%. Systolic function is severely reduced.  Dyssynchronous LV contractility is noted.  Global longitudinal strain is significantly reduced at -5.2% There is severe global hypokinesis.    Right Ventricle: Systolic function is mildly reduced.    Left Atrium: The atrium is moderately dilated.    Right Atrium: The atrium is moderately dilated.    Mitral Valve: There is mild thickening. The leaflets exhibit normal mobility. There is mild annular calcification. There is moderate regurgitation with an eccentrically directed jet.    Tricuspid Valve: There is moderate regurgitation.  RVSP is 30 to 35 mmHg.    Pericardium: There is a trivial pericardial effusion posterior to the heart.    Compared to August 2024 study report, LV function remains severely depressed and mitral and tricuspid regurgitation are at least moderate.    Does follows up with [facility] cardiology, last note reviewed, per chart review, patient is being evaluated for outpatient cardioversion  Patient also came with A-fib with RVR, however patient has intermittently paced rhythm remained reasonable.  With cardiology, recommending conservative management intake and output, standing height.  Due to cost of medication, patient is unable to afford Entresto or  SGLT2  Patient TSH 5.825, pending free T4  Magnesium came back 1.8, will supplement

## 2025-04-10 NOTE — PLAN OF CARE
Problem: Potential for Falls  Goal: Patient will remain free of falls  Description: INTERVENTIONS:- Educate patient/family on patient safety including physical limitations- Instruct patient to call for assistance with activity - Consult OT/PT to assist with strengthening/mobility - Keep Call bell within reach- Keep bed low and locked with side rails adjusted as appropriate- Keep care items and personal belongings within reach- Initiate and maintain comfort rounds- Make Fall Risk Sign visible to staff- Offer Toileting every Hours, in advance of need- Initiate/Maintain alarm- Obtain necessary fall risk management equipment: - Apply yellow socks and bracelet for high fall risk patients- Consider moving patient to room near nurses station  Outcome: Progressing     Problem: DISCHARGE PLANNING  Goal: Discharge to home or other facility with appropriate resources  Description: INTERVENTIONS:- Identify barriers to discharge w/patient and caregiver- Arrange for needed discharge resources and transportation as appropriate- Identify discharge learning needs (meds, wound care, etc.)- Arrange for interpretive services to assist at discharge as needed- Refer to Case Management Department for coordinating discharge planning if the patient needs post-hospital services based on physician/advanced practitioner order or complex needs related to functional status, cognitive ability, or social support system  Outcome: Progressing     Problem: Knowledge Deficit  Goal: Patient/family/caregiver demonstrates understanding of disease process, treatment plan, medications, and discharge instructions  Description: Complete learning assessment and assess knowledge base.Interventions:- Provide teaching at level of understanding- Provide teaching via preferred learning methods  Outcome: Progressing

## 2025-04-10 NOTE — ASSESSMENT & PLAN NOTE
History of substance abuse, check urine drug screen  Patient urine drug screen is still coming up positive for methamphetamine as well as THC.  Counseling provided.

## 2025-04-10 NOTE — ASSESSMENT & PLAN NOTE
Wt Readings from Last 3 Encounters:   04/10/25 74.1 kg (163 lb 6.4 oz)   04/08/25 80.6 kg (177 lb 11.1 oz)   03/28/25 72.3 kg (159 lb 6.4 oz)   Patient is about 3.9 L negative on IV diuretics.  He looks like he is back to his baseline.  I think he can be discharged home today on his outpatient GDMT for HFrEF.  Unfortunately due to cost issues, he cannot afford SGLT2 inhibitors or Entresto.

## 2025-04-10 NOTE — DISCHARGE SUMMARY
Discharge Summary - Hospitalist   Name: Britton Batista 59 y.o. male I MRN: [id number]  Unit/Bed#: -01 I Date of Admission: 4/9/2025   Date of Service: 4/10/2025 I Hospital Day: 1     Assessment & Plan  Acute on chronic systolic congestive heart failure (HCC)  Wt Readings from Last 3 Encounters:   04/10/25 74.1 kg (163 lb 6.4 oz)   04/08/25 80.6 kg (177 lb 11.1 oz)   03/28/25 72.3 kg (159 lb 6.4 oz)     Tired, fatigued, short of breath with exertion  Chest x-ray shows:Marked cardiomegaly with mild vascular and interstitial prominence. No focal consolidation in the lung identified at this time   Elevated proBNP patient recently discharged from this hospital after receiving treatment for CHF and A-fib  Last echocardiogram shows:Patient appears to be in atrial fibrillation.    Left Ventricle: Left ventricular cavity size is moderately dilated. There is moderate concentric hypertrophy. There is concentric remodeling. The left ventricular ejection fraction is 25%. Systolic function is severely reduced.  Dyssynchronous LV contractility is noted.  Global longitudinal strain is significantly reduced at -5.2% There is severe global hypokinesis.    Right Ventricle: Systolic function is mildly reduced.    Left Atrium: The atrium is moderately dilated.    Right Atrium: The atrium is moderately dilated.    Mitral Valve: There is mild thickening. The leaflets exhibit normal mobility. There is mild annular calcification. There is moderate regurgitation with an eccentrically directed jet.    Tricuspid Valve: There is moderate regurgitation.  RVSP is 30 to 35 mmHg.    Pericardium: There is a trivial pericardial effusion posterior to the heart.    Compared to August 2024 study report, LV function remains severely depressed and mitral and tricuspid regurgitation are at least moderate.    Does follows up with [facility] cardiology, last note reviewed, per chart review, patient is being evaluated for outpatient  cardioversion  Patient also came with A-fib with RVR, however patient has intermittently paced rhythm remained reasonable  Due to cost of medication, patient is unable to afford Entresto or SGLT2  Patient evaluated by on-call cardiology, agrees with diuretics.  Since patient diuretics -3.9 later and back to baseline, and condition improved, cardiology clearing the patient to discharge patient with follow-up with his own cardiology.    Patient can resume all his home medication, we are lowering the dose of Coreg to 6.25 twice daily        Longstanding persistent atrial fibrillation (HCC)  Continue Coumadin, beta-blocker, digoxin  Continue potassium more than 4, magnesium more than 2  Cardiology recommendation.  Due to soft blood pressure, Coreg dose reduced as above.  Methamphetamine abuse (HCC)  History of substance abuse, check urine drug screen  Patient urine drug screen is still coming up positive for methamphetamine as well as THC.  Counseling provided.  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    Left Ventricle: Left ventricular cavity size is moderately dilated. There is moderate concentric hypertrophy. There is concentric remodeling. The left ventricular ejection fraction is 25%. Systolic function is severely reduced.  Dyssynchronous LV contractility is noted.  Global longitudinal strain is significantly reduced at -5.2% There is severe global hypokinesis.    Right Ventricle: Systolic function is mildly reduced.    Left Atrium: The atrium is moderately dilated.    Right Atrium: The atrium is moderately dilated.    Mitral Valve: There is mild thickening. The leaflets exhibit normal mobility. There is mild annular calcification. There is moderate regurgitation with an eccentrically directed jet.    Tricuspid Valve: There is moderate regurgitation.  RVSP is 30 to 35 mmHg.    Pericardium: There is a trivial pericardial effusion posterior to the heart.    Compared  to August 2024 study report, LV function remains severely depressed and mitral and tricuspid regurgitation are at least moderate.   Discharging Physician / Practitioner: Conrad Bynum MD  PCP: Juan Gailndo DO  Admission Date:   Admission Orders (From admission, onward)       Ordered        04/09/25 1109  INPATIENT ADMISSION  Once                          Discharge Date: 04/10/25    Medical Problems       Resolved Problems  Date Reviewed: 3/24/2025   None         Consultations During Hospital Stay:  Cardiology    Procedures Performed:   XR chest 1 view portable   ED Interpretation by Gary Nguyne DO (04/09 1033)   Cardiomegaly. Mild pulmonary vascular congestion.       Final Result by Fran Llamas MD (04/09 1046)      Marked cardiomegaly with mild vascular and interstitial prominence. No focal consolidation in the lung identified at this time.            Workstation performed: [id number]               Significant Findings / Test Results:   Lab Results   Component Value Date    WBC 8.35 04/10/2025    HGB 13.3 04/10/2025    HCT 40.7 04/10/2025    MCV 90 04/10/2025     04/10/2025     Lab Results   Component Value Date    SODIUM 135 04/09/2025    K 4.4 04/09/2025     04/09/2025    CO2 23 04/09/2025    AGAP 9 04/09/2025    BUN 37 (H) 04/09/2025    CREATININE 1.68 (H) 04/09/2025    GLUC 129 04/09/2025    GLUF 90 03/03/2023    CALCIUM 9.3 04/09/2025    AST 30 04/09/2025    ALT 24 04/09/2025    ALKPHOS 110 (H) 04/09/2025    TP 6.9 04/09/2025    TBILI 0.45 04/09/2025    EGFR 43 04/09/2025         Incidental Findings:   As mentioned above    Test Results Pending at Discharge (will require follow up):   none     Outpatient Tests Requested:  none    Complications:  none    Reason for Admission: Shortness of breath    Hospital Course:     Britton Batista is a 59 y.o. male patient who originally presented to the hospital on 4/9/2025 due to shortness of breath secondary to CHF exacerbation as  "well as A-fib with RVR.  Patient received adequate treatment, received diuretics with significant effect.  Patient back to baseline, condition improved.  Evaluated by cardiology, cleared by cardiology to be discharged.  Patient can resume all home medication.  Except we are lowering Coreg dose.  Urine drug screen still coming back positive for substance abuse.  Counseling provided.  Patient advised to follow-up with his own cardiology outpatient basis.  Verbalized understanding agrees.  Patient's sister also updated about the plan.  Verbalized understanding agrees.    The patient, initially admitted to the hospital as inpatient, was discharged earlier than expected given the following: Condition significantly improved and cleared by cardiology.    Please see above list of diagnoses and related plan for additional information.     Condition at Discharge: stable     Discharge Day Visit / Exam:     Subjective: Seen and evaluated during the run.  Resting comfortably.  Denies any significant complaint.  Vitals: Blood Pressure: 120/74 (04/10/25 0903)  Pulse: (!) 50 (04/10/25 0903)  Temperature: (!) 97.3 °F (36.3 °C) (04/10/25 0617)  Temp Source: Temporal (04/10/25 0617)  Respirations: 20 (04/10/25 0617)  Height: 5' 9\" (175.3 cm) (04/09/25 0951)  Weight - Scale: 74.1 kg (163 lb 6.4 oz) (04/10/25 0606)  SpO2: 92 % (04/10/25 0900)  Exam:   Physical Exam  Vitals and nursing note reviewed.   Constitutional:       Appearance: Normal appearance. He is not ill-appearing or diaphoretic.   Eyes:      General: No scleral icterus.        Left eye: No discharge.      Extraocular Movements: Extraocular movements intact.      Conjunctiva/sclera: Conjunctivae normal.      Pupils: Pupils are equal, round, and reactive to light.   Cardiovascular:      Rate and Rhythm: Normal rate.      Heart sounds:      No friction rub. No gallop.   Pulmonary:      Effort: No respiratory distress.      Breath sounds: No stridor. No wheezing or rhonchi. "   Musculoskeletal:      Right lower leg: No edema.      Left lower leg: No edema.   Neurological:      Mental Status: He is alert and oriented to person, place, and time.      Cranial Nerves: No cranial nerve deficit.      Sensory: No sensory deficit.      Motor: No weakness.      Coordination: Coordination normal.       Discussion with Family: Sister    Discharge instructions/Information to patient and family:   See after visit summary for information provided to patient and family.      Provisions for Follow-Up Care:  See after visit summary for information related to follow-up care and any pertinent home health orders.      Disposition:     Home    For Discharges to [facility] SNF:   Not Applicable to this Patient - Not Applicable to this Patient    Planned Readmission: Condition adverse     Discharge Statement:   Greater than 50% of the total time was spent examining patient, answering all patient questions, arranging and discussing plan of care with patient as well as directly providing post-discharge instructions.  Additional time then spent on discharge activities.    Discharge Medications:  See after visit summary for reconciled discharge medications provided to patient and family.      ** Please Note: This note has been constructed using a voice recognition system **

## 2025-04-10 NOTE — ASSESSMENT & PLAN NOTE
Wt Readings from Last 3 Encounters:   04/10/25 74.1 kg (163 lb 6.4 oz)   04/08/25 80.6 kg (177 lb 11.1 oz)   03/28/25 72.3 kg (159 lb 6.4 oz)     Tired, fatigued, short of breath with exertion  Chest x-ray shows:Marked cardiomegaly with mild vascular and interstitial prominence. No focal consolidation in the lung identified at this time   Elevated proBNP patient recently discharged from this hospital after receiving treatment for CHF and A-fib  Last echocardiogram shows:Patient appears to be in atrial fibrillation.    Left Ventricle: Left ventricular cavity size is moderately dilated. There is moderate concentric hypertrophy. There is concentric remodeling. The left ventricular ejection fraction is 25%. Systolic function is severely reduced.  Dyssynchronous LV contractility is noted.  Global longitudinal strain is significantly reduced at -5.2% There is severe global hypokinesis.    Right Ventricle: Systolic function is mildly reduced.    Left Atrium: The atrium is moderately dilated.    Right Atrium: The atrium is moderately dilated.    Mitral Valve: There is mild thickening. The leaflets exhibit normal mobility. There is mild annular calcification. There is moderate regurgitation with an eccentrically directed jet.    Tricuspid Valve: There is moderate regurgitation.  RVSP is 30 to 35 mmHg.    Pericardium: There is a trivial pericardial effusion posterior to the heart.    Compared to August 2024 study report, LV function remains severely depressed and mitral and tricuspid regurgitation are at least moderate.    Does follows up with [facility] cardiology, last note reviewed, per chart review, patient is being evaluated for outpatient cardioversion  Patient also came with A-fib with RVR, however patient has intermittently paced rhythm remained reasonable  Due to cost of medication, patient is unable to afford Entresto or SGLT2  Patient evaluated by on-call cardiology, agrees with diuretics.  Since patient diuretics  -3.9 later and back to baseline, and condition improved, cardiology clearing the patient to discharge patient with follow-up with his own cardiology.    Patient can resume all his home medication, we are lowering the dose of Coreg to 6.25 twice daily

## 2025-04-10 NOTE — UTILIZATION REVIEW
"Initial Clinical Review    Admission: Date/Time/Statement:   Admission Orders (From admission, onward)       Ordered        04/09/25 1109  INPATIENT ADMISSION  Once                          Orders Placed This Encounter   Procedures    INPATIENT ADMISSION     Standing Status:   Standing     Number of Occurrences:   1     Level of Care:   Med Surg [16]     Estimated length of stay:   More than 2 Midnights     Certification:   I certify that inpatient services are medically necessary for this patient for a duration of greater than two midnights. See H&P and MD Progress Notes for additional information about the patient's course of treatment.     ED Arrival Information       Expected   -    Arrival   4/9/2025 09:47    Acuity   Urgent              Means of arrival   Wheelchair    Escorted by   Family Member    Service   Hospitalist    Admission type   Emergency              Arrival complaint   SOB  (here yesterday for same)             Chief Complaint   Patient presents with    Shortness of Breath     Patient reports \"I'm filling up with fluid and I didn't piss at all last night\". Here yesterday for same and refused admission.        Initial Presentation: 59 y.o. male to ED via WC from home  Present to ED with h shortness of breath, dyspnea, fatigue and tiredness.    PMHX A-fib, cardiomyopathy, CHF (EF 25%), HTN, substance abuse   Admitted to [facility] with DX: Acute on chronic systolic congestive heart failure   on exam: tachy- irregular; hypertensive; tachypnea; Cr 1.68; INR 2.74; LA 2.3; BNP 2,132  Chest x-ray shows:Marked cardiomegaly with mild vascular and interstitial prominence.   PLAN: start lasix iv; monitor labs; fluid / Na restriction; consult cardiology       Anticipated Length of Stay/Certification Statement: Patient will be admitted on an inpatient basis with an anticipated length of stay of greater than 2 midnights secondary to monitorable conditions.       CARDIOLOGY CONSULT   Acute on chronic systolic " congestive heart failure:  Agree with IV diuretics and resumption of his GDMT for HFrEF. Unfortunately due to cost issues, he cannot afford SGLT2 inhibitors or Entresto.       Date: 4/10/25      Day 2   Patient states that he feels much better. Mg 1.7  Plan: cont lasix iv; recd lopressor iv x1; recd Mg iv x1; monitor labs; fluid / Na restriction    Discharge Summary   Hospital Course: Britton Batista is a 59 y.o. male patient who originally presented to the hospital on 4/9/2025 due to shortness of breath secondary to CHF exacerbation as well as A-fib with RVR.  Patient received adequate treatment, received diuretics with significant effect.  Patient back to baseline, condition improved.  Evaluated by cardiology, cleared by cardiology to be discharged.  Patient can resume all home medication.  Except we are lowering Coreg dose.  Urine drug screen still coming back positive for substance abuse.  Counseling provided.  Patient advised to follow-up with his own cardiology outpatient basis.  Verbalized understanding agrees.  Patient's sister also updated about the plan.  Verbalized understanding agrees.     The patient, initially admitted to the hospital as inpatient, was discharged earlier than expected given the following: Condition significantly improved and cleared by cardiology.          ED Treatment-Medication Administration from 04/09/2025 0946 to 04/09/2025 1204         Date/Time Order Dose Route Action     04/09/2025 1016 metoprolol (LOPRESSOR) injection 5 mg 5 mg Intravenous Given     04/09/2025 1059 magnesium sulfate 2 g/50 mL IVPB (premix) 2 g 2 g Intravenous New Bag            Scheduled Medications:  carvedilol, 12.5 mg, Oral, BID With Meals  digoxin, 125 mcg, Oral, Once per day on Monday Wednesday Friday  furosemide, 20 mg, Intravenous, BID  lisinopril, 2.5 mg, Oral, Daily  magnesium Oxide, 400 mg, Oral, Daily  nicotine, 1 patch, Transdermal, Daily  pantoprazole, 40 mg, Oral, BID  spironolactone, 12.5 mg,  Oral, Daily  warfarin, 5 mg, Oral, Daily (warfarin)    magnesium sulfate 2 g/50 mL IVPB (premix) 2 g  Dose: 2 g  Freq: Once Route: IV  Last Dose: 2 g (04/10/25 0941)  Start: 04/10/25 0915 End: 04/10/25 1141    metoprolol (LOPRESSOR) injection 2.5 mg  Dose: 2.5 mg  Freq: Once Route: IV  Start: 04/10/25 0645 End: 04/10/25 0635      Continuous IV Infusions: None       PRN Meds:  acetaminophen, 650 mg, Oral, Q4H PRN  albuterol, 2 puff, Inhalation, Q6H PRN  metoprolol, 2.5 mg, Intravenous, Q6H PRN  trimethobenzamide, 200 mg, Intramuscular, Q8H PRN    oxyCODONE (ROXICODONE) IR tablet 5 mg  Dose: 5 mg  Freq: Once Route: PO  Start: 04/10/25 0600 End: 04/10/25 0601      ED Triage Vitals [04/09/25 0951]   Temperature Pulse Respirations Blood Pressure SpO2 Pain Score   97.9 °F (36.6 °C) 61 22 132/96 98 % No Pain     Weight (last 2 days) before discharge       Date/Time Weight    04/10/25 0606 74.1 (163.4)    04/09/25 0951 76.8 (169.31)            Vital Signs (last 3 days) before discharge       Date/Time Temp Pulse Resp BP MAP (mmHg) SpO2 Calculated FIO2 (%) - Nasal Cannula Nasal Cannula O2 Flow Rate (L/min) O2 Device Patient Position - Orthostatic VS Keily Coma Scale Score Pain    04/10/25 0903 -- 50 -- 120/74 -- -- -- -- -- -- -- No Pain    04/10/25 0900 -- -- -- -- -- 92 % -- -- None (Room air) -- 15 --    04/10/25 0810 -- 65 -- 106/76 -- -- -- -- -- -- -- --    04/10/25 06:17:15 97.3 °F (36.3 °C) 75 20 108/71 79 91 % 28 2 L/min Nasal cannula Lying -- --    04/10/25 0601 -- -- -- -- -- -- -- -- -- -- -- 5    04/10/25 0331 -- -- -- -- -- -- -- -- -- -- -- 5 04/09/25 2359 -- -- -- -- -- -- 28 2 L/min Nasal cannula -- -- No Pain    04/09/25 2201 -- -- 17 115/62 70 -- -- -- -- -- -- --    04/09/25 19:03:04 97.2 °F (36.2 °C) 54 18 105/75 85 95 % -- -- -- -- -- --    04/09/25 1726 -- 58 -- 134/108 -- 95 % 28 2 L/min Nasal cannula -- -- --    04/09/25 1621 -- -- -- -- -- 98 % -- -- None (Room air) -- 15 No Pain    04/09/25  1503 -- -- -- 135/95 95 -- -- -- -- -- -- --    04/09/25 1335 -- 69 -- 133/105 -- -- -- -- -- -- -- --    04/09/25 1211 -- -- 20 119/81 94 -- -- -- None (Room air) Lying -- --    04/09/25 1130 -- 110 25 103/74 83 95 % -- -- None (Room air) Lying -- --    04/09/25 1115 -- 109 18 97/69 80 97 % -- -- None (Room air) -- -- --    04/09/25 1100 -- 108 20 108/62 -- 95 % -- -- None (Room air) Lying -- No Pain    04/09/25 1045 -- 108 20 106/90 95 95 % -- -- None (Room air) -- -- --    04/09/25 1030 -- 114 22 111/96 101 98 % -- -- None (Room air) Lying -- --    04/09/25 1015 -- 137 20 123/105 112 98 % -- -- -- -- -- --    04/09/25 1002 -- -- -- -- -- -- -- -- -- -- 15 --    04/09/25 1001 -- -- -- -- -- -- -- -- None (Room air) -- -- --    04/09/25 0951 97.9 °F (36.6 °C) 61 22 132/96 -- 98 % -- -- None (Room air) Lying -- No Pain              Pertinent Labs/Diagnostic Test Results:   Radiology:  XR chest 1 view portable   ED Interpretation by Gary Nguyen DO (04/09 1033)   Cardiomegaly. Mild pulmonary vascular congestion.       Final Interpretation by Fran Llamas MD (04/09 1046)      Marked cardiomegaly with mild vascular and interstitial prominence. No focal consolidation in the lung identified at this time.            Workstation performed: [id number]           Cardiology:  ECG 12 lead   Final Result by John Hwang MD (04/09 1409)   Ventricular-paced rhythm  with intermittent A-fib with RBBB   Abnormal ECG   When compared with ECG of 09-Apr-2025 09:56, (unconfirmed)   Previous ECG has undetermined rhythm, needs review   Confirmed by John Hwang ([id number]) on 4/9/2025 2:09:38 PM      ECG 12 lead   Final Result by John Hwang MD (04/09 9563)   ** ** Critical Test Result: High HR   Undetermined rhythm , Likelyy atrial fibrillation   Right bundle branch block   T wave abnormality, consider inferolateral ischemia   Abnormal ECG   When compared with ECG of 08-Apr-2025 13:05,   Current undetermined rhythm  precludes rhythm comparison, needs review   Confirmed by John Hwang ([id number]) on 4/9/2025 2:11:00 PM           Results from last 7 days   Lab Units 04/10/25  0504 04/09/25  1016 04/08/25  0120   WBC Thousand/uL 8.35 8.23 8.26   HEMOGLOBIN g/dL 13.3 13.2 13.7   HEMATOCRIT % 40.7 41.1 44.1   PLATELETS Thousands/uL 293 297 311   TOTAL NEUT ABS Thousands/µL 5.92 6.00 5.72        Results from last 7 days   Lab Units 04/10/25  0504 04/09/25  1016 04/08/25  0120   SODIUM mmol/L  --  135 134*   POTASSIUM mmol/L  --  4.4 5.4*   CHLORIDE mmol/L  --  103 104   CO2 mmol/L  --  23 25   ANION GAP mmol/L  --  9 5   BUN mg/dL  --  37* 35*   CREATININE mg/dL  --  1.68* 1.68*   EGFR ml/min/1.73sq m  --  43 43   CALCIUM mg/dL  --  9.3 9.2   MAGNESIUM mg/dL 1.8* 1.7* 1.9     Results from last 7 days   Lab Units 04/09/25  1016 04/08/25  0120   AST U/L 30 31   ALT U/L 24 24   ALK PHOS U/L 110* 110*   TOTAL PROTEIN g/dL 6.9 6.9   ALBUMIN g/dL 3.5 3.6   TOTAL BILIRUBIN mg/dL 0.45 0.52   BILIRUBIN DIRECT mg/dL 0.16  --         Results from last 7 days   Lab Units 04/09/25  1016 04/08/25  0120   GLUCOSE RANDOM mg/dL 129 155*        Beta- Hydroxybutyrate   Date Value Ref Range Status   09/18/2024 <0.05 0.02 - 0.27 mmol/L Final          Results from last 7 days   Lab Units 04/09/25  1016 04/08/25  0120   PH SHAHANA  7.395 7.274*   PCO2 SHAHANA mm Hg 35.1* 51.7*   PO2 SHAHANA mm Hg 77.4* 31.1*   HCO3 SHAHANA mmol/L 21.0* 23.4*   BASE EXC SHAHANA mmol/L -3.2 -4.0   O2 CONTENT SHAHANA ml/dL 18.3 9.9   O2 HGB, VENOUS % 92.7* 47.9*             Results from last 7 days   Lab Units 04/09/25  1447 04/09/25  1235 04/09/25  1016 04/08/25  0120   HS TNI 0HR ng/L  --   --  121* 94*   HS TNI 2HR ng/L  --  104*  --   --    HSTNI D2 ng/L  --  -17  --   --    HS TNI 4HR ng/L 107*  --   --   --    HSTNI D4 ng/L -14  --   --   --         Results from last 7 days   Lab Units 04/09/25  1016 04/08/25  0120   PROTIME seconds 29.0* 31.0*   INR  2.74* 2.99*   PTT seconds  --  42*      Results from last 7 days   Lab Units 04/10/25  0504   TSH 3RD GENERATON uIU/mL 5.825*     Results from last 7 days   Lab Units 04/08/25  0120   PROCALCITONIN ng/ml 0.12     Results from last 7 days   Lab Units 04/09/25  1016 04/08/25  0120   LACTIC ACID mmol/L 1.9 2.3*        Results from last 7 days   Lab Units 04/09/25  1235   DIGOXIN LVL ng/mL 0.3*     Results from last 7 days   Lab Units 04/09/25  1016 04/08/25  0120   BNP pg/mL 2,132* 1,698*        Results from last 7 days   Lab Units 04/09/25  1016 04/08/25  0120   LIPASE u/L 13 11        Results from last 7 days   Lab Units 04/09/25  1326   AMPH/METH  Positive*   BARBITURATE UR  Negative   BENZODIAZEPINE UR  Negative   COCAINE UR  Negative   METHADONE URINE  Negative   OPIATE UR  Negative   PCP UR  Negative   THC UR  Positive*        Results from last 7 days   Lab Units 04/08/25  0120   BLOOD CULTURE  No Growth at 24 hrs.  No Growth at 24 hrs.          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    CHF (congestive heart failure) ([facility])     Depression     Emphysema of lung ([facility])     GERD (gastroesophageal reflux disease)     Gout     Hypertension     Metabolic encephalopathy 03/22/2025    Neuropathy     Raynaud's disease     Right inguinal hernia     Scleroderma ([facility])      Present on Admission:   Acute on chronic systolic congestive heart failure ([facility])   Methamphetamine abuse ([facility])      Admitting Diagnosis: Acute on chronic combined systolic and diastolic heart failure ([facility]) [I50.43]  SOB (shortness of breath) [R06.02]  NSTEMI (non-ST elevated myocardial infarction) ([facility]) [I21.4]  Atrial fibrillation with RVR ([facility]) [I48.91]  Age/Sex: 59 y.o. male    Network Utilization Review Department  ATTENTION: Please call with any questions or concerns to [phone] and carefully listen to the prompts so that you are directed to the right person. All voicemails are confidential.    For Discharge needs, contact Care Management DC Support Team at [phone] opt. 2  Send all requests for admission clinical reviews, approved or denied determinations and any other requests to dedicated fax number below belonging to the campus where the patient is receiving treatment. List of dedicated fax numbers for the Facilities:  FACILITY NAME UR FAX NUMBER   ADMISSION DENIALS (Administrative/Medical Necessity) [phone]   DISCHARGE SUPPORT TEAM (NETWORK) [phone]   PARENT CHILD HEALTH (Maternity/NICU/Pediatrics) [phone]   [facility] [phone]   [facility] [phone]   [facility] [phone]   [facility] [phone]   Replaced by [facility] [phone]   [facility] [phone]   [facility] [phone]   [facility] [phone]   [facility] [phone]   [facility] [phone]   [facility] [phone]   [facility] [phone]

## 2025-04-11 NOTE — UTILIZATION REVIEW
NOTIFICATION OF ADMISSION DISCHARGE   This is a Notification of Discharge from [facility]. Please be advised that this patient has been discharge from our facility. Below you will find the admission and discharge date and time including the patient’s disposition.   UTILIZATION REVIEW CONTACT:  Utilization Review Assistants  Network Utilization Review Department  Phone: [phone] x carefully listen to the prompts. All voicemails are confidential.  Email: [email or web address]     ADMISSION INFORMATION  PRESENTATION DATE: 4/9/2025  9:47 AM  OBERVATION ADMISSION DATE: N/A  INPATIENT ADMISSION DATE: 4/9/25 11:09 AM   DISCHARGE DATE: 4/10/2025  2:56 PM   DISPOSITION:Home/Self Care    Network Utilization Review Department  ATTENTION: Please call with any questions or concerns to [phone] and carefully listen to the prompts so that you are directed to the right person. All voicemails are confidential.   For Discharge needs, contact Care Management DC Support Team at [phone] opt. 2  Send all requests for admission clinical reviews, approved or denied determinations and any other requests to dedicated fax number below belonging to the campus where the patient is receiving treatment. List of dedicated fax numbers for the Facilities:  FACILITY NAME UR FAX NUMBER   ADMISSION DENIALS (Administrative/Medical Necessity) [phone]   DISCHARGE SUPPORT TEAM ([facility]) [phone]   PARENT CHILD HEALTH (Maternity/NICU/Pediatrics) [phone]   [facility] [phone]   [facility] [phone]   Counts include 234 beds at the [facility] [phone]   [facility] [phone]   [facility] [phone]   [facility] [phone]   [facility] [phone]   [facility] [phone]   [facility]  [facility] [phone]   [facility] [phone]   [facility] [phone]   [facility] [phone]

## 2025-04-12 ENCOUNTER — APPOINTMENT (OUTPATIENT)
Dept: RADIOLOGY | Facility: HOSPITAL | Age: 60
End: 2025-04-12
Payer: COMMERCIAL

## 2025-04-12 ENCOUNTER — HOSPITAL ENCOUNTER (EMERGENCY)
Facility: HOSPITAL | Age: 60
Discharge: HOME | End: 2025-04-12
Attending: EMERGENCY MEDICINE
Payer: COMMERCIAL

## 2025-04-12 VITALS
TEMPERATURE: 97.9 F | OXYGEN SATURATION: 95 % | WEIGHT: 163 LBS | BODY MASS INDEX: 24.07 KG/M2 | HEART RATE: 104 BPM | RESPIRATION RATE: 16 BRPM | SYSTOLIC BLOOD PRESSURE: 103 MMHG | DIASTOLIC BLOOD PRESSURE: 83 MMHG

## 2025-04-12 DIAGNOSIS — I50.9 CHF (CONGESTIVE HEART FAILURE) (HCC): ICD-10-CM

## 2025-04-12 DIAGNOSIS — R06.02 SOB (SHORTNESS OF BREATH): Primary | ICD-10-CM

## 2025-04-12 LAB
ALBUMIN SERPL BCG-MCNC: 3.3 G/DL (ref 3.5–5)
ALP SERPL-CCNC: 125 U/L (ref 34–104)
ALT SERPL W P-5'-P-CCNC: 16 U/L (ref 7–52)
ANION GAP SERPL CALCULATED.3IONS-SCNC: 5 MMOL/L (ref 4–13)
AST SERPL W P-5'-P-CCNC: 30 U/L (ref 13–39)
BASE EX.OXY STD BLDV CALC-SCNC: 78.9 % (ref 60–80)
BASE EXCESS BLDV CALC-SCNC: -1.7 MMOL/L
BASOPHILS # BLD AUTO: 0.02 THOUSANDS/ÂΜL (ref 0–0.1)
BASOPHILS NFR BLD AUTO: 0 % (ref 0–1)
BILIRUB SERPL-MCNC: 0.51 MG/DL (ref 0.2–1)
BNP SERPL-MCNC: 818 PG/ML (ref 0–100)
BUN SERPL-MCNC: 28 MG/DL (ref 5–25)
CALCIUM ALBUM COR SERPL-MCNC: 8.8 MG/DL (ref 8.3–10.1)
CALCIUM SERPL-MCNC: 8.2 MG/DL (ref 8.4–10.2)
CARDIAC TROPONIN I PNL SERPL HS: 79 NG/L (ref ?–50)
CHLORIDE SERPL-SCNC: 102 MMOL/L (ref 96–108)
CO2 SERPL-SCNC: 24 MMOL/L (ref 21–32)
CREAT SERPL-MCNC: 1.53 MG/DL (ref 0.6–1.3)
EOSINOPHIL # BLD AUTO: 0.12 THOUSAND/ÂΜL (ref 0–0.61)
EOSINOPHIL NFR BLD AUTO: 2 % (ref 0–6)
ERYTHROCYTE [DISTWIDTH] IN BLOOD BY AUTOMATED COUNT: 16.6 % (ref 11.6–15.1)
GFR SERPL CREATININE-BSD FRML MDRD: 49 ML/MIN/1.73SQ M
GLUCOSE SERPL-MCNC: 91 MG/DL (ref 65–140)
HCO3 BLDV-SCNC: 23.2 MMOL/L (ref 24–30)
HCT VFR BLD AUTO: 41.8 % (ref 36.5–49.3)
HGB BLD-MCNC: 13.2 G/DL (ref 12–17)
IMM GRANULOCYTES # BLD AUTO: 0.02 THOUSAND/UL (ref 0–0.2)
IMM GRANULOCYTES NFR BLD AUTO: 0 % (ref 0–2)
LACTATE SERPL-SCNC: 1.3 MMOL/L (ref 0.5–2)
LYMPHOCYTES # BLD AUTO: 0.9 THOUSANDS/ÂΜL (ref 0.6–4.47)
LYMPHOCYTES NFR BLD AUTO: 16 % (ref 14–44)
MCH RBC QN AUTO: 29.1 PG (ref 26.8–34.3)
MCHC RBC AUTO-ENTMCNC: 31.6 G/DL (ref 31.4–37.4)
MCV RBC AUTO: 92 FL (ref 82–98)
MONOCYTES # BLD AUTO: 0.79 THOUSAND/ÂΜL (ref 0.17–1.22)
MONOCYTES NFR BLD AUTO: 14 % (ref 4–12)
NEUTROPHILS # BLD AUTO: 3.93 THOUSANDS/ÂΜL (ref 1.85–7.62)
NEUTS SEG NFR BLD AUTO: 68 % (ref 43–75)
NRBC BLD AUTO-RTO: 0 /100 WBCS
O2 CT BLDV-SCNC: 15.5 ML/DL
PCO2 BLDV: 39.8 MM HG (ref 42–50)
PH BLDV: 7.38 [PH] (ref 7.3–7.4)
PLATELET # BLD AUTO: 249 THOUSANDS/UL (ref 149–390)
PMV BLD AUTO: 9.3 FL (ref 8.9–12.7)
PO2 BLDV: 48.4 MM HG (ref 35–45)
POTASSIUM SERPL-SCNC: 4.8 MMOL/L (ref 3.5–5.3)
PROT SERPL-MCNC: 6.4 G/DL (ref 6.4–8.4)
RBC # BLD AUTO: 4.54 MILLION/UL (ref 3.88–5.62)
SODIUM SERPL-SCNC: 131 MMOL/L (ref 135–147)
WBC # BLD AUTO: 5.78 THOUSAND/UL (ref 4.31–10.16)

## 2025-04-12 PROCEDURE — 83605 ASSAY OF LACTIC ACID: CPT

## 2025-04-12 PROCEDURE — 83880 ASSAY OF NATRIURETIC PEPTIDE: CPT

## 2025-04-12 PROCEDURE — 36415 COLL VENOUS BLD VENIPUNCTURE: CPT

## 2025-04-12 PROCEDURE — 84484 ASSAY OF TROPONIN QUANT: CPT

## 2025-04-12 PROCEDURE — 71046 X-RAY EXAM CHEST 2 VIEWS: CPT

## 2025-04-12 PROCEDURE — 99285 EMERGENCY DEPT VISIT HI MDM: CPT

## 2025-04-12 PROCEDURE — 85025 COMPLETE CBC W/AUTO DIFF WBC: CPT

## 2025-04-12 PROCEDURE — 93005 ELECTROCARDIOGRAM TRACING: CPT

## 2025-04-12 PROCEDURE — 99285 EMERGENCY DEPT VISIT HI MDM: CPT | Performed by: EMERGENCY MEDICINE

## 2025-04-12 PROCEDURE — 82805 BLOOD GASES W/O2 SATURATION: CPT

## 2025-04-12 PROCEDURE — 80053 COMPREHEN METABOLIC PANEL: CPT

## 2025-04-12 NOTE — ED PROVIDER NOTES
Time reflects when diagnosis was documented in both MDM as applicable and the Disposition within this note       Time User Action Codes Description Comment    4/12/2025  5:25 PM Madeline Glez Add [R06.02] SOB (shortness of breath)     4/12/2025  5:25 PM Madeline Glez Add [I50.9] CHF (congestive heart failure) (HCC)           ED Disposition       ED Disposition   Discharge    Condition   Stable    Date/Time   Sat Apr 12, 2025  5:25 PM    Comment   Britton Batista discharge to home/self care.                   Assessment & Plan       Medical Decision Making  Patient is an 59-year-old male presenting for worsening shortness of breath with likely congestive heart failure after missing a dose of his Lasix today.  Alternate diagnoses considered include ACS, valvular disease, arrhythmia, myocarditis/endocarditis, dissection.  EKG shows no signs of acute ischemia, patient is noted to have a slight elevation of troponin but denies any chest pain and troponin is actually lower than recent values, no evidence of infectious process such as pneumonia, covid, or viral infection.  The patient was advised to take Lasix upon return home and then on a daily basis as prescribed, follow-up with PCP as needed or return if symptoms worsen.        Problems Addressed:  CHF (congestive heart failure) (HCC): acute illness or injury  SOB (shortness of breath): acute illness or injury    Amount and/or Complexity of Data Reviewed  Labs: ordered. Decision-making details documented in ED Course.  Radiology: ordered and independent interpretation performed. Decision-making details documented in ED Course.  ECG/medicine tests: ordered and independent interpretation performed. Decision-making details documented in ED Course.        ED Course as of 04/12/25 2333   Sat Apr 12, 2025   1725 Patient has urinated since being here and reports feeling much better, symptoms likely secondary to missing his dose of Lasix recently, states he has some at home  "and will take it upon returning home   2333 HS Troponin 0hr (reflex protocol)(!)   2333 B-Type Natriuretic Peptide(BNP)(!)   2333 Comprehensive metabolic panel(!)   2333 Lactic acid, plasma (w/reflex if result > 2.0)   2333 Blood gas, venous(!)   2333 CBC and differential(!)   2333 ECG 12 lead       Medications - No data to display    ED Risk Strat Scores                    No data recorded        SBIRT 20yo+      Flowsheet Row Most Recent Value   Initial Alcohol Screen: US AUDIT-C     1. How often do you have a drink containing alcohol? 0 Filed at: 04/12/2025 1409   2. How many drinks containing alcohol do you have on a typical day you are drinking?  0 Filed at: 04/12/2025 1409   3a. Male UNDER 65: How often do you have five or more drinks on one occasion? 0 Filed at: 04/12/2025 1409   Audit-C Score 0 Filed at: 04/12/2025 1409   SILVIA: How many times in the past year have you...    Used an illegal drug or used a prescription medication for non-medical reasons? Never Filed at: 04/12/2025 1404                            History of Present Illness       Chief Complaint   Patient presents with    Shortness of Breath     Patient presents to the ED with complaints of shortness of breath that began a week ago. The patient was evaluated here twice this past week and was admitted and discharged.        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    CHF (congestive heart failure) (HCC)     Depression     Emphysema of lung (HCC)     GERD (gastroesophageal reflux disease)     Gout     Hypertension     Metabolic encephalopathy 03/22/2025    Neuropathy     Raynaud's disease     Right inguinal hernia     Scleroderma (HCC)       Past Surgical History:   Procedure Laterality Date    AMPUTATION Right     pt had tip of \"pointer finger\" d/t scleraderma    CARDIAC PACEMAKER PLACEMENT      FINGER AMPUTATION Left 01/31/2024    Procedure: AMPUTATION " FINGER, LEFT INDEX FINGER CPT: 23182;  Surgeon: Pedro Vazquez MD;  Location:  MAIN OR;  Service: Orthopedics    HERNIA REPAIR Left     times 2    MA AMP F/TH 1/2 JT/PHALANX W/NEURECT W/DIR CLSR Right 01/23/2024    Procedure: AMPUTATION FINGER, RIGHT MIDDLE;  Surgeon: Pedro Vazquez MD;  Location: [facility];  Service: Orthopedics    MA RPR 1ST INGUN HRNA AGE 5 YRS/> REDUCIBLE Right 03/03/2023    Procedure: OPEN INGUINAL HERNIA REPAIR WITH MESH;  Surgeon: Temo Jackson DO;  Location:  MAIN OR;  Service: General      Family History   Problem Relation Age of Onset    Hypertension Father       Social History     Tobacco Use    Smoking status: Every Day     Current packs/day: 1.00     Average packs/day: 1 pack/day for 45.3 years (45.3 ttl pk-yrs)     Types: Cigarettes     Start date: 2000    Smokeless tobacco: Never    Tobacco comments:     Last cigarette 0430   Vaping Use    Vaping status: Never Used   Substance Use Topics    Alcohol use: Not Currently     Comment: occasional    Drug use: Yes     Types: Marijuana, Methamphetamines     Comment: Hx meth use      E-Cigarette/Vaping    E-Cigarette Use Never User       E-Cigarette/Vaping Substances    Nicotine No     THC No     CBD No     Flavoring No       I have reviewed and agree with the history as documented.     Patient is a 59-year-old male presenting to the emergency department complaining of generalized weakness, shortness of breath, dyspnea on exertion, has been going on for some time now but worsened over the past week, was recently seen in this emergency department, initially discharged home, on the second visit he was admitted for congestive heart failure, EF noted to be 25%, he does admit to missing his dose of Lasix yesterday due to medication error, he denies any chest pain, no nausea or vomiting, no cough or congestion, no fever or chills        Review of Systems   Constitutional:  Positive for fatigue.   HENT: Negative.     Eyes: Negative.    Respiratory:   Positive for shortness of breath.    Cardiovascular: Negative.    Gastrointestinal: Negative.    Endocrine: Negative.    Genitourinary: Negative.    Musculoskeletal: Negative.    Skin: Negative.    Allergic/Immunologic: Negative.    Neurological: Negative.    Hematological: Negative.    Psychiatric/Behavioral: Negative.             Objective       ED Triage Vitals [04/12/25 1409]   Temperature Pulse Blood Pressure Respirations SpO2 Patient Position - Orthostatic VS   97.9 °F (36.6 °C) (!) 48 92/63 18 94 % Sitting      Temp Source Heart Rate Source BP Location FiO2 (%) Pain Score    Temporal Monitor Left arm -- 2      Vitals      Date and Time Temp Pulse SpO2 Resp BP Pain Score FACES Pain Rating User   04/12/25 1726 -- 104 -- -- -- -- -- CATARINO   04/12/25 1700 -- 114 95 % 16 103/83 -- -- MD   04/12/25 1612 -- 93 97 % 17 103/76 -- -- MD   04/12/25 1409 97.9 °F (36.6 °C) 48 94 % 18 92/63 2 -- RR            Physical Exam  Constitutional:       Appearance: He is well-developed.   HENT:      Head: Normocephalic and atraumatic.   Eyes:      Conjunctiva/sclera: Conjunctivae normal.      Pupils: Pupils are equal, round, and reactive to light.   Cardiovascular:      Rate and Rhythm: Normal rate.   Pulmonary:      Effort: Pulmonary effort is normal.   Abdominal:      Palpations: Abdomen is soft.   Musculoskeletal:         General: Normal range of motion.      Cervical back: Normal range of motion and neck supple.   Skin:     General: Skin is warm and dry.   Neurological:      Mental Status: He is alert and oriented to person, place, and time.         Results Reviewed       Procedure Component Value Units Date/Time    HS Troponin 0hr (reflex protocol) [[id number]]  (Abnormal) Collected: 04/12/25 1613    Lab Status: Final result Specimen: Blood from Arm, Right Updated: 04/12/25 1649     hs TnI 0hr 79 ng/L     B-Type Natriuretic Peptide(BNP) [[id number]]  (Abnormal) Collected: 04/12/25 1613    Lab Status: Final result Specimen: Blood  from Arm, Right Updated: 04/12/25 1648      pg/mL     Comprehensive metabolic panel [[id number]]  (Abnormal) Collected: 04/12/25 1613    Lab Status: Final result Specimen: Blood from Arm, Right Updated: 04/12/25 1643     Sodium 131 mmol/L      Potassium 4.8 mmol/L      Chloride 102 mmol/L      CO2 24 mmol/L      ANION GAP 5 mmol/L      BUN 28 mg/dL      Creatinine 1.53 mg/dL      Glucose 91 mg/dL      Calcium 8.2 mg/dL      Corrected Calcium 8.8 mg/dL      AST 30 U/L      ALT 16 U/L      Alkaline Phosphatase 125 U/L      Total Protein 6.4 g/dL      Albumin 3.3 g/dL      Total Bilirubin 0.51 mg/dL      eGFR 49 ml/min/1.73sq m     Narrative:      [employer] guidelines for Chronic Kidney Disease (CKD):     Stage 1 with normal or high GFR (GFR > 90 mL/min/1.73 square meters)    Stage 2 Mild CKD (GFR = 60-89 mL/min/1.73 square meters)    Stage 3A Moderate CKD (GFR = 45-59 mL/min/1.73 square meters)    Stage 3B Moderate CKD (GFR = 30-44 mL/min/1.73 square meters)    Stage 4 Severe CKD (GFR = 15-29 mL/min/1.73 square meters)    Stage 5 End Stage CKD (GFR <15 mL/min/1.73 square meters)  Note: GFR calculation is accurate only with a steady state creatinine    Lactic acid, plasma (w/reflex if result > 2.0) [[id number]]  (Normal) Collected: 04/12/25 1613    Lab Status: Final result Specimen: Blood from Arm, Right Updated: 04/12/25 1643     LACTIC ACID 1.3 mmol/L     Narrative:      Result may be elevated if tourniquet was used during collection.    Blood gas, venous [[id number]]  (Abnormal) Collected: 04/12/25 1613    Lab Status: Final result Specimen: Blood from Arm, Right Updated: 04/12/25 1624     pH, Chuck 7.383     pCO2, Chuck 39.8 mm Hg      pO2, Chuck 48.4 mm Hg      HCO3, Chuck 23.2 mmol/L      Base Excess, Chuck -1.7 mmol/L      O2 Content, Chuck 15.5 ml/dL      O2 HGB, VENOUS 78.9 %     CBC and differential [[id number]]  (Abnormal) Collected: 04/12/25 0963    Lab Status: Final result Specimen: Blood  from Arm, Right Updated: 04/12/25 1624     WBC 5.78 Thousand/uL      RBC 4.54 Million/uL      Hemoglobin 13.2 g/dL      Hematocrit 41.8 %      MCV 92 fL      MCH 29.1 pg      MCHC 31.6 g/dL      RDW 16.6 %      MPV 9.3 fL      Platelets 249 Thousands/uL      nRBC 0 /100 WBCs      Segmented % 68 %      Immature Grans % 0 %      Lymphocytes % 16 %      Monocytes % 14 %      Eosinophils Relative 2 %      Basophils Relative 0 %      Absolute Neutrophils 3.93 Thousands/µL      Absolute Immature Grans 0.02 Thousand/uL      Absolute Lymphocytes 0.90 Thousands/µL      Absolute Monocytes 0.79 Thousand/µL      Eosinophils Absolute 0.12 Thousand/µL      Basophils Absolute 0.02 Thousands/µL             XR chest 2 views   ED Interpretation by Madeline Glez DO (04/12 5221)   No acute findings          ECG 12 Lead Documentation Only    Date/Time: 4/12/2025 4:22 PM    Performed by: Madeline Glez DO  Authorized by: Madeline Glez DO    Indications / Diagnosis:  Shortness of breath  ECG reviewed by me, the ED Provider: yes    Patient location:  ED  Previous ECG:     Previous ECG:  Compared to current    Comparison ECG info:  4/9/2025    Similarity:  No change    Comparison to cardiac monitor: Yes    Interpretation:     Interpretation: non-specific    Rate:     ECG rate:  110    ECG rate assessment: tachycardic    Rhythm:     Rhythm: paced    Pacing:     Type of pacing:  Ventricular  Ectopy:     Ectopy: none    QRS:     QRS intervals:  Wide  ST segments:     ST segments:  Non-specific    Depression:  V2, V3, V4, V5 and aVR  T waves:     T waves: inverted    Q waves:     Q waves:  III, aVF, V1, V2, V3, V4, V5 and V6      ED Medication and Procedure Management   Prior to Admission Medications   Prescriptions Last Dose Informant Patient Reported? Taking?   albuterol (ProAir HFA) 90 mcg/act inhaler   No No   Sig: Inhale 2 puffs every 6 (six) hours as needed for wheezing   carvedilol (COREG) 12.5 mg tablet   No No   Sig: Take 0.5  tablets (6.25 mg total) by mouth 2 (two) times a day with meals   digoxin (LANOXIN) 0.125 mg tablet   No No   Sig: Take 1 tablet (125 mcg total) by mouth 3 (three) times a week   furosemide (LASIX) 40 mg tablet   No No   Sig: Take 1 tablet (40 mg total) by mouth daily   lisinopril (ZESTRIL) 2.5 mg tablet   No No   Sig: Take 1 tablet (2.5 mg total) by mouth daily   magnesium Oxide (MAG-OX) 400 mg TABS   No No   Sig: Take 1 tablet (400 mg total) by mouth daily   nicotine (NICODERM CQ) 21 mg/24 hr TD 24 hr patch   No No   Sig: Place 1 patch on the skin over 24 hours daily   pantoprazole (PROTONIX) 40 mg tablet  Self No No   Sig: Take 1 tablet (40 mg total) by mouth 2 (two) times a day   spironolactone (ALDACTONE) 25 mg tablet   No No   Sig: Take 0.5 tablets (12.5 mg total) by mouth daily   warfarin (COUMADIN) 5 mg tablet   No No   Sig: Target INR range is between 2-3      Facility-Administered Medications: None     Discharge Medication List as of 4/12/2025  5:26 PM        CONTINUE these medications which have NOT CHANGED    Details   albuterol (ProAir HFA) 90 mcg/act inhaler Inhale 2 puffs every 6 (six) hours as needed for wheezing, Starting Fri 3/28/2025, Normal      carvedilol (COREG) 12.5 mg tablet Take 0.5 tablets (6.25 mg total) by mouth 2 (two) times a day with meals, Starting Thu 4/10/2025, Until Mon 6/9/2025, Normal      digoxin (LANOXIN) 0.125 mg tablet Take 1 tablet (125 mcg total) by mouth 3 (three) times a week, Starting Fri 3/28/2025, Normal      furosemide (LASIX) 40 mg tablet Take 1 tablet (40 mg total) by mouth daily, Starting Fri 3/28/2025, Until Sun 4/27/2025, Normal      lisinopril (ZESTRIL) 2.5 mg tablet Take 1 tablet (2.5 mg total) by mouth daily, Starting Fri 3/28/2025, Normal      magnesium Oxide (MAG-OX) 400 mg TABS Take 1 tablet (400 mg total) by mouth daily, Starting Fri 3/28/2025, Normal      nicotine (NICODERM CQ) 21 mg/24 hr TD 24 hr patch Place 1 patch on the skin over 24 hours daily,  Starting Fri 3/28/2025, Normal      pantoprazole (PROTONIX) 40 mg tablet Take 1 tablet (40 mg total) by mouth 2 (two) times a day, Starting Thu 10/10/2024, Until Wed 4/9/2025, Normal      spironolactone (ALDACTONE) 25 mg tablet Take 0.5 tablets (12.5 mg total) by mouth daily, Starting Fri 3/28/2025, Normal      warfarin (COUMADIN) 5 mg tablet Target INR range is between 2-3, Normal           No discharge procedures on file.  ED SEPSIS DOCUMENTATION   Time reflects when diagnosis was documented in both MDM as applicable and the Disposition within this note       Time User Action Codes Description Comment    4/12/2025  5:25 PM Madeline Glez [R06.02] SOB (shortness of breath)     4/12/2025  5:25 PM Madeline Glez [I50.9] CHF (congestive heart failure) ([facility])                  Madeline Glez DO  04/12/25 4336

## 2025-04-13 LAB
BACTERIA BLD CULT: NORMAL
BACTERIA BLD CULT: NORMAL

## 2025-04-14 LAB
ATRIAL RATE: 68 BPM
QRS AXIS: 82 DEGREES
QRSD INTERVAL: 172 MS
QT INTERVAL: 414 MS
QTC INTERVAL: 560 MS
T WAVE AXIS: -49 DEGREES
VENTRICULAR RATE: 110 BPM

## 2025-04-18 ENCOUNTER — HOSPITAL ENCOUNTER (OUTPATIENT)
Dept: NON INVASIVE DIAGNOSTICS | Facility: HOSPITAL | Age: 60
Discharge: HOME | End: 2025-04-18
Attending: INTERNAL MEDICINE

## 2025-04-19 ENCOUNTER — APPOINTMENT (EMERGENCY)
Dept: RADIOLOGY | Facility: HOSPITAL | Age: 60
DRG: 291 | End: 2025-04-19
Payer: COMMERCIAL

## 2025-04-19 ENCOUNTER — HOSPITAL ENCOUNTER (OUTPATIENT)
Facility: HOSPITAL | Age: 60
Setting detail: OBSERVATION
Discharge: HOME | DRG: 291 | End: 2025-04-20
Attending: EMERGENCY MEDICINE | Admitting: FAMILY MEDICINE
Payer: COMMERCIAL

## 2025-04-19 DIAGNOSIS — N18.31 STAGE 3A CHRONIC KIDNEY DISEASE (CKD) (HCC): ICD-10-CM

## 2025-04-19 DIAGNOSIS — R07.1 CHEST PAIN ON BREATHING: Primary | ICD-10-CM

## 2025-04-19 DIAGNOSIS — I48.91 ATRIAL FIBRILLATION WITH RVR (HCC): ICD-10-CM

## 2025-04-19 PROBLEM — R07.9 CHEST PAIN: Status: ACTIVE | Noted: 2024-03-26

## 2025-04-19 LAB
2HR DELTA HS TROPONIN: -17 NG/L
4HR DELTA HS TROPONIN: -14 NG/L
ALBUMIN SERPL BCG-MCNC: 4 G/DL (ref 3.5–5)
ALP SERPL-CCNC: 131 U/L (ref 34–104)
ALT SERPL W P-5'-P-CCNC: 21 U/L (ref 7–52)
ANION GAP SERPL CALCULATED.3IONS-SCNC: 7 MMOL/L (ref 4–13)
AST SERPL W P-5'-P-CCNC: 26 U/L (ref 13–39)
BASOPHILS # BLD AUTO: 0.05 THOUSANDS/ÂΜL (ref 0–0.1)
BASOPHILS NFR BLD AUTO: 1 % (ref 0–1)
BILIRUB SERPL-MCNC: 0.87 MG/DL (ref 0.2–1)
BNP SERPL-MCNC: 1178 PG/ML (ref 0–100)
BUN SERPL-MCNC: 33 MG/DL (ref 5–25)
CALCIUM SERPL-MCNC: 9.6 MG/DL (ref 8.4–10.2)
CARDIAC TROPONIN I PNL SERPL HS: 108 NG/L (ref ?–50)
CARDIAC TROPONIN I PNL SERPL HS: 91 NG/L (ref ?–50)
CARDIAC TROPONIN I PNL SERPL HS: 94 NG/L (ref ?–50)
CHLORIDE SERPL-SCNC: 100 MMOL/L (ref 96–108)
CO2 SERPL-SCNC: 27 MMOL/L (ref 21–32)
CREAT SERPL-MCNC: 1.58 MG/DL (ref 0.6–1.3)
DIGOXIN SERPL-MCNC: 0.5 NG/ML (ref 0.8–2)
EOSINOPHIL # BLD AUTO: 0.14 THOUSAND/ÂΜL (ref 0–0.61)
EOSINOPHIL NFR BLD AUTO: 2 % (ref 0–6)
ERYTHROCYTE [DISTWIDTH] IN BLOOD BY AUTOMATED COUNT: 16.7 % (ref 11.6–15.1)
GFR SERPL CREATININE-BSD FRML MDRD: 47 ML/MIN/1.73SQ M
GLUCOSE SERPL-MCNC: 104 MG/DL (ref 65–140)
HCT VFR BLD AUTO: 43.7 % (ref 36.5–49.3)
HGB BLD-MCNC: 13.8 G/DL (ref 12–17)
IMM GRANULOCYTES # BLD AUTO: 0.03 THOUSAND/UL (ref 0–0.2)
IMM GRANULOCYTES NFR BLD AUTO: 0 % (ref 0–2)
INR PPP: 1.87 (ref 0.85–1.19)
LYMPHOCYTES # BLD AUTO: 1.37 THOUSANDS/ÂΜL (ref 0.6–4.47)
LYMPHOCYTES NFR BLD AUTO: 21 % (ref 14–44)
MCH RBC QN AUTO: 28.8 PG (ref 26.8–34.3)
MCHC RBC AUTO-ENTMCNC: 31.6 G/DL (ref 31.4–37.4)
MCV RBC AUTO: 91 FL (ref 82–98)
MONOCYTES # BLD AUTO: 0.87 THOUSAND/ÂΜL (ref 0.17–1.22)
MONOCYTES NFR BLD AUTO: 13 % (ref 4–12)
NEUTROPHILS # BLD AUTO: 4.21 THOUSANDS/ÂΜL (ref 1.85–7.62)
NEUTS SEG NFR BLD AUTO: 63 % (ref 43–75)
NRBC BLD AUTO-RTO: 0 /100 WBCS
PLATELET # BLD AUTO: 242 THOUSANDS/UL (ref 149–390)
PMV BLD AUTO: 8.8 FL (ref 8.9–12.7)
POTASSIUM SERPL-SCNC: 4.7 MMOL/L (ref 3.5–5.3)
PROT SERPL-MCNC: 7.4 G/DL (ref 6.4–8.4)
PROTHROMBIN TIME: 21.7 SECONDS (ref 12.3–15)
RBC # BLD AUTO: 4.8 MILLION/UL (ref 3.88–5.62)
SODIUM SERPL-SCNC: 134 MMOL/L (ref 135–147)
WBC # BLD AUTO: 6.67 THOUSAND/UL (ref 4.31–10.16)

## 2025-04-19 PROCEDURE — 99285 EMERGENCY DEPT VISIT HI MDM: CPT

## 2025-04-19 PROCEDURE — 80162 ASSAY OF DIGOXIN TOTAL: CPT | Performed by: FAMILY MEDICINE

## 2025-04-19 PROCEDURE — 36415 COLL VENOUS BLD VENIPUNCTURE: CPT

## 2025-04-19 PROCEDURE — 83880 ASSAY OF NATRIURETIC PEPTIDE: CPT

## 2025-04-19 PROCEDURE — 99223 1ST HOSP IP/OBS HIGH 75: CPT | Performed by: FAMILY MEDICINE

## 2025-04-19 PROCEDURE — 84484 ASSAY OF TROPONIN QUANT: CPT

## 2025-04-19 PROCEDURE — 93005 ELECTROCARDIOGRAM TRACING: CPT

## 2025-04-19 PROCEDURE — 80053 COMPREHEN METABOLIC PANEL: CPT

## 2025-04-19 PROCEDURE — 85025 COMPLETE CBC W/AUTO DIFF WBC: CPT

## 2025-04-19 PROCEDURE — 71045 X-RAY EXAM CHEST 1 VIEW: CPT

## 2025-04-19 PROCEDURE — 85610 PROTHROMBIN TIME: CPT

## 2025-04-19 PROCEDURE — 99285 EMERGENCY DEPT VISIT HI MDM: CPT | Performed by: EMERGENCY MEDICINE

## 2025-04-19 RX ORDER — PANTOPRAZOLE SODIUM 40 MG/1
40 TABLET, DELAYED RELEASE ORAL
Status: DISCONTINUED | OUTPATIENT
Start: 2025-04-20 | End: 2025-04-20 | Stop reason: HOSPADM

## 2025-04-19 RX ORDER — METOPROLOL TARTRATE 25 MG/1
25 TABLET, FILM COATED ORAL EVERY 12 HOURS SCHEDULED
Status: DISCONTINUED | OUTPATIENT
Start: 2025-04-19 | End: 2025-04-20

## 2025-04-19 RX ORDER — ALBUTEROL SULFATE 90 UG/1
2 INHALANT RESPIRATORY (INHALATION) EVERY 6 HOURS PRN
Status: DISCONTINUED | OUTPATIENT
Start: 2025-04-19 | End: 2025-04-20 | Stop reason: HOSPADM

## 2025-04-19 RX ORDER — SPIRONOLACTONE 25 MG/1
12.5 TABLET ORAL DAILY
Status: DISCONTINUED | OUTPATIENT
Start: 2025-04-20 | End: 2025-04-20 | Stop reason: HOSPADM

## 2025-04-19 RX ORDER — FUROSEMIDE 10 MG/ML
40 INJECTION INTRAMUSCULAR; INTRAVENOUS
Status: DISCONTINUED | OUTPATIENT
Start: 2025-04-19 | End: 2025-04-19

## 2025-04-19 RX ORDER — NICOTINE 21 MG/24HR
1 PATCH, TRANSDERMAL 24 HOURS TRANSDERMAL DAILY
Status: DISCONTINUED | OUTPATIENT
Start: 2025-04-19 | End: 2025-04-20 | Stop reason: HOSPADM

## 2025-04-19 RX ORDER — METOPROLOL TARTRATE 1 MG/ML
5 INJECTION, SOLUTION INTRAVENOUS EVERY 6 HOURS PRN
Status: DISCONTINUED | OUTPATIENT
Start: 2025-04-19 | End: 2025-04-20 | Stop reason: HOSPADM

## 2025-04-19 RX ORDER — ACETAMINOPHEN 325 MG/1
650 TABLET ORAL EVERY 6 HOURS PRN
Status: DISCONTINUED | OUTPATIENT
Start: 2025-04-19 | End: 2025-04-20 | Stop reason: HOSPADM

## 2025-04-19 RX ORDER — WARFARIN SODIUM 5 MG/1
5 TABLET ORAL
Status: DISCONTINUED | OUTPATIENT
Start: 2025-04-19 | End: 2025-04-20 | Stop reason: HOSPADM

## 2025-04-19 RX ORDER — LANOLIN ALCOHOL/MO/W.PET/CERES
400 CREAM (GRAM) TOPICAL DAILY
Status: DISCONTINUED | OUTPATIENT
Start: 2025-04-19 | End: 2025-04-20 | Stop reason: HOSPADM

## 2025-04-19 RX ORDER — DIGOXIN 125 MCG
125 TABLET ORAL 3 TIMES WEEKLY
Status: DISCONTINUED | OUTPATIENT
Start: 2025-04-21 | End: 2025-04-20 | Stop reason: HOSPADM

## 2025-04-19 RX ORDER — MIDODRINE HYDROCHLORIDE 5 MG/1
2.5 TABLET ORAL
Status: DISCONTINUED | OUTPATIENT
Start: 2025-04-19 | End: 2025-04-20

## 2025-04-19 RX ORDER — FUROSEMIDE 10 MG/ML
40 INJECTION INTRAMUSCULAR; INTRAVENOUS
Status: DISCONTINUED | OUTPATIENT
Start: 2025-04-19 | End: 2025-04-20 | Stop reason: HOSPADM

## 2025-04-19 RX ADMIN — FUROSEMIDE 40 MG: 10 INJECTION, SOLUTION INTRAMUSCULAR; INTRAVENOUS at 19:49

## 2025-04-19 RX ADMIN — Medication 3 MG: at 21:55

## 2025-04-19 RX ADMIN — MIDODRINE HYDROCHLORIDE 2.5 MG: 5 TABLET ORAL at 16:11

## 2025-04-19 RX ADMIN — Medication 400 MG: at 16:11

## 2025-04-19 RX ADMIN — NICOTINE 1 PATCH: 21 PATCH, EXTENDED RELEASE TRANSDERMAL at 16:11

## 2025-04-19 RX ADMIN — WARFARIN SODIUM 5 MG: 5 TABLET ORAL at 18:16

## 2025-04-19 NOTE — ED PROVIDER NOTES
Time reflects when diagnosis was documented in both MDM as applicable and the Disposition within this note       Time User Action Codes Description Comment    4/19/2025  3:01 PM Juancarlos Quintanilla Add [R07.1] Chest pain on breathing           ED Disposition       ED Disposition   Admit    Condition   Stable    Date/Time   Sat Apr 19, 2025  2:50 PM    Comment   Case was discussed with Dr. Muñoz and the patient's admission status was agreed to be Admission Status: observation status to the service of Dr. Muñoz .               Assessment & Plan       Medical Decision Making  59-year-old male with established history of CHF, EF 25%, ICD placement, presenting for evaluation of chest pain and shortness of breath.  Of note patient was discharged for similar approximately 10 days ago, and he believes his symptoms have continued to worsen since discharge.  Refer to ED course, cardiology and hospitalist consulted for inpatient management.  Blood pressure noted to be slightly soft, but seems to be within his baseline.  Will admit to inpatient services for further monitoring and management.    Amount and/or Complexity of Data Reviewed  Labs:  Decision-making details documented in ED Course.    Risk  Decision regarding hospitalization.        ED Course as of 04/19/25 1501   Sat Apr 19, 2025   1408 EKG shows ventricularly paced rhythm, rate is 104, does not appear significantly different than prior   1436 hs TnI 0hr(!): 108  Appears elevated, but within his 10 day baseline   1436 Creatinine(!): 1.58  Baseline   1437 Patient noted to be hypotensive, will hold diuresis as of now       Medications - No data to display    ED Risk Strat Scores                    No data recorded        SBIRT 20yo+      Flowsheet Row Most Recent Value   Initial Alcohol Screen: US AUDIT-C     1. How often do you have a drink containing alcohol? 0 Filed at: 04/19/2025 2451   2. How many drinks containing alcohol do you have on a typical day you are  "drinking?  0 Filed at: 04/19/2025 1353   3a. Male UNDER 65: How often do you have five or more drinks on one occasion? 0 Filed at: 04/19/2025 3613   Audit-C Score 0 Filed at: 04/19/2025 8463   SILVIA: How many times in the past year have you...    Used an illegal drug or used a prescription medication for non-medical reasons? Never Filed at: 04/19/2025 1358          MARTHA Risk Score      Flowsheet Row Most Recent Value   Age >= 65 0 Filed at: 04/19/2025 1459   Known CAD (stenosis >= 50%) 1 Filed at: 04/19/2025 1459   Recent (<=24 hrs) Service Angina 1 Filed at: 04/19/2025 1455   ST Deviation >= 0.5 mm 0 Filed at: 04/19/2025 1454   3+ CAD Risk Factors (FHx, HTN, HLP, DM, Smoker) 1 Filed at: 04/19/2025 1459   Aspirin Use Past 7 Days 1 Filed at: 04/19/2025 1459   Elevated Cardiac Markers 1 Filed at: 04/19/2025 1457   MARTHA Risk Score (Calculated) 5 Filed at: 04/19/2025 8515                          History of Present Illness       Chief Complaint   Patient presents with    Chest Pain     Started about one hour ago       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    CHF (congestive heart failure) (HCC)     Depression     Emphysema of lung (HCC)     GERD (gastroesophageal reflux disease)     Gout     Hypertension     Metabolic encephalopathy 03/22/2025    Neuropathy     Raynaud's disease     Right inguinal hernia     Scleroderma (HCC)       Past Surgical History:   Procedure Laterality Date    AMPUTATION Right     pt had tip of \"pointer finger\" d/t scleraderma    CARDIAC PACEMAKER PLACEMENT      FINGER AMPUTATION Left 01/31/2024    Procedure: AMPUTATION FINGER, LEFT INDEX FINGER CPT: 94393;  Surgeon: Pedro Vazquez MD;  Location:  MAIN OR;  Service: Orthopedics    HERNIA REPAIR Left     times 2    [place] AMP F/TH 1/2 JT/PHALANX W/NEURECT W/DIR CLSR Right 01/23/2024    Procedure: AMPUTATION FINGER, RIGHT MIDDLE;  Surgeon: Pedro Vazqeuz MD;  " Location: [facility];  Service: Orthopedics    NM RPR 1ST INGUN HRNA AGE 5 YRS/> REDUCIBLE Right 03/03/2023    Procedure: OPEN INGUINAL HERNIA REPAIR WITH MESH;  Surgeon: Temo Jackson DO;  Location:  MAIN OR;  Service: General      Family History   Problem Relation Age of Onset    Hypertension Father       Social History     Tobacco Use    Smoking status: Every Day     Current packs/day: 1.00     Average packs/day: 1 pack/day for 45.3 years (45.3 ttl pk-yrs)     Types: Cigarettes     Start date: 2000    Smokeless tobacco: Never    Tobacco comments:     Last cigarette 0430   Vaping Use    Vaping status: Never Used   Substance Use Topics    Alcohol use: Not Currently     Comment: occasional    Drug use: Yes     Types: Marijuana, Methamphetamines     Comment: Hx meth use      E-Cigarette/Vaping    E-Cigarette Use Never User       E-Cigarette/Vaping Substances    Nicotine No     THC No     CBD No     Flavoring No       I have reviewed and agree with the history as documented.     59-year-old male, complex medical history as below, notable for regular smoking, CHF with ejection fraction 25%, pacemaker/ICD, presenting for evaluation of chest pain and shortness of breath.  Patient reports approximately 24 hours ago he noted subacute onset of worsening shortness of breath.  This time it was associated with occasional chest pains which he reported is new.  Describes some association with exertion, no association with nausea, diaphoresis, radiation, or hemoptysis.  He reports he has been taking his medications as prescribed.  He presents with sister and offers no other significant complaints today.  Otherwise denies fever, chills, back pain, headache, or significant leg swelling.      Chest Pain  Associated symptoms: shortness of breath    Associated symptoms: no abdominal pain, no back pain, no cough, no fever, no palpitations and not vomiting        Review of Systems   Constitutional:  Negative for chills and fever.    HENT:  Negative for ear pain and sore throat.    Eyes:  Negative for pain and visual disturbance.   Respiratory:  Positive for shortness of breath. Negative for cough.    Cardiovascular:  Positive for chest pain. Negative for palpitations.   Gastrointestinal:  Negative for abdominal pain and vomiting.   Genitourinary:  Negative for dysuria and hematuria.   Musculoskeletal:  Negative for arthralgias and back pain.   Skin:  Negative for color change and rash.   Neurological:  Negative for seizures and syncope.   All other systems reviewed and are negative.          Objective       ED Triage Vitals   Temperature Pulse Blood Pressure Respirations SpO2 Patient Position - Orthostatic VS   04/19/25 1354 04/19/25 1354 04/19/25 1354 04/19/25 1354 04/19/25 1354 --   98 °F (36.7 °C) 94 (!) 82/72 16 92 %       Temp src Heart Rate Source BP Location FiO2 (%) Pain Score    -- 04/19/25 1415 04/19/25 1354 -- 04/19/25 1354     Monitor Right arm  No Pain      Vitals      Date and Time Temp Pulse SpO2 Resp BP Pain Score FACES Pain Rating User   04/19/25 1445 -- 103 91 % 18 106/84 -- -- SA   04/19/25 1415 -- 101 96 % 18 96/81 -- -- SA   04/19/25 1354 98 °F (36.7 °C) 94 92 % 16 82/72 No Pain -- KM            Physical Exam  Vitals and nursing note reviewed.   Constitutional:       General: He is not in acute distress.     Appearance: He is well-developed.   HENT:      Head: Normocephalic and atraumatic.   Eyes:      Conjunctiva/sclera: Conjunctivae normal.   Cardiovascular:      Rate and Rhythm: Normal rate and regular rhythm.      Heart sounds: No murmur heard.  Pulmonary:      Effort: Pulmonary effort is normal. No respiratory distress.      Breath sounds: Normal breath sounds.   Abdominal:      Palpations: Abdomen is soft.      Tenderness: There is no abdominal tenderness.   Musculoskeletal:         General: No swelling.      Cervical back: Neck supple.   Skin:     General: Skin is warm and dry.      Capillary Refill: Capillary  refill takes less than 2 seconds.   Neurological:      Mental Status: He is alert.   Psychiatric:         Mood and Affect: Mood normal.         Results Reviewed       Procedure Component Value Units Date/Time    HS Troponin 0hr (reflex protocol) [[id number]]  (Abnormal) Collected: 04/19/25 1404    Lab Status: Final result Specimen: Blood from Arm, Right Updated: 04/19/25 1435     hs TnI 0hr 108 ng/L     HS Troponin I 2hr [[id number]]     Lab Status: No result Specimen: Blood     B-Type Natriuretic Peptide(BNP) [[id number]]  (Abnormal) Collected: 04/19/25 1404    Lab Status: Final result Specimen: Blood from Arm, Right Updated: 04/19/25 1434     BNP 1,178 pg/mL     Comprehensive metabolic panel [[id number]]  (Abnormal) Collected: 04/19/25 1404    Lab Status: Final result Specimen: Blood from Arm, Right Updated: 04/19/25 1426     Sodium 134 mmol/L      Potassium 4.7 mmol/L      Chloride 100 mmol/L      CO2 27 mmol/L      ANION GAP 7 mmol/L      BUN 33 mg/dL      Creatinine 1.58 mg/dL      Glucose 104 mg/dL      Calcium 9.6 mg/dL      AST 26 U/L      ALT 21 U/L      Alkaline Phosphatase 131 U/L      Total Protein 7.4 g/dL      Albumin 4.0 g/dL      Total Bilirubin 0.87 mg/dL      eGFR 47 ml/min/1.73sq m     Narrative:      National Kid[employer] Foundation guidelines for Chronic Kidney Disease (CKD):     Stage 1 with normal or high GFR (GFR > 90 mL/min/1.73 square meters)    Stage 2 Mild CKD (GFR = 60-89 mL/min/1.73 square meters)    Stage 3A Moderate CKD (GFR = 45-59 mL/min/1.73 square meters)    Stage 3B Moderate CKD (GFR = 30-44 mL/min/1.73 square meters)    Stage 4 Severe CKD (GFR = 15-29 mL/min/1.73 square meters)    Stage 5 End Stage CKD (GFR <15 mL/min/1.73 square meters)  Note: GFR calculation is accurate only with a steady state creatinine    CBC and differential [[id number]]  (Abnormal) Collected: 04/19/25 1404    Lab Status: Final result Specimen: Blood from Arm, Right Updated: 04/19/25 1413     WBC 6.67  Thousand/uL      RBC 4.80 Million/uL      Hemoglobin 13.8 g/dL      Hematocrit 43.7 %      MCV 91 fL      MCH 28.8 pg      MCHC 31.6 g/dL      RDW 16.7 %      MPV 8.8 fL      Platelets 242 Thousands/uL      nRBC 0 /100 WBCs      Segmented % 63 %      Immature Grans % 0 %      Lymphocytes % 21 %      Monocytes % 13 %      Eosinophils Relative 2 %      Basophils Relative 1 %      Absolute Neutrophils 4.21 Thousands/µL      Absolute Immature Grans 0.03 Thousand/uL      Absolute Lymphocytes 1.37 Thousands/µL      Absolute Monocytes 0.87 Thousand/µL      Eosinophils Absolute 0.14 Thousand/µL      Basophils Absolute 0.05 Thousands/µL             XR chest portable    (Results Pending)       Procedures    ED Medication and Procedure Management   Prior to Admission Medications   Prescriptions Last Dose Informant Patient Reported? Taking?   albuterol (ProAir HFA) 90 mcg/act inhaler   No No   Sig: Inhale 2 puffs every 6 (six) hours as needed for wheezing   carvedilol (COREG) 12.5 mg tablet   No No   Sig: Take 0.5 tablets (6.25 mg total) by mouth 2 (two) times a day with meals   digoxin (LANOXIN) 0.125 mg tablet   No No   Sig: Take 1 tablet (125 mcg total) by mouth 3 (three) times a week   furosemide (LASIX) 40 mg tablet   No No   Sig: Take 1 tablet (40 mg total) by mouth daily   lisinopril (ZESTRIL) 2.5 mg tablet   No No   Sig: Take 1 tablet (2.5 mg total) by mouth daily   magnesium Oxide (MAG-OX) 400 mg TABS   No No   Sig: Take 1 tablet (400 mg total) by mouth daily   nicotine (NICODERM CQ) 21 mg/24 hr TD 24 hr patch   No No   Sig: Place 1 patch on the skin over 24 hours daily   pantoprazole (PROTONIX) 40 mg tablet  Self No No   Sig: Take 1 tablet (40 mg total) by mouth 2 (two) times a day   spironolactone (ALDACTONE) 25 mg tablet   No No   Sig: Take 0.5 tablets (12.5 mg total) by mouth daily   warfarin (COUMADIN) 5 mg tablet   No No   Sig: Target INR range is between 2-3      Facility-Administered Medications: None      Patient's Medications   Discharge Prescriptions    No medications on file     No discharge procedures on file.  ED SEPSIS DOCUMENTATION   Time reflects when diagnosis was documented in both MDM as applicable and the Disposition within this note       Time User Action Codes Description Comment    4/19/2025  3:01 PM Juancarlos Quintanilla Add [R07.1] Chest pain on breathing                  Juancarlos Quintanilla DO  04/19/25 1505

## 2025-04-19 NOTE — ED NOTES
Pt pulse ox 81% on room air. Placed on 2 liters of oxygen.      Geetha Babcock RN  04/19/25 1074

## 2025-04-19 NOTE — ASSESSMENT & PLAN NOTE
Secondary to methamphetamine abuse  Managed on Lasix, coreg, spironolactone  No longer taking lisinopril per sister. Change coreg to metoprolol   Unable to afford Entresto or Jardiance  Follow-up with cardiology outpatient (5/5)

## 2025-04-19 NOTE — ASSESSMENT & PLAN NOTE
POA with chest pain for 1 hour  Likely in setting of decompensated heart failure with A-fib RVR  Start metoprolol for rates as well as diuresis for chf   Troponin 108  Trend until peak with serial EKGs  EKG on admission with A-fib , V paced  MARTHA 5  Telemetry

## 2025-04-19 NOTE — ASSESSMENT & PLAN NOTE
EKG on admission with A-fib   Continue home digoxin, Coumadin  Trend daily inr   Change Coreg to Lopressor 25 twice daily. Increase as needed   Was scheduled for cardioversion on 4/18, but was canceled  Monitor on telemetry

## 2025-04-19 NOTE — H&P
H&P - Hospitalist   Name: Britton Batista 59 y.o. male I MRN: [id number]  Unit/Bed#: ED 02 I Date of Admission: 4/19/2025   Date of Service: 4/19/2025 I Hospital Day: 0     Assessment & Plan  Chest pain  POA with chest pain for 1 hour  Likely in setting of decompensated heart failure with A-fib RVR  Start metoprolol for rates as well as diuresis for chf   Troponin 108  Trend until peak with serial EKGs  EKG on admission with A-fib , V paced  MARTHA 5  Telemetry  Atrial fibrillation with RVR (HCC)  EKG on admission with A-fib   Continue home digoxin, Coumadin  Trend daily inr   Change Coreg to Lopressor 25 twice daily. Increase as needed   Was scheduled for cardioversion on 4/18, but was canceled  Monitor on telemetry  Acute on chronic combined systolic and diastolic congestive heart failure (HCC)  Wt Readings from Last 3 Encounters:   04/19/25 74.8 kg (165 lb)   04/12/25 73.9 kg (163 lb)   04/10/25 74.1 kg (163 lb 6.4 oz)   Uncontrolled AF likely driving CHF and chest pain   Take lasix 40mg qd for diuresis as outpatient   Discharged 4/10 with weight of 163  CXR pending final read, but appears to have mild pulmonary venous congestion  BNP 1178  EKG AF , v paced  ECHO 3/24/25 revealed EF 25%.  Severely reduced systolic function.  Severe global hypokinesis.  Mitral and tricuspid regurg are moderate  Continue diuresis with Lasix 40 mg IV twice daily  Start midodrine for BP support while diuresing   Daily weights, Strict I & Os  Cardiac diet, low sodium <2g, fluid restriction <1500   Hyponatremia  134 in admission, likely due to volume overload  IV diuresis  Monitor daily BMP  Cardiomyopathy (HCC)  Secondary to methamphetamine abuse  Managed on Lasix, coreg, spironolactone  No longer taking lisinopril per sister. Change coreg to metoprolol   Unable to afford Entresto or Jardiance  Follow-up with cardiology outpatient (5/5)  Stage 3a chronic kidney disease (CKD) (HCC)  Lab Results   Component Value Date  "   EGFR 47 04/19/2025    EGFR 49 04/12/2025    EGFR 43 04/09/2025    CREATININE 1.58 (H) 04/19/2025    CREATININE 1.53 (H) 04/12/2025    CREATININE 1.68 (H) 04/09/2025   Baseline varies.  Mostly between 1.3-1.6  Currently at baseline  Avoid nephrotoxic agents and hypotension  Monitor daily BMP with diuresing      VTE Pharmacologic Prophylaxis:   Moderate Risk (Score 3-4) - Pharmacological DVT Prophylaxis Ordered: warfarin (Coumadin).  Code Status: Prior   Discussion with family: Updated  (sister) via phone.    Anticipated Length of Stay: Patient will be admitted on an observation basis with an anticipated length of stay of less than 2 midnights secondary to a fib rvr requiring medication adjustment as well as chf exacerbation requiring iv lasix.    History of Present Illness   Chief Complaint: \"I have been having chest pain for the last hour\"    Britton Batista is a 59 y.o. male with a PMH of CKD, cardiomyopathy, CHF, A-fib, hyponatremia, methamphetamine abuse who presents with chest pain.  Patient states that he has been short of breath for some time, however chest pain just started today.  States that he has been taking all of his medications, and has not missed any doses.  States that he was supposed to have a cardioversion yesterday, but it was canceled and he is not sure why.  After discussing with sister, appears that cardioversion was canceled to allow patient to \"rest.\"  Patient has cardiology appointment on 5/5.  Denies any drug or alcohol use.  States that he smokes cigarettes.    Review of Systems   Constitutional:  Negative for diaphoresis, fatigue and fever.   HENT:  Negative for congestion and rhinorrhea.    Respiratory:  Negative for cough, chest tightness, shortness of breath and wheezing.    Cardiovascular:  Positive for chest pain. Negative for palpitations and leg swelling.   Gastrointestinal:  Negative for abdominal distention, abdominal pain, constipation, diarrhea, nausea and " "vomiting.   Genitourinary:  Negative for difficulty urinating and dysuria.   Musculoskeletal:  Negative for arthralgias.   Skin:  Negative for wound.   Neurological:  Negative for dizziness, syncope, weakness, light-headedness, numbness and headaches.   Psychiatric/Behavioral:  Negative for agitation, behavioral problems and confusion.        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    CHF (congestive heart failure) ([facility])     Depression     Emphysema of lung (HCC)     GERD (gastroesophageal reflux disease)     Gout     Hypertension     Metabolic encephalopathy 03/22/2025    Neuropathy     Raynaud's disease     Right inguinal hernia     Scleroderma ([facility])      Past Surgical History:   Procedure Laterality Date    AMPUTATION Right     pt had tip of \"pointer finger\" d/t scleraderma    CARDIAC PACEMAKER PLACEMENT      FINGER AMPUTATION Left 01/31/2024    Procedure: AMPUTATION FINGER, LEFT INDEX FINGER CPT: 60413;  Surgeon: Pedro Vazquez MD;  Location:  MAIN OR;  Service: Orthopedics    HERNIA REPAIR Left     times 2    [place] AMP F/TH 1/2 JT/PHALANX W/NEURECT W/DIR CLSR Right 01/23/2024    Procedure: AMPUTATION FINGER, RIGHT MIDDLE;  Surgeon: Pedro Vazquez MD;  Location: [facility];  Service: Orthopedics    MN RPR 1ST INGUN HRNA AGE 5 YRS/> REDUCIBLE Right 03/03/2023    Procedure: OPEN INGUINAL HERNIA REPAIR WITH MESH;  Surgeon: Temo Jackson DO;  Location:  MAIN OR;  Service: General     Social History     Tobacco Use    Smoking status: Every Day     Current packs/day: 1.00     Average packs/day: 1 pack/day for 45.3 years (45.3 ttl pk-yrs)     Types: Cigarettes     Start date: 2000    Smokeless tobacco: Never    Tobacco comments:     Last cigarette 0430   Vaping Use    Vaping status: Never Used   Substance and Sexual Activity    Alcohol use: Not Currently     Comment: occasional    Drug use: Yes     Types: " Marijuana, Methamphetamines     Comment: Hx meth use    Sexual activity: Not Currently     Comment: defer     E-Cigarette/Vaping    E-Cigarette Use Never User      E-Cigarette/Vaping Substances    Nicotine No     THC No     CBD No     Flavoring No      Family History   Problem Relation Age of Onset    Hypertension Father      Social History:  Marital Status:    Patient Pre-hospital Living Situation: Home  Patient Pre-hospital Level of Mobility: walks  Patient Pre-hospital Diet Restrictions: none    Meds/Allergies   I have reviewed home medications with patient personally.  Prior to Admission medications    Medication Sig Start Date End Date Taking? Authorizing Provider   albuterol (ProAir HFA) 90 mcg/act inhaler Inhale 2 puffs every 6 (six) hours as needed for wheezing 3/28/25   Conrad Bynum MD   carvedilol (COREG) 12.5 mg tablet Take 0.5 tablets (6.25 mg total) by mouth 2 (two) times a day with meals 4/10/25 6/9/25  Conrad Bynum MD   digoxin (LANOXIN) 0.125 mg tablet Take 1 tablet (125 mcg total) by mouth 3 (three) times a week 3/28/25   Conrad Bynum MD   furosemide (LASIX) 40 mg tablet Take 1 tablet (40 mg total) by mouth daily 3/28/25 4/27/25  Conrad Bynum MD   lisinopril (ZESTRIL) 2.5 mg tablet Take 1 tablet (2.5 mg total) by mouth daily 3/28/25   Conrad Bynum MD   magnesium Oxide (MAG-OX) 400 mg TABS Take 1 tablet (400 mg total) by mouth daily 3/28/25   Conrad Bynum MD   nicotine (NICODERM CQ) 21 mg/24 hr TD 24 hr patch Place 1 patch on the skin over 24 hours daily 3/28/25   Conrad Bynum MD   pantoprazole (PROTONIX) 40 mg tablet Take 1 tablet (40 mg total) by mouth 2 (two) times a day 10/10/24 4/9/25  Armida Coppola MD   spironolactone (ALDACTONE) 25 mg tablet Take 0.5 tablets (12.5 mg total) by mouth daily 3/28/25   Conrad Bynum MD   warfarin (COUMADIN) 5 mg tablet Target INR range is between 2-3 3/28/25   Conrad Bynum MD     Allergies   Allergen Reactions     Aspirin GI Intolerance       Objective :  Temp:  [98 °F (36.7 °C)] 98 °F (36.7 °C)  HR:  [] 103  BP: ()/(72-84) 106/84  Resp:  [16-18] 18  SpO2:  [91 %-96 %] 91 %  O2 Device: Nasal cannula  Nasal Cannula O2 Flow Rate (L/min):  [2 L/min] 2 L/min    Physical Exam  Vitals reviewed.   Constitutional:       General: He is not in acute distress.     Appearance: Normal appearance. He is ill-appearing.   HENT:      Head: Normocephalic and atraumatic.      Nose: Nose normal.      Mouth/Throat:      Mouth: Mucous membranes are moist.      Pharynx: Oropharynx is clear.   Eyes:      Extraocular Movements: Extraocular movements intact.      Conjunctiva/sclera: Conjunctivae normal.   Cardiovascular:      Rate and Rhythm: Tachycardia present. Rhythm irregular.      Pulses: Normal pulses.      Heart sounds: Normal heart sounds. No murmur heard.  Pulmonary:      Effort: Pulmonary effort is normal. No respiratory distress.      Breath sounds: Rales present. No wheezing.   Abdominal:      General: Abdomen is flat. Bowel sounds are normal. There is no distension.      Palpations: Abdomen is soft.      Tenderness: There is no abdominal tenderness. There is no guarding.   Musculoskeletal:         General: Normal range of motion.      Cervical back: Normal range of motion.      Right lower leg: Edema present.      Left lower leg: Edema present.   Skin:     General: Skin is warm.   Neurological:      General: No focal deficit present.      Mental Status: He is alert and oriented to person, place, and time. Mental status is at baseline.      Motor: No weakness.   Psychiatric:         Mood and Affect: Mood normal.         Behavior: Behavior normal.         Thought Content: Thought content normal.         Judgment: Judgment normal.          Lines/Drains:      Lab Results: I have reviewed the following results:  Results from last 7 days   Lab Units 04/19/25  1404   WBC Thousand/uL 6.67   HEMOGLOBIN g/dL 13.8   HEMATOCRIT % 43.7    PLATELETS Thousands/uL 242   SEGS PCT % 63   LYMPHO PCT % 21   MONO PCT % 13*   EOS PCT % 2     Results from last 7 days   Lab Units 04/19/25  1404   SODIUM mmol/L 134*   POTASSIUM mmol/L 4.7   CHLORIDE mmol/L 100   CO2 mmol/L 27   BUN mg/dL 33*   CREATININE mg/dL 1.58*   ANION GAP mmol/L 7   CALCIUM mg/dL 9.6   ALBUMIN g/dL 4.0   TOTAL BILIRUBIN mg/dL 0.87   ALK PHOS U/L 131*   ALT U/L 21   AST U/L 26   GLUCOSE RANDOM mg/dL 104             Lab Results   Component Value Date    HGBA1C 5.9 (H) 10/25/2021     Results from last 7 days   Lab Units 04/12/25  1613   LACTIC ACID mmol/L 1.3       Imaging Results Review: I personally reviewed the following image studies/reports in PACS and discussed pertinent findings with Radiology: chest xray. My interpretation of the radiology images/reports is: Mild pulmonary venous congestion.  Other Study Results Review: EKG was reviewed.     Administrative Statements       ** Please Note: This note has been constructed using a voice recognition system. **

## 2025-04-19 NOTE — ASSESSMENT & PLAN NOTE
Wt Readings from Last 3 Encounters:   04/19/25 74.8 kg (165 lb)   04/12/25 73.9 kg (163 lb)   04/10/25 74.1 kg (163 lb 6.4 oz)   Uncontrolled AF likely driving CHF and chest pain   Take lasix 40mg qd for diuresis as outpatient   Discharged 4/10 with weight of 163  CXR pending final read, but appears to have mild pulmonary venous congestion  BNP 1178  EKG AF , v paced  ECHO 3/24/25 revealed EF 25%.  Severely reduced systolic function.  Severe global hypokinesis.  Mitral and tricuspid regurg are moderate  Continue diuresis with Lasix 40 mg IV twice daily  Start midodrine for BP support while diuresing   Daily weights, Strict I & Os  Cardiac diet, low sodium <2g, fluid restriction <1500

## 2025-04-19 NOTE — PLAN OF CARE
Problem: INFECTION - ADULT  Goal: Absence or prevention of progression during hospitalization  Description: INTERVENTIONS:- Assess and monitor for signs and symptoms of infection- Monitor lab/diagnostic results- Monitor all insertion sites, i.e. indwelling lines, tubes, and drains- Monitor endotracheal if appropriate and nasal secretions for changes in amount and color- [place] appropriate cooling/warming therapies per order- Administer medications as ordered- Instruct and encourage patient and family to use good hand hygiene technique- Identify and instruct in appropriate isolation precautions for identified infection/condition  Outcome: Progressing  Goal: Absence of fever/infection during neutropenic period  Description: INTERVENTIONS:- Monitor WBC  Outcome: Progressing     Problem: SAFETY ADULT  Goal: Patient will remain free of falls  Description: INTERVENTIONS:- Educate patient/family on patient safety including physical limitations- Instruct patient to call for assistance with activity - Consult OT/PT to assist with strengthening/mobility - Keep Call bell within reach- Keep bed low and locked with side rails adjusted as appropriate- Keep care items and personal belongings within reach- Initiate and maintain comfort rounds- Make Fall Risk Sign visible to staff-   Outcome: Progressing  Goal: Maintain or return to baseline ADL function  Description: INTERVENTIONS:-  Assess patient's ability to carry out ADLs; assess patient's baseline for ADL function and identify physical deficits which impact ability to perform ADLs (bathing, care of mouth/teeth, toileting, grooming, dressing, etc.)- Assess/evaluate cause of self-care deficits - Assess range of motion- Assess patient's mobility; develop plan if impaired- Assess patient's need for assistive devices and provide as appropriate- Encourage maximum independence but intervene and supervise when necessary- Involve family in performance of ADLs- Assess for home care  needs following discharge - Consider OT consult to assist with ADL evaluation and planning for discharge- Provide patient education as appropriate  Outcome: Progressing  Goal: Maintains/Returns to pre admission functional level  Description: INTERVENTIONS:- Perform AM-PAC 6 Click Basic Mobility/ Daily Activity assessment daily.- Set and communicate daily mobility goal to care team and patient/family/caregiver. - Collaborate with rehabilitation services on mobility goals if consulted- Perform Range of Motion 3 times a day.- Reposition patient every 2 hours.- Dangle patient 3 times a day- Stand patient 3 times a day- Ambulate patient 3 times a day- Out of bed to chair 3 times a day - Out of bed for meals 3 times a day- Out of bed for toileting- Record patient progress and toleration of activity level   Outcome: Progressing     Problem: DISCHARGE PLANNING  Goal: Discharge to home or other facility with appropriate resources  Description: INTERVENTIONS:- Identify barriers to discharge w/patient and caregiver- Arrange for needed discharge resources and transportation as appropriate- Identify discharge learning needs (meds, wound care, etc.)- Arrange for interpretive services to assist at discharge as needed- Refer to Case Management Department for coordinating discharge planning if the patient needs post-hospital services based on physician/advanced practitioner order or complex needs related to functional status, cognitive ability, or social support system  Outcome: Progressing     Problem: Knowledge Deficit  Goal: Patient/family/caregiver demonstrates understanding of disease process, treatment plan, medications, and discharge instructions  Description: Complete learning assessment and assess knowledge base.Interventions:- Provide teaching at level of understanding- Provide teaching via preferred learning methods  Outcome: Progressing     Problem: Decreased Cardiac Output  Goal: Cardiac output adequate for individual  needs  Description: INTERVENTIONS: Monitor for signs and symptoms of decreased cardiac output - Monitor for dyspnea with exertion and at rest- Monitor for orthopnea- Monitor for signs of tachycardia. Place patient on telemetry monitoring.- Assess patient for jugular vein distention- Assess patient for lower extremity edema and poor peripheral perfusion - Auscultate lung sound for Fine bibasilar crackles - Monitor for cardiac arrythmias - Administer beta blockers, antiarrhythmic, and blood pressure medications as ordered  Outcome: Progressing     Problem: Impaired Gas Exchange  Goal: Optimize oxygenation and ensure adequate ventilation  Description: INTERVENTIONS: Monitor for signs and symptoms of respiratory distress              - Elevate HOB or use high fowlers to promote lung expansion              - Administer oxygen as ordered to maintain adequate oxygenation              - Encourage use of IS to promote lung expansion and prevent PN              - Monitor ABGs to assess oxygenation status              - Monitor blood oxygen level to maintain adequate oxygenation              - Encourage cough and deep breathing exercises to promote lung expansion              - Monitor patient's mental status for increased confusion  Outcome: Progressing     Problem: Excess Fluid Volume  Goal: Patient is able to achieve and maintain homeostasis  Description: INTERVENTIONS: Monitor for sign and symptoms of fluid overload- Evaluate LE edema every shift- Elevate LE to prevent dependent edema- Apply FARAZ stockings as ordered - Monitor ankle circumference daily- Assess for jugular vein distention- Evaluate provider orders for the CHF diuretic algorithm. Administer diuretics as ordered- Weigh the patient daily at 0600 and report a weight gain of five pounds or more - Strict intake and output- Monitor fluid intake and adhere to fluid restrictions- Assess lung sounds every shift and as needed- Monitor vital signs and lab values (CBC,  chem, BUN, BNP)- Measure and document urine output  Outcome: Progressing     Problem: Activity Intolerance  Goal: Patient is able to perform activities within their limitations  Description: INTERVENTIONS:                     -   Alternate periods of activity with periods of rest               -   Patients is able to maintain normal vitals heart rhythm during activity               -   Gradually increase activity and exercise as patient can tolerate               -   Monitor blood pressure and heart before and after exercise                -   Monitor blood oxygen saturation during activity and apply oxygen as needed  Outcome: Progressing     Problem: Knowledge Deficit  Goal: Patient is able to verbalize understanding of Heart Failure after education  Description: INTERVENTIONS:- Educate the patient and family on signs and symptoms of HF- Provide the patient with HF education and HF zone tool- Educate on the importance of daily weight in the AM and reporting a weight gain             of 3 or more pounds to their primary care physician- Monitor for SOB- Maintain and sodium and fluid restriction- Educate the patient on the importance of medications such as: diuretics, betablockers,             antiarrhythmics and their purpose, dose, route, side effects and labs             if they are needed  Outcome: Progressing

## 2025-04-19 NOTE — ASSESSMENT & PLAN NOTE
Lab Results   Component Value Date    EGFR 47 04/19/2025    EGFR 49 04/12/2025    EGFR 43 04/09/2025    CREATININE 1.58 (H) 04/19/2025    CREATININE 1.53 (H) 04/12/2025    CREATININE 1.68 (H) 04/09/2025   Baseline varies.  Mostly between 1.3-1.6  Currently at baseline  Avoid nephrotoxic agents and hypotension  Monitor daily BMP with diuresing

## 2025-04-20 VITALS
RESPIRATION RATE: 18 BRPM | HEIGHT: 69 IN | OXYGEN SATURATION: 92 % | TEMPERATURE: 97.5 F | DIASTOLIC BLOOD PRESSURE: 56 MMHG | WEIGHT: 167.6 LBS | HEART RATE: 80 BPM | BODY MASS INDEX: 24.82 KG/M2 | SYSTOLIC BLOOD PRESSURE: 118 MMHG

## 2025-04-20 PROBLEM — E87.1 HYPONATREMIA: Status: RESOLVED | Noted: 2021-10-24 | Resolved: 2025-04-20

## 2025-04-20 LAB
ALBUMIN SERPL BCG-MCNC: 3.4 G/DL (ref 3.5–5)
ALP SERPL-CCNC: 108 U/L (ref 34–104)
ALT SERPL W P-5'-P-CCNC: 17 U/L (ref 7–52)
ANION GAP SERPL CALCULATED.3IONS-SCNC: 7 MMOL/L (ref 4–13)
AST SERPL W P-5'-P-CCNC: 21 U/L (ref 13–39)
BILIRUB SERPL-MCNC: 0.73 MG/DL (ref 0.2–1)
BUN SERPL-MCNC: 35 MG/DL (ref 5–25)
CALCIUM ALBUM COR SERPL-MCNC: 9.5 MG/DL (ref 8.3–10.1)
CALCIUM SERPL-MCNC: 9 MG/DL (ref 8.4–10.2)
CHLORIDE SERPL-SCNC: 102 MMOL/L (ref 96–108)
CO2 SERPL-SCNC: 27 MMOL/L (ref 21–32)
CREAT SERPL-MCNC: 1.59 MG/DL (ref 0.6–1.3)
ERYTHROCYTE [DISTWIDTH] IN BLOOD BY AUTOMATED COUNT: 16.8 % (ref 11.6–15.1)
GFR SERPL CREATININE-BSD FRML MDRD: 46 ML/MIN/1.73SQ M
GLUCOSE P FAST SERPL-MCNC: 90 MG/DL (ref 65–99)
GLUCOSE SERPL-MCNC: 90 MG/DL (ref 65–140)
HCT VFR BLD AUTO: 40.9 % (ref 36.5–49.3)
HGB BLD-MCNC: 12.8 G/DL (ref 12–17)
INR PPP: 1.74 (ref 0.85–1.19)
MAGNESIUM SERPL-MCNC: 1.8 MG/DL (ref 1.9–2.7)
MCH RBC QN AUTO: 28.3 PG (ref 26.8–34.3)
MCHC RBC AUTO-ENTMCNC: 31.3 G/DL (ref 31.4–37.4)
MCV RBC AUTO: 90 FL (ref 82–98)
PLATELET # BLD AUTO: 238 THOUSANDS/UL (ref 149–390)
PMV BLD AUTO: 9.2 FL (ref 8.9–12.7)
POTASSIUM SERPL-SCNC: 4.4 MMOL/L (ref 3.5–5.3)
PROT SERPL-MCNC: 6.5 G/DL (ref 6.4–8.4)
PROTHROMBIN TIME: 20.7 SECONDS (ref 12.3–15)
RBC # BLD AUTO: 4.53 MILLION/UL (ref 3.88–5.62)
SODIUM SERPL-SCNC: 136 MMOL/L (ref 135–147)
WBC # BLD AUTO: 6.9 THOUSAND/UL (ref 4.31–10.16)

## 2025-04-20 PROCEDURE — 80053 COMPREHEN METABOLIC PANEL: CPT

## 2025-04-20 PROCEDURE — 85610 PROTHROMBIN TIME: CPT

## 2025-04-20 PROCEDURE — 85027 COMPLETE CBC AUTOMATED: CPT

## 2025-04-20 PROCEDURE — 83735 ASSAY OF MAGNESIUM: CPT

## 2025-04-20 PROCEDURE — 99239 HOSP IP/OBS DSCHRG MGMT >30: CPT

## 2025-04-20 RX ORDER — TRAZODONE HYDROCHLORIDE 50 MG/1
50 TABLET ORAL
COMMUNITY
Start: 2025-04-16 | End: 2025-04-24

## 2025-04-20 RX ORDER — TRAZODONE HYDROCHLORIDE 50 MG/1
50 TABLET ORAL
Status: DISCONTINUED | OUTPATIENT
Start: 2025-04-20 | End: 2025-04-20 | Stop reason: HOSPADM

## 2025-04-20 RX ORDER — MIDODRINE HYDROCHLORIDE 5 MG/1
5 TABLET ORAL
Qty: 90 TABLET | Refills: 0 | Status: SHIPPED | OUTPATIENT
Start: 2025-04-20 | End: 2025-04-30

## 2025-04-20 RX ORDER — METOPROLOL TARTRATE 25 MG/1
25 TABLET, FILM COATED ORAL EVERY 12 HOURS SCHEDULED
Qty: 60 TABLET | Refills: 0 | Status: SHIPPED | OUTPATIENT
Start: 2025-04-20

## 2025-04-20 RX ORDER — MAGNESIUM SULFATE HEPTAHYDRATE 40 MG/ML
2 INJECTION, SOLUTION INTRAVENOUS ONCE
Status: COMPLETED | OUTPATIENT
Start: 2025-04-20 | End: 2025-04-20

## 2025-04-20 RX ORDER — METOPROLOL TARTRATE 25 MG/1
25 TABLET, FILM COATED ORAL EVERY 12 HOURS SCHEDULED
Status: DISCONTINUED | OUTPATIENT
Start: 2025-04-20 | End: 2025-04-20 | Stop reason: HOSPADM

## 2025-04-20 RX ORDER — MIDODRINE HYDROCHLORIDE 5 MG/1
5 TABLET ORAL
Status: DISCONTINUED | OUTPATIENT
Start: 2025-04-20 | End: 2025-04-20 | Stop reason: HOSPADM

## 2025-04-20 RX ORDER — ISONIAZID 300 MG/1
1 TABLET ORAL DAILY
COMMUNITY
Start: 2025-04-03 | End: 2025-04-30

## 2025-04-20 RX ADMIN — FUROSEMIDE 40 MG: 10 INJECTION, SOLUTION INTRAMUSCULAR; INTRAVENOUS at 10:07

## 2025-04-20 RX ADMIN — PANTOPRAZOLE SODIUM 40 MG: 40 TABLET, DELAYED RELEASE ORAL at 06:25

## 2025-04-20 RX ADMIN — MIDODRINE HYDROCHLORIDE 2.5 MG: 5 TABLET ORAL at 06:25

## 2025-04-20 RX ADMIN — MIDODRINE HYDROCHLORIDE 5 MG: 5 TABLET ORAL at 10:06

## 2025-04-20 RX ADMIN — TRAZODONE HYDROCHLORIDE 50 MG: 50 TABLET ORAL at 03:06

## 2025-04-20 RX ADMIN — METOPROLOL TARTRATE 25 MG: 25 TABLET, FILM COATED ORAL at 08:08

## 2025-04-20 RX ADMIN — Medication 3 MG: at 01:40

## 2025-04-20 RX ADMIN — METOPROLOL TARTRATE 5 MG: 5 INJECTION INTRAVENOUS at 06:25

## 2025-04-20 RX ADMIN — NICOTINE 1 PATCH: 21 PATCH, EXTENDED RELEASE TRANSDERMAL at 08:09

## 2025-04-20 RX ADMIN — MAGNESIUM SULFATE HEPTAHYDRATE 2 G: 40 INJECTION, SOLUTION INTRAVENOUS at 08:15

## 2025-04-20 RX ADMIN — Medication 400 MG: at 08:03

## 2025-04-20 RX ADMIN — TRIMETHOBENZAMIDE HYDROCHLORIDE 200 MG: 100 INJECTION INTRAMUSCULAR at 11:00

## 2025-04-20 NOTE — ASSESSMENT & PLAN NOTE
Wt Readings from Last 3 Encounters:   04/20/25 76 kg (167 lb 9.6 oz)   04/12/25 73.9 kg (163 lb)   04/10/25 74.1 kg (163 lb 6.4 oz)   Uncontrolled AF likely driving CHF and chest pain   Take lasix 40mg qd for diuresis as outpatient   Discharged 4/10 with weight of 163  CXR mild pulmonary venous congestion superimposed on emphysema and pulmonary fibrosis   BNP 1178  EKG AF , v paced  ECHO 3/24/25 revealed EF 25%.  Severely reduced systolic function.  Severe global hypokinesis.  Mitral and tricuspid regurg are moderate  Continue diuresis with Lasix 40 mg IV twice daily  Transition back to home lasix on 4/21   Start midodrine for BP support   Daily weights, Strict I & Os  Cardiac diet, low sodium <2g, fluid restriction <1500

## 2025-04-20 NOTE — ASSESSMENT & PLAN NOTE
Lab Results   Component Value Date    EGFR 46 04/20/2025    EGFR 47 04/19/2025    EGFR 49 04/12/2025    CREATININE 1.59 (H) 04/20/2025    CREATININE 1.58 (H) 04/19/2025    CREATININE 1.53 (H) 04/12/2025   Baseline varies.  Mostly between 1.3-1.6  Currently at baseline  Avoid nephrotoxic agents and hypotension  Bmp in one week

## 2025-04-20 NOTE — ASSESSMENT & PLAN NOTE
POA with chest pain for 1 hour  Likely in setting of decompensated heart failure with A-fib RVR  Start metoprolol for rates as well as diuresis for chf   Troponin 108 ->91->94  EKG on admission with A-fib , V paced  MARTHA 5  No further chest pain during hospital stay   Telemetry - remained in afib, now 80-90bpm  Follow up with cardiology

## 2025-04-20 NOTE — PLAN OF CARE
Problem: INFECTION - ADULT  Goal: Absence or prevention of progression during hospitalization  Description: INTERVENTIONS:- Assess and monitor for signs and symptoms of infection- Monitor lab/diagnostic results- Monitor all insertion sites, i.e. indwelling lines, tubes, and drains- Monitor endotracheal if appropriate and nasal secretions for changes in amount and color- [place] appropriate cooling/warming therapies per order- Administer medications as ordered- Instruct and encourage patient and family to use good hand hygiene technique- Identify and instruct in appropriate isolation precautions for identified infection/condition  Outcome: Progressing     Problem: SAFETY ADULT  Goal: Patient will remain free of falls  Description: INTERVENTIONS:- Educate patient/family on patient safety including physical limitations- Instruct patient to call for assistance with activity - Consult OT/PT to assist with strengthening/mobility - Keep Call bell within reach- Keep bed low and locked with side rails adjusted as appropriate- Keep care items and personal belongings within reach- Initiate and maintain comfort rounds- Make Fall Risk Sign visible to staff-   Outcome: Progressing     Problem: Decreased Cardiac Output  Goal: Cardiac output adequate for individual needs  Description: INTERVENTIONS: Monitor for signs and symptoms of decreased cardiac output - Monitor for dyspnea with exertion and at rest- Monitor for orthopnea- Monitor for signs of tachycardia. Place patient on telemetry monitoring.- Assess patient for jugular vein distention- Assess patient for lower extremity edema and poor peripheral perfusion - Auscultate lung sound for Fine bibasilar crackles - Monitor for cardiac arrythmias - Administer beta blockers, antiarrhythmic, and blood pressure medications as ordered  Outcome: Progressing     Problem: Impaired Gas Exchange  Goal: Optimize oxygenation and ensure adequate ventilation  Description: INTERVENTIONS:  Impression: Presence of intraocular lens: Z96.1. Plan: Patient educated about today's findings. Lens implant is clear and centered without need for treatment at this time. Patient to call office with any problems. Monitor for signs and symptoms of respiratory distress              - Elevate HOB or use high fowlers to promote lung expansion              - Administer oxygen as ordered to maintain adequate oxygenation              - Encourage use of IS to promote lung expansion and prevent PN              - Monitor ABGs to assess oxygenation status              - Monitor blood oxygen level to maintain adequate oxygenation              - Encourage cough and deep breathing exercises to promote lung expansion              - Monitor patient's mental status for increased confusion  Outcome: Progressing     Problem: Excess Fluid Volume  Goal: Patient is able to achieve and maintain homeostasis  Description: INTERVENTIONS: Monitor for sign and symptoms of fluid overload- Evaluate LE edema every shift- Elevate LE to prevent dependent edema- Apply FARAZ stockings as ordered - Monitor ankle circumference daily- Assess for jugular vein distention- Evaluate provider orders for the CHF diuretic algorithm. Administer diuretics as ordered- Weigh the patient daily at 0600 and report a weight gain of five pounds or more - Strict intake and output- Monitor fluid intake and adhere to fluid restrictions- Assess lung sounds every shift and as needed- Monitor vital signs and lab values (CBC, chem, BUN, BNP)- Measure and document urine output  Outcome: Progressing

## 2025-04-20 NOTE — PLAN OF CARE
Problem: INFECTION - ADULT  Goal: Absence or prevention of progression during hospitalization  Description: INTERVENTIONS:- Assess and monitor for signs and symptoms of infection- Monitor lab/diagnostic results- Monitor all insertion sites, i.e. indwelling lines, tubes, and drains- Monitor endotracheal if appropriate and nasal secretions for changes in amount and color- [place] appropriate cooling/warming therapies per order- Administer medications as ordered- Instruct and encourage patient and family to use good hand hygiene technique- Identify and instruct in appropriate isolation precautions for identified infection/condition  Outcome: Progressing  Goal: Absence of fever/infection during neutropenic period  Description: INTERVENTIONS:- Monitor WBC  Outcome: Progressing     Problem: SAFETY ADULT  Goal: Patient will remain free of falls  Description: INTERVENTIONS:- Educate patient/family on patient safety including physical limitations- Instruct patient to call for assistance with activity - Consult OT/PT to assist with strengthening/mobility - Keep Call bell within reach- Keep bed low and locked with side rails adjusted as appropriate- Keep care items and personal belongings within reach- Initiate and maintain comfort rounds- Make Fall Risk Sign visible to staff-   Outcome: Progressing  Goal: Maintain or return to baseline ADL function  Description: INTERVENTIONS:-  Assess patient's ability to carry out ADLs; assess patient's baseline for ADL function and identify physical deficits which impact ability to perform ADLs (bathing, care of mouth/teeth, toileting, grooming, dressing, etc.)- Assess/evaluate cause of self-care deficits - Assess range of motion- Assess patient's mobility; develop plan if impaired- Assess patient's need for assistive devices and provide as appropriate- Encourage maximum independence but intervene and supervise when necessary- Involve family in performance of ADLs- Assess for home care  needs following discharge - Consider OT consult to assist with ADL evaluation and planning for discharge- Provide patient education as appropriate  Outcome: Progressing  Goal: Maintains/Returns to pre admission functional level  Description: INTERVENTIONS:- Perform AM-PAC 6 Click Basic Mobility/ Daily Activity assessment daily.- Set and communicate daily mobility goal to care team and patient/family/caregiver. - Collaborate with rehabilitation services on mobility goals if consulted- Perform Range of Motion 3 times a day.- Reposition patient every 2 hours.- Dangle patient 3 times a day- Stand patient 3 times a day- Ambulate patient 3 times a day- Out of bed to chair 3 times a day - Out of bed for meals 3 times a day- Out of bed for toileting- Record patient progress and toleration of activity level   Outcome: Progressing     Problem: DISCHARGE PLANNING  Goal: Discharge to home or other facility with appropriate resources  Description: INTERVENTIONS:- Identify barriers to discharge w/patient and caregiver- Arrange for needed discharge resources and transportation as appropriate- Identify discharge learning needs (meds, wound care, etc.)- Arrange for interpretive services to assist at discharge as needed- Refer to Case Management Department for coordinating discharge planning if the patient needs post-hospital services based on physician/advanced practitioner order or complex needs related to functional status, cognitive ability, or social support system  Outcome: Progressing     Problem: Knowledge Deficit  Goal: Patient/family/caregiver demonstrates understanding of disease process, treatment plan, medications, and discharge instructions  Description: Complete learning assessment and assess knowledge base.Interventions:- Provide teaching at level of understanding- Provide teaching via preferred learning methods  Outcome: Progressing     Problem: Decreased Cardiac Output  Goal: Cardiac output adequate for individual  needs  Description: INTERVENTIONS: Monitor for signs and symptoms of decreased cardiac output - Monitor for dyspnea with exertion and at rest- Monitor for orthopnea- Monitor for signs of tachycardia. Place patient on telemetry monitoring.- Assess patient for jugular vein distention- Assess patient for lower extremity edema and poor peripheral perfusion - Auscultate lung sound for Fine bibasilar crackles - Monitor for cardiac arrythmias - Administer beta blockers, antiarrhythmic, and blood pressure medications as ordered  Outcome: Progressing     Problem: Impaired Gas Exchange  Goal: Optimize oxygenation and ensure adequate ventilation  Description: INTERVENTIONS: Monitor for signs and symptoms of respiratory distress              - Elevate HOB or use high fowlers to promote lung expansion              - Administer oxygen as ordered to maintain adequate oxygenation              - Encourage use of IS to promote lung expansion and prevent PN              - Monitor ABGs to assess oxygenation status              - Monitor blood oxygen level to maintain adequate oxygenation              - Encourage cough and deep breathing exercises to promote lung expansion              - Monitor patient's mental status for increased confusion  Outcome: Progressing     Problem: Excess Fluid Volume  Goal: Patient is able to achieve and maintain homeostasis  Description: INTERVENTIONS: Monitor for sign and symptoms of fluid overload- Evaluate LE edema every shift- Elevate LE to prevent dependent edema- Apply FARAZ stockings as ordered - Monitor ankle circumference daily- Assess for jugular vein distention- Evaluate provider orders for the CHF diuretic algorithm. Administer diuretics as ordered- Weigh the patient daily at 0600 and report a weight gain of five pounds or more - Strict intake and output- Monitor fluid intake and adhere to fluid restrictions- Assess lung sounds every shift and as needed- Monitor vital signs and lab values (CBC,  chem, BUN, BNP)- Measure and document urine output  Outcome: Progressing     Problem: Activity Intolerance  Goal: Patient is able to perform activities within their limitations  Description: INTERVENTIONS:                     -   Alternate periods of activity with periods of rest               -   Patients is able to maintain normal vitals heart rhythm during activity               -   Gradually increase activity and exercise as patient can tolerate               -   Monitor blood pressure and heart before and after exercise                -   Monitor blood oxygen saturation during activity and apply oxygen as needed  Outcome: Progressing     Problem: Knowledge Deficit  Goal: Patient is able to verbalize understanding of Heart Failure after education  Description: INTERVENTIONS:- Educate the patient and family on signs and symptoms of HF- Provide the patient with HF education and HF zone tool- Educate on the importance of daily weight in the AM and reporting a weight gain             of 3 or more pounds to their primary care physician- Monitor for SOB- Maintain and sodium and fluid restriction- Educate the patient on the importance of medications such as: diuretics, betablockers,             antiarrhythmics and their purpose, dose, route, side effects and labs             if they are needed  Outcome: Progressing

## 2025-04-20 NOTE — UTILIZATION REVIEW
Initial Clinical Review    Admission: Date/Time/Statement:   Admission Orders (From admission, onward)       Ordered        04/19/25 1450  Place in Observation  Once                          Orders Placed This Encounter   Procedures    Place in Observation     Standing Status:   Standing     Number of Occurrences:   1     Level of Care:   Med Surg [16]     ED Arrival Information       Expected   -    Arrival   4/19/2025 13:45    Acuity   Urgent              Means of arrival   Walk-In    Escorted by   Family Member    Service   Hospitalist    Admission type   Emergency              Arrival complaint   chest pain             Chief Complaint   Patient presents with    Chest Pain     Started about one hour ago       Initial Presentation: 59 y.o. male to ED via walk-in from home  Present to ED with chest pain and shortness of breath. States that he was supposed to have a cardioversion yesterday, but it was canceled and he is not sure why.   PMHX CKD, cardiomyopathy, CHF, A-fib, hyponatremia, methamphetamine abuse;  ECHO 3/24/25 revealed EF 25%.   Admitted to OBS with DX: Chest pain   on exam: MARTHA 5; tachy - irregular; hypotensive; lungs with rales; B/L LE edema; BNP 1,178; Na 134; Cr 1.58 (baseline 1.3-1.6)  EKG on admission with A-fib , V paced   CXR pending final read, but appears to have mild pulmonary venous congestion   PLAN: cont iv lasix; start midodrine po; change Coreg to metoprolol tartrate 25 mg p.o. twice daily hold Aldactone secondary to soft blood pressures; tele monitoring; monitor labs; fluid / Na restriction; f/u digoxin level; trend TROPs      Anticipated Length of Stay/Certification Statement: Patient will be admitted on an observation basis with an anticipated length of stay of less than 2 midnights secondary to a fib rvr requiring medication adjustment as well as chf exacerbation requiring iv lasix.       Scheduled Medications:  [START ON 4/21/2025] digoxin, 125 mcg, Oral, Once per day on  Monday Wednesday Friday  furosemide, 40 mg, Intravenous, BID (diuretic)  magnesium Oxide, 400 mg, Oral, Daily  magnesium sulfate, 2 g, Intravenous, Once (recd 4/20)  melatonin, 6 mg, Oral, HS  metoprolol tartrate, 25 mg, Oral, Q12H JESUS  midodrine, 5 mg, Oral, TID AC  nicotine, 1 patch, Transdermal, Daily  pantoprazole, 40 mg, Oral, Early Morning  [Held by provider] spironolactone, 12.5 mg, Oral, Daily  traZODone, 50 mg, Oral, HS  warfarin, 5 mg, Oral, Daily (warfarin)      Continuous IV Infusions: None       PRN Meds:  acetaminophen, 650 mg, Oral, Q6H PRN  albuterol, 2 puff, Inhalation, Q6H PRN  metoprolol, 5 mg, Intravenous, Q6H PRN: 4/20 x1       ED Triage Vitals [04/19/25 1354]   Temperature Pulse Respirations Blood Pressure SpO2 Pain Score   98 °F (36.7 °C) 94 16 (!) 82/72 92 % No Pain     Weight (last 2 days)       Date/Time Weight    04/20/25 0600 76 (167.6)    04/19/25 1526 78.2 (172.4)    04/19/25 1354 74.8 (165)            Vital Signs (last 3 days)       Date/Time Temp Pulse Resp BP MAP (mmHg) SpO2 Calculated FIO2 (%) - Nasal Cannula Nasal Cannula O2 Flow Rate (L/min) O2 Device Patient Position - Orthostatic VS Pain    04/20/25 0808 -- 54 -- 104/64 -- -- -- -- -- -- --    04/20/25 00:06:40 -- 39 -- 96/51 66 96 % -- -- -- -- --    04/19/25 2207 -- -- -- -- -- 96 % -- -- None (Room air) -- No Pain    04/19/25 21:59:17 -- 52 -- 94/62 73 95 % -- -- -- -- --    04/19/25 2159 -- -- -- 94/62 -- -- -- -- -- -- --    04/19/25 21:07:46 -- 50 -- 98/64 75 95 % -- -- -- -- --    04/19/25 19:42:37 -- 46 -- 100/65 77 97 % -- -- -- -- --    04/19/25 1700 -- -- -- 98/62 -- -- -- -- -- Lying --    04/19/25 15:36:46 97.7 °F (36.5 °C) 44 18 92/60 71 98 % -- -- -- -- --    04/19/25 1524 -- -- -- -- -- -- -- -- None (Room air) -- No Pain    04/19/25 1445 -- 103 18 106/84 90 91 % 28 2 L/min Nasal cannula -- --    04/19/25 1415 -- 101 18 96/81 86 96 % -- -- -- -- --    04/19/25 1354 98 °F (36.7 °C) 94 16 82/72 -- 92 % -- -- None  (Room air) -- No Pain              Pertinent Labs/Diagnostic Test Results:   Radiology:  XR chest portable   Final Interpretation by Marian Verde MD (04/20 0612)      Mild pulmonary venous congestion superimposed on emphysema and pulmonary fibrosis.            Workstation performed: [id number]             Results from last 7 days   Lab Units 04/20/25  0628 04/19/25  1404   WBC Thousand/uL 6.90 6.67   HEMOGLOBIN g/dL 12.8 13.8   HEMATOCRIT % 40.9 43.7   PLATELETS Thousands/uL 238 242   TOTAL NEUT ABS Thousands/µL  --  4.21        Results from last 7 days   Lab Units 04/20/25  0628 04/19/25  1404   SODIUM mmol/L 136 134*   POTASSIUM mmol/L 4.4 4.7   CHLORIDE mmol/L 102 100   CO2 mmol/L 27 27   ANION GAP mmol/L 7 7   BUN mg/dL 35* 33*   CREATININE mg/dL 1.59* 1.58*   EGFR ml/min/1.73sq m 46 47   CALCIUM mg/dL 9.0 9.6   MAGNESIUM mg/dL 1.8*  --      Results from last 7 days   Lab Units 04/20/25  0628 04/19/25  1404   AST U/L 21 26   ALT U/L 17 21   ALK PHOS U/L 108* 131*   TOTAL PROTEIN g/dL 6.5 7.4   ALBUMIN g/dL 3.4* 4.0   TOTAL BILIRUBIN mg/dL 0.73 0.87        Results from last 7 days   Lab Units 04/20/25  0628 04/19/25  1404   GLUCOSE RANDOM mg/dL 90 104        Beta- Hydroxybutyrate   Date Value Ref Range Status   09/18/2024 <0.05 0.02 - 0.27 mmol/L Final         Results from last 7 days   Lab Units 04/19/25  1940 04/19/25  1726 04/19/25  1404   HS TNI 0HR ng/L  --   --  108*   HS TNI 2HR ng/L  --  91*  --    HSTNI D2 ng/L  --  -17  --    HS TNI 4HR ng/L 94*  --   --    HSTNI D4 ng/L -14  --   --         Results from last 7 days   Lab Units 04/20/25  0652 04/19/25  1404   PROTIME seconds 20.7* 21.7*   INR  1.74* 1.87*        Results from last 7 days   Lab Units 04/19/25  1404   DIGOXIN LVL ng/mL 0.5*     Results from last 7 days   Lab Units 04/19/25  1404   BNP pg/mL 1,178*           Past Medical History:   Diagnosis Date    Anxiety     Cardiomyopathy (HCC)     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/2025    CHF (congestive heart failure) (HCC)     Depression     Emphysema of lung (HCC)     GERD (gastroesophageal reflux disease)     Gout     Hypertension     Metabolic encephalopathy 03/22/2025    Neuropathy     Raynaud's disease     Right inguinal hernia     Scleroderma (HCC)      Present on Admission:   Acute on chronic combined systolic and diastolic congestive heart failure (HCC)   Atrial fibrillation with RVR (HCC)   (Resolved) Hyponatremia   Stage 3a chronic kidney disease (CKD) (HCC)   Chest pain   Cardiomyopathy (HCC)      Admitting Diagnosis: Chest pain [R07.9]  Chest pain on breathing [R07.1]  Age/Sex: 59 y.o. male    Network Utilization Review Department  ATTENTION: Please call with any questions or concerns to [phone] and carefully listen to the prompts so that you are directed to the right person. All voicemails are confidential.   For Discharge needs, contact Care Management DC Support Team at [phone] opt. 2  Send all requests for admission clinical reviews, approved or denied determinations and any other requests to dedicated fax number below belonging to the [facility] where the patient is receiving treatment. List of dedicated fax numbers for the Facilities:  FACILITY NAME UR FAX NUMBER   ADMISSION DENIALS (Administrative/Medical Necessity) [phone]   DISCHARGE SUPPORT TEAM (NETWORK) [phone]   PARENT CHILD HEALTH (Maternity/NICU/Pediatrics) [phone]   [facility] [phone]   [facility] [phone]   [facility] [phone]   [facility] [phone]   [facility] [phone]   [facility] [phone]   [facility] [phone]   [facility] [phone]   [facility] [phone]   [facility]  [phone]   [facility] [phone]   [facility] [phone]

## 2025-04-20 NOTE — DISCHARGE INSTR - AVS FIRST PAGE
Dear Britton Batista,     It was our pleasure to care for you here at [facility]. For follow up as well as any medication refills, we recommend that you follow up with your primary care physician. Here are the most important instructions/ recommendations at discharge:     Notable Medication Adjustments -   Stop lisinopril   Stop carvediolol  Start metoprolol tartrate 25mg twice daily  Start midodrine 5mg three times daily   Testing Required after Discharge - ** Please contact your PCP to request testing orders for any of the testing recommended here **  Bmp in one week  Important follow up information -   Follow up with cardiology  Follow up with family doctor  Other Instructions -   If symptoms worsen or new symptoms arise, come back to the hospital   Continue to weigh yourself daily. Take extra dose of lasix if you notice weight gain of 2-3 pounds in one day or 3-5 pounds in one week  Please review this entire after visit summary as additional general instructions including medication list, appointments, activity, diet, any pertinent wound care, and other additional recommendations from your care team that may be provided for you.      Sincerely,     Cheryl Sin PA-C

## 2025-04-20 NOTE — ASSESSMENT & PLAN NOTE
EKG on admission with A-fib   Continue home digoxin, Coumadin  Change Coreg to Lopressor 25 twice daily  Was scheduled for cardioversion on 4/18, but was canceled  Monitor on telemetry - rates controlled on lopressor 80-90bpm  Follow up with cardiology outpatient

## 2025-04-20 NOTE — DISCHARGE SUMMARY
Discharge Summary - Hospitalist   Name: Britton Batista 59 y.o. male I MRN: [id number]  Unit/Bed#: -01 I Date of Admission: 4/19/2025   Date of Service: 4/20/2025 I Hospital Day: 0     Assessment & Plan  Chest pain  POA with chest pain for 1 hour  Likely in setting of decompensated heart failure with A-fib RVR  Start metoprolol for rates as well as diuresis for chf   Troponin 108 ->91->94  EKG on admission with A-fib , V paced  MARTHA 5  No further chest pain during hospital stay   Telemetry - remained in afib, now 80-90bpm  Follow up with cardiology   Atrial fibrillation with RVR (HCC)  EKG on admission with A-fib   Continue home digoxin, Coumadin  Change Coreg to Lopressor 25 twice daily  Was scheduled for cardioversion on 4/18, but was canceled  Monitor on telemetry - rates controlled on lopressor 80-90bpm  Follow up with cardiology outpatient   Acute on chronic combined systolic and diastolic congestive heart failure (HCC)  Wt Readings from Last 3 Encounters:   04/20/25 76 kg (167 lb 9.6 oz)   04/12/25 73.9 kg (163 lb)   04/10/25 74.1 kg (163 lb 6.4 oz)   Uncontrolled AF likely driving CHF and chest pain   Take lasix 40mg qd for diuresis as outpatient   Discharged 4/10 with weight of 163  CXR mild pulmonary venous congestion superimposed on emphysema and pulmonary fibrosis   BNP 1178  EKG AF , v paced  ECHO 3/24/25 revealed EF 25%.  Severely reduced systolic function.  Severe global hypokinesis.  Mitral and tricuspid regurg are moderate  Continue diuresis with Lasix 40 mg IV twice daily  Transition back to home lasix on 4/21   Start midodrine for BP support   Daily weights, Strict I & Os  Cardiac diet, low sodium <2g, fluid restriction <1500   Cardiomyopathy (HCC)  Secondary to methamphetamine abuse  Managed on Lasix, coreg, spironolactone  No longer taking lisinopril per sister. Change coreg to metoprolol   Unable to afford Entresto or Jardiance  Follow-up with cardiology outpatient  (5/5)  Stage 3a chronic kidney disease (CKD) ([facility])  Lab Results   Component Value Date    EGFR 46 04/20/2025    EGFR 47 04/19/2025    EGFR 49 04/12/2025    CREATININE 1.59 (H) 04/20/2025    CREATININE 1.58 (H) 04/19/2025    CREATININE 1.53 (H) 04/12/2025   Baseline varies.  Mostly between 1.3-1.6  Currently at baseline  Avoid nephrotoxic agents and hypotension  Bmp in one week     Medical Problems       Resolved Problems  Date Reviewed: 3/24/2025          Resolved    Hyponatremia 4/20/2025     Resolved by  Cheryl Sin PA-C          MESSAGE TO PCP (Juan Galindo DO) FOR FOLLOW UP:   Thank you for allowing us to participate in the care of your patient, Britton Batista, who was hospitalized from 4/19/2025 through 04/20/25 with the admitting diagnosis of chest pain.  He was monitored on telemetry. Troponins peaked at 108. Workup consistent with a fib rvr and chf exacerbation. Patient was treated with iv lasix and should resume his home lasix on discharge. Patient euvolemic at time of exam. Also changed coreg to metoprolol for a fib rvr. This resulted in improvement in heart rates 80-90 by time of discharge. He should stop taking lisinopril as well and start midodrine 5mg three time daily for blood pressure support. Agreeable to discharge plan and outpatient follow up.    Medication Changes:  Stop coreg and lisinopril  Start midodrine 5mg three times daily  Start metoprolol tartrate 25mg twice daily  Outpatient testing recommended:  bmp in one week  If you have any additional questions or would like to discuss further, please feel free to contact me.  Cheryl Sin PA-C  [facility] Internal Medicine, Hospitalist, [phone]     Admission Date:   Admission Orders (From admission, onward)       Ordered        04/19/25 1450  Place in Observation  Once                          Discharge Date: 04/20/25    Consultations During Hospital Stay:  none    Procedures Performed:   none    Significant Findings / Test Results:  "  Cxr with mild pulmonary venous congestion superimposed on emphysema and pulmonary fibrosis     Incidental Findings:   none     Test Results Pending at Discharge (will require follow up):   none     Complications:  none    Reason for Admission: chest pain    Hospital Course:   Birtton Batista is a 59 y.o. male patient who originally presented to the hospital on 4/19/2025 due to chest pain. He was monitored on telemetry. Troponins peaked at 108. Workup consistent with a fib rvr and chf exacerbation. Patient was treated with iv lasix and should resume his home lasix on discharge. Patient euvolemic at time of exam. Also changed coreg to metoprolol for a fib rvr. This resulted in improvement in heart rates 80-90 by time of discharge. He should stop taking lisinopril as well and start midodrine 5mg three time daily for blood pressure support. Agreeable to discharge plan and outpatient follow up.    Please see above list of diagnoses and related plan for additional information.     Condition at Discharge: good    Discharge Day Visit / Exam:   Subjective:  patient states that he is feeling well. Denies any further chest pain, shortness of breath or abdominal pain. Agreeable to discharge plan and outpatient follow up.   Vitals: Blood Pressure: 118/56 (04/20/25 1004)  Pulse: 80 (04/20/25 1004)  Temperature: 97.5 °F (36.4 °C) (04/20/25 0808)  Temp Source: Temporal (04/20/25 0808)  Respirations: 18 (04/20/25 1004)  Height: 5' 9\" (175.3 cm) (04/20/25 1004)  Weight - Scale: 76 kg (167 lb 9.6 oz) (04/20/25 0600)  SpO2: 92 % (04/20/25 1004)  Physical Exam  Vitals reviewed.   Constitutional:       General: He is not in acute distress.     Appearance: Normal appearance. He is ill-appearing.   HENT:      Head: Normocephalic and atraumatic.      Nose: Nose normal.      Mouth/Throat:      Mouth: Mucous membranes are moist.      Pharynx: Oropharynx is clear.   Eyes:      Extraocular Movements: Extraocular movements intact.      " Conjunctiva/sclera: Conjunctivae normal.   Cardiovascular:      Rate and Rhythm: Normal rate. Rhythm irregular.      Pulses: Normal pulses.      Heart sounds: Normal heart sounds. No murmur heard.  Pulmonary:      Effort: Pulmonary effort is normal. No respiratory distress.      Breath sounds: Normal breath sounds. No wheezing.   Abdominal:      General: Abdomen is flat. Bowel sounds are normal. There is no distension.      Palpations: Abdomen is soft.      Tenderness: There is no abdominal tenderness. There is no guarding.   Musculoskeletal:         General: Normal range of motion.      Cervical back: Normal range of motion.      Right lower leg: No edema.      Left lower leg: No edema.   Skin:     General: Skin is warm.   Neurological:      General: No focal deficit present.      Mental Status: He is alert and oriented to person, place, and time. Mental status is at baseline.      Motor: No weakness.   Psychiatric:         Mood and Affect: Mood normal.         Behavior: Behavior normal.         Thought Content: Thought content normal.         Judgment: Judgment normal.          Discussion with Family: Updated  (sister) via phone.    Discharge instructions/Information to patient and family:   See after visit summary for information provided to patient and family.      Provisions for Follow-Up Care:  See after visit summary for information related to follow-up care and any pertinent home health orders.      Mobility at time of Discharge:   Basic Mobility Inpatient Raw Score: 24  JH-HLM Goal: 8: Walk 250 feet or more  JH-HLM Achieved: 7: Walk 25 feet or more  HLM Goal achieved. Continue to encourage appropriate mobility.     Disposition:   Home    Planned Readmission: none    Discharge Medications:  See after visit summary for reconciled discharge medications provided to patient and/or family.      Administrative Statements   Discharge Statement:  I have spent a total time of 45 minutes in caring for  this patient on the day of the visit/encounter. .    **Please Note: This note may have been constructed using a voice recognition system**

## 2025-04-21 LAB
QRS AXIS: 117 DEGREES
QRSD INTERVAL: 178 MS
QT INTERVAL: 416 MS
QTC INTERVAL: 547 MS
T WAVE AXIS: 41 DEGREES
VENTRICULAR RATE: 104 BPM

## 2025-04-21 PROCEDURE — 93010 ELECTROCARDIOGRAM REPORT: CPT | Performed by: INTERNAL MEDICINE

## 2025-04-21 NOTE — UTILIZATION REVIEW
NOTIFICATION OF OBSERVATION ADMISSION   AUTHORIZATION REQUEST   SERVICING FACILITY:   [place] [address]  Tax ID: [id number]  NPI: [id number] ATTENDING PROVIDER:  Attending Name and NPI#: Guadalupe Muñoz Md [[id number]]  Address: [address] [place] [address]  Phone: [phone]   ADMISSION INFORMATION:  Place of Service: On [facility]  Place of Service Code: 22 CPT Code:   Admitting Diagnosis Code/Description:  Chest pain [R07.9]  Chest pain on breathing [R07.1]  Observation Admission Date/Time: 04/19/2025 1450  Discharge Date/Time: 4/20/2025 12:09 PM     UTILIZATION REVIEW CONTACT:  Comfort Dennis Utilization   Network Utilization Review Department  Phone: [phone]  Fax [phone]  Email: [email or web address]  Contact for approvals/pending authorizations, clinical reviews, and discharge.   PHYSICIAN ADVISORY SERVICES:  Medical Necessity Denial & Riwv-gj-Ruge Review  Phone: [phone]  Fax: [phone]  Email: [email or web address]     DISCHARGE SUPPORT TEAM:  For Patients Discharge Needs & Updates  Phone: [phone] opt. 2 Fax: [phone]  Email: [email or web address]       Phoned pt to f/u on ED visit yesterday, left message for return call.    ED provider sent urgent referral for general surgery for colonoscopy and they have reached out to pt to schedule.

## 2025-04-22 ENCOUNTER — HOSPITAL ENCOUNTER (INPATIENT)
Facility: HOSPITAL | Age: 60
LOS: 1 days | Discharge: HOME | DRG: 291 | End: 2025-04-24
Attending: EMERGENCY MEDICINE | Admitting: INTERNAL MEDICINE
Payer: COMMERCIAL

## 2025-04-22 ENCOUNTER — APPOINTMENT (EMERGENCY)
Dept: RADIOLOGY | Facility: HOSPITAL | Age: 60
DRG: 291 | End: 2025-04-22
Payer: COMMERCIAL

## 2025-04-22 DIAGNOSIS — J44.1 COPD EXACERBATION (HCC): ICD-10-CM

## 2025-04-22 DIAGNOSIS — J96.01 ACUTE RESPIRATORY FAILURE WITH HYPOXIA (HCC): Primary | ICD-10-CM

## 2025-04-22 DIAGNOSIS — J84.10 PULMONARY FIBROSIS (HCC): ICD-10-CM

## 2025-04-22 DIAGNOSIS — I50.9 CHF (CONGESTIVE HEART FAILURE) (HCC): ICD-10-CM

## 2025-04-22 LAB
ALBUMIN SERPL BCG-MCNC: 3.7 G/DL (ref 3.5–5)
ALP SERPL-CCNC: 119 U/L (ref 34–104)
ALT SERPL W P-5'-P-CCNC: 21 U/L (ref 7–52)
ANION GAP SERPL CALCULATED.3IONS-SCNC: 7 MMOL/L (ref 4–13)
APTT PPP: 37 SECONDS (ref 23–34)
AST SERPL W P-5'-P-CCNC: 30 U/L (ref 13–39)
BASE EX.OXY STD BLDV CALC-SCNC: 63 % (ref 60–80)
BASE EXCESS BLDV CALC-SCNC: -4.5 MMOL/L
BASOPHILS # BLD AUTO: 0.06 THOUSANDS/ÂΜL (ref 0–0.1)
BASOPHILS NFR BLD AUTO: 1 % (ref 0–1)
BILIRUB SERPL-MCNC: 0.47 MG/DL (ref 0.2–1)
BNP SERPL-MCNC: 1034 PG/ML (ref 0–100)
BUN SERPL-MCNC: 37 MG/DL (ref 5–25)
CALCIUM SERPL-MCNC: 9 MG/DL (ref 8.4–10.2)
CARDIAC TROPONIN I PNL SERPL HS: 122 NG/L (ref ?–50)
CHLORIDE SERPL-SCNC: 102 MMOL/L (ref 96–108)
CO2 SERPL-SCNC: 25 MMOL/L (ref 21–32)
CREAT SERPL-MCNC: 1.49 MG/DL (ref 0.6–1.3)
D DIMER PPP FEU-MCNC: <0.27 UG/ML FEU
EOSINOPHIL # BLD AUTO: 0.14 THOUSAND/ÂΜL (ref 0–0.61)
EOSINOPHIL NFR BLD AUTO: 2 % (ref 0–6)
ERYTHROCYTE [DISTWIDTH] IN BLOOD BY AUTOMATED COUNT: 17.2 % (ref 11.6–15.1)
FLUAV AG UPPER RESP QL IA.RAPID: NEGATIVE
FLUBV AG UPPER RESP QL IA.RAPID: NEGATIVE
GFR SERPL CREATININE-BSD FRML MDRD: 50 ML/MIN/1.73SQ M
GLUCOSE SERPL-MCNC: 90 MG/DL (ref 65–140)
HCO3 BLDV-SCNC: 21 MMOL/L (ref 24–30)
HCT VFR BLD AUTO: 46.4 % (ref 36.5–49.3)
HGB BLD-MCNC: 14.4 G/DL (ref 12–17)
IMM GRANULOCYTES # BLD AUTO: 0.04 THOUSAND/UL (ref 0–0.2)
IMM GRANULOCYTES NFR BLD AUTO: 1 % (ref 0–2)
INR PPP: 2.07 (ref 0.85–1.19)
LYMPHOCYTES # BLD AUTO: 1.64 THOUSANDS/ÂΜL (ref 0.6–4.47)
LYMPHOCYTES NFR BLD AUTO: 20 % (ref 14–44)
MCH RBC QN AUTO: 28.8 PG (ref 26.8–34.3)
MCHC RBC AUTO-ENTMCNC: 31 G/DL (ref 31.4–37.4)
MCV RBC AUTO: 93 FL (ref 82–98)
MONOCYTES # BLD AUTO: 0.91 THOUSAND/ÂΜL (ref 0.17–1.22)
MONOCYTES NFR BLD AUTO: 11 % (ref 4–12)
NEUTROPHILS # BLD AUTO: 5.32 THOUSANDS/ÂΜL (ref 1.85–7.62)
NEUTS SEG NFR BLD AUTO: 65 % (ref 43–75)
NRBC BLD AUTO-RTO: 0 /100 WBCS
O2 CT BLDV-SCNC: 12.3 ML/DL
PCO2 BLDV: 40.2 MM HG (ref 42–50)
PH BLDV: 7.34 [PH] (ref 7.3–7.4)
PLATELET # BLD AUTO: 213 THOUSANDS/UL (ref 149–390)
PMV BLD AUTO: 9.6 FL (ref 8.9–12.7)
PO2 BLDV: 36.9 MM HG (ref 35–45)
POTASSIUM SERPL-SCNC: 4.3 MMOL/L (ref 3.5–5.3)
PROT SERPL-MCNC: 7.2 G/DL (ref 6.4–8.4)
PROTHROMBIN TIME: 23.5 SECONDS (ref 12.3–15)
RBC # BLD AUTO: 5 MILLION/UL (ref 3.88–5.62)
SARS-COV+SARS-COV-2 AG RESP QL IA.RAPID: NEGATIVE
SODIUM SERPL-SCNC: 134 MMOL/L (ref 135–147)
WBC # BLD AUTO: 8.11 THOUSAND/UL (ref 4.31–10.16)

## 2025-04-22 PROCEDURE — 36415 COLL VENOUS BLD VENIPUNCTURE: CPT | Performed by: EMERGENCY MEDICINE

## 2025-04-22 PROCEDURE — 82805 BLOOD GASES W/O2 SATURATION: CPT | Performed by: EMERGENCY MEDICINE

## 2025-04-22 PROCEDURE — 87811 SARS-COV-2 COVID19 W/OPTIC: CPT | Performed by: EMERGENCY MEDICINE

## 2025-04-22 PROCEDURE — 80162 ASSAY OF DIGOXIN TOTAL: CPT

## 2025-04-22 PROCEDURE — 84100 ASSAY OF PHOSPHORUS: CPT

## 2025-04-22 PROCEDURE — 71045 X-RAY EXAM CHEST 1 VIEW: CPT

## 2025-04-22 PROCEDURE — 84145 PROCALCITONIN (PCT): CPT

## 2025-04-22 PROCEDURE — 84484 ASSAY OF TROPONIN QUANT: CPT | Performed by: EMERGENCY MEDICINE

## 2025-04-22 PROCEDURE — 99284 EMERGENCY DEPT VISIT MOD MDM: CPT | Performed by: EMERGENCY MEDICINE

## 2025-04-22 PROCEDURE — 94640 AIRWAY INHALATION TREATMENT: CPT

## 2025-04-22 PROCEDURE — 80053 COMPREHEN METABOLIC PANEL: CPT | Performed by: EMERGENCY MEDICINE

## 2025-04-22 PROCEDURE — 85379 FIBRIN DEGRADATION QUANT: CPT | Performed by: EMERGENCY MEDICINE

## 2025-04-22 PROCEDURE — 96374 THER/PROPH/DIAG INJ IV PUSH: CPT

## 2025-04-22 PROCEDURE — 99285 EMERGENCY DEPT VISIT HI MDM: CPT

## 2025-04-22 PROCEDURE — 84439 ASSAY OF FREE THYROXINE: CPT

## 2025-04-22 PROCEDURE — 93005 ELECTROCARDIOGRAM TRACING: CPT

## 2025-04-22 PROCEDURE — 80061 LIPID PANEL: CPT

## 2025-04-22 PROCEDURE — 85730 THROMBOPLASTIN TIME PARTIAL: CPT | Performed by: EMERGENCY MEDICINE

## 2025-04-22 PROCEDURE — 85610 PROTHROMBIN TIME: CPT | Performed by: EMERGENCY MEDICINE

## 2025-04-22 PROCEDURE — 85025 COMPLETE CBC W/AUTO DIFF WBC: CPT | Performed by: EMERGENCY MEDICINE

## 2025-04-22 PROCEDURE — 83880 ASSAY OF NATRIURETIC PEPTIDE: CPT | Performed by: EMERGENCY MEDICINE

## 2025-04-22 PROCEDURE — 83735 ASSAY OF MAGNESIUM: CPT

## 2025-04-22 PROCEDURE — 96375 TX/PRO/DX INJ NEW DRUG ADDON: CPT

## 2025-04-22 PROCEDURE — 84443 ASSAY THYROID STIM HORMONE: CPT

## 2025-04-22 PROCEDURE — 87804 INFLUENZA ASSAY W/OPTIC: CPT | Performed by: EMERGENCY MEDICINE

## 2025-04-22 RX ORDER — METHYLPREDNISOLONE SODIUM SUCCINATE 125 MG/2ML
80 INJECTION, POWDER, LYOPHILIZED, FOR SOLUTION INTRAMUSCULAR; INTRAVENOUS ONCE
Status: COMPLETED | OUTPATIENT
Start: 2025-04-22 | End: 2025-04-22

## 2025-04-22 RX ORDER — FUROSEMIDE 10 MG/ML
80 INJECTION INTRAMUSCULAR; INTRAVENOUS ONCE
Status: COMPLETED | OUTPATIENT
Start: 2025-04-22 | End: 2025-04-22

## 2025-04-22 RX ORDER — IPRATROPIUM BROMIDE AND ALBUTEROL SULFATE 2.5; .5 MG/3ML; MG/3ML
3 SOLUTION RESPIRATORY (INHALATION) ONCE
Status: COMPLETED | OUTPATIENT
Start: 2025-04-22 | End: 2025-04-22

## 2025-04-22 RX ADMIN — FUROSEMIDE 80 MG: 10 INJECTION, SOLUTION INTRAVENOUS at 23:28

## 2025-04-22 RX ADMIN — METHYLPREDNISOLONE SODIUM SUCCINATE 80 MG: 125 INJECTION, POWDER, FOR SOLUTION INTRAMUSCULAR; INTRAVENOUS at 22:17

## 2025-04-22 RX ADMIN — IPRATROPIUM BROMIDE AND ALBUTEROL SULFATE 3 ML: .5; 3 SOLUTION RESPIRATORY (INHALATION) at 22:20

## 2025-04-23 ENCOUNTER — PATIENT OUTREACH (OUTPATIENT)
Dept: CASE MANAGEMENT | Facility: OTHER | Age: 60
End: 2025-04-23

## 2025-04-23 ENCOUNTER — APPOINTMENT (INPATIENT)
Dept: CT IMAGING | Facility: HOSPITAL | Age: 60
DRG: 291 | End: 2025-04-23
Payer: COMMERCIAL

## 2025-04-23 PROBLEM — J96.01 ACUTE RESPIRATORY FAILURE WITH HYPOXIA (HCC): Status: ACTIVE | Noted: 2025-04-23

## 2025-04-23 PROBLEM — J84.10 PULMONARY FIBROSIS (HCC): Status: ACTIVE | Noted: 2025-04-23

## 2025-04-23 LAB
2HR DELTA HS TROPONIN: -34 NG/L
4HR DELTA HS TROPONIN: -50 NG/L
ALBUMIN SERPL BCG-MCNC: 3.6 G/DL (ref 3.5–5)
ALP SERPL-CCNC: 106 U/L (ref 34–104)
ALT SERPL W P-5'-P-CCNC: 21 U/L (ref 7–52)
ANION GAP SERPL CALCULATED.3IONS-SCNC: 8 MMOL/L (ref 4–13)
AST SERPL W P-5'-P-CCNC: 30 U/L (ref 13–39)
ATRIAL RATE: 57 BPM
BASE EX.OXY STD BLDV CALC-SCNC: 73.7 % (ref 60–80)
BASE EX.OXY STD BLDV CALC-SCNC: 94.9 % (ref 60–80)
BASE EXCESS BLDV CALC-SCNC: -3.7 MMOL/L
BASE EXCESS BLDV CALC-SCNC: 0.9 MMOL/L
BILIRUB SERPL-MCNC: 0.49 MG/DL (ref 0.2–1)
BILIRUB UR QL STRIP: NEGATIVE
BUN SERPL-MCNC: 40 MG/DL (ref 5–25)
CALCIUM SERPL-MCNC: 9 MG/DL (ref 8.4–10.2)
CARDIAC TROPONIN I PNL SERPL HS: 72 NG/L (ref ?–50)
CARDIAC TROPONIN I PNL SERPL HS: 88 NG/L (ref ?–50)
CHLORIDE SERPL-SCNC: 102 MMOL/L (ref 96–108)
CHOLEST SERPL-MCNC: 120 MG/DL (ref ?–200)
CLARITY UR: CLEAR
CO2 SERPL-SCNC: 25 MMOL/L (ref 21–32)
COLOR UR: YELLOW
CREAT SERPL-MCNC: 1.59 MG/DL (ref 0.6–1.3)
CRP SERPL QL: 14.3 MG/L
DIGOXIN SERPL-MCNC: 0.4 NG/ML (ref 0.8–2)
ERYTHROCYTE [DISTWIDTH] IN BLOOD BY AUTOMATED COUNT: 16.9 % (ref 11.6–15.1)
GFR SERPL CREATININE-BSD FRML MDRD: 46 ML/MIN/1.73SQ M
GLUCOSE SERPL-MCNC: 143 MG/DL (ref 65–140)
GLUCOSE UR STRIP-MCNC: NEGATIVE MG/DL
HCO3 BLDV-SCNC: 22.2 MMOL/L (ref 24–30)
HCO3 BLDV-SCNC: 24.8 MMOL/L (ref 24–30)
HCT VFR BLD AUTO: 39.6 % (ref 36.5–49.3)
HDLC SERPL-MCNC: 33 MG/DL
HGB BLD-MCNC: 12.6 G/DL (ref 12–17)
HGB UR QL STRIP.AUTO: NEGATIVE
INR PPP: 2.41 (ref 0.85–1.19)
KETONES UR STRIP-MCNC: NEGATIVE MG/DL
LDLC SERPL CALC-MCNC: 74 MG/DL (ref 0–100)
LEUKOCYTE ESTERASE UR QL STRIP: NEGATIVE
MAGNESIUM SERPL-MCNC: 1.8 MG/DL (ref 1.9–2.7)
MCH RBC QN AUTO: 28.4 PG (ref 26.8–34.3)
MCHC RBC AUTO-ENTMCNC: 31.8 G/DL (ref 31.4–37.4)
MCV RBC AUTO: 89 FL (ref 82–98)
NITRITE UR QL STRIP: NEGATIVE
O2 CT BLDV-SCNC: 14.9 ML/DL
O2 CT BLDV-SCNC: 18.3 ML/DL
PCO2 BLDV: 37.4 MM HG (ref 42–50)
PCO2 BLDV: 43.1 MM HG (ref 42–50)
PH BLDV: 7.33 [PH] (ref 7.3–7.4)
PH BLDV: 7.44 [PH] (ref 7.3–7.4)
PH UR STRIP.AUTO: 6 [PH]
PHOSPHATE SERPL-MCNC: 4.4 MG/DL (ref 2.7–4.5)
PLATELET # BLD AUTO: 208 THOUSANDS/UL (ref 149–390)
PMV BLD AUTO: 8.9 FL (ref 8.9–12.7)
PO2 BLDV: 116.1 MM HG (ref 35–45)
PO2 BLDV: 45.5 MM HG (ref 35–45)
POTASSIUM SERPL-SCNC: 4 MMOL/L (ref 3.5–5.3)
PROCALCITONIN SERPL-MCNC: 0.07 NG/ML
PROT SERPL-MCNC: 6.8 G/DL (ref 6.4–8.4)
PROT UR STRIP-MCNC: NEGATIVE MG/DL
PROTHROMBIN TIME: 26.3 SECONDS (ref 12.3–15)
QRS AXIS: 164 DEGREES
QRSD INTERVAL: 154 MS
QT INTERVAL: 408 MS
QTC INTERVAL: 518 MS
RBC # BLD AUTO: 4.43 MILLION/UL (ref 3.88–5.62)
SODIUM SERPL-SCNC: 135 MMOL/L (ref 135–147)
SP GR UR STRIP.AUTO: 1.01 (ref 1–1.03)
T WAVE AXIS: -4 DEGREES
T4 FREE SERPL-MCNC: 0.98 NG/DL (ref 0.61–1.12)
TRIGL SERPL-MCNC: 64 MG/DL (ref ?–150)
TSH SERPL DL<=0.05 MIU/L-ACNC: 8.11 UIU/ML (ref 0.45–4.5)
UROBILINOGEN UR QL STRIP.AUTO: 0.2 E.U./DL
VENTRICULAR RATE: 97 BPM
WBC # BLD AUTO: 7.63 THOUSAND/UL (ref 4.31–10.16)

## 2025-04-23 PROCEDURE — 99223 1ST HOSP IP/OBS HIGH 75: CPT | Performed by: INTERNAL MEDICINE

## 2025-04-23 PROCEDURE — 94664 DEMO&/EVAL PT USE INHALER: CPT

## 2025-04-23 PROCEDURE — 99222 1ST HOSP IP/OBS MODERATE 55: CPT | Performed by: FAMILY MEDICINE

## 2025-04-23 PROCEDURE — 93010 ELECTROCARDIOGRAM REPORT: CPT | Performed by: INTERNAL MEDICINE

## 2025-04-23 PROCEDURE — 36415 COLL VENOUS BLD VENIPUNCTURE: CPT | Performed by: EMERGENCY MEDICINE

## 2025-04-23 PROCEDURE — 82805 BLOOD GASES W/O2 SATURATION: CPT

## 2025-04-23 PROCEDURE — 87081 CULTURE SCREEN ONLY: CPT

## 2025-04-23 PROCEDURE — 80053 COMPREHEN METABOLIC PANEL: CPT

## 2025-04-23 PROCEDURE — 84484 ASSAY OF TROPONIN QUANT: CPT | Performed by: EMERGENCY MEDICINE

## 2025-04-23 PROCEDURE — 85027 COMPLETE CBC AUTOMATED: CPT

## 2025-04-23 PROCEDURE — 85610 PROTHROMBIN TIME: CPT

## 2025-04-23 PROCEDURE — 71250 CT THORAX DX C-: CPT

## 2025-04-23 PROCEDURE — 82805 BLOOD GASES W/O2 SATURATION: CPT | Performed by: EMERGENCY MEDICINE

## 2025-04-23 PROCEDURE — 94640 AIRWAY INHALATION TREATMENT: CPT

## 2025-04-23 PROCEDURE — 81003 URINALYSIS AUTO W/O SCOPE: CPT | Performed by: EMERGENCY MEDICINE

## 2025-04-23 PROCEDURE — 86140 C-REACTIVE PROTEIN: CPT | Performed by: INTERNAL MEDICINE

## 2025-04-23 PROCEDURE — 94760 N-INVAS EAR/PLS OXIMETRY 1: CPT

## 2025-04-23 RX ORDER — METHYLPREDNISOLONE SODIUM SUCCINATE 40 MG/ML
40 INJECTION, POWDER, LYOPHILIZED, FOR SOLUTION INTRAMUSCULAR; INTRAVENOUS EVERY 8 HOURS
Status: DISCONTINUED | OUTPATIENT
Start: 2025-04-23 | End: 2025-04-24

## 2025-04-23 RX ORDER — TRAZODONE HYDROCHLORIDE 50 MG/1
50 TABLET ORAL
Status: DISCONTINUED | OUTPATIENT
Start: 2025-04-23 | End: 2025-04-24 | Stop reason: HOSPADM

## 2025-04-23 RX ORDER — MIDODRINE HYDROCHLORIDE 5 MG/1
5 TABLET ORAL
Status: DISCONTINUED | OUTPATIENT
Start: 2025-04-23 | End: 2025-04-24 | Stop reason: HOSPADM

## 2025-04-23 RX ORDER — NICOTINE 21 MG/24HR
1 PATCH, TRANSDERMAL 24 HOURS TRANSDERMAL DAILY
Status: DISCONTINUED | OUTPATIENT
Start: 2025-04-23 | End: 2025-04-24 | Stop reason: HOSPADM

## 2025-04-23 RX ORDER — BENZONATATE 100 MG/1
100 CAPSULE ORAL 3 TIMES DAILY PRN
Status: DISCONTINUED | OUTPATIENT
Start: 2025-04-23 | End: 2025-04-24 | Stop reason: HOSPADM

## 2025-04-23 RX ORDER — WARFARIN SODIUM 5 MG/1
5 TABLET ORAL
Status: DISCONTINUED | OUTPATIENT
Start: 2025-04-23 | End: 2025-04-24 | Stop reason: HOSPADM

## 2025-04-23 RX ORDER — MAGNESIUM SULFATE HEPTAHYDRATE 40 MG/ML
2 INJECTION, SOLUTION INTRAVENOUS ONCE
Status: COMPLETED | OUTPATIENT
Start: 2025-04-23 | End: 2025-04-23

## 2025-04-23 RX ORDER — TRAZODONE HYDROCHLORIDE 50 MG/1
50 TABLET ORAL
Status: DISCONTINUED | OUTPATIENT
Start: 2025-04-23 | End: 2025-04-23

## 2025-04-23 RX ORDER — LEVALBUTEROL INHALATION SOLUTION 1.25 MG/3ML
1.25 SOLUTION RESPIRATORY (INHALATION)
Status: DISCONTINUED | OUTPATIENT
Start: 2025-04-23 | End: 2025-04-23

## 2025-04-23 RX ORDER — IPRATROPIUM BROMIDE AND ALBUTEROL SULFATE 2.5; .5 MG/3ML; MG/3ML
3 SOLUTION RESPIRATORY (INHALATION)
Status: DISCONTINUED | OUTPATIENT
Start: 2025-04-24 | End: 2025-04-24 | Stop reason: HOSPADM

## 2025-04-23 RX ORDER — LEVALBUTEROL INHALATION SOLUTION 1.25 MG/3ML
1.25 SOLUTION RESPIRATORY (INHALATION) EVERY 8 HOURS PRN
Status: DISCONTINUED | OUTPATIENT
Start: 2025-04-23 | End: 2025-04-24 | Stop reason: HOSPADM

## 2025-04-23 RX ORDER — METOPROLOL TARTRATE 25 MG/1
25 TABLET, FILM COATED ORAL EVERY 12 HOURS SCHEDULED
Status: DISCONTINUED | OUTPATIENT
Start: 2025-04-23 | End: 2025-04-24 | Stop reason: HOSPADM

## 2025-04-23 RX ORDER — CALCIUM CARBONATE 500 MG/1
1000 TABLET, CHEWABLE ORAL DAILY PRN
Status: DISCONTINUED | OUTPATIENT
Start: 2025-04-23 | End: 2025-04-24 | Stop reason: HOSPADM

## 2025-04-23 RX ORDER — BUDESONIDE 0.5 MG/2ML
0.5 INHALANT ORAL
Status: DISCONTINUED | OUTPATIENT
Start: 2025-04-23 | End: 2025-04-24 | Stop reason: HOSPADM

## 2025-04-23 RX ORDER — HYDROXYZINE HYDROCHLORIDE 25 MG/1
25 TABLET, FILM COATED ORAL EVERY 6 HOURS PRN
Status: DISCONTINUED | OUTPATIENT
Start: 2025-04-23 | End: 2025-04-24 | Stop reason: HOSPADM

## 2025-04-23 RX ORDER — PANTOPRAZOLE SODIUM 40 MG/1
40 TABLET, DELAYED RELEASE ORAL 2 TIMES DAILY
Status: DISCONTINUED | OUTPATIENT
Start: 2025-04-23 | End: 2025-04-24 | Stop reason: HOSPADM

## 2025-04-23 RX ORDER — SODIUM CHLORIDE FOR INHALATION 0.9 %
3 VIAL, NEBULIZER (ML) INHALATION
Status: DISCONTINUED | OUTPATIENT
Start: 2025-04-23 | End: 2025-04-23

## 2025-04-23 RX ORDER — ACETAMINOPHEN 325 MG/1
650 TABLET ORAL EVERY 6 HOURS PRN
Status: DISCONTINUED | OUTPATIENT
Start: 2025-04-23 | End: 2025-04-24 | Stop reason: HOSPADM

## 2025-04-23 RX ORDER — IPRATROPIUM BROMIDE AND ALBUTEROL SULFATE 2.5; .5 MG/3ML; MG/3ML
3 SOLUTION RESPIRATORY (INHALATION)
Status: DISCONTINUED | OUTPATIENT
Start: 2025-04-23 | End: 2025-04-23

## 2025-04-23 RX ORDER — DIGOXIN 125 MCG
125 TABLET ORAL 3 TIMES WEEKLY
Status: DISCONTINUED | OUTPATIENT
Start: 2025-04-23 | End: 2025-04-24 | Stop reason: HOSPADM

## 2025-04-23 RX ORDER — LANOLIN ALCOHOL/MO/W.PET/CERES
400 CREAM (GRAM) TOPICAL DAILY
Status: DISCONTINUED | OUTPATIENT
Start: 2025-04-23 | End: 2025-04-24 | Stop reason: HOSPADM

## 2025-04-23 RX ORDER — LORAZEPAM 1 MG/1
1 TABLET ORAL ONCE
Status: COMPLETED | OUTPATIENT
Start: 2025-04-23 | End: 2025-04-23

## 2025-04-23 RX ORDER — FUROSEMIDE 10 MG/ML
50 INJECTION INTRAMUSCULAR; INTRAVENOUS 2 TIMES DAILY
Status: DISCONTINUED | OUTPATIENT
Start: 2025-04-23 | End: 2025-04-24

## 2025-04-23 RX ADMIN — METHYLPREDNISOLONE SODIUM SUCCINATE 40 MG: 40 INJECTION, POWDER, FOR SOLUTION INTRAMUSCULAR; INTRAVENOUS at 06:26

## 2025-04-23 RX ADMIN — ISODIUM CHLORIDE 3 ML: 0.03 SOLUTION RESPIRATORY (INHALATION) at 03:40

## 2025-04-23 RX ADMIN — BUDESONIDE INHALATION 0.5 MG: 0.5 SUSPENSION RESPIRATORY (INHALATION) at 08:23

## 2025-04-23 RX ADMIN — Medication 400 MG: at 08:23

## 2025-04-23 RX ADMIN — DIGOXIN 125 MCG: 125 TABLET ORAL at 08:23

## 2025-04-23 RX ADMIN — METOPROLOL TARTRATE 25 MG: 25 TABLET, FILM COATED ORAL at 21:23

## 2025-04-23 RX ADMIN — TRAZODONE HYDROCHLORIDE 50 MG: 50 TABLET ORAL at 04:02

## 2025-04-23 RX ADMIN — MIDODRINE HYDROCHLORIDE 5 MG: 5 TABLET ORAL at 16:55

## 2025-04-23 RX ADMIN — WARFARIN SODIUM 5 MG: 5 TABLET ORAL at 18:57

## 2025-04-23 RX ADMIN — IPRATROPIUM BROMIDE AND ALBUTEROL SULFATE 3 ML: 2.5; .5 SOLUTION RESPIRATORY (INHALATION) at 14:16

## 2025-04-23 RX ADMIN — NICOTINE 1 PATCH: 21 PATCH, EXTENDED RELEASE TRANSDERMAL at 02:41

## 2025-04-23 RX ADMIN — FUROSEMIDE 50 MG: 10 INJECTION, SOLUTION INTRAMUSCULAR; INTRAVENOUS at 18:59

## 2025-04-23 RX ADMIN — BUDESONIDE INHALATION 0.5 MG: 0.5 SUSPENSION RESPIRATORY (INHALATION) at 02:23

## 2025-04-23 RX ADMIN — AZITHROMYCIN MONOHYDRATE 500 MG: 500 INJECTION, POWDER, LYOPHILIZED, FOR SOLUTION INTRAVENOUS at 01:43

## 2025-04-23 RX ADMIN — METHYLPREDNISOLONE SODIUM SUCCINATE 40 MG: 40 INJECTION, POWDER, FOR SOLUTION INTRAMUSCULAR; INTRAVENOUS at 15:36

## 2025-04-23 RX ADMIN — METOPROLOL TARTRATE 25 MG: 25 TABLET, FILM COATED ORAL at 08:23

## 2025-04-23 RX ADMIN — TRAZODONE HYDROCHLORIDE 50 MG: 50 TABLET ORAL at 21:23

## 2025-04-23 RX ADMIN — IPRATROPIUM BROMIDE AND ALBUTEROL SULFATE 3 ML: 2.5; .5 SOLUTION RESPIRATORY (INHALATION) at 03:40

## 2025-04-23 RX ADMIN — PANTOPRAZOLE SODIUM 40 MG: 40 TABLET, DELAYED RELEASE ORAL at 21:23

## 2025-04-23 RX ADMIN — HYDROXYZINE HYDROCHLORIDE 25 MG: 25 TABLET, FILM COATED ORAL at 04:02

## 2025-04-23 RX ADMIN — MIDODRINE HYDROCHLORIDE 5 MG: 5 TABLET ORAL at 11:21

## 2025-04-23 RX ADMIN — METHYLPREDNISOLONE SODIUM SUCCINATE 40 MG: 40 INJECTION, POWDER, FOR SOLUTION INTRAMUSCULAR; INTRAVENOUS at 23:18

## 2025-04-23 RX ADMIN — FUROSEMIDE 50 MG: 10 INJECTION, SOLUTION INTRAMUSCULAR; INTRAVENOUS at 08:23

## 2025-04-23 RX ADMIN — MIDODRINE HYDROCHLORIDE 5 MG: 5 TABLET ORAL at 08:23

## 2025-04-23 RX ADMIN — PANTOPRAZOLE SODIUM 40 MG: 40 TABLET, DELAYED RELEASE ORAL at 08:23

## 2025-04-23 RX ADMIN — MAGNESIUM SULFATE HEPTAHYDRATE 2 G: 40 INJECTION, SOLUTION INTRAVENOUS at 04:03

## 2025-04-23 RX ADMIN — LORAZEPAM 1 MG: 1 TABLET ORAL at 21:22

## 2025-04-23 RX ADMIN — IPRATROPIUM BROMIDE AND ALBUTEROL SULFATE 3 ML: 2.5; .5 SOLUTION RESPIRATORY (INHALATION) at 08:23

## 2025-04-23 RX ADMIN — IPRATROPIUM BROMIDE AND ALBUTEROL SULFATE 3 ML: 2.5; .5 SOLUTION RESPIRATORY (INHALATION) at 19:14

## 2025-04-23 RX ADMIN — BUDESONIDE INHALATION 0.5 MG: 0.5 SUSPENSION RESPIRATORY (INHALATION) at 19:14

## 2025-04-23 NOTE — ASSESSMENT & PLAN NOTE
BP in the /98, current /73  Home medications: Lasix, Lopressor, spironolactone  Continue home medications with hold parameters  Monitor BP

## 2025-04-23 NOTE — PROGRESS NOTES
Referral received via email. Referred to High Utilizer Care Plan Committee today to be reviewed for careplan.  Case placed on department's Rising Risk Watch List.  No further action to be taken pending determination of case review.

## 2025-04-23 NOTE — ED NOTES
Patient requesting a nicotine patch, secure chat communication sent to provider.      Zaira Thurman RN  04/23/25 3971

## 2025-04-23 NOTE — H&P
"H&P - Hospitalist   Name: Britton Batista 59 y.o. male I MRN: [id number]  Unit/Bed#: ED 12 I Date of Admission: 4/22/2025   Date of Service: 4/23/2025 I Hospital Day: 0     Assessment & Plan  Acute on chronic systolic congestive heart failure (HCC)  Wt Readings from Last 3 Encounters:   04/22/25 79.6 kg (175 lb 7.8 oz)   04/20/25 76 kg (167 lb 9.6 oz)   04/12/25 73.9 kg (163 lb)   Patient presents with increased cough, fatigue, and shortness of breath for 2 days - found to be 78% on RA in the ED. Noted weight gain of 8 lbs since previous admission on 4/20  Patient reports adherence to low-sodium diet at home  Daily weights and strict I's and O's  Troponin: 122 -> 88 -> 72  BNP elevated at 1034  Continue low-sodium cardiac diet with 1800 mL fluid restriction  Received Lasix 80 mg IV in the ED, continue Lasix 50 mg IV twice daily  Appreciate cardiology recommendations  Acute respiratory failure with hypoxia (HCC)  As above  Patient reports shortness of breath at rest and with exertion  Patient noted to be tachypneic.  COVID/flu negative  VBG shows metabolic acidosis  Repeat VBG shows respiratory alkalosis  He denies any home oxygen use.    Procalcitonin normal at 0.07  Pt initially improved on 2 L NC to 95% but started having shortness of breath, oxygen increased to 5 L NC  Received DuoNeb, continue along with Pulmicort, sodium chloride neb nebulizer and Xopenex as needed  Respiratory protocol  Patient need ambulatory pulse ox to see if he qualifies for home oxygen  Pulmonary fibrosis (HCC)  CT chest: \"No acute abnormality. Pulmonary fibrosis. Stable left lower lobe pulmonary nodule. Recommend 12-month follow-up noncontrast CT of the chest.\"  Noted history of ILD and emphysema  Received Solu-Medrol 80 mg IV in the ED, continue Solu-Medrol 40 mg IV  Patient requesting pulmonology consult while admitted  Appreciate pulmonology recommendations  Anxiety  On examination, patient reports feeling anxious  Started hydroxyzine " as needed for anxiety  GERD (gastroesophageal reflux disease)  Continue home pantoprazole  Primary hypertension  BP in the /98, current /73  Home medications: Lasix, Lopressor, spironolactone  Continue home medications with hold parameters  Monitor BP  Atrial fibrillation with RVR ([facility])  Noted history of A-fib on digoxin, Lopressor, and Coumadin  Stage 3a chronic kidney disease (CKD) ([facility])  Lab Results   Component Value Date    EGFR 50 04/22/2025    EGFR 46 04/20/2025    EGFR 47 04/19/2025    CREATININE 1.49 (H) 04/22/2025    CREATININE 1.59 (H) 04/20/2025    CREATININE 1.58 (H) 04/19/2025   Creatinine on admission noted to be 1.49  Creatinine seems to be improved from previous  Avoid nephrotoxic meds and hypotension    VTE Pharmacologic Prophylaxis: VTE Score: 7 High Risk (Score >/= 5) - Pharmacological DVT Prophylaxis Ordered: warfarin (Coumadin). Sequential Compression Devices Ordered.  Code Status: Level 1 - Full Code Per patient  Discussion with family: Patient declined call to .     Anticipated Length of Stay: Patient will be admitted on an inpatient basis with an anticipated length of stay of greater than 2 midnights secondary to CHF/COPD exacerbation management.    History of Present Illness   Chief Complaint: Cough, fatigue, and shortness of breath    Britton Batista is a 59 y.o. male with a PMH of, cardiomyopathy, CHF, depression, emphysema, GERD, gout, hypertension, neuropathy, Raynaud's syndrome, scleroderma who presents with cough, fatigue, and shortness of breath. Patient presents with increased cough, fatigue, and shortness of breath for 2 days - found to be 78% on RA in the ED. Noted weight gain of 8 lbs since previous admission on 4/20. Patient reports adherence to low-sodium diet at home. CT chest showed no acute abnormality. Pulmonary fibrosis. Stable left lower lobe pulmonary nodule. Recommend 12-month follow-up noncontrast CT of the chest.    Review of Systems  "  Constitutional:  Positive for activity change, fatigue and unexpected weight change. Negative for chills and fever.   HENT:  Negative for ear pain and sore throat.    Eyes:  Negative for pain and visual disturbance.   Respiratory:  Positive for shortness of breath. Negative for cough and chest tightness.    Cardiovascular:  Negative for chest pain and palpitations.   Gastrointestinal:  Negative for abdominal pain, nausea and vomiting.   Endocrine: Negative.    Genitourinary:  Negative for dysuria and hematuria.   Musculoskeletal:  Negative for arthralgias and back pain.   Skin:  Negative for color change and rash.   Allergic/Immunologic: Negative.    Neurological:  Negative for dizziness, seizures, syncope, light-headedness and numbness.   Hematological: Negative.    Psychiatric/Behavioral:  Positive for agitation and sleep disturbance. The patient is nervous/anxious.    All other systems reviewed and are negative.    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    CHF (congestive heart failure) (HCC)     Depression     Emphysema of lung (HCC)     GERD (gastroesophageal reflux disease)     Gout     Hypertension     Metabolic encephalopathy 03/22/2025    Neuropathy     Raynaud's disease     Right inguinal hernia     Scleroderma (HCC)      Past Surgical History:   Procedure Laterality Date    AMPUTATION Right     pt had tip of \"pointer finger\" d/t scleraderma    CARDIAC PACEMAKER PLACEMENT      FINGER AMPUTATION Left 01/31/2024    Procedure: AMPUTATION FINGER, LEFT INDEX FINGER CPT: 17683;  Surgeon: Pedro Vazquez MD;  Location:  MAIN OR;  Service: Orthopedics    HERNIA REPAIR Left     times 2    NJ AMP F/TH 1/2 JT/PHALANX W/NEURECT W/DIR CLSR Right 01/23/2024    Procedure: AMPUTATION FINGER, RIGHT MIDDLE;  Surgeon: Pedro Vazquez MD;  Location: [facility];  Service: Orthopedics    NJ RPR 1ST INGUN HRNA AGE 5 " YRS/> REDUCIBLE Right 03/03/2023    Procedure: OPEN INGUINAL HERNIA REPAIR WITH MESH;  Surgeon: Temo Jackson DO;  Location: [facility];  Service: General     Social History     Tobacco Use    Smoking status: Every Day     Current packs/day: 1.00     Average packs/day: 1 pack/day for 45.3 years (45.3 ttl pk-yrs)     Types: Cigarettes     Start date: 2000    Smokeless tobacco: Never    Tobacco comments:     Last cigarette 0430   Vaping Use    Vaping status: Never Used   Substance and Sexual Activity    Alcohol use: Not Currently     Comment: occasional    Drug use: Yes     Types: Marijuana, Methamphetamines     Comment: Hx meth use    Sexual activity: Not Currently     Comment: defer     E-Cigarette/Vaping    E-Cigarette Use Never User      E-Cigarette/Vaping Substances    Nicotine No     THC No     CBD No     Flavoring No      Family History   Problem Relation Age of Onset    Hypertension Father      Social History:  Marital Status:    Occupation: Not working  Patient Pre-hospital Living Situation: Home, With other family member: Brother  Patient Pre-hospital Level of Mobility: walks  Patient Pre-hospital Diet Restrictions: Low sodium diet    Meds/Allergies   I have reviewed home medications with patient personally.  Prior to Admission medications    Medication Sig Start Date End Date Taking? Authorizing Provider   albuterol (ProAir HFA) 90 mcg/act inhaler Inhale 2 puffs every 6 (six) hours as needed for wheezing 3/28/25   Conrad Bynum MD   digoxin (LANOXIN) 0.125 mg tablet Take 1 tablet (125 mcg total) by mouth 3 (three) times a week 3/28/25   Conrad Bynum MD   furosemide (LASIX) 40 mg tablet Take 1 tablet (40 mg total) by mouth daily 3/28/25 4/27/25  Conrad Bynum MD   isoniazid (NYDRAZID) 300 mg tablet Take 1 tablet by mouth in the morning 4/3/25   Historical Provider, MD   magnesium Oxide (MAG-OX) 400 mg TABS Take 1 tablet (400 mg total) by mouth daily 3/28/25   Conrad Bynum MD    metoprolol tartrate (LOPRESSOR) 25 mg tablet Take 1 tablet (25 mg total) by mouth every 12 (twelve) hours 4/20/25   Cheryl Sin PA-C   midodrine (PROAMATINE) 5 mg tablet Take 1 tablet (5 mg total) by mouth 3 (three) times a day before meals 4/20/25   Cheryl Sin PA-C   nicotine (NICODERM CQ) 21 mg/24 hr TD 24 hr patch Place 1 patch on the skin over 24 hours daily  Patient not taking: Reported on 4/19/2025 3/28/25   Conrad Bynum MD   pantoprazole (PROTONIX) 40 mg tablet Take 1 tablet (40 mg total) by mouth 2 (two) times a day 10/10/24 4/9/25  Armida Coppola MD   spironolactone (ALDACTONE) 25 mg tablet Take 0.5 tablets (12.5 mg total) by mouth daily 3/28/25   Conrad Bynum MD   traZODone (DESYREL) 50 mg tablet Take 50 mg by mouth daily at bedtime 4/16/25   Historical Provider, MD   warfarin (COUMADIN) 5 mg tablet Target INR range is between 2-3 3/28/25   Conrad Bynum MD     Allergies   Allergen Reactions    Aspirin GI Intolerance     Objective :  Temp:  [97.6 °F (36.4 °C)] 97.6 °F (36.4 °C)  HR:  [] 114  BP: (108-136)/(73-98) 136/73  Resp:  [20-25] 25  SpO2:  [78 %-99 %] 99 %  O2 Device: Nasal cannula  Nasal Cannula O2 Flow Rate (L/min):  [2 L/min] 2 L/min    Physical Exam  Constitutional:       General: He is not in acute distress.     Appearance: He is ill-appearing.   Cardiovascular:      Rate and Rhythm: Regular rhythm. Bradycardia present.      Pulses: Normal pulses.      Heart sounds: Normal heart sounds. No murmur heard.  Pulmonary:      Effort: Tachypnea and respiratory distress present.      Breath sounds: Decreased breath sounds present. No wheezing or rales.   Abdominal:      General: Bowel sounds are normal. There is no distension.      Palpations: Abdomen is soft.      Tenderness: There is no abdominal tenderness.   Musculoskeletal:         General: No swelling or tenderness.   Skin:     General: Skin is warm and dry.      Findings: No erythema or rash.   Neurological:       General: No focal deficit present.      Mental Status: He is alert and oriented to person, place, and time. Mental status is at baseline.   Psychiatric:         Attention and Perception: Attention and perception normal.         Mood and Affect: Mood is anxious. Affect is labile.         Speech: Speech normal.         Behavior: Behavior is agitated.         Thought Content: Thought content normal.         Cognition and Memory: Cognition and memory normal.       Lines/Drains:      Lab Results: I have reviewed the following results:  Results from last 7 days   Lab Units 04/22/25  2208   WBC Thousand/uL 8.11   HEMOGLOBIN g/dL 14.4   HEMATOCRIT % 46.4   PLATELETS Thousands/uL 213   SEGS PCT % 65   LYMPHO PCT % 20   MONO PCT % 11   EOS PCT % 2     Results from last 7 days   Lab Units 04/22/25  2327   SODIUM mmol/L 134*   POTASSIUM mmol/L 4.3   CHLORIDE mmol/L 102   CO2 mmol/L 25   BUN mg/dL 37*   CREATININE mg/dL 1.49*   ANION GAP mmol/L 7   CALCIUM mg/dL 9.0   ALBUMIN g/dL 3.7   TOTAL BILIRUBIN mg/dL 0.47   ALK PHOS U/L 119*   ALT U/L 21   AST U/L 30   GLUCOSE RANDOM mg/dL 90     Results from last 7 days   Lab Units 04/22/25  2208   INR  2.07*     Lab Results   Component Value Date    HGBA1C 5.9 (H) 10/25/2021     Imaging Results Review: I reviewed radiology reports from this admission including: chest xray and CT chest.  Other Study Results Review: No additional pertinent studies reviewed.    Administrative Statements     ** Please Note: This note has been constructed using a voice recognition system. **

## 2025-04-23 NOTE — CASE MANAGEMENT
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     Scleroderma (HCC) 2025     Hyperkalemia 2025     Stage 3a chronic kidney disease (CKD) ([facility]) 2025     Cardiogenic shock ([facility]) 2025     Atrial fibrillation with RVR ([facility]) 2025     Syncope 2024     Chest pain 2024     Acute on chronic systolic congestive heart failure ([facility]) 2024     Primary hypertension 2024     Hypomagnesemia 2023     Methamphetamine abuse ([facility]) 2023     Tobacco abuse 2023     D-dimer, elevated 2023     Cardiomyopathy ([facility]) 10/09/2023     IRINEO (acute kidney injury) ([facility]) 10/08/2023     Acute on chronic combined systolic and diastolic congestive heart failure ([facility]) 10/07/2023     Hypertensive emergency 10/07/2023     Anxiety      Depression      Other emphysema ([facility])      GERD (gastroesophageal reflux disease)      Gout      Hypertensive urgency      Neuropathy      Raynaud's disease      Unilateral inguinal hernia, without obstruction or gangrene, not specified as recurrent      PSS (progressive systemic sclerosis) ([facility]) 10/25/2021     Near syncope 10/25/2021     Osteomyelitis ([facility]) 10/25/2021     Elevated troponin 10/25/2021     Acute encephalopathy 10/25/2021       LOS (days): 0  Geometric Mean LOS (GMLOS) (days):   Days to GMLOS:     OBJECTIVE:  Risk of Unplanned Readmission Score: 48.95         Current admission status: Inpatient   Preferred Pharmacy:   [facility] -  [place], [place] - [address]  [address]  [place] [address]  Phone: [phone] Fax: [phone]    [facility]  [place] - [address]  [facility] [facility] [address],  [address],  First Floor [facility] [place] [address]  Phone: [phone] Fax: [phone]    [id number] - [place], [place] - [address] [place] [address]  Phone: [phone] Fax: [phone]    Primary Care Provider: Juan Galindo DO    Primary Insurance: [facility] RE[facility]  Secondary Insurance:     DISCHARGE DETAILS:        Referral made to the High Utilize rcommittee to review and assist.

## 2025-04-23 NOTE — ASSESSMENT & PLAN NOTE
Lab Results   Component Value Date    EGFR 50 04/22/2025    EGFR 46 04/20/2025    EGFR 47 04/19/2025    CREATININE 1.49 (H) 04/22/2025    CREATININE 1.59 (H) 04/20/2025    CREATININE 1.58 (H) 04/19/2025   Creatinine on admission noted to be 1.49  Creatinine seems to be improved from previous  Avoid nephrotoxic meds and hypotension

## 2025-04-23 NOTE — ED PROVIDER NOTES
Time reflects when diagnosis was documented in both MDM as applicable and the Disposition within this note       Time User Action Codes Description Comment    4/22/2025 11:45 PM George Sampson Add [J96.01] Acute respiratory failure with hypoxia (HCC)     4/22/2025 11:46 PM George Sampson Add [J44.1] COPD exacerbation (HCC)     4/22/2025 11:46 PM George Sampson Add [I50.9] CHF (congestive heart failure) ([facility])           ED Disposition       ED Disposition   Admit    Condition   Stable    Date/Time   Tue Apr 22, 2025 11:45 PM    Comment                  Assessment & Plan       Medical Decision Making  2137:  Pt appears well, VS reviewed.  Pt noted to have hypoxia in triage, responding to NC oxygen.  Placed on monitor.  Pt dyspnea likely multifactorial.  Recent CTA chest negative for PE.  Plan to complete basic labs including cardiac enzymes, BNP, VBG, Ddimer, coags.  Pt currently on coumadin for afib.  Check EKG and CXR.  I will give duoneb and solumedrol, reeval.    2345: Chest x-ray and labs reviewed.  The patient has remained stable throughout ED course.  Discussed with hospitalist for admission.      Amount and/or Complexity of Data Reviewed  Labs: ordered.  Radiology: ordered.     Details: CXR--severe cardiomegaly  ECG/medicine tests: ordered and independent interpretation performed.     Details: Atrial sensed, V paced rhythm with occasional PVCs, 97 bpm, unchanged from previous  Discussion of management or test interpretation with external provider(s): SLIM    Risk  Prescription drug management.  Decision regarding hospitalization.             Medications   ipratropium-albuterol (DUO-NEB) 0.5-2.5 mg/3 mL inhalation solution 3 mL (3 mL Nebulization Given 4/22/25 2220)   methylPREDNISolone sodium succinate (Solu-MEDROL) injection 80 mg (80 mg Intravenous Given 4/22/25 2217)   furosemide (LASIX) injection 80 mg (80 mg Intravenous Given 4/22/25 2328)       ED Risk Strat Scores                    No data  "recorded        SBIRT 22yo+      Flowsheet Row Most Recent Value   Initial Alcohol Screen: US AUDIT-C     1. How often do you have a drink containing alcohol? 0 Filed at: 04/22/2025 2144   2. How many drinks containing alcohol do you have on a typical day you are drinking?  0 Filed at: 04/22/2025 2144   3a. Male UNDER 65: How often do you have five or more drinks on one occasion? 0 Filed at: 04/22/2025 2144   Audit-C Score 0 Filed at: 04/22/2025 2144   SILVIA: How many times in the past year have you...    Used an illegal drug or used a prescription medication for non-medical reasons? Never Filed at: 04/22/2025 2144                            History of Present Illness       Chief Complaint   Patient presents with    Shortness of Breath     Patient presents to the ED with worsening SOB that started yesterday. HX COPD.        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    CHF (congestive heart failure) (HCC)     Depression     Emphysema of lung (HCC)     GERD (gastroesophageal reflux disease)     Gout     Hypertension     Metabolic encephalopathy 03/22/2025    Neuropathy     Raynaud's disease     Right inguinal hernia     Scleroderma (HCC)       Past Surgical History:   Procedure Laterality Date    AMPUTATION Right     pt had tip of \"pointer finger\" d/t scleraderma    CARDIAC PACEMAKER PLACEMENT      FINGER AMPUTATION Left 01/31/2024    Procedure: AMPUTATION FINGER, LEFT INDEX FINGER CPT: 62517;  Surgeon: Pedro Vazquez MD;  Location:  MAIN OR;  Service: Orthopedics    HERNIA REPAIR Left     times 2    VT AMP F/TH 1/2 JT/PHALANX W/NEURECT W/DIR CLSR Right 01/23/2024    Procedure: AMPUTATION FINGER, RIGHT MIDDLE;  Surgeon: Pedro Vazquez MD;  Location: [facility];  Service: Orthopedics    VT RPR 1ST INGUN HRNA AGE 5 YRS/> REDUCIBLE Right 03/03/2023    Procedure: OPEN INGUINAL HERNIA REPAIR WITH MESH;  Surgeon: Temo Jackson DO;  Location: " [facility];  Service: General      Family History   Problem Relation Age of Onset    Hypertension Father       Social History     Tobacco Use    Smoking status: Every Day     Current packs/day: 1.00     Average packs/day: 1 pack/day for 45.3 years (45.3 ttl pk-yrs)     Types: Cigarettes     Start date: 2000    Smokeless tobacco: Never    Tobacco comments:     Last cigarette 0430   Vaping Use    Vaping status: Never Used   Substance Use Topics    Alcohol use: Not Currently     Comment: occasional    Drug use: Yes     Types: Marijuana, Methamphetamines     Comment: Hx meth use      E-Cigarette/Vaping    E-Cigarette Use Never User       E-Cigarette/Vaping Substances    Nicotine No     THC No     CBD No     Flavoring No       I have reviewed and agree with the history as documented.       History provided by:  Medical records, patient and spouse  Shortness of Breath  Severity:  Moderate  Onset quality:  Gradual  Duration:  1 day  Timing:  Constant  Progression:  Unchanged  Chronicity:  Recurrent  Context comment:  Hx of CHF, ILD, emphysema, recurrent bouts of cough, SOB, recent evaluation for similar 2 days ago  Relieved by:  Nothing  Worsened by:  Smoke exposure  Ineffective treatments:  Inhaler (albuterol and lasix 40 mg daily)  Associated symptoms: no abdominal pain, no chest pain, no claudication, no cough, no diaphoresis, no ear pain, no fever, no headaches, no hemoptysis, no neck pain, no PND, no rash, no sore throat, no sputum production, no syncope, no swollen glands, no vomiting and no wheezing    Risk factors: tobacco use        Review of Systems   Constitutional:  Positive for fatigue. Negative for appetite change, chills, diaphoresis and fever.   HENT:  Negative for ear pain, rhinorrhea, sore throat and trouble swallowing.    Eyes:  Negative for pain, discharge and visual disturbance.   Respiratory:  Positive for shortness of breath. Negative for cough, hemoptysis, sputum production, chest tightness and  wheezing.    Cardiovascular:  Negative for chest pain, palpitations, claudication, syncope and PND.   Gastrointestinal:  Negative for abdominal pain, nausea and vomiting.   Endocrine: Negative for polydipsia, polyphagia and polyuria.   Genitourinary:  Positive for decreased urine volume. Negative for difficulty urinating, dysuria, flank pain, frequency, genital sores, hematuria, penile discharge, penile pain, penile swelling, scrotal swelling, testicular pain and urgency.   Musculoskeletal:  Negative for arthralgias, back pain and neck pain.   Skin:  Negative for color change and rash.   Allergic/Immunologic: Negative for immunocompromised state.   Neurological:  Negative for dizziness, seizures, syncope, weakness and headaches.   Hematological:  Negative for adenopathy.   Psychiatric/Behavioral:  Negative for confusion and dysphoric mood.    All other systems reviewed and are negative.          Objective       ED Triage Vitals   Temperature Pulse Blood Pressure Respirations SpO2 Patient Position - Orthostatic VS   04/22/25 2124 04/22/25 2140 04/22/25 2125 04/22/25 2124 04/22/25 2125 04/22/25 2125   97.6 °F (36.4 °C) 55 108/98 22 (!) 78 % Lying      Temp Source Heart Rate Source BP Location FiO2 (%) Pain Score    04/22/25 2124 04/22/25 2124 04/22/25 2125 -- 04/22/25 2125    Tympanic Monitor Left arm  2      Vitals      Date and Time Temp Pulse SpO2 Resp BP Pain Score FACES Pain Rating User   04/23/25 0000 -- 114 95 % 25 136/73 -- -- SV   04/22/25 2330 -- 105 96 % 20 119/91 -- -- SV   04/22/25 2140 -- 55 95 % 22 114/85 -- -- CG   04/22/25 2125 -- -- 78 % -- 108/98 2 -- CG   04/22/25 2124 97.6 °F (36.4 °C) -- -- 22 -- -- -- CG            Physical Exam  Vitals and nursing note reviewed.   Constitutional:       General: He is not in acute distress.     Appearance: Normal appearance. He is well-developed. He is not ill-appearing, toxic-appearing or diaphoretic.      Comments: Smells of tobacco   HENT:      Head:  Normocephalic and atraumatic.      Nose: Nose normal. No congestion or rhinorrhea.      Mouth/Throat:      Mouth: Mucous membranes are moist.      Pharynx: Oropharynx is clear. No oropharyngeal exudate or posterior oropharyngeal erythema.   Eyes:      General:         Right eye: No discharge.         Left eye: No discharge.   Cardiovascular:      Rate and Rhythm: Normal rate and regular rhythm.      Pulses: Normal pulses.      Heart sounds: Normal heart sounds. No murmur heard.     No gallop.      Comments: LCW AICD in place, no overlying redness or tenderness  Pulmonary:      Effort: Pulmonary effort is normal. No respiratory distress.      Breath sounds: No stridor. Examination of the right-upper field reveals decreased breath sounds. Examination of the left-upper field reveals decreased breath sounds. Examination of the right-middle field reveals decreased breath sounds. Examination of the left-middle field reveals decreased breath sounds. Examination of the right-lower field reveals decreased breath sounds. Examination of the left-lower field reveals decreased breath sounds. Decreased breath sounds present. No wheezing, rhonchi or rales.   Chest:      Chest wall: No tenderness.   Abdominal:      General: Bowel sounds are normal. There is no distension.      Palpations: Abdomen is soft. There is no mass.      Tenderness: There is no abdominal tenderness. There is no right CVA tenderness, left CVA tenderness, guarding or rebound.      Hernia: No hernia is present.   Musculoskeletal:         General: Normal range of motion.      Cervical back: Normal range of motion and neck supple.   Skin:     General: Skin is warm and dry.      Capillary Refill: Capillary refill takes less than 2 seconds.      Comments: Purpural rash to face, chronic   Neurological:      General: No focal deficit present.      Mental Status: He is alert and oriented to person, place, and time.      Cranial Nerves: No cranial nerve deficit.       Sensory: No sensory deficit.      Motor: No weakness.      Coordination: Coordination normal.      Gait: Gait normal.      Deep Tendon Reflexes: Reflexes normal.   Psychiatric:         Mood and Affect: Mood normal. Mood is not anxious.         Behavior: Behavior normal. Behavior is not agitated.         Thought Content: Thought content normal.         Judgment: Judgment normal.         Results Reviewed       Procedure Component Value Units Date/Time    Blood gas, venous [[id number]] Updated: 04/23/25 0018    Lab Status: In process Specimen: Blood     HS Troponin I 2hr [[id number]] Updated: 04/23/25 0017    Lab Status: No result Specimen: Blood     Magnesium [[id number]]     Lab Status: No result Specimen: Blood     Phosphorus [[id number]]     Lab Status: No result Specimen: Blood     HS Troponin I 4hr [[id number]]     Lab Status: No result Specimen: Blood     HS Troponin 0hr (reflex protocol) [[id number]]  (Abnormal) Collected: 04/22/25 2327    Lab Status: Final result Specimen: Blood from Arm, Right Updated: 04/22/25 2359     hs TnI 0hr 122 ng/L     Comprehensive metabolic panel [[id number]]  (Abnormal) Collected: 04/22/25 2327    Lab Status: Final result Specimen: Blood from Arm, Right Updated: 04/22/25 2351     Sodium 134 mmol/L      Potassium 4.3 mmol/L      Chloride 102 mmol/L      CO2 25 mmol/L      ANION GAP 7 mmol/L      BUN 37 mg/dL      Creatinine 1.49 mg/dL      Glucose 90 mg/dL      Calcium 9.0 mg/dL      AST 30 U/L      ALT 21 U/L      Alkaline Phosphatase 119 U/L      Total Protein 7.2 g/dL      Albumin 3.7 g/dL      Total Bilirubin 0.47 mg/dL      eGFR 50 ml/min/1.73sq m     Narrative:      National Kid[employer] Disease Foundation guidelines for Chronic Kidney Disease (CKD):     Stage 1 with normal or high GFR (GFR > 90 mL/min/1.73 square meters)    Stage 2 Mild CKD (GFR = 60-89 mL/min/1.73 square meters)    Stage 3A Moderate CKD (GFR = 45-59 mL/min/1.73 square meters)    Stage 3B Moderate CKD (GFR = 30-44  mL/min/1.73 square meters)    Stage 4 Severe CKD (GFR = 15-29 mL/min/1.73 square meters)    Stage 5 End Stage CKD (GFR <15 mL/min/1.73 square meters)  Note: GFR calculation is accurate only with a steady state creatinine    B-Type Natriuretic Peptide(BNP) [[id number]]  (Abnormal) Collected: 04/22/25 2208    Lab Status: Final result Specimen: Blood from Arm, Left Updated: 04/22/25 2332     BNP 1,034 pg/mL     Protime-INR [[id number]]  (Abnormal) Collected: 04/22/25 2208    Lab Status: Final result Specimen: Blood from Arm, Right Updated: 04/22/25 2247     Protime 23.5 seconds      INR 2.07    Narrative:      INR Therapeutic Range    Indication                                             INR Range      Atrial Fibrillation                                               2.0-3.0  Hypercoagulable State                                    2.0.2.3  Left Ventricular Asist Device                            2.0-3.0  Mechanical Heart Valve                                  -    Aortic(with afib, MI, embolism, HF, LA enlargement,    and/or coagulopathy)                                     2.0-3.0 (2.5-3.5)     Mitral                                                             2.5-3.5  Prosthetic/Bioprosthetic Heart Valve               2.0-3.0  Venous thromboembolism (VTE: VT, PE        2.0-3.0    APTT [[id number]]  (Abnormal) Collected: 04/22/25 2208    Lab Status: Final result Specimen: Blood from Arm, Right Updated: 04/22/25 2247     PTT 37 seconds     D-dimer, quantitative [[id number]]  (Normal) Collected: 04/22/25 2208    Lab Status: Final result Specimen: Blood from Arm, Right Updated: 04/22/25 2246     D-Dimer, Quant <0.27 ug/ml FEU     Narrative:      In the evaluation for possible pulmonary embolism, in the appropriate (Well's Score of 4 or less) patient, the age adjusted d-dimer cutoff for this patient can be calculated as:    Age x 0.01 (in ug/mL) for Age-adjusted D-dimer exclusion threshold for a patient over 50 years.     Blood gas, venous [[id number]]  (Abnormal) Collected: 04/22/25 2232    Lab Status: Final result Specimen: Blood from Arm, Left Updated: 04/22/25 2238     pH, Chuck 7.336     pCO2, Chuck 40.2 mm Hg      pO2, Chuck 36.9 mm Hg      HCO3, Chuck 21.0 mmol/L      Base Excess, Chuck -4.5 mmol/L      O2 Content, Chuck 12.3 ml/dL      O2 HGB, VENOUS 63.0 %     FLU/COVID Rapid Antigen (30 min. TAT) - Preferred screening test in ED [[id number]]  (Normal) Collected: 04/22/25 2208    Lab Status: Final result Specimen: Nares from Nose Updated: 04/22/25 2235     SARS COV Rapid Antigen Negative     Influenza A Rapid Antigen Negative     Influenza B Rapid Antigen Negative    Narrative:      This test has been performed using the [employer] Inez 2 FLU+SARS Antigen test under the Emergency Use Authorization (EUA). This test has been validated by the  and verified by the performing laboratory. The Inez uses lateral flow immunofluorescent sandwich assay to detect SARS-COV, Influenza A and Influenza B Antigen.     The [employer] Inez 2 SARS Antigen test does not differentiate between SARS-CoV and SARS-CoV-2.     Negative results are presumptive and may be confirmed with a molecular assay, if necessary, for patient management. Negative results do not rule out SARS-CoV-2 or influenza infection and should not be used as the sole basis for treatment or patient management decisions. A negative test result may occur if the level of antigen in a sample is below the limit of detection of this test.     Positive results are indicative of the presence of viral antigens, but do not rule out bacterial infection or co-infection with other viruses.     All test results should be used as an adjunct to clinical observations and other information available to the provider.    FOR PEDIATRIC PATIENTS - copy/paste COVID Guidelines URL to browser: [email or web address]    CBC and differential [[id number]]   (Abnormal) Collected: 04/22/25 2208    Lab Status: Final result Specimen: Blood from Arm, Left Updated: 04/22/25 2219     WBC 8.11 Thousand/uL      RBC 5.00 Million/uL      Hemoglobin 14.4 g/dL      Hematocrit 46.4 %      MCV 93 fL      MCH 28.8 pg      MCHC 31.0 g/dL      RDW 17.2 %      MPV 9.6 fL      Platelets 213 Thousands/uL      nRBC 0 /100 WBCs      Segmented % 65 %      Immature Grans % 1 %      Lymphocytes % 20 %      Monocytes % 11 %      Eosinophils Relative 2 %      Basophils Relative 1 %      Absolute Neutrophils 5.32 Thousands/µL      Absolute Immature Grans 0.04 Thousand/uL      Absolute Lymphocytes 1.64 Thousands/µL      Absolute Monocytes 0.91 Thousand/µL      Eosinophils Absolute 0.14 Thousand/µL      Basophils Absolute 0.06 Thousands/µL     UA w Reflex to Microscopic w Reflex to Culture [[id number]]     Lab Status: No result Specimen: Urine             XR chest 1 view portable    (Results Pending)   CT chest wo contrast    (Results Pending)       Procedures    ED Medication and Procedure Management   Prior to Admission Medications   Prescriptions Last Dose Informant Patient Reported? Taking?   albuterol (ProAir HFA) 90 mcg/act inhaler   No No   Sig: Inhale 2 puffs every 6 (six) hours as needed for wheezing   digoxin (LANOXIN) 0.125 mg tablet   No No   Sig: Take 1 tablet (125 mcg total) by mouth 3 (three) times a week   furosemide (LASIX) 40 mg tablet   No No   Sig: Take 1 tablet (40 mg total) by mouth daily   isoniazid (NYDRAZID) 300 mg tablet   Yes No   Sig: Take 1 tablet by mouth in the morning   magnesium Oxide (MAG-OX) 400 mg TABS   No No   Sig: Take 1 tablet (400 mg total) by mouth daily   metoprolol tartrate (LOPRESSOR) 25 mg tablet   No No   Sig: Take 1 tablet (25 mg total) by mouth every 12 (twelve) hours   midodrine (PROAMATINE) 5 mg tablet   No No   Sig: Take 1 tablet (5 mg total) by mouth 3 (three) times a day before meals   nicotine (NICODERM CQ) 21 mg/24 hr TD 24 hr patch   No No    Sig: Place 1 patch on the skin over 24 hours daily   Patient not taking: Reported on 4/19/2025   pantoprazole (PROTONIX) 40 mg tablet  Self No No   Sig: Take 1 tablet (40 mg total) by mouth 2 (two) times a day   spironolactone (ALDACTONE) 25 mg tablet   No No   Sig: Take 0.5 tablets (12.5 mg total) by mouth daily   traZODone (DESYREL) 50 mg tablet   Yes No   Sig: Take 50 mg by mouth daily at bedtime   warfarin (COUMADIN) 5 mg tablet   No No   Sig: Target INR range is between 2-3      Facility-Administered Medications: None     Patient's Medications   Discharge Prescriptions    No medications on file     No discharge procedures on file.  ED SEPSIS DOCUMENTATION   Time reflects when diagnosis was documented in both MDM as applicable and the Disposition within this note       Time User Action Codes Description Comment    4/22/2025 11:45 PM George Sampson [J96.01] Acute respiratory failure with hypoxia ([facility])     4/22/2025 11:46 PM George Sampson [J44.1] COPD exacerbation ([facility])     4/22/2025 11:46 PM George Sampson [I50.9] CHF (congestive heart failure) ([facility])                  George Sampson MD  04/23/25 0022

## 2025-04-23 NOTE — CONSULTS
Pulmonary Medicine-Consultation  Britton Batista 59 y.o. 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  Cardiomyopathy  Systolic CHF W/exacerbation  ILD  Unclassified, mild subpleural reticulations, no clear honeycombing appears to be paraseptal emphysema  Possible early CTD ILD-or related to prior exposure to dust/asbestos  Negative other rheumatologic serology, see below  PFT/office spirometry last year showed no restriction, mild decreased DLCO which argues against severe ILD  Scleroderma  Raynaud's  Latent TB-supposed to be on INH/B6, however pt does not recall that he is taking that med  Active tobacco abuse-30-pack-year smokes 0.5 PPD    Discussion:  Current presentation likely CHF exacerbation/cardiomyopathy, and mild COPD exacerbation, no signs of ILD/exacerbation per radiographic appearance no GGO's  ILD likely mild at baseline given normal spirometer, mild reticulations on CT  Feeling better, since started steroids/diuretics  I tried calling the sister to obtain more information about latent TB/treatment and recommendation from his prior pulmonologist but no answer    Recommendations  Given the above, and  hx scleroderma recommend to discontinue steroids, or switch to prednisone 40 mg for 5 days  Continue DuoNeb q6 hrs, budesonide bid azithromycin to complete 3 days  Diuretics as per the primary team    On discharge  --Nebulizer/supplies with DuoNeb tid  --Home oxygen evaluation  --pt will continue to follow with pulmonary at [facility], to resume treatment for latent TB    Discussed with the primary team    Physician Requesting Consult: Francine Corrigan MD    Reason for Consult / Principal Problem: Acute hypoxic respiratory failure    HPI:   59 y.o. male with a h/o systolic CHF, cardiomyopathy, depression, acid reflux, gout, HTN, COPD, emphysema, scleroderma, ILD    Follows with rheumatology at  [facility]-had a normal spirometry/mild decrease in DLCO    Seen by pulmonary at [facility] 10/2024-treated for latent TB, with INH/B6.  Known multiple risk factor for TB exposure, grandfather had an active TB/incarceration/and drugs.    Scented to the ER 4/23 with cough, fatigue and dyspnea.  Noted to be in acute hypoxic respiratory failure SpO2 78% on room air.  Also noted weight gain since prior admission.  Otherwise HD stable, normal D-dimer >> 1034, normal lactic acid normal PCT.  VBG 7 point 4/37/116.  Chest CT showed stable 7 mm nodule, mild fibrotic changes, mild background emphysema.  No focal infiltrates.  Admitted to  IM, started on treatment for CHF exacerbation, as well as IV steroids for presumed ILD flare.    On my assessment, pt resting in bed, reports feeling better since yesterday.  Reports less dyspnea, denies cough, wheezing.  States that dyspnea is worse when laying flat.  No recent travel or exposure to sick contact.    Lives with his brother in [facility], smoked 30-pack-year, cut down to 0.5 PPD now.  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    Inpatient consult to Pulmonology  Consult performed by: Patrick Fortune MD  Consult ordered by: LUIS ANGEL Wiley          Review of Systems  As per hpi, all other systems reviewed and were negative      Studies:    Imaging Studies: I have personally reviewed pertinent films in PACS    CT chest/20 3/2025-mild emphysema.  Mild subpleural reticulations, honeycombing at the far bases.  LLL 7 mm nodule    EKG, Pathology, and Other Studies: I have personally reviewed pertinent films in PACS    Labs 2/6/2025  Negative/normal the following: RF, anti-dsDNA, ANCA, C3, C4, cryoglobulin, RF    Pulmonary Results (PFTs, PSG): As per HPI    Historical Information   Past Medical History:   Diagnosis Date    Anxiety     Cardiomyopathy (HCC)     Cardiomyopathy secondary to drug  "([facility]) 04/09/2025    CHF (congestive heart failure) (HCC)     Depression     Emphysema of lung (HCC)     GERD (gastroesophageal reflux disease)     Gout     Hypertension     Metabolic encephalopathy 03/22/2025    Neuropathy     Raynaud's disease     Right inguinal hernia     Scleroderma ([facility])      Past Surgical History:   Procedure Laterality Date    AMPUTATION Right     pt had tip of \"pointer finger\" d/t scleraderma    CARDIAC PACEMAKER PLACEMENT      FINGER AMPUTATION Left 01/31/2024    Procedure: AMPUTATION FINGER, LEFT INDEX FINGER CPT: 31607;  Surgeon: Pedro Vazquez MD;  Location:  MAIN OR;  Service: Orthopedics    HERNIA REPAIR Left     times 2    TX AMP F/TH 1/2 JT/PHALANX W/NEURECT W/DIR CLSR Right 01/23/2024    Procedure: AMPUTATION FINGER, RIGHT MIDDLE;  Surgeon: Pdero Vazquez MD;  Location: [facility];  Service: Orthopedics    TX RPR 1ST INGUN HRNA AGE 5 YRS/> REDUCIBLE Right 03/03/2023    Procedure: OPEN INGUINAL HERNIA REPAIR WITH MESH;  Surgeon: Temo Jackson DO;  Location:  MAIN OR;  Service: General     Social History   Social History     Substance and Sexual Activity   Alcohol Use Not Currently    Comment: occasional     Social History     Substance and Sexual Activity   Drug Use Yes    Types: Marijuana, Methamphetamines    Comment: Hx meth use     Social History     Tobacco Use   Smoking Status Every Day    Current packs/day: 1.00    Average packs/day: 1 pack/day for 45.3 years (45.3 ttl pk-yrs)    Types: Cigarettes    Start date: 2000   Smokeless Tobacco Never   Tobacco Comments    Last cigarette 0430     E-Cigarette/Vaping    E-Cigarette Use Never User      E-Cigarette/Vaping Substances    Nicotine No     THC No     CBD No     Flavoring No     Other No     Unknown No          Family History:   Family History   Problem Relation Age of Onset    Hypertension Father        Meds/Allergies   all current active meds have been reviewed    Allergies   Allergen Reactions    Aspirin GI Intolerance " "      Objective   Vitals: Blood pressure 117/84, pulse 75, temperature 98.1 °F (36.7 °C), temperature source Tympanic, resp. rate 20, height 5' 9\" (1.753 m), weight 78 kg (171 lb 15.3 oz), SpO2 97%.,Body mass index is 25.39 kg/m².    Intake/Output Summary (Last 24 hours) at 4/23/2025 1409  Last data filed at 4/23/2025 0647  Gross per 24 hour   Intake 780 ml   Output 1200 ml   Net -420 ml     Invasive Devices       Peripheral Intravenous Line  Duration             Peripheral IV 04/22/25 Dorsal (posterior);Left Wrist <1 day    Peripheral IV 04/22/25 Right Antecubital <1 day                    Physical Exam  Body mass index is 25.39 kg/m².   Gen: not in acute distress,   Neck/Eyes: supple,  PERRL  Ear: normal appearance, no significant hearing impairment  Nose:  normal nasal mucosa, no drainage  Chest: normal respiratory efforts, diminished air movement, no wheezes, crackles or rhonchi  CV: RRR, no murmurs appreciated, no edema  Abdomen: soft, non tender  Extremities: Amputated digits of the hands  Skin: Facial skin rash  Neuro: AAO X3, no focal motor deficit       Lab Results: I have personally reviewed pertinent lab results.          None    Portions of the record may have been created with voice recognition software.  Occasional wrong word or \"sound a like\" substitutions may have occurred due to the inherent limitations of voice recognition software.  Read the chart carefully and recognize, using context, where substitutions have occurred.    "

## 2025-04-23 NOTE — ASSESSMENT & PLAN NOTE
Wt Readings from Last 3 Encounters:   04/22/25 79.6 kg (175 lb 7.8 oz)   04/20/25 76 kg (167 lb 9.6 oz)   04/12/25 73.9 kg (163 lb)   Patient presents with increased cough, fatigue, and shortness of breath for 2 days - found to be 78% on RA in the ED. Noted weight gain of 8 lbs since previous admission on 4/20  Patient reports adherence to low-sodium diet at home  Daily weights and strict I's and O's  Troponin: 122 -> 88 -> 72  BNP elevated at 1034  Continue low-sodium cardiac diet with 1800 mL fluid restriction  Received Lasix 80 mg IV in the ED, continue Lasix 50 mg IV twice daily  Appreciate cardiology recommendations

## 2025-04-23 NOTE — CASE MANAGEMENT
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     Scleroderma (HCC) 2025     Hyperkalemia 2025     Stage 3a chronic kidney disease (CKD) ([facility]) 2025     Cardiogenic shock ([facility]) 2025     Atrial fibrillation with RVR ([facility]) 2025     Syncope 2024     Chest pain 2024     Acute on chronic systolic congestive heart failure ([facility]) 2024     Primary hypertension 2024     Hypomagnesemia 2023     Methamphetamine abuse ([facility]) 2023     Tobacco abuse 2023     D-dimer, elevated 2023     Cardiomyopathy ([facility]) 10/09/2023     IRINEO (acute kidney injury) ([facility]) 10/08/2023     Acute on chronic combined systolic and diastolic congestive heart failure ([facility]) 10/07/2023     Hypertensive emergency 10/07/2023     Anxiety      Depression      Other emphysema ([facility])      GERD (gastroesophageal reflux disease)      Gout      Hypertensive urgency      Neuropathy      Raynaud's disease      Unilateral inguinal hernia, without obstruction or gangrene, not specified as recurrent      PSS (progressive systemic sclerosis) ([facility]) 10/25/2021     Near syncope 10/25/2021     Osteomyelitis ([facility]) 10/25/2021     Elevated troponin 10/25/2021     Acute encephalopathy 10/25/2021       LOS (days): 0  Geometric Mean LOS (GMLOS) (days):   Days to GMLOS:     OBJECTIVE:  Risk of Unplanned Readmission Score: 48.95         Current admission status: Inpatient   Preferred Pharmacy:   [facility] -  [place], [place] - [address]  [address]   [place] [address]  Phone: [phone] Fax: [phone]    [facility]  [place] Mur[place] - [address]  [facility],  [address],  First [address] [facility] [place] [address]  Phone: [phone] Fax: [phone]    [id number] - [place], [place] - [address] [address]  Phone: [phone] Fax: [phone]    Primary Care Provider: Juan Galindo DO    Primary Insurance: [facility] REP  Secondary Insurance:     DISCHARGE DETAILS:    Received a call from Priscilla Abrams- she an OP CM for the network who is available to due HF education via [employer] and available to assist with follow up with him weekly.  Please contact Priscilla Abrams on teams or [phone], if pt is willing to participate in the teaching.

## 2025-04-23 NOTE — ASSESSMENT & PLAN NOTE
As above  Patient reports shortness of breath at rest and with exertion  Patient noted to be tachypneic.  COVID/flu negative  VBG shows metabolic acidosis  Repeat VBG shows respiratory alkalosis  He denies any home oxygen use.    Procalcitonin normal at 0.07  Pt initially improved on 2 L NC to 95% but started having shortness of breath, oxygen increased to 5 L NC  Received DuoNeb, continue along with Pulmicort, sodium chloride neb nebulizer and Xopenex as needed  Respiratory protocol  Patient need ambulatory pulse ox to see if he qualifies for home oxygen

## 2025-04-23 NOTE — PLAN OF CARE
Problem: PAIN - ADULT  Goal: Verbalizes/displays adequate comfort level or baseline comfort level  Description: Interventions:- Encourage patient to monitor pain and request assistance- Assess pain using appropriate pain scale- Administer analgesics based on type and severity of pain and evaluate response- Implement non-pharmacological measures as appropriate and evaluate response- Consider cultural and social influences on pain and pain management- Notify physician/advanced practitioner if interventions unsuccessful or patient reports new pain  Outcome: Progressing     Problem: INFECTION - ADULT  Goal: Absence or prevention of progression during hospitalization  Description: INTERVENTIONS:- Assess and monitor for signs and symptoms of infection- Monitor lab/diagnostic results- Monitor all insertion sites, i.e. indwelling lines, tubes, and drains- Monitor endotracheal if appropriate and nasal secretions for changes in amount and color- Hayden appropriate cooling/warming therapies per order- Administer medications as ordered- Instruct and encourage patient and family to use good hand hygiene technique- Identify and instruct in appropriate isolation precautions for identified infection/condition  Outcome: Progressing     Problem: DISCHARGE PLANNING  Goal: Discharge to home or other facility with appropriate resources  Description: INTERVENTIONS:- Identify barriers to discharge w/patient and caregiver- Arrange for needed discharge resources and transportation as appropriate- Identify discharge learning needs (meds, wound care, etc.)- Arrange for interpretive services to assist at discharge as needed- Refer to Case Management Department for coordinating discharge planning if the patient needs post-hospital services based on physician/advanced practitioner order or complex needs related to functional status, cognitive ability, or social support system  Outcome: Progressing     Problem: Knowledge Deficit  Goal:  Patient/family/caregiver demonstrates understanding of disease process, treatment plan, medications, and discharge instructions  Description: Complete learning assessment and assess knowledge base.Interventions:- Provide teaching at level of understanding- Provide teaching via preferred learning methods  Outcome: Progressing     Problem: RESPIRATORY - ADULT  Goal: Achieves optimal ventilation and oxygenation  Description: INTERVENTIONS:- Assess for changes in respiratory status- Assess for changes in mentation and behavior- Position to facilitate oxygenation and minimize respiratory effort- Oxygen administered by appropriate delivery if ordered- Initiate smoking cessation education as indicated- Encourage broncho-pulmonary hygiene including cough, deep breathe, Incentive Spirometry- Assess the need for suctioning and aspirate as needed- Assess and instruct to report SOB or any respiratory difficulty- Respiratory Therapy support as indicated  Outcome: Progressing

## 2025-04-23 NOTE — CONSULTS
Consultation - Cardiology   Britton Batista 59 y.o. male MRN: [id number]  Unit/Bed#: ED 12 Encounter: [id number]    Inpatient consult to Cardiology  Consult performed by: Dewayne Pompa PA-C  Consult ordered by: LUIS ANGEL Wiley      Physician Requesting Consult: Conrad Bynum MD  Reason for Consult / Principal Problem: Acute on chronic CHF    Assessment/Plan:  Acute on chronic systolic congestive heart failure:  With recent hospitalization for the same, discharged on 4/10 weighing 163 pounds  Presenting on 4/22 with worsening shortness of breath, BNP 1034  Chest imaging showing pulmonary vascular congestion and pulmonary fibrosis  Patient reports compliance with home Lasix 40 mg daily and low sodium diet  Has received IV Lasix 80 mg x 1 dose, started on 50 BID, so far net - 400 ml  Weight 175 lbs  Continue IV lasix 50 mg BID  Monitor daily standing weights, fluid/sodium restriction, strict I/Os, BMP    Drug-induced cardiomyopathy:  Active methamphetamine abuser  LVEF 25% on echo 3/24/2025  GDMT: Spironolactone 12.5 mg daily, Toprol tartrate 25 mg twice daily, unable to afford Entresto or SGLT2    Longstanding persistent atrial fibrillation:  Rate has been maintained on digoxin 125 mcg 3 times weekly, metoprolol tartrate 25 mg twice daily  On anticoagulation with warfarin    Hypertension:  Well-controlled  Continue to monitor with diuresis    History of Present Illness   HPI: Britton Batista is a 59 y.o. year old male who has a history of chronic HFrEF, drug induced cardiomyopathy, longstanding persistent atrial fibrillation, hypertension, methamphetamine use who follows with [facility] cardiology.     Patient was hospitalized for acute on chronic HFrEF 4/9-4/10, he received IV diuretics and reported improvement in his shortness of breath and lower extremity edema. He was discharged on lasix 40 mg daily at 163 lbs. He now presents to the ER on 4/22 for the same. BNP 1034, troponins 122 => 88 => 72,  "creatinine 1.49, weight 175 lbs. Chest imaging negative for acute pathology. Received IV Lasix 80 mg in the ER. Patient admitted, cardiology consulted for further evaluation and management.     Upon meeting the patient today he is lying comfortably in bed. He tells me he is tired and unable to sleep. He came in for worsening shortness of breath. He reports compliance with all prescribed medications including his Lasix 40 mg daily and GDMT, as well as following a sodium restricted diet. Was not weighing himself at home. Denies chest pain, palpitations, lightheadedness, dizziness, syncope, lower extremity edema, orthopnea, PND.       Review of Systems   Constitutional: Positive for malaise/fatigue.   Respiratory:  Positive for shortness of breath.    All other systems reviewed and are negative.    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    CHF (congestive heart failure) (HCC)     Depression     Emphysema of lung (HCC)     GERD (gastroesophageal reflux disease)     Gout     Hypertension     Metabolic encephalopathy 03/22/2025    Neuropathy     Raynaud's disease     Right inguinal hernia     Scleroderma (HCC)      Past Surgical History:   Procedure Laterality Date    AMPUTATION Right     pt had tip of \"pointer finger\" d/t scleraderma    CARDIAC PACEMAKER PLACEMENT      FINGER AMPUTATION Left 01/31/2024    Procedure: AMPUTATION FINGER, LEFT INDEX FINGER CPT: 22039;  Surgeon: Pedro Vazquez MD;  Location:  MAIN OR;  Service: Orthopedics    HERNIA REPAIR Left     times 2    NV AMP F/TH 1/2 JT/PHALANX W/NEURECT W/DIR CLSR Right 01/23/2024    Procedure: AMPUTATION FINGER, RIGHT MIDDLE;  Surgeon: Pedro Vazquez MD;  Location: [facility];  Service: Orthopedics    NV RPR 1ST INGUN HRNA AGE 5 YRS/> REDUCIBLE Right 03/03/2023    Procedure: OPEN INGUINAL HERNIA REPAIR WITH MESH;  Surgeon: Temo Jackson DO;  Location:  " "MAIN OR;  Service: General     Social History     Substance and Sexual Activity   Alcohol Use Not Currently    Comment: occasional     Social History     Substance and Sexual Activity   Drug Use Yes    Types: Marijuana, Methamphetamines    Comment: Hx meth use     Social History     Tobacco Use   Smoking Status Every Day    Current packs/day: 1.00    Average packs/day: 1 pack/day for 45.3 years (45.3 ttl pk-yrs)    Types: Cigarettes    Start date:    Smokeless Tobacco Never   Tobacco Comments    Last cigarette 043     Family History:   Family History   Problem Relation Age of Onset    Hypertension Father        Meds/Allergies   all current active meds have been reviewed       Allergies   Allergen Reactions    Aspirin GI Intolerance       Objective   Vitals: Blood pressure 99/72, pulse 59, temperature 97.6 °F (36.4 °C), temperature source Tympanic, resp. rate 20, height 5' 9\" (1.753 m), weight 79.6 kg (175 lb 7.8 oz), SpO2 96%., Body mass index is 25.91 kg/m².,   Orthostatic Blood Pressures      Flowsheet Row Most Recent Value   Blood Pressure 99/72 filed at 2025 0628   Patient Position - Orthostatic VS Sitting filed at 2025 0628          Systolic (24hrs), Av , Min:99 , Max:136     Diastolic (24hrs), Av, Min:72, Max:98      Intake/Output Summary (Last 24 hours) at 2025 0929  Last data filed at 2025 0647  Gross per 24 hour   Intake 780 ml   Output 1200 ml   Net -420 ml       Weight (last 2 days)       Date/Time Weight    25 79.6 (175.49)            Invasive Devices       Peripheral Intravenous Line  Duration             Peripheral IV 25 Dorsal (posterior);Left Wrist <1 day    Peripheral IV 25 Right Antecubital <1 day                      Physical Exam  Vitals reviewed.   Constitutional:       General: He is not in acute distress.     Appearance: He is ill-appearing. He is not diaphoretic.   HENT:      Head: Normocephalic and atraumatic.   Eyes:      Pupils: " Pupils are equal, round, and reactive to light.   Neck:      Vascular: No carotid bruit.   Cardiovascular:      Rate and Rhythm: Normal rate. Rhythm irregular.      Pulses: Normal pulses.      Heart sounds: Normal heart sounds. No murmur heard.  Pulmonary:      Effort: Pulmonary effort is normal.      Breath sounds: Decreased breath sounds and rales present.   Abdominal:      General: There is no distension.      Palpations: Abdomen is soft.      Tenderness: There is no abdominal tenderness.   Musculoskeletal:      Cervical back: Normal range of motion.      Right lower leg: No edema.      Left lower leg: No edema.   Skin:     General: Skin is warm.      Capillary Refill: Capillary refill takes less than 2 seconds.   Neurological:      General: No focal deficit present.      Mental Status: He is alert and oriented to person, place, and time. Mental status is at baseline.      Gait: Gait normal.   Psychiatric:         Mood and Affect: Mood normal.         Behavior: Behavior normal.         Thought Content: Thought content normal.             Laboratory Results:        CBC with diff:   Results from last 7 days   Lab Units 04/23/25 0627 04/22/25  2208 04/20/25 0628 04/19/25  1404   WBC Thousand/uL 7.63 8.11 6.90 6.67   HEMOGLOBIN g/dL 12.6 14.4 12.8 13.8   HEMATOCRIT % 39.6 46.4 40.9 43.7   MCV fL 89 93 90 91   PLATELETS Thousands/uL 208 213 238 242   RBC Million/uL 4.43 5.00 4.53 4.80   MCH pg 28.4 28.8 28.3 28.8   MCHC g/dL 31.8 31.0* 31.3* 31.6   RDW % 16.9* 17.2* 16.8* 16.7*   MPV fL 8.9 9.6 9.2 8.8*   NRBC AUTO /100 WBCs  --  0  --  0         CMP:  Results from last 7 days   Lab Units 04/23/25  0627 04/22/25  2327 04/20/25 0628 04/19/25  1404   POTASSIUM mmol/L 4.0 4.3 4.4 4.7   CHLORIDE mmol/L 102 102 102 100   CO2 mmol/L 25 25 27 27   BUN mg/dL 40* 37* 35* 33*   CREATININE mg/dL 1.59* 1.49* 1.59* 1.58*   CALCIUM mg/dL 9.0 9.0 9.0 9.6   AST U/L 30 30 21 26   ALT U/L 21 21 17 21   ALK PHOS U/L 106* 119* 108*  131*   EGFR ml/min/1.73sq m 46 50 46 47         BMP:  Results from last 7 days   Lab Units 04/23/25 0627 04/22/25 2327 04/20/25 0628 04/19/25  1404   POTASSIUM mmol/L 4.0 4.3 4.4 4.7   CHLORIDE mmol/L 102 102 102 100   CO2 mmol/L 25 25 27 27   BUN mg/dL 40* 37* 35* 33*   CREATININE mg/dL 1.59* 1.49* 1.59* 1.58*   CALCIUM mg/dL 9.0 9.0 9.0 9.6       BNP:    Recent Labs     04/22/25 2208   BNP 1,034*       Magnesium:   Results from last 7 days   Lab Units 04/22/25 2327 04/20/25 0628   MAGNESIUM mg/dL 1.8* 1.8*       Coags:   Results from last 7 days   Lab Units 04/23/25 0627 04/22/25 2208 04/20/25 0652 04/19/25  1404   PTT seconds  --  37*  --   --    INR  2.41* 2.07* 1.74* 1.87*       TSH:       Hemoglobin A1C       Lipid Profile:   Results from last 7 days   Lab Units 04/22/25 2327   TRIGLYCERIDES mg/dL 64   HDL mg/dL 33*       Cardiac testing:   No results found for this or any previous visit.    No results found for this or any previous visit.    No results found for this or any previous visit.    No results found for this or any previous visit.        Imaging: Results Review Statement: I reviewed radiology reports from this admission including: chest xray and CT chest.  XR chest 1 view portable  Result Date: 4/23/2025  Narrative: XR CHEST PORTABLE INDICATION: SOB. COMPARISON: 1 view chest 4/19/2025 FINDINGS: Left chest wall cardiac pacer/defibrillator device in place. Leads appear intact. No infiltrate. Prominent central pulmonary vasculature. No pneumothorax or pleural effusion. Marked cardiomegaly. Small bilateral glenohumeral calcified loose bodies. Normal upper abdomen.     Impression: Mild central pulmonary vascular congestion. Workstation performed: [id number]     CT chest wo contrast  Result Date: 4/23/2025  Narrative: CT CHEST WITHOUT IV CONTRAST INDICATION: increased cough and shortness of breath. COMPARISON: Multiple priors most recently 4/22/2025 radiograph TECHNIQUE: CT examination of the  chest was performed without intravenous contrast. Multiplanar 2D reformatted images were created from the source data. This examination, like all CT scans performed in the [facility], was performed utilizing techniques to minimize radiation dose exposure, including the use of iterative reconstruction and automated exposure control. Radiation dose length product (DLP) for this visit: 283.74 mGy-cm FINDINGS: LUNGS: Mild emphysema. Subpleural fibrotic changes throughout the lungs with honeycombing at the lung bases. Stable 0.7 cm left lower lobe pulmonary nodule (3, 185) PLEURA: Unremarkable. HEART/GREAT VESSELS: Cardiomegaly unchanged. No thoracic aortic aneurysm. MEDIASTINUM AND OCTAVIA: Small hiatal hernia noted. No mediastinal or hilar lymphadenopathy. Debris within the esophagus CHEST WALL AND LOWER NECK: Left chest cardiac resynchronization device. VISUALIZED STRUCTURES IN THE UPPER ABDOMEN: Unremarkable. OSSEOUS STRUCTURES: Spinal degenerative changes are noted. No acute fracture or destructive osseous lesion.     Impression: No acute abnormality. Pulmonary fibrosis. Stable left lower lobe pulmonary nodule. Recommend 12-month follow-up noncontrast CT of the chest. Debris within the esophagus suggesting reflux. Workstation performed: [id number]     XR chest portable  Result Date: 4/20/2025  Narrative: XR CHEST PORTABLE INDICATION: Chest Pain. COMPARISON: CXR 4/12/2025, 4/9/2025, chest CT 4/8/2025. FINDINGS: Increased interstitial markings correspond with pulmonary fibrosis on CT. Mild superimposed pulmonary venous congestion. Emphysema better shown on CT. No acute disease. No pneumothorax or pleural effusion. Severe cardiomegaly with left subclavian ICD leads in the right atrium, right ventricle, and traversing the coronary sinus. Skeleton normal for age. Calcified loose bodies in both glenohumeral joints. Normal upper abdomen.     Impression: Mild pulmonary venous congestion superimposed on  emphysema and pulmonary fibrosis. Workstation performed: [id number]     XR chest 2 views  Result Date: 4/13/2025  Narrative: XR CHEST AP AND LATERAL INDICATION: Chest Pain. COMPARISON: CXR 4/9/2025, chest CT 4/8/2025. FINDINGS: Mild pulmonary venous congestion. Trace pleural effusions. Severe cardiomegaly with left subclavian ICD leads in the right atrial appendage, right ventricular apex, and traversing the coronary sinus. Bones are unremarkable for age. Normal upper abdomen.     Impression: Mild pulmonary venous congestion with trace pleural effusions. Workstation performed: [id number]     XR chest 1 view portable  Result Date: 4/9/2025  Narrative: XR CHEST PORTABLE INDICATION: shortness of breath. COMPARISON: 3/22/2025 FINDINGS: Left chest wall intracardiac device with intact lead(s). No abandoned lead(s). The right IJ line has been removed. The patient's emphysema/pulmonary fibrosis which was noted on the prior CT from 4/8/2025 is less obvious on x-ray although there does seem to be mild interstitial prominence. Mild vascular prominence as well. No focal consolidations or mass lesions are seen. The 7 mm lung nodule is not readily apparent. No pneumothorax or pleural effusion. Marked cardiomegaly again noted. Bones are unremarkable for age. Normal upper abdomen.     Impression: Marked cardiomegaly with mild vascular and interstitial prominence. No focal consolidation in the lung identified at this time. Workstation performed: [id number]     CT pe study w abdomen pelvis w contrast  Result Date: 4/8/2025  Narrative: CT PULMONARY ANGIOGRAM OF THE CHEST AND CT ABDOMEN AND PELVIS WITH INTRAVENOUS CONTRAST INDICATION: shortness of breath. COMPARISON: Chest x-ray and CT chest from 3/22/2025. TECHNIQUE: CT examination of the chest, abdomen and pelvis was performed. Thin section CT angiographic technique was used in the chest in order to evaluate for pulmonary embolus and coronal 3D MIP postprocessing was performed on the  acquisition scanner. Multiplanar 2D reformatted images were created from the source data. This examination, like all CT scans performed in the [facility], was performed utilizing techniques to minimize radiation dose exposure, including the use of iterative reconstruction and automated exposure control. Radiation dose length product (DLP) for this visit: 690 mGy-cm IV Contrast: 75 mL of iohexol Enteric Contrast: Not administered. FINDINGS: CHEST PULMONARY ARTERIAL TREE: No definite pulmonary embolus. Probable mixing artifact in the right interlobar artery image 301/143. LUNGS: Stable background emphysema. Stable subpleural reticulation throughout the lungs with bronchiolectasis in keeping with interstitial lung disease. No definite honeycombing. Stable 7 mm nodule in the left lower lobe image 607/188. No new nodule. No endotracheal or endobronchial lesion identified. PLEURA: Unremarkable. HEART/AORTA: Stable massive cardiomegaly. No thoracic aortic aneurysm. Coronary artery calcifications indicating atherosclerotic heart disease. MEDIASTINUM AND OCTAVIA: Stable prominent nodes likely reactive including a left hilar node measuring 1.6 cm, right hilar node measuring 1.0 cm, subcarinal node measuring 1.4 cm CHEST WALL AND LOWER NECK: Unremarkable. ABDOMEN LIVER/BILIARY TREE: Unremarkable. GALLBLADDER: Gallbladder wall appears thickened. This may be secondary to adjacent ascites. No radiopaque stones identified. SPLEEN: Unremarkable. PANCREAS: Unremarkable. ADRENAL GLANDS: Unremarkable. KIDNEYS/URETERS: Unremarkable. No hydronephrosis. STOMACH AND BOWEL: Unremarkable. APPENDIX: No findings to suggest appendicitis. ABDOMINOPELVIC CAVITY: Small volume ascites throughout the abdomen and pelvis.. No pneumoperitoneum. No lymphadenopathy. VESSELS: Atherosclerosis without abdominal aortic aneurysm. PELVIS REPRODUCTIVE ORGANS: Unremarkable for patient's age. URINARY BLADDER: Small bladder diverticulum posteriorly  just to the right of midline measuring 2.1 cm. ABDOMINAL WALL/INGUINAL REGIONS: Previous left inguinal hernia repair. BONES: No acute fracture or suspicious osseous lesion.     Impression: No definite pulmonary embolism. Probable mixing artifact in the right interlobar artery on image 301/143. Stable background emphysema and interstitial lung disease as described consistent with combined pulmonary fibrosis and emphysema. Stable 7 mm left lower lobe pulmonary nodule. Stable massive cardiomegaly. Gallbladder wall thickening. This may be secondary to adjacent ascites. No radiopaque stones identified. If there is concern for acute cholecystitis, consider ultrasound. Small volume ascites throughout the abdomen and pelvis. Workstation performed: [id number]     Code Status: Level 1 - Full Code

## 2025-04-23 NOTE — RESPIRATORY THERAPY NOTE
04/23/25 0349   Respiratory Protocol   Protocol Initiated? Yes   Protocol Selection Respiratory   Language Barrier? No   Medical & Social History Reviewed? Yes   Diagnostic Studies Reviewed? Yes   Physical Assessment Performed? Yes   Home Devices/Therapy Other (Comment)  (inhalers home use)   Respiratory Plan Moderate/Severe Distress pathway   Respiratory Assessment   Assessment Type Assess only   General Appearance Alert;Awake   Respiratory Pattern Shallow   Chest Assessment Chest expansion symmetrical   Bilateral Breath Sounds Diminished   Cough Dry   Resp Comments Patient has extensive pulmonary hx.

## 2025-04-23 NOTE — ED NOTES
"Patient took off his oxygen stated \"I can't breath\" told him he needed to leave his oxygen on, that it will help him. He said I am sick of this and I am fucking leaving. Told him I would have th provider come talk to him. He did put his oxygen back on. He asked for a drink, I told him he was on a fluid restriction and he already drank 240ml that I could give him some ice chips. He was given ice chips. Provider at the beside talking with patient.      Zaira Thurman RN  04/23/25 [id number]    "

## 2025-04-23 NOTE — ED NOTES
Bed side shift report completed. Patient laying litter, oxygen on, no signs of distress noted.     Iva Mathis RN  04/23/25 [id number]

## 2025-04-23 NOTE — ASSESSMENT & PLAN NOTE
"CT chest: \"No acute abnormality. Pulmonary fibrosis. Stable left lower lobe pulmonary nodule. Recommend 12-month follow-up noncontrast CT of the chest.\"  Noted history of ILD and emphysema  Received Solu-Medrol 80 mg IV in the ED, continue Solu-Medrol 40 mg IV  Patient requesting pulmonology consult while admitted  Appreciate pulmonology recommendations  "

## 2025-04-24 ENCOUNTER — HOSPITAL ENCOUNTER (EMERGENCY)
Facility: HOSPITAL | Age: 60
Discharge: HOME | DRG: 291 | End: 2025-04-25
Attending: EMERGENCY MEDICINE
Payer: COMMERCIAL

## 2025-04-24 ENCOUNTER — PATIENT OUTREACH (OUTPATIENT)
Dept: CASE MANAGEMENT | Facility: HOSPITAL | Age: 60
End: 2025-04-24

## 2025-04-24 ENCOUNTER — APPOINTMENT (EMERGENCY)
Dept: CT IMAGING | Facility: HOSPITAL | Age: 60
DRG: 291 | End: 2025-04-24
Payer: COMMERCIAL

## 2025-04-24 VITALS
WEIGHT: 169.31 LBS | SYSTOLIC BLOOD PRESSURE: 106 MMHG | RESPIRATION RATE: 18 BRPM | BODY MASS INDEX: 25.08 KG/M2 | HEART RATE: 61 BPM | OXYGEN SATURATION: 91 % | TEMPERATURE: 98.3 F | DIASTOLIC BLOOD PRESSURE: 75 MMHG | HEIGHT: 69 IN

## 2025-04-24 VITALS
SYSTOLIC BLOOD PRESSURE: 143 MMHG | OXYGEN SATURATION: 89 % | RESPIRATION RATE: 17 BRPM | WEIGHT: 169.4 LBS | HEIGHT: 69 IN | DIASTOLIC BLOOD PRESSURE: 105 MMHG | BODY MASS INDEX: 25.09 KG/M2 | TEMPERATURE: 98.2 F | HEART RATE: 53 BPM

## 2025-04-24 DIAGNOSIS — K42.9 UMBILICAL HERNIA: Primary | ICD-10-CM

## 2025-04-24 PROBLEM — K42.0 INCARCERATED UMBILICAL HERNIA: Status: ACTIVE | Noted: 2025-04-24

## 2025-04-24 LAB
ALBUMIN SERPL BCG-MCNC: 4.1 G/DL (ref 3.5–5)
ALP SERPL-CCNC: 97 U/L (ref 34–104)
ALT SERPL W P-5'-P-CCNC: 24 U/L (ref 7–52)
ANION GAP SERPL CALCULATED.3IONS-SCNC: 9 MMOL/L (ref 4–13)
ANION GAP SERPL CALCULATED.3IONS-SCNC: 9 MMOL/L (ref 4–13)
AST SERPL W P-5'-P-CCNC: 25 U/L (ref 13–39)
BASOPHILS # BLD AUTO: 0.02 THOUSANDS/ÂΜL (ref 0–0.1)
BASOPHILS NFR BLD AUTO: 0 % (ref 0–1)
BILIRUB SERPL-MCNC: 0.49 MG/DL (ref 0.2–1)
BUN SERPL-MCNC: 42 MG/DL (ref 5–25)
BUN SERPL-MCNC: 48 MG/DL (ref 5–25)
CALCIUM SERPL-MCNC: 9.3 MG/DL (ref 8.4–10.2)
CALCIUM SERPL-MCNC: 9.7 MG/DL (ref 8.4–10.2)
CHLORIDE SERPL-SCNC: 101 MMOL/L (ref 96–108)
CHLORIDE SERPL-SCNC: 98 MMOL/L (ref 96–108)
CO2 SERPL-SCNC: 26 MMOL/L (ref 21–32)
CO2 SERPL-SCNC: 28 MMOL/L (ref 21–32)
CREAT SERPL-MCNC: 1.49 MG/DL (ref 0.6–1.3)
CREAT SERPL-MCNC: 1.83 MG/DL (ref 0.6–1.3)
DME PARACHUTE DELIVERY DATE REQUESTED: NORMAL
DME PARACHUTE ITEM DESCRIPTION: NORMAL
DME PARACHUTE ORDER STATUS: NORMAL
DME PARACHUTE SUPPLIER NAME: NORMAL
DME PARACHUTE SUPPLIER PHONE: NORMAL
EOSINOPHIL # BLD AUTO: 0 THOUSAND/ÂΜL (ref 0–0.61)
EOSINOPHIL NFR BLD AUTO: 0 % (ref 0–6)
ERYTHROCYTE [DISTWIDTH] IN BLOOD BY AUTOMATED COUNT: 17.3 % (ref 11.6–15.1)
GFR SERPL CREATININE-BSD FRML MDRD: 39 ML/MIN/1.73SQ M
GFR SERPL CREATININE-BSD FRML MDRD: 50 ML/MIN/1.73SQ M
GLUCOSE SERPL-MCNC: 120 MG/DL (ref 65–140)
GLUCOSE SERPL-MCNC: 182 MG/DL (ref 65–140)
HCT VFR BLD AUTO: 44.8 % (ref 36.5–49.3)
HGB BLD-MCNC: 14 G/DL (ref 12–17)
IMM GRANULOCYTES # BLD AUTO: 0.15 THOUSAND/UL (ref 0–0.2)
IMM GRANULOCYTES NFR BLD AUTO: 1 % (ref 0–2)
LACTATE SERPL-SCNC: 3.1 MMOL/L (ref 0.5–2)
LIPASE SERPL-CCNC: 13 U/L (ref 11–82)
LYMPHOCYTES # BLD AUTO: 0.68 THOUSANDS/ÂΜL (ref 0.6–4.47)
LYMPHOCYTES NFR BLD AUTO: 4 % (ref 14–44)
MCH RBC QN AUTO: 28.4 PG (ref 26.8–34.3)
MCHC RBC AUTO-ENTMCNC: 31.3 G/DL (ref 31.4–37.4)
MCV RBC AUTO: 91 FL (ref 82–98)
MONOCYTES # BLD AUTO: 1.16 THOUSAND/ÂΜL (ref 0.17–1.22)
MONOCYTES NFR BLD AUTO: 6 % (ref 4–12)
MRSA NOSE QL CULT: NORMAL
NEUTROPHILS # BLD AUTO: 17.65 THOUSANDS/ÂΜL (ref 1.85–7.62)
NEUTS SEG NFR BLD AUTO: 89 % (ref 43–75)
NRBC BLD AUTO-RTO: 0 /100 WBCS
PLATELET # BLD AUTO: 248 THOUSANDS/UL (ref 149–390)
PMV BLD AUTO: 9.4 FL (ref 8.9–12.7)
POTASSIUM SERPL-SCNC: 4.5 MMOL/L (ref 3.5–5.3)
POTASSIUM SERPL-SCNC: 4.6 MMOL/L (ref 3.5–5.3)
PROCALCITONIN SERPL-MCNC: 0.08 NG/ML
PROT SERPL-MCNC: 7.6 G/DL (ref 6.4–8.4)
RBC # BLD AUTO: 4.93 MILLION/UL (ref 3.88–5.62)
SODIUM SERPL-SCNC: 135 MMOL/L (ref 135–147)
SODIUM SERPL-SCNC: 136 MMOL/L (ref 135–147)
WBC # BLD AUTO: 19.66 THOUSAND/UL (ref 4.31–10.16)

## 2025-04-24 PROCEDURE — 99232 SBSQ HOSP IP/OBS MODERATE 35: CPT | Performed by: INTERNAL MEDICINE

## 2025-04-24 PROCEDURE — 36415 COLL VENOUS BLD VENIPUNCTURE: CPT | Performed by: EMERGENCY MEDICINE

## 2025-04-24 PROCEDURE — 83605 ASSAY OF LACTIC ACID: CPT | Performed by: EMERGENCY MEDICINE

## 2025-04-24 PROCEDURE — 94640 AIRWAY INHALATION TREATMENT: CPT

## 2025-04-24 PROCEDURE — 96374 THER/PROPH/DIAG INJ IV PUSH: CPT

## 2025-04-24 PROCEDURE — 84145 PROCALCITONIN (PCT): CPT

## 2025-04-24 PROCEDURE — 99284 EMERGENCY DEPT VISIT MOD MDM: CPT

## 2025-04-24 PROCEDURE — 96361 HYDRATE IV INFUSION ADD-ON: CPT

## 2025-04-24 PROCEDURE — 80048 BASIC METABOLIC PNL TOTAL CA: CPT

## 2025-04-24 PROCEDURE — 74177 CT ABD & PELVIS W/CONTRAST: CPT

## 2025-04-24 PROCEDURE — 96375 TX/PRO/DX INJ NEW DRUG ADDON: CPT

## 2025-04-24 PROCEDURE — 99205 OFFICE O/P NEW HI 60 MIN: CPT | Performed by: SURGERY

## 2025-04-24 PROCEDURE — 99239 HOSP IP/OBS DSCHRG MGMT >30: CPT | Performed by: FAMILY MEDICINE

## 2025-04-24 PROCEDURE — 99284 EMERGENCY DEPT VISIT MOD MDM: CPT | Performed by: EMERGENCY MEDICINE

## 2025-04-24 PROCEDURE — 85025 COMPLETE CBC W/AUTO DIFF WBC: CPT | Performed by: EMERGENCY MEDICINE

## 2025-04-24 PROCEDURE — 83690 ASSAY OF LIPASE: CPT | Performed by: EMERGENCY MEDICINE

## 2025-04-24 PROCEDURE — 80053 COMPREHEN METABOLIC PANEL: CPT | Performed by: EMERGENCY MEDICINE

## 2025-04-24 RX ORDER — FENTANYL CITRATE 50 UG/ML
50 INJECTION, SOLUTION INTRAMUSCULAR; INTRAVENOUS ONCE
Refills: 0 | Status: COMPLETED | OUTPATIENT
Start: 2025-04-24 | End: 2025-04-24

## 2025-04-24 RX ORDER — QUETIAPINE FUMARATE 100 MG/1
100 TABLET, FILM COATED ORAL ONCE
Status: COMPLETED | OUTPATIENT
Start: 2025-04-24 | End: 2025-04-24

## 2025-04-24 RX ORDER — FUROSEMIDE 10 MG/ML
50 INJECTION INTRAMUSCULAR; INTRAVENOUS 2 TIMES DAILY
Status: DISCONTINUED | OUTPATIENT
Start: 2025-04-24 | End: 2025-04-24 | Stop reason: HOSPADM

## 2025-04-24 RX ORDER — MORPHINE SULFATE 4 MG/ML
4 INJECTION, SOLUTION INTRAMUSCULAR; INTRAVENOUS ONCE
Status: COMPLETED | OUTPATIENT
Start: 2025-04-24 | End: 2025-04-24

## 2025-04-24 RX ORDER — IPRATROPIUM BROMIDE AND ALBUTEROL SULFATE 2.5; .5 MG/3ML; MG/3ML
3 SOLUTION RESPIRATORY (INHALATION)
Qty: 270 ML | Refills: 1 | Status: SHIPPED | OUTPATIENT
Start: 2025-04-24

## 2025-04-24 RX ORDER — ONDANSETRON 2 MG/ML
4 INJECTION INTRAMUSCULAR; INTRAVENOUS ONCE
Status: COMPLETED | OUTPATIENT
Start: 2025-04-24 | End: 2025-04-24

## 2025-04-24 RX ADMIN — AZITHROMYCIN MONOHYDRATE 500 MG: 500 INJECTION, POWDER, LYOPHILIZED, FOR SOLUTION INTRAVENOUS at 02:02

## 2025-04-24 RX ADMIN — FUROSEMIDE 50 MG: 10 INJECTION, SOLUTION INTRAMUSCULAR; INTRAVENOUS at 11:30

## 2025-04-24 RX ADMIN — MORPHINE SULFATE 4 MG: 4 INJECTION INTRAVENOUS at 21:41

## 2025-04-24 RX ADMIN — IPRATROPIUM BROMIDE AND ALBUTEROL SULFATE 3 ML: 2.5; .5 SOLUTION RESPIRATORY (INHALATION) at 08:36

## 2025-04-24 RX ADMIN — PANTOPRAZOLE SODIUM 40 MG: 40 TABLET, DELAYED RELEASE ORAL at 08:51

## 2025-04-24 RX ADMIN — FENTANYL CITRATE 50 MCG: 0.05 INJECTION, SOLUTION INTRAMUSCULAR; INTRAVENOUS at 22:36

## 2025-04-24 RX ADMIN — ONDANSETRON 4 MG: 2 INJECTION INTRAMUSCULAR; INTRAVENOUS at 21:41

## 2025-04-24 RX ADMIN — NICOTINE 1 PATCH: 21 PATCH, EXTENDED RELEASE TRANSDERMAL at 02:02

## 2025-04-24 RX ADMIN — MIDODRINE HYDROCHLORIDE 5 MG: 5 TABLET ORAL at 06:33

## 2025-04-24 RX ADMIN — METHYLPREDNISOLONE SODIUM SUCCINATE 40 MG: 40 INJECTION, POWDER, FOR SOLUTION INTRAMUSCULAR; INTRAVENOUS at 08:50

## 2025-04-24 RX ADMIN — BUDESONIDE INHALATION 0.5 MG: 0.5 SUSPENSION RESPIRATORY (INHALATION) at 08:36

## 2025-04-24 RX ADMIN — QUETIAPINE FUMARATE 100 MG: 100 TABLET ORAL at 00:09

## 2025-04-24 RX ADMIN — Medication 400 MG: at 08:51

## 2025-04-24 RX ADMIN — SODIUM CHLORIDE 1000 ML: 0.9 INJECTION, SOLUTION INTRAVENOUS at 21:41

## 2025-04-24 RX ADMIN — IOHEXOL 85 ML: 350 INJECTION, SOLUTION INTRAVENOUS at 22:27

## 2025-04-24 NOTE — CONSULTS
"Consult received for CHF Ed/nutrition assessment.     Pt admits to dietary noncompliance at home. Reports eating hotdogs frequently. Reports sister does the grocery shopping. Reports issues with swallowing foods \"even watermelon\" reportedly had esophageal dilations. Reports drinking extra fluids to help guide food down. Chart review of weight hx: 2/23/24 156lb, 9/5/24 166lb, 10/25/24 166lb, 4/22/25 175lb (standing scale, admission wt), 4/24/25 169lb.     Reviewed importance of low sodium diet with pt. Educated on how to identify low sodium food option using nutrition label per pt request. Discussed selecting lean beef burger instead of hotdogs to reduce sodium/saturated fat intake, encouraged to chew thoroughly. Encouraged daily weights to monitor fluid retention, provided pt with scale for home. Provided with HF Nutrition Therapy, Salt Free Seasoning Tips, Fluid Restriction Nutrition Therapy handouts and RD contact information. ?compliance to diet at home after d/c. Pt states he will have sister call author if she has questions. Consider SLP v GI eval if medically appropriate given reported difficulties swallowing.   "

## 2025-04-24 NOTE — ASSESSMENT & PLAN NOTE
"CT chest: \"No acute abnormality. Pulmonary fibrosis. Stable left lower lobe pulmonary nodule. Recommend 12-month follow-up noncontrast CT of the chest.\"  Noted history of ILD and emphysema  Received Solu-Medrol 80 mg IV in the ED, continue Solu-Medrol 40 mg IV  Appreciate pulmonary consult, patient advised to follow-up with [facility] pulmonology for further recommendations  "

## 2025-04-24 NOTE — ASSESSMENT & PLAN NOTE
Wt Readings from Last 3 Encounters:   04/24/25 76.8 kg (169 lb 6.4 oz)   04/20/25 76 kg (167 lb 9.6 oz)   04/12/25 73.9 kg (163 lb)   With recent hospitalization for the same, discharged on 4/10 weighing 163 pounds  Presenting on 4/22 with worsening shortness of breath, BNP 1034  Chest imaging showing pulmonary vascular congestion and pulmonary fibrosis  Patient reports compliance with home Lasix 40 mg daily and low sodium diet  Has received IV Lasix 80 mg x 1 dose, 50 mg x 2 doses  No output tracked  Weight 175 lbs => 169 lbs  Patient clinically appears improved, reports improvement in breathing almost to his baseline level, still on 2 L NC  Continue IV lasix 50 mg BID  Monitor daily standing weights, fluid/sodium restriction, strict I/Os, BMP

## 2025-04-24 NOTE — ASSESSMENT & PLAN NOTE
Multifactorial in nature, due to pulmonary fibrosis as well as cardiac condition  Initially patient was using supplemental oxygen, with the treatment, patient condition improved, patient remain off of oxygen,  Patient is requesting to be discharged since she is feeling better.

## 2025-04-24 NOTE — DISCHARGE SUMMARY
"Discharge Summary - Hospitalist   Name: Britton Batista 59 y.o. male I MRN: [id number]  Unit/Bed#: -01 I Date of Admission: 4/22/2025   Date of Service: 4/24/2025 I Hospital Day: 1     Assessment & Plan  Acute on chronic systolic congestive heart failure (HCC)  Wt Readings from Last 3 Encounters:   04/24/25 76.8 kg (169 lb 6.4 oz)   04/20/25 76 kg (167 lb 9.6 oz)   04/12/25 73.9 kg (163 lb)   Patient presents with increased cough, fatigue, and shortness of breath for 2 days - found to be 78% on RA in the ED. Noted weight gain of 8 lbs since previous admission on 4/20  Patient reports adherence to low-sodium diet at home  Daily weights and strict I's and O's  Troponin: 122 -> 88 -> 72  BNP elevated at 1034  Continue low-sodium cardiac diet with 1800 mL fluid restriction  Patient condition significantly improved, remain off of oxygen.  Since patient is feeling better, patient prefers to go home  Acute respiratory failure with hypoxia (HCC)  Multifactorial in nature, due to pulmonary fibrosis as well as cardiac condition  Initially patient was using supplemental oxygen, with the treatment, patient condition improved, patient remain off of oxygen,  Patient is requesting to be discharged since she is feeling better.  Pulmonary fibrosis (HCC)  CT chest: \"No acute abnormality. Pulmonary fibrosis. Stable left lower lobe pulmonary nodule. Recommend 12-month follow-up noncontrast CT of the chest.\"  Noted history of ILD and emphysema  Received Solu-Medrol 80 mg IV in the ED, continue Solu-Medrol 40 mg IV  Appreciate pulmonary consult, patient advised to follow-up with [facility] pulmonology for further recommendations  Anxiety  On examination, patient reports feeling anxious  Started hydroxyzine as needed for anxiety  GERD (gastroesophageal reflux disease)  Continue home pantoprazole  Primary hypertension  Continue home medication  Atrial fibrillation with RVR (HCC)  Noted history of A-fib on digoxin, Lopressor, and " Coumadin  Stage 3a chronic kidney disease (CKD) ([facility])  Lab Results   Component Value Date    EGFR 50 04/24/2025    EGFR 46 04/23/2025    EGFR 50 04/22/2025    CREATININE 1.49 (H) 04/24/2025    CREATININE 1.59 (H) 04/23/2025    CREATININE 1.49 (H) 04/22/2025   Creatinine on admission noted to be 1.49  Creatinine seems to be improved from previous  Avoid nephrotoxic meds and hypotension     Discharging Physician / Practitioner: Cnorad Bynum MD  PCP: Juan Galindo DO  Admission Date:   Admission Orders (From admission, onward)       Ordered        04/23/25 0013  INPATIENT ADMISSION  Once                          Discharge Date: 04/24/25    Medical Problems       Resolved Problems  Date Reviewed: 3/24/2025   None         Consultations During Hospital Stay:  Pulmonary, cardiology    Procedures Performed:   CT chest wo contrast   Final Result by Poli Mendoza DO (04/23 0145)      No acute abnormality.      Pulmonary fibrosis.      Stable left lower lobe pulmonary nodule. Recommend 12-month follow-up noncontrast CT of the chest.      Debris within the esophagus suggesting reflux.               Workstation performed: [id number]         XR chest 1 view portable   Final Result by George Parson MD (04/23 0919)      Mild central pulmonary vascular congestion.            Workstation performed: [id number]               Significant Findings / Test Results:   Lab Results   Component Value Date    WBC 7.63 04/23/2025    HGB 12.6 04/23/2025    HCT 39.6 04/23/2025    MCV 89 04/23/2025     04/23/2025     Lab Results   Component Value Date    SODIUM 136 04/24/2025    K 4.5 04/24/2025     04/24/2025    CO2 26 04/24/2025    AGAP 9 04/24/2025    BUN 42 (H) 04/24/2025    CREATININE 1.49 (H) 04/24/2025    GLUC 182 (H) 04/24/2025    GLUF 90 04/20/2025    CALCIUM 9.3 04/24/2025    AST 30 04/23/2025    ALT 21 04/23/2025    ALKPHOS 106 (H) 04/23/2025    TP 6.8 04/23/2025    TBILI 0.49 04/23/2025    EGFR 50  04/24/2025 04/22/2025 2208 04/22/2025 2235 FLU/COVID Rapid Antigen (30 min. TAT) - Preferred screening test in ED [[id number]]    Nares from Nose    Final result [facility] This test has been performed using the Quidel Inez 2 FLU+SARS Antigen test under the Emergency Use Authorization (EUA). This test has been validated by the  and verified by the performing laboratory. The Inez uses lateral flow immunofluorescent sandwich assay to detect SARS-COV, Influenza A and Influenza B Antigen.       The Quidel Inez 2 SARS Antigen test does not differentiate between SARS-CoV and SARS-CoV-2.       Negative results are presumptive and may be confirmed with a molecular assay, if necessary, for patient management. Negative results do not rule out SARS-CoV-2 or influenza infection and should not be used as the sole basis for treatment or patient management decisions. A negative test result may occur if the level of antigen in a sample is below the limit of detection of this test.       Positive results are indicative of the presence of viral antigens, but do not rule out bacterial infection or co-infection with other viruses.       All test results should be used as an adjunct to clinical observations and other information available to the provider.   FOR PEDIATRIC PATIENTS - copy/paste COVID Guidelines URL to browser: [email or web address]             Incidental Findings:   As mentioned above    Test Results Pending at Discharge (will require follow up):   none     Outpatient Tests Requested:  None    Complications: Shortness of breath    Reason for Admission: Shortness of breath    Hospital Course:     Britton Batista is a 59 y.o. male patient who originally presented to the hospital on 4/22/2025 due to shortness of breath secondary to CHF exacerbation as well as pulmonary fibrosis, patient received IV diuretics with significant  "improvement, patient remain off of oxygen, followed by cardiology, agrees with treatment plan.  Patient has pulmonary fibrosis, also evaluated by pulmonologist, appreciate recommendation  All recommendation discussed in details with patient.  Patient verbalized understanding agrees.  Patient also reports since he is feeling better, he prefers to go home and follow-up with his cardiologist and pulmonologist outpatient basis.    This time, patient be discharged back home with follow-up with PCP, cardiology and pulmonology outpatient basis.    Please see above list of diagnoses and related plan for additional information.     Condition at Discharge: stable     Discharge Day Visit / Exam:     Subjective: Seen and evaluated during the run.  Resting comfortably P denies any significant complaint.  Vitals: Blood Pressure: 122/89 (04/24/25 1521)  Pulse: (!) 53 (04/24/25 1521)  Temperature: 98.2 °F (36.8 °C) (04/24/25 0850)  Temp Source: Tympanic (04/23/25 1351)  Respirations: 17 (04/24/25 1518)  Height: 5' 9\" (175.3 cm) (04/23/25 1345)  Weight - Scale: 76.8 kg (169 lb 6.4 oz) (04/24/25 0600)  SpO2: 91% (04/24/25 1521)  Exam:   Physical Exam  Vitals and nursing note reviewed.   Constitutional:       Appearance: Normal appearance. He is not ill-appearing or diaphoretic.   HENT:      Nose: Nose normal. No congestion or rhinorrhea.      Mouth/Throat:      Mouth: Mucous membranes are moist.      Pharynx: No oropharyngeal exudate or posterior oropharyngeal erythema.   Cardiovascular:      Rate and Rhythm: Bradycardia present.      Heart sounds:      No friction rub. No gallop.   Pulmonary:      Effort: No respiratory distress.      Breath sounds: No stridor. No wheezing.   Abdominal:      General: There is no distension.      Palpations: There is no mass.      Tenderness: There is no abdominal tenderness.      Hernia: No hernia is present.   Musculoskeletal:      Right lower leg: No edema.      Left lower leg: No edema. "   Neurological:      Mental Status: He is alert and oriented to person, place, and time.      Cranial Nerves: No cranial nerve deficit.      Sensory: No sensory deficit.      Motor: No weakness.      Coordination: Coordination normal.       Discussion with Family: Patient's Sister Arin over the phone    Discharge instructions/Information to patient and family:   See after visit summary for information provided to patient and family.      Provisions for Follow-Up Care:  See after visit summary for information related to follow-up care and any pertinent home health orders.      Disposition:     Home    For Discharges to [facility] SNF:   Not Applicable to this Patient - Not Applicable to this Patient    Planned Readmission: If condition get worse     Discharge Statement:  Greater than 50% of the total time was spent examining patient, answering all patient questions, arranging and discussing plan of care with patient as well as directly providing post-discharge instructions.  Additional time then spent on discharge activities.    Discharge Medications:  See after visit summary for reconciled discharge medications provided to patient and family.      ** Please Note: This note has been constructed using a voice recognition system **

## 2025-04-24 NOTE — ASSESSMENT & PLAN NOTE
Lab Results   Component Value Date    EGFR 50 04/24/2025    EGFR 46 04/23/2025    EGFR 50 04/22/2025    CREATININE 1.49 (H) 04/24/2025    CREATININE 1.59 (H) 04/23/2025    CREATININE 1.49 (H) 04/22/2025   Creatinine on admission noted to be 1.49  Creatinine seems to be improved from previous  Avoid nephrotoxic meds and hypotension

## 2025-04-24 NOTE — PLAN OF CARE
Problem: Knowledge Deficit  Goal: Patient/family/caregiver demonstrates understanding of disease process, treatment plan, medications, and discharge instructions  Description: Complete learning assessment and assess knowledge base.Interventions:- Provide teaching at level of understanding- Provide teaching via preferred learning methods  Outcome: Progressing     Problem: RESPIRATORY - ADULT  Goal: Achieves optimal ventilation and oxygenation  Description: INTERVENTIONS:- Assess for changes in respiratory status- Assess for changes in mentation and behavior- Position to facilitate oxygenation and minimize respiratory effort- Oxygen administered by appropriate delivery if ordered- Initiate smoking cessation education as indicated- Encourage broncho-pulmonary hygiene including cough, deep breathe, Incentive Spirometry- Assess the need for suctioning and aspirate as needed- Assess and instruct to report SOB or any respiratory difficulty- Respiratory Therapy support as indicated  Outcome: Progressing

## 2025-04-24 NOTE — PLAN OF CARE
Problem: Potential for Falls  Goal: Patient will remain free of falls  Description: INTERVENTIONS:- Educate patient/family on patient safety including physical limitations- Instruct patient to call for assistance with activity - Consult OT/PT to assist with strengthening/mobility - Keep Call bell within reach- Keep bed low and locked with side rails adjusted as appropriate- Keep care items and personal belongings within reach- Initiate and maintain comfort rounds- Make Fall Risk Sign   Outcome: Adequate for Discharge     Problem: PAIN - ADULT  Goal: Verbalizes/displays adequate comfort level or baseline comfort level  Description: Interventions:- Encourage patient to monitor pain and request assistance- Assess pain using appropriate pain scale- Administer analgesics based on type and severity of pain and evaluate response- Implement non-pharmacological measures as appropriate and evaluate response- Consider cultural and social influences on pain and pain management- Notify physician/advanced practitioner if interventions unsuccessful or patient reports new pain  Outcome: Adequate for Discharge     Problem: INFECTION - ADULT  Goal: Absence or prevention of progression during hospitalization  Description: INTERVENTIONS:- Assess and monitor for signs and symptoms of infection- Monitor lab/diagnostic results- Monitor all insertion sites, i.e. indwelling lines, tubes, and drains- Monitor endotracheal if appropriate and nasal secretions for changes in amount and color- [place] appropriate cooling/warming therapies per order- Administer medications as ordered- Instruct and encourage patient and family to use good hand hygiene technique- Identify and instruct in appropriate isolation precautions for identified infection/condition  Outcome: Adequate for Discharge     Problem: DISCHARGE PLANNING  Goal: Discharge to home or other facility with appropriate resources  Description: INTERVENTIONS:- Identify barriers to  discharge w/patient and caregiver- Arrange for needed discharge resources and transportation as appropriate- Identify discharge learning needs (meds, wound care, etc.)- Arrange for interpretive services to assist at discharge as needed- Refer to Case Management Department for coordinating discharge planning if the patient needs post-hospital services based on physician/advanced practitioner order or complex needs related to functional status, cognitive ability, or social support system  Outcome: Adequate for Discharge     Problem: Knowledge Deficit  Goal: Patient/family/caregiver demonstrates understanding of disease process, treatment plan, medications, and discharge instructions  Description: Complete learning assessment and assess knowledge base.Interventions:- Provide teaching at level of understanding- Provide teaching via preferred learning methods  4/24/2025 1532 by Nevaeh Law RN  Outcome: Adequate for Discharge  4/24/2025 1155 by Nevaeh Law RN  Outcome: Progressing     Problem: RESPIRATORY - ADULT  Goal: Achieves optimal ventilation and oxygenation  Description: INTERVENTIONS:- Assess for changes in respiratory status- Assess for changes in mentation and behavior- Position to facilitate oxygenation and minimize respiratory effort- Oxygen administered by appropriate delivery if ordered- Initiate smoking cessation education as indicated- Encourage broncho-pulmonary hygiene including cough, deep breathe, Incentive Spirometry- Assess the need for suctioning and aspirate as needed- Assess and instruct to report SOB or any respiratory difficulty- Respiratory Therapy support as indicated  4/24/2025 1532 by Nevaeh Law RN  Outcome: Adequate for Discharge  4/24/2025 1155 by Nevaeh Law RN  Outcome: Progressing

## 2025-04-24 NOTE — PROGRESS NOTES
RN Outpatient Care Manager Note: Received Teams message from CORNELIO Jarquin RN CM that patient was agreeable to HF TV kit education. Met with patient and his sister briefly, but she states they could not stay and declined education. She states he has a Integrated CM with [facility] who follow him regularly and did not feel he needed weekly calls from [facility] FARLEYM.  She added that he has been more compliant with his medication and has followed up with his PCP last week and will see him again next week. He also is being followed for coumadin management. Did stress importance of taking medication as ordered, daily weights and contacting provider for 3# weight gain from one am to the next or 5 # in a week or less.  Contacted Pily and updated her. She states she did notify his [facility] FARLEYM Nichol Law.  Will not enroll in Care Transition program.

## 2025-04-24 NOTE — ASSESSMENT & PLAN NOTE
Wt Readings from Last 3 Encounters:   04/24/25 76.8 kg (169 lb 6.4 oz)   04/20/25 76 kg (167 lb 9.6 oz)   04/12/25 73.9 kg (163 lb)   Patient presents with increased cough, fatigue, and shortness of breath for 2 days - found to be 78% on RA in the ED. Noted weight gain of 8 lbs since previous admission on 4/20  Patient reports adherence to low-sodium diet at home  Daily weights and strict I's and O's  Troponin: 122 -> 88 -> 72  BNP elevated at 1034  Continue low-sodium cardiac diet with 1800 mL fluid restriction  Patient condition significantly improved, remain off of oxygen.  Since patient is feeling better, patient prefers to go home

## 2025-04-24 NOTE — NURSING NOTE
"Pt refused am labs states \"No you're not sticking me. I'm is done getting stuck.\"   Provider made aware. Will pass along in report to on coming shift.  "

## 2025-04-24 NOTE — NURSING NOTE
Patient discharged home today; IV removed intact, no bleeding noted; AVS printed and reviewed with patient, understanding verbalized, nebulizer provided.  Patient ambulates to exit per his request accompanied by sister.

## 2025-04-24 NOTE — ASSESSMENT & PLAN NOTE
Wt Readings from Last 3 Encounters:   04/24/25 76.8 kg (169 lb 6.4 oz)   04/20/25 76 kg (167 lb 9.6 oz)   04/12/25 73.9 kg (163 lb)   Patient presents with increased cough, fatigue, and shortness of breath for 2 days - found to be 78% on RA in the ED. Noted weight gain of 8 lbs since previous admission on 4/20  Patient reports adherence to low-sodium diet at home  Daily weights and strict I's and O's  Troponin: 122 -> 88 -> 72  BNP elevated at 1034  Continue low-sodium cardiac diet with 1800 mL fluid restriction  Received Lasix 80 mg IV in the ED, continue Lasix 50 mg IV twice daily  Appreciate cardiology recommendations

## 2025-04-24 NOTE — UTILIZATION REVIEW
Patient arrived on 4/22/2025  9:30 PM to the ED. Care then began on 4/22/25 @ 2138. The patient has already surpassed 1 midnight with active ongoing care.     Initial Clinical Review    Admission: Date/Time/Statement:   Admission Orders (From admission, onward)       Ordered        04/23/25 0013  INPATIENT ADMISSION  Once                          Orders Placed This Encounter   Procedures    INPATIENT ADMISSION     Standing Status:   Standing     Number of Occurrences:   1     Level of Care:   Med Surg [16]     Estimated length of stay:   More than 2 Midnights     Certification:   I certify that inpatient services are medically necessary for this patient for a duration of greater than two midnights. See H&P and MD Progress Notes for additional information about the patient's course of treatment.     ED Arrival Information       Expected   -    Arrival   4/22/2025 21:20    Acuity   Urgent              Means of arrival   Walk-In    Escorted by   Family Member    Service   Hospitalist    Admission type   Emergency              Arrival complaint   sob             Chief Complaint   Patient presents with    Shortness of Breath     Patient presents to the ED with worsening SOB that started yesterday. HX COPD.        Initial Presentation: 59 y.o. male w/ PMH of cardiomyopathy, CHF, depression, emphysema, GERD, gout, HTN, neuropathy, Raynaud's syndrome, scleroderma who presented from home to [facility] ED. Admitted as Inpatient for evaluation and treatment of Acute on chronic CHF, Acute resp failure w/ hypoxia.     Presented w/ increased cough, fatigue and SOB for 2 days. In the ED, pt found to be 78% on RA, placed on O2 2L @ 95% but started having shortness of breath, oxygen increased to 5 L NC . Noted weight gain of 8 lbs since previous admission on 4/20 - weight on 4/20 167, today's weight On exam, Decreased BS, pt tachypneic, anxious and agitated. Abnormal Labs BNP 1034, Troponin 122>88>72. Bun/Creat  37/2.49, Mg 1.8, VBG pCO2 40.2, HCO3 21.0. Dig level 0.4, TSH 8.109. CRP 14.3, INR 2.41. CXR: Mild central pulmonary vascular congestion. Please see below med list for meds given in the ED.     Plan: Tele, IV lasix BID, Daily weights, I/O, low-sodium cardiac diet with 1800 mL fluid restriction, DuoNeb, Pulmicort, Sodium chloride neb and Xopenex PRN. May need ambulatory pulse ox to see if qualifies for O2 at home. Continue IV Solumedrol, Avoid nephrotoxic meds and hypotension. Monitor labs in am. Cardiology and Pulmonology consulted.    Cardiology: Acute on chronic CHF.  Continue IV Lasix, Monitor daily standing weights, fluid/sodium restriction, strict I/O. BMP in am. Continue Spironolactone and Toprol. Continue Warfarin. INR.      Pulmonology: Acute hypoxic respiratory failure. Current presentation likely CHF exacerbation/cardiomyopathy, and mild COPD. ILD likely mild at baseline. Pt feeling better since started steroid and diuretics. Plan: given hx of scleroderma, dc steroids or switch to prednisone. Continue DuoNeb, Budesonide BID, Azithromycin IV to complete 3 days. Home O2 eval.     Anticipated Length of Stay/Certification Statement: Patient will be admitted on an inpatient basis with an anticipated length of stay of greater than 2 midnights secondary to CHF/COPD exacerbation management.     Date: 4/24/25  Day 3: Has surpassed a 2nd midnight with active treatments and services.  Pt stable for discharge today. Patient received IV diuretics with significant improvement, patient remain off of oxygen, followed by cardiology, agrees with treatment plan.  Patient has pulmonary fibrosis, also evaluated by pulmonologist, appreciate recommendation  All recommendation discussed in details with patient.   Patient also reports since he is feeling better, he prefers to go home and follow-up with his cardiologist and pulmonologist outpatient basis.     This time, patient be discharged back home with follow-up with PCP,  cardiology and pulmonology outpatient basis.       ED Treatment-Medication Administration from 04/22/2025 2119 to 04/23/2025 1344         Date/Time Order Dose Route Action     04/22/2025 2220 ipratropium-albuterol (DUO-NEB) 0.5-2.5 mg/3 mL inhalation solution 3 mL 3 mL Nebulization Given     04/22/2025 2217 methylPREDNISolone sodium succinate (Solu-MEDROL) injection 80 mg 80 mg Intravenous Given     04/22/2025 2328 furosemide (LASIX) injection 80 mg 80 mg Intravenous Given     04/23/2025 0241 nicotine (NICODERM CQ) 21 mg/24 hr TD 24 hr patch 1 patch 1 patch Transdermal Medication Applied     04/23/2025 0223 budesonide (PULMICORT) inhalation solution 0.5 mg 0.5 mg Nebulization Given     04/23/2025 0823 budesonide (PULMICORT) inhalation solution 0.5 mg 0.5 mg Nebulization Given     04/23/2025 0626 methylPREDNISolone sodium succinate (Solu-MEDROL) injection 40 mg 40 mg Intravenous Given     04/23/2025 0143 azithromycin (ZITHROMAX) 500 mg in sodium chloride 0.9 % 250 mL IVPB 500 mg Intravenous New Bag     04/23/2025 0823 furosemide (LASIX) injection 50 mg 50 mg Intravenous Given     04/23/2025 0340 sodium chloride 0.9 % inhalation solution 3 mL 3 mL Nebulization Given     04/23/2025 0823 digoxin (LANOXIN) tablet 125 mcg 125 mcg Oral Given     04/23/2025 0823 magnesium Oxide (MAG-OX) tablet 400 mg 400 mg Oral Given     04/23/2025 0823 metoprolol tartrate (LOPRESSOR) tablet 25 mg 25 mg Oral Given     04/23/2025 0823 midodrine (PROAMATINE) tablet 5 mg 5 mg Oral Given     04/23/2025 1121 midodrine (PROAMATINE) tablet 5 mg 5 mg Oral Given     04/23/2025 0823 pantoprazole (PROTONIX) EC tablet 40 mg 40 mg Oral Given     04/23/2025 0402 hydrOXYzine HCL (ATARAX) tablet 25 mg 25 mg Oral Given     04/23/2025 0340 ipratropium-albuterol (DUO-NEB) 0.5-2.5 mg/3 mL inhalation solution 3 mL 3 mL Nebulization Given     04/23/2025 0823 ipratropium-albuterol (DUO-NEB) 0.5-2.5 mg/3 mL inhalation solution 3 mL 3 mL Nebulization Given      04/23/2025 0402 traZODone (DESYREL) tablet 50 mg 50 mg Oral Given     04/23/2025 0403 magnesium sulfate 2 g/50 mL IVPB (premix) 2 g 2 g Intravenous New Bag            Scheduled Medications:  azithromycin, 500 mg, Intravenous, Q24H  budesonide, 0.5 mg, Nebulization, Q12H  digoxin, 125 mcg, Oral, Once per day on Monday Wednesday Friday  furosemide, 50 mg, Intravenous, BID  ipratropium-albuterol, 3 mL, Nebulization, Q6H While awake  magnesium Oxide, 400 mg, Oral, Daily  metoprolol tartrate, 25 mg, Oral, Q12H JESUS  midodrine, 5 mg, Oral, TID AC  nicotine, 1 patch, Transdermal, Daily  pantoprazole, 40 mg, Oral, BID  traZODone, 50 mg, Oral, HS  warfarin, 5 mg, Oral, Daily (warfarin)  magnesium sulfate 2 g/50 mL IVPB (premix) 2 g  Dose: 2 g  Freq: Once Route: IV  Indications of Use: HYPOMAGNESEMIA  Last Dose: Stopped (04/23/25 0620)  Start: 04/23/25 0345 End: 04/23/25 0620  sodium chloride 0.9 % inhalation solution 3 mL  Dose: 3 mL  Freq: 3 times daily (RESP) Route: NEBULIZATION  Start: 04/23/25 0330 End: 04/23/25 0705  methylPREDNISolone sodium succinate (Solu-MEDROL) injection 40 mg  Dose: 40 mg  Freq: Every 8 hours Route: IV  Start: 04/23/25 0600 End: 04/24/25 0919  QUEtiapine (SEROquel) tablet 100 mg  Dose: 100 mg  Freq: Once Route: PO  Start: 04/24/25 0015 End: 04/24/25 0009    Continuous IV Infusions: NONE     PRN Meds:  acetaminophen, 650 mg, Oral, Q6H PRN  benzonatate, 100 mg, Oral, TID PRN  calcium carbonate, 1,000 mg, Oral, Daily PRN  hydrOXYzine HCL, 25 mg, Oral, Q6H PRN - given x1 4/23  levalbuterol, 1.25 mg, Nebulization, Q8H PRN  trimethobenzamide, 200 mg, Intramuscular, Q6H PRN      ED Triage Vitals   Temperature Pulse Respirations Blood Pressure SpO2 Pain Score   04/22/25 2124 04/22/25 2140 04/22/25 2124 04/22/25 2125 04/22/25 2125 04/22/25 2125   97.6 °F (36.4 °C) 55 22 108/98 (!) 78 % 2     Weight (last 2 days)       Date/Time Weight    04/24/25 0600 76.8 (169.4)    04/23/25 1345 78 (171.96)    04/22/25  2125 79.6 (175.49)            Vital Signs (last 3 days)       Date/Time Temp Pulse Resp BP MAP (mmHg) SpO2 Calculated FIO2 (%) - Nasal Cannula Nasal Cannula O2 Flow Rate (L/min) O2 Device Patient Position - Orthostatic VS Pain    04/24/25 1100 -- -- -- -- -- -- -- -- -- -- No Pain    04/24/25 0850 98.2 °F (36.8 °C) 60 18 101/70 80 -- -- -- -- Sitting --    04/24/25 0837 -- -- -- -- -- -- -- -- None (Room air) -- --    04/24/25 06:30:34 -- -- -- 93/69 77 -- -- -- -- -- --    04/23/25 22:53:33 98.1 °F (36.7 °C) 60 -- 102/66 78 96 % -- -- -- -- --    04/23/25 21:13:25 -- 88 -- 119/76 90 95 % -- -- -- -- --    04/23/25 1947 -- 61 -- -- -- 94 % 28 2 L/min -- -- No Pain    04/23/25 1916 -- -- -- -- -- 97 % -- -- -- -- --    04/23/25 1858 -- 63 18 140/94 109 93 % -- -- None (Room air) Sitting --    04/23/25 1505 -- -- -- -- -- -- 28 2 L/min Nasal cannula -- No Pain    04/23/25 15:01:20 98.1 °F (36.7 °C) 88 20 111/77 88 91 % -- -- -- -- --    04/23/25 1417 -- -- -- -- -- -- -- -- None (Room air) -- --    04/23/25 13:51:14 98.1 °F (36.7 °C) 75 -- 117/84 95 97 % -- -- -- Lying No Pain    04/23/25 1345 -- -- -- -- -- -- 28 2 L/min Nasal cannula -- No Pain    04/23/25 0628 -- 59 20 99/72 -- 96 % 28 2 L/min Nasal cannula Sitting --    04/23/25 0340 -- -- -- -- -- 99 % -- -- -- -- --    04/23/25 0000 -- 114 25 136/73 94 95 % -- -- Nasal cannula Sitting --    04/22/25 2330 -- 105 20 119/91 102 96 % 28 2 L/min Nasal cannula Sitting --    04/22/25 2140 -- 55 22 114/85 97 95 % 28 2 L/min Nasal cannula Lying --    04/22/25 2125 -- -- -- 108/98 -- 78 % -- -- None (Room air) Lying 2    04/22/25 2124 97.6 °F (36.4 °C) -- 22 -- -- -- -- -- -- -- --              Pertinent Labs/Diagnostic Test Results:   Radiology:  CT chest wo contrast   Final Interpretation by Poli Mendoza DO (04/23 0145)      No acute abnormality.      Pulmonary fibrosis.      Stable left lower lobe pulmonary nodule. Recommend 12-month follow-up noncontrast CT of the  chest.      Debris within the esophagus suggesting reflux.               Workstation performed: [id number]         XR chest 1 view portable   Final Interpretation by George Parson MD (04/23 0919)      Mild central pulmonary vascular congestion.            Workstation performed: [id number]           Cardiology:  ECG 12 lead   Final Result by John Hwang MD (04/23 1446)   Atrial fibrillation with  RBBB and intermittent V-paced beats   Right bundle branch block   Abnormal ECG   No significant change since prior tracing   Confirmed by John Hwang ([id number]) on 4/23/2025 2:46:19 PM              Results from last 7 days   Lab Units 04/23/25 0627 04/22/25 2208 04/20/25 0628 04/19/25  1404   WBC Thousand/uL 7.63 8.11 6.90 6.67   HEMOGLOBIN g/dL 12.6 14.4 12.8 13.8   HEMATOCRIT % 39.6 46.4 40.9 43.7   PLATELETS Thousands/uL 208 213 238 242   TOTAL NEUT ABS Thousands/µL  --  5.32  --  4.21         Results from last 7 days   Lab Units 04/24/25  1008 04/23/25 0627 04/22/25 2327 04/20/25 0628 04/19/25  1404   SODIUM mmol/L 136 135 134* 136 134*   POTASSIUM mmol/L 4.5 4.0 4.3 4.4 4.7   CHLORIDE mmol/L 101 102 102 102 100   CO2 mmol/L 26 25 25 27 27   ANION GAP mmol/L 9 8 7 7 7   BUN mg/dL 42* 40* 37* 35* 33*   CREATININE mg/dL 1.49* 1.59* 1.49* 1.59* 1.58*   EGFR ml/min/1.73sq m 50 46 50 46 47   CALCIUM mg/dL 9.3 9.0 9.0 9.0 9.6   MAGNESIUM mg/dL  --   --  1.8* 1.8*  --    PHOSPHORUS mg/dL  --   --  4.4  --   --      Results from last 7 days   Lab Units 04/23/25  0627 04/22/25 2327 04/20/25 0628 04/19/25  1404   AST U/L 30 30 21 26   ALT U/L 21 21 17 21   ALK PHOS U/L 106* 119* 108* 131*   TOTAL PROTEIN g/dL 6.8 7.2 6.5 7.4   ALBUMIN g/dL 3.6 3.7 3.4* 4.0   TOTAL BILIRUBIN mg/dL 0.49 0.47 0.73 0.87         Results from last 7 days   Lab Units 04/24/25  1008 04/23/25  0627 04/22/25  2327 04/20/25  0628 04/19/25  1404   GLUCOSE RANDOM mg/dL 182* 143* 90 90 104             Beta- Hydroxybutyrate   Date Value Ref  Range Status   09/18/2024 <0.05 0.02 - 0.27 mmol/L Final          Results from last 7 days   Lab Units 04/23/25  0627 04/23/25  0025 04/22/25  2232   PH SHAHANA  7.440* 7.329 7.336   PCO2 SHAHANA mm Hg 37.4* 43.1 40.2*   PO2 SHAHANA mm Hg 116.1* 45.5* 36.9   HCO3 SHAHANA mmol/L 24.8 22.2* 21.0*   BASE EXC SHAHANA mmol/L 0.9 -3.7 -4.5   O2 CONTENT SHAHANA ml/dL 18.3 14.9 12.3   O2 HGB, VENOUS % 94.9* 73.7 63.0     Results from last 7 days   Lab Units 04/23/25  0405 04/23/25  0141 04/22/25 2327 04/19/25  1940 04/19/25  1726 04/19/25  1404   HS TNI 0HR ng/L  --   --  122*  --   --  108*   HS TNI 2HR ng/L  --  88*  --   --  91*  --    HSTNI D2 ng/L  --  -34  --   --  -17  --    HS TNI 4HR ng/L 72*  --   --  94*  --   --    HSTNI D4 ng/L -50  --   --  -14  --   --      Results from last 7 days   Lab Units 04/22/25 2208   D-DIMER QUANTITATIVE ug/ml FEU <0.27     Results from last 7 days   Lab Units 04/23/25  0627 04/22/25 2208 04/20/25  0652   PROTIME seconds 26.3* 23.5* 20.7*   INR  2.41* 2.07* 1.74*   PTT seconds  --  37*  --      Results from last 7 days   Lab Units 04/22/25  2327   TSH 3RD GENERATON uIU/mL 8.109*     Results from last 7 days   Lab Units 04/24/25  1008 04/22/25  2327   PROCALCITONIN ng/ml 0.08 0.07         Results from last 7 days   Lab Units 04/22/25 2327 04/19/25  1404   DIGOXIN LVL ng/mL 0.4* 0.5*     Results from last 7 days   Lab Units 04/22/25 2208 04/19/25  1404   BNP pg/mL 1,034* 1,178*     Results from last 7 days   Lab Units 04/23/25  0627   CRP mg/L 14.3*         Results from last 7 days   Lab Units 04/23/25  0148   CLARITY UA  Clear   COLOR UA  Yellow   SPEC GRAV UA  1.010   PH UA  6.0   GLUCOSE UA mg/dl Negative   KETONES UA mg/dl Negative   BLOOD UA  Negative   PROTEIN UA mg/dl Negative   NITRITE UA  Negative   BILIRUBIN UA  Negative   UROBILINOGEN UA E.U./dl 0.2   LEUKOCYTES UA  Negative         Past Medical History:   Diagnosis Date    Anxiety     Cardiomyopathy (HCC)     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    CHF (congestive heart failure) (HCC)     Depression     Emphysema of lung (HCC)     GERD (gastroesophageal reflux disease)     Gout     Hypertension     Metabolic encephalopathy 03/22/2025    Neuropathy     Raynaud's disease     Right inguinal hernia     Scleroderma (HCC)      Present on Admission:   GERD (gastroesophageal reflux disease)   Atrial fibrillation with RVR (HCC)   Stage 3a chronic kidney disease (CKD) (HCC)   Primary hypertension   Acute on chronic systolic congestive heart failure (HCC)   Anxiety   Pulmonary fibrosis (HCC)   Acute respiratory failure with hypoxia (HCC)   Longstanding persistent atrial fibrillation (HCC)   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 male    Network Utilization Review Department  ATTENTION: Please call with any questions or concerns to [phone] and carefully listen to the prompts so that you are directed to the right person. All voicemails are confidential.   For Discharge needs, contact Care Management DC Support Team at [phone] opt. 2  Send all requests for admission clinical reviews, approved or denied determinations and any other requests to dedicated fax number below belonging to the campus where the patient is receiving treatment. List of dedicated fax numbers for the Facilities:  FACILITY NAME UR FAX NUMBER   ADMISSION DENIALS (Administrative/Medical Necessity) [phone]   DISCHARGE SUPPORT TEAM (NETWORK) [phone]   PARENT CHILD HEALTH (Maternity/NICU/Pediatrics) [phone]   [facility] [phone]   General acute hospital [phone]   [facility] [phone]   [facility] [phone]   [facility] [phone]   [facility] [phone]   [facility] [phone]   [facility] [phone]   [facility] [phone]   [facility] [phone]   [facility] [phone]   [facility] [phone]

## 2025-04-24 NOTE — ASSESSMENT & PLAN NOTE
Lab Results   Component Value Date    EGFR 46 04/23/2025    EGFR 50 04/22/2025    EGFR 46 04/20/2025    CREATININE 1.59 (H) 04/23/2025    CREATININE 1.49 (H) 04/22/2025    CREATININE 1.59 (H) 04/20/2025

## 2025-04-24 NOTE — ASSESSMENT & PLAN NOTE
Lab Results   Component Value Date    EGFR 46 04/23/2025    EGFR 50 04/22/2025    EGFR 46 04/20/2025    CREATININE 1.59 (H) 04/23/2025    CREATININE 1.49 (H) 04/22/2025    CREATININE 1.59 (H) 04/20/2025   Creatinine on admission noted to be 1.49  Creatinine seems to be improved from previous  Avoid nephrotoxic meds and hypotension

## 2025-04-24 NOTE — ASSESSMENT & PLAN NOTE
Active methamphetamine abuser  LVEF 25% on echo 3/24/2025  GDMT: Spironolactone 12.5 mg daily, Toprol tartrate 25 mg twice daily, unable to afford Entresto or SGLT2

## 2025-04-24 NOTE — ASSESSMENT & PLAN NOTE
Rate has been maintained on digoxin 125 mcg 3 times weekly, metoprolol tartrate 25 mg twice daily  On anticoagulation with warfarin

## 2025-04-24 NOTE — PROGRESS NOTES
Progress Note - Cardiology   Name: Britton Batista 59 y.o. male I MRN: [id number]  Unit/Bed#: -01 I Date of Admission: 4/22/2025   Date of Service: 4/24/2025 I Hospital Day: 1     Assessment & Plan  Acute on chronic systolic congestive heart failure (HCC)  Wt Readings from Last 3 Encounters:   04/24/25 76.8 kg (169 lb 6.4 oz)   04/20/25 76 kg (167 lb 9.6 oz)   04/12/25 73.9 kg (163 lb)   With recent hospitalization for the same, discharged on 4/10 weighing 163 pounds  Presenting on 4/22 with worsening shortness of breath, BNP 1034  Chest imaging showing pulmonary vascular congestion and pulmonary fibrosis  Patient reports compliance with home Lasix 40 mg daily and low sodium diet  Has received IV Lasix 80 mg x 1 dose, 50 mg x 2 doses  No output tracked  Weight 175 lbs => 169 lbs  Patient clinically appears improved, reports improvement in breathing almost to his baseline level, still on 2 L NC  Continue IV lasix 50 mg BID  Monitor daily standing weights, fluid/sodium restriction, strict I/Os, BMP  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)  Active methamphetamine abuser  LVEF 25% on echo 3/24/2025  GDMT: Spironolactone 12.5 mg daily, Toprol tartrate 25 mg twice daily, unable to afford Entresto or SGLT2  Longstanding persistent atrial fibrillation (HCC)  Rate has been maintained on digoxin 125 mcg 3 times weekly, metoprolol tartrate 25 mg twice daily  On anticoagulation with warfarin  Primary hypertension  Well-controlled  Continue to monitor with diuresis  Anxiety    GERD (gastroesophageal reflux disease)    Atrial fibrillation with RVR (HCC)    Stage 3a chronic kidney disease (CKD) (HCC)  Lab Results   Component Value Date    EGFR 46 04/23/2025    EGFR 50 04/22/2025    EGFR 46 04/20/2025    CREATININE 1.59 (H) 04/23/2025    CREATININE 1.49 (H) 04/22/2025    CREATININE 1.59 (H) 04/20/2025     Pulmonary fibrosis (HCC)    Acute respiratory failure with hypoxia (HCC)      Subjective   Patient seen and examined at  bedside.  He reports he is breathing better today, states he feels he is almost back to his baseline.  He says he did not have a significant urine output, although his weight is down 6 pounds.  He remains on 2 L NC.  Offers no new complaints.    Objective :  Temp:  [98.1 °F (36.7 °C)-98.2 °F (36.8 °C)] 98.2 °F (36.8 °C)  HR:  [60-88] 60  BP: ()/(66-94) 101/70  Resp:  [18-20] 18  SpO2:  [91 %-97 %] 96 %  O2 Device: None (Room air)  Nasal Cannula O2 Flow Rate (L/min):  [2 L/min] 2 L/min  Orthostatic Blood Pressures      Flowsheet Row Most Recent Value   Blood Pressure 101/70 filed at 04/24/2025 0850   Patient Position - Orthostatic VS Sitting filed at 04/24/2025 0850          First Weight: Weight - Scale: 79.6 kg (175 lb 7.8 oz) (04/22/25 2125)  Vitals:    04/23/25 1345 04/24/25 0600   Weight: 78 kg (171 lb 15.3 oz) 76.8 kg (169 lb 6.4 oz)     Physical Exam  Vitals reviewed.   Constitutional:       General: He is not in acute distress.     Appearance: He is not diaphoretic.   HENT:      Head: Normocephalic and atraumatic.   Eyes:      Pupils: Pupils are equal, round, and reactive to light.   Neck:      Vascular: No carotid bruit.   Cardiovascular:      Rate and Rhythm: Normal rate. Rhythm irregular.      Pulses: Normal pulses.      Heart sounds: Normal heart sounds. No murmur heard.  Pulmonary:      Effort: Pulmonary effort is normal.      Breath sounds: Decreased breath sounds present.      Comments: On 2 L NC  Abdominal:      General: There is no distension.      Palpations: Abdomen is soft.      Tenderness: There is no abdominal tenderness.   Musculoskeletal:      Cervical back: Normal range of motion.      Right lower leg: No edema.      Left lower leg: No edema.   Skin:     General: Skin is warm.      Capillary Refill: Capillary refill takes less than 2 seconds.   Neurological:      General: No focal deficit present.      Mental Status: He is alert and oriented to person, place, and time. Mental status is  at baseline.   Psychiatric:         Mood and Affect: Mood normal.         Behavior: Behavior normal.         Thought Content: Thought content normal.           Lab Results: I have reviewed the following results:  Results from last 7 days   Lab Units 04/23/25 0627 04/22/25 2208 04/20/25 0628   WBC Thousand/uL 7.63 8.11 6.90   HEMOGLOBIN g/dL 12.6 14.4 12.8   HEMATOCRIT % 39.6 46.4 40.9   PLATELETS Thousands/uL 208 213 238     Results from last 7 days   Lab Units 04/23/25 0627 04/22/25 2327 04/20/25 0628   POTASSIUM mmol/L 4.0 4.3 4.4   CHLORIDE mmol/L 102 102 102   CO2 mmol/L 25 25 27   BUN mg/dL 40* 37* 35*   CREATININE mg/dL 1.59* 1.49* 1.59*   CALCIUM mg/dL 9.0 9.0 9.0     Results from last 7 days   Lab Units 04/23/25 0627 04/22/25 2208 04/20/25 0652   INR  2.41* 2.07* 1.74*   PTT seconds  --  37*  --      Lab Results   Component Value Date    HGBA1C 5.9 (H) 10/25/2021     Lab Results   Component Value Date    CKTOTAL 65 09/18/2024    TROPONINI 0.04 10/25/2021

## 2025-04-24 NOTE — CASE MANAGEMENT
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     Scleroderma ([facility]) 2025     Hyperkalemia 2025     Stage 3a chronic kidney disease (CKD) ([facility]) 2025     Cardiogenic shock ([facility]) 2025     Atrial fibrillation with RVR ([facility]) 2025     Syncope 2024     Chest pain 2024     Acute on chronic systolic congestive heart failure ([facility]) 2024     Primary hypertension 2024     Hypomagnesemia 2023     Methamphetamine abuse ([facility]) 2023     Tobacco abuse 2023     D-dimer, elevated 2023     Cardiomyopathy ([facility]) 10/09/2023     IRINEO (acute kidney injury) ([facility]) 10/08/2023     Acute on chronic combined systolic and diastolic congestive heart failure ([facility]) 10/07/2023     Hypertensive emergency 10/07/2023     Anxiety      Depression      Other emphysema ([facility])      GERD (gastroesophageal reflux disease)      Gout      Hypertensive urgency      Neuropathy      Raynaud's disease      Unilateral inguinal hernia, without obstruction or gangrene, not specified as recurrent      PSS (progressive systemic sclerosis) ([facility]) 10/25/2021     Near syncope 10/25/2021     Osteomyelitis ([facility]) 10/25/2021     Elevated troponin 10/25/2021     Acute encephalopathy 10/25/2021       LOS (days): 1  Geometric Mean LOS (GMLOS) (days): 3.9  Days to GMLOS:2.5     OBJECTIVE:  PATIENT READMITTED TO HOSPITAL  Risk of Unplanned Readmission Score: 53.03         Current admission  status: Inpatient       Preferred Pharmacy:   [facility] - [place], [place] - [address]  [address]  [place] [address]  Phone: [phone] Fax: [phone]    [facility] - [place] - [address]  [facility],  [address]  [facility],  First Floor [place] [facility] [place] [address]  Phone: [phone] Fax: [phone]    [id number] [place] - [address] [address]  Phone: [phone] Fax: [phone]    Primary Care Provider: Juan Galindo DO    Primary Insurance: [facility] REP  Secondary Insurance:     ASSESSMENT:  Active Health Care Proxies       Arin Batista Health Care Representative - Sister   Primary Phone: [phone] (Mobile)                 Advance Directives  Does patient have a Health Care POA?: No  Was patient offered paperwork?: Yes (pt declined)  Does patient currently have a Health Care decision maker?: Yes, please see Health Care Proxy section  Does patient have Advance Directives?: No  Was patient offered paperwork?: Yes (pt declined)  Primary Contact: Arin Batista- sister         Readmission Root Cause  30 Day Readmission: Yes  During your hospital stay, did someone (provider, nurse, ) explain your care to you in a way you could understand?: Yes  Did you feel medically stable to leave the hospital?: Yes  Were you able to pay for your medication at the pharmacy?: Yes  Did you have reliable transportation to take you to your appointments?: Yes  During previous admission, was a post-acute recommendation made?: No  Patient was readmitted due to: Patient presented to the ED with worsening shortness of breath that started 4/22/25. HX COPD.  Action Plan: oxygen, consult pulmonology and cardiology, consult nutrition, IV ABT, lab work    Patient Information  Admitted from:: Home  Mental Status: Alert  During Assessment patient was accompanied by: Not accompanied during assessment  Assessment  information provided by:: Patient  Primary Caregiver: Self  Support Systems: Self, Family members  County of Residence: [facility]  What [facility] do you live in?: [place]  Home entry access options. Select all that apply.: Stairs  Number of steps to enter home.: One Flight  Do the steps have railings?: Yes  Type of Current Residence: Apartment  Floor Level: 2  Upon entering residence, is there a bedroom on the main floor (no further steps)?: Yes  Upon entering residence, is there a bathroom on the main floor (no further steps)?: Yes  Living Arrangements: Lives w/ Family members (brother)  Is patient a ?: No    Activities of Daily Living Prior to Admission  Functional Status: Independent  Completes ADLs independently?: Yes  Ambulates independently?: Yes  Does patient use assisted devices?: No  Does patient currently own DME?: Yes  What DME does the patient currently own?: Nebulizer, Straight Cane  Does patient have a history of Outpatient Therapy (PT/OT)?: No  Does the patient have a history of Short-Term Rehab?: No  Does patient have a history of HHC?: No  Does patient currently have HHC?: No         Patient Information Continued  Income Source: SSI/SSD  Does patient have prescription coverage?: Yes  Can the patient afford their medications and any related supplies (such as glucometers or test strips)?: Yes  Does patient receive dialysis treatments?: No  Does patient have a history of substance abuse?: Yes  Historical substance use preference: Alcohol/ETOH, Marijuana, Methamphetamines  History of Withdrawal Symptoms: Denies past symptoms  Is patient currently in treatment for substance abuse?: No. Patient declined treatment information.  Does patient have a history of Mental Health Diagnosis?: Yes (Anxiety and Depression)  Is patient receiving treatment for mental health?: Yes (medication managed)  Has patient received inpatient treatment related to mental health in the last 2 years?: No         Means of  "Transportation  Means of Transport to Appts:: Family transport      Social Determinants of Health (SDOH)      Flowsheet Row Most Recent Value   Housing Stability    In the last 12 months, was there a time when you were not able to pay the mortgage or rent on time? N   In the past 12 months, how many times have you moved where you were living? 0   At any time in the past 12 months, were you homeless or living in a shelter (including now)? N   Transportation Needs    In the past 12 months, has lack of transportation kept you from medical appointments or from getting medications? no   In the past 12 months, has lack of transportation kept you from meetings, work, or from getting things needed for daily living? No   Food Insecurity    Within the past 12 months, you worried that your food would run out before you got the money to buy more. Never true   Within the past 12 months, the food you bought just didn't last and you didn't have money to get more. Never true   Utilities    In the past 12 months has the electric, gas, oil, or water company threatened to shut off services in your home? No            DISCHARGE DETAILS:    Discharge planning discussed with:: patient    CM met with pt to review role of CM and discuss any supports needed upon discharge. Baseline information obtained, pt indicates he lives at home with his brother in a 2nd floor apartment with 1 fight of steps to enter apartment. Pt indicates he is independent with ADLs, ambulates without any assistive devices. Pt indicates his sister provides transportation, since pt does not drive. Pt indicated he uses marijuana and smokes cigarettes daily, pt denies currently using alcohol or methamphetamines, pt stated the last time he used methamphetamines was a \"month ago\". CM asked pt is he would like to speak with a D&A specialist from [facility] to discuss drug use, pt refusing to speak with anyone regarding drug use. Pt stated \" I don't have a problem\". CM " "discussed if pt was able to obtain medication when he previously left the hospital, pt stated \"yes\". CM asked pt if he followed up with his PCP Dr Galindo after recent hospital discharge, pt stated \"no, I just come here\". CM discussed with pt he needs to follow up with his PCP after he is discharged from the hospital, pt rolled his eyes and stated \"yeah\".   Freedom of Choice: Yes   OP CM referral made to nichol Law aware pt was admitted to hospital, message received from OP CM Nory Law \" The behavioral health  leader and addiction coordinator leader is aware for Kb and will be trying to see him in the hospital.\"     Pt is medically cleared for discharge today. Discussed with pt if he would like to follow up with Op CM for the network for HF education via [employer] and assist with weekly follow up, pt stated \"yeah\". CM sent a message via teams to Priscilla Abrams, that pt is being discharged and is agreeable for HF education and weekly follow up.     1400- call received from Priscilla OP JESSE, pt is already established with Op CM Nichol Law, since pt is already established with a OP CM Priscilla will not follow pt, he will continue to be followed by Nichol Boogie Op CM.   CM contacted family/caregiver?: Yes  Were Treatment Team discharge recommendations reviewed with patient/caregiver?: Yes  Did patient/caregiver verbalize understanding of patient care needs?: Yes  Were patient/caregiver advised of the risks associated with not following Treatment Team discharge recommendations?: Yes    Contacts  Patient Contacts: Arin Batista- sister  Relationship to Patient:: Family  Contact Method: Phone  Phone Number: [phone]  Reason/Outcome: Discharge Planning, Continuity of Care    Requested Home Health Care         Is the patient interested in HHC at discharge?: No    DME requested- Nebulizer machine- pt stated he does not have one at home.   Agreeable to place order through [email or web address].   Order for nebulizer " machine placed with [employer], pending insurance approval.   Nebulizer machine provided to bedside for home, pt signed delivery ticket.        Would you like to participate in our [facility] service program?  : No - Declined    Treatment Team Recommendation: Home  Discharge Destination Plan:: Home  Transport at Discharge : Family

## 2025-04-25 ENCOUNTER — HOSPITAL ENCOUNTER (INPATIENT)
Facility: HOSPITAL | Age: 60
LOS: 5 days | Discharge: HOME | DRG: 291 | End: 2025-04-30
Attending: EMERGENCY MEDICINE | Admitting: ANESTHESIOLOGY
Payer: COMMERCIAL

## 2025-04-25 ENCOUNTER — APPOINTMENT (EMERGENCY)
Dept: CT IMAGING | Facility: HOSPITAL | Age: 60
DRG: 291 | End: 2025-04-25
Payer: COMMERCIAL

## 2025-04-25 ENCOUNTER — APPOINTMENT (INPATIENT)
Dept: ULTRASOUND IMAGING | Facility: HOSPITAL | Age: 60
DRG: 291 | End: 2025-04-25
Payer: COMMERCIAL

## 2025-04-25 DIAGNOSIS — N18.31 ACUTE KIDNEY INJURY SUPERIMPOSED ON STAGE 3A CHRONIC KIDNEY DISEASE (HCC): ICD-10-CM

## 2025-04-25 DIAGNOSIS — E86.0 DEHYDRATION: ICD-10-CM

## 2025-04-25 DIAGNOSIS — I48.91 ATRIAL FIBRILLATION WITH RVR (HCC): ICD-10-CM

## 2025-04-25 DIAGNOSIS — K74.60 CIRRHOSIS (HCC): ICD-10-CM

## 2025-04-25 DIAGNOSIS — J84.10 PULMONARY FIBROSIS (HCC): ICD-10-CM

## 2025-04-25 DIAGNOSIS — R57.0 CARDIOGENIC SHOCK (HCC): ICD-10-CM

## 2025-04-25 DIAGNOSIS — R10.9 INTRACTABLE ABDOMINAL PAIN: Primary | ICD-10-CM

## 2025-04-25 DIAGNOSIS — R57.9 SHOCK (HCC): ICD-10-CM

## 2025-04-25 DIAGNOSIS — N17.9 ACUTE KIDNEY INJURY SUPERIMPOSED ON STAGE 3A CHRONIC KIDNEY DISEASE (HCC): ICD-10-CM

## 2025-04-25 DIAGNOSIS — N17.9 AKI (ACUTE KIDNEY INJURY) (HCC): ICD-10-CM

## 2025-04-25 PROBLEM — E87.20 LACTIC ACIDOSIS: Status: ACTIVE | Noted: 2025-04-25

## 2025-04-25 LAB
ALBUMIN SERPL BCG-MCNC: 3.8 G/DL (ref 3.5–5)
ALP SERPL-CCNC: 95 U/L (ref 34–104)
ALT SERPL W P-5'-P-CCNC: 21 U/L (ref 7–52)
ANION GAP SERPL CALCULATED.3IONS-SCNC: 10 MMOL/L (ref 4–13)
AST SERPL W P-5'-P-CCNC: 28 U/L (ref 13–39)
BACTERIA UR QL AUTO: NORMAL /HPF
BASOPHILS # BLD AUTO: 0.02 THOUSANDS/ÂΜL (ref 0–0.1)
BASOPHILS NFR BLD AUTO: 0 % (ref 0–1)
BILIRUB SERPL-MCNC: 0.59 MG/DL (ref 0.2–1)
BILIRUB UR QL STRIP: NEGATIVE
BUN SERPL-MCNC: 55 MG/DL (ref 5–25)
CALCIUM SERPL-MCNC: 9.2 MG/DL (ref 8.4–10.2)
CHLORIDE SERPL-SCNC: 101 MMOL/L (ref 96–108)
CLARITY UR: CLEAR
CO2 SERPL-SCNC: 21 MMOL/L (ref 21–32)
COLOR UR: YELLOW
CREAT SERPL-MCNC: 1.87 MG/DL (ref 0.6–1.3)
DIGOXIN SERPL-MCNC: 0.3 NG/ML (ref 0.8–2)
EOSINOPHIL # BLD AUTO: 0.01 THOUSAND/ÂΜL (ref 0–0.61)
EOSINOPHIL NFR BLD AUTO: 0 % (ref 0–6)
ERYTHROCYTE [DISTWIDTH] IN BLOOD BY AUTOMATED COUNT: 17.8 % (ref 11.6–15.1)
GFR SERPL CREATININE-BSD FRML MDRD: 38 ML/MIN/1.73SQ M
GLUCOSE SERPL-MCNC: 105 MG/DL (ref 65–140)
GLUCOSE SERPL-MCNC: 136 MG/DL (ref 65–140)
GLUCOSE SERPL-MCNC: 139 MG/DL (ref 65–140)
GLUCOSE SERPL-MCNC: 176 MG/DL (ref 65–140)
GLUCOSE SERPL-MCNC: 53 MG/DL (ref 65–140)
GLUCOSE SERPL-MCNC: 54 MG/DL (ref 65–140)
GLUCOSE SERPL-MCNC: 55 MG/DL (ref 65–140)
GLUCOSE UR STRIP-MCNC: NEGATIVE MG/DL
HCT VFR BLD AUTO: 47.7 % (ref 36.5–49.3)
HGB BLD-MCNC: 14.6 G/DL (ref 12–17)
HGB UR QL STRIP.AUTO: NEGATIVE
IMM GRANULOCYTES # BLD AUTO: 0.13 THOUSAND/UL (ref 0–0.2)
IMM GRANULOCYTES NFR BLD AUTO: 1 % (ref 0–2)
INR PPP: 4.86 (ref 0.85–1.19)
KETONES UR STRIP-MCNC: NEGATIVE MG/DL
LACTATE SERPL-SCNC: 2.2 MMOL/L (ref 0.5–2)
LACTATE SERPL-SCNC: 2.5 MMOL/L (ref 0.5–2)
LACTATE SERPL-SCNC: 2.8 MMOL/L (ref 0.5–2)
LACTATE SERPL-SCNC: 2.9 MMOL/L (ref 0.5–2)
LEUKOCYTE ESTERASE UR QL STRIP: NEGATIVE
LIPASE SERPL-CCNC: 32 U/L (ref 11–82)
LYMPHOCYTES # BLD AUTO: 1.44 THOUSANDS/ÂΜL (ref 0.6–4.47)
LYMPHOCYTES NFR BLD AUTO: 10 % (ref 14–44)
MCH RBC QN AUTO: 28.5 PG (ref 26.8–34.3)
MCHC RBC AUTO-ENTMCNC: 30.6 G/DL (ref 31.4–37.4)
MCV RBC AUTO: 93 FL (ref 82–98)
MONOCYTES # BLD AUTO: 1.63 THOUSAND/ÂΜL (ref 0.17–1.22)
MONOCYTES NFR BLD AUTO: 11 % (ref 4–12)
NEUTROPHILS # BLD AUTO: 11.5 THOUSANDS/ÂΜL (ref 1.85–7.62)
NEUTS SEG NFR BLD AUTO: 78 % (ref 43–75)
NITRITE UR QL STRIP: NEGATIVE
NON-SQ EPI CELLS URNS QL MICRO: NORMAL /HPF
NRBC BLD AUTO-RTO: 0 /100 WBCS
PH UR STRIP.AUTO: 6 [PH]
PLATELET # BLD AUTO: 234 THOUSANDS/UL (ref 149–390)
PMV BLD AUTO: 9.4 FL (ref 8.9–12.7)
POTASSIUM SERPL-SCNC: 5.1 MMOL/L (ref 3.5–5.3)
POTASSIUM SERPL-SCNC: 5.6 MMOL/L (ref 3.5–5.3)
PROCALCITONIN SERPL-MCNC: 0.15 NG/ML
PROT SERPL-MCNC: 7.2 G/DL (ref 6.4–8.4)
PROT UR STRIP-MCNC: ABNORMAL MG/DL
PROTHROMBIN TIME: 44.7 SECONDS (ref 12.3–15)
RBC # BLD AUTO: 5.12 MILLION/UL (ref 3.88–5.62)
RBC #/AREA URNS AUTO: NORMAL /HPF
SODIUM SERPL-SCNC: 132 MMOL/L (ref 135–147)
SP GR UR STRIP.AUTO: 1.02 (ref 1–1.03)
UROBILINOGEN UR QL STRIP.AUTO: 0.2 E.U./DL
WBC # BLD AUTO: 14.73 THOUSAND/UL (ref 4.31–10.16)
WBC #/AREA URNS AUTO: NORMAL /HPF

## 2025-04-25 PROCEDURE — 81001 URINALYSIS AUTO W/SCOPE: CPT | Performed by: EMERGENCY MEDICINE

## 2025-04-25 PROCEDURE — 83605 ASSAY OF LACTIC ACID: CPT | Performed by: PHYSICIAN ASSISTANT

## 2025-04-25 PROCEDURE — 99285 EMERGENCY DEPT VISIT HI MDM: CPT | Performed by: EMERGENCY MEDICINE

## 2025-04-25 PROCEDURE — 83605 ASSAY OF LACTIC ACID: CPT | Performed by: EMERGENCY MEDICINE

## 2025-04-25 PROCEDURE — 94664 DEMO&/EVAL PT USE INHALER: CPT

## 2025-04-25 PROCEDURE — 83605 ASSAY OF LACTIC ACID: CPT | Performed by: INTERNAL MEDICINE

## 2025-04-25 PROCEDURE — 94760 N-INVAS EAR/PLS OXIMETRY 1: CPT

## 2025-04-25 PROCEDURE — 80053 COMPREHEN METABOLIC PANEL: CPT | Performed by: EMERGENCY MEDICINE

## 2025-04-25 PROCEDURE — 84132 ASSAY OF SERUM POTASSIUM: CPT | Performed by: INTERNAL MEDICINE

## 2025-04-25 PROCEDURE — 82948 REAGENT STRIP/BLOOD GLUCOSE: CPT

## 2025-04-25 PROCEDURE — 93005 ELECTROCARDIOGRAM TRACING: CPT

## 2025-04-25 PROCEDURE — 36415 COLL VENOUS BLD VENIPUNCTURE: CPT | Performed by: EMERGENCY MEDICINE

## 2025-04-25 PROCEDURE — 74176 CT ABD & PELVIS W/O CONTRAST: CPT

## 2025-04-25 PROCEDURE — 96375 TX/PRO/DX INJ NEW DRUG ADDON: CPT

## 2025-04-25 PROCEDURE — 96361 HYDRATE IV INFUSION ADD-ON: CPT

## 2025-04-25 PROCEDURE — 85610 PROTHROMBIN TIME: CPT | Performed by: INTERNAL MEDICINE

## 2025-04-25 PROCEDURE — 94640 AIRWAY INHALATION TREATMENT: CPT

## 2025-04-25 PROCEDURE — 83690 ASSAY OF LIPASE: CPT | Performed by: EMERGENCY MEDICINE

## 2025-04-25 PROCEDURE — 76700 US EXAM ABDOM COMPLETE: CPT

## 2025-04-25 PROCEDURE — 80162 ASSAY OF DIGOXIN TOTAL: CPT | Performed by: INTERNAL MEDICINE

## 2025-04-25 PROCEDURE — 96376 TX/PRO/DX INJ SAME DRUG ADON: CPT

## 2025-04-25 PROCEDURE — 84145 PROCALCITONIN (PCT): CPT | Performed by: EMERGENCY MEDICINE

## 2025-04-25 PROCEDURE — 99223 1ST HOSP IP/OBS HIGH 75: CPT | Performed by: INTERNAL MEDICINE

## 2025-04-25 PROCEDURE — 85025 COMPLETE CBC W/AUTO DIFF WBC: CPT | Performed by: EMERGENCY MEDICINE

## 2025-04-25 PROCEDURE — 99285 EMERGENCY DEPT VISIT HI MDM: CPT

## 2025-04-25 PROCEDURE — 96374 THER/PROPH/DIAG INJ IV PUSH: CPT

## 2025-04-25 RX ORDER — WARFARIN SODIUM 5 MG/1
5 TABLET ORAL
Status: COMPLETED | OUTPATIENT
Start: 2025-04-25 | End: 2025-04-25

## 2025-04-25 RX ORDER — METOPROLOL TARTRATE 25 MG/1
25 TABLET, FILM COATED ORAL EVERY 12 HOURS SCHEDULED
Status: DISCONTINUED | OUTPATIENT
Start: 2025-04-25 | End: 2025-04-28

## 2025-04-25 RX ORDER — DEXTROSE MONOHYDRATE 25 G/50ML
50 INJECTION, SOLUTION INTRAVENOUS ONCE
Status: DISCONTINUED | OUTPATIENT
Start: 2025-04-25 | End: 2025-04-26

## 2025-04-25 RX ORDER — SUCRALFATE 1 G/1
1 TABLET ORAL
Status: DISPENSED | OUTPATIENT
Start: 2025-04-25 | End: 2025-04-27

## 2025-04-25 RX ORDER — ACETAMINOPHEN 325 MG/1
650 TABLET ORAL EVERY 6 HOURS PRN
Status: DISCONTINUED | OUTPATIENT
Start: 2025-04-25 | End: 2025-04-26

## 2025-04-25 RX ORDER — LANOLIN ALCOHOL/MO/W.PET/CERES
400 CREAM (GRAM) TOPICAL DAILY
Status: DISCONTINUED | OUTPATIENT
Start: 2025-04-26 | End: 2025-04-30 | Stop reason: HOSPADM

## 2025-04-25 RX ORDER — PANTOPRAZOLE SODIUM 40 MG/1
40 TABLET, DELAYED RELEASE ORAL 2 TIMES DAILY
Status: DISCONTINUED | OUTPATIENT
Start: 2025-04-25 | End: 2025-04-30 | Stop reason: HOSPADM

## 2025-04-25 RX ORDER — NICOTINE 21 MG/24HR
1 PATCH, TRANSDERMAL 24 HOURS TRANSDERMAL DAILY
Status: DISCONTINUED | OUTPATIENT
Start: 2025-04-26 | End: 2025-04-25

## 2025-04-25 RX ORDER — ONDANSETRON 2 MG/ML
4 INJECTION INTRAMUSCULAR; INTRAVENOUS EVERY 6 HOURS PRN
Status: DISCONTINUED | OUTPATIENT
Start: 2025-04-25 | End: 2025-04-25

## 2025-04-25 RX ORDER — ALBUTEROL SULFATE 90 UG/1
2 INHALANT RESPIRATORY (INHALATION) EVERY 6 HOURS PRN
Status: DISCONTINUED | OUTPATIENT
Start: 2025-04-25 | End: 2025-04-30 | Stop reason: HOSPADM

## 2025-04-25 RX ORDER — SODIUM CHLORIDE, SODIUM GLUCONATE, SODIUM ACETATE, POTASSIUM CHLORIDE, MAGNESIUM CHLORIDE, SODIUM PHOSPHATE, DIBASIC, AND POTASSIUM PHOSPHATE .53; .5; .37; .037; .03; .012; .00082 G/100ML; G/100ML; G/100ML; G/100ML; G/100ML; G/100ML; G/100ML
1000 INJECTION, SOLUTION INTRAVENOUS ONCE
Status: DISCONTINUED | OUTPATIENT
Start: 2025-04-25 | End: 2025-04-25

## 2025-04-25 RX ORDER — DEXTROSE MONOHYDRATE 25 G/50ML
INJECTION, SOLUTION INTRAVENOUS
Status: COMPLETED
Start: 2025-04-25 | End: 2025-04-25

## 2025-04-25 RX ORDER — ONDANSETRON 2 MG/ML
4 INJECTION INTRAMUSCULAR; INTRAVENOUS ONCE
Status: COMPLETED | OUTPATIENT
Start: 2025-04-25 | End: 2025-04-25

## 2025-04-25 RX ORDER — FUROSEMIDE 10 MG/ML
40 INJECTION INTRAMUSCULAR; INTRAVENOUS
Status: DISCONTINUED | OUTPATIENT
Start: 2025-04-26 | End: 2025-04-26

## 2025-04-25 RX ORDER — DIGOXIN 125 MCG
125 TABLET ORAL 3 TIMES WEEKLY
Status: DISCONTINUED | OUTPATIENT
Start: 2025-04-25 | End: 2025-04-30 | Stop reason: HOSPADM

## 2025-04-25 RX ORDER — NICOTINE 21 MG/24HR
1 PATCH, TRANSDERMAL 24 HOURS TRANSDERMAL DAILY
Status: DISCONTINUED | OUTPATIENT
Start: 2025-04-25 | End: 2025-04-30 | Stop reason: HOSPADM

## 2025-04-25 RX ORDER — METOPROLOL TARTRATE 1 MG/ML
2.5 INJECTION, SOLUTION INTRAVENOUS ONCE
Status: COMPLETED | OUTPATIENT
Start: 2025-04-25 | End: 2025-04-25

## 2025-04-25 RX ORDER — MIDODRINE HYDROCHLORIDE 5 MG/1
5 TABLET ORAL
Status: DISCONTINUED | OUTPATIENT
Start: 2025-04-25 | End: 2025-04-26

## 2025-04-25 RX ORDER — IPRATROPIUM BROMIDE AND ALBUTEROL SULFATE 2.5; .5 MG/3ML; MG/3ML
3 SOLUTION RESPIRATORY (INHALATION)
Status: DISCONTINUED | OUTPATIENT
Start: 2025-04-25 | End: 2025-04-26

## 2025-04-25 RX ADMIN — DIGOXIN 125 MCG: 125 TABLET ORAL at 19:22

## 2025-04-25 RX ADMIN — SODIUM CHLORIDE 1000 ML: 0.9 INJECTION, SOLUTION INTRAVENOUS at 10:15

## 2025-04-25 RX ADMIN — MORPHINE SULFATE 2 MG: 2 INJECTION, SOLUTION INTRAMUSCULAR; INTRAVENOUS at 08:06

## 2025-04-25 RX ADMIN — ACETAMINOPHEN 650 MG: 325 TABLET ORAL at 18:09

## 2025-04-25 RX ADMIN — SUCRALFATE 1 G: 1 TABLET ORAL at 20:38

## 2025-04-25 RX ADMIN — NICOTINE 1 PATCH: 14 PATCH, EXTENDED RELEASE TRANSDERMAL at 23:12

## 2025-04-25 RX ADMIN — SODIUM CHLORIDE 1000 ML: 0.9 INJECTION, SOLUTION INTRAVENOUS at 13:19

## 2025-04-25 RX ADMIN — WARFARIN SODIUM 5 MG: 5 TABLET ORAL at 19:22

## 2025-04-25 RX ADMIN — DEXTROSE 150 MG: 50 INJECTION, SOLUTION INTRAVENOUS at 19:11

## 2025-04-25 RX ADMIN — DEXTROSE MONOHYDRATE: 500 INJECTION PARENTERAL at 19:20

## 2025-04-25 RX ADMIN — METOPROLOL TARTRATE 2.5 MG: 5 INJECTION INTRAVENOUS at 17:35

## 2025-04-25 RX ADMIN — METOPROLOL TARTRATE 25 MG: 25 TABLET, FILM COATED ORAL at 20:38

## 2025-04-25 RX ADMIN — PANTOPRAZOLE SODIUM 40 MG: 40 TABLET, DELAYED RELEASE ORAL at 19:22

## 2025-04-25 RX ADMIN — MIDODRINE HYDROCHLORIDE 5 MG: 5 TABLET ORAL at 18:24

## 2025-04-25 RX ADMIN — AMIODARONE HYDROCHLORIDE 1 MG/MIN: 50 INJECTION, SOLUTION INTRAVENOUS at 19:24

## 2025-04-25 RX ADMIN — MORPHINE SULFATE 2 MG: 2 INJECTION, SOLUTION INTRAMUSCULAR; INTRAVENOUS at 10:43

## 2025-04-25 RX ADMIN — IPRATROPIUM BROMIDE AND ALBUTEROL SULFATE 3 ML: .5; 3 SOLUTION RESPIRATORY (INHALATION) at 16:49

## 2025-04-25 RX ADMIN — Medication 3 MG: at 20:38

## 2025-04-25 RX ADMIN — ONDANSETRON 4 MG: 2 INJECTION INTRAMUSCULAR; INTRAVENOUS at 08:06

## 2025-04-25 NOTE — ASSESSMENT & PLAN NOTE
Secondary to methamphetamine abuse  Managed on Lasix, coreg, spironolactone  No longer taking lisinopril per sister.   Unable to afford Entresto or Jardiance  Close outpatient cardiology follow-up recommended upon discharge

## 2025-04-25 NOTE — RAPID RESPONSE
Rapid Response Note  Britton Batista 59 y.o. male MRN: [id number]  Unit/Bed#: -01 Encounter: [id number]    Rapid Response Notification(s):   Response called date/time:  4/25/2025 6:21 PM  Response team arrival date/time:  4/25/2025 6:21 PM  Response end date/time:  4/25/2025 6:33 PM  Level of care:  [facility]  Rapid response location:  [facility] unit  Primary reason for rapid response call:  Acute change in BP, acute change in heart rate, acute change in neuro status and acute change in O2 sat    Rapid Response Intervention(s):   Airway:  None  Breathing:  Oxygen  Circulation:  None  Fluids administered:  Normal saline  Medications administered:  Amiodarone and D50       Assessment:   Hypoglycemia - 53->55->176  Afib with RVR  Hypotension  AMS    Plan:   D50 x1 - Repeat glucose still low after juice and D50.  Suspected still reading low due too Raynaud's/cold fingers.  Glucose checked with blood draw.  Glucose found to be 176.  Afib RVR - Received lopressor prior to RRT.  Amio bolus and amio gtt ordered during RRT.  Digoxin noted to be ordered at 1600 but not given.  Administered in ICU.   Hypotension - Patient on midodrine at baseline.  Dosage held earlier in the day.  Given during rapid.  IVF bolus ordered and stopped on arrival to the ICU with a SBP of 122.       Rapid Response Outcome:   Transfer:  Transfer to stepdown 2  Primary service notified of transfer: Yes    Code Status: Level 1 (Full Code)      Family notified: Yes, Name of Family member contacted Arin Batista.          Background/Situation:   Britton Batista is a 59 y.o. male who rapid response was called for hypoxia, Afib RVR, hypoglycemia, hypotension and AMS.  Hospitalist at bedside.  Patient transferred to [facility].  Hypoxia, HoTN, AMS and hypoglycemia resolved.     Objective:   Vitals:    04/25/25 1640 04/25/25 1709 04/25/25 1809 04/25/25 1922   BP:  106/50 90/52    BP Location:       Pulse:  (!) 136  (!) 136   Resp:       Temp:       SpO2: 92%       Weight:         Physical Exam  Constitutional:       General: He is not in acute distress.     Appearance: He is ill-appearing. He is not diaphoretic.   HENT:      Head: Normocephalic and atraumatic.   Eyes:      Extraocular Movements: Extraocular movements intact.      Pupils: Pupils are equal, round, and reactive to light.   Cardiovascular:      Rate and Rhythm: Tachycardia present. Rhythm irregular.   Pulmonary:      Effort: Pulmonary effort is normal.      Breath sounds: Decreased breath sounds present.   Abdominal:      General: Abdomen is flat.      Palpations: Abdomen is soft.   Musculoskeletal:         General: No deformity or signs of injury. Normal range of motion.      Cervical back: No tenderness.      Right lower leg: No edema.      Left lower leg: No edema.   Lymphadenopathy:      Cervical: No cervical adenopathy.   Skin:     General: Skin is warm and dry.   Neurological:      General: No focal deficit present.      Mental Status: He is alert and oriented to person, place, and time.

## 2025-04-25 NOTE — ASSESSMENT & PLAN NOTE
Wt Readings from Last 3 Encounters:   04/25/25 81.9 kg (180 lb 8.9 oz)   04/24/25 76.8 kg (169 lb 5 oz)   04/24/25 76.8 kg (169 lb 6.4 oz)       Patient with recent hospitalization for acute on chronic combined systolic and diastolic heart failure.  Was on IV diuretics.  Weight increased today however will get standing weight.  Continue with current IV diuretics.  Continue with strict intake output monitoring.  Continue with sodium and fluid restricted diet.

## 2025-04-25 NOTE — CONSULTS
Consultation - Surgery-General   Name: Britton Batista 59 y.o. male I MRN: [id number]  Unit/Bed#: ED 03 I Date of Admission: 4/24/2025   Date of Service: 4/24/2025 I Hospital Day: 0   Consult to surgery general  Consult performed by: Ravi Arnett MD  Consult ordered by: Matt Arteaga MD        Physician Requesting Evaluation: Matt Atreaga MD   Reason for Evaluation / Principal Problem: Incarcerated umbilical hernia    Assessment & Plan  Incarcerated umbilical hernia  Patient is a pleasant 59-year-old male with multiple comorbid conditions most notably congestive heart failure, CHF systemically anticoagulated on Coumadin with an INR of 2.41 on April 23 and COPD presenting with the onset of periumbilical abdominal pain after exerting himself at home.    Here in the emergency room his evaluation included a history and physical examination as well as serum blood work demonstrating a leukocytosis and CT of the abdomen pelvis which suggest jested the presence of a fluid-filled umbilical hernia.    On my physical examination the patient had a soft but tender umbilical hernia.  It was able to be successfully reduced.  The patient noted relief of pain following the reduction of the umbilical hernia.    In light of the patient's multiple comorbid conditions and his systemic anticoagulation on Coumadin we discussed the equally reasonable options of being admitted for observation overnight.  As a safe and reasonable alternative patient was offered discharge to home with the understanding that he should return to the emergency room for recurrent symptoms referable to his umbilical hernia.    The merits of each approach discussed at length with both patient and his sister who accompanies him at the bedside this evening.  Together we decided that it was safe and reasonable to allow him to be released to home with the understanding that he return to the hospital for recurrent symptoms.  Medications reviewed. Continue  current medications at prescribed doses.    History of Present Illness   Britton Batista is a 59 y.o. male who presents with an incarcerated umbilical hernia for which general surgery consultation was quested.    Review of Systems   Constitutional:  Negative for chills and fever.   HENT:  Negative for ear pain and sore throat.    Eyes:  Negative for pain and visual disturbance.   Respiratory:  Negative for cough and shortness of breath.    Cardiovascular:  Negative for chest pain and palpitations.   Gastrointestinal:  Positive for abdominal pain. Negative for vomiting.   Genitourinary:  Negative for dysuria and hematuria.   Musculoskeletal:  Negative for arthralgias and back pain.   Skin:  Negative for color change and rash.   Neurological:  Negative for seizures and syncope.   All other systems reviewed and are negative.    Medical History Review: I have reviewed the patient's PMH, PSH, Social History, Family History, Meds, and Allergies     Objective :  Temp:  [98.2 °F (36.8 °C)-98.3 °F (36.8 °C)] 98.3 °F (36.8 °C)  HR:  [41-70] 61  BP: ()/() 106/75  Resp:  [17-18] 18  SpO2:  [78 %-95 %] 91 %  O2 Device: None (Room air)      Physical Exam  Vitals and nursing note reviewed.   Constitutional:       General: He is not in acute distress.     Appearance: He is well-developed.   HENT:      Head: Normocephalic and atraumatic.   Eyes:      Conjunctiva/sclera: Conjunctivae normal.   Cardiovascular:      Rate and Rhythm: Normal rate and regular rhythm.      Heart sounds: No murmur heard.  Pulmonary:      Effort: Pulmonary effort is normal. No respiratory distress.      Breath sounds: Normal breath sounds.   Abdominal:      Palpations: Abdomen is soft.      Tenderness: There is abdominal tenderness.      Comments: Incarcerated umbilical hernia containing intra-abdominal ascites easily reduced in the supine position.   Musculoskeletal:         General: No swelling.      Cervical back: Neck supple.   Skin:      General: Skin is warm and dry.      Capillary Refill: Capillary refill takes less than 2 seconds.   Neurological:      Mental Status: He is alert.   Psychiatric:         Mood and Affect: Mood normal.         Lab Results: I have reviewed the following results:  Recent Labs     04/22/25 2208 04/22/25 2327 04/22/25 2327 04/23/25  0141 04/23/25  0627 04/24/25  1008 04/24/25  2140   WBC 8.11  --   --   --  7.63  --  19.66*   HGB 14.4  --   --   --  12.6  --  14.0   HCT 46.4  --   --   --  39.6  --  44.8     --   --   --  208  --  248   SODIUM  --  134*   < >  --  135   < > 135   K  --  4.3   < >  --  4.0   < > 4.6   CL  --  102   < >  --  102   < > 98   CO2  --  25   < >  --  25   < > 28   BUN  --  37*   < >  --  40*   < > 48*   CREATININE  --  1.49*   < >  --  1.59*   < > 1.83*   GLUC  --  90   < >  --  143*   < > 120   MG  --  1.8*  --   --   --   --   --    PHOS  --  4.4  --   --   --   --   --    AST  --  30   < >  --  30  --  25   ALT  --  21   < >  --  21  --  24   ALB  --  3.7   < >  --  3.6  --  4.1   TBILI  --  0.47   < >  --  0.49  --  0.49   ALKPHOS  --  119*   < >  --  106*  --  97   PTT 37*  --   --   --   --   --   --    INR 2.07*  --   --   --  2.41*  --   --    HSTNI0  --  122*  --   --   --   --   --    HSTNI2  --   --   --  88*  --   --   --    BNP 1,034*  --   --   --   --   --   --    LACTICACID  --   --   --   --   --   --  3.1*    < > = values in this interval not displayed.       Imaging Results Review: I personally reviewed the following image studies/reports in PACS and discussed pertinent findings with Radiology: CT abdomen/pelvis. My interpretation of the radiology images/reports is: Fluid containing umbilical hernia.  Other Study Results Review: No additional pertinent studies reviewed.        Administrative Statements   I have spent a total time of 20 minutes in caring for this patient on the day of the visit/encounter including Risks and benefits of tx options.

## 2025-04-25 NOTE — NURSING NOTE
Pt upright in bed. Complaints of abdominal pain. Pt overall skin color dusky. Fingers and toes cool. Pulse ox to forehead for more accurate sat monitoring. Sats go anywhere from 70's - 90's . 02 in place at 3lpm. Hr tachy. Pt having sob. Reached out to provider regarding above. See orders . Rt in to give pt breathing tx. Discussing sat monitoring.

## 2025-04-25 NOTE — H&P
H&P - Hospitalist   Name: Britton Batista 59 y.o. male I MRN: [id number]  Unit/Bed#: -01 I Date of Admission: 4/25/2025   Date of Service: 4/25/2025 I Hospital Day: 0     Assessment & Plan  Acute on chronic combined systolic and diastolic congestive heart failure (HCC)  Wt Readings from Last 3 Encounters:   04/25/25 81.9 kg (180 lb 8.9 oz)   04/24/25 76.8 kg (169 lb 5 oz)   04/24/25 76.8 kg (169 lb 6.4 oz)       Patient with recent hospitalization for acute on chronic combined systolic and diastolic heart failure.  Was on IV diuretics.  Weight increased today however will get standing weight.  Continue with current IV diuretics.  Continue with strict intake output monitoring.  Continue with sodium and fluid restricted diet.      Pulmonary fibrosis (HCC)  Chronic illness.  Does not appear to have any acute exacerbation.  Continue to monitor respiratory status closely.  Abdominal pain  Patient was discharged yesterday after being treated for acute on chronic heart failure.  Presents back with worsening shortness of breath and abdominal discomfort.  CT of the abdomen today showsPersistent mild nephrogram of bilateral kidneys and extended excretory phase suggesting renal failure. The urinary bladder is filled with IV contrast from prior study. Correlate for urinary retention. There is no hydroureteronephrosis.Persistent moderate ascites, mesenteric edema, and partially imaged small pericardial effusion with cardiomegaly, suggesting third spacing. The gallbladder is filled with IV contrast with surrounding fluid which is possibly related to ascites and   vicarious excretion of the contrast. Acute cholecystitis cannot entirely be excluded in the right clinical setting.  Does not have any LFT elevation or right upper quadrant tenderness to suggest cholecystitis.  Follow-up on abdominal ultrasound as CAT scan mentions cirrhotic liver morphology.  Also has fat-containing umbilical hernia.  Continue with PPI, as needed  Bentyl.  Longstanding persistent atrial fibrillation (HCC)  Follow-up on EKG.  Continue with outpatient dose of digoxin and metoprolol.  Follow-up on digoxin levels.  Acute kidney injury superimposed on stage 3a chronic kidney disease (HCC)  Lab Results   Component Value Date    EGFR 38 04/25/2025    EGFR 39 04/24/2025    EGFR 50 04/24/2025    CREATININE 1.87 (H) 04/25/2025    CREATININE 1.83 (H) 04/24/2025    CREATININE 1.49 (H) 04/24/2025   Patient has baseline creatinine up to 1.5.  Creatinine elevated at 1.8 today.  Possible cardiorenal syndrome as a likely etiology.  Continue with diuresis.  Hold spironolactone for now in view of hyperkalemia.  Continue to monitor renal functions closely.  Continue with urinary retention protocol.  Follow-up with urinalysis with microscopy.  Strict intake output monitoring.  Cardiomyopathy (HCC)  Secondary to methamphetamine abuse  Managed on Lasix, coreg, spironolactone  No longer taking lisinopril per sister.   Unable to afford Entresto or Jardiance  Close outpatient cardiology follow-up recommended upon discharge  Hyperkalemia  Potassium elevated upon admission.  Follow-up on EKG.  Continue with telemetry.  Give K Lokelma x 1 dose.  Received 2 L of IV fluids in the emergency room.  Continue with furosemide.  Continue with calcium gluconate.  Follow-up on repeat potassium stat.  Lactic acidosis  Likely secondary to poor tissue perfusion in setting of advanced cardiomyopathy.  Does not appear to have any infection.  Received 2 L of IV fluids in the emergency room.  Follow-up repeat lactic acid.  Follow-up on blood cultures.  Continue to monitor off antibiotics      VTE Pharmacologic Prophylaxis:   High Risk (Score >/= 5) - Pharmacological DVT Prophylaxis Ordered: heparin. Sequential Compression Devices Ordered.  Code Status: Prior FUll Code   Discussion with family: Patient declined call to .     Anticipated Length of Stay: Patient will be admitted on an  inpatient basis with an anticipated length of stay of greater than 2 midnights secondary to management as documented above.    History of Present Illness   Chief Complaint: Abdominal pain    Britton Batista is a 59 y.o. male with a PMH of cardiomyopathy, chronic combined systolic and diastolic heart failure, chronic kidney disease, chronic atrial fibrillation, pulmonary fibrosis, depression, Raynaud's disease, scleroderma who presents with abdominal pain.  Denies any nausea or vomiting.  Was recently discharged from the emergency room last night and was hospitalized and discharged from the hospital on 4/24..  Patient states that this morning at around 2 AM he started having intense epigastric abdominal discomfort.  Denies any nausea, vomiting, diarrhea.  Also reported some shortness of breath but denied any cough, fever, chills.  Denied any worsening lower extremity swelling.  Denied any lightheadedness dizziness or syncopal episode.  Denied any rectal bleeding or melanotic stools.    Review of Systems   Constitutional:  Positive for activity change, appetite change, diaphoresis and fatigue.   HENT: Negative.     Respiratory:  Positive for shortness of breath.    Gastrointestinal:  Positive for abdominal pain. Negative for diarrhea, nausea and vomiting.   Genitourinary: Negative.    Musculoskeletal:  Positive for gait problem.   Skin:  Positive for color change.   Neurological:  Positive for weakness.   Psychiatric/Behavioral:  The patient is nervous/anxious.        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    CHF (congestive heart failure) (HCC)     Depression     Emphysema of lung (HCC)     GERD (gastroesophageal reflux disease)     Gout     Hypertension     Metabolic encephalopathy 03/22/2025    Neuropathy     Raynaud's disease     Right inguinal hernia     Scleroderma (HCC)      Past Surgical History:  "  Procedure Laterality Date    AMPUTATION Right     pt had tip of \"pointer finger\" d/t scleraderma    CARDIAC PACEMAKER PLACEMENT      FINGER AMPUTATION Left 01/31/2024    Procedure: AMPUTATION FINGER, LEFT INDEX FINGER CPT: 46287;  Surgeon: Pedro Vazquez MD;  Location:  MAIN OR;  Service: Orthopedics    HERNIA REPAIR Left     times 2    MO AMP F/TH 1/2 JT/PHALANX W/NEURECT W/DIR CLSR Right 01/23/2024    Procedure: AMPUTATION FINGER, RIGHT MIDDLE;  Surgeon: Pedro Vazquez MD;  Location: [facility];  Service: Orthopedics    MO RPR 1ST INGUN HRNA AGE 5 YRS/> REDUCIBLE Right 03/03/2023    Procedure: OPEN INGUINAL HERNIA REPAIR WITH MESH;  Surgeon: Temo Jackson DO;  Location:  MAIN OR;  Service: General     Social History     Tobacco Use    Smoking status: Every Day     Current packs/day: 1.00     Average packs/day: 1 pack/day for 45.3 years (45.3 ttl pk-yrs)     Types: Cigarettes     Start date: 2000    Smokeless tobacco: Never    Tobacco comments:     Last cigarette 0430   Vaping Use    Vaping status: Never Used   Substance and Sexual Activity    Alcohol use: Not Currently     Comment: occasional    Drug use: Yes     Types: Marijuana, Methamphetamines     Comment: Hx meth use    Sexual activity: Not Currently     Comment: defer     E-Cigarette/Vaping    E-Cigarette Use Never User      E-Cigarette/Vaping Substances    Nicotine No     THC No     CBD No     Flavoring No     Other No     Unknown No        Social History:  Marital Status:      Meds/Allergies   I have reviewed home medications using recent Epic encounter.  Prior to Admission medications    Medication Sig Start Date End Date Taking? Authorizing Provider   digoxin (LANOXIN) 0.125 mg tablet Take 1 tablet (125 mcg total) by mouth 3 (three) times a week 3/28/25  Yes Conrad Bynum MD   furosemide (LASIX) 40 mg tablet Take 1 tablet (40 mg total) by mouth daily 3/28/25 4/27/25 Yes Conrad Bynum MD   magnesium Oxide (MAG-OX) 400 mg TABS Take 1 tablet " (400 mg total) by mouth daily 3/28/25  Yes Conrad Bynum MD   melatonin 3 mg Take 1 tablet (3 mg total) by mouth daily at bedtime 4/24/25 5/24/25 Yes Conrad Bynum MD   metoprolol tartrate (LOPRESSOR) 25 mg tablet Take 1 tablet (25 mg total) by mouth every 12 (twelve) hours 4/20/25  Yes Cheryl Sin PA-C   midodrine (PROAMATINE) 5 mg tablet Take 1 tablet (5 mg total) by mouth 3 (three) times a day before meals 4/20/25  Yes Cheryl Sin PA-C   pantoprazole (PROTONIX) 40 mg tablet Take 1 tablet (40 mg total) by mouth 2 (two) times a day 10/10/24 4/25/25 Yes Armida Coppola MD   spironolactone (ALDACTONE) 25 mg tablet Take 0.5 tablets (12.5 mg total) by mouth daily 3/28/25  Yes Conrad Bynum MD   warfarin (COUMADIN) 5 mg tablet Target INR range is between 2-3 3/28/25  Yes Conrad Bynum MD   albuterol (ProAir HFA) 90 mcg/act inhaler Inhale 2 puffs every 6 (six) hours as needed for wheezing 3/28/25   Conrad Bynum MD   ipratropium-albuterol (DUO-NEB) 0.5-2.5 mg/3 mL nebulizer solution Take 3 mL by nebulization every 6 (six) hours while awake 4/24/25   Conrad Bynum MD   isoniazid (NYDRAZID) 300 mg tablet Take 1 tablet by mouth in the morning  Patient not taking: Reported on 4/25/2025 4/3/25   Historical Provider, MD     Allergies   Allergen Reactions    Aspirin GI Intolerance       Objective :  Temp:  [96.6 °F (35.9 °C)-98.3 °F (36.8 °C)] 97.5 °F (36.4 °C)  HR:  [45-70] 45  BP: (102-143)/() 109/72  Resp:  [17-18] 17  SpO2:  [85 %-97 %] 85 %  O2 Device: None (Room air)    Physical Exam  Constitutional:       General: He is in acute distress.      Appearance: He is ill-appearing.   HENT:      Head: Normocephalic.      Mouth/Throat:      Mouth: Mucous membranes are dry.   Eyes:      Extraocular Movements: Extraocular movements intact.      Pupils: Pupils are equal, round, and reactive to light.   Cardiovascular:      Rate and Rhythm: Normal rate. Rhythm irregular.      Heart sounds: Murmur  heard.   Pulmonary:      Comments: Diminished breath sounds at bases.  No wheezing or rales appreciated.  Abdominal:      General: Bowel sounds are normal.      Palpations: Abdomen is soft.      Comments: Epigastric tenderness.  No periumbilical tenderness.  No right upper quadrant tenderness rebound or guarding.  Bowel sounds audible.  No flank tenderness.   Musculoskeletal:      Comments: Trace bilateral lower extremity edema.   Neurological:      General: No focal deficit present.      Mental Status: He is alert and oriented to person, place, and time. Mental status is at baseline.          Lines/Drains:            Lab Results: I have reviewed the following results:  Results from last 7 days   Lab Units 04/25/25  0736   WBC Thousand/uL 14.73*   HEMOGLOBIN g/dL 14.6   HEMATOCRIT % 47.7   PLATELETS Thousands/uL 234   SEGS PCT % 78*   LYMPHO PCT % 10*   MONO PCT % 11   EOS PCT % 0     Results from last 7 days   Lab Units 04/25/25  0736   SODIUM mmol/L 132*   POTASSIUM mmol/L 5.6*   CHLORIDE mmol/L 101   CO2 mmol/L 21   BUN mg/dL 55*   CREATININE mg/dL 1.87*   ANION GAP mmol/L 10   CALCIUM mg/dL 9.2   ALBUMIN g/dL 3.8   TOTAL BILIRUBIN mg/dL 0.59   ALK PHOS U/L 95   ALT U/L 21   AST U/L 28   GLUCOSE RANDOM mg/dL 105     Results from last 7 days   Lab Units 04/23/25  0627   INR  2.41*         Lab Results   Component Value Date    HGBA1C 5.9 (H) 10/25/2021     Results from last 7 days   Lab Units 04/25/25  1318 04/25/25  1157 04/25/25  0815 04/24/25  2140 04/24/25  1008 04/22/25  2327   LACTIC ACID mmol/L  --  2.9* 2.2* 3.1*  --   --    PROCALCITONIN ng/ml 0.15  --   --   --  0.08 0.07       Imaging Results Review: I reviewed radiology reports from this admission including: CT abdomen/pelvis.      Administrative Statements       ** Please Note: This note has been constructed using a voice recognition system. **

## 2025-04-25 NOTE — ED PROVIDER NOTES
Time reflects when diagnosis was documented in both MDM as applicable and the Disposition within this note       Time User Action Codes Description Comment    4/25/2025  1:14 PM Camila Silva Add [R10.9] Intractable abdominal pain     4/25/2025  1:15 PM Camila Silva Add [K74.60] Cirrhosis (HCC)     4/25/2025  1:15 PM Camila Silva Add [E86.0] Dehydration           ED Disposition       ED Disposition   Admit    Condition   Stable    Date/Time   Fri Apr 25, 2025  1:14 PM    Comment   Case was discussed with Dr Bynum and the patient's admission status was agreed to be Admission Status: inpatient status to the service of Dr. Corrigan .               Assessment & Plan       Medical Decision Making  Ddx: SBO, Hernia, Bowel Ischemia    Amount and/or Complexity of Data Reviewed  Labs: ordered.  Radiology: ordered.    Risk  Prescription drug management.  Decision regarding hospitalization.        ED Course as of 04/25/25 1507   Fri Apr 25, 2025   1033 Although patient's abdominal exam appears to be more benign than it was when he previously came to the ED several hours ago he is complaining of worsening abdominal pain.  Given his escalation of symptoms we will repeat a CT scan of the abdomen pelvis however without contrast given his worsening renal failure.  Patient also found to have a 2.2 lactic acid in the ED.  His white blood cell count is elevated at 14.73 however his markedly improved from previous finding of 19,000 yesterday   1313 Case was discussed with Dr. Bynum who accepts the patient for admission under Dr. Corrigan       Medications   morphine injection 2 mg (2 mg Intravenous Given 4/25/25 0806)   ondansetron (ZOFRAN) injection 4 mg (4 mg Intravenous Given 4/25/25 0806)   sodium chloride 0.9 % bolus 1,000 mL (0 mL Intravenous Stopped 4/25/25 1121)   morphine injection 2 mg (2 mg Intravenous Given 4/25/25 1043)   sodium chloride 0.9 % bolus 1,000 mL (1,000 mL Intravenous New Bag 4/25/25 1319)       ED Risk  "Strat Scores                    No data recorded        SBIRT 22yo+      Flowsheet Row Most Recent Value   Initial Alcohol Screen: US AUDIT-C     1. How often do you have a drink containing alcohol? 0 Filed at: 04/25/2025 0729   2. How many drinks containing alcohol do you have on a typical day you are drinking?  0 Filed at: 04/25/2025 0729   3a. Male UNDER 65: How often do you have five or more drinks on one occasion? 0 Filed at: 04/25/2025 0729   3b. FEMALE Any Age, or MALE 65+: How often do you have 4 or more drinks on one occassion? 0 Filed at: 04/25/2025 0729   Audit-C Score 0 Filed at: 04/25/2025 0729   SILVIA: How many times in the past year have you...    Used an illegal drug or used a prescription medication for non-medical reasons? Never Filed at: 04/25/2025 0729                            History of Present Illness       Chief Complaint   Patient presents with    Abdominal Pain     Patient reports umbilical pain starting approximately 2 hours ago. Seen last night for the same thing; hernia reduced, was told to come back if the pain got worse. Nausea, denies vomiting.       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    CHF (congestive heart failure) (HCC)     Depression     Emphysema of lung (HCC)     GERD (gastroesophageal reflux disease)     Gout     Hypertension     Metabolic encephalopathy 03/22/2025    Neuropathy     Raynaud's disease     Right inguinal hernia     Scleroderma (HCC)       Past Surgical History:   Procedure Laterality Date    AMPUTATION Right     pt had tip of \"pointer finger\" d/t scleraderma    CARDIAC PACEMAKER PLACEMENT      FINGER AMPUTATION Left 01/31/2024    Procedure: AMPUTATION FINGER, LEFT INDEX FINGER CPT: 87925;  Surgeon: Pedro Vazquez MD;  Location:  MAIN OR;  Service: Orthopedics    HERNIA REPAIR Left     times 2    MT AMP F/TH 1/2 JT/PHALANX W/NEURECT W/DIR CLSR Right 01/23/2024    " Procedure: AMPUTATION FINGER, RIGHT MIDDLE;  Surgeon: Pedro Vazquez MD;  Location: [facility];  Service: Orthopedics    NE RPR 1ST INGUN HRNA AGE 5 YRS/> REDUCIBLE Right 03/03/2023    Procedure: OPEN INGUINAL HERNIA REPAIR WITH MESH;  Surgeon: Temo Jackson DO;  Location:  MAIN OR;  Service: General      Family History   Problem Relation Age of Onset    Hypertension Father       Social History     Tobacco Use    Smoking status: Every Day     Current packs/day: 1.00     Average packs/day: 1 pack/day for 45.3 years (45.3 ttl pk-yrs)     Types: Cigarettes     Start date: 2000    Smokeless tobacco: Never    Tobacco comments:     Last cigarette 0430   Vaping Use    Vaping status: Never Used   Substance Use Topics    Alcohol use: Not Currently     Comment: occasional    Drug use: Yes     Types: Marijuana, Methamphetamines     Comment: Hx meth use      E-Cigarette/Vaping    E-Cigarette Use Never User       E-Cigarette/Vaping Substances    Nicotine No     THC No     CBD No     Flavoring No     Other No     Unknown No       I have reviewed and agree with the history as documented.     59 yr old male presents for evaluation of umbilical pain starting 2 hrs ago. Patient presented to the ED last night for the same reason and was found to have an umbilical hernia which was reduced.  Patient states that he went home and the pain got worse however there is no swelling to the umbilicus as it was last night.  He is admitting to nausea without any vomiting.        Review of Systems   Constitutional:  Negative for chills and fever.   HENT:  Negative for ear pain and sore throat.    Eyes:  Negative for pain and visual disturbance.   Respiratory:  Negative for cough and shortness of breath.    Cardiovascular:  Negative for chest pain and palpitations.   Gastrointestinal:  Positive for abdominal pain and nausea. Negative for abdominal distention, anal bleeding, blood in stool, constipation, diarrhea, rectal pain and vomiting.    Genitourinary:  Negative for dysuria and hematuria.   Musculoskeletal:  Negative for arthralgias and back pain.   Skin:  Negative for color change and rash.   Neurological:  Negative for seizures and syncope.   All other systems reviewed and are negative.          Objective       ED Triage Vitals   Temperature Pulse Blood Pressure Respirations SpO2 Patient Position - Orthostatic VS   04/25/25 0727 04/25/25 0744 04/25/25 0727 04/25/25 0727 04/25/25 0744 04/25/25 0945   (!) 96.6 °F (35.9 °C) (!) 52 129/74 17 97 % Sitting      Temp src Heart Rate Source BP Location FiO2 (%) Pain Score    -- 04/25/25 0727 04/25/25 0945 -- 04/25/25 0727     Monitor Left arm  7      Vitals      Date and Time Temp Pulse SpO2 Resp BP Pain Score FACES Pain Rating User   04/25/25 1416 97.5 °F (36.4 °C) 45 85 % 17 109/72 -- -- DII   04/25/25 1043 -- -- -- -- -- 8 -- RR   04/25/25 0945 -- 60 93 % 18 118/86 -- -- RR   04/25/25 0806 -- -- -- -- -- 5 -- RR   04/25/25 0744 -- 52 97 % 18 -- -- -- RR   04/25/25 0727 96.6 °F (35.9 °C) -- -- 17 129/74 7 -- AS            Physical Exam  Vitals and nursing note reviewed.   Constitutional:       General: He is not in acute distress.     Appearance: He is well-developed. He is not ill-appearing, toxic-appearing or diaphoretic.   HENT:      Head: Normocephalic and atraumatic.      Mouth/Throat:      Mouth: Mucous membranes are moist.   Eyes:      Extraocular Movements: Extraocular movements intact.      Conjunctiva/sclera: Conjunctivae normal.   Cardiovascular:      Rate and Rhythm: Normal rate and regular rhythm.      Heart sounds: Normal heart sounds. No murmur heard.  Pulmonary:      Effort: Pulmonary effort is normal. No respiratory distress.      Breath sounds: Normal breath sounds.   Abdominal:      General: Abdomen is flat. Bowel sounds are normal.      Palpations: Abdomen is soft. There is no shifting dullness, fluid wave, hepatomegaly, splenomegaly, mass or pulsatile mass.      Tenderness: There  is abdominal tenderness in the periumbilical area. There is no right CVA tenderness, left CVA tenderness, guarding or rebound. Negative signs include Singh's sign, Rovsing's sign and McBurney's sign.      Hernia: No hernia is present.      Comments: No abdominal distension   Musculoskeletal:         General: No swelling.      Cervical back: Neck supple.   Skin:     General: Skin is warm and dry.      Capillary Refill: Capillary refill takes less than 2 seconds.      Coloration: Skin is not cyanotic, jaundiced, mottled or pale.      Findings: No erythema or rash.   Neurological:      General: No focal deficit present.      Mental Status: He is alert and oriented to person, place, and time.   Psychiatric:         Mood and Affect: Mood normal.         Results Reviewed       Procedure Component Value Units Date/Time    Procalcitonin [[id number]]  (Normal) Collected: 04/25/25 1318    Lab Status: Final result Specimen: Blood from Arm, Right Updated: 04/25/25 1349     Procalcitonin 0.15 ng/ml     Lactic acid 2 Hours [[id number]]  (Abnormal) Collected: 04/25/25 1157    Lab Status: Final result Specimen: Blood from Hand, Right Updated: 04/25/25 1224     LACTIC ACID 2.9 mmol/L     Narrative:      Result may be elevated if tourniquet was used during collection.    UA w Reflex to Microscopic w Reflex to Culture [[id number]]     Lab Status: No result Specimen: Urine     Lactic acid, plasma (w/reflex if result > 2.0) [[id number]]  (Abnormal) Collected: 04/25/25 0815    Lab Status: Final result Specimen: Blood from Hand, Right Updated: 04/25/25 0835     LACTIC ACID 2.2 mmol/L     Narrative:      Result may be elevated if tourniquet was used during collection.    CMP [[id number]]  (Abnormal) Collected: 04/25/25 0736    Lab Status: Final result Specimen: Blood from Arm, Left Updated: 04/25/25 0828     Sodium 132 mmol/L      Potassium 5.6 mmol/L      Chloride 101 mmol/L      CO2 21 mmol/L      ANION GAP 10 mmol/L      BUN 55 mg/dL       Creatinine 1.87 mg/dL      Glucose 105 mg/dL      Calcium 9.2 mg/dL      AST 28 U/L      ALT 21 U/L      Alkaline Phosphatase 95 U/L      Total Protein 7.2 g/dL      Albumin 3.8 g/dL      Total Bilirubin 0.59 mg/dL      eGFR 38 ml/min/1.73sq m     Narrative:      [employer] guidelines for Chronic Kidney Disease (CKD):     Stage 1 with normal or high GFR (GFR > 90 mL/min/1.73 square meters)    Stage 2 Mild CKD (GFR = 60-89 mL/min/1.73 square meters)    Stage 3A Moderate CKD (GFR = 45-59 mL/min/1.73 square meters)    Stage 3B Moderate CKD (GFR = 30-44 mL/min/1.73 square meters)    Stage 4 Severe CKD (GFR = 15-29 mL/min/1.73 square meters)    Stage 5 End Stage CKD (GFR <15 mL/min/1.73 square meters)  Note: GFR calculation is accurate only with a steady state creatinine    Lipase [[id number]]  (Normal) Collected: 04/25/25 0736    Lab Status: Final result Specimen: Blood from Arm, Left Updated: 04/25/25 0828     Lipase 32 u/L     CBC and differential [[id number]]  (Abnormal) Collected: 04/25/25 0736    Lab Status: Final result Specimen: Blood from Arm, Left Updated: 04/25/25 0741     WBC 14.73 Thousand/uL      RBC 5.12 Million/uL      Hemoglobin 14.6 g/dL      Hematocrit 47.7 %      MCV 93 fL      MCH 28.5 pg      MCHC 30.6 g/dL      RDW 17.8 %      MPV 9.4 fL      Platelets 234 Thousands/uL      nRBC 0 /100 WBCs      Segmented % 78 %      Immature Grans % 1 %      Lymphocytes % 10 %      Monocytes % 11 %      Eosinophils Relative 0 %      Basophils Relative 0 %      Absolute Neutrophils 11.50 Thousands/µL      Absolute Immature Grans 0.13 Thousand/uL      Absolute Lymphocytes 1.44 Thousands/µL      Absolute Monocytes 1.63 Thousand/µL      Eosinophils Absolute 0.01 Thousand/µL      Basophils Absolute 0.02 Thousands/µL             CT abdomen pelvis wo contrast   Final Interpretation by Joel Huerta MD (04/25 8251)   Limited assessment without IV contrast.      1.  Persistent mild nephrogram of  bilateral kidneys and extended excretory phase suggesting renal failure. The urinary bladder is filled with IV contrast from prior study. Correlate for urinary retention. There is no hydroureteronephrosis.      2.  Persistent moderate ascites, mesenteric edema, and partially imaged small pericardial effusion with cardiomegaly, suggesting third spacing. The gallbladder is filled with IV contrast with surrounding fluid which is possibly related to ascites and    vicarious excretion of the contrast. Acute cholecystitis cannot entirely be excluded in the right clinical setting.      3.  Minimal lobulated liver contour suggesting cirrhosis. Correlate with history and labs.      4.  Additional/chronic findings as above.            Workstation performed: [id number]             Procedures    ED Medication and Procedure Management   Prior to Admission Medications   Prescriptions Last Dose Informant Patient Reported? Taking?   albuterol (ProAir HFA) 90 mcg/act inhaler   No No   Sig: Inhale 2 puffs every 6 (six) hours as needed for wheezing   digoxin (LANOXIN) 0.125 mg tablet Past Week  No Yes   Sig: Take 1 tablet (125 mcg total) by mouth 3 (three) times a week   furosemide (LASIX) 40 mg tablet 4/24/2025  No Yes   Sig: Take 1 tablet (40 mg total) by mouth daily   ipratropium-albuterol (DUO-NEB) 0.5-2.5 mg/3 mL nebulizer solution   No No   Sig: Take 3 mL by nebulization every 6 (six) hours while awake   isoniazid (NYDRAZID) 300 mg tablet Not Taking  Yes No   Sig: Take 1 tablet by mouth in the morning   Patient not taking: Reported on 4/25/2025   magnesium Oxide (MAG-OX) 400 mg TABS 4/24/2025  No Yes   Sig: Take 1 tablet (400 mg total) by mouth daily   melatonin 3 mg 4/24/2025  No Yes   Sig: Take 1 tablet (3 mg total) by mouth daily at bedtime   metoprolol tartrate (LOPRESSOR) 25 mg tablet 4/24/2025  No Yes   Sig: Take 1 tablet (25 mg total) by mouth every 12 (twelve) hours   midodrine (PROAMATINE) 5 mg tablet 4/24/2025  No Yes    Sig: Take 1 tablet (5 mg total) by mouth 3 (three) times a day before meals   pantoprazole (PROTONIX) 40 mg tablet 4/24/2025 Self No Yes   Sig: Take 1 tablet (40 mg total) by mouth 2 (two) times a day   spironolactone (ALDACTONE) 25 mg tablet 4/24/2025  No Yes   Sig: Take 0.5 tablets (12.5 mg total) by mouth daily   warfarin (COUMADIN) 5 mg tablet 4/24/2025  No Yes   Sig: Target INR range is between 2-3      Facility-Administered Medications: None     Current Discharge Medication List        CONTINUE these medications which have NOT CHANGED    Details   digoxin (LANOXIN) 0.125 mg tablet Take 1 tablet (125 mcg total) by mouth 3 (three) times a week  Qty: 30 tablet, Refills: 0    Associated Diagnoses: Atrial fibrillation with RVR ([facility]); Chronic HFrEF (heart failure with reduced ejection fraction) ([facility])      furosemide (LASIX) 40 mg tablet Take 1 tablet (40 mg total) by mouth daily  Qty: 30 tablet, Refills: 0    Associated Diagnoses: Chronic HFrEF (heart failure with reduced ejection fraction) ([facility])      magnesium Oxide (MAG-OX) 400 mg TABS Take 1 tablet (400 mg total) by mouth daily  Qty: 30 tablet, Refills: 0    Associated Diagnoses: Atrial fibrillation with RVR ([facility])      melatonin 3 mg Take 1 tablet (3 mg total) by mouth daily at bedtime  Qty: 30 tablet, Refills: 0    Associated Diagnoses: Pulmonary fibrosis ([facility])      metoprolol tartrate (LOPRESSOR) 25 mg tablet Take 1 tablet (25 mg total) by mouth every 12 (twelve) hours  Qty: 60 tablet, Refills: 0    Associated Diagnoses: Atrial fibrillation with RVR ([facility])      midodrine (PROAMATINE) 5 mg tablet Take 1 tablet (5 mg total) by mouth 3 (three) times a day before meals  Qty: 90 tablet, Refills: 0    Associated Diagnoses: Atrial fibrillation with RVR (HCC)      pantoprazole (PROTONIX) 40 mg tablet Take 1 tablet (40 mg total) by mouth 2 (two) times a day  Qty: 180 tablet, Refills: 0    Associated Diagnoses: Esophageal obstruction due to food impaction;  Gastroesophageal reflux disease without esophagitis      spironolactone (ALDACTONE) 25 mg tablet Take 0.5 tablets (12.5 mg total) by mouth daily  Qty: 30 tablet, Refills: 0    Associated Diagnoses: Chronic HFrEF (heart failure with reduced ejection fraction) ([facility])      warfarin (COUMADIN) 5 mg tablet Target INR range is between 2-3  Qty: 30 tablet, Refills: 0    Associated Diagnoses: Atrial fibrillation with RVR ([facility])      albuterol (ProAir HFA) 90 mcg/act inhaler Inhale 2 puffs every 6 (six) hours as needed for wheezing  Qty: 8.5 g, Refills: 0    Comments: Substitution to a formulary equivalent within the same pharmaceutical class is authorized.  Associated Diagnoses: Other emphysema ([facility])      ipratropium-albuterol (DUO-NEB) 0.5-2.5 mg/3 mL nebulizer solution Take 3 mL by nebulization every 6 (six) hours while awake  Qty: 270 mL, Refills: 1    Associated Diagnoses: Pulmonary fibrosis ([facility])      isoniazid (NYDRAZID) 300 mg tablet Take 1 tablet by mouth in the morning           No discharge procedures on file.  ED SEPSIS DOCUMENTATION   Time reflects when diagnosis was documented in both MDM as applicable and the Disposition within this note       Time User Action Codes Description Comment    4/25/2025  1:14 PM Camila Silva [R10.9] Intractable abdominal pain     4/25/2025  1:15 PM Camila Silva [K74.60] Cirrhosis (HCC)     4/25/2025  1:15 PM Camila Silva [E86.0] Dehydration                  Losia Tabby Silva DO  04/25/25 3531

## 2025-04-25 NOTE — ASSESSMENT & PLAN NOTE
Potassium elevated upon admission.  Follow-up on EKG.  Continue with telemetry.  Give K Lokelma x 1 dose.  Received 2 L of IV fluids in the emergency room.  Continue with furosemide.  Continue with calcium gluconate.  Follow-up on repeat potassium stat.

## 2025-04-25 NOTE — RESPIRATORY THERAPY NOTE
RT Protocol Note  Britton Batista 59 y.o. 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    CHF (congestive heart failure) (HCC)     Depression     Emphysema of lung (HCC)     GERD (gastroesophageal reflux disease)     Gout     Hypertension     Metabolic encephalopathy 03/22/2025    Neuropathy     Raynaud's disease     Right inguinal hernia     Scleroderma (HCC)      Social History     Socioeconomic History    Marital status:      Spouse name: None    Number of children: None    Years of education: None    Highest education level: None   Occupational History    None   Tobacco Use    Smoking status: Every Day     Current packs/day: 1.00     Average packs/day: 1 pack/day for 45.3 years (45.3 ttl pk-yrs)     Types: Cigarettes     Start date: 2000    Smokeless tobacco: Never    Tobacco comments:     Last cigarette 0430   Vaping Use    Vaping status: Never Used   Substance and Sexual Activity    Alcohol use: Not Currently     Comment: occasional    Drug use: Yes     Types: Marijuana, Methamphetamines     Comment: Hx meth use    Sexual activity: Not Currently     Comment: defer   Other Topics Concern    None   Social History Narrative    None     Social Drivers of Health     Financial Resource Strain: Not on file   Food Insecurity: No Food Insecurity (4/25/2025)    Nursing - Inadequate Food Risk Classification     Worried About Running Out of Food in the Last Year:  Never true     Ran Out of Food in the Last Year: Never true     Ran Out of Food in the Last Year: Never true   Recent Concern: Food Insecurity - Food Insecurity Present (2025)    Nursing - Inadequate Food Risk Classification     Worried About Running Out of Food in the Last Year: Never true     Ran Out of Food in the Last Year: Never true     Ran Out of Food in the Last Year: Sometimes true   Transportation Needs: Unmet Transportation Needs (2025)    Nursing - Transportation Risk Classification     Lack of Transportation: Not on file     Lack of Transportation: Yes   Physical Activity: Not on file   Stress: Not on file   Social Connections: Not on file   Intimate Partner Violence: Unknown (2025)    Nursing IPS     Feels Physically and Emotionally Safe: Not on file     Physically Hurt by Someone: Not on file     Humiliated or Emotionally Abused by Someone: Not on file     Physically Hurt by Someone: No     Hurt or Threatened by Someone: No   Housing Stability: Unknown (2025)    Nursing: Inadequate Housing Risk Classification     Has Housing: Not on file     Worried About Losing Housing: Not on file     Unable to Get Utilities: Not on file     Unable to Pay for Housing in the Last Year: No     Has Housin       Subjective         Objective    Physical Exam:   Assessment Type: During-treatment  General Appearance: Alert, Awake  Respiratory Pattern: Shallow, Tachypneic  Chest Assessment: Chest expansion symmetrical  Bilateral Breath Sounds: Diminished  Cough: None  O2 Device: 2LPM NC    Vitals:  Blood pressure 109/72, pulse (!) 45, temperature 97.5 °F (36.4 °C), resp. rate 17, weight 81.9 kg (180 lb 8.9 oz), SpO2 92%.          Imaging and other studies:     O2 Device: 2LPM NC     Plan    Respiratory Plan: Moderate/Severe Distress pathway        Resp Comments: Pt here after being D/C yesterday with worsening SOB, abdomen pain. Pt has hx CHF, pulmonary fibrosis, latent TB, Raynauds, COPD and current  smoker. Will continue with previous pulmonary MD recommendations of Q6 Duoneb.

## 2025-04-25 NOTE — PLAN OF CARE
Problem: PAIN - ADULT  Goal: Verbalizes/displays adequate comfort level or baseline comfort level  Description: Interventions:- Encourage patient to monitor pain and request assistance- Assess pain using appropriate pain scale- Administer analgesics based on type and severity of pain and evaluate response- Implement non-pharmacological measures as appropriate and evaluate response- Consider cultural and social influences on pain and pain management- Notify physician/advanced practitioner if interventions unsuccessful or patient reports new pain  Outcome: Progressing     Problem: INFECTION - ADULT  Goal: Absence or prevention of progression during hospitalization  Description: INTERVENTIONS:- Assess and monitor for signs and symptoms of infection- Monitor lab/diagnostic results- Monitor all insertion sites, i.e. indwelling lines, tubes, and drains- Monitor endotracheal if appropriate and nasal secretions for changes in amount and color- [place] appropriate cooling/warming therapies per order- Administer medications as ordered- Instruct and encourage patient and family to use good hand hygiene technique- Identify and instruct in appropriate isolation precautions for identified infection/condition  Outcome: Progressing  Goal: Absence of fever/infection during neutropenic period  Description: INTERVENTIONS:- Monitor WBC  Outcome: Progressing     Problem: SAFETY ADULT  Goal: Patient will remain free of falls  Description: INTERVENTIONS:- Educate patient/family on patient safety including physical limitations- Instruct patient to call for assistance with activity - Consult OT/PT to assist with strengthening/mobility - Keep Call bell within reach- Keep bed low and locked with side rails adjusted as appropriate- Keep care items and personal belongings within reach- Initiate and maintain comfort rounds- Make Fall Risk Sign visible to staff- Offer Toileting every    Hours, in advance of need- Initiate/Maintain   alarm-  Obtain necessary fall risk management equipment:   - Apply yellow socks and bracelet for high fall risk patients- Consider moving patient to room near nurses station  Outcome: Progressing  Goal: Maintain or return to baseline ADL function  Description: INTERVENTIONS:-  Assess patient's ability to carry out ADLs; assess patient's baseline for ADL function and identify physical deficits which impact ability to perform ADLs (bathing, care of mouth/teeth, toileting, grooming, dressing, etc.)- Assess/evaluate cause of self-care deficits - Assess range of motion- Assess patient's mobility; develop plan if impaired- Assess patient's need for assistive devices and provide as appropriate- Encourage maximum independence but intervene and supervise when necessary- Involve family in performance of ADLs- Assess for home care needs following discharge - Consider OT consult to assist with ADL evaluation and planning for discharge- Provide patient education as appropriate  Outcome: Progressing  Goal: Maintains/Returns to pre admission functional level  Description: INTERVENTIONS:- Perform AM-PAC 6 Click Basic Mobility/ Daily Activity assessment daily.- Set and communicate daily mobility goal to care team and patient/family/caregiver. - Collaborate with rehabilitation services on mobility goals if consulted- Perform Range of Motion    times a day.- Reposition patient every    hours.- Dangle patient    times a day- Stand patient    times a day- Ambulate patient    times a day- Out of bed to chair    times a day - Out of bed for meals                times a day- Out of bed for toileting- Record patient progress and toleration of activity level   Outcome: Progressing     Problem: DISCHARGE PLANNING  Goal: Discharge to home or other facility with appropriate resources  Description: INTERVENTIONS:- Identify barriers to discharge w/patient and caregiver- Arrange for needed discharge resources and transportation as appropriate- Identify  discharge learning needs (meds, wound care, etc.)- Arrange for interpretive services to assist at discharge as needed- Refer to Case Management Department for coordinating discharge planning if the patient needs post-hospital services based on physician/advanced practitioner order or complex needs related to functional status, cognitive ability, or social support system  Outcome: Progressing     Problem: Knowledge Deficit  Goal: Patient/family/caregiver demonstrates understanding of disease process, treatment plan, medications, and discharge instructions  Description: Complete learning assessment and assess knowledge base.Interventions:- Provide teaching at level of understanding- Provide teaching via preferred learning methods  Outcome: Progressing

## 2025-04-25 NOTE — UTILIZATION REVIEW
NOTIFICATION OF ADMISSION DISCHARGE   This is a Notification of Discharge from [facility]. Please be advised that this patient has been discharge from our facility. Below you will find the admission and discharge date and time including the patient’s disposition.   UTILIZATION REVIEW CONTACT:  Utilization Review Assistants  Network Utilization Review Department  Phone: [phone] x carefully listen to the prompts. All voicemails are confidential.  Email: [email or web address]     ADMISSION INFORMATION  PRESENTATION DATE: 4/22/2025  9:30 PM  OBERVATION ADMISSION DATE: N/A  INPATIENT ADMISSION DATE: 4/23/25 12:13 AM   DISCHARGE DATE: 4/24/2025  4:38 PM   DISPOSITION:Home/Self Care    Network Utilization Review Department  ATTENTION: Please call with any questions or concerns to [phone] and carefully listen to the prompts so that you are directed to the right person. All voicemails are confidential.   For Discharge needs, contact Care Management DC Support Team at [phone] opt. 2  Send all requests for admission clinical reviews, approved or denied determinations and any other requests to dedicated fax number below belonging to the campus where the patient is receiving treatment. List of dedicated fax numbers for the Facilities:  FACILITY NAME UR FAX NUMBER   ADMISSION DENIALS (Administrative/Medical Necessity) [phone]   DISCHARGE SUPPORT TEAM ([facility]) [phone]   PARENT CHILD HEALTH (Maternity/NICU/Pediatrics) [phone]   [facility] [phone]   [facility] [phone]   [facility] [phone]   [facility] [phone]   [facility] [phone]   [facility] [phone]   [facility] [phone]   [facility] [phone]   [facility]  [facility] [phone]   [facility] [phone]   [facility] [phone]   [facility] [phone]

## 2025-04-25 NOTE — SEPSIS NOTE
Sepsis Note   Britton Batista 59 y.o. male MRN: [id number]  Unit/Bed#: -01 Encounter: [id number]              Body mass index is 26.66 kg/m².  Wt Readings from Last 1 Encounters:   04/25/25 81.9 kg (180 lb 8.9 oz)     IBW (Ideal Body Weight): 70.7 kg    Ideal body weight: 70.7 kg (155 lb 13.8 oz)  Adjusted ideal body weight: 75.2 kg (165 lb 11.9 oz)    Epic alert initiated for sepsis, patient evaluated.  Patient has tachycardia from A-fib, also has cardiomyopathy.  No signs or symptoms of worsening infection.    Will continue to monitor.

## 2025-04-25 NOTE — DISCHARGE INSTRUCTIONS
There were several nonemergent findings noted on the CAT scan of the abdomen and pelvis.  Please discuss these with the primary care physician.  They are listed below      IMPRESSION:     1.  Subtle nodular contour of the liver suggesting cirrhotic morphology. Mild diffuse hepatic steatosis. No suspicious enhancing hepatic mass.  2.  Small hiatal hernia. No evidence of bowel obstruction, inflammation, appendicitis, obstructive uropathy, or free air.  3.  There is a small dhara-umbilical hernia containing a small bowel loop and small amount of ascites.  4.  A small amount of abdominal/pelvic ascites is again seen, similar compared to the prior study.  5.  A few mildly prominent left inguinal lymph nodes measuring up to 1.7 x 0.9 cm are again seen. Sequelae of previous bilateral inguinal hernia repair without recurrent hernia.  6.  Scattered calcific atherosclerosis. 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  7.  Stable cardiomegaly, emphysematous changes, and fibrosis/parenchymal scarring in the lung bases.

## 2025-04-25 NOTE — ASSESSMENT & PLAN NOTE
Lab Results   Component Value Date    EGFR 38 04/25/2025    EGFR 39 04/24/2025    EGFR 50 04/24/2025    CREATININE 1.87 (H) 04/25/2025    CREATININE 1.83 (H) 04/24/2025    CREATININE 1.49 (H) 04/24/2025   Patient has baseline creatinine up to 1.5.  Creatinine elevated at 1.8 today.  Possible cardiorenal syndrome as a likely etiology.  Continue with diuresis.  Hold spironolactone for now in view of hyperkalemia.  Continue to monitor renal functions closely.  Continue with urinary retention protocol.  Follow-up with urinalysis with microscopy.  Strict intake output monitoring.

## 2025-04-25 NOTE — NURSING NOTE
Pt continues to have abdominal pain. Apap given. Pt up and ambulating to bathroom. Voided 200 cc dark yellow urine. B scan for 208 mls.

## 2025-04-25 NOTE — ASSESSMENT & PLAN NOTE
Patient is a pleasant 59-year-old male with multiple comorbid conditions most notably congestive heart failure, CHF systemically anticoagulated on Coumadin with an INR of 2.41 on April 23 and COPD presenting with the onset of periumbilical abdominal pain after exerting himself at home.    Here in the emergency room his evaluation included a history and physical examination as well as serum blood work demonstrating a leukocytosis and CT of the abdomen pelvis which suggest jested the presence of a fluid-filled umbilical hernia.    On my physical examination the patient had a soft but tender umbilical hernia.  It was able to be successfully reduced.  The patient noted relief of pain following the reduction of the umbilical hernia.    In light of the patient's multiple comorbid conditions and his systemic anticoagulation on Coumadin we discussed the equally reasonable options of being admitted for observation overnight.  As a safe and reasonable alternative patient was offered discharge to home with the understanding that he should return to the emergency room for recurrent symptoms referable to his umbilical hernia.    The merits of each approach discussed at length with both patient and his sister who accompanies him at the bedside this evening.  Together we decided that it was safe and reasonable to allow him to be released to home with the understanding that he return to the hospital for recurrent symptoms.

## 2025-04-25 NOTE — ASSESSMENT & PLAN NOTE
Likely secondary to poor tissue perfusion in setting of advanced cardiomyopathy.  Does not appear to have any infection.  Received 2 L of IV fluids in the emergency room.  Follow-up repeat lactic acid.  Follow-up on blood cultures.  Continue to monitor off antibiotics

## 2025-04-25 NOTE — ED PROVIDER NOTES
Time reflects when diagnosis was documented in both MDM as applicable and the Disposition within this note       Time User Action Codes Description Comment    4/24/2025 11:42 PM Matt Arteaga Add [K42.9] Umbilical hernia           ED Disposition       ED Disposition   Discharge    Condition   Stable    Date/Time   Thu Apr 24, 2025 11:44 PM    Comment   Britton Batista discharge to home/self care.                   Assessment & Plan       Medical Decision Making  Based on the history and medical screening exam performed the patient may be at risk for umbilical hernia, incarcerated hernia, strangulated hernia.    Based on the work-up performed in the emergency room which includes physical examination, and which may include laboratory studies and imaging as warranted including advanced imaging such as CT scan or ultrasound, the diagnostic considerations are narrowed to exclude limb or life-threatening process.    The patient is stable for discharge.  Patient has hernia on examination but general surgery was able to reduce it at bedside.  White count and lactic acidosis is likely reactive.  Patient will follow-up with general surgery on an outpatient basis.  Appropriate for discharge at this time.  Several nonacute incidental findings on the CT abdomen pelvis noted.  Please see the radiology section of the chart.  These findings have been discussed with the patient and outpatient follow-up with primary care physician has been advised.  Patient advised to follow-up with general surgery in the office as well.  Outpatient follow-up information provided    Amount and/or Complexity of Data Reviewed  Labs: ordered. Decision-making details documented in ED Course.     Details: Leukocytosis and lactic acidosis likely reactive  Radiology: ordered and independent interpretation performed. Decision-making details documented in ED Course.     Details: CT abdomen pelvis:  IMPRESSION:     1.  Subtle nodular contour of the liver  suggesting cirrhotic morphology. Mild diffuse hepatic steatosis. No suspicious enhancing hepatic mass.  2.  Small hiatal hernia. No evidence of bowel obstruction, inflammation, appendicitis, obstructive uropathy, or free air.  3.  There is a small dhara-umbilical hernia containing a small bowel loop and small amount of ascites.  4.  A small amount of abdominal/pelvic ascites is again seen, similar compared to the prior study.  5.  A few mildly prominent left inguinal lymph nodes measuring up to 1.7 x 0.9 cm are again seen. Sequelae of previous bilateral inguinal hernia repair without recurrent hernia.  6.  Scattered calcific atherosclerosis. 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  7.  Stable cardiomegaly, emphysematous changes, and fibrosis/parenchymal scarring in the lung bases.         Risk  Prescription drug management.        ED Course as of 04/24/25 2350   Thu Apr 24, 2025   2322 General surgeon, Dr. Arnett, now in the ED to examine patient.   2343 Seen in ED by general surgery, surgeon was able to reduce the hernia.  White count and lactic acidosis likely reactive secondary to temporary incarceration.  As this is now reduced, appropriate for discharge at this time.       Medications   sodium chloride 0.9 % bolus 1,000 mL (0 mL Intravenous Stopped 4/24/25 2241)   ondansetron (ZOFRAN) injection 4 mg (4 mg Intravenous Given 4/24/25 2141)   morphine injection 4 mg (4 mg Intravenous Given 4/24/25 2141)   fentaNYL injection 50 mcg (50 mcg Intravenous Given 4/24/25 2236)   iohexol (OMNIPAQUE) 350 MG/ML injection (MULTI-DOSE) 85 mL (85 mL Intravenous Given 4/24/25 2227)       ED Risk Strat Scores                    No data recorded                            History of Present Illness       Chief Complaint   Patient presents with    Abdominal Pain     Pt started with pain in umbilicus an hour ago. 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    CHF (congestive heart failure) (HCC)     Depression     Emphysema of lung (HCC)     GERD (gastroesophageal reflux disease)     Gout     Hypertension     Metabolic encephalopathy 03/22/2025    Neuropathy     Raynaud's disease     Right inguinal hernia     Scleroderma (HCC)       Past Surgical History:   Procedure Laterality Date    AMPUTATION Right     pt had tip of \"pointer finger\" d/t scleraderma    CARDIAC PACEMAKER PLACEMENT      FINGER AMPUTATION Left 01/31/2024    Procedure: AMPUTATION FINGER, LEFT INDEX FINGER CPT: 43546;  Surgeon: Pedro Vazquez MD;  Location:  MAIN OR;  Service: Orthopedics    HERNIA REPAIR Left     times 2    AK AMP F/TH 1/2 JT/PHALANX W/NEURECT W/DIR CLSR Right 01/23/2024    Procedure: AMPUTATION FINGER, RIGHT MIDDLE;  Surgeon: Pedro Vazquez MD;  Location: [facility];  Service: Orthopedics    AK RPR 1ST INGUN HRNA AGE 5 YRS/> REDUCIBLE Right 03/03/2023    Procedure: OPEN INGUINAL HERNIA REPAIR WITH MESH;  Surgeon: Temo Jackson DO;  Location:  MAIN OR;  Service: General      Family History   Problem Relation Age of Onset    Hypertension Father       Social History     Tobacco Use    Smoking status: Every Day     Current packs/day: 1.00     Average packs/day: 1 pack/day for 45.3 years (45.3 ttl pk-yrs)     Types: Cigarettes     Start date: 2000    Smokeless tobacco: Never    Tobacco comments:     Last cigarette 0430   Vaping Use    Vaping status: Never Used   Substance Use Topics    Alcohol use: Not Currently     Comment: occasional    Drug use: Yes     Types: Marijuana, Methamphetamines     Comment: Hx meth use      E-Cigarette/Vaping    E-Cigarette Use Never User       E-Cigarette/Vaping Substances    Nicotine No     THC No     CBD No     Flavoring No     Other No     Unknown No       I have reviewed and agree with the history as documented.     Patient complains of abdominal pain over the past hour.  " States he was just discharged from the hospital for CHF admission earlier today, went home, approximately 1 hour ago noticed a hernia in his umbilical region.  Has been having pain in the area.  No nausea or vomiting or fever.  No other complaints.      History provided by:  Patient   used: No    Abdominal Pain  Pain location:  Periumbilical  Pain quality: aching and dull    Pain radiates to:  Does not radiate  Pain severity:  Moderate  Onset quality:  Gradual  Duration:  1 hour  Timing:  Constant  Progression:  Unchanged  Chronicity:  New  Relieved by:  Nothing  Worsened by:  Nothing  Ineffective treatments:  None tried  Associated symptoms: no chest pain, no chills, no constipation, no cough, no diarrhea, no dysuria, no fever, no hematemesis, no hematochezia, no hematuria, no nausea, no shortness of breath, no sore throat and no vomiting        Review of Systems   Constitutional:  Negative for chills and fever.   HENT:  Negative for ear pain, hearing loss, sore throat, trouble swallowing and voice change.    Eyes:  Negative for pain and discharge.   Respiratory:  Negative for cough, shortness of breath and wheezing.    Cardiovascular:  Negative for chest pain and palpitations.   Gastrointestinal:  Positive for abdominal pain. Negative for blood in stool, constipation, diarrhea, hematemesis, hematochezia, nausea and vomiting.   Genitourinary:  Negative for dysuria, flank pain, frequency and hematuria.   Musculoskeletal:  Negative for joint swelling, neck pain and neck stiffness.   Skin:  Negative for rash and wound.   Neurological:  Negative for dizziness, seizures, syncope, facial asymmetry and headaches.   Psychiatric/Behavioral:  Negative for hallucinations, self-injury and suicidal ideas.    All other systems reviewed and are negative.          Objective       ED Triage Vitals [04/24/25 2127]   Temperature Pulse Blood Pressure Respirations SpO2 Patient Position - Orthostatic VS   98.3 °F  (36.8 °C) 68 106/86 17 95 % Sitting      Temp Source Heart Rate Source BP Location FiO2 (%) Pain Score    Temporal Monitor Left arm -- 6      Vitals      Date and Time Temp Pulse SpO2 Resp BP Pain Score FACES Pain Rating User   04/24/25 2330 -- 61 91 % 18 106/75 -- -- MD   04/24/25 2300 -- 68 94 % -- 114/90 -- -- MD   04/24/25 2245 -- 60 91 % -- 116/73 -- -- MD   04/24/25 2236 -- -- -- -- -- 7 -- MD   04/24/25 2200 -- 70 90 % -- 102/76 -- -- MD   04/24/25 2141 -- -- -- -- -- 6 -- MD   04/24/25 2127 98.3 °F (36.8 °C) 68 95 % 17 106/86 6 -- SR            Physical Exam  Vitals and nursing note reviewed.   Constitutional:       General: He is not in acute distress.     Appearance: He is well-developed.   HENT:      Head: Normocephalic and atraumatic.      Right Ear: External ear normal.      Left Ear: External ear normal.   Eyes:      General: No scleral icterus.        Right eye: No discharge.         Left eye: No discharge.      Extraocular Movements: Extraocular movements intact.      Conjunctiva/sclera: Conjunctivae normal.   Cardiovascular:      Rate and Rhythm: Normal rate and regular rhythm.      Heart sounds: Normal heart sounds. No murmur heard.  Pulmonary:      Effort: Pulmonary effort is normal.      Breath sounds: Normal breath sounds. No wheezing or rales.   Abdominal:      General: Bowel sounds are normal. There is no distension.      Palpations: Abdomen is soft.      Tenderness: There is abdominal tenderness in the periumbilical area. There is no guarding or rebound.      Comments: Palpable nonreducible 1 x 1 cm infraumbilical hernia noted   Musculoskeletal:         General: No deformity. Normal range of motion.      Cervical back: Normal range of motion and neck supple.   Skin:     General: Skin is warm and dry.      Findings: No rash.   Neurological:      General: No focal deficit present.      Mental Status: He is alert and oriented to person, place, and time.      Cranial Nerves: No cranial nerve  deficit.   Psychiatric:         Mood and Affect: Mood normal.         Behavior: Behavior normal.         Thought Content: Thought content normal.         Judgment: Judgment normal.         Results Reviewed       Procedure Component Value Units Date/Time    Comprehensive metabolic panel [[id number]]  (Abnormal) Collected: 04/24/25 2140    Lab Status: Final result Specimen: Blood from Arm, Right Updated: 04/24/25 2214     Sodium 135 mmol/L      Potassium 4.6 mmol/L      Chloride 98 mmol/L      CO2 28 mmol/L      ANION GAP 9 mmol/L      BUN 48 mg/dL      Creatinine 1.83 mg/dL      Glucose 120 mg/dL      Calcium 9.7 mg/dL      AST 25 U/L      ALT 24 U/L      Alkaline Phosphatase 97 U/L      Total Protein 7.6 g/dL      Albumin 4.1 g/dL      Total Bilirubin 0.49 mg/dL      eGFR 39 ml/min/1.73sq m     Narrative:      [employer] guidelines for Chronic Kidney Disease (CKD):     Stage 1 with normal or high GFR (GFR > 90 mL/min/1.73 square meters)    Stage 2 Mild CKD (GFR = 60-89 mL/min/1.73 square meters)    Stage 3A Moderate CKD (GFR = 45-59 mL/min/1.73 square meters)    Stage 3B Moderate CKD (GFR = 30-44 mL/min/1.73 square meters)    Stage 4 Severe CKD (GFR = 15-29 mL/min/1.73 square meters)    Stage 5 End Stage CKD (GFR <15 mL/min/1.73 square meters)  Note: GFR calculation is accurate only with a steady state creatinine    Lipase [[id number]]  (Normal) Collected: 04/24/25 2140    Lab Status: Final result Specimen: Blood from Arm, Right Updated: 04/24/25 2214     Lipase 13 u/L     Lactic acid, plasma (w/reflex if result > 2.0) [[id number]]  (Abnormal) Collected: 04/24/25 2140    Lab Status: Final result Specimen: Blood from Arm, Right Updated: 04/24/25 2212     LACTIC ACID 3.1 mmol/L     Narrative:      Result may be elevated if tourniquet was used during collection.    Lactic acid 2 Hours [[id number]]     Lab Status: No result Specimen: Blood     CBC and differential [[id number]]  (Abnormal) Collected:  04/24/25 2140    Lab Status: Final result Specimen: Blood from Arm, Right Updated: 04/24/25 2153     WBC 19.66 Thousand/uL      RBC 4.93 Million/uL      Hemoglobin 14.0 g/dL      Hematocrit 44.8 %      MCV 91 fL      MCH 28.4 pg      MCHC 31.3 g/dL      RDW 17.3 %      MPV 9.4 fL      Platelets 248 Thousands/uL      nRBC 0 /100 WBCs      Segmented % 89 %      Immature Grans % 1 %      Lymphocytes % 4 %      Monocytes % 6 %      Eosinophils Relative 0 %      Basophils Relative 0 %      Absolute Neutrophils 17.65 Thousands/µL      Absolute Immature Grans 0.15 Thousand/uL      Absolute Lymphocytes 0.68 Thousands/µL      Absolute Monocytes 1.16 Thousand/µL      Eosinophils Absolute 0.00 Thousand/µL      Basophils Absolute 0.02 Thousands/µL             CT abdomen pelvis w contrast   Final Interpretation by David Nguyen MD (04/24 9429)      1.  Subtle nodular contour of the liver suggesting cirrhotic morphology. Mild diffuse hepatic steatosis. No suspicious enhancing hepatic mass.   2.  Small hiatal hernia. No evidence of bowel obstruction, inflammation, appendicitis, obstructive uropathy, or free air.   3.  There is a small dhara-umbilical hernia containing a small bowel loop and small amount of ascites.   4.  A small amount of abdominal/pelvic ascites is again seen, similar compared to the prior study.   5.  A few mildly prominent left inguinal lymph nodes measuring up to 1.7 x 0.9 cm are again seen. Sequelae of previous bilateral inguinal hernia repair without recurrent hernia.   6.  Scattered calcific atherosclerosis. 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   7.  Stable cardiomegaly, emphysematous changes, and fibrosis/parenchymal scarring in the lung bases.            Workstation performed: [id number]             Procedures    ED Medication and Procedure Management   Prior to Admission Medications   Prescriptions Last Dose Informant Patient Reported? Taking?   albuterol  (ProAir HFA) 90 mcg/act inhaler   No No   Sig: Inhale 2 puffs every 6 (six) hours as needed for wheezing   digoxin (LANOXIN) 0.125 mg tablet   No No   Sig: Take 1 tablet (125 mcg total) by mouth 3 (three) times a week   furosemide (LASIX) 40 mg tablet   No No   Sig: Take 1 tablet (40 mg total) by mouth daily   ipratropium-albuterol (DUO-NEB) 0.5-2.5 mg/3 mL nebulizer solution   No No   Sig: Take 3 mL by nebulization every 6 (six) hours while awake   isoniazid (NYDRAZID) 300 mg tablet   Yes No   Sig: Take 1 tablet by mouth in the morning   magnesium Oxide (MAG-OX) 400 mg TABS   No No   Sig: Take 1 tablet (400 mg total) by mouth daily   melatonin 3 mg   No No   Sig: Take 1 tablet (3 mg total) by mouth daily at bedtime   metoprolol tartrate (LOPRESSOR) 25 mg tablet   No No   Sig: Take 1 tablet (25 mg total) by mouth every 12 (twelve) hours   midodrine (PROAMATINE) 5 mg tablet   No No   Sig: Take 1 tablet (5 mg total) by mouth 3 (three) times a day before meals   pantoprazole (PROTONIX) 40 mg tablet  Self No No   Sig: Take 1 tablet (40 mg total) by mouth 2 (two) times a day   spironolactone (ALDACTONE) 25 mg tablet   No No   Sig: Take 0.5 tablets (12.5 mg total) by mouth daily   warfarin (COUMADIN) 5 mg tablet   No No   Sig: Target INR range is between 2-3      Facility-Administered Medications: None     Patient's Medications   Discharge Prescriptions    No medications on file     No discharge procedures on file.  ED SEPSIS DOCUMENTATION   Time reflects when diagnosis was documented in both MDM as applicable and the Disposition within this note       Time User Action Codes Description Comment    4/24/2025 11:42 PM Matt Arteaga Add [K42.9] Umbilical hernia                  Matt Arteaga MD  04/24/25 6219

## 2025-04-25 NOTE — ASSESSMENT & PLAN NOTE
Chronic illness.  Does not appear to have any acute exacerbation.  Continue to monitor respiratory status closely.

## 2025-04-25 NOTE — ASSESSMENT & PLAN NOTE
Follow-up on EKG.  Continue with outpatient dose of digoxin and metoprolol.  Follow-up on digoxin levels.

## 2025-04-25 NOTE — ASSESSMENT & PLAN NOTE
Patient was discharged yesterday after being treated for acute on chronic heart failure.  Presents back with worsening shortness of breath and abdominal discomfort.  CT of the abdomen today showsPersistent mild nephrogram of bilateral kidneys and extended excretory phase suggesting renal failure. The urinary bladder is filled with IV contrast from prior study. Correlate for urinary retention. There is no hydroureteronephrosis.Persistent moderate ascites, mesenteric edema, and partially imaged small pericardial effusion with cardiomegaly, suggesting third spacing. The gallbladder is filled with IV contrast with surrounding fluid which is possibly related to ascites and   vicarious excretion of the contrast. Acute cholecystitis cannot entirely be excluded in the right clinical setting.  Does not have any LFT elevation or right upper quadrant tenderness to suggest cholecystitis.  Follow-up on abdominal ultrasound as CAT scan mentions cirrhotic liver morphology.  Also has fat-containing umbilical hernia.  Continue with PPI, as needed Bentyl.

## 2025-04-25 NOTE — ED NOTES
Patient ambulated to the restroom independently without difficulty.      Netta Otero RN  04/25/25 2053

## 2025-04-26 ENCOUNTER — APPOINTMENT (INPATIENT)
Dept: RADIOLOGY | Facility: HOSPITAL | Age: 60
DRG: 291 | End: 2025-04-26
Payer: COMMERCIAL

## 2025-04-26 PROBLEM — R74.01 TRANSAMINITIS: Status: ACTIVE | Noted: 2025-04-26

## 2025-04-26 PROBLEM — J96.00 ACUTE RESPIRATORY FAILURE (HCC): Status: ACTIVE | Noted: 2025-04-23

## 2025-04-26 PROBLEM — R57.9 SHOCK (HCC): Status: ACTIVE | Noted: 2025-04-26

## 2025-04-26 PROBLEM — D68.9 COAGULOPATHY (HCC): Status: ACTIVE | Noted: 2025-04-26

## 2025-04-26 PROBLEM — E72.20 HYPERAMMONEMIA (HCC): Status: ACTIVE | Noted: 2025-04-26

## 2025-04-26 LAB
ALBUMIN SERPL BCG-MCNC: 3.2 G/DL (ref 3.5–5)
ALBUMIN SERPL BCG-MCNC: 3.6 G/DL (ref 3.5–5)
ALBUMIN SERPL BCG-MCNC: 3.7 G/DL (ref 3.5–5)
ALP SERPL-CCNC: 78 U/L (ref 34–104)
ALP SERPL-CCNC: 89 U/L (ref 34–104)
ALP SERPL-CCNC: 92 U/L (ref 34–104)
ALT SERPL W P-5'-P-CCNC: 1767 U/L (ref 7–52)
ALT SERPL W P-5'-P-CCNC: 1940 U/L (ref 7–52)
ALT SERPL W P-5'-P-CCNC: 2054 U/L (ref 7–52)
AMMONIA PLAS-SCNC: 98 UMOL/L (ref 18–72)
ANION GAP SERPL CALCULATED.3IONS-SCNC: 10 MMOL/L (ref 4–13)
ANION GAP SERPL CALCULATED.3IONS-SCNC: 13 MMOL/L (ref 4–13)
ANION GAP SERPL CALCULATED.3IONS-SCNC: 14 MMOL/L (ref 4–13)
APAP SERPL-MCNC: <2 UG/ML (ref 10–20)
ARTERIAL PATENCY WRIST A: YES
AST SERPL W P-5'-P-CCNC: 1934 U/L (ref 13–39)
AST SERPL W P-5'-P-CCNC: 2707 U/L (ref 13–39)
AST SERPL W P-5'-P-CCNC: 3029 U/L (ref 13–39)
BASE EX.OXY STD BLDV CALC-SCNC: 32.9 % (ref 60–80)
BASE EX.OXY STD BLDV CALC-SCNC: 64.2 % (ref 60–80)
BASE EXCESS BLDA CALC-SCNC: -8.9 MMOL/L
BASE EXCESS BLDV CALC-SCNC: -2.3 MMOL/L
BASE EXCESS BLDV CALC-SCNC: -6.3 MMOL/L
BASOPHILS # BLD AUTO: 0.03 THOUSANDS/ÂΜL (ref 0–0.1)
BASOPHILS NFR BLD AUTO: 0 % (ref 0–1)
BILIRUB SERPL-MCNC: 2.24 MG/DL (ref 0.2–1)
BILIRUB SERPL-MCNC: 2.64 MG/DL (ref 0.2–1)
BILIRUB SERPL-MCNC: 3.13 MG/DL (ref 0.2–1)
BNP SERPL-MCNC: 2148 PG/ML (ref 0–100)
BODY TEMPERATURE: 98.7 DEGREES FEHRENHEIT
BUN SERPL-MCNC: 73 MG/DL (ref 5–25)
BUN SERPL-MCNC: 75 MG/DL (ref 5–25)
BUN SERPL-MCNC: 75 MG/DL (ref 5–25)
CALCIUM ALBUM COR SERPL-MCNC: 9.3 MG/DL (ref 8.3–10.1)
CALCIUM SERPL-MCNC: 8.7 MG/DL (ref 8.4–10.2)
CALCIUM SERPL-MCNC: 9 MG/DL (ref 8.4–10.2)
CALCIUM SERPL-MCNC: 9.3 MG/DL (ref 8.4–10.2)
CHLORIDE SERPL-SCNC: 100 MMOL/L (ref 96–108)
CHLORIDE SERPL-SCNC: 100 MMOL/L (ref 96–108)
CHLORIDE SERPL-SCNC: 99 MMOL/L (ref 96–108)
CO2 SERPL-SCNC: 19 MMOL/L (ref 21–32)
CO2 SERPL-SCNC: 21 MMOL/L (ref 21–32)
CO2 SERPL-SCNC: 22 MMOL/L (ref 21–32)
CREAT SERPL-MCNC: 2.2 MG/DL (ref 0.6–1.3)
CREAT SERPL-MCNC: 2.37 MG/DL (ref 0.6–1.3)
CREAT SERPL-MCNC: 2.38 MG/DL (ref 0.6–1.3)
EOSINOPHIL # BLD AUTO: 0.01 THOUSAND/ÂΜL (ref 0–0.61)
EOSINOPHIL NFR BLD AUTO: 0 % (ref 0–6)
ERYTHROCYTE [DISTWIDTH] IN BLOOD BY AUTOMATED COUNT: 17.2 % (ref 11.6–15.1)
GFR SERPL CREATININE-BSD FRML MDRD: 28 ML/MIN/1.73SQ M
GFR SERPL CREATININE-BSD FRML MDRD: 28 ML/MIN/1.73SQ M
GFR SERPL CREATININE-BSD FRML MDRD: 31 ML/MIN/1.73SQ M
GLUCOSE SERPL-MCNC: 110 MG/DL (ref 65–140)
GLUCOSE SERPL-MCNC: 113 MG/DL (ref 65–140)
GLUCOSE SERPL-MCNC: 124 MG/DL (ref 65–140)
GLUCOSE SERPL-MCNC: 84 MG/DL (ref 65–140)
HCO3 BLDA-SCNC: 14.7 MMOL/L (ref 22–28)
HCO3 BLDV-SCNC: 19.8 MMOL/L (ref 24–30)
HCO3 BLDV-SCNC: 23.1 MMOL/L (ref 24–30)
HCT VFR BLD AUTO: 46 % (ref 36.5–49.3)
HGB BLD-MCNC: 14.4 G/DL (ref 12–17)
IMM GRANULOCYTES # BLD AUTO: 0.13 THOUSAND/UL (ref 0–0.2)
IMM GRANULOCYTES NFR BLD AUTO: 1 % (ref 0–2)
INR PPP: 9.93 (ref 0.85–1.19)
LACTATE SERPL-SCNC: 1.7 MMOL/L (ref 0.5–2)
LACTATE SERPL-SCNC: 3.5 MMOL/L (ref 0.5–2)
LACTATE SERPL-SCNC: 3.7 MMOL/L (ref 0.5–2)
LACTATE SERPL-SCNC: 3.8 MMOL/L (ref 0.5–2)
LACTATE SERPL-SCNC: 4.5 MMOL/L (ref 0.5–2)
LYMPHOCYTES # BLD AUTO: 0.68 THOUSANDS/ÂΜL (ref 0.6–4.47)
LYMPHOCYTES NFR BLD AUTO: 4 % (ref 14–44)
MAGNESIUM SERPL-MCNC: 2.3 MG/DL (ref 1.9–2.7)
MCH RBC QN AUTO: 28.3 PG (ref 26.8–34.3)
MCHC RBC AUTO-ENTMCNC: 31.3 G/DL (ref 31.4–37.4)
MCV RBC AUTO: 90 FL (ref 82–98)
MONOCYTES # BLD AUTO: 1.95 THOUSAND/ÂΜL (ref 0.17–1.22)
MONOCYTES NFR BLD AUTO: 10 % (ref 4–12)
NASAL CANNULA: 10
NASAL CANNULA: 7
NEUTROPHILS # BLD AUTO: 16.86 THOUSANDS/ÂΜL (ref 1.85–7.62)
NEUTS SEG NFR BLD AUTO: 85 % (ref 43–75)
NRBC BLD AUTO-RTO: 0 /100 WBCS
O2 CT BLDA-SCNC: 21 ML/DL (ref 16–23)
O2 CT BLDV-SCNC: 12.1 ML/DL
O2 CT BLDV-SCNC: 6.3 ML/DL
OXYHGB MFR BLDA: 96.8 % (ref 94–97)
PCO2 BLD: 42.5 MM HG (ref 42–50)
PCO2 BLDA: 26.8 MM HG (ref 36–44)
PCO2 BLDV: 41 MM HG (ref 42–50)
PCO2 BLDV: 42.3 MM HG (ref 42–50)
PH BLD: 7.36 [PH] (ref 7.3–7.4)
PH BLDA: 7.36 [PH] (ref 7.35–7.45)
PH BLDV: 7.3 [PH] (ref 7.3–7.4)
PH BLDV: 7.36 [PH] (ref 7.3–7.4)
PLATELET # BLD AUTO: 256 THOUSANDS/UL (ref 149–390)
PMV BLD AUTO: 9.3 FL (ref 8.9–12.7)
PO2 BLDA: 124.4 MM HG (ref 75–129)
PO2 BLDV: 25.8 MM HG (ref 35–45)
PO2 BLDV: 39.1 MM HG (ref 35–45)
PO2 VENOUS TEMP CORRECTED: 39.4 MM HG (ref 35–45)
POTASSIUM SERPL-SCNC: 4.5 MMOL/L (ref 3.5–5.3)
POTASSIUM SERPL-SCNC: 5.2 MMOL/L (ref 3.5–5.3)
POTASSIUM SERPL-SCNC: 5.3 MMOL/L (ref 3.5–5.3)
PROCALCITONIN SERPL-MCNC: 1.84 NG/ML
PROT SERPL-MCNC: 5.8 G/DL (ref 6.4–8.4)
PROT SERPL-MCNC: 6.5 G/DL (ref 6.4–8.4)
PROT SERPL-MCNC: 6.6 G/DL (ref 6.4–8.4)
PROTHROMBIN TIME: 76.4 SECONDS (ref 12.3–15)
RBC # BLD AUTO: 5.09 MILLION/UL (ref 3.88–5.62)
SODIUM SERPL-SCNC: 132 MMOL/L (ref 135–147)
SODIUM SERPL-SCNC: 132 MMOL/L (ref 135–147)
SODIUM SERPL-SCNC: 134 MMOL/L (ref 135–147)
SPECIMEN SOURCE: ABNORMAL
WBC # BLD AUTO: 19.66 THOUSAND/UL (ref 4.31–10.16)

## 2025-04-26 PROCEDURE — 71045 X-RAY EXAM CHEST 1 VIEW: CPT

## 2025-04-26 PROCEDURE — 99223 1ST HOSP IP/OBS HIGH 75: CPT | Performed by: INTERNAL MEDICINE

## 2025-04-26 PROCEDURE — 80053 COMPREHEN METABOLIC PANEL: CPT | Performed by: NURSE PRACTITIONER

## 2025-04-26 PROCEDURE — 82140 ASSAY OF AMMONIA: CPT | Performed by: NURSE PRACTITIONER

## 2025-04-26 PROCEDURE — 99232 SBSQ HOSP IP/OBS MODERATE 35: CPT | Performed by: FAMILY MEDICINE

## 2025-04-26 PROCEDURE — 83605 ASSAY OF LACTIC ACID: CPT | Performed by: FAMILY MEDICINE

## 2025-04-26 PROCEDURE — 84145 PROCALCITONIN (PCT): CPT | Performed by: FAMILY MEDICINE

## 2025-04-26 PROCEDURE — 94640 AIRWAY INHALATION TREATMENT: CPT

## 2025-04-26 PROCEDURE — 94760 N-INVAS EAR/PLS OXIMETRY 1: CPT

## 2025-04-26 PROCEDURE — 82805 BLOOD GASES W/O2 SATURATION: CPT | Performed by: NURSE PRACTITIONER

## 2025-04-26 PROCEDURE — 82805 BLOOD GASES W/O2 SATURATION: CPT | Performed by: FAMILY MEDICINE

## 2025-04-26 PROCEDURE — 94664 DEMO&/EVAL PT USE INHALER: CPT

## 2025-04-26 PROCEDURE — 80074 ACUTE HEPATITIS PANEL: CPT | Performed by: NURSE PRACTITIONER

## 2025-04-26 PROCEDURE — 80053 COMPREHEN METABOLIC PANEL: CPT | Performed by: PHYSICIAN ASSISTANT

## 2025-04-26 PROCEDURE — 83605 ASSAY OF LACTIC ACID: CPT | Performed by: NURSE PRACTITIONER

## 2025-04-26 PROCEDURE — 83880 ASSAY OF NATRIURETIC PEPTIDE: CPT | Performed by: NURSE PRACTITIONER

## 2025-04-26 PROCEDURE — 02HV33Z INSERTION OF INFUSION DEVICE INTO SUPERIOR VENA CAVA, PERCUTANEOUS APPROACH: ICD-10-PCS | Performed by: ANESTHESIOLOGY

## 2025-04-26 PROCEDURE — 36600 WITHDRAWAL OF ARTERIAL BLOOD: CPT

## 2025-04-26 PROCEDURE — 82948 REAGENT STRIP/BLOOD GLUCOSE: CPT

## 2025-04-26 PROCEDURE — 85610 PROTHROMBIN TIME: CPT | Performed by: PHYSICIAN ASSISTANT

## 2025-04-26 PROCEDURE — 87040 BLOOD CULTURE FOR BACTERIA: CPT | Performed by: FAMILY MEDICINE

## 2025-04-26 PROCEDURE — 83735 ASSAY OF MAGNESIUM: CPT | Performed by: PHYSICIAN ASSISTANT

## 2025-04-26 PROCEDURE — 85025 COMPLETE CBC W/AUTO DIFF WBC: CPT | Performed by: PHYSICIAN ASSISTANT

## 2025-04-26 PROCEDURE — 80143 DRUG ASSAY ACETAMINOPHEN: CPT | Performed by: NURSE PRACTITIONER

## 2025-04-26 RX ORDER — CHLORHEXIDINE GLUCONATE ORAL RINSE 1.2 MG/ML
15 SOLUTION DENTAL EVERY 12 HOURS SCHEDULED
Status: DISCONTINUED | OUTPATIENT
Start: 2025-04-26 | End: 2025-04-30 | Stop reason: HOSPADM

## 2025-04-26 RX ORDER — FUROSEMIDE 10 MG/ML
40 INJECTION INTRAMUSCULAR; INTRAVENOUS
Status: DISCONTINUED | OUTPATIENT
Start: 2025-04-26 | End: 2025-04-26

## 2025-04-26 RX ORDER — MILRINONE LACTATE 0.2 MG/ML
0.13 INJECTION, SOLUTION INTRAVENOUS CONTINUOUS
Status: DISCONTINUED | OUTPATIENT
Start: 2025-04-26 | End: 2025-04-28

## 2025-04-26 RX ORDER — LACTULOSE 10 G/15ML
30 SOLUTION ORAL DAILY
Status: DISCONTINUED | OUTPATIENT
Start: 2025-04-26 | End: 2025-04-26

## 2025-04-26 RX ORDER — LIDOCAINE HYDROCHLORIDE 10 MG/ML
INJECTION, SOLUTION EPIDURAL; INFILTRATION; INTRACAUDAL; PERINEURAL
Status: COMPLETED
Start: 2025-04-26 | End: 2025-04-26

## 2025-04-26 RX ORDER — METOPROLOL TARTRATE 1 MG/ML
2.5 INJECTION, SOLUTION INTRAVENOUS EVERY 6 HOURS PRN
Status: DISCONTINUED | OUTPATIENT
Start: 2025-04-26 | End: 2025-04-30 | Stop reason: HOSPADM

## 2025-04-26 RX ORDER — FUROSEMIDE 10 MG/ML
80 INJECTION INTRAMUSCULAR; INTRAVENOUS ONCE
Status: COMPLETED | OUTPATIENT
Start: 2025-04-26 | End: 2025-04-26

## 2025-04-26 RX ORDER — PHYTONADIONE 5 MG/1
5 TABLET ORAL ONCE
Status: DISCONTINUED | OUTPATIENT
Start: 2025-04-26 | End: 2025-04-26

## 2025-04-26 RX ORDER — MIDODRINE HYDROCHLORIDE 5 MG/1
7.5 TABLET ORAL
Status: DISCONTINUED | OUTPATIENT
Start: 2025-04-26 | End: 2025-04-30 | Stop reason: HOSPADM

## 2025-04-26 RX ORDER — LEVALBUTEROL INHALATION SOLUTION 1.25 MG/3ML
1.25 SOLUTION RESPIRATORY (INHALATION)
Status: DISCONTINUED | OUTPATIENT
Start: 2025-04-26 | End: 2025-04-30 | Stop reason: HOSPADM

## 2025-04-26 RX ORDER — LORAZEPAM 2 MG/ML
0.5 INJECTION INTRAMUSCULAR ONCE
Status: COMPLETED | OUTPATIENT
Start: 2025-04-26 | End: 2025-04-26

## 2025-04-26 RX ORDER — METOPROLOL TARTRATE 1 MG/ML
2.5 INJECTION, SOLUTION INTRAVENOUS ONCE
Status: COMPLETED | OUTPATIENT
Start: 2025-04-26 | End: 2025-04-26

## 2025-04-26 RX ADMIN — NOREPINEPHRINE BITARTRATE 2 MCG/MIN: 1 SOLUTION INTRAVENOUS at 15:53

## 2025-04-26 RX ADMIN — MIDODRINE HYDROCHLORIDE 5 MG: 5 TABLET ORAL at 06:14

## 2025-04-26 RX ADMIN — DEXTROSE 150 MG: 50 INJECTION, SOLUTION INTRAVENOUS at 21:44

## 2025-04-26 RX ADMIN — FUROSEMIDE 40 MG: 10 INJECTION, SOLUTION INTRAMUSCULAR; INTRAVENOUS at 02:19

## 2025-04-26 RX ADMIN — MAGNESIUM OXIDE TAB 400 MG (241.3 MG ELEMENTAL MG) 400 MG: 400 (241.3 MG) TAB at 08:56

## 2025-04-26 RX ADMIN — AMIODARONE HYDROCHLORIDE 1 MG/MIN: 50 INJECTION, SOLUTION INTRAVENOUS at 22:11

## 2025-04-26 RX ADMIN — PIPERACILLIN AND TAZOBACTAM 4.5 G: 36; 4.5 INJECTION, POWDER, FOR SOLUTION INTRAVENOUS at 22:25

## 2025-04-26 RX ADMIN — PHYTONADIONE 10 MG: 10 INJECTION, EMULSION INTRAMUSCULAR; INTRAVENOUS; SUBCUTANEOUS at 11:00

## 2025-04-26 RX ADMIN — MILRINONE LACTATE IN DEXTROSE 0.25 MCG/KG/MIN: 200 INJECTION, SOLUTION INTRAVENOUS at 13:49

## 2025-04-26 RX ADMIN — LEVALBUTEROL HYDROCHLORIDE 1.25 MG: 1.25 SOLUTION RESPIRATORY (INHALATION) at 02:41

## 2025-04-26 RX ADMIN — IPRATROPIUM BROMIDE 0.5 MG: 0.5 SOLUTION RESPIRATORY (INHALATION) at 02:41

## 2025-04-26 RX ADMIN — IPRATROPIUM BROMIDE 0.5 MG: 0.5 SOLUTION RESPIRATORY (INHALATION) at 08:16

## 2025-04-26 RX ADMIN — LEVALBUTEROL HYDROCHLORIDE 1.25 MG: 1.25 SOLUTION RESPIRATORY (INHALATION) at 13:29

## 2025-04-26 RX ADMIN — LEVALBUTEROL HYDROCHLORIDE 1.25 MG: 1.25 SOLUTION RESPIRATORY (INHALATION) at 20:34

## 2025-04-26 RX ADMIN — METOROPROLOL TARTRATE 2.5 MG: 5 INJECTION, SOLUTION INTRAVENOUS at 18:24

## 2025-04-26 RX ADMIN — LEVALBUTEROL HYDROCHLORIDE 1.25 MG: 1.25 SOLUTION RESPIRATORY (INHALATION) at 08:16

## 2025-04-26 RX ADMIN — PIPERACILLIN AND TAZOBACTAM 4.5 G: 36; 4.5 INJECTION, POWDER, FOR SOLUTION INTRAVENOUS at 10:58

## 2025-04-26 RX ADMIN — AMIODARONE HYDROCHLORIDE 0.5 MG/MIN: 50 INJECTION, SOLUTION INTRAVENOUS at 02:00

## 2025-04-26 RX ADMIN — IPRATROPIUM BROMIDE 0.5 MG: 0.5 SOLUTION RESPIRATORY (INHALATION) at 13:29

## 2025-04-26 RX ADMIN — NICOTINE 1 PATCH: 14 PATCH, EXTENDED RELEASE TRANSDERMAL at 08:55

## 2025-04-26 RX ADMIN — FUROSEMIDE 80 MG: 10 INJECTION, SOLUTION INTRAVENOUS at 18:24

## 2025-04-26 RX ADMIN — PIPERACILLIN AND TAZOBACTAM 4.5 G: 36; 4.5 INJECTION, POWDER, FOR SOLUTION INTRAVENOUS at 14:14

## 2025-04-26 RX ADMIN — LIDOCAINE HYDROCHLORIDE 50 MG: 10 INJECTION, SOLUTION EPIDURAL; INFILTRATION; INTRACAUDAL; PERINEURAL at 12:20

## 2025-04-26 RX ADMIN — IPRATROPIUM BROMIDE 0.5 MG: 0.5 SOLUTION RESPIRATORY (INHALATION) at 20:34

## 2025-04-26 RX ADMIN — SUCRALFATE 1 G: 1 TABLET ORAL at 10:47

## 2025-04-26 RX ADMIN — MIDODRINE HYDROCHLORIDE 7.5 MG: 5 TABLET ORAL at 10:47

## 2025-04-26 RX ADMIN — TRIMETHOBENZAMIDE HYDROCHLORIDE 200 MG: 100 INJECTION INTRAMUSCULAR at 09:10

## 2025-04-26 RX ADMIN — SUCRALFATE 1 G: 1 TABLET ORAL at 06:14

## 2025-04-26 RX ADMIN — ALBUTEROL SULFATE 2 PUFF: 90 AEROSOL, METERED RESPIRATORY (INHALATION) at 01:00

## 2025-04-26 RX ADMIN — PANTOPRAZOLE SODIUM 40 MG: 40 TABLET, DELAYED RELEASE ORAL at 06:14

## 2025-04-26 RX ADMIN — LORAZEPAM 0.5 MG: 2 INJECTION INTRAMUSCULAR; INTRAVENOUS at 00:23

## 2025-04-26 RX ADMIN — METOPROLOL TARTRATE 25 MG: 25 TABLET, FILM COATED ORAL at 08:56

## 2025-04-26 NOTE — ASSESSMENT & PLAN NOTE
S/P reduction of of umbilical hernia on 4/24 in the ED by general sugery  Repeat CT on 4/25:  Persistent mild nephrogram of bilateral kidneys and extended excretory phase suggesting renal failure. The urinary bladder is filled with IV contrast from prior study. Correlate for urinary retention. There is no hydroureteronephrosis.Persistent moderate ascites, mesenteric edema, and partially imaged small pericardial effusion with cardiomegaly, suggesting third spacing. The gallbladder is filled with IV contrast with surrounding fluid which is possibly related to ascites and vicarious excretion of the contrast. Acute cholecystitis cannot entirely be excluded in the right clinical setting.Minimal lobulated liver contour suggesting cirrhosis. Correlate with history and labs.Additional/chronic findings as above.  4/25 Abdominal US: No sonographic findings of cirrhosis.Pulsatile portal venous flow, gallbladder wall thickening, and small volume upper abdominal ascites, likely due to right heart failure.  Patient initiated on Zosyn D#1    Plan  Serial abdominal exams  Continue Zosyn

## 2025-04-26 NOTE — ASSESSMENT & PLAN NOTE
Recent Labs     04/25/25  0736 04/26/25  0625 04/26/25  1025   AST 28 1,934* 3,029*   ALT 21 1,767* 2,054*   ALKPHOS 95 89 92   TBILI 0.59 3.13* 2.64*      Patient with acute deterioration in LFTs,   Received amio bolus and infusion for a few hours-discontinued at this time  Trend LFT and lactate  Avoid hepatotoxic agents

## 2025-04-26 NOTE — ASSESSMENT & PLAN NOTE
Potassium elevated upon admission.  Follow-up on EKG.  Continue with telemetry.  Give K Lokelma x 1 dose.  Received 2 L of IV fluids in the emergency room.  Continue with furosemide.  Continue with calcium gluconate.  Follow-up on repeat potassium stat.  426-potassium 5.3 continue Lokelma the rest per nephrology

## 2025-04-26 NOTE — ASSESSMENT & PLAN NOTE
Follow-up on EKG.  Continue with outpatient dose of digoxin and metoprolol.  Follow-up on digoxin levels.  Subtherapeutic  Critical care has started amiodarone last night discontinue as he has significantly elevated LFTs.  Will add Lopressor as needed he is on midodrine for blood pressure support if needed would increase metoprolol or switch to Toprol-XL also consulted cardiology  INR is over 9 will give vitamin K 5 mg p.o. x 1 hold Coumadin

## 2025-04-26 NOTE — QUICK NOTE
Patient noted to have confusion, shallow breathing, little restless.  Placed on 10 L of mid flow and chest x-ray and ABG done.  Given a dose of IV Lasix as per critical care also placed on nebulizer treatments.  Also placed on 1 dose of Ativan 0.5 mg around midnight for anxiety.  Will need close follow-up

## 2025-04-26 NOTE — ASSESSMENT & PLAN NOTE
Wt Readings from Last 3 Encounters:   04/25/25 80.4 kg (177 lb 4 oz)   04/24/25 76.8 kg (169 lb 5 oz)   04/24/25 76.8 kg (169 lb 6.4 oz)

## 2025-04-26 NOTE — ASSESSMENT & PLAN NOTE
Likely secondary to poor tissue perfusion in setting of advanced cardiomyopathy.  Does not appear to have any infection.  Received 2 L of IV fluids in the emergency room.  Follow-up repeat lactic acid.  Follow-up on blood cultures.    4/26 lactic acid 4.5 and decreased to 2.5 he has cool extremities suspect secondary to advanced cardiomyopathy with elevation of white count of 19,000 could be reactive for now we will check a procalcitonin and blood cultures follow-up with ultrasound but for now we will place him on Zosyn discontinue if blood cultures negative for 48 hours and there is no evidence of acute cholecystitis on imaging  Also discussed with with cc to evaluate for possible milrinone

## 2025-04-26 NOTE — ASSESSMENT & PLAN NOTE
Improved  Suspect due to cardiogenic etiology  However, elevated LA, PCT and WBC could related to infection  Blood cultures pending  Continue Pip Tazo at this time      Plan  Trend endpoints and cultures

## 2025-04-26 NOTE — ASSESSMENT & PLAN NOTE
Suspected shock liver secondary from cardiomyopathy as discussed with critical care asked for evaluation for need for milrinone also follow-up an ultrasound of the abdomen labs in the morning

## 2025-04-26 NOTE — PROCEDURES
Central Line Insertion    Date/Time: 4/26/2025 12:30 PM    Performed by: LUIS ANGEL Thomas  Authorized by: LUIS ANGEL Thomas    Patient location:  ICU  Other Assisting Provider: Yes (comment) (Dr. Glover)    Consent:     Consent obtained:  Written    Consent given by:  Healthcare agent    Risks discussed:  Arterial puncture, incorrect placement, nerve damage, pneumothorax, infection and bleeding    Alternatives discussed:  No treatment  Universal protocol:     Procedure explained and questions answered to patient or proxy's satisfaction: yes      Relevant documents present and verified: yes      Immediately prior to procedure, a time out was called: yes      Patient identity confirmed:  Verbally with patient and arm band  Pre-procedure details:     Hand hygiene: Hand hygiene performed prior to insertion      Sterile barrier technique: All elements of maximal sterile technique followed      Skin preparation:  2% chlorhexidine    Skin preparation agent: Skin preparation agent completely dried prior to procedure    Indications:     Central line indications: medications requiring central line and hemodynamic monitoring      Site selection rationale:  Compressible vessel, patient with L CW AICD  Anesthesia (see MAR for exact dosages):     Anesthesia method:  Local infiltration    Local anesthetic:  Lidocaine 1% w/o epi  Procedure details:     Location:  Right internal jugular    Vessel type: vein      Laterality:  Right    Approach: percutaneous technique used      Patient position:  Flat    Catheter type:  Triple lumen 16cm    Catheter size:  7 Fr    Landmarks identified: yes      Ultrasound guidance: yes      Ultrasound image availability:  Images available in PACS    Sterile ultrasound techniques: Sterile gel and sterile probe covers were used      Manometry confirmation: yes      Number of attempts:  1    Successful placement: yes      Catheter tip vessel location: atriocaval junction    Post-procedure details:      Post-procedure:  Dressing applied and line sutured    Assessment:  Blood return through all ports, no pneumothorax on x-ray and placement verified by x-ray    Patient tolerance of procedure:  Tolerated well, no immediate complications    Observer: Yes      Observer name:  Ratna Mederos RN

## 2025-04-26 NOTE — ASSESSMENT & PLAN NOTE
Lab Results   Component Value Date    EGFR 28 04/26/2025    EGFR 31 04/26/2025    EGFR 38 04/25/2025    CREATININE 2.37 (H) 04/26/2025    CREATININE 2.20 (H) 04/26/2025    CREATININE 1.87 (H) 04/25/2025

## 2025-04-26 NOTE — ASSESSMENT & PLAN NOTE
Lab Results   Component Value Date    EGFR 28 04/26/2025    EGFR 28 04/26/2025    EGFR 31 04/26/2025    CREATININE 2.38 (H) 04/26/2025    CREATININE 2.37 (H) 04/26/2025    CREATININE 2.20 (H) 04/26/2025     Suspect in setting of cardiogenic shock  Continue to monitor closely  Lasix 80mg IV x 1 dose on 4/26  Zazueta placed for strict I&O  Nephrology following

## 2025-04-26 NOTE — ASSESSMENT & PLAN NOTE
Lab Results   Component Value Date    EGFR 31 04/26/2025    EGFR 38 04/25/2025    EGFR 39 04/24/2025    CREATININE 2.20 (H) 04/26/2025    CREATININE 1.87 (H) 04/25/2025    CREATININE 1.83 (H) 04/24/2025   On CKD stage III patient has baseline creatinine up to 1.5.  2.20 creatinine today possible cardiorenal syndrome as a likely etiology.  Continue with diuresis.  Hold spironolactone for now in view of hyperkalemia.  Continue to monitor renal functions closely.  Continue with urinary retention protocol.  Follow-up with urinalysis with microscopy negative.  Strict intake output monitoring.  Consulted nephrology discussed with nephrology hold Lasix at this time as they do not think he is much overloaded and worsening kidney function also possible HRS secondary to acute T prolongation octreotide cannot be added until holding off on albumin but increase midodrine to 7.5 mg p.o. 3 times daily

## 2025-04-26 NOTE — ASSESSMENT & PLAN NOTE
Continue rate control agents Digoxin and metoprolol (on hold due to hypotension)  Lopressor 2.5 mg IV PRN for HR > 110 Q6H  Avoid cardizem  Amiodarone re-initiated evening of 4/27 for afib with RVR.  Continue to hold warfarin at this time (LD 4/25) in setting of elevated INR    Plan  Continue Diogoxin

## 2025-04-26 NOTE — PLAN OF CARE
Problem: PAIN - ADULT  Goal: Verbalizes/displays adequate comfort level or baseline comfort level  Description: Interventions:- Encourage patient to monitor pain and request assistance- Assess pain using appropriate pain scale- Administer analgesics based on type and severity of pain and evaluate response- Implement non-pharmacological measures as appropriate and evaluate response- Consider cultural and social influences on pain and pain management- Notify physician/advanced practitioner if interventions unsuccessful or patient reports new pain  Outcome: Progressing     Problem: INFECTION - ADULT  Goal: Absence or prevention of progression during hospitalization  Description: INTERVENTIONS:- Assess and monitor for signs and symptoms of infection- Monitor lab/diagnostic results- Monitor all insertion sites, i.e. indwelling lines, tubes, and drains- Monitor endotracheal if appropriate and nasal secretions for changes in amount and color- Collison appropriate cooling/warming therapies per order- Administer medications as ordered- Instruct and encourage patient and family to use good hand hygiene technique- Identify and instruct in appropriate isolation precautions for identified infection/condition  Outcome: Progressing  Goal: Absence of fever/infection during neutropenic period  Description: INTERVENTIONS:- Monitor WBC  Outcome: Progressing     Problem: SAFETY ADULT  Goal: Patient will remain free of falls  Description: INTERVENTIONS:- Educate patient/family on patient safety including physical limitations- Instruct patient to call for assistance with activity - Consult OT/PT to assist with strengthening/mobility - Keep Call bell within reach- Keep bed low and locked with side rails adjusted as appropriate- Keep care items and personal belongings within reach- Initiate and maintain comfort rounds- Make Fall Risk Sign visible to staff- Offer Toileting every 2 Hours, in advance of need- Initiate/Maintain bed alarm-   Apply yellow socks and bracelet for high fall risk patients- Consider moving patient to room near nurses station  Outcome: Progressing  Goal: Maintain or return to baseline ADL function  Description: INTERVENTIONS:-  Assess patient's ability to carry out ADLs; assess patient's baseline for ADL function and identify physical deficits which impact ability to perform ADLs (bathing, care of mouth/teeth, toileting, grooming, dressing, etc.)- Assess/evaluate cause of self-care deficits - Assess range of motion- Assess patient's mobility; develop plan if impaired- Assess patient's need for assistive devices and provide as appropriate- Encourage maximum independence but intervene and supervise when necessary- Involve family in performance of ADLs- Assess for home care needs following discharge - Consider OT consult to assist with ADL evaluation and planning for discharge- Provide patient education as appropriate  Outcome: Progressing  Goal: Maintains/Returns to pre admission functional level  Description: INTERVENTIONS:- Perform AM-PAC 6 Click Basic Mobility/ Daily Activity assessment daily.- Set and communicate daily mobility goal to care team and patient/family/caregiver. - Collaborate with rehabilitation services on mobility goals if consulted- Perform Range of Motion 2 times a day. Stand patient 2 times a day- Ambulate patient 2 times a day- Out of bed to chair 2 times a day - Out of bed for toileting- Record patient progress and toleration of activity level   Outcome: Progressing     Problem: DISCHARGE PLANNING  Goal: Discharge to home or other facility with appropriate resources  Description: INTERVENTIONS:- Identify barriers to discharge w/patient and caregiver- Arrange for needed discharge resources and transportation as appropriate- Identify discharge learning needs (meds, wound care, etc.)- Arrange for interpretive services to assist at discharge as needed- Refer to Case Management Department for coordinating  discharge planning if the patient needs post-hospital services based on physician/advanced practitioner order or complex needs related to functional status, cognitive ability, or social support system  Outcome: Progressing     Problem: Knowledge Deficit  Goal: Patient/family/caregiver demonstrates understanding of disease process, treatment plan, medications, and discharge instructions  Description: Complete learning assessment and assess knowledge base.Interventions:- Provide teaching at level of understanding- Provide teaching via preferred learning methods  Outcome: Progressing     Problem: CARDIOVASCULAR - ADULT  Goal: Maintains optimal cardiac output and hemodynamic stability  Description: INTERVENTIONS:- Monitor I/O, vital signs and rhythm- Monitor for S/S and trends of decreased cardiac output- Administer and titrate ordered vasoactive medications to optimize hemodynamic stability- Assess quality of pulses, skin color and temperature- Assess for signs of decreased coronary artery perfusion- Instruct patient to report change in severity of symptoms  Outcome: Progressing  Goal: Absence of cardiac dysrhythmias or at baseline rhythm  Description: INTERVENTIONS:- Continuous cardiac monitoring, vital signs, obtain 12 lead EKG if ordered- Administer antiarrhythmic and heart rate control medications as ordered- Monitor electrolytes and administer replacement therapy as ordered  Outcome: Progressing     Problem: RESPIRATORY - ADULT  Goal: Achieves optimal ventilation and oxygenation  Description: INTERVENTIONS:- Assess for changes in respiratory status- Assess for changes in mentation and behavior- Position to facilitate oxygenation and minimize respiratory effort- Oxygen administered by appropriate delivery if ordered- Initiate smoking cessation education as indicated- Encourage broncho-pulmonary hygiene including cough, deep breathe, Incentive Spirometry- Assess the need for suctioning and aspirate as needed- Assess  and instruct to report SOB or any respiratory difficulty- Respiratory Therapy support as indicated  Outcome: Progressing

## 2025-04-26 NOTE — ASSESSMENT & PLAN NOTE
Possible early CTD ILD-or related to prior exposure to dust/asbestos  PFT/office spirometry last year showed no restriction, mild decreased DLCO which argues against severe ILD

## 2025-04-26 NOTE — ASSESSMENT & PLAN NOTE
Patient with history of methamphetamine induced cardiomyopathy  Last cardiac cath in 2020 showed no coronary artery disease  3/2025 ECHO: EF 25% with RV nl/reduced, Moderate MR   Patient's coreg was changed to metoprolol during prior hospitalization  Cardiology consult pending

## 2025-04-26 NOTE — PROGRESS NOTES
Progress Note - Hospitalist   Name: Britton Batista 59 y.o. male I MRN: [id number]  Unit/Bed#: -01 I Date of Admission: 4/25/2025   Date of Service: 4/26/2025 I Hospital Day: 1    Assessment & Plan  Acute on chronic combined systolic and diastolic congestive heart failure (HCC)  Wt Readings from Last 3 Encounters:   04/25/25 80.4 kg (177 lb 4 oz)   04/24/25 76.8 kg (169 lb 5 oz)   04/24/25 76.8 kg (169 lb 6.4 oz)       Patient with recent hospitalization for acute on chronic combined systolic and diastolic heart failure.  Was on IV diuretics.  Weight increased today however will get standing weight.  Hold diuretics at this time as discussed with nephrology as his kidney function has worsened   continue with strict intake output monitoring.  Continue with sodium and fluid restricted diet.      Pulmonary fibrosis (HCC)  Chronic illness.  Does not appear to have any acute exacerbation.  Continue to monitor respiratory status closely.  Abdominal pain  Patient was discharged yesterday after being treated for acute on chronic heart failure.  Presents back with worsening shortness of breath and abdominal discomfort.  CT of the abdomen today showsPersistent mild nephrogram of bilateral kidneys and extended excretory phase suggesting renal failure. The urinary bladder is filled with IV contrast from prior study. Correlate for urinary retention. There is no hydroureteronephrosis.Persistent moderate ascites, mesenteric edema, and partially imaged small pericardial effusion with cardiomegaly, suggesting third spacing. The gallbladder is filled with IV contrast with surrounding fluid which is possibly related to ascites and   vicarious excretion of the contrast. Acute cholecystitis cannot entirely be excluded in the right clinical setting.  Does not have any LFT elevation or right upper quadrant tenderness to suggest cholecystitis.  Follow-up on abdominal ultrasound as CAT scan mentions cirrhotic liver morphology.  Also  has fat-containing umbilical hernia.  Continue with PPI, as needed Bentyl.  4/26 no abdominal pain and abdominal exam is benign we will follow-up with ultrasound  Longstanding persistent atrial fibrillation (HCC)  Follow-up on EKG.  Continue with outpatient dose of digoxin and metoprolol.  Follow-up on digoxin levels.  Subtherapeutic  Critical care has started amiodarone last night discontinue as he has significantly elevated LFTs.  Will add Lopressor as needed he is on midodrine for blood pressure support if needed would increase metoprolol or switch to Toprol-XL also consulted cardiology  INR is over 9 will give vitamin K 5 mg p.o. x 1 hold Coumadin  Acute kidney injury superimposed on stage 3a chronic kidney disease (HCC)  Lab Results   Component Value Date    EGFR 31 04/26/2025    EGFR 38 04/25/2025    EGFR 39 04/24/2025    CREATININE 2.20 (H) 04/26/2025    CREATININE 1.87 (H) 04/25/2025    CREATININE 1.83 (H) 04/24/2025   On CKD stage III patient has baseline creatinine up to 1.5.  2.20 creatinine today possible cardiorenal syndrome as a likely etiology.  Continue with diuresis.  Hold spironolactone for now in view of hyperkalemia.  Continue to monitor renal functions closely.  Continue with urinary retention protocol.  Follow-up with urinalysis with microscopy negative.  Strict intake output monitoring.  Consulted nephrology discussed with nephrology hold Lasix at this time as they do not think he is much overloaded and worsening kidney function also possible HRS secondary to acute T prolongation octreotide cannot be added until holding off on albumin but increase midodrine to 7.5 mg p.o. 3 times daily  Cardiomyopathy (HCC)  Secondary to methamphetamine abuse  Managed on Lasix, metoprolol spironolactone  No longer taking lisinopril per sister.   Unable to afford Entresto or Jardiance  Close outpatient cardiology follow-up recommended upon discharge  Hyperkalemia  Potassium elevated upon admission.  Follow-up  on EKG.  Continue with telemetry.  Give K Lokelma x 1 dose.  Received 2 L of IV fluids in the emergency room.  Continue with furosemide.  Continue with calcium gluconate.  Follow-up on repeat potassium stat.  426-potassium 5.3 continue Lokelma the rest per nephrology  Lactic acidosis  Likely secondary to poor tissue perfusion in setting of advanced cardiomyopathy.  Does not appear to have any infection.  Received 2 L of IV fluids in the emergency room.  Follow-up repeat lactic acid.  Follow-up on blood cultures.    4/26 lactic acid 4.5 and decreased to 2.5 he has cool extremities suspect secondary to advanced cardiomyopathy with elevation of white count of 19,000 could be reactive for now we will check a procalcitonin and blood cultures follow-up with ultrasound but for now we will place him on Zosyn discontinue if blood cultures negative for 48 hours and there is no evidence of acute cholecystitis on imaging  Also discussed with with cc to evaluate for possible milrinone   Transaminitis  Suspected shock liver secondary from cardiomyopathy as discussed with critical care asked for evaluation for need for milrinone also follow-up an ultrasound of the abdomen labs in the morning    VTE Pharmacologic Prophylaxis:   Moderate Risk (Score 3-4) - Pharmacological DVT Prophylaxis Contraindicated. Sequential Compression Devices Ordered.    Mobility:   Basic Mobility Inpatient Raw Score: 14  JH-HLM Goal: 4: Move to chair/commode  JH-HLM Achieved: 2: Bed activities/Dependent transfer  JH-HLM Goal achieved. Continue to encourage appropriate mobility.    Patient Centered Rounds: I performed bedside rounds with nursing staff today.   Discussions with Specialists or Other Care Team Provider: Discussed with critical care    Education and Discussions with Family / Patient: Updated  (sister) at bedside.    Current Length of Stay: 1 day(s)  Current Patient Status: Inpatient   Certification Statement: The patient will  continue to require additional inpatient hospital stay due to lactic acidosis IRINEO possible cardiogenic shock transaminitis  Discharge Plan: Anticipate discharge in >72 hrs to home.    Code Status: Level 1 - Full Code    Subjective   Patient seen and examined denies any chest pain or shortness of breath denies abdominal pain    Objective :  Temp:  [97 °F (36.1 °C)-98.1 °F (36.7 °C)] 98.1 °F (36.7 °C)  HR:  [] 110  BP: ()/(50-93) 117/87  Resp:  [17-36] 24  SpO2:  [82 %-99 %] 99 %  O2 Device: Nasal cannula  Nasal Cannula O2 Flow Rate (L/min):  [3 L/min-14 L/min] 6 L/min    Body mass index is 26.18 kg/m².     Input and Output Summary (last 24 hours):     Intake/Output Summary (Last 24 hours) at 4/26/2025 1011  Last data filed at 4/26/2025 0927  Gross per 24 hour   Intake 1958.31 ml   Output 955 ml   Net 1003.31 ml       Physical Exam  Vitals and nursing note reviewed.   Constitutional:       General: He is not in acute distress.     Appearance: He is well-developed.   HENT:      Head: Normocephalic and atraumatic.   Eyes:      Conjunctiva/sclera: Conjunctivae normal.   Cardiovascular:      Rate and Rhythm: Normal rate and regular rhythm.      Heart sounds: No murmur heard.  Pulmonary:      Effort: Pulmonary effort is normal. No respiratory distress.      Breath sounds: Rales present.   Abdominal:      Palpations: Abdomen is soft.      Tenderness: There is no abdominal tenderness.   Musculoskeletal:         General: No swelling.      Cervical back: Neck supple.   Skin:     General: Skin is warm and dry.      Capillary Refill: Capillary refill takes less than 2 seconds.   Neurological:      Mental Status: He is alert and oriented to person, place, and time.   Psychiatric:         Mood and Affect: Mood normal.           Lines/Drains:        Telemetry:  Telemetry Orders (From admission, onward)               24 Hour Telemetry Monitoring  Continuous x 24 Hours (Telem)        Expiring   Question:  Reason for 24  Hour Telemetry  Answer:  Metabolic/electrolyte disturbance with high probability of dysrhythmia. K level <3 or >6 OR KCL infusion >10mEq/hr                     Telemetry Reviewed: Atrial fibrillation. HR averaging 110  Indication for Continued Telemetry Use: Arrthymias requiring medical therapy               Lab Results: I have reviewed the following results:   Results from last 7 days   Lab Units 04/26/25  0625   WBC Thousand/uL 19.66*   HEMOGLOBIN g/dL 14.4   HEMATOCRIT % 46.0   PLATELETS Thousands/uL 256   SEGS PCT % 85*   LYMPHO PCT % 4*   MONO PCT % 10   EOS PCT % 0     Results from last 7 days   Lab Units 04/26/25  0625   SODIUM mmol/L 134*   POTASSIUM mmol/L 5.3   CHLORIDE mmol/L 100   CO2 mmol/L 21   BUN mg/dL 73*   CREATININE mg/dL 2.20*   ANION GAP mmol/L 13   CALCIUM mg/dL 9.0   ALBUMIN g/dL 3.6   TOTAL BILIRUBIN mg/dL 3.13*   ALK PHOS U/L 89   ALT U/L 1,767*   AST U/L 1,934*   GLUCOSE RANDOM mg/dL 84     Results from last 7 days   Lab Units 04/26/25  0625   INR  9.93*     Results from last 7 days   Lab Units 04/25/25  2357 04/25/25  2136 04/25/25  1853 04/25/25  1849 04/25/25  1827 04/25/25  1820   POC GLUCOSE mg/dl 136 139 176* 54* 55* 53*         Results from last 7 days   Lab Units 04/26/25  0904 04/26/25  0625 04/25/25  2028 04/25/25  1703 04/25/25  1318 04/24/25  2140 04/24/25  1008 04/22/25  2327   LACTIC ACID mmol/L 3.5* 4.5* 2.8* 2.5*  --    < >  --   --    PROCALCITONIN ng/ml  --   --   --   --  0.15  --  0.08 0.07    < > = values in this interval not displayed.       Recent Cultures (last 7 days):         Imaging Results Review: No pertinent imaging studies reviewed.  Other Study Results Review: No additional pertinent studies reviewed.    Last 24 Hours Medication List:     Current Facility-Administered Medications:     albuterol (PROVENTIL HFA,VENTOLIN HFA) inhaler 2 puff, Q6H PRN    digoxin (LANOXIN) tablet 125 mcg, Once per day on Monday Wednesday Friday    ipratropium (ATROVENT) 0.02 %  inhalation solution 0.5 mg, TID    levalbuterol (XOPENEX) inhalation solution 1.25 mg, TID    magnesium Oxide (MAG-OX) tablet 400 mg, Daily    melatonin tablet 3 mg, HS    metoprolol (LOPRESSOR) injection 2.5 mg, Q6H PRN    metoprolol tartrate (LOPRESSOR) tablet 25 mg, Q12H JESUS    midodrine (PROAMATINE) tablet 7.5 mg, TID AC    nicotine (NICODERM CQ) 14 mg/24hr TD 24 hr patch 1 patch, Daily    pantoprazole (PROTONIX) EC tablet 40 mg, BID    phytonadione (MEPHYTON) tablet 5 mg, Once    piperacillin-tazobactam (ZOSYN) 4.5 g in sodium chloride 0.9 % 100 mL IV LOADING DOSE, Once **FOLLOWED BY** piperacillin-tazobactam (ZOSYN) 4.5 g in sodium chloride 0.9 % 100 mL IVPB (EXTENDED INFUSION), Q8H    Sodium Zirconium Cyclosilicate (Lokelma) 10 g, Daily    sucralfate (CARAFATE) tablet 1 g, 4x Daily (AC & HS)    trimethobenzamide (TIGAN) IM injection 200 mg, Q8H PRN    Administrative Statements   Today, Patient Was Seen By: Guadalupe Muñoz MD  I have spent a total time of >35 minutes in caring for this patient on the day of the visit/encounter including Patient and family education, Documenting in the medical record, and Reviewing/placing orders in the medical record (including tests, medications, and/or procedures).    **Please Note: This note may have been constructed using a voice recognition system.**

## 2025-04-26 NOTE — ASSESSMENT & PLAN NOTE
INR 9.93 this AM  LD warfarin 4/25    Plan  Vitamin K 10mg IV x 1 dose  Monitor for s/s of bleeding  Hold warfarin  Repeat INR in the AM

## 2025-04-26 NOTE — ASSESSMENT & PLAN NOTE
Patient was discharged yesterday after being treated for acute on chronic heart failure.  Presents back with worsening shortness of breath and abdominal discomfort.  CT of the abdomen today showsPersistent mild nephrogram of bilateral kidneys and extended excretory phase suggesting renal failure. The urinary bladder is filled with IV contrast from prior study. Correlate for urinary retention. There is no hydroureteronephrosis.Persistent moderate ascites, mesenteric edema, and partially imaged small pericardial effusion with cardiomegaly, suggesting third spacing. The gallbladder is filled with IV contrast with surrounding fluid which is possibly related to ascites and   vicarious excretion of the contrast. Acute cholecystitis cannot entirely be excluded in the right clinical setting.  Does not have any LFT elevation or right upper quadrant tenderness to suggest cholecystitis.  Follow-up on abdominal ultrasound as CAT scan mentions cirrhotic liver morphology.  Also has fat-containing umbilical hernia.  Continue with PPI, as needed Bentyl.  4/26 no abdominal pain and abdominal exam is benign we will follow-up with ultrasound

## 2025-04-26 NOTE — ASSESSMENT & PLAN NOTE
Suspect in the setting of hepatic dysfunction    Plan  Lactulose 30mg PO daily  Repeat Ammonia level in the AM

## 2025-04-26 NOTE — ASSESSMENT & PLAN NOTE
Recent Labs     04/26/25  0625 04/26/25  1025 04/26/25  1658   AST 1,934* 3,029* 2,707*   ALT 1,767* 2,054* 1,940*   ALKPHOS 89 92 78   TBILI 3.13* 2.64* 2.24*      Patient with acute deterioration in LFTs,   Suspect related to cardiogenic shock  Received amio bolus and infusion for a few hours-discontinued at this time  Trend LFT and lactate  Avoid hepatotoxic agents

## 2025-04-26 NOTE — ASSESSMENT & PLAN NOTE
Concern for cardiogenic etiology as cause for acute decompensation of liver function and renal function  3/25 ECHO EF 25% with RV Nl/slightly reduced, Moderate MR, RVSP 30-35      Plan  Central line placement for ScVO2  Consider initiation of Milrione tx

## 2025-04-26 NOTE — CONSULTS
Consultation - Critical Care/ICU   Name: Britton Batista 59 y.o. male I MRN: [id number]  Unit/Bed#: -01 I Date of Admission: 4/25/2025   Date of Service: 4/26/2025 I Hospital Day: 1   Consults  Physician Requesting Evaluation: Cortes Plascencia*   Reason for Evaluation / Principal Problem:         Assessment & Plan  Shock (HCC)  Concern for cardiogenic etiology as cause for acute decompensation of liver function and renal function  3/25 ECHO EF 25% with RV Nl/slightly reduced, Moderate MR, RVSP 30-35      Plan  Central line placement for ScVO2  Consider initiation of Milrione tx  Acute kidney injury superimposed on stage 3a chronic kidney disease (HCC)  Lab Results   Component Value Date    EGFR 28 04/26/2025    EGFR 31 04/26/2025    EGFR 38 04/25/2025    CREATININE 2.37 (H) 04/26/2025    CREATININE 2.20 (H) 04/26/2025    CREATININE 1.87 (H) 04/25/2025     Transaminitis  Recent Labs     04/25/25  0736 04/26/25  0625 04/26/25  1025   AST 28 1,934* 3,029*   ALT 21 1,767* 2,054*   ALKPHOS 95 89 92   TBILI 0.59 3.13* 2.64*      Patient with acute deterioration in LFTs,   Received amio bolus and infusion for a few hours-discontinued at this time  Trend LFT and lactate  Avoid hepatotoxic agents    Abdominal pain  S/P reduction of of umbilical hernia on 4/24 in the ED by general sugery  Repeat CT on 4/25:  Persistent mild nephrogram of bilateral kidneys and extended excretory phase suggesting renal failure. The urinary bladder is filled with IV contrast from prior study. Correlate for urinary retention. There is no hydroureteronephrosis.Persistent moderate ascites, mesenteric edema, and partially imaged small pericardial effusion with cardiomegaly, suggesting third spacing. The gallbladder is filled with IV contrast with surrounding fluid which is possibly related to ascites and vicarious excretion of the contrast. Acute cholecystitis cannot entirely be excluded in the right clinical setting.Minimal lobulated  liver contour suggesting cirrhosis. Correlate with history and labs.Additional/chronic findings as above.  4/25 Abdominal US: No sonographic findings of cirrhosis.Pulsatile portal venous flow, gallbladder wall thickening, and small volume upper abdominal ascites, likely due to right heart failure.  Patient initiated on Zosyn D#1    Plan  Serial abdominal exams  Continue Zosyn  Lactic acidosis  Suspect due to cardiogenic etiology  However, elevated LA, PCT and WBC could related to infection  Blood cultures pending  Continue Pip Tazo at this time      Plan  Trend endpoints and cultures  Acute on chronic combined systolic and diastolic congestive heart failure (HCC)  Wt Readings from Last 3 Encounters:   04/25/25 80.4 kg (177 lb 4 oz)   04/24/25 76.8 kg (169 lb 5 oz)   04/24/25 76.8 kg (169 lb 6.4 oz)             Cardiomyopathy (HCC)  Patient with history of methamphetamine induced cardiomyopathy  Last cardiac cath in 2020 showed no coronary artery disease  3/2025 ECHO: EF 25% with RV nl/reduced, Moderate MR   Patient's coreg was changed to metoprolol during prior hospitalization  Cardiology consult pending      Longstanding persistent atrial fibrillation (HCC)  Continue rate control agents Digoxin and metoprolol  Avoid cardizem and amiodarone at this time  Continue to hold warfarin at this time (LD 4/25) in setting of elevated INR  Pulmonary fibrosis (HCC)  Possible early CTD ILD-or related to prior exposure to dust/asbestos  PFT/office spirometry last year showed no restriction, mild decreased DLCO which argues against severe ILD  Acute respiratory failure (HCC)  Suspect component of pulmonary edema      Continue to wean FiO2 for SaO2 >92%  Pulmonary hygiene  Coagulopathy (HCC)  INR 9.93 this AM  LD warfarin 4/25    Plan  Vitamin K 10mg IV x 1 dose  Monitor for s/s of bleeding  Hold warfarin  Repeat INR in the AM  Hyperammonemia (HCC)  Suspect in the setting of hepatic dysfunction    Plan  Lactulose 30mg PO  "daily  Repeat Ammonia level in the AM  Disposition: Critical care    History of Present Illness   Britton Batista is a 59 y.o. who presents has PMH of methamphetamine induced cardiomyopathy, combined systolic and diastolic heart failure, Afib, CKD, Pulmonary Fibrosis, depterssion, scleroderna, latent TB and multiple hospitalizations in the past month for CP/volume overload/SOB and cardiogenic shock March of 2025 when he required milrinone and vasopressors.     Patient was seen in ED on 4/24 and underwent reduction of umbilical hernia. Admitted for further montioring. Patient was a RRT on 4/25 for hypoglycemia, afb and hypotension. Patient was initiated on Amio and transferred to [facility]. Today 4/26 critical care was contacted for concern for cardiogenic shock in light of patient's worsening lactic acidosis, transaminitis, IRINEO and coagulopathy.     History obtained from sibling, chart review, and the patient.  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)  No date: CHF (congestive heart failure) (HCC)  No date: Depression  No date: Emphysema of lung (HCC)  No date: GERD (gastroesophageal reflux disease)  No date: Gout  No date: Hypertension  03/22/2025: Metabolic encephalopathy  No date: Neuropathy  No date: Raynaud's disease  No date: Right inguinal hernia  No date: Scleroderma (HCC) Past Surgical History:  No date: AMPUTATION; Right      Comment:  pt had tip of \"pointer finger\" d/t scleraderma  No date: CARDIAC PACEMAKER PLACEMENT  01/31/2024: FINGER AMPUTATION; Left      Comment:  Procedure: AMPUTATION FINGER, LEFT INDEX FINGER CPT:                62989;  Surgeon: Pedro Vazquez MD;  Location:  MAIN OR;                 Service: Orthopedics  No date: HERNIA REPAIR; Left      Comment:  times 2  01/23/2024: NV AMP F/TH 1/2 JT/PHALANX W/NEURECT W/DIR CLSR; Right      " Comment:  Procedure: AMPUTATION FINGER, RIGHT MIDDLE;  Surgeon:                Pedro Vazquez MD;  Location: [facility];  Service:                Orthopedics  03/03/2023: [place] RPR 1ST INGUN HRNA AGE 5 YRS/> REDUCIBLE; Right      Comment:  Procedure: OPEN INGUINAL HERNIA REPAIR WITH MESH;                 Surgeon: Temo Jackson DO;  Location:  MAIN OR;                 Service: General   Current Outpatient Medications   Medication Instructions    albuterol (ProAir HFA) 90 mcg/act inhaler 2 puffs, Inhalation, Every 6 hours PRN    digoxin (LANOXIN) 125 mcg, Oral, 3 times weekly    furosemide (LASIX) 40 mg, Oral, Daily    ipratropium-albuterol (DUO-NEB) 0.5-2.5 mg/3 mL nebulizer solution 3 mL, Nebulization, Every 6 hours while awake (RESP)    isoniazid (NYDRAZID) 300 mg tablet 1 tablet, Daily    magnesium Oxide (MAG-OX) 400 mg, Oral, Daily    melatonin 3 mg, Oral, Daily at bedtime    metoprolol tartrate (LOPRESSOR) 25 mg, Oral, Every 12 hours scheduled    midodrine (PROAMATINE) 5 mg, Oral, 3 times daily before meals    pantoprazole (PROTONIX) 40 mg, Oral, 2 times daily    spironolactone (ALDACTONE) 12.5 mg, Oral, Daily    warfarin (COUMADIN) 5 mg tablet Target INR range is between 2-3    Allergies   Allergen Reactions    Aspirin GI Intolerance      Social History     Tobacco Use    Smoking status: Every Day     Current packs/day: 1.00     Average packs/day: 1 pack/day for 45.3 years (45.3 ttl pk-yrs)     Types: Cigarettes     Start date: 2000    Smokeless tobacco: Never    Tobacco comments:     Last cigarette 0430   Vaping Use    Vaping status: Never Used   Substance Use Topics    Alcohol use: Not Currently     Comment: occasional    Drug use: Yes     Types: Marijuana, Methamphetamines     Comment: Hx meth use    Family History   Problem Relation Age of Onset    Hypertension Father           Objective :                   Vitals I/O      Most Recent Min/Max in 24hrs   Temp 98.1 °F (36.7 °C) Temp  Min: 97 °F (36.1 °C)  Max:  98.1 °F (36.7 °C)   Pulse 100 Pulse  Min: 45  Max: 136   Resp (!) 29 Resp  Min: 17  Max: 36   /72 BP  Min: 90/52  Max: 142/81   O2 Sat 96 % SpO2  Min: 82 %  Max: 99 %      Intake/Output Summary (Last 24 hours) at 4/26/2025 1253  Last data filed at 4/26/2025 1048  Gross per 24 hour   Intake 1078.31 ml   Output 955 ml   Net 123.31 ml       Diet NPO; Sips with meds    Invasive Monitoring           Physical Exam   Physical Exam  Eyes:      Extraocular Movements: Extraocular movements intact.      Pupils: Pupils are equal, round, and reactive to light.   Skin:     Comments: Feet cool to touch  Knees slightly mottled in coloration   HENT:      Head: Normocephalic and atraumatic.      Mouth/Throat:      Mouth: Mucous membranes are dry.   Neck:      Vascular: JVD and central line present.   Cardiovascular:      Rate and Rhythm: Regular rhythm. Tachycardia present.      Pulses: Decreased pulses.   Musculoskeletal:      Right lower leg: No edema.      Left lower leg: No edema.   Abdominal: General: Bowel sounds are normal.      Palpations: Abdomen is soft.      Tenderness: There is no guarding.      Comments: Mild RUQ tenderness on deep palpation, no rebound, no guarding   Constitutional:       Appearance: He is ill-appearing.   Pulmonary:      Effort: Tachypnea present.      Breath sounds: Rhonchi present. No wheezing.   Neurological:      Mental Status: He is oriented to person, place and time.      Cranial Nerves: No dysarthria or facial asymmetry.      Motor: Strength full and intact in all extremities.          Diagnostic Studies        Lab Results: I have reviewed the following results:     Medications:  Scheduled PRN   chlorhexidine, 15 mL, Q12H JESUS  digoxin, 125 mcg, Once per day on Monday Wednesday Friday  ipratropium, 0.5 mg, TID  lactulose, 30 g, Daily  levalbuterol, 1.25 mg, TID  magnesium Oxide, 400 mg, Daily  melatonin, 3 mg, HS  metoprolol tartrate, 25 mg, Q12H JESUS  midodrine, 7.5 mg, TID AC  nicotine, 1  patch, Daily  pantoprazole, 40 mg, BID  piperacillin-tazobactam, 4.5 g, Q8H  sucralfate, 1 g, 4x Daily (AC & HS)      albuterol, 2 puff, Q6H PRN  metoprolol, 2.5 mg, Q6H PRN  trimethobenzamide, 200 mg, Q8H PRN       Continuous          Labs:   CBC    Recent Labs     04/25/25  0736 04/26/25  0625   WBC 14.73* 19.66*   HGB 14.6 14.4   HCT 47.7 46.0    256     BMP    Recent Labs     04/26/25  0625 04/26/25  1025   SODIUM 134* 132*   K 5.3 5.2    99   CO2 21 19*   AGAP 13 14*   BUN 73* 75*   CREATININE 2.20* 2.37*   CALCIUM 9.0 9.3       Coags    Recent Labs     04/25/25  1703 04/26/25  0625   INR 4.86* 9.93*        Additional Electrolytes  Recent Labs     04/26/25  0625   MG 2.3          Blood Gas    Recent Labs     04/26/25  0239   PHART 7.357   [id number] 26.8*   PO2ART 124.4   EUK4VON 14.7*   BEART -8.9   SOURCE Radial, Right     Recent Labs     04/26/25  0239   SOURCE Radial, Right    LFTs  Recent Labs     04/26/25  0625 04/26/25  1025   ALT 1,767* 2,054*   AST 1,934* 3,029*   ALKPHOS 89 92   ALB 3.6 3.7   TBILI 3.13* 2.64*       Infectious  Recent Labs     04/25/25  1318 04/26/25  0625   PROCALCITONI 0.15 1.84*     Glucose  Recent Labs     04/24/25  2140 04/25/25  0736 04/26/25  0625 04/26/25  1025   GLUC 120 105 84 124      Patient's sister, Arin, at bedside and update provided. Patient states that he would like Arin to make medical decisions for him. Patient's daughter, Corinna, updated via phone.     Administrative Statements   0 mins of critical care time

## 2025-04-26 NOTE — ASSESSMENT & PLAN NOTE
Concern for cardiogenic etiology as cause for acute decompensation of liver function and renal function  3/25 ECHO EF 25% with RV Nl/slightly reduced, Moderate MR, RVSP 30-35  4/26 ScVo2 32-> Milrinone initiated and ScVo2 improved to 64; Lasix 80mg IV x 1 dose      Plan  Continue Milrinone infusion  ScVO2 04/27 improved to 67.

## 2025-04-26 NOTE — ASSESSMENT & PLAN NOTE
Secondary to methamphetamine abuse  Managed on Lasix, metoprolol spironolactone  No longer taking lisinopril per sister.   Unable to afford Entresto or Jardiance  Close outpatient cardiology follow-up recommended upon discharge

## 2025-04-26 NOTE — ASSESSMENT & PLAN NOTE
Continue rate control agents Digoxin and metoprolol  Avoid cardizem and amiodarone at this time  Continue to hold warfarin at this time (LD 4/25) in setting of elevated INR

## 2025-04-26 NOTE — PLAN OF CARE
Problem: PAIN - ADULT  Goal: Verbalizes/displays adequate comfort level or baseline comfort level  Description: Interventions:- Encourage patient to monitor pain and request assistance- Assess pain using appropriate pain scale- Administer analgesics based on type and severity of pain and evaluate response- Implement non-pharmacological measures as appropriate and evaluate response- Consider cultural and social influences on pain and pain management- Notify physician/advanced practitioner if interventions unsuccessful or patient reports new pain  Outcome: Progressing     Problem: INFECTION - ADULT  Goal: Absence or prevention of progression during hospitalization  Description: INTERVENTIONS:- Assess and monitor for signs and symptoms of infection- Monitor lab/diagnostic results- Monitor all insertion sites, i.e. indwelling lines, tubes, and drains- Monitor endotracheal if appropriate and nasal secretions for changes in amount and color- Tobias appropriate cooling/warming therapies per order- Administer medications as ordered- Instruct and encourage patient and family to use good hand hygiene technique- Identify and instruct in appropriate isolation precautions for identified infection/condition  Outcome: Progressing  Goal: Absence of fever/infection during neutropenic period  Description: INTERVENTIONS:- Monitor WBC  Outcome: Progressing     Problem: SAFETY ADULT  Goal: Patient will remain free of falls  Description: INTERVENTIONS:- Educate patient/family on patient safety including physical limitations- Instruct patient to call for assistance with activity - Consult OT/PT to assist with strengthening/mobility - Keep Call bell within reach- Keep bed low and locked with side rails adjusted as appropriate- Keep care items and personal belongings within reach- Initiate and maintain comfort rounds- Make Fall Risk Sign visible to staff- Offer Toileting every 2 Hours, in advance of need- Initiate/Maintain bed/chair  alarm-  Apply yellow socks and bracelet for high fall risk patients- Consider moving patient to room near nurses station  Outcome: Progressing     Problem: DISCHARGE PLANNING  Goal: Discharge to home or other facility with appropriate resources  Description: INTERVENTIONS:- Identify barriers to discharge w/patient and caregiver- Arrange for needed discharge resources and transportation as appropriate- Identify discharge learning needs (meds, wound care, etc.)- Arrange for interpretive services to assist at discharge as needed- Refer to Case Management Department for coordinating discharge planning if the patient needs post-hospital services based on physician/advanced practitioner order or complex needs related to functional status, cognitive ability, or social support system  Outcome: Progressing     Problem: Knowledge Deficit  Goal: Patient/family/caregiver demonstrates understanding of disease process, treatment plan, medications, and discharge instructions  Description: Complete learning assessment and assess knowledge base.Interventions:- Provide teaching at level of understanding- Provide teaching via preferred learning methods  Outcome: Progressing     Problem: CARDIOVASCULAR - ADULT  Goal: Maintains optimal cardiac output and hemodynamic stability  Description: INTERVENTIONS:- Monitor I/O, vital signs and rhythm- Monitor for S/S and trends of decreased cardiac output- Administer and titrate ordered vasoactive medications to optimize hemodynamic stability- Assess quality of pulses, skin color and temperature- Assess for signs of decreased coronary artery perfusion- Instruct patient to report change in severity of symptoms  Outcome: Progressing  Goal: Absence of cardiac dysrhythmias or at baseline rhythm  Description: INTERVENTIONS:- Continuous cardiac monitoring, vital signs, obtain 12 lead EKG if ordered- Administer antiarrhythmic and heart rate control medications as ordered- Monitor electrolytes and  administer replacement therapy as ordered  Outcome: Progressing     Problem: RESPIRATORY - ADULT  Goal: Achieves optimal ventilation and oxygenation  Description: INTERVENTIONS:- Assess for changes in respiratory status- Assess for changes in mentation and behavior- Position to facilitate oxygenation and minimize respiratory effort- Oxygen administered by appropriate delivery if ordered- Initiate smoking cessation education as indicated- Encourage broncho-pulmonary hygiene including cough, deep breathe, Incentive Spirometry- Assess the need for suctioning and aspirate as needed- Assess and instruct to report SOB or any respiratory difficulty- Respiratory Therapy support as indicated  Outcome: Progressing     Problem: METABOLIC, FLUID AND ELECTROLYTES - ADULT  Goal: Electrolytes maintained within normal limits  Description: INTERVENTIONS:- Monitor labs and assess patient for signs and symptoms of electrolyte imbalances- Administer electrolyte replacement as ordered- Monitor response to electrolyte replacements, including repeat lab results as appropriate- Instruct patient on fluid and nutrition as appropriate  Outcome: Progressing  Goal: Fluid balance maintained  Description: INTERVENTIONS:- Monitor labs - Monitor I/O and WT- Instruct patient on fluid and nutrition as appropriate- Assess for signs & symptoms of volume excess or deficit  Outcome: Progressing  Goal: Glucose maintained within target range  Description: INTERVENTIONS:- Monitor Blood Glucose as ordered- Assess for signs and symptoms of hyperglycemia and hypoglycemia- Administer ordered medications to maintain glucose within target range- Assess nutritional intake and initiate nutrition service referral as needed  Outcome: Progressing

## 2025-04-26 NOTE — ASSESSMENT & PLAN NOTE
Wt Readings from Last 3 Encounters:   04/25/25 80.4 kg (177 lb 4 oz)   04/24/25 76.8 kg (169 lb 5 oz)   04/24/25 76.8 kg (169 lb 6.4 oz)       Patient with recent hospitalization for acute on chronic combined systolic and diastolic heart failure.  Was on IV diuretics.  Weight increased today however will get standing weight.  Hold diuretics at this time as discussed with nephrology as his kidney function has worsened   continue with strict intake output monitoring.  Continue with sodium and fluid restricted diet.

## 2025-04-26 NOTE — UTILIZATION REVIEW
Initial Clinical Review    Admission: Date/Time/Statement:   Admission Orders (From admission, onward)       Ordered        04/25/25 1316  INPATIENT ADMISSION  Once                          Orders Placed This Encounter   Procedures    INPATIENT ADMISSION     Standing Status:   Standing     Number of Occurrences:   1     Level of Care:   Med Surg [16]     Estimated length of stay:   More than 2 Midnights     Certification:   I certify that inpatient services are medically necessary for this patient for a duration of greater than two midnights. See H&P and MD Progress Notes for additional information about the patient's course of treatment.     ED Arrival Information       Expected   4/25/2025     Arrival   4/25/2025 07:22    Acuity   Urgent              Means of arrival   Ambulance    Escorted by   [facility] [employer]    Service   Hospitalist    Admission type   Emergency              Arrival complaint   hernia pain             Chief Complaint   Patient presents with    Abdominal Pain     Patient reports umbilical pain starting approximately 2 hours ago. Seen last night for the same thing; hernia reduced, was told to come back if the pain got worse. Nausea, denies vomiting.       Initial Presentation: 59 y.o. male presents to ED via  EMS from home  with abdominal pain, denies nausea or vomiting.  Just discharged  from hospital on  4/24.  Early the morning of admission started with intense epigastric abdominal pain.  PMH is  CM, CHF, CKD, chronic Afib, pulmonary fibrosis, depression, Raynaud's disease  and scleroderma.   Labs show elevated potassium, lactic acidosis. Creatinine elevated  at  1.8.   Ct abdomen shows mod ascites, mesenteric edema, sm pericardial effusion.   Admit  Ip with  Abdominal pain, Acute/chronic CHF,  IRINEO/CKD,  hyperkalemia  and Lactic acidosis and plan is  monitor labs, S/P 2 L  IVF  in  ED, Ca gluconate,  fluid restrict,  strict  I  & O and   IV  lasix.    4/25   6: 21 PM  RAPID RESPONSE  CALL  Acute change  in  BP,  acute change in heart rate, acute  neuro status change  and  change in  O2  sats.  Transfer to Stepdown LOC.    Anticipated Length of Stay/Certification Statement:  Patient will be admitted on an inpatient basis with an anticipated length of stay of greater than 2 midnights     Date:    4/26   Day 2:   5:46  AM  Confused, shallow breathing, restless.  Placed on  10L  MF.  Given IV  lasix  X1 and  1 dose ativan.    Hold  IV  diuretics for now.  Kidney function worse.   Creatinine this am  2.20.   Need strict  I & O. Weight increased, need standing weight.  INR  now  >  9 and giving Vit  K X 1 and hold coumadin.  Now  on  O2  6L  NC with sats  99 %.      Critical Care consult  Concern for cardiogenic shock as cause for acute decompensation of liver and renal function.  Central line placed.  May need milrinone.  Giving  1 dose  IV lasix.    Date:    4/27  Day 3: Has surpassed a 2nd midnight with active treatments and services.  Remains on  KYRIE.    Was in Afib   last evening and  amiodarone drip started, now  d/c  early this am.  Started  Milrinone infusion.   Zazueta intact for strict  I O.    Given  1 dose   IV lasix.   On lactulose daily.    Alert/oriented to person, place and time.       ED Treatment-Medication Administration from 04/25/2025 0722 to 04/25/2025 1407         Date/Time Order Dose Route Action     04/25/2025 0806 morphine injection 2 mg 2 mg Intravenous Given     04/25/2025 0806 ondansetron (ZOFRAN) injection 4 mg 4 mg Intravenous Given     04/25/2025 1015 sodium chloride 0.9 % bolus 1,000 mL 1,000 mL Intravenous New Bag     04/25/2025 1043 morphine injection 2 mg 2 mg Intravenous Given     04/25/2025 1319 sodium chloride 0.9 % bolus 1,000 mL 1,000 mL Intravenous New Bag            Scheduled Medications:  digoxin, 125 mcg, Oral, Once per day on Monday Wednesday Friday  ipratropium, 0.5 mg, Nebulization, TID  levalbuterol, 1.25 mg, Nebulization, TID  magnesium Oxide, 400 mg,  Oral, Daily  melatonin, 3 mg, Oral, HS  metoprolol tartrate, 25 mg, Oral, Q12H JESUS  midodrine, 7.5 mg, Oral, TID AC  nicotine, 1 patch, Transdermal, Daily  pantoprazole, 40 mg, Oral, BID  phytonadione, 10 mg, Intravenous, Once  phytonadione, 5 mg, Oral, Once  piperacillin-tazobactam, 4.5 g, Intravenous, Once   Followed by  piperacillin-tazobactam, 4.5 g, Intravenous, Q8H  IV  lasix  BID  - d/c  4/26  Sodium Zirconium Cyclosilicate, 10 g, Oral, Daily  sucralfate, 1 g, Oral, 4x Daily (AC & HS)      Continuous IV Infusions:  Amiodarone - d/c  4/27  Milrinone  infusion  Norepinephrine  infusion     PRN Meds:  albuterol, 2 puff, Inhalation, Q6H PRN  metoprolol, 2.5 mg, Intravenous, Q6H PRN  trimethobenzamide, 200 mg, Intramuscular, Q8H PRN      ED Triage Vitals   Temperature Pulse Respirations Blood Pressure SpO2 Pain Score   04/25/25 0727 04/25/25 0744 04/25/25 0727 04/25/25 0727 04/25/25 0744 04/25/25 0727   (!) 96.6 °F (35.9 °C) (!) 52 17 129/74 97 % 7     Weight (last 2 days)       Date/Time Weight    04/25/25 2000 80.4 (177.25)    04/25/25 0727 81.9 (180.56)            Vital Signs (last 3 days)       Date/Time Temp Pulse Resp BP MAP (mmHg) SpO2 Calculated FIO2 (%) - Nasal Cannula Nasal Cannula O2 Flow Rate (L/min) O2 Device Patient Position - Orthostatic VS [place] Coma Scale Score Pain    04/26/25 0920 -- -- -- -- -- 99 % 44 6 L/min Nasal cannula -- -- --    04/26/25 0840 -- -- 24 -- -- 97 % 44 6 L/min Mid flow nasal cannula -- -- --    04/26/25 0817 -- -- -- -- -- -- 44 6 L/min Mid flow nasal cannula -- -- --    04/26/25 0754 98.1 °F (36.7 °C) 110 21 117/87 99 98 % 44 6 L/min Mid flow nasal cannula Lying 14 No Pain    04/26/25 0640 -- -- -- -- -- 93 % 52 8 L/min Mid flow nasal cannula -- -- --    04/26/25 0610 -- 104 31 124/74 93 92 % -- -- -- -- -- --    04/26/25 0420 97 °F (36.1 °C) 90 28 134/90 104 91 % 60 10 L/min Mid flow nasal cannula Lying -- No Pain    04/26/25 0400 -- -- -- -- -- -- -- -- -- -- 15 --     04/26/25 0244 -- -- -- -- -- 92 % -- -- -- -- -- --    04/26/25 0225 -- 90 35 122/88 101 96 % 60 10 L/min -- -- -- --    04/26/25 0215 -- 93 32 122/88 101 95 % -- -- -- -- -- --    04/26/25 0200 -- -- -- -- -- 97 % 68 12 L/min -- -- -- --    04/26/25 0131 -- -- -- -- -- 94 % 76 14 L/min Mid flow nasal cannula -- -- --    04/26/25 0100 -- 99 36 117/68 87 82 % 40 5 L/min Nasal cannula -- -- --    04/26/25 0000 -- 104 20 113/79 91 93 % 36 4 L/min Nasal cannula -- 15 No Pain    04/25/25 2340 -- 101 20 -- -- -- -- -- -- -- -- --    04/25/25 2300 97 °F (36.1 °C) 114 19 112/66 81 91 % -- -- -- Lying -- --    04/25/25 2230 -- 119 18 113/79 93 95 % -- -- -- -- -- --    04/25/25 2201 -- -- -- -- -- -- -- -- -- -- -- 2    04/25/25 2200 -- 123 18 118/75 87 97 % -- -- -- -- -- --    04/25/25 2038 -- 127 17 113/82 93 93 % -- -- -- -- -- --    04/25/25 2000 -- 125 18 112/93 100 96 % 36 4 L/min Nasal cannula Lying 15 --    04/25/25 1940 -- 128 24 142/81 107 88 % -- -- -- -- -- --    04/25/25 1936 97.6 °F (36.4 °C) -- -- -- -- -- -- -- -- -- -- --    04/25/25 1930 -- 131 23 122/91 101 93 % -- -- -- -- -- --    04/25/25 1922 -- 136 -- -- -- -- -- -- -- -- -- --    04/25/25 1809 -- -- -- 90/52 -- -- -- -- -- -- -- 6    04/25/25 1709 -- 136 -- 106/50 -- -- -- -- -- -- -- --    04/25/25 1640 -- -- -- -- -- 92 % 32 3 L/min Nasal cannula -- 15 --    04/25/25 14:16:24 97.5 °F (36.4 °C) 45 17 109/72 84 85 % -- -- -- -- -- --    04/25/25 1043 -- -- -- -- -- -- -- -- -- -- -- 8    04/25/25 0945 -- 60 18 118/86 99 93 % -- -- None (Room air) Sitting -- --    04/25/25 0806 -- -- -- -- -- -- -- -- -- -- -- 5    04/25/25 0744 -- 52 18 -- -- 97 % -- -- None (Room air) -- -- --    04/25/25 0727 96.6 °F (35.9 °C) -- 17 129/74 -- -- -- -- -- -- -- 7              Pertinent Labs/Diagnostic Test Results:   Radiology:  US abdomen complete   Final Interpretation by Cherelle Luu MD (04/26 1033)      No sonographic findings of cirrhosis.       Pulsatile portal venous flow, gallbladder wall thickening, and small volume upper abdominal ascites, likely due to right heart failure.      Resident: Yosi De Dios I, the attending radiologist, have reviewed the images and agree with the final report above.      Workstation performed: [id number]         CT abdomen pelvis wo contrast   Final Interpretation by Joel Huerta MD (04/25 1151)   Limited assessment without IV contrast.      1.  Persistent mild nephrogram of bilateral kidneys and extended excretory phase suggesting renal failure. The urinary bladder is filled with IV contrast from prior study. Correlate for urinary retention. There is no hydroureteronephrosis.      2.  Persistent moderate ascites, mesenteric edema, and partially imaged small pericardial effusion with cardiomegaly, suggesting third spacing. The gallbladder is filled with IV contrast with surrounding fluid which is possibly related to ascites and    vicarious excretion of the contrast. Acute cholecystitis cannot entirely be excluded in the right clinical setting.      3.  Minimal lobulated liver contour suggesting cirrhosis. Correlate with history and labs.      4.  Additional/chronic findings as above.            Workstation performed: [id number]         XR chest portable    (Results Pending)               Results from last 7 days   Lab Units 04/26/25  0625 04/25/25  0736 04/24/25 2140 04/23/25  0627 04/22/25  2208   WBC Thousand/uL 19.66* 14.73* 19.66* 7.63 8.11   HEMOGLOBIN g/dL 14.4 14.6 14.0 12.6 14.4   HEMATOCRIT % 46.0 47.7 44.8 39.6 46.4   PLATELETS Thousands/uL 256 234 248 208 213   TOTAL NEUT ABS Thousands/µL 16.86* 11.50* 17.65*  --  5.32         Results from last 7 days   Lab Units 04/26/25  0625 04/25/25  1703 04/25/25  0736 04/24/25  2140 04/24/25  1008 04/23/25  0627 04/22/25  2327 04/20/25  0628   SODIUM mmol/L 134*  --  132* 135 136 135 134* 136   POTASSIUM mmol/L 5.3 5.1 5.6* 4.6 4.5 4.0 4.3 4.4   CHLORIDE mmol/L 100   --  101 98 101 102 102 102   CO2 mmol/L 21  --  21 28 26 25 25 27   ANION GAP mmol/L 13  --  10 9 9 8 7 7   BUN mg/dL 73*  --  55* 48* 42* 40* 37* 35*   CREATININE mg/dL 2.20*  --  1.87* 1.83* 1.49* 1.59* 1.49* 1.59*   EGFR ml/min/1.73sq m 31  --  38 39 50 46 50 46   CALCIUM mg/dL 9.0  --  9.2 9.7 9.3 9.0 9.0 9.0   MAGNESIUM mg/dL 2.3  --   --   --   --   --  1.8* 1.8*   PHOSPHORUS mg/dL  --   --   --   --   --   --  4.4  --      Results from last 7 days   Lab Units 04/26/25 0625 04/25/25  0736 04/24/25 2140 04/23/25 0627 04/22/25 2327   AST U/L 1,934* 28 25 30 30   ALT U/L 1,767* 21 24 21 21   ALK PHOS U/L 89 95 97 106* 119*   TOTAL PROTEIN g/dL 6.5 7.2 7.6 6.8 7.2   ALBUMIN g/dL 3.6 3.8 4.1 3.6 3.7   TOTAL BILIRUBIN mg/dL 3.13* 0.59 0.49 0.49 0.47     Results from last 7 days   Lab Units 04/25/25  2357 04/25/25  2136 04/25/25  1853 04/25/25  1849 04/25/25  1827 04/25/25  1820   POC GLUCOSE mg/dl 136 139 176* 54* 55* 53*     Results from last 7 days   Lab Units 04/26/25  0625 04/25/25  0736 04/24/25 2140 04/24/25  1008 04/23/25 0627 04/22/25 2327 04/20/25  0628 04/19/25  1404   GLUCOSE RANDOM mg/dL 84 105 120 182* 143* 90 90 104            Results from last 7 days   Lab Units 04/26/25  0239   PH ART  7.357   PCO2 ART mm Hg 26.8*   PO2 ART mm Hg 124.4   HCO3 ART mmol/L 14.7*   BASE EXC ART mmol/L -8.9   O2 CONTENT ART mL/dL 21.0   O2 HGB, ARTERIAL % 96.8   ABG SOURCE  Radial, Right     Results from last 7 days   Lab Units 04/23/25  0627 04/23/25  0025 04/22/25  2232   PH SHAHANA  7.440* 7.329 7.336   PCO2 SHAHANA mm Hg 37.4* 43.1 40.2*   PO2 SHAHANA mm Hg 116.1* 45.5* 36.9   HCO3 SHAHANA mmol/L 24.8 22.2* 21.0*   BASE EXC SHAHANA mmol/L 0.9 -3.7 -4.5   O2 CONTENT SHAHANA ml/dL 18.3 14.9 12.3   O2 HGB, VENOUS % 94.9* 73.7 63.0             Results from last 7 days   Lab Units 04/23/25  0405 04/23/25  0141 04/22/25  2327 04/19/25  1940 04/19/25  1726 04/19/25  1404   HS TNI 0HR ng/L  --   --  122*  --   --  108*   HS TNI 2HR ng/L  --   88*  --   --  91*  --    HSTNI D2 ng/L  --  -34  --   --  -17  --    HS TNI 4HR ng/L 72*  --   --  94*  --   --    HSTNI D4 ng/L -50  --   --  -14  --   --      Results from last 7 days   Lab Units 04/22/25  2208   D-DIMER QUANTITATIVE ug/ml FEU <0.27     Results from last 7 days   Lab Units 04/26/25  0625 04/25/25  1703 04/23/25  0627 04/22/25  2208   PROTIME seconds 76.4* 44.7* 26.3* 23.5*   INR  9.93* 4.86* 2.41* 2.07*   PTT seconds  --   --   --  37*     Results from last 7 days   Lab Units 04/22/25  2327   TSH 3RD GENERATON uIU/mL 8.109*     Results from last 7 days   Lab Units 04/25/25  1318 04/24/25  1008 04/22/25  2327   PROCALCITONIN ng/ml 0.15 0.08 0.07     Results from last 7 days   Lab Units 04/26/25  0904 04/26/25  0625 04/25/25  2028 04/25/25  1703 04/25/25  1157 04/25/25  0815 04/24/25  2140   LACTIC ACID mmol/L 3.5* 4.5* 2.8* 2.5* 2.9* 2.2* 3.1*         Results from last 7 days   Lab Units 04/25/25  1703 04/22/25  2327 04/19/25  1404   DIGOXIN LVL ng/mL 0.3* 0.4* 0.5*     Results from last 7 days   Lab Units 04/22/25  2208 04/19/25  1404   BNP pg/mL 1,034* 1,178*                     Results from last 7 days   Lab Units 04/25/25  0736 04/24/25  2140   LIPASE u/L 32 13     Results from last 7 days   Lab Units 04/23/25  0627   CRP mg/L 14.3*             Results from last 7 days   Lab Units 04/25/25  1554 04/23/25  0148   CLARITY UA  Clear Clear   COLOR UA  Yellow Yellow   SPEC GRAV UA  1.020 1.010   PH UA  6.0 6.0   GLUCOSE UA mg/dl Negative Negative   KETONES UA mg/dl Negative Negative   BLOOD UA  Negative Negative   PROTEIN UA mg/dl 100 (2+)* Negative   NITRITE UA  Negative Negative   BILIRUBIN UA  Negative Negative   UROBILINOGEN UA E.U./dl 0.2 0.2   LEUKOCYTES UA  Negative Negative   WBC UA /hpf None Seen  --    RBC UA /hpf None Seen  --    BACTERIA UA /hpf None Seen  --    EPITHELIAL CELLS WET PREP /hpf Occasional  --                        Present on Admission:   Acute on chronic combined systolic  and diastolic congestive heart failure (HCC)   Pulmonary fibrosis (HCC)   Longstanding persistent atrial fibrillation (HCC)   Acute kidney injury superimposed on stage 3a chronic kidney disease (HCC)   Cardiomyopathy (HCC)   Hyperkalemia      Admitting Diagnosis: Dehydration [E86.0]  Cirrhosis (HCC) [K74.60]  Abdominal pain [R10.9]  Intractable abdominal pain [R10.9]  Age/Sex: 59 y.o. male    Network Utilization Review Department  ATTENTION: Please call with any questions or concerns to [phone] and carefully listen to the prompts so that you are directed to the right person. All voicemails are confidential.   For Discharge needs, contact Care Management DC Support Team at [phone] opt. [phone]  Send all requests for admission clinical reviews, approved or denied determinations and any other requests to dedicated fax number below belonging to the campus where the patient is receiving treatment. List of dedicated fax numbers for the Facilities:  FACILITY NAME UR FAX NUMBER   ADMISSION DENIALS (Administrative/Medical Necessity) [phone]   DISCHARGE SUPPORT TEAM (NETWORK) [phone]   PARENT CHILD HEALTH (Maternity/NICU/Pediatrics) [phone]   [facility] [phone]   [facility] [phone]   [facility] [phone]   [facility] [phone]   [facility] [phone]   [facility] [phone]   [facility] [phone]   [facility] [phone]   [facility] [phone]   [facility] [phone]   [facility] [phone]   [facility] [phone]

## 2025-04-26 NOTE — CONSULTS
Consultation - Nephrology   Britton Batista 59 y.o. male MRN: [id number]  Unit/Bed#: -01 Encounter: [id number]      A/P:  1.  Acute kidney injury   Etiology appears to be due to reduced effective circulation, but is unclear at this time as of this is due to cardiac source, overdiuresis, or other physiologic shunting.  Although the patient had IV contrast exposure yesterday, it is not likely that he is experiencing contrast-induced nephropathy at this time.    I believe will be low risk to initiate midodrine, and consider the use of octreotide depending on how he progresses clinically.  The patient certainly is at risk for hepatorenal syndrome given findings that may be consistent with cirrhosis on imaging, as well as cardiorenal syndrome/cardiogenic shock given his known history of cardiogenic shock recently which required milrinone for medical assistance.  Our critical care colleagues were asked to evaluate the patient for potential milrinone infusion.  Continue to optimize hemodynamics and physiology, avoid potential nephrotoxins.  2.  Chronic kidney disease stage IIIa with baseline creatinine around 1.5 mg/dL  3.  Hyperkalemia   Patient was given a one-time dose of Lokelma at presentation, current serum potassium is 5.3 mmol/L which is appropriate.  Continue to monitor for now, etiology potentially due to acute kidney injury, history of outpatient spironolactone use.  4.  Hypertension   As noted above, increase midodrine from 5 mg 3 times daily at the 7.5 mg 3 times daily.  Continue to closely monitor the patient's overall physiologic progression.  5.  Lactic acidosis   Potentially due to the consequences of poor perfusion, as noted above.  Work to improve overall physiology as best as possible.  Will start with improving potential shunting with the use of midodrine and consider the use of octreotide.  Patient may not be candidate due to prolonged QTc.  6.  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  At this time, given overall clinical circumstances, hold additional diuretics, potentially consider the use of inotrope such as milrinone, was given amiodarone bolus, remains on digoxin 125 mcg 3 times weekly, and had metoprolol held at presentation.  7.  Tachycardia   Patient had a heart rate closer to 150-160 bpm at presentation, now has improved down between 100-115 bpm during my evaluation.  Continue to further care and management according to hospitalist/critical care colleagues.  Patient was given a one-time dose of amiodarone, remains on digoxin, metoprolol held due to hypotension.    Case discussed with hospitalist as well as critical care colleagues.  Unless otherwise indicated, would hold loop diuretics for now until we are able to better assess the patient's overall physiologic circumstances.  Will start with midodrine, increasing from 5 mg up to 7.5 mg 3 times daily.  Currently not a candidate for octreotide due to prolonged QTc.  We can consider the use of albumin depending on what is needed clinically.  Consider initiating milrinone for inotropic support.    Case also discussed with his sister who is in the room during the initial consultation.       Thank you for allowing us to participate in the care of your patient.     Please feel free to contact us regarding the care of this patient, or any other questions/concerns that may be applicable.    Patient Active Problem List   Diagnosis    PSS (progressive systemic sclerosis) (HCC)    Near syncope    Osteomyelitis (HCC)    Elevated troponin    Acute encephalopathy    Anxiety    Depression    Other emphysema (HCC)    GERD (gastroesophageal reflux disease)    Gout    Hypertensive urgency    Neuropathy    Raynaud's disease    Unilateral inguinal hernia, without obstruction or gangrene, not specified as recurrent    Acute on chronic combined systolic and diastolic  congestive heart failure (HCC)    Hypertensive emergency    Acute kidney injury superimposed on stage 3a chronic kidney disease (HCC)    Cardiomyopathy (HCC)    Methamphetamine abuse (HCC)    Tobacco abuse    D-dimer, elevated    Hypomagnesemia    Chest pain    Acute on chronic systolic congestive heart failure (HCC)    Primary hypertension    Syncope    Atrial fibrillation with RVR (HCC)    Hyperkalemia    Stage 3a chronic kidney disease (CKD) (HCC)    Cardiogenic shock (HCC)    Scleroderma (HCC)    Longstanding persistent atrial fibrillation (HCC)    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 year old male who presented to the emergency room yesterday, April 25 with complaints of abdominal pain.  At that time, the patient was given a CT scan of the abdomen pelvis with IV contrast which suggested potential ascites as well as an uncomplicated hernia with no other definitive findings.  Patient has had abdominal pain in the past off-and-on with unclear etiology.  Patient recently admitted to the hospital 3 days prior to his presentation yesterday due to findings consistent with an acute exacerbation of congestive heart failure with associated acute respiratory failure and hypoxia.  The patient was diuresed and ultimately discharged from the hospital on April 24.    Upon presentation yesterday with readmission, patient initially was given conservative management with observation and continuation of low-sodium diet with a fluid restriction, however overnight into earlier this morning, the patient was noted to be more hypoxic and required 10 L mid flow and was  given IV furosemide.  Patient's creatinine at the same time went from 1.5 mg/dL up to 1.9 mg/dL, and this morning is 2.2 mg/dL.  Although he feels tired and fatigued, he has no acute complaints at this time.    Patient also noted to have increased levels of transaminases with no definitive findings suggestive of cholecystitis.    History obtained from sibling, chart review, and the patient    Constitutional ROS- Denies fever, chills, night sweats, weight changes.  HEENT ROS- Denies history of eye surgeries, glaucoma, headaches or history of trauma, blurred vision..  Endocrine ROS- No history diabetes mellitus or thyroid disease.  Cardiovascular ROS- Denies chest pain, palpitation, dyspnea exertion, orthopnea, claudication.  Pulmonary ROS- Denies history of COPD, asthma.  Denies cough, hemoptysis, shortness of breath.  GI ROS- Denies diarrhea, nausea, swallowing problems, vomiting, constipation, blood in stools, fecal incontinence.  Positive for abdominal pain  Hematological ROS- Denies history of easy bruising, blood clots, bleeding or blood transfusions.  Genitourinary ROS- Denies recent hematuria, pyuria, flank pain, change in urinary stream, decreased urinary output, increased urinary frequency, nocturia, foamy urine, or urinary incontinence.  Lymphatic ROS- Denies lymphadenopathy.  Musculoskeletal ROS- Denies history of muscle weakness, joint pain.  Dermatological ROS- Denies rash, wounds, ulcers, itching, jaundice.  Psychiatric ROS- Denies anxiety, depression, hallucinations, disorientation.  Neurological ROS- No stroke or TIA symptoms. .    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    CHF (congestive heart failure) (HCC)     Depression     Emphysema of lung (HCC)     GERD (gastroesophageal reflux disease)     Gout     Hypertension     Metabolic encephalopathy 03/22/2025    Neuropathy     Raynaud's  "disease     Right inguinal hernia     Scleroderma (HCC)      Past Surgical History:   Procedure Laterality Date    AMPUTATION Right     pt had tip of \"pointer finger\" d/t scleraderma    CARDIAC PACEMAKER PLACEMENT      FINGER AMPUTATION Left 01/31/2024    Procedure: AMPUTATION FINGER, LEFT INDEX FINGER CPT: 01746;  Surgeon: Pedro Vazquez MD;  Location:  MAIN OR;  Service: Orthopedics    HERNIA REPAIR Left     times 2    WI AMP F/TH 1/2 JT/PHALANX W/NEURECT W/DIR CLSR Right 01/23/2024    Procedure: AMPUTATION FINGER, RIGHT MIDDLE;  Surgeon: Pedro Vazquez MD;  Location: [facility];  Service: Orthopedics    WI RPR 1ST INGUN HRNA AGE 5 YRS/> REDUCIBLE Right 03/03/2023    Procedure: OPEN INGUINAL HERNIA REPAIR WITH MESH;  Surgeon: Temo Jackson DO;  Location:  MAIN OR;  Service: General     Social History   Social History     Substance and Sexual Activity   Alcohol Use Not Currently    Comment: occasional     Social History     Substance and Sexual Activity   Drug Use Yes    Types: Marijuana, Methamphetamines    Comment: Hx meth use     Social History     Tobacco Use   Smoking Status Every Day    Current packs/day: 1.00    Average packs/day: 1 pack/day for 45.3 years (45.3 ttl pk-yrs)    Types: Cigarettes    Start date: 2000   Smokeless Tobacco Never   Tobacco Comments    Last cigarette 0430     Family History   Problem Relation Age of Onset    Hypertension Father        Meds/Allergies   all current active meds have been reviewed, current meds:   Current Facility-Administered Medications:     albuterol (PROVENTIL HFA,VENTOLIN HFA) inhaler 2 puff, Q6H PRN    digoxin (LANOXIN) tablet 125 mcg, Once per day on Monday Wednesday Friday    ipratropium (ATROVENT) 0.02 % inhalation solution 0.5 mg, TID    levalbuterol (XOPENEX) inhalation solution 1.25 mg, TID    magnesium Oxide (MAG-OX) tablet 400 mg, Daily    melatonin tablet 3 mg, HS    metoprolol (LOPRESSOR) injection 2.5 mg, Q6H PRN    metoprolol tartrate (LOPRESSOR) tablet " 25 mg, Q12H JESUS    midodrine (PROAMATINE) tablet 7.5 mg, TID AC    nicotine (NICODERM CQ) 14 mg/24hr TD 24 hr patch 1 patch, Daily    pantoprazole (PROTONIX) EC tablet 40 mg, BID    phytonadione (MEPHYTON) tablet 5 mg, Once    piperacillin-tazobactam (ZOSYN) 4.5 g in sodium chloride 0.9 % 100 mL IV LOADING DOSE, Once **FOLLOWED BY** piperacillin-tazobactam (ZOSYN) 4.5 g in sodium chloride 0.9 % 100 mL IVPB (EXTENDED INFUSION), Q8H    Sodium Zirconium Cyclosilicate (Lokelma) 10 g, Daily    sucralfate (CARAFATE) tablet 1 g, 4x Daily (AC & HS)    trimethobenzamide (TIGAN) IM injection 200 mg, Q8H PRN and PTA meds:    Medications Prior to Admission:     digoxin (LANOXIN) 0.125 mg tablet    furosemide (LASIX) 40 mg tablet    magnesium Oxide (MAG-OX) 400 mg TABS    melatonin 3 mg    metoprolol tartrate (LOPRESSOR) 25 mg tablet    midodrine (PROAMATINE) 5 mg tablet    pantoprazole (PROTONIX) 40 mg tablet    spironolactone (ALDACTONE) 25 mg tablet    warfarin (COUMADIN) 5 mg tablet    albuterol (ProAir HFA) 90 mcg/act inhaler    ipratropium-albuterol (DUO-NEB) 0.5-2.5 mg/3 mL nebulizer solution    isoniazid (NYDRAZID) 300 mg tablet      Allergies   Allergen Reactions    Aspirin GI Intolerance       Objective     Intake/Output Summary (Last 24 hours) at 4/26/2025 1023  Last data filed at 4/26/2025 0927  Gross per 24 hour   Intake 1958.31 ml   Output 955 ml   Net 1003.31 ml       Invasive Devices:        Physical Exam      I/O last 3 completed shifts:  In: 1626.9 [P.O.:240; I.V.:286.9; IV Piggyback:1100]  Out: 925 [Urine:925]    Vitals:    04/26/25 0920   BP:    Pulse:    Resp:    Temp:    SpO2: 99%       General Appearance:    No acute distress. Cooperative. Appears stated age.   Head:    Normocephalic. Atraumatic. Normal jaw occlusion.   Eyes:    Lids, conjunctiva normal. No scleral icterus.   Ears:    Normal external ears.   Nose:   Nares normal. No drainage.   Mouth:   Lips, tongue normal. Mucosa normal. Phonation normal.  "  Neck:   Supple. Symmetrical.   Back:     Symmetric. No CVA tenderness.   Lungs:     Normal respiratory effort. Clear to auscultation bilaterally.   Chest wall:    No tenderness or deformity.   Heart:    Regular rate and rhythm. Normal S1 and S2. No murmur. No JVD. No edema.   Abdomen:     Soft. Bowel sounds active.  Positive for mild periumbilical abdominal pain   Genitourinary:   No Zazueta catheter present.   Extremities:   Extremities normal. Atraumatic. No cyanosis.   Skin:   Dry. No pallor, jaundice, rash, ecchymoses.  Telangiectasias noted throughout   Neurologic:   Alert and oriented to person, place, time. No focal deficit.         Current Weight: Weight - Scale: 80.4 kg (177 lb 4 oz)  First Weight: Weight - Scale: 81.9 kg (180 lb 8.9 oz)    Lab Results:  I have personally reviewed pertinent labs.    CBC:   Lab Results   Component Value Date    WBC 19.66 (H) 04/26/2025    HGB 14.4 04/26/2025    HCT 46.0 04/26/2025    MCV 90 04/26/2025     04/26/2025    RBC 5.09 04/26/2025    MCH 28.3 04/26/2025    MCHC 31.3 (L) 04/26/2025    RDW 17.2 (H) 04/26/2025    MPV 9.3 04/26/2025    NRBC 0 04/26/2025     CMP:   Lab Results   Component Value Date    K 5.3 04/26/2025     04/26/2025    CO2 21 04/26/2025    BUN 73 (H) 04/26/2025    CREATININE 2.20 (H) 04/26/2025    CALCIUM 9.0 04/26/2025    AST 1,934 (H) 04/26/2025    ALT 1,767 (H) 04/26/2025    ALKPHOS 89 04/26/2025    EGFR 31 04/26/2025     Phosphorus: No results found for: \"PHOS\"  Magnesium:   Lab Results   Component Value Date    MG 2.3 04/26/2025     Urinalysis:   Lab Results   Component Value Date    COLORU Yellow 04/25/2025    CLARITYU Clear 04/25/2025    SPECGRAV 1.020 04/25/2025    PHUR 6.0 04/25/2025    LEUKOCYTESUR Negative 04/25/2025    NITRITE Negative 04/25/2025    GLUCOSEU Negative 04/25/2025    KETONESU Negative 04/25/2025    BILIRUBINUR Negative 04/25/2025    BLOODU Negative 04/25/2025     Ionized Calcium: No results found for: " "\"CAION\"  Coagulation:   Lab Results   Component Value Date    INR 9.93 (HH) 04/26/2025     Troponin: No results found for: \"TROPONINI\"  ABG:   Lab Results   Component Value Date    PHART 7.357 04/26/2025    ZBL8AJA 26.8 (L) 04/26/2025    PO2ART 124.4 04/26/2025    JST4KTR 14.7 (L) 04/26/2025    BEART -8.9 04/26/2025    SOURCE Radial, Right 04/26/2025       Results from last 7 days   Lab Units 04/26/25  0625 04/25/25  1703 04/25/25  0736 04/24/25  2140   POTASSIUM mmol/L 5.3 5.1 5.6* 4.6   CHLORIDE mmol/L 100  --  101 98   CO2 mmol/L 21  --  21 28   BUN mg/dL 73*  --  55* 48*   CREATININE mg/dL 2.20*  --  1.87* 1.83*   CALCIUM mg/dL 9.0  --  9.2 9.7   ALK PHOS U/L 89  --  95 97   ALT U/L 1,767*  --  21 24   AST U/L 1,934*  --  28 25       Radiology review:  Procedure: CT abdomen pelvis wo contrast  Result Date: 4/25/2025  Narrative: CT ABDOMEN AND PELVIS WITHOUT IV CONTRAST INDICATION: worsening abdominal pain over the last 6 hrs. COMPARISON: CT abdomen pelvis 4/24/2025 TECHNIQUE: CT examination of the abdomen and pelvis was performed without intravenous contrast. Multiplanar 2D reformatted images were created from the source data. This examination, like all CT scans performed in the [facility], was performed utilizing techniques to minimize radiation dose exposure, including the use of iterative reconstruction and automated exposure control. Radiation dose length product (DLP) for this visit: 656.12 mGy-cm. Enteric Contrast: Not administered. FINDINGS: ABDOMEN LOWER CHEST: Chronic interstitial changes in the lung bases are again noted. Partially imaged small pericardial effusion and cardiomegaly. LIVER/BILIARY TREE: Minimal lobulated liver contour. GALLBLADDER: The gallbladder is filled with IV contrast administrated in prior exam. There is gallbladder wall thickening versus ascites ascites. SPLEEN: Unremarkable. PANCREAS: Unremarkable. ADRENAL GLANDS: Unremarkable. KIDNEYS/URETERS: There is minimal " contrast within the bilateral calyces and right greater than left ureter without hydroureteronephrosis, suggesting extended excretory phase with persistent mild nephrogram of bilateral kidneys. STOMACH AND BOWEL: Esophageal hiatal hernia. No bowel obstruction. Moderate colonic stool burden. APPENDIX: No findings to suggest appendicitis. ABDOMINOPELVIC CAVITY: There is persistent moderate ascites and mesenteric edema. Nonspecific mildly prominent mesenteric lymph nodes. No pneumoperitoneum. VESSELS: Limited evaluation without IV contrast. PELVIS REPRODUCTIVE ORGANS: Prostate is enlarged. URINARY BLADDER: Bladder is filled with contrast from prior CT. There is 3 cm right posterior inferior urinary bladder diverticulum (series 2 image 154) ABDOMINAL WALL/INGUINAL REGIONS: Small fat-containing umbilical hernia. BONES: No acute fracture or suspicious osseous lesion.     Impression: Limited assessment without IV contrast. 1.  Persistent mild nephrogram of bilateral kidneys and extended excretory phase suggesting renal failure. The urinary bladder is filled with IV contrast from prior study. Correlate for urinary retention. There is no hydroureteronephrosis. 2.  Persistent moderate ascites, mesenteric edema, and partially imaged small pericardial effusion with cardiomegaly, suggesting third spacing. The gallbladder is filled with IV contrast with surrounding fluid which is possibly related to ascites and vicarious excretion of the contrast. Acute cholecystitis cannot entirely be excluded in the right clinical setting. 3.  Minimal lobulated liver contour suggesting cirrhosis. Correlate with history and labs. 4.  Additional/chronic findings as above. Workstation performed: [id number]     Procedure: CT abdomen pelvis w contrast  Result Date: 4/24/2025  Narrative: CT ABDOMEN AND PELVIS WITH IV CONTRAST INDICATION: abdominal pain. . COMPARISON: 4/8/2025 TECHNIQUE: CT examination of the abdomen and pelvis was performed.  Multiplanar 2D reformatted images were created from the source data. This examination, like all CT scans performed in the [facility], was performed utilizing techniques to minimize radiation dose exposure, including the use of iterative reconstruction and automated exposure control. Radiation dose length product (DLP) for this visit: 637.75 mGy-cm. IV Contrast: 85 mL of iohexol Enteric Contrast: Not administered. FINDINGS: ABDOMEN LOWER CHEST: Mild emphysematous changes and scattered areas of fibrosis/parenchymal scarring again noted. No pleural or pericardial effusions. Stable cardiomegaly. LIVER/BILIARY TREE: Subtle nodular contour of the liver suggesting cirrhotic morphology. No suspicious mass within study limitations. No biliary dilation. Portal vein is patent. Mild diffuse hepatic steatosis. GALLBLADDER: No calcified gallstones. No pericholecystic inflammatory change. SPLEEN: Unremarkable. PANCREAS: Unremarkable. ADRENAL GLANDS: Unremarkable. KIDNEYS/URETERS: Unremarkable. No hydronephrosis. STOMACH AND BOWEL: Small hiatal hernia. Stomach is mildly distended with heterogeneous density debris. Small and large bowel loops appear normal in course and caliber without evidence for obstruction or inflammation. Terminal ileum and appendix are unremarkable. Mild sigmoid diverticulosis without evidence for acute diverticulitis. There is a small dhara-umbilical hernia containing a small bowel loop and small amount of ascites. APPENDIX: No findings to suggest appendicitis. ABDOMINOPELVIC CAVITY: A small amount of abdominal/pelvic ascites is again seen, similar compared to the prior study. No loculated fluid collections in the abdomen/pelvis.. No pneumoperitoneum. No lymphadenopathy by size criteria. A few mildly prominent left inguinal lymph nodes measuring up to 1.7 x 0.9 cm are again seen. Sequelae of previous inguinal hernia repair is noted bilaterally without recurrent hernia.. VESSELS: Scattered  calcific atherosclerosis without abdominal aortic aneurysm. 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 PELVIS REPRODUCTIVE ORGANS: Unremarkable for patient's age. URINARY BLADDER: Moderately distended without intraluminal mass or calculi. Small posterior bladder wall diverticulum noted.. ABDOMINAL WALL/INGUINAL REGIONS: Unremarkable. BONES: No acute fracture or suspicious osseous lesion. Schmorl's node along the superior endplate of L1 noted. Mild multilevel degenerative changes of the visualized thoracolumbar spine and bilateral hip joints.     Impression: 1.  Subtle nodular contour of the liver suggesting cirrhotic morphology. Mild diffuse hepatic steatosis. No suspicious enhancing hepatic mass. 2.  Small hiatal hernia. No evidence of bowel obstruction, inflammation, appendicitis, obstructive uropathy, or free air. 3.  There is a small dhara-umbilical hernia containing a small bowel loop and small amount of ascites. 4.  A small amount of abdominal/pelvic ascites is again seen, similar compared to the prior study. 5.  A few mildly prominent left inguinal lymph nodes measuring up to 1.7 x 0.9 cm are again seen. Sequelae of previous bilateral inguinal hernia repair without recurrent hernia. 6.  Scattered calcific atherosclerosis. 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 7.  Stable cardiomegaly, emphysematous changes, and fibrosis/parenchymal scarring in the lung bases. Workstation performed: [id number]       Lamont Palmer,       This consultation note was produced in part using a dictation device which may document imprecise wording from author's original intent.

## 2025-04-26 NOTE — ASSESSMENT & PLAN NOTE
Suspect due to cardiogenic etiology  However, elevated LA, PCT and WBC could related to infection  Blood cultures pending  Continue Pip Tazo at this time      Plan  Trend endpoints and cultures

## 2025-04-27 PROBLEM — E87.20 LACTIC ACIDOSIS: Status: RESOLVED | Noted: 2025-04-25 | Resolved: 2025-04-27

## 2025-04-27 PROBLEM — E87.5 HYPERKALEMIA: Status: RESOLVED | Noted: 2025-03-22 | Resolved: 2025-04-27

## 2025-04-27 LAB
ALBUMIN SERPL BCG-MCNC: 3.2 G/DL (ref 3.5–5)
ALP SERPL-CCNC: 76 U/L (ref 34–104)
ALT SERPL W P-5'-P-CCNC: 1713 U/L (ref 7–52)
AMMONIA PLAS-SCNC: 36 UMOL/L (ref 18–72)
ANION GAP SERPL CALCULATED.3IONS-SCNC: 8 MMOL/L (ref 4–13)
AST SERPL W P-5'-P-CCNC: 1625 U/L (ref 13–39)
BASE EX.OXY STD BLDV CALC-SCNC: 67 % (ref 60–80)
BASE EX.OXY STD BLDV CALC-SCNC: 67.1 % (ref 60–80)
BASE EX.OXY STD BLDV CALC-SCNC: 73.8 % (ref 60–80)
BASE EXCESS BLDV CALC-SCNC: -0.6 MMOL/L
BASE EXCESS BLDV CALC-SCNC: 1.1 MMOL/L
BASE EXCESS BLDV CALC-SCNC: 2.2 MMOL/L
BASOPHILS # BLD AUTO: 0.01 THOUSANDS/ÂΜL (ref 0–0.1)
BASOPHILS NFR BLD AUTO: 0 % (ref 0–1)
BILIRUB SERPL-MCNC: 2.94 MG/DL (ref 0.2–1)
BUN SERPL-MCNC: 67 MG/DL (ref 5–25)
CALCIUM ALBUM COR SERPL-MCNC: 9.2 MG/DL (ref 8.3–10.1)
CALCIUM SERPL-MCNC: 8.6 MG/DL (ref 8.4–10.2)
CHLORIDE SERPL-SCNC: 99 MMOL/L (ref 96–108)
CO2 SERPL-SCNC: 28 MMOL/L (ref 21–32)
CREAT SERPL-MCNC: 2.42 MG/DL (ref 0.6–1.3)
CREAT UR-MCNC: 56.7 MG/DL
DIGOXIN SERPL-MCNC: 0.4 NG/ML (ref 0.8–2)
EOSINOPHIL # BLD AUTO: 0.06 THOUSAND/ÂΜL (ref 0–0.61)
EOSINOPHIL NFR BLD AUTO: 1 % (ref 0–6)
ERYTHROCYTE [DISTWIDTH] IN BLOOD BY AUTOMATED COUNT: 16.2 % (ref 11.6–15.1)
GFR SERPL CREATININE-BSD FRML MDRD: 28 ML/MIN/1.73SQ M
GLUCOSE SERPL-MCNC: 93 MG/DL (ref 65–140)
HAV IGM SER QL: NORMAL
HBV CORE IGM SER QL: NORMAL
HBV SURFACE AG SER QL: NORMAL
HCO3 BLDV-SCNC: 24 MMOL/L (ref 24–30)
HCO3 BLDV-SCNC: 26.2 MMOL/L (ref 24–30)
HCO3 BLDV-SCNC: 27.3 MMOL/L (ref 24–30)
HCT VFR BLD AUTO: 37.1 % (ref 36.5–49.3)
HCV AB SER QL: NORMAL
HGB BLD-MCNC: 12.4 G/DL (ref 12–17)
IMM GRANULOCYTES # BLD AUTO: 0.08 THOUSAND/UL (ref 0–0.2)
IMM GRANULOCYTES NFR BLD AUTO: 1 % (ref 0–2)
INR PPP: 3.13 (ref 0.85–1.19)
LYMPHOCYTES # BLD AUTO: 1.08 THOUSANDS/ÂΜL (ref 0.6–4.47)
LYMPHOCYTES NFR BLD AUTO: 9 % (ref 14–44)
MCH RBC QN AUTO: 29.6 PG (ref 26.8–34.3)
MCHC RBC AUTO-ENTMCNC: 33.4 G/DL (ref 31.4–37.4)
MCV RBC AUTO: 89 FL (ref 82–98)
MONOCYTES # BLD AUTO: 1.17 THOUSAND/ÂΜL (ref 0.17–1.22)
MONOCYTES NFR BLD AUTO: 10 % (ref 4–12)
NASAL CANNULA: 2
NASAL CANNULA: 2
NEUTROPHILS # BLD AUTO: 9.64 THOUSANDS/ÂΜL (ref 1.85–7.62)
NEUTS SEG NFR BLD AUTO: 79 % (ref 43–75)
NRBC BLD AUTO-RTO: 0 /100 WBCS
O2 CT BLDV-SCNC: 12.1 ML/DL
O2 CT BLDV-SCNC: 12.5 ML/DL
O2 CT BLDV-SCNC: 13.2 ML/DL
PCO2 BLDV: 39.3 MM HG (ref 42–50)
PCO2 BLDV: 43.3 MM HG (ref 42–50)
PCO2 BLDV: 44.6 MM HG (ref 42–50)
PH BLDV: 7.4 [PH] (ref 7.3–7.4)
PH BLDV: 7.4 [PH] (ref 7.3–7.4)
PH BLDV: 7.41 [PH] (ref 7.3–7.4)
PLATELET # BLD AUTO: 195 THOUSANDS/UL (ref 149–390)
PMV BLD AUTO: 9.8 FL (ref 8.9–12.7)
PO2 BLDV: 38.1 MM HG (ref 35–45)
PO2 BLDV: 39.1 MM HG (ref 35–45)
PO2 BLDV: 43.3 MM HG (ref 35–45)
POTASSIUM SERPL-SCNC: 3.9 MMOL/L (ref 3.5–5.3)
PROCALCITONIN SERPL-MCNC: 3.83 NG/ML
PROT SERPL-MCNC: 5.8 G/DL (ref 6.4–8.4)
PROTHROMBIN TIME: 32.1 SECONDS (ref 12.3–15)
RBC # BLD AUTO: 4.19 MILLION/UL (ref 3.88–5.62)
SODIUM SERPL-SCNC: 135 MMOL/L (ref 135–147)
SODIUM UR-SCNC: 45 MMOL/L
WBC # BLD AUTO: 12.04 THOUSAND/UL (ref 4.31–10.16)

## 2025-04-27 PROCEDURE — 84145 PROCALCITONIN (PCT): CPT | Performed by: NURSE PRACTITIONER

## 2025-04-27 PROCEDURE — 80053 COMPREHEN METABOLIC PANEL: CPT | Performed by: FAMILY MEDICINE

## 2025-04-27 PROCEDURE — 94760 N-INVAS EAR/PLS OXIMETRY 1: CPT

## 2025-04-27 PROCEDURE — 82140 ASSAY OF AMMONIA: CPT | Performed by: NURSE PRACTITIONER

## 2025-04-27 PROCEDURE — 94640 AIRWAY INHALATION TREATMENT: CPT

## 2025-04-27 PROCEDURE — 82805 BLOOD GASES W/O2 SATURATION: CPT | Performed by: NURSE PRACTITIONER

## 2025-04-27 PROCEDURE — 85610 PROTHROMBIN TIME: CPT | Performed by: FAMILY MEDICINE

## 2025-04-27 PROCEDURE — 82570 ASSAY OF URINE CREATININE: CPT | Performed by: INTERNAL MEDICINE

## 2025-04-27 PROCEDURE — 80162 ASSAY OF DIGOXIN TOTAL: CPT | Performed by: NURSE PRACTITIONER

## 2025-04-27 PROCEDURE — 85025 COMPLETE CBC W/AUTO DIFF WBC: CPT | Performed by: FAMILY MEDICINE

## 2025-04-27 PROCEDURE — 94664 DEMO&/EVAL PT USE INHALER: CPT

## 2025-04-27 PROCEDURE — 99233 SBSQ HOSP IP/OBS HIGH 50: CPT | Performed by: INTERNAL MEDICINE

## 2025-04-27 PROCEDURE — 84300 ASSAY OF URINE SODIUM: CPT | Performed by: INTERNAL MEDICINE

## 2025-04-27 RX ORDER — POTASSIUM CHLORIDE 14.9 MG/ML
20 INJECTION INTRAVENOUS ONCE
Status: COMPLETED | OUTPATIENT
Start: 2025-04-27 | End: 2025-04-27

## 2025-04-27 RX ORDER — FUROSEMIDE 10 MG/ML
40 INJECTION INTRAMUSCULAR; INTRAVENOUS ONCE
Status: COMPLETED | OUTPATIENT
Start: 2025-04-27 | End: 2025-04-27

## 2025-04-27 RX ORDER — MAGNESIUM SULFATE HEPTAHYDRATE 40 MG/ML
2 INJECTION, SOLUTION INTRAVENOUS ONCE
Status: COMPLETED | OUTPATIENT
Start: 2025-04-27 | End: 2025-04-27

## 2025-04-27 RX ORDER — DIGOXIN 0.25 MG/ML
125 INJECTION INTRAMUSCULAR; INTRAVENOUS ONCE
Status: COMPLETED | OUTPATIENT
Start: 2025-04-27 | End: 2025-04-27

## 2025-04-27 RX ADMIN — MIDODRINE HYDROCHLORIDE 7.5 MG: 5 TABLET ORAL at 11:16

## 2025-04-27 RX ADMIN — POTASSIUM CHLORIDE 20 MEQ: 14.9 INJECTION, SOLUTION INTRAVENOUS at 06:39

## 2025-04-27 RX ADMIN — MIDODRINE HYDROCHLORIDE 7.5 MG: 5 TABLET ORAL at 06:00

## 2025-04-27 RX ADMIN — MAGNESIUM SULFATE HEPTAHYDRATE 2 G: 40 INJECTION, SOLUTION INTRAVENOUS at 06:39

## 2025-04-27 RX ADMIN — LEVALBUTEROL HYDROCHLORIDE 1.25 MG: 1.25 SOLUTION RESPIRATORY (INHALATION) at 07:34

## 2025-04-27 RX ADMIN — PIPERACILLIN AND TAZOBACTAM 4.5 G: 36; 4.5 INJECTION, POWDER, FOR SOLUTION INTRAVENOUS at 21:17

## 2025-04-27 RX ADMIN — CHLORHEXIDINE GLUCONATE 15 ML: 1.2 SOLUTION ORAL at 09:04

## 2025-04-27 RX ADMIN — DIGOXIN 125 MCG: 0.25 INJECTION INTRAMUSCULAR; INTRAVENOUS at 10:24

## 2025-04-27 RX ADMIN — CHLORHEXIDINE GLUCONATE 15 ML: 1.2 SOLUTION ORAL at 21:17

## 2025-04-27 RX ADMIN — FUROSEMIDE 40 MG: 10 INJECTION, SOLUTION INTRAVENOUS at 17:25

## 2025-04-27 RX ADMIN — PANTOPRAZOLE SODIUM 40 MG: 40 TABLET, DELAYED RELEASE ORAL at 17:24

## 2025-04-27 RX ADMIN — IPRATROPIUM BROMIDE 0.5 MG: 0.5 SOLUTION RESPIRATORY (INHALATION) at 07:34

## 2025-04-27 RX ADMIN — NICOTINE 1 PATCH: 14 PATCH, EXTENDED RELEASE TRANSDERMAL at 09:04

## 2025-04-27 RX ADMIN — LEVALBUTEROL HYDROCHLORIDE 1.25 MG: 1.25 SOLUTION RESPIRATORY (INHALATION) at 20:33

## 2025-04-27 RX ADMIN — LEVALBUTEROL HYDROCHLORIDE 1.25 MG: 1.25 SOLUTION RESPIRATORY (INHALATION) at 14:09

## 2025-04-27 RX ADMIN — IPRATROPIUM BROMIDE 0.5 MG: 0.5 SOLUTION RESPIRATORY (INHALATION) at 14:09

## 2025-04-27 RX ADMIN — PIPERACILLIN AND TAZOBACTAM 4.5 G: 36; 4.5 INJECTION, POWDER, FOR SOLUTION INTRAVENOUS at 06:00

## 2025-04-27 RX ADMIN — PANTOPRAZOLE SODIUM 40 MG: 40 TABLET, DELAYED RELEASE ORAL at 06:00

## 2025-04-27 RX ADMIN — MIDODRINE HYDROCHLORIDE 7.5 MG: 5 TABLET ORAL at 17:24

## 2025-04-27 RX ADMIN — AMIODARONE HYDROCHLORIDE 0.5 MG/MIN: 50 INJECTION, SOLUTION INTRAVENOUS at 21:18

## 2025-04-27 RX ADMIN — MILRINONE LACTATE IN DEXTROSE 0.25 MCG/KG/MIN: 200 INJECTION, SOLUTION INTRAVENOUS at 01:27

## 2025-04-27 RX ADMIN — Medication 3 MG: at 21:18

## 2025-04-27 RX ADMIN — IPRATROPIUM BROMIDE 0.5 MG: 0.5 SOLUTION RESPIRATORY (INHALATION) at 20:33

## 2025-04-27 RX ADMIN — MAGNESIUM OXIDE TAB 400 MG (241.3 MG ELEMENTAL MG) 400 MG: 400 (241.3 MG) TAB at 09:04

## 2025-04-27 RX ADMIN — PIPERACILLIN AND TAZOBACTAM 4.5 G: 36; 4.5 INJECTION, POWDER, FOR SOLUTION INTRAVENOUS at 14:26

## 2025-04-27 RX ADMIN — METOPROLOL TARTRATE 2.5 MG: 5 INJECTION INTRAVENOUS at 02:10

## 2025-04-27 NOTE — ASSESSMENT & PLAN NOTE
INR 9.93 this AM  LD warfarin 4/25    Plan  Vitamin K 10mg IV x 1 dose  Monitor for s/s of bleeding  Hold warfarin  Repeat INR pending.

## 2025-04-27 NOTE — PROGRESS NOTES
Progress Note - Critical Care/ICU   Name: Britton Batista 59 y.o. male I MRN: [id number]  Unit/Bed#: -01 I Date of Admission: 4/25/2025   Date of Service: 4/27/2025 I Hospital Day: 2      Assessment & Plan  Shock (HCC)  Concern for cardiogenic etiology as cause for acute decompensation of liver function and renal function  3/25 ECHO EF 25% with RV Nl/slightly reduced, Moderate MR, RVSP 30-35  4/26 ScVo2 32-> Milrinone initiated and ScVo2 improved to 64; Lasix 80mg IV x 1 dose      Plan  Continue Milrinone infusion  ScVO2 04/27 improved to 67.   Acute kidney injury superimposed on stage 3a chronic kidney disease (HCC)  Lab Results   Component Value Date    EGFR 28 04/26/2025    EGFR 28 04/26/2025    EGFR 31 04/26/2025    CREATININE 2.38 (H) 04/26/2025    CREATININE 2.37 (H) 04/26/2025    CREATININE 2.20 (H) 04/26/2025     Suspect in setting of cardiogenic shock  Continue to monitor closely  Lasix 80mg IV x 1 dose on 4/26  Zazueta placed for strict I&O  Nephrology following  Transaminitis  Recent Labs     04/26/25  0625 04/26/25  1025 04/26/25  1658   AST 1,934* 3,029* 2,707*   ALT 1,767* 2,054* 1,940*   ALKPHOS 89 92 78   TBILI 3.13* 2.64* 2.24*      Patient with acute deterioration in LFTs,   Suspect related to cardiogenic shock  Received amio bolus and infusion for a few hours-discontinued at this time  Trend LFT and lactate  Avoid hepatotoxic agents    Abdominal pain  S/P reduction of of umbilical hernia on 4/24 in the ED by general sugery  Repeat CT on 4/25:  Persistent mild nephrogram of bilateral kidneys and extended excretory phase suggesting renal failure. The urinary bladder is filled with IV contrast from prior study. Correlate for urinary retention. There is no hydroureteronephrosis.Persistent moderate ascites, mesenteric edema, and partially imaged small pericardial effusion with cardiomegaly, suggesting third spacing. The gallbladder is filled with IV contrast with surrounding fluid which is  possibly related to ascites and vicarious excretion of the contrast. Acute cholecystitis cannot entirely be excluded in the right clinical setting.Minimal lobulated liver contour suggesting cirrhosis. Correlate with history and labs.Additional/chronic findings as above.  4/25 Abdominal US: No sonographic findings of cirrhosis.Pulsatile portal venous flow, gallbladder wall thickening, and small volume upper abdominal ascites, likely due to right heart failure.  Patient initiated on Zosyn D#2    Plan  Serial abdominal exams  Continue Zosyn  Lactic acidosis (Resolved: 4/27/2025)  Improved  Suspect due to cardiogenic etiology  However, elevated LA, PCT and WBC could related to infection  Blood cultures pending  Continue Pip Tazo at this time      Plan  Trend endpoints and cultures  Acute on chronic combined systolic and diastolic congestive heart failure (HCC)  Wt Readings from Last 3 Encounters:   04/27/25 73.8 kg (162 lb 11.2 oz)   04/24/25 76.8 kg (169 lb 5 oz)   04/24/25 76.8 kg (169 lb 6.4 oz)             Cardiomyopathy (HCC)  Patient with history of methamphetamine induced cardiomyopathy  Last cardiac cath in 2020 showed no coronary artery disease  3/2025 ECHO: EF 25% with RV nl/reduced, Moderate MR   Patient's coreg was changed to metoprolol during prior hospitalization  Cardiology consult pending      Longstanding persistent atrial fibrillation (HCC)  Continue rate control agents Digoxin and metoprolol (on hold due to hypotension)  Lopressor 2.5 mg IV PRN for HR > 110 Q6H  Avoid cardizem  Amiodarone re-initiated evening of 4/27 for afib with RVR.  Continue to hold warfarin at this time (LD 4/25) in setting of elevated INR    Plan  Continue Diogoxin  Pulmonary fibrosis (HCC)  Possible early CTD ILD-or related to prior exposure to dust/asbestos  PFT/office spirometry last year showed no restriction, mild decreased DLCO which argues against severe ILD  Acute respiratory failure (HCC)  Suspect component of pulmonary  edema      Continue to wean FiO2 for SaO2 >92%  Pulmonary hygiene  Coagulopathy (HCC)  INR 9.93 this AM  LD warfarin 4/25    Plan  Vitamin K 10mg IV x 1 dose  Monitor for s/s of bleeding  Hold warfarin  Repeat INR pending.  Hyperammonemia (HCC)  Suspect in the setting of hepatic dysfunction    Plan  Lactulose 30mg PO daily  Repeat Ammonia level in the AM  Disposition: Critical care    ICU Core Measures     A: Assess, Prevent, and Manage Pain Has pain been assessed? Yes  Need for changes to pain regimen? No   B: Both SAT/SAT  N/A   C: Choice of Sedation RASS Goal: N/A patient not on sedation  Need for changes to sedation or analgesia regimen? NA   D: Delirium CAM-ICU: Negative   E: Early Mobility  Plan for early mobility? Yes   F: Family Engagement Plan for family engagement today? Yes       Antibiotic Review: Awaiting culture results.     Review of Invasive Devices:    Zazueta Plan: Continue for accurate I/O monitoring for 48 hours  Central access plan: Medications requiring central line      Prophylaxis:  VTE VTE covered by:    None       Stress Ulcer  covered bypantoprazole (PROTONIX) 40 mg tablet [[id number]] (Long-Term Med), pantoprazole (PROTONIX) EC tablet 40 mg [[id number]]         24 Hour Events : Patient was upgraded to SD1 yesterday for the treatment of cardiogenic shock.  Milrinone was initiated and the SVO2 was noted to improve to 67 with a pH of 7.4.  Afib RVR noted last evening.  Amiodarone gtt re-initiated.  The rest of the morning labs are pending.    Objective :                   Vitals I/O      Most Recent Min/Max in 24hrs   Temp 98 °F (36.7 °C) Temp  Min: 98 °F (36.7 °C)  Max: 98.1 °F (36.7 °C)   Pulse (!) 120 Pulse  Min: 99  Max: 143   Resp 22 Resp  Min: 15  Max: 50   /56 BP  Min: 76/61  Max: 145/93   O2 Sat 96 % SpO2  Min: 90 %  Max: 100 %      Intake/Output Summary (Last 24 hours) at 4/27/2025 1357  Last data filed at 4/27/2025 0428  Gross per 24 hour   Intake 1295.64 ml   Output 5440 ml    Net -4144.36 ml       Diet NPO; Sips with meds    Invasive Monitoring           Physical Exam   Physical Exam  Eyes:      Extraocular Movements: Extraocular movements intact.      Pupils: Pupils are equal, round, and reactive to light.   Skin:     General: Skin is cool and dry.   HENT:      Head: Normocephalic and atraumatic.   Neck:      Vascular: JVD and central line present.   Cardiovascular:      Rate and Rhythm: Tachycardia present. Rhythm irregular.   Musculoskeletal:         General: No deformity or signs of injury. Normal range of motion.   Abdominal: General: There is no distension.      Palpations: Abdomen is soft.      Tenderness: There is abdominal tenderness.      Hernia: A hernia is present.   Constitutional:       General: He is not in acute distress.     Appearance: He is well-developed and well-nourished. He is ill-appearing.   Pulmonary:      Effort: Tachypnea present.      Breath sounds: Rhonchi present.   Neurological:      General: No focal deficit present.      Mental Status: He is alert and oriented to person, place and time.      Motor: Strength full and intact in all extremities.   Genitourinary/Anorectal:  Zazueta present.        Diagnostic Studies        Lab Results: I have reviewed the following results:     Medications:  Scheduled PRN   chlorhexidine, 15 mL, Q12H JESUS  digoxin, 125 mcg, Once per day on Monday Wednesday Friday  ipratropium, 0.5 mg, TID  levalbuterol, 1.25 mg, TID  magnesium Oxide, 400 mg, Daily  melatonin, 3 mg, HS  [Held by provider] metoprolol tartrate, 25 mg, Q12H JESUS  midodrine, 7.5 mg, TID AC  nicotine, 1 patch, Daily  pantoprazole, 40 mg, BID  piperacillin-tazobactam, 4.5 g, Q8H  sucralfate, 1 g, 4x Daily (AC & HS)      albuterol, 2 puff, Q6H PRN  metoprolol, 2.5 mg, Q6H PRN  trimethobenzamide, 200 mg, Q8H PRN       Continuous    amiodarone (CORDARONE) 900 mg in dextrose 5 % 500 mL infusion, 0.5 mg/min, Last Rate: 0.5 mg/min (04/27/25 0309)  milrinone (Primacor)  infusion, 0.25 mcg/kg/min, Last Rate: 0.25 mcg/kg/min (04/27/25 0127)  norepinephrine, 1-30 mcg/min, Last Rate: Stopped (04/26/25 1932)         Labs:   CBC    Recent Labs     04/25/25  0736 04/26/25  0625   WBC 14.73* 19.66*   HGB 14.6 14.4   HCT 47.7 46.0    256     BMP    Recent Labs     04/26/25  1025 04/26/25  1658   SODIUM 132* 132*   K 5.2 4.5   CL 99 100   CO2 19* 22   AGAP 14* 10   BUN 75* 75*   CREATININE 2.37* 2.38*   CALCIUM 9.3 8.7       Coags    Recent Labs     04/25/25  1703 04/26/25  0625   INR 4.86* 9.93*        Additional Electrolytes  Recent Labs     04/26/25  0625   MG 2.3          Blood Gas    Recent Labs     04/26/25  0239   PHART 7.357   HHJ6ESD 26.8*   PO2ART 124.4   RRF8IPL 14.7*   BEART -8.9   SOURCE Radial, Right     Recent Labs     04/26/25  0239 04/26/25  1303 04/27/25  0456   PHVEN  --    < > 7.403*   [id number]  --    < > 39.3*   PO2VEN  --    < > 39.1   [id number]  --    < > 24.0   BEVEN  --    < > -0.6   [id number]  --    < > 67.0   SOURCE Radial, Right  --   --     < > = values in this interval not displayed.    LFTs  Recent Labs     04/26/25  1025 04/26/25  1658   ALT 2,054* 1,940*   AST 3,029* 2,707*   ALKPHOS 92 78   ALB 3.7 3.2*   TBILI 2.64* 2.24*       Infectious  Recent Labs     04/25/25  1318 04/26/25  0625   PROCALCITONI 0.15 1.84*     Glucose  Recent Labs     04/25/25  0736 04/26/25  0625 04/26/25  1025 04/26/25  1658   GLUC 105 84 124 110

## 2025-04-27 NOTE — ASSESSMENT & PLAN NOTE
Concern for cardiogenic etiology as cause for acute decompensation of liver function and renal function  3/25 ECHO EF 25% with RV Nl/slightly reduced, Moderate MR, RVSP 30-35  4/26 ScVo2 32-> Milrinone initiated and ScVo2 improved to 64; Lasix 80mg IV x 1 dose  4/27 Milrinone weaned to off      Plan  Monitor ScVO2  Continue diuresis PRN

## 2025-04-27 NOTE — ASSESSMENT & PLAN NOTE
Improved, continue with milrinone and midodrine, patient briefly on norepinephrine, continue to optimize heart rate with the patient currently on an amiodarone drip.

## 2025-04-27 NOTE — ASSESSMENT & PLAN NOTE
Wt Readings from Last 3 Encounters:   04/27/25 73.8 kg (162 lb 11.2 oz)   04/24/25 76.8 kg (169 lb 5 oz)   04/24/25 76.8 kg (169 lb 6.4 oz)     Patient had a one-time dose of furosemide yesterday evening, difficult to assess true volume status at this time, especially in setting of probable effective reduction of circulating volume, continue to optimize physiology as best as possible as noted above with inotropes and other measures.         Depth Of Tumor Invasion (For Histology): dermis

## 2025-04-27 NOTE — ASSESSMENT & PLAN NOTE
INR 9.93 4/26  LD warfarin 4/25  Vitamin K x 1 dose on 4/26    Plan  Monitor for s/s of bleeding, warfarin resumed with bridging to heparin  Daily INR

## 2025-04-27 NOTE — ASSESSMENT & PLAN NOTE
S/P reduction of of umbilical hernia on 4/24 in the ED by general sugsaundra  Repeat CT on 4/25:  Persistent mild nephrogram of bilateral kidneys and extended excretory phase suggesting renal failure. The urinary bladder is filled with IV contrast from prior study. Correlate for urinary retention. There is no hydroureteronephrosis.Persistent moderate ascites, mesenteric edema, and partially imaged small pericardial effusion with cardiomegaly, suggesting third spacing. The gallbladder is filled with IV contrast with surrounding fluid which is possibly related to ascites and vicarious excretion of the contrast. Acute cholecystitis cannot entirely be excluded in the right clinical setting.Minimal lobulated liver contour suggesting cirrhosis. Correlate with history and labs.Additional/chronic findings as above.  4/25 Abdominal US: No sonographic findings of cirrhosis.Pulsatile portal venous flow, gallbladder wall thickening, and small volume upper abdominal ascites, likely due to right heart failure.  Patient initiated on Zosyn D#2    Plan  Serial abdominal exams  Continue Zosyn

## 2025-04-27 NOTE — ASSESSMENT & PLAN NOTE
Continue rate control agents Digoxin and metoprolol (on hold due to hypotension)  Lopressor 2.5 mg IV PRN for HR > 110 Q6H  Avoid cardizem  Amiodarone re-initiated evening of 4/27 for afib with RVR.  Warfarin held (LD 4/25) in setting of elevated INR    Plan  Continue Digoxin  Resume warfarin and bridge with warfarin  Started on Amio infusion, continue; attempt to wean off once BB restarted

## 2025-04-27 NOTE — PROGRESS NOTES
Progress Note - Nephrology   Name: Britton Batista 59 y.o. male I MRN: [id number]  Unit/Bed#: -01 I Date of Admission: 4/25/2025   Date of Service: 4/27/2025 I Hospital Day: 2     Assessment & Plan  Acute kidney injury superimposed on stage 3a chronic kidney disease (HCC)  Lab Results   Component Value Date    EGFR 28 04/27/2025    EGFR 28 04/26/2025    EGFR 28 04/26/2025    CREATININE 2.42 (H) 04/27/2025    CREATININE 2.38 (H) 04/26/2025    CREATININE 2.37 (H) 04/26/2025   Kidney function essentially unchanged over the last 24 hours, continue to optimize hemodynamics and avoid potential nephrotoxins.  Will order urine studies to further evaluate and confirm prerenal state.  Currently on milrinone and midodrine to assist with hemodynamics.    Baseline creatinine likely around 1.5 mg/dL  Shock (HCC)  Improved, continue with milrinone and midodrine, patient briefly on norepinephrine, continue to optimize heart rate with the patient currently on an amiodarone drip.  Acute on chronic combined systolic and diastolic congestive heart failure (HCC)  Wt Readings from Last 3 Encounters:   04/27/25 73.8 kg (162 lb 11.2 oz)   04/24/25 76.8 kg (169 lb 5 oz)   04/24/25 76.8 kg (169 lb 6.4 oz)     Patient had a one-time dose of furosemide yesterday evening, difficult to assess true volume status at this time, especially in setting of probable effective reduction of circulating volume, continue to optimize physiology as best as possible as noted above with inotropes and other measures.        Longstanding persistent atrial fibrillation (HCC)  Continue with digoxin and amiodarone, metoprolol currently on hold.  Abdominal pain  As noted above, unclear if this is due to reduced blood flow to a portion of the gut versus other intermittent abdominal pain.  Patient currently without acidosis.  Previous lactic acidosis now resolved  Cardiomyopathy (HCC)    Pulmonary fibrosis (HCC)    Acute respiratory failure  "(HCC)    Transaminitis    Coagulopathy (HCC)    Hyperammonemia (HCC)        Follow up reason for today's visit: Acute kidney injury/chronic kidney disease    Shock (HCC)    Patient Active Problem List   Diagnosis    PSS (progressive systemic sclerosis) (HCC)    Near syncope    Osteomyelitis (HCC)    Elevated troponin    Acute encephalopathy    Anxiety    Depression    Other emphysema (HCC)    GERD (gastroesophageal reflux disease)    Gout    Hypertensive urgency    Neuropathy    Raynaud's disease    Unilateral inguinal hernia, without obstruction or gangrene, not specified as recurrent    Acute on chronic combined systolic and diastolic congestive heart failure (HCC)    Hypertensive emergency    Acute kidney injury superimposed on stage 3a chronic kidney disease (HCC)    Cardiomyopathy (HCC)    Methamphetamine abuse (HCC)    Tobacco abuse    D-dimer, elevated    Hypomagnesemia    Chest pain    Acute on chronic systolic congestive heart failure (HCC)    Primary hypertension    Syncope    Atrial fibrillation with RVR (HCC)    Stage 3a chronic kidney disease (CKD) (HCC)    Cardiogenic shock (HCC)    Scleroderma (HCC)    Longstanding persistent atrial fibrillation (HCC)    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    Objective:     Vitals: Blood pressure 90/64, pulse (!) 127, temperature 98 °F (36.7 °C), temperature source Temporal, resp. rate (!) 26, height 5' 9\" (1.753 m), weight 73.8 kg (162 lb 11.2 oz), SpO2 94%.,Body mass index is 24.03 kg/m².    Weight (last 2 days)       Date/Time Weight    04/27/25 0508 73.8 (162.7)    04/25/25 2000 80.4 (177.25)    04/25/25 0727 81.9 (180.56)      " "        Intake/Output Summary (Last 24 hours) at 4/27/2025 0907  Last data filed at 4/27/2025 0657  Gross per 24 hour   Intake 1142.18 ml   Output 5935 ml   Net -4792.82 ml     I/O last 3 completed shifts:  In: 1920.5 [P.O.:780; I.V.:690.5; IV Piggyback:450]  Out: 6690 [Urine:6690]    Urethral Catheter Latex 16 Fr. (Active)   Amt returned on insertion(mL) 200 mL 04/26/25 1600   [place] Care Done 04/26/25 2100   Output (mL) 175 mL 04/27/25 0657       Physical Exam: BP 90/64   Pulse (!) 127   Temp 98 °F (36.7 °C) (Temporal)   Resp (!) 26   Ht 5' 9\" (1.753 m)   Wt 73.8 kg (162 lb 11.2 oz)   SpO2 94%   BMI 24.03 kg/m²     General Appearance:    Alert, cooperative, no distress, appears stated age   Head:    Normocephalic, without obvious abnormality, atraumatic   Eyes:    Conjunctiva/corneas clear   Ears:    Normal external ears   Nose:   Nares normal, septum midline, mucosa normal, no drainage    or sinus tenderness   Throat:   Lips, mucosa, and tongue normal; teeth and gums normal   Neck:   Supple, symmetrical, trachea midline, no adenopathy;        thyroid:  No enlargement/tenderness/nodules; no carotid    bruit or JVD   Back:     Symmetric, no curvature, ROM normal, no CVA tenderness   Lungs:     Clear to auscultation bilaterally, respirations unlabored   Chest wall:    No tenderness or deformity   Heart:    Regular rate and rhythm, S1 and S2 normal, no murmur, rub   or gallop   Abdomen:     Soft, non-tender, bowel sounds active   Extremities:   Extremities normal, atraumatic, no cyanosis or edema   Skin:   Skin color, texture, turgor normal, no rashes or lesions   Lymph nodes:   Cervical normal   Neurologic:   CNII-XII intact            Lab, Imaging and other studies: I have personally reviewed pertinent labs.  CBC:   Lab Results   Component Value Date    WBC 12.04 (H) 04/27/2025    HGB 12.4 04/27/2025    HCT 37.1 04/27/2025    MCV 89 04/27/2025     04/27/2025    RBC 4.19 04/27/2025    MCH 29.6 " "04/27/2025    MCHC 33.4 04/27/2025    RDW 16.2 (H) 04/27/2025    MPV 9.8 04/27/2025    NRBC 0 04/27/2025     CMP:   Lab Results   Component Value Date    K 3.9 04/27/2025    CL 99 04/27/2025    CO2 28 04/27/2025    BUN 67 (H) 04/27/2025    CREATININE 2.42 (H) 04/27/2025    CALCIUM 8.6 04/27/2025    AST 1,625 (H) 04/27/2025    ALT 1,713 (H) 04/27/2025    ALKPHOS 76 04/27/2025    EGFR 28 04/27/2025       .  Results from last 7 days   Lab Units 04/27/25  0456 04/26/25  1658 04/26/25  1025   POTASSIUM mmol/L 3.9 4.5 5.2   CHLORIDE mmol/L 99 100 99   CO2 mmol/L 28 22 19*   BUN mg/dL 67* 75* 75*   CREATININE mg/dL 2.42* 2.38* 2.37*   CALCIUM mg/dL 8.6 8.7 9.3   ALK PHOS U/L 76 78 92   ALT U/L 1,713* 1,940* 2,054*   AST U/L 1,625* 2,707* 3,029*         Phosphorus: No results found for: \"PHOS\"  Magnesium: No results found for: \"MG\"  Urinalysis: No results found for: \"COLORU\", \"CLARITYU\", \"SPECGRAV\", \"PHUR\", \"LEUKOCYTESUR\", \"NITRITE\", \"PROTEINUA\", \"GLUCOSEU\", \"KETONESU\", \"BILIRUBINUR\", \"BLOODU\"  Ionized Calcium: No results found for: \"CAION\"  Coagulation:   Lab Results   Component Value Date    INR 3.13 (H) 04/27/2025     Troponin: No results found for: \"TROPONINI\"  ABG: No results found for: \"PHART\", \"QVF8FOW\", \"PO2ART\", \"ILS1MHT\", \"O7BMSHCJ\", \"BEART\", \"SOURCE\"  Radiology review:     IMAGING  Procedure: XR chest portable ICU  Result Date: 4/26/2025  Narrative: XR CHEST PORTABLE ICU INDICATION: CVC placement. COMPARISON: CXR 4/26/2025, 4/22/2025, chest CT 4/23/2025. FINDINGS: Right jugular catheter in lower SVC. No acute disease. Mild emphysema and mild pulmonary fibrosis better shown on CT. No pneumothorax or pleural effusion. Severe cardiomegaly with left subclavian ICD leads in the right atrium, right ventricle, traversing the coronary sinus. Bones are unremarkable for age. Normal upper abdomen.     Impression: Right jugular catheter in lower SVC with no pneumothorax. Mild emphysema and mild pulmonary fibrosis better " shown on CT. Workstation performed: [id number]     Procedure: XR chest portable  Result Date: 4/26/2025  Narrative: XR CHEST PORTABLE INDICATION: Hypoxia, tachypnea. COMPARISON: CT 4/23/2025 FINDINGS: Slight left base atelectasis. Clear lungs otherwise. Left AICD.. No pneumothorax or pleural effusion. Enlarged cardiac silhouette. Bones are unremarkable for age. Normal upper abdomen.     Impression: Cardiomegaly. No acute cardiopulmonary disease. Workstation performed: [id number]     Procedure: US abdomen complete  Result Date: 4/26/2025  Narrative: ABDOMEN ULTRASOUND, COMPLETE INDICATION: Cirrhosis on CT. COMPARISON: CT abdomen pelvis 4/25/2025. TECHNIQUE: Real-time ultrasound of the abdomen was performed with a curvilinear transducer with both volumetric sweeps and still imaging techniques. FINDINGS: PANCREAS: Visualized portions of the pancreas are within normal limits. AORTA AND IVC: Visualized portions are normal for patient age. LIVER: Size: Within normal range. The liver measures 16.8 cm in the midclavicular line. Contour: Surface contour is smooth. Parenchyma: Echogenicity and echotexture are within normal limits. No suspicious liver mass. The portal vein is patent and hepatopetal. Pulsatile portal venous flow, likely due to right heart failure. BILIARY: The gallbladder is normal in caliber. Gallbladder wall thickening measuring up to 19 mm, likely secondary to right heart failure. No pericholecystic fluid. No stones or sludge identified. No sonographic Singh sign. No intrahepatic biliary dilatation. CBD measures 5.0 mm. No choledocholithiasis. KIDNEY: Right kidney measures 10.9 x 4.7 x 4.9 cm. Volume 132.2 mL Kidney within normal limits. Left kidney measures 10.9 x 5.1 x 4.2 cm. Volume 122.9 mL Kidney within normal limits. SPLEEN: Measures 9.4 x 9.1 x 2.5 cm. Volume 112.8 mL Normal. ASCITES: Small volume upper abdominal ascites.     Impression: No sonographic findings of cirrhosis. Pulsatile portal venous  flow, gallbladder wall thickening, and small volume upper abdominal ascites, likely due to right heart failure. Resident: Yosi De Dios I, the attending radiologist, have reviewed the images and agree with the final report above. Workstation performed: [id number]     Procedure: CT abdomen pelvis wo contrast  Result Date: 4/25/2025  Narrative: CT ABDOMEN AND PELVIS WITHOUT IV CONTRAST INDICATION: worsening abdominal pain over the last 6 hrs. COMPARISON: CT abdomen pelvis 4/24/2025 TECHNIQUE: CT examination of the abdomen and pelvis was performed without intravenous contrast. Multiplanar 2D reformatted images were created from the source data. This examination, like all CT scans performed in the [facility], was performed utilizing techniques to minimize radiation dose exposure, including the use of iterative reconstruction and automated exposure control. Radiation dose length product (DLP) for this visit: 656.12 mGy-cm. Enteric Contrast: Not administered. FINDINGS: ABDOMEN LOWER CHEST: Chronic interstitial changes in the lung bases are again noted. Partially imaged small pericardial effusion and cardiomegaly. LIVER/BILIARY TREE: Minimal lobulated liver contour. GALLBLADDER: The gallbladder is filled with IV contrast administrated in prior exam. There is gallbladder wall thickening versus ascites ascites. SPLEEN: Unremarkable. PANCREAS: Unremarkable. ADRENAL GLANDS: Unremarkable. KIDNEYS/URETERS: There is minimal contrast within the bilateral calyces and right greater than left ureter without hydroureteronephrosis, suggesting extended excretory phase with persistent mild nephrogram of bilateral kidneys. STOMACH AND BOWEL: Esophageal hiatal hernia. No bowel obstruction. Moderate colonic stool burden. APPENDIX: No findings to suggest appendicitis. ABDOMINOPELVIC CAVITY: There is persistent moderate ascites and mesenteric edema. Nonspecific mildly prominent mesenteric lymph nodes. No pneumoperitoneum.  VESSELS: Limited evaluation without IV contrast. PELVIS REPRODUCTIVE ORGANS: Prostate is enlarged. URINARY BLADDER: Bladder is filled with contrast from prior CT. There is 3 cm right posterior inferior urinary bladder diverticulum (series 2 image 154) ABDOMINAL WALL/INGUINAL REGIONS: Small fat-containing umbilical hernia. BONES: No acute fracture or suspicious osseous lesion.     Impression: Limited assessment without IV contrast. 1.  Persistent mild nephrogram of bilateral kidneys and extended excretory phase suggesting renal failure. The urinary bladder is filled with IV contrast from prior study. Correlate for urinary retention. There is no hydroureteronephrosis. 2.  Persistent moderate ascites, mesenteric edema, and partially imaged small pericardial effusion with cardiomegaly, suggesting third spacing. The gallbladder is filled with IV contrast with surrounding fluid which is possibly related to ascites and vicarious excretion of the contrast. Acute cholecystitis cannot entirely be excluded in the right clinical setting. 3.  Minimal lobulated liver contour suggesting cirrhosis. Correlate with history and labs. 4.  Additional/chronic findings as above. Workstation performed: [id number]     Procedure: CT abdomen pelvis w contrast  Result Date: 4/24/2025  Narrative: CT ABDOMEN AND PELVIS WITH IV CONTRAST INDICATION: abdominal pain. . COMPARISON: 4/8/2025 TECHNIQUE: CT examination of the abdomen and pelvis was performed. Multiplanar 2D reformatted images were created from the source data. This examination, like all CT scans performed in the [facility], was performed utilizing techniques to minimize radiation dose exposure, including the use of iterative reconstruction and automated exposure control. Radiation dose length product (DLP) for this visit: 637.75 mGy-cm. IV Contrast: 85 mL of iohexol Enteric Contrast: Not administered. FINDINGS: ABDOMEN LOWER CHEST: Mild emphysematous changes and  scattered areas of fibrosis/parenchymal scarring again noted. No pleural or pericardial effusions. Stable cardiomegaly. LIVER/BILIARY TREE: Subtle nodular contour of the liver suggesting cirrhotic morphology. No suspicious mass within study limitations. No biliary dilation. Portal vein is patent. Mild diffuse hepatic steatosis. GALLBLADDER: No calcified gallstones. No pericholecystic inflammatory change. SPLEEN: Unremarkable. PANCREAS: Unremarkable. ADRENAL GLANDS: Unremarkable. KIDNEYS/URETERS: Unremarkable. No hydronephrosis. STOMACH AND BOWEL: Small hiatal hernia. Stomach is mildly distended with heterogeneous density debris. Small and large bowel loops appear normal in course and caliber without evidence for obstruction or inflammation. Terminal ileum and appendix are unremarkable. Mild sigmoid diverticulosis without evidence for acute diverticulitis. There is a small dhara-umbilical hernia containing a small bowel loop and small amount of ascites. APPENDIX: No findings to suggest appendicitis. ABDOMINOPELVIC CAVITY: A small amount of abdominal/pelvic ascites is again seen, similar compared to the prior study. No loculated fluid collections in the abdomen/pelvis.. No pneumoperitoneum. No lymphadenopathy by size criteria. A few mildly prominent left inguinal lymph nodes measuring up to 1.7 x 0.9 cm are again seen. Sequelae of previous inguinal hernia repair is noted bilaterally without recurrent hernia.. VESSELS: Scattered calcific atherosclerosis without abdominal aortic aneurysm. 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 PELVIS REPRODUCTIVE ORGANS: Unremarkable for patient's age. URINARY BLADDER: Moderately distended without intraluminal mass or calculi. Small posterior bladder wall diverticulum noted.. ABDOMINAL WALL/INGUINAL REGIONS: Unremarkable. BONES: No acute fracture or suspicious osseous lesion. Schmorl's node along the superior endplate of L1 noted. Mild  multilevel degenerative changes of the visualized thoracolumbar spine and bilateral hip joints.     Impression: 1.  Subtle nodular contour of the liver suggesting cirrhotic morphology. Mild diffuse hepatic steatosis. No suspicious enhancing hepatic mass. 2.  Small hiatal hernia. No evidence of bowel obstruction, inflammation, appendicitis, obstructive uropathy, or free air. 3.  There is a small dhara-umbilical hernia containing a small bowel loop and small amount of ascites. 4.  A small amount of abdominal/pelvic ascites is again seen, similar compared to the prior study. 5.  A few mildly prominent left inguinal lymph nodes measuring up to 1.7 x 0.9 cm are again seen. Sequelae of previous bilateral inguinal hernia repair without recurrent hernia. 6.  Scattered calcific atherosclerosis. 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 7.  Stable cardiomegaly, emphysematous changes, and fibrosis/parenchymal scarring in the lung bases. Workstation performed: [id number]         Current Facility-Administered Medications:     albuterol (PROVENTIL HFA,VENTOLIN HFA) inhaler 2 puff, Q6H PRN    amiodarone (CORDARONE) 900 mg in dextrose 5 % 500 mL infusion, Continuous, Last Rate: 0.5 mg/min (04/27/25 0309)    chlorhexidine (PERIDEX) 0.12 % oral rinse 15 mL, Q12H JESUS    digoxin (LANOXIN) tablet 125 mcg, Once per day on Monday Wednesday Friday    ipratropium (ATROVENT) 0.02 % inhalation solution 0.5 mg, TID    levalbuterol (XOPENEX) inhalation solution 1.25 mg, TID    magnesium Oxide (MAG-OX) tablet 400 mg, Daily    melatonin tablet 3 mg, HS    metoprolol (LOPRESSOR) injection 2.5 mg, Q6H PRN    [Held by provider] metoprolol tartrate (LOPRESSOR) tablet 25 mg, Q12H JESUS    midodrine (PROAMATINE) tablet 7.5 mg, TID AC    milrinone (PRIMACOR) 20 mg in 100 mL infusion (premix), Continuous, Last Rate: 0.13 mcg/kg/min (04/27/25 0662)    nicotine (NICODERM CQ) 14 mg/24hr TD 24 hr patch 1 patch, Daily     NOREPINEPHRINE 4 MG  ML NSS (CMPD ORDER) infusion, Titrated, Last Rate: Stopped (04/26/25 1932)    pantoprazole (PROTONIX) EC tablet 40 mg, BID    [COMPLETED] piperacillin-tazobactam (ZOSYN) 4.5 g in sodium chloride 0.9 % 100 mL IV LOADING DOSE, Once, Last Rate: Stopped (04/26/25 1600) **FOLLOWED BY** piperacillin-tazobactam (ZOSYN) 4.5 g in sodium chloride 0.9 % 100 mL IVPB (EXTENDED INFUSION), Q8H, Last Rate: 4.5 g (04/27/25 0600)    trimethobenzamide (TIGAN) IM injection 200 mg, Q8H PRN  Medications Discontinued During This Encounter   Medication Reason    ondansetron (ZOFRAN) injection 4 mg     multi-electrolyte (Plasmalyte-A/Isolyte-S PH 7.4/Normosol-R) IV bolus 1,000 mL     nicotine (NICODERM CQ) 14 mg/24hr TD 24 hr patch 1 patch     dextrose 50 % IV solution 50 mL     furosemide (LASIX) injection 40 mg     ipratropium-albuterol (DUO-NEB) 0.5-2.5 mg/3 mL inhalation solution 3 mL     amiodarone (CORDARONE) 900 mg in dextrose 5 % 500 mL infusion     midodrine (PROAMATINE) tablet 5 mg     amiodarone (CORDARONE) 900 mg in dextrose 5 % 500 mL infusion     furosemide (LASIX) injection 40 mg     acetaminophen (TYLENOL) tablet 650 mg     Sodium Zirconium Cyclosilicate (Lokelma) 10 g     phytonadione (MEPHYTON) tablet 5 mg     lactulose (CHRONULAC) oral solution 30 g        Lamont Varvarelis, DO      This progress note was produced in part using a dictation device which may document imprecise wording from author's original intent.

## 2025-04-27 NOTE — ASSESSMENT & PLAN NOTE
As noted above, unclear if this is due to reduced blood flow to a portion of the gut versus other intermittent abdominal pain.  Patient currently without acidosis.  Previous lactic acidosis now resolved

## 2025-04-27 NOTE — CASE MANAGEMENT
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     Scleroderma (HCC) 2025     Stage 3a chronic kidney disease (CKD) (Formerly [facility]) 2025     Cardiogenic shock ([facility]) 2025     Atrial fibrillation with RVR ([facility]ly [facility]) 2025     Syncope 2024     Chest pain 2024     Acute on chronic systolic congestive heart failure (HCC) 2024     Primary hypertension 2024     Hypomagnesemia 2023     Methamphetamine abuse (Formerly [facility]) 2023     Tobacco abuse 2023     D-dimer, elevated 2023     Cardiomyopathy (HCC) 10/09/2023     Acute kidney injury superimposed on stage 3a chronic kidney disease (HCC) 10/08/2023     Acute on chronic combined systolic and diastolic congestive heart failure ([facility]) 10/07/2023     Hypertensive emergency 10/07/2023     Anxiety      Depression      Other emphysema (HCC)      GERD (gastroesophageal reflux disease)      Gout      Hypertensive urgency      Neuropathy      Raynaud's disease      Unilateral inguinal hernia, without obstruction or gangrene, not specified as recurrent      PSS (progressive systemic sclerosis) (HCC) 10/25/2021     Near syncope 10/25/2021     Osteomyelitis ([facility]) 10/25/2021     Elevated troponin 10/25/2021     Acute encephalopathy 10/25/2021       LOS (days): 2  Geometric Mean LOS  (GMLOS) (days): 3.9  Days to GMLOS:1.9     OBJECTIVE:  PATIENT READMITTED TO HOSPITAL  Risk of Unplanned Readmission Score: 54.51         Current admission status: Inpatient  Referral Reason: Other (Disposition planning)    Preferred Pharmacy:   [facility] - [place], [place] - [address]  [address]  [place] [address]  Phone: [phone] Fax: [phone]    [facility] [place] - [address]  First [address] [facility] [address]  Phone: [phone] Fax: [phone]    [id number] - [place], [place] - [address] [address]  Phone: [phone] Fax: [phone]    Primary Care Provider: Juan Galindo DO    Primary Insurance: [employer] REP  Secondary Insurance:     ASSESSMENT:  Active Health Care Proxies       Baskin, Arin Health Care Representative - Sister   Primary Phone: [phone] (Mobile)                 Advance Directives  Does patient have a Health Care POA?: No  Was patient offered paperwork?: Yes (pt declined)  Does patient currently have a Health Care decision maker?: Yes, please see Health Care Proxy section  Does patient have Advance Directives?: No  Was patient offered paperwork?: Yes (pt declined)  Primary Contact: sister Kwan         Readmission Root Cause  30 Day Readmission: Yes  During your hospital stay, did someone (provider, nurse, ) explain your care to you in a way you could understand?: Yes  Did you feel medically stable to leave the hospital?: Yes  Were you able to pay for your medication at the pharmacy?: Yes  Did you have reliable transportation to take you to your appointments?: Yes  During previous admission, was a post-acute recommendation made?: No  Patient was readmitted due to: umbilical pain  Action Plan: CM to follow for CM discharge needs    Patient Information  Admitted from:: Home  Mental Status: Alert  During Assessment  patient was accompanied by: Not accompanied during assessment  Assessment information provided by:: Patient  Primary Caregiver: Self  Support Systems: Family members  County [place] Residence: [facility]  [place] [facility] do you live in[place]  Home entry access options. Select all that apply.: Stairs  Number of steps to enter home.: One Flight  Do the steps have railings?: Yes  Type of Current Residence: Apartment  Floor Level: 2  Upon entering residence, is there a bedroom on the main floor (no further steps)?: Yes  Upon entering residence, is there a bathroom on the main floor (no further steps)?: Yes  Living Arrangements: Lives w/ Family members  Is patient a ?: No    Activities of Daily Living Prior to Admission  Functional Status: Independent  Completes ADLs independently?: Yes  Ambulates independently?: Yes  Does patient use assisted devices?: No  Does patient currently own DME?: Yes  What DME does the patient currently own?: Straight Cane, Nebulizer  Does patient have a history of Outpatient Therapy (PT/OT)?: No  Does the patient have a history of Short-Term Rehab?: No  Does patient have a history of HHC?: No  Does patient currently have HHC?: No         Patient Information Continued  Income Source: SSI/SSD  Does patient have prescription coverage?: Yes  Can the patient afford their medications and any related supplies (such as glucometers or test strips)?: Yes  Does patient receive dialysis treatments?: No  Does patient have a history of substance abuse?: Yes  Historical substance use preference: Methamphetamines, Marijuana, Alcohol/ETOH  History of Withdrawal Symptoms: Denies past symptoms  Is patient currently in treatment for substance abuse?: No. Patient declined treatment information.  Does patient have a history of Mental Health Diagnosis?: Yes (anxiety and depression)  Is patient receiving treatment for mental health?: Yes (medication management)  Has patient received inpatient treatment  related to mental health in the last 2 years?: No         Means of Transportation  Means of Transport to Appts:: Family transport          DISCHARGE DETAILS:    Discharge planning discussed with:: patient  Freedom of Choice: Yes     CM contacted family/caregiver?: No- see comments (pt declined)                  Requested Home Health Care         Is the patient interested in HHC at discharge?: No    DME Referral Provided  Referral made for DME?: No              Treatment Team Recommendation: Home  Discharge Destination Plan:: Home  Transport at Discharge : Family                 CM met with patient at the bedside, baseline information was obtained. CM discussed the role of CM in helping the patient develop a discharge plan and assist the patient in carry out their plan.     Patient lives in 2nd floor apartment with brother who is not there very often. Patient independent with mobility at baseline.  Patient sister, Arin, provides transportation for patient.     Patient admits he smokes marijuana regularly but reports he has not used meth in a few weeks.  Patient reports he also drinks only occasionally. Patient reports he has completed drug and alcohol rehab on two occasions, one time being [facility].     Patient reports he has been taking his medication regularly for approximately the past 2-3 weeks. Patient indicated his sister comes to his home to fill his pill box regularly.     CM to follow for CM discharge referral needs.

## 2025-04-27 NOTE — ASSESSMENT & PLAN NOTE
S/P reduction of of umbilical hernia on 4/24 in the ED by general sugery  Repeat CT on 4/25:  Persistent mild nephrogram of bilateral kidneys and extended excretory phase suggesting renal failure. The urinary bladder is filled with IV contrast from prior study. Correlate for urinary retention. There is no hydroureteronephrosis.Persistent moderate ascites, mesenteric edema, and partially imaged small pericardial effusion with cardiomegaly, suggesting third spacing. The gallbladder is filled with IV contrast with surrounding fluid which is possibly related to ascites and vicarious excretion of the contrast. Acute cholecystitis cannot entirely be excluded in the right clinical setting.Minimal lobulated liver contour suggesting cirrhosis. Correlate with history and labs.Additional/chronic findings as above.  4/25 Abdominal US: No sonographic findings of cirrhosis.Pulsatile portal venous flow, gallbladder wall thickening, and small volume upper abdominal ascites, likely due to right heart failure.  Patient initiated on Zosyn D#3    Plan  Serial abdominal exams  Dc Zosyn

## 2025-04-27 NOTE — ASSESSMENT & PLAN NOTE
Wt Readings from Last 3 Encounters:   04/28/25 74.2 kg (163 lb 9.3 oz)   04/24/25 76.8 kg (169 lb 5 oz)   04/24/25 76.8 kg (169 lb 6.4 oz)       Patient received Lasix 80mg IV x 1 dose on 4/26  GDMT for heart failure  Diuretic: Lasix 40mg PO daily (home dose, held)  Beta-blocker: Metoprolol 25mg Q12 hours (home, held)  Renin-angiotensin inhibition: Held due to hypotension, unable to afford Entresto ( ARNI / ACE inhibitor/ ARB)  MRA: Spirolactone 12.5mg daily (home)  Sodium glucose cotransporter 2 (SGLT2 ) inhibitor: Patient unable to financially afford medication at this time    [employer] AICD: Interrogated on 4/26    Plan  Resume metoprolol 12.5 mg/ oral lasix 40 mg   Follow echo  Follow cardiology consult

## 2025-04-27 NOTE — ASSESSMENT & PLAN NOTE
Lab Results   Component Value Date    EGFR 28 04/27/2025    EGFR 28 04/26/2025    EGFR 28 04/26/2025    CREATININE 2.42 (H) 04/27/2025    CREATININE 2.38 (H) 04/26/2025    CREATININE 2.37 (H) 04/26/2025   Kidney function essentially unchanged over the last 24 hours, continue to optimize hemodynamics and avoid potential nephrotoxins.  Will order urine studies to further evaluate and confirm prerenal state.  Currently on milrinone and midodrine to assist with hemodynamics.    Baseline creatinine likely around 1.5 mg/dL

## 2025-04-27 NOTE — QUICK NOTE
I called patient's sister, Arin and left a message for a callback on her VM for routine update. I was able to reach the patient's daughter, Corinna, via phone and provided her an update.      LUIS ANGEL Thomas

## 2025-04-28 ENCOUNTER — APPOINTMENT (INPATIENT)
Dept: NON INVASIVE DIAGNOSTICS | Facility: HOSPITAL | Age: 60
DRG: 291 | End: 2025-04-28
Payer: COMMERCIAL

## 2025-04-28 PROBLEM — D68.9 COAGULOPATHY (HCC): Status: RESOLVED | Noted: 2025-04-26 | Resolved: 2025-04-28

## 2025-04-28 PROBLEM — J96.00 ACUTE RESPIRATORY FAILURE (HCC): Status: RESOLVED | Noted: 2025-04-23 | Resolved: 2025-04-28

## 2025-04-28 PROBLEM — R57.9 SHOCK (HCC): Status: RESOLVED | Noted: 2025-04-26 | Resolved: 2025-04-28

## 2025-04-28 PROBLEM — R10.9 ABDOMINAL PAIN: Status: RESOLVED | Noted: 2025-04-25 | Resolved: 2025-04-28

## 2025-04-28 PROBLEM — E72.20 HYPERAMMONEMIA (HCC): Status: RESOLVED | Noted: 2025-04-26 | Resolved: 2025-04-28

## 2025-04-28 LAB
ALBUMIN SERPL BCG-MCNC: 3.1 G/DL (ref 3.5–5)
ALP SERPL-CCNC: 84 U/L (ref 34–104)
ALT SERPL W P-5'-P-CCNC: 1188 U/L (ref 7–52)
ANION GAP SERPL CALCULATED.3IONS-SCNC: 6 MMOL/L (ref 4–13)
AORTIC VALVE MEAN VELOCITY: 13.5 M/S
APTT PPP: 34 SECONDS (ref 23–34)
APTT PPP: 47 SECONDS (ref 23–34)
APTT PPP: 55 SECONDS (ref 23–34)
ARTERIAL PATENCY WRIST A: NO
AST SERPL W P-5'-P-CCNC: 596 U/L (ref 13–39)
ATRIAL RATE: 138 BPM
AV AREA BY CONTINUOUS VTI: 1.6 CM2
AV AREA PEAK VELOCITY: 1.5 CM2
AV LVOT MEAN GRADIENT: 1 MMHG
AV LVOT PEAK GRADIENT: 2 MMHG
AV MEAN PRESS GRAD SYS DOP V1V2: 7 MMHG
AV ORIFICE AREA US: 1.63 CM2
AV PEAK GRADIENT: 9 MMHG
AV VELOCITY RATIO: 0.52
AV VMAX SYS DOP: 1.48 M/S
BASE EX.OXY STD BLDV CALC-SCNC: 54.2 % (ref 60–80)
BASE EXCESS BLDV CALC-SCNC: 1.5 MMOL/L
BILIRUB SERPL-MCNC: 1.87 MG/DL (ref 0.2–1)
BSA FOR ECHO PROCEDURE: 1.89 M2
BUN SERPL-MCNC: 50 MG/DL (ref 5–25)
CALCIUM ALBUM COR SERPL-MCNC: 8.6 MG/DL (ref 8.3–10.1)
CALCIUM SERPL-MCNC: 7.9 MG/DL (ref 8.4–10.2)
CHLORIDE SERPL-SCNC: 98 MMOL/L (ref 96–108)
CO2 SERPL-SCNC: 30 MMOL/L (ref 21–32)
CREAT SERPL-MCNC: 1.99 MG/DL (ref 0.6–1.3)
DOP CALC AO VTI: 23.09 CM
DOP CALC LVOT AREA: 3.14 CM2
DOP CALC LVOT CARDIAC INDEX: 1.58 L/MIN/M2
DOP CALC LVOT CARDIAC OUTPUT: 3 L/MIN
DOP CALC LVOT DIAMETER: 2 CM
DOP CALC LVOT PEAK VEL VTI: 11.95 CM
DOP CALC LVOT PEAK VEL: 0.69 M/S
DOP CALC LVOT STROKE INDEX: 18.9 ML/M2
DOP CALC LVOT STROKE VOLUME: 37.52
ERYTHROCYTE [DISTWIDTH] IN BLOOD BY AUTOMATED COUNT: 16.6 % (ref 11.6–15.1)
GFR SERPL CREATININE-BSD FRML MDRD: 35 ML/MIN/1.73SQ M
GLUCOSE SERPL-MCNC: 103 MG/DL (ref 65–140)
HCO3 BLDV-SCNC: 26.6 MMOL/L (ref 24–30)
HCT VFR BLD AUTO: 37.3 % (ref 36.5–49.3)
HGB BLD-MCNC: 12.2 G/DL (ref 12–17)
INR PPP: 1.94 (ref 0.85–1.19)
LEFT VENTRICLE DIASTOLIC VOLUME (MOD BIPLANE): 246 ML
LEFT VENTRICLE DIASTOLIC VOLUME INDEX (MOD BIPLANE): 130.2 ML/M2
LEFT VENTRICLE SYSTOLIC VOLUME (MOD BIPLANE): 194 ML
LEFT VENTRICLE SYSTOLIC VOLUME INDEX (MOD BIPLANE): 102.6 ML/M2
LV EF BIPLANE MOD: 21 %
LV EF US.2D.A4C+ESTIMATED: 15 %
MAGNESIUM SERPL-MCNC: 2 MG/DL (ref 1.9–2.7)
MCH RBC QN AUTO: 29.3 PG (ref 26.8–34.3)
MCHC RBC AUTO-ENTMCNC: 32.7 G/DL (ref 31.4–37.4)
MCV RBC AUTO: 90 FL (ref 82–98)
O2 CT BLDV-SCNC: 10 ML/DL
PCO2 BLDV: 43.5 MM HG (ref 42–50)
PH BLDV: 7.4 [PH] (ref 7.3–7.4)
PHOSPHATE SERPL-MCNC: 2.9 MG/DL (ref 2.7–4.5)
PLATELET # BLD AUTO: 171 THOUSANDS/UL (ref 149–390)
PMV BLD AUTO: 9.4 FL (ref 8.9–12.7)
PO2 BLDV: 31.3 MM HG (ref 35–45)
POTASSIUM SERPL-SCNC: 3.5 MMOL/L (ref 3.5–5.3)
PR INTERVAL: 144 MS
PROCALCITONIN SERPL-MCNC: 2.62 NG/ML
PROT SERPL-MCNC: 5.8 G/DL (ref 6.4–8.4)
PROTHROMBIN TIME: 22.4 SECONDS (ref 12.3–15)
QRS AXIS: 107 DEGREES
QRS AXIS: 112 DEGREES
QRSD INTERVAL: 156 MS
QRSD INTERVAL: 174 MS
QT INTERVAL: 322 MS
QT INTERVAL: 340 MS
QTC INTERVAL: 487 MS
QTC INTERVAL: 517 MS
RA PRESSURE ESTIMATED: 15 MMHG
RBC # BLD AUTO: 4.16 MILLION/UL (ref 3.88–5.62)
RV PSP: 41 MMHG
SL CV LV EF: 15
SODIUM SERPL-SCNC: 134 MMOL/L (ref 135–147)
T WAVE AXIS: -50 DEGREES
T WAVE AXIS: -62 DEGREES
TR MAX PG: 26 MMHG
TR PEAK VELOCITY: 2.6 M/S
TRICUSPID VALVE PEAK REGURGITATION VELOCITY: 2.57 M/S
VENTRICULAR RATE: 138 BPM
VENTRICULAR RATE: 139 BPM
WBC # BLD AUTO: 8.98 THOUSAND/UL (ref 4.31–10.16)

## 2025-04-28 PROCEDURE — 84100 ASSAY OF PHOSPHORUS: CPT | Performed by: NURSE PRACTITIONER

## 2025-04-28 PROCEDURE — 80053 COMPREHEN METABOLIC PANEL: CPT | Performed by: NURSE PRACTITIONER

## 2025-04-28 PROCEDURE — 93325 DOPPLER ECHO COLOR FLOW MAPG: CPT

## 2025-04-28 PROCEDURE — 94760 N-INVAS EAR/PLS OXIMETRY 1: CPT

## 2025-04-28 PROCEDURE — 85730 THROMBOPLASTIN TIME PARTIAL: CPT

## 2025-04-28 PROCEDURE — 94640 AIRWAY INHALATION TREATMENT: CPT

## 2025-04-28 PROCEDURE — 85610 PROTHROMBIN TIME: CPT | Performed by: NURSE PRACTITIONER

## 2025-04-28 PROCEDURE — NC001 PR NO CHARGE

## 2025-04-28 PROCEDURE — 82805 BLOOD GASES W/O2 SATURATION: CPT | Performed by: NURSE PRACTITIONER

## 2025-04-28 PROCEDURE — 93321 DOPPLER ECHO F-UP/LMTD STD: CPT

## 2025-04-28 PROCEDURE — 85027 COMPLETE CBC AUTOMATED: CPT | Performed by: NURSE PRACTITIONER

## 2025-04-28 PROCEDURE — 84145 PROCALCITONIN (PCT): CPT | Performed by: NURSE PRACTITIONER

## 2025-04-28 PROCEDURE — 99232 SBSQ HOSP IP/OBS MODERATE 35: CPT | Performed by: INTERNAL MEDICINE

## 2025-04-28 PROCEDURE — 83735 ASSAY OF MAGNESIUM: CPT | Performed by: NURSE PRACTITIONER

## 2025-04-28 PROCEDURE — 93308 TTE F-UP OR LMTD: CPT

## 2025-04-28 RX ORDER — WARFARIN SODIUM 5 MG/1
5 TABLET ORAL
Status: DISCONTINUED | OUTPATIENT
Start: 2025-04-28 | End: 2025-04-30

## 2025-04-28 RX ORDER — POTASSIUM CHLORIDE 14.9 MG/ML
20 INJECTION INTRAVENOUS ONCE
Status: COMPLETED | OUTPATIENT
Start: 2025-04-28 | End: 2025-04-28

## 2025-04-28 RX ORDER — HEPARIN SODIUM 1000 [USP'U]/ML
4000 INJECTION, SOLUTION INTRAVENOUS; SUBCUTANEOUS EVERY 6 HOURS PRN
Status: DISCONTINUED | OUTPATIENT
Start: 2025-04-28 | End: 2025-04-29

## 2025-04-28 RX ORDER — HEPARIN SODIUM 1000 [USP'U]/ML
2000 INJECTION, SOLUTION INTRAVENOUS; SUBCUTANEOUS EVERY 6 HOURS PRN
Status: DISCONTINUED | OUTPATIENT
Start: 2025-04-28 | End: 2025-04-29

## 2025-04-28 RX ORDER — FUROSEMIDE 10 MG/ML
40 INJECTION INTRAMUSCULAR; INTRAVENOUS DAILY
Status: DISCONTINUED | OUTPATIENT
Start: 2025-04-28 | End: 2025-04-29

## 2025-04-28 RX ORDER — ECHINACEA PURPUREA EXTRACT 125 MG
1 TABLET ORAL
Status: DISCONTINUED | OUTPATIENT
Start: 2025-04-28 | End: 2025-04-30 | Stop reason: HOSPADM

## 2025-04-28 RX ORDER — HEPARIN SODIUM 10000 [USP'U]/100ML
3-20 INJECTION, SOLUTION INTRAVENOUS
Status: DISCONTINUED | OUTPATIENT
Start: 2025-04-28 | End: 2025-04-29

## 2025-04-28 RX ORDER — GABAPENTIN 100 MG/1
100 CAPSULE ORAL 3 TIMES DAILY
Status: DISCONTINUED | OUTPATIENT
Start: 2025-04-28 | End: 2025-04-30 | Stop reason: HOSPADM

## 2025-04-28 RX ORDER — HYDROXYZINE HYDROCHLORIDE 25 MG/1
25 TABLET, FILM COATED ORAL EVERY 6 HOURS PRN
Status: DISCONTINUED | OUTPATIENT
Start: 2025-04-28 | End: 2025-04-30 | Stop reason: HOSPADM

## 2025-04-28 RX ORDER — GABAPENTIN 100 MG/1
100 CAPSULE ORAL 3 TIMES DAILY
Status: DISCONTINUED | OUTPATIENT
Start: 2025-04-28 | End: 2025-04-28

## 2025-04-28 RX ORDER — METOPROLOL TARTRATE 25 MG/1
25 TABLET, FILM COATED ORAL EVERY 12 HOURS SCHEDULED
Status: DISCONTINUED | OUTPATIENT
Start: 2025-04-28 | End: 2025-04-30 | Stop reason: HOSPADM

## 2025-04-28 RX ADMIN — CHLORHEXIDINE GLUCONATE 15 ML: 1.2 SOLUTION ORAL at 09:06

## 2025-04-28 RX ADMIN — PANTOPRAZOLE SODIUM 40 MG: 40 TABLET, DELAYED RELEASE ORAL at 06:05

## 2025-04-28 RX ADMIN — DIGOXIN 125 MCG: 125 TABLET ORAL at 09:06

## 2025-04-28 RX ADMIN — MIDODRINE HYDROCHLORIDE 7.5 MG: 5 TABLET ORAL at 16:44

## 2025-04-28 RX ADMIN — MAGNESIUM OXIDE TAB 400 MG (241.3 MG ELEMENTAL MG) 400 MG: 400 (241.3 MG) TAB at 09:07

## 2025-04-28 RX ADMIN — PANTOPRAZOLE SODIUM 40 MG: 40 TABLET, DELAYED RELEASE ORAL at 18:16

## 2025-04-28 RX ADMIN — HEPARIN SODIUM 2000 UNITS: 1000 INJECTION, SOLUTION INTRAVENOUS; SUBCUTANEOUS at 17:42

## 2025-04-28 RX ADMIN — IPRATROPIUM BROMIDE 0.5 MG: 0.5 SOLUTION RESPIRATORY (INHALATION) at 08:28

## 2025-04-28 RX ADMIN — IPRATROPIUM BROMIDE 0.5 MG: 0.5 SOLUTION RESPIRATORY (INHALATION) at 19:27

## 2025-04-28 RX ADMIN — METOPROLOL TARTRATE 25 MG: 25 TABLET, FILM COATED ORAL at 22:02

## 2025-04-28 RX ADMIN — HEPARIN SODIUM 12 UNITS/KG/HR: 10000 INJECTION, SOLUTION INTRAVENOUS at 09:07

## 2025-04-28 RX ADMIN — NICOTINE 1 PATCH: 14 PATCH, EXTENDED RELEASE TRANSDERMAL at 09:06

## 2025-04-28 RX ADMIN — LEVALBUTEROL HYDROCHLORIDE 1.25 MG: 1.25 SOLUTION RESPIRATORY (INHALATION) at 15:19

## 2025-04-28 RX ADMIN — FUROSEMIDE 40 MG: 10 INJECTION, SOLUTION INTRAMUSCULAR; INTRAVENOUS at 10:46

## 2025-04-28 RX ADMIN — PERFLUTREN 0.4 ML/MIN: 6.52 INJECTION, SUSPENSION INTRAVENOUS at 08:08

## 2025-04-28 RX ADMIN — MIDODRINE HYDROCHLORIDE 7.5 MG: 5 TABLET ORAL at 10:46

## 2025-04-28 RX ADMIN — POTASSIUM CHLORIDE 20 MEQ: 14.9 INJECTION, SOLUTION INTRAVENOUS at 07:43

## 2025-04-28 RX ADMIN — MIDODRINE HYDROCHLORIDE 7.5 MG: 5 TABLET ORAL at 06:06

## 2025-04-28 RX ADMIN — IPRATROPIUM BROMIDE 0.5 MG: 0.5 SOLUTION RESPIRATORY (INHALATION) at 15:19

## 2025-04-28 RX ADMIN — LEVALBUTEROL HYDROCHLORIDE 1.25 MG: 1.25 SOLUTION RESPIRATORY (INHALATION) at 08:28

## 2025-04-28 RX ADMIN — Medication 12.5 MG: at 10:46

## 2025-04-28 RX ADMIN — PIPERACILLIN AND TAZOBACTAM 4.5 G: 36; 4.5 INJECTION, POWDER, FOR SOLUTION INTRAVENOUS at 06:04

## 2025-04-28 RX ADMIN — Medication 3 MG: at 22:02

## 2025-04-28 RX ADMIN — POTASSIUM CHLORIDE 20 MEQ: 14.9 INJECTION, SOLUTION INTRAVENOUS at 09:48

## 2025-04-28 RX ADMIN — SALINE NASAL SPRAY 1 SPRAY: 1.5 SOLUTION NASAL at 10:45

## 2025-04-28 RX ADMIN — WARFARIN SODIUM 5 MG: 5 TABLET ORAL at 18:16

## 2025-04-28 RX ADMIN — GABAPENTIN 100 MG: 100 CAPSULE ORAL at 22:02

## 2025-04-28 RX ADMIN — HYDROXYZINE HYDROCHLORIDE 25 MG: 25 TABLET, FILM COATED ORAL at 06:06

## 2025-04-28 RX ADMIN — LEVALBUTEROL HYDROCHLORIDE 1.25 MG: 1.25 SOLUTION RESPIRATORY (INHALATION) at 19:27

## 2025-04-28 RX ADMIN — CHLORHEXIDINE GLUCONATE 15 ML: 1.2 SOLUTION ORAL at 22:03

## 2025-04-28 NOTE — PROGRESS NOTES
Progress Note - Critical Care/ICU   Name: Britton Batista 59 y.o. male I MRN: [id number]  Unit/Bed#: -01 I Date of Admission: 4/25/2025   Date of Service: 4/28/2025 I Hospital Day: 3       Assessment & Plan  Shock (HCC) (Resolved: 4/28/2025)  Concern for cardiogenic etiology as cause for acute decompensation of liver function and renal function  3/25 ECHO EF 25% with RV Nl/slightly reduced, Moderate MR, RVSP 30-35  4/26 ScVo2 32-> Milrinone initiated and ScVo2 improved to 64; Lasix 80mg IV x 1 dose  4/27 Milrinone weaned to off      Plan  Monitor ScVO2  Continue diuresis PRN  Acute kidney injury superimposed on stage 3a chronic kidney disease (HCC)  Lab Results   Component Value Date    EGFR 35 04/28/2025    EGFR 28 04/27/2025    EGFR 28 04/26/2025    CREATININE 1.99 (H) 04/28/2025    CREATININE 2.42 (H) 04/27/2025    CREATININE 2.38 (H) 04/26/2025     Suspect in setting of cardiogenic shock, improving  Plan:    Continue to monitor closely  Lasix 80mg IV x 1 dose on 4/26, lasix 40 on 4/27  Zazueta placed for strict I&O  Nephrology following  Transaminitis  Recent Labs     04/26/25  1658 04/27/25  0456 04/28/25  0526   AST 2,707* 1,625* 596*   ALT 1,940* 1,713* 1,188*   ALKPHOS 78 76 84   TBILI 2.24* 2.94* 1.87*      Patient with acute deterioration in LFTs,   Suspect related to cardiogenic shock  Received amio bolus and infusion for a few hours-discontinued at this time    Plan:  Trend LFT and lactate  Avoid hepatotoxic agents    Abdominal pain (Resolved: 4/28/2025)  S/P reduction of of umbilical hernia on 4/24 in the ED by general sugery  Repeat CT on 4/25:  Persistent mild nephrogram of bilateral kidneys and extended excretory phase suggesting renal failure. The urinary bladder is filled with IV contrast from prior study. Correlate for urinary retention. There is no hydroureteronephrosis.Persistent moderate ascites, mesenteric edema, and partially imaged small pericardial effusion with cardiomegaly,  suggesting third spacing. The gallbladder is filled with IV contrast with surrounding fluid which is possibly related to ascites and vicarious excretion of the contrast. Acute cholecystitis cannot entirely be excluded in the right clinical setting.Minimal lobulated liver contour suggesting cirrhosis. Correlate with history and labs.Additional/chronic findings as above.  4/25 Abdominal US: No sonographic findings of cirrhosis.Pulsatile portal venous flow, gallbladder wall thickening, and small volume upper abdominal ascites, likely due to right heart failure.  Patient initiated on Zosyn D#3    Plan  Serial abdominal exams  Dc Zosyn  Acute on chronic combined systolic and diastolic congestive heart failure (HCC)  Wt Readings from Last 3 Encounters:   04/28/25 74.2 kg (163 lb 9.3 oz)   04/24/25 76.8 kg (169 lb 5 oz)   04/24/25 76.8 kg (169 lb 6.4 oz)       Patient received Lasix 80mg IV x 1 dose on 4/26  GDMT for heart failure  Diuretic: Lasix 40mg PO daily (home dose, held)  Beta-blocker: Metoprolol 25mg Q12 hours (home, held)  Renin-angiotensin inhibition: Held due to hypotension, unable to afford Entresto ( ARNI / ACE inhibitor/ ARB)  MRA: Spirolactone 12.5mg daily (home)  Sodium glucose cotransporter 2 (SGLT2 ) inhibitor: Patient unable to financially afford medication at this time    [employer] AICD: Interrogated on 4/26    Plan  Resume metoprolol 12.5 mg/ oral lasix 40 mg   Follow echo  Follow cardiology consult      Cardiomyopathy (HCC)  Patient with history of methamphetamine induced cardiomyopathy  Last cardiac cath in 2020 showed no coronary artery disease  3/2025 ECHO: EF 25% with RV nl/reduced, Moderate MR   Patient's coreg was changed to metoprolol during prior hospitalization    Cardiology consult pending      Longstanding persistent atrial fibrillation (HCC)  Continue rate control agents Digoxin and metoprolol (on hold due to hypotension)  Lopressor 2.5 mg IV PRN for HR > 110 Q6H  Avoid  cardizem  Amiodarone re-initiated evening of 4/27 for afib with RVR.  Warfarin held (LD 4/25) in setting of elevated INR    Plan  Continue Digoxin  Resume warfarin and bridge with warfarin  Started on Amio infusion, continue; attempt to wean off once BB restarted  Pulmonary fibrosis (HCC)  Possible early CTD ILD-or related to prior exposure to dust/asbestos  PFT/office spirometry last year showed no restriction, mild decreased DLCO which argues against severe ILD  Acute respiratory failure (HCC) (Resolved: 4/28/2025)  Suspect component of pulmonary edema      Saturating 98% on RA  Pulmonary hygiene  Coagulopathy (HCC) (Resolved: 4/28/2025)  INR 9.93 4/26  LD warfarin 4/25  Vitamin K x 1 dose on 4/26    Plan  Monitor for s/s of bleeding, warfarin resumed with bridging to heparin  Daily INR  Hyperammonemia (HCC) (Resolved: 4/28/2025)  Suspect in the setting of hepatic dysfunction  Improved    Plan  Monitor Neuro exams  Disposition: Stepdown Level 1    ICU Core Measures     A: Assess, Prevent, and Manage Pain Has pain been assessed? Yes  Need for changes to pain regimen? No   B: Both SAT/SAT  N/A   C: Choice of Sedation RASS Goal: 0 Alert and Calm  Need for changes to sedation or analgesia regimen? NA   D: Delirium CAM-ICU: Negative   E: Early Mobility  Plan for early mobility? Yes   F: Family Engagement Plan for family engagement today? Yes     Antibiotic Review: Continue broad spectrum secondary to severity of illness.     Review of Invasive Devices:    Central access plan: Will obtain peripheral access and discontinue prior to transfer      Prophylaxis:  VTE VTE covered by:    None       Stress Ulcer  covered bypantoprazole (PROTONIX) 40 mg tablet [[id number]] (Long-Term Med), pantoprazole (PROTONIX) EC tablet 40 mg [[id number]]       24 Hour Events : no acute overnight events  Subjective patient reports improvement in abdominal pain. Denies any chest pain, SOB, nausea vomiting. Last bowel movement was 2 days ago.  States he is urinating less than normal  Review of Systems: See HPI for Review of Systems    Objective :                   Vitals I/O      Most Recent Min/Max in 24hrs   Temp 97.5 °F (36.4 °C) Temp  Min: 97.5 °F (36.4 °C)  Max: 98.3 °F (36.8 °C)   Pulse 87 Pulse  Min: 84  Max: 133   Resp 19 Resp  Min: 17  Max: 30   /80 BP  Min: 85/66  Max: 119/73   O2 Sat 97 % SpO2  Min: 90 %  Max: 100 %      Intake/Output Summary (Last 24 hours) at 4/28/2025 0750  Last data filed at 4/28/2025 0553  Gross per 24 hour   Intake 1440.98 ml   Output 3250 ml   Net -1809.02 ml       Diet Cardiovascular; Cardiac; Fluid Restriction 1800 ML, Sodium 2 GM    Invasive Monitoring           Physical Exam   Physical Exam  Eyes:      Conjunctiva/sclera: Conjunctivae normal.   Skin:     General: Skin is warm and dry.      Capillary Refill: Capillary refill takes less than 2 seconds.      Coloration: Skin is jaundiced.      Findings: Rash present.   HENT:      Head: Normocephalic and atraumatic.      Nose: No congestion.      Mouth/Throat:      Mouth: Mucous membranes are moist.   Cardiovascular:      Rate and Rhythm: Normal rate. Paced rhythm.      Heart sounds: Murmur heard.   Musculoskeletal:         General: Normal range of motion.      Right lower leg: No edema.      Left lower leg: No edema.   Abdominal:      Palpations: Abdomen is soft.      Tenderness: There is no abdominal tenderness.   Constitutional:       General: He is not in acute distress.     Appearance: He is not ill-appearing.   Pulmonary:      Breath sounds: Rhonchi present.   Neurological:      General: No focal deficit present.      Mental Status: He is alert and oriented to person, place and time.      Motor: Strength full and intact in all extremities.          Diagnostic Studies      Lab Results: I have reviewed the following results:     Medications:  Scheduled PRN   chlorhexidine, 15 mL, Q12H JESUS  digoxin, 125 mcg, Once per day on Monday Wednesday Friday  ipratropium,  0.5 mg, TID  levalbuterol, 1.25 mg, TID  magnesium Oxide, 400 mg, Daily  melatonin, 3 mg, HS  [Held by provider] metoprolol tartrate, 25 mg, Q12H JESUS  midodrine, 7.5 mg, TID AC  nicotine, 1 patch, Daily  pantoprazole, 40 mg, BID  piperacillin-tazobactam, 4.5 g, Q8H  potassium chloride, 20 mEq, Once   Followed by  potassium chloride, 20 mEq, Once      albuterol, 2 puff, Q6H PRN  hydrOXYzine HCL, 25 mg, Q6H PRN  metoprolol, 2.5 mg, Q6H PRN  sodium chloride, 1 spray, Q1H PRN  trimethobenzamide, 200 mg, Q8H PRN       Continuous    amiodarone (CORDARONE) 900 mg in dextrose 5 % 500 mL infusion, 0.5 mg/min, Last Rate: 0.5 mg/min (04/27/25 2118)  milrinone (Primacor) infusion, 0.13 mcg/kg/min, Last Rate: Stopped (04/27/25 1355)         Labs:  CBC    Recent Labs     04/27/25  0456 04/28/25  0526   WBC 12.04* 8.98   HGB 12.4 12.2   HCT 37.1 37.3    171     BMP    Recent Labs     04/27/25  0456 04/28/25  0526   SODIUM 135 134*   K 3.9 3.5   CL 99 98   CO2 28 30   AGAP 8 6   BUN 67* 50*   CREATININE 2.42* 1.99*   CALCIUM 8.6 7.9*       Coags    Recent Labs     04/27/25  0456 04/28/25  0526   INR 3.13* 1.94*        Additional Electrolytes  Recent Labs     04/28/25  0526   MG 2.0   PHOS 2.9          Blood Gas    No recent results  Recent Labs     04/28/25  0526   PHVEN 7.404*   ILP8BOB 43.5   PO2VEN 31.3*   [id number] 26.6   BEVEN 1.5   [id number] 54.2*    LFTs  Recent Labs     04/27/25  0456 04/28/25  0526   ALT 1,713* 1,188*   AST 1,625* 596*   ALKPHOS 76 84   ALB 3.2* 3.1*   TBILI 2.94* 1.87*       Infectious  Recent Labs     04/27/25  0456 04/28/25  0526   PROCALCITONI 3.83* 2.62*     Glucose  Recent Labs     04/26/25  1025 04/26/25  1658 04/27/25  0456 04/28/25  0526   GLUC 124 110 93 103

## 2025-04-28 NOTE — ASSESSMENT & PLAN NOTE
Patient with history of methamphetamine induced cardiomyopathy  Last cardiac cath in 2020 showed no coronary artery disease  3/2025 ECHO: EF 25% with RV nl/reduced, Moderate MR   Patient's coreg was changed to metoprolol during prior hospitalization    Plan:  Discussed with cardiology  F/U ECHO revealing EF 15%

## 2025-04-28 NOTE — ASSESSMENT & PLAN NOTE
Recent Labs     04/26/25  1658 04/27/25  0456 04/28/25  0526   AST 2,707* 1,625* 596*   ALT 1,940* 1,713* 1,188*   ALKPHOS 78 76 84   TBILI 2.24* 2.94* 1.87*      Patient with acute deterioration in LFTs,   Suspect related to cardiogenic shock  Received amio bolus and infusion for a few hours-discontinued at this time    Plan:  Trend LFT and lactate  Avoid hepatotoxic agents

## 2025-04-28 NOTE — ASSESSMENT & PLAN NOTE
Management per primary team, the patient was noted to be able to answer questions for me today.    Administrative Statements   VIRTUAL CARE DOCUMENTATION:     1. This service was provided via Telemedicine using [employer]     2. Parties in the room with patient during teleconsult RN    3. Confidentiality My office door was closed     4. Participants No one else was in the room    5. Patient acknowledged consent and understanding of privacy and security of the  Telemedicine consult. I informed the patient that I have reviewed their record in Epic and presented the opportunity for them to ask any questions regarding the visit today.  The patient agreed to participate.    6. I have spent a total time of 30 minutes in caring for this patient on the day of the visit/encounter including documentation of medical decision making, communication with the patient's nurse, documentation of medical necessity of care, modification of assessment and plan, review of patient record, communication with the primary team as well as communication with the patient's nurse at bedside not including the time spent for establishing the audio/video connection.      I communicated via [employer] secure chat with ICU team this morning with regards to patient's improved kidney status and utilizing Lasix on an as-needed basis.  Appreciate ICU team time, help and input.

## 2025-04-28 NOTE — PLAN OF CARE
Problem: PAIN - ADULT  Goal: Verbalizes/displays adequate comfort level or baseline comfort level  Description: Interventions:- Encourage patient to monitor pain and request assistance- Assess pain using appropriate pain scale- Administer analgesics based on type and severity of pain and evaluate response- Implement non-pharmacological measures as appropriate and evaluate response- Consider cultural and social influences on pain and pain management- Notify physician/advanced practitioner if interventions unsuccessful or patient reports new pain  Outcome: Progressing     Problem: INFECTION - ADULT  Goal: Absence or prevention of progression during hospitalization  Description: INTERVENTIONS:- Assess and monitor for signs and symptoms of infection- Monitor lab/diagnostic results- Monitor all insertion sites, i.e. indwelling lines, tubes, and drains- Monitor endotracheal if appropriate and nasal secretions for changes in amount and color- Birney appropriate cooling/warming therapies per order- Administer medications as ordered- Instruct and encourage patient and family to use good hand hygiene technique- Identify and instruct in appropriate isolation precautions for identified infection/condition  Outcome: Progressing  Goal: Absence of fever/infection during neutropenic period  Description: INTERVENTIONS:- Monitor WBC  Outcome: Progressing     Problem: SAFETY ADULT  Goal: Patient will remain free of falls  Description: INTERVENTIONS:- Educate patient/family on patient safety including physical limitations- Instruct patient to call for assistance with activity - Consult OT/PT to assist with strengthening/mobility - Keep Call bell within reach- Keep bed low and locked with side rails adjusted as appropriate- Keep care items and personal belongings within reach- Initiate and maintain comfort rounds- Make Fall Risk Sign visible to staff- Offer Toileting every 2 Hours, in advance of need- Initiate/Maintain Bed/Chair  alarm- Obtain necessary fall risk management equipment  - Apply yellow socks and bracelet for high fall risk patients- Consider moving patient to room near nurses station  Outcome: Progressing  Goal: Maintain or return to baseline ADL function  Description: INTERVENTIONS:-  Assess patient's ability to carry out ADLs; assess patient's baseline for ADL function and identify physical deficits which impact ability to perform ADLs (bathing, care of mouth/teeth, toileting, grooming, dressing, etc.)- Assess/evaluate cause of self-care deficits - Assess range of motion- Assess patient's mobility; develop plan if impaired- Assess patient's need for assistive devices and provide as appropriate- Encourage maximum independence but intervene and supervise when necessary- Involve family in performance of ADLs- Assess for home care needs following discharge - Consider OT consult to assist with ADL evaluation and planning for discharge- Provide patient education as appropriate  Outcome: Progressing

## 2025-04-28 NOTE — ASSESSMENT & PLAN NOTE
Wt Readings from Last 3 Encounters:   04/28/25 73.9 kg (163 lb)   04/24/25 76.8 kg (169 lb 5 oz)   04/24/25 76.8 kg (169 lb 6.4 oz)   LVEF 20%  Non ischemic cardiomyopathy (meth)  Neg cath 2020   GDMT limited by cost and renal function  Currently on Lopressor 12.5 mg PO BID  Midodrine being used for BP support   On lasix 40 IV daily   Monitor I and O, renal fx and electrolytes   Daily standing weights  See below

## 2025-04-28 NOTE — ASSESSMENT & PLAN NOTE
Rate improved discussed cardiology continue digoxin and continue metoprolol his INR is therapeutic continue Coumadin 5 mg daily discontinue heparin drip

## 2025-04-28 NOTE — ASSESSMENT & PLAN NOTE
Wt Readings from Last 3 Encounters:   04/29/25 74.5 kg (164 lb 3.2 oz)   04/24/25 76.8 kg (169 lb 5 oz)   04/24/25 76.8 kg (169 lb 6.4 oz)       GDMT for heart failure  Diuretic: Lasix 40mg PO daily (home dose, held)  Beta-blocker: Metoprolol 25mg Q12 hours (home, held)  Renin-angiotensin inhibition: Held due to hypotension, unable to afford Entresto ( ARNI / ACE inhibitor/ ARB)  MRA: Spirolactone 12.5mg daily (home)  Sodium glucose cotransporter 2 (SGLT2 ) inhibitor: Patient unable to financially afford medication at this time     [employer] AICD: Interrogated on 4/26     Patient is examines euvolemic no shortness of breath no leg edema discussed with cardiology will switch over to p.o. he takes Lasix 40 mg daily at home will switch over to that  Over the weekend patient was on milrinone secondary to cardiogenic shock with ICU since then lactic acid has cleared and he was weaned off milrinone.

## 2025-04-28 NOTE — PLAN OF CARE
Problem: PAIN - ADULT  Goal: Verbalizes/displays adequate comfort level or baseline comfort level  Description: Interventions:- Encourage patient to monitor pain and request assistance- Assess pain using appropriate pain scale- Administer analgesics based on type and severity of pain and evaluate response- Implement non-pharmacological measures as appropriate and evaluate response- Consider cultural and social influences on pain and pain management- Notify physician/advanced practitioner if interventions unsuccessful or patient reports new pain  Outcome: Progressing     Problem: INFECTION - ADULT  Goal: Absence or prevention of progression during hospitalization  Description: INTERVENTIONS:- Assess and monitor for signs and symptoms of infection- Monitor lab/diagnostic results- Monitor all insertion sites, i.e. indwelling lines, tubes, and drains- Monitor endotracheal if appropriate and nasal secretions for changes in amount and color- Cha[place]cey appropriate cooling/warming therapies per order- Administer medications as ordered- Instruct and encourage patient and family to use good hand hygiene technique- Identify and instruct in appropriate isolation precautions for identified infection/condition  Outcome: Progressing  Goal: Absence of fever/infection during neutropenic period  Description: INTERVENTIONS:- Monitor WBC  Outcome: Progressing  Goal: Maintain or return to baseline ADL function  Description: INTERVENTIONS:-  Assess patient's ability to carry out ADLs; assess patient's baseline for ADL function and identify physical deficits which impact ability to perform ADLs (bathing, care of mouth/teeth, toileting, grooming, dressing, etc.)- Assess/evaluate cause of self-care deficits - Assess range of motion- Assess patient's mobility; develop plan if impaired- Assess patient's need for assistive devices and provide as appropriate- Encourage maximum independence but intervene and supervise when necessary- Involve  family in performance of ADLs- Assess for home care needs following discharge - Consider OT consult to assist with ADL evaluation and planning for discharge- Provide patient education as appropriate     Problem: DISCHARGE PLANNING  Goal: Discharge to home or other facility with appropriate resources  Description: INTERVENTIONS:- Identify barriers to discharge w/patient and caregiver- Arrange for needed discharge resources and transportation as appropriate- Identify discharge learning needs (meds, wound care, etc.)- Arrange for interpretive services to assist at discharge as needed- Refer to Case Management Department for coordinating discharge planning if the patient needs post-hospital services based on physician/advanced practitioner order or complex needs related to functional status, cognitive ability, or social support system  Outcome: Progressing     Problem: Knowledge Deficit  Goal: Patient/family/caregiver demonstrates understanding of disease process, treatment plan, medications, and discharge instructions  Description: Complete learning assessment and assess knowledge base.Interventions:- Provide teaching at level of understanding- Provide teaching via preferred learning methods  Outcome: Progressing     Problem: CARDIOVASCULAR - ADULT  Goal: Maintains optimal cardiac output and hemodynamic stability  Description: INTERVENTIONS:- Monitor I/O, vital signs and rhythm- Monitor for S/S and trends of decreased cardiac output- Administer and titrate ordered vasoactive medications to optimize hemodynamic stability- Assess quality of pulses, skin color and temperature- Assess for signs of decreased coronary artery perfusion- Instruct patient to report change in severity of symptoms  Outcome: Progressing  Goal: Absence of cardiac dysrhythmias or at baseline rhythm  Description: INTERVENTIONS:- Continuous cardiac monitoring, vital signs, obtain 12 lead EKG if ordered- Administer antiarrhythmic and heart rate control  medications as ordered- Monitor electrolytes and administer replacement therapy as ordered  Outcome: Progressing     Problem: RESPIRATORY - ADULT  Goal: Achieves optimal ventilation and oxygenation  Description: INTERVENTIONS:- Assess for changes in respiratory status- Assess for changes in mentation and behavior- Position to facilitate oxygenation and minimize respiratory effort- Oxygen administered by appropriate delivery if ordered- Initiate smoking cessation education as indicated- Encourage broncho-pulmonary hygiene including cough, deep breathe, Incentive Spirometry- Assess the need for suctioning and aspirate as needed- Assess and instruct to report SOB or any respiratory difficulty- Respiratory Therapy support as indicated  Outcome: Progressing     Problem: METABOLIC, FLUID AND ELECTROLYTES - ADULT  Goal: Electrolytes maintained within normal limits  Description: INTERVENTIONS:- Monitor labs and assess patient for signs and symptoms of electrolyte imbalances- Administer electrolyte replacement as ordered- Monitor response to electrolyte replacements, including repeat lab results as appropriate- Instruct patient on fluid and nutrition as appropriate  Outcome: Progressing  Goal: Fluid balance maintained  Description: INTERVENTIONS:- Monitor labs - Monitor I/O and WT- Instruct patient on fluid and nutrition as appropriate- Assess for signs & symptoms of volume excess or deficit  Outcome: Progressing  Goal: Glucose maintained within target range  Description: INTERVENTIONS:- Monitor Blood Glucose as ordered- Assess for signs and symptoms of hyperglycemia and hypoglycemia- Administer ordered medications to maintain glucose within target range- Assess nutritional intake and initiate nutrition service referral as needed  Outcome: Progressing

## 2025-04-28 NOTE — ASSESSMENT & PLAN NOTE
The patient is noted to have an LVEF of 25% as per echocardiogram March 2020 5 repeat echocardiogram is pending.  Patient is noted to have RVSP 30 to 35 mmHg from that echocardiogram with biatrial dilatation.

## 2025-04-28 NOTE — ASSESSMENT & PLAN NOTE
Lab Results   Component Value Date    EGFR 35 04/28/2025    EGFR 28 04/27/2025    EGFR 28 04/26/2025    CREATININE 1.99 (H) 04/28/2025    CREATININE 2.42 (H) 04/27/2025    CREATININE 2.38 (H) 04/26/2025   Acute kidney injury on stage III chronic kidney disease likely secondary to ATN in the setting of shock and acute on chronic systolic and diastolic heart failure.  The patient's creatinine today on April 28 is 1.9 had a decrease from 2.4 on April 27.  The patient is currently off of milrinone and norepinephrine as of late on April 27.  The patient is net -1.8 L over the past 24 hours.  The patient had been given 1 dose of Lasix 40 mg IV.  For right now I think over the next 24 hours allow the patient to equilibrate and just utilizing Lasix on an as-needed basis.  The patient's baseline creatinine is approximately 1.5 range.  It is noted with improvement in shock there has been improvement in patient's increased LFTs likely secondary from ischemic hepatitis plus or minus congestive hepatopathy.    Regarding acid-base status today on April 20, VBG shows pH 7.44, pCO2 43 with a calculated bicarbonate of 26, this is a normal acid-base status.

## 2025-04-28 NOTE — PROGRESS NOTES
Progress Note - Critical Care/ICU   Name: Britton Batista 59 y.o. male I MRN: [id number]  Unit/Bed#: -01 I Date of Admission: 4/25/2025   Date of Service: 4/28/2025 I Hospital Day: 3       Critical Care Interval Transfer Note:    Brief Hospital Summary:  Patient came in to the ED for a reduction of an umbilical hernia on the 24th and went home, came back due to abdominal pain and was then admitted for monitoring. By the 25th he was an RRT due to hypoglycemia and going into afib RVR with hypotension was given metoprolol and amiodarone and started on an amio drip. By the 26th he came to the ICU under the CC service for cardiogenic shock was started on levo and later milrinone and diuresed. He has been off pressors since yesterday afternoon. Today was started on his home regimen of lasix and reintroduced his home BB at a lower dose.   Barriers to discharge:   On amiodarone drip, Cardiology following, will either bridge to PO or DC drip  Takes coumadin at home, started heparin today to begin bridging  Empirical Zosyn Dced   Started on metoprolol at half patient's normal dose (12.5mg BID) (home dose 25mg BID)     Consults: IP CONSULT TO NEPHROLOGY  IP CONSULT TO CARDIOLOGY  IP CONSULT TO CASE MANAGEMENT    Recommended to review admission imaging for incidental findings and document in discharge navigator: Chart reviewed, no known incidental findings noted at this time.      Discharge Plan: Anticipate discharge in 24-48 hrs to home.  Central access plan: Will obtain peripheral access and discontinue prior to transfer       Patient seen and evaluated by Critical Care today and deemed to be appropriate for transfer to Med Surg with Telemetry. Spoke to Dr. Corrigan from [facility] to accept transfer. Critical care can be contacted via SecureChat with any questions or concerns. Please use the Critical Care AP Role in Secure Chat for any provider inquires until the patient is transferred out of the ICU or until tomorrow at  0600.

## 2025-04-28 NOTE — ASSESSMENT & PLAN NOTE
Lab Results   Component Value Date    EGFR 45 04/29/2025    EGFR 35 04/28/2025    EGFR 28 04/27/2025    CREATININE 1.63 (H) 04/29/2025    CREATININE 1.99 (H) 04/28/2025    CREATININE 2.42 (H) 04/27/2025     Suspect in setting of cardiogenic shock, improving  On CKD stage III with a baseline creatinine 1.5 his creatinine is improving  Nephrology has been following

## 2025-04-28 NOTE — CONSULTS
Consultation - Cardiology   Name: Britton Batista 59 y.o. male I MRN: [id number]  Unit/Bed#: -01 I Date of Admission: 4/25/2025   Date of Service: 4/28/2025 I Hospital Day: 3   Inpatient consult to Cardiology  Consult performed by: Veronika Ca PA-C  Consult ordered by: Guadalupe Muñoz MD        Physician Requesting Evaluation: Francine Corrigan MD   Reason for Evaluation / Principal Problem: CHFrEF, Persistent afib     Assessment & Plan  Acute on chronic combined systolic and diastolic congestive heart failure (HCC)  Wt Readings from Last 3 Encounters:   04/28/25 73.9 kg (163 lb)   04/24/25 76.8 kg (169 lb 5 oz)   04/24/25 76.8 kg (169 lb 6.4 oz)   LVEF 20%  Non ischemic cardiomyopathy (meth)  Neg cath 2020   GDMT limited by cost and renal function  Currently on Lopressor 12.5 mg PO BID  Midodrine being used for BP support   On lasix 40 IV daily   Monitor I and O, renal fx and electrolytes   Daily standing weights  See below   Acute kidney injury superimposed on stage 3a chronic kidney disease (HCC)  Lab Results   Component Value Date    EGFR 35 04/28/2025    EGFR 28 04/27/2025    EGFR 28 04/26/2025    CREATININE 1.99 (H) 04/28/2025    CREATININE 2.42 (H) 04/27/2025    CREATININE 2.38 (H) 04/26/2025   Improving   GDMT on hold   Cardiomyopathy (HCC)  Non ischemic - meth use  Increase Lopressor to 25 mg PO BID  See above  Longstanding persistent atrial fibrillation (HCC)  Supra therapeutic INR on admission on Coumadin  RRT called for afib with rvr and hypoglycemia   Subsequently developed cardiogenic shock  Placed on Levo and Milrinone but weaned off  Now HR controlled   Started amiodarone infusion and currently at 0.5 mg/min   INR subtherapeutic and bridging to coumadin   DC amiodarone infusion  Continue Dig at present dose  Increase Lopressor to 25 mg PO BID (home dose)- for now planning on rate controlling   Concern for compliance at home given meth use   Will discuss with his OP cardiologist  indications for pursuing rhythm control strategy  Transaminitis  Likely secondary to cardiogenic shock  Improving   I have discussed the above management plan in detail with the primary service.     History of Present Illness   Britton Batista is a 59 y.o. male with history of non ischemic cardiomyopathy, CHFrEF (LVEF 20%), methamphetamine use, persistent atrial fibrillation on coumadin (with concern for non compliance), CKD presents to the ED for concerns with abdominal pain. He had recently been in the ED for a reduction of umbilical hernia and sent home but returned secondary to worsening pain. After admission developed worsening hypotension and afib with rvr started on amiodarone. Subsequently developed cardiogenic shock requiring levo and milrinone but was able to be weaned off. Remains on IV lasix. Currently at the bedside he remains on amiodarone although INR is now subtherapeutic- heparin was started. Pt reports feeling tired but without acute complaints.     Review of Systems   Constitutional:  Negative for chills and fever.   Respiratory:  Negative for chest tightness and shortness of breath.    Cardiovascular:  Negative for chest pain and leg swelling.   Gastrointestinal:  Negative for abdominal distention.   Musculoskeletal:  Negative for arthralgias.   Neurological:  Negative for dizziness and weakness.     Medical History Review: I have reviewed the patient's PMH, PSH, Social History, Family History, Meds, and Allergies     Objective :  Temp:  [97.5 °F (36.4 °C)-98.2 °F (36.8 °C)] 98 °F (36.7 °C)  HR:  [] 97  BP: ()/(56-80) 112/80  Resp:  [17-30] 26  SpO2:  [90 %-99 %] 93 %  O2 Device: None (Room air)  Nasal Cannula O2 Flow Rate (L/min):  [2 L/min] 2 L/min  Orthostatic Blood Pressures      Flowsheet Row Most Recent Value   Blood Pressure 112/80 filed at 04/28/2025 0800   Patient Position - Orthostatic VS Lying filed at 04/28/2025 0000          First Weight: Weight - Scale: 81.9 kg (180 lb  8.9 oz) (04/25/25 0727)  Vitals:    04/28/25 0600 04/28/25 0800   Weight: 74.2 kg (163 lb 9.3 oz) 73.9 kg (163 lb)       Physical Exam  Vitals and nursing note reviewed.   HENT:      Head: Normocephalic.   Cardiovascular:      Rate and Rhythm: Rhythm irregular.      Heart sounds: No murmur heard.  Pulmonary:      Effort: Pulmonary effort is normal.      Breath sounds: Stridor present.   Musculoskeletal:         General: No swelling.      Cervical back: Neck supple.   Skin:     General: Skin is warm.      Capillary Refill: Capillary refill takes less than 2 seconds.   Neurological:      General: No focal deficit present.      Mental Status: He is alert and oriented to person, place, and time.   Psychiatric:         Mood and Affect: Mood normal.           Lab Results: I have reviewed the following results:CBC/BMP:   .     04/28/25  0526   WBC 8.98   HGB 12.2   HCT 37.3      SODIUM 134*   K 3.5   CL 98   CO2 30   BUN 50*   CREATININE 1.99*   GLUC 103   MG 2.0   PHOS 2.9      Results from last 7 days   Lab Units 04/28/25  0526 04/27/25  0456 04/26/25  0625   WBC Thousand/uL 8.98 12.04* 19.66*   HEMOGLOBIN g/dL 12.2 12.4 14.4   HEMATOCRIT % 37.3 37.1 46.0   PLATELETS Thousands/uL 171 195 256     Results from last 7 days   Lab Units 04/28/25  0526 04/27/25  0456 04/26/25  1658   POTASSIUM mmol/L 3.5 3.9 4.5   CHLORIDE mmol/L 98 99 100   CO2 mmol/L 30 28 22   BUN mg/dL 50* 67* 75*   CREATININE mg/dL 1.99* 2.42* 2.38*   CALCIUM mg/dL 7.9* 8.6 8.7     Results from last 7 days   Lab Units 04/28/25  0851 04/28/25  0526 04/27/25  0456 04/26/25  0625 04/23/25  0627 04/22/25  2208   INR   --  1.94* 3.13* 9.93*   < > 2.07*   PTT seconds 34  --   --   --   --  37*    < > = values in this interval not displayed.     Lab Results   Component Value Date    HGBA1C 5.9 (H) 10/25/2021     Lab Results   Component Value Date    CKTOTAL 65 09/18/2024    TROPONINI 0.04 10/25/2021       Imaging Results Review: I reviewed radiology  reports from this admission including: Echocardiogram.  Other Study Results Review: EKG was reviewed.     Echo 4/28/2025:    Limited study    Left Ventricle: Left ventricular cavity size is severely dilated. The left ventricular ejection fraction is 15-20% by visual estimation. Systolic function is severely reduced. There is severe global hypokinesis.    Right Ventricle: Systolic function is moderately reduced.    Mitral Valve: There is mild to moderate regurgitation with an eccentrically directed jet. Limited views may underestimate severity of MR    Tricuspid Valve: There is severe regurgitation. The estimated right ventricular systolic pressure is 41.00 mmHg.    IVC/SVC: The right atrial pressure is estimated at 15.0 mmHg. The inferior vena cava is dilated. Respirophasic changes were blunted (less than 50% variation).    Pericardium: There is a small pericardial effusion. There is no echocardiographic evidence of tamponade. The evidence against tamponade includes: no right ventricular diastolic collapse and no right atrial inversion.

## 2025-04-28 NOTE — ASSESSMENT & PLAN NOTE
Continue with digoxin and amiodarone, metoprolol currently on hold.  Management per critical care and cardiology.

## 2025-04-28 NOTE — ASSESSMENT & PLAN NOTE
Supra therapeutic INR on admission on Coumadin  RRT called for afib with rvr and hypoglycemia   Subsequently developed cardiogenic shock  Placed on Levo and Milrinone but weaned off  Now HR controlled   Started amiodarone infusion and currently at 0.5 mg/min   INR subtherapeutic and bridging to coumadin   DC amiodarone infusion  Continue Dig at present dose  Increase Lopressor to 25 mg PO BID (home dose)- for now planning on rate controlling   Concern for compliance at home given meth use   Will discuss with his OP cardiologist indications for pursuing rhythm control strategy

## 2025-04-28 NOTE — ASSESSMENT & PLAN NOTE
Wt Readings from Last 3 Encounters:   04/28/25 74.2 kg (163 lb 9.3 oz)   04/24/25 76.8 kg (169 lb 5 oz)   04/24/25 76.8 kg (169 lb 6.4 oz)   The patient was given a one-time dose of IV Lasix in April 27 as noted above continue to use Lasix on an as-needed basis for the next 24 hours.

## 2025-04-28 NOTE — ASSESSMENT & PLAN NOTE
This has improved and patient is currently off of pressors and milrinone just maintained on amiodarone per critical care and cardiology.

## 2025-04-28 NOTE — ASSESSMENT & PLAN NOTE
Lab Results   Component Value Date    EGFR 35 04/28/2025    EGFR 28 04/27/2025    EGFR 28 04/26/2025    CREATININE 1.99 (H) 04/28/2025    CREATININE 2.42 (H) 04/27/2025    CREATININE 2.38 (H) 04/26/2025   Improving   GDMT on hold

## 2025-04-28 NOTE — PROGRESS NOTES
NEPHROLOGY HOSPITAL PROGRESS NOTE   Britton Batista 59 y.o. male MRN: [id number]  Unit/Bed#: -01 Encounter: [id number]  Reason for Consult: Acute kidney injury on chronic kidney disease.      Assessment & Plan  Acute kidney injury superimposed on stage 3a chronic kidney disease (HCC)  Lab Results   Component Value Date    EGFR 35 04/28/2025    EGFR 28 04/27/2025    EGFR 28 04/26/2025    CREATININE 1.99 (H) 04/28/2025    CREATININE 2.42 (H) 04/27/2025    CREATININE 2.38 (H) 04/26/2025   Acute kidney injury on stage III chronic kidney disease likely secondary to ATN in the setting of shock and acute on chronic systolic and diastolic heart failure.  The patient's creatinine today on April 28 is 1.9 had a decrease from 2.4 on April 27.  The patient is currently off of milrinone and norepinephrine as of late on April 27.  The patient is net -1.8 L over the past 24 hours.  The patient had been given 1 dose of Lasix 40 mg IV.  For right now I think over the next 24 hours allow the patient to equilibrate and just utilizing Lasix on an as-needed basis.  The patient's baseline creatinine is approximately 1.5 range.  It is noted with improvement in shock there has been improvement in patient's increased LFTs likely secondary from ischemic hepatitis plus or minus congestive hepatopathy.    Regarding acid-base status today on April 20, VBG shows pH 7.44, pCO2 43 with a calculated bicarbonate of 26, this is a normal acid-base status.  Shock (HCC)  This has improved and patient is currently off of pressors and milrinone just maintained on amiodarone per critical care and cardiology.  Acute on chronic combined systolic and diastolic congestive heart failure (HCC)  Wt Readings from Last 3 Encounters:   04/28/25 74.2 kg (163 lb 9.3 oz)   04/24/25 76.8 kg (169 lb 5 oz)   04/24/25 76.8 kg (169 lb 6.4 oz)   The patient was given a one-time dose of IV Lasix in April 27 as noted above continue to use Lasix on an as-needed basis for  the next 24 hours.  Longstanding persistent atrial fibrillation (HCC)  Continue with digoxin and amiodarone, metoprolol currently on hold.  Management per critical care and cardiology.  Cardiomyopathy (HCC)  The patient is noted to have an LVEF of 25% as per echocardiogram March 2020 5 repeat echocardiogram is pending.  Patient is noted to have RVSP 30 to 35 mmHg from that echocardiogram with biatrial dilatation.  Acute respiratory failure (HCC)  This has improved and the patient is currently on room air when I spoke with him this morning.  Transaminitis  Patient's LFTs are improving in the setting of hypotension and shock/ischemic hepatitis.  Coagulopathy (HCC)  Management per primary team.  Hyperammonemia (HCC)  Management per primary team, the patient was noted to be able to answer questions for me today.    Administrative Statements   VIRTUAL CARE DOCUMENTATION:     1. This service was provided via Telemedicine using [employer] Kit     2. Parties in the room with patient during teleconsult RN    3. Confidentiality My office door was closed     4. Participants No one else was in the room    5. Patient acknowledged consent and understanding of privacy and security of the  Telemedicine consult. I informed the patient that I have reviewed their record in Epic and presented the opportunity for them to ask any questions regarding the visit today.  The patient agreed to participate.    6. I have spent a total time of 30 minutes in caring for this patient on the day of the visit/encounter including documentation of medical decision making, communication with the patient's nurse, documentation of medical necessity of care, modification of assessment and plan, review of patient record, communication with the primary team as well as communication with the patient's nurse at bedside not including the time spent for establishing the audio/video connection.      I communicated via [employer] secure chat with ICU team this morning  with regards to patient's improved kidney status and utilizing Lasix on an as-needed basis.  Appreciate ICU team time, help and input.      SUBJECTIVE / 24H INTERVAL HISTORY:  Mr. Batista is seen in follow-up earlier this morning for acute kidney injury on chronic kidney disease.  The patient was seen with patient's nurse at bedside.  No acute events noted overnight.  With regards to continuous infusions, the patient has been on amiodarone infusion however has been off of milrinone since the afternoon of April 27.  Norepinephrine had been stopped on April 27 as well.  He denies any acute complaints noted to be mildly anxious.    Regarding hemodynamics, over the past 12 hours systolic blood pressure has been anywhere from  systolic and pulse oximetry is 98% on room air.  The patient is nonoliguric and -1.8 L over the past 24 hours.  Although via bed scale, it is noted that the patient's weight has decreased from 180 pounds or 81.9 kg on April 25 to 163 pounds and 74.2 kg in April 20.    OBJECTIVE:  Current Weight: Weight - Scale: 74.2 kg (163 lb 9.3 oz)  Vitals:    04/28/25 0000 04/28/25 0300 04/28/25 0500 04/28/25 0600   BP: 94/68 97/75 101/71 112/80   BP Location: Right arm      Pulse: 97 97 84 87   Resp: 20 21 17 19   Temp: 98.2 °F (36.8 °C) 97.5 °F (36.4 °C)     TempSrc: Temporal Temporal     SpO2: 98% 96% 96% 97%   Weight:    74.2 kg (163 lb 9.3 oz)   Height:           Intake/Output Summary (Last 24 hours) at 4/28/2025 0706  Last data filed at 4/28/2025 0553  Gross per 24 hour   Intake 1440.98 ml   Output 3250 ml   Net -1809.02 ml     General: Patient is resting comfortably no acute distress.  HEENT: Normocephalic atraumatic  Neck: Appears to be supple  Pulmonary: There is no accessory muscle use/no increased work of breathing noted last respirations are 17/min and pulse oximetry is 96% on room air.  Cardiac: Pulse rate has been in the 80s to 90s.  Extremities: No significant edema noted.  Neurologic: No  focal deficits noted patient was able to answer questions for me appropriately this morning.  Medications:    Current Facility-Administered Medications:     albuterol (PROVENTIL HFA,VENTOLIN HFA) inhaler 2 puff, 2 puff, Inhalation, Q6H PRN, Dewayne Patel Jr., PA-C, 2 puff at 04/26/25 0100    amiodarone (CORDARONE) 900 mg in dextrose 5 % 500 mL infusion, 0.5 mg/min, Intravenous, Continuous, Dewayne Patel Jr., PA-C, Last Rate: 16.7 mL/hr at 04/27/25 2118, 0.5 mg/min at 04/27/25 2118    chlorhexidine (PERIDEX) 0.12 % oral rinse 15 mL, 15 mL, Mouth/Throat, Q12H JESUS, LUIS ANGEL Thomas, 15 mL at 04/27/25 2117    digoxin (LANOXIN) tablet 125 mcg, 125 mcg, Oral, Once per day on Monday Wednesday Friday, Dewayne Patel Jr., PA-C, 125 mcg at 04/25/25 1922    hydrOXYzine HCL (ATARAX) tablet 25 mg, 25 mg, Oral, Q6H PRN, Dewayne Patel Jr., PA-C, 25 mg at 04/28/25 0606    ipratropium (ATROVENT) 0.02 % inhalation solution 0.5 mg, 0.5 mg, Nebulization, TID, Anna Rushing MD, 0.5 mg at 04/27/25 2033    levalbuterol (XOPENEX) inhalation solution 1.25 mg, 1.25 mg, Nebulization, TID, Anna Rushing MD, 1.25 mg at 04/27/25 2033    magnesium Oxide (MAG-OX) tablet 400 mg, 400 mg, Oral, Daily, Dewayne Patel Jr., PA-C, 400 mg at 04/27/25 0904    melatonin tablet 3 mg, 3 mg, Oral, HS, Dewayne Patel Jr., PA-C, 3 mg at 04/27/25 2118    metoprolol (LOPRESSOR) injection 2.5 mg, 2.5 mg, Intravenous, Q6H PRN, Guadalupe Muñoz MD, 2.5 mg at 04/27/25 0210    [Held by provider] metoprolol tartrate (LOPRESSOR) tablet 25 mg, 25 mg, Oral, Q12H [facility], Dewayne Patel Jr., PARudyC, 25 mg at 04/26/25 0856    midodrine (PROAMATINE) tablet 7.5 mg, 7.5 mg, Oral, TID AC, Guadalupe Muñoz MD, 7.5 mg at 04/28/25 0606    milrinone (PRIMACOR) 20 mg in 100 mL infusion (premix), 0.13 mcg/kg/min, Intravenous, Continuous, LUIS ANGEL Thomas, Held at 04/27/25 1355    nicotine (NICODERM CQ) 14 mg/24hr TD 24 hr patch 1 patch, 1 patch, Transdermal, Daily,  Anna Rushing MD, 1 patch at 04/27/25 0904    pantoprazole (PROTONIX) EC tablet 40 mg, 40 mg, Oral, BID, Dewayne Patel Jr., PA-C, 40 mg at 04/28/25 0605    [COMPLETED] piperacillin-tazobactam (ZOSYN) 4.5 g in sodium chloride 0.9 % 100 mL IV LOADING DOSE, 4.5 g, Intravenous, Once, Stopped at 04/26/25 1600 **FOLLOWED BY** piperacillin-tazobactam (ZOSYN) 4.5 g in sodium chloride 0.9 % 100 mL IVPB (EXTENDED INFUSION), 4.5 g, Intravenous, Q8H, Guadalupe Muñoz MD, Last Rate: 25 mL/hr at 04/28/25 0604, 4.5 g at 04/28/25 0604    potassium chloride 20 mEq IVPB (premix), 20 mEq, Intravenous, Once **FOLLOWED BY** potassium chloride 20 mEq IVPB (premix), 20 mEq, Intravenous, Once, LUIS ANGEL Esparza    sodium chloride (OCEAN) 0.65 % nasal spray 1 spray, 1 spray, Each Nare, Q1H PRN, Dewayne Patel Jr., PA-C    trimethobenzamide (TIGAN) IM injection 200 mg, 200 mg, Intramuscular, Q8H PRN, Dewayne Patel Jr., PA-C, 200 mg at 04/26/25 0910    Laboratory Results:  Results from last 7 days   Lab Units 04/28/25  0526 04/27/25  0456 04/26/25  1658 04/26/25  1025 04/26/25  0625 04/25/25  1703 04/25/25  0736 04/24/25  2140 04/24/25  1008 04/23/25  0627 04/22/25  2327 04/22/25  2327 04/22/25  2208   WBC Thousand/uL 8.98 12.04*  --   --  19.66*  --  14.73* 19.66*  --  7.63  --   --  8.11   HEMOGLOBIN g/dL 12.2 12.4  --   --  14.4  --  14.6 14.0  --  12.6  --   --  14.4   HEMATOCRIT % 37.3 37.1  --   --  46.0  --  47.7 44.8  --  39.6  --   --  46.4   PLATELETS Thousands/uL 171 195  --   --  256  --  234 248  --  208  --   --  213   POTASSIUM mmol/L 3.5 3.9 4.5 5.2 5.3 5.1 5.6* 4.6   < > 4.0   < > 4.3  --    CHLORIDE mmol/L 98 99 100 99 100  --  101 98   < > 102   < > 102  --    CO2 mmol/L 30 28 22 19* 21  --  21 28   < > 25   < > 25  --    BUN mg/dL 50* 67* 75* 75* 73*  --  55* 48*   < > 40*   < > 37*  --    CREATININE mg/dL 1.99* 2.42* 2.38* 2.37* 2.20*  --  1.87* 1.83*   < > 1.59*   < > 1.49*  --    CALCIUM mg/dL 7.9* 8.6 8.7 9.3  "9.0  --  9.2 9.7   < > 9.0   < > 9.0  --    MAGNESIUM mg/dL 2.0  --   --   --  2.3  --   --   --   --   --   --  1.8*  --    PHOSPHORUS mg/dL 2.9  --   --   --   --   --   --   --   --   --   --  4.4  --     < > = values in this interval not displayed.       Portions of the record may have been created with voice recognition software. Occasional wrong word or \"sound a like\" substitutions may have occurred due to the inherent limitations of voice recognition software. Read the chart carefully and recognize, using context, where substitutions have occurred.If you have any questions, please contact the dictating provider.   "

## 2025-04-28 NOTE — ASSESSMENT & PLAN NOTE
Lab Results   Component Value Date    EGFR 35 04/28/2025    EGFR 28 04/27/2025    EGFR 28 04/26/2025    CREATININE 1.99 (H) 04/28/2025    CREATININE 2.42 (H) 04/27/2025    CREATININE 2.38 (H) 04/26/2025     Suspect in setting of cardiogenic shock, improving  Plan:    Continue to monitor closely  Lasix 80mg IV x 1 dose on 4/26, lasix 40 on 4/27  Zazueat placed for strict I&O  Nephrology following

## 2025-04-29 PROBLEM — E87.8 ELECTROLYTE ABNORMALITY: Status: ACTIVE | Noted: 2025-04-29

## 2025-04-29 LAB
ALBUMIN SERPL BCG-MCNC: 3.1 G/DL (ref 3.5–5)
ALP SERPL-CCNC: 88 U/L (ref 34–104)
ALT SERPL W P-5'-P-CCNC: 946 U/L (ref 7–52)
ANION GAP SERPL CALCULATED.3IONS-SCNC: 6 MMOL/L (ref 4–13)
APTT PPP: 55 SECONDS (ref 23–34)
AST SERPL W P-5'-P-CCNC: 349 U/L (ref 13–39)
BILIRUB DIRECT SERPL-MCNC: 0.57 MG/DL (ref 0–0.2)
BILIRUB SERPL-MCNC: 1.63 MG/DL (ref 0.2–1)
BUN SERPL-MCNC: 32 MG/DL (ref 5–25)
CA-I BLD-SCNC: 0.98 MMOL/L (ref 1.12–1.32)
CALCIUM SERPL-MCNC: 8 MG/DL (ref 8.4–10.2)
CHLORIDE SERPL-SCNC: 100 MMOL/L (ref 96–108)
CO2 SERPL-SCNC: 28 MMOL/L (ref 21–32)
CREAT SERPL-MCNC: 1.63 MG/DL (ref 0.6–1.3)
ERYTHROCYTE [DISTWIDTH] IN BLOOD BY AUTOMATED COUNT: 17.4 % (ref 11.6–15.1)
GFR SERPL CREATININE-BSD FRML MDRD: 45 ML/MIN/1.73SQ M
GLUCOSE SERPL-MCNC: 164 MG/DL (ref 65–140)
HCT VFR BLD AUTO: 40.2 % (ref 36.5–49.3)
HGB BLD-MCNC: 12.8 G/DL (ref 12–17)
INR PPP: 2.32 (ref 0.85–1.19)
MAGNESIUM SERPL-MCNC: 1.8 MG/DL (ref 1.9–2.7)
MCH RBC QN AUTO: 28.6 PG (ref 26.8–34.3)
MCHC RBC AUTO-ENTMCNC: 31.8 G/DL (ref 31.4–37.4)
MCV RBC AUTO: 90 FL (ref 82–98)
PHOSPHATE SERPL-MCNC: 1.9 MG/DL (ref 2.7–4.5)
PLATELET # BLD AUTO: 173 THOUSANDS/UL (ref 149–390)
PMV BLD AUTO: 9.8 FL (ref 8.9–12.7)
POTASSIUM SERPL-SCNC: 3.5 MMOL/L (ref 3.5–5.3)
PROCALCITONIN SERPL-MCNC: 1.12 NG/ML
PROT SERPL-MCNC: 6.1 G/DL (ref 6.4–8.4)
PROTHROMBIN TIME: 25.6 SECONDS (ref 12.3–15)
RBC # BLD AUTO: 4.48 MILLION/UL (ref 3.88–5.62)
SODIUM SERPL-SCNC: 134 MMOL/L (ref 135–147)
WBC # BLD AUTO: 8.23 THOUSAND/UL (ref 4.31–10.16)

## 2025-04-29 PROCEDURE — 82330 ASSAY OF CALCIUM: CPT

## 2025-04-29 PROCEDURE — 80076 HEPATIC FUNCTION PANEL: CPT

## 2025-04-29 PROCEDURE — 85730 THROMBOPLASTIN TIME PARTIAL: CPT

## 2025-04-29 PROCEDURE — 94760 N-INVAS EAR/PLS OXIMETRY 1: CPT

## 2025-04-29 PROCEDURE — 94640 AIRWAY INHALATION TREATMENT: CPT

## 2025-04-29 PROCEDURE — 99232 SBSQ HOSP IP/OBS MODERATE 35: CPT | Performed by: FAMILY MEDICINE

## 2025-04-29 PROCEDURE — 85027 COMPLETE CBC AUTOMATED: CPT

## 2025-04-29 PROCEDURE — 83735 ASSAY OF MAGNESIUM: CPT

## 2025-04-29 PROCEDURE — 99232 SBSQ HOSP IP/OBS MODERATE 35: CPT | Performed by: INTERNAL MEDICINE

## 2025-04-29 PROCEDURE — 84100 ASSAY OF PHOSPHORUS: CPT

## 2025-04-29 PROCEDURE — 80048 BASIC METABOLIC PNL TOTAL CA: CPT | Performed by: INTERNAL MEDICINE

## 2025-04-29 PROCEDURE — 84145 PROCALCITONIN (PCT): CPT

## 2025-04-29 PROCEDURE — 85610 PROTHROMBIN TIME: CPT

## 2025-04-29 RX ORDER — MAGNESIUM SULFATE HEPTAHYDRATE 40 MG/ML
2 INJECTION, SOLUTION INTRAVENOUS ONCE
Status: COMPLETED | OUTPATIENT
Start: 2025-04-29 | End: 2025-04-29

## 2025-04-29 RX ORDER — POTASSIUM CHLORIDE 1500 MG/1
20 TABLET, EXTENDED RELEASE ORAL ONCE
Status: COMPLETED | OUTPATIENT
Start: 2025-04-29 | End: 2025-04-29

## 2025-04-29 RX ORDER — FUROSEMIDE 40 MG/1
40 TABLET ORAL DAILY
Status: DISCONTINUED | OUTPATIENT
Start: 2025-04-30 | End: 2025-04-30 | Stop reason: HOSPADM

## 2025-04-29 RX ORDER — ZOLPIDEM TARTRATE 5 MG/1
5 TABLET ORAL
Status: DISCONTINUED | OUTPATIENT
Start: 2025-04-29 | End: 2025-04-30 | Stop reason: HOSPADM

## 2025-04-29 RX ADMIN — HEPARIN SODIUM 18 UNITS/KG/HR: 10000 INJECTION, SOLUTION INTRAVENOUS at 07:46

## 2025-04-29 RX ADMIN — LEVALBUTEROL HYDROCHLORIDE 1.25 MG: 1.25 SOLUTION RESPIRATORY (INHALATION) at 13:54

## 2025-04-29 RX ADMIN — IPRATROPIUM BROMIDE 0.5 MG: 0.5 SOLUTION RESPIRATORY (INHALATION) at 07:24

## 2025-04-29 RX ADMIN — HEPARIN SODIUM 2000 UNITS: 1000 INJECTION, SOLUTION INTRAVENOUS; SUBCUTANEOUS at 07:47

## 2025-04-29 RX ADMIN — PANTOPRAZOLE SODIUM 40 MG: 40 TABLET, DELAYED RELEASE ORAL at 17:41

## 2025-04-29 RX ADMIN — CHLORHEXIDINE GLUCONATE 15 ML: 1.2 SOLUTION ORAL at 08:13

## 2025-04-29 RX ADMIN — MAGNESIUM SULFATE HEPTAHYDRATE 2 G: 2 INJECTION, SOLUTION INTRAVENOUS at 12:04

## 2025-04-29 RX ADMIN — WARFARIN SODIUM 5 MG: 5 TABLET ORAL at 17:41

## 2025-04-29 RX ADMIN — METOPROLOL TARTRATE 25 MG: 25 TABLET, FILM COATED ORAL at 08:13

## 2025-04-29 RX ADMIN — PANTOPRAZOLE SODIUM 40 MG: 40 TABLET, DELAYED RELEASE ORAL at 06:04

## 2025-04-29 RX ADMIN — METOPROLOL TARTRATE 25 MG: 25 TABLET, FILM COATED ORAL at 20:35

## 2025-04-29 RX ADMIN — LEVALBUTEROL HYDROCHLORIDE 1.25 MG: 1.25 SOLUTION RESPIRATORY (INHALATION) at 07:24

## 2025-04-29 RX ADMIN — HEPARIN SODIUM 2000 UNITS: 1000 INJECTION, SOLUTION INTRAVENOUS; SUBCUTANEOUS at 00:15

## 2025-04-29 RX ADMIN — CHLORHEXIDINE GLUCONATE 15 ML: 1.2 SOLUTION ORAL at 20:35

## 2025-04-29 RX ADMIN — MIDODRINE HYDROCHLORIDE 7.5 MG: 5 TABLET ORAL at 16:17

## 2025-04-29 RX ADMIN — ZOLPIDEM TARTRATE 5 MG: 5 TABLET, FILM COATED ORAL at 20:35

## 2025-04-29 RX ADMIN — GABAPENTIN 100 MG: 100 CAPSULE ORAL at 20:35

## 2025-04-29 RX ADMIN — MIDODRINE HYDROCHLORIDE 7.5 MG: 5 TABLET ORAL at 12:03

## 2025-04-29 RX ADMIN — LEVALBUTEROL HYDROCHLORIDE 1.25 MG: 1.25 SOLUTION RESPIRATORY (INHALATION) at 20:10

## 2025-04-29 RX ADMIN — GABAPENTIN 100 MG: 100 CAPSULE ORAL at 16:17

## 2025-04-29 RX ADMIN — GABAPENTIN 100 MG: 100 CAPSULE ORAL at 08:13

## 2025-04-29 RX ADMIN — SODIUM PHOSPHATE, MONOBASIC, MONOHYDRATE AND SODIUM PHOSPHATE, DIBASIC, ANHYDROUS 12 MMOL: 142; 276 INJECTION, SOLUTION INTRAVENOUS at 14:27

## 2025-04-29 RX ADMIN — IPRATROPIUM BROMIDE 0.5 MG: 0.5 SOLUTION RESPIRATORY (INHALATION) at 13:54

## 2025-04-29 RX ADMIN — POTASSIUM CHLORIDE 20 MEQ: 1500 TABLET, EXTENDED RELEASE ORAL at 12:03

## 2025-04-29 RX ADMIN — NICOTINE 1 PATCH: 14 PATCH, EXTENDED RELEASE TRANSDERMAL at 08:14

## 2025-04-29 RX ADMIN — MAGNESIUM OXIDE TAB 400 MG (241.3 MG ELEMENTAL MG) 400 MG: 400 (241.3 MG) TAB at 08:13

## 2025-04-29 RX ADMIN — FUROSEMIDE 40 MG: 10 INJECTION, SOLUTION INTRAMUSCULAR; INTRAVENOUS at 08:13

## 2025-04-29 RX ADMIN — MIDODRINE HYDROCHLORIDE 7.5 MG: 5 TABLET ORAL at 06:04

## 2025-04-29 RX ADMIN — IPRATROPIUM BROMIDE 0.5 MG: 0.5 SOLUTION RESPIRATORY (INHALATION) at 20:10

## 2025-04-29 NOTE — NURSING NOTE
Pt refusing bed or chair alarms. Refusal paperwork signed. Instructed to use call bell for assistance in getting up, verb understanding.

## 2025-04-29 NOTE — ASSESSMENT & PLAN NOTE
Wt Readings from Last 3 Encounters:   04/29/25 74.5 kg (164 lb 3.2 oz)   04/24/25 76.8 kg (169 lb 5 oz)   04/24/25 76.8 kg (169 lb 6.4 oz)   The patient is again noted to have an LVEF of 25% in March however repeat labs here show LVEF of 15 to 20% with global hypokinesis.  Appreciate cardiology input and consult.

## 2025-04-29 NOTE — CASE MANAGEMENT
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     Scleroderma ([facility]) 2025     Stage 3a chronic kidney disease (CKD) ([facility]) 2025     Cardiogenic shock ([facility]) 2025     Atrial fibrillation with RVR ([facility]) 2025     Syncope 2024     Chest pain 2024     Acute on chronic systolic congestive heart failure ([facility]) 2024     Primary hypertension 2024     Hypomagnesemia 2023     Methamphetamine abuse ([facility]) 2023     Tobacco abuse 2023     D-dimer, elevated 2023     Cardiomyopathy ([facility]) 10/09/2023     Acute kidney injury superimposed on stage 3a chronic kidney disease ([facility]) 10/08/2023     Acute on chronic combined systolic and diastolic congestive heart failure ([facility]) 10/07/2023     Hypertensive emergency 10/07/2023     Anxiety      Depression      Other emphysema ([facility])      GERD (gastroesophageal reflux disease)      Gout      Hypertensive urgency      Neuropathy      Raynaud's disease      Unilateral inguinal hernia, without obstruction or gangrene, not specified as recurrent      PSS (progressive systemic sclerosis) ([facility]) 10/25/2021     Near syncope 10/25/2021     Osteomyelitis ([facility]) 10/25/2021     Elevated troponin 10/25/2021     Acute encephalopathy 10/25/2021       LOS (days): 4  Geometric Mean LOS (GMLOS) (days): 3.9  Days to GMLOS:-0.2     OBJECTIVE:  Risk of Unplanned  Readmission Score: 61.13         Current admission status: Inpatient   Preferred Pharmacy:   [facility] - [place], [place] - [address]  [address]  [place] [address]  Phone: [phone] Fax: [phone]    [facility] - [place] - [address]  [address]  [address] [facility] [place] [address]  Phone: [phone] Fax: [phone]    [id number] [place], [place] - [address] [place] [address]  Phone: [phone] Fax: [phone]    Primary Care Provider: Juan Galindo DO    Primary Insurance: [facility] REP  Secondary Insurance:     DISCHARGE DETAILS:           CM notifying OP CM at [facility] of pt anticipated discharge tomorrow, 4/30/25

## 2025-04-29 NOTE — ASSESSMENT & PLAN NOTE
Lab Results   Component Value Date    EGFR 35 04/28/2025    EGFR 28 04/27/2025    EGFR 28 04/26/2025    CREATININE 1.99 (H) 04/28/2025    CREATININE 2.42 (H) 04/27/2025    CREATININE 2.38 (H) 04/26/2025   Acute kidney injury on stage III chronic kidney disease likely secondary to ATN in the setting of shock and acute on chronic systolic and diastolic heart failure.  The patient's creatinine today on April 28 is 1.9 had a decrease from 2.4 on April 27.  The patient's baseline creatinine is approximately 1.3-1.5 in reviewing prior labs.    Regarding plan of care as of today on April 29: Awaiting a.m. labs.  The patient is noted to have resolving leukocytosis of 8.2 and hemoglobin level stable at 12.8.  Creatinine level had improved to 1.9 on April 28.   will defer diuretic dosing to cardiology; patient is currently on IV Lasix.  I suspect we hopefully may be able to transition to oral Lasix over the next 24 to 48 hours.  UA from April 25 showed 2+ protein negative blood and negative urine microscopic findings.  Check protein creatinine ratio although patient not in steady state.  Immunofixation performed in February section no evidence of any monoclonal protein.

## 2025-04-29 NOTE — ASSESSMENT & PLAN NOTE
The patient is noted to have an LVEF of 15 to 20% on most recent cardiac echocardiogram with global hypokinesis and RV EF moderately reduced as well with RVSP 41 mmHg and severe TR.  Cardiology is following and appreciate input.

## 2025-04-29 NOTE — ASSESSMENT & PLAN NOTE
Patient's LFTs are improving in the setting of hypotension and shock/ischemic hepatitis.    Administrative Statements   VIRTUAL CARE DOCUMENTATION:     1. This service was provided via Telemedicine using [employer]     2. Parties in the room with patient during teleconsult Patient only    3. Confidentiality My office door was closed     4. Participants No one else was in the room    5. Patient acknowledged consent and understanding of privacy and security of the  Telemedicine consult. I informed the patient that I have reviewed their record in Epic and presented the opportunity for them to ask any questions regarding the visit today.  The patient agreed to participate.    6. I have spent a total time of approximately 25 minutes in caring for this patient on the day of the visit/encounter including documentation medical decision making, modification of assessment and plan, review of patient record, documentation medical decision making, patient visit, not including the time spent for establishing the audio/video connection.

## 2025-04-29 NOTE — PROGRESS NOTES
Progress Note - Cardiology   Name: Britton Batista 59 y.o. male I MRN: [id number]  Unit/Bed#: -01 I Date of Admission: 4/25/2025   Date of Service: 4/29/2025 I Hospital Day: 4    Assessment & Plan  Acute on chronic combined systolic and diastolic congestive heart failure (HCC)  Wt Readings from Last 3 Encounters:   04/29/25 74.5 kg (164 lb 3.2 oz)   04/24/25 76.8 kg (169 lb 5 oz)   04/24/25 76.8 kg (169 lb 6.4 oz)   LVEF 20%  Non ischemic cardiomyopathy (meth)  Neg cath 2020   GDMT limited by cost and renal function  Tolerating Lopressor 25 mg PO BID   On lasix 40 IV daily   Monitor I and O, renal fx and electrolytes   Daily standing weights  Acute kidney injury superimposed on stage 3a chronic kidney disease (HCC)  Lab Results   Component Value Date    EGFR 45 04/29/2025    EGFR 35 04/28/2025    EGFR 28 04/27/2025    CREATININE 1.63 (H) 04/29/2025    CREATININE 1.99 (H) 04/28/2025    CREATININE 2.42 (H) 04/27/2025   Improving   GDMT on hold   Cardiomyopathy (HCC)  Non ischemic - meth use  Continue  Lopressor to 25 mg PO BID  See above  Longstanding persistent atrial fibrillation (HCC)  Supra therapeutic INR on admission on Coumadin  RRT called for afib with rvr and hypoglycemia   Subsequently developed cardiogenic shock  Placed on Levo and Milrinone but weaned off  Now HR controlled   Started amiodarone infusion and currently at 0.5 mg/min   INR subtherapeutic and bridging to coumadin   Amiodarone discontinued  Continue Dig at present dose  Continue Lopressor at present dose  Rate control only   Concern for compliance at home given meth use   Will discuss with his OP cardiologist indications for pursuing rhythm control strategy  Transaminitis  Likely secondary to cardiogenic shock  Improving     Subjective     No chest pain. Reports breathing is improved. Feels that his legs are in pain this AM.     Objective :  Temp:  [97.2 °F (36.2 °C)-98.2 °F (36.8 °C)] 97.5 °F (36.4 °C)  HR:  [] 84  BP:  ()/(57-78) 111/78  Resp:  [18-20] 18  SpO2:  [89 %-98 %] 96 %  O2 Device: None (Room air)  Nasal Cannula O2 Flow Rate (L/min):  [2 L/min] 2 L/min  Orthostatic Blood Pressures      Flowsheet Row Most Recent Value   Blood Pressure 111/78 filed at 04/29/2025 0725   Patient Position - Orthostatic VS Lying filed at 04/29/2025 0252          First Weight: Weight - Scale: 81.9 kg (180 lb 8.9 oz) (04/25/25 0727)  Vitals:    04/29/25 0442 04/29/25 0600   Weight: 74.5 kg (164 lb 3.2 oz) 74.5 kg (164 lb 3.2 oz)     Physical Exam  Vitals and nursing note reviewed.   Constitutional:       Appearance: Normal appearance.   HENT:      Head: Normocephalic and atraumatic.   Cardiovascular:      Rate and Rhythm: Rhythm irregular.      Heart sounds: Murmur heard.   Pulmonary:      Effort: Pulmonary effort is normal.      Breath sounds: No rales.   Abdominal:      Palpations: Abdomen is soft.   Musculoskeletal:         General: No swelling.   Skin:     General: Skin is warm.      Capillary Refill: Capillary refill takes less than 2 seconds.   Neurological:      General: No focal deficit present.      Mental Status: He is alert and oriented to person, place, and time.   Psychiatric:         Mood and Affect: Mood normal.           Lab Results: I have reviewed the following results:CBC/BMP:   .     04/29/25  0618 04/29/25  0911   WBC 8.23  --    HGB 12.8  --    HCT 40.2  --      --    SODIUM  --  134*   K  --  3.5   CL  --  100   CO2  --  28   BUN  --  32*   CREATININE  --  1.63*   GLUC  --  164*   CAIONIZED 0.98*  --    MG  --  1.8*   PHOS  --  1.9*      Results from last 7 days   Lab Units 04/29/25  0618 04/28/25  0526 04/27/25  0456   WBC Thousand/uL 8.23 8.98 12.04*   HEMOGLOBIN g/dL 12.8 12.2 12.4   HEMATOCRIT % 40.2 37.3 37.1   PLATELETS Thousands/uL 173 171 195     Results from last 7 days   Lab Units 04/29/25  0911 04/28/25  0526 04/27/25  0456   POTASSIUM mmol/L 3.5 3.5 3.9   CHLORIDE mmol/L 100 98 99   CO2 mmol/L 28  30 28   BUN mg/dL 32* 50* 67*   CREATININE mg/dL 1.63* 1.99* 2.42*   CALCIUM mg/dL 8.0* 7.9* 8.6     Results from last 7 days   Lab Units 04/29/25  0618 04/28/25  2315 04/28/25  1518 04/28/25  0851 04/28/25  0526 04/27/25  0456   INR  2.32*  --   --   --  1.94* 3.13*   PTT seconds 55* 55* 47*   < >  --   --     < > = values in this interval not displayed.     Lab Results   Component Value Date    HGBA1C 5.9 (H) 10/25/2021     Lab Results   Component Value Date    CKTOTAL 65 09/18/2024    TROPONINI 0.04 10/25/2021       Imaging Results Review: I reviewed radiology reports from this admission including: chest xray.  Other Study Results Review: EKG was reviewed.

## 2025-04-29 NOTE — PLAN OF CARE
Problem: PAIN - ADULT  Goal: Verbalizes/displays adequate comfort level or baseline comfort level  Description: Interventions:- Encourage patient to monitor pain and request assistance- Assess pain using appropriate pain scale- Administer analgesics based on type and severity of pain and evaluate response- Implement non-pharmacological measures as appropriate and evaluate response- Consider cultural and social influences on pain and pain management- Notify physician/advanced practitioner if interventions unsuccessful or patient reports new pain  Outcome: Progressing     Problem: INFECTION - ADULT  Goal: Absence or prevention of progression during hospitalization  Description: INTERVENTIONS:- Assess and monitor for signs and symptoms of infection- Monitor lab/diagnostic results- Monitor all insertion sites, i.e. indwelling lines, tubes, and drains- Monitor endotracheal if appropriate and nasal secretions for changes in amount and color- [place] appropriate cooling/warming therapies per order- Administer medications as ordered- Instruct and encourage patient and family to use good hand hygiene technique- Identify and instruct in appropriate isolation precautions for identified infection/condition  Outcome: Progressing  Goal: Absence of fever/infection during neutropenic period  Description: INTERVENTIONS:- Monitor WBC  Outcome: Progressing     Problem: SAFETY ADULT  Goal: Patient will remain free of falls  Description: INTERVENTIONS:- Educate patient/family on patient safety including physical limitations- Instruct patient to call for assistance with activity - Consult OT/PT to assist with strengthening/mobility - Keep Call bell within reach- Keep bed low and locked with side rails adjusted as appropriate- Keep care items and personal belongings within reach- Initiate and maintain comfort rounds- Make Fall Risk Sign visible to staff- Offer Toileting every 2 Hours, in advance of need- Initiate/Maintain bed alarm-  Obtain necessary fall risk management equipment: non skid socks - Apply yellow socks and bracelet for high fall risk patients- Consider moving patient to room near nurses station  Outcome: Progressing  Goal: Maintain or return to baseline ADL function  Description: INTERVENTIONS:-  Assess patient's ability to carry out ADLs; assess patient's baseline for ADL function and identify physical deficits which impact ability to perform ADLs (bathing, care of mouth/teeth, toileting, grooming, dressing, etc.)- Assess/evaluate cause of self-care deficits - Assess range of motion- Assess patient's mobility; develop plan if impaired- Assess patient's need for assistive devices and provide as appropriate- Encourage maximum independence but intervene and supervise when necessary- Involve family in performance of ADLs- Assess for home care needs following discharge - Consider OT consult to assist with ADL evaluation and planning for discharge- Provide patient education as appropriate  Outcome: Progressing  Goal: Maintains/Returns to pre admission functional level  Description: INTERVENTIONS:- Perform AM-PAC 6 Click Basic Mobility/ Daily Activity assessment daily.- Set and communicate daily mobility goal to care team and patient/family/caregiver. - Collaborate with rehabilitation services on mobility goals if consulted- Perform Range of Motion 3 times a day.- Reposition patient every 2 hours.- Dangle patient 3 times a day- Stand patient 3 times a day- Ambulate patient 3 times a day- Out of bed to chair 3 times a day - Out of bed for meals 3 times a day- Out of bed for toileting- Record patient progress and toleration of activity level   Outcome: Progressing     Problem: DISCHARGE PLANNING  Goal: Discharge to home or other facility with appropriate resources  Description: INTERVENTIONS:- Identify barriers to discharge w/patient and caregiver- Arrange for needed discharge resources and transportation as appropriate- Identify  discharge learning needs (meds, wound care, etc.)- Arrange for interpretive services to assist at discharge as needed- Refer to Case Management Department for coordinating discharge planning if the patient needs post-hospital services based on physician/advanced practitioner order or complex needs related to functional status, cognitive ability, or social support system  Outcome: Progressing     Problem: Knowledge Deficit  Goal: Patient/family/caregiver demonstrates understanding of disease process, treatment plan, medications, and discharge instructions  Description: Complete learning assessment and assess knowledge base.Interventions:- Provide teaching at level of understanding- Provide teaching via preferred learning methods  Outcome: Progressing     Problem: CARDIOVASCULAR - ADULT  Goal: Maintains optimal cardiac output and hemodynamic stability  Description: INTERVENTIONS:- Monitor I/O, vital signs and rhythm- Monitor for S/S and trends of decreased cardiac output- Administer and titrate ordered vasoactive medications to optimize hemodynamic stability- Assess quality of pulses, skin color and temperature- Assess for signs of decreased coronary artery perfusion- Instruct patient to report change in severity of symptoms  Outcome: Progressing  Goal: Absence of cardiac dysrhythmias or at baseline rhythm  Description: INTERVENTIONS:- Continuous cardiac monitoring, vital signs, obtain 12 lead EKG if ordered- Administer antiarrhythmic and heart rate control medications as ordered- Monitor electrolytes and administer replacement therapy as ordered  Outcome: Progressing     Problem: RESPIRATORY - ADULT  Goal: Achieves optimal ventilation and oxygenation  Description: INTERVENTIONS:- Assess for changes in respiratory status- Assess for changes in mentation and behavior- Position to facilitate oxygenation and minimize respiratory effort- Oxygen administered by appropriate delivery if ordered- Initiate smoking cessation  education as indicated- Encourage broncho-pulmonary hygiene including cough, deep breathe, Incentive Spirometry- Assess the need for suctioning and aspirate as needed- Assess and instruct to report SOB or any respiratory difficulty- Respiratory Therapy support as indicated  Outcome: Progressing     Problem: METABOLIC, FLUID AND ELECTROLYTES - ADULT  Goal: Electrolytes maintained within normal limits  Description: INTERVENTIONS:- Monitor labs and assess patient for signs and symptoms of electrolyte imbalances- Administer electrolyte replacement as ordered- Monitor response to electrolyte replacements, including repeat lab results as appropriate- Instruct patient on fluid and nutrition as appropriate  Outcome: Progressing  Goal: Fluid balance maintained  Description: INTERVENTIONS:- Monitor labs - Monitor I/O and WT- Instruct patient on fluid and nutrition as appropriate- Assess for signs & symptoms of volume excess or deficit  Outcome: Progressing  Goal: Glucose maintained within target range  Description: INTERVENTIONS:- Monitor Blood Glucose as ordered- Assess for signs and symptoms of hyperglycemia and hypoglycemia- Administer ordered medications to maintain glucose within target range- Assess nutritional intake and initiate nutrition service referral as needed  Outcome: Progressing

## 2025-04-29 NOTE — ASSESSMENT & PLAN NOTE
Wt Readings from Last 3 Encounters:   04/29/25 74.5 kg (164 lb 3.2 oz)   04/24/25 76.8 kg (169 lb 5 oz)   04/24/25 76.8 kg (169 lb 6.4 oz)   LVEF 20%  Non ischemic cardiomyopathy (meth)  Neg cath 2020   GDMT limited by cost and renal function  Tolerating Lopressor 25 mg PO BID   On lasix 40 IV daily   Monitor I and O, renal fx and electrolytes   Daily standing weights

## 2025-04-29 NOTE — ASSESSMENT & PLAN NOTE
Supra therapeutic INR on admission on Coumadin  RRT called for afib with rvr and hypoglycemia   Subsequently developed cardiogenic shock  Placed on Levo and Milrinone but weaned off  Now HR controlled   Started amiodarone infusion and currently at 0.5 mg/min   INR subtherapeutic and bridging to coumadin   Amiodarone discontinued  Continue Dig at present dose  Continue Lopressor at present dose  Rate control only   Concern for compliance at home given meth use   Will discuss with his OP cardiologist indications for pursuing rhythm control strategy

## 2025-04-29 NOTE — PLAN OF CARE
Problem: PAIN - ADULT  Goal: Verbalizes/displays adequate comfort level or baseline comfort level  Description: Interventions:- Encourage patient to monitor pain and request assistance- Assess pain using appropriate pain scale- Administer analgesics based on type and severity of pain and evaluate response- Implement non-pharmacological measures as appropriate and evaluate response- Consider cultural and social influences on pain and pain management- Notify physician/advanced practitioner if interventions unsuccessful or patient reports new pain  Outcome: Progressing     Problem: INFECTION - ADULT  Goal: Absence or prevention of progression during hospitalization  Description: INTERVENTIONS:- Assess and monitor for signs and symptoms of infection- Monitor lab/diagnostic results- Monitor all insertion sites, i.e. indwelling lines, tubes, and drains- Monitor endotracheal if appropriate and nasal secretions for changes in amount and color- [place] appropriate cooling/warming therapies per order- Administer medications as ordered- Instruct and encourage patient and family to use good hand hygiene technique- Identify and instruct in appropriate isolation precautions for identified infection/condition  Outcome: Progressing  Goal: Absence of fever/infection during neutropenic period  Description: INTERVENTIONS:- Monitor WBC  Outcome: Progressing     Problem: SAFETY ADULT  Goal: Patient will remain free of falls  Description: INTERVENTIONS:- Educate patient/family on patient safety including physical limitations- Instruct patient to call for assistance with activity - Consult OT/PT to assist with strengthening/mobility - Keep Call bell within reach- Keep bed low and locked with side rails adjusted as appropriate- Keep care items and personal belongings within reach- Initiate and maintain comfort rounds- Make Fall Risk Sign visible to staff- Offer Toileting every 2 Hours, in advance of need- Initiate/Maintain bed/ chair  alarm- Obtain necessary fall risk management equipment: walker, non skid socks- Apply yellow socks and bracelet for high fall risk patients- Consider moving patient to room near nurses station  Outcome: Progressing  Goal: Maintain or return to baseline ADL function  Description: INTERVENTIONS:-  Assess patient's ability to carry out ADLs; assess patient's baseline for ADL function and identify physical deficits which impact ability to perform ADLs (bathing, care of mouth/teeth, toileting, grooming, dressing, etc.)- Assess/evaluate cause of self-care deficits - Assess range of motion- Assess patient's mobility; develop plan if impaired- Assess patient's need for assistive devices and provide as appropriate- Encourage maximum independence but intervene and supervise when necessary- Involve family in performance of ADLs- Assess for home care needs following discharge - Consider OT consult to assist with ADL evaluation and planning for discharge- Provide patient education as appropriate  Outcome: Progressing  Goal: Maintains/Returns to pre admission functional level  Description: INTERVENTIONS:- Perform AM-PAC 6 Click Basic Mobility/ Daily Activity assessment daily.- Set and communicate daily mobility goal to care team and patient/family/caregiver. - Collaborate with rehabilitation services on mobility goals if consulted- Perform Range of Motion 2 times a day.- Reposition patient every 2 hours.- Dangle patient 2 times a day- Stand patient 2 times a day- Ambulate patient 2 times a day- Out of bed to chair 2 times a day - Out of bed for meals 2 times a day- Out of bed for toileting- Record patient progress and toleration of activity level   Outcome: Progressing     Problem: DISCHARGE PLANNING  Goal: Discharge to home or other facility with appropriate resources  Description: INTERVENTIONS:- Identify barriers to discharge w/patient and caregiver- Arrange for needed discharge resources and transportation as appropriate-  Identify discharge learning needs (meds, wound care, etc.)- Arrange for interpretive services to assist at discharge as needed- Refer to Case Management Department for coordinating discharge planning if the patient needs post-hospital services based on physician/advanced practitioner order or complex needs related to functional status, cognitive ability, or social support system  Outcome: Progressing     Problem: CARDIOVASCULAR - ADULT  Goal: Maintains optimal cardiac output and hemodynamic stability  Description: INTERVENTIONS:- Monitor I/O, vital signs and rhythm- Monitor for S/S and trends of decreased cardiac output- Administer and titrate ordered vasoactive medications to optimize hemodynamic stability- Assess quality of pulses, skin color and temperature- Assess for signs of decreased coronary artery perfusion- Instruct patient to report change in severity of symptoms  Outcome: Progressing  Goal: Absence of cardiac dysrhythmias or at baseline rhythm  Description: INTERVENTIONS:- Continuous cardiac monitoring, vital signs, obtain 12 lead EKG if ordered- Administer antiarrhythmic and heart rate control medications as ordered- Monitor electrolytes and administer replacement therapy as ordered  Outcome: Progressing     Problem: RESPIRATORY - ADULT  Goal: Achieves optimal ventilation and oxygenation  Description: INTERVENTIONS:- Assess for changes in respiratory status- Assess for changes in mentation and behavior- Position to facilitate oxygenation and minimize respiratory effort- Oxygen administered by appropriate delivery if ordered- Initiate smoking cessation education as indicated- Encourage broncho-pulmonary hygiene including cough, deep breathe, Incentive Spirometry- Assess the need for suctioning and aspirate as needed- Assess and instruct to report SOB or any respiratory difficulty- Respiratory Therapy support as indicated  Outcome: Progressing     Problem: METABOLIC, FLUID AND ELECTROLYTES - ADULT  Goal:  Electrolytes maintained within normal limits  Description: INTERVENTIONS:- Monitor labs and assess patient for signs and symptoms of electrolyte imbalances- Administer electrolyte replacement as ordered- Monitor response to electrolyte replacements, including repeat lab results as appropriate- Instruct patient on fluid and nutrition as appropriate  Outcome: Progressing  Goal: Fluid balance maintained  Description: INTERVENTIONS:- Monitor labs - Monitor I/O and WT- Instruct patient on fluid and nutrition as appropriate- Assess for signs & symptoms of volume excess or deficit  Outcome: Progressing  Goal: Glucose maintained within target range  Description: INTERVENTIONS:- Monitor Blood Glucose as ordered- Assess for signs and symptoms of hyperglycemia and hypoglycemia- Administer ordered medications to maintain glucose within target range- Assess nutritional intake and initiate nutrition service referral as needed  Outcome: Progressing

## 2025-04-29 NOTE — PROGRESS NOTES
NEPHROLOGY HOSPITAL PROGRESS NOTE   Britton Batista 59 y.o. male MRN: [id number]  Unit/Bed#: -01 Encounter: [id number]  Assessment & Plan  Acute kidney injury superimposed on stage 3a chronic kidney disease (HCC)  Lab Results   Component Value Date    EGFR 35 04/28/2025    EGFR 28 04/27/2025    EGFR 28 04/26/2025    CREATININE 1.99 (H) 04/28/2025    CREATININE 2.42 (H) 04/27/2025    CREATININE 2.38 (H) 04/26/2025   Acute kidney injury on stage III chronic kidney disease likely secondary to ATN in the setting of shock and acute on chronic systolic and diastolic heart failure.  The patient's creatinine today on April 28 is 1.9 had a decrease from 2.4 on April 27.  The patient's baseline creatinine is approximately 1.3-1.5 in reviewing prior labs.    Regarding plan of care as of today on April 29: Awaiting a.m. labs.  The patient is noted to have resolving leukocytosis of 8.2 and hemoglobin level stable at 12.8.  Creatinine level had improved to 1.9 on April 28.   will defer diuretic dosing to cardiology; patient is currently on IV Lasix.  I suspect we hopefully may be able to transition to oral Lasix over the next 24 to 48 hours.  UA from April 25 showed 2+ protein negative blood and negative urine microscopic findings.  Check protein creatinine ratio although patient not in steady state.  Immunofixation performed in February section no evidence of any monoclonal protein.  Acute on chronic combined systolic and diastolic congestive heart failure (HCC)  Wt Readings from Last 3 Encounters:   04/29/25 74.5 kg (164 lb 3.2 oz)   04/24/25 76.8 kg (169 lb 5 oz)   04/24/25 76.8 kg (169 lb 6.4 oz)   The patient is again noted to have an LVEF of 25% in March however repeat labs here show LVEF of 15 to 20% with global hypokinesis.  Appreciate cardiology input and consult.  Longstanding persistent atrial fibrillation (HCC)  Continue with digoxin and amiodarone, metoprolol currently on hold.  Management per critical care and  "cardiology.  Cardiomyopathy (HCC)  The patient is noted to have an LVEF of 15 to 20% on most recent cardiac echocardiogram with global hypokinesis and RV EF moderately reduced as well with RVSP 41 mmHg and severe TR.  Cardiology is following and appreciate input.  Transaminitis  Patient's LFTs are improving in the setting of hypotension and shock/ischemic hepatitis.    Administrative Statements   VIRTUAL CARE DOCUMENTATION:     1. This service was provided via Telemedicine using memory lane syndications Kit     2. Parties in the room with patient during teleconsult Patient only    3. Confidentiality My office door was closed     4. Participants No one else was in the room    5. Patient acknowledged consent and understanding of privacy and security of the  Telemedicine consult. I informed the patient that I have reviewed their record in Epic and presented the opportunity for them to ask any questions regarding the visit today.  The patient agreed to participate.    6. I have spent a total time of approximately 25 minutes in caring for this patient on the day of the visit/encounter including documentation medical decision making, modification of assessment and plan, review of patient record, documentation medical decision making, patient visit, not including the time spent for establishing the audio/video connection.        SUBJECTIVE / 24H INTERVAL HISTORY:  Mr. Batista is seen in follow-up for acute kidney injury on chronic kidney disease and fluid management.  The patient is resting comfortably no acute distress.  The patient states he had difficulty sleeping at night.  Denied any chest pressure or shortness of breath he says \"just could not sleep.\".  He denies any orthopnea or PND.  The patient had been transferred out of the ICU.    Regarding hemodynamics, systolic blood pressure has been anywhere from  systolic.  Pulse oximetry is 95% on room air.  The patient has been IV Lasix daily and patient is net negative " approximately 170 cc over the past 24 hours.  The patient's weight is 164 pounds at its highest weight via bed scale-question 180 pounds though not sure of the validity of the bed scale weight.    OBJECTIVE:  Current Weight: Weight - Scale: 74.5 kg (164 lb 3.2 oz)  Vitals:    04/29/25 0445 04/29/25 0600 04/29/25 0720 04/29/25 0725   BP: 105/73  111/78    BP Location:       Pulse: (!) 51  90    Resp:   18    Temp: (!) 97.2 °F (36.2 °C)  97.5 °F (36.4 °C)    TempSrc:       SpO2: (!) 89%  93% 96%   Weight:  74.5 kg (164 lb 3.2 oz)     Height:           Intake/Output Summary (Last 24 hours) at 4/29/2025 0741  Last data filed at 4/29/2025 0601  Gross per 24 hour   Intake 1276.12 ml   Output 1450 ml   Net -173.88 ml     General: Patient is not in any acute distress.  HEENT: Normocephalic atraumatic  Neck: Appears to be supple  Pulmonary: There is no accessory muscle use/no increased work of breathing noted.  Cardiac: Pulse rate last was noted to be 90.  Extremities: No significant edema noted.  Neurologic: No focal deficit noted.  Medications:    Current Facility-Administered Medications:     albuterol (PROVENTIL HFA,VENTOLIN HFA) inhaler 2 puff, 2 puff, Inhalation, Q6H PRN, Coyle Nimako-Sánchez, CRNP, 2 puff at 04/26/25 0100    chlorhexidine (PERIDEX) 0.12 % oral rinse 15 mL, 15 mL, Mouth/Throat, Q12H JESUS, Coyle Nimako-Sánchez, CRNP, 15 mL at 04/28/25 2203    digoxin (LANOXIN) tablet 125 mcg, 125 mcg, Oral, Once per day on Monday Wednesday Friday, Coyle Nimako-Sánchez, CRNP, 125 mcg at 04/28/25 0906    furosemide (LASIX) injection 40 mg, 40 mg, Intravenous, Daily, Coyle Nimako-Sánchez, CRNP, 40 mg at 04/28/25 1046    gabapentin (NEURONTIN) capsule 100 mg, 100 mg, Oral, TID, LUIS ANGEL Wiley, 100 mg at 04/28/25 2202    heparin (porcine) 25,000 units in 0.45% NaCl 250 mL infusion (premix), 3-20 Units/kg/hr (Order-Specific), Intravenous, Titrated, LUIS ANGEL Wiley, Last Rate: 11.2 mL/hr at 04/29/25 0016, 16  Units/kg/hr at 04/29/25 0016    heparin (porcine) injection 2,000 Units, 2,000 Units, Intravenous, Q6H PRN, Prince George Nimako-Sánchez, CRNP, 2,000 Units at 04/29/25 0015    heparin (porcine) injection 4,000 Units, 4,000 Units, Intravenous, Q6H PRN, Prince George Nimako-Sánchez, CRNP    hydrOXYzine HCL (ATARAX) tablet 25 mg, 25 mg, Oral, Q6H PRN, Prince George Nimako-Sánchez, CRNP, 25 mg at 04/28/25 0606    ipratropium (ATROVENT) 0.02 % inhalation solution 0.5 mg, 0.5 mg, Nebulization, TID, Prince George Nimako-Sánchez, CRNP, 0.5 mg at 04/29/25 0724    levalbuterol (XOPENEX) inhalation solution 1.25 mg, 1.25 mg, Nebulization, TID, Prince George Nimako-Sánchez, CRNP, 1.25 mg at 04/29/25 0724    magnesium Oxide (MAG-OX) tablet 400 mg, 400 mg, Oral, Daily, Prince George Nimako-Sánchez, CRNP, 400 mg at 04/28/25 0907    melatonin tablet 3 mg, 3 mg, Oral, HS, Prince George Nimako-Sánchez, CRNP, 3 mg at 04/28/25 2202    metoprolol (LOPRESSOR) injection 2.5 mg, 2.5 mg, Intravenous, Q6H PRN, Prince George Nimako-Sánchez, CRNP, 2.5 mg at 04/27/25 0210    metoprolol tartrate (LOPRESSOR) tablet 25 mg, 25 mg, Oral, Q12H JESUS, Prince George Nimako-Sánchez, CRNP, 25 mg at 04/28/25 2202    midodrine (PROAMATINE) tablet 7.5 mg, 7.5 mg, Oral, TID AC, Prince George Nimako-Sánchez, CRNP, 7.5 mg at 04/29/25 0604    nicotine (NICODERM CQ) 14 mg/24hr TD 24 hr patch 1 patch, 1 patch, Transdermal, Daily, Prince George Nimako-Sánchez, CRNP, 1 patch at 04/28/25 0906    pantoprazole (PROTONIX) EC tablet 40 mg, 40 mg, Oral, BID, Prince George Nimdanni-Sánchez, CRNP, 40 mg at 04/29/25 0604    sodium chloride (OCEAN) 0.65 % nasal spray 1 spray, 1 spray, Each Nare, Q1H PRN, Prince George John-Sánchez, CRNP, 1 spray at 04/28/25 1045    trimethobenzamide (TIGAN) IM injection 200 mg, 200 mg, Intramuscular, Q8H PRN, Prince George John-Sánchez, CRNP, 200 mg at 04/26/25 0910    warfarin (COUMADIN) tablet 5 mg, 5 mg, Oral, Daily (warfarin), Prince George Nimako-Sánchez, CRNP, 5 mg at 04/28/25 1816    Laboratory Results:  Results from last 7 days   Lab Units 04/29/25  0618 04/28/25  0570  "04/27/25  0456 04/26/25  1658 04/26/25  1025 04/26/25  0625 04/25/25  1703 04/25/25  0736 04/24/25  2140 04/24/25  1008 04/23/25  0627 04/22/25  2327   WBC Thousand/uL 8.23 8.98 12.04*  --   --  19.66*  --  14.73* 19.66*  --  7.63  --    HEMOGLOBIN g/dL 12.8 12.2 12.4  --   --  14.4  --  14.6 14.0  --  12.6  --    HEMATOCRIT % 40.2 37.3 37.1  --   --  46.0  --  47.7 44.8  --  39.6  --    PLATELETS Thousands/uL 173 171 195  --   --  256  --  234 248  --  208  --    POTASSIUM mmol/L  --  3.5 3.9 4.5 5.2 5.3 5.1 5.6* 4.6   < > 4.0 4.3   CHLORIDE mmol/L  --  98 99 100 99 100  --  101 98   < > 102 102   CO2 mmol/L  --  30 28 22 19* 21  --  21 28   < > 25 25   BUN mg/dL  --  50* 67* 75* 75* 73*  --  55* 48*   < > 40* 37*   CREATININE mg/dL  --  1.99* 2.42* 2.38* 2.37* 2.20*  --  1.87* 1.83*   < > 1.59* 1.49*   CALCIUM mg/dL  --  7.9* 8.6 8.7 9.3 9.0  --  9.2 9.7   < > 9.0 9.0   MAGNESIUM mg/dL  --  2.0  --   --   --  2.3  --   --   --   --   --  1.8*   PHOSPHORUS mg/dL  --  2.9  --   --   --   --   --   --   --   --   --  4.4    < > = values in this interval not displayed.       Portions of the record may have been created with voice recognition software. Occasional wrong word or \"sound a like\" substitutions may have occurred due to the inherent limitations of voice recognition software. Read the chart carefully and recognize, using context, where substitutions have occurred.If you have any questions, please contact the dictating provider.   "

## 2025-04-29 NOTE — ASSESSMENT & PLAN NOTE
Lab Results   Component Value Date    EGFR 45 04/29/2025    EGFR 35 04/28/2025    EGFR 28 04/27/2025    CREATININE 1.63 (H) 04/29/2025    CREATININE 1.99 (H) 04/28/2025    CREATININE 2.42 (H) 04/27/2025   Improving   GDMT on hold

## 2025-04-29 NOTE — PROGRESS NOTES
Progress Note - Hospitalist   Name: Britton Batista 59 y.o. male I MRN: [id number]  Unit/Bed#: -01 I Date of Admission: 4/25/2025   Date of Service: 4/29/2025 I Hospital Day: 4    Assessment & Plan  Acute on chronic combined systolic and diastolic congestive heart failure (HCC)  Wt Readings from Last 3 Encounters:   04/29/25 74.5 kg (164 lb 3.2 oz)   04/24/25 76.8 kg (169 lb 5 oz)   04/24/25 76.8 kg (169 lb 6.4 oz)       GDMT for heart failure  Diuretic: Lasix 40mg PO daily (home dose, held)  Beta-blocker: Metoprolol 25mg Q12 hours (home, held)  Renin-angiotensin inhibition: Held due to hypotension, unable to afford Entresto ( ARNI / ACE inhibitor/ ARB)  MRA: Spirolactone 12.5mg daily (home)  Sodium glucose cotransporter 2 (SGLT2 ) inhibitor: Patient unable to financially afford medication at this time     [employer] AICD: Interrogated on 4/26     Patient is examines euvolemic no shortness of breath no leg edema discussed with cardiology will switch over to p.o. he takes Lasix 40 mg daily at home will switch over to that  Over the weekend patient was on milrinone secondary to cardiogenic shock with ICU since then lactic acid has cleared and he was weaned off milrinone.      Pulmonary fibrosis (HCC)  Possible early CTD ILD-or related to prior exposure to dust/asbestos  PFT/office spirometry last year showed no restriction, mild decreased DLCO which argues against severe ILD  Longstanding persistent atrial fibrillation (HCC)  Rate improved discussed cardiology continue digoxin and continue metoprolol his INR is therapeutic continue Coumadin 5 mg daily discontinue heparin drip  Acute kidney injury superimposed on stage 3a chronic kidney disease (HCC)  Lab Results   Component Value Date    EGFR 45 04/29/2025    EGFR 35 04/28/2025    EGFR 28 04/27/2025    CREATININE 1.63 (H) 04/29/2025    CREATININE 1.99 (H) 04/28/2025    CREATININE 2.42 (H) 04/27/2025     Suspect in setting of cardiogenic shock, improving  On CKD  stage III with a baseline creatinine 1.5 his creatinine is improving  Nephrology has been following  Cardiomyopathy (HCC)  Patient with history of methamphetamine induced cardiomyopathy  Last cardiac cath in 2020 showed no coronary artery disease  3/2025 ECHO: EF 25% with RV nl/reduced, Moderate MR   Patient's coreg was changed to metoprolol during prior hospitalization    Plan:  Discussed with cardiology  F/U ECHO revealing EF 15%  Transaminitis  Suspected shock liver secondary from cardiogenic shock improved  Electrolyte abnormality  Has been replenished by nephrology with a phosphate and magnesium recheck tomorrow    VTE Pharmacologic Prophylaxis:   Moderate Risk (Score 3-4) - Pharmacological DVT Prophylaxis Ordered: warfarin (Coumadin).    Mobility:   Basic Mobility Inpatient Raw Score: 22  JH-HLM Goal: 7: Walk 25 feet or more  JH-HLM Achieved: 8: Walk 250 feet ot more  JH-HLM Goal achieved. Continue to encourage appropriate mobility.    Patient Centered Rounds: I performed bedside rounds with nursing staff today.   Discussions with Specialists or Other Care Team Provider: Discussed with cardiology    Education and Discussions with Family / Patient: Patient will follow-up with sister    Current Length of Stay: 4 day(s)  Current Patient Status: Inpatient   Certification Statement: The patient will continue to require additional inpatient hospital stay due to secondary to electrolyte abnormality IRINEO  Discharge Plan: Anticipate discharge tomorrow to home.    Code Status: Level 1 - Full Code    Subjective   Patient seen and examined denies chest pain or shortness of breath she is complaining of insomnia    Objective :  Temp:  [97.2 °F (36.2 °C)-98.1 °F (36.7 °C)] 97.5 °F (36.4 °C)  HR:  [] 84  BP: ()/(57-78) 111/78  Resp:  [18] 18  SpO2:  [89 %-98 %] 96 %  O2 Device: None (Room air)  Nasal Cannula O2 Flow Rate (L/min):  [2 L/min] 2 L/min    Body mass index is 24.25 kg/m².     Input and Output Summary  (last 24 hours):     Intake/Output Summary (Last 24 hours) at 4/29/2025 1202  Last data filed at 4/29/2025 0941  Gross per 24 hour   Intake 1249.63 ml   Output 1550 ml   Net -300.37 ml       Physical Exam  Vitals and nursing note reviewed.   Constitutional:       General: He is not in acute distress.     Appearance: He is well-developed.   HENT:      Head: Normocephalic and atraumatic.   Eyes:      Conjunctiva/sclera: Conjunctivae normal.   Cardiovascular:      Rate and Rhythm: Normal rate. Rhythm irregular.      Heart sounds: No murmur heard.  Pulmonary:      Effort: Pulmonary effort is normal. No respiratory distress.      Breath sounds: Normal breath sounds. No wheezing or rales.   Abdominal:      Palpations: Abdomen is soft.      Tenderness: There is no abdominal tenderness.   Musculoskeletal:         General: No swelling.      Cervical back: Neck supple.   Skin:     General: Skin is warm and dry.      Capillary Refill: Capillary refill takes less than 2 seconds.   Neurological:      Mental Status: He is alert and oriented to person, place, and time.   Psychiatric:         Mood and Affect: Mood normal.         Lines/Drains:        Telemetry:  Telemetry Orders (From admission, onward)               24 Hour Telemetry Monitoring  Continuous x 24 Hours (Telem)        Expiring   Question:  Reason for 24 Hour Telemetry  Answer:  Arrhythmias requiring acute medical intervention / PPM or ICD malfunction                     Telemetry Reviewed: Atrial fibrillation. HR averaging 80  Indication for Continued Telemetry Use: Arrthymias requiring medical therapy               Lab Results: I have reviewed the following results:   Results from last 7 days   Lab Units 04/29/25  0618 04/28/25  0526 04/27/25  0456   WBC Thousand/uL 8.23   < > 12.04*   HEMOGLOBIN g/dL 12.8   < > 12.4   HEMATOCRIT % 40.2   < > 37.1   PLATELETS Thousands/uL 173   < > 195   SEGS PCT %  --   --  79*   LYMPHO PCT %  --   --  9*   MONO PCT %  --   --  10    EOS PCT %  --   --  1    < > = values in this interval not displayed.     Results from last 7 days   Lab Units 04/29/25  0911   SODIUM mmol/L 134*   POTASSIUM mmol/L 3.5   CHLORIDE mmol/L 100   CO2 mmol/L 28   BUN mg/dL 32*   CREATININE mg/dL 1.63*   ANION GAP mmol/L 6   CALCIUM mg/dL 8.0*   ALBUMIN g/dL 3.1*   TOTAL BILIRUBIN mg/dL 1.63*   ALK PHOS U/L 88   ALT U/L 946*   AST U/L 349*   GLUCOSE RANDOM mg/dL 164*     Results from last 7 days   Lab Units 04/29/25  0618   INR  2.32*     Results from last 7 days   Lab Units 04/26/25  2216 04/25/25  2357 04/25/25  2136 04/25/25  1853 04/25/25  1849 04/25/25  1827 04/25/25  1820   POC GLUCOSE mg/dl 113 136 139 176* 54* 55* 53*         Results from last 7 days   Lab Units 04/29/25  0623 04/28/25  0526 04/27/25  0456 04/26/25  1658 04/26/25  1303 04/26/25  1038 04/26/25  0904 04/26/25  0625 04/25/25  1703 04/25/25  1318   LACTIC ACID mmol/L  --   --   --  1.7 3.8* 3.7* 3.5* 4.5*   < >  --    PROCALCITONIN ng/ml 1.12* 2.62* 3.83*  --   --   --   --  1.84*  --  0.15    < > = values in this interval not displayed.       Recent Cultures (last 7 days):   Results from last 7 days   Lab Units 04/26/25  1037 04/26/25  1026   BLOOD CULTURE  No Growth at 48 hrs. No Growth at 48 hrs.       Imaging Results Review: No pertinent imaging studies reviewed.  Other Study Results Review: No additional pertinent studies reviewed.    Last 24 Hours Medication List:     Current Facility-Administered Medications:     albuterol (PROVENTIL HFA,VENTOLIN HFA) inhaler 2 puff, Q6H PRN    chlorhexidine (PERIDEX) 0.12 % oral rinse 15 mL, Q12H JESUS    digoxin (LANOXIN) tablet 125 mcg, Once per day on Monday Wednesday Friday    [START ON 4/30/2025] furosemide (LASIX) tablet 40 mg, Daily    gabapentin (NEURONTIN) capsule 100 mg, TID    hydrOXYzine HCL (ATARAX) tablet 25 mg, Q6H PRN    ipratropium (ATROVENT) 0.02 % inhalation solution 0.5 mg, TID    levalbuterol (XOPENEX) inhalation solution 1.25 mg, TID     magnesium Oxide (MAG-OX) tablet 400 mg, Daily    magnesium sulfate 2 g/50 mL IVPB (premix) 2 g, Once    melatonin tablet 3 mg, HS    metoprolol (LOPRESSOR) injection 2.5 mg, Q6H PRN    metoprolol tartrate (LOPRESSOR) tablet 25 mg, Q12H JESUS    midodrine (PROAMATINE) tablet 7.5 mg, TID AC    nicotine (NICODERM CQ) 14 mg/24hr TD 24 hr patch 1 patch, Daily    pantoprazole (PROTONIX) EC tablet 40 mg, BID    potassium chloride (Klor-Con M20) CR tablet 20 mEq, Once    sodium chloride (OCEAN) 0.65 % nasal spray 1 spray, Q1H PRN    sodium phosphate 12 mmol in sodium chloride 0.9 % 100 mL Infusion, Once    trimethobenzamide (TIGAN) IM injection 200 mg, Q8H PRN    warfarin (COUMADIN) tablet 5 mg, Daily (warfarin)    zolpidem (AMBIEN) tablet 5 mg, HS PRN    Administrative Statements   Today, Patient Was Seen By: Guadalupe Muñoz MD  I have spent a total time of >35 minutes in caring for this patient on the day of the visit/encounter including Patient and family education, Documenting in the medical record, and Reviewing/placing orders in the medical record (including tests, medications, and/or procedures).    **Please Note: This note may have been constructed using a voice recognition system.**

## 2025-04-30 VITALS
OXYGEN SATURATION: 95 % | HEIGHT: 69 IN | TEMPERATURE: 97.3 F | SYSTOLIC BLOOD PRESSURE: 120 MMHG | WEIGHT: 170 LBS | HEART RATE: 54 BPM | DIASTOLIC BLOOD PRESSURE: 89 MMHG | BODY MASS INDEX: 25.18 KG/M2 | RESPIRATION RATE: 18 BRPM

## 2025-04-30 LAB
ALBUMIN SERPL BCG-MCNC: 3.3 G/DL (ref 3.5–5)
ALP SERPL-CCNC: 92 U/L (ref 34–104)
ALT SERPL W P-5'-P-CCNC: 871 U/L (ref 7–52)
ANION GAP SERPL CALCULATED.3IONS-SCNC: 8 MMOL/L (ref 4–13)
AST SERPL W P-5'-P-CCNC: 289 U/L (ref 13–39)
BILIRUB SERPL-MCNC: 1.09 MG/DL (ref 0.2–1)
BUN SERPL-MCNC: 24 MG/DL (ref 5–25)
CALCIUM ALBUM COR SERPL-MCNC: 8.5 MG/DL (ref 8.3–10.1)
CALCIUM SERPL-MCNC: 7.9 MG/DL (ref 8.4–10.2)
CHLORIDE SERPL-SCNC: 98 MMOL/L (ref 96–108)
CO2 SERPL-SCNC: 25 MMOL/L (ref 21–32)
CREAT SERPL-MCNC: 1.42 MG/DL (ref 0.6–1.3)
GFR SERPL CREATININE-BSD FRML MDRD: 53 ML/MIN/1.73SQ M
GLUCOSE SERPL-MCNC: 147 MG/DL (ref 65–140)
INR PPP: 3.14 (ref 0.85–1.19)
PHOSPHATE SERPL-MCNC: 2.1 MG/DL (ref 2.7–4.5)
POTASSIUM SERPL-SCNC: 3.7 MMOL/L (ref 3.5–5.3)
PROT SERPL-MCNC: 6.3 G/DL (ref 6.4–8.4)
PROTHROMBIN TIME: 32.2 SECONDS (ref 12.3–15)
SODIUM SERPL-SCNC: 131 MMOL/L (ref 135–147)

## 2025-04-30 PROCEDURE — 85610 PROTHROMBIN TIME: CPT | Performed by: FAMILY MEDICINE

## 2025-04-30 PROCEDURE — 94760 N-INVAS EAR/PLS OXIMETRY 1: CPT

## 2025-04-30 PROCEDURE — 94640 AIRWAY INHALATION TREATMENT: CPT

## 2025-04-30 PROCEDURE — 84100 ASSAY OF PHOSPHORUS: CPT | Performed by: FAMILY MEDICINE

## 2025-04-30 PROCEDURE — 99239 HOSP IP/OBS DSCHRG MGMT >30: CPT | Performed by: FAMILY MEDICINE

## 2025-04-30 PROCEDURE — 80053 COMPREHEN METABOLIC PANEL: CPT | Performed by: FAMILY MEDICINE

## 2025-04-30 RX ORDER — POTASSIUM CHLORIDE 1500 MG/1
40 TABLET, EXTENDED RELEASE ORAL ONCE
Status: COMPLETED | OUTPATIENT
Start: 2025-04-30 | End: 2025-04-30

## 2025-04-30 RX ORDER — MIDODRINE HYDROCHLORIDE 2.5 MG/1
7.5 TABLET ORAL
Qty: 270 TABLET | Refills: 0 | Status: SHIPPED | OUTPATIENT
Start: 2025-04-30

## 2025-04-30 RX ORDER — WARFARIN SODIUM 3 MG/1
3 TABLET ORAL
Qty: 30 TABLET | Refills: 0 | Status: SHIPPED | OUTPATIENT
Start: 2025-05-02

## 2025-04-30 RX ADMIN — PANTOPRAZOLE SODIUM 40 MG: 40 TABLET, DELAYED RELEASE ORAL at 05:43

## 2025-04-30 RX ADMIN — FUROSEMIDE 40 MG: 40 TABLET ORAL at 08:43

## 2025-04-30 RX ADMIN — GABAPENTIN 100 MG: 100 CAPSULE ORAL at 08:43

## 2025-04-30 RX ADMIN — MAGNESIUM OXIDE TAB 400 MG (241.3 MG ELEMENTAL MG) 400 MG: 400 (241.3 MG) TAB at 08:43

## 2025-04-30 RX ADMIN — POTASSIUM & SODIUM PHOSPHATES POWDER PACK 280-160-250 MG 1 PACKET: 280-160-250 PACK at 08:43

## 2025-04-30 RX ADMIN — NICOTINE 1 PATCH: 14 PATCH, EXTENDED RELEASE TRANSDERMAL at 08:43

## 2025-04-30 RX ADMIN — POTASSIUM CHLORIDE 40 MEQ: 1500 TABLET, EXTENDED RELEASE ORAL at 11:12

## 2025-04-30 RX ADMIN — MIDODRINE HYDROCHLORIDE 7.5 MG: 5 TABLET ORAL at 05:43

## 2025-04-30 RX ADMIN — LEVALBUTEROL HYDROCHLORIDE 1.25 MG: 1.25 SOLUTION RESPIRATORY (INHALATION) at 07:23

## 2025-04-30 RX ADMIN — DIGOXIN 125 MCG: 125 TABLET ORAL at 08:43

## 2025-04-30 RX ADMIN — IPRATROPIUM BROMIDE 0.5 MG: 0.5 SOLUTION RESPIRATORY (INHALATION) at 07:23

## 2025-04-30 RX ADMIN — METOPROLOL TARTRATE 25 MG: 25 TABLET, FILM COATED ORAL at 08:43

## 2025-04-30 NOTE — NURSING NOTE
Iv removed. Discharged from [facility]. Patient given discharge instructions.

## 2025-04-30 NOTE — DISCHARGE SUMMARY
Discharge Summary - Hospitalist   Name: Britton Batista 59 y.o. male I MRN: [id number]  Unit/Bed#: -01 I Date of Admission: 4/25/2025   Date of Service: 4/30/2025 I Hospital Day: 5     Assessment & Plan  Acute on chronic combined systolic and diastolic congestive heart failure (HCC)  Wt Readings from Last 3 Encounters:   04/30/25 77.1 kg (170 lb)   04/24/25 76.8 kg (169 lb 5 oz)   04/24/25 76.8 kg (169 lb 6.4 oz)       GDMT for heart failure  Diuretic: Lasix 40mg PO daily (home dose, held)  Beta-blocker: Metoprolol 25mg Q12 hours (home, held)  Renin-angiotensin inhibition: Held due to hypotension, unable to afford Entresto ( ARNI / ACE inhibitor/ ARB)  MRA: Spirolactone 12.5mg daily (home)  Sodium glucose cotransporter 2 (SGLT2 ) inhibitor: Patient unable to financially afford medication at this time     [employer] AICD: Interrogated on 4/26     Patient is examines euvolemic no shortness of breath no leg edema discussed with cardiology will switch over to p.o. he takes Lasix 40 mg daily at home will switch over to that  Over the weekend patient was on milrinone secondary to cardiogenic shock with ICU since then lactic acid has cleared and he was weaned off milrinone. Due to being on midodrine for bp to be up will dc aldactone for now on outpatient cards can consider restart as outpatient if bp will be stable      Pulmonary fibrosis (HCC)  Possible early CTD ILD-or related to prior exposure to dust/asbestos  PFT/office spirometry last year showed no restriction, mild decreased DLCO which argues against severe ILD  Longstanding persistent atrial fibrillation (HCC)  Rate improved discussed cardiology continue digoxin and continue metoprolol-INR is mildly supratherapeutic discussed with the patient and he is to hold Coumadin for the next 2 days and repeat INR on Friday and to call Coumadin clinic for the results I will send the new dose of Coumadin to the pharmacy lower at 3 mg daily he needs to asked the Coumadin  clinic on 5/2 if he is to restart the new dosing  Acute kidney injury superimposed on stage 3a chronic kidney disease (HCC)  Lab Results   Component Value Date    EGFR 53 04/30/2025    EGFR 45 04/29/2025    EGFR 35 04/28/2025    CREATININE 1.42 (H) 04/30/2025    CREATININE 1.63 (H) 04/29/2025    CREATININE 1.99 (H) 04/28/2025     Suspect in setting of cardiogenic shock, improving  On CKD stage III with a baseline creatinine 1.5 his creatinine is below baseline   Nephrology has been following  Cardiomyopathy (HCC)  Patient with history of methamphetamine induced cardiomyopathy  Last cardiac cath in 2020 showed no coronary artery disease  3/2025 ECHO: EF 25% with RV nl/reduced, Moderate MR   Patient's coreg was changed to metoprolol during prior hospitalization    Plan:  Discussed with cardiology  F/U ECHO revealing EF 15%  Transaminitis  Suspected shock liver secondary from cardiogenic shock improved  Electrolyte abnormality  In terms of hyponatremia essentially stable about 130 discussed with nephrology continue diuretics continue fluid restriction 1.5 L.  Will do a BMP in 1 week and also referral to outpatient nephrology will supplement potassium to make sodium better       Medical Problems       Resolved Problems  Date Reviewed: 4/29/2025          Resolved    Hyperkalemia 4/27/2025     Resolved by  Dewayne Patel Jr., PA-C    Acute respiratory failure (HCC) 4/28/2025     Resolved by  Chinyere Knapp MD    Abdominal pain 4/28/2025     Resolved by  Chinyere Knapp MD    Lactic acidosis 4/27/2025     Resolved by  Dewayne Patel Jr., PA-C    * (Principal) Shock (HCC) 4/28/2025     Resolved by  Chinyere Knapp MD    Coagulopathy (HCC) 4/28/2025     Resolved by  Chinyere Knapp MD    Hyperammonemia (HCC) 4/28/2025     Resolved by  Chinyere Knapp MD          MESSAGE TO PCP (Juan Glaindo DO) FOR FOLLOW UP:   Thank you for allowing us to participate in the care of your patient, Britton Batista, who was hospitalized from  4/25/2025 through 04/30/25 with the admitting diagnosis of acute on chronic systolic and diastolic heart failure exacerbation, cardiogenic shock.      Medication Changes:  Discontinue Aldactone  Midodrine was increased to 7.5 mg p.o. 3 times daily  Hold coumadin till check inr 5/2-new prescription of Coumadin was sent just in case he is to restart at 3 mg  Outpatient testing recommended:  Follow-up with nephrology follow-up with cardiology  INR 5/2  If you have any additional questions or would like to discuss further, please feel free to contact me.  Guadalupe Muñoz MD  [facility] Internal Medicine, Hospitalist, [phone]     Admission Date:   Admission Orders (From admission, onward)       Ordered        04/25/25 1316  INPATIENT ADMISSION  Once                          Discharge Date: 04/30/25    Consultations During Hospital Stay:  Cardiology  Nephrology  Critical care    Procedures Performed:   none    Significant Findings / Test Results:   CT abdomen and pelvis without contrast- 1.  Persistent mild nephrogram of bilateral kidneys and extended excretory phase suggesting renal failure. The urinary bladder is filled with IV contrast from prior study. Correlate for urinary retention. There is no hydroureteronephrosis.     2.  Persistent moderate ascites, mesenteric edema, and partially imaged small pericardial effusion with cardiomegaly, suggesting third spacing. The gallbladder is filled with IV contrast with surrounding fluid which is possibly related to ascites and   vicarious excretion of the contrast. Acute cholecystitis cannot entirely be excluded in the right clinical setting.     3.  Minimal lobulated liver contour suggesting cirrhosis. Correlate with history and labs.  Ultrasound of the abdomen completed- No sonographic findings of cirrhosis.     Pulsatile portal venous flow, gallbladder wall thickening, and small volume upper abdominal ascites, likely due to right heart failure.  4/25 chest x-ray no  abnormalities  Tte-  Left ventricular cavity size is severely dilated. The left ventricular ejection fraction is 15% by visual estimation. Systolic function is severely reduced. There is severe global hypokinesis.   Right Ventricle Systolic function is moderately reduced.   Aortic Valve The leaflets are mildly thickened. There is no evidence of regurgitation. The aortic valve has no significant stenosis.   Mitral Valve There is mild thickening.  There is mild to moderate regurgitation with an eccentrically directed jet. There is no evidence of stenosis.   Tricuspid Valve The leaflets are not thickened. There is severe regurgitation. The estimated right ventricular systolic pressure is 41.00 mmHg.   Pulmonic Valve There is no evidence of regurgitation.   IVC/SVC The right atrial pressure is estimated at 15.0 mmHg. The inferior vena cava is dilated. Respirophasic changes were blunted (less than 50% variation).   Pericardium There is a small pericardial effusion. There is no echocardiographic evidence of tamponade. The evidence against tamponade includes: no right ventricular diastolic collapse and no right atrial inversion.     Incidental Findings:   See above to be followed by PCP    Test Results Pending at Discharge (will require follow up):   None     Complications: Cardiogenic shock    Reason for Admission: Acute on chronic systolic and diastolic heart failure exacerbation    Hospital Course:   Britton Batista is a 59 y.o. male patient who originally presented to the hospital on 4/25/2025 due to acute on chronic heart failure systolic and diastolic exacerbation started on IV diuretics.  24 hours post patient developed multiorgan failure with IRINEO and transaminitis suspected secondary to cardiogenic shock his lactic acid was elevated transferred to critical care service for milrinone milrinone was essentially and eventually titrated down and he came off.  He is lactic acid has cleared.  His transaminitis and IRINEO has  "improved diuresed quite well nephrology was following for the IRINEO patient was switched over to Lasix 40 mg daily as he was euvolemic.  His atrial fibrillation RVR has improved his midodrine was increased his Coumadin was restarted initially he was bridged to Coumadin/heparin.  On the day of discharge his INR was slightly supratherapeutic I discussed with the patient that please repeat an INR on 5/2 for now hold the Coumadin although I will send his new Coumadin order 3 mg to the pharmacy discussed with him and gave directions that to call Coumadin clinic on 5/2 to find out his results and if he is to restart 3 mg of Coumadin at that time.  Nephrology consultation was send follow-up cardiology nephrology full no I will hold his Aldactone and cardiology can reevaluate his vitals laboratories as an outpatient to see if Aldactone can be restarted otherwise his medical cleared to be discharged.          Please see above list of diagnoses and related plan for additional information.     Condition at Discharge: stable    Discharge Day Visit / Exam:   Subjective: Patient seen and examined no complaints ready to go home  Vitals: Blood Pressure: 120/89 (04/30/25 0710)  Pulse: (!) 54 (04/30/25 0710)  Temperature: (!) 97.3 °F (36.3 °C) (04/30/25 0300)  Temp Source: Temporal (04/29/25 2228)  Respirations: 18 (04/30/25 0710)  Height: 5' 9\" (175.3 cm) (04/28/25 0800)  Weight - Scale: 77.1 kg (170 lb) (04/30/25 0435)  SpO2: 95 % (04/30/25 0723)  Physical Exam  Vitals and nursing note reviewed.   Constitutional:       General: He is not in acute distress.     Appearance: He is well-developed.   HENT:      Head: Normocephalic and atraumatic.   Eyes:      Conjunctiva/sclera: Conjunctivae normal.   Cardiovascular:      Rate and Rhythm: Normal rate and regular rhythm.      Heart sounds: No murmur heard.  Pulmonary:      Effort: Pulmonary effort is normal. No respiratory distress.      Breath sounds: Normal breath sounds. No wheezing or " rales.   Abdominal:      Palpations: Abdomen is soft.      Tenderness: There is no abdominal tenderness.   Musculoskeletal:         General: No swelling.      Cervical back: Neck supple.   Skin:     General: Skin is warm and dry.      Capillary Refill: Capillary refill takes less than 2 seconds.   Neurological:      Mental Status: He is alert and oriented to person, place, and time.   Psychiatric:         Mood and Affect: Mood normal.          Discussion with Family: Updated  (sister) via phone.    Discharge instructions/Information to patient and family:   See after visit summary for information provided to patient and family.      Provisions for Follow-Up Care:  See after visit summary for information related to follow-up care and any pertinent home health orders.      Mobility at time of Discharge:   Basic Mobility Inpatient Raw Score: 22  JH-HLM Goal: 7: Walk 25 feet or more  JH-HLM Achieved: 7: Walk 25 feet or more  HLM Goal achieved. Continue to encourage appropriate mobility.     Disposition:   Home    Planned Readmission: anticipate no    Discharge Medications:  See after visit summary for reconciled discharge medications provided to patient and/or family.      Administrative Statements   Discharge Statement:  I have spent a total time of >35 minutes in caring for this patient on the day of the visit/encounter. >30 minutes of time was spent on: Patient and family education, Documenting in the medical record, and Reviewing / ordering tests, medicine, procedures  .    **Please Note: This note may have been constructed using a voice recognition system**

## 2025-04-30 NOTE — ASSESSMENT & PLAN NOTE
In terms of hyponatremia essentially stable about 130 discussed with nephrology continue diuretics continue fluid restriction 1.5 L.  Will do a BMP in 1 week and also referral to outpatient nephrology will supplement potassium to make sodium better

## 2025-04-30 NOTE — PLAN OF CARE
Problem: PAIN - ADULT  Goal: Verbalizes/displays adequate comfort level or baseline comfort level  Description: Interventions:- Encourage patient to monitor pain and request assistance- Assess pain using appropriate pain scale- Administer analgesics based on type and severity of pain and evaluate response- Implement non-pharmacological measures as appropriate and evaluate response- Consider cultural and social influences on pain and pain management- Notify physician/advanced practitioner if interventions unsuccessful or patient reports new pain  Outcome: Progressing     Problem: INFECTION - ADULT  Goal: Absence or prevention of progression during hospitalization  Description: INTERVENTIONS:- Assess and monitor for signs and symptoms of infection- Monitor lab/diagnostic results- Monitor all insertion sites, i.e. indwelling lines, tubes, and drains- Monitor endotracheal if appropriate and nasal secretions for changes in amount and color- Burns appropriate cooling/warming therapies per order- Administer medications as ordered- Instruct and encourage patient and family to use good hand hygiene technique- Identify and instruct in appropriate isolation precautions for identified infection/condition  Outcome: Progressing  Goal: Absence of fever/infection during neutropenic period  Description: INTERVENTIONS:- Monitor WBC  Outcome: Progressing     Problem: SAFETY ADULT  Goal: Patient will remain free of falls  Description: INTERVENTIONS:- Educate patient/family on patient safety including physical limitations- Instruct patient to call for assistance with activity - Consult OT/PT to assist with strengthening/mobility - Keep Call bell within reach- Keep bed low and locked with side rails adjusted as appropriate- Keep care items and personal belongings within reach- Initiate and maintain comfort rounds- Make Fall Risk Sign visible to staff- Offer Toileting every 2 Hours, in advance of need- Initiate/Maintain bed/ chair  alarm- Obtain necessary fall risk management equipment: walker, non skid socks- Apply yellow socks and bracelet for high fall risk patients- Consider moving patient to room near nurses station  Outcome: Progressing  Goal: Maintain or return to baseline ADL function  Description: INTERVENTIONS:-  Assess patient's ability to carry out ADLs; assess patient's baseline for ADL function and identify physical deficits which impact ability to perform ADLs (bathing, care of mouth/teeth, toileting, grooming, dressing, etc.)- Assess/evaluate cause of self-care deficits - Assess range of motion- Assess patient's mobility; develop plan if impaired- Assess patient's need for assistive devices and provide as appropriate- Encourage maximum independence but intervene and supervise when necessary- Involve family in performance of ADLs- Assess for home care needs following discharge - Consider OT consult to assist with ADL evaluation and planning for discharge- Provide patient education as appropriate  Outcome: Progressing  Goal: Maintains/Returns to pre admission functional level  Description: INTERVENTIONS:- Perform AM-PAC 6 Click Basic Mobility/ Daily Activity assessment daily.- Set and communicate daily mobility goal to care team and patient/family/caregiver. - Collaborate with rehabilitation services on mobility goals if consulted- Perform Range of Motion 2 times a day.- Reposition patient every 2 hours.- Dangle patient 2 times a day- Stand patient 2 times a day- Ambulate patient 2 times a day- Out of bed to chair 2 times a day - Out of bed for meals 2 times a day- Out of bed for toileting- Record patient progress and toleration of activity level   Outcome: Progressing     Problem: DISCHARGE PLANNING  Goal: Discharge to home or other facility with appropriate resources  Description: INTERVENTIONS:- Identify barriers to discharge w/patient and caregiver- Arrange for needed discharge resources and transportation as appropriate-  Identify discharge learning needs (meds, wound care, etc.)- Arrange for interpretive services to assist at discharge as needed- Refer to Case Management Department for coordinating discharge planning if the patient needs post-hospital services based on physician/advanced practitioner order or complex needs related to functional status, cognitive ability, or social support system  Outcome: Progressing     Problem: Knowledge Deficit  Goal: Patient/family/caregiver demonstrates understanding of disease process, treatment plan, medications, and discharge instructions  Description: Complete learning assessment and assess knowledge base.Interventions:- Provide teaching at level of understanding- Provide teaching via preferred learning methods  Outcome: Progressing     Problem: CARDIOVASCULAR - ADULT  Goal: Maintains optimal cardiac output and hemodynamic stability  Description: INTERVENTIONS:- Monitor I/O, vital signs and rhythm- Monitor for S/S and trends of decreased cardiac output- Administer and titrate ordered vasoactive medications to optimize hemodynamic stability- Assess quality of pulses, skin color and temperature- Assess for signs of decreased coronary artery perfusion- Instruct patient to report change in severity of symptoms  Outcome: Progressing  Goal: Absence of cardiac dysrhythmias or at baseline rhythm  Description: INTERVENTIONS:- Continuous cardiac monitoring, vital signs, obtain 12 lead EKG if ordered- Administer antiarrhythmic and heart rate control medications as ordered- Monitor electrolytes and administer replacement therapy as ordered  Outcome: Progressing     Problem: RESPIRATORY - ADULT  Goal: Achieves optimal ventilation and oxygenation  Description: INTERVENTIONS:- Assess for changes in respiratory status- Assess for changes in mentation and behavior- Position to facilitate oxygenation and minimize respiratory effort- Oxygen administered by appropriate delivery if ordered- Initiate smoking  cessation education as indicated- Encourage broncho-pulmonary hygiene including cough, deep breathe, Incentive Spirometry- Assess the need for suctioning and aspirate as needed- Assess and instruct to report SOB or any respiratory difficulty- Respiratory Therapy support as indicated  Outcome: Progressing     Problem: METABOLIC, FLUID AND ELECTROLYTES - ADULT  Goal: Electrolytes maintained within normal limits  Description: INTERVENTIONS:- Monitor labs and assess patient for signs and symptoms of electrolyte imbalances- Administer electrolyte replacement as ordered- Monitor response to electrolyte replacements, including repeat lab results as appropriate- Instruct patient on fluid and nutrition as appropriate  Outcome: Progressing  Goal: Fluid balance maintained  Description: INTERVENTIONS:- Monitor labs - Monitor I/O and WT- Instruct patient on fluid and nutrition as appropriate- Assess for signs & symptoms of volume excess or deficit  Outcome: Progressing  Goal: Glucose maintained within target range  Description: INTERVENTIONS:- Monitor Blood Glucose as ordered- Assess for signs and symptoms of hyperglycemia and hypoglycemia- Administer ordered medications to maintain glucose within target range- Assess nutritional intake and initiate nutrition service referral as needed  Outcome: Progressing

## 2025-04-30 NOTE — CASE MANAGEMENT
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     Scleroderma ([facility]) 2025     Stage 3a chronic kidney disease (CKD) ([facility]) 2025     Cardiogenic shock ([facility]) 2025     Atrial fibrillation with RVR ([facility]) 2025     Syncope 2024     Chest pain 2024     Acute on chronic systolic congestive heart failure ([facility]) 2024     Primary hypertension 2024     Hypomagnesemia 2023     Methamphetamine abuse ([facility]) 2023     Tobacco abuse 2023     D-dimer, elevated 2023     Cardiomyopathy ([facility]) 10/09/2023     Acute kidney injury superimposed on stage 3a chronic kidney disease ([facility]) 10/08/2023     Acute on chronic combined systolic and diastolic congestive heart failure ([facility]) 10/07/2023     Hypertensive emergency 10/07/2023     Anxiety      Depression      Other emphysema ([facility])      GERD (gastroesophageal reflux disease)      Gout      Hypertensive urgency      Neuropathy      Raynaud's disease      Unilateral inguinal hernia, without obstruction or gangrene, not specified as recurrent      PSS (progressive systemic sclerosis) ([facility]) 10/25/2021     Near syncope 10/25/2021     Osteomyelitis ([facility]) 10/25/2021     Elevated troponin 10/25/2021     Acute encephalopathy 10/25/2021       LOS (days): 5  Geometric Mean LOS (GMLOS) (days): 3.9  Days to GMLOS:-0.9     OBJECTIVE:  Risk of Unplanned  Readmission Score: 57.41         Current admission status: Inpatient   Preferred Pharmacy:   [facility] - [place] - [address]  [address]  [place] [address]  Phone: [phone] Fax: [phone]    [facility] - [place] - [address],  [address],  First [address] [facility] [place] [address]  Phone: [phone] Fax: [phone]    [id number] - [facility] [place] - [address]  [address] [address]  Phone: [phone] Fax: [phone]    Primary Care Provider: Juan Galindo DO    Primary Insurance: [facility] RE[employer]  Secondary Insurance:     DISCHARGE DETAILS:        CM met with patient to review discharge today. Family will provide patient transport home later today.    CM spoke to patient at the bedside, reviewed DC IMM with patient and informed that patient can stay an additional 4 hours for reconsidering appealing the discharge as the medicare rights were review on the day of discharge. Pt verbalized understanding and feels ready to go home and does not intend to stay 4 hours to reconsider. IMM placed in bon for filing.    CM received email from patients outpatient PCP informing CM of patients hospital discharge follow up appt : 5/6/25 at 1630.  CM placed information on AVS.                                                                                 IMM Given (Date):: 04/30/25  IMM Given to:: Patient  Family notified:: appeal rights provided to patient

## 2025-04-30 NOTE — DISCHARGE INSTR - AVS FIRST PAGE
Hold coumadin till inr recheck on 5/2 call coumadin clinic and find out results and if u can restart coumadin new dose 3 mg   DO NOT START COUMADIN TILL LAB REPEAT AND YOU TALK TO COUMADIN CLINIC 5/2

## 2025-04-30 NOTE — ASSESSMENT & PLAN NOTE
Lab Results   Component Value Date    EGFR 53 04/30/2025    EGFR 45 04/29/2025    EGFR 35 04/28/2025    CREATININE 1.42 (H) 04/30/2025    CREATININE 1.63 (H) 04/29/2025    CREATININE 1.99 (H) 04/28/2025     Suspect in setting of cardiogenic shock, improving  On CKD stage III with a baseline creatinine 1.5 his creatinine is below baseline   Nephrology has been following

## 2025-04-30 NOTE — ASSESSMENT & PLAN NOTE
Rate improved discussed cardiology continue digoxin and continue metoprolol-INR is mildly supratherapeutic discussed with the patient and he is to hold Coumadin for the next 2 days and repeat INR on Friday and to call Coumadin clinic for the results I will send the new dose of Coumadin to the pharmacy lower at 3 mg daily he needs to asked the Coumadin clinic on 5/2 if he is to restart the new dosing

## 2025-04-30 NOTE — ASSESSMENT & PLAN NOTE
Wt Readings from Last 3 Encounters:   04/30/25 77.1 kg (170 lb)   04/24/25 76.8 kg (169 lb 5 oz)   04/24/25 76.8 kg (169 lb 6.4 oz)       GDMT for heart failure  Diuretic: Lasix 40mg PO daily (home dose, held)  Beta-blocker: Metoprolol 25mg Q12 hours (home, held)  Renin-angiotensin inhibition: Held due to hypotension, unable to afford Entresto ( ARNI / ACE inhibitor/ ARB)  MRA: Spirolactone 12.5mg daily (home)  Sodium glucose cotransporter 2 (SGLT2 ) inhibitor: Patient unable to financially afford medication at this time     [employer] AICD: Interrogated on 4/26     Patient is examines euvolemic no shortness of breath no leg edema discussed with cardiology will switch over to p.o. he takes Lasix 40 mg daily at home will switch over to that  Over the weekend patient was on milrinone secondary to cardiogenic shock with ICU since then lactic acid has cleared and he was weaned off milrinone. Due to being on midodrine for bp to be up will dc aldactone for now on outpatient cards can consider restart as outpatient if bp will be stable

## 2025-05-01 LAB
BACTERIA BLD CULT: NORMAL
BACTERIA BLD CULT: NORMAL

## 2025-05-01 NOTE — UTILIZATION REVIEW
NOTIFICATION OF ADMISSION DISCHARGE   This is a Notification of Discharge from [facility]. Please be advised that this patient has been discharge from our facility. Below you will find the admission and discharge date and time including the patient’s disposition.   UTILIZATION REVIEW CONTACT:  Utilization Review Assistants  Network Utilization Review Department  Phone: [phone] x carefully listen to the prompts. All voicemails are confidential.  Email: [email or web address]     ADMISSION INFORMATION  PRESENTATION DATE: 4/25/2025  7:22 AM  OBERVATION ADMISSION DATE: N/A  INPATIENT ADMISSION DATE: 4/25/25  1:16 PM   DISCHARGE DATE: 4/30/2025 11:25 AM   DISPOSITION:Home/Self Care    Network Utilization Review Department  ATTENTION: Please call with any questions or concerns to [phone] and carefully listen to the prompts so that you are directed to the right person. All voicemails are confidential.   For Discharge needs, contact Care Management DC Support Team at [phone] opt. [phone]  Send all requests for admission clinical reviews, approved or denied determinations and any other requests to dedicated fax number below belonging to the campus where the patient is receiving treatment. List of dedicated fax numbers for the Facilities:  FACILITY NAME UR FAX NUMBER   ADMISSION DENIALS (Administrative/Medical Necessity) [phone]   DISCHARGE SUPPORT TEAM ([facility]) [phone]   PARENT CHILD HEALTH (Maternity/NICU/Pediatrics) [phone]   [facility] [phone]   [facility] [phone]   [facility] [phone]   [facility] [phone]   [facility] [phone]   [facility] [phone]   [facility] [phone]   [facility] [phone]   [facility]  [facility] [phone]   [facility] [phone]   [facility] [phone]   [facility] [phone]

## 2025-05-04 ENCOUNTER — HOSPITAL ENCOUNTER (EMERGENCY)
Facility: HOSPITAL | Age: 60
Discharge: HOME | End: 2025-05-04
Attending: EMERGENCY MEDICINE | Admitting: EMERGENCY MEDICINE
Payer: COMMERCIAL

## 2025-05-04 ENCOUNTER — APPOINTMENT (EMERGENCY)
Dept: RADIOLOGY | Facility: HOSPITAL | Age: 60
End: 2025-05-04
Payer: COMMERCIAL

## 2025-05-04 VITALS
OXYGEN SATURATION: 95 % | TEMPERATURE: 98.3 F | SYSTOLIC BLOOD PRESSURE: 140 MMHG | WEIGHT: 174.6 LBS | BODY MASS INDEX: 25.78 KG/M2 | DIASTOLIC BLOOD PRESSURE: 75 MMHG | RESPIRATION RATE: 16 BRPM | HEART RATE: 80 BPM

## 2025-05-04 DIAGNOSIS — M25.551 RIGHT HIP PAIN: Primary | ICD-10-CM

## 2025-05-04 PROCEDURE — 72100 X-RAY EXAM L-S SPINE 2/3 VWS: CPT

## 2025-05-04 PROCEDURE — 99283 EMERGENCY DEPT VISIT LOW MDM: CPT

## 2025-05-04 PROCEDURE — 73502 X-RAY EXAM HIP UNI 2-3 VIEWS: CPT

## 2025-05-04 PROCEDURE — 99284 EMERGENCY DEPT VISIT MOD MDM: CPT | Performed by: PHYSICIAN ASSISTANT

## 2025-05-04 RX ORDER — PREDNISONE 20 MG/1
40 TABLET ORAL ONCE
Status: COMPLETED | OUTPATIENT
Start: 2025-05-04 | End: 2025-05-04

## 2025-05-04 RX ORDER — PREDNISONE 20 MG/1
40 TABLET ORAL DAILY
Qty: 10 TABLET | Refills: 0 | Status: SHIPPED | OUTPATIENT
Start: 2025-05-04 | End: 2025-05-09

## 2025-05-04 RX ORDER — OXYCODONE HYDROCHLORIDE 5 MG/1
5 TABLET ORAL ONCE
Refills: 0 | Status: COMPLETED | OUTPATIENT
Start: 2025-05-04 | End: 2025-05-04

## 2025-05-04 RX ORDER — OXYCODONE AND ACETAMINOPHEN 5; 325 MG/1; MG/1
1 TABLET ORAL EVERY 6 HOURS PRN
Qty: 12 TABLET | Refills: 0 | Status: SHIPPED | OUTPATIENT
Start: 2025-05-04

## 2025-05-04 RX ADMIN — PREDNISONE 40 MG: 20 TABLET ORAL at 20:28

## 2025-05-04 RX ADMIN — OXYCODONE HYDROCHLORIDE 5 MG: 5 TABLET ORAL at 20:28

## 2025-05-05 NOTE — ED PROVIDER NOTES
Time reflects when diagnosis was documented in both MDM as applicable and the Disposition within this note       Time User Action Codes Description Comment    5/4/2025  9:08 PM Julissa Woodyna Add [M25.551] Right hip pain           ED Disposition       ED Disposition   Discharge    Condition   Stable    Date/Time   Sun May 4, 2025  9:08 PM    Comment   Britton Batista discharge to home/self care.                   Assessment & Plan       Medical Decision Making  The patient is a 60-year-old male who presents with right hip pain.  The patient states he does not have any falls or traumas.  The patient is having pain mostly over the right posterior hip.  Worse with certain positions and movements.  He denies any fevers chills falls or traumas.  He denies any numbness tingling weakness.  States he has some low back pain.  He states he has chronic back pain.  Denies any bowel or bladder incontinence.  Patient states that the pain started yesterday gradually worsened.    Patient's x-ray is unremarkable for any acute etiology.  The patient was given pain medication in the emergency department and will continue this as an outpatient follow-up with orthopedics in agreement treatment plan.  Condition is similar to a bursitis versus msk etiology    Amount and/or Complexity of Data Reviewed  Radiology: ordered and independent interpretation performed. Decision-making details documented in ED Course.    Risk  Prescription drug management.             Medications   oxyCODONE (ROXICODONE) IR tablet 5 mg (5 mg Oral Given 5/4/25 2028)   predniSONE tablet 40 mg (40 mg Oral Given 5/4/25 2028)       ED Risk Strat Scores                    No data recorded        SBIRT 20yo+      Flowsheet Row Most Recent Value   Initial Alcohol Screen: US AUDIT-C     1. How often do you have a drink containing alcohol? 0 Filed at: 05/04/2025 1957   2. How many drinks containing alcohol do you have on a typical day you are drinking?  0 Filed at: 05/04/2025  "1957   3a. Male UNDER 65: How often do you have five or more drinks on one occasion? 0 Filed at: 05/04/2025 1957   Audit-C Score 0 Filed at: 05/04/2025 1957   SILVIA: How many times in the past year have you...    Used an illegal drug or used a prescription medication for non-medical reasons? 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    CHF (congestive heart failure) (HCC)     Coagulopathy (HCC) 04/26/2025    Depression     Emphysema of lung (HCC)     GERD (gastroesophageal reflux disease)     Gout     Hyperammonemia ([facility]) 04/26/2025    Hypertension     Metabolic encephalopathy 03/22/2025    Neuropathy     Raynaud's disease     Right inguinal hernia     Scleroderma (HCC)     Shock ([facility]) 04/26/2025      Past Surgical History:   Procedure Laterality Date    AMPUTATION Right     pt had tip of \"pointer finger\" d/t scleraderma    CARDIAC PACEMAKER PLACEMENT      FINGER AMPUTATION Left 01/31/2024    Procedure: AMPUTATION FINGER, LEFT INDEX FINGER CPT: 09620;  Surgeon: Pedro Vazquez MD;  Location:  MAIN OR;  Service: Orthopedics    HERNIA REPAIR Left     times 2    TN AMP F/TH 1/2 JT/PHALANX W/NEURECT W/DIR CLSR Right 01/23/2024    Procedure: AMPUTATION FINGER, RIGHT MIDDLE;  Surgeon: Pedro Vazquez MD;  Location: [facility] Main OR;  Service: Orthopedics    TN RPR 1ST INGUN HRNA AGE 5 YRS/> REDUCIBLE Right 03/03/2023    Procedure: OPEN INGUINAL HERNIA REPAIR WITH MESH;  Surgeon: Temo Jackson DO;  Location: [facility] MAIN OR;  Service: General      Family History   Problem Relation Age of Onset    Hypertension Father       Social History     Tobacco Use    Smoking status: Every Day     " Current packs/day: 1.00     Average packs/day: 1 pack/day for 45.3 years (45.3 ttl pk-yrs)     Types: Cigarettes     Start date: 2000    Smokeless tobacco: Never    Tobacco comments:     Last cigarette 0430   Vaping Use    Vaping status: Never Used   Substance Use Topics    Alcohol use: Not Currently     Comment: occasional    Drug use: Yes     Types: Marijuana, Methamphetamines     Comment: Hx meth use      E-Cigarette/Vaping    E-Cigarette Use Never User       E-Cigarette/Vaping Substances    Nicotine No     THC No     CBD No     Flavoring No     Other No     Unknown No       I have reviewed and agree with the history as documented.     The patient is a 60-year-old male who presents with right hip pain.  The patient states he does not have any falls or traumas.  The patient is having pain mostly over the right posterior hip.  Worse with certain positions and movements.  He denies any fevers chills falls or traumas.  He denies any numbness tingling weakness.  States he has some low back pain.  He states he has chronic back pain.  Denies any bowel or bladder incontinence.  Patient states that the pain started yesterday gradually worsened.          Review of Systems   All other systems reviewed and are negative.          Objective       ED Triage Vitals [05/04/25 1958]   Temperature Pulse Blood Pressure Respirations SpO2 Patient Position - Orthostatic VS   98.3 °F (36.8 °C) 80 140/75 16 95 % Lying      Temp Source Heart Rate Source BP Location FiO2 (%) Pain Score    Temporal Monitor Left arm -- 5      Vitals      Date and Time Temp Pulse SpO2 Resp BP Pain Score FACES Pain Rating User   05/04/25 2028 -- -- -- -- -- 5 --    05/04/25 1958 98.3 °F (36.8 °C) 80 95 % 16 140/75 5 -- AD            Physical Exam  Vitals and nursing note reviewed.   Constitutional:       General: He is not in acute distress.     Appearance: He is well-developed.   HENT:      Head: Normocephalic and atraumatic.   Eyes:      Pupils: Pupils are  equal, round, and reactive to light.   Cardiovascular:      Rate and Rhythm: Normal rate.   Pulmonary:      Effort: Pulmonary effort is normal.   Musculoskeletal:      Cervical back: Normal range of motion.      Thoracic back: Normal.      Lumbar back: No tenderness or bony tenderness. Negative right straight leg raise test and negative left straight leg raise test.      Right hip: Tenderness and bony tenderness present. Normal range of motion.      Right upper leg: Normal.        Legs:    Skin:     General: Skin is warm and dry.      Capillary Refill: Capillary refill takes less than 2 seconds.   Neurological:      General: No focal deficit present.      Mental Status: He is alert and oriented to person, place, and time.   Psychiatric:         Behavior: Behavior normal.         Results Reviewed       None            XR hip/pelv 2-3 vws right if performed    (Results Pending)   XR spine lumbar 2 or 3 views injury    (Results Pending)       Procedures    ED Medication and Procedure Management   Prior to Admission Medications   Prescriptions Last Dose Informant Patient Reported? Taking?   albuterol (ProAir HFA) 90 mcg/act inhaler   No No   Sig: Inhale 2 puffs every 6 (six) hours as needed for wheezing   digoxin (LANOXIN) 0.125 mg tablet   No No   Sig: Take 1 tablet (125 mcg total) by mouth 3 (three) times a week   furosemide (LASIX) 40 mg tablet   No No   Sig: Take 1 tablet (40 mg total) by mouth daily   ipratropium-albuterol (DUO-NEB) 0.5-2.5 mg/3 mL nebulizer solution   No No   Sig: Take 3 mL by nebulization every 6 (six) hours while awake   magnesium Oxide (MAG-OX) 400 mg TABS   No No   Sig: Take 1 tablet (400 mg total) by mouth daily   melatonin 3 mg   No No   Sig: Take 1 tablet (3 mg total) by mouth daily at bedtime   metoprolol tartrate (LOPRESSOR) 25 mg tablet   No No   Sig: Take 1 tablet (25 mg total) by mouth every 12 (twelve) hours   midodrine (PROAMATINE) 2.5 mg tablet   No No   Sig: Take 3 tablets (7.5 mg  total) by mouth 3 (three) times a day before meals   pantoprazole (PROTONIX) 40 mg tablet  Self No No   Sig: Take 1 tablet (40 mg total) by mouth 2 (two) times a day   warfarin (COUMADIN) 3 mg tablet   No No   Sig: Take 1 tablet (3 mg total) by mouth daily Only restart if ur inr on 5/2 is less then 3 Do not start before May 2, 2025.      Facility-Administered Medications: None     Discharge Medication List as of 5/4/2025  9:12 PM        START taking these medications    Details   oxyCODONE-acetaminophen (Percocet) 5-325 mg per tablet Take 1 tablet by mouth every 6 (six) hours as needed for severe pain Max Daily Amount: 4 tablets, Starting Sun 5/4/2025, Normal      predniSONE 20 mg tablet Take 2 tablets (40 mg total) by mouth daily for 5 days, Starting Sun 5/4/2025, Until Fri 5/9/2025, Normal           CONTINUE these medications which have NOT CHANGED    Details   albuterol (ProAir HFA) 90 mcg/act inhaler Inhale 2 puffs every 6 (six) hours as needed for wheezing, Starting Fri 3/28/2025, Normal      digoxin (LANOXIN) 0.125 mg tablet Take 1 tablet (125 mcg total) by mouth 3 (three) times a week, Starting Fri 3/28/2025, Normal      furosemide (LASIX) 40 mg tablet Take 1 tablet (40 mg total) by mouth daily, Starting Fri 3/28/2025, Until Sun 4/27/2025, Normal      ipratropium-albuterol (DUO-NEB) 0.5-2.5 mg/3 mL nebulizer solution Take 3 mL by nebulization every 6 (six) hours while awake, Starting Thu 4/24/2025, Normal      magnesium Oxide (MAG-OX) 400 mg TABS Take 1 tablet (400 mg total) by mouth daily, Starting Fri 3/28/2025, Normal      melatonin 3 mg Take 1 tablet (3 mg total) by mouth daily at bedtime, Starting u 4/24/2025, Until Sat 5/24/2025, Normal      metoprolol tartrate (LOPRESSOR) 25 mg tablet Take 1 tablet (25 mg total) by mouth every 12 (twelve) hours, Starting Sun 4/20/2025, Normal      midodrine (PROAMATINE) 2.5 mg tablet Take 3 tablets (7.5 mg total) by mouth 3 (three) times a day before meals, Starting  Wed 4/30/2025, Normal      pantoprazole (PROTONIX) 40 mg tablet Take 1 tablet (40 mg total) by mouth 2 (two) times a day, Starting Thu 10/10/2024, Until Fri 4/25/2025, Normal      warfarin (COUMADIN) 3 mg tablet Take 1 tablet (3 mg total) by mouth daily Only restart if ur inr on 5/2 is less then 3 Do not start before May 2, 2025., Starting Fri 5/2/2025, Normal             ED SEPSIS DOCUMENTATION   Time reflects when diagnosis was documented in both MDM as applicable and the Disposition within this note       Time User Action Codes Description Comment    5/4/2025  9:08 PM Oscar Woody Add [M25.551] Right hip pain                  Oscar Woody PA-C  05/04/25 0070

## 2025-05-06 ENCOUNTER — PATIENT OUTREACH (OUTPATIENT)
Dept: CASE MANAGEMENT | Facility: OTHER | Age: 60
End: 2025-05-06

## 2025-05-06 NOTE — PROGRESS NOTES
Email received from High Utilizer Committee today.  Committee reviewed and determined a care plan is not appropriate at this time.  Recommendation from committee is for patient to  be connected with Cardio and Nephro for OP follow up.  Patient is referred to OP CM as a Rising Risk Utilizer.    High Utilizer Program initiated and I added myself to care team. OPCM Watch List database updated.     Chart review completed.  Outside records through Care Everywhere requested and refreshed.   Admission or ED Risk Score:  96  There have been 6 admissions and 4 ED visits in the past 6 month.  There are no future appointments noted in EPIC.  Per chart review noted patient is connected with [facility] OP CM Nichol Law.    Phone call made to [facility].  Spoke with Beatrice.  Left message requesting call back from Nichol for the purposes of ensuring patient does not get lost to outpatient followup.  Will await call back.

## 2025-05-08 LAB
DME PARACHUTE DELIVERY DATE ACTUAL: NORMAL
DME PARACHUTE DELIVERY DATE REQUESTED: NORMAL
DME PARACHUTE ITEM DESCRIPTION: NORMAL
DME PARACHUTE ORDER STATUS: NORMAL
DME PARACHUTE SUPPLIER NAME: NORMAL
DME PARACHUTE SUPPLIER PHONE: NORMAL

## 2025-05-09 ENCOUNTER — PATIENT OUTREACH (OUTPATIENT)
Dept: CASE MANAGEMENT | Facility: OTHER | Age: 60
End: 2025-05-09

## 2025-05-09 NOTE — PROGRESS NOTES
"OutPatient  Care Management Note:  [facility] Integrated Care Coordinator did not return my phone call request from 5/7/25.    Unable to locate current open programs with [facility] under Co Ro.    Initial call attempted to patient. No answer.  Voice mail reached identified as patient's sister's phone (Blanche).  Left voicemail message with general contact information with name, title, call back phone number office hours when I am available and message encouraging return call to this CM.      Second telephone attempt made to speak with [facility] WAN RN CM.  Was informed that Nichol is out of the office today and there is no one covering her patients.  Was also informed that patient is enrolled in \"Level 2\" RN CM and a \"Level 4 psych\" was \"open at the end of April\".  Requested an email be sent with update as this CM will be out all next week.  Provided my email and my contact information.      At this time, HU/RU program will be closed as patient is open with [facility].  "

## 2025-05-20 ENCOUNTER — PATIENT OUTREACH (OUTPATIENT)
Dept: CASE MANAGEMENT | Facility: OTHER | Age: 60
End: 2025-05-20

## 2025-05-20 NOTE — PROGRESS NOTES
Referral received via email. Referred to High Utilizer Care Plan Committee today to be reviewed for careplan.   No further action to be taken pending determination of case review.   OP CM Dept Watch list updated.

## 2025-05-20 NOTE — PROGRESS NOTES
"LATE ENTRY DUE TO PTO:  5/12/25: Email received from Nichol CHO RN CM ([phone]) with update on this patient.    Patient is being followed by Nichol, mukul  CM (via Phone calls),  [facility] pharmacist who manages patient's INR's and coumadin dosing, and an addiction coordinator who follows with patient.    Per Nichol's email, she had provided education to both patient and sister on appropriated ED utilization \"multiple times\" and has attempted to encourage patient to utilize UCCs, calling the [facility] PCP service after hours, or calling the CM, but patient continues to choose to go to the ED for low acuity symptoms.    Nichol also informs me that his CHF is not well managed as patient does not adhere to low salt diet or to daily weights.   "

## 2025-05-22 ENCOUNTER — HOSPITAL ENCOUNTER (EMERGENCY)
Facility: HOSPITAL | Age: 60
Discharge: HOME | End: 2025-05-22
Attending: EMERGENCY MEDICINE
Payer: COMMERCIAL

## 2025-05-22 ENCOUNTER — APPOINTMENT (EMERGENCY)
Dept: RADIOLOGY | Facility: HOSPITAL | Age: 60
End: 2025-05-22
Payer: COMMERCIAL

## 2025-05-22 VITALS
HEART RATE: 112 BPM | SYSTOLIC BLOOD PRESSURE: 125 MMHG | OXYGEN SATURATION: 98 % | TEMPERATURE: 97.6 F | RESPIRATION RATE: 20 BRPM | DIASTOLIC BLOOD PRESSURE: 95 MMHG

## 2025-05-22 DIAGNOSIS — R06.00 DYSPNEA: Primary | ICD-10-CM

## 2025-05-22 LAB
ALBUMIN SERPL BCG-MCNC: 4.2 G/DL (ref 3.5–5)
ALP SERPL-CCNC: 124 U/L (ref 34–104)
ALT SERPL W P-5'-P-CCNC: 18 U/L (ref 7–52)
ANION GAP SERPL CALCULATED.3IONS-SCNC: 9 MMOL/L (ref 4–13)
APTT PPP: 39 SECONDS (ref 23–34)
AST SERPL W P-5'-P-CCNC: 31 U/L (ref 13–39)
BASOPHILS # BLD AUTO: 0.08 THOUSANDS/ÂΜL (ref 0–0.1)
BASOPHILS NFR BLD AUTO: 1 % (ref 0–1)
BILIRUB SERPL-MCNC: 0.71 MG/DL (ref 0.2–1)
BNP SERPL-MCNC: 1394 PG/ML (ref 0–100)
BUN SERPL-MCNC: 30 MG/DL (ref 5–25)
CALCIUM SERPL-MCNC: 9.5 MG/DL (ref 8.4–10.2)
CARDIAC TROPONIN I PNL SERPL HS: 83 NG/L (ref ?–50)
CHLORIDE SERPL-SCNC: 100 MMOL/L (ref 96–108)
CO2 SERPL-SCNC: 26 MMOL/L (ref 21–32)
CREAT SERPL-MCNC: 1.31 MG/DL (ref 0.6–1.3)
EOSINOPHIL # BLD AUTO: 0.32 THOUSAND/ÂΜL (ref 0–0.61)
EOSINOPHIL NFR BLD AUTO: 4 % (ref 0–6)
ERYTHROCYTE [DISTWIDTH] IN BLOOD BY AUTOMATED COUNT: 17.1 % (ref 11.6–15.1)
GFR SERPL CREATININE-BSD FRML MDRD: 58 ML/MIN/1.73SQ M
GLUCOSE SERPL-MCNC: 83 MG/DL (ref 65–140)
HCT VFR BLD AUTO: 50.2 % (ref 36.5–49.3)
HGB BLD-MCNC: 15.9 G/DL (ref 12–17)
IMM GRANULOCYTES # BLD AUTO: 0.04 THOUSAND/UL (ref 0–0.2)
IMM GRANULOCYTES NFR BLD AUTO: 1 % (ref 0–2)
INR PPP: 1.89 (ref 0.85–1.19)
LYMPHOCYTES # BLD AUTO: 2.08 THOUSANDS/ÂΜL (ref 0.6–4.47)
LYMPHOCYTES NFR BLD AUTO: 24 % (ref 14–44)
MCH RBC QN AUTO: 29 PG (ref 26.8–34.3)
MCHC RBC AUTO-ENTMCNC: 31.7 G/DL (ref 31.4–37.4)
MCV RBC AUTO: 92 FL (ref 82–98)
MONOCYTES # BLD AUTO: 1.27 THOUSAND/ÂΜL (ref 0.17–1.22)
MONOCYTES NFR BLD AUTO: 15 % (ref 4–12)
NEUTROPHILS # BLD AUTO: 4.86 THOUSANDS/ÂΜL (ref 1.85–7.62)
NEUTS SEG NFR BLD AUTO: 55 % (ref 43–75)
NRBC BLD AUTO-RTO: 0 /100 WBCS
PLATELET # BLD AUTO: 280 THOUSANDS/UL (ref 149–390)
PMV BLD AUTO: 10.6 FL (ref 8.9–12.7)
POTASSIUM SERPL-SCNC: 4.4 MMOL/L (ref 3.5–5.3)
PROT SERPL-MCNC: 8.1 G/DL (ref 6.4–8.4)
PROTHROMBIN TIME: 22 SECONDS (ref 12.3–15)
RBC # BLD AUTO: 5.48 MILLION/UL (ref 3.88–5.62)
SODIUM SERPL-SCNC: 135 MMOL/L (ref 135–147)
WBC # BLD AUTO: 8.65 THOUSAND/UL (ref 4.31–10.16)

## 2025-05-22 PROCEDURE — 99285 EMERGENCY DEPT VISIT HI MDM: CPT | Performed by: EMERGENCY MEDICINE

## 2025-05-22 PROCEDURE — 84484 ASSAY OF TROPONIN QUANT: CPT | Performed by: EMERGENCY MEDICINE

## 2025-05-22 PROCEDURE — 99285 EMERGENCY DEPT VISIT HI MDM: CPT

## 2025-05-22 PROCEDURE — 96374 THER/PROPH/DIAG INJ IV PUSH: CPT

## 2025-05-22 PROCEDURE — 83880 ASSAY OF NATRIURETIC PEPTIDE: CPT | Performed by: EMERGENCY MEDICINE

## 2025-05-22 PROCEDURE — 93005 ELECTROCARDIOGRAM TRACING: CPT

## 2025-05-22 PROCEDURE — 71045 X-RAY EXAM CHEST 1 VIEW: CPT

## 2025-05-22 PROCEDURE — 36415 COLL VENOUS BLD VENIPUNCTURE: CPT | Performed by: EMERGENCY MEDICINE

## 2025-05-22 PROCEDURE — 80053 COMPREHEN METABOLIC PANEL: CPT | Performed by: EMERGENCY MEDICINE

## 2025-05-22 PROCEDURE — 85730 THROMBOPLASTIN TIME PARTIAL: CPT | Performed by: EMERGENCY MEDICINE

## 2025-05-22 PROCEDURE — 85610 PROTHROMBIN TIME: CPT | Performed by: EMERGENCY MEDICINE

## 2025-05-22 PROCEDURE — 85025 COMPLETE CBC W/AUTO DIFF WBC: CPT | Performed by: EMERGENCY MEDICINE

## 2025-05-22 RX ORDER — FUROSEMIDE 10 MG/ML
40 INJECTION INTRAMUSCULAR; INTRAVENOUS ONCE
Status: COMPLETED | OUTPATIENT
Start: 2025-05-22 | End: 2025-05-22

## 2025-05-22 RX ADMIN — FUROSEMIDE 40 MG: 10 INJECTION, SOLUTION INTRAMUSCULAR; INTRAVENOUS at 18:07

## 2025-05-22 NOTE — ED PROVIDER NOTES
Time reflects when diagnosis was documented in both MDM as applicable and the Disposition within this note       Time User Action Codes Description Comment    5/22/2025  5:54 PM George Sampson Add [R06.00] Dyspnea           ED Disposition       ED Disposition   Discharge    Condition   Stable    Date/Time   u May 22, 2025  5:54 PM    Comment   Britton Batista discharge to home/self care.                   Assessment & Plan       Medical Decision Making  1618:  Pt appears well, VS reviewed.  No acute distress.  Normal CP exams.  No findings of fluid overload.  Pt on coumadin, no stigmata of thromboembolism.  Placed on monitor.  Plan to complete basic labs including cardiac enzymes, check BNP, EKG and CXR.  Encouraged patient to stop smoking.    1700: Chest x-ray and labs reviewed.  No acute pathology noted.  Chronic troponinemia.  Close follow-up with PCP/cardiology.    Amount and/or Complexity of Data Reviewed  Labs: ordered.     Details: CT chest 4/23/25--No acute abnormality.     Pulmonary fibrosis.     Stable left lower lobe pulmonary nodule. Recommend 12-month follow-up noncontrast CT of the chest.     Debris within the esophagus suggesting reflux.    Radiology: ordered.     Details: CXR  ECG/medicine tests: ordered and independent interpretation performed.     Details: V-paced rhythm 88 bpm, no acute ischemia    Risk  Prescription drug management.             Medications   furosemide (LASIX) injection 40 mg (40 mg Intravenous Given 5/22/25 1807)       ED Risk Strat Scores                    No data recorded        SBIRT 22yo+      Flowsheet Row Most Recent Value   Initial Alcohol Screen: US AUDIT-C     1. How often do you have a drink containing alcohol? 0 Filed at: 05/22/2025 1137   2. How many drinks containing alcohol do you have on a typical day you are drinking?  0 Filed at: 05/22/2025 0596   3a. Male UNDER 65: How often do you have five or more drinks on one occasion? 0 Filed at: 05/22/2025 4646   3b.  FEMALE Any Age, or MALE 65+: How often do you have 4 or more drinks on one occassion? 0 Filed at: 05/22/2025 1544   Audit-C Score 0 Filed at: 05/22/2025 4521   SILVIA: How many times in the past year have you...    Used an illegal drug or used a prescription medication for non-medical reasons? Never Filed at: 05/22/2025 4173                            History of Present Illness       Chief Complaint   Patient presents with    Shortness of Breath     Patient reporting starting today.        Past Medical History[1]   Past Surgical History[2]   Family History[3]   Social History[4]   E-Cigarette/Vaping    E-Cigarette Use Never User       E-Cigarette/Vaping Substances    Nicotine No     THC No     CBD No     Flavoring No     Other No     Unknown No       I have reviewed and agree with the history as documented.       History provided by:  Medical records and patient  Shortness of Breath  Severity:  Mild  Onset quality:  Gradual  Duration:  1 day  Timing:  Constant  Progression:  Unchanged  Chronicity:  Recurrent  Context comment:  Hx of CHF, had some BLACK and orthopnea this morning, denies chest pain  Relieved by:  Nothing  Worsened by:  Nothing  Ineffective treatments:  None tried  Associated symptoms: no abdominal pain, no chest pain, no claudication, no cough, no diaphoresis, no ear pain, no fever, no headaches, no hemoptysis, no neck pain, no PND, no rash, no sore throat, no sputum production, no syncope, no swollen glands, no vomiting and no wheezing    Risk factors: tobacco use        Review of Systems   Constitutional:  Negative for appetite change, chills, diaphoresis, fatigue and fever.   HENT:  Negative for ear pain, rhinorrhea, sore throat and trouble swallowing.    Eyes:  Negative for pain, discharge and visual disturbance.   Respiratory:  Positive for shortness of breath. Negative for apnea, cough, hemoptysis, sputum production, choking, chest tightness, wheezing and stridor.    Cardiovascular:  Negative for  chest pain, palpitations, claudication, leg swelling, syncope and PND.   Gastrointestinal:  Negative for abdominal pain, nausea and vomiting.   Endocrine: Negative for polydipsia, polyphagia and polyuria.   Genitourinary:  Negative for difficulty urinating, dysuria, hematuria and testicular pain.   Musculoskeletal:  Negative for arthralgias, back pain and neck pain.   Skin:  Negative for color change and rash.   Allergic/Immunologic: Negative for immunocompromised state.   Neurological:  Negative for dizziness, seizures, syncope, weakness and headaches.   Hematological:  Negative for adenopathy.   Psychiatric/Behavioral:  Negative for confusion and dysphoric mood.    All other systems reviewed and are negative.          Objective       ED Triage Vitals [05/22/25 1544]   Temperature Pulse Blood Pressure Respirations SpO2 Patient Position - Orthostatic VS   97.6 °F (36.4 °C) 73 (!) 88/72 20 92 % Sitting      Temp Source Heart Rate Source BP Location FiO2 (%) Pain Score    Temporal Monitor Right arm -- No Pain      Vitals      Date and Time Temp Pulse SpO2 Resp BP Pain Score FACES Pain Rating User   05/22/25 1800 -- 112 98 % 20 125/95 -- -- SB   05/22/25 1730 -- 106 99 % 17 111/74 -- -- SB   05/22/25 1630 -- 100 93 % 16 148/95 -- -- BF   05/22/25 1544 97.6 °F (36.4 °C) 73 92 % 20 88/72 No Pain -- AS            Physical Exam  Vitals and nursing note reviewed.   Constitutional:       General: He is not in acute distress.     Appearance: Normal appearance. He is not ill-appearing, toxic-appearing or diaphoretic.      Comments: Lying semi-fowlers in no acute distress watching TV   HENT:      Head: Normocephalic and atraumatic.      Nose: Nose normal. No congestion or rhinorrhea.      Mouth/Throat:      Mouth: Mucous membranes are moist.      Pharynx: Oropharynx is clear. No oropharyngeal exudate or posterior oropharyngeal erythema.     Eyes:      General:         Right eye: No discharge.         Left eye: No discharge.        Cardiovascular:      Rate and Rhythm: Normal rate and regular rhythm.      Pulses: Normal pulses.      Heart sounds: Normal heart sounds. No murmur heard.     No gallop.   Pulmonary:      Effort: Pulmonary effort is normal. No respiratory distress.      Breath sounds: Normal breath sounds. No stridor. No wheezing, rhonchi or rales.   Chest:      Chest wall: No tenderness.   Abdominal:      General: Bowel sounds are normal. There is no distension.      Palpations: Abdomen is soft. There is no mass.      Tenderness: There is no abdominal tenderness. There is no right CVA tenderness, left CVA tenderness, guarding or rebound.      Hernia: No hernia is present.     Musculoskeletal:         General: Normal range of motion.      Cervical back: Normal range of motion and neck supple.      Right lower leg: No tenderness. No edema.      Left lower leg: No tenderness. No edema.     Skin:     General: Skin is warm and dry.      Capillary Refill: Capillary refill takes less than 2 seconds.     Neurological:      General: No focal deficit present.      Mental Status: He is alert and oriented to person, place, and time.      Cranial Nerves: No cranial nerve deficit.      Sensory: No sensory deficit.      Motor: No weakness.      Coordination: Coordination normal.      Gait: Gait normal.      Deep Tendon Reflexes: Reflexes normal.     Psychiatric:         Mood and Affect: Mood normal.         Behavior: Behavior normal.         Thought Content: Thought content normal.         Judgment: Judgment normal.         Results Reviewed       Procedure Component Value Units Date/Time    Protime-INR [[id number]]  (Abnormal) Collected: 05/22/25 1715    Lab Status: Final result Specimen: Blood from Arm, Left Updated: 05/22/25 1752     Protime 22.0 seconds      INR 1.89    Narrative:      INR Therapeutic Range    Indication                                             INR Range      Atrial Fibrillation                                                2.0-3.0  Hypercoagulable State                                    2.0.2.3  Left Ventricular Asist Device                            2.0-3.0  Mechanical Heart Valve                                  -    Aortic(with afib, MI, embolism, HF, LA enlargement,    and/or coagulopathy)                                     2.0-3.0 (2.5-3.5)     Mitral                                                             2.5-3.5  Prosthetic/Bioprosthetic Heart Valve               2.0-3.0  Venous thromboembolism (VTE: VT, PE        2.0-3.0    APTT [[id number]]  (Abnormal) Collected: 05/22/25 1715    Lab Status: Final result Specimen: Blood from Arm, Left Updated: 05/22/25 1752     PTT 39 seconds     Comprehensive metabolic panel [[id number]]  (Abnormal) Collected: 05/22/25 1720    Lab Status: Final result Specimen: Blood from Arm, Left Updated: 05/22/25 1751     Sodium 135 mmol/L      Potassium 4.4 mmol/L      Chloride 100 mmol/L      CO2 26 mmol/L      ANION GAP 9 mmol/L      BUN 30 mg/dL      Creatinine 1.31 mg/dL      Glucose 83 mg/dL      Calcium 9.5 mg/dL      AST 31 U/L      ALT 18 U/L      Alkaline Phosphatase 124 U/L      Total Protein 8.1 g/dL      Albumin 4.2 g/dL      Total Bilirubin 0.71 mg/dL      eGFR 58 ml/min/1.73sq m     Narrative:      [employer] guidelines for Chronic Kidney Disease (CKD):     Stage 1 with normal or high GFR (GFR > 90 mL/min/1.73 square meters)    Stage 2 Mild CKD (GFR = 60-89 mL/min/1.73 square meters)    Stage 3A Moderate CKD (GFR = 45-59 mL/min/1.73 square meters)    Stage 3B Moderate CKD (GFR = 30-44 mL/min/1.73 square meters)    Stage 4 Severe CKD (GFR = 15-29 mL/min/1.73 square meters)    Stage 5 End Stage CKD (GFR <15 mL/min/1.73 square meters)  Note: GFR calculation is accurate only with a steady state creatinine    B-Type Natriuretic Peptide(BNP) [[id number]]  (Abnormal) Collected: 05/22/25 1648    Lab Status: Final result Specimen: Blood from Arm, Right Updated:  05/22/25 1739     BNP 1,394 pg/mL     HS Troponin 0hr (reflex protocol) [[id number]]  (Abnormal) Collected: 05/22/25 1648    Lab Status: Final result Specimen: Blood from Arm, Right Updated: 05/22/25 1717     hs TnI 0hr 83 ng/L     CBC and differential [[id number]]  (Abnormal) Collected: 05/22/25 1648    Lab Status: Final result Specimen: Blood from Arm, Right Updated: 05/22/25 1654     WBC 8.65 Thousand/uL      RBC 5.48 Million/uL      Hemoglobin 15.9 g/dL      Hematocrit 50.2 %      MCV 92 fL      MCH 29.0 pg      MCHC 31.7 g/dL      RDW 17.1 %      MPV 10.6 fL      Platelets 280 Thousands/uL      nRBC 0 /100 WBCs      Segmented % 55 %      Immature Grans % 1 %      Lymphocytes % 24 %      Monocytes % 15 %      Eosinophils Relative 4 %      Basophils Relative 1 %      Absolute Neutrophils 4.86 Thousands/µL      Absolute Immature Grans 0.04 Thousand/uL      Absolute Lymphocytes 2.08 Thousands/µL      Absolute Monocytes 1.27 Thousand/µL      Eosinophils Absolute 0.32 Thousand/µL      Basophils Absolute 0.08 Thousands/µL             XR chest 1 view portable   Final Interpretation by Trina Hoyt MD (05/22 1715)      No acute cardiopulmonary disease.      Enlarged cardiac silhouette.            Workstation performed: [id number]             Procedures    ED Medication and Procedure Management   Prior to Admission Medications   Prescriptions Last Dose Informant Patient Reported? Taking?   albuterol (ProAir HFA) 90 mcg/act inhaler   No No   Sig: Inhale 2 puffs every 6 (six) hours as needed for wheezing   digoxin (LANOXIN) 0.125 mg tablet   No No   Sig: Take 1 tablet (125 mcg total) by mouth 3 (three) times a week   furosemide (LASIX) 40 mg tablet   No No   Sig: Take 1 tablet (40 mg total) by mouth daily   ipratropium-albuterol (DUO-NEB) 0.5-2.5 mg/3 mL nebulizer solution   No No   Sig: Take 3 mL by nebulization every 6 (six) hours while awake   magnesium Oxide (MAG-OX) 400 mg TABS   No No   Sig: Take 1 tablet (400 mg  total) by mouth daily   melatonin 3 mg   No No   Sig: Take 1 tablet (3 mg total) by mouth daily at bedtime   metoprolol tartrate (LOPRESSOR) 25 mg tablet   No No   Sig: Take 1 tablet (25 mg total) by mouth every 12 (twelve) hours   midodrine (PROAMATINE) 2.5 mg tablet   No No   Sig: Take 3 tablets (7.5 mg total) by mouth 3 (three) times a day before meals   oxyCODONE-acetaminophen (Percocet) 5-325 mg per tablet   No No   Sig: Take 1 tablet by mouth every 6 (six) hours as needed for severe pain Max Daily Amount: 4 tablets   pantoprazole (PROTONIX) 40 mg tablet  Self No No   Sig: Take 1 tablet (40 mg total) by mouth 2 (two) times a day   warfarin (COUMADIN) 3 mg tablet   No No   Sig: Take 1 tablet (3 mg total) by mouth daily Only restart if ur inr on 5/2 is less then 3 Do not start before May 2, 2025.      Facility-Administered Medications: None     Discharge Medication List as of 5/22/2025  5:54 PM        CONTINUE these medications which have NOT CHANGED    Details   albuterol (ProAir HFA) 90 mcg/act inhaler Inhale 2 puffs every 6 (six) hours as needed for wheezing, Starting Fri 3/28/2025, Normal      digoxin (LANOXIN) 0.125 mg tablet Take 1 tablet (125 mcg total) by mouth 3 (three) times a week, Starting Fri 3/28/2025, Normal      furosemide (LASIX) 40 mg tablet Take 1 tablet (40 mg total) by mouth daily, Starting Fri 3/28/2025, Until Sun 4/27/2025, Normal      ipratropium-albuterol (DUO-NEB) 0.5-2.5 mg/3 mL nebulizer solution Take 3 mL by nebulization every 6 (six) hours while awake, Starting Thu 4/24/2025, Normal      magnesium Oxide (MAG-OX) 400 mg TABS Take 1 tablet (400 mg total) by mouth daily, Starting Fri 3/28/2025, Normal      melatonin 3 mg Take 1 tablet (3 mg total) by mouth daily at bedtime, Starting Thu 4/24/2025, Until Sat 5/24/2025, Normal      metoprolol tartrate (LOPRESSOR) 25 mg tablet Take 1 tablet (25 mg total) by mouth every 12 (twelve) hours, Starting Sun 4/20/2025, Normal      midodrine  "(PROAMATINE) 2.5 mg tablet Take 3 tablets (7.5 mg total) by mouth 3 (three) times a day before meals, Starting Wed 4/30/2025, Normal      oxyCODONE-acetaminophen (Percocet) 5-325 mg per tablet Take 1 tablet by mouth every 6 (six) hours as needed for severe pain Max Daily Amount: 4 tablets, Starting Sun 5/4/2025, Normal      pantoprazole (PROTONIX) 40 mg tablet Take 1 tablet (40 mg total) by mouth 2 (two) times a day, Starting Thu 10/10/2024, Until Fri 4/25/2025, Normal      warfarin (COUMADIN) 3 mg tablet Take 1 tablet (3 mg total) by mouth daily Only restart if ur inr on 5/2 is less then 3 Do not start before May 2, 2025., Starting Fri 5/2/2025, Normal           No discharge procedures on file.  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    CHF (congestive heart failure) (HCC)     Coagulopathy (HCC) 04/26/2025    Depression     Emphysema of lung (HCC)     GERD (gastroesophageal reflux disease)     Gout     Hyperammonemia (HCC) 04/26/2025    Hypertension     Metabolic encephalopathy 03/22/2025    Neuropathy     Raynaud's disease     Right inguinal hernia     Scleroderma (HCC)     Shock (HCC) 04/26/2025   [2]   Past Surgical History:  Procedure Laterality Date    AMPUTATION Right     pt had tip of \"pointer finger\" d/t scleraderma    CARDIAC PACEMAKER PLACEMENT      FINGER AMPUTATION Left 01/31/2024    Procedure: AMPUTATION FINGER, LEFT INDEX FINGER CPT: 13572;  Surgeon: Pedro Vazquez MD;  Location:  MAIN OR;  Service: Orthopedics    HERNIA REPAIR Left     times 2    [place] AMP F/TH 1/2 JT/PHALANX " W/NEURECT W/DIR CLSR Right 01/23/2024    Procedure: AMPUTATION FINGER, RIGHT MIDDLE;  Surgeon: Pedro Vazquez MD;  Location: [facility];  Service: Orthopedics    IN RPR 1ST INGUN HRNA AGE 5 YRS/> REDUCIBLE Right 03/03/2023    Procedure: OPEN INGUINAL HERNIA REPAIR WITH MESH;  Surgeon: Temo Jackson DO;  Location: [facility];  Service: General   [3]   Family History  Problem Relation Name Age of Onset    Hypertension Father     [4]   Social History  Tobacco Use    Smoking status: Every Day     Current packs/day: 1.00     Average packs/day: 1 pack/day for 45.4 years (45.4 ttl pk-yrs)     Types: Cigarettes     Start date: 2000    Smokeless tobacco: Never    Tobacco comments:     Last cigarette 0430   Vaping Use    Vaping status: Never Used   Substance Use Topics    Alcohol use: Not Currently     Comment: occasional    Drug use: Yes     Types: Marijuana, Methamphetamines     Comment: Hx meth use        George Sampson MD  05/22/25 2385

## 2025-05-23 LAB
ATRIAL RATE: 79 BPM
QRS AXIS: 91 DEGREES
QRSD INTERVAL: 172 MS
QT INTERVAL: 444 MS
QTC INTERVAL: 537 MS
T WAVE AXIS: -60 DEGREES
VENTRICULAR RATE: 88 BPM

## 2025-05-23 PROCEDURE — 93010 ELECTROCARDIOGRAM REPORT: CPT | Performed by: INTERNAL MEDICINE

## 2025-05-26 PROCEDURE — 02HV33Z INSERTION OF INFUSION DEVICE INTO SUPERIOR VENA CAVA, PERCUTANEOUS APPROACH: ICD-10-PCS | Performed by: ANESTHESIOLOGY

## 2025-06-05 ENCOUNTER — HOSPITAL ENCOUNTER (EMERGENCY)
Facility: HOSPITAL | Age: 60
Discharge: HOME | DRG: 871 | End: 2025-06-05
Attending: EMERGENCY MEDICINE
Payer: COMMERCIAL

## 2025-06-05 ENCOUNTER — APPOINTMENT (EMERGENCY)
Dept: RADIOLOGY | Facility: HOSPITAL | Age: 60
DRG: 871 | End: 2025-06-05
Payer: COMMERCIAL

## 2025-06-05 ENCOUNTER — HOSPITAL ENCOUNTER (INPATIENT)
Facility: HOSPITAL | Age: 60
LOS: 9 days | Discharge: HOME | DRG: 871 | End: 2025-06-14
Attending: EMERGENCY MEDICINE | Admitting: STUDENT IN AN ORGANIZED HEALTH CARE EDUCATION/TRAINING PROGRAM
Payer: COMMERCIAL

## 2025-06-05 ENCOUNTER — APPOINTMENT (INPATIENT)
Dept: CT IMAGING | Facility: HOSPITAL | Age: 60
DRG: 871 | End: 2025-06-05
Payer: COMMERCIAL

## 2025-06-05 VITALS
HEART RATE: 67 BPM | SYSTOLIC BLOOD PRESSURE: 137 MMHG | RESPIRATION RATE: 20 BRPM | WEIGHT: 178.79 LBS | HEIGHT: 69 IN | DIASTOLIC BLOOD PRESSURE: 92 MMHG | BODY MASS INDEX: 26.48 KG/M2 | TEMPERATURE: 97.7 F | OXYGEN SATURATION: 98 %

## 2025-06-05 DIAGNOSIS — E87.5 HYPERKALEMIA: ICD-10-CM

## 2025-06-05 DIAGNOSIS — I42.7 CARDIOMYOPATHY SECONDARY TO DRUG (HCC): ICD-10-CM

## 2025-06-05 DIAGNOSIS — R57.0 CARDIOGENIC SHOCK (HCC): ICD-10-CM

## 2025-06-05 DIAGNOSIS — J03.90 ACUTE TONSILLITIS: ICD-10-CM

## 2025-06-05 DIAGNOSIS — I50.43 ACUTE ON CHRONIC COMBINED SYSTOLIC (CONGESTIVE) AND DIASTOLIC (CONGESTIVE) HEART FAILURE (HCC): ICD-10-CM

## 2025-06-05 DIAGNOSIS — K21.9 GASTROESOPHAGEAL REFLUX DISEASE WITHOUT ESOPHAGITIS: ICD-10-CM

## 2025-06-05 DIAGNOSIS — N17.9 AKI (ACUTE KIDNEY INJURY) (HCC): ICD-10-CM

## 2025-06-05 DIAGNOSIS — J03.90 TONSILLITIS: ICD-10-CM

## 2025-06-05 DIAGNOSIS — K12.2 UVULITIS: ICD-10-CM

## 2025-06-05 DIAGNOSIS — J02.0 STREP PHARYNGITIS: Primary | ICD-10-CM

## 2025-06-05 DIAGNOSIS — J02.9 PHARYNGITIS: ICD-10-CM

## 2025-06-05 DIAGNOSIS — I48.91 ATRIAL FIBRILLATION WITH RVR (HCC): ICD-10-CM
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
## 2025-06-05 DIAGNOSIS — I48.91 RAPID ATRIAL FIBRILLATION (HCC): Primary | ICD-10-CM

## 2025-06-05 DIAGNOSIS — F15.10 METHAMPHETAMINE ABUSE (HCC): ICD-10-CM

## 2025-06-05 DIAGNOSIS — T18.128A ESOPHAGEAL OBSTRUCTION DUE TO FOOD IMPACTION: ICD-10-CM

## 2025-06-05 DIAGNOSIS — E83.42 HYPOMAGNESEMIA: ICD-10-CM

## 2025-06-05 DIAGNOSIS — I42.9 CARDIOMYOPATHY (HCC): ICD-10-CM

## 2025-06-05 PROBLEM — A41.9 SEPSIS (HCC): Status: ACTIVE | Noted: 2025-06-05

## 2025-06-05 PROBLEM — R79.1 SUPRATHERAPEUTIC INR: Status: ACTIVE | Noted: 2025-06-05

## 2025-06-05 PROBLEM — N18.30 STAGE 3 CHRONIC KIDNEY DISEASE (HCC): Status: ACTIVE | Noted: 2025-03-22

## 2025-06-05 PROBLEM — I50.42 CHRONIC COMBINED SYSTOLIC AND DIASTOLIC CONGESTIVE HEART FAILURE (HCC): Status: ACTIVE | Noted: 2023-10-07

## 2025-06-05 LAB
ALBUMIN SERPL BCG-MCNC: 3.7 G/DL (ref 3.5–5)
ALP SERPL-CCNC: 174 U/L (ref 34–104)
ALT SERPL W P-5'-P-CCNC: 54 U/L (ref 7–52)
AMPHETAMINES SERPL QL SCN: POSITIVE
ANION GAP SERPL CALCULATED.3IONS-SCNC: 9 MMOL/L (ref 4–13)
APTT PPP: 69 SECONDS (ref 23–34)
AST SERPL W P-5'-P-CCNC: 73 U/L (ref 13–39)
BARBITURATES UR QL: NEGATIVE
BASOPHILS # BLD AUTO: 0.01 THOUSANDS/ÂΜL (ref 0–0.1)
BASOPHILS NFR BLD AUTO: 0 % (ref 0–1)
BENZODIAZ UR QL: NEGATIVE
BILIRUB SERPL-MCNC: 1.04 MG/DL (ref 0.2–1)
BUN SERPL-MCNC: 44 MG/DL (ref 5–25)
CALCIUM SERPL-MCNC: 9.2 MG/DL (ref 8.4–10.2)
CHLORIDE SERPL-SCNC: 101 MMOL/L (ref 96–108)
CO2 SERPL-SCNC: 22 MMOL/L (ref 21–32)
COCAINE UR QL: NEGATIVE
CREAT SERPL-MCNC: 1.63 MG/DL (ref 0.6–1.3)
DIGOXIN SERPL-MCNC: 0.4 NG/ML (ref 0.8–2)
EOSINOPHIL # BLD AUTO: 0.01 THOUSAND/ÂΜL (ref 0–0.61)
EOSINOPHIL NFR BLD AUTO: 0 % (ref 0–6)
ERYTHROCYTE [DISTWIDTH] IN BLOOD BY AUTOMATED COUNT: 18.3 % (ref 11.6–15.1)
FENTANYL UR QL SCN: NEGATIVE
GFR SERPL CREATININE-BSD FRML MDRD: 45 ML/MIN/1.73SQ M
GLUCOSE SERPL-MCNC: 143 MG/DL (ref 65–140)
HCT VFR BLD AUTO: 42 % (ref 36.5–49.3)
HGB BLD-MCNC: 13.3 G/DL (ref 12–17)
HYDROCODONE UR QL SCN: NEGATIVE
IMM GRANULOCYTES # BLD AUTO: 0.06 THOUSAND/UL (ref 0–0.2)
IMM GRANULOCYTES NFR BLD AUTO: 1 % (ref 0–2)
INR PPP: 8.98 (ref 0.85–1.19)
LACTATE SERPL-SCNC: 1.5 MMOL/L (ref 0.5–2)
LACTATE SERPL-SCNC: 2.8 MMOL/L (ref 0.5–2)
LACTATE SERPL-SCNC: 2.8 MMOL/L (ref 0.5–2)
LYMPHOCYTES # BLD AUTO: 0.5 THOUSANDS/ÂΜL (ref 0.6–4.47)
LYMPHOCYTES NFR BLD AUTO: 5 % (ref 14–44)
MAGNESIUM SERPL-MCNC: 1.7 MG/DL (ref 1.9–2.7)
MCH RBC QN AUTO: 28.7 PG (ref 26.8–34.3)
MCHC RBC AUTO-ENTMCNC: 31.7 G/DL (ref 31.4–37.4)
MCV RBC AUTO: 91 FL (ref 82–98)
METHADONE UR QL: NEGATIVE
MONOCYTES # BLD AUTO: 0.52 THOUSAND/ÂΜL (ref 0.17–1.22)
MONOCYTES NFR BLD AUTO: 6 % (ref 4–12)
NEUTROPHILS # BLD AUTO: 8.16 THOUSANDS/ÂΜL (ref 1.85–7.62)
NEUTS SEG NFR BLD AUTO: 88 % (ref 43–75)
NRBC BLD AUTO-RTO: 0 /100 WBCS
OPIATES UR QL SCN: NEGATIVE
OXYCODONE+OXYMORPHONE UR QL SCN: NEGATIVE
PCP UR QL: NEGATIVE
PLATELET # BLD AUTO: 234 THOUSANDS/UL (ref 149–390)
PMV BLD AUTO: 9.6 FL (ref 8.9–12.7)
POTASSIUM SERPL-SCNC: 5.2 MMOL/L (ref 3.5–5.3)
PROCALCITONIN SERPL-MCNC: 0.23 NG/ML
PROT SERPL-MCNC: 7.3 G/DL (ref 6.4–8.4)
PROTHROMBIN TIME: 70.8 SECONDS (ref 12.3–15)
QRS AXIS: 98 DEGREES
QRSD INTERVAL: 146 MS
QT INTERVAL: 366 MS
QTC INTERVAL: 556 MS
RBC # BLD AUTO: 4.63 MILLION/UL (ref 3.88–5.62)
S PYO DNA THROAT QL NAA+PROBE: NOT DETECTED
SODIUM SERPL-SCNC: 132 MMOL/L (ref 135–147)
T WAVE AXIS: -59 DEGREES
THC UR QL: POSITIVE
VENTRICULAR RATE: 139 BPM
WBC # BLD AUTO: 9.26 THOUSAND/UL (ref 4.31–10.16)

## 2025-06-05 PROCEDURE — 96375 TX/PRO/DX INJ NEW DRUG ADDON: CPT

## 2025-06-05 PROCEDURE — 94640 AIRWAY INHALATION TREATMENT: CPT

## 2025-06-05 PROCEDURE — 99283 EMERGENCY DEPT VISIT LOW MDM: CPT

## 2025-06-05 PROCEDURE — 86664 EPSTEIN-BARR NUCLEAR ANTIGEN: CPT | Performed by: FAMILY MEDICINE

## 2025-06-05 PROCEDURE — 85025 COMPLETE CBC W/AUTO DIFF WBC: CPT | Performed by: EMERGENCY MEDICINE

## 2025-06-05 PROCEDURE — 94760 N-INVAS EAR/PLS OXIMETRY 1: CPT

## 2025-06-05 PROCEDURE — 96368 THER/DIAG CONCURRENT INF: CPT

## 2025-06-05 PROCEDURE — 93010 ELECTROCARDIOGRAM REPORT: CPT | Performed by: INTERNAL MEDICINE

## 2025-06-05 PROCEDURE — 70490 CT SOFT TISSUE NECK W/O DYE: CPT

## 2025-06-05 PROCEDURE — 86665 EPSTEIN-BARR CAPSID VCA: CPT | Performed by: FAMILY MEDICINE

## 2025-06-05 PROCEDURE — 99223 1ST HOSP IP/OBS HIGH 75: CPT | Performed by: INTERNAL MEDICINE

## 2025-06-05 PROCEDURE — 83605 ASSAY OF LACTIC ACID: CPT | Performed by: INTERNAL MEDICINE

## 2025-06-05 PROCEDURE — 85610 PROTHROMBIN TIME: CPT | Performed by: EMERGENCY MEDICINE

## 2025-06-05 PROCEDURE — 71046 X-RAY EXAM CHEST 2 VIEWS: CPT

## 2025-06-05 PROCEDURE — 80307 DRUG TEST PRSMV CHEM ANLYZR: CPT | Performed by: FAMILY MEDICINE

## 2025-06-05 PROCEDURE — 80053 COMPREHEN METABOLIC PANEL: CPT | Performed by: EMERGENCY MEDICINE

## 2025-06-05 PROCEDURE — 99285 EMERGENCY DEPT VISIT HI MDM: CPT

## 2025-06-05 PROCEDURE — 80162 ASSAY OF DIGOXIN TOTAL: CPT | Performed by: EMERGENCY MEDICINE

## 2025-06-05 PROCEDURE — 83735 ASSAY OF MAGNESIUM: CPT | Performed by: EMERGENCY MEDICINE

## 2025-06-05 PROCEDURE — 96361 HYDRATE IV INFUSION ADD-ON: CPT

## 2025-06-05 PROCEDURE — 86663 EPSTEIN-BARR ANTIBODY: CPT | Performed by: FAMILY MEDICINE

## 2025-06-05 PROCEDURE — 86308 HETEROPHILE ANTIBODY SCREEN: CPT | Performed by: FAMILY MEDICINE

## 2025-06-05 PROCEDURE — 87651 STREP A DNA AMP PROBE: CPT | Performed by: EMERGENCY MEDICINE

## 2025-06-05 PROCEDURE — 99291 CRITICAL CARE FIRST HOUR: CPT | Performed by: EMERGENCY MEDICINE

## 2025-06-05 PROCEDURE — 84145 PROCALCITONIN (PCT): CPT | Performed by: FAMILY MEDICINE

## 2025-06-05 PROCEDURE — 99284 EMERGENCY DEPT VISIT MOD MDM: CPT | Performed by: EMERGENCY MEDICINE

## 2025-06-05 PROCEDURE — 93005 ELECTROCARDIOGRAM TRACING: CPT

## 2025-06-05 PROCEDURE — 36415 COLL VENOUS BLD VENIPUNCTURE: CPT | Performed by: EMERGENCY MEDICINE

## 2025-06-05 PROCEDURE — 85730 THROMBOPLASTIN TIME PARTIAL: CPT | Performed by: EMERGENCY MEDICINE

## 2025-06-05 PROCEDURE — 99223 1ST HOSP IP/OBS HIGH 75: CPT | Performed by: FAMILY MEDICINE

## 2025-06-05 PROCEDURE — 83605 ASSAY OF LACTIC ACID: CPT | Performed by: FAMILY MEDICINE

## 2025-06-05 PROCEDURE — 96365 THER/PROPH/DIAG IV INF INIT: CPT

## 2025-06-05 PROCEDURE — 87040 BLOOD CULTURE FOR BACTERIA: CPT | Performed by: FAMILY MEDICINE

## 2025-06-05 RX ORDER — MAGNESIUM SULFATE HEPTAHYDRATE 40 MG/ML
2 INJECTION, SOLUTION INTRAVENOUS ONCE
Status: COMPLETED | OUTPATIENT
Start: 2025-06-05 | End: 2025-06-05

## 2025-06-05 RX ORDER — DIGOXIN 0.25 MG/ML
125 INJECTION INTRAMUSCULAR; INTRAVENOUS ONCE
Status: COMPLETED | OUTPATIENT
Start: 2025-06-05 | End: 2025-06-05

## 2025-06-05 RX ORDER — NICOTINE 21 MG/24HR
1 PATCH, TRANSDERMAL 24 HOURS TRANSDERMAL DAILY
Status: DISCONTINUED | OUTPATIENT
Start: 2025-06-05 | End: 2025-06-14 | Stop reason: HOSPADM

## 2025-06-05 RX ORDER — SODIUM CHLORIDE, SODIUM GLUCONATE, SODIUM ACETATE, POTASSIUM CHLORIDE, MAGNESIUM CHLORIDE, SODIUM PHOSPHATE, DIBASIC, AND POTASSIUM PHOSPHATE .53; .5; .37; .037; .03; .012; .00082 G/100ML; G/100ML; G/100ML; G/100ML; G/100ML; G/100ML; G/100ML
50 INJECTION, SOLUTION INTRAVENOUS CONTINUOUS
Status: DISPENSED | OUTPATIENT
Start: 2025-06-05 | End: 2025-06-05

## 2025-06-05 RX ORDER — METOPROLOL TARTRATE 25 MG/1
25 TABLET, FILM COATED ORAL EVERY 12 HOURS SCHEDULED
Status: DISCONTINUED | OUTPATIENT
Start: 2025-06-05 | End: 2025-06-06

## 2025-06-05 RX ORDER — DIPHENHYDRAMINE HYDROCHLORIDE AND LIDOCAINE HYDROCHLORIDE AND ALUMINUM HYDROXIDE AND MAGNESIUM HYDRO
10 KIT EVERY 6 HOURS PRN
Qty: 90 ML | Refills: 0 | Status: SHIPPED | OUTPATIENT
Start: 2025-06-05 | End: 2025-06-14

## 2025-06-05 RX ORDER — DIPHENHYDRAMINE HYDROCHLORIDE AND LIDOCAINE HYDROCHLORIDE AND ALUMINUM HYDROXIDE AND MAGNESIUM HYDRO
10 KIT ONCE
Status: COMPLETED | OUTPATIENT
Start: 2025-06-05 | End: 2025-06-05

## 2025-06-05 RX ORDER — PREDNISONE 20 MG/1
60 TABLET ORAL DAILY
Qty: 15 TABLET | Refills: 0 | Status: SHIPPED | OUTPATIENT
Start: 2025-06-05 | End: 2025-06-14

## 2025-06-05 RX ORDER — PHYTONADIONE 5 MG/1
2.5 TABLET ORAL ONCE
Status: COMPLETED | OUTPATIENT
Start: 2025-06-05 | End: 2025-06-05

## 2025-06-05 RX ORDER — MIRTAZAPINE 15 MG/1
15 TABLET, FILM COATED ORAL
COMMUNITY
Start: 2025-05-01

## 2025-06-05 RX ORDER — ACETAMINOPHEN 325 MG/1
975 TABLET ORAL ONCE
Status: COMPLETED | OUTPATIENT
Start: 2025-06-05 | End: 2025-06-05

## 2025-06-05 RX ORDER — MIDODRINE HYDROCHLORIDE 5 MG/1
7.5 TABLET ORAL
Status: DISCONTINUED | OUTPATIENT
Start: 2025-06-05 | End: 2025-06-14 | Stop reason: HOSPADM

## 2025-06-05 RX ORDER — LANOLIN ALCOHOL/MO/W.PET/CERES
400 CREAM (GRAM) TOPICAL DAILY
Status: DISCONTINUED | OUTPATIENT
Start: 2025-06-05 | End: 2025-06-14 | Stop reason: HOSPADM

## 2025-06-05 RX ORDER — METOPROLOL TARTRATE 1 MG/ML
2.5 INJECTION, SOLUTION INTRAVENOUS EVERY 6 HOURS PRN
Status: DISCONTINUED | OUTPATIENT
Start: 2025-06-05 | End: 2025-06-09

## 2025-06-05 RX ORDER — METOPROLOL TARTRATE 25 MG/1
25 TABLET, FILM COATED ORAL ONCE
Status: COMPLETED | OUTPATIENT
Start: 2025-06-05 | End: 2025-06-05

## 2025-06-05 RX ORDER — CLINDAMYCIN HYDROCHLORIDE 300 MG/1
300 CAPSULE ORAL 3 TIMES DAILY
Qty: 30 CAPSULE | Refills: 0 | Status: SHIPPED | OUTPATIENT
Start: 2025-06-05 | End: 2025-06-14

## 2025-06-05 RX ORDER — IPRATROPIUM BROMIDE AND ALBUTEROL SULFATE 2.5; .5 MG/3ML; MG/3ML
3 SOLUTION RESPIRATORY (INHALATION)
Status: DISCONTINUED | OUTPATIENT
Start: 2025-06-05 | End: 2025-06-14 | Stop reason: HOSPADM

## 2025-06-05 RX ORDER — LIDOCAINE HYDROCHLORIDE 20 MG/ML
15 SOLUTION OROPHARYNGEAL ONCE
Status: COMPLETED | OUTPATIENT
Start: 2025-06-05 | End: 2025-06-05

## 2025-06-05 RX ORDER — DIPHENHYDRAMINE HYDROCHLORIDE AND LIDOCAINE HYDROCHLORIDE AND ALUMINUM HYDROXIDE AND MAGNESIUM HYDRO
10 KIT EVERY 6 HOURS PRN
Status: DISPENSED | OUTPATIENT
Start: 2025-06-05 | End: 2025-06-10

## 2025-06-05 RX ORDER — DEXAMETHASONE SODIUM PHOSPHATE 10 MG/ML
6 INJECTION, SOLUTION INTRAMUSCULAR; INTRAVENOUS EVERY 8 HOURS SCHEDULED
Status: DISCONTINUED | OUTPATIENT
Start: 2025-06-05 | End: 2025-06-06

## 2025-06-05 RX ORDER — CLINDAMYCIN HYDROCHLORIDE 150 MG/1
300 CAPSULE ORAL ONCE
Status: COMPLETED | OUTPATIENT
Start: 2025-06-05 | End: 2025-06-05

## 2025-06-05 RX ORDER — SODIUM CHLORIDE, SODIUM LACTATE, POTASSIUM CHLORIDE, CALCIUM CHLORIDE 600; 310; 30; 20 MG/100ML; MG/100ML; MG/100ML; MG/100ML
100 INJECTION, SOLUTION INTRAVENOUS CONTINUOUS
Status: DISCONTINUED | OUTPATIENT
Start: 2025-06-05 | End: 2025-06-05

## 2025-06-05 RX ORDER — ACETAMINOPHEN 325 MG/1
650 TABLET ORAL EVERY 6 HOURS PRN
Status: DISCONTINUED | OUTPATIENT
Start: 2025-06-05 | End: 2025-06-14 | Stop reason: HOSPADM

## 2025-06-05 RX ORDER — DIGOXIN 125 MCG
125 TABLET ORAL DAILY
Status: DISCONTINUED | OUTPATIENT
Start: 2025-06-05 | End: 2025-06-08

## 2025-06-05 RX ADMIN — LIDOCAINE HYDROCHLORIDE 15 ML: 20 SOLUTION ORAL at 09:33

## 2025-06-05 RX ADMIN — AMPICILLIN SODIUM AND SULBACTAM SODIUM 3 G: 10; 5 INJECTION, POWDER, FOR SOLUTION INTRAMUSCULAR; INTRAVENOUS at 13:36

## 2025-06-05 RX ADMIN — AMPICILLIN SODIUM AND SULBACTAM SODIUM 3 G: 10; 5 INJECTION, POWDER, FOR SOLUTION INTRAMUSCULAR; INTRAVENOUS at 19:47

## 2025-06-05 RX ADMIN — DIPHENHYDRAMINE HYDROCHLORIDE AND LIDOCAINE HYDROCHLORIDE AND ALUMINUM HYDROXIDE AND MAGNESIUM HYDRO 10 ML: KIT at 19:56

## 2025-06-05 RX ADMIN — DEXAMETHASONE SODIUM PHOSPHATE 10 MG: 10 INJECTION, SOLUTION INTRAMUSCULAR; INTRAVENOUS at 04:53

## 2025-06-05 RX ADMIN — DIGOXIN 125 MCG: 0.25 INJECTION INTRAMUSCULAR; INTRAVENOUS at 10:32

## 2025-06-05 RX ADMIN — MORPHINE SULFATE 2 MG: 2 INJECTION, SOLUTION INTRAMUSCULAR; INTRAVENOUS at 18:06

## 2025-06-05 RX ADMIN — METOROPROLOL TARTRATE 2.5 MG: 5 INJECTION, SOLUTION INTRAVENOUS at 14:14

## 2025-06-05 RX ADMIN — PHYTONADIONE 2.5 MG: 5 TABLET ORAL at 13:36

## 2025-06-05 RX ADMIN — DIPHENHYDRAMINE HYDROCHLORIDE AND LIDOCAINE HYDROCHLORIDE AND ALUMINUM HYDROXIDE AND MAGNESIUM HYDRO 10 ML: KIT at 13:35

## 2025-06-05 RX ADMIN — DEXAMETHASONE SODIUM PHOSPHATE 6 MG: 10 INJECTION, SOLUTION INTRAMUSCULAR; INTRAVENOUS at 13:37

## 2025-06-05 RX ADMIN — MAGNESIUM SULFATE HEPTAHYDRATE 2 G: 2 INJECTION, SOLUTION INTRAVENOUS at 10:34

## 2025-06-05 RX ADMIN — MIDODRINE HYDROCHLORIDE 7.5 MG: 5 TABLET ORAL at 17:05

## 2025-06-05 RX ADMIN — DIPHENHYDRAMINE HYDROCHLORIDE AND LIDOCAINE HYDROCHLORIDE AND ALUMINUM HYDROXIDE AND MAGNESIUM HYDRO 10 ML: KIT at 04:50

## 2025-06-05 RX ADMIN — NICOTINE 1 PATCH: 14 PATCH, EXTENDED RELEASE TRANSDERMAL at 13:37

## 2025-06-05 RX ADMIN — MIDODRINE HYDROCHLORIDE 7.5 MG: 5 TABLET ORAL at 13:35

## 2025-06-05 RX ADMIN — CLINDAMYCIN HYDROCHLORIDE 300 MG: 150 CAPSULE ORAL at 04:54

## 2025-06-05 RX ADMIN — SODIUM CHLORIDE, SODIUM LACTATE, POTASSIUM CHLORIDE, AND CALCIUM CHLORIDE 500 ML: .6; .31; .03; .02 INJECTION, SOLUTION INTRAVENOUS at 10:30

## 2025-06-05 RX ADMIN — DEXAMETHASONE SODIUM PHOSPHATE 6 MG: 10 INJECTION, SOLUTION INTRAMUSCULAR; INTRAVENOUS at 21:18

## 2025-06-05 RX ADMIN — ACETAMINOPHEN 650 MG: 325 TABLET, FILM COATED ORAL at 19:56

## 2025-06-05 RX ADMIN — SODIUM CHLORIDE, SODIUM GLUCONATE, SODIUM ACETATE, POTASSIUM CHLORIDE, MAGNESIUM CHLORIDE, SODIUM PHOSPHATE, DIBASIC, AND POTASSIUM PHOSPHATE 50 ML/HR: .53; .5; .37; .037; .03; .012; .00082 INJECTION, SOLUTION INTRAVENOUS at 13:37

## 2025-06-05 RX ADMIN — SODIUM CHLORIDE, SODIUM LACTATE, POTASSIUM CHLORIDE, AND CALCIUM CHLORIDE 100 ML/HR: .6; .31; .03; .02 INJECTION, SOLUTION INTRAVENOUS at 11:13

## 2025-06-05 RX ADMIN — ACETAMINOPHEN 975 MG: 325 TABLET ORAL at 09:31

## 2025-06-05 RX ADMIN — METOPROLOL TARTRATE 25 MG: 25 TABLET, FILM COATED ORAL at 21:18

## 2025-06-05 RX ADMIN — MORPHINE SULFATE 2 MG: 2 INJECTION, SOLUTION INTRAMUSCULAR; INTRAVENOUS at 23:59

## 2025-06-05 RX ADMIN — IPRATROPIUM BROMIDE AND ALBUTEROL SULFATE 3 ML: 2.5; .5 SOLUTION RESPIRATORY (INHALATION) at 19:11

## 2025-06-05 RX ADMIN — METOPROLOL TARTRATE 25 MG: 25 TABLET, FILM COATED ORAL at 09:31

## 2025-06-05 RX ADMIN — DIGOXIN 125 MCG: 125 TABLET ORAL at 13:35

## 2025-06-05 RX ADMIN — Medication 400 MG: at 13:46

## 2025-06-05 RX ADMIN — ACETAMINOPHEN 650 MG: 325 TABLET, FILM COATED ORAL at 13:36

## 2025-06-05 NOTE — ASSESSMENT & PLAN NOTE
Lab Results   Component Value Date    EGFR 45 06/05/2025    EGFR 58 05/22/2025    EGFR 53 04/30/2025    CREATININE 1.63 (H) 06/05/2025    CREATININE 1.31 (H) 05/22/2025    CREATININE 1.42 (H) 04/30/2025   CKD remains overall stable with slight increase on today's blood work.

## 2025-06-05 NOTE — H&P
H&P - Hospitalist   Name: Britton Batista 60 y.o. male I MRN: [id number]  Unit/Bed#: -01 I Date of Admission: 6/5/2025   Date of Service: 6/5/2025 I Hospital Day: 0     Assessment & Plan  Sepsis (HCC)  Met criteria by tachycardia and tachypnea secondary to tonsillitis/pharyngitis rule out peritonsillar abscess  He received 500 LR in the ER patient has an EF of 25% will gently hydrate only for 5 hours at normal saline 50 cc/h  On blood cultures lactic acid  Unasyn  Chronic combined systolic and diastolic congestive heart failure (HCC)  Wt Readings from Last 3 Encounters:   06/05/25 80.1 kg (176 lb 9.4 oz)   06/05/25 81.1 kg (178 lb 12.7 oz)   05/04/25 79.2 kg (174 lb 9.7 oz)       Last EF is 25% moderate MR on 3/2025 hold his Lasix as patient is septic and needed some hydration anticipate restart in 24 hours      Atrial fibrillation with RVR (HCC)  Is given metoprolol and digoxin in the ER discussed with cardiology they will see patient later will start him on metoprolol tartrate 25 mg p.o. twice daily and digoxin 125 mcg p.o. daily will check digoxin level continue his midodrine to allow medications to be given  INR supratherapeutic hold Coumadin  Stage 3 chronic kidney disease (HCC)  Lab Results   Component Value Date    EGFR 45 06/05/2025    EGFR 58 05/22/2025    EGFR 53 04/30/2025    CREATININE 1.63 (H) 06/05/2025    CREATININE 1.31 (H) 05/22/2025    CREATININE 1.42 (H) 04/30/2025   Creatinine baseline is 1.5 creatinine currently is 1.6 no evidence of IRINEO  Transaminitis  Recurrent no evidence of heart failure exacerbation.  Could be secondary to sepsis will monitor  Tonsillitis  Patient complaining of sore throat for the past week his tonsils are erythematous swollen left greater than right he is tender on the left side of the neck his uvula is swollen as well will start Unasyn will start Decadron 6 mg IV every 8 hours will consult ENT will do a CT neck to rule out peritonsillar abscess pain control strep  screen is negative will do an EBV and Monospot  Aspiration precautions and clear liquid diet  Supratherapeutic INR  Give vitamin K 2.5 mg p.o. x 1 hold Coumadin INR tomorrow      VTE Pharmacologic Prophylaxis: VTE Score: 4 Moderate Risk (Score 3-4) - Pharmacological DVT Prophylaxis Contraindicated. Sequential Compression Devices Ordered.  Code Status:full code  Discussion with family: pt    Anticipated Length of Stay: Patient will be admitted on an inpatient basis with an anticipated length of stay of greater than 2 midnights secondary to tonsillitis rule out tonsillar abscess A-fib RVR supratherapeutic INR.    History of Present Illness   Chief Complaint: Throat pain    Britton Batista is a 60 y.o. male with a PMH of of systolic/diastolic heart failure status post AICD atrial fibrillation CKD stage III who presents with throat pain for the past week difficulty swallowing he actually presented at 4 in a morning for this reason then sent home and was asked to return secondary to elevated INR.  Denies any chest pain or shortness of breath    Review of Systems   Constitutional:  Negative for chills and fever.   HENT:  Positive for sore throat. Negative for ear pain.    Eyes:  Negative for pain and visual disturbance.   Respiratory:  Negative for cough and shortness of breath.    Cardiovascular:  Negative for chest pain and palpitations.   Gastrointestinal:  Negative for abdominal pain and vomiting.   Genitourinary:  Negative for dysuria and hematuria.   Musculoskeletal:  Negative for arthralgias and back pain.   Skin:  Negative for color change and rash.   Neurological:  Negative for seizures and syncope.   All other systems reviewed and are negative.      Historical Information   Past Medical History[1]  Past Surgical History[2]  Social History[3]  E-Cigarette/Vaping    E-Cigarette Use Never User      E-Cigarette/Vaping Substances    Nicotine No     THC No     CBD No     Flavoring No     Other No     Unknown No       Family History[4]  Social History:  Marital Status:      Meds/Allergies   I have reviewed home medications with patient personally.  Prior to Admission medications    Medication Sig Start Date End Date Taking? Authorizing Provider   albuterol (ProAir HFA) 90 mcg/act inhaler Inhale 2 puffs every 6 (six) hours as needed for wheezing 3/28/25  Yes Conrad Bynum MD   clindamycin (CLEOCIN) 300 MG capsule Take 1 capsule (300 mg total) by mouth 3 (three) times a day for 10 days 6/5/25 6/15/25 Yes Bhargav Antonio DO   digoxin (LANOXIN) 0.125 mg tablet Take 1 tablet (125 mcg total) by mouth 3 (three) times a week 3/28/25  Yes Conrad Bynum MD   diphenhydramine, lidocaine, Al/Mg hydroxide, simethicone (Magic Mouthwash) SUSP Swish and swallow 10 mL every 6 (six) hours as needed for mouth pain or discomfort or mucositis for up to 5 days 6/5/25 6/10/25 Yes Bhargav Antonio DO   ipratropium-albuterol (DUO-NEB) 0.5-2.5 mg/3 mL nebulizer solution Take 3 mL by nebulization every 6 (six) hours while awake 4/24/25  Yes Conrad Bynum MD   magnesium Oxide (MAG-OX) 400 mg TABS Take 1 tablet (400 mg total) by mouth daily 3/28/25  Yes Conrad Bynum MD   metoprolol tartrate (LOPRESSOR) 25 mg tablet Take 1 tablet (25 mg total) by mouth every 12 (twelve) hours 4/20/25  Yes Cheryl Sin PA-C   midodrine (PROAMATINE) 2.5 mg tablet Take 3 tablets (7.5 mg total) by mouth 3 (three) times a day before meals 4/30/25  Yes Guadalupe Muñoz MD   mirtazapine (REMERON) 15 mg tablet Take 15 mg by mouth daily at bedtime 5/1/25  Yes Historical Provider, MD   warfarin (COUMADIN) 3 mg tablet Take 1 tablet (3 mg total) by mouth daily Only restart if ur inr on 5/2 is less then 3 Do not start before May 2, 2025. 5/2/25  Yes Guadalupe Muñoz MD   furosemide (LASIX) 40 mg tablet Take 1 tablet (40 mg total) by mouth daily 3/28/25 4/27/25  Conrad Bynum MD   oxyCODONE-acetaminophen (Percocet) 5-325 mg per tablet Take 1 tablet by mouth  every 6 (six) hours as needed for severe pain Max Daily Amount: 4 tablets 5/4/25   Oscar Woody PA-C   pantoprazole (PROTONIX) 40 mg tablet Take 1 tablet (40 mg total) by mouth 2 (two) times a day 10/10/24 4/25/25  Armida Coppola MD   predniSONE 20 mg tablet Take 3 tablets (60 mg total) by mouth daily for 5 days  Patient not taking: Reported on 6/5/2025 6/5/25 6/10/25  Bhargav Antonio DO     Allergies   Allergen Reactions    Aspirin GI Intolerance       Objective :  Temp:  [97.7 °F (36.5 °C)-98.6 °F (37 °C)] 98.6 °F (37 °C)  HR:  [] 107  BP: (111-149)/() 129/75  Resp:  [18-22] 20  SpO2:  [95 %-99 %] 97 %  O2 Device: None (Room air)    Physical Exam  Vitals and nursing note reviewed.   Constitutional:       General: He is not in acute distress.     Appearance: He is well-developed.   HENT:      Head: Normocephalic and atraumatic.      Mouth/Throat:       Eyes:      Conjunctiva/sclera: Conjunctivae normal.     Neck:       Cardiovascular:      Rate and Rhythm: Normal rate. Rhythm irregular.      Heart sounds: No murmur heard.  Pulmonary:      Effort: Pulmonary effort is normal. No respiratory distress.      Breath sounds: Normal breath sounds. No wheezing or rales.   Abdominal:      Palpations: Abdomen is soft.      Tenderness: There is no abdominal tenderness.     Musculoskeletal:         General: No swelling.      Cervical back: Neck supple.     Skin:     General: Skin is warm and dry.      Capillary Refill: Capillary refill takes less than 2 seconds.     Neurological:      Mental Status: He is alert and oriented to person, place, and time.     Psychiatric:         Mood and Affect: Mood normal.          Lines/Drains:            Lab Results: I have reviewed the following results:  Results from last 7 days   Lab Units 06/05/25  0943   WBC Thousand/uL 9.26   HEMOGLOBIN g/dL 13.3   HEMATOCRIT % 42.0   PLATELETS Thousands/uL 234   SEGS PCT % 88*   LYMPHO PCT % 5*   MONO PCT % 6   EOS PCT % 0  "    Results from last 7 days   Lab Units 06/05/25  0943   SODIUM mmol/L 132*   POTASSIUM mmol/L 5.2   CHLORIDE mmol/L 101   CO2 mmol/L 22   BUN mg/dL 44*   CREATININE mg/dL 1.63*   ANION GAP mmol/L 9   CALCIUM mg/dL 9.2   ALBUMIN g/dL 3.7   TOTAL BILIRUBIN mg/dL 1.04*   ALK PHOS U/L 174*   ALT U/L 54*   AST U/L 73*   GLUCOSE RANDOM mg/dL 143*     Results from last 7 days   Lab Units 06/05/25  1106   INR  8.98*         Lab Results   Component Value Date    HGBA1C 5.9 (H) 10/25/2021           Imaging Results Review: I reviewed radiology reports from this admission including: chest xray.  Other Study Results Review: EKG was reviewed.     Administrative Statements   I have spent a total time of >45 minutes in caring for this patient on the day of the visit/encounter including Documenting in the medical record, Reviewing/placing orders in the medical record (including tests, medications, and/or procedures), Obtaining or reviewing history  , and Communicating with other healthcare professionals .    ** Please Note: This note has been constructed using a voice recognition system. 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    CHF (congestive heart failure) (HCC)     Coagulopathy (HCC) 04/26/2025    Depression     Emphysema of lung (HCC)     GERD (gastroesophageal reflux disease)     Gout     Hyperammonemia (HCC) 04/26/2025    Hypertension     Metabolic encephalopathy 03/22/2025    Neuropathy     Raynaud's disease     Right inguinal hernia     Scleroderma (HCC)     Shock (HCC) 04/26/2025   [2]   Past Surgical History:  Procedure Laterality Date    AMPUTATION Right     pt had tip of \"pointer finger\" d/t scleraderma    CARDIAC PACEMAKER PLACEMENT      FINGER AMPUTATION Left 01/31/2024    Procedure: AMPUTATION FINGER, LEFT INDEX FINGER CPT: 14054;  " Surgeon: Pedro Vazquez MD;  Location:  MAIN OR;  Service: Orthopedics    HERNIA REPAIR Left     times 2    MT AMP F/TH 1/2 JT/PHALANX W/NEURECT W/DIR CLSR Right 01/23/2024    Procedure: AMPUTATION FINGER, RIGHT MIDDLE;  Surgeon: Pedro Vazquez MD;  Location: [facility];  Service: Orthopedics    MT RPR 1ST INGUN HRNA AGE 5 YRS/> REDUCIBLE Right 03/03/2023    Procedure: OPEN INGUINAL HERNIA REPAIR WITH MESH;  Surgeon: Temo Jackson DO;  Location:  MAIN OR;  Service: General   [3]   Social History  Tobacco Use    Smoking status: Every Day     Current packs/day: 1.00     Average packs/day: 1 pack/day for 45.4 years (45.4 ttl pk-yrs)     Types: Cigarettes     Start date: 2000    Smokeless tobacco: Never    Tobacco comments:     Last cigarette 0430   Vaping Use    Vaping status: Never Used   Substance and Sexual Activity    Alcohol use: Not Currently     Comment: occasional    Drug use: Yes     Types: Marijuana, Methamphetamines     Comment: Hx meth use    Sexual activity: Not Currently     Comment: defer   [4]   Family History  Problem Relation Name Age of Onset    Hypertension Father

## 2025-06-05 NOTE — ED NOTES
This RN called patient's sister to  patient. Patient's sister told this RN she is on her way for this patient at this time.      Simona Gotti RN  06/05/25 0519

## 2025-06-05 NOTE — ED NOTES
2 blue top tubes labeled and walked down to lab by Daljit Black.      Kim Martinez RN  06/05/25 0440

## 2025-06-05 NOTE — ASSESSMENT & PLAN NOTE
Lab Results   Component Value Date    EGFR 45 06/05/2025    EGFR 58 05/22/2025    EGFR 53 04/30/2025    CREATININE 1.63 (H) 06/05/2025    CREATININE 1.31 (H) 05/22/2025    CREATININE 1.42 (H) 04/30/2025   Creatinine baseline is 1.5 creatinine currently is 1.6 no evidence of IRINEO

## 2025-06-05 NOTE — ED PROVIDER NOTES
Time reflects when diagnosis was documented in both MDM as applicable and the Disposition within this note       Time User Action Codes Description Comment    6/5/2025 10:15 AM Akhil Razo [I48.91] Rapid atrial fibrillation (HCC)     6/5/2025 10:15 AM Akhil Razo [E83.42] Hypomagnesemia     6/5/2025 10:15 AM Akhil Razo Add [N17.9] IRINEO (acute kidney injury) (HCC)     6/5/2025 10:15 AM Akhil Razo [J02.9] Pharyngitis           ED Disposition       ED Disposition   Admit    Condition   Stable    Date/Time   u Jun 5, 2025 11:50 AM    Comment   Case was discussed with Shekhar and the patient's admission status was agreed to be Admission Status: inpatient status to the service of Dr. Muñoz.               Assessment & Plan       Medical Decision Making  This patient presents with abnormal INR, sore throat, tachycardia.   Diagnostic considerations include electrolyte abnormality, febrile syndrome, bleeding diathesis versus clotting complications of atrial fibrillation, rapid atrial fibrillation. See ED Course.       Amount and/or Complexity of Data Reviewed  Labs: ordered. Decision-making details documented in ED Course.  Radiology: ordered and independent interpretation performed. Decision-making details documented in ED Course.  ECG/medicine tests: ordered and independent interpretation performed. Decision-making details documented in ED Course.    Risk  OTC drugs.  Prescription drug management.  Decision regarding hospitalization.        ED Course as of 06/05/25 1151   u Jun 05, 2025   0919 EKG 9:06 AM rapid atrial fibrillation rate 139 right axis right bundle branch block, inferior ST elevations anterolateral T wave ST depression/inversions similar to prior interpreted by me   0941 Echo reviewed with most recent EF 25%, pacer defibrillator in place per patient, labs pending and currently comfortable, adding additional oral metoprolol   0945 INR 5/22 1.89   1006 [facility] 6/4 >9.0    1013 Sodium(!): 132   1013 BUN(!): 44   1013 Creatinine(!): 1.63   1013 AST(!): 73   1013 ALT(!): 54   1013 ALK PHOS(!): 174   1013 MAGNESIUM(!): 1.7   1013 DIGOXIN LEVEL(!): 0.4   1016 Heart rate 130-150  Mild IRINEO likely secondary to oropharyngeal infection, will gently rehydrate secondary to low EF, replete magnesium, add digoxin, INR pending   1019 XR chest pa and lateral  Cardiomegaly otherwise no acute cardiopulmonary changes interpreted by me   1141 INR 8.98, no bleeding or back pain or abdominal pain   1150 Heart rate 100-1 20s, blood pressure 137/100, clinically well-appearing, ate turkey sandwich and agreeable with hospitalization, sister bedside       Medications   lactated ringers infusion (100 mL/hr Intravenous New Bag 6/5/25 1113)   acetaminophen (TYLENOL) tablet 975 mg (975 mg Oral Given 6/5/25 0931)   Lidocaine Viscous HCl (XYLOCAINE) 2 % mucosal solution 15 mL (15 mL Swish & Spit Given 6/5/25 0933)   metoprolol tartrate (LOPRESSOR) tablet 25 mg (25 mg Oral Given 6/5/25 0931)   magnesium sulfate 2 g/50 mL IVPB (premix) 2 g (0 g Intravenous Stopped 6/5/25 1102)   lactated ringers bolus 500 mL (0 mL Intravenous Stopped 6/5/25 1102)   digoxin (LANOXIN) injection 125 mcg (125 mcg Intravenous Given 6/5/25 1032)       ED Risk Strat Scores                    No data recorded                            History of Present Illness       Chief Complaint   Patient presents with    Abnormal Lab     Pt arrives from home with abnormally high PT/INR per cardiology. Pt also reports a fall while ambulating to bathroom overnight. Denies HS, +BT. Seen here overnight and dx with strep throat.        Past Medical History[1]   Past Surgical History[2]   Family History[3]   Social History[4]   E-Cigarette/Vaping    E-Cigarette Use Never User       E-Cigarette/Vaping Substances    Nicotine No     THC No     CBD No     Flavoring No     Other No     Unknown No       I have reviewed and agree with the history as documented.      60-year-old male returns accompanied by sister, caregiver, notes his throat is sore and was called by cardiology for very high INR and told to return to emergency department where recently diagnosed with strep pharyngitis.  Currently describes sore throat and difficulty swallowing secondary to pain, but able.  He denies chest pain and dyspnea.  No rectal bleeding.  No hemoptysis.  States he feels warm, unaware of fever.  Notes he is taking medications as directed and scheduled for cardioversion at the end of the month.      History provided by:  Patient and spouse  Medical Problem  Severity:  Unable to specify  Onset quality:  Unable to specify  Progression:  Unable to specify  Chronicity:  New  Context:  Warfarin use secondary to atrial fibrillation, last dose last evening  Relieved by:  Nothing  Ineffective treatments:  None  Associated symptoms: fever    Associated symptoms: no abdominal pain, no chest pain and no shortness of breath        Review of Systems   Constitutional:  Positive for fever.   Respiratory:  Negative for shortness of breath.    Cardiovascular:  Negative for chest pain.   Gastrointestinal:  Negative for abdominal pain.   All other systems reviewed and are negative.          Objective       ED Triage Vitals [06/05/25 0855]   Temperature Pulse Blood Pressure Respirations SpO2 Patient Position - Orthostatic VS   98.6 °F (37 °C) (!) 118 149/91 18 98 % Sitting      Temp Source Heart Rate Source BP Location FiO2 (%) Pain Score    Temporal Monitor Right arm -- 8      Vitals      Date and Time Temp Pulse SpO2 Resp BP Pain Score FACES Pain Rating User   06/05/25 1130 -- 113 95 % 20 137/100 -- -- NB   06/05/25 1115 -- 125 97 % 20 -- -- --    06/05/25 1100 -- 125 95 % 20 134/85 -- --    06/05/25 1045 -- 123 95 % 21 -- -- --    06/05/25 1030 -- 142 98 % 20 117/90 -- --    06/05/25 1015 -- 151 99 % 19 -- -- --    06/05/25 1000 -- 146 97 % 19 137/97 -- --    06/05/25 0945 -- 148 -- 20 133/89  -- -- NADIYA   06/05/25 0931 -- -- -- -- -- 10 - Worst Possible Pain -- NADIYA   06/05/25 0930 -- 145 -- 22 -- -- -- NADIYA   06/05/25 0915 -- 146 98 % 22 -- -- -- NADIYA   06/05/25 0900 -- 126 97 % 18 111/94 -- -- NADIYA   06/05/25 0855 98.6 °F (37 °C) 118 98 % 18 149/91 8 -- MD            Physical Exam  Vitals and nursing note reviewed.   Constitutional:       General: He is not in acute distress.     Appearance: Normal appearance.      Comments: Pleasant, comfortable appearing, conversational, articulate, appears to have some type of movement disorder using upper extremities to hold stretcher sides and moving head and shoulders from side-to-side, frequently looking mouth, edentulous   HENT:      Head: Normocephalic and atraumatic.      Right Ear: External ear normal.      Left Ear: External ear normal.      Nose: Nose normal.      Mouth/Throat:      Mouth: Mucous membranes are moist.      Comments: Edentulous, generalized oropharyngeal erythema without swelling or asymmetry or airway compromise, no bleeding    Eyes:      Extraocular Movements: Extraocular movements intact.      Pupils: Pupils are equal, round, and reactive to light.       Cardiovascular:      Rate and Rhythm: Tachycardia present. Rhythm irregular.      Heart sounds: No murmur heard.  Pulmonary:      Effort: Pulmonary effort is normal. No respiratory distress.      Breath sounds: Normal breath sounds. No wheezing or rales.   Abdominal:      General: Abdomen is flat. Bowel sounds are normal. There is no distension.      Tenderness: There is no abdominal tenderness. There is no guarding or rebound.     Musculoskeletal:      Cervical back: Neck supple.      Right lower leg: No edema.      Left lower leg: No edema.     Skin:     General: Skin is warm and dry.      Comments: Patchy facial rash with fairly well-circumscribed reddened patches, multitude, well spread apart, flat, not pustular-sister notes stable and chronic and related to scleroderma     Neurological:       General: No focal deficit present.      Mental Status: He is alert and oriented to person, place, and time.      Cranial Nerves: No cranial nerve deficit.      Sensory: No sensory deficit.      Motor: No weakness.      Coordination: Coordination normal.     Psychiatric:         Mood and Affect: Mood normal.         Behavior: Behavior normal.         Results Reviewed       Procedure Component Value Units Date/Time    Protime-INR [[id number]]  (Abnormal) Collected: 06/05/25 1106    Lab Status: Final result Specimen: Blood from Arm, Left Updated: 06/05/25 1142     Protime 70.8 seconds      INR 8.98    Narrative:      INR Therapeutic Range    Indication                                             INR Range      Atrial Fibrillation                                               2.0-3.0  Hypercoagulable State                                    2.0.2.3  Left Ventricular Asist Device                            2.0-3.0  Mechanical Heart Valve                                  -    Aortic(with afib, MI, embolism, HF, LA enlargement,    and/or coagulopathy)                                     2.0-3.0 (2.5-3.5)     Mitral                                                             2.5-3.5  Prosthetic/Bioprosthetic Heart Valve               2.0-3.0  Venous thromboembolism (VTE: VT, PE        2.0-3.0    APTT [[id number]]  (Abnormal) Collected: 06/05/25 1106    Lab Status: Final result Specimen: Blood from Arm, Left Updated: 06/05/25 1142     PTT 69 seconds     Strep A PCR [[id number]]  (Normal) Collected: 06/05/25 1034    Lab Status: Final result Specimen: Throat Updated: 06/05/25 1106     STREP A PCR Not Detected    Comprehensive metabolic panel [[id number]]  (Abnormal) Collected: 06/05/25 0943    Lab Status: Final result Specimen: Blood from Arm, Left Updated: 06/05/25 1010     Sodium 132 mmol/L      Potassium 5.2 mmol/L      Chloride 101 mmol/L      CO2 22 mmol/L      ANION GAP 9 mmol/L      BUN 44 mg/dL      Creatinine 1.63 mg/dL       Glucose 143 mg/dL      Calcium 9.2 mg/dL      AST 73 U/L      ALT 54 U/L      Alkaline Phosphatase 174 U/L      Total Protein 7.3 g/dL      Albumin 3.7 g/dL      Total Bilirubin 1.04 mg/dL      eGFR 45 ml/min/1.73sq m     Narrative:      National Kid[employer] guidelines for Chronic Kidney Disease (CKD):     Stage 1 with normal or high GFR (GFR > 90 mL/min/1.73 square meters)    Stage 2 Mild CKD (GFR = 60-89 mL/min/1.73 square meters)    Stage 3A Moderate CKD (GFR = 45-59 mL/min/1.73 square meters)    Stage 3B Moderate CKD (GFR = 30-44 mL/min/1.73 square meters)    Stage 4 Severe CKD (GFR = 15-29 mL/min/1.73 square meters)    Stage 5 End Stage CKD (GFR <15 mL/min/1.73 square meters)  Note: GFR calculation is accurate only with a steady state creatinine    Digoxin level [[id number]]  (Abnormal) Collected: 06/05/25 0943    Lab Status: Final result Specimen: Blood from Arm, Left Updated: 06/05/25 1010     Digoxin Lvl 0.4 ng/mL     Magnesium [[id number]]  (Abnormal) Collected: 06/05/25 0943    Lab Status: Final result Specimen: Blood from Arm, Left Updated: 06/05/25 1010     Magnesium 1.7 mg/dL     CBC and differential [[id number]]  (Abnormal) Collected: 06/05/25 0943    Lab Status: Final result Specimen: Blood from Arm, Left Updated: 06/05/25 0948     WBC 9.26 Thousand/uL      RBC 4.63 Million/uL      Hemoglobin 13.3 g/dL      Hematocrit 42.0 %      MCV 91 fL      MCH 28.7 pg      MCHC 31.7 g/dL      RDW 18.3 %      MPV 9.6 fL      Platelets 234 Thousands/uL      nRBC 0 /100 WBCs      Segmented % 88 %      Immature Grans % 1 %      Lymphocytes % 5 %      Monocytes % 6 %      Eosinophils Relative 0 %      Basophils Relative 0 %      Absolute Neutrophils 8.16 Thousands/µL      Absolute Immature Grans 0.06 Thousand/uL      Absolute Lymphocytes 0.50 Thousands/µL      Absolute Monocytes 0.52 Thousand/µL      Eosinophils Absolute 0.01 Thousand/µL      Basophils Absolute 0.01 Thousands/µL             XR chest pa  and lateral    (Results Pending)       Procedures    Critical care time 30 minutes not including billable procedures, treating other patients and teaching      ED Medication and Procedure Management   Prior to Admission Medications   Prescriptions Last Dose Informant Patient Reported? Taking?   albuterol (ProAir HFA) 90 mcg/act inhaler   No No   Sig: Inhale 2 puffs every 6 (six) hours as needed for wheezing   clindamycin (CLEOCIN) 300 MG capsule   No No   Sig: Take 1 capsule (300 mg total) by mouth 3 (three) times a day for 10 days   digoxin (LANOXIN) 0.125 mg tablet   No No   Sig: Take 1 tablet (125 mcg total) by mouth 3 (three) times a week   diphenhydramine, lidocaine, Al/Mg hydroxide, simethicone (Magic Mouthwash) SUSP   No No   Sig: Swish and swallow 10 mL every 6 (six) hours as needed for mouth pain or discomfort or mucositis for up to 5 days   furosemide (LASIX) 40 mg tablet   No No   Sig: Take 1 tablet (40 mg total) by mouth daily   ipratropium-albuterol (DUO-NEB) 0.5-2.5 mg/3 mL nebulizer solution   No No   Sig: Take 3 mL by nebulization every 6 (six) hours while awake   magnesium Oxide (MAG-OX) 400 mg TABS   No No   Sig: Take 1 tablet (400 mg total) by mouth daily   metoprolol tartrate (LOPRESSOR) 25 mg tablet   No No   Sig: Take 1 tablet (25 mg total) by mouth every 12 (twelve) hours   midodrine (PROAMATINE) 2.5 mg tablet   No No   Sig: Take 3 tablets (7.5 mg total) by mouth 3 (three) times a day before meals   oxyCODONE-acetaminophen (Percocet) 5-325 mg per tablet   No No   Sig: Take 1 tablet by mouth every 6 (six) hours as needed for severe pain Max Daily Amount: 4 tablets   pantoprazole (PROTONIX) 40 mg tablet  Self No No   Sig: Take 1 tablet (40 mg total) by mouth 2 (two) times a day   predniSONE 20 mg tablet   No No   Sig: Take 3 tablets (60 mg total) by mouth daily for 5 days   warfarin (COUMADIN) 3 mg tablet   No No   Sig: Take 1 tablet (3 mg total) by mouth daily Only restart if ur inr on 5/2 is  "less then 3 Do not start before May 2, 2025.      Facility-Administered Medications: None     Patient's Medications   Discharge Prescriptions    No medications on file     No discharge procedures on file.  ED SEPSIS DOCUMENTATION   Time reflects when diagnosis was documented in both MDM as applicable and the Disposition within this note       Time User Action Codes Description Comment    6/5/2025 10:15 Akhil Grande [I48.91] Rapid atrial fibrillation (HCC)     6/5/2025 10:15 Akhil Grande [E83.42] Hypomagnesemia     6/5/2025 10:15 Akhil Grande [N17.9] IRINEO (acute kidney injury) (HCC)     6/5/2025 10:15 Akhil Grande [J02.9] Pharyngitis                    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    CHF (congestive heart failure) ([facility])     Coagulopathy (HCC) 04/26/2025    Depression     Emphysema of lung (HCC)     GERD (gastroesophageal reflux disease)     Gout     Hyperammonemia ([facility]) 04/26/2025    Hypertension     Metabolic encephalopathy 03/22/2025    Neuropathy     Raynaud's disease     Right inguinal hernia     Scleroderma ([facility])     Shock ([facility]) 04/26/2025   [2]   Past Surgical History:  Procedure Laterality Date    AMPUTATION Right     pt had tip of \"pointer finger\" d/t scleraderma    CARDIAC PACEMAKER PLACEMENT      FINGER AMPUTATION Left 01/31/2024    Procedure: AMPUTATION FINGER, LEFT INDEX FINGER CPT: 92574;  Surgeon: Pedro Vazquez MD;  Location:  MAIN OR;  Service: Orthopedics    HERNIA REPAIR Left     times 2    MD AMP F/TH 1/2 JT/PHALANX W/NEURECT W/DIR CLSR Right 01/23/2024    Procedure: AMPUTATION FINGER, RIGHT MIDDLE;  Surgeon: Pedro Vazquez MD;  Location: [facility];  Service: Orthopedics    MD RPR 1ST INGUN HRNA AGE 5 YRS/> REDUCIBLE Right 03/03/2023    Procedure: OPEN INGUINAL HERNIA REPAIR WITH " MESH;  Surgeon: Temo Jackson DO;  Location:  MAIN OR;  Service: General   [3]   Family History  Problem Relation Name Age of Onset    Hypertension Father     [4]   Social History  Tobacco Use    Smoking status: Every Day     Current packs/day: 1.00     Average packs/day: 1 pack/day for 45.4 years (45.4 ttl pk-yrs)     Types: Cigarettes     Start date: 2000    Smokeless tobacco: Never    Tobacco comments:     Last cigarette 0430   Vaping Use    Vaping status: Never Used   Substance Use Topics    Alcohol use: Not Currently     Comment: occasional    Drug use: Yes     Types: Marijuana, Methamphetamines     Comment: Hx meth use        Akhil Razo DO  06/05/25 115

## 2025-06-05 NOTE — CONSULTS
Patients Name: Britton Batista   YOB: 1965   [id number]   [facility] Cardiology Consult    ASSESSMENT AND RECOMMENDATIONS:    Assessment & Plan  Supratherapeutic INR  Patient was found to have supratherapeutic INR and is currently being treated for that by internal medicine team.  They will continue to monitor his INR closely.  Atrial fibrillation with RVR (HCC)  Patient is known to have atrial fibrillation which has been persistent.  [facility] cardiology team has him scheduled for cardioversion later this month if his INR is persistently remain in the therapeutic range.  In the meanwhile we will continue him on the digoxin and metoprolol for rate control.  Stage 3 chronic kidney disease (HCC)  Lab Results   Component Value Date    EGFR 45 06/05/2025    EGFR 58 05/22/2025    EGFR 53 04/30/2025    CREATININE 1.63 (H) 06/05/2025    CREATININE 1.31 (H) 05/22/2025    CREATININE 1.42 (H) 04/30/2025   CKD remains overall stable with slight increase on today's blood work.  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  Most recent echocardiogram from April 2025 showed severely dilated left ventricle with LVEF of 15 to 20% with global hypokinesis.  Mild to moderate mitral regurgitation and severe tricuspid regurgitation.  He is currently quite euvolemic with normal chest x-ray and no clinical evidence of congestive heart failure.  Patient is also being worked up for tonsillitis with blood cultures and antibiotics.  CC:   Atrial fibrillation with Coumadin toxicity.      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  Patient states that his cardiac status has been stable and he has been compliant with his medications.  He does complain of sore throat over the last several days.  Review of record reveals that patient was recently hospitalized in April 2025 with acute on chronic systolic congestive heart failure.  He was hospitalized at that time because of noncompliance with his GDMT for HFrEF.  He initially signed AMA but returned to the ER a few hours later.  Previously he was hospitalized for heart failure exacerbation in March 2025 as well.  Prior to that he was hospitalized in February 2025 for heart failure and left AMA.  Patient underwent cardiac catheterization at [facility] in 2020 which showed no evidence of obstructive coronary artery disease.  He underwent ICD implantation at [facility] in 2024.    Past Medical History[1]    Past Surgical History[2]     Social History[3]     Allergies   Allergen Reactions    Aspirin GI Intolerance        Family History[4]     Current Medications[5]     Lab Results   Component Value Date    LDLCALC 74 04/22/2025    HDL 33 (L) 04/22/2025    TRIG 64 04/22/2025    CHOLESTEROL 120 04/22/2025    CREATININE 1.63 (H) 06/05/2025    SODIUM 132 (L) 06/05/2025    K 5.2 06/05/2025    HSTNI 137 (H) 03/22/2025         Results for orders placed or performed during the hospital encounter of 06/05/25   ECG 12 lead   Result Value    Ventricular Rate 139    Atrial Rate     IL Interval     QRSD Interval 146    QT Interval 366    QTC Interval 556    P Axis     QRS Axis 98    T Wave Axis -59    Narrative    Atrial fibrillation with rapid ventricular response with frequent ventricular-paced complexes  Right bundle branch block  T wave abnormality, consider inferolateral ischemia  Abnormal ECG  When compared with ECG of 22-May-2025 16:26,  Vent. rate has increased by  51 bpm  Confirmed by John Hwang ([id number]) on 6/5/2025 12:16:34 PM       I  have personally reviewed the ECG from today which showed atrial fibrillation with rapid ventricular rate of 139 beats a minute, right bundle branch block.      REVIEW OF SYSTEMS   Positive for: Chronic dyspnea on exertion  Negative for: All remaining as reviewed below and in HPI.    SYSTEM SYMPTOMS REVIEWED:  General--weight change, fever, night sweats  Respiratory--cough, wheezing, shortness of breath, sputum production  Cardiovascular--chest pain, syncope, dyspnea on exertion, edema, decline in exercise tolerance, claudication   Gastrointestinal--persistent vomiting, diarrhea, abdominal distention, blood in stool   Muscular or skeletal--joint pain or swelling   Neurologic--headaches, syncope, abnormal movement  Hematologic--history of easy bruising and bleeding   Endocrine--thyroid enlargement, heat or cold intolerance, polyuria   Psychiatric--anxiety, depression     General physical examination:    General appearance: Alert, no acute distress, appears older than stated age.  Poor general hygiene  HEENT: Mucous membranes are moist.  Poor dental hygiene with multiple missing teeth.  Neck: Supple with no lymphadenopathy.  No JVD.  Carotid pulses are intact.  No carotid bruit.  Cardiovascular system: Irregular rhythm.  Normal S1 and S2.  Soft 1/6 systolic murmur at base.  No rubs or gallops. Extremities: No edema. No cyanosis.  Pulmonary: Respirations unlabored.  Reduced air entry bilaterally.  Clear to auscultation bilaterally.  Gastrointestinal: Abdomen is soft and nontender.  Bowel sounds are positive.  Musculoskeletal: Upper Extremities: Normal upper motor strength. Lower Extremity: Normal motor strength. Gait: Normal.   Skin: Skin is warm.  Patchy erythematous rash over face   Neurological: Patient is alert and oriented with no gross motor deficits.  Psychiatric: Mood is normal.  Behavior is normal.    Vitals:    06/05/25 1100 06/05/25 1115 06/05/25 1130 06/05/25 1240   BP: 134/85  137/100 129/75   BP Location:   " Right arm    Pulse: (!) 125 (!) 125 (!) 113 (!) 107   Resp: 20 20 20    Temp:       TempSrc:       SpO2: 95% 97% 95% 97%   Weight:       Height:           Body mass index is 26.08 kg/m².       Intake/Output Summary (Last 24 hours) at 6/5/2025 1255  Last data filed at 6/5/2025 1102  Gross per 24 hour   Intake 550 ml   Output --   Net 550 ml             Thank you for this consult. Please call for any further questions.  John Hwang MD, FACC, MAIAE  Attending Cardiologist    Portions of the record  have been created with voice recognition software.  Occasional grammatical mistakes or wrong word or \"sound alike\" substitutions may have occurred due to the inherent limitations of voice recognition software. Please reach out to me directly for any clarifications.          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    CHF (congestive heart failure) (HCC)     Coagulopathy (HCC) 04/26/2025    Depression     Emphysema of lung (HCC)     GERD (gastroesophageal reflux disease)     Gout     Hyperammonemia (HCC) 04/26/2025    Hypertension     Metabolic encephalopathy 03/22/2025    Neuropathy     Raynaud's disease     Right inguinal hernia     Scleroderma (HCC)     Shock (HCC) 04/26/2025   [2]   Past Surgical History:  Procedure Laterality Date    AMPUTATION Right     pt had tip of \"pointer finger\" d/t scleraderma    CARDIAC PACEMAKER PLACEMENT      FINGER AMPUTATION Left 01/31/2024    Procedure: AMPUTATION FINGER, LEFT INDEX FINGER CPT: 22448;  Surgeon: Pedro Vazquez MD;  Location:  MAIN OR;  Service: Orthopedics    HERNIA REPAIR Left     times 2    GA AMP F/TH 1/2 JT/PHALANX W/NEURECT W/DIR CLSR Right 01/23/2024    Procedure: AMPUTATION FINGER, RIGHT MIDDLE;  Surgeon: Pedro Vazquez MD;  Location: [facility];  Service: Orthopedics    GA RPR 1ST INGUN HRNA AGE 5 YRS/> " REDUCIBLE Right 03/03/2023    Procedure: OPEN INGUINAL HERNIA REPAIR WITH MESH;  Surgeon: Temo Jackson DO;  Location: [facility];  Service: General   [3]   Social History  Tobacco Use    Smoking status: Every Day     Current packs/day: 1.00     Average packs/day: 1 pack/day for 45.4 years (45.4 ttl pk-yrs)     Types: Cigarettes     Start date: 2000    Smokeless tobacco: Never    Tobacco comments:     Last cigarette 0430   Vaping Use    Vaping status: Never Used   Substance Use Topics    Alcohol use: Not Currently     Comment: occasional    Drug use: Yes     Types: Marijuana, Methamphetamines     Comment: Hx meth use   [4]   Family History  Problem Relation Name Age of Onset    Hypertension Father     [5]   Current Facility-Administered Medications:     acetaminophen (TYLENOL) tablet 650 mg, 650 mg, Oral, Q6H PRN, Guadalupe Muñoz MD    dexamethasone (PF) (DECADRON) injection 6 mg, 6 mg, Intravenous, Q8H JESUS, Guadalupe Muñoz MD    digoxin (LANOXIN) tablet 125 mcg, 125 mcg, Oral, Daily, Guadalupe Muñoz MD    diphenhydramine, lidocaine, Al/Mg hydroxide, simethicone (Magic Mouthwash) oral solution 10 mL, 10 mL, Swish & Swallow, Q6H PRN, Guadalupe Muñoz MD    ipratropium-albuterol (DUO-NEB) 0.5-2.5 mg/3 mL inhalation solution 3 mL, 3 mL, Nebulization, Q6H While awake, Guadalupe Muñoz MD    magnesium Oxide (MAG-OX) tablet 400 mg, 400 mg, Oral, Daily, Guadalupe Muñoz MD    metoprolol tartrate (LOPRESSOR) tablet 25 mg, 25 mg, Oral, Q12H JESUS, Guadalupe Muñoz MD    midodrine (PROAMATINE) tablet 7.5 mg, 7.5 mg, Oral, TID AC, Guadalupe Muñoz MD    morphine injection 2 mg, 2 mg, Intravenous, Q4H PRN, Guadalupe Muñoz MD    multi-electrolyte (Plasmalyte-A/Isolyte-S PH 7.4/Normosol-R) IV solution, 50 mL/hr, Intravenous, Continuous, Guadalupe Muñoz MD    nicotine (NICODERM CQ) 14 mg/24hr TD 24 hr patch 1 patch, 1 patch, Transdermal, Daily, Guadalupe Muñoz MD    phytonadione (MEPHYTON) tablet 2.5 mg, 2.5 mg, Oral, Once,  Guadalupe Muñoz MD

## 2025-06-05 NOTE — ASSESSMENT & PLAN NOTE
Patient is known to have atrial fibrillation which has been persistent.  [facility] cardiology team has him scheduled for cardioversion later this month if his INR is persistently remain in the therapeutic range.  In the meanwhile we will continue him on the digoxin and metoprolol for rate control.

## 2025-06-05 NOTE — ASSESSMENT & PLAN NOTE
Wt Readings from Last 3 Encounters:   06/05/25 80.1 kg (176 lb 9.4 oz)   06/05/25 81.1 kg (178 lb 12.7 oz)   05/04/25 79.2 kg (174 lb 9.7 oz)       Last EF is 25% moderate MR on 3/2025 hold his Lasix as patient is septic and needed some hydration anticipate restart in 24 hours

## 2025-06-05 NOTE — ASSESSMENT & PLAN NOTE
Met criteria by tachycardia and tachypnea secondary to tonsillitis/pharyngitis rule out peritonsillar abscess  He received 500 LR in the ER patient has an EF of 25% will gently hydrate only for 5 hours at normal saline 50 cc/h  On blood cultures lactic acid  Unasyn

## 2025-06-05 NOTE — ED NOTES
Pt is eating turkey sandwich and drinking soda with out any issues or distress     Kim Martinez RN  06/05/25 3776

## 2025-06-05 NOTE — ASSESSMENT & PLAN NOTE
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  Most recent echocardiogram from April 2025 showed severely dilated left ventricle with LVEF of 15 to 20% with global hypokinesis.  Mild to moderate mitral regurgitation and severe tricuspid regurgitation.  He is currently quite euvolemic with normal chest x-ray and no clinical evidence of congestive heart failure.

## 2025-06-05 NOTE — ASSESSMENT & PLAN NOTE
Is given metoprolol and digoxin in the ER discussed with cardiology they will see patient later will start him on metoprolol tartrate 25 mg p.o. twice daily and digoxin 125 mcg p.o. daily will check digoxin level continue his midodrine to allow medications to be given  INR supratherapeutic hold Coumadin

## 2025-06-05 NOTE — ED PROVIDER NOTES
Time reflects when diagnosis was documented in both MDM as applicable and the Disposition within this note       Time User Action Codes Description Comment    6/5/2025  4:46 AM Bhargav Antonio [J02.0] Strep pharyngitis     6/5/2025  4:46 AM Bhargav Antonio [J03.90] Acute tonsillitis     6/5/2025  4:46 AM Bhargav Antonio [K12.2] Uvulitis           ED Disposition       ED Disposition   Discharge    Condition   Stable    Date/Time   Thu Jun 5, 2025  4:46 AM    Comment   Britton Batista discharge to home/self care.                   Assessment & Plan       Medical Decision Making  Patient is afebrile nontoxic well-appearing clinically and hemodynamically stable in the emergency department history examination consistent with acute tonsillitis pharyngitis and mild swelling of the uvula.  For now we will recommend steroid course antibiotics and supportive care and follow-up with primary physician and ear nose and throat referral provided for reevaluation and further evaluation and treatment.  Return precautions and anticipatory guidance discussed.    Problems Addressed:  Acute tonsillitis: acute illness or injury  Strep pharyngitis: acute illness or injury  Uvulitis: acute illness or injury    Risk  Prescription drug management.             Medications   dexamethasone oral liquid 10 mg 1 mL (has no administration in time range)   clindamycin (CLEOCIN) capsule 300 mg (has no administration in time range)   diphenhydramine, lidocaine, Al/Mg hydroxide, simethicone (Magic Mouthwash) oral solution 10 mL (has no administration in time range)       ED Risk Strat Scores                    No data recorded        SBIRT 20yo+      Flowsheet Row Most Recent Value   Initial Alcohol Screen: US AUDIT-C     1. How often do you have a drink containing alcohol? 0 Filed at: 06/05/2025 0442   2. How many drinks containing alcohol do you have on a typical day you are drinking?  0 Filed at: 06/05/2025 0442   3a. Male UNDER 65: How often  "do you have five or more drinks on one occasion? 0 Filed at: 06/05/2025 0442   Audit-C Score 0 Filed at: 06/05/2025 0442   SILVIA: How many times in the past year have you...    Used an illegal drug or used a prescription medication for non-medical reasons? Once or Twice Filed at: 06/05/2025 0442                            History of Present Illness       Chief Complaint   Patient presents with    Sore Throat     Patient reports starting a few days ago. Also, states he thinks he has \"sores in there.\"       Past Medical History[1]   Past Surgical History[2]   Family History[3]   Social History[4]   E-Cigarette/Vaping    E-Cigarette Use Never User       E-Cigarette/Vaping Substances    Nicotine No     THC No     CBD No     Flavoring No     Other No     Unknown No       I have reviewed and agree with the history as documented.     Patient is a 60-year-old male presents the emergency department complaining of sore throat started about 3 days ago reports pain and burning with swallowing no drooling no voice change or difficulty speaking.  No chest pain or shortness of breath no fevers.        History provided by:  Patient  Sore Throat  Location:  Generalized  Quality:  Burning  Severity:  Moderate  Onset quality:  Gradual  Duration:  3 days  Timing:  Constant  Progression:  Worsening  Associated symptoms: cough and postnasal drip    Associated symptoms: no abdominal pain, no chest pain, no fever and no shortness of breath        Review of Systems   Constitutional:  Negative for fever.   HENT:  Positive for congestion, postnasal drip and sore throat.    Respiratory:  Positive for cough. Negative for shortness of breath.    Cardiovascular:  Negative for chest pain.   Gastrointestinal:  Negative for abdominal pain, nausea and vomiting.   All other systems reviewed and are negative.          Objective       ED Triage Vitals [06/05/25 0513]   Temperature Pulse Blood Pressure Respirations SpO2 Patient Position - Orthostatic VS "   97.7 °F (36.5 °C) 67 137/92 20 98 % Sitting      Temp Source Heart Rate Source BP Location FiO2 (%) Pain Score    Temporal -- Right arm -- 8      Vitals      Date and Time Temp Pulse SpO2 Resp BP Pain Score FACES Pain Rating User   06/05/25 0438 97.7 °F (36.5 °C) 67 98 % 20 137/92 8 -- TH            Physical Exam  Vitals and nursing note reviewed.   Constitutional:       General: He is not in acute distress.     Appearance: Normal appearance.   HENT:      Head: Normocephalic and atraumatic.      Nose: Congestion present.      Mouth/Throat:      Pharynx: Uvula midline. Posterior oropharyngeal erythema and uvula swelling present.      Tonsils: No tonsillar abscesses. 1+ on the right. 1+ on the left.     Eyes:      Conjunctiva/sclera: Conjunctivae normal.     Pulmonary:      Effort: Pulmonary effort is normal. No respiratory distress.     Skin:     General: Skin is dry.     Neurological:      General: No focal deficit present.      Mental Status: He is alert and oriented to person, place, and time.         Results Reviewed       None            No orders to display       Procedures    ED Medication and Procedure Management   Prior to Admission Medications   Prescriptions Last Dose Informant Patient Reported? Taking?   albuterol (ProAir HFA) 90 mcg/act inhaler   No No   Sig: Inhale 2 puffs every 6 (six) hours as needed for wheezing   digoxin (LANOXIN) 0.125 mg tablet   No No   Sig: Take 1 tablet (125 mcg total) by mouth 3 (three) times a week   furosemide (LASIX) 40 mg tablet   No No   Sig: Take 1 tablet (40 mg total) by mouth daily   ipratropium-albuterol (DUO-NEB) 0.5-2.5 mg/3 mL nebulizer solution   No No   Sig: Take 3 mL by nebulization every 6 (six) hours while awake   magnesium Oxide (MAG-OX) 400 mg TABS   No No   Sig: Take 1 tablet (400 mg total) by mouth daily   metoprolol tartrate (LOPRESSOR) 25 mg tablet   No No   Sig: Take 1 tablet (25 mg total) by mouth every 12 (twelve) hours   midodrine (PROAMATINE) 2.5  mg tablet   No No   Sig: Take 3 tablets (7.5 mg total) by mouth 3 (three) times a day before meals   oxyCODONE-acetaminophen (Percocet) 5-325 mg per tablet   No No   Sig: Take 1 tablet by mouth every 6 (six) hours as needed for severe pain Max Daily Amount: 4 tablets   pantoprazole (PROTONIX) 40 mg tablet  Self No No   Sig: Take 1 tablet (40 mg total) by mouth 2 (two) times a day   warfarin (COUMADIN) 3 mg tablet   No No   Sig: Take 1 tablet (3 mg total) by mouth daily Only restart if ur inr on 5/2 is less then 3 Do not start before May 2, 2025.      Facility-Administered Medications: None     Patient's Medications   Discharge Prescriptions    CLINDAMYCIN (CLEOCIN) 300 MG CAPSULE    Take 1 capsule (300 mg total) by mouth 3 (three) times a day for 10 days       Start Date: 6/5/2025  End Date: 6/15/2025       Order Dose: 300 mg       Quantity: 30 capsule    Refills: 0    DIPHENHYDRAMINE, LIDOCAINE, AL/MG HYDROXIDE, SIMETHICONE (MAGIC MOUTHWASH) SUSP    Swish and swallow 10 mL every 6 (six) hours as needed for mouth pain or discomfort or mucositis for up to 5 days       Start Date: 6/5/2025  End Date: 6/10/2025       Order Dose: 10 mL       Quantity: 90 mL    Refills: 0    PREDNISONE 20 MG TABLET    Take 3 tablets (60 mg total) by mouth daily for 5 days       Start Date: 6/5/2025  End Date: 6/10/2025       Order Dose: 60 mg       Quantity: 15 tablet    Refills: 0     No discharge procedures on file.  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    CHF (congestive heart failure) (HCC)     Coagulopathy (HCC) 04/26/2025    Depression     Emphysema of lung (HCC)     GERD (gastroesophageal reflux disease)     Gout     Hyperammonemia (HCC) 04/26/2025    Hypertension     Metabolic encephalopathy 03/22/2025    Neuropathy     Raynaud's disease     Right inguinal hernia     Scleroderma (HCC)     Shock (HCC) 04/26/2025   [2]   Past Surgical History:  Procedure Laterality Date    AMPUTATION Right     pt had tip of \"pointer finger\" d/t scleraderma    CARDIAC PACEMAKER PLACEMENT      FINGER AMPUTATION Left 01/31/2024    Procedure: AMPUTATION FINGER, LEFT INDEX FINGER CPT: 03276;  Surgeon: Pedro Vazquez MD;  Location:  MAIN OR;  Service: Orthopedics    HERNIA REPAIR Left     times 2    OK AMP F/TH 1/2 JT/PHALANX W/NEURECT W/DIR CLSR Right 01/23/2024    Procedure: AMPUTATION FINGER, RIGHT MIDDLE;  Surgeon: Pedro Vazquez MD;  Location[facility];  Service: Orthopedics    OK RPR 1ST INGUN HRNA AGE 5 YRS/> REDUCIBLE Right 03/03/2023    Procedure: OPEN INGUINAL HERNIA REPAIR WITH MESH;  Surgeon: Temo Jackson DO;  Location:  MAIN OR;  Service: General   [3]   Family History  Problem Relation Name Age of Onset    Hypertension Father     [4]   Social History  Tobacco Use    Smoking status: Every Day     Current packs/day: 1.00     Average packs/day: 1 pack/day for 45.4 years (45.4 ttl pk-yrs)     Types: Cigarettes     Start date: 2000    Smokeless tobacco: Never    Tobacco comments:     Last cigarette 0430   Vaping Use    Vaping status: Never Used   Substance Use Topics    Alcohol use: Not Currently     Comment: occasional    Drug use: Yes     Types: Marijuana, Methamphetamines     Comment: Hx meth use        Bhargav Antonio DO  06/05/25 0787    "

## 2025-06-05 NOTE — ASSESSMENT & PLAN NOTE
Patient complaining of sore throat for the past week his tonsils are erythematous swollen left greater than right he is tender on the left side of the neck his uvula is swollen as well will start Unasyn will start Decadron 6 mg IV every 8 hours will consult ENT will do a CT neck to rule out peritonsillar abscess pain control strep screen is negative will do an EBV and Monospot  Aspiration precautions and clear liquid diet

## 2025-06-05 NOTE — ASSESSMENT & PLAN NOTE
Patient was found to have supratherapeutic INR and is currently being treated for that by internal medicine team.  They will continue to monitor his INR closely.

## 2025-06-05 NOTE — ED NOTES
Pt unable to sit still for IV placement. This RN attempted x2, pt continues to move and placement unsuccessful.      Kim Martinez RN  06/05/25 [id number]

## 2025-06-05 NOTE — SEPSIS NOTE
Sepsis Note   Brittno Batista 60 y.o. male MRN: [id number]  Unit/Bed#: -01 Encounter: [id number]       Initial Sepsis Screening       Row Name 06/05/25 1256                Is the patient's history suggestive of a new or worsening infection? Yes (Proceed)  -DR        Suspected source of infection soft tissue  -DR        Indicate SIRS criteria Tachycardia > 90 bpm;Tachypnea > 20 resp per min  -DR        Are two or more of the above signs & symptoms of infection both present and new to the patient? --                  User Key  (r) = Recorded By, (t) = Taken By, (c) = Cosigned By      Initials Name Provider Type    DR Guadalupe Muñoz MD Physician                   Initial Sepsis Screening       Row Name 06/05/25 1256                   Initial Sepsis Assessment    Is the patient's history suggestive of a new or worsening infection? Yes (Proceed)  -DR        Suspected source of infection soft tissue  -DR        Indicate SIRS criteria Tachycardia > 90 bpm;Tachypnea > 20 resp per min  -DR                  User Key  (r) = Recorded By, (t) = Taken By, (c) = Cosigned By      Initials Name Provider Type    DR Guadalupe Muñoz MD Physician                         Body mass index is 26.08 kg/m².  Wt Readings from Last 1 Encounters:   06/05/25 80.1 kg (176 lb 9.4 oz)     IBW (Ideal Body Weight): 70.7 kg    Ideal body weight: 70.7 kg (155 lb 13.8 oz)  Adjusted ideal body weight: 74.5 kg (164 lb 2.5 oz)

## 2025-06-06 LAB
ALBUMIN SERPL BCG-MCNC: 3.6 G/DL (ref 3.5–5)
ALP SERPL-CCNC: 147 U/L (ref 34–104)
ALT SERPL W P-5'-P-CCNC: 48 U/L (ref 7–52)
ANION GAP SERPL CALCULATED.3IONS-SCNC: 8 MMOL/L (ref 4–13)
AST SERPL W P-5'-P-CCNC: 44 U/L (ref 13–39)
BASOPHILS # BLD AUTO: 0.01 THOUSANDS/ÂΜL (ref 0–0.1)
BASOPHILS NFR BLD AUTO: 0 % (ref 0–1)
BILIRUB SERPL-MCNC: 1.23 MG/DL (ref 0.2–1)
BUN SERPL-MCNC: 30 MG/DL (ref 5–25)
CALCIUM SERPL-MCNC: 9.1 MG/DL (ref 8.4–10.2)
CHLORIDE SERPL-SCNC: 101 MMOL/L (ref 96–108)
CO2 SERPL-SCNC: 23 MMOL/L (ref 21–32)
CREAT SERPL-MCNC: 1.23 MG/DL (ref 0.6–1.3)
DIGOXIN SERPL-MCNC: 0.7 NG/ML (ref 0.8–2)
EBV EA IGG SER QL IA: NEGATIVE
EBV NA IGG SER QL IA: POSITIVE
EBV VCA IGG SER QL IA: POSITIVE
EBV VCA IGM SER QL IA: NEGATIVE
EOSINOPHIL # BLD AUTO: 0.03 THOUSAND/ÂΜL (ref 0–0.61)
EOSINOPHIL NFR BLD AUTO: 0 % (ref 0–6)
ERYTHROCYTE [DISTWIDTH] IN BLOOD BY AUTOMATED COUNT: 18.3 % (ref 11.6–15.1)
GFR SERPL CREATININE-BSD FRML MDRD: 63 ML/MIN/1.73SQ M
GLUCOSE SERPL-MCNC: 129 MG/DL (ref 65–140)
HCT VFR BLD AUTO: 40.7 % (ref 36.5–49.3)
HETEROPH AB SER QL: NEGATIVE
HGB BLD-MCNC: 13 G/DL (ref 12–17)
IMM GRANULOCYTES # BLD AUTO: 0.05 THOUSAND/UL (ref 0–0.2)
IMM GRANULOCYTES NFR BLD AUTO: 1 % (ref 0–2)
INR PPP: 6.35 (ref 0.85–1.19)
LYMPHOCYTES # BLD AUTO: 0.57 THOUSANDS/ÂΜL (ref 0.6–4.47)
LYMPHOCYTES NFR BLD AUTO: 6 % (ref 14–44)
MCH RBC QN AUTO: 29 PG (ref 26.8–34.3)
MCHC RBC AUTO-ENTMCNC: 31.9 G/DL (ref 31.4–37.4)
MCV RBC AUTO: 91 FL (ref 82–98)
MONOCYTES # BLD AUTO: 0.47 THOUSAND/ÂΜL (ref 0.17–1.22)
MONOCYTES NFR BLD AUTO: 5 % (ref 4–12)
NEUTROPHILS # BLD AUTO: 8.43 THOUSANDS/ÂΜL (ref 1.85–7.62)
NEUTS SEG NFR BLD AUTO: 88 % (ref 43–75)
NRBC BLD AUTO-RTO: 1 /100 WBCS
PLATELET # BLD AUTO: 230 THOUSANDS/UL (ref 149–390)
PMV BLD AUTO: 9.1 FL (ref 8.9–12.7)
POTASSIUM SERPL-SCNC: 4.9 MMOL/L (ref 3.5–5.3)
PROCALCITONIN SERPL-MCNC: 0.16 NG/ML
PROT SERPL-MCNC: 6.9 G/DL (ref 6.4–8.4)
PROTHROMBIN TIME: 54.6 SECONDS (ref 12.3–15)
RBC # BLD AUTO: 4.48 MILLION/UL (ref 3.88–5.62)
SODIUM SERPL-SCNC: 132 MMOL/L (ref 135–147)
WBC # BLD AUTO: 9.56 THOUSAND/UL (ref 4.31–10.16)

## 2025-06-06 PROCEDURE — 80053 COMPREHEN METABOLIC PANEL: CPT | Performed by: FAMILY MEDICINE

## 2025-06-06 PROCEDURE — 94664 DEMO&/EVAL PT USE INHALER: CPT

## 2025-06-06 PROCEDURE — 99232 SBSQ HOSP IP/OBS MODERATE 35: CPT | Performed by: INTERNAL MEDICINE

## 2025-06-06 PROCEDURE — 84145 PROCALCITONIN (PCT): CPT | Performed by: FAMILY MEDICINE

## 2025-06-06 PROCEDURE — 94640 AIRWAY INHALATION TREATMENT: CPT

## 2025-06-06 PROCEDURE — 94760 N-INVAS EAR/PLS OXIMETRY 1: CPT

## 2025-06-06 PROCEDURE — 85025 COMPLETE CBC W/AUTO DIFF WBC: CPT | Performed by: FAMILY MEDICINE

## 2025-06-06 PROCEDURE — 85610 PROTHROMBIN TIME: CPT | Performed by: FAMILY MEDICINE

## 2025-06-06 PROCEDURE — 80162 ASSAY OF DIGOXIN TOTAL: CPT | Performed by: FAMILY MEDICINE

## 2025-06-06 PROCEDURE — 99232 SBSQ HOSP IP/OBS MODERATE 35: CPT | Performed by: FAMILY MEDICINE

## 2025-06-06 RX ORDER — DIPHENHYDRAMINE HYDROCHLORIDE AND LIDOCAINE HYDROCHLORIDE AND ALUMINUM HYDROXIDE AND MAGNESIUM HYDRO
10 KIT EVERY 6 HOURS PRN
Status: DISCONTINUED | OUTPATIENT
Start: 2025-06-06 | End: 2025-06-06

## 2025-06-06 RX ORDER — FUROSEMIDE 40 MG/1
40 TABLET ORAL DAILY
Status: DISCONTINUED | OUTPATIENT
Start: 2025-06-06 | End: 2025-06-12

## 2025-06-06 RX ORDER — DEXAMETHASONE SODIUM PHOSPHATE 10 MG/ML
6 INJECTION, SOLUTION INTRAMUSCULAR; INTRAVENOUS EVERY 12 HOURS SCHEDULED
Status: DISCONTINUED | OUTPATIENT
Start: 2025-06-06 | End: 2025-06-08

## 2025-06-06 RX ORDER — METOPROLOL TARTRATE 25 MG/1
25 TABLET, FILM COATED ORAL ONCE
Status: COMPLETED | OUTPATIENT
Start: 2025-06-06 | End: 2025-06-06

## 2025-06-06 RX ORDER — METOPROLOL TARTRATE 50 MG
50 TABLET ORAL EVERY 12 HOURS SCHEDULED
Status: DISCONTINUED | OUTPATIENT
Start: 2025-06-06 | End: 2025-06-07

## 2025-06-06 RX ADMIN — MIDODRINE HYDROCHLORIDE 7.5 MG: 5 TABLET ORAL at 11:38

## 2025-06-06 RX ADMIN — METOPROLOL TARTRATE 50 MG: 50 TABLET, FILM COATED ORAL at 20:31

## 2025-06-06 RX ADMIN — FUROSEMIDE 40 MG: 40 TABLET ORAL at 10:39

## 2025-06-06 RX ADMIN — MIDODRINE HYDROCHLORIDE 7.5 MG: 5 TABLET ORAL at 06:05

## 2025-06-06 RX ADMIN — METOROPROLOL TARTRATE 2.5 MG: 5 INJECTION, SOLUTION INTRAVENOUS at 23:46

## 2025-06-06 RX ADMIN — DEXAMETHASONE SODIUM PHOSPHATE 6 MG: 10 INJECTION, SOLUTION INTRAMUSCULAR; INTRAVENOUS at 20:31

## 2025-06-06 RX ADMIN — DEXAMETHASONE SODIUM PHOSPHATE 6 MG: 10 INJECTION, SOLUTION INTRAMUSCULAR; INTRAVENOUS at 06:06

## 2025-06-06 RX ADMIN — DIPHENHYDRAMINE HYDROCHLORIDE AND LIDOCAINE HYDROCHLORIDE AND ALUMINUM HYDROXIDE AND MAGNESIUM HYDRO 10 ML: KIT at 11:43

## 2025-06-06 RX ADMIN — AMPICILLIN SODIUM AND SULBACTAM SODIUM 3 G: 10; 5 INJECTION, POWDER, FOR SOLUTION INTRAMUSCULAR; INTRAVENOUS at 20:37

## 2025-06-06 RX ADMIN — DIPHENHYDRAMINE HYDROCHLORIDE AND LIDOCAINE HYDROCHLORIDE AND ALUMINUM HYDROXIDE AND MAGNESIUM HYDRO 10 ML: KIT at 15:55

## 2025-06-06 RX ADMIN — NICOTINE 1 PATCH: 14 PATCH, EXTENDED RELEASE TRANSDERMAL at 08:12

## 2025-06-06 RX ADMIN — DIGOXIN 125 MCG: 125 TABLET ORAL at 08:09

## 2025-06-06 RX ADMIN — AMPICILLIN SODIUM AND SULBACTAM SODIUM 3 G: 10; 5 INJECTION, POWDER, FOR SOLUTION INTRAMUSCULAR; INTRAVENOUS at 08:09

## 2025-06-06 RX ADMIN — AMPICILLIN SODIUM AND SULBACTAM SODIUM 3 G: 10; 5 INJECTION, POWDER, FOR SOLUTION INTRAMUSCULAR; INTRAVENOUS at 13:20

## 2025-06-06 RX ADMIN — MORPHINE SULFATE 2 MG: 2 INJECTION, SOLUTION INTRAMUSCULAR; INTRAVENOUS at 06:06

## 2025-06-06 RX ADMIN — METOPROLOL TARTRATE 25 MG: 25 TABLET, FILM COATED ORAL at 08:09

## 2025-06-06 RX ADMIN — DIPHENHYDRAMINE HYDROCHLORIDE AND LIDOCAINE HYDROCHLORIDE AND ALUMINUM HYDROXIDE AND MAGNESIUM HYDRO 10 ML: KIT at 22:48

## 2025-06-06 RX ADMIN — DIPHENHYDRAMINE HYDROCHLORIDE AND LIDOCAINE HYDROCHLORIDE AND ALUMINUM HYDROXIDE AND MAGNESIUM HYDRO 10 ML: KIT at 06:06

## 2025-06-06 RX ADMIN — MORPHINE SULFATE 2 MG: 2 INJECTION, SOLUTION INTRAMUSCULAR; INTRAVENOUS at 15:55

## 2025-06-06 RX ADMIN — MIDODRINE HYDROCHLORIDE 7.5 MG: 5 TABLET ORAL at 15:55

## 2025-06-06 RX ADMIN — IPRATROPIUM BROMIDE AND ALBUTEROL SULFATE 3 ML: 2.5; .5 SOLUTION RESPIRATORY (INHALATION) at 07:40

## 2025-06-06 RX ADMIN — AMPICILLIN SODIUM AND SULBACTAM SODIUM 3 G: 10; 5 INJECTION, POWDER, FOR SOLUTION INTRAMUSCULAR; INTRAVENOUS at 01:33

## 2025-06-06 RX ADMIN — Medication 400 MG: at 08:09

## 2025-06-06 RX ADMIN — METOPROLOL TARTRATE 25 MG: 25 TABLET, FILM COATED ORAL at 09:12

## 2025-06-06 RX ADMIN — MORPHINE SULFATE 2 MG: 2 INJECTION, SOLUTION INTRAMUSCULAR; INTRAVENOUS at 20:31

## 2025-06-06 NOTE — ASSESSMENT & PLAN NOTE
Patient complaining of sore throat for the past week his tonsils are erythematous swollen left greater than right he is tender on the left side of the neck his uvula is swollen as well will continue Unasyn   Today looking much better the swelling is down almost completely he is feeling much better decrease Decadron down to 6 every 12 advance to regular consistency diet CT without any evidence of abscess discontinue ENT consult  Aspiration precautions and clear liquid diet

## 2025-06-06 NOTE — ASSESSMENT & PLAN NOTE
Continue digoxin 125 mcg p.o. daily level reviewed discussed with cardiologist rates are still uncontrolled increase metoprolol titrate to 50 mg p.o. twice daily  INR supratherapeutic hold Coumadin status post vitamin K INR improved we will continue to monitor

## 2025-06-06 NOTE — ASSESSMENT & PLAN NOTE
Lab Results   Component Value Date    EGFR 63 06/06/2025    EGFR 45 06/05/2025    EGFR 58 05/22/2025    CREATININE 1.23 06/06/2025    CREATININE 1.63 (H) 06/05/2025    CREATININE 1.31 (H) 05/22/2025   CKD remains overall stable with improvement today

## 2025-06-06 NOTE — ASSESSMENT & PLAN NOTE
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  Most recent echocardiogram from April 2025 showed severely dilated left ventricle with LVEF of 15 to 20% with global hypokinesis.  Mild to moderate mitral regurgitation and severe tricuspid regurgitation. Euvolemic during this admission.

## 2025-06-06 NOTE — UTILIZATION REVIEW
Initial Clinical Review    Admission: Date/Time/Statement:   Admission Orders (From admission, onward)       Ordered        06/05/25 1150  INPATIENT ADMISSION  Once                          Orders Placed This Encounter   Procedures    INPATIENT ADMISSION     Standing Status:   Standing     Number of Occurrences:   1     Level of Care:   Med Surg [16]     Bed request comments:   telemetry     Estimated length of stay:   More than 2 Midnights     Certification:   I certify that inpatient services are medically necessary for this patient for a duration of greater than two midnights. See H&P and MD Progress Notes for additional information about the patient's course of treatment.     ED Arrival Information       Expected   -    Arrival   6/5/2025 08:49    Acuity   Urgent              Means of arrival   Walk-In    Escorted by   Family Member    Service   Hospitalist    Admission type   Emergency              Arrival complaint   Abnormal labs, Strep thoat pain             Chief Complaint   Patient presents with    Abnormal Lab     Pt arrives from home with abnormally high PT/INR per cardiology. Pt also reports a fall while ambulating to bathroom overnight. Denies HS, +BT. Seen here overnight and dx with strep throat.        Initial Presentation: 60 y.o. male to ED via walk-in from home  Present to ED with throat pain for the past week difficulty swallowing.   PMHX systolic/diastolic heart failure status post AICD, atrial fibrillation, CKD stage III, EF of 25%   Admitted to IP - MS - TELE  with DX: Sepsis  on exam: tachy; hypertensive; tonsils are erythematous swollen left greater than right he is tender on the left side of the neck his uvula is swollen; pain 10/10;  Na 132; Cr 1.63 (baseline 1.5); ALT 54; AST 73; T bili 1.04; INR 8.98  PLAN: iv abx; decadron iv; DuoNebs; po vit K x1; monitor labs; tele monitoring; Fluid / Na restriction; f/u blood cx; hold coumadin; ENT consulted; f/u CT neck; f/u EBV and Monospot        Anticipated Length of Stay/Certification Statement: Patient will be admitted on an inpatient basis with an anticipated length of stay of greater than 2 midnights secondary to tonsillitis rule out tonsillar abscess A-fib RVR supratherapeutic INR.       CARDIOLOGY CONSULT   Supratherapeutic INR: Patient was found to have supratherapeutic INR. hold coumadin  Atrial fibrillation with RVR:  Patient is known to have atrial fibrillation which has been persistent. [facility] cardiology team has him scheduled for cardioversion later this month if his INR is persistently remain in the therapeutic range. In the meanwhile we will continue him on the digoxin and metoprolol for rate control.       Date: 6/6/25      Day 2:  Tonsillitis - looking much better the swelling is down almost completely he is feeling much better decrease Decadron down to 6 every 12.  CT without any evidence of abscess discontinue ENT consult . INR improved 6.35  Plan:  iv abx; decadron iv; DuoNebs; monitor labs; tele monitoring; Fluid / Na restriction; f/u blood cx; hold coumadin; f/u EBV and Monospot         ED Treatment-Medication Administration from 06/05/2025 0849 to 06/05/2025 1217         Date/Time Order Dose Route Action     06/05/2025 0931 acetaminophen (TYLENOL) tablet 975 mg 975 mg Oral Given     06/05/2025 0933 Lidocaine Viscous HCl (XYLOCAINE) 2 % mucosal solution 15 mL 15 mL Swish & Spit Given     06/05/2025 0931 metoprolol tartrate (LOPRESSOR) tablet 25 mg 25 mg Oral Given     06/05/2025 1034 magnesium sulfate 2 g/50 mL IVPB (premix) 2 g 2 g Intravenous New Bag     06/05/2025 1030 lactated ringers bolus 500 mL 500 mL Intravenous New Bag     06/05/2025 1113 lactated ringers infusion 100 mL/hr Intravenous New Bag     06/05/2025 1032 digoxin (LANOXIN) injection 125 mcg 125 mcg Intravenous Given            Scheduled Medications:  ampicillin-sulbactam, 3 g, Intravenous, Q6H  dexamethasone, 6 mg, Intravenous, Q12H JESUS  digoxin, 125 mcg, Oral,  Daily  furosemide, 40 mg, Oral, Daily  ipratropium-albuterol, 3 mL, Nebulization, Q6H While awake  magnesium Oxide, 400 mg, Oral, Daily  metoprolol tartrate, 50 mg, Oral, Q12H JESUS  midodrine, 7.5 mg, Oral, TID AC  nicotine, 1 patch, Transdermal, Daily      Continuous IV Infusions:   lactated ringers infusion  Rate: 100 mL/hr Dose: 100 mL/hr  Freq: Continuous Route: IV  Indications of Use: IV Hydration  Last Dose: Stopped (06/05/25 1340)  Start: 06/05/25 1015 End: 06/05/25 1202     multi-electrolyte (Plasmalyte-A/Isolyte-S PH 7.4/Normosol-R) IV solution  Rate: 50 mL/hr Dose: 50 mL/hr  Freq: Continuous Route: IV  Last Dose: Stopped (06/05/25 1835)  Start: 06/05/25 1300 End: 06/05/25 1836       PRN Meds:  acetaminophen, 650 mg, Oral, Q6H PRN: 6/5 x2  diphenhydramine, lidocaine, Al/Mg hydroxide, simethicone, 10 mL, Swish & Swallow, Q6H PRN  metoprolol, 2.5 mg, Intravenous, Q6H PRN  morphine injection, 2 mg, Intravenous, Q4H PRN: 6/5 x2; 6/6 x1 so far       ED Triage Vitals [06/05/25 0855]   Temperature Pulse Respirations Blood Pressure SpO2 Pain Score   98.6 °F (37 °C) (!) 118 18 149/91 98 % 8     Weight (last 2 days)       Date/Time Weight    06/05/25 0855 80.1 (176.59)            Vital Signs (last 3 days)       Date/Time Temp Pulse Resp BP MAP (mmHg) SpO2 O2 Device Patient Position - Orthostatic VS [place] Coma Scale Score Pain    06/06/25 0914 -- -- -- -- -- -- None (Room air) -- 15 No Pain    06/06/25 07:49:35 97.2 °F (36.2 °C) 79 17 130/91 104 81 % -- -- -- --    06/06/25 0740 -- -- -- -- -- 95 % None (Room air) -- -- --    06/06/25 0606 -- -- -- -- -- -- -- -- -- 7    06/05/25 2359 -- -- -- -- -- -- -- -- -- 8    06/05/25 21:18:44 -- 124 -- 120/69 86 95 % -- -- -- --    06/05/25 2118 -- 70 -- 120/69 -- -- -- -- -- --    06/05/25 2015 -- -- -- -- -- 95 % None (Room air) -- 15 --    06/05/25 1956 -- -- -- -- -- -- -- -- -- 10 - Worst Possible Pain    06/05/25 1911 -- -- -- -- -- 98 % -- -- -- --    06/05/25 18:31:51  97.7 °F (36.5 °C) 101 21 131/99 110 94 % -- -- -- --    06/05/25 1806 -- -- -- -- -- -- -- -- -- 8    06/05/25 1500 97.7 °F (36.5 °C) 108 20 129/99 109 -- -- -- -- --    06/05/25 1415 -- 122 -- -- -- -- -- -- -- --    06/05/25 1336 -- -- -- -- -- -- -- -- -- 3    06/05/25 1300 -- -- -- -- -- -- None (Room air) -- 15 --    06/05/25 12:40:13 -- 107 -- 129/75 93 97 % -- -- -- --    06/05/25 1223 -- -- -- -- -- -- -- -- -- 8    06/05/25 1130 -- 113 20 137/100 113 95 % None (Room air) Lying -- --    06/05/25 1115 -- 125 20 -- -- 97 % -- -- -- --    06/05/25 1100 -- 125 20 134/85 106 95 % None (Room air) -- -- --    06/05/25 1045 -- 123 21 -- -- 95 % -- -- -- --    06/05/25 1030 -- 142 20 117/90 100 98 % -- -- -- --    06/05/25 1015 -- 151 19 -- -- 99 % -- -- -- --    06/05/25 1000 -- 146 19 137/97 108 97 % -- -- -- --    06/05/25 0945 -- 148 20 133/89 -- -- -- -- -- --    06/05/25 0931 -- -- -- -- -- -- -- -- -- 10 - Worst Possible Pain    06/05/25 0930 -- 145 22 -- -- -- -- -- -- --    06/05/25 0926 -- -- -- -- -- -- -- -- 15 --    06/05/25 0915 -- 146 22 -- -- 98 % -- -- -- --    06/05/25 0900 -- 126 18 111/94 100 97 % -- -- -- --    06/05/25 0855 98.6 °F (37 °C) 118 18 149/91 115 98 % None (Room air) Sitting -- 8              Pertinent Labs/Diagnostic Test Results:   Radiology:  CT soft tissue neck wo contrast   Final Interpretation by Rom Suárez MD (06/05 9183)      No acute neck abnormality on this noncontrast examination which limits evaluation for abscess. No organized fluid collection to suggest abscess on this noncontrast examination. If continued clinical concern for peritonsillar abscess, consider follow-up    CT soft tissue neck with contrast for further evaluation.      Findings suggestive of chronic tonsillitis. Recommend direct visualization.      Poor dentition with periodontal disease. Recommend follow-up with dentist.      Additional chronic/incidental findings as detailed above.      The  study was marked in EPIC for immediate notification.               Workstation performed: [id number]         XR chest pa and lateral   Final Interpretation by Polo Tavarez MD (06/05 1612)      Cardiomegaly. No focal airspace consolidation.            Workstation performed: [id number]           Cardiology:  ECG 12 lead   Final Result by John Hwang MD (06/05 1216)   Atrial fibrillation with rapid ventricular response with frequent    ventricular-paced complexes   Right bundle branch block   T wave abnormality, consider inferolateral ischemia   Abnormal ECG   When compared with ECG of 22-May-2025 16:26,   Vent. rate has increased by  51 bpm   Confirmed by John Hwang ([id number]) on 6/5/2025 12:16:34 PM           Results from last 7 days   Lab Units 06/06/25 0541 06/05/25  0943   WBC Thousand/uL 9.56 9.26   HEMOGLOBIN g/dL 13.0 13.3   HEMATOCRIT % 40.7 42.0   PLATELETS Thousands/uL 230 234   TOTAL NEUT ABS Thousands/µL 8.43* 8.16*        Results from last 7 days   Lab Units 06/06/25 0541 06/05/25  0943   SODIUM mmol/L 132* 132*   POTASSIUM mmol/L 4.9 5.2   CHLORIDE mmol/L 101 101   CO2 mmol/L 23 22   ANION GAP mmol/L 8 9   BUN mg/dL 30* 44*   CREATININE mg/dL 1.23 1.63*   EGFR ml/min/1.73sq m 63 45   CALCIUM mg/dL 9.1 9.2   MAGNESIUM mg/dL  --  1.7*     Results from last 7 days   Lab Units 06/06/25 0541 06/05/25  0943   AST U/L 44* 73*   ALT U/L 48 54*   ALK PHOS U/L 147* 174*   TOTAL PROTEIN g/dL 6.9 7.3   ALBUMIN g/dL 3.6 3.7   TOTAL BILIRUBIN mg/dL 1.23* 1.04*      Results from last 7 days   Lab Units 06/06/25 0541 06/05/25  0943   GLUCOSE RANDOM mg/dL 129 143*         Results from last 7 days   Lab Units 06/06/25 0541 06/05/25  1106   PROTIME seconds 54.6* 70.8*   INR  6.35* 8.98*   PTT seconds  --  69*        Results from last 7 days   Lab Units 06/06/25 0541 06/05/25  0943   PROCALCITONIN ng/ml 0.16 0.23     Results from last 7 days   Lab Units 06/05/25  2115 06/05/25  1614 06/05/25  1359   LACTIC ACID mmol/L 1.5  2.8* 2.8*        Results from last 7 days   Lab Units 06/06/25  0541 06/05/25  0943   DIGOXIN LVL ng/mL 0.7* 0.4*        Results from last 7 days   Lab Units 06/05/25  1614   AMPH/METH  Positive*   BARBITURATE UR  Negative   BENZODIAZEPINE UR  Negative   COCAINE UR  Negative   METHADONE URINE  Negative   OPIATE UR  Negative   PCP UR  Negative   THC UR  Positive*        Results from last 7 days   Lab Units 06/05/25  1324 06/05/25  1309   BLOOD CULTURE  Received in Microbiology Lab. Culture in Progress. Received in Microbiology Lab. Culture in Progress.          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 male    Network Utilization Review Department  ATTENTION: Please call with any questions or concerns to [phone] and carefully listen to the prompts so that you are directed to the right person. All voicemails are confidential.   For Discharge needs, contact Care Management DC Support Team at [phone] opt. 2  Send all requests for admission clinical reviews, approved or denied determinations and any other requests to dedicated fax number below belonging to the campus where the patient is receiving treatment. List of dedicated fax numbers for the Facilities:  FACILITY NAME UR FAX NUMBER   ADMISSION DENIALS (Administrative/Medical Necessity) [phone]   DISCHARGE SUPPORT TEAM (NETWORK) [phone]   PARENT CHILD HEALTH (Maternity/NICU/Pediatrics) [phone]   [facility] [phone]   [facility] [phone]   [facility]  [place] [phone]   [facility] [phone]   [facility] [phone]   [facility] [phone]   [facility] [phone]   [facility] [phone]   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

## 2025-06-06 NOTE — PLAN OF CARE
Problem: PAIN - ADULT  Goal: Verbalizes/displays adequate comfort level or baseline comfort level  Description: Interventions:  - Encourage patient to monitor pain and request assistance  - Assess pain using appropriate pain scale0-10  - Administer analgesics as ordered based on type and severity of pain and evaluate response  - Implement non-pharmacological measures as appropriate and evaluate response  - Consider cultural and social influences on pain and pain management  - Notify physician/advanced practitioner if interventions unsuccessful or patient reports new pain  - Educate patient/family on pain management process including their role and importance of  reporting pain   - Provide non-pharmacologic/complimentary pain relief interventions  Outcome: Progressing     Problem: INFECTION - ADULT  Goal: Absence or prevention of progression during hospitalization  Description: INTERVENTIONS:  - Assess and monitor for signs and symptoms of infection  - Monitor lab/diagnostic results  - Monitor all insertion sites, i.e. indwelling lines, tubes, and drains  - Monitor endotracheal if appropriate and nasal secretions for changes in amount and color  - [place] appropriate cooling/warming therapies per order  - Administer medications as ordered  - Instruct and encourage patient and family to use good hand hygiene technique  - Identify and instruct in appropriate isolation precautions for identified infection/condition  Outcome: Progressing  Goal: Absence of fever/infection during neutropenic period  Description: INTERVENTIONS:  - Monitor WBC  - Perform strict hand hygiene  - Limit to healthy visitors only  - No plants, dried, fresh or silk flowers with chaidez in patient room  Outcome: Progressing     Problem: SAFETY ADULT  Goal: Patient will remain free of falls  Description: INTERVENTIONS:  - Educate patient/family on patient safety including physical limitations  - Instruct patient to call for assistance with activity    - Consider consulting OT/PT to assist with strengthening/mobility based on AM PAC & JH-HLM score  - Consult OT/PT to assist with strengthening/mobility   - Keep Call bell within reach  - Keep bed low and locked with side rails adjusted as appropriate  - Keep care items and personal belongings within reach  - Initiate and maintain comfort rounds  - Make Fall Risk Sign visible to staff  - Offer Toileting every    Hours, in advance of need  - Initiate/Maintain   alarm  - Obtain necessary fall risk management equipment:     - Apply yellow socks and bracelet for high fall risk patients  - Consider moving patient to room near nurses station  Outcome: Progressing  Goal: Maintain or return to baseline ADL function  Description: INTERVENTIONS:  -  Assess patient's ability to carry out ADLs; assess patient's baseline for ADL function and identify physical deficits which impact ability to perform ADLs (bathing, care of mouth/teeth, toileting, grooming, dressing, etc.)  - Assess/evaluate cause of self-care deficits   - Assess range of motion  - Assess patient's mobility; develop plan if impaired  - Assess patient's need for assistive devices and provide as appropriate  - Encourage maximum independence but intervene and supervise when necessary  - Involve family in performance of ADLs  - Assess for home care needs following discharge   - Consider OT consult to assist with ADL evaluation and planning for discharge  - Provide patient education as appropriate  - Monitor functional capacity and physical performance, use of AM PAC & JH-HLM   - Monitor gait, balance and fatigue with ambulation    Outcome: Progressing  Goal: Maintains/Returns to pre admission functional level  Description: INTERVENTIONS:  - Perform AM-PAC 6 Click Basic Mobility/ Daily Activity assessment daily.  - Set and communicate daily mobility goal to care team and patient/family/caregiver.   - Collaborate with rehabilitation services on mobility goals if  consulted  - Perform Range of Motion    times a day.  - Reposition patient every    hours.  - Dangle patient    times a day  - Stand patient    times a day  - Ambulate patient    times a day  - Out of bed to chair    times a day   - Out of bed for meals        times a day  - Out of bed for toileting  - Record patient progress and toleration of activity level   Outcome: Progressing     Problem: DISCHARGE PLANNING  Goal: Discharge to home or other facility with appropriate resources  Description: INTERVENTIONS:  - Identify barriers to discharge w/patient and caregiver  - Arrange for needed discharge resources and transportation as appropriate  - Identify discharge learning needs (meds, wound care, etc.)  - Arrange for interpretive services to assist at discharge as needed  - Refer to Case Management Department for coordinating discharge planning if the patient needs post-hospital services based on physician/advanced practitioner order or complex needs related to functional status, cognitive ability, or social support system  Outcome: Progressing     Problem: Knowledge Deficit  Goal: Patient/family/caregiver demonstrates understanding of disease process, treatment plan, medications, and discharge instructions  Description: Complete learning assessment and assess knowledge base.  Interventions:  - Provide teaching at level of understanding  - Provide teaching via preferred learning methods  Outcome: Progressing

## 2025-06-06 NOTE — DISCHARGE INSTR - OTHER ORDERS
Drug and Alcohol providers          Please call to schedule an appointment.  [facility] Phone: [phone]      [facility] Phone: [phone]     [facility] Phone: [phone]     [facility] [place] Phone: [phone]     [facility] Phone: [phone]     [place] [facility] Phone: [phone]    Warm Hand Off - [facility] D/A to speak with you/assist with resources:  [phone]    Mental Health Providers          Please call to schedule an appointment or reach out to your insurance to find in network providers.   [facility] [phone]   [facility] [phone]   [facility] [phone]   [facility] [phone]  [facility] [phone]  [facility] [facility] ([place]) [phone]  [facility] [phone]  [facility] [phone]  [facility] [phone]    Mental Health Case Management Support  Walk in hours M-F 9am-1pm  Address: [address] [place] [address]  Phone [phone]      [facility] Crisis  Call: [phone]   Text: [phone]   Website with chat feature: [email or web address]      Services are available for anyone who calls, chats, texts, walks in, or requests a Crisis Specialist to come to their home, school, or community.

## 2025-06-06 NOTE — ASSESSMENT & PLAN NOTE
Patient was found to have supratherapeutic INR and is currently being treated for that by internal medicine team.  They will continue to monitor his INR closely. Goal INR 2-3.

## 2025-06-06 NOTE — ASSESSMENT & PLAN NOTE
Met criteria by tachycardia and tachypnea secondary to tonsillitis/pharyngitis rule out peritonsillar abscess  Status post hydration  Blood cultures pending lactic acid is normal  Unasyn  Resolved no further fluids

## 2025-06-06 NOTE — PROGRESS NOTES
Progress Note - Hospitalist   Name: Britton Batista 60 y.o. male I MRN: [id number]  Unit/Bed#: -01 I Date of Admission: 6/5/2025   Date of Service: 6/6/2025 I Hospital Day: 1    Assessment & Plan  Sepsis (HCC)  Met criteria by tachycardia and tachypnea secondary to tonsillitis/pharyngitis rule out peritonsillar abscess  Status post hydration  Blood cultures pending lactic acid is normal  Unasyn  Resolved no further fluids  Chronic combined systolic and diastolic congestive heart failure (HCC)  Wt Readings from Last 3 Encounters:   06/05/25 80.1 kg (176 lb 9.4 oz)   06/05/25 81.1 kg (178 lb 12.7 oz)   05/04/25 79.2 kg (174 lb 9.7 oz)       Last EF is 25% moderate MR on 3/2025 sepsis resolved -will restart his lasix     Atrial fibrillation with RVR (HCC)  Continue digoxin 125 mcg p.o. daily level reviewed discussed with cardiologist rates are still uncontrolled increase metoprolol titrate to 50 mg p.o. twice daily  INR supratherapeutic hold Coumadin status post vitamin K INR improved we will continue to monitor  Stage 3 chronic kidney disease (HCC)  Lab Results   Component Value Date    EGFR 63 06/06/2025    EGFR 45 06/05/2025    EGFR 58 05/22/2025    CREATININE 1.23 06/06/2025    CREATININE 1.63 (H) 06/05/2025    CREATININE 1.31 (H) 05/22/2025   Creatinine baseline is 1.5 creatinine admitted with  1.6 no evidence of IRINEO creatinine now down to 1.2  Transaminitis  Recurrent no evidence of heart failure exacerbation.  Could be secondary to sepsis improved  Tonsillitis  Patient complaining of sore throat for the past week his tonsils are erythematous swollen left greater than right he is tender on the left side of the neck his uvula is swollen as well will continue Unasyn   Today looking much better the swelling is down almost completely he is feeling much better decrease Decadron down to 6 every 12 advance to regular consistency diet CT without any evidence of abscess discontinue ENT consult  Aspiration  precautions and clear liquid diet  Supratherapeutic INR  Given vitamin K 2.5 mg p.o. 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 Sequential Compression Devices Ordered.    Mobility:   Basic Mobility Inpatient Raw Score: 20  JH-HLM Goal: 6: Walk 10 steps or more  JH-HLM Achieved: 7: Walk 25 feet or more  JH-HLM Goal achieved. Continue to encourage appropriate mobility.    Patient Centered Rounds: I performed bedside rounds with nursing staff today.   Discussions with Specialists or Other Care Team Provider: Discussed with cardiology    Education and Discussions with Family / Patient: Attempted to update  (sister) via phone. Unable to contact.    Current Length of Stay: 1 day(s)  Current Patient Status: Inpatient   Certification Statement: The patient will continue to require additional inpatient hospital stay due to atrial fibrillation with RVR  Discharge Plan: Anticipate discharge in 48 hrs to home.    Code Status: Level 1 - Full Code    Subjective   Seen and examined throat feels much better no chest pain or shortness of breath    Objective :  Temp:  [97.2 °F (36.2 °C)-97.7 °F (36.5 °C)] 97.2 °F (36.2 °C)  HR:  [] 79  BP: (117-137)/() 130/91  Resp:  [17-21] 17  SpO2:  [81 %-98 %] 81 %  O2 Device: None (Room air)    Body mass index is 26.08 kg/m².     Input and Output Summary (last 24 hours):     Intake/Output Summary (Last 24 hours) at 6/6/2025 1015  Last data filed at 6/6/2025 0914  Gross per 24 hour   Intake 1390 ml   Output 1900 ml   Net -510 ml       Physical Exam  Vitals and nursing note reviewed.   Constitutional:       General: He is not in acute distress.     Appearance: He is well-developed.   HENT:      Head: Normocephalic and atraumatic.      Mouth/Throat:      Comments: Tonsils and uvula swelling significantly  improved there is only minimal left    Eyes:      Conjunctiva/sclera: Conjunctivae normal.       Cardiovascular:      Rate and Rhythm: Normal rate. Rhythm irregular.      Heart sounds: No murmur heard.  Pulmonary:      Effort: Pulmonary effort is normal. No respiratory distress.      Breath sounds: Normal breath sounds. No wheezing or rales.   Abdominal:      Palpations: Abdomen is soft.      Tenderness: There is no abdominal tenderness.     Musculoskeletal:         General: No swelling.      Cervical back: Neck supple.     Skin:     General: Skin is warm and dry.      Capillary Refill: Capillary refill takes less than 2 seconds.     Neurological:      Mental Status: He is alert and oriented to person, place, and time.     Psychiatric:         Mood and Affect: Mood normal.           Lines/Drains:        Telemetry:  Telemetry Orders (From admission, onward)               24 Hour Telemetry Monitoring  Continuous x 24 Hours (Telem)        Expiring   Question:  Reason for 24 Hour Telemetry  Answer:  Arrhythmias requiring acute medical intervention / PPM or ICD malfunction                     Telemetry Reviewed: Atrial fibrillation. HR averaging 120  Indication for Continued Telemetry Use: Arrthymias requiring medical therapy               Lab Results: I have reviewed the following results:   Results from last 7 days   Lab Units 06/06/25  0541   WBC Thousand/uL 9.56   HEMOGLOBIN g/dL 13.0   HEMATOCRIT % 40.7   PLATELETS Thousands/uL 230   SEGS PCT % 88*   LYMPHO PCT % 6*   MONO PCT % 5   EOS PCT % 0     Results from last 7 days   Lab Units 06/06/25  0541   SODIUM mmol/L 132*   POTASSIUM mmol/L 4.9   CHLORIDE mmol/L 101   CO2 mmol/L 23   BUN mg/dL 30*   CREATININE mg/dL 1.23   ANION GAP mmol/L 8   CALCIUM mg/dL 9.1   ALBUMIN g/dL 3.6   TOTAL BILIRUBIN mg/dL 1.23*   ALK PHOS U/L 147*   ALT U/L 48   AST U/L 44*   GLUCOSE RANDOM mg/dL 129     Results from last 7 days   Lab Units 06/06/25  0541   INR  6.35*              Results from last 7 days   Lab Units 06/06/25  0541 06/05/25  2115 06/05/25  1614 06/05/25  1359 06/05/25  0943   LACTIC ACID mmol/L  --  1.5 2.8* 2.8*  --    PROCALCITONIN ng/ml 0.16  --   --   --  0.23       Recent Cultures (last 7 days):   Results from last 7 days   Lab Units 06/05/25  1324 06/05/25  1309   BLOOD CULTURE  Received in Microbiology Lab. Culture in Progress. Received in Microbiology Lab. Culture in Progress.       Imaging Results Review: No pertinent imaging studies reviewed.  Other Study Results Review: No additional pertinent studies reviewed.    Last 24 Hours Medication List:     Current Facility-Administered Medications:     acetaminophen (TYLENOL) tablet 650 mg, Q6H PRN    ampicillin-sulbactam (UNASYN) 3 g in sodium chloride 0.9 % 100 mL IVPB, Q6H, Last Rate: 3 g (06/06/25 0809)    dexamethasone (PF) (DECADRON) injection 6 mg, Q12H JESUS    digoxin (LANOXIN) tablet 125 mcg, Daily    diphenhydramine, lidocaine, Al/Mg hydroxide, simethicone (Magic Mouthwash) oral solution 10 mL, Q6H PRN    furosemide (LASIX) tablet 40 mg, Daily    ipratropium-albuterol (DUO-NEB) 0.5-2.5 mg/3 mL inhalation solution 3 mL, Q6H While awake    magnesium Oxide (MAG-OX) tablet 400 mg, Daily    metoprolol (LOPRESSOR) injection 2.5 mg, Q6H PRN    metoprolol tartrate (LOPRESSOR) tablet 50 mg, Q12H JESUS    midodrine (PROAMATINE) tablet 7.5 mg, TID AC    morphine injection 2 mg, Q4H PRN    nicotine (NICODERM CQ) 14 mg/24hr TD 24 hr patch 1 patch, Daily    Administrative Statements   Today, Patient Was Seen By: Guadalupe Muñoz MD  I have spent a total time of >35 minutes in caring for this patient on the day of the visit/encounter including Patient and family education, Documenting in the medical record, Reviewing/placing orders in the medical record (including tests, medications, and/or procedures), and Communicating with other healthcare professionals .    **Please Note: This note may have been constructed using a voice  recognition system.**

## 2025-06-06 NOTE — ASSESSMENT & PLAN NOTE
Patient is known to have atrial fibrillation which has been persistent.  [facility] cardiology team has him scheduled for cardioversion later this month if his INR is persistently remain in the therapeutic range. Of note, UDS this admission continues to show + meth.  In the meanwhile we will continue him on the digoxin and metoprolol for rate control. Increase metoprolol tartrate to 50 mg BID this morning. Can continue to titrate as needed. If BP allows can transition to metoprolol succinate.

## 2025-06-06 NOTE — PROGRESS NOTES
Pastoral Care Progress Note  CH in consult with CM initiated support visit to pt. Pt received CH upright in bed, no family present.    Pt thanked CH for the offer of Spiritual Care services, but did not identify any needs today. CH remains available.         06/06/25 1200   Clinical Encounter Type   Referral From

## 2025-06-06 NOTE — ASSESSMENT & PLAN NOTE
Wt Readings from Last 3 Encounters:   06/05/25 80.1 kg (176 lb 9.4 oz)   06/05/25 81.1 kg (178 lb 12.7 oz)   05/04/25 79.2 kg (174 lb 9.7 oz)       Last EF is 25% moderate MR on 3/2025 sepsis resolved -will restart his lasix

## 2025-06-06 NOTE — PROGRESS NOTES
Progress Note - Cardiology   Name: Britton Batista 60 y.o. male I MRN: [id number]  Unit/Bed#: -01 I Date of Admission: 6/5/2025   Date of Service: 6/6/2025 I Hospital Day: 1     Assessment & Plan  Supratherapeutic INR  Patient was found to have supratherapeutic INR and is currently being treated for that by internal medicine team.  They will continue to monitor his INR closely. Goal INR 2-3.  Atrial fibrillation with RVR (HCC)  Patient is known to have atrial fibrillation which has been persistent.  [facility] cardiology team has him scheduled for cardioversion later this month if his INR is persistently remain in the therapeutic range. Of note, UDS this admission continues to show + meth.  In the meanwhile we will continue him on the digoxin and metoprolol for rate control. Increase metoprolol tartrate to 50 mg BID this morning. Can continue to titrate as needed. If BP allows can transition to metoprolol succinate.   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  Most recent echocardiogram from April 2025 showed severely dilated left ventricle with LVEF of 15 to 20% with global hypokinesis.  Mild to moderate mitral regurgitation and severe tricuspid regurgitation. Euvolemic during this admission.     Subjective   Chief Complaint: Supratherapeutic INR. A fib RVR.  Aiden is sitting up in bed. Feels well. No complaints today.    Objective :  Temp:  [97.2 °F (36.2 °C)-97.7 °F (36.5 °C)] 97.2 °F (36.2 °C)  HR:  [] 79  BP: (117-137)/() 130/91  Resp:  [17-22] 17  SpO2:  [81 %-99 %] 81 %  O2 Device: None (Room air)  Orthostatic Blood Pressures       Flowsheet Row Most Recent Value   Blood Pressure 130/91 filed at 06/06/2025 0749   Patient Position - Orthostatic VS Lying filed at 06/05/2025 1130          First Weight: Weight - Scale: 80.1 kg (176 lb 9.4 oz) (06/05/25 0855)  Vitals:    06/05/25 0855   Weight: 80.1 kg (176 lb 9.4 oz)     Physical Exam  Constitutional:       Comments: Appears chronically ill   Neck:      Vascular: No JVD.     Cardiovascular:      Rate and Rhythm: Tachycardia present. Rhythm irregular.   Pulmonary:      Comments: Room air    Musculoskeletal:      Right lower leg: No edema.      Left lower leg: No edema.         Lab Results: I have reviewed the following results:CBC/BMP:   .     06/05/25  0943 06/06/25  0541   WBC 9.26 9.56   HGB 13.3 13.0   HCT 42.0 40.7    230   SODIUM 132* 132*   K 5.2 4.9    101   CO2 22 23   BUN 44* 30*   CREATININE 1.63* 1.23   GLUC 143* 129   MG 1.7*  --       Results from last 7 days   Lab Units 06/06/25  0541 06/05/25  0943   WBC Thousand/uL 9.56 9.26   HEMOGLOBIN g/dL 13.0 13.3   HEMATOCRIT % 40.7 42.0   PLATELETS Thousands/uL 230 234     Results from last 7 days   Lab Units 06/06/25  0541 06/05/25  0943   POTASSIUM mmol/L 4.9 5.2   CHLORIDE mmol/L 101 101   CO2 mmol/L 23 22   BUN mg/dL 30* 44*   CREATININE mg/dL 1.23 1.63*   CALCIUM mg/dL 9.1 9.2     Results from last 7 days   Lab Units 06/06/25  0541 06/05/25  1106   INR  6.35* 8.98*   PTT seconds  --  69*     Lab Results   Component Value Date    HGBA1C 5.9 (H) 10/25/2021     Lab Results   Component Value Date    CKTOTAL 65 09/18/2024    TROPONINI 0.04 10/25/2021       Imaging Results Review: I reviewed radiology reports from this admission including: chest xray.  Other Study Results Review: EKG was reviewed.     Telemetry: atrial fibrillation, rate 110-140's

## 2025-06-06 NOTE — PLAN OF CARE
Problem: PAIN - ADULT  Goal: Verbalizes/displays adequate comfort level or baseline comfort level  Description: Interventions:  - Encourage patient to monitor pain and request assistance  - Assess pain using appropriate pain scale0-10  - Administer analgesics as ordered based on type and severity of pain and evaluate response  - Implement non-pharmacological measures as appropriate and evaluate response  - Consider cultural and social influences on pain and pain management  - Notify physician/advanced practitioner if interventions unsuccessful or patient reports new pain  - Educate patient/family on pain management process including their role and importance of  reporting pain   - Provide non-pharmacologic/complimentary pain relief interventions  Outcome: Progressing     Problem: INFECTION - ADULT  Goal: Absence or prevention of progression during hospitalization  Description: INTERVENTIONS:  - Assess and monitor for signs and symptoms of infection  - Monitor lab/diagnostic results  - Monitor all insertion sites, i.e. indwelling lines, tubes, and drains  - Monitor endotracheal if appropriate and nasal secretions for changes in amount and color  - [place] appropriate cooling/warming therapies per order  - Administer medications as ordered  - Instruct and encourage patient and family to use good hand hygiene technique  - Identify and instruct in appropriate isolation precautions for identified infection/condition  Outcome: Progressing  Goal: Absence of fever/infection during neutropenic period  Description: INTERVENTIONS:  - Monitor WBC  - Perform strict hand hygiene  - Limit to healthy visitors only  - No plants, dried, fresh or silk flowers with chaidez in patient room  Outcome: Progressing     Problem: SAFETY ADULT  Goal: Patient will remain free of falls  Description: INTERVENTIONS:  - Educate patient/family on patient safety including physical limitations  - Instruct patient to call for assistance with activity    - Consider consulting OT/PT to assist with strengthening/mobility based on AM PAC & JH-HLM score  - Consult OT/PT to assist with strengthening/mobility   - Keep Call bell within reach  - Keep bed low and locked with side rails adjusted as appropriate  - Keep care items and personal belongings within reach  - Initiate and maintain comfort rounds  - Make Fall Risk Sign visible to staff  - Offer Toileting every 2 Hours, in advance of need  - Initiate/Maintain bed alarm  - Obtain necessary fall risk management equipment  - Apply yellow socks and bracelet for high fall risk patients  - Consider moving patient to room near nurses station  Outcome: Progressing  Goal: Maintain or return to baseline ADL function  Description: INTERVENTIONS:  -  Assess patient's ability to carry out ADLs; assess patient's baseline for ADL function and identify physical deficits which impact ability to perform ADLs (bathing, care of mouth/teeth, toileting, grooming, dressing, etc.)  - Assess/evaluate cause of self-care deficits   - Assess range of motion  - Assess patient's mobility; develop plan if impaired  - Assess patient's need for assistive devices and provide as appropriate  - Encourage maximum independence but intervene and supervise when necessary  - Involve family in performance of ADLs  - Assess for home care needs following discharge   - Consider OT consult to assist with ADL evaluation and planning for discharge  - Provide patient education as appropriate  - Monitor functional capacity and physical performance, use of AM PAC & JH-HLM   - Monitor gait, balance and fatigue with ambulation    Outcome: Progressing  Goal: Maintains/Returns to pre admission functional level  Description: INTERVENTIONS:  - Perform AM-PAC 6 Click Basic Mobility/ Daily Activity assessment daily.  - Set and communicate daily mobility goal to care team and patient/family/caregiver.   - Collaborate with rehabilitation services on mobility goals if  consulted  - Perform Range of Motion 2 times a day.  - Reposition patient every 2 hours.  - Dangle patient 3 times a day  - Stand patient 3 times a day  - Ambulate patient 3 times a day  - Out of bed to chair 3 times a day   - Out of bed for meals 3 times a day  - Out of bed for toileting  - Record patient progress and toleration of activity level   Outcome: Progressing     Problem: DISCHARGE PLANNING  Goal: Discharge to home or other facility with appropriate resources  Description: INTERVENTIONS:  - Identify barriers to discharge w/patient and caregiver  - Arrange for needed discharge resources and transportation as appropriate  - Identify discharge learning needs (meds, wound care, etc.)  - Arrange for interpretive services to assist at discharge as needed  - Refer to Case Management Department for coordinating discharge planning if the patient needs post-hospital services based on physician/advanced practitioner order or complex needs related to functional status, cognitive ability, or social support system  Outcome: Progressing     Problem: Knowledge Deficit  Goal: Patient/family/caregiver demonstrates understanding of disease process, treatment plan, medications, and discharge instructions  Description: Complete learning assessment and assess knowledge base.  Interventions:  - Provide teaching at level of understanding  - Provide teaching via preferred learning methods  Outcome: Progressing

## 2025-06-06 NOTE — CASE MANAGEMENT
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) 2025    Cardiogenic shock ([facility]) 2025    Atrial fibrillation with RVR ([facility]) 2025    Syncope 2024    Chest pain 2024    Acute on chronic systolic congestive heart failure (HCC) 2024    Primary hypertension 2024    Hypomagnesemia 2023    Methamphetamine abuse ([facility]) 2023    Tobacco abuse 2023    D-dimer, elevated 2023    Cardiomyopathy (HCC) 10/09/2023    Acute kidney injury superimposed on stage 3a chronic kidney disease ([facility]) 10/08/2023    Chronic combined systolic and diastolic congestive heart failure ([facility]) 10/07/2023    Hypertensive emergency 10/07/2023    Anxiety     Depression     Other emphysema (HCC)     GERD (gastroesophageal reflux disease)     Gout     Hypertensive urgency     Neuropathy     Raynaud's disease     Unilateral inguinal hernia, without obstruction or gangrene, not specified as recurrent     PSS (progressive systemic sclerosis) ([facility]) 10/25/2021    Near syncope 10/25/2021    Osteomyelitis ([facility]) 10/25/2021    Elevated troponin 10/25/2021    Acute encephalopathy 10/25/2021      LOS (days): 1  Geometric Mean LOS (GMLOS) (days): 3.5  Days to GMLOS:2.4     OBJECTIVE:    Risk of Unplanned Readmission Score: 63.13         Current  admission status: Inpatient  Referral Reason: Drug/Alcohol Abuse    Preferred Pharmacy:   [facility] - [facility] [place] [place] - [address]  [address]   [place] [address]  Phone: [phone] Fax: [phone]    [facility] [place] - JADIEL Murillo - [address]  [facility] [address],  [address],  First Floor [facility] [place] [address]  Phone: [phone] Fax: [phone]    [id number] - [facility] [place] - [address] [address]  Phone: [phone] Fax: [phone]    Primary Care Provider: Juan Galindo DO    Primary Insurance: [facility] REP  Secondary Insurance:     ASSESSMENT:  Active Health Care Proxies       Lester, Arin Health Care Representative - Sister   Primary Phone: [phone] (Mobile)                 Advance Directives  Does patient have a Health Care POA?: No  Was patient offered paperwork?: Yes  Does patient currently have a Health Care decision maker?: Yes, please see Health Care Proxy section  Does patient have Advance Directives?: No  Was patient offered paperwork?: Yes  Primary Contact: Arin (sister)         Readmission Root Cause  30 Day Readmission: No    Patient Information  Admitted from:: Home  Mental Status: Alert  During Assessment patient was accompanied by: Not accompanied during assessment  Assessment information provided by:: Patient  Primary Caregiver: Self  Support Systems: Daughter, Family members  County of Residence: [facility]  What city do you live in?: [facility]  Home entry access options. Select all that apply.: Stairs  Number of steps to enter home.: One Flight  Do the steps have railings?: Yes  Type of Current Residence: Apartment  Floor Level: 2  Upon entering residence, is there a bedroom on the main floor (no further steps)?: Yes  Upon entering residence, is there a bathroom on the main floor (no further steps)?: Yes  Living Arrangements: Lives w/ Family members  Is patient a ?:  No    Activities of Daily Living Prior to Admission  Functional Status: Independent (sister does help when to fill his pill box)  Completes ADLs independently?: Yes  Ambulates independently?: Yes  Does patient use assisted devices?: Yes  Assisted Devices (DME) used: Nebulizer  DME Company Name (respiratory supplies): [employer]  Does patient currently own DME?: Yes  What DME does the patient currently own?: Nebulizer, Straight Cane  Does patient have a history of Outpatient Therapy (PT/OT)?: No  Does the patient have a history of Short-Term Rehab?: No  Does patient have a history of HHC?: No  Does patient currently have HHC?: No         Patient Information Continued  Income Source: SSI/SSD  Does patient have prescription coverage?: Yes  Can the patient afford their medications and any related supplies (such as glucometers or test strips)?: Yes  Does patient receive dialysis treatments?: No  Does patient have a history of substance abuse?: Yes, Currently using  Current substance use preference: Methamphetamines, Marijuana  Historical substance use preference: Alcohol/ETOH, Marijuana, Methamphetamines  History of Withdrawal Symptoms: Denies past symptoms  Is patient currently in treatment for substance abuse?: No. Patient declined treatment information. (pot 3 days ago, meth other day)  Does patient have a history of Mental Health Diagnosis?: Yes (anxiety, depression)  Is patient receiving treatment for mental health?: Yes (med mgt PCP)  Has patient received inpatient treatment related to mental health in the last 2 years?: No         Means of Transportation  Means of Transport to [facility]:: Family transport          DISCHARGE DETAILS:    Discharge planning discussed with:: patient  Freedom of Choice: Yes  Comments - Freedom of Choice: refusing D/A tx  CM contacted family/caregiver?: Yes  Were Treatment Team discharge recommendations reviewed with patient/caregiver?: Yes  Did patient/caregiver verbalize understanding of  patient care needs?: Yes  Were patient/caregiver advised of the risks associated with not following Treatment Team discharge recommendations?: Yes    Contacts  Patient Contacts: Arin Batista- sister  Relationship to Patient:: Family  Contact Method: Phone, Other (Comment) (Message left)  Phone Number: sister's phone is broken - please call her TALAT Beltran - [phone] (message left)  Reason/Outcome: Continuity of Care, Discharge Planning    Requested Home Health Care         Is the patient interested in HHC at discharge?: No    DME Referral Provided  Referral made for DME?: No    Other Referral/Resources/Interventions Provided:  Interventions: None Indicated    Would you like to participate in our [facility] service program?  : No - Declined    Treatment Team Recommendation: Home  Discharge Destination Plan:: Home  Transport at Discharge : Family          CM met with patient at the bedside,baseline information  was obtained. CM discussed the role of CM in helping the patient develop a discharge plan and assist the patient in carry out their plan.  Patient lives at home with his brother Bhaskar in a 2nd floor apartment with 1 flight of [place]. Pt is independent with ADLs, able to ambulate without assistive devices, drives an electric bike to appointments. Pt sister is able to assist with transportation, setting up his medications, assist with meals, and taking pt food shopping.   Patient has d/a hx - starting in his late teens/early 20s of meth, pot, alcohol - patient admitted to pot use a few days ago, meth the other day and alcohol use a long time ago. He is refusing any D/A referrals. He allowed CM to placed D/A information on DC if he changes him mind. CM explained they wanted patient to be successful outside of the hospital; and worries that his substance abuse can lead to adverse reactions to his medical functioning.   CM reviewed discharge planning - denied any other CM needs.     Patient asked CM to follow up  with his sister Arin - her phone is out of service at this time - provided her boyfriend Devin's # [phone].  CM called the above number and left message for call back.    CM added D/A information to AVS.    CM updated Nichol Rizo OP of patient's discharge planning and timeline.     CM will continue to follow and address any discharge needs.

## 2025-06-06 NOTE — ASSESSMENT & PLAN NOTE
Lab Results   Component Value Date    EGFR 63 06/06/2025    EGFR 45 06/05/2025    EGFR 58 05/22/2025    CREATININE 1.23 06/06/2025    CREATININE 1.63 (H) 06/05/2025    CREATININE 1.31 (H) 05/22/2025   Creatinine baseline is 1.5 creatinine admitted with  1.6 no evidence of IRINEO creatinine now down to 1.2

## 2025-06-07 ENCOUNTER — APPOINTMENT (INPATIENT)
Dept: ULTRASOUND IMAGING | Facility: HOSPITAL | Age: 60
DRG: 871 | End: 2025-06-07
Payer: COMMERCIAL

## 2025-06-07 LAB
ANION GAP SERPL CALCULATED.3IONS-SCNC: 11 MMOL/L (ref 4–13)
ANION GAP SERPL CALCULATED.3IONS-SCNC: 12 MMOL/L (ref 4–13)
BUN SERPL-MCNC: 41 MG/DL (ref 5–25)
BUN SERPL-MCNC: 46 MG/DL (ref 5–25)
CALCIUM SERPL-MCNC: 9.5 MG/DL (ref 8.4–10.2)
CALCIUM SERPL-MCNC: 9.7 MG/DL (ref 8.4–10.2)
CHLORIDE SERPL-SCNC: 97 MMOL/L (ref 96–108)
CHLORIDE SERPL-SCNC: 98 MMOL/L (ref 96–108)
CK SERPL-CCNC: 44 U/L (ref 39–308)
CO2 SERPL-SCNC: 23 MMOL/L (ref 21–32)
CO2 SERPL-SCNC: 24 MMOL/L (ref 21–32)
CREAT SERPL-MCNC: 1.77 MG/DL (ref 0.6–1.3)
CREAT SERPL-MCNC: 1.8 MG/DL (ref 0.6–1.3)
DIGOXIN SERPL-MCNC: 1 NG/ML (ref 0.8–2)
GFR SERPL CREATININE-BSD FRML MDRD: 40 ML/MIN/1.73SQ M
GFR SERPL CREATININE-BSD FRML MDRD: 40 ML/MIN/1.73SQ M
GLUCOSE SERPL-MCNC: 100 MG/DL (ref 65–140)
GLUCOSE SERPL-MCNC: 121 MG/DL (ref 65–140)
GLUCOSE SERPL-MCNC: 152 MG/DL (ref 65–140)
GLUCOSE SERPL-MCNC: 69 MG/DL (ref 65–140)
INR PPP: 3.73 (ref 0.85–1.19)
POTASSIUM SERPL-SCNC: 4.7 MMOL/L (ref 3.5–5.3)
POTASSIUM SERPL-SCNC: 6.3 MMOL/L (ref 3.5–5.3)
PROTHROMBIN TIME: 36.6 SECONDS (ref 12.3–15)
SODIUM SERPL-SCNC: 132 MMOL/L (ref 135–147)
SODIUM SERPL-SCNC: 133 MMOL/L (ref 135–147)

## 2025-06-07 PROCEDURE — 80048 BASIC METABOLIC PNL TOTAL CA: CPT | Performed by: INTERNAL MEDICINE

## 2025-06-07 PROCEDURE — 94640 AIRWAY INHALATION TREATMENT: CPT

## 2025-06-07 PROCEDURE — 85610 PROTHROMBIN TIME: CPT | Performed by: FAMILY MEDICINE

## 2025-06-07 PROCEDURE — 99223 1ST HOSP IP/OBS HIGH 75: CPT | Performed by: INTERNAL MEDICINE

## 2025-06-07 PROCEDURE — 80162 ASSAY OF DIGOXIN TOTAL: CPT | Performed by: FAMILY MEDICINE

## 2025-06-07 PROCEDURE — 76775 US EXAM ABDO BACK WALL LIM: CPT

## 2025-06-07 PROCEDURE — 94664 DEMO&/EVAL PT USE INHALER: CPT

## 2025-06-07 PROCEDURE — 82948 REAGENT STRIP/BLOOD GLUCOSE: CPT

## 2025-06-07 PROCEDURE — 94760 N-INVAS EAR/PLS OXIMETRY 1: CPT

## 2025-06-07 PROCEDURE — 99232 SBSQ HOSP IP/OBS MODERATE 35: CPT | Performed by: FAMILY MEDICINE

## 2025-06-07 PROCEDURE — 82550 ASSAY OF CK (CPK): CPT | Performed by: INTERNAL MEDICINE

## 2025-06-07 PROCEDURE — 80048 BASIC METABOLIC PNL TOTAL CA: CPT | Performed by: FAMILY MEDICINE

## 2025-06-07 RX ORDER — HYDROXYZINE HYDROCHLORIDE 25 MG/1
25 TABLET, FILM COATED ORAL EVERY 6 HOURS PRN
Status: DISCONTINUED | OUTPATIENT
Start: 2025-06-07 | End: 2025-06-12

## 2025-06-07 RX ORDER — DEXTROSE MONOHYDRATE 25 G/50ML
25 INJECTION, SOLUTION INTRAVENOUS ONCE
Status: COMPLETED | OUTPATIENT
Start: 2025-06-07 | End: 2025-06-07

## 2025-06-07 RX ORDER — CALCIUM GLUCONATE 20 MG/ML
1 INJECTION, SOLUTION INTRAVENOUS ONCE
Status: COMPLETED | OUTPATIENT
Start: 2025-06-07 | End: 2025-06-07

## 2025-06-07 RX ORDER — METOPROLOL TARTRATE 25 MG/1
25 TABLET, FILM COATED ORAL ONCE
Status: COMPLETED | OUTPATIENT
Start: 2025-06-07 | End: 2025-06-07

## 2025-06-07 RX ADMIN — MIDODRINE HYDROCHLORIDE 7.5 MG: 5 TABLET ORAL at 15:48

## 2025-06-07 RX ADMIN — DEXAMETHASONE SODIUM PHOSPHATE 6 MG: 10 INJECTION, SOLUTION INTRAMUSCULAR; INTRAVENOUS at 09:13

## 2025-06-07 RX ADMIN — DEXAMETHASONE SODIUM PHOSPHATE 6 MG: 10 INJECTION, SOLUTION INTRAMUSCULAR; INTRAVENOUS at 21:43

## 2025-06-07 RX ADMIN — AMPICILLIN SODIUM AND SULBACTAM SODIUM 3 G: 10; 5 INJECTION, POWDER, FOR SOLUTION INTRAMUSCULAR; INTRAVENOUS at 02:37

## 2025-06-07 RX ADMIN — IPRATROPIUM BROMIDE AND ALBUTEROL SULFATE 3 ML: 2.5; .5 SOLUTION RESPIRATORY (INHALATION) at 22:41

## 2025-06-07 RX ADMIN — IPRATROPIUM BROMIDE AND ALBUTEROL SULFATE 3 ML: 2.5; .5 SOLUTION RESPIRATORY (INHALATION) at 07:24

## 2025-06-07 RX ADMIN — METOPROLOL TARTRATE 50 MG: 50 TABLET, FILM COATED ORAL at 09:14

## 2025-06-07 RX ADMIN — AMPICILLIN SODIUM AND SULBACTAM SODIUM 3 G: 10; 5 INJECTION, POWDER, FOR SOLUTION INTRAMUSCULAR; INTRAVENOUS at 21:44

## 2025-06-07 RX ADMIN — DIGOXIN 125 MCG: 125 TABLET ORAL at 09:14

## 2025-06-07 RX ADMIN — AMPICILLIN SODIUM AND SULBACTAM SODIUM 3 G: 10; 5 INJECTION, POWDER, FOR SOLUTION INTRAMUSCULAR; INTRAVENOUS at 14:31

## 2025-06-07 RX ADMIN — DIPHENHYDRAMINE HYDROCHLORIDE AND LIDOCAINE HYDROCHLORIDE AND ALUMINUM HYDROXIDE AND MAGNESIUM HYDRO 10 ML: KIT at 09:30

## 2025-06-07 RX ADMIN — HYDROXYZINE HYDROCHLORIDE 25 MG: 25 TABLET, FILM COATED ORAL at 15:46

## 2025-06-07 RX ADMIN — INSULIN HUMAN 10 UNITS: 100 INJECTION, SOLUTION PARENTERAL at 09:12

## 2025-06-07 RX ADMIN — SODIUM ZIRCONIUM CYCLOSILICATE 10 G: 10 POWDER, FOR SUSPENSION ORAL at 09:19

## 2025-06-07 RX ADMIN — DIPHENHYDRAMINE HYDROCHLORIDE AND LIDOCAINE HYDROCHLORIDE AND ALUMINUM HYDROXIDE AND MAGNESIUM HYDRO 10 ML: KIT at 21:50

## 2025-06-07 RX ADMIN — AMPICILLIN SODIUM AND SULBACTAM SODIUM 3 G: 10; 5 INJECTION, POWDER, FOR SOLUTION INTRAMUSCULAR; INTRAVENOUS at 09:00

## 2025-06-07 RX ADMIN — MORPHINE SULFATE 2 MG: 2 INJECTION, SOLUTION INTRAMUSCULAR; INTRAVENOUS at 02:37

## 2025-06-07 RX ADMIN — CALCIUM GLUCONATE 1 G: 20 INJECTION, SOLUTION INTRAVENOUS at 11:51

## 2025-06-07 RX ADMIN — ACETAMINOPHEN 650 MG: 325 TABLET, FILM COATED ORAL at 09:30

## 2025-06-07 RX ADMIN — HYDROXYZINE HYDROCHLORIDE 25 MG: 25 TABLET, FILM COATED ORAL at 21:50

## 2025-06-07 RX ADMIN — TRIMETHOBENZAMIDE HYDROCHLORIDE 200 MG: 100 INJECTION INTRAMUSCULAR at 15:47

## 2025-06-07 RX ADMIN — METOPROLOL TARTRATE 25 MG: 25 TABLET, FILM COATED ORAL at 12:44

## 2025-06-07 RX ADMIN — IPRATROPIUM BROMIDE AND ALBUTEROL SULFATE 3 ML: 2.5; .5 SOLUTION RESPIRATORY (INHALATION) at 13:56

## 2025-06-07 RX ADMIN — DEXTROSE MONOHYDRATE 25 ML: 25 INJECTION, SOLUTION INTRAVENOUS at 09:12

## 2025-06-07 RX ADMIN — Medication 400 MG: at 09:14

## 2025-06-07 RX ADMIN — METOPROLOL TARTRATE 75 MG: 50 TABLET, FILM COATED ORAL at 21:43

## 2025-06-07 RX ADMIN — MIDODRINE HYDROCHLORIDE 7.5 MG: 5 TABLET ORAL at 11:58

## 2025-06-07 RX ADMIN — NICOTINE 1 PATCH: 14 PATCH, EXTENDED RELEASE TRANSDERMAL at 09:30

## 2025-06-07 RX ADMIN — MORPHINE SULFATE 2 MG: 2 INJECTION, SOLUTION INTRAMUSCULAR; INTRAVENOUS at 23:14

## 2025-06-07 NOTE — PROGRESS NOTES
Progress Note - Hospitalist   Name: Britton Batista 60 y.o. male I MRN: [id number]  Unit/Bed#: -01 I Date of Admission: 6/5/2025   Date of Service: 6/7/2025 I Hospital Day: 2    Assessment & Plan  Sepsis (HCC)  Met criteria by tachycardia and tachypnea secondary to tonsillitis/pharyngitis rule out peritonsillar abscess  Status post hydration  Blood cultures pending lactic acid is normal  Unasyn  Resolved no further fluids  Chronic combined systolic and diastolic congestive heart failure (HCC)  Wt Readings from Last 3 Encounters:   06/05/25 80.1 kg (176 lb 9.4 oz)   06/05/25 81.1 kg (178 lb 12.7 oz)   05/04/25 79.2 kg (174 lb 9.7 oz)       Last EF is 25% moderate MR on 3/2025 sepsis resolved -hold lasix his lungs clear and no leg edema- hold cause cr went up    Atrial fibrillation with RVR (HCC)  Continue digoxin monitor levels while his cr went up. Increase metoprolol tartarte to 75 mg po bid as rates uncontrolled   INR supratherapeutic hold Coumadin status post vitamin K INR improved we will continue to monitor  Stage 3 chronic kidney disease (HCC)  Lab Results   Component Value Date    EGFR 40 06/07/2025    EGFR 63 06/06/2025    EGFR 45 06/05/2025    CREATININE 1.80 (H) 06/07/2025    CREATININE 1.23 06/06/2025    CREATININE 1.63 (H) 06/05/2025   Creatinine baseline is 1.5 creatinine admitted with  1.6 no evidence of IRINEO improved to 1.2 and now upto 1.8 - hold lasix till evaluation per nephrology he does not look dry or fluid overloaded   ? 2/2 afib with rvr hypoperfusion . No evidence of hypotension   Repeat labs in afternoon and am   Transaminitis  Recurrent no evidence of heart failure exacerbation.  Could be secondary to sepsis improved  Tonsillitis  Patient complaining of sore throat for the past week his tonsils are erythematous swollen left greater than right he is tender on the left side of the neck his uvula is swollen as well will continue Unasyn   Today looking much better the swelling is down  almost completely he is feeling much better decrease Decadron down to 6 every 12 advance to regular consistency diet CT without any evidence of abscess discontinue ENT consult  Aspiration precautions and clear liquid diet  6/7 still sore but swelling improved he still wants to stay on liquid diet - will add cepacol spray   Supratherapeutic INR  Given vitamin K 2.5 mg p.o. 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 Sequential Compression Devices Ordered.    Mobility:   Basic Mobility Inpatient Raw Score: 16  JH-HLM Goal: 5: Stand one or more mins  JH-HLM Achieved: 7: Walk 25 feet or more  JH-HLM Goal achieved. Continue to encourage appropriate mobility.    Patient Centered Rounds: I performed bedside rounds with nursing staff today.   Discussions with Specialists or Other Care Team Provider: none     Education and Discussions with Family / Patient: Updated  (sister) via phone.    Current Length of Stay: 2 day(s)  Current Patient Status: Inpatient   Certification Statement: The patient will continue to require additional inpatient hospital stay due to afib with rvr   Discharge Plan: Anticipate discharge in 48-72 hrs to home.    Code Status: Level 1 - Full Code    Subjective   Seen and examined states has some sore throat but improved from admission. Had some sob overnight    Objective :  Temp:  [97.2 °F (36.2 °C)-97.4 °F (36.3 °C)] 97.4 °F (36.3 °C)  HR:  [120-131] 129  BP: (116-140)/() 116/96  Resp:  [16-20] 20  SpO2:  [87 %-95 %] 92 %  O2 Device: Nasal cannula  Nasal Cannula O2 Flow Rate (L/min):  [2 L/min] 2 L/min    Body mass index is 26.08 kg/m².     Input and Output Summary (last 24 hours):     Intake/Output Summary (Last 24 hours) at 6/7/2025 1249  Last data filed at 6/6/2025 2010  Gross per 24 hour   Intake 755 ml   Output --    Net 755 ml       Physical Exam  Vitals and nursing note reviewed.   Constitutional:       General: He is not in acute distress.     Appearance: He is well-developed.   HENT:      Head: Normocephalic and atraumatic.      Mouth/Throat:      Comments: Uvula and tonsils appear to improve in swelling     Eyes:      Conjunctiva/sclera: Conjunctivae normal.       Cardiovascular:      Rate and Rhythm: Normal rate. Rhythm irregular.      Heart sounds: No murmur heard.  Pulmonary:      Effort: Pulmonary effort is normal. No respiratory distress.      Breath sounds: Normal breath sounds. No wheezing or rales.   Abdominal:      Palpations: Abdomen is soft.      Tenderness: There is no abdominal tenderness.     Musculoskeletal:         General: No swelling.      Cervical back: Neck supple.     Skin:     General: Skin is warm and dry.      Capillary Refill: Capillary refill takes less than 2 seconds.     Neurological:      Mental Status: He is alert and oriented to person, place, and time.     Psychiatric:         Mood and Affect: Mood normal.           Lines/Drains:        Telemetry:  Telemetry Orders (From admission, onward)               24 Hour Telemetry Monitoring  Continuous x 24 Hours (Telem)        Expiring   Question:  Reason for 24 Hour Telemetry  Answer:  Arrhythmias requiring acute medical intervention / PPM or ICD malfunction                     Telemetry Reviewed: Atrial fibrillation. HR averaging 100  Indication for Continued Telemetry Use: Arrthymias requiring medical therapy               Lab Results: I have reviewed the following results:   Results from last 7 days   Lab Units 06/06/25  0541   WBC Thousand/uL 9.56   HEMOGLOBIN g/dL 13.0   HEMATOCRIT % 40.7   PLATELETS Thousands/uL 230   SEGS PCT % 88*   LYMPHO PCT % 6*   MONO PCT % 5   EOS PCT % 0     Results from last 7 days   Lab Units 06/07/25  0800 06/06/25  0541   SODIUM mmol/L 132* 132*   POTASSIUM mmol/L 6.3* 4.9   CHLORIDE mmol/L 97 101   CO2 mmol/L 24  23   BUN mg/dL 41* 30*   CREATININE mg/dL 1.80* 1.23   ANION GAP mmol/L 11 8   CALCIUM mg/dL 9.7 9.1   ALBUMIN g/dL  --  3.6   TOTAL BILIRUBIN mg/dL  --  1.23*   ALK PHOS U/L  --  147*   ALT U/L  --  48   AST U/L  --  44*   GLUCOSE RANDOM mg/dL 100 129     Results from last 7 days   Lab Units 06/07/25  0800   INR  3.73*     Results from last 7 days   Lab Units 06/07/25  1241 06/07/25  0915   POC GLUCOSE mg/dl 152* 69         Results from last 7 days   Lab Units 06/06/25  0541 06/05/25  2115 06/05/25  1614 06/05/25  1359 06/05/25  0943   LACTIC ACID mmol/L  --  1.5 2.8* 2.8*  --    PROCALCITONIN ng/ml 0.16  --   --   --  0.23       Recent Cultures (last 7 days):   Results from last 7 days   Lab Units 06/05/25  1324 06/05/25  1309   BLOOD CULTURE  No Growth at 24 hrs. No Growth at 24 hrs.       Imaging Results Review: No pertinent imaging studies reviewed.  Other Study Results Review: No additional pertinent studies reviewed.    Last 24 Hours Medication List:     Current Facility-Administered Medications:     acetaminophen (TYLENOL) tablet 650 mg, Q6H PRN    ampicillin-sulbactam (UNASYN) 3 g in sodium chloride 0.9 % 100 mL IVPB, Q6H, Last Rate: 3 g (06/07/25 0900)    dexamethasone (PF) (DECADRON) injection 6 mg, Q12H JESUS    digoxin (LANOXIN) tablet 125 mcg, Daily    diphenhydramine, lidocaine, Al/Mg hydroxide, simethicone (Magic Mouthwash) oral solution 10 mL, Q6H PRN    [Held by provider] furosemide (LASIX) tablet 40 mg, Daily    ipratropium-albuterol (DUO-NEB) 0.5-2.5 mg/3 mL inhalation solution 3 mL, Q6H While awake    magnesium Oxide (MAG-OX) tablet 400 mg, Daily    metoprolol (LOPRESSOR) injection 2.5 mg, Q6H PRN    metoprolol tartrate (LOPRESSOR) tablet 75 mg, Q12H JESUS    midodrine (PROAMATINE) tablet 7.5 mg, TID AC    morphine injection 2 mg, Q4H PRN    nicotine (NICODERM CQ) 14 mg/24hr TD 24 hr patch 1 patch, Daily    phenol (CHLORASEPTIC) 1.4 % mucosal liquid 1 spray, Q2H PRN    Administrative Statements    Today, Patient Was Seen By: Guadalupe Muñoz MD  I have spent a total time of >35 minutes in caring for this patient on the day of the visit/encounter including Patient and family education, Documenting in the medical record, and Reviewing/placing orders in the medical record (including tests, medications, and/or procedures).    **Please Note: This note may have been constructed using a voice recognition system.**

## 2025-06-07 NOTE — ASSESSMENT & PLAN NOTE
Wt Readings from Last 3 Encounters:   06/05/25 80.1 kg (176 lb 9.4 oz)   06/05/25 81.1 kg (178 lb 12.7 oz)   05/04/25 79.2 kg (174 lb 9.7 oz)       Last EF is 25% moderate MR on 3/2025 sepsis resolved -hold lasix his lungs clear and no leg edema- hold cause cr went up

## 2025-06-07 NOTE — ASSESSMENT & PLAN NOTE
Lab Results   Component Value Date    EGFR 40 06/07/2025    EGFR 63 06/06/2025    EGFR 45 06/05/2025    CREATININE 1.80 (H) 06/07/2025    CREATININE 1.23 06/06/2025    CREATININE 1.63 (H) 06/05/2025     Currently with elevated creatinine 1.8 for the baseline suspected to be in the mid 1 range, perhaps 1.4-1.6.  Acute kidney injury noted during April hospitalization in the setting of heart failure with peak creatinine 2.4.    Potassium 6.3 this morning increased from potassium 4.9 on 6/6.  Received Lasix 40 mg on 6/6.  Unclear etiology of potassium elevation.  Not noted to be hemolyzed.  Received transient treatment with D5/insulin and expiratory treatment with Lokelma.  Advised to recheck chemistry at 1300 to check trends.  Lasix held this morning, okay for now.  Check CK for completeness.  Recommend continued observation to assess creatinine and potassium trends.  Check bladder scan for obstruction. Follow I/O.

## 2025-06-07 NOTE — ASSESSMENT & PLAN NOTE
Wt Readings from Last 3 Encounters:   06/05/25 80.1 kg (176 lb 9.4 oz)   06/05/25 81.1 kg (178 lb 12.7 oz)   05/04/25 79.2 kg (174 lb 9.7 oz)   Continue management per cardiology colleagues.    EF 25% on March echocardiogram.  No evidence of volume overload on exam.  [place] stable from cardiology colleagues on 6/6.

## 2025-06-07 NOTE — PLAN OF CARE
Problem: INFECTION - ADULT  Goal: Absence or prevention of progression during hospitalization  Description: INTERVENTIONS:  - Assess and monitor for signs and symptoms of infection  - Monitor lab/diagnostic results  - Monitor all insertion sites, i.e. indwelling lines, tubes, and drains  - Monitor endotracheal if appropriate and nasal secretions for changes in amount and color  - [place] appropriate cooling/warming therapies per order  - Administer medications as ordered  - Instruct and encourage patient and family to use good hand hygiene technique  - Identify and instruct in appropriate isolation precautions for identified infection/condition  Outcome: Progressing

## 2025-06-07 NOTE — ASSESSMENT & PLAN NOTE
Patient complaining of sore throat for the past week his tonsils are erythematous swollen left greater than right he is tender on the left side of the neck his uvula is swollen as well will continue Unasyn   Today looking much better the swelling is down almost completely he is feeling much better decrease Decadron down to 6 every 12 advance to regular consistency diet CT without any evidence of abscess discontinue ENT consult  Aspiration precautions and clear liquid diet  6/7 still sore but swelling improved he still wants to stay on liquid diet - will add cepacol spray

## 2025-06-07 NOTE — PLAN OF CARE
Problem: PAIN - ADULT  Goal: Verbalizes/displays adequate comfort level or baseline comfort level  Description: Interventions:  - Encourage patient to monitor pain and request assistance  - Assess pain using appropriate pain scale0-10  - Administer analgesics as ordered based on type and severity of pain and evaluate response  - Implement non-pharmacological measures as appropriate and evaluate response  - Consider cultural and social influences on pain and pain management  - Notify physician/advanced practitioner if interventions unsuccessful or patient reports new pain  - Educate patient/family on pain management process including their role and importance of  reporting pain   - Provide non-pharmacologic/complimentary pain relief interventions  Outcome: Progressing     Problem: INFECTION - ADULT  Goal: Absence or prevention of progression during hospitalization  Description: INTERVENTIONS:  - Assess and monitor for signs and symptoms of infection  - Monitor lab/diagnostic results  - Monitor all insertion sites, i.e. indwelling lines, tubes, and drains  - Monitor endotracheal if appropriate and nasal secretions for changes in amount and color  - [place] appropriate cooling/warming therapies per order  - Administer medications as ordered  - Instruct and encourage patient and family to use good hand hygiene technique  - Identify and instruct in appropriate isolation precautions for identified infection/condition  Outcome: Progressing     Problem: SAFETY ADULT  Goal: Patient will remain free of falls  Description: INTERVENTIONS:  - Educate patient/family on patient safety including physical limitations  - Instruct patient to call for assistance with activity   - Consider consulting OT/PT to assist with strengthening/mobility based on AM PAC & JH-HLM score  - Consult OT/PT to assist with strengthening/mobility   - Keep Call bell within reach  - Keep bed low and locked with side rails adjusted as appropriate  - Keep  care items and personal belongings within reach  - Initiate and maintain comfort rounds  - Make Fall Risk Sign visible to staff  - Offer Toileting every 2 Hours, in advance of need  - Initiate/Maintain bed alarm  - Apply yellow socks and bracelet for high fall risk patients  - Consider moving patient to room near nurses station  Outcome: Progressing

## 2025-06-07 NOTE — ASSESSMENT & PLAN NOTE
Lab Results   Component Value Date    EGFR 40 06/07/2025    EGFR 63 06/06/2025    EGFR 45 06/05/2025    CREATININE 1.80 (H) 06/07/2025    CREATININE 1.23 06/06/2025    CREATININE 1.63 (H) 06/05/2025   Creatinine baseline is 1.5 creatinine admitted with  1.6 no evidence of IRINEO improved to 1.2 and now upto 1.8 - hold lasix till evaluation per nephrology he does not look dry or fluid overloaded   ? 2/2 afib with rvr hypoperfusion . No evidence of hypotension   Repeat labs in afternoon and am

## 2025-06-07 NOTE — CONSULTS
NEPHROLOGY HOSPITAL CONSULTATION   Britton Batista 60 y.o. 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  Patient evaluated by cardiology service not felt to be in acute heart failure during hospitalization.  Creatinine jim to 1.8 today from 1.2 on 6/6 and potassium also noted to be 6.3.  Nephrology consulted for hyperkalemia.  Assessment & Plan  Stage 3 chronic kidney disease (HCC)  Lab Results   Component Value Date    EGFR 40 06/07/2025    EGFR 63 06/06/2025    EGFR 45 06/05/2025    CREATININE 1.80 (H) 06/07/2025    CREATININE 1.23 06/06/2025    CREATININE 1.63 (H) 06/05/2025     Currently with elevated creatinine 1.8 for the baseline suspected to be in the mid 1 range, perhaps 1.4-1.6.  Acute kidney injury noted during April hospitalization in the setting of heart failure with peak creatinine 2.4.    Potassium 6.3 this morning increased from potassium 4.9 on 6/6.  Received Lasix 40 mg on 6/6.  Unclear etiology of potassium elevation.  Not noted to be hemolyzed.  Received transient treatment with D5/insulin and expiratory treatment with Lokelma.  Advised to recheck chemistry at 1300 to check trends.  Lasix held this morning, okay for now.  Check CK for completeness.  Recommend continued observation to assess creatinine and potassium trends.  Check bladder scan for obstruction. Follow I/O.   Hyperkalemia  Unclear elevation due to potassium 6.3..  Not noted to be hemolyzed but did note that he has a difficult stick.  He was treated with transient and expiratory therapy.  Will recheck chemistry today and evaluate potassium trends.  Will check CK for completeness.  Atrial fibrillation with RVR ([facility])  Wt Readings from Last 3 Encounters:   06/05/25 80.1 kg (176 lb 9.4 oz)   06/05/25  81.1 kg (178 lb 12.7 oz)   05/04/25 79.2 kg (174 lb 9.7 oz)   Continue management per cardiology colleagues.    EF 25% on March echocardiogram.  No evidence of volume overload on exam.  Loving stable from cardiology colleagues on 6/6.  Transaminitis  Attributed to sepsis.  Supratherapeutic INR  Continue management per cardiology colleagues.  INR 8.8 on admission.  INR 3.7 today.  Tonsillitis  Currently on Decadron per primary team.  No evidence of abscess with CT.    HISTORY OF PRESENT ILLNESS:  Requesting Physician: Guadalupe Muñoz MD  Reason for Consult: hyperkalemia    Britton Batista is a 60 y.o. male who was admitted to [facility] after presenting with supratherapeutic INR of 8.8. A renal consultation is requested today for assistance in the management of hyperkalemia and IRINEO.  Patient has history of peroxisomal atrial fibrillation, CKD 3, COPD, moderate mitral regurgitation, moderate tricuspid regurgitation, EF 25%, methamphetamine induced cardiomyopathy presented to the emergency room with supratherapeutic INR.  He was seen by cardiology colleagues.  He was also hospitalized in April 2025 with acute on chronic systolic heart failure and he left AGAINST MEDICAL ADVICE.  He was hospitalized in March 2025 and February 2025 as well.  His metoprolol was adjusted during hospitalization.  It was felt that he would be able to go home today by cardiology colleagues if he was optimized from their perspective.  Creatinine 1.6 on admission and improved to 1.2.  Baseline suspected to be mid 1 range.  Creatinine 1.8 today.  Potassium within normal limits during hospitalization and bump from 4.9 yesterday to 6.3 today.  He endorses decreased urine output from baseline.  Received Lasix 40 mg on 6/6.    PAST MEDICAL HISTORY:  Past Medical History[1]    PAST SURGICAL HISTORY:  Past Surgical History[2]    ALLERGIES:  Allergies[3]    SOCIAL HISTORY:  Social History     Substance and Sexual Activity   Alcohol Use Not  Currently    Comment: occasional     Social History     Substance and Sexual Activity   Drug Use Yes    Types: Marijuana, Methamphetamines    Comment: Hx meth use     Tobacco Use History[4]    FAMILY HISTORY:  Family History[5]    MEDICATIONS:  Current Medications[6]    REVIEW OF SYSTEMS:  Constitutional: Negative for fatigue, anorexia, fever, chills, diaphoresis  HENT: Negative for postnasal drip  Eyes: Negative for visual disturbance.   Respiratory: Negative for cough, shortness of breath and wheezing.   Cardiovascular: Negative for chest pain, palpitations and leg swelling.   Gastrointestinal: Negative for abdominal pain, constipation, diarrhea, nausea and vomiting.   Genitourinary: No dysuria, hematuria  Endocrine: Negative for polyuria.   Musculoskeletal: Negative for arthralgias, back pain and joint swelling.   Skin: Negative for rash.   Neurological: Negative for focal weakness, headaches, dizziness.  Hematological: Negative for easy bruising or bleeding.  Psychiatric/Behavioral: Negative for confusion and sleep disturbance.   All the systems were reviewed and were negative except as documented on the HPI.    PHYSICAL EXAM:  Current Weight: Weight - Scale: 80.1 kg (176 lb 9.4 oz)  First Weight: Weight - Scale: 80.1 kg (176 lb 9.4 oz)  Vitals:    06/06/25 2300 06/07/25 0644 06/07/25 0724 06/07/25 1109   BP: (!) 129/114 129/96  116/96   BP Location: Left arm   Right arm   Pulse: (!) 129      Resp:    20   Temp:    (!) 97.4 °F (36.3 °C)   TempSrc:    Temporal   SpO2: (!) 87%  92%    Weight:       Height:           Intake/Output Summary (Last 24 hours) at 6/7/2025 1339  Last data filed at 6/6/2025 2010  Gross per 24 hour   Intake 755 ml   Output --   Net 755 ml     Physical Exam  Vitals reviewed.   Constitutional:       General: He is not in acute distress.     Appearance: He is not ill-appearing.   HENT:      Head: Normocephalic and atraumatic.      Nose: Nose normal.      Mouth/Throat:      Mouth: Mucous  "membranes are moist.      Pharynx: Oropharynx is clear.     Eyes:      Extraocular Movements: Extraocular movements intact.      Conjunctiva/sclera: Conjunctivae normal.       Cardiovascular:      Rate and Rhythm: Normal rate. Rhythm irregular.   Pulmonary:      Breath sounds: Normal breath sounds. No wheezing or rales.   Abdominal:      Palpations: Abdomen is soft.      Tenderness: There is no abdominal tenderness. There is no guarding.     Musculoskeletal:      Right lower leg: No edema.      Left lower leg: No edema.     Skin:     General: Skin is warm and dry.      Coloration: Skin is not jaundiced.      Findings: No bruising.     Neurological:      General: No focal deficit present.      Mental Status: He is alert and oriented to person, place, and time. Mental status is at baseline.      Cranial Nerves: No cranial nerve deficit.     Psychiatric:         Mood and Affect: Mood normal.         Behavior: Behavior normal.           Invasive Devices:      Lab Results:   Results from last 7 days   Lab Units 06/07/25  0800 06/06/25  0541 06/05/25  0943   WBC Thousand/uL  --  9.56 9.26   HEMOGLOBIN g/dL  --  13.0 13.3   HEMATOCRIT %  --  40.7 42.0   PLATELETS Thousands/uL  --  230 234   POTASSIUM mmol/L 6.3* 4.9 5.2   CHLORIDE mmol/L 97 101 101   CO2 mmol/L 24 23 22   BUN mg/dL 41* 30* 44*   CREATININE mg/dL 1.80* 1.23 1.63*   CALCIUM mg/dL 9.7 9.1 9.2   MAGNESIUM mg/dL  --   --  1.7*   ALK PHOS U/L  --  147* 174*   ALT U/L  --  48 54*   AST U/L  --  44* 73*         Portions of the record may have been created with voice recognition software. Occasional wrong word or \"sound a like\" substitutions may have occurred due to the inherent limitations of voice recognition software. Read the chart carefully and recognize, using context, where substitutions have occurred.If you have any questions, please contact the dictating provider.         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    CHF (congestive heart failure) (HCC)     Coagulopathy (HCC) 04/26/2025    Depression     Emphysema of lung (HCC)     GERD (gastroesophageal reflux disease)     Gout     Hyperammonemia (HCC) 04/26/2025    Hypertension     Metabolic encephalopathy 03/22/2025    Neuropathy     Raynaud's disease     Right inguinal hernia     Scleroderma (HCC)     Shock (HCC) 04/26/2025   [2]   Past Surgical History:  Procedure Laterality Date    AMPUTATION Right     pt had tip of \"pointer finger\" d/t scleraderma    CARDIAC PACEMAKER PLACEMENT      FINGER AMPUTATION Left 01/31/2024    Procedure: AMPUTATION FINGER, LEFT INDEX FINGER CPT: 13123;  Surgeon: Pedro Vazquez MD;  Location:  MAIN OR;  Service: Orthopedics    HERNIA REPAIR Left     times 2    KS AMP F/TH 1/2 JT/PHALANX W/NEURECT W/DIR CLSR Right 01/23/2024    Procedure: AMPUTATION FINGER, RIGHT MIDDLE;  Surgeon: Pedro Vazquez MD;  Location: [facility];  Service: Orthopedics    KS RPR 1ST INGUN HRNA AGE 5 YRS/> REDUCIBLE Right 03/03/2023    Procedure: OPEN INGUINAL HERNIA REPAIR WITH MESH;  Surgeon: Temo Jackson DO;  Location:  MAIN OR;  Service: General   [3]   Allergies  Allergen Reactions    Aspirin GI Intolerance   [4]   Social History  Tobacco Use   Smoking Status Every Day    Current packs/day: 1.00    Average packs/day: 1 pack/day for 45.4 years (45.4 ttl pk-yrs)    Types: Cigarettes    Start date: 2000   Smokeless Tobacco Never   Tobacco Comments    Last cigarette 0430   [5]   Family History  Problem Relation Name Age of Onset    Hypertension Father     [6]   Current Facility-Administered Medications:     acetaminophen (TYLENOL) tablet 650 mg, 650 mg, Oral, Q6H PRN, Guadalupe Muñoz MD, 650 mg at 06/07/25 0930    ampicillin-sulbactam (UNASYN) 3 g in sodium chloride 0.9 % 100 mL IVPB, 3 g, Intravenous, Q6H, Guadalupe Muñoz MD, Last Rate: 200 " mL/hr at 06/07/25 0900, 3 g at 06/07/25 0900    dexamethasone (PF) (DECADRON) injection 6 mg, 6 mg, Intravenous, Q12H JESUS, Guadalupe Muñoz MD, 6 mg at 06/07/25 0913    digoxin (LANOXIN) tablet 125 mcg, 125 mcg, Oral, Daily, Guadalupe Muñoz MD, 125 mcg at 06/07/25 0914    diphenhydramine, lidocaine, Al/Mg hydroxide, simethicone (Magic Mouthwash) oral solution 10 mL, 10 mL, Swish & Swallow, Q6H PRN, Guadalupe Muñoz MD, 10 mL at 06/07/25 0930    [Held by provider] furosemide (LASIX) tablet 40 mg, 40 mg, Oral, Daily, Guadalupe Muñoz MD, 40 mg at 06/06/25 1039    ipratropium-albuterol (DUO-NEB) 0.5-2.5 mg/3 mL inhalation solution 3 mL, 3 mL, Nebulization, Q6H While awake, Guadalupe Muñoz MD, 3 mL at 06/07/25 0724    magnesium Oxide (MAG-OX) tablet 400 mg, 400 mg, Oral, Daily, Guadalupe Muñoz MD, 400 mg at 06/07/25 0914    metoprolol (LOPRESSOR) injection 2.5 mg, 2.5 mg, Intravenous, Q6H PRN, Guadalupe Muñoz MD, 2.5 mg at 06/06/25 2346    metoprolol tartrate (LOPRESSOR) tablet 75 mg, 75 mg, Oral, Q12H JESUS, Guadalupe Muñoz MD    midodrine (PROAMATINE) tablet 7.5 mg, 7.5 mg, Oral, TID AC, Guadalupe Muñoz MD, 7.5 mg at 06/07/25 1158    morphine injection 2 mg, 2 mg, Intravenous, Q4H PRN, Guadalupe Muñoz MD, 2 mg at 06/07/25 0237    nicotine (NICODERM CQ) 14 mg/24hr TD 24 hr patch 1 patch, 1 patch, Transdermal, Daily, Guadalupe Muñoz MD, 1 patch at 06/07/25 0930    phenol (CHLORASEPTIC) 1.4 % mucosal liquid 1 spray, 1 spray, Mouth/Throat, Q2H PRN, Guadalupe Muñoz MD

## 2025-06-07 NOTE — UTILIZATION REVIEW
Continued Stay Review    Date: 6/7                          Current Patient Class: Inpatient  Current Level of Care: MS    HPI:60 y.o. male initially admitted on 6/5   Current Diagnosis:  sepsis     Assessment/Plan:     Date: 6/7    Day 3: Has surpassed a 2nd midnight with active treatments and services.  Some SOB overnight . Cont stay for afib w/ RVR . Sore throat has improved . Keep on liq diet and add cepacol spray . Cr inc to 1.80 from 1.23 , repeat in afternoon and am . O2 sat 92 % on 2l  Anticipate discharge in 48-72 hrs to home.     Medications:   Scheduled Medications:  ampicillin-sulbactam, 3 g, Intravenous, Q6H  dexamethasone, 6 mg, Intravenous, Q12H JESUS  digoxin, 125 mcg, Oral, Daily  [Held by provider] furosemide, 40 mg, Oral, Daily  ipratropium-albuterol, 3 mL, Nebulization, Q6H While awake  magnesium Oxide, 400 mg, Oral, Daily  metoprolol tartrate, 25 mg, Oral, Once  metoprolol tartrate, 75 mg, Oral, Q12H JESUS  midodrine, 7.5 mg, Oral, TID AC  nicotine, 1 patch, Transdermal, Daily      Continuous IV Infusions:     PRN Meds:  acetaminophen, 650 mg, Oral, Q6H PRN  diphenhydramine, lidocaine, Al/Mg hydroxide, simethicone, 10 mL, Swish & Swallow, Q6H PRN  metoprolol, 2.5 mg, Intravenous, Q6H PRN  morphine injection, 2 mg, Intravenous, Q4H PRN 6/7 x1  phenol, 1 spray, Mouth/Throat, Q2H PRN      Discharge Plan: TBD     Vital Signs (last 3 days)       Date/Time Temp Pulse Resp BP MAP (mmHg) SpO2 Calculated FIO2 (%) - Nasal Cannula Nasal Cannula O2 Flow Rate (L/min) O2 Device Patient Position - Orthostatic VS Keily Coma Scale Score Pain    06/07/25 1109 97.4 °F (36.3 °C) -- 20 116/96 101 -- 28 2 L/min Nasal cannula Lying -- --    06/07/25 0900 -- -- -- -- -- -- 28 2 L/min Nasal cannula -- 15 No Pain    06/07/25 0724 -- -- -- -- -- 92 % 28 2 L/min Nasal cannula -- -- No Pain    06/07/25 0657 -- -- -- -- -- -- 28 2 L/min Nasal cannula -- -- --    06/07/25 0644 -- -- -- 129/96 94 -- -- -- -- -- -- --    06/07/25  0237 -- -- -- -- -- -- -- -- -- -- -- 10 - Worst Possible Pain    06/06/25 2300 -- 129 -- 129/114 119 87 % -- -- -- Lying -- --    06/06/25 2031 -- -- -- -- -- -- -- -- -- -- -- 9    06/06/25 2030 -- -- -- -- -- -- -- -- None (Room air) -- 15 --    06/06/25 2026 -- -- -- 135/92 -- -- -- -- -- Lying -- --    06/06/25 20:12:54 97.2 °F (36.2 °C) 131 20 140/110 121 95 % -- -- None (Room air) -- -- --    06/06/25 1555 -- -- -- -- -- -- -- -- -- -- -- 10 - Worst Possible Pain    06/06/25 15:18:34 97.3 °F (36.3 °C) 120 16 131/93 106 93 % -- -- -- -- -- --    06/06/25 0914 -- -- -- -- -- -- -- -- None (Room air) -- 15 No Pain    06/06/25 07:49:35 97.2 °F (36.2 °C) 79 17 130/91 104 81 % -- -- -- -- -- --    06/06/25 0740 -- -- -- -- -- 95 % -- -- None (Room air) -- -- --    06/06/25 0606 -- -- -- -- -- -- -- -- -- -- -- 7 06/05/25 2359 -- -- -- -- -- -- -- -- -- -- -- 8    06/05/25 21:18:44 -- 124 -- 120/69 86 95 % -- -- -- -- -- --    06/05/25 2118 -- 70 -- 120/69 -- -- -- -- -- -- -- --    06/05/25 2015 -- -- -- -- -- 95 % -- -- None (Room air) -- 15 --    06/05/25 1956 -- -- -- -- -- -- -- -- -- -- -- 10 - Worst Possible Pain    06/05/25 1911 -- -- -- -- -- 98 % -- -- -- -- -- --    06/05/25 18:31:51 97.7 °F (36.5 °C) 101 21 131/99 110 94 % -- -- -- -- -- --    06/05/25 1806 -- -- -- -- -- -- -- -- -- -- -- 8    06/05/25 1500 97.7 °F (36.5 °C) 108 20 129/99 109 -- -- -- -- -- -- --    06/05/25 1415 -- 122 -- -- -- -- -- -- -- -- -- --    06/05/25 1336 -- -- -- -- -- -- -- -- -- -- -- 3    06/05/25 1300 -- -- -- -- -- -- -- -- None (Room air) -- 15 --    06/05/25 12:40:13 -- 107 -- 129/75 93 97 % -- -- -- -- -- --    06/05/25 1223 -- -- -- -- -- -- -- -- -- -- -- 8    06/05/25 1130 -- 113 20 137/100 113 95 % -- -- None (Room air) Lying -- --    06/05/25 1115 -- 125 20 -- -- 97 % -- -- -- -- -- --    06/05/25 1100 -- 125 20 134/85 106 95 % -- -- None (Room air) -- -- --    06/05/25 1045 -- 123 21 -- -- 95 % -- -- -- --  -- --    06/05/25 1030 -- 142 20 117/90 100 98 % -- -- -- -- -- --    06/05/25 1015 -- 151 19 -- -- 99 % -- -- -- -- -- --    06/05/25 1000 -- 146 19 137/97 108 97 % -- -- -- -- -- --    06/05/25 0945 -- 148 20 133/89 -- -- -- -- -- -- -- --    06/05/25 0931 -- -- -- -- -- -- -- -- -- -- -- 10 - Worst Possible Pain    06/05/25 0930 -- 145 22 -- -- -- -- -- -- -- -- --    06/05/25 0926 -- -- -- -- -- -- -- -- -- -- 15 --    06/05/25 0915 -- 146 22 -- -- 98 % -- -- -- -- -- --    06/05/25 0900 -- 126 18 111/94 100 97 % -- -- -- -- -- --    06/05/25 0855 98.6 °F (37 °C) 118 18 149/91 115 98 % -- -- None (Room air) Sitting -- 8          Weight (last 2 days)       Date/Time Weight    06/05/25 0855 80.1 (176.59)            Pertinent Labs/Diagnostic Results:   Radiology:  CT soft tissue neck wo contrast   Final Interpretation by Rom Suárez MD (06/05 1413)      No acute neck abnormality on this noncontrast examination which limits evaluation for abscess. No organized fluid collection to suggest abscess on this noncontrast examination. If continued clinical concern for peritonsillar abscess, consider follow-up    CT soft tissue neck with contrast for further evaluation.      Findings suggestive of chronic tonsillitis. Recommend direct visualization.      Poor dentition with periodontal disease. Recommend follow-up with dentist.      Additional chronic/incidental findings as detailed above.      The study was marked in EPIC for immediate notification.               Workstation performed: [id number]         XR chest pa and lateral   Final Interpretation by Polo Tavarez MD (06/05 1612)      Cardiomegaly. No focal airspace consolidation.            Workstation performed: [id number]           Cardiology:  ECG 12 lead   Final Result by John Hwang MD (06/05 1216)   Atrial fibrillation with rapid ventricular response with frequent    ventricular-paced complexes   Right bundle branch block   T wave abnormality, consider  inferolateral ischemia   Abnormal ECG   When compared with ECG of 22-May-2025 16:26,   Vent. rate has increased by  51 bpm   Confirmed by John Hwang ([id number]) on 6/5/2025 12:16:34 PM        GI:  No orders to display           Results from last 7 days   Lab Units 06/06/25  0541 06/05/25  0943   WBC Thousand/uL 9.56 9.26   HEMOGLOBIN g/dL 13.0 13.3   HEMATOCRIT % 40.7 42.0   PLATELETS Thousands/uL 230 234   TOTAL NEUT ABS Thousands/µL 8.43* 8.16*         Results from last 7 days   Lab Units 06/07/25  0800 06/06/25  0541 06/05/25  0943   SODIUM mmol/L 132* 132* 132*   POTASSIUM mmol/L 6.3* 4.9 5.2   CHLORIDE mmol/L 97 101 101   CO2 mmol/L 24 23 22   ANION GAP mmol/L 11 8 9   BUN mg/dL 41* 30* 44*   CREATININE mg/dL 1.80* 1.23 1.63*   EGFR ml/min/1.73sq m 40 63 45   CALCIUM mg/dL 9.7 9.1 9.2   MAGNESIUM mg/dL  --   --  1.7*     Results from last 7 days   Lab Units 06/06/25  0541 06/05/25  0943   AST U/L 44* 73*   ALT U/L 48 54*   ALK PHOS U/L 147* 174*   TOTAL PROTEIN g/dL 6.9 7.3   ALBUMIN g/dL 3.6 3.7   TOTAL BILIRUBIN mg/dL 1.23* 1.04*     Results from last 7 days   Lab Units 06/07/25  0915   POC GLUCOSE mg/dl 69     Results from last 7 days   Lab Units 06/07/25  0800 06/06/25  0541 06/05/25  0943   GLUCOSE RANDOM mg/dL 100 129 143*       Beta- Hydroxybutyrate   Date Value Ref Range Status   09/18/2024 <0.05 0.02 - 0.27 mmol/L Final        Results from last 7 days   Lab Units 06/07/25  0800 06/06/25  0541 06/05/25  1106   PROTIME seconds 36.6* 54.6* 70.8*   INR  3.73* 6.35* 8.98*   PTT seconds  --   --  69*         Results from last 7 days   Lab Units 06/06/25  0541 06/05/25  0943   PROCALCITONIN ng/ml 0.16 0.23     Results from last 7 days   Lab Units 06/05/25  2115 06/05/25  1614 06/05/25  1359   LACTIC ACID mmol/L 1.5 2.8* 2.8*       Results from last 7 days   Lab Units 06/07/25  0800 06/06/25  0541 06/05/25  0943   DIGOXIN LVL ng/mL 1.0 0.7* 0.4*       Results from last 7 days   Lab Units 06/05/25  1614    AMPH/METH  Positive*   BARBITURATE UR  Negative   BENZODIAZEPINE UR  Negative   COCAINE UR  Negative   METHADONE URINE  Negative   OPIATE UR  Negative   PCP UR  Negative   THC UR  Positive*       Results from last 7 days   Lab Units 06/05/25  1324 06/05/25  1309   BLOOD CULTURE  No Growth at 24 hrs. No Growth at 24 hrs.           Network Utilization Review Department  ATTENTION: Please call with any questions or concerns to [phone] and carefully listen to the prompts so that you are directed to the right person. All voicemails are confidential.   For Discharge needs, contact Care Management DC Support Team at [phone] opt. [phone]  Send all requests for admission clinical reviews, approved or denied determinations and any other requests to dedicated fax number below belonging to the campus where the patient is receiving treatment. List of dedicated fax numbers for the Facilities:  FACILITY NAME UR FAX NUMBER   ADMISSION DENIALS (Administrative/Medical Necessity) [phone]   DISCHARGE SUPPORT TEAM (NETWORK) [phone]   PARENT CHILD HEALTH (Maternity/NICU/Pediatrics) [phone]   [facility] [phone]   [facility] [phone]   [facility] [phone]   [facility] [phone]   [facility] [phone]   [facility] [phone]   [facility] [phone]   [facility] [phone]   [facility] [phone]   [facility] [phone]   [facility] [phone]   [facility] [phone]

## 2025-06-07 NOTE — ASSESSMENT & PLAN NOTE
Continue digoxin monitor levels while his cr went up. Increase metoprolol tartarte to 75 mg po bid as rates uncontrolled   INR supratherapeutic hold Coumadin status post vitamin K INR improved we will continue to monitor

## 2025-06-07 NOTE — ASSESSMENT & PLAN NOTE
Nathalia Mckenzie  1956  [id number]    Situation:  Verbal report received from: h Elaine Fleischer  Procedure: Procedure(s):  COLONOSCOPY,EGD  ESOPHAGOGASTRODUODENOSCOPY (EGD)  ESOPHAGOGASTRODUODENAL (EGD) BIOPSY  ENDOSCOPIC POLYPECTOMY    Background:    Preoperative diagnosis: DYSPHAGIA,GERD, RECTAL HEMORRHAGE  Postoperative diagnosis: Gastric Polyps  colon Polyps    :  Dr. Jessa Chavez  Assistant(s): Endoscopy Technician-1: Fermin Ramírez  Endoscopy RN-1: Ced Cleveland RN    Specimens:   ID Type Source Tests Collected by Time Destination   1 : Gastric Polyps Preservative   Noble Nguyen MD 8/29/2018 1256 Pathology   2 :  Colon Polyp X 2 Preservative   Noble Nguyen MD 8/29/2018 1306 Pathology     H. Pylori  no    Assessment:  Intra-procedure medications   VAnesthesia gave intra-procedure sedation and medications, see anesthesia flow sheet yes    Intravenous fluids: NS@ KVO     Vital signs stable     Abdominal assessment: round and soft     Recommendation:  Discharge patient per MD order. Family   Permission to share finding with family or friend yes  Endoscopy discharge instructions have been reviewed and given to patient. The patient verbalized understanding and acceptance of instructions. Unclear elevation due to potassium 6.3..  Not noted to be hemolyzed but did note that he has a difficult stick.  He was treated with transient and expiratory therapy.  Will recheck chemistry today and evaluate potassium trends.  Will check CK for completeness.

## 2025-06-08 ENCOUNTER — APPOINTMENT (INPATIENT)
Dept: RADIOLOGY | Facility: HOSPITAL | Age: 60
DRG: 871 | End: 2025-06-08
Payer: COMMERCIAL

## 2025-06-08 PROBLEM — N17.9 ACUTE RENAL FAILURE SUPERIMPOSED ON STAGE 3 CHRONIC KIDNEY DISEASE (HCC): Status: ACTIVE | Noted: 2025-03-22

## 2025-06-08 LAB
ALBUMIN SERPL BCG-MCNC: 3.6 G/DL (ref 3.5–5)
ALP SERPL-CCNC: 132 U/L (ref 34–104)
ALT SERPL W P-5'-P-CCNC: 302 U/L (ref 7–52)
ANION GAP SERPL CALCULATED.3IONS-SCNC: 12 MMOL/L (ref 4–13)
ANION GAP SERPL CALCULATED.3IONS-SCNC: 14 MMOL/L (ref 4–13)
ANION GAP SERPL CALCULATED.3IONS-SCNC: 20 MMOL/L (ref 4–13)
AST SERPL W P-5'-P-CCNC: 472 U/L (ref 13–39)
B-OH-BUTYR SERPL-MCNC: 0.23 MMOL/L (ref 0.2–0.6)
BASE EX.OXY STD BLDV CALC-SCNC: 45.4 % (ref 60–80)
BASE EX.OXY STD BLDV CALC-SCNC: 48.6 % (ref 60–80)
BASE EX.OXY STD BLDV CALC-SCNC: 94 % (ref 60–80)
BASE EXCESS BLDV CALC-SCNC: -1.3 MMOL/L
BASE EXCESS BLDV CALC-SCNC: -3.7 MMOL/L
BASE EXCESS BLDV CALC-SCNC: -7.6 MMOL/L
BILIRUB SERPL-MCNC: 1.8 MG/DL (ref 0.2–1)
BUN SERPL-MCNC: 60 MG/DL (ref 5–25)
BUN SERPL-MCNC: 65 MG/DL (ref 5–25)
BUN SERPL-MCNC: 65 MG/DL (ref 5–25)
CALCIUM SERPL-MCNC: 9.2 MG/DL (ref 8.4–10.2)
CALCIUM SERPL-MCNC: 9.4 MG/DL (ref 8.4–10.2)
CALCIUM SERPL-MCNC: 9.9 MG/DL (ref 8.4–10.2)
CHLORIDE SERPL-SCNC: 95 MMOL/L (ref 96–108)
CHLORIDE SERPL-SCNC: 95 MMOL/L (ref 96–108)
CHLORIDE SERPL-SCNC: 98 MMOL/L (ref 96–108)
CO2 SERPL-SCNC: 17 MMOL/L (ref 21–32)
CO2 SERPL-SCNC: 23 MMOL/L (ref 21–32)
CO2 SERPL-SCNC: 25 MMOL/L (ref 21–32)
CREAT SERPL-MCNC: 1.98 MG/DL (ref 0.6–1.3)
CREAT SERPL-MCNC: 2.13 MG/DL (ref 0.6–1.3)
CREAT SERPL-MCNC: 2.18 MG/DL (ref 0.6–1.3)
DIGOXIN SERPL-MCNC: 1.3 NG/ML (ref 0.8–2)
GFR SERPL CREATININE-BSD FRML MDRD: 31 ML/MIN/1.73SQ M
GFR SERPL CREATININE-BSD FRML MDRD: 32 ML/MIN/1.73SQ M
GFR SERPL CREATININE-BSD FRML MDRD: 35 ML/MIN/1.73SQ M
GLUCOSE SERPL-MCNC: 184 MG/DL (ref 65–140)
GLUCOSE SERPL-MCNC: 191 MG/DL (ref 65–140)
GLUCOSE SERPL-MCNC: 95 MG/DL (ref 65–140)
HCO3 BLDV-SCNC: 15.8 MMOL/L (ref 24–30)
HCO3 BLDV-SCNC: 21.4 MMOL/L (ref 24–30)
HCO3 BLDV-SCNC: 24.1 MMOL/L (ref 24–30)
INR PPP: 3.25 (ref 0.85–1.19)
LACTATE SERPL-SCNC: 3.8 MMOL/L (ref 0.5–2)
LACTATE SERPL-SCNC: 5.1 MMOL/L (ref 0.5–2)
LACTATE SERPL-SCNC: 6.3 MMOL/L (ref 0.5–2)
MAGNESIUM SERPL-MCNC: 2.4 MG/DL (ref 1.9–2.7)
O2 CT BLDV-SCNC: 18.6 ML/DL
O2 CT BLDV-SCNC: 8.4 ML/DL
O2 CT BLDV-SCNC: 9.2 ML/DL
PCO2 BLDV: 27.2 MM HG (ref 42–50)
PCO2 BLDV: 38.9 MM HG (ref 42–50)
PCO2 BLDV: 42.8 MM HG (ref 42–50)
PH BLDV: 7.36 [PH] (ref 7.3–7.4)
PH BLDV: 7.37 [PH] (ref 7.3–7.4)
PH BLDV: 7.38 [PH] (ref 7.3–7.4)
PHOSPHATE SERPL-MCNC: 5.7 MG/DL (ref 2.3–4.1)
PO2 BLDV: 31.7 MM HG (ref 35–45)
PO2 BLDV: 32.2 MM HG (ref 35–45)
PO2 BLDV: 91.3 MM HG (ref 35–45)
POTASSIUM SERPL-SCNC: 4.9 MMOL/L (ref 3.5–5.3)
POTASSIUM SERPL-SCNC: 5.2 MMOL/L (ref 3.5–5.3)
POTASSIUM SERPL-SCNC: 5.7 MMOL/L (ref 3.5–5.3)
PROT SERPL-MCNC: 6.6 G/DL (ref 6.4–8.4)
PROTHROMBIN TIME: 33 SECONDS (ref 12.3–15)
SODIUM SERPL-SCNC: 132 MMOL/L (ref 135–147)
SODIUM SERPL-SCNC: 132 MMOL/L (ref 135–147)
SODIUM SERPL-SCNC: 135 MMOL/L (ref 135–147)

## 2025-06-08 PROCEDURE — 80048 BASIC METABOLIC PNL TOTAL CA: CPT | Performed by: FAMILY MEDICINE

## 2025-06-08 PROCEDURE — 99233 SBSQ HOSP IP/OBS HIGH 50: CPT | Performed by: INTERNAL MEDICINE

## 2025-06-08 PROCEDURE — 94664 DEMO&/EVAL PT USE INHALER: CPT

## 2025-06-08 PROCEDURE — 80048 BASIC METABOLIC PNL TOTAL CA: CPT | Performed by: PHYSICIAN ASSISTANT

## 2025-06-08 PROCEDURE — 71045 X-RAY EXAM CHEST 1 VIEW: CPT

## 2025-06-08 PROCEDURE — 94760 N-INVAS EAR/PLS OXIMETRY 1: CPT

## 2025-06-08 PROCEDURE — 99223 1ST HOSP IP/OBS HIGH 75: CPT | Performed by: PHYSICIAN ASSISTANT

## 2025-06-08 PROCEDURE — 84100 ASSAY OF PHOSPHORUS: CPT | Performed by: PHYSICIAN ASSISTANT

## 2025-06-08 PROCEDURE — 94640 AIRWAY INHALATION TREATMENT: CPT

## 2025-06-08 PROCEDURE — 82805 BLOOD GASES W/O2 SATURATION: CPT | Performed by: PHYSICIAN ASSISTANT

## 2025-06-08 PROCEDURE — 83735 ASSAY OF MAGNESIUM: CPT | Performed by: PHYSICIAN ASSISTANT

## 2025-06-08 PROCEDURE — 99232 SBSQ HOSP IP/OBS MODERATE 35: CPT | Performed by: FAMILY MEDICINE

## 2025-06-08 PROCEDURE — 36556 INSERT NON-TUNNEL CV CATH: CPT | Performed by: STUDENT IN AN ORGANIZED HEALTH CARE EDUCATION/TRAINING PROGRAM

## 2025-06-08 PROCEDURE — 83605 ASSAY OF LACTIC ACID: CPT | Performed by: PHYSICIAN ASSISTANT

## 2025-06-08 PROCEDURE — 80162 ASSAY OF DIGOXIN TOTAL: CPT | Performed by: FAMILY MEDICINE

## 2025-06-08 PROCEDURE — 82805 BLOOD GASES W/O2 SATURATION: CPT | Performed by: FAMILY MEDICINE

## 2025-06-08 PROCEDURE — 85610 PROTHROMBIN TIME: CPT | Performed by: FAMILY MEDICINE

## 2025-06-08 PROCEDURE — NC001 PR NO CHARGE: Performed by: PHYSICIAN ASSISTANT

## 2025-06-08 PROCEDURE — 83605 ASSAY OF LACTIC ACID: CPT | Performed by: FAMILY MEDICINE

## 2025-06-08 PROCEDURE — 80053 COMPREHEN METABOLIC PANEL: CPT | Performed by: PHYSICIAN ASSISTANT

## 2025-06-08 PROCEDURE — 82010 KETONE BODYS QUAN: CPT | Performed by: FAMILY MEDICINE

## 2025-06-08 RX ORDER — PREDNISONE 20 MG/1
40 TABLET ORAL DAILY
Status: DISCONTINUED | OUTPATIENT
Start: 2025-06-09 | End: 2025-06-09

## 2025-06-08 RX ORDER — MILRINONE LACTATE 0.2 MG/ML
0.13 INJECTION, SOLUTION INTRAVENOUS CONTINUOUS
Status: DISCONTINUED | OUTPATIENT
Start: 2025-06-08 | End: 2025-06-11

## 2025-06-08 RX ORDER — CHLORHEXIDINE GLUCONATE ORAL RINSE 1.2 MG/ML
15 SOLUTION DENTAL EVERY 12 HOURS SCHEDULED
Status: DISCONTINUED | OUTPATIENT
Start: 2025-06-08 | End: 2025-06-13

## 2025-06-08 RX ORDER — FUROSEMIDE 10 MG/ML
40 INJECTION INTRAMUSCULAR; INTRAVENOUS ONCE
Status: COMPLETED | OUTPATIENT
Start: 2025-06-08 | End: 2025-06-08

## 2025-06-08 RX ORDER — METOPROLOL TARTRATE 50 MG
100 TABLET ORAL EVERY 12 HOURS SCHEDULED
Status: DISCONTINUED | OUTPATIENT
Start: 2025-06-08 | End: 2025-06-12

## 2025-06-08 RX ORDER — DIGOXIN 125 MCG
125 TABLET ORAL EVERY OTHER DAY
Status: DISCONTINUED | OUTPATIENT
Start: 2025-06-09 | End: 2025-06-11

## 2025-06-08 RX ADMIN — CHLORHEXIDINE GLUCONATE 0.12% ORAL RINSE 15 ML: 1.2 LIQUID ORAL at 21:04

## 2025-06-08 RX ADMIN — MORPHINE SULFATE 2 MG: 2 INJECTION, SOLUTION INTRAMUSCULAR; INTRAVENOUS at 21:04

## 2025-06-08 RX ADMIN — AMPICILLIN SODIUM AND SULBACTAM SODIUM 3 G: 10; 5 INJECTION, POWDER, FOR SOLUTION INTRAMUSCULAR; INTRAVENOUS at 09:00

## 2025-06-08 RX ADMIN — SODIUM ZIRCONIUM CYCLOSILICATE 10 G: 10 POWDER, FOR SUSPENSION ORAL at 09:32

## 2025-06-08 RX ADMIN — AMPICILLIN SODIUM AND SULBACTAM SODIUM 3 G: 10; 5 INJECTION, POWDER, FOR SOLUTION INTRAMUSCULAR; INTRAVENOUS at 20:08

## 2025-06-08 RX ADMIN — MIDODRINE HYDROCHLORIDE 7.5 MG: 5 TABLET ORAL at 11:44

## 2025-06-08 RX ADMIN — MILRINONE LACTATE IN DEXTROSE 0.25 MCG/KG/MIN: 200 INJECTION, SOLUTION INTRAVENOUS at 15:29

## 2025-06-08 RX ADMIN — DIPHENHYDRAMINE HYDROCHLORIDE AND LIDOCAINE HYDROCHLORIDE AND ALUMINUM HYDROXIDE AND MAGNESIUM HYDRO 10 ML: KIT at 06:25

## 2025-06-08 RX ADMIN — AMIODARONE HYDROCHLORIDE 1 MG/MIN: 50 INJECTION, SOLUTION INTRAVENOUS at 15:28

## 2025-06-08 RX ADMIN — IPRATROPIUM BROMIDE AND ALBUTEROL SULFATE 3 ML: 2.5; .5 SOLUTION RESPIRATORY (INHALATION) at 20:09

## 2025-06-08 RX ADMIN — MORPHINE SULFATE 2 MG: 2 INJECTION, SOLUTION INTRAMUSCULAR; INTRAVENOUS at 06:25

## 2025-06-08 RX ADMIN — DEXAMETHASONE SODIUM PHOSPHATE 6 MG: 10 INJECTION, SOLUTION INTRAMUSCULAR; INTRAVENOUS at 09:30

## 2025-06-08 RX ADMIN — IPRATROPIUM BROMIDE AND ALBUTEROL SULFATE 3 ML: 2.5; .5 SOLUTION RESPIRATORY (INHALATION) at 07:27

## 2025-06-08 RX ADMIN — HYDROXYZINE HYDROCHLORIDE 25 MG: 25 TABLET, FILM COATED ORAL at 11:44

## 2025-06-08 RX ADMIN — AMIODARONE HYDROCHLORIDE 0.5 MG/MIN: 50 INJECTION, SOLUTION INTRAVENOUS at 21:38

## 2025-06-08 RX ADMIN — METOPROLOL TARTRATE 75 MG: 50 TABLET, FILM COATED ORAL at 09:30

## 2025-06-08 RX ADMIN — NICOTINE 1 PATCH: 14 PATCH, EXTENDED RELEASE TRANSDERMAL at 09:35

## 2025-06-08 RX ADMIN — PHENOL 1 SPRAY: 1.5 LIQUID ORAL at 09:47

## 2025-06-08 RX ADMIN — METOROPROLOL TARTRATE 2.5 MG: 5 INJECTION, SOLUTION INTRAVENOUS at 22:12

## 2025-06-08 RX ADMIN — DIPHENHYDRAMINE HYDROCHLORIDE AND LIDOCAINE HYDROCHLORIDE AND ALUMINUM HYDROXIDE AND MAGNESIUM HYDRO 10 ML: KIT at 14:09

## 2025-06-08 RX ADMIN — MIDODRINE HYDROCHLORIDE 7.5 MG: 5 TABLET ORAL at 06:23

## 2025-06-08 RX ADMIN — HYDROXYZINE HYDROCHLORIDE 25 MG: 25 TABLET, FILM COATED ORAL at 03:56

## 2025-06-08 RX ADMIN — AMPICILLIN SODIUM AND SULBACTAM SODIUM 3 G: 10; 5 INJECTION, POWDER, FOR SOLUTION INTRAMUSCULAR; INTRAVENOUS at 14:09

## 2025-06-08 RX ADMIN — MORPHINE SULFATE 2 MG: 2 INJECTION, SOLUTION INTRAMUSCULAR; INTRAVENOUS at 11:44

## 2025-06-08 RX ADMIN — Medication 400 MG: at 09:30

## 2025-06-08 RX ADMIN — AMPICILLIN SODIUM AND SULBACTAM SODIUM 3 G: 10; 5 INJECTION, POWDER, FOR SOLUTION INTRAMUSCULAR; INTRAVENOUS at 03:07

## 2025-06-08 RX ADMIN — IPRATROPIUM BROMIDE AND ALBUTEROL SULFATE 3 ML: 2.5; .5 SOLUTION RESPIRATORY (INHALATION) at 13:32

## 2025-06-08 RX ADMIN — FUROSEMIDE 40 MG: 10 INJECTION, SOLUTION INTRAMUSCULAR; INTRAVENOUS at 13:59

## 2025-06-08 RX ADMIN — DEXTROSE 150 MG: 50 INJECTION, SOLUTION INTRAVENOUS at 15:20

## 2025-06-08 RX ADMIN — DIPHENHYDRAMINE HYDROCHLORIDE AND LIDOCAINE HYDROCHLORIDE AND ALUMINUM HYDROXIDE AND MAGNESIUM HYDRO 10 ML: KIT at 21:04

## 2025-06-08 NOTE — QUICK NOTE
Lactic acid at 6 checked as his kidney numbers were rising - his feet are cold   Discussed with critical care will transfer to their service for milrinone.

## 2025-06-08 NOTE — ASSESSMENT & PLAN NOTE
Lab Results   Component Value Date    EGFR 35 06/08/2025    EGFR 40 06/07/2025    EGFR 40 06/07/2025    CREATININE 1.98 (H) 06/08/2025    CREATININE 1.77 (H) 06/07/2025    CREATININE 1.80 (H) 06/07/2025       Baseline creatine 1.2-1.5  Creatine 1.98  IRINEO likely in the setting of CRS  Hold on diuretics at this time  Starting milrinone   Trend chemistry  Place delvis  Trend I/O

## 2025-06-08 NOTE — PROCEDURES
Central Line Insertion    Date/Time: 6/8/2025 4:24 PM    Performed by: Mack Varela PA-C  Authorized by: Mack Varela PA-C    Patient location:  ICU and bedside  Other Assisting Provider: No    Consent:     Consent obtained:  Verbal    Consent given by:  Patient    Risks discussed:  Bleeding, pneumothorax, arterial puncture and incorrect placement  Universal protocol:     Site/side marked: yes      Immediately prior to procedure, a time out was called: yes      Patient identity confirmed:  Verbally with patient, arm band and provided demographic data  Pre-procedure details:     Hand hygiene: Hand hygiene performed prior to insertion      Skin preparation:  ChloraPrep    Skin preparation agent: Skin preparation agent completely dried prior to procedure    Indications:     Central line indications: medications requiring central line    Anesthesia (see MAR for exact dosages):     Anesthesia method:  Local infiltration    Local anesthetic:  Lidocaine 1% w/o epi  Procedure details:     Location:  Right internal jugular    Vessel type: vein      Approach: percutaneous technique used      Patient position:  Flat    Catheter type:  Triple lumen 16cm    Catheter size:  7 Fr    Landmarks identified: yes      Ultrasound guidance: yes      Sterile ultrasound techniques: Sterile gel and sterile probe covers were used      Number of attempts:  1    Successful placement: yes      Catheter tip vessel location: atriocaval junction    Post-procedure details:     Post-procedure:  Dressing applied and line sutured    Assessment:  Blood return through all ports, no pneumothorax on x-ray, free fluid flow and placement verified by x-ray    Post-procedure complications: none      Patient tolerance of procedure:  Tolerated well, no immediate complications    Mack Varela PA-C

## 2025-06-08 NOTE — ASSESSMENT & PLAN NOTE
Continue digoxin but switch to every other day as creatinine went up now to level monitor 1.3 monitor levels while his cr went up. Increase metoprolol tartarte to 100 mg po bid as rates uncontrolled   INR supratherapeutic hold Coumadin status post vitamin K INR improved we will continue to monitor

## 2025-06-08 NOTE — ASSESSMENT & PLAN NOTE
Likely in the setting of IRINEO secondary to CRS   Recheck chemistry   Medically manage with insulin/dextrose

## 2025-06-08 NOTE — RESPIRATORY THERAPY NOTE
"RT Protocol Note  Britton Batista 60 y.o. 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 rate 18, height 5' 9\" (1.753 m), weight 80.1 kg (176 lb 9.4 oz), SpO2 91%.          Imaging and other studies: Results Review Statement: No pertinent imaging studies reviewed.    O2 Device: 3lpm     Plan             Resp Comments: Pt had refused scheduled neb tx but indicated feeling SOB at this time, pt given Duoneb tx, assessed on  3lpm NC.        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    CHF (congestive heart failure) (HCC)     Coagulopathy (HCC) 04/26/2025    Depression     Emphysema of lung (HCC)     GERD (gastroesophageal reflux disease)     Gout     Hyperammonemia (HCC) 04/26/2025    Hypertension     Metabolic encephalopathy 03/22/2025    Neuropathy     Raynaud's disease     Right inguinal hernia     Scleroderma (HCC)     Shock (HCC) 04/26/2025   [2]   Social History  Socioeconomic History    Marital status:    Tobacco Use    Smoking status: Every Day     Current packs/day: 1.00     Average packs/day: 1 pack/day for 45.4 years (45.4 ttl pk-yrs)     Types: Cigarettes     Start date: 2000    Smokeless tobacco: Never    Tobacco comments:     Last cigarette 0430 "   Vaping Use    Vaping status: Never Used   Substance and Sexual Activity    Alcohol use: Not Currently     Comment: occasional    Drug use: Yes     Types: Marijuana, Methamphetamines     Comment: Hx meth use    Sexual activity: Not Currently     Comment: defer     Social Drivers of Health     Food Insecurity: No Food Insecurity (2025)    Nursing - Inadequate Food Risk Classification     Worried About Running Out of Food in the Last Year: Never true     Ran Out of Food in the Last Year: Never true     Ran Out of Food in the Last Year: Never true   Recent Concern: Food Insecurity - Food Insecurity Present (2025)    Nursing - Inadequate Food Risk Classification     Worried About Running Out of Food in the Last Year: Never true     Ran Out of Food in the Last Year: Never true     Ran Out of Food in the Last Year: Sometimes true   Transportation Needs: No Transportation Needs (2025)    Nursing - Transportation Risk Classification     Lack of Transportation: No   Recent Concern: Transportation Needs - Unmet Transportation Needs (2025)    Nursing - Transportation Risk Classification     Lack of Transportation: Yes   Intimate Partner Violence: Unknown (2025)    Nursing IPS     Physically Hurt by Someone: No     Hurt or Threatened by Someone: No   Housing Stability: Unknown (2025)    Nursing: Inadequate Housing Risk Classification     Unable to Pay for Housing in the Last Year: No     Has Housin

## 2025-06-08 NOTE — ASSESSMENT & PLAN NOTE
Lab Results   Component Value Date    EGFR 32 06/09/2025    EGFR 32 06/08/2025    EGFR 31 06/08/2025    CREATININE 2.12 (H) 06/09/2025    CREATININE 2.13 (H) 06/08/2025    CREATININE 2.18 (H) 06/08/2025       Baseline creatine 1.2-1.5  Creatine 1.98  IRINEO likely in the setting of CRS  Hold on diuretics at this time  Starting milrinone   Trend chemistry  Place delvis  Trend I/O   Osteomyelitis

## 2025-06-08 NOTE — ASSESSMENT & PLAN NOTE
Wt Readings from Last 3 Encounters:   06/05/25 80.1 kg (176 lb 9.4 oz)   06/05/25 81.1 kg (178 lb 12.7 oz)   05/04/25 79.2 kg (174 lb 9.7 oz)   Continue management per cardiology colleagues.    EF 25% on March echocardiogram evaluated by cardiology colleagues earlier this admision and felt stable.  Now with worsening AGMA and lactic acidosis.  Patient to be transferred to [facility].

## 2025-06-08 NOTE — PROGRESS NOTES
Progress Note - Hospitalist   Name: Britton Batista 60 y.o. male I MRN: [id number]  Unit/Bed#: -01 I Date of Admission: 6/5/2025   Date of Service: 6/8/2025 I Hospital Day: 3    Assessment & Plan  Sepsis (HCC)  Met criteria by tachycardia and tachypnea secondary to tonsillitis/pharyngitis rule out peritonsillar abscess  Status post hydration  Blood cultures pending lactic acid is normal  Unasyn  Resolved no further fluids  Chronic combined systolic and diastolic congestive heart failure (HCC)  Wt Readings from Last 3 Encounters:   06/05/25 80.1 kg (176 lb 9.4 oz)   06/05/25 81.1 kg (178 lb 12.7 oz)   05/04/25 79.2 kg (174 lb 9.7 oz)       Last EF is 25% moderate MR on 3/2025 sepsis resolved -hold lasix his lungs clear and no leg edema- hold cause cr went up    Atrial fibrillation with RVR (HCC)  Continue digoxin but switch to every other day as creatinine went up now to level monitor 1.3 monitor levels while his cr went up. Increase metoprolol tartarte to 100 mg po bid as rates uncontrolled   INR supratherapeutic hold Coumadin status post vitamin K INR improved we will continue to monitor  Stage 3 chronic kidney disease (HCC)  Lab Results   Component Value Date    EGFR 35 06/08/2025    EGFR 40 06/07/2025    EGFR 40 06/07/2025    CREATININE 1.98 (H) 06/08/2025    CREATININE 1.77 (H) 06/07/2025    CREATININE 1.80 (H) 06/07/2025   Creatinine baseline is 1.5 creatinine admitted with  1.6 no evidence of IRINEO improved to 1.2 and now upto 1.9- hold lasix till evaluation per nephrology he does not look dry or fluid overloaded   ? 2/2 afib with rvr hypoperfusion . No evidence of hypotension   Repeat labs in afternoon and am   Transaminitis  Recurrent no evidence of heart failure exacerbation.  Could be secondary to sepsis improved  Tonsillitis  Patient complaining of sore throat for the past week his tonsils are erythematous swollen left greater than right he is tender on the left side of the neck his uvula is swollen  as well will continue Unasyn   Today looking much better the swelling is down almost completely he is feeling much better decrease Decadron down to 6 every 12 advance to regular consistency diet CT without any evidence of abscess discontinue ENT consult  Aspiration precautions and clear liquid diet  6/8 swelling improved throat pain improved advance to surgical soft diet switch to prednisone for 5 days continue antibiotics for 7-day total duration  Supratherapeutic INR  Given vitamin K 2.5 mg p.o. 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 Sequential Compression Devices Ordered.    Mobility:   Basic Mobility Inpatient Raw Score: 19  JH-HLM Goal: 6: Walk 10 steps or more  JH-HLM Achieved: 6: Walk 10 steps or more  JH-HLM Goal achieved. Continue to encourage appropriate mobility.    Patient Centered Rounds: I performed bedside rounds with nursing staff today.   Discussions with Specialists or Other Care Team Provider: Will discuss with nephrology    Education and Discussions with Family / Patient: pt    Current Length of Stay: 3 day(s)  Current Patient Status: Inpatient   Certification Statement: The patient will continue to require additional inpatient hospital stay due to IRINEO A-fib RVR  Discharge Plan: Anticipate discharge in 48-72 hrs to home.    Code Status: Level 1 - Full Code    Subjective   Seen and examined throat feels better denies any chest pain shortness of breath    Objective :  Temp:  [96.8 °F (36 °C)-97.5 °F (36.4 °C)] 97.3 °F (36.3 °C)  HR:  [] 116  BP: (123-148)/() 127/96  Resp:  [18-20] 20  SpO2:  [77 %-100 %] 98 %  O2 Device: None (Room air)  Nasal Cannula O2 Flow Rate (L/min):  [2 L/min-8 L/min] 8 L/min    Body mass index is  26.08 kg/m².     Input and Output Summary (last 24 hours):     Intake/Output Summary (Last 24 hours) at 6/8/2025 1147  Last data filed at 6/8/2025 1101  Gross per 24 hour   Intake 732 ml   Output 1050 ml   Net -318 ml       Physical Exam  Vitals and nursing note reviewed.   Constitutional:       General: He is not in acute distress.     Appearance: He is well-developed.   HENT:      Head: Normocephalic and atraumatic.      Mouth/Throat:      Comments: Swelling resolved    Eyes:      Conjunctiva/sclera: Conjunctivae normal.       Cardiovascular:      Rate and Rhythm: Normal rate. Rhythm irregular.      Heart sounds: No murmur heard.  Pulmonary:      Effort: Pulmonary effort is normal. No respiratory distress.      Breath sounds: Normal breath sounds. No wheezing or rales.   Abdominal:      Palpations: Abdomen is soft.      Tenderness: There is no abdominal tenderness.     Musculoskeletal:         General: No swelling.      Cervical back: Neck supple.     Skin:     General: Skin is warm and dry.      Capillary Refill: Capillary refill takes less than 2 seconds.     Neurological:      Mental Status: He is alert.     Psychiatric:         Mood and Affect: Mood normal.           Lines/Drains:        Telemetry:  Telemetry Orders (From admission, onward)               24 Hour Telemetry Monitoring  Continuous x 24 Hours (Telem)        Expiring   Question:  Reason for 24 Hour Telemetry  Answer:  Arrhythmias requiring acute medical intervention / PPM or ICD malfunction                     Telemetry Reviewed: Atrial fibrillation. HR averaging 120  Indication for Continued Telemetry Use: Arrthymias requiring medical therapy               Lab Results: I have reviewed the following results:   Results from last 7 days   Lab Units 06/06/25  0541   WBC Thousand/uL 9.56   HEMOGLOBIN g/dL 13.0   HEMATOCRIT % 40.7   PLATELETS Thousands/uL 230   SEGS PCT % 88*   LYMPHO PCT % 6*   MONO PCT % 5   EOS PCT % 0     Results from last 7 days    Lab Units 06/08/25  0551 06/07/25  0800 06/06/25  0541   SODIUM mmol/L 135   < > 132*   POTASSIUM mmol/L 5.7*   < > 4.9   CHLORIDE mmol/L 98   < > 101   CO2 mmol/L 17*   < > 23   BUN mg/dL 60*   < > 30*   CREATININE mg/dL 1.98*   < > 1.23   ANION GAP mmol/L 20*   < > 8   CALCIUM mg/dL 9.9   < > 9.1   ALBUMIN g/dL  --   --  3.6   TOTAL BILIRUBIN mg/dL  --   --  1.23*   ALK PHOS U/L  --   --  147*   ALT U/L  --   --  48   AST U/L  --   --  44*   GLUCOSE RANDOM mg/dL 95   < > 129    < > = values in this interval not displayed.     Results from last 7 days   Lab Units 06/08/25  0551   INR  3.25*     Results from last 7 days   Lab Units 06/07/25  1241 06/07/25  0915   POC GLUCOSE mg/dl 152* 69         Results from last 7 days   Lab Units 06/06/25  0541 06/05/25  2115 06/05/25  1614 06/05/25  1359 06/05/25  0943   LACTIC ACID mmol/L  --  1.5 2.8* 2.8*  --    PROCALCITONIN ng/ml 0.16  --   --   --  0.23       Recent Cultures (last 7 days):   Results from last 7 days   Lab Units 06/05/25  1324 06/05/25  1309   BLOOD CULTURE  No Growth at 48 hrs. No Growth at 48 hrs.       Imaging Results Review: No pertinent imaging studies reviewed.  Other Study Results Review: No additional pertinent studies reviewed.    Last 24 Hours Medication List:     Current Facility-Administered Medications:     acetaminophen (TYLENOL) tablet 650 mg, Q6H PRN    ampicillin-sulbactam (UNASYN) 3 g in sodium chloride 0.9 % 100 mL IVPB, Q6H, Last Rate: 3 g (06/08/25 0900)    [START ON 6/9/2025] digoxin (LANOXIN) tablet 125 mcg, Every Other Day    diphenhydramine, lidocaine, Al/Mg hydroxide, simethicone (Magic Mouthwash) oral solution 10 mL, Q6H PRN    [Held by provider] furosemide (LASIX) tablet 40 mg, Daily    hydrOXYzine HCL (ATARAX) tablet 25 mg, Q6H PRN    ipratropium-albuterol (DUO-NEB) 0.5-2.5 mg/3 mL inhalation solution 3 mL, Q6H While awake    magnesium Oxide (MAG-OX) tablet 400 mg, Daily    metoprolol (LOPRESSOR) injection 2.5 mg, Q6H PRN     metoprolol tartrate (LOPRESSOR) tablet 100 mg, Q12H JESUS    midodrine (PROAMATINE) tablet 7.5 mg, TID AC    morphine injection 2 mg, Q4H PRN    nicotine (NICODERM CQ) 14 mg/24hr TD 24 hr patch 1 patch, Daily    phenol (CHLORASEPTIC) 1.4 % mucosal liquid 1 spray, Q2H PRN    [START ON 6/9/2025] predniSONE tablet 40 mg, Daily    trimethobenzamide (TIGAN) IM injection 200 mg, Q6H PRN    Administrative Statements   Today, Patient Was Seen By: Guadalupe Muñoz MD  I have spent a total time of >35 minutes in caring for this patient on the day of the visit/encounter including Documenting in the medical record and Reviewing/placing orders in the medical record (including tests, medications, and/or procedures).    **Please Note: This note may have been constructed using a voice recognition system.**

## 2025-06-08 NOTE — NURSING NOTE
Patient requesting transfer to [facility] where patient's cardiologist is located. Maria Del Rosario Paul made aware of request no other interventions ordered at this time.

## 2025-06-08 NOTE — ASSESSMENT & PLAN NOTE
Wt Readings from Last 3 Encounters:   06/05/25 80.1 kg (176 lb 9.4 oz)   06/05/25 81.1 kg (178 lb 12.7 oz)   05/04/25 79.2 kg (174 lb 9.7 oz)     Echo 3/2025: Left Ventricle:   Left ventricular cavity size is severely dilated. The left ventricular ejection fraction is 15-20% by visual estimation. Systolic function is severely reduced. There is severe global hypokinesis.  Right Ventricle: Systolic function is moderately reduced.  Mitral Valve: There is mild to moderate regurgitation with an eccentrically directed jet. Limited views may underestimate severity of MR  Tricuspid Valve: There is severe regurgitation. The estimated right ventricular systolic pressure is 41.00 mmHg.    Holding lasix iat this time secondary to elevated creatine likely secondary to CRS  Monitor volume status closely   Place edmondson   Elevated LA likely secondary to hypoperfusion   Start on Milrinone infusion at 0.38  Will place CVC- Consider PAC   Trend Sv02  Contact PACS for shock team alert   Amio bolus and start infusion for rate control   Patient received digoxin on hold due to renal function level 1.3  Metoprolol BID - pt refuses stated make him feel weird

## 2025-06-08 NOTE — ASSESSMENT & PLAN NOTE
Patient on coumadin at home for atrial fibrillation   Holding Coumadin in the setting of supratheapeutic INR  Trend INR  S/p 2.5mg Vit K on 6/5

## 2025-06-08 NOTE — ASSESSMENT & PLAN NOTE
Patient on digoxin 125- Currently holding  SLIM increased dose of Metoprolol today- Likely DC but will discuss with Shock team   Will amio bolus and start infusion   Goal HR <120  Continue to monitor on telemetry

## 2025-06-08 NOTE — PROGRESS NOTES
Progress Note - Nephrology   Name: Britton Batista 60 y.o. male I MRN: [id number]  Unit/Bed#: -01 I Date of Admission: 6/5/2025   Date of Service: 6/8/2025 I Hospital Day: 3     Assessment & Plan  Stage 3 chronic kidney disease (HCC)  Lab Results   Component Value Date    EGFR 35 06/08/2025    EGFR 40 06/07/2025    EGFR 40 06/07/2025    CREATININE 1.98 (H) 06/08/2025    CREATININE 1.77 (H) 06/07/2025    CREATININE 1.80 (H) 06/07/2025     Currently with elevated creatinine 1.8 for the baseline suspected to be in the mid 1 range, perhaps 1.4-1.6.  Acute kidney injury noted during April hospitalization in the setting of heart failure with peak creatinine 2.4.        6/8    Labs attempted yesterday evening to assess renal function/potassium and could not obtained due to difficult stick.  Labs noted this AM and Cr continues to trend up to 1.98 but now with increased AGMA and TCO2 17 with persistent hyperk at 5.7. No signs of obstructive uropathy noted.  Suspected due to prerenal state with cardiomyopathy.   Discussed with primary and advised to check lactate, BHB and VBG. Lactate elevated at 6.0 and patient transferred to CC for milrinone.   Patient reports SOB worsening over past 24 hr and reduced UOP. Advised Lasix 40mg IVX 1 and state CXR.  Patient will need chemistry repeated to assess K trends .  Sp lokelma this AM 10 g.  Will need to coordinate further diuresis with CC.  Hyperkalemia  Etiology suspected 2/2 decreased distal delivery and worsening IRINEO. CK WNL and no obstructive uropathy.  Now with worsening metabolic acidosis.  Atrial fibrillation with RVR ([facility])  Wt Readings from Last 3 Encounters:   06/05/25 80.1 kg (176 lb 9.4 oz)   06/05/25 81.1 kg (178 lb 12.7 oz)   05/04/25 79.2 kg (174 lb 9.7 oz)   Continue management per cardiology colleagues.    EF 25% on March echocardiogram evaluated by cardiology colleagues earlier this admision and felt stable.  Now with worsening AGMA and lactic  acidosis.  Patient to be transferred to .  Transaminitis  Attributed to sepsis.  Supratherapeutic INR  Continue management per cardiology colleagues.  INR 8.8 on admission.   Tonsillitis  Steroid per primary-- prednisone and Unasyn.    I have reviewed the nephrology recommendations including hyperkalemia/agma, with primary, and we are in agreement with renal plan including the information outlined above.     Subjective   Patient seen and examined today. Reports feeling worse in the past 24 hr with SOB and decline in UOP. Labs attempted yesterday evening but unsuccessful per RN.   Labs this AM with recurrent hyperk and now with worsening Flavio and metabolic acidoses.  Reports he is taking a pink pill that makes him unwell.   A complete 10 point review of systems was performed and is otherwise negative.     Objective :  Temp:  [97 °F (36.1 °C)-97.7 °F (36.5 °C)] 97.7 °F (36.5 °C)  HR:  [] 114  BP: (123-148)/() 133/104  Resp:  [18-20] 18  SpO2:  [77 %-100 %] 96 %  O2 Device: None (Room air)  Nasal Cannula O2 Flow Rate (L/min):  [2 L/min-8 L/min] 8 L/min    Current Weight: Weight - Scale: 80.1 kg (176 lb 9.4 oz)  First Weight: Weight - Scale: 80.1 kg (176 lb 9.4 oz)  I/O         06/06 0701  06/07 0700 06/07 0701  06/08 0700 06/08 0701  06/09 0700    P.O. 755 702 120    IV Piggyback  150     Total Intake(mL/kg) 755 (9.4) 852 (10.6) 120 (1.5)    Urine (mL/kg/hr) 700 (0.4) 700 (0.4) 350 (0.5)    Stool  0     Total Output 700 700 350    Net +55 +152 -230           Unmeasured Stool Occurrence  1 x           Physical Exam  Vitals reviewed.   Constitutional:       General: He is not in acute distress.     Appearance: He is ill-appearing.   HENT:      Head: Normocephalic and atraumatic.      Nose: Nose normal.      Mouth/Throat:      Mouth: Mucous membranes are moist.      Pharynx: Oropharynx is clear.     Cardiovascular:      Rate and Rhythm: Normal rate. Rhythm irregular.      Heart sounds:      No friction rub.  "  Pulmonary:      Comments: Decrease BS  Abdominal:      General: Abdomen is flat.      Palpations: Abdomen is soft.      Tenderness: There is no abdominal tenderness. There is no guarding.     Musculoskeletal:      Right lower leg: No edema.      Left lower leg: No edema.     Skin:     General: Skin is warm and dry.     Neurological:      General: No focal deficit present.      Mental Status: He is alert and oriented to person, place, and time.      Cranial Nerves: No cranial nerve deficit.     Psychiatric:         Mood and Affect: Mood normal.         Behavior: Behavior normal.         Medications:  Current Medications[1]      Lab Results: I have reviewed the following results:  Results from last 7 days   Lab Units 06/08/25  0551 06/07/25  1320 06/07/25  0800 06/06/25  0541 06/05/25  0943   WBC Thousand/uL  --   --   --  9.56 9.26   HEMOGLOBIN g/dL  --   --   --  13.0 13.3   HEMATOCRIT %  --   --   --  40.7 42.0   PLATELETS Thousands/uL  --   --   --  230 234   POTASSIUM mmol/L 5.7* 4.7 6.3* 4.9 5.2   CHLORIDE mmol/L 98 98 97 101 101   CO2 mmol/L 17* 23 24 23 22   BUN mg/dL 60* 46* 41* 30* 44*   CREATININE mg/dL 1.98* 1.77* 1.80* 1.23 1.63*   CALCIUM mg/dL 9.9 9.5 9.7 9.1 9.2   MAGNESIUM mg/dL  --   --   --   --  1.7*   ALBUMIN g/dL  --   --   --  3.6 3.7       Administrative Statements     Portions of the record may have been created with voice recognition software. Occasional wrong word or \"sound a like\" substitutions may have occurred due to the inherent limitations of voice recognition software. Read the chart carefully and recognize, using context, where substitutions have occurred.If you have any questions, please contact the dictating provider.       [1]   Current Facility-Administered Medications:     acetaminophen (TYLENOL) tablet 650 mg, 650 mg, Oral, Q6H PRN, Mack Varela PA-C, 650 mg at 06/07/25 0930    amiodarone (CORDARONE) 900 mg in dextrose 5 % 500 mL infusion, 1 mg/min, Intravenous, " Continuous **FOLLOWED BY** amiodarone (CORDARONE) 900 mg in dextrose 5 % 500 mL infusion, 0.5 mg/min, Intravenous, Continuous, Mack Varela PA-C    amiodarone 150 mg in dextrose 5 % 100 mL IV bolus, 150 mg, Intravenous, Once, Mack Varela PA-C    ampicillin-sulbactam (UNASYN) 3 g in sodium chloride 0.9 % 100 mL IVPB, 3 g, Intravenous, Q6H, Mack Varela PA-C, Last Rate: 200 mL/hr at 06/08/25 1409, 3 g at 06/08/25 1409    chlorhexidine (PERIDEX) 0.12 % oral rinse 15 mL, 15 mL, Mouth/Throat, Q12H JESUS, Mack Varela PA-C    [Held by provider] digoxin (LANOXIN) tablet 125 mcg, 125 mcg, Oral, Every Other Day, Mack Varela PA-C    diphenhydramine, lidocaine, Al/Mg hydroxide, simethicone (Magic Mouthwash) oral solution 10 mL, 10 mL, Swish & Swallow, Q6H PRN, Mack Varela PA-C, 10 mL at 06/08/25 1409    [Held by provider] furosemide (LASIX) tablet 40 mg, 40 mg, Oral, Daily, Guadalupe Muñoz MD, 40 mg at 06/06/25 1039    hydrOXYzine HCL (ATARAX) tablet 25 mg, 25 mg, Oral, Q6H PRN, Mack Varela PA-C, 25 mg at 06/08/25 1144    ipratropium-albuterol (DUO-NEB) 0.5-2.5 mg/3 mL inhalation solution 3 mL, 3 mL, Nebulization, Q6H While awake, Mack Varela PA-C, 3 mL at 06/08/25 1332    magnesium Oxide (MAG-OX) tablet 400 mg, 400 mg, Oral, Daily, Mack Varela PA-C, 400 mg at 06/08/25 0930    metoprolol (LOPRESSOR) injection 2.5 mg, 2.5 mg, Intravenous, Q6H PRN, Mack Varela PA-C, 2.5 mg at 06/06/25 2346    metoprolol tartrate (LOPRESSOR) tablet 100 mg, 100 mg, Oral, Q12H JESUS, Mack Varela PA-ERIK    midodrine (PROAMATINE) tablet 7.5 mg, 7.5 mg, Oral, TID AC, Mack Varela PA-C, 7.5 mg at 06/08/25 1144    milrinone (PRIMACOR) 20 mg in 100 mL infusion (premix), 0.25 mcg/kg/min, Intravenous, Continuous, JADIEL Ocampo-ERIK    morphine injection 2 mg, 2 mg, Intravenous, Q4H PRN, Mack Varela PA-C, 2 mg at 06/08/25 1144    nicotine  (NICODERM CQ) 14 mg/24hr TD 24 hr patch 1 patch, 1 patch, Transdermal, Daily, Mack Bryan Zavifabi, PA-C, 1 patch at 06/08/25 0935    phenol (CHLORASEPTIC) 1.4 % mucosal liquid 1 spray, 1 spray, Mouth/Throat, Q2H PRN, Mack Bryanvera Varela, PA-C, 1 spray at 06/08/25 0947    [START ON 6/9/2025] predniSONE tablet 40 mg, 40 mg, Oral, Daily, Mack Varela PA-C    trimethobenzamide (TIGAN) IM injection 200 mg, 200 mg, Intramuscular, Q6H PRN, Mack Bryanvera Hintona, PA-C, 200 mg at 06/07/25 1546

## 2025-06-08 NOTE — ASSESSMENT & PLAN NOTE
Lab Results   Component Value Date    EGFR 35 06/08/2025    EGFR 40 06/07/2025    EGFR 40 06/07/2025    CREATININE 1.98 (H) 06/08/2025    CREATININE 1.77 (H) 06/07/2025    CREATININE 1.80 (H) 06/07/2025     Currently with elevated creatinine 1.8 for the baseline suspected to be in the mid 1 range, perhaps 1.4-1.6.  Acute kidney injury noted during April hospitalization in the setting of heart failure with peak creatinine 2.4.        6/8    Labs attempted yesterday evening to assess renal function/potassium and could not obtained due to difficult stick.  Labs noted this AM and Cr continues to trend up to 1.98 but now with increased AGMA and TCO2 17 with persistent hyperk at 5.7. No signs of obstructive uropathy noted.  Suspected due to prerenal state with cardiomyopathy.   Discussed with primary and advised to check lactate, BHB and VBG. Lactate elevated at 6.0 and patient transferred to CC for milrinone.   Patient reports SOB worsening over past 24 hr and reduced UOP. Advised Lasix 40mg IVX 1 and state CXR.  Patient will need chemistry repeated to assess K trends .  Sp lokelma this AM 10 g.  Will need to coordinate further diuresis with CC.

## 2025-06-08 NOTE — ASSESSMENT & PLAN NOTE
Patient complaining of sore throat for the past week his tonsils are erythematous swollen left greater than right he is tender on the left side of the neck his uvula is swollen as well will continue Unasyn   Today looking much better the swelling is down almost completely he is feeling much better decrease Decadron down to 6 every 12 advance to regular consistency diet CT without any evidence of abscess discontinue ENT consult  Aspiration precautions and clear liquid diet  6/8 swelling improved throat pain improved advance to surgical soft diet switch to prednisone for 5 days continue antibiotics for 7-day total duration

## 2025-06-08 NOTE — PLAN OF CARE
Problem: PAIN - ADULT  Goal: Verbalizes/displays adequate comfort level or baseline comfort level  Description: Interventions:  - Encourage patient to monitor pain and request assistance  - Assess pain using appropriate pain scale0-10  - Administer analgesics as ordered based on type and severity of pain and evaluate response  - Implement non-pharmacological measures as appropriate and evaluate response  - Consider cultural and social influences on pain and pain management  - Notify physician/advanced practitioner if interventions unsuccessful or patient reports new pain  - Educate patient/family on pain management process including their role and importance of  reporting pain   - Provide non-pharmacologic/complimentary pain relief interventions  Outcome: Progressing     Problem: INFECTION - ADULT  Goal: Absence or prevention of progression during hospitalization  Description: INTERVENTIONS:  - Assess and monitor for signs and symptoms of infection  - Monitor lab/diagnostic results  - Monitor all insertion sites, i.e. indwelling lines, tubes, and drains  - Monitor endotracheal if appropriate and nasal secretions for changes in amount and color  - [place] appropriate cooling/warming therapies per order  - Administer medications as ordered  - Instruct and encourage patient and family to use good hand hygiene technique  - Identify and instruct in appropriate isolation precautions for identified infection/condition  Outcome: Progressing     Problem: SAFETY ADULT  Goal: Patient will remain free of falls  Description: INTERVENTIONS:  - Educate patient/family on patient safety including physical limitations  - Instruct patient to call for assistance with activity   - Consider consulting OT/PT to assist with strengthening/mobility based on AM PAC & JH-HLM score  - Consult OT/PT to assist with strengthening/mobility   - Keep Call bell within reach  - Keep bed low and locked with side rails adjusted as appropriate  - Keep  care items and personal belongings within reach  - Initiate and maintain comfort rounds  - Make Fall Risk Sign visible to staff  - Offer Toileting every 2 Hours, in advance of need  - Initiate/Maintain bed alarm  - Apply yellow socks and bracelet for high fall risk patients  - Consider moving patient to room near nurses station  Outcome: Progressing     Problem: Knowledge Deficit  Goal: Patient/family/caregiver demonstrates understanding of disease process, treatment plan, medications, and discharge instructions  Description: Complete learning assessment and assess knowledge base.  Interventions:  - Provide teaching at level of understanding  - Provide teaching via preferred learning methods  Outcome: Progressing

## 2025-06-08 NOTE — ASSESSMENT & PLAN NOTE
Wt Readings from Last 3 Encounters:   06/05/25 80.1 kg (176 lb 9.4 oz)   06/05/25 81.1 kg (178 lb 12.7 oz)   05/04/25 79.2 kg (174 lb 9.7 oz)     Echo 3/2025: Left Ventricle:   Left ventricular cavity size is severely dilated. The left ventricular ejection fraction is 15-20% by visual estimation. Systolic function is severely reduced. There is severe global hypokinesis.  Right Ventricle: Systolic function is moderately reduced.  Mitral Valve: There is mild to moderate regurgitation with an eccentrically directed jet. Limited views may underestimate severity of MR  Tricuspid Valve: There is severe regurgitation. The estimated right ventricular systolic pressure is 41.00 mmHg.    Holding lasix iat this time secondary to elevated creatine likely secondary to CRS  Monitor volume status closely   Place edmondson   Elevated LA likely secondary to hypoperfusion   Start on Milrinone infusion at 0.25  Will place CVC- Consider PAC   Trend Sv02  Contact PACS for shock team alert   DC Milrinone  Amio bolus and start infusion for rate control   Patient received digoxin and is on Metoprolol BID

## 2025-06-08 NOTE — ASSESSMENT & PLAN NOTE
Echo 3/2025: Left Ventricle:   Left ventricular cavity size is severely dilated. The left ventricular ejection fraction is 15-20% by visual estimation. Systolic function is severely reduced. There is severe global hypokinesis.  Right Ventricle: Systolic function is moderately reduced.  Mitral Valve: There is mild to moderate regurgitation with an eccentrically directed jet. Limited views may underestimate severity of MR  Tricuspid Valve: There is severe regurgitation. The estimated right ventricular systolic pressure is 41.00 mmHg.        See above under acute diastolic and systolic HF

## 2025-06-08 NOTE — PLAN OF CARE
Problem: PAIN - ADULT  Goal: Verbalizes/displays adequate comfort level or baseline comfort level  Description: Interventions:  - Encourage patient to monitor pain and request assistance  - Assess pain using appropriate pain scale0-10  - Administer analgesics as ordered based on type and severity of pain and evaluate response  - Implement non-pharmacological measures as appropriate and evaluate response  - Consider cultural and social influences on pain and pain management  - Notify physician/advanced practitioner if interventions unsuccessful or patient reports new pain  - Educate patient/family on pain management process including their role and importance of  reporting pain   - Provide non-pharmacologic/complimentary pain relief interventions  6/8/2025 0634 by Kim Arnett  Outcome: Progressing  6/8/2025 0256 by Kim Arnett  Outcome: Progressing     Problem: INFECTION - ADULT  Goal: Absence or prevention of progression during hospitalization  Description: INTERVENTIONS:  - Assess and monitor for signs and symptoms of infection  - Monitor lab/diagnostic results  - Monitor all insertion sites, i.e. indwelling lines, tubes, and drains  - Monitor endotracheal if appropriate and nasal secretions for changes in amount and color  - [place] appropriate cooling/warming therapies per order  - Administer medications as ordered  - Instruct and encourage patient and family to use good hand hygiene technique  - Identify and instruct in appropriate isolation precautions for identified infection/condition  6/8/2025 0634 by Kim Arnett  Outcome: Progressing  6/8/2025 0256 by Kim Arnett  Outcome: Progressing     Problem: DISCHARGE PLANNING  Goal: Discharge to home or other facility with appropriate resources  Description: INTERVENTIONS:  - Identify barriers to discharge w/patient and caregiver  - Arrange for needed discharge resources and transportation as appropriate  - Identify discharge learning needs  (meds, wound care, etc.)  - Arrange for interpretive services to assist at discharge as needed  - Refer to Case Management Department for coordinating discharge planning if the patient needs post-hospital services based on physician/advanced practitioner order or complex needs related to functional status, cognitive ability, or social support system  6/8/2025 0634 by Kim Arnett  Outcome: Progressing  6/8/2025 0256 by Kim Arnett  Outcome: Progressing     Problem: Knowledge Deficit  Goal: Patient/family/caregiver demonstrates understanding of disease process, treatment plan, medications, and discharge instructions  Description: Complete learning assessment and assess knowledge base.  Interventions:  - Provide teaching at level of understanding  - Provide teaching via preferred learning methods  6/8/2025 0634 by Kim Arnett  Outcome: Progressing  6/8/2025 0256 by Kim Arnett  Outcome: Progressing

## 2025-06-08 NOTE — CONSULTS
Consultation - Critical Care/ICU   Name: Britton Batista 60 y.o. male I MRN: [id number]  Unit/Bed#: -01 I Date of Admission: 6/5/2025   Date of Service: 6/8/2025 I Hospital Day: 3   Inpatient consult to Medical Critical Care  Consult performed by: Mack Varela PA-C  Consult ordered by: Guadalupe Muñoz MD        Physician Requesting Evaluation: Robin Fung DO   Reason for Evaluation / Principal Problem: Cardiogenic shock        Assessment & Plan  Acute on chronic combined systolic (congestive) and diastolic (congestive) heart failure (HCC)  Wt Readings from Last 3 Encounters:   06/05/25 80.1 kg (176 lb 9.4 oz)   06/05/25 81.1 kg (178 lb 12.7 oz)   05/04/25 79.2 kg (174 lb 9.7 oz)     Echo 3/2025: Left Ventricle:   Left ventricular cavity size is severely dilated. The left ventricular ejection fraction is 15-20% by visual estimation. Systolic function is severely reduced. There is severe global hypokinesis.  Right Ventricle: Systolic function is moderately reduced.  Mitral Valve: There is mild to moderate regurgitation with an eccentrically directed jet. Limited views may underestimate severity of MR  Tricuspid Valve: There is severe regurgitation. The estimated right ventricular systolic pressure is 41.00 mmHg.    Holding lasix iat this time secondary to elevated creatine likely secondary to CRS  Monitor volume status closely   Place edmondson   Elevated LA likely secondary to hypoperfusion   Start on Milrinone infusion at 0.25  Will place CVC- Consider PAC   Trend Sv02  Contact PACS for shock team alert   DC Milrinone  Amio bolus and start infusion for rate control   Patient received digoxin and is on Metoprolol BID    Atrial fibrillation with RVR (HCC)  Patient on digoxin 125- Currently holding  SLIM increased dose of Metoprolol today- Likely DC but will discuss with Shock team   Will amio bolus and start infusion   Goal HR <120  Continue to monitor on telemetry   Transaminitis  Likely in the  setting of HF and recent infection  Trend LFTs  Acute renal failure superimposed on stage 3 chronic kidney disease (HCC)  Lab Results   Component Value Date    EGFR 35 06/08/2025    EGFR 40 06/07/2025    EGFR 40 06/07/2025    CREATININE 1.98 (H) 06/08/2025    CREATININE 1.77 (H) 06/07/2025    CREATININE 1.80 (H) 06/07/2025       Baseline creatine 1.2-1.5  Creatine 1.98  IRINEO likely in the setting of CRS  Hold on diuretics at this time  Starting milrinone   Trend chemistry  Place edmondson  Trend I/O  Hyperkalemia  Likely in the setting of IRINEO secondary to CRS   Recheck chemistry   Medically manage with insulin/dextrose   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 The left ventricular ejection fraction is 15-20% by visual estimation. Systolic function is severely reduced. There is severe global hypokinesis.  Right Ventricle: Systolic function is moderately reduced.  Mitral Valve: There is mild to moderate regurgitation with an eccentrically directed jet. Limited views may underestimate severity of MR  Tricuspid Valve: There is severe regurgitation. The estimated right ventricular systolic pressure is 41.00 mmHg.        See above under acute diastolic and systolic HF  Tonsillitis  Patient currently on Unasyn   Blood cultures negative x 48hrs  Sepsis (HCC)  Patient currently on Unasyn   Blood cultures negative x 48hrs  Supratherapeutic INR  Patient on coumadin at home for atrial fibrillation   Holding Coumadin in the setting of supratheapeutic INR  Trend INR  S/p 2.5mg Vit K on 6/5  Disposition: Critical care    History of Present Illness   Britton Batista is a 60 y.o. who presented on 6/5 with throat pain for the past week and difficulty swallowing. He was initially DC'd home then was asked to return to the ED secondary to elevated INR. He has a PMH of of systolic/diastolic heart failure status post AICD atrial fibrillation CKD stage III.  He was  "on MS and developed worsening renal failure and hyperkalemia. He was acidotic on chem. SLIM checked a lactate which was elevated to 6.3. They requested transfer to the ICU secondary to possible cardiogenic shock and CRS.   Patient is in afib with RVR. He had been on digoxin. SLIM increased BB today.  We bolused amio and started on amio gtt upon ICU admission.     No specific c/o per patient     History obtained from chart review and the patient.  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)  No date: CHF (congestive heart failure) ([facility])  04/26/2025: Coagulopathy ([facility])  No date: Depression  No date: Emphysema of lung ([facility])  No date: GERD (gastroesophageal reflux disease)  No date: Gout  04/26/2025: Hyperammonemia ([facility])  No date: Hypertension  03/22/2025: Metabolic encephalopathy  No date: Neuropathy  No date: Raynaud's disease  No date: Right inguinal hernia  No date: Scleroderma ([facility])  04/26/2025: Shock ([facility]) Past Surgical History:  No date: AMPUTATION; Right      Comment:  pt had tip of \"pointer finger\" d/t scleraderma  No date: CARDIAC PACEMAKER PLACEMENT  01/31/2024: FINGER AMPUTATION; Left      Comment:  Procedure: AMPUTATION FINGER, LEFT INDEX FINGER CPT:                80580;  Surgeon: Pedro Vazquez MD;  Location:  MAIN OR;                 Service: Orthopedics  No date: HERNIA REPAIR; Left      Comment:  times 2  01/23/2024: MT AMP F/TH 1/2 JT/PHALANX W/NEURECT W/DIR CLSR; Right      Comment:  Procedure: AMPUTATION FINGER, RIGHT MIDDLE;  Surgeon:                Pedro Vazqeuz MD;  Location: [facility];  Service:                Orthopedics  03/03/2023: MT RPR 1ST INGUN HRNA AGE 5 YRS/> REDUCIBLE; Right      Comment:  Procedure: OPEN INGUINAL HERNIA REPAIR WITH MESH;                 Surgeon: " Temo Jackson DO;  Location:  MAIN OR;                 Service: General   Current Outpatient Medications   Medication Instructions    albuterol (ProAir HFA) 90 mcg/act inhaler 2 puffs, Inhalation, Every 6 hours PRN    clindamycin (CLEOCIN) 300 mg, Oral, 3 times daily    digoxin (LANOXIN) 125 mcg, Oral, 3 times weekly    diphenhydramine, lidocaine, Al/Mg hydroxide, simethicone (Magic Mouthwash) SUSP 10 mL, Swish & Swallow, Every 6 hours PRN    furosemide (LASIX) 40 mg, Oral, Daily    ipratropium-albuterol (DUO-NEB) 0.5-2.5 mg/3 mL nebulizer solution 3 mL, Nebulization, Every 6 hours while awake (RESP)    magnesium Oxide (MAG-OX) 400 mg, Oral, Daily    metoprolol tartrate (LOPRESSOR) 25 mg, Oral, Every 12 hours scheduled    midodrine (PROAMATINE) 7.5 mg, Oral, 3 times daily before meals    mirtazapine (REMERON) 15 mg, Oral, Daily at bedtime    oxyCODONE-acetaminophen (Percocet) 5-325 mg per tablet 1 tablet, Oral, Every 6 hours PRN    pantoprazole (PROTONIX) 40 mg, Oral, 2 times daily    predniSONE 60 mg, Oral, Daily    warfarin (COUMADIN) 3 mg, Oral, Daily (warfarin), Only restart if ur inr on 5/2 is less then 3    Allergies[1]   Social History[2] Family History[3]       Objective :                   Vitals I/O      Most Recent Min/Max in 24hrs   Temp 97.7 °F (36.5 °C) Temp  Min: 97 °F (36.1 °C)  Max: 97.7 °F (36.5 °C)   Pulse (!) 114 Pulse  Min: 48  Max: 125   Resp 18 Resp  Min: 18  Max: 20   BP (!) 133/104 BP  Min: 123/99  Max: 148/109   O2 Sat 96 % SpO2  Min: 77 %  Max: 100 %      Intake/Output Summary (Last 24 hours) at 6/8/2025 1529  Last data filed at 6/8/2025 1515  Gross per 24 hour   Intake 342 ml   Output 1600 ml   Net -1258 ml       Diet Cardiovascular; Sodium 2 GM    Invasive Monitoring           Physical Exam   Physical Exam  Vitals reviewed.   Skin:     General: Skin is warm.      Capillary Refill: Capillary refill takes less than 2 seconds.   HENT:      Head: Normocephalic.      Mouth/Throat:       Mouth: Mucous membranes are moist.   Cardiovascular:      Rate and Rhythm: Tachycardia present. Rhythm irregular.      Pulses: Normal pulses.   Abdominal:      Palpations: Abdomen is soft.   Constitutional:       General: He is not in acute distress.     Appearance: He is not toxic-appearing.   Pulmonary:      Effort: Pulmonary effort is normal.   Neurological:      General: No focal deficit present.          Diagnostic Studies        Lab Results: I have reviewed the following results:     Medications:  Scheduled PRN   amiodarone 150 mg in dextrose 5 % 100 mL IV bolus, 150 mg, Once  ampicillin-sulbactam, 3 g, Q6H  chlorhexidine, 15 mL, Q12H JESUS  [Held by provider] digoxin, 125 mcg, Every Other Day  [Held by provider] furosemide, 40 mg, Daily  ipratropium-albuterol, 3 mL, Q6H While awake  magnesium Oxide, 400 mg, Daily  metoprolol tartrate, 100 mg, Q12H JESUS  midodrine, 7.5 mg, TID AC  nicotine, 1 patch, Daily  [START ON 6/9/2025] predniSONE, 40 mg, Daily      acetaminophen, 650 mg, Q6H PRN  diphenhydramine, lidocaine, Al/Mg hydroxide, simethicone, 10 mL, Q6H PRN  hydrOXYzine HCL, 25 mg, Q6H PRN  metoprolol, 2.5 mg, Q6H PRN  morphine injection, 2 mg, Q4H PRN  phenol, 1 spray, Q2H PRN  trimethobenzamide, 200 mg, Q6H PRN       Continuous    amiodarone (CORDARONE) 900 mg in dextrose 5 % 500 mL infusion, 1 mg/min, Last Rate: 1 mg/min (06/08/25 1528)   Followed by  amiodarone (CORDARONE) 900 mg in dextrose 5 % 500 mL infusion, 0.5 mg/min  milrinone (Primacor) infusion, 0.25 mcg/kg/min, Last Rate: 0.25 mcg/kg/min (06/08/25 1528)         Labs:   CBC    No recent results  BMP    Recent Labs     06/07/25  1320 06/08/25  0551   SODIUM 133* 135   K 4.7 5.7*   CL 98 98   CO2 23 17*   AGAP 12 20*   BUN 46* 60*   CREATININE 1.77* 1.98*   CALCIUM 9.5 9.9       Coags    Recent Labs     06/07/25  0800 06/08/25  0551   INR 3.73* 3.25*        Additional Electrolytes  No recent results       Blood Gas    No recent results  Recent Labs      06/08/25  1355   PHVEN 7.383   BSL3TKY 27.2*   PO2VEN 91.3*   SPV9VLV 15.8*   BEVEN -7.6   P9UMFFC 94.0*    LFTs  No recent results    Infectious  No recent results  Glucose  Recent Labs     06/07/25  0800 06/07/25  1320 06/08/25  0551   GLUC 100 121 95        Administrative Statements   Critical Care Time Statement: Upon my evaluation, this patient had a high probability of imminent or life-threatening deterioration due to CRS, cardiogenic shock, which required my direct attention, intervention, and personal management.  I spent a total of 15 minutes directly providing critical care services, including interpretation of complex medical databases, evaluating for the presence of life-threatening injuries or illnesses, management of organ system failure(s) , complex medical decision making (to support/prevent further life-threatening deterioration)., interpretation of hemodynamic data, titration of vasoactive medications, and titration of continuous IV medications (drips). This time is exclusive of procedures, teaching, family meetings, and any prior time recorded by providers other than myself.      Mack Varela PA-C         [1]   Allergies  Allergen Reactions    Aspirin GI Intolerance   [2]   Social History  Tobacco Use    Smoking status: Every Day     Current packs/day: 1.00     Average packs/day: 1 pack/day for 45.4 years (45.4 ttl pk-yrs)     Types: Cigarettes     Start date: 2000    Smokeless tobacco: Never    Tobacco comments:     Last cigarette 0430   Vaping Use    Vaping status: Never Used   Substance Use Topics    Alcohol use: Not Currently     Comment: occasional    Drug use: Yes     Types: Marijuana, Methamphetamines     Comment: Hx meth use   [3]   Family History  Problem Relation Name Age of Onset    Hypertension Father

## 2025-06-08 NOTE — ASSESSMENT & PLAN NOTE
Lab Results   Component Value Date    EGFR 35 06/08/2025    EGFR 40 06/07/2025    EGFR 40 06/07/2025    CREATININE 1.98 (H) 06/08/2025    CREATININE 1.77 (H) 06/07/2025    CREATININE 1.80 (H) 06/07/2025   Creatinine baseline is 1.5 creatinine admitted with  1.6 no evidence of IRINEO improved to 1.2 and now upto 1.9- hold lasix till evaluation per nephrology he does not look dry or fluid overloaded   ? 2/2 afib with rvr hypoperfusion . No evidence of hypotension   Repeat labs in afternoon and am

## 2025-06-08 NOTE — ASSESSMENT & PLAN NOTE
Etiology suspected 2/2 decreased distal delivery and worsening IRINEO. CK WNL and no obstructive uropathy.  Now with worsening metabolic acidosis.

## 2025-06-09 PROBLEM — E87.5 HYPERKALEMIA: Status: RESOLVED | Noted: 2025-03-22 | Resolved: 2025-06-09

## 2025-06-09 PROBLEM — Z51.5 PALLIATIVE CARE ENCOUNTER: Status: ACTIVE | Noted: 2025-06-09

## 2025-06-09 PROBLEM — A41.9 SEPSIS (HCC): Status: RESOLVED | Noted: 2025-06-05 | Resolved: 2025-06-09

## 2025-06-09 LAB
ALBUMIN SERPL BCG-MCNC: 3.5 G/DL (ref 3.5–5)
ALBUMIN SERPL BCG-MCNC: 3.6 G/DL (ref 3.5–5)
ALBUMIN SERPL BCG-MCNC: 3.8 G/DL (ref 3.5–5)
ALP SERPL-CCNC: 124 U/L (ref 34–104)
ALP SERPL-CCNC: 127 U/L (ref 34–104)
ALP SERPL-CCNC: 131 U/L (ref 34–104)
ALT SERPL W P-5'-P-CCNC: 296 U/L (ref 7–52)
ALT SERPL W P-5'-P-CCNC: 313 U/L (ref 7–52)
ALT SERPL W P-5'-P-CCNC: 324 U/L (ref 7–52)
ANION GAP SERPL CALCULATED.3IONS-SCNC: 10 MMOL/L (ref 4–13)
ANION GAP SERPL CALCULATED.3IONS-SCNC: 10 MMOL/L (ref 4–13)
ANION GAP SERPL CALCULATED.3IONS-SCNC: 9 MMOL/L (ref 4–13)
ARTERIAL PATENCY WRIST A: YES
AST SERPL W P-5'-P-CCNC: 302 U/L (ref 13–39)
AST SERPL W P-5'-P-CCNC: 413 U/L (ref 13–39)
AST SERPL W P-5'-P-CCNC: 446 U/L (ref 13–39)
BASE EX.OXY STD BLDV CALC-SCNC: 54.7 % (ref 60–80)
BASE EX.OXY STD BLDV CALC-SCNC: 55.8 % (ref 60–80)
BASE EX.OXY STD BLDV CALC-SCNC: 61.2 % (ref 60–80)
BASE EXCESS BLDV CALC-SCNC: 0.4 MMOL/L
BASE EXCESS BLDV CALC-SCNC: 0.5 MMOL/L
BASE EXCESS BLDV CALC-SCNC: 4.1 MMOL/L
BASOPHILS # BLD AUTO: 0 THOUSANDS/ÂΜL (ref 0–0.1)
BASOPHILS NFR BLD AUTO: 0 % (ref 0–1)
BILIRUB DIRECT SERPL-MCNC: 0.91 MG/DL (ref 0–0.2)
BILIRUB SERPL-MCNC: 1.56 MG/DL (ref 0.2–1)
BILIRUB SERPL-MCNC: 1.65 MG/DL (ref 0.2–1)
BILIRUB SERPL-MCNC: 1.75 MG/DL (ref 0.2–1)
BNP SERPL-MCNC: >4700 PG/ML (ref 0–100)
BODY TEMPERATURE: 97.7 DEGREES FEHRENHEIT
BODY TEMPERATURE: 97.9 DEGREES FEHRENHEIT
BUN SERPL-MCNC: 66 MG/DL (ref 5–25)
BUN SERPL-MCNC: 67 MG/DL (ref 5–25)
BUN SERPL-MCNC: 68 MG/DL (ref 5–25)
CALCIUM SERPL-MCNC: 8.9 MG/DL (ref 8.4–10.2)
CALCIUM SERPL-MCNC: 9.1 MG/DL (ref 8.4–10.2)
CALCIUM SERPL-MCNC: 9.1 MG/DL (ref 8.4–10.2)
CHLORIDE SERPL-SCNC: 92 MMOL/L (ref 96–108)
CHLORIDE SERPL-SCNC: 93 MMOL/L (ref 96–108)
CHLORIDE SERPL-SCNC: 94 MMOL/L (ref 96–108)
CO2 SERPL-SCNC: 28 MMOL/L (ref 21–32)
CO2 SERPL-SCNC: 29 MMOL/L (ref 21–32)
CO2 SERPL-SCNC: 30 MMOL/L (ref 21–32)
CREAT SERPL-MCNC: 2.04 MG/DL (ref 0.6–1.3)
CREAT SERPL-MCNC: 2.12 MG/DL (ref 0.6–1.3)
CREAT SERPL-MCNC: 2.13 MG/DL (ref 0.6–1.3)
DIGOXIN SERPL-MCNC: 1 NG/ML (ref 0.8–2)
EOSINOPHIL # BLD AUTO: 0 THOUSAND/ÂΜL (ref 0–0.61)
EOSINOPHIL NFR BLD AUTO: 0 % (ref 0–6)
ERYTHROCYTE [DISTWIDTH] IN BLOOD BY AUTOMATED COUNT: 17.6 % (ref 11.6–15.1)
GFR SERPL CREATININE-BSD FRML MDRD: 32 ML/MIN/1.73SQ M
GFR SERPL CREATININE-BSD FRML MDRD: 32 ML/MIN/1.73SQ M
GFR SERPL CREATININE-BSD FRML MDRD: 34 ML/MIN/1.73SQ M
GLUCOSE SERPL-MCNC: 129 MG/DL (ref 65–140)
GLUCOSE SERPL-MCNC: 141 MG/DL (ref 65–140)
GLUCOSE SERPL-MCNC: 156 MG/DL (ref 65–140)
HCO3 BLDV-SCNC: 25.2 MMOL/L (ref 24–30)
HCO3 BLDV-SCNC: 26 MMOL/L (ref 24–30)
HCO3 BLDV-SCNC: 30.3 MMOL/L (ref 24–30)
HCT VFR BLD AUTO: 38.9 % (ref 36.5–49.3)
HGB BLD-MCNC: 12.6 G/DL (ref 12–17)
IMM GRANULOCYTES # BLD AUTO: 0.04 THOUSAND/UL (ref 0–0.2)
IMM GRANULOCYTES NFR BLD AUTO: 1 % (ref 0–2)
INR PPP: 3.34 (ref 0.85–1.19)
LACTATE SERPL-SCNC: 1.7 MMOL/L (ref 0.5–2)
LACTATE SERPL-SCNC: 2.7 MMOL/L (ref 0.5–2)
LACTATE SERPL-SCNC: 2.9 MMOL/L (ref 0.5–2)
LACTATE SERPL-SCNC: 3 MMOL/L (ref 0.5–2)
LACTATE SERPL-SCNC: 3.3 MMOL/L (ref 0.5–2)
LYMPHOCYTES # BLD AUTO: 0.18 THOUSANDS/ÂΜL (ref 0.6–4.47)
LYMPHOCYTES NFR BLD AUTO: 2 % (ref 14–44)
MAGNESIUM SERPL-MCNC: 2.4 MG/DL (ref 1.9–2.7)
MCH RBC QN AUTO: 28.8 PG (ref 26.8–34.3)
MCHC RBC AUTO-ENTMCNC: 32.4 G/DL (ref 31.4–37.4)
MCV RBC AUTO: 89 FL (ref 82–98)
MONOCYTES # BLD AUTO: 0.9 THOUSAND/ÂΜL (ref 0.17–1.22)
MONOCYTES NFR BLD AUTO: 10 % (ref 4–12)
NASAL CANNULA: 2
NASAL CANNULA: 2
NEUTROPHILS # BLD AUTO: 7.56 THOUSANDS/ÂΜL (ref 1.85–7.62)
NEUTS SEG NFR BLD AUTO: 87 % (ref 43–75)
NRBC BLD AUTO-RTO: 2 /100 WBCS
O2 CT BLDV-SCNC: 10.6 ML/DL
O2 CT BLDV-SCNC: 10.7 ML/DL
O2 CT BLDV-SCNC: 11.3 ML/DL
PCO2 BLD: 40.8 MM HG (ref 42–50)
PCO2 BLD: 43.9 MM HG (ref 42–50)
PCO2 BLDV: 41.5 MM HG (ref 42–50)
PCO2 BLDV: 44.9 MM HG (ref 42–50)
PCO2 BLDV: 52 MM HG (ref 42–50)
PH BLD: 7.39 [PH] (ref 7.3–7.4)
PH BLD: 7.41 [PH] (ref 7.3–7.4)
PH BLDV: 7.38 [PH] (ref 7.3–7.4)
PH BLDV: 7.38 [PH] (ref 7.3–7.4)
PH BLDV: 7.4 [PH] (ref 7.3–7.4)
PHOSPHATE SERPL-MCNC: 4.8 MG/DL (ref 2.3–4.1)
PLATELET # BLD AUTO: 246 THOUSANDS/UL (ref 149–390)
PMV BLD AUTO: 9.3 FL (ref 8.9–12.7)
PO2 BLDV: 34.2 MM HG (ref 35–45)
PO2 BLDV: 34.3 MM HG (ref 35–45)
PO2 BLDV: 36.9 MM HG (ref 35–45)
PO2 VENOUS TEMP CORRECTED: 33.1 MM HG (ref 35–45)
PO2 VENOUS TEMP CORRECTED: 35.9 MM HG (ref 35–45)
POTASSIUM SERPL-SCNC: 4.7 MMOL/L (ref 3.5–5.3)
POTASSIUM SERPL-SCNC: 4.8 MMOL/L (ref 3.5–5.3)
POTASSIUM SERPL-SCNC: 4.8 MMOL/L (ref 3.5–5.3)
PROT SERPL-MCNC: 6.5 G/DL (ref 6.4–8.4)
PROT SERPL-MCNC: 6.7 G/DL (ref 6.4–8.4)
PROT SERPL-MCNC: 7 G/DL (ref 6.4–8.4)
PROTHROMBIN TIME: 33.7 SECONDS (ref 12.3–15)
RBC # BLD AUTO: 4.37 MILLION/UL (ref 3.88–5.62)
SODIUM SERPL-SCNC: 131 MMOL/L (ref 135–147)
SODIUM SERPL-SCNC: 132 MMOL/L (ref 135–147)
SODIUM SERPL-SCNC: 132 MMOL/L (ref 135–147)
WBC # BLD AUTO: 8.68 THOUSAND/UL (ref 4.31–10.16)

## 2025-06-09 PROCEDURE — 82805 BLOOD GASES W/O2 SATURATION: CPT | Performed by: PHYSICIAN ASSISTANT

## 2025-06-09 PROCEDURE — 85025 COMPLETE CBC W/AUTO DIFF WBC: CPT | Performed by: PHYSICIAN ASSISTANT

## 2025-06-09 PROCEDURE — 83605 ASSAY OF LACTIC ACID: CPT | Performed by: PHYSICIAN ASSISTANT

## 2025-06-09 PROCEDURE — 99291 CRITICAL CARE FIRST HOUR: CPT | Performed by: ANESTHESIOLOGY

## 2025-06-09 PROCEDURE — 94760 N-INVAS EAR/PLS OXIMETRY 1: CPT

## 2025-06-09 PROCEDURE — 80076 HEPATIC FUNCTION PANEL: CPT | Performed by: NURSE PRACTITIONER

## 2025-06-09 PROCEDURE — 80048 BASIC METABOLIC PNL TOTAL CA: CPT | Performed by: NURSE PRACTITIONER

## 2025-06-09 PROCEDURE — 82805 BLOOD GASES W/O2 SATURATION: CPT | Performed by: NURSE PRACTITIONER

## 2025-06-09 PROCEDURE — 80053 COMPREHEN METABOLIC PANEL: CPT | Performed by: PHYSICIAN ASSISTANT

## 2025-06-09 PROCEDURE — 83880 ASSAY OF NATRIURETIC PEPTIDE: CPT | Performed by: ANESTHESIOLOGY

## 2025-06-09 PROCEDURE — 80162 ASSAY OF DIGOXIN TOTAL: CPT | Performed by: PHYSICIAN ASSISTANT

## 2025-06-09 PROCEDURE — 84100 ASSAY OF PHOSPHORUS: CPT | Performed by: PHYSICIAN ASSISTANT

## 2025-06-09 PROCEDURE — 80053 COMPREHEN METABOLIC PANEL: CPT | Performed by: NURSE PRACTITIONER

## 2025-06-09 PROCEDURE — 99232 SBSQ HOSP IP/OBS MODERATE 35: CPT | Performed by: INTERNAL MEDICINE

## 2025-06-09 PROCEDURE — 85610 PROTHROMBIN TIME: CPT | Performed by: PHYSICIAN ASSISTANT

## 2025-06-09 PROCEDURE — 83735 ASSAY OF MAGNESIUM: CPT | Performed by: PHYSICIAN ASSISTANT

## 2025-06-09 PROCEDURE — 94640 AIRWAY INHALATION TREATMENT: CPT

## 2025-06-09 PROCEDURE — 83605 ASSAY OF LACTIC ACID: CPT | Performed by: NURSE PRACTITIONER

## 2025-06-09 RX ORDER — FUROSEMIDE 10 MG/ML
40 INJECTION INTRAMUSCULAR; INTRAVENOUS DAILY
Status: DISCONTINUED | OUTPATIENT
Start: 2025-06-09 | End: 2025-06-12

## 2025-06-09 RX ORDER — DIGOXIN 0.25 MG/ML
125 INJECTION INTRAMUSCULAR; INTRAVENOUS EVERY OTHER DAY
Status: DISCONTINUED | OUTPATIENT
Start: 2025-06-09 | End: 2025-06-10

## 2025-06-09 RX ORDER — PANTOPRAZOLE SODIUM 40 MG/1
40 TABLET, DELAYED RELEASE ORAL
Status: DISCONTINUED | OUTPATIENT
Start: 2025-06-09 | End: 2025-06-14 | Stop reason: HOSPADM

## 2025-06-09 RX ORDER — OXYMETAZOLINE HYDROCHLORIDE 0.05 G/100ML
2 SPRAY NASAL EVERY 12 HOURS SCHEDULED
Status: DISPENSED | OUTPATIENT
Start: 2025-06-09 | End: 2025-06-12

## 2025-06-09 RX ORDER — ECHINACEA PURPUREA EXTRACT 125 MG
1 TABLET ORAL
Status: DISCONTINUED | OUTPATIENT
Start: 2025-06-09 | End: 2025-06-14 | Stop reason: HOSPADM

## 2025-06-09 RX ORDER — CALCIUM CARBONATE 500 MG/1
500 TABLET, CHEWABLE ORAL ONCE
Status: COMPLETED | OUTPATIENT
Start: 2025-06-09 | End: 2025-06-09

## 2025-06-09 RX ORDER — MAGNESIUM HYDROXIDE/ALUMINUM HYDROXICE/SIMETHICONE 120; 1200; 1200 MG/30ML; MG/30ML; MG/30ML
30 SUSPENSION ORAL ONCE
Status: COMPLETED | OUTPATIENT
Start: 2025-06-09 | End: 2025-06-09

## 2025-06-09 RX ORDER — FLUTICASONE PROPIONATE 50 MCG
1 SPRAY, SUSPENSION (ML) NASAL DAILY
Status: DISCONTINUED | OUTPATIENT
Start: 2025-06-09 | End: 2025-06-14 | Stop reason: HOSPADM

## 2025-06-09 RX ORDER — MIRTAZAPINE 15 MG/1
15 TABLET, FILM COATED ORAL
Status: DISCONTINUED | OUTPATIENT
Start: 2025-06-09 | End: 2025-06-12

## 2025-06-09 RX ADMIN — DIGOXIN 125 MCG: 0.25 INJECTION INTRAMUSCULAR; INTRAVENOUS at 11:59

## 2025-06-09 RX ADMIN — SALINE NASAL SPRAY 1 SPRAY: 1.5 SOLUTION NASAL at 20:48

## 2025-06-09 RX ADMIN — CALCIUM CARBONATE (ANTACID) CHEW TAB 500 MG 500 MG: 500 CHEW TAB at 01:02

## 2025-06-09 RX ADMIN — AMIODARONE HYDROCHLORIDE 0.5 MG/MIN: 50 INJECTION, SOLUTION INTRAVENOUS at 05:59

## 2025-06-09 RX ADMIN — METOROPROLOL TARTRATE 2.5 MG: 5 INJECTION, SOLUTION INTRAVENOUS at 07:21

## 2025-06-09 RX ADMIN — AMPICILLIN SODIUM AND SULBACTAM SODIUM 3 G: 10; 5 INJECTION, POWDER, FOR SOLUTION INTRAMUSCULAR; INTRAVENOUS at 08:01

## 2025-06-09 RX ADMIN — DIPHENHYDRAMINE HYDROCHLORIDE AND LIDOCAINE HYDROCHLORIDE AND ALUMINUM HYDROXIDE AND MAGNESIUM HYDRO 10 ML: KIT at 09:06

## 2025-06-09 RX ADMIN — ALUMINUM HYDROXIDE, MAGNESIUM HYDROXIDE, AND SIMETHICONE 30 ML: 1200; 120; 1200 SUSPENSION ORAL at 03:26

## 2025-06-09 RX ADMIN — PREDNISONE 40 MG: 20 TABLET ORAL at 09:06

## 2025-06-09 RX ADMIN — DIPHENHYDRAMINE HYDROCHLORIDE AND LIDOCAINE HYDROCHLORIDE AND ALUMINUM HYDROXIDE AND MAGNESIUM HYDRO 10 ML: KIT at 21:09

## 2025-06-09 RX ADMIN — DIPHENHYDRAMINE HYDROCHLORIDE AND LIDOCAINE HYDROCHLORIDE AND ALUMINUM HYDROXIDE AND MAGNESIUM HYDRO 10 ML: KIT at 05:12

## 2025-06-09 RX ADMIN — MORPHINE SULFATE 2 MG: 2 INJECTION, SOLUTION INTRAMUSCULAR; INTRAVENOUS at 05:10

## 2025-06-09 RX ADMIN — HYDROXYZINE HYDROCHLORIDE 25 MG: 25 TABLET, FILM COATED ORAL at 07:29

## 2025-06-09 RX ADMIN — MILRINONE LACTATE IN DEXTROSE 0.38 MCG/KG/MIN: 200 INJECTION, SOLUTION INTRAVENOUS at 11:51

## 2025-06-09 RX ADMIN — AMPICILLIN SODIUM AND SULBACTAM SODIUM 3 G: 10; 5 INJECTION, POWDER, FOR SOLUTION INTRAMUSCULAR; INTRAVENOUS at 01:23

## 2025-06-09 RX ADMIN — FUROSEMIDE 40 MG: 10 INJECTION, SOLUTION INTRAMUSCULAR; INTRAVENOUS at 10:28

## 2025-06-09 RX ADMIN — MORPHINE SULFATE 2 MG: 2 INJECTION, SOLUTION INTRAMUSCULAR; INTRAVENOUS at 17:11

## 2025-06-09 RX ADMIN — TRIMETHOBENZAMIDE HYDROCHLORIDE 200 MG: 100 INJECTION INTRAMUSCULAR at 18:46

## 2025-06-09 RX ADMIN — OXYMETAZOLINE HYDROCHLORIDE 2 SPRAY: 0.5 SPRAY NASAL at 21:10

## 2025-06-09 RX ADMIN — METOPROLOL TARTRATE 100 MG: 50 TABLET, FILM COATED ORAL at 09:05

## 2025-06-09 RX ADMIN — DIPHENHYDRAMINE HYDROCHLORIDE AND LIDOCAINE HYDROCHLORIDE AND ALUMINUM HYDROXIDE AND MAGNESIUM HYDRO 10 ML: KIT at 14:50

## 2025-06-09 RX ADMIN — MILRINONE LACTATE IN DEXTROSE 0.38 MCG/KG/MIN: 200 INJECTION, SOLUTION INTRAVENOUS at 21:15

## 2025-06-09 RX ADMIN — NICOTINE 1 PATCH: 14 PATCH, EXTENDED RELEASE TRANSDERMAL at 09:06

## 2025-06-09 RX ADMIN — MORPHINE SULFATE 2 MG: 2 INJECTION, SOLUTION INTRAMUSCULAR; INTRAVENOUS at 01:05

## 2025-06-09 RX ADMIN — AMPICILLIN SODIUM AND SULBACTAM SODIUM 3 G: 10; 5 INJECTION, POWDER, FOR SOLUTION INTRAMUSCULAR; INTRAVENOUS at 20:41

## 2025-06-09 RX ADMIN — MIRTAZAPINE 15 MG: 15 TABLET, FILM COATED ORAL at 21:09

## 2025-06-09 RX ADMIN — MORPHINE SULFATE 2 MG: 2 INJECTION, SOLUTION INTRAMUSCULAR; INTRAVENOUS at 09:06

## 2025-06-09 RX ADMIN — MORPHINE SULFATE 2 MG: 2 INJECTION, SOLUTION INTRAMUSCULAR; INTRAVENOUS at 13:10

## 2025-06-09 RX ADMIN — IPRATROPIUM BROMIDE AND ALBUTEROL SULFATE 3 ML: 2.5; .5 SOLUTION RESPIRATORY (INHALATION) at 19:27

## 2025-06-09 RX ADMIN — OXYMETAZOLINE HYDROCHLORIDE 2 SPRAY: 0.5 SPRAY NASAL at 11:50

## 2025-06-09 RX ADMIN — FLUTICASONE PROPIONATE 1 SPRAY: 50 SPRAY, METERED NASAL at 11:50

## 2025-06-09 RX ADMIN — MILRINONE LACTATE IN DEXTROSE 0.38 MCG/KG/MIN: 200 INJECTION, SOLUTION INTRAVENOUS at 02:49

## 2025-06-09 RX ADMIN — PANTOPRAZOLE SODIUM 40 MG: 40 TABLET, DELAYED RELEASE ORAL at 08:01

## 2025-06-09 RX ADMIN — IPRATROPIUM BROMIDE AND ALBUTEROL SULFATE 3 ML: 2.5; .5 SOLUTION RESPIRATORY (INHALATION) at 07:53

## 2025-06-09 RX ADMIN — Medication 400 MG: at 09:06

## 2025-06-09 RX ADMIN — AMPICILLIN SODIUM AND SULBACTAM SODIUM 3 G: 10; 5 INJECTION, POWDER, FOR SOLUTION INTRAMUSCULAR; INTRAVENOUS at 14:50

## 2025-06-09 RX ADMIN — MILRINONE LACTATE IN DEXTROSE 0.38 MCG/KG/MIN: 200 INJECTION, SOLUTION INTRAVENOUS at 23:15

## 2025-06-09 RX ADMIN — MORPHINE SULFATE 2 MG: 2 INJECTION, SOLUTION INTRAMUSCULAR; INTRAVENOUS at 21:09

## 2025-06-09 NOTE — ASSESSMENT & PLAN NOTE
Etiology suspected 2/2 decreased distal delivery and worsening IRINEO.   Proved with improved urine output

## 2025-06-09 NOTE — ASSESSMENT & PLAN NOTE
Wt Readings from Last 3 Encounters:   06/10/25 80.4 kg (177 lb 4.8 oz)   06/05/25 81.1 kg (178 lb 12.7 oz)   05/04/25 79.2 kg (174 lb 9.7 oz)     4/2025 TTE: 15-20% by visual estimation. Severe global hypokinesis.  Moderate regurgitation of mitral valve. Limited views may underestimate severity of MR. Severe tricuspid regurgitation with RVSP 41 mmHg.    BNP >4700  Diuretics: lasix 40 mg IV daily  Elevated LA likely secondary to hypoperfusion   Continue Milrinone infusion at 0.38, will attempt to wean  CVC in placed- Consider PAC   Trend end points:Sv02 and lactic  Amio bolus and continue gtt at 1 mg/hr  Hold BB while on milrinone  Continue digoxin 125 mcg EOD

## 2025-06-09 NOTE — PROGRESS NOTES
Progress Note - Cardiology   Name: Britton Batista 60 y.o. male I MRN: [id number]  Unit/Bed#: -01 I Date of Admission: 6/5/2025   Date of Service: 6/9/2025 I Hospital Day: 4    Assessment & Plan  Supratherapeutic INR  Patient was found to have supratherapeutic INR as an OP and recommended for admission   Goal INR 2-3.  Atrial fibrillation with RVR (HCC)  Patient is known to have atrial fibrillation which has been persistent.    Initially scheduled for DCCV after therapeutic inrs but has had labile inrs   Cost prohibitive issues with DOACs   Remains in afib with rvr during admission  Not candidate for most AADs given cardiomyopathy and renal dysfunction  Cannot be on CCBs given low LVEF   In cardiogenic shock currently limiting use of BB at present time  With worsening renal function digoxin use is only EOD  Given risks and benefits as above, it is appropriate to consider amiodarone for rate control at this time  Currently on amiodarone infusion at 1 mg/min - continue   May need to consider DCCV during stay but will be limited by concerns with compliance   Another option eventually to consider may be AV marina ablation and PPM as a palliative care approach  Acute renal failure superimposed on stage 3 chronic kidney disease (HCC)  Lab Results   Component Value Date    EGFR 32 06/09/2025    EGFR 32 06/08/2025    EGFR 31 06/08/2025    CREATININE 2.12 (H) 06/09/2025    CREATININE 2.13 (H) 06/08/2025    CREATININE 2.18 (H) 06/08/2025   CKD stable  Likely CRS  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  Most recent echocardiogram from April 2025 showed severely dilated left ventricle with LVEF of 15 to 20% with global hypokinesis.  Mild to moderate mitral regurgitation and severe tricuspid regurgitation.   Negative cardiac cath 2020   Hyperkalemia (Resolved: 6/9/2025)    Cardiogenic shock (HCC)  Currently on milrinone infusion   Recommend  DC BB  Dig and amiodarone for rate control  Titrate off milrinone as able   Prognosis is guarded    Palliative care encounter  Palliative to see patient likely tomorrow     Subjective     Pt currently on milrinone infusion secondary to cardiogenic shock developing over weekend. CRS noted. BP stable.    Objective :  Temp:  [97.3 °F (36.3 °C)-98.1 °F (36.7 °C)] 97.5 °F (36.4 °C)  HR:  [] 121  BP: (102-139)/() 115/97  Resp:  [11-26] 20  SpO2:  [87 %-100 %] 99 %  O2 Device: Nasal cannula  Nasal Cannula O2 Flow Rate (L/min):  [2 L/min] 2 L/min  Orthostatic Blood Pressures      Flowsheet Row Most Recent Value   Blood Pressure 115/97 filed at 06/09/2025 0700   Patient Position - Orthostatic VS Lying filed at 06/09/2025 0700          First Weight: Weight - Scale: 80.1 kg (176 lb 9.4 oz) (06/05/25 0855)  Vitals:    06/05/25 0855 06/09/25 0600   Weight: 80.1 kg (176 lb 9.4 oz) 80.4 kg (177 lb 4 oz)     Physical Exam  Vitals and nursing note reviewed.   Constitutional:       General: He is not in acute distress.  HENT:      Head: Normocephalic and atraumatic.     Cardiovascular:      Rate and Rhythm: Rhythm irregular.      Heart sounds: No murmur heard.  Pulmonary:      Effort: Pulmonary effort is normal.      Breath sounds: No wheezing.   Abdominal:      Palpations: Abdomen is soft.     Musculoskeletal:         General: No swelling.      Cervical back: Neck supple.     Skin:     General: Skin is warm.      Capillary Refill: Capillary refill takes less than 2 seconds.     Neurological:      General: No focal deficit present.      Mental Status: He is alert and oriented to person, place, and time.           Lab Results: I have reviewed the following results:CBC/BMP:   .     06/09/25  0504 06/09/25  1206   WBC 8.68  --    HGB 12.6  --    HCT 38.9  --      --    SODIUM 132* 132*   K 4.8 4.7   CL 94* 93*   CO2 28 29   BUN 68* 66*   CREATININE 2.12* 2.13*   GLUC 156* 129   MG 2.4  --    PHOS 4.8*  --        Results from last 7 days   Lab Units 06/09/25  0504 06/06/25  0541 06/05/25  0943   WBC Thousand/uL 8.68 9.56 9.26   HEMOGLOBIN g/dL 12.6 13.0 13.3   HEMATOCRIT % 38.9 40.7 42.0   PLATELETS Thousands/uL 246 230 234     Results from last 7 days   Lab Units 06/09/25  0504 06/08/25  2213 06/08/25  1641   POTASSIUM mmol/L 4.8 4.9 5.2   CHLORIDE mmol/L 94* 95* 95*   CO2 mmol/L 28 25 23   BUN mg/dL 68* 65* 65*   CREATININE mg/dL 2.12* 2.13* 2.18*   CALCIUM mg/dL 8.9 9.2 9.4     Results from last 7 days   Lab Units 06/09/25  0504 06/08/25  0551 06/07/25  0800 06/06/25  0541 06/05/25  1106   INR  3.34* 3.25* 3.73*   < > 8.98*   PTT seconds  --   --   --   --  69*    < > = values in this interval not displayed.     Lab Results   Component Value Date    HGBA1C 5.9 (H) 10/25/2021     Lab Results   Component Value Date    CKTOTAL 44 06/07/2025    TROPONINI 0.04 10/25/2021       Imaging Results Review: I reviewed radiology reports from this admission including: chest xray.  Other Study Results Review: EKG was reviewed.

## 2025-06-09 NOTE — PLAN OF CARE
Problem: PAIN - ADULT  Goal: Verbalizes/displays adequate comfort level or baseline comfort level  Description: Interventions:  - Encourage patient to monitor pain and request assistance  - Assess pain using appropriate pain scale0-10  - Administer analgesics as ordered based on type and severity of pain and evaluate response  - Implement non-pharmacological measures as appropriate and evaluate response  - Consider cultural and social influences on pain and pain management  - Notify physician/advanced practitioner if interventions unsuccessful or patient reports new pain  - Educate patient/family on pain management process including their role and importance of  reporting pain   - Provide non-pharmacologic/complimentary pain relief interventions  Outcome: Progressing     Problem: INFECTION - ADULT  Goal: Absence or prevention of progression during hospitalization  Description: INTERVENTIONS:  - Assess and monitor for signs and symptoms of infection  - Monitor lab/diagnostic results  - Monitor all insertion sites, i.e. indwelling lines, tubes, and drains  - Monitor endotracheal if appropriate and nasal secretions for changes in amount and color  - [place] appropriate cooling/warming therapies per order  - Administer medications as ordered  - Instruct and encourage patient and family to use good hand hygiene technique  - Identify and instruct in appropriate isolation precautions for identified infection/condition  Outcome: Progressing  Goal: Absence of fever/infection during neutropenic period  Description: INTERVENTIONS:  - Monitor WBC  - Perform strict hand hygiene  - Limit to healthy visitors only  - No plants, dried, fresh or silk flowers with chaidez in patient room  Outcome: Progressing     Problem: SAFETY ADULT  Goal: Patient will remain free of falls  Description: INTERVENTIONS:  - Educate patient/family on patient safety including physical limitations  - Instruct patient to call for assistance with activity    - Consider consulting OT/PT to assist with strengthening/mobility based on AM PAC & JH-HLM score  - Consult OT/PT to assist with strengthening/mobility   - Keep Call bell within reach  - Keep bed low and locked with side rails adjusted as appropriate  - Keep care items and personal belongings within reach  - Initiate and maintain comfort rounds  - Make Fall Risk Sign visible to staff  - Offer Toileting every 2 Hours, in advance of need  - Initiate/Maintain bed alarm  - Obtain necessary fall risk management equipment: yellow socks  - Apply yellow socks and bracelet for high fall risk patients  - Consider moving patient to room near nurses station  Outcome: Progressing  Goal: Maintain or return to baseline ADL function  Description: INTERVENTIONS:  -  Assess patient's ability to carry out ADLs; assess patient's baseline for ADL function and identify physical deficits which impact ability to perform ADLs (bathing, care of mouth/teeth, toileting, grooming, dressing, etc.)  - Assess/evaluate cause of self-care deficits   - Assess range of motion  - Assess patient's mobility; develop plan if impaired  - Assess patient's need for assistive devices and provide as appropriate  - Encourage maximum independence but intervene and supervise when necessary  - Involve family in performance of ADLs  - Assess for home care needs following discharge   - Consider OT consult to assist with ADL evaluation and planning for discharge  - Provide patient education as appropriate  - Monitor functional capacity and physical performance, use of AM PAC & JH-HLM   - Monitor gait, balance and fatigue with ambulation    Outcome: Progressing  Goal: Maintains/Returns to pre admission functional level  Description: INTERVENTIONS:  - Perform AM-PAC 6 Click Basic Mobility/ Daily Activity assessment daily.  - Set and communicate daily mobility goal to care team and patient/family/caregiver.   - Collaborate with rehabilitation services on mobility  goals if consulted  - Out of bed for toileting  - Record patient progress and toleration of activity level   Outcome: Progressing

## 2025-06-09 NOTE — ASSESSMENT & PLAN NOTE
Currently on milrinone infusion   Recommend DC BB  Dig and amiodarone for rate control  Titrate off milrinone as able   Prognosis is guarded

## 2025-06-09 NOTE — HOSPITAL COURSE
60 y.o. who presented on 6/5 with throat pain for the past week and difficulty swallowing. He was initially DC'd home then was asked to return to the ED secondary to elevated INR. PMH of of substance abuse (Meth), systolic/diastolic heart failure status post AICD, EF 15%, atrial fibrillation on coumadin, scleroderma with chronic pain, CKD stage III. Admitted to [facility] but developed worsening renal failure, metabolic acidosis, and hyperkalemia. Lactic elevated at 6.3. Transfer to the ICU 2/2 cardiogenic shock. Also in Afib RVR. Started on milrinone and amiodarone infusions.

## 2025-06-09 NOTE — ASSESSMENT & PLAN NOTE
Consult placed to palliative care given patient's multiple comorbidities including end-stage heart failure and chronic pain in the setting of scleroderma

## 2025-06-09 NOTE — CASE MANAGEMENT
Case Management Progress Note    Patient name Britton Batista  Location /-01 MRN [id number]  : 1965 Date 2025       LOS (days): 4  Geometric Mean LOS (GMLOS) (days): 3.5  Days to GMLOS:-0.4        OBJECTIVE:        Current admission status: Inpatient  Preferred Pharmacy:   [facility] - [place], [place] - [address]  [address]  [place] [address]  Phone: [phone] Fax: [phone]    [facility] [place] [address]  [facility],  [address]  [facility],  First [address] [facility] [address]  Phone: [phone] Fax: [phone]    [facility]pharmacy #[id number] [place], [place] - [address] [place] [address]  Phone: [phone] Fax: [phone]    Primary Care Provider: Juan Galindo DO    Primary Insurance: [employer]  REP  Secondary Insurance:     Chart reviewed aware of medical management. Case was discussed in multidisciplinary discharge meeting in AM rounds.  Clinical information supporting hospitalization:   HF management continues  On primacor and cordarone infusion  Barriers to discharge:  - medical management  Discharge plan:  home  CM will continue to follow plan of care.

## 2025-06-09 NOTE — ASSESSMENT & PLAN NOTE
Lab Results   Component Value Date    EGFR 36 06/10/2025    EGFR 34 06/09/2025    EGFR 32 06/09/2025    CREATININE 1.95 (H) 06/10/2025    CREATININE 2.04 (H) 06/09/2025    CREATININE 2.13 (H) 06/09/2025       Baseline creatine 1.2-1.5  Creatine trended up to 2.1  IRINEO likely in the setting of Cardiogenic shock  Continue milrinone   Trend chemistry  Continue edmondson  Trend I/O

## 2025-06-09 NOTE — ASSESSMENT & PLAN NOTE
6/5 Tonsillitis appears chronic on CT  Completed 5 days of Unasyn 6/9  Blood cultures negative x 48hrs  Strep PCR negative  Flonase, ocean spray nasal spray, and 3 days of afrin ordered

## 2025-06-09 NOTE — PROGRESS NOTES
Progress Note - Critical Care/ICU   Name: Britton Batista 60 y.o. male I MRN: [id number]  Unit/Bed#: -01 I Date of Admission: 6/5/2025   Date of Service: 6/9/2025 I Hospital Day: 4      Assessment & Plan  Acute on chronic combined systolic (congestive) and diastolic (congestive) heart failure (HCC)  Wt Readings from Last 3 Encounters:   06/05/25 80.1 kg (176 lb 9.4 oz)   06/05/25 81.1 kg (178 lb 12.7 oz)   05/04/25 79.2 kg (174 lb 9.7 oz)     Echo 3/2025: Left Ventricle:   Left ventricular cavity size is severely dilated. The left ventricular ejection fraction is 15-20% by visual estimation. Systolic function is severely reduced. There is severe global hypokinesis.  Right Ventricle: Systolic function is moderately reduced.  Mitral Valve: There is mild to moderate regurgitation with an eccentrically directed jet. Limited views may underestimate severity of MR  Tricuspid Valve: There is severe regurgitation. The estimated right ventricular systolic pressure is 41.00 mmHg.    Holding lasix iat this time secondary to elevated creatine likely secondary to CRS  Monitor volume status closely   Place edmondson   Elevated LA likely secondary to hypoperfusion   Start on Milrinone infusion at 0.38  Will place CVC- Consider PAC   Trend Sv02  Contact PACS for shock team alert   Amio bolus and start infusion for rate control   Patient received digoxin on hold due to renal function level 1.3  Metoprolol BID - pt refuses stated make him feel weird     Atrial fibrillation with RVR (HCC)  Patient on digoxin 125- Currently holding  SLIM increased dose of Metoprolol today- Likely DC but will discuss with Shock team   Will amio bolus and start infusion   Goal HR <120  Continue to monitor on telemetry   Transaminitis  Likely in the setting of HF and recent infection  Trend LFTs  Acute renal failure superimposed on stage 3 chronic kidney disease (HCC)  Lab Results   Component Value Date    EGFR 32 06/09/2025    EGFR 32 06/08/2025     EGFR 31 06/08/2025    CREATININE 2.12 (H) 06/09/2025    CREATININE 2.13 (H) 06/08/2025    CREATININE 2.18 (H) 06/08/2025       Baseline creatine 1.2-1.5  Creatine 1.98  IRINEO likely in the setting of CRS  Hold on diuretics at this time  Starting milrinone   Trend chemistry  Place edmondson  Trend I/O  Hyperkalemia  Likely in the setting of IRINEO secondary to CRS   Recheck chemistry   Medically manage with insulin/dextrose   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 The left ventricular ejection fraction is 15-20% by visual estimation. Systolic function is severely reduced. There is severe global hypokinesis.  Right Ventricle: Systolic function is moderately reduced.  Mitral Valve: There is mild to moderate regurgitation with an eccentrically directed jet. Limited views may underestimate severity of MR  Tricuspid Valve: There is severe regurgitation. The estimated right ventricular systolic pressure is 41.00 mmHg.        See above under acute diastolic and systolic HF  Tonsillitis  Patient currently on Unasyn   Blood cultures negative x 48hrs  Sepsis (HCC)  Patient currently on Unasyn   Blood cultures negative x 48hrs  Supratherapeutic INR  Patient on coumadin at home for atrial fibrillation   Holding Coumadin in the setting of supratheapeutic INR  Trend INR  S/p 2.5mg Vit K on 6/5  Disposition: Critical care    ICU Core Measures     A: Assess, Prevent, and Manage Pain Has pain been assessed? Yes  Need for changes to pain regimen? N/A   B: Both SAT/SAT  N/A   C: Choice of Sedation RASS Goal: 0 Alert and Calm or N/A patient not on sedation  Need for changes to sedation or analgesia regimen? N/A   D: Delirium CAM-ICU: Negative   E: Early Mobility  Plan for early mobility? Yes   F: Family Engagement Plan for family engagement today? Yes       Antibiotic Review: Awaiting culture results.     Review of Invasive Devices:    Edmondson Plan: Continue for  accurate I/O monitoring for 48 hours  Central access plan: Medications requiring central line Hemodynamic monitoring      Prophylaxis:  VTE VTE covered by:    None       Stress Ulcer  not orderedcovered bypantoprazole (PROTONIX) 40 mg tablet [[id number]] (Long-Term Med)         24 Hour Events :     During the night refused his metoprolol stated does not make him feel good did give him IV metoprolol  increased Primacor to 0.38  Last SVO 2 was 45  Patient did complain of shortness of breath even with oxygenation being 98 %     Subjective       Objective :                   Vitals I/O      Most Recent Min/Max in 24hrs   Temp 97.5 °F (36.4 °C) Temp  Min: 97.3 °F (36.3 °C)  Max: 98.1 °F (36.7 °C)   Pulse (!) 124 Pulse  Min: 54  Max: 126   Resp 20 Resp  Min: 11  Max: 26   /94 BP  Min: 102/65  Max: 133/104   O2 Sat 100 % SpO2  Min: 87 %  Max: 100 %      Intake/Output Summary (Last 24 hours) at 6/9/2025 0536  Last data filed at 6/9/2025 0328  Gross per 24 hour   Intake 1008.46 ml   Output 2455 ml   Net -1446.54 ml       Diet Cardiovascular; Sodium 2 GM    Invasive Monitoring           Physical Exam   Physical Exam  Vitals and nursing note reviewed.   Eyes:      Extraocular Movements: Extraocular movements intact.      Pupils: Pupils are equal, round, and reactive to light.   Skin:     General: Skin is warm, dry and not mottled extremities.      Capillary Refill: Capillary refill takes less than 2 seconds.      Coloration: Skin is pale.   HENT:      Head: Normocephalic and atraumatic.   Cardiovascular:      Rate and Rhythm: Tachycardia present. Rhythm irregular.      Pulses: Normal pulses.      Heart sounds: Murmur heard.   Musculoskeletal:         General: Normal range of motion.      Cervical back: Full passive range of motion without pain, normal range of motion and neck supple.      Right lower leg: Trace Edema present.      Left lower leg: Trace Edema present.   Abdominal: General: Bowel sounds are normal.       Palpations: Abdomen is soft.   Constitutional:       General: He is awake.      Appearance: He is well-developed, normal weight and well-nourished. He is ill-appearing.      Interventions: Nasal cannula in place.   Pulmonary:      Effort: Pulmonary effort is normal.      Breath sounds: Normal breath sounds. No wheezing or rhonchi.   Psychiatric:         Behavior: Behavior is cooperative.   Neurological:      General: No focal deficit present.      Mental Status: He is alert, easily aroused and oriented to person, place and time. Mental status is at baseline.      Sensory: Sensation is intact.      Motor: gross motor function is at baseline for patient. Strength full and intact in all extremities.          Diagnostic Studies        Lab Results: I have reviewed the following results:     Medications:  Scheduled PRN   ampicillin-sulbactam, 3 g, Q6H  chlorhexidine, 15 mL, Q12H JESUS  [Held by provider] digoxin, 125 mcg, Every Other Day  [Held by provider] furosemide, 40 mg, Daily  ipratropium-albuterol, 3 mL, Q6H While awake  magnesium Oxide, 400 mg, Daily  metoprolol tartrate, 100 mg, Q12H JESUS  [Held by provider] midodrine, 7.5 mg, TID AC  nicotine, 1 patch, Daily  predniSONE, 40 mg, Daily      acetaminophen, 650 mg, Q6H PRN  diphenhydramine, lidocaine, Al/Mg hydroxide, simethicone, 10 mL, Q6H PRN  hydrOXYzine HCL, 25 mg, Q6H PRN  metoprolol, 2.5 mg, Q6H PRN  morphine injection, 2 mg, Q4H PRN  phenol, 1 spray, Q2H PRN  trimethobenzamide, 200 mg, Q6H PRN       Continuous    amiodarone (CORDARONE) 900 mg in dextrose 5 % 500 mL infusion, 1 mg/min, Last Rate: 1 mg/min (06/08/25 2243)  amiodarone (CORDARONE) 900 mg in dextrose 5 % 500 mL infusion, 0.5 mg/min  milrinone (Primacor) infusion, 0.38 mcg/kg/min, Last Rate: 0.38 mcg/kg/min (06/09/25 0249)         Labs:   CBC    Recent Labs     06/09/25  0504   WBC 8.68   HGB 12.6   HCT 38.9        BMP    Recent Labs     06/08/25  2213 06/09/25  0504   SODIUM 132* 132*   K 4.9  4.8   CL 95* 94*   CO2 25 28   AGAP 12 10   BUN 65* 68*   CREATININE 2.13* 2.12*   CALCIUM 9.2 8.9       Coags    Recent Labs     06/07/25  0800 06/08/25  0551   INR 3.73* 3.25*        Additional Electrolytes  Recent Labs     06/08/25 2213 06/09/25  0504   MG 2.4 2.4   PHOS 5.7* 4.8*          Blood Gas    No recent results  Recent Labs     06/09/25  0504   PHVEN 7.402*   TPX7KIT 41.5*   PO2VEN 36.9   BPH5OYR 25.2   BEVEN 0.4   O7OXDBJ 61.2    LFTs  Recent Labs     06/08/25 2213 06/09/25  0504   * 313*   * 446*   ALKPHOS 132* 124*   ALB 3.6 3.5   TBILI 1.80* 1.65*       Infectious  No recent results  Glucose  Recent Labs     06/08/25  0551 06/08/25  1641 06/08/25 2213 06/09/25  0504   GLUC 95 191* 184* 156*

## 2025-06-09 NOTE — ASSESSMENT & PLAN NOTE
Patient is known to have atrial fibrillation which has been persistent.    Initially scheduled for DCCV after therapeutic inrs but has had labile inrs   Cost prohibitive issues with DOACs   Remains in afib with rvr during admission  Not candidate for most AADs given cardiomyopathy and renal dysfunction  Cannot be on CCBs given low LVEF   In cardiogenic shock currently limiting use of BB at present time  With worsening renal function digoxin use is only EOD  Given risks and benefits as above, it is appropriate to consider amiodarone for rate control at this time  Currently on amiodarone infusion at 1 mg/min - continue   May need to consider DCCV during stay but will be limited by concerns with compliance   Another option eventually to consider may be AV marina ablation and PPM as a palliative care approach   done

## 2025-06-09 NOTE — ASSESSMENT & PLAN NOTE
Lab Results   Component Value Date    EGFR 32 06/09/2025    EGFR 32 06/08/2025    EGFR 31 06/08/2025    CREATININE 2.12 (H) 06/09/2025    CREATININE 2.13 (H) 06/08/2025    CREATININE 2.18 (H) 06/08/2025   Baseline creatinine 1.4-1.6  Etiology of acute kidney injury is cardiorenal syndrome in the setting of cardiogenic shock  Remains on inotrope support with milrinone  Off diuretics  2.2 L of urine output yesterday  Urinalysis shows 2+ protein and a high specific gravity  Previous albumin to creatinine ratio is around 1 L  Continue to monitor off diuretics, inotrope support per cardiology  Making urine on current regimen, given active drug use and patients hemodynamics,  RRT long term if needed would be difficult given his low output HF 15-20%

## 2025-06-09 NOTE — ASSESSMENT & PLAN NOTE
Lab Results   Component Value Date    EGFR 32 06/09/2025    EGFR 32 06/08/2025    EGFR 31 06/08/2025    CREATININE 2.12 (H) 06/09/2025    CREATININE 2.13 (H) 06/08/2025    CREATININE 2.18 (H) 06/08/2025   CKD stable  Likely CRS

## 2025-06-09 NOTE — CONSULTS
"Attempted to speak to patient however he was unavailable.    Called daughter Corinna and gathered the following information    Patient is legally   2 biological children - Corinna Batista (local) and son Andrei Batista (lives in [facility]).  Patient does not currently have a working phone in the home, cell phone may be disconnected  Other emergency contact is patient's sister Arin - Arin and Corinna DO NOT speak to each other therefor any information that Arin receives is then given to patient's ex wife who then shares it with patient's children.    Corinna describes patient as having \"short fuse\" and worse when he is in the hospital.  Feels it would be better if she were present for any challenging conversations.    Explained that:  We need to discuss prognosis and code status  We need to introduce Hospice    [place]  explained:     In the absence of advanced directives with a designated HCA or POA paperwork, [place] would be closely adhered to with the following hierarchy:  1. Spouse - legally , no decision making rights  2. Adult children - 2 adult children would be decision makers would share decision making equally 50/50 without advanced directives  3. Parents   4. Siblings - lives with his brother and sister is very involved in care - may want them involved in decisions  5. Adult grandchildren  6. Any adult who has knowledge of the patient preference and values.    We need to determine if he wants to name a decision maker other than adult children.    Corinna would like to be involved in these conversations.  Can be present in person at the hospital at 11:00 tomorrow. Corinna was informed of the fact that Palliative Medicine does not come to  in person but could meet with her virtually tomorrow at 11:00 which she is agreeable to.    "

## 2025-06-09 NOTE — ASSESSMENT & PLAN NOTE
4/2025 TTE: 15-20% by visual estimation. Severe global hypokinesis.  Moderate regurgitation of mitral valve. Limited views may underestimate severity of MR. Severe tricuspid regurgitation with RVSP 41 mmHg.        See above under acute diastolic and systolic HF

## 2025-06-09 NOTE — ASSESSMENT & PLAN NOTE
Continue digoxin 125  DC BB while on milrinone  Will amio bolus and continue infusion   Goal HR <120  Continue to monitor on telemetry

## 2025-06-09 NOTE — ASSESSMENT & PLAN NOTE
Likely in the setting of IRINEO secondary to CRS   Recheck chemistry   Medically manage with insulin/dextrose   Resolved

## 2025-06-09 NOTE — ASSESSMENT & PLAN NOTE
Patient was found to have supratherapeutic INR as an OP and recommended for admission   Goal INR 2-3.

## 2025-06-09 NOTE — ASSESSMENT & PLAN NOTE
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  Most recent echocardiogram from April 2025 showed severely dilated left ventricle with LVEF of 15 to 20% with global hypokinesis.  Mild to moderate mitral regurgitation and severe tricuspid regurgitation.   Negative cardiac cath 2020

## 2025-06-09 NOTE — QUICK NOTE
Patient requested his sister (Arin Batista) received an update. Began to update Arin at bedside in front of Aiden. During the conversation, Aiden politely asked us to leave the room to discuss this away from him. Arin and I went to the waiting room and continued to discuss.    Plan for palliative to virtually see patient tomorrow with children. Aiden was asked if he wants his sister, Arin, present for the conversation and he stated yes. Arin plans to attend meeting on 6/10 at 11 am. CC team will also be present.

## 2025-06-09 NOTE — PLAN OF CARE
Problem: PAIN - ADULT  Goal: Verbalizes/displays adequate comfort level or baseline comfort level  Description: Interventions:  - Encourage patient to monitor pain and request assistance  - Assess pain using appropriate pain scale0-10  - Administer analgesics as ordered based on type and severity of pain and evaluate response  - Implement non-pharmacological measures as appropriate and evaluate response  - Consider cultural and social influences on pain and pain management  - Notify physician/advanced practitioner if interventions unsuccessful or patient reports new pain  - Educate patient/family on pain management process including their role and importance of  reporting pain   - Provide non-pharmacologic/complimentary pain relief interventions  6/9/2025 0749 by Liane Berger RN  Outcome: Progressing  6/9/2025 0747 by Liane Berger RN  Outcome: Progressing     Problem: INFECTION - ADULT  Goal: Absence or prevention of progression during hospitalization  Description: INTERVENTIONS:  - Assess and monitor for signs and symptoms of infection  - Monitor lab/diagnostic results  - Monitor all insertion sites, i.e. indwelling lines, tubes, and drains  - Monitor endotracheal if appropriate and nasal secretions for changes in amount and color  - [place] appropriate cooling/warming therapies per order  - Administer medications as ordered  - Instruct and encourage patient and family to use good hand hygiene technique  - Identify and instruct in appropriate isolation precautions for identified infection/condition  6/9/2025 0749 by Liane Berger RN  Outcome: Progressing  6/9/2025 0747 by Liane Berger RN  Outcome: Progressing  Goal: Absence of fever/infection during neutropenic period  Description: INTERVENTIONS:  - Monitor WBC  - Perform strict hand hygiene  - Limit to healthy visitors only  - No plants, dried, fresh or silk flowers with chaidez in patient room  6/9/2025 0749 by Liane Berger RN  Outcome: Progressing  6/9/2025  0747 by Liane Berger RN  Outcome: Progressing     Problem: SAFETY ADULT  Goal: Patient will remain free of falls  Description: INTERVENTIONS:  - Educate patient/family on patient safety including physical limitations  - Instruct patient to call for assistance with activity   - Consider consulting OT/PT to assist with strengthening/mobility based on AM PAC & JH-HLM score  - Consult OT/PT to assist with strengthening/mobility   - Keep Call bell within reach  - Keep bed low and locked with side rails adjusted as appropriate  - Keep care items and personal belongings within reach  - Initiate and maintain comfort rounds  - Make Fall Risk Sign visible to staff  - Offer Toileting every 2 Hours, in advance of need  - Initiate/Maintain bed alarm  - Obtain necessary fall risk management equipment: yellow socks  - Apply yellow socks and bracelet for high fall risk patients  - Consider moving patient to room near nurses station  6/9/2025 0749 by Liane Berger RN  Outcome: Progressing  6/9/2025 0747 by Liane Berger RN  Outcome: Progressing  Goal: Maintain or return to baseline ADL function  Description: INTERVENTIONS:  -  Assess patient's ability to carry out ADLs; assess patient's baseline for ADL function and identify physical deficits which impact ability to perform ADLs (bathing, care of mouth/teeth, toileting, grooming, dressing, etc.)  - Assess/evaluate cause of self-care deficits   - Assess range of motion  - Assess patient's mobility; develop plan if impaired  - Assess patient's need for assistive devices and provide as appropriate  - Encourage maximum independence but intervene and supervise when necessary  - Involve family in performance of ADLs  - Assess for home care needs following discharge   - Consider OT consult to assist with ADL evaluation and planning for discharge  - Provide patient education as appropriate  - Monitor functional capacity and physical performance, use of AM PAC & JH-HLM   - Monitor gait,  balance and fatigue with ambulation    6/9/2025 0749 by Liane Berger RN  Outcome: Progressing  6/9/2025 0747 by Liane Berger RN  Outcome: Progressing  Goal: Maintains/Returns to pre admission functional level  Description: INTERVENTIONS:  - Perform AM-PAC 6 Click Basic Mobility/ Daily Activity assessment daily.  - Set and communicate daily mobility goal to care team and patient/family/caregiver.   - Collaborate with rehabilitation services on mobility goals if consulted  - Out of bed for toileting  - Record patient progress and toleration of activity level   6/9/2025 0749 by Liane Berger RN  Outcome: Progressing  6/9/2025 0747 by Liane Berger RN  Outcome: Progressing

## 2025-06-10 PROBLEM — Z71.89 GOALS OF CARE, COUNSELING/DISCUSSION: Status: ACTIVE | Noted: 2025-06-10

## 2025-06-10 PROBLEM — R79.1 SUPRATHERAPEUTIC INR: Status: RESOLVED | Noted: 2025-06-05 | Resolved: 2025-06-10

## 2025-06-10 LAB
ALBUMIN SERPL BCG-MCNC: 3.5 G/DL (ref 3.5–5)
ALP SERPL-CCNC: 138 U/L (ref 34–104)
ALT SERPL W P-5'-P-CCNC: 276 U/L (ref 7–52)
ANION GAP SERPL CALCULATED.3IONS-SCNC: 6 MMOL/L (ref 4–13)
ANION GAP SERPL CALCULATED.3IONS-SCNC: 9 MMOL/L (ref 4–13)
AST SERPL W P-5'-P-CCNC: 270 U/L (ref 13–39)
BACTERIA BLD CULT: NORMAL
BACTERIA BLD CULT: NORMAL
BASE EX.OXY STD BLDV CALC-SCNC: 53.6 % (ref 60–80)
BASE EX.OXY STD BLDV CALC-SCNC: 56.1 % (ref 60–80)
BASE EX.OXY STD BLDV CALC-SCNC: 59.3 % (ref 60–80)
BASE EX.OXY STD BLDV CALC-SCNC: 65.8 % (ref 60–80)
BASE EXCESS BLDV CALC-SCNC: 2 MMOL/L
BASE EXCESS BLDV CALC-SCNC: 2.3 MMOL/L
BASE EXCESS BLDV CALC-SCNC: 2.9 MMOL/L
BASE EXCESS BLDV CALC-SCNC: 2.9 MMOL/L
BASOPHILS # BLD AUTO: 0.01 THOUSANDS/ÂΜL (ref 0–0.1)
BASOPHILS NFR BLD AUTO: 0 % (ref 0–1)
BILIRUB SERPL-MCNC: 1.61 MG/DL (ref 0.2–1)
BODY TEMPERATURE: 97.7 DEGREES FEHRENHEIT
BODY TEMPERATURE: 97.9 DEGREES FEHRENHEIT
BODY TEMPERATURE: 97.9 DEGREES FEHRENHEIT
BUN SERPL-MCNC: 64 MG/DL (ref 5–25)
BUN SERPL-MCNC: 68 MG/DL (ref 5–25)
CA-I BLD-SCNC: 1.09 MMOL/L (ref 1.12–1.32)
CALCIUM SERPL-MCNC: 8.9 MG/DL (ref 8.4–10.2)
CALCIUM SERPL-MCNC: 8.9 MG/DL (ref 8.4–10.2)
CHLORIDE SERPL-SCNC: 93 MMOL/L (ref 96–108)
CHLORIDE SERPL-SCNC: 94 MMOL/L (ref 96–108)
CO2 SERPL-SCNC: 28 MMOL/L (ref 21–32)
CO2 SERPL-SCNC: 30 MMOL/L (ref 21–32)
CREAT SERPL-MCNC: 1.95 MG/DL (ref 0.6–1.3)
CREAT SERPL-MCNC: 1.98 MG/DL (ref 0.6–1.3)
EOSINOPHIL # BLD AUTO: 0.04 THOUSAND/ÂΜL (ref 0–0.61)
EOSINOPHIL NFR BLD AUTO: 0 % (ref 0–6)
ERYTHROCYTE [DISTWIDTH] IN BLOOD BY AUTOMATED COUNT: 17.5 % (ref 11.6–15.1)
GFR SERPL CREATININE-BSD FRML MDRD: 35 ML/MIN/1.73SQ M
GFR SERPL CREATININE-BSD FRML MDRD: 36 ML/MIN/1.73SQ M
GLUCOSE SERPL-MCNC: 148 MG/DL (ref 65–140)
GLUCOSE SERPL-MCNC: 208 MG/DL (ref 65–140)
HCO3 BLDV-SCNC: 26.9 MMOL/L (ref 24–30)
HCO3 BLDV-SCNC: 27.9 MMOL/L (ref 24–30)
HCO3 BLDV-SCNC: 28.5 MMOL/L (ref 24–30)
HCO3 BLDV-SCNC: 28.6 MMOL/L (ref 24–30)
HCT VFR BLD AUTO: 39.3 % (ref 36.5–49.3)
HGB BLD-MCNC: 12.8 G/DL (ref 12–17)
IMM GRANULOCYTES # BLD AUTO: 0.03 THOUSAND/UL (ref 0–0.2)
IMM GRANULOCYTES NFR BLD AUTO: 0 % (ref 0–2)
INR PPP: 2.28 (ref 0.85–1.19)
LACTATE SERPL-SCNC: 1.8 MMOL/L (ref 0.5–2)
LACTATE SERPL-SCNC: 1.9 MMOL/L (ref 0.5–2)
LACTATE SERPL-SCNC: 2.1 MMOL/L (ref 0.5–2)
LACTATE SERPL-SCNC: 2.3 MMOL/L (ref 0.5–2)
LYMPHOCYTES # BLD AUTO: 0.19 THOUSANDS/ÂΜL (ref 0.6–4.47)
LYMPHOCYTES NFR BLD AUTO: 2 % (ref 14–44)
MAGNESIUM SERPL-MCNC: 2.3 MG/DL (ref 1.9–2.7)
MCH RBC QN AUTO: 29 PG (ref 26.8–34.3)
MCHC RBC AUTO-ENTMCNC: 32.6 G/DL (ref 31.4–37.4)
MCV RBC AUTO: 89 FL (ref 82–98)
MONOCYTES # BLD AUTO: 0.88 THOUSAND/ÂΜL (ref 0.17–1.22)
MONOCYTES NFR BLD AUTO: 10 % (ref 4–12)
NASAL CANNULA: 2
NASAL CANNULA: 4
NEUTROPHILS # BLD AUTO: 8.13 THOUSANDS/ÂΜL (ref 1.85–7.62)
NEUTS SEG NFR BLD AUTO: 88 % (ref 43–75)
NRBC BLD AUTO-RTO: 3 /100 WBCS
O2 CT BLDV-SCNC: 10.3 ML/DL
O2 CT BLDV-SCNC: 10.6 ML/DL
O2 CT BLDV-SCNC: 11.3 ML/DL
O2 CT BLDV-SCNC: 12.2 ML/DL
PCO2 BLD: 42.3 MM HG (ref 42–50)
PCO2 BLD: 46.2 MM HG (ref 42–50)
PCO2 BLD: 47.3 MM HG (ref 42–50)
PCO2 BLDV: 43.2 MM HG (ref 42–50)
PCO2 BLDV: 47 MM HG (ref 42–50)
PCO2 BLDV: 47.9 MM HG (ref 42–50)
PCO2 BLDV: 48.1 MM HG (ref 42–50)
PH BLD: 7.4 [PH] (ref 7.3–7.4)
PH BLD: 7.4 [PH] (ref 7.3–7.4)
PH BLD: 7.42 [PH] (ref 7.3–7.4)
PH BLDV: 7.39 [PH] (ref 7.3–7.4)
PH BLDV: 7.41 [PH] (ref 7.3–7.4)
PHOSPHATE SERPL-MCNC: 3.6 MG/DL (ref 2.3–4.1)
PLATELET # BLD AUTO: 209 THOUSANDS/UL (ref 149–390)
PMV BLD AUTO: 9.4 FL (ref 8.9–12.7)
PO2 BLDV: 32.2 MM HG (ref 35–45)
PO2 BLDV: 32.9 MM HG (ref 35–45)
PO2 BLDV: 36.5 MM HG (ref 35–45)
PO2 BLDV: 37.6 MM HG (ref 35–45)
PO2 VENOUS TEMP CORRECTED: 31.3 MM HG (ref 35–45)
PO2 VENOUS TEMP CORRECTED: 32 MM HG (ref 35–45)
PO2 VENOUS TEMP CORRECTED: 36.3 MM HG (ref 35–45)
POTASSIUM SERPL-SCNC: 4.4 MMOL/L (ref 3.5–5.3)
POTASSIUM SERPL-SCNC: 5.1 MMOL/L (ref 3.5–5.3)
PROT SERPL-MCNC: 6.4 G/DL (ref 6.4–8.4)
PROTHROMBIN TIME: 25.2 SECONDS (ref 12.3–15)
RBC # BLD AUTO: 4.42 MILLION/UL (ref 3.88–5.62)
SODIUM SERPL-SCNC: 130 MMOL/L (ref 135–147)
SODIUM SERPL-SCNC: 130 MMOL/L (ref 135–147)
WBC # BLD AUTO: 9.28 THOUSAND/UL (ref 4.31–10.16)

## 2025-06-10 PROCEDURE — NC001 PR NO CHARGE: Performed by: NURSE PRACTITIONER

## 2025-06-10 PROCEDURE — 94640 AIRWAY INHALATION TREATMENT: CPT

## 2025-06-10 PROCEDURE — 82330 ASSAY OF CALCIUM: CPT | Performed by: NURSE PRACTITIONER

## 2025-06-10 PROCEDURE — 83605 ASSAY OF LACTIC ACID: CPT | Performed by: NURSE PRACTITIONER

## 2025-06-10 PROCEDURE — 85610 PROTHROMBIN TIME: CPT | Performed by: NURSE PRACTITIONER

## 2025-06-10 PROCEDURE — 82805 BLOOD GASES W/O2 SATURATION: CPT | Performed by: NURSE PRACTITIONER

## 2025-06-10 PROCEDURE — 99232 SBSQ HOSP IP/OBS MODERATE 35: CPT | Performed by: INTERNAL MEDICINE

## 2025-06-10 PROCEDURE — 80053 COMPREHEN METABOLIC PANEL: CPT | Performed by: NURSE PRACTITIONER

## 2025-06-10 PROCEDURE — 80048 BASIC METABOLIC PNL TOTAL CA: CPT | Performed by: NURSE PRACTITIONER

## 2025-06-10 PROCEDURE — 94760 N-INVAS EAR/PLS OXIMETRY 1: CPT

## 2025-06-10 PROCEDURE — 97167 OT EVAL HIGH COMPLEX 60 MIN: CPT

## 2025-06-10 PROCEDURE — 99291 CRITICAL CARE FIRST HOUR: CPT | Performed by: ANESTHESIOLOGY

## 2025-06-10 PROCEDURE — 84100 ASSAY OF PHOSPHORUS: CPT | Performed by: NURSE PRACTITIONER

## 2025-06-10 PROCEDURE — 85025 COMPLETE CBC W/AUTO DIFF WBC: CPT | Performed by: NURSE PRACTITIONER

## 2025-06-10 PROCEDURE — 83735 ASSAY OF MAGNESIUM: CPT | Performed by: NURSE PRACTITIONER

## 2025-06-10 RX ORDER — WARFARIN SODIUM 3 MG/1
3 TABLET ORAL
Status: DISCONTINUED | OUTPATIENT
Start: 2025-06-10 | End: 2025-06-14 | Stop reason: HOSPADM

## 2025-06-10 RX ORDER — DIPHENHYDRAMINE HYDROCHLORIDE AND LIDOCAINE HYDROCHLORIDE AND ALUMINUM HYDROXIDE AND MAGNESIUM HYDRO
10 KIT EVERY 6 HOURS PRN
Status: DISCONTINUED | OUTPATIENT
Start: 2025-06-10 | End: 2025-06-14 | Stop reason: HOSPADM

## 2025-06-10 RX ORDER — DIGOXIN 125 MCG
125 TABLET ORAL EVERY OTHER DAY
Status: DISCONTINUED | OUTPATIENT
Start: 2025-06-11 | End: 2025-06-12

## 2025-06-10 RX ORDER — CALCIUM GLUCONATE 20 MG/ML
2 INJECTION, SOLUTION INTRAVENOUS ONCE
Status: COMPLETED | OUTPATIENT
Start: 2025-06-10 | End: 2025-06-10

## 2025-06-10 RX ADMIN — WARFARIN SODIUM 3 MG: 3 TABLET ORAL at 18:22

## 2025-06-10 RX ADMIN — MORPHINE SULFATE 2 MG: 2 INJECTION, SOLUTION INTRAMUSCULAR; INTRAVENOUS at 21:11

## 2025-06-10 RX ADMIN — OXYMETAZOLINE HYDROCHLORIDE 2 SPRAY: 0.5 SPRAY NASAL at 21:12

## 2025-06-10 RX ADMIN — MORPHINE SULFATE 2 MG: 2 INJECTION, SOLUTION INTRAMUSCULAR; INTRAVENOUS at 16:36

## 2025-06-10 RX ADMIN — MIRTAZAPINE 15 MG: 15 TABLET, FILM COATED ORAL at 21:11

## 2025-06-10 RX ADMIN — DIPHENHYDRAMINE HYDROCHLORIDE AND LIDOCAINE HYDROCHLORIDE AND ALUMINUM HYDROXIDE AND MAGNESIUM HYDRO 10 ML: KIT at 05:37

## 2025-06-10 RX ADMIN — FLUTICASONE PROPIONATE 1 SPRAY: 50 SPRAY, METERED NASAL at 08:54

## 2025-06-10 RX ADMIN — IPRATROPIUM BROMIDE AND ALBUTEROL SULFATE 3 ML: 2.5; .5 SOLUTION RESPIRATORY (INHALATION) at 20:05

## 2025-06-10 RX ADMIN — MORPHINE SULFATE 2 MG: 2 INJECTION, SOLUTION INTRAMUSCULAR; INTRAVENOUS at 05:37

## 2025-06-10 RX ADMIN — CALCIUM GLUCONATE 2 G: 20 INJECTION, SOLUTION INTRAVENOUS at 07:44

## 2025-06-10 RX ADMIN — MILRINONE LACTATE IN DEXTROSE 0.25 MCG/KG/MIN: 200 INJECTION, SOLUTION INTRAVENOUS at 10:34

## 2025-06-10 RX ADMIN — NICOTINE 1 PATCH: 14 PATCH, EXTENDED RELEASE TRANSDERMAL at 08:54

## 2025-06-10 RX ADMIN — CHLORHEXIDINE GLUCONATE 0.12% ORAL RINSE 15 ML: 1.2 LIQUID ORAL at 08:54

## 2025-06-10 RX ADMIN — Medication 400 MG: at 08:54

## 2025-06-10 RX ADMIN — AMIODARONE HYDROCHLORIDE 1 MG/MIN: 50 INJECTION, SOLUTION INTRAVENOUS at 04:27

## 2025-06-10 RX ADMIN — IPRATROPIUM BROMIDE AND ALBUTEROL SULFATE 3 ML: 2.5; .5 SOLUTION RESPIRATORY (INHALATION) at 07:51

## 2025-06-10 RX ADMIN — DIPHENHYDRAMINE HYDROCHLORIDE AND LIDOCAINE HYDROCHLORIDE AND ALUMINUM HYDROXIDE AND MAGNESIUM HYDRO 10 ML: KIT at 18:22

## 2025-06-10 RX ADMIN — MORPHINE SULFATE 2 MG: 2 INJECTION, SOLUTION INTRAMUSCULAR; INTRAVENOUS at 10:42

## 2025-06-10 RX ADMIN — OXYMETAZOLINE HYDROCHLORIDE 2 SPRAY: 0.5 SPRAY NASAL at 08:54

## 2025-06-10 RX ADMIN — CHLORHEXIDINE GLUCONATE 0.12% ORAL RINSE 15 ML: 1.2 LIQUID ORAL at 21:11

## 2025-06-10 RX ADMIN — PANTOPRAZOLE SODIUM 40 MG: 40 TABLET, DELAYED RELEASE ORAL at 05:37

## 2025-06-10 RX ADMIN — FUROSEMIDE 40 MG: 10 INJECTION, SOLUTION INTRAMUSCULAR; INTRAVENOUS at 08:54

## 2025-06-10 NOTE — ASSESSMENT & PLAN NOTE
Pt sent by PCP for evaluation of supratherapeutic INR of 8.8 on outpatient labs  INR improving.  Cardiology team following  Mgmt per primary team

## 2025-06-10 NOTE — ASSESSMENT & PLAN NOTE
Patient is known to have atrial fibrillation which has been persistent.    Initially scheduled for DCCV after therapeutic inrs but has had labile inrs   Cost prohibitive issues with DOACs   Remains in afib with rvr during admission  Not candidate for most AADs given cardiomyopathy and renal dysfunction  Cannot be on CCBs given low LVEF   In cardiogenic shock currently limiting use of BB at present time  With worsening renal function digoxin use is only EOD  Given risks and benefits as above, it is appropriate to consider amiodarone for rate control at this time  Currently on amiodarone infusion at 1 mg/min - continue   May need to consider DCCV during stay but will be limited by concerns with compliance   Another option eventually to consider may be AV marina ablation and PPM as a palliative care approach

## 2025-06-10 NOTE — RESPIRATORY THERAPY NOTE
06/10/25 1441   Respiratory Assessment   Resp Comments Pt refused 1400 tx   stable on 2L no distress

## 2025-06-10 NOTE — ASSESSMENT & PLAN NOTE
Consult placed to palliative care given patient's multiple comorbidities including end-stage heart failure and chronic pain in the setting of scleroderma  Patient interested in following with palliative care after discharge

## 2025-06-10 NOTE — ASSESSMENT & PLAN NOTE
Presented with c/o of painful swallowing and found to have enlarged, erythematous tonsils  CT negative for peritonsillar abscess  Currently on decadron  Mgmt per primary team

## 2025-06-10 NOTE — PROGRESS NOTES
NEPHROLOGY HOSPITAL PROGRESS NOTE   Britton Batista 60 y.o. male MRN: [id number]  Unit/Bed#: -01 Encounter: [id number]  Assessment & Plan  Acute renal failure superimposed on stage 3 chronic kidney disease (HCC)  Lab Results   Component Value Date    EGFR 36 06/10/2025    EGFR 34 06/09/2025    EGFR 32 06/09/2025    CREATININE 1.95 (H) 06/10/2025    CREATININE 2.04 (H) 06/09/2025    CREATININE 2.13 (H) 06/09/2025   Baseline creatinine 1.4-1.6  Etiology of acute kidney injury is cardiorenal syndrome in the setting of cardiogenic shock  Remains on inotrope support with milrinone  Off diuretics  2.2 L of urine output yesterday  Urinalysis shows 2+ protein and a high specific gravity  Previous albumin to creatinine ratio is around 1 L  Continue to monitor off diuretics, inotrope support per cardiology    Regarding plan of care today on Anna 10: There has been improvement in the patient's kidney function as it was 2.1 on June 9 and this morning it is 1.95.  This can be related to increased renal perfusion with the patient being on high inotrope/milrinone and stabilization of the patient's cardiac rhythm with amiodarone plus or minus ATN that is slowly improving as well.  There is also improvement in the patient's lactic acid level.  Would maintain current level of treatment including maintaining milrinone infusion as per critical care and cardiology.  Can maintain daily Lasix at 40 mg IV daily as this seems to be an effective dose of diuretic the patient is nonoliguric.  Patient's hemodynamics are slowly improving.  There is a  goals of care meeting scheduled this morning at 11 AM with ICU team and cardiology service.  I am happy to talk with family members if needed including the patient's sister with regards to renal status if requested.  Certainly much of the patient's kidney function is related to medications the patient is being given to try and optimize cardiac function as best as possible including the use  of inotropes and arrhythmia stabilizing medication such as amiodarone.    Regarding prior labs and imaging studies: Note that urinalysis from April 25 had demonstrated 2+ protein which patient has a known history albumin reviewing lab work and urinary protein studies back from February 2025 otherwise patient had a benign UA.  Ultrasound of the abdomen from April 20 showed no evidence of any hydronephrosis.  It is noted the patient's last echocardiogram limited from April 28 showed EF 15 to 20% with severe global hypokinesis.    Atrial fibrillation with RVR (HCC)  Wt Readings from Last 3 Encounters:   06/10/25 80.4 kg (177 lb 4.8 oz)   06/05/25 81.1 kg (178 lb 12.7 oz)   05/04/25 79.2 kg (174 lb 9.7 oz)   Continue management per cardiology colleagues ICU team; the patient is currently on amiodarone infusion 1 mg/h.  Monitor tachycardia while on milrinone  Transaminitis  This is improving with AST decreased to 270 and  and total bilirubin improving as well.  Query sepsis versus congestive hepatopathy  Supratherapeutic INR  Continue management per cardiology colleagues.  INR 8.8 on admission.  Most recent INR is 3.3 on June 9.  Tonsillitis  Management per primary team, patient is currently on intravenous Unasyn.  Cardiogenic shock (HCC)  Management per critical care and cardiology; this is improving patient is on milrinone infusion as noted and there is improvement in lactic acid level noting improvement in shock review.      I communicated via epic secure chat with critical care advanced practitioner  this morning LUIS ANGEL Parra,  regarding improvement in kidney function and renal status of patient and continued plan of care.    Administrative Statements   VIRTUAL CARE DOCUMENTATION:     1. This service was provided via Telemedicine using [employer]     2. Parties in the room with patient during teleconsult Patient only    3. Confidentiality My office door was closed     4. Participants No one else  was in the room    5. Patient acknowledged consent and understanding of privacy and security of the  Telemedicine consult. I informed the patient that I have reviewed their record in Epic and presented the opportunity for them to ask any questions regarding the visit today.  The patient agreed to participate.    6. I have spent a total time of approximately 35 minutes in caring for this patient on the day of the visit/encounter including documentation medical decision making, modification of assessment and plan, order placement, review of patient record, patient visit, communication with other members of the healthcare team including ICU team and brief communication with patient's nurse via [employer] secure chat this morning.  Not including the time spent for establishing the audio/video connection.          SUBJECTIVE / 24H INTERVAL HISTORY:  Mr. Batista is seen in follow-up for acute kidney injury on chronic kidney disease and volume management.  The patient feels better this morning.  He states he feels his breathing is better.  He is still maintained on a milrinone infusion last dose noted 0.38 mcg/kg/min.  The patient is also maintained on an amiodarone infusion at a dose of 1 mg/min.  He was given 1 dose of IV Lasix on 10:30 AM on June 9.    Regarding hemodynamics, systolic blood pressure over the past 12 hours has been approximately 102-134 systolic.  Pulse oximetry is 97% on 2 L nasal cannula.  The patient has been nonoliguric and net -1.2 L over the past 24 hours total urine output 2.3 L.  Lab wise it is noted there has been improvement in his lactic acid level as it had been as high as 3.0 in June I think improved today this morning to 1.8.  I had communicated with the patient's nurse this morning via epic secure chat no acute events noted overnight.  I also communicated with advanced practitioner in the ICU and there is a plan for palliative care meeting this morning at 11 AM.    OBJECTIVE:  Current Weight:  Weight - Scale: 80.4 kg (177 lb 4.8 oz)  Vitals:    06/09/25 1927 06/10/25 0205 06/10/25 0500 06/10/25 0600   BP:  140/91 127/74 134/93   BP Location:  Right arm Right arm    Pulse:  (!) 107 (!) 108 103   Resp:  14 21 17   Temp:  98.8 °F (37.1 °C) 98.4 °F (36.9 °C) 98.4 °F (36.9 °C)   TempSrc:  Bladder Bladder    SpO2: 96% 90% 95%    Weight:    80.4 kg (177 lb 4.8 oz)   Height:           Intake/Output Summary (Last 24 hours) at 6/10/2025 0721  Last data filed at 6/10/2025 0701  Gross per 24 hour   Intake 1286.86 ml   Output 2350 ml   Net -1063.14 ml     General: Patient was resting comfortably no acute distress.  HEENT: Normocephalic atraumatic  Neck: Appears to be supple  Pulmonary: No accessory muscle use/no conversational dyspnea  Cardiac: Pulse rate has been anywhere from 103-108 bpm  Extremities: Mild lower extremity edema noted.  Neurologic: No focal deficits patient was able to answer questions for me appropriately.  Medications:  Current Medications[1]    Laboratory Results:  Results from last 7 days   Lab Units 06/10/25  0527 06/09/25  2315 06/09/25  1206 06/09/25  0504 06/08/25  2213 06/08/25  1641 06/08/25  0551 06/07/25  0800 06/06/25  0541 06/05/25  0943   WBC Thousand/uL 9.28  --   --  8.68  --   --   --   --  9.56 9.26   HEMOGLOBIN g/dL 12.8  --   --  12.6  --   --   --   --  13.0 13.3   HEMATOCRIT % 39.3  --   --  38.9  --   --   --   --  40.7 42.0   PLATELETS Thousands/uL 209  --   --  246  --   --   --   --  230 234   POTASSIUM mmol/L 5.1 4.8 4.7 4.8 4.9 5.2 5.7*   < > 4.9 5.2   CHLORIDE mmol/L 94* 92* 93* 94* 95* 95* 98   < > 101 101   CO2 mmol/L 30 30 29 28 25 23 17*   < > 23 22   BUN mg/dL 68* 67* 66* 68* 65* 65* 60*   < > 30* 44*   CREATININE mg/dL 1.95* 2.04* 2.13* 2.12* 2.13* 2.18* 1.98*   < > 1.23 1.63*   CALCIUM mg/dL 8.9 9.1 9.1 8.9 9.2 9.4 9.9   < > 9.1 9.2   MAGNESIUM mg/dL 2.3  --   --  2.4 2.4  --   --   --   --  1.7*   PHOSPHORUS mg/dL 3.6  --   --  4.8* 5.7*  --   --   --   --   --   "   < > = values in this interval not displayed.       Portions of the record may have been created with voice recognition software. Occasional wrong word or \"sound a like\" substitutions may have occurred due to the inherent limitations of voice recognition software. Read the chart carefully and recognize, using context, where substitutions have occurred.If you have any questions, please contact the dictating provider.        [1]   Current Facility-Administered Medications:     acetaminophen (TYLENOL) tablet 650 mg, 650 mg, Oral, Q6H PRN, Mack Varela PA-C, 650 mg at 06/07/25 0930    amiodarone (CORDARONE) 900 mg in dextrose 5 % 500 mL infusion, 1 mg/min, Intravenous, Continuous, LUIS ANGEL Lyman, Last Rate: 33.3 mL/hr at 06/10/25 0427, 1 mg/min at 06/10/25 0427    calcium gluconate 2 g in sodium chloride 0.9% 100 mL (premix), 2 g, Intravenous, Once, LUIS ANGEL Lyman    chlorhexidine (PERIDEX) 0.12 % oral rinse 15 mL, 15 mL, Mouth/Throat, Q12H JESUS, Mack Varela PA-C, 15 mL at 06/08/25 2104    digoxin (LANOXIN) injection 125 mcg, 125 mcg, Intravenous, Every Other Day, LUIS ANGEL Lyman, 125 mcg at 06/09/25 1159    [Held by provider] digoxin (LANOXIN) tablet 125 mcg, 125 mcg, Oral, Every Other Day, Mack Varela PA-C    diphenhydramine, lidocaine, Al/Mg hydroxide, simethicone (Magic Mouthwash) oral solution 10 mL, 10 mL, Swish & Swallow, Q6H PRN, Mack Varela PA-C, 10 mL at 06/10/25 0537    fluticasone (FLONASE) 50 mcg/act nasal spray 1 spray, 1 spray, Each Nare, Daily, LUIS ANGEL Lyman, 1 spray at 06/09/25 1150    furosemide (LASIX) injection 40 mg, 40 mg, Intravenous, Daily, LUIS ANGEL Lyman, 40 mg at 06/09/25 1028    [Held by provider] furosemide (LASIX) tablet 40 mg, 40 mg, Oral, Daily, Guadalupe Muñoz MD, 40 mg at 06/06/25 1039    hydrOXYzine HCL (ATARAX) tablet 25 mg, 25 mg, Oral, Q6H PRN, Mack Varela PA-C, 25 mg at 06/09/25 0726    " ipratropium-albuterol (DUO-NEB) 0.5-2.5 mg/3 mL inhalation solution 3 mL, 3 mL, Nebulization, Q6H While awake, Mack Varela PA-C, 3 mL at 06/09/25 1927    magnesium Oxide (MAG-OX) tablet 400 mg, 400 mg, Oral, Daily, Mack Varela PA-C, 400 mg at 06/09/25 0906    [Held by provider] metoprolol tartrate (LOPRESSOR) tablet 100 mg, 100 mg, Oral, Q12H JESUS, Mack Varela PA-C, 100 mg at 06/09/25 0905    [Held by provider] midodrine (PROAMATINE) tablet 7.5 mg, 7.5 mg, Oral, TID AC, Mack Varela PA-C, 7.5 mg at 06/08/25 1144    milrinone (PRIMACOR) 20 mg in 100 mL infusion (premix), 0.38 mcg/kg/min, Intravenous, Continuous, La Nena Mijares PA-C, Last Rate: 9.1 mL/hr at 06/09/25 2315, 0.38 mcg/kg/min at 06/09/25 2315    mirtazapine (REMERON) tablet 15 mg, 15 mg, Oral, HS, LUIS ANGEL Lyman, 15 mg at 06/09/25 2109    morphine injection 2 mg, 2 mg, Intravenous, Q4H PRN, Mack Varela PA-C, 2 mg at 06/10/25 0537    nicotine (NICODERM CQ) 14 mg/24hr TD 24 hr patch 1 patch, 1 patch, Transdermal, Daily, Mack Varela PA-C, 1 patch at 06/09/25 0906    oxymetazoline (AFRIN) 0.05 % nasal spray 2 spray, 2 spray, Each Nare, Q12H JESUS, LUIS ANGEL Lyman, 2 spray at 06/09/25 2110    pantoprazole (PROTONIX) EC tablet 40 mg, 40 mg, Oral, Early Morning, LUIS ANGEL Lyman, 40 mg at 06/10/25 0537    phenol (CHLORASEPTIC) 1.4 % mucosal liquid 1 spray, 1 spray, Mouth/Throat, Q2H PRN, Mack Varela PA-C, 1 spray at 06/08/25 0947    sodium chloride (OCEAN) 0.65 % nasal spray 1 spray, 1 spray, Each Nare, Q1H PRN, LUIS ANGEL Lyman, 1 spray at 06/09/25 2048    trimethobenzamide (TIGAN) IM injection 200 mg, 200 mg, Intramuscular, Q6H PRN, Mack Varela PA-C, 200 mg at 06/09/25 1847

## 2025-06-10 NOTE — ASSESSMENT & PLAN NOTE
Lab Results   Component Value Date    EGFR 36 06/10/2025    EGFR 34 06/09/2025    EGFR 32 06/09/2025    CREATININE 1.95 (H) 06/10/2025    CREATININE 2.04 (H) 06/09/2025    CREATININE 2.13 (H) 06/09/2025   Noted to have elevated Cr on admission from suspected baseline of 1.4-1.6  Nephrology team consulted, see plan

## 2025-06-10 NOTE — PROGRESS NOTES
Progress Note - Palliative Care   Name: Britton Batista 60 y.o. male I MRN: [id number]  Unit/Bed#: -01 I Date of Admission: 6/5/2025   Date of Service: 6/10/2025 I Hospital Day: 5    Assessment & Plan  Goals of care, counseling/discussion  Decisional apparatus: Patient does have capacity on exam today.  If capacity is lost, patient's substitute decision maker would default to adult children per [place] Act 169  Advance Directive / Living Will / POLST / POA Forms: No. Reviewed POLST form. Pt to consider and may complete during this admission with ICU provider. Advised to name a primary HCR to streamline surrogate decisions in the future if needed.     Goals  Level 1 code status.   Full code.   Disease focused care.   No limits placed.   Family meeting today to discuss GOC  Patient is disease-focused and wants to prolong survivability and reports good QoL  Would like to remain independent in ADLs and be able to return home  Intends to avoid illicit drug use and be compliant with medical recommendations  Would not want to be kept alive by artificial means indefinitely   Would be interested in outpatient PSC follow up. Placed on hospital follow up list. Has transport challenges. Can attempt virtual follow ups or referral to [facility] palliative agency.     Palliative care encounter  Social support  Patient is supported by sister, daughter  Supportive listening provided  Normalized experience of patient/family  Provided anxiety containment  Provided anticipatory guidance  Investigated spiritual needs -  following    Follow up  Palliative Care will continue to follow  Please reach out to on-call provider via Epic Secure Chat  if questions or concerns arise.  Please do not hesitate to reach our on call provider through our clinic answering service at [phone] should you have acute symptom control concerns.    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  Pt also being worked up for tonsillitis.  Wt Readings from Last 3 Encounters:   06/10/25 80.4 kg (177 lb 4.8 oz)   06/05/25 81.1 kg (178 lb 12.7 oz)   05/04/25 79.2 kg (174 lb 9.7 oz)   4/25 TTE: LVEF 15-20%. Severe global hypokinesis. Moderate regurgitation of mitral valve. Limited views may underestimate severity of MR. Severe tricuspid regurgitation  Mgmt per primary team and cardiology  Acute renal failure superimposed on stage 3 chronic kidney disease (HCC)  Lab Results   Component Value Date    EGFR 36 06/10/2025    EGFR 34 06/09/2025    EGFR 32 06/09/2025    CREATININE 1.95 (H) 06/10/2025    CREATININE 2.04 (H) 06/09/2025    CREATININE 2.13 (H) 06/09/2025   Noted to have elevated Cr on admission from suspected baseline of 1.4-1.6  Nephrology team consulted, see plan  Tonsillitis  Presented with c/o of painful swallowing and found to have enlarged, erythematous tonsils  CT negative for peritonsillar abscess  Currently on decadron  Mgmt per primary team  Supratherapeutic INR  Pt sent by PCP for evaluation of supratherapeutic INR of 8.8 on outpatient labs  INR improving.  Cardiology team following  Mgmt per primary team        PDMP Review: I have reviewed the patient's controlled substance dispensing history in the Prescription Drug Monitoring Program in compliance with the [place] regulations before prescribing any controlled substances.  Filled  Written  ID  Drug  QTY  Days  Prescriber  RX #  Dispenser  Refill  Daily Dose*  Pymt Type      05/05/2025 05/04/2025 1  Oxycodone-Acetaminophen 5-325 12.00 3 Sa Bat [id number] Sti (3219) 0 30.00 [place]ME [place]   2024 1 Tramadol Hcl 50 Mg Tablet 6.00 3 Sa Gna [id number] Sti (3874) 0 20.00 MME [place]     Subjective     PALLIATIVE AND SUPPORTIVE CARE FAMILY CONFERENCE:     Time of [phone]  Participants: Sister, daughter, patient and ICU team and PSC Team  Patient Participation: Patient was an active participant in the meeting. He is able to make his own medical decisions with the support of family.  Patient Support System: Family  Meeting Location: bedside     A family meeting was necessary to allow for thorough discussion regarding patient's clinical presentation, expected disease trajectory, and comfort care versus continuing disease directed therapy in the setting of HFrEF. Family had questions related to medical diagnoses, prognosis, comfort care, hospice  and palliative services and coverage and after all questions were answered. Patient is disease-focused and want sto prolong his survivability. He reports a tolerable QoL. Being independent in ADLs, being at home are important to his QoL. He wants to continue with outpatient follow ups and would seek hospital care again if needed. He intends to stop using illicit drugs and follow his doctors' recommendations. He would not want to be kept alive by artificial means if permanently unconscious, confused, comatose or in a vegetative state. We discussed CODE status. Patient will need time to consider DNR/DNI status and will discuss with his family today. He does not have any ACP documents in place. He will talk with his family today to figure out who he would like to be his primary HCR. We reviewed POLST form and advised to complete this form today once his decisions were made. Patient is interested in outpatient PSC support. We can try to offer virtual follow ups for the times he may be unable to make it into clinic. Or if preferred, can refer him to a home  palliative agency. Patient is not currently considering hospice, however we spent time reviewing hospice services and when they may be appropriate. Palliative and Supportive Care services reviewed. 24 hour on-call provider line discuss and contact information provided. All questions and concerns were addressed today to the satisfaction of the patient and their family.       Objective :  Temp:  [97.3 °F (36.3 °C)-98.8 °F (37.1 °C)] 98.1 °F (36.7 °C)  HR:  [103-123] 105  BP: (102-165)/() 103/76  Resp:  [11-35] 21  SpO2:  [87 %-99 %] 95 %  O2 Device: Nasal cannula  Nasal Cannula O2 Flow Rate (L/min):  [2 L/min-4 L/min] 2 L/min    Physical Exam  Vitals reviewed.   Constitutional:       General: He is not in acute distress.     Appearance: He is ill-appearing.   HENT:      Head: Normocephalic.     Neurological:      Mental Status: He is alert.     Psychiatric:         Attention and Perception: Attention normal.         Mood and Affect: Mood normal.         Behavior: Behavior is withdrawn.            Lab Results: I have reviewed the following results:  Lab Results   Component Value Date/Time    SODIUM 130 (L) 06/10/2025 05:27 AM    K 5.1 06/10/2025 05:27 AM    BUN 68 (H) 06/10/2025 05:27 AM    CREATININE 1.95 (H) 06/10/2025 05:27 AM    GLUC 148 (H) 06/10/2025 05:27 AM    CALCIUM 8.9 06/10/2025 05:27 AM     (H) 06/10/2025 05:27 AM     (H) 06/10/2025 05:27 AM    ALB 3.5 06/10/2025 05:27 AM    TP 6.4 06/10/2025 05:27 AM    EGFR 36 06/10/2025 05:27 AM     Lab Results   Component Value Date/Time    HGB 12.8 06/10/2025 05:27 AM    WBC 9.28 06/10/2025 05:27 AM     06/10/2025 05:27 AM    INR 3.34 (H) 06/09/2025 05:04 AM    PTT 69 (H) 06/05/2025 11:06 AM     Lab Results   Component Value Date/Time    [id number] 8.109 (H) 04/22/2025 11:27 PM       Administrative Statements   I have spent a total time of 90 minutes in caring for this patient on the day of the visit/encounter including Diagnostic results,  Prognosis, Risks and benefits of tx options, Instructions for management, Patient and family education, Importance of tx compliance, Risk factor reductions, Counseling / Coordination of care, Documenting in the medical record, Reviewing/placing orders in the medical record (including tests, medications, and/or procedures), Obtaining or reviewing history  , Communicating with other healthcare professionals , and assistance with ACP documents. Case discussed with ICU team.

## 2025-06-10 NOTE — CASE MANAGEMENT
Case Management Discharge Planning Note    Patient name Britton Batista  Location /-01 MRN [id number]  : 1965 Date 6/10/2025       Current Admission Date: 2025  Current Admission Diagnosis:Acute on chronic combined systolic (congestive) and diastolic (congestive) heart failure (HCC)   Patient Active Problem List    Diagnosis Date Noted    Goals of care, counseling/discussion 06/10/2025    Palliative care encounter 2025    Tonsillitis 2025    Electrolyte abnormality 2025    Transaminitis 2025    Incarcerated umbilical hernia 2025    Pulmonary fibrosis (HCC) 2025    Longstanding persistent atrial fibrillation (HCC) 2025    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    Scleroderma (HCC) 2025    Acute renal failure superimposed on stage 3 chronic kidney disease ([facility]) 2025    Cardiogenic shock ([facility]) 2025    Atrial fibrillation with RVR ([facility]) 2025    Syncope 2024    Chest pain 2024    Acute on chronic systolic congestive heart failure (HCC) 2024    Primary hypertension 2024    Hypomagnesemia 2023    Methamphetamine abuse ([facility]) 2023    Tobacco abuse 2023    D-dimer, elevated 2023    Cardiomyopathy ([facility]) 10/09/2023    Acute kidney injury superimposed on stage 3a chronic kidney disease ([facility]) 10/08/2023    Acute on chronic combined systolic (congestive) and diastolic (congestive) heart failure ([facility]) 10/07/2023    Hypertensive emergency 10/07/2023    Anxiety     Depression     Other emphysema ([facility])     GERD (gastroesophageal reflux disease)     Gout     Hypertensive urgency     Neuropathy     Raynaud's disease     Unilateral inguinal hernia, without obstruction or gangrene, not specified as recurrent     PSS (progressive systemic sclerosis) ([facility]) 10/25/2021    Near syncope 10/25/2021    Osteomyelitis ([facility]) 10/25/2021    Elevated troponin 10/25/2021    Acute encephalopathy  10/25/2021      LOS (days): 5  Geometric Mean LOS (GMLOS) (days): 3.5  Days to GMLOS:-1.7     OBJECTIVE:  Risk of Unplanned Readmission Score: 71.55         Current admission status: Inpatient   Preferred Pharmacy:   [facility] - [place], [place] - [address]  [address] [facility]  [place] [address]  Phone: [phone] Fax: [phone]    [facility] - [place] - [address]  [facility],  [address]  [facility],  First [address] [facility] [place] [address]  Phone: [phone] Fax: [phone]    [id number] - [place], [place] - [address] [address]  Phone: [phone] Fax: [phone]    Primary Care Provider: Juan Galindo DO    Primary Insurance: [employer] [facility]  Secondary Insurance:     DISCHARGE DETAILS:    Discharge planning discussed with:: patient  Freedom of Choice: Yes  Comments - Freedom of Choice: 1) Discussed HHC and patient is in agreement to this, reviewed the list from [facility] of the accepting HHC- [facility]. 2) Discussed Warm Handoff for his Meth use, patient is interested in this and wants to know what his IP options would be... 3) patient also said that he is unsure if he is going home to his apartment or his sister's, he plans on talking with her this evening.  CM contacted family/caregiver?: No- see comments (declined at this time)                  Requested Home Health Care         Is the patient interested in HHC at discharge?: Yes  Home Health Discipline requested:: Nursing, Physical Therapy, Occupational Therapy  Home Health Agency Name:: [facility]  HHA External Referral Reason (only applicable if external HHA name selected): [facility] does not accept patient's insurance  Home Health Follow-Up Provider:: PCP  Home Health Services Needed:: Evaluate Functional Status and Safety, Heart Failure Management, Strengthening/Theraputic Exercises to Improve Function  Homebound Criteria  Met:: Requires the Assistance of Another Person for Safe Ambulation or to Leave the Home  Supporting Clincal Findings:: Dyspnea with Exertion, Fatigues Easliy in Short Distances, Limited Endurance         Other Referral/Resources/Interventions Provided:  Interventions: D&A Warm Handoff, [facility]  Referral Comments: 1_ Secured [facility] for dc. 2) Referral made to [facility]         Treatment Team Recommendation: Home with Home Health Care     Additional Discharge Dispositions: Home Health Services  Transport at Discharge : Family

## 2025-06-10 NOTE — ASSESSMENT & PLAN NOTE
Lab Results   Component Value Date    EGFR 36 06/10/2025    EGFR 34 06/09/2025    EGFR 32 06/09/2025    CREATININE 1.95 (H) 06/10/2025    CREATININE 2.04 (H) 06/09/2025    CREATININE 2.13 (H) 06/09/2025   Baseline creatinine 1.4-1.6  Etiology of acute kidney injury is cardiorenal syndrome in the setting of cardiogenic shock  Remains on inotrope support with milrinone  Off diuretics  2.2 L of urine output yesterday  Urinalysis shows 2+ protein and a high specific gravity  Previous albumin to creatinine ratio is around 1 L  Continue to monitor off diuretics, inotrope support per cardiology    Regarding plan of care today on Anna 10: There has been improvement in the patient's kidney function as it was 2.1 on June 9 and this morning it is 1.95.  This can be related to increased renal perfusion with the patient being on high inotrope/milrinone and stabilization of the patient's cardiac rhythm with amiodarone plus or minus ATN that is slowly improving as well.  There is also improvement in the patient's lactic acid level.  Would maintain current level of treatment including maintaining milrinone infusion as per critical care and cardiology.  Can maintain daily Lasix at 40 mg IV daily as this seems to be an effective dose of diuretic the patient is nonoliguric.  Patient's hemodynamics are slowly improving.  There is a  goals of care meeting scheduled this morning at 11 AM with ICU team and cardiology service.  I am happy to talk with family members if needed including the patient's sister with regards to renal status if requested.  Certainly much of the patient's kidney function is related to medications the patient is being given to try and optimize cardiac function as best as possible including the use of inotropes and arrhythmia stabilizing medication such as amiodarone.    Regarding prior labs and imaging studies: Note that urinalysis from April 25 had demonstrated 2+ protein which patient has a known history albumin  reviewing lab work and urinary protein studies back from February 2025 otherwise patient had a benign UA.  Ultrasound of the abdomen from April 20 showed no evidence of any hydronephrosis.  It is noted the patient's last echocardiogram limited from April 28 showed EF 15 to 20% with severe global hypokinesis.

## 2025-06-10 NOTE — PROGRESS NOTES
Progress Note - Critical Care/ICU   Name: Britton Batista 60 y.o. male I MRN: [id number]  Unit/Bed#: -01 I Date of Admission: 6/5/2025   Date of Service: 6/10/2025 I Hospital Day: 5      Assessment & Plan  Acute on chronic combined systolic (congestive) and diastolic (congestive) heart failure (HCC)  Wt Readings from Last 3 Encounters:   06/10/25 80.4 kg (177 lb 4.8 oz)   06/05/25 81.1 kg (178 lb 12.7 oz)   05/04/25 79.2 kg (174 lb 9.7 oz)     4/2025 TTE: 15-20% by visual estimation. Severe global hypokinesis.  Moderate regurgitation of mitral valve. Limited views may underestimate severity of MR. Severe tricuspid regurgitation with RVSP 41 mmHg.    BNP >4700  Diuretics: lasix 40 mg IV daily  Elevated LA likely secondary to hypoperfusion   Continue Milrinone infusion at 0.38, will attempt to wean  CVC in placed- Consider PAC   Trend end points:Sv02 and lactic  Amio bolus and continue gtt at 1 mg/hr  Hold BB while on milrinone  Continue digoxin 125 mcg EOD    Atrial fibrillation with RVR (HCC)  Continue digoxin 125  DC BB while on milrinone  Will amio bolus and continue infusion   Goal HR <120  Continue to monitor on telemetry   Transaminitis  Likely in the setting of HF and recent infection  Trend LFTs  Acute renal failure superimposed on stage 3 chronic kidney disease (HCC)  Lab Results   Component Value Date    EGFR 36 06/10/2025    EGFR 34 06/09/2025    EGFR 32 06/09/2025    CREATININE 1.95 (H) 06/10/2025    CREATININE 2.04 (H) 06/09/2025    CREATININE 2.13 (H) 06/09/2025       Baseline creatine 1.2-1.5  Creatine trended up to 2.1  IRINEO likely in the setting of Cardiogenic shock  Continue milrinone   Trend chemistry  Continue edmondson  Trend I/O  Hyperkalemia (Resolved: 6/9/2025)  Likely in the setting of IRINEO secondary to CRS   Recheck chemistry   Medically manage with insulin/dextrose   Resolved  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 Severe global hypokinesis.   Moderate regurgitation of mitral valve. Limited views may underestimate severity of MR. Severe tricuspid regurgitation with RVSP 41 mmHg.        See above under acute diastolic and systolic HF  Tonsillitis  6/5 Tonsillitis appears chronic on CT  Completed 5 days of Unasyn 6/9  Blood cultures negative x 48hrs  Strep PCR negative  Flonase, ocean spray nasal spray, and 3 days of afrin ordered  Sepsis (HCC) (Resolved: 6/9/2025)  Unasyn 5 days completed 6/9  Blood cultures negative x 48hrs  Supratherapeutic INR  Patient on coumadin at home for atrial fibrillation   Holding Coumadin in the setting of supratheapeutic INR  Trend INR  S/p 2.5mg Vit K on 6/5  Cardiogenic shock (HCC)  Continue on milrinone 0.38  And SVO 2 and BMP  Palliative care consult  Family meeting tomorrow    Palliative care encounter  Consult placed to palliative care given patient's multiple comorbidities including end-stage heart failure and chronic pain in the setting of scleroderma  Disposition: Critical care    ICU Core Measures     A: Assess, Prevent, and Manage Pain Has pain been assessed? N/A  Need for changes to pain regimen? N/A   B: Both SAT/SAT  N/A   C: Choice of Sedation RASS Goal: 0 Alert and Calm or N/A patient not on sedation  Need for changes to sedation or analgesia regimen? No   D: Delirium CAM-ICU: Negative   E: Early Mobility  Plan for early mobility? Yes   F: Family Engagement Plan for family engagement today? Yes       Review of Invasive Devices:    Zazueta Plan: Continue for accurate I/O monitoring for 48 hours  Central access plan: Medications requiring central line      Prophylaxis:  VTE VTE covered by:    None       Stress Ulcer  covered bypantoprazole (PROTONIX) 40 mg tablet [[id number]] (Long-Term Med), pantoprazole (PROTONIX) EC tablet 40 mg [[id number]]         24 Hour Events :     No acute events overnight   Subjective   Review of Systems: Review of Systems   Respiratory:  Positive for shortness of breath.    Cardiovascular:   Positive for leg swelling.   All other systems reviewed and are negative.      Objective :                   Vitals I/O      Most Recent Min/Max in 24hrs   Temp 98.4 °F (36.9 °C) Temp  Min: 97.3 °F (36.3 °C)  Max: 98.8 °F (37.1 °C)   Pulse (!) 108 Pulse  Min: 107  Max: 124   Resp 21 Resp  Min: 11  Max: 31   /74 BP  Min: 102/79  Max: 164/81   O2 Sat 95 % SpO2  Min: 90 %  Max: 100 %      Intake/Output Summary (Last 24 hours) at 6/10/2025 0702  Last data filed at 6/10/2025 0532  Gross per 24 hour   Intake 1077.69 ml   Output 2350 ml   Net -1272.31 ml       Diet Cardiovascular; Sodium 2 GM; Dysphagia 1-Pureed; Thin Liquid    Invasive Monitoring           Physical Exam   Physical Exam  Vitals and nursing note reviewed.   Eyes:      Extraocular Movements: Extraocular movements intact.      Pupils: Pupils are equal, round, and reactive to light.   Skin:     General: Skin is warm, dry and not mottled extremities.      Capillary Refill: Capillary refill takes less than 2 seconds.      Coloration: Skin is pale.   HENT:      Head: Normocephalic and atraumatic.   Cardiovascular:      Rate and Rhythm: Tachycardia present. Rhythm irregular.      Pulses: Normal pulses.      Heart sounds: Normal heart sounds.   Musculoskeletal:         General: Normal range of motion.      Cervical back: Full passive range of motion without pain, normal range of motion and neck supple.   Abdominal: General: Bowel sounds are normal.      Palpations: Abdomen is soft.   Constitutional:       General: He is awake.      Appearance: He is well-developed and well-nourished.   Pulmonary:      Effort: Pulmonary effort is normal.      Breath sounds: Normal breath sounds. No wheezing or rhonchi.   Psychiatric:         Behavior: Behavior is cooperative.   Neurological:      General: No focal deficit present.      Mental Status: He is alert, easily aroused and oriented to person, place and time. Mental status is at baseline.      Sensory: Sensation is  intact.      Motor: gross motor function is at baseline for patient. Strength full and intact in all extremities.   Genitourinary/Anorectal:  Zazueta present.        Diagnostic Studies        Lab Results: I have reviewed the following results:     Medications:  Scheduled PRN   calcium gluconate, 2 g, Once  chlorhexidine, 15 mL, Q12H JESUS  digoxin, 125 mcg, Every Other Day  [Held by provider] digoxin, 125 mcg, Every Other Day  fluticasone, 1 spray, Daily  furosemide, 40 mg, Daily  [Held by provider] furosemide, 40 mg, Daily  ipratropium-albuterol, 3 mL, Q6H While awake  magnesium Oxide, 400 mg, Daily  [Held by provider] metoprolol tartrate, 100 mg, Q12H JESUS  [Held by provider] midodrine, 7.5 mg, TID AC  mirtazapine, 15 mg, HS  nicotine, 1 patch, Daily  oxymetazoline, 2 spray, Q12H JESUS  pantoprazole, 40 mg, Early Morning      acetaminophen, 650 mg, Q6H PRN  diphenhydramine, lidocaine, Al/Mg hydroxide, simethicone, 10 mL, Q6H PRN  hydrOXYzine HCL, 25 mg, Q6H PRN  morphine injection, 2 mg, Q4H PRN  phenol, 1 spray, Q2H PRN  sodium chloride, 1 spray, Q1H PRN  trimethobenzamide, 200 mg, Q6H PRN       Continuous    amiodarone (CORDARONE) 900 mg in dextrose 5 % 500 mL infusion, 1 mg/min, Last Rate: 1 mg/min (06/10/25 0427)  milrinone (Primacor) infusion, 0.38 mcg/kg/min, Last Rate: 0.38 mcg/kg/min (06/09/25 2315)         Labs:   CBC    Recent Labs     06/09/25  0504 06/10/25  0527   WBC 8.68 9.28   HGB 12.6 12.8   HCT 38.9 39.3    209     BMP    Recent Labs     06/09/25  2315 06/10/25  0527   SODIUM 131* 130*   K 4.8 5.1   CL 92* 94*   CO2 30 30   AGAP 9 6   BUN 67* 68*   CREATININE 2.04* 1.95*   CALCIUM 9.1 8.9       Coags    Recent Labs     06/09/25  0504   INR 3.34*        Additional Electrolytes  Recent Labs     06/09/25  0504 06/10/25  0527   MG 2.4 2.3   PHOS 4.8* 3.6   CAIONIZED  --  1.09*          Blood Gas    No recent results  Recent Labs     06/10/25  0527   PHVEN 7.393   PAO2BOD 47.9   PO2VEN 36.5   [id number]  28.5   BEVEN 2.9   [id number] 59.3*    LFTs  Recent Labs     06/09/25  2315 06/10/25  0527   * 276*   * 270*   ALKPHOS 127* 138*   ALB 3.6 3.5   TBILI 1.56* 1.61*       Infectious  No recent results  Glucose  Recent Labs     06/09/25  0504 06/09/25  1206 06/09/25  2315 06/10/25  0527   GLUC 156* 129 141* 148*

## 2025-06-10 NOTE — PROGRESS NOTES
CH received consult request from provider to supply support visit to pt in setting of upcoming palliative care consult. Pt received CH reclined in bed, no family present. CH is familiar with pt from past admissions.    Pt shared that he does not feel upset by his prognosis as he understands it. Pt shared that he doesn't feel it is helpful to worry about the future.     Pt shared about his outlook on life and where he lives.     assesses pt is speaking fatalistically today about his prognosis which aligns with his attitude toward other life events and sparse motivation to improve his health.     will continue to provide therapeutic conversation and family support as appropriate.

## 2025-06-10 NOTE — ASSESSMENT & PLAN NOTE
Management per critical care and cardiology; this is improving patient is on milrinone infusion as noted and there is improvement in lactic acid level noting improvement in shock review.

## 2025-06-10 NOTE — PROGRESS NOTES
Progress Note - Cardiology   Name: Britton Batista 60 y.o. male I MRN: [id number]  Unit/Bed#: -01 I Date of Admission: 6/5/2025   Date of Service: 6/10/2025 I Hospital Day: 5    Assessment & Plan  Supratherapeutic INR  Patient was found to have supratherapeutic INR as an OP and recommended for admission   Goal INR 2-3.  Atrial fibrillation with RVR (HCC)  Patient is known to have atrial fibrillation which has been persistent.    Initially scheduled for DCCV after therapeutic inrs but has had labile inrs   Cost prohibitive issues with DOACs   Remains in afib with rvr during admission  Not candidate for most AADs given cardiomyopathy and renal dysfunction  Cannot be on CCBs given low LVEF   In cardiogenic shock currently limiting use of BB at present time  With worsening renal function digoxin use is only EOD  Given risks and benefits as above, it is appropriate to consider amiodarone for rate control at this time  Currently on amiodarone infusion at 1 mg/min - continue   May need to consider DCCV during stay but will be limited by concerns with compliance   Another option eventually to consider may be AV marina ablation and PPM as a palliative care approach  Acute renal failure superimposed on stage 3 chronic kidney disease (HCC)  Lab Results   Component Value Date    EGFR 36 06/10/2025    EGFR 34 06/09/2025    EGFR 32 06/09/2025    CREATININE 1.95 (H) 06/10/2025    CREATININE 2.04 (H) 06/09/2025    CREATININE 2.13 (H) 06/09/2025   CKD stable  Likely CRS but improving   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  Most recent echocardiogram from April 2025 showed severely dilated left ventricle with LVEF of 15 to 20% with global hypokinesis.  Mild to moderate mitral regurgitation and severe tricuspid regurgitation.   Negative cardiac cath 2020   Cardiogenic shock (HCC)  Currently on milrinone infusion working on weaning off  Bb is  being held  Dig and amiodarone for rate control  Prognosis is guarded  Diuresing appropriately on lasix 40 IV daily  Palliative care encounter  Palliative to see patient today    Subjective     Pt reports breathing is stable. Reports nasal congestion but no other concerns at bedside.     Objective :  Temp:  [97.3 °F (36.3 °C)-98.8 °F (37.1 °C)] 98.1 °F (36.7 °C)  HR:  [103-124] 105  BP: (102-165)/() 103/76  Resp:  [11-35] 21  SpO2:  [87 %-99 %] 95 %  O2 Device: Nasal cannula  Nasal Cannula O2 Flow Rate (L/min):  [2 L/min-4 L/min] 2 L/min  Orthostatic Blood Pressures      Flowsheet Row Most Recent Value   Blood Pressure 103/76 filed at 06/10/2025 0723   Patient Position - Orthostatic VS Lying filed at 06/10/2025 0723          First Weight: Weight - Scale: 80.1 kg (176 lb 9.4 oz) (06/05/25 0855)  Vitals:    06/09/25 0600 06/10/25 0600   Weight: 80.4 kg (177 lb 4 oz) 80.4 kg (177 lb 4.8 oz)     Physical Exam  Vitals and nursing note reviewed.   HENT:      Head: Normocephalic.     Cardiovascular:      Rate and Rhythm: Rhythm irregular.      Heart sounds: Murmur heard.   Pulmonary:      Effort: Pulmonary effort is normal.      Breath sounds: No rales.   Abdominal:      Palpations: Abdomen is soft.     Musculoskeletal:         General: No swelling.      Cervical back: Neck supple.     Skin:     General: Skin is warm.      Capillary Refill: Capillary refill takes less than 2 seconds.     Neurological:      General: No focal deficit present.      Mental Status: He is alert and oriented to person, place, and time.           Lab Results: I have reviewed the following results:CBC/BMP:   .     06/10/25  0527   WBC 9.28   HGB 12.8   HCT 39.3      SODIUM 130*   K 5.1   CL 94*   CO2 30   BUN 68*   CREATININE 1.95*   GLUC 148*   CAIONIZED 1.09*   MG 2.3   PHOS 3.6      Results from last 7 days   Lab Units 06/10/25  0527 06/09/25  0504 06/06/25  0541   WBC Thousand/uL 9.28 8.68 9.56   HEMOGLOBIN g/dL 12.8 12.6 13.0    HEMATOCRIT % 39.3 38.9 40.7   PLATELETS Thousands/uL 209 246 230     Results from last 7 days   Lab Units 06/10/25  0527 06/09/25  2315 06/09/25  1206   POTASSIUM mmol/L 5.1 4.8 4.7   CHLORIDE mmol/L 94* 92* 93*   CO2 mmol/L 30 30 29   BUN mg/dL 68* 67* 66*   CREATININE mg/dL 1.95* 2.04* 2.13*   CALCIUM mg/dL 8.9 9.1 9.1     Results from last 7 days   Lab Units 06/09/25  0504 06/08/25  0551 06/07/25  0800 06/06/25  0541 06/05/25  1106   INR  3.34* 3.25* 3.73*   < > 8.98*   PTT seconds  --   --   --   --  69*    < > = values in this interval not displayed.     Lab Results   Component Value Date    HGBA1C 5.9 (H) 10/25/2021     Lab Results   Component Value Date    CKTOTAL 44 06/07/2025    TROPONINI 0.04 10/25/2021       Imaging Results Review: I reviewed radiology reports from this admission including: chest xray.  Other Study Results Review: EKG was reviewed.

## 2025-06-10 NOTE — ACP (ADVANCE CARE PLANNING)
Critical Care Advanced Care Planning Note  Britton Batista 60 y.o. male MRN: [id number]  Unit/Bed#: -01 Encounter: [id number]    Critical care team participated in ACP meeting in conjunction with palliative care.  Today's discussion, which was held at the bedside, included the patient, sister Arin, daughter Corinna, Dr. Myrick, and myself. Joining the meeting virtually was Palliative Care-JUSTICE Mathews PA-C.    Summary of Discussion:  Dr. Myrick provided a medical team update. Palliative care assisted with further discussion of goals of care. Patient expressed he wanted to continue treatment, even if it means repeated hospitalizations, as he feels he is currently has a good quality of life. Hospital code status and POLST form was discussed. Patient requesting more time to discuss with family regarding these decisions. Patient did say he is interested in following up with Palliative care after discharge.    Please see Palliative care note for additional details.      Total time spent, (30) minutes.      CODE STATUS: FULL CODE - Level 1  POA:  none  POLST:  none     SIGNATURE: LUIS ANGEL Lyman  DATE: Anan 10, 2025  TIME: 1:36 PM   asap, kidney stone

## 2025-06-10 NOTE — ASSESSMENT & PLAN NOTE
59yo M with PMH of combined systolic and diastolic heart failure s/p AICD, Afib, CKD who presented to [facility] for dysphagia, and sent by PCP for evaluation of supratherapeutic INR. 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  Pt also being worked up for tonsillitis.  Wt Readings from Last 3 Encounters:   06/10/25 80.4 kg (177 lb 4.8 oz)   06/05/25 81.1 kg (178 lb 12.7 oz)   05/04/25 79.2 kg (174 lb 9.7 oz)   4/25 TTE: LVEF 15-20%. Severe global hypokinesis. Moderate regurgitation of mitral valve. Limited views may underestimate severity of MR. Severe tricuspid regurgitation  Mgmt per primary team and cardiology

## 2025-06-10 NOTE — ASSESSMENT & PLAN NOTE
Currently on milrinone infusion working on weaning off  Bb is being held  Dig and amiodarone for rate control  Prognosis is guarded  Diuresing appropriately on lasix 40 IV daily

## 2025-06-10 NOTE — ASSESSMENT & PLAN NOTE
Wt Readings from Last 3 Encounters:   06/10/25 80.4 kg (177 lb 4.8 oz)   06/05/25 81.1 kg (178 lb 12.7 oz)   05/04/25 79.2 kg (174 lb 9.7 oz)     4/2025 TTE: 15-20% by visual estimation. Severe global hypokinesis.  Moderate regurgitation of mitral valve. Limited views may underestimate severity of MR. Severe tricuspid regurgitation with RVSP 41 mmHg.    BNP >4700  Diuretics: lasix 40 mg IV daily  Elevated LA likely secondary to hypoperfusion   Milrinone infusion weaned to 0.25 on 6/10  CVC in placed  Trend end points:Sv02 and lactic  Continue amio at 0.5 mg/hr  Hold BB while on milrinone  Continue digoxin 125 mcg EOD

## 2025-06-10 NOTE — ASSESSMENT & PLAN NOTE
Lab Results   Component Value Date    EGFR 36 06/10/2025    EGFR 34 06/09/2025    EGFR 32 06/09/2025    CREATININE 1.95 (H) 06/10/2025    CREATININE 2.04 (H) 06/09/2025    CREATININE 2.13 (H) 06/09/2025   CKD stable  Likely CRS but improving

## 2025-06-10 NOTE — ASSESSMENT & PLAN NOTE
Patient on coumadin at home for atrial fibrillation   Holding Coumadin in the setting of supratheapeutic INR  Trend INR  S/p 2.5mg Vit K on 6/5  Now resolved.

## 2025-06-10 NOTE — OCCUPATIONAL THERAPY NOTE
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)    Transaminitis    Tonsillitis    Supratherapeutic INR    Palliative care encounter    Goals of care, counseling/discussion    Past Medical History  Past Medical History[1]  Past Surgical History  Past Surgical History[2]       06/10/25 1223   Note Type   Note type Evaluation   Pain Assessment   Pain Assessment Tool 0-10   Pain Score 5   Pain Location/Orientation   (Throat)   Restrictions/Precautions   Other Precautions O2;Chair Alarm;Bed Alarm;Multiple lines;Telemetry;Fall Risk;Pain  (2 L O2 NC at start of session)   Home Living   Type of Home Apartment  (FF B HRs + landing + ~2 steps to enter)   Home Layout One level   Bathroom Shower/Tub Tub/shower unit   Bathroom Toilet Standard   Home Equipment Cane  (\"It's no good anymore\")   Additional Comments Wasn't using AD for functional mobility PTA, has an adjustable bed however prefers to sleep on the floor   Prior Function   Level of [place] Independent with ADLs;Independent with functional mobility   Lives With   (Brother)   Receives Help From Family   IADLs Family/Friend/Other provides transportation;Family/Friend/Other provides medication management;Independent with meal prep  (Pt has an electric bicycle)   Falls in the last 6 months 0   General   Family/Caregiver Present Yes  (Daughter and sister)   ADL   UB Dressing Assistance 6  Modified independent   LB Dressing Assistance 5  Supervision/Setup   LB Dressing Deficit Don/doff R sock;Don/doff L sock   Additional Comments Pt able to doff/don B socks while seated EOB. BUE assessment " "demonstrating ability to complete UB ADLs at mod I.   Bed Mobility   Supine to Sit 6  Modified independent   Additional items Increased time required   Additional Comments HOB flat, no bedrails   Transfers   Sit to Stand 5  Supervision   Additional items Verbal cues   Stand to Sit 5  Supervision   Additional items Verbal cues   Stand pivot 5  Supervision  (Close)   Additional items Verbal cues   Additional Comments no AD   Functional Mobility   Functional Mobility 5  Supervision  (Close)   Additional Comments Pt participated in short functional household distance in room with close supervision and no AD.   Balance   Static Sitting Normal   Dynamic Sitting Good   Static Standing Fair +   Dynamic Standing Fair   Ambulatory Fair   Activity Tolerance   Activity Tolerance Patient limited by fatigue  (HR elevates to ~130 bpm with activity)   Medical Staff Made Aware Ewa WHITMAN   Nurse Made Aware Liane ROBB   RUDAVID Assessment   RUE Assessment WFL   RUE Strength   R Shoulder Flexion 4+/5   LUE Assessment   LUE Assessment WFL   LUE Strength   L Shoulder Flexion 4/5   Hand Function   Gross Motor Coordination Functional   Fine Motor Coordination Functional   Hand Function Comments Pt is L hand dominant. L hand  strength > R.   Sensation   Additional Comments Pt with neuropathy, Raynaud's   Vision-Basic Assessment   Current Vision   (\"I have glasses I am supposed to wear, but I don't wear them\")   Cognition   Overall Cognitive Status WFL   Arousal/Participation Alert;Cooperative   Attention Within functional limits   Orientation Level Oriented X4   Memory Within functional limits   Following Commands Follows one step commands without difficulty   Assessment   Limitation Decreased ADL status;Decreased Safe judgement during ADL;Decreased endurance;Decreased self-care trans;Decreased high-level ADLs   Prognosis Good   Goals   Patient Goals to go home   Plan   Treatment Interventions ADL retraining;Functional transfer training;UE " strengthening/ROM;Endurance training;Patient/family training;Equipment evaluation/education;Compensatory technique education;Continued evaluation;Cardiac education;Energy conservation;Activityengagement   Goal Expiration Date 06/24/25   OT Frequency 2-3x/wk   Discharge Recommendation   Rehab Resource Intensity Level, OT III (Minimum Resource Intensity)   AM-PAC Daily Activity Inpatient   Lower Body Dressing 3   Bathing 3   Toileting 1  ([place])   Upper Body Dressing 4   Grooming 4   Eating 4   Daily Activity Raw Score 19   Daily Activity Standardized Score (Calc for Raw Score >=11) 40.22   AM-PAC Applied Cognition Inpatient   Following a Speech/Presentation 4   Understanding Ordinary Conversation 4   Taking Medications 4   Remembering Where Things Are Placed or Put Away 4   Remembering List of 4-5 Errands 4   Taking Care of Complicated Tasks 4   Applied Cognition Raw Score 24   Applied Cognition Standardized Score 62.21   End of Consult   Patient Position at End of Consult Supine;Bed/Chair alarm activated;All needs within reach     Assessment:  Pt is 60 y.o., male, evaluated at [facility] 06/10/25 due to Acute on chronic combined systolic (congestive) and diastolic (congestive) heart failure (HCC). OT order placed to assess Pt's ADLs, cognitive status, and performance during functional tasks in order to maximize safety and independence while making most appropriate d/c recommendations. An occupational profile and medical/therapy history were completed, including a extensive review of physical, cognitive, psychosocial history related to pt’s current functional performance. Pt with PMHx impacting their performance during ADL tasks include: abdominal pain, acute respiratory failure, anxiety, cardiomyopathy secondary to drug, CHF, coagulopathy, depression, lung emphysema, GERD, gout, hyperammonemia, HTN, metabolic encephalopathy, neuropathy, Raynaud's disease, scleroderma, R inguinal hernia, shock, L finger amputation index  finger, R middle finger amputation, persistent Afib, CKD, tonsillitis. Performance deficits/impairments identified at time of initial evaluation, that are impacting Pt's occupational performance and ability to safely return to PLOF, include decreased LB dressing, decreased toileting, decreased LB bathing, decreased personal hygiene, decreased grooming , decreased financial management, decreased medication management, decreased meal preparation, decreased rest/sleep hygiene, decreased leisure activities, decreased driving/community mobility, and decreased functional community distances/decreased endurance, decreased activity tolerance, impaired balance, pain, decreased safety awareness, and compromised cardiac function. Personal factors such as limited availability of support from family/caregiver, stair(s) to enter home environment, need for assistance with IADLs, fall risk, polypharmacy, health literacy, limited insight to deficits, decreased initiation and engagement, decreased community engagement, questionable noncompliance, hx of noncompliance, and polysubstance abuse, and medical complications of abnormal renal lab values, abnormal CBC, abnormal blood sugars, abnormal sodium values, new onset O2 use, need for input for mobility technique/safety, abnormal VBG, and urinary retention are also affecting pt and may cause a barrier to discharge. In consideration of history obtained, identification of performance deficits, comorbidities that affect occupational performance, clinical presentation/decision-making, and modification of tasks/assistance required to enable pt to complete evaluation components, this evaluation is determined to be of high complexity. Pt will benefit from continued skilled acute OT services to address barriers as defined above and to maximize level of independence/participation during ADLs and functional tasks to facilitate return toward PLOF and improved QoL. Pt would benefit from skilled OT  intervention to address comprehensive ADL, functional mobility, balance during functional tasks, and endurance to maximize the potential of meeting client centered goals. From an OT standpoint, Level III (Minimum Resource Intensity is recommended upon d/c.      The patient's raw score on the AM-PAC Daily Activity Inpatient Short Form is 19. A raw score of greater than or equal to 19 suggests the patient may benefit from discharge to home. Please refer to the recommendation of the Occupational Therapist for safe discharge planning.    Pt goals to be met by 6/24/2025:    Pt will demonstrate ability to complete grooming/hygiene tasks @ I after set-up.  Pt will demonstrate ability to complete supine<>sit @ I in order to increase safety and independence during ADL tasks.  Pt will demonstrate ability to complete UB ADLs including washing/dressing @ I in order to increase performance and participation during meaningful tasks  Pt will demonstrate ability to complete LB dressing @ I in order to increase safety and independence during meaningful tasks.   Pt will demonstrate ability to allegra/doff socks/shoes while sitting EOB/chair @ I in order to increase safety and independence during meaningful tasks.   Pt will demonstrate ability to complete toileting tasks including CM and pericare @ I in order to increase safety and independence during meaningful tasks.  Pt will demonstrate ability to complete EOB, chair, toilet/commode transfers @ I in order to increase performance and participation during functional tasks.  Pt will demonstrate ability to stand for 20 minutes while maintaining F+ balance with use of SPC for UB support PRN.  Pt will demonstrate ability to tolerate 30 minute OT session with no vc'ing for deep breathing or use of energy conservation techniques in order to increase activity tolerance during functional tasks.   Pt will demonstrate Good carryover of use of energy conservation/compensatory strategies during  "ADLs and functional tasks in order to increase safety and reduce risk for falls.   Pt will demonstrate Good attention and participation in continued evaluation of functional ambulation house hold distances in order to assist with safe d/c planning.  Pt will attend to continued cognitive assessments 100% of the time in order to provide most appropriate d/c recommendations.   Pt will follow 100% simple 2-step commands and be A&O x4 consistently with environmental cues to increase participation in functional activities.   Pt will identify 3 areas of interest/hobbies and 1 intervention on how to incorporate into daily life in order to increase interaction with environment and peers as well as increase participation in meaningful tasks.      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    CHF (congestive heart failure) (HCC)     Coagulopathy (HCC) 04/26/2025    Depression     Emphysema of lung (HCC)     GERD (gastroesophageal reflux disease)     Gout     Hyperammonemia (HCC) 04/26/2025    Hypertension     Metabolic encephalopathy 03/22/2025    Neuropathy     Raynaud's disease     Right inguinal hernia     Scleroderma (HCC)     Shock (HCC) 04/26/2025   [2]   Past Surgical History:  Procedure Laterality Date    AMPUTATION Right     pt had tip of \"pointer finger\" d/t scleraderma    CARDIAC PACEMAKER PLACEMENT      FINGER AMPUTATION Left 01/31/2024    Procedure: AMPUTATION FINGER, LEFT INDEX FINGER CPT: 16302;  Surgeon: Pedro Vazquez MD;  Location:  MAIN OR;  Service: Orthopedics    HERNIA REPAIR Left     times 2    CA AMP F/TH 1/2 JT/PHALANX W/NEURECT W/DIR CLSR Right 01/23/2024    Procedure: AMPUTATION FINGER, RIGHT MIDDLE;  Surgeon: Pedro Vazquez MD;  Location: [facility];  Service: Orthopedics    CA RPR 1ST INGUN HRNA AGE 5 " YRS/> REDUCIBLE Right 03/03/2023    Procedure: OPEN INGUINAL HERNIA REPAIR WITH MESH;  Surgeon: Temo Jackson DO;  Location:  MAIN OR;  Service: General

## 2025-06-10 NOTE — CASE MANAGEMENT
Case Management Discharge Planning Note    Patient name Britton Batista  Location /-01 MRN [id number]  : 1965 Date 6/10/2025       Current Admission Date: 2025  Current Admission Diagnosis:Acute on chronic combined systolic (congestive) and diastolic (congestive) heart failure (HCC)   Patient Active Problem List    Diagnosis Date Noted    Goals of care, counseling/discussion 06/10/2025    Palliative care encounter 2025    Tonsillitis 2025    Supratherapeutic INR 2025    Electrolyte abnormality 2025    Transaminitis 2025    Incarcerated umbilical hernia 2025    Pulmonary fibrosis (HCC) 2025    Longstanding persistent atrial fibrillation (HCC) 2025    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    Scleroderma (HCC) 2025    Acute renal failure superimposed on stage 3 chronic kidney disease (HCC) 2025    Cardiogenic shock (HCC) 2025    Atrial fibrillation with RVR (HCC) 2025    Syncope 2024    Chest pain 2024    Acute on chronic systolic congestive heart failure (HCC) 2024    Primary hypertension 2024    Hypomagnesemia 2023    Methamphetamine abuse (HCC) 2023    Tobacco abuse 2023    D-dimer, elevated 2023    Cardiomyopathy (HCC) 10/09/2023    Acute kidney injury superimposed on stage 3a chronic kidney disease (HCC) 10/08/2023    Acute on chronic combined systolic (congestive) and diastolic (congestive) heart failure (HCC) 10/07/2023    Hypertensive emergency 10/07/2023    Anxiety     Depression     Other emphysema (HCC)     GERD (gastroesophageal reflux disease)     Gout     Hypertensive urgency     Neuropathy     Raynaud's disease     Unilateral inguinal hernia, without obstruction or gangrene, not specified as recurrent     PSS (progressive systemic sclerosis) (HCC) 10/25/2021    Near syncope 10/25/2021    Osteomyelitis ([facility]) 10/25/2021    Elevated troponin  10/25/2021    Acute encephalopathy 10/25/2021      LOS (days): 5  Geometric Mean LOS (GMLOS) (days): 3.5  Days to GMLOS:-1.6     OBJECTIVE:  Risk of Unplanned Readmission Score: 71.23         Current admission status: Inpatient   Preferred Pharmacy:   [facility] - [place], [place] - [address]  [address] [facility]  [place] [address]  Phone: [phone] Fax: [phone]    [facility]  [place] - [address]  [facility],  [address]  [facility],  First [address] [facility] [place] [address]  Phone: [phone] Fax: [phone]    [facility]/pharmacy #[id number] - [place], [place] - [address] [address]  Phone: [phone] Fax: [phone]    Primary Care Provider: Juan Galindo DO    Primary Insurance: [place]  REP  Secondary Insurance:     DISCHARGE DETAILS:     Recommendation is Level 3 for DC.  Reviewed Palliative Care Note-  Palliative Care can follow as an OP. In regards to Home Palliative Care programs with [facility], [facility] does not accept patients insurance.   [facility] a [facility] program, however [facility].  Referral was made for HHC and requested [facility] with [facility].  Will follow up with options for HHC for dc.

## 2025-06-10 NOTE — PROGRESS NOTES
Patient:    MRN:  [id number]    Aidin Request ID:  [id number]    Level of care reserved:  Home Health Agency    Partner Reserved:  [facility] [place] [address] [phone]    Clinical needs requested:  skilled nursing, physical therapy, occupational therapy    Geography searched:  [id number]    Start of Service:    Request sent:  1:30pm EDT on 6/10/2025 by Emily Mendoza    Partner reserved:  4:17pm EDT on 6/10/2025 by Emily Mendoza    Choice list shared:  3:33pm EDT on 6/10/2025 by Emily Mendoza

## 2025-06-10 NOTE — PLAN OF CARE
Problem: PAIN - ADULT  Goal: Verbalizes/displays adequate comfort level or baseline comfort level  Description: Interventions:  - Encourage patient to monitor pain and request assistance  - Assess pain using appropriate pain scale0-10  - Administer analgesics as ordered based on type and severity of pain and evaluate response  - Implement non-pharmacological measures as appropriate and evaluate response  - Consider cultural and social influences on pain and pain management  - Notify physician/advanced practitioner if interventions unsuccessful or patient reports new pain  - Educate patient/family on pain management process including their role and importance of  reporting pain   - Provide non-pharmacologic/complimentary pain relief interventions  6/10/2025 0810 by Liane Berger RN  Outcome: Progressing  6/10/2025 0806 by Liane Berger RN  Outcome: Progressing     Problem: INFECTION - ADULT  Goal: Absence or prevention of progression during hospitalization  Description: INTERVENTIONS:  - Assess and monitor for signs and symptoms of infection  - Monitor lab/diagnostic results  - Monitor all insertion sites, i.e. indwelling lines, tubes, and drains  - Monitor endotracheal if appropriate and nasal secretions for changes in amount and color  - [place] appropriate cooling/warming therapies per order  - Administer medications as ordered  - Instruct and encourage patient and family to use good hand hygiene technique  - Identify and instruct in appropriate isolation precautions for identified infection/condition  6/10/2025 0810 by Liane Berger RN  Outcome: Progressing  6/10/2025 0806 by Liane Berger RN  Outcome: Progressing  Goal: Absence of fever/infection during neutropenic period  Description: INTERVENTIONS:  - Monitor WBC  - Perform strict hand hygiene  - Limit to healthy visitors only  - No plants, dried, fresh or silk flowers with chaidez in patient room  6/10/2025 0810 by Liane Berger RN  Outcome:  Progressing  6/10/2025 0806 by Liane Berger RN  Outcome: Progressing     Problem: SAFETY ADULT  Goal: Patient will remain free of falls  Description: INTERVENTIONS:  - Educate patient/family on patient safety including physical limitations  - Instruct patient to call for assistance with activity   - Consider consulting OT/PT to assist with strengthening/mobility based on AM PAC & JH-HLM score  - Consult OT/PT to assist with strengthening/mobility   - Keep Call bell within reach  - Keep bed low and locked with side rails adjusted as appropriate  - Keep care items and personal belongings within reach  - Initiate and maintain comfort rounds  - Make Fall Risk Sign visible to staff  - Offer Toileting every 2 Hours, in advance of need  - Initiate/Maintain bed alarm  - Obtain necessary fall risk management equipment: yellow socks  - Apply yellow socks and bracelet for high fall risk patients  - Consider moving patient to room near nurses station  6/10/2025 0810 by Liane Berger RN  Outcome: Progressing  6/10/2025 0806 by Liane Berger RN  Outcome: Progressing  Goal: Maintain or return to baseline ADL function  Description: INTERVENTIONS:  -  Assess patient's ability to carry out ADLs; assess patient's baseline for ADL function and identify physical deficits which impact ability to perform ADLs (bathing, care of mouth/teeth, toileting, grooming, dressing, etc.)  - Assess/evaluate cause of self-care deficits   - Assess range of motion  - Assess patient's mobility; develop plan if impaired  - Assess patient's need for assistive devices and provide as appropriate  - Encourage maximum independence but intervene and supervise when necessary  - Involve family in performance of ADLs  - Assess for home care needs following discharge   - Consider OT consult to assist with ADL evaluation and planning for discharge  - Provide patient education as appropriate  - Monitor functional capacity and physical performance, use of AM PAC &  JH-HLM   - Monitor gait, balance and fatigue with ambulation    6/10/2025 0810 by Liane Berger RN  Outcome: Progressing  6/10/2025 0806 by Liane Berger RN  Outcome: Progressing  Goal: Maintains/Returns to pre admission functional level  Description: INTERVENTIONS:  - Perform AM-PAC 6 Click Basic Mobility/ Daily Activity assessment daily.  - Set and communicate daily mobility goal to care team and patient/family/caregiver.   - Collaborate with rehabilitation services on mobility goals if consulted  - Out of bed for toileting  - Record patient progress and toleration of activity level   6/10/2025 0810 by Liane Berger RN  Outcome: Progressing  6/10/2025 0806 by Liane Berger RN  Outcome: Progressing

## 2025-06-10 NOTE — ASSESSMENT & PLAN NOTE
61yo M with PMH of combined systolic and diastolic heart failure s/p AICD, Afib, CKD who presented to [facility] for dysphagia, and sent by PCP for evaluation of supratherapeutic INR. 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  Pt also being worked up for tonsillitis.  Wt Readings from Last 3 Encounters:   06/10/25 80.4 kg (177 lb 4.8 oz)   06/05/25 81.1 kg (178 lb 12.7 oz)   05/04/25 79.2 kg (174 lb 9.7 oz)   4/25 TTE: LVEF 15-20%. Severe global hypokinesis. Moderate regurgitation of mitral valve. Limited views may underestimate severity of MR. Severe tricuspid regurgitation  Mgmt per primary team and cardiology

## 2025-06-10 NOTE — ASSESSMENT & PLAN NOTE
This is improving with AST decreased to 270 and  and total bilirubin improving as well.  Query sepsis versus congestive hepatopathy

## 2025-06-10 NOTE — ASSESSMENT & PLAN NOTE
Decisional apparatus: Patient does have capacity on exam today.  If capacity is lost, patient's substitute decision maker would default to adult children per [place] Act 169  Advance Directive / Living Will / POLST / POA Forms: No. Reviewed POLST form. Pt to consider and may complete during this admission with ICU provider. Advised to name a primary HCR to streamline surrogate decisions in the future if needed.     Goals  Level 1 code status.   Full code.   Disease focused care.   No limits placed.   Family meeting today to discuss GOC  Patient is disease-focused and wants to prolong survivability and reports good QoL  Would like to remain independent in ADLs and be able to return home  Intends to avoid illicit drug use and be compliant with medical recommendations  Would not want to be kept alive by artificial means indefinitely   Would be interested in outpatient [facility] follow up. Placed on hospital follow up list. Has transport challenges. Can attempt virtual follow ups or referral to [facility] palliative agency.

## 2025-06-10 NOTE — PROGRESS NOTES
Pastoral Care Progress Note  Fr Landaverde provided communion.         [facility] notified that patient's chart is ready for review.

## 2025-06-10 NOTE — ASSESSMENT & PLAN NOTE
Wt Readings from Last 3 Encounters:   06/10/25 80.4 kg (177 lb 4.8 oz)   06/05/25 81.1 kg (178 lb 12.7 oz)   05/04/25 79.2 kg (174 lb 9.7 oz)   Continue management per cardiology colleagues ICU team; the patient is currently on amiodarone infusion 1 mg/h.  Monitor tachycardia while on milrinone

## 2025-06-10 NOTE — PLAN OF CARE
Problem: OCCUPATIONAL THERAPY ADULT  Goal: Performs self-care activities at highest level of function for planned discharge setting.  See evaluation for individualized goals.  Description: Treatment Interventions: ADL retraining, Functional transfer training, UE strengthening/ROM, Endurance training, Patient/family training, Equipment evaluation/education, Compensatory technique education, Continued evaluation, Cardiac education, Energy conservation, Activityengagement          See flowsheet documentation for full assessment, interventions and recommendations.   Note: Limitation: Decreased ADL status, Decreased Safe judgement during ADL, Decreased endurance, Decreased self-care trans, Decreased high-level ADLs  Prognosis: Good  Assessment: Pt is 60 y.o., male, evaluated at [facility] 06/10/25 due to Acute on chronic combined systolic (congestive) and diastolic (congestive) heart failure (HCC). OT order placed to assess Pt's ADLs, cognitive status, and performance during functional tasks in order to maximize safety and independence while making most appropriate d/c recommendations. An occupational profile and medical/therapy history were completed, including a extensive review of physical, cognitive, psychosocial history related to pt’s current functional performance. Pt with PMHx impacting their performance during ADL tasks include: abdominal pain, acute respiratory failure, anxiety, cardiomyopathy secondary to drug, CHF, coagulopathy, depression, lung emphysema, GERD, gout, hyperammonemia, HTN, metabolic encephalopathy, neuropathy, Raynaud's disease, scleroderma, R inguinal hernia, shock, L finger amputation index finger, R middle finger amputation, persistent Afib, CKD, tonsillitis. Performance deficits/impairments identified at time of initial evaluation, that are impacting Pt's occupational performance and ability to safely return to [facility], include decreased LB dressing, decreased toileting, decreased LB bathing,  decreased personal hygiene, decreased grooming , decreased financial management, decreased medication management, decreased meal preparation, decreased rest/sleep hygiene, decreased leisure activities, decreased driving/community mobility, and decreased functional community distances/decreased endurance, decreased activity tolerance, impaired balance, pain, decreased safety awareness, and compromised cardiac function. Personal factors such as limited availability of support from family/caregiver, stair(s) to enter home environment, need for assistance with IADLs, fall risk, polypharmacy, health literacy, limited insight to deficits, decreased initiation and engagement, decreased community engagement, questionable noncompliance, hx of noncompliance, and polysubstance abuse, and medical complications of abnormal renal lab values, abnormal CBC, abnormal blood sugars, abnormal sodium values, new onset O2 use, need for input for mobility technique/safety, abnormal VBG, and urinary retention are also affecting pt and may cause a barrier to discharge. In consideration of history obtained, identification of performance deficits, comorbidities that affect occupational performance, clinical presentation/decision-making, and modification of tasks/assistance required to enable pt to complete eval     Rehab Resource Intensity Level, OT: III (Minimum Resource Intensity)

## 2025-06-10 NOTE — ASSESSMENT & PLAN NOTE
Social support  Patient is supported by sister, daughter  Supportive listening provided  Normalized experience of patient/family  Provided anxiety containment  Provided anticipatory guidance  Investigated spiritual needs -  following    Follow up  Palliative Care will continue to follow  Please reach out to on-call provider via Epic Secure Chat  if questions or concerns arise.  Please do not hesitate to reach our on call provider through our clinic answering service at [phone] should you have acute symptom control concerns.

## 2025-06-10 NOTE — ASSESSMENT & PLAN NOTE
Continue digoxin 125  DC BB while on milrinone  Continue amio at 0.5   Goal HR <120  Continue to monitor on telemetry   Resume warfarin 6/10, daily INR, goal INR 2-3

## 2025-06-10 NOTE — ASSESSMENT & PLAN NOTE
59yo M with PMH of combined systolic and diastolic heart failure s/p AICD, Afib, CKD who presented to [facility] for dysphagia, and sent by PCP for evaluation of supratherapeutic INR. 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  Pt also being worked up for tonsillitis.  Wt Readings from Last 3 Encounters:   06/10/25 80.4 kg (177 lb 4.8 oz)   06/05/25 81.1 kg (178 lb 12.7 oz)   05/04/25 79.2 kg (174 lb 9.7 oz)   4/25 TTE: LVEF 15-20%. Severe global hypokinesis. Moderate regurgitation of mitral valve. Limited views may underestimate severity of MR. Severe tricuspid regurgitation  Mgmt per primary team and cardiology

## 2025-06-10 NOTE — ASSESSMENT & PLAN NOTE
4/2025 TTE: 15-20% by visual estimation. Severe global hypokinesis.  Moderate regurgitation of mitral valve. Limited views may underestimate severity of MR. Severe tricuspid regurgitation with RVSP 41 mmHg.        See above under acute diastolic and systolic HF  Patient interested in D&A rehab. CM on consult.

## 2025-06-10 NOTE — ASSESSMENT & PLAN NOTE
Continue management per cardiology colleagues.  INR 8.8 on admission.  Most recent INR is 3.3 on June 9.

## 2025-06-10 NOTE — PLAN OF CARE
Problem: PAIN - ADULT  Goal: Verbalizes/displays adequate comfort level or baseline comfort level  Description: Interventions:  - Encourage patient to monitor pain and request assistance  - Assess pain using appropriate pain scale0-10  - Administer analgesics as ordered based on type and severity of pain and evaluate response  - Implement non-pharmacological measures as appropriate and evaluate response  - Consider cultural and social influences on pain and pain management  - Notify physician/advanced practitioner if interventions unsuccessful or patient reports new pain  - Educate patient/family on pain management process including their role and importance of  reporting pain   - Provide non-pharmacologic/complimentary pain relief interventions  Outcome: Progressing     Problem: INFECTION - ADULT  Goal: Absence or prevention of progression during hospitalization  Description: INTERVENTIONS:  - Assess and monitor for signs and symptoms of infection  - Monitor lab/diagnostic results  - Monitor all insertion sites, i.e. indwelling lines, tubes, and drains  - Monitor endotracheal if appropriate and nasal secretions for changes in amount and color  - [place] appropriate cooling/warming therapies per order  - Administer medications as ordered  - Instruct and encourage patient and family to use good hand hygiene technique  - Identify and instruct in appropriate isolation precautions for identified infection/condition  Outcome: Progressing  Goal: Absence of fever/infection during neutropenic period  Description: INTERVENTIONS:  - Monitor WBC  - Perform strict hand hygiene  - Limit to healthy visitors only  - No plants, dried, fresh or silk flowers with chaidez in patient room  Outcome: Progressing     Problem: SAFETY ADULT  Goal: Patient will remain free of falls  Description: INTERVENTIONS:  - Educate patient/family on patient safety including physical limitations  - Instruct patient to call for assistance with activity    - Consider consulting OT/PT to assist with strengthening/mobility based on AM PAC & JH-HLM score  - Consult OT/PT to assist with strengthening/mobility   - Keep Call bell within reach  - Keep bed low and locked with side rails adjusted as appropriate  - Keep care items and personal belongings within reach  - Initiate and maintain comfort rounds  - Make Fall Risk Sign visible to staff  - Offer Toileting every 2 Hours, in advance of need  - Initiate/Maintain bed alarm  - Obtain necessary fall risk management equipment: yellow socks  - Apply yellow socks and bracelet for high fall risk patients  - Consider moving patient to room near nurses station  Outcome: Progressing  Goal: Maintain or return to baseline ADL function  Description: INTERVENTIONS:  -  Assess patient's ability to carry out ADLs; assess patient's baseline for ADL function and identify physical deficits which impact ability to perform ADLs (bathing, care of mouth/teeth, toileting, grooming, dressing, etc.)  - Assess/evaluate cause of self-care deficits   - Assess range of motion  - Assess patient's mobility; develop plan if impaired  - Assess patient's need for assistive devices and provide as appropriate  - Encourage maximum independence but intervene and supervise when necessary  - Involve family in performance of ADLs  - Assess for home care needs following discharge   - Consider OT consult to assist with ADL evaluation and planning for discharge  - Provide patient education as appropriate  - Monitor functional capacity and physical performance, use of AM PAC & JH-HLM   - Monitor gait, balance and fatigue with ambulation    Outcome: Progressing  Goal: Maintains/Returns to pre admission functional level  Description: INTERVENTIONS:  - Perform AM-PAC 6 Click Basic Mobility/ Daily Activity assessment daily.  - Set and communicate daily mobility goal to care team and patient/family/caregiver.   - Collaborate with rehabilitation services on mobility  goals if consulted  - Out of bed for toileting  - Record patient progress and toleration of activity level   Outcome: Progressing

## 2025-06-10 NOTE — ASSESSMENT & PLAN NOTE
Consult placed to palliative care given patient's multiple comorbidities including end-stage heart failure and chronic pain in the setting of scleroderma  Patient interested in following with palliative care after discharge  Patient discussing code status and POLST with family

## 2025-06-11 PROBLEM — J03.90 TONSILLITIS: Status: RESOLVED | Noted: 2025-06-05 | Resolved: 2025-06-11

## 2025-06-11 LAB
ALBUMIN SERPL BCG-MCNC: 3.3 G/DL (ref 3.5–5)
ALP SERPL-CCNC: 132 U/L (ref 34–104)
ALT SERPL W P-5'-P-CCNC: 250 U/L (ref 7–52)
ANION GAP SERPL CALCULATED.3IONS-SCNC: 8 MMOL/L (ref 4–13)
ANION GAP SERPL CALCULATED.3IONS-SCNC: 9 MMOL/L (ref 4–13)
AST SERPL W P-5'-P-CCNC: 218 U/L (ref 13–39)
BASE EX.OXY STD BLDV CALC-SCNC: 61.7 % (ref 60–80)
BASE EX.OXY STD BLDV CALC-SCNC: 65.2 % (ref 60–80)
BASE EX.OXY STD BLDV CALC-SCNC: 67.5 % (ref 60–80)
BASE EXCESS BLDV CALC-SCNC: 1.5 MMOL/L
BASE EXCESS BLDV CALC-SCNC: 2.6 MMOL/L
BASE EXCESS BLDV CALC-SCNC: 5.5 MMOL/L
BILIRUB DIRECT SERPL-MCNC: 0.73 MG/DL (ref 0–0.2)
BILIRUB SERPL-MCNC: 1.58 MG/DL (ref 0.2–1)
BODY TEMPERATURE: 97.7 DEGREES FEHRENHEIT
BODY TEMPERATURE: 97.7 DEGREES FEHRENHEIT
BUN SERPL-MCNC: 46 MG/DL (ref 5–25)
BUN SERPL-MCNC: 53 MG/DL (ref 5–25)
CA-I BLD-SCNC: 1.1 MMOL/L (ref 1.12–1.32)
CALCIUM SERPL-MCNC: 8.6 MG/DL (ref 8.4–10.2)
CALCIUM SERPL-MCNC: 8.9 MG/DL (ref 8.4–10.2)
CHLORIDE SERPL-SCNC: 93 MMOL/L (ref 96–108)
CHLORIDE SERPL-SCNC: 96 MMOL/L (ref 96–108)
CO2 SERPL-SCNC: 28 MMOL/L (ref 21–32)
CO2 SERPL-SCNC: 33 MMOL/L (ref 21–32)
CREAT SERPL-MCNC: 1.8 MG/DL (ref 0.6–1.3)
CREAT SERPL-MCNC: 1.86 MG/DL (ref 0.6–1.3)
DIGOXIN SERPL-MCNC: 0.8 NG/ML (ref 0.8–2)
ERYTHROCYTE [DISTWIDTH] IN BLOOD BY AUTOMATED COUNT: 17.4 % (ref 11.6–15.1)
EST. AVERAGE GLUCOSE BLD GHB EST-MCNC: 151 MG/DL
GFR SERPL CREATININE-BSD FRML MDRD: 38 ML/MIN/1.73SQ M
GFR SERPL CREATININE-BSD FRML MDRD: 40 ML/MIN/1.73SQ M
GLUCOSE SERPL-MCNC: 102 MG/DL (ref 65–140)
GLUCOSE SERPL-MCNC: 110 MG/DL (ref 65–140)
GLUCOSE SERPL-MCNC: 131 MG/DL (ref 65–140)
GLUCOSE SERPL-MCNC: 139 MG/DL (ref 65–140)
GLUCOSE SERPL-MCNC: 142 MG/DL (ref 65–140)
GLUCOSE SERPL-MCNC: 87 MG/DL (ref 65–140)
HBA1C MFR BLD: 6.9 %
HCO3 BLDV-SCNC: 26.5 MMOL/L (ref 24–30)
HCO3 BLDV-SCNC: 27.1 MMOL/L (ref 24–30)
HCO3 BLDV-SCNC: 30.1 MMOL/L (ref 24–30)
HCT VFR BLD AUTO: 38.1 % (ref 36.5–49.3)
HGB BLD-MCNC: 12.3 G/DL (ref 12–17)
INR PPP: 2.28 (ref 0.85–1.19)
LACTATE SERPL-SCNC: 1.6 MMOL/L (ref 0.5–2)
LACTATE SERPL-SCNC: 1.6 MMOL/L (ref 0.5–2)
LACTATE SERPL-SCNC: 2.2 MMOL/L (ref 0.5–2)
LACTATE SERPL-SCNC: 2.3 MMOL/L (ref 0.5–2)
MAGNESIUM SERPL-MCNC: 2 MG/DL (ref 1.9–2.7)
MCH RBC QN AUTO: 28.8 PG (ref 26.8–34.3)
MCHC RBC AUTO-ENTMCNC: 32.3 G/DL (ref 31.4–37.4)
MCV RBC AUTO: 89 FL (ref 82–98)
NASAL CANNULA: 2
NASAL CANNULA: 2
O2 CT BLDV-SCNC: 11.5 ML/DL
O2 CT BLDV-SCNC: 12.6 ML/DL
O2 CT BLDV-SCNC: 12.7 ML/DL
PCO2 BLD: 42.4 MM HG (ref 42–50)
PCO2 BLD: 43 MM HG (ref 42–50)
PCO2 BLDV: 41.3 MM HG (ref 42–50)
PCO2 BLDV: 43.3 MM HG (ref 42–50)
PCO2 BLDV: 43.9 MM HG (ref 42–50)
PH BLD: 7.41 [PH] (ref 7.3–7.4)
PH BLD: 7.46 [PH] (ref 7.3–7.4)
PH BLDV: 7.41 [PH] (ref 7.3–7.4)
PH BLDV: 7.43 [PH] (ref 7.3–7.4)
PH BLDV: 7.45 [PH] (ref 7.3–7.4)
PHOSPHATE SERPL-MCNC: 3 MG/DL (ref 2.3–4.1)
PLATELET # BLD AUTO: 193 THOUSANDS/UL (ref 149–390)
PMV BLD AUTO: 9.5 FL (ref 8.9–12.7)
PO2 BLDV: 34.4 MM HG (ref 35–45)
PO2 BLDV: 35.3 MM HG (ref 35–45)
PO2 BLDV: 37.4 MM HG (ref 35–45)
PO2 VENOUS TEMP CORRECTED: 34.1 MM HG (ref 35–45)
PO2 VENOUS TEMP CORRECTED: 36.1 MM HG (ref 35–45)
POTASSIUM SERPL-SCNC: 3.9 MMOL/L (ref 3.5–5.3)
POTASSIUM SERPL-SCNC: 4.2 MMOL/L (ref 3.5–5.3)
PROT SERPL-MCNC: 6 G/DL (ref 6.4–8.4)
PROTHROMBIN TIME: 25.3 SECONDS (ref 12.3–15)
RBC # BLD AUTO: 4.27 MILLION/UL (ref 3.88–5.62)
SODIUM SERPL-SCNC: 133 MMOL/L (ref 135–147)
SODIUM SERPL-SCNC: 134 MMOL/L (ref 135–147)
WBC # BLD AUTO: 11.16 THOUSAND/UL (ref 4.31–10.16)

## 2025-06-11 PROCEDURE — 94640 AIRWAY INHALATION TREATMENT: CPT

## 2025-06-11 PROCEDURE — 85027 COMPLETE CBC AUTOMATED: CPT | Performed by: NURSE PRACTITIONER

## 2025-06-11 PROCEDURE — 80076 HEPATIC FUNCTION PANEL: CPT | Performed by: NURSE PRACTITIONER

## 2025-06-11 PROCEDURE — 85610 PROTHROMBIN TIME: CPT | Performed by: NURSE PRACTITIONER

## 2025-06-11 PROCEDURE — 82805 BLOOD GASES W/O2 SATURATION: CPT | Performed by: NURSE PRACTITIONER

## 2025-06-11 PROCEDURE — 80162 ASSAY OF DIGOXIN TOTAL: CPT

## 2025-06-11 PROCEDURE — 83735 ASSAY OF MAGNESIUM: CPT | Performed by: NURSE PRACTITIONER

## 2025-06-11 PROCEDURE — 83605 ASSAY OF LACTIC ACID: CPT

## 2025-06-11 PROCEDURE — 99233 SBSQ HOSP IP/OBS HIGH 50: CPT | Performed by: ANESTHESIOLOGY

## 2025-06-11 PROCEDURE — 83036 HEMOGLOBIN GLYCOSYLATED A1C: CPT

## 2025-06-11 PROCEDURE — 82805 BLOOD GASES W/O2 SATURATION: CPT

## 2025-06-11 PROCEDURE — 94760 N-INVAS EAR/PLS OXIMETRY 1: CPT

## 2025-06-11 PROCEDURE — 82948 REAGENT STRIP/BLOOD GLUCOSE: CPT

## 2025-06-11 PROCEDURE — 80048 BASIC METABOLIC PNL TOTAL CA: CPT

## 2025-06-11 PROCEDURE — 84100 ASSAY OF PHOSPHORUS: CPT | Performed by: NURSE PRACTITIONER

## 2025-06-11 PROCEDURE — 83605 ASSAY OF LACTIC ACID: CPT | Performed by: PHYSICIAN ASSISTANT

## 2025-06-11 PROCEDURE — 83605 ASSAY OF LACTIC ACID: CPT | Performed by: NURSE PRACTITIONER

## 2025-06-11 PROCEDURE — 99232 SBSQ HOSP IP/OBS MODERATE 35: CPT | Performed by: INTERNAL MEDICINE

## 2025-06-11 PROCEDURE — 82330 ASSAY OF CALCIUM: CPT | Performed by: NURSE PRACTITIONER

## 2025-06-11 RX ORDER — AMOXICILLIN 250 MG
1 CAPSULE ORAL
Status: DISCONTINUED | OUTPATIENT
Start: 2025-06-11 | End: 2025-06-14 | Stop reason: HOSPADM

## 2025-06-11 RX ORDER — AMIODARONE HYDROCHLORIDE 200 MG/1
200 TABLET ORAL
Status: DISCONTINUED | OUTPATIENT
Start: 2025-06-12 | End: 2025-06-14 | Stop reason: HOSPADM

## 2025-06-11 RX ORDER — AMOXICILLIN 250 MG
1 CAPSULE ORAL
Status: DISCONTINUED | OUTPATIENT
Start: 2025-06-11 | End: 2025-06-11

## 2025-06-11 RX ADMIN — AMIODARONE HYDROCHLORIDE 0.5 MG/MIN: 50 INJECTION, SOLUTION INTRAVENOUS at 06:16

## 2025-06-11 RX ADMIN — FLUTICASONE PROPIONATE 1 SPRAY: 50 SPRAY, METERED NASAL at 10:38

## 2025-06-11 RX ADMIN — DEXTROSE 150 MG: 50 INJECTION, SOLUTION INTRAVENOUS at 10:29

## 2025-06-11 RX ADMIN — OXYMETAZOLINE HYDROCHLORIDE 2 SPRAY: 0.5 SPRAY NASAL at 10:38

## 2025-06-11 RX ADMIN — DIPHENHYDRAMINE HYDROCHLORIDE AND LIDOCAINE HYDROCHLORIDE AND ALUMINUM HYDROXIDE AND MAGNESIUM HYDRO 10 ML: KIT at 00:14

## 2025-06-11 RX ADMIN — MORPHINE SULFATE 2 MG: 2 INJECTION, SOLUTION INTRAMUSCULAR; INTRAVENOUS at 01:40

## 2025-06-11 RX ADMIN — IPRATROPIUM BROMIDE AND ALBUTEROL SULFATE 3 ML: 2.5; .5 SOLUTION RESPIRATORY (INHALATION) at 13:54

## 2025-06-11 RX ADMIN — Medication 400 MG: at 08:29

## 2025-06-11 RX ADMIN — WARFARIN SODIUM 3 MG: 3 TABLET ORAL at 19:16

## 2025-06-11 RX ADMIN — CHLORHEXIDINE GLUCONATE 0.12% ORAL RINSE 15 ML: 1.2 LIQUID ORAL at 08:29

## 2025-06-11 RX ADMIN — MORPHINE SULFATE 2 MG: 2 INJECTION, SOLUTION INTRAMUSCULAR; INTRAVENOUS at 08:30

## 2025-06-11 RX ADMIN — IPRATROPIUM BROMIDE AND ALBUTEROL SULFATE 3 ML: 2.5; .5 SOLUTION RESPIRATORY (INHALATION) at 07:36

## 2025-06-11 RX ADMIN — DIGOXIN 125 MCG: 125 TABLET ORAL at 08:29

## 2025-06-11 RX ADMIN — NICOTINE 1 PATCH: 14 PATCH, EXTENDED RELEASE TRANSDERMAL at 10:40

## 2025-06-11 RX ADMIN — SENNOSIDES AND DOCUSATE SODIUM 1 TABLET: 50; 8.6 TABLET ORAL at 21:04

## 2025-06-11 RX ADMIN — PANTOPRAZOLE SODIUM 40 MG: 40 TABLET, DELAYED RELEASE ORAL at 06:16

## 2025-06-11 RX ADMIN — MILRINONE LACTATE IN DEXTROSE 0.25 MCG/KG/MIN: 200 INJECTION, SOLUTION INTRAVENOUS at 01:37

## 2025-06-11 RX ADMIN — DIPHENHYDRAMINE HYDROCHLORIDE AND LIDOCAINE HYDROCHLORIDE AND ALUMINUM HYDROXIDE AND MAGNESIUM HYDRO 10 ML: KIT at 07:14

## 2025-06-11 RX ADMIN — MILRINONE LACTATE IN DEXTROSE 0.13 MCG/KG/MIN: 200 INJECTION, SOLUTION INTRAVENOUS at 21:13

## 2025-06-11 RX ADMIN — MIRTAZAPINE 15 MG: 15 TABLET, FILM COATED ORAL at 21:04

## 2025-06-11 RX ADMIN — TRIMETHOBENZAMIDE HYDROCHLORIDE 200 MG: 100 INJECTION INTRAMUSCULAR at 18:30

## 2025-06-11 RX ADMIN — DIPHENHYDRAMINE HYDROCHLORIDE AND LIDOCAINE HYDROCHLORIDE AND ALUMINUM HYDROXIDE AND MAGNESIUM HYDRO 10 ML: KIT at 21:08

## 2025-06-11 RX ADMIN — FUROSEMIDE 40 MG: 10 INJECTION, SOLUTION INTRAMUSCULAR; INTRAVENOUS at 08:29

## 2025-06-11 RX ADMIN — HYDROXYZINE HYDROCHLORIDE 25 MG: 25 TABLET, FILM COATED ORAL at 14:52

## 2025-06-11 RX ADMIN — ACETAMINOPHEN 650 MG: 325 TABLET, FILM COATED ORAL at 14:52

## 2025-06-11 NOTE — PROGRESS NOTES
Progress Note - Cardiology   Name: Britton Batista 60 y.o. male I MRN: [id number]  Unit/Bed#: -01 I Date of Admission: 6/5/2025   Date of Service: 6/11/2025 I Hospital Day: 6    Assessment & Plan  Atrial fibrillation with RVR (HCC)  Patient is known to have atrial fibrillation which has been persistent.    Initially scheduled for DCCV after therapeutic inrs but has had labile inrs   Cost prohibitive issues with DOACs   Remains in afib with rvr during admission  Not candidate for most AADs given cardiomyopathy and renal dysfunction  Cannot be on CCBs given low LVEF   In cardiogenic shock currently limiting use of BB at present time  With worsening renal function digoxin use is only EOD  Given risks and benefits as above, it is appropriate to consider amiodarone for rate control at this time  Currently on amiodarone infusion at 0.5 mg/min - continue   At this point will give another dose of digoxin today.   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  Most recent echocardiogram from April 2025 showed severely dilated left ventricle with LVEF of 15 to 20% with global hypokinesis.  Mild to moderate mitral regurgitation and severe tricuspid regurgitation.   Negative cardiac cath 2020   Cardiogenic shock (HCC)  Currently on milrinone infusion working on weaning off  Bb is being held  Dig and amiodarone for rate control  Prognosis is guarded  Diuresing appropriately on lasix 40 IV  daily  Will plan to take off milrinone today, recheck lactate 4 hours later  Then start BB tomorrow  GDMT limited by hypotension issues, and CKD.   Goals of care, counseling/discussion  Palliative care is involved    Subjective     Pt reports breathing has improved. He is interested in going home. Remains full code.    Objective :  Temp:  [97.7 °F (36.5 °C)-98.6 °F (37 °C)] 97.7 °F (36.5 °C)  HR:  [109-134] 124  BP: (110-157)/() 112/68  Resp:  [9-32] 14  SpO2:  [90 %-99 %] 95 %  O2 Device: None (Room air)  Nasal Cannula O2 Flow Rate (L/min):  [2 L/min] 2 L/min  Orthostatic Blood Pressures      Flowsheet Row Most Recent Value   Blood Pressure 112/68 filed at 06/11/2025 0900   Patient Position - Orthostatic VS Sitting filed at 06/11/2025 0731          First Weight: Weight - Scale: 80.1 kg (176 lb 9.4 oz) (06/05/25 0855)  Vitals:    06/10/25 0600 06/11/25 0600   Weight: 80.4 kg (177 lb 4.8 oz) 81 kg (178 lb 9.2 oz)     Physical Exam  Vitals and nursing note reviewed.   Constitutional:       Appearance: Normal appearance.   HENT:      Head: Normocephalic and atraumatic.     Cardiovascular:      Rate and Rhythm: Rhythm irregular.      Heart sounds: No murmur heard.  Pulmonary:      Effort: Pulmonary effort is normal.      Breath sounds: Rales present.   Abdominal:      Palpations: Abdomen is soft.     Skin:     General: Skin is warm.      Capillary Refill: Capillary refill takes less than 2 seconds.     Neurological:      General: No focal deficit present.      Mental Status: He is alert.           Lab Results: I have reviewed the following results:  Results from last 7 days   Lab Units 06/11/25  0606 06/10/25  0527 06/09/25  0504   WBC Thousand/uL 11.16* 9.28 8.68   HEMOGLOBIN g/dL 12.3 12.8 12.6   HEMATOCRIT % 38.1 39.3 38.9   PLATELETS Thousands/uL 193 209 246     Results from last 7 days   Lab Units 06/11/25  0606 06/10/25  1454 06/10/25  0527   POTASSIUM mmol/L 4.2 4.4 5.1   CHLORIDE mmol/L 96 93* 94*   CO2  mmol/L 28 28 30   BUN mg/dL 53* 64* 68*   CREATININE mg/dL 1.86* 1.98* 1.95*   CALCIUM mg/dL 8.6 8.9 8.9     Results from last 7 days   Lab Units 06/11/25  0606 06/10/25  1454 06/09/25  0504 06/06/25  0541 06/05/25  1106   INR  2.28* 2.28* 3.34*   < > 8.98*   PTT seconds  --   --   --   --  69*    < > = values in this interval not displayed.     Lab Results   Component Value Date    HGBA1C 5.9 (H) 10/25/2021     Lab Results   Component Value Date    CKTOTAL 44 06/07/2025    TROPONINI 0.04 10/25/2021       Imaging Results Review: I reviewed radiology reports from this admission including: chest xray.  Other Study Results Review: EKG was reviewed.

## 2025-06-11 NOTE — ASSESSMENT & PLAN NOTE
Patient is known to have atrial fibrillation which has been persistent.    Initially scheduled for DCCV after therapeutic inrs but has had labile inrs   Cost prohibitive issues with DOACs   Remains in afib with rvr during admission  Not candidate for most AADs given cardiomyopathy and renal dysfunction  Cannot be on CCBs given low LVEF   In cardiogenic shock currently limiting use of BB at present time  With worsening renal function digoxin use is only EOD  Given risks and benefits as above, it is appropriate to consider amiodarone for rate control at this time  Currently on amiodarone infusion at 0.5 mg/min - continue   At this point will give another dose of digoxin today.   Titrating off milrinone today and plan to resume BB tomorrow  Then transition to oral amiodarone   No further digoxin after today   Monitor response

## 2025-06-11 NOTE — ASSESSMENT & PLAN NOTE
Lab Results   Component Value Date    EGFR 35 06/10/2025    EGFR 36 06/10/2025    EGFR 34 06/09/2025    CREATININE 1.98 (H) 06/10/2025    CREATININE 1.95 (H) 06/10/2025    CREATININE 2.04 (H) 06/09/2025       Baseline creatine 1.2-1.5  Creatine trended up to 2.1  IRINEO likely in the setting of Cardiogenic shock  Continue milrinone   Trend chemistry  Continue edmondson  Trend I/O

## 2025-06-11 NOTE — PROGRESS NOTES
Progress Note - Nephrology   Name: Britton Batista 60 y.o. male I MRN: [id number]  Unit/Bed#: -01 I Date of Admission: 6/5/2025   Date of Service: 6/11/2025 I Hospital Day: 6    Assessment & Plan  Acute renal failure superimposed on stage 3 chronic kidney disease (HCC)  Lab Results   Component Value Date    EGFR 38 06/11/2025    EGFR 35 06/10/2025    EGFR 36 06/10/2025    CREATININE 1.86 (H) 06/11/2025    CREATININE 1.98 (H) 06/10/2025    CREATININE 1.95 (H) 06/10/2025   Baseline creatinine 1.4-1.6  Etiology of acute kidney injury is cardiorenal syndrome in the setting of cardiogenic shock  Remains on inotrope support with milrinone  Off diuretics  2.2 L of urine output yesterday  Urinalysis shows 2+ protein and a high specific gravity  Previous albumin to creatinine ratio is around 1 L  Continue to monitor off diuretics, inotrope support per cardiology  Regarding prior labs and imaging studies: Note that urinalysis from April 25 had demonstrated 2+ protein which patient has a known history albumin reviewing lab work and urinary protein studies back from February 2025 otherwise patient had a benign UA.  Ultrasound of the abdomen from April 20 showed no evidence of any hydronephrosis.  It is noted the patient's last echocardiogram limited from April 28 showed EF 15 to 20% with severe global hypokinesis.  Patient's creatinine has remained between 1.82, making over 2 L of urine, GFR has been between 30 and 40 mL/min.  Titration of inotropes per critical care team as well as cardiology  Remains on intermittent IV Lasix 40 mg daily which can be continued    Atrial fibrillation with RVR ([facility])  Wt Readings from Last 3 Encounters:   06/11/25 81 kg (178 lb 9.2 oz)   06/05/25 81.1 kg (178 lb 12.7 oz)   05/04/25 79.2 kg (174 lb 9.7 oz)   Continue management per cardiology colleagues ICU team; the patient is currently on amiodarone infusion 1 mg/h.  Monitor tachycardia while on milrinone  Transaminitis  This is improving  with AST decreased to 270 and  and total bilirubin improving as well.  Query sepsis versus congestive hepatopathy  Tonsillitis  Completed antibiotics  Cardiogenic shock (HCC)  Management per critical care and cardiology; this is improving patient is on milrinone infusion as noted and there is improvement in lactic acid level noting improvement in shock review.  Goals of care, counseling/discussion  Palliative care following    I have reviewed the nephrology recommendations including monitoring, with ICU team, and we are in agreement with renal plan including the information outlined above. Please contact the SecureChat role for the Nephrology service with any questions/concerns.    Subjective     The patient was seen today.  He is sitting up resting comfortably.  No acute complaints.    Objective :  Temp:  [97.7 °F (36.5 °C)-98.6 °F (37 °C)] 97.7 °F (36.5 °C)  HR:  [109-134] 124  BP: (110-157)/() 112/68  Resp:  [9-32] 14  SpO2:  [90 %-99 %] 95 %  O2 Device: None (Room air)  Nasal Cannula O2 Flow Rate (L/min):  [2 L/min] 2 L/min    Current Weight: Weight - Scale: 81 kg (178 lb 9.2 oz)  First Weight: Weight - Scale: 80.1 kg (176 lb 9.4 oz)  I/O         06/09 0701  06/10 0700 06/10 0701  06/11 0700 06/11 0701  06/12 0700    P.O. 180 1320 240    I.V. (mL/kg) 723.9 (9) 787.2 (9.7)     IV Piggyback 200 100     Total Intake(mL/kg) 1103.9 (13.7) 2207.2 (27.2) 240 (3)    Urine (mL/kg/hr) 2350 (1.2) 3575 (1.8) 250 (1)    Total Output 2350 3575 250    Net -1246.1 -1367.8 -10                 Physical Exam  Vitals and nursing note reviewed.   Constitutional:       General: He is not in acute distress.     Appearance: He is well-developed.   HENT:      Head: Normocephalic and atraumatic.     Eyes:      Conjunctiva/sclera: Conjunctivae normal.       Cardiovascular:      Rate and Rhythm: Normal rate and regular rhythm.      Heart sounds: No murmur heard.  Pulmonary:      Effort: Pulmonary effort is normal. No  "respiratory distress.      Breath sounds: Normal breath sounds.   Abdominal:      Palpations: Abdomen is soft.      Tenderness: There is no abdominal tenderness.     Musculoskeletal:         General: No swelling.      Cervical back: Neck supple.     Skin:     General: Skin is warm and dry.      Capillary Refill: Capillary refill takes less than 2 seconds.     Neurological:      Mental Status: He is alert.     Psychiatric:         Mood and Affect: Mood normal.       Medications:  Current Medications[1]      Lab Results: I have reviewed the following results:  Results from last 7 days   Lab Units 06/11/25  0606 06/10/25  1454 06/10/25  0527 06/09/25  2315 06/09/25  1206 06/09/25  0504 06/08/25  2213 06/07/25  0800 06/06/25  0541 06/05/25  0943   WBC Thousand/uL 11.16*  --  9.28  --   --  8.68  --   --  9.56 9.26   HEMOGLOBIN g/dL 12.3  --  12.8  --   --  12.6  --   --  13.0 13.3   HEMATOCRIT % 38.1  --  39.3  --   --  38.9  --   --  40.7 42.0   PLATELETS Thousands/uL 193  --  209  --   --  246  --   --  230 234   POTASSIUM mmol/L 4.2 4.4 5.1 4.8 4.7 4.8 4.9   < > 4.9 5.2   CHLORIDE mmol/L 96 93* 94* 92* 93* 94* 95*   < > 101 101   CO2 mmol/L 28 28 30 30 29 28 25   < > 23 22   BUN mg/dL 53* 64* 68* 67* 66* 68* 65*   < > 30* 44*   CREATININE mg/dL 1.86* 1.98* 1.95* 2.04* 2.13* 2.12* 2.13*   < > 1.23 1.63*   CALCIUM mg/dL 8.6 8.9 8.9 9.1 9.1 8.9 9.2   < > 9.1 9.2   MAGNESIUM mg/dL 2.0  --  2.3  --   --  2.4 2.4  --   --  1.7*   PHOSPHORUS mg/dL 3.0  --  3.6  --   --  4.8* 5.7*  --   --   --    ALBUMIN g/dL 3.3*  --  3.5 3.6 3.8 3.5 3.6  --  3.6 3.7    < > = values in this interval not displayed.       Administrative Statements     Portions of the record may have been created with voice recognition software. Occasional wrong word or \"sound a like\" substitutions may have occurred due to the inherent limitations of voice recognition software. Read the chart carefully and recognize, using context, where substitutions have " occurred.If you have any questions, please contact the dictating provider.       [1]   Current Facility-Administered Medications:     acetaminophen (TYLENOL) tablet 650 mg, 650 mg, Oral, Q6H PRN, Mack Varela PA-C, 650 mg at 06/07/25 0930    amiodarone (CORDARONE) 900 mg in dextrose 5 % 500 mL infusion, 0.5 mg/min, Intravenous, Continuous, LUIS ANGEL Lyman, Last Rate: 16.7 mL/hr at 06/11/25 0616, 0.5 mg/min at 06/11/25 0616    amiodarone 150 mg in dextrose 5 % 100 mL IV bolus, 150 mg, Intravenous, Once, LUIS ANGEL Esparza    chlorhexidine (PERIDEX) 0.12 % oral rinse 15 mL, 15 mL, Mouth/Throat, Q12H JESUS, Mack Varela PA-C, 15 mL at 06/11/25 0829    digoxin (LANOXIN) tablet 125 mcg, 125 mcg, Oral, Every Other Day, LUIS ANGEL Lyman, 125 mcg at 06/11/25 0829    diphenhydramine, lidocaine, Al/Mg hydroxide, simethicone (Magic Mouthwash) oral solution 10 mL, 10 mL, Swish & Swallow, Q6H PRN, LUIS ANGEL Lyman, 10 mL at 06/11/25 0714    fluticasone (FLONASE) 50 mcg/act nasal spray 1 spray, 1 spray, Each Nare, Daily, LUIS ANGEL Lyman, 1 spray at 06/10/25 0854    furosemide (LASIX) injection 40 mg, 40 mg, Intravenous, Daily, LUIS ANGEL Lyman, 40 mg at 06/11/25 0829    [Held by provider] furosemide (LASIX) tablet 40 mg, 40 mg, Oral, Daily, Guadalupe Muñoz MD, 40 mg at 06/06/25 1039    hydrOXYzine HCL (ATARAX) tablet 25 mg, 25 mg, Oral, Q6H PRN, Mack Varela PA-C, 25 mg at 06/09/25 0729    ipratropium-albuterol (DUO-NEB) 0.5-2.5 mg/3 mL inhalation solution 3 mL, 3 mL, Nebulization, Q6H While awake, Mack Varela PA-C, 3 mL at 06/11/25 0736    magnesium Oxide (MAG-OX) tablet 400 mg, 400 mg, Oral, Daily, JAKE OcampoC, 400 mg at 06/11/25 0829    [Held by provider] metoprolol tartrate (LOPRESSOR) tablet 100 mg, 100 mg, Oral, Q12H JESUS, JADIEL Ocampo-C, 100 mg at 06/09/25 0905    [Held by provider] midodrine (PROAMATINE) tablet 7.5 mg, 7.5 mg,  Oral, TID AC, Mack Varela PA-C, 7.5 mg at 06/08/25 1144    milrinone (PRIMACOR) 20 mg in 100 mL infusion (premix), 0.13 mcg/kg/min, Intravenous, Continuous, LUIS ANGEL Esparza, Last Rate: 6 mL/hr at 06/11/25 0137, 0.25 mcg/kg/min at 06/11/25 0137    mirtazapine (REMERON) tablet 15 mg, 15 mg, Oral, HS, LUIS ANGEL Lyman, 15 mg at 06/10/25 2111    nicotine (NICODERM CQ) 14 mg/24hr TD 24 hr patch 1 patch, 1 patch, Transdermal, Daily, Mack Varela PA-C, 1 patch at 06/10/25 0854    oxymetazoline (AFRIN) 0.05 % nasal spray 2 spray, 2 spray, Each Nare, Q12H JESUS, LUIS ANGEL Lyman, 2 spray at 06/10/25 2112    pantoprazole (PROTONIX) EC tablet 40 mg, 40 mg, Oral, Early Morning, LUIS ANGEL Lyman, 40 mg at 06/11/25 0616    phenol (CHLORASEPTIC) 1.4 % mucosal liquid 1 spray, 1 spray, Mouth/Throat, Q2H PRN, Mack Varela PA-C, 1 spray at 06/08/25 0947    senna-docusate sodium (SENOKOT S) 8.6-50 mg per tablet 1 tablet, 1 tablet, Oral, HS, LUIS ANGEL Esparza    sodium chloride (OCEAN) 0.65 % nasal spray 1 spray, 1 spray, Each Nare, Q1H PRN, LUIS ANGEL Lyman, 1 spray at 06/09/25 2048    trimethobenzamide (TIGAN) IM injection 200 mg, 200 mg, Intramuscular, Q6H PRN, Mack Varela PA-C, 200 mg at 06/09/25 1846    warfarin (COUMADIN) tablet 3 mg, 3 mg, Oral, Daily (warfarin), LUIS ANGEL Lyman, 3 mg at 06/10/25 1822

## 2025-06-11 NOTE — ASSESSMENT & PLAN NOTE
Continue amio at 0.5   Goal HR <120  Continue to monitor on telemetry   Resume warfarin 6/10, daily INR, goal INR 2-3    Plan:  6/12 transition to Amiodarone PO, and restart BB

## 2025-06-11 NOTE — ASSESSMENT & PLAN NOTE
6/5 Tonsillitis appears chronic on CT  Completed 5 days of Unasyn 6/9  Blood cultures negative x 48hrs  Strep PCR negative  Flonase, ocean spray nasal spray, and 3 days of afrin ordered    Resolved

## 2025-06-11 NOTE — CASE MANAGEMENT
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    Scleroderma (HCC) 2025    Acute renal failure superimposed on stage 3 chronic kidney disease ([facility]) 2025    Cardiogenic shock (HCC) 2025    Atrial fibrillation with RVR (HCC) 2025    Syncope 2024    Chest pain 2024    Acute on chronic systolic congestive heart failure (HCC) 2024    Primary hypertension 2024    Hypomagnesemia 2023    Methamphetamine abuse ([facility]) 2023    Tobacco abuse 2023    D-dimer, elevated 2023    Cardiomyopathy (HCC) 10/09/2023    Acute kidney injury superimposed on stage 3a chronic kidney disease ([facility]) 10/08/2023    Acute on chronic combined systolic (congestive) and diastolic (congestive) heart failure (HCC) 10/07/2023    Hypertensive emergency 10/07/2023    Anxiety     Depression     Other emphysema (HCC)     GERD (gastroesophageal reflux disease)     Gout     Hypertensive urgency     Neuropathy     Raynaud's disease     Unilateral inguinal hernia, without obstruction or gangrene, not specified as recurrent     PSS (progressive systemic sclerosis) (HCC) 10/25/2021    Near syncope 10/25/2021    Osteomyelitis ([facility]) 10/25/2021    Elevated troponin 10/25/2021    Acute encephalopathy 10/25/2021      LOS (days): 6  Geometric Mean LOS (GMLOS) (days):  3.5  Days to GMLOS:-2.7     OBJECTIVE:  Risk of Unplanned Readmission Score: 71.65         Current admission status: Inpatient   Preferred Pharmacy:   [facility] - [place] - [address]  [address]  [place] [address]  Phone: [phone] Fax: [phone]    [facility] [place] - [place] - [address],  [address],  [address] [facility] [place] [address]  Phone: [phone] Fax: [phone]    [id number] [place], [place] - [address] [place] [id number]  Phone: [phone] Fax: [phone]    Primary Care Provider: Juan Galindo DO    Primary Insurance: [facility] RE[facility]  Secondary Insurance:     DISCHARGE DETAILS:           CM emailed [facility] to inquire if assessment was completed on patient. CM to follow.

## 2025-06-11 NOTE — ASSESSMENT & PLAN NOTE
Lab Results   Component Value Date    EGFR 38 06/11/2025    EGFR 35 06/10/2025    EGFR 36 06/10/2025    CREATININE 1.86 (H) 06/11/2025    CREATININE 1.98 (H) 06/10/2025    CREATININE 1.95 (H) 06/10/2025   CKD stable  Likely CRS but improving

## 2025-06-11 NOTE — ASSESSMENT & PLAN NOTE
Currently on milrinone infusion working on weaning off  Bb is being held  Dig and amiodarone for rate control  Prognosis is guarded  Diuresing appropriately on lasix 40 IV daily  Will plan to take off milrinone today, recheck lactate 4 hours later  Then start BB tomorrow  GDMT limited by hypotension issues, and CKD.

## 2025-06-11 NOTE — PLAN OF CARE
Problem: PAIN - ADULT  Goal: Verbalizes/displays adequate comfort level or baseline comfort level  Description: Interventions:  - Encourage patient to monitor pain and request assistance  - Assess pain using appropriate pain scale0-10  - Administer analgesics as ordered based on type and severity of pain and evaluate response  - Implement non-pharmacological measures as appropriate and evaluate response  - Consider cultural and social influences on pain and pain management  - Notify physician/advanced practitioner if interventions unsuccessful or patient reports new pain  - Educate patient/family on pain management process including their role and importance of  reporting pain   - Provide non-pharmacologic/complimentary pain relief interventions  Outcome: Progressing     Problem: INFECTION - ADULT  Goal: Absence or prevention of progression during hospitalization  Description: INTERVENTIONS:  - Assess and monitor for signs and symptoms of infection  - Monitor lab/diagnostic results  - Monitor all insertion sites, i.e. indwelling lines, tubes, and drains  - Monitor endotracheal if appropriate and nasal secretions for changes in amount and color  - [place] appropriate cooling/warming therapies per order  - Administer medications as ordered  - Instruct and encourage patient and family to use good hand hygiene technique  - Identify and instruct in appropriate isolation precautions for identified infection/condition  Outcome: Progressing  Goal: Absence of fever/infection during neutropenic period  Description: INTERVENTIONS:  - Monitor WBC  - Perform strict hand hygiene  - Limit to healthy visitors only  - No plants, dried, fresh or silk flowers with chaidez in patient room  Outcome: Progressing     Problem: SAFETY ADULT  Goal: Patient will remain free of falls  Description: INTERVENTIONS:  - Educate patient/family on patient safety including physical limitations  - Instruct patient to call for assistance with activity    - Consider consulting OT/PT to assist with strengthening/mobility based on AM PAC & JH-HLM score  - Consult OT/PT to assist with strengthening/mobility   - Keep Call bell within reach  - Keep bed low and locked with side rails adjusted as appropriate  - Keep care items and personal belongings within reach  - Initiate and maintain comfort rounds  - Make Fall Risk Sign visible to staff  - Offer Toileting every 2 Hours, in advance of need  - Initiate/Maintain bed alarm  - Obtain necessary fall risk management equipment: yellow socks  - Apply yellow socks and bracelet for high fall risk patients  - Consider moving patient to room near nurses station  Outcome: Progressing  Goal: Maintain or return to baseline ADL function  Description: INTERVENTIONS:  -  Assess patient's ability to carry out ADLs; assess patient's baseline for ADL function and identify physical deficits which impact ability to perform ADLs (bathing, care of mouth/teeth, toileting, grooming, dressing, etc.)  - Assess/evaluate cause of self-care deficits   - Assess range of motion  - Assess patient's mobility; develop plan if impaired  - Assess patient's need for assistive devices and provide as appropriate  - Encourage maximum independence but intervene and supervise when necessary  - Involve family in performance of ADLs  - Assess for home care needs following discharge   - Consider OT consult to assist with ADL evaluation and planning for discharge  - Provide patient education as appropriate  - Monitor functional capacity and physical performance, use of AM PAC & JH-HLM   - Monitor gait, balance and fatigue with ambulation    Outcome: Progressing  Goal: Maintains/Returns to pre admission functional level  Description: INTERVENTIONS:  - Perform AM-PAC 6 Click Basic Mobility/ Daily Activity assessment daily.  - Set and communicate daily mobility goal to care team and patient/family/caregiver.   - Collaborate with rehabilitation services on mobility  goals if consulted  - Out of bed for toileting  - Record patient progress and toleration of activity level   Outcome: Progressing     Problem: DISCHARGE PLANNING  Goal: Discharge to home or other facility with appropriate resources  Description: INTERVENTIONS:  - Identify barriers to discharge w/patient and caregiver  - Arrange for needed discharge resources and transportation as appropriate  - Identify discharge learning needs (meds, wound care, etc.)  - Arrange for interpretive services to assist at discharge as needed  - Refer to Case Management Department for coordinating discharge planning if the patient needs post-hospital services based on physician/advanced practitioner order or complex needs related to functional status, cognitive ability, or social support system  Outcome: Progressing     Problem: Knowledge Deficit  Goal: Patient/family/caregiver demonstrates understanding of disease process, treatment plan, medications, and discharge instructions  Description: Complete learning assessment and assess knowledge base.  Interventions:  - Provide teaching at level of understanding  - Provide teaching via preferred learning methods  Outcome: Progressing

## 2025-06-11 NOTE — PROGRESS NOTES
Progress Note - Critical Care/ICU   Name: Britton Batista 60 y.o. male I MRN: [id number]  Unit/Bed#: -01 I Date of Admission: 6/5/2025   Date of Service: 6/11/2025 I Hospital Day: 6      Assessment & Plan  Acute on chronic combined systolic (congestive) and diastolic (congestive) heart failure (HCC)  Wt Readings from Last 3 Encounters:   06/10/25 80.4 kg (177 lb 4.8 oz)   06/05/25 81.1 kg (178 lb 12.7 oz)   05/04/25 79.2 kg (174 lb 9.7 oz)     4/2025 TTE: 15-20% by visual estimation. Severe global hypokinesis.  Moderate regurgitation of mitral valve. Limited views may underestimate severity of MR. Severe tricuspid regurgitation with RVSP 41 mmHg.    BNP >4700  Diuretics: lasix 40 mg IV daily  Elevated LA likely secondary to hypoperfusion   Milrinone infusion weaned to 0.25 on 6/10  CVC in placed  Trend end points:Sv02 and lactic  Continue amio at 0.5 mg/hr  Hold BB while on milrinone  Continue digoxin 125 mcg EOD    Atrial fibrillation with RVR (HCC)  Continue digoxin 125  DC BB while on milrinone  Continue amio at 0.5   Goal HR <120  Continue to monitor on telemetry   Resume warfarin 6/10, daily INR, goal INR 2-3  Transaminitis  Likely in the setting of HF and recent infection  Trend LFTs  Acute renal failure superimposed on stage 3 chronic kidney disease (HCC)  Lab Results   Component Value Date    EGFR 35 06/10/2025    EGFR 36 06/10/2025    EGFR 34 06/09/2025    CREATININE 1.98 (H) 06/10/2025    CREATININE 1.95 (H) 06/10/2025    CREATININE 2.04 (H) 06/09/2025       Baseline creatine 1.2-1.5  Creatine trended up to 2.1  IRINEO likely in the setting of Cardiogenic shock  Continue milrinone   Trend chemistry  Continue edmondson  Trend I/O  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 Severe global hypokinesis.  Moderate regurgitation of mitral valve. Limited views may underestimate severity of MR. Severe tricuspid regurgitation with RVSP 41 mmHg.        See above under acute  diastolic and systolic HF  Patient interested in D&A rehab. CM on consult.  Tonsillitis  6/5 Tonsillitis appears chronic on CT  Completed 5 days of Unasyn 6/9  Blood cultures negative x 48hrs  Strep PCR negative  Flonase, ocean spray nasal spray, and 3 days of afrin ordered  Supratherapeutic INR (Resolved: 6/10/2025)  Patient on coumadin at home for atrial fibrillation   Holding Coumadin in the setting of supratheapeutic INR  Trend INR  S/p 2.5mg Vit K on 6/5  Now resolved.  Cardiogenic shock (HCC)  Continue on milrinone 0.25  Trend svo2 and lactic  Cardiology following    Goals of care, counseling/discussion  Consult placed to palliative care given patient's multiple comorbidities including end-stage heart failure and chronic pain in the setting of scleroderma  Patient interested in following with palliative care after discharge  Patient discussing code status and POLST with family  Disposition: Critical care    ICU Core Measures     A: Assess, Prevent, and Manage Pain Has pain been assessed? Yes  Need for changes to pain regimen? No   B: Both SAT/SAT  N/A   C: Choice of Sedation RASS Goal: N/A patient not on sedation  Need for changes to sedation or analgesia regimen? N/A   D: Delirium CAM-ICU: Negative   E: Early Mobility  Plan for early mobility? Yes   F: Family Engagement Plan for family engagement today? Yes       Review of Invasive Devices:    Zazueta Plan: Continue for accurate I/O monitoring for 48 hours  Central access plan: Medications requiring central line      Prophylaxis:  VTE VTE covered by:  warfarin, Oral, 3 mg at 06/10/25 1822       Stress Ulcer  covered bypantoprazole (PROTONIX) 40 mg tablet [[id number]] (Long-Term Med), pantoprazole (PROTONIX) EC tablet 40 mg [[id number]]         24 Hour Events : No acute events were reported overnight.  Patient continues on milrinone with frequent SVO2 checks.      Objective :                   Vitals I/O      Most Recent Min/Max in 24hrs   Temp 98.2 °F (36.8 °C)  Temp  Min: 97.7 °F (36.5 °C)  Max: 98.6 °F (37 °C)   Pulse (!) 122 Pulse  Min: 103  Max: 122   Resp 16 Resp  Min: 9  Max: 32   /82 BP  Min: 103/76  Max: 165/104   O2 Sat 96 % SpO2  Min: 88 %  Max: 99 %      Intake/Output Summary (Last 24 hours) at 6/11/2025 0330  Last data filed at 6/11/2025 0301  Gross per 24 hour   Intake 1996.39 ml   Output 3575 ml   Net -1578.61 ml       Diet Cardiovascular; Sodium 2 GM; Fluid Restriction 1800 ML    Invasive Monitoring           Physical Exam   Physical Exam  Eyes:      Extraocular Movements: Extraocular movements intact.      Pupils: Pupils are equal, round, and reactive to light.   Skin:     General: Skin is warm and dry.      Coloration: Skin is not jaundiced or pale.   HENT:      Head: Normocephalic and atraumatic.   Neck:      Vascular: No JVD.   no midline tenderness Cardiovascular:      Rate and Rhythm: Tachycardia present. Rhythm irregular.   Musculoskeletal:         General: No deformity or signs of injury. Normal range of motion.   Abdominal: General: Bowel sounds are normal.      Palpations: Abdomen is soft.   Constitutional:       General: He is not in acute distress.     Appearance: He is well-developed and well-nourished.   Pulmonary:      Effort: Pulmonary effort is normal.      Breath sounds: Normal breath sounds.   Neurological:      General: No focal deficit present.      Mental Status: He is alert and oriented to person, place and time.      Motor: Strength full and intact in all extremities.   Genitourinary/Anorectal:  Zazueta present.        Diagnostic Studies        Lab Results: I have reviewed the following results:     Medications:  Scheduled PRN   chlorhexidine, 15 mL, Q12H JESUS  [Held by provider] digoxin, 125 mcg, Every Other Day  digoxin, 125 mcg, Every Other Day  fluticasone, 1 spray, Daily  furosemide, 40 mg, Daily  [Held by provider] furosemide, 40 mg, Daily  ipratropium-albuterol, 3 mL, Q6H While awake  magnesium Oxide, 400 mg, Daily  [Held by  provider] metoprolol tartrate, 100 mg, Q12H JESUS  [Held by provider] midodrine, 7.5 mg, TID AC  mirtazapine, 15 mg, HS  nicotine, 1 patch, Daily  oxymetazoline, 2 spray, Q12H JESUS  pantoprazole, 40 mg, Early Morning  warfarin, 3 mg, Daily (warfarin)      acetaminophen, 650 mg, Q6H PRN  diphenhydramine, lidocaine, Al/Mg hydroxide, simethicone, 10 mL, Q6H PRN  hydrOXYzine HCL, 25 mg, Q6H PRN  morphine injection, 2 mg, Q4H PRN  phenol, 1 spray, Q2H PRN  sodium chloride, 1 spray, Q1H PRN  trimethobenzamide, 200 mg, Q6H PRN       Continuous    amiodarone (CORDARONE) 900 mg in dextrose 5 % 500 mL infusion, 0.5 mg/min, Last Rate: 0.5 mg/min (06/10/25 1002)  milrinone (Primacor) infusion, 0.25 mcg/kg/min, Last Rate: 0.25 mcg/kg/min (06/11/25 0137)         Labs:   CBC    Recent Labs     06/09/25  0504 06/10/25  0527   WBC 8.68 9.28   HGB 12.6 12.8   HCT 38.9 39.3    209     BMP    Recent Labs     06/10/25  0527 06/10/25  1454   SODIUM 130* 130*   K 5.1 4.4   CL 94* 93*   CO2 30 28   AGAP 6 9   BUN 68* 64*   CREATININE 1.95* 1.98*   CALCIUM 8.9 8.9       Coags    Recent Labs     06/09/25  0504 06/10/25  1454   INR 3.34* 2.28*        Additional Electrolytes  Recent Labs     06/09/25  0504 06/10/25  0527   MG 2.4 2.3   PHOS 4.8* 3.6   CAIONIZED  --  1.09*          Blood Gas    No recent results  Recent Labs     06/10/25  2207   PHVEN 7.412*   AYE1TBQ 43.2   PO2VEN 37.6   KZS2XJV 26.9   BEVEN 2.0   K8AWGHQ 65.8    LFTs  Recent Labs     06/09/25  2315 06/10/25  0527   * 276*   * 270*   ALKPHOS 127* 138*   ALB 3.6 3.5   TBILI 1.56* 1.61*       Infectious  No recent results  Glucose  Recent Labs     06/09/25  1206 06/09/25  2315 06/10/25  0527 06/10/25  1454   GLUC 129 141* 148* 208*

## 2025-06-11 NOTE — ASSESSMENT & PLAN NOTE
Lab Results   Component Value Date    EGFR 40 06/11/2025    EGFR 38 06/11/2025    EGFR 35 06/10/2025    CREATININE 1.80 (H) 06/11/2025    CREATININE 1.86 (H) 06/11/2025    CREATININE 1.98 (H) 06/10/2025       Baseline creatine 1.2-1.5  Creatine trended up to 2.1  IRINEO likely in the setting of Cardiogenic shock  Continue milrinone   Trend chemistry  Trend I/O

## 2025-06-11 NOTE — ASSESSMENT & PLAN NOTE
Lab Results   Component Value Date    EGFR 38 06/11/2025    EGFR 35 06/10/2025    EGFR 36 06/10/2025    CREATININE 1.86 (H) 06/11/2025    CREATININE 1.98 (H) 06/10/2025    CREATININE 1.95 (H) 06/10/2025   Baseline creatinine 1.4-1.6  Etiology of acute kidney injury is cardiorenal syndrome in the setting of cardiogenic shock  Remains on inotrope support with milrinone  Off diuretics  2.2 L of urine output yesterday  Urinalysis shows 2+ protein and a high specific gravity  Previous albumin to creatinine ratio is around 1 L  Continue to monitor off diuretics, inotrope support per cardiology  Regarding prior labs and imaging studies: Note that urinalysis from April 25 had demonstrated 2+ protein which patient has a known history albumin reviewing lab work and urinary protein studies back from February 2025 otherwise patient had a benign UA.  Ultrasound of the abdomen from April 20 showed no evidence of any hydronephrosis.  It is noted the patient's last echocardiogram limited from April 28 showed EF 15 to 20% with severe global hypokinesis.  Patient's creatinine has remained between 1.82, making over 2 L of urine, GFR has been between 30 and 40 mL/min.  Titration of inotropes per critical care team as well as cardiology  Remains on intermittent IV Lasix 40 mg daily which can be continued

## 2025-06-11 NOTE — ASSESSMENT & PLAN NOTE
Secondary to cadiomyopathy in the setting of methamph abuse  Patient started on Milrinone gtt 6/8. D/c'd 6/11 evening.    Plan:  Trend svo2 and lactic  Cardiology following   Lopressor restarted 6/12.

## 2025-06-11 NOTE — ASSESSMENT & PLAN NOTE
Wt Readings from Last 3 Encounters:   06/11/25 81 kg (178 lb 9.2 oz)   06/05/25 81.1 kg (178 lb 12.7 oz)   05/04/25 79.2 kg (174 lb 9.7 oz)   Continue management per cardiology colleagues ICU team; the patient is currently on amiodarone infusion 1 mg/h.  Monitor tachycardia while on milrinone

## 2025-06-12 LAB
ALBUMIN SERPL BCG-MCNC: 3.3 G/DL (ref 3.5–5)
ALP SERPL-CCNC: 137 U/L (ref 34–104)
ALT SERPL W P-5'-P-CCNC: 243 U/L (ref 7–52)
ANION GAP SERPL CALCULATED.3IONS-SCNC: 7 MMOL/L (ref 4–13)
AST SERPL W P-5'-P-CCNC: 207 U/L (ref 13–39)
BASE EX.OXY STD BLDV CALC-SCNC: 58.6 % (ref 60–80)
BASE EX.OXY STD BLDV CALC-SCNC: 61.9 % (ref 60–80)
BASE EX.OXY STD BLDV CALC-SCNC: 95.4 % (ref 60–80)
BASE EXCESS BLDV CALC-SCNC: 4.4 MMOL/L
BASE EXCESS BLDV CALC-SCNC: 6.8 MMOL/L
BASE EXCESS BLDV CALC-SCNC: 7.3 MMOL/L
BILIRUB SERPL-MCNC: 1.78 MG/DL (ref 0.2–1)
BODY TEMPERATURE: 98.9 DEGREES FEHRENHEIT
BUN SERPL-MCNC: 39 MG/DL (ref 5–25)
CALCIUM ALBUM COR SERPL-MCNC: 9.1 MG/DL (ref 8.3–10.1)
CALCIUM SERPL-MCNC: 8.5 MG/DL (ref 8.4–10.2)
CHLORIDE SERPL-SCNC: 96 MMOL/L (ref 96–108)
CO2 SERPL-SCNC: 33 MMOL/L (ref 21–32)
CREAT SERPL-MCNC: 1.74 MG/DL (ref 0.6–1.3)
DME PARACHUTE DELIVERY DATE REQUESTED: NORMAL
DME PARACHUTE ITEM DESCRIPTION: NORMAL
DME PARACHUTE ORDER STATUS: NORMAL
DME PARACHUTE SUPPLIER NAME: NORMAL
DME PARACHUTE SUPPLIER PHONE: NORMAL
ERYTHROCYTE [DISTWIDTH] IN BLOOD BY AUTOMATED COUNT: 17.8 % (ref 11.6–15.1)
GFR SERPL CREATININE-BSD FRML MDRD: 41 ML/MIN/1.73SQ M
GLUCOSE SERPL-MCNC: 109 MG/DL (ref 65–140)
GLUCOSE SERPL-MCNC: 129 MG/DL (ref 65–140)
GLUCOSE SERPL-MCNC: 132 MG/DL (ref 65–140)
GLUCOSE SERPL-MCNC: 85 MG/DL (ref 65–140)
GLUCOSE SERPL-MCNC: 92 MG/DL (ref 65–140)
GLUCOSE SERPL-MCNC: <10 MG/DL (ref 65–140)
HCO3 BLDV-SCNC: 27.8 MMOL/L (ref 24–30)
HCO3 BLDV-SCNC: 32.1 MMOL/L (ref 24–30)
HCO3 BLDV-SCNC: 32.2 MMOL/L (ref 24–30)
HCT VFR BLD AUTO: 40 % (ref 36.5–49.3)
HGB BLD-MCNC: 12.8 G/DL (ref 12–17)
INR PPP: 2.33 (ref 0.85–1.19)
LACTATE SERPL-SCNC: 1.1 MMOL/L (ref 0.5–2)
MAGNESIUM SERPL-MCNC: 1.8 MG/DL (ref 1.9–2.7)
MCH RBC QN AUTO: 28.6 PG (ref 26.8–34.3)
MCHC RBC AUTO-ENTMCNC: 32 G/DL (ref 31.4–37.4)
MCV RBC AUTO: 89 FL (ref 82–98)
NON VENT ROOM AIR: ABNORMAL %
O2 CT BLDV-SCNC: 11.2 ML/DL
O2 CT BLDV-SCNC: 11.8 ML/DL
O2 CT BLDV-SCNC: 18.2 ML/DL
PCO2 BLDV: 37.4 MM HG (ref 42–50)
PCO2 BLDV: 46.1 MM HG (ref 42–50)
PCO2 BLDV: 48.9 MM HG (ref 42–50)
PH BLDV: 7.44 [PH] (ref 7.3–7.4)
PH BLDV: 7.46 [PH] (ref 7.3–7.4)
PH BLDV: 7.49 [PH] (ref 7.3–7.4)
PHOSPHATE SERPL-MCNC: 2.4 MG/DL (ref 2.3–4.1)
PLATELET # BLD AUTO: 198 THOUSANDS/UL (ref 149–390)
PMV BLD AUTO: 9.2 FL (ref 8.9–12.7)
PO2 BLDV: 32.5 MM HG (ref 35–45)
PO2 BLDV: 33.9 MM HG (ref 35–45)
PO2 BLDV: 99.5 MM HG (ref 35–45)
POTASSIUM SERPL-SCNC: 4 MMOL/L (ref 3.5–5.3)
PROT SERPL-MCNC: 6.3 G/DL (ref 6.4–8.4)
PROTHROMBIN TIME: 25.7 SECONDS (ref 12.3–15)
RBC # BLD AUTO: 4.48 MILLION/UL (ref 3.88–5.62)
SODIUM SERPL-SCNC: 136 MMOL/L (ref 135–147)
WBC # BLD AUTO: 10.83 THOUSAND/UL (ref 4.31–10.16)

## 2025-06-12 PROCEDURE — 99497 ADVNCD CARE PLAN 30 MIN: CPT

## 2025-06-12 PROCEDURE — 84100 ASSAY OF PHOSPHORUS: CPT

## 2025-06-12 PROCEDURE — 97116 GAIT TRAINING THERAPY: CPT

## 2025-06-12 PROCEDURE — 85027 COMPLETE CBC AUTOMATED: CPT

## 2025-06-12 PROCEDURE — 94760 N-INVAS EAR/PLS OXIMETRY 1: CPT

## 2025-06-12 PROCEDURE — 99233 SBSQ HOSP IP/OBS HIGH 50: CPT | Performed by: PHYSICIAN ASSISTANT

## 2025-06-12 PROCEDURE — 82948 REAGENT STRIP/BLOOD GLUCOSE: CPT

## 2025-06-12 PROCEDURE — 94664 DEMO&/EVAL PT USE INHALER: CPT

## 2025-06-12 PROCEDURE — 85610 PROTHROMBIN TIME: CPT

## 2025-06-12 PROCEDURE — 97163 PT EVAL HIGH COMPLEX 45 MIN: CPT

## 2025-06-12 PROCEDURE — 80053 COMPREHEN METABOLIC PANEL: CPT

## 2025-06-12 PROCEDURE — 99232 SBSQ HOSP IP/OBS MODERATE 35: CPT | Performed by: INTERNAL MEDICINE

## 2025-06-12 PROCEDURE — 97535 SELF CARE MNGMENT TRAINING: CPT

## 2025-06-12 PROCEDURE — 83735 ASSAY OF MAGNESIUM: CPT

## 2025-06-12 PROCEDURE — 94640 AIRWAY INHALATION TREATMENT: CPT

## 2025-06-12 PROCEDURE — 82805 BLOOD GASES W/O2 SATURATION: CPT

## 2025-06-12 PROCEDURE — NC001 PR NO CHARGE

## 2025-06-12 PROCEDURE — 83605 ASSAY OF LACTIC ACID: CPT | Performed by: PHYSICIAN ASSISTANT

## 2025-06-12 RX ORDER — DIPHENHYDRAMINE HYDROCHLORIDE 50 MG/ML
25 INJECTION, SOLUTION INTRAMUSCULAR; INTRAVENOUS ONCE
Status: COMPLETED | OUTPATIENT
Start: 2025-06-12 | End: 2025-06-12

## 2025-06-12 RX ORDER — HYDROXYZINE HYDROCHLORIDE 25 MG/1
50 TABLET, FILM COATED ORAL EVERY 6 HOURS PRN
Status: DISCONTINUED | OUTPATIENT
Start: 2025-06-12 | End: 2025-06-14 | Stop reason: HOSPADM

## 2025-06-12 RX ORDER — FUROSEMIDE 40 MG/1
40 TABLET ORAL DAILY
Status: DISCONTINUED | OUTPATIENT
Start: 2025-06-13 | End: 2025-06-13

## 2025-06-12 RX ORDER — MAGNESIUM SULFATE HEPTAHYDRATE 40 MG/ML
2 INJECTION, SOLUTION INTRAVENOUS ONCE
Status: COMPLETED | OUTPATIENT
Start: 2025-06-12 | End: 2025-06-12

## 2025-06-12 RX ORDER — MIRTAZAPINE 15 MG/1
30 TABLET, FILM COATED ORAL
Status: DISCONTINUED | OUTPATIENT
Start: 2025-06-12 | End: 2025-06-14 | Stop reason: HOSPADM

## 2025-06-12 RX ORDER — BISACODYL 10 MG
10 SUPPOSITORY, RECTAL RECTAL DAILY PRN
Status: DISCONTINUED | OUTPATIENT
Start: 2025-06-12 | End: 2025-06-14 | Stop reason: HOSPADM

## 2025-06-12 RX ORDER — METOPROLOL TARTRATE 50 MG
50 TABLET ORAL EVERY 6 HOURS
Status: DISCONTINUED | OUTPATIENT
Start: 2025-06-12 | End: 2025-06-13

## 2025-06-12 RX ADMIN — CHLORHEXIDINE GLUCONATE 0.12% ORAL RINSE 15 ML: 1.2 LIQUID ORAL at 22:16

## 2025-06-12 RX ADMIN — MAGNESIUM SULFATE HEPTAHYDRATE 2 G: 40 INJECTION, SOLUTION INTRAVENOUS at 07:57

## 2025-06-12 RX ADMIN — TRIMETHOBENZAMIDE HYDROCHLORIDE 200 MG: 100 INJECTION INTRAMUSCULAR at 05:06

## 2025-06-12 RX ADMIN — PANTOPRAZOLE SODIUM 40 MG: 40 TABLET, DELAYED RELEASE ORAL at 04:33

## 2025-06-12 RX ADMIN — HYDROXYZINE HYDROCHLORIDE 50 MG: 25 TABLET, FILM COATED ORAL at 15:57

## 2025-06-12 RX ADMIN — METOPROLOL TARTRATE 50 MG: 50 TABLET, FILM COATED ORAL at 15:57

## 2025-06-12 RX ADMIN — WARFARIN SODIUM 3 MG: 3 TABLET ORAL at 18:16

## 2025-06-12 RX ADMIN — NICOTINE 1 PATCH: 14 PATCH, EXTENDED RELEASE TRANSDERMAL at 08:07

## 2025-06-12 RX ADMIN — BISACODYL 10 MG: 10 SUPPOSITORY RECTAL at 01:46

## 2025-06-12 RX ADMIN — IPRATROPIUM BROMIDE AND ALBUTEROL SULFATE 3 ML: 2.5; .5 SOLUTION RESPIRATORY (INHALATION) at 13:43

## 2025-06-12 RX ADMIN — DIPHENHYDRAMINE HYDROCHLORIDE 25 MG: 50 INJECTION, SOLUTION INTRAMUSCULAR; INTRAVENOUS at 09:24

## 2025-06-12 RX ADMIN — HYDROXYZINE HYDROCHLORIDE 25 MG: 25 TABLET, FILM COATED ORAL at 04:33

## 2025-06-12 RX ADMIN — MIRTAZAPINE 30 MG: 15 TABLET, FILM COATED ORAL at 22:16

## 2025-06-12 RX ADMIN — CHLORHEXIDINE GLUCONATE 0.12% ORAL RINSE 15 ML: 1.2 LIQUID ORAL at 08:08

## 2025-06-12 RX ADMIN — METOPROLOL TARTRATE 50 MG: 50 TABLET, FILM COATED ORAL at 22:16

## 2025-06-12 RX ADMIN — SENNOSIDES AND DOCUSATE SODIUM 1 TABLET: 50; 8.6 TABLET ORAL at 22:16

## 2025-06-12 RX ADMIN — Medication 400 MG: at 08:07

## 2025-06-12 RX ADMIN — FUROSEMIDE 40 MG: 10 INJECTION, SOLUTION INTRAMUSCULAR; INTRAVENOUS at 08:07

## 2025-06-12 RX ADMIN — IPRATROPIUM BROMIDE AND ALBUTEROL SULFATE 3 ML: 2.5; .5 SOLUTION RESPIRATORY (INHALATION) at 19:53

## 2025-06-12 RX ADMIN — METOPROLOL TARTRATE 100 MG: 50 TABLET, FILM COATED ORAL at 08:07

## 2025-06-12 RX ADMIN — AMIODARONE HYDROCHLORIDE 200 MG: 200 TABLET ORAL at 08:08

## 2025-06-12 NOTE — ASSESSMENT & PLAN NOTE
Wt Readings from Last 3 Encounters:   06/12/25 78.7 kg (173 lb 8 oz)   06/05/25 81.1 kg (178 lb 12.7 oz)   05/04/25 79.2 kg (174 lb 9.7 oz)   Continue management per cardiology colleagues ICU team; the patient is currently on amiodarone infusion 1 mg/h.  Monitor tachycardia while on milrinone seems to be improving

## 2025-06-12 NOTE — PROGRESS NOTES
Progress Note - Critical Care/ICU   Name: Britton Batista 60 y.o. male I MRN: [id number]  Unit/Bed#: -01 I Date of Admission: 6/5/2025   Date of Service: 6/12/2025 I Hospital Day: 7       Critical Care Interval Transfer Note:    Brief Hospital Summary:  Hx of pacer, methamphetamine abuse Patient initially came to hospital for treatment of tonsillitis and was treated under [facility] with Unasyn. By the 8th had worsening IRINEO, hyperkalemia and elevated LA. Patient was then transferred to ICU for management of cardiogenic shock requiring milrinone and amiodarone drip for HR control. Patient has been off milrinone since 6/10 PM. HR maintained using amiodarone and BB.     Barriers to discharge:   Remove CVC prior to transfer to med/surg  Amiodarone drip bridged to PO 6/12  Cardiology following  Stop digoxin indefinitely and maintain amio  Start BB 50mg Q6hr to start and can escalate to 100mg BID when appropriate     Consults: IP CONSULT TO CARDIOLOGY  IP CONSULT TO NEPHROLOGY  IP CONSULT TO MEDICAL CRITICAL CARE  IP CONSULT TO CASE MANAGEMENT  IP CONSULT TO PALLIATIVE CARE    Recommended to review admission imaging for incidental findings and document in discharge navigator: Chart reviewed, no known incidental findings noted at this time.      Discharge Plan: Anticipate discharge in 48 hrs to home.  Central access plan: Remove CVC before transfer to med/surg       Patient seen and evaluated by Critical Care today and deemed to be appropriate for transfer to [facility] with Telemetry. Spoke to Dr. Brady from [facility] to accept transfer. Critical care can be contacted via SecureChat with any questions or concerns. Please use the Critical Care AP Role in Secure Chat for any provider inquires until the patient is transferred out of the ICU or until tomorrow at 0600.

## 2025-06-12 NOTE — CASE MANAGEMENT
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    Scleroderma (HCC) 2025    Acute renal failure superimposed on stage 3 chronic kidney disease ([facility]) 2025    Cardiogenic shock (HCC) 2025    Atrial fibrillation with RVR (HCC) 2025    Syncope 2024    Chest pain 2024    Acute on chronic systolic congestive heart failure (HCC) 2024    Primary hypertension 2024    Hypomagnesemia 2023    Methamphetamine abuse ([facility]) 2023    Tobacco abuse 2023    D-dimer, elevated 2023    Cardiomyopathy (HCC) 10/09/2023    Acute kidney injury superimposed on stage 3a chronic kidney disease ([facility]) 10/08/2023    Acute on chronic combined systolic (congestive) and diastolic (congestive) heart failure (HCC) 10/07/2023    Hypertensive emergency 10/07/2023    Anxiety     Depression     Other emphysema (HCC)     GERD (gastroesophageal reflux disease)     Gout     Hypertensive urgency     Neuropathy     Raynaud's disease     Unilateral inguinal hernia, without obstruction or gangrene, not specified as recurrent     PSS (progressive systemic sclerosis) (HCC) 10/25/2021    Near syncope 10/25/2021    Osteomyelitis ([facility]) 10/25/2021    Elevated troponin 10/25/2021    Acute encephalopathy 10/25/2021      LOS (days): 7  Geometric Mean LOS (GMLOS) (days):  4.9  Days to GMLOS:-2.3     OBJECTIVE:  Risk of Unplanned Readmission Score: 69.53         Current admission status: Inpatient   Preferred Pharmacy:   [facility] - [place], [place] - [address]  [address]  [place] [address]  Phone: [phone] Fax: [phone]    [facility] - [place] - [address]  [address]  [address] [facility] [place] [address]  Phone: [phone] Fax: [phone]    [id number] - [place], [place] - [address]  [address] [address]  Phone: [phone] Fax: [phone]    Primary Care Provider: Juan Galindo DO    Primary Insurance: [facility]  Secondary Insurance:     DISCHARGE DETAILS:       DME Referral Provided  Referral made for DME?: Yes  DME referral completed for the following items:: [facility]    CM reciedved update from [facility] D[facility] - patient only wanted IOP treatment, so they called [facility] with him and they did a screening on him. They told him to come in on a Thursday, when he's discharged from the hospital, since he can just walk - in between 9 AM - 4 PM that day for an assessment.    CM followed up with patient - confirmed [facility] DC plan and agreeable to walker.   Freedom of choice was provided in regards to needing a home medical equipment company, pt does not have preference. Pt is aware that we frequently utilized [employer] as we have a working relationship with this company. Patients choice is to use [employer]..  He is not aware of sister's address but confirmed he will be living there.     CM placed walker referral via [place].     CM call to Arin, patient's sister - her address is [address] [place] (address is actually in [place]).     CM updated Kb on patient's address.     CM to follow patient's care and discharge needs.

## 2025-06-12 NOTE — PROGRESS NOTES
Pastoral Care Progress Note  Ch initiated support visit to pt in setting of GOC discussions and ACP document conversations with medical team.  Pt feeling ill at this time and emotional. Britton attests he is not appropriate for a visit.  CH will continue to follow.         06/12/25 1200   Clinical Encounter Type   Visited With Patient

## 2025-06-12 NOTE — PLAN OF CARE
Problem: PAIN - ADULT  Goal: Verbalizes/displays adequate comfort level or baseline comfort level  Description: Interventions:  - Encourage patient to monitor pain and request assistance  - Assess pain using appropriate pain scale0-10  - Administer analgesics as ordered based on type and severity of pain and evaluate response  - Implement non-pharmacological measures as appropriate and evaluate response  - Consider cultural and social influences on pain and pain management  - Notify physician/advanced practitioner if interventions unsuccessful or patient reports new pain  - Educate patient/family on pain management process including their role and importance of  reporting pain   - Provide non-pharmacologic/complimentary pain relief interventions  6/12/2025 0118 by Noemi Pozo RN  Outcome: Progressing  6/12/2025 0118 by Noemi Pozo RN  Outcome: Progressing     Problem: INFECTION - ADULT  Goal: Absence or prevention of progression during hospitalization  Description: INTERVENTIONS:  - Assess and monitor for signs and symptoms of infection  - Monitor lab/diagnostic results  - Monitor all insertion sites, i.e. indwelling lines, tubes, and drains  - Monitor endotracheal if appropriate and nasal secretions for changes in amount and color  - [place] appropriate cooling/warming therapies per order  - Administer medications as ordered  - Instruct and encourage patient and family to use good hand hygiene technique  - Identify and instruct in appropriate isolation precautions for identified infection/condition  6/12/2025 0118 by Neomi Pozo RN  Outcome: Progressing  6/12/2025 0118 by Noemi Pozo RN  Outcome: Progressing  Goal: Absence of fever/infection during neutropenic period  Description: INTERVENTIONS:  - Monitor WBC  - Perform strict hand hygiene  - Limit to healthy visitors only  - No plants, dried, fresh or silk flowers with chaidez in patient room  6/12/2025 0118 by Noemi Pozo RN  Outcome:  Progressing  6/12/2025 0118 by Noemi Pozo RN  Outcome: Progressing     Problem: SAFETY ADULT  Goal: Patient will remain free of falls  Description: INTERVENTIONS:  - Educate patient/family on patient safety including physical limitations  - Instruct patient to call for assistance with activity   - Consider consulting OT/PT to assist with strengthening/mobility based on AM PAC & JH-HLM score  - Consult OT/PT to assist with strengthening/mobility   - Keep Call bell within reach  - Keep bed low and locked with side rails adjusted as appropriate  - Keep care items and personal belongings within reach  - Initiate and maintain comfort rounds  - Make Fall Risk Sign visible to staff  - Offer Toileting every 2 Hours, in advance of need  - Initiate/Maintain bed alarm  - Obtain necessary fall risk management equipment: yellow socks  - Apply yellow socks and bracelet for high fall risk patients  - Consider moving patient to room near nurses station  6/12/2025 0118 by Noemi Pozo RN  Outcome: Progressing  6/12/2025 0118 by Noemi Pozo RN  Outcome: Progressing  Goal: Maintain or return to baseline ADL function  Description: INTERVENTIONS:  -  Assess patient's ability to carry out ADLs; assess patient's baseline for ADL function and identify physical deficits which impact ability to perform ADLs (bathing, care of mouth/teeth, toileting, grooming, dressing, etc.)  - Assess/evaluate cause of self-care deficits   - Assess range of motion  - Assess patient's mobility; develop plan if impaired  - Assess patient's need for assistive devices and provide as appropriate  - Encourage maximum independence but intervene and supervise when necessary  - Involve family in performance of ADLs  - Assess for home care needs following discharge   - Consider OT consult to assist with ADL evaluation and planning for discharge  - Provide patient education as appropriate  - Monitor functional capacity and physical performance, use of AM PAC  & -HLM   - Monitor gait, balance and fatigue with ambulation    6/12/2025 0118 by Noemi Pozo RN  Outcome: Progressing  6/12/2025 0118 by Noemi Pozo RN  Outcome: Progressing  Goal: Maintains/Returns to pre admission functional level  Description: INTERVENTIONS:  - Perform AM-PAC 6 Click Basic Mobility/ Daily Activity assessment daily.  - Set and communicate daily mobility goal to care team and patient/family/caregiver.   - Collaborate with rehabilitation services on mobility goals if consulted  - Out of bed for toileting  - Record patient progress and toleration of activity level   6/12/2025 0118 by Noemi Pozo RN  Outcome: Progressing  6/12/2025 0118 by Noemi Pozo RN  Outcome: Progressing     Problem: DISCHARGE PLANNING  Goal: Discharge to home or other facility with appropriate resources  Description: INTERVENTIONS:  - Identify barriers to discharge w/patient and caregiver  - Arrange for needed discharge resources and transportation as appropriate  - Identify discharge learning needs (meds, wound care, etc.)  - Arrange for interpretive services to assist at discharge as needed  - Refer to Case Management Department for coordinating discharge planning if the patient needs post-hospital services based on physician/advanced practitioner order or complex needs related to functional status, cognitive ability, or social support system  6/12/2025 0118 by Noemi Pozo RN  Outcome: Progressing  6/12/2025 0118 by Noemi Pozo RN  Outcome: Progressing     Problem: Knowledge Deficit  Goal: Patient/family/caregiver demonstrates understanding of disease process, treatment plan, medications, and discharge instructions  Description: Complete learning assessment and assess knowledge base.  Interventions:  - Provide teaching at level of understanding  - Provide teaching via preferred learning methods  6/12/2025 0118 by Noemi Pozo RN  Outcome: Progressing  6/12/2025 0118 by Noemi Pozo RN  Outcome:  Progressing     Problem: CARDIOVASCULAR - ADULT  Goal: Maintains optimal cardiac output and hemodynamic stability  Description: INTERVENTIONS:  - Monitor I/O, vital signs and rhythm  - Monitor for S/S and trends of decreased cardiac output  - Administer and titrate ordered vasoactive medications to optimize hemodynamic stability  - Assess quality of pulses, skin color and temperature  - Assess for signs of decreased coronary artery perfusion  - Instruct patient to report change in severity of symptoms  Outcome: Progressing  Goal: Absence of cardiac dysrhythmias or at baseline rhythm  Description: INTERVENTIONS:  - Continuous cardiac monitoring, vital signs, obtain 12 lead EKG if ordered  - Administer antiarrhythmic and heart rate control medications as ordered  - Monitor electrolytes and administer replacement therapy as ordered  Outcome: Progressing     Problem: METABOLIC, FLUID AND ELECTROLYTES - ADULT  Goal: Electrolytes maintained within normal limits  Description: INTERVENTIONS:  - Monitor labs and assess patient for signs and symptoms of electrolyte imbalances  - Administer electrolyte replacement as ordered  - Monitor response to electrolyte replacements, including repeat lab results as appropriate  - Instruct patient on fluid and nutrition as appropriate  Outcome: Progressing  Goal: Fluid balance maintained  Description: INTERVENTIONS:  - Monitor labs   - Monitor I/O and WT  - Instruct patient on fluid and nutrition as appropriate  - Assess for signs & symptoms of volume excess or deficit  Outcome: Progressing  Goal: Glucose maintained within target range  Description: INTERVENTIONS:  - Monitor Blood Glucose as ordered  - Assess for signs and symptoms of hyperglycemia and hypoglycemia  - Administer ordered medications to maintain glucose within target range  - Assess nutritional intake and initiate nutrition service referral as needed  Outcome: Progressing

## 2025-06-12 NOTE — PROGRESS NOTES
Progress Note - Nephrology   Name: Britton Batista 60 y.o. male I MRN: [id number]  Unit/Bed#: -01 I Date of Admission: 6/5/2025   Date of Service: 6/12/2025 I Hospital Day: 7    Assessment & Plan  Acute renal failure superimposed on stage 3 chronic kidney disease (HCC)  Lab Results   Component Value Date    EGFR 41 06/12/2025    EGFR 40 06/11/2025    EGFR 38 06/11/2025    CREATININE 1.74 (H) 06/12/2025    CREATININE 1.80 (H) 06/11/2025    CREATININE 1.86 (H) 06/11/2025   Baseline creatinine 1.4-1.6  Etiology of acute kidney injury is cardiorenal syndrome in the setting of cardiogenic shock  Off inotropes  On amdiodarone  Off diuretics  Urine output improved to 5 liters  BP at goal  Creatinine trending down to 1.74 near baseline  IV Lasix 40 mg can be continued    Atrial fibrillation with RVR (HCC)  Wt Readings from Last 3 Encounters:   06/12/25 78.7 kg (173 lb 8 oz)   06/05/25 81.1 kg (178 lb 12.7 oz)   05/04/25 79.2 kg (174 lb 9.7 oz)   Continue management per cardiology colleagues ICU team; the patient is currently on amiodarone infusion 1 mg/h.  Monitor tachycardia while on milrinone seems to be improving  Transaminitis  Persistently elevated  Ongoing ICU care  Cardiogenic shock (HCC)  Now off milrinone  IV Lasix  BP improved  Continue to monitor hemodynamics  Goals of care, counseling/discussion  Palliative care following    Please contact the SecureChat role for the Nephrology service with any questions/concerns.    Subjective     Patient was seen today  On the commode  Making urine   [place] out  Off milrinone    Objective :  Temp:  [97.6 °F (36.4 °C)-98.9 °F (37.2 °C)] 97.6 °F (36.4 °C)  HR:  [] 91  BP: (111-163)/() 124/89  Resp:  [10-45] 23  SpO2:  [77 %-99 %] 95 %  O2 Device: Nasal cannula  Nasal Cannula O2 Flow Rate (L/min):  [2 L/min] 2 L/min    Current Weight: Weight - Scale: 78.7 kg (173 lb 8 oz)  First Weight: Weight - Scale: 80.1 kg (176 lb 9.4 oz)  I/O         06/10 0701  06/11 0700  06/11 0701  06/12 0700 06/12 0701  06/13 0700    P.O. 1320 840 60    I.V. (mL/kg) 787.2 (9.7) 466.8 (5.9) 54.8 (0.7)    IV Piggyback 100  26.7    Total Intake(mL/kg) 2207.2 (27.2) 1306.8 (16.6) 141.5 (1.8)    Urine (mL/kg/hr) 3575 (1.8) 5017 (2.7) 325 (1.5)    Stool  0 0    Total Output 3575 5017 325    Net -1367.8 -3710.3 -183.5           Unmeasured Urine Occurrence   1 x    Unmeasured Stool Occurrence  1 x 2 x          Physical Exam  Vitals and nursing note reviewed.   Constitutional:       General: He is not in acute distress.     Appearance: He is well-developed.   HENT:      Head: Normocephalic and atraumatic.     Eyes:      Conjunctiva/sclera: Conjunctivae normal.       Cardiovascular:      Rate and Rhythm: Normal rate and regular rhythm.      Heart sounds: No murmur heard.  Pulmonary:      Effort: Pulmonary effort is normal. No respiratory distress.      Breath sounds: Normal breath sounds.   Abdominal:      Palpations: Abdomen is soft.      Tenderness: There is no abdominal tenderness.     Musculoskeletal:         General: No swelling.      Cervical back: Neck supple.     Skin:     General: Skin is warm and dry.      Capillary Refill: Capillary refill takes less than 2 seconds.     Neurological:      Mental Status: He is alert.     Psychiatric:         Mood and Affect: Mood normal.       Medications:  Current Medications[1]      Lab Results: I have reviewed the following results:  Results from last 7 days   Lab Units 06/12/25  0501 06/11/25  1405 06/11/25  0606 06/10/25  1454 06/10/25  0527 06/09/25  2315 06/09/25  1206 06/09/25  0504 06/08/25  2213 06/07/25  0800 06/06/25  0541 06/05/25  0943   WBC Thousand/uL 10.83*  --  11.16*  --  9.28  --   --  8.68  --   --  9.56 9.26   HEMOGLOBIN g/dL 12.8  --  12.3  --  12.8  --   --  12.6  --   --  13.0 13.3   HEMATOCRIT % 40.0  --  38.1  --  39.3  --   --  38.9  --   --  40.7 42.0   PLATELETS Thousands/uL 198  --  193  --  209  --   --  246  --   --  230 234  "  POTASSIUM mmol/L 4.0 3.9 4.2 4.4 5.1 4.8 4.7 4.8 4.9   < > 4.9 5.2   CHLORIDE mmol/L 96 93* 96 93* 94* 92* 93* 94* 95*   < > 101 101   CO2 mmol/L 33* 33* 28 28 30 30 29 28 25   < > 23 22   BUN mg/dL 39* 46* 53* 64* 68* 67* 66* 68* 65*   < > 30* 44*   CREATININE mg/dL 1.74* 1.80* 1.86* 1.98* 1.95* 2.04* 2.13* 2.12* 2.13*   < > 1.23 1.63*   CALCIUM mg/dL 8.5 8.9 8.6 8.9 8.9 9.1 9.1 8.9 9.2   < > 9.1 9.2   MAGNESIUM mg/dL 1.8*  --  2.0  --  2.3  --   --  2.4 2.4  --   --  1.7*   PHOSPHORUS mg/dL 2.4  --  3.0  --  3.6  --   --  4.8* 5.7*  --   --   --    ALBUMIN g/dL 3.3*  --  3.3*  --  3.5 3.6 3.8 3.5 3.6  --  3.6 3.7    < > = values in this interval not displayed.       Administrative Statements     Portions of the record may have been created with voice recognition software. Occasional wrong word or \"sound a like\" substitutions may have occurred due to the inherent limitations of voice recognition software. Read the chart carefully and recognize, using context, where substitutions have occurred.If you have any questions, please contact the dictating provider.       [1]   Current Facility-Administered Medications:     acetaminophen (TYLENOL) tablet 650 mg, 650 mg, Oral, Q6H PRN, Mack Varela PA-C, 650 mg at 06/11/25 1452    amiodarone tablet 200 mg, 200 mg, Oral, Daily With Breakfast, Dewayne Patel Jr., PA-C, 200 mg at 06/12/25 0808    bisacodyl (DULCOLAX) rectal suppository 10 mg, 10 mg, Rectal, Daily PRN, Dewayne Patel Jr., NOE, 10 mg at 06/12/25 0146    chlorhexidine (PERIDEX) 0.12 % oral rinse 15 mL, 15 mL, Mouth/Throat, Q12H JESUS, Mack Varela PA-C, 15 mL at 06/12/25 0808    diphenhydramine, lidocaine, Al/Mg hydroxide, simethicone (Magic Mouthwash) oral solution 10 mL, 10 mL, Swish & Swallow, Q6H PRN, LUIS ANGEL Lyman, 10 mL at 06/11/25 2108    fluticasone (FLONASE) 50 mcg/act nasal spray 1 spray, 1 spray, Each Nare, Daily, LUIS ANGEL Lyman, 1 spray at 06/11/25 1038    [START " ON 6/13/2025] furosemide (LASIX) tablet 40 mg, 40 mg, Oral, Daily, LUIS ANGEL Esparza    hydrOXYzine HCL (ATARAX) tablet 50 mg, 50 mg, Oral, Q6H PRN, LUIS ANGEL Esparza    ipratropium-albuterol (DUO-NEB) 0.5-2.5 mg/3 mL inhalation solution 3 mL, 3 mL, Nebulization, Q6H While awake, Mack Varela PA-C, 3 mL at 06/11/25 1354    magnesium Oxide (MAG-OX) tablet 400 mg, 400 mg, Oral, Daily, Mack Varela PA-C, 400 mg at 06/12/25 0807    magnesium sulfate 2 g/50 mL IVPB (premix) 2 g, 2 g, Intravenous, Once, LUIS ANGEL Esparza, Last Rate: 25 mL/hr at 06/12/25 0757, 2 g at 06/12/25 0757    metoprolol tartrate (LOPRESSOR) tablet 100 mg, 100 mg, Oral, Q12H JESUS, Dewayne Patel Jr., PA-C, 100 mg at 06/12/25 0807    [Held by provider] midodrine (PROAMATINE) tablet 7.5 mg, 7.5 mg, Oral, TID AC, Mack Varela PA-C, 7.5 mg at 06/08/25 1144    mirtazapine (REMERON) tablet 30 mg, 30 mg, Oral, HS, LUIS ANGEL Esparza    nicotine (NICODERM CQ) 14 mg/24hr TD 24 hr patch 1 patch, 1 patch, Transdermal, Daily, Mack Varela PA-C, 1 patch at 06/12/25 0807    pantoprazole (PROTONIX) EC tablet 40 mg, 40 mg, Oral, Early Morning, LUIS ANGEL Lyman, 40 mg at 06/12/25 0433    phenol (CHLORASEPTIC) 1.4 % mucosal liquid 1 spray, 1 spray, Mouth/Throat, Q2H PRN, Mack Varela PA-C, 1 spray at 06/08/25 0947    senna-docusate sodium (SENOKOT S) 8.6-50 mg per tablet 1 tablet, 1 tablet, Oral, HS, LUIS ANGEL Esparza, 1 tablet at 06/11/25 2104    sodium chloride (OCEAN) 0.65 % nasal spray 1 spray, 1 spray, Each Nare, Q1H PRN, LUIS ANGEL Lyman, 1 spray at 06/09/25 2048    trimethobenzamide (TIGAN) IM injection 200 mg, 200 mg, Intramuscular, Q6H PRN, Mack Varela PA-C, 200 mg at 06/12/25 0506    warfarin (COUMADIN) tablet 3 mg, 3 mg, Oral, Daily (warfarin), LUIS ANGEL Lyman, 3 mg at 06/11/25 1916

## 2025-06-12 NOTE — ASSESSMENT & PLAN NOTE
Wt Readings from Last 3 Encounters:   06/11/25 81 kg (178 lb 9.2 oz)   06/05/25 81.1 kg (178 lb 12.7 oz)   05/04/25 79.2 kg (174 lb 9.7 oz)     4/2025 TTE: 15-20% by visual estimation. Severe global hypokinesis.  Moderate regurgitation of mitral valve. Limited views may underestimate severity of MR. Severe tricuspid regurgitation with RVSP 41 mmHg.    BNP >4700  Diuretics: lasix 40 mg IV daily  Elevated LA likely secondary to hypoperfusion   Trend end points:Sv02 and lactic  Amio IV converted to PO Amio 6/12  Milrinone d/c'd evening of 6/11.  Lopressor restarted 6/12.  Continue digoxin 125 mcg EOD

## 2025-06-12 NOTE — PROGRESS NOTES
Progress Note - Critical Care/ICU   Name: Britton Batista 60 y.o. male I MRN: [id number]  Unit/Bed#: -01 I Date of Admission: 6/5/2025   Date of Service: 6/12/2025 I Hospital Day: 7      Assessment & Plan  Acute on chronic combined systolic (congestive) and diastolic (congestive) heart failure (HCC)  Wt Readings from Last 3 Encounters:   06/11/25 81 kg (178 lb 9.2 oz)   06/05/25 81.1 kg (178 lb 12.7 oz)   05/04/25 79.2 kg (174 lb 9.7 oz)     4/2025 TTE: 15-20% by visual estimation. Severe global hypokinesis.  Moderate regurgitation of mitral valve. Limited views may underestimate severity of MR. Severe tricuspid regurgitation with RVSP 41 mmHg.    BNP >4700  Diuretics: lasix 40 mg IV daily  Elevated LA likely secondary to hypoperfusion   Trend end points:Sv02 and lactic  Amio IV converted to PO Amio 6/12  Milrinone d/c'd evening of 6/11.  Lopressor restarted 6/12.  Continue digoxin 125 mcg EOD    Atrial fibrillation with RVR (HCC)    Continue amio at 0.5   Goal HR <120  Continue to monitor on telemetry   Resume warfarin 6/10, daily INR, goal INR 2-3    Plan:  6/12 transition to Amiodarone PO, and restart BB  Transaminitis  Likely in the setting of HF and recent infection  Trend LFTs  Acute renal failure superimposed on stage 3 chronic kidney disease (HCC)  Lab Results   Component Value Date    EGFR 40 06/11/2025    EGFR 38 06/11/2025    EGFR 35 06/10/2025    CREATININE 1.80 (H) 06/11/2025    CREATININE 1.86 (H) 06/11/2025    CREATININE 1.98 (H) 06/10/2025       Baseline creatine 1.2-1.5  Creatine trended up to 2.1  IRINEO likely in the setting of Cardiogenic shock  Continue milrinone   Trend chemistry  Trend I/O  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 Severe global hypokinesis.  Moderate regurgitation of mitral valve. Limited views may underestimate severity of MR. Severe tricuspid regurgitation with RVSP 41 mmHg.        See above under acute diastolic and systolic  HF  Patient interested in D&A rehab. CM on consult.  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 D/c'd 6/11 evening.    Plan:  Trend svo2 and lactic  Cardiology following   Lopressor restarted 6/12.    Goals of care, counseling/discussion  Consult placed to palliative care given patient's multiple comorbidities including end-stage heart failure and chronic pain in the setting of scleroderma  Patient interested in following with palliative care after discharge  Patient discussing code status and POLST with family  Disposition: Stepdown Level 1    ICU Core Measures     A: Assess, Prevent, and Manage Pain Has pain been assessed? Yes  Need for changes to pain regimen? No   B: Both SAT/SAT  N/A   C: Choice of Sedation RASS Goal: N/A patient not on sedation  Need for changes to sedation or analgesia regimen? N/A   D: Delirium CAM-ICU: Negative   E: Early Mobility  Plan for early mobility? Yes   F: Family Engagement Plan for family engagement today? Yes       Review of Invasive Devices:      Central access plan: Will obtain peripheral access and discontinue prior to transfer      Prophylaxis:  VTE VTE covered by:  warfarin, Oral, 3 mg at 06/11/25 1916       Stress Ulcer  covered bypantoprazole (PROTONIX) 40 mg tablet [[id number]] (Long-Term Med), pantoprazole (PROTONIX) EC tablet 40 mg [[id number]]         24 Hour Events : Patient's medications were adjusted this morning to include the discontinuing of milrinone with an improved SVO2.  Patient c/o constipation overnight and requested a suppository.  Dulcolax was ordered.  No other acute events were reported overnight.        Objective :                   Vitals I/O       Most Recent Min/Max in 24hrs   Temp 97.7 °F (36.5 °C) Temp  Min: 97.7 °F (36.5 °C)  Max: 98.7 °F (37.1 °C)   Pulse (!) 108 Pulse  Min: 108  Max: 136   Resp 21 Resp  Min: 10  Max: 45   /92 BP  Min: 111/89  Max: 163/106   O2 Sat 94 % SpO2  Min: 86 %  Max: 99 %      Intake/Output Summary (Last 24 hours) at 6/12/2025 0419  Last data filed at 6/12/2025 0235  Gross per 24 hour   Intake 1297.35 ml   Output 4817 ml   Net -3519.65 ml       Diet Cardiovascular; Sodium 2 GM; Fluid Restriction 1800 ML    Invasive Monitoring           Physical Exam   Physical Exam  Eyes:      Extraocular Movements: Extraocular movements intact.      Pupils: Pupils are equal, round, and reactive to light.   Skin:     General: Skin is warm and dry.      Coloration: Skin is not jaundiced or pale.      Findings: No rash.   HENT:      Head: Normocephalic and atraumatic.      Mouth/Throat:      Mouth: Mucous membranes are moist.   Neck:      Vascular: No JVD.   no midline tenderness Cardiovascular:      Rate and Rhythm: Tachycardia present. Rhythm irregular.   Musculoskeletal:         General: No deformity or signs of injury. Normal range of motion.   Abdominal: General: Bowel sounds are normal.      Palpations: Abdomen is soft.   Constitutional:       General: He is not in acute distress.     Appearance: He is well-developed and well-nourished. He is not ill-appearing.   Pulmonary:      Effort: Pulmonary effort is normal.      Breath sounds: Normal breath sounds.   Neurological:      General: No focal deficit present.      Mental Status: He is alert and oriented to person, place and time.      Motor: Strength full and intact in all extremities.          Diagnostic Studies        Lab Results: I have reviewed the following results:     Medications:  Scheduled PRN   amiodarone, 200 mg, Daily With Breakfast  chlorhexidine, 15 mL, Q12H JESUS  digoxin, 125 mcg, Every Other Day  fluticasone, 1 spray, Daily  furosemide, 40 mg, Daily  [Held by  provider] furosemide, 40 mg, Daily  ipratropium-albuterol, 3 mL, Q6H While awake  magnesium Oxide, 400 mg, Daily  metoprolol tartrate, 100 mg, Q12H JESUS  [Held by provider] midodrine, 7.5 mg, TID AC  mirtazapine, 15 mg, HS  nicotine, 1 patch, Daily  oxymetazoline, 2 spray, Q12H JESUS  pantoprazole, 40 mg, Early Morning  senna-docusate sodium, 1 tablet, HS  warfarin, 3 mg, Daily (warfarin)      acetaminophen, 650 mg, Q6H PRN  bisacodyl, 10 mg, Daily PRN  diphenhydramine, lidocaine, Al/Mg hydroxide, simethicone, 10 mL, Q6H PRN  hydrOXYzine HCL, 25 mg, Q6H PRN  phenol, 1 spray, Q2H PRN  sodium chloride, 1 spray, Q1H PRN  trimethobenzamide, 200 mg, Q6H PRN       Continuous    amiodarone (CORDARONE) 900 mg in dextrose 5 % 500 mL infusion, 0.5 mg/min, Last Rate: 0.5 mg/min (06/11/25 0616)         Labs:   CBC    Recent Labs     06/10/25  0527 06/11/25  0606   WBC 9.28 11.16*   HGB 12.8 12.3   HCT 39.3 38.1    193     BMP    Recent Labs     06/11/25  0606 06/11/25  1405   SODIUM 133* 134*   K 4.2 3.9   CL 96 93*   CO2 28 33*   AGAP 9 8   BUN 53* 46*   CREATININE 1.86* 1.80*   CALCIUM 8.6 8.9       Coags    Recent Labs     06/10/25  1454 06/11/25  0606   INR 2.28* 2.28*        Additional Electrolytes  Recent Labs     06/10/25  0527 06/11/25  0606   MG 2.3 2.0   PHOS 3.6 3.0   CAIONIZED 1.09* 1.10*          Blood Gas    No recent results  Recent Labs     06/11/25  2200   PHVEN 7.454*   RCW2WNP 43.9   PO2VEN 35.3   FEM4OMB 30.1*   BEVEN 5.5   P8OAFDR 65.2    LFTs  Recent Labs     06/10/25  0527 06/11/25  0606   * 250*   * 218*   ALKPHOS 138* 132*   ALB 3.5 3.3*   TBILI 1.61* 1.58*       Infectious  No recent results  Glucose  Recent Labs     06/10/25  0527 06/10/25  1454 06/11/25  0606 06/11/25  1405   GLUC 148* 208* 131 102

## 2025-06-12 NOTE — RESPIRATORY THERAPY NOTE
Respiratory Note     06/12/25 0740   Respiratory Assessment   Assessment Type Assess only   General Appearance Alert;Awake   Respiratory Pattern Normal   Chest Assessment Chest expansion symmetrical   Bilateral Breath Sounds Diminished   Resp Comments Pt stable on 2LPM NC, refused UDN tx.   O2 Device 2LPM NC   Oxygen Therapy/Pulse Ox   O2 Device Nasal cannula   O2 Therapy Oxygen humidified   Nasal Cannula O2 Flow Rate (L/min) 2 L/min   Calculated FIO2 (%) - Nasal Cannula 28   SpO2 97 %   SpO2 Activity At Rest   $ Pulse Oximetry Spot Check Charge Completed

## 2025-06-12 NOTE — PROGRESS NOTES
Progress Note - Cardiology   Name: Britton Batista 60 y.o. male I MRN: [id number]  Unit/Bed#: -01 I Date of Admission: 6/5/2025   Date of Service: 6/12/2025 I Hospital Day: 7    Assessment & Plan  Atrial fibrillation with RVR (HCC)  Patient is known to have atrial fibrillation which has been persistent.    Initially scheduled for DCCV after therapeutic inrs but has had labile inrs   Cost prohibitive issues with DOACs   Remains in afib with rvr during admission  Not candidate for most AADs given cardiomyopathy and renal dysfunction  Cannot be on CCBs given low LVEF   In cardiogenic shock currently limiting use of BB initially  Was using digoxin, now off.  Milrinone discontinued 6/11/2025  Given risks and benefits as above, it is appropriate to consider amiodarone for rate control at this time  Was initially on amiodarone infusion and transitioning to oral for rate control   Currently on Lopressor 100 mg PO BID  Will switch to Lopressor 50 q6h w parameters given concerns w recent cardiogenic shock  Amiodarone infusion currently off  Starting amiodarone 200 mg Po daily for rate control  Will monitor response  Pt states that if he worsens at this point he wants to go home on hospice   Acute renal failure superimposed on stage 3 chronic kidney disease (HCC)  Lab Results   Component Value Date    EGFR 41 06/12/2025    EGFR 40 06/11/2025    EGFR 38 06/11/2025    CREATININE 1.74 (H) 06/12/2025    CREATININE 1.80 (H) 06/11/2025    CREATININE 1.86 (H) 06/11/2025   CKD stable  Likely CRS but improving   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  Most recent echocardiogram from April 2025 showed severely dilated left ventricle with LVEF of 15 to 20% with global hypokinesis.  Mild to moderate mitral regurgitation and severe tricuspid regurgitation.   Negative cardiac cath 2020   Cardiogenic shock (HCC)  Resolved  Tolerating bb well  Will plan  Lopressor 50 q6h   Prognosis is guarded  Diuresing appropriately on lasix 40 IV daily but planning to transition to oral   GDMT limited by CKD.   Goals of care, counseling/discussion  Palliative care is involved  We had a long discussion about this  He is at this point interested in medications for rate control  Should medications not work and he have a recurrence he has stated he would be interested in going home on hospice. At this point though, he states he wants to be a full code. Ongoing discussions with primary service are happening.     Subjective     Reports breathing has improved. Has nausea this AM. Reports a lot of anxiety. No chest pain.     Objective :  Temp:  [97.6 °F (36.4 °C)-98.9 °F (37.2 °C)] 97.6 °F (36.4 °C)  HR:  [] 91  BP: (111-163)/() 124/89  Resp:  [10-45] 23  SpO2:  [77 %-99 %] 95 %  O2 Device: Nasal cannula  Nasal Cannula O2 Flow Rate (L/min):  [2 L/min] 2 L/min  Orthostatic Blood Pressures      Flowsheet Row Most Recent Value   Blood Pressure 124/89 filed at 06/12/2025 0925   Patient Position - Orthostatic VS Sitting filed at 06/12/2025 0701          First Weight: Weight - Scale: 80.1 kg (176 lb 9.4 oz) (06/05/25 0855)  Vitals:    06/11/25 0600 06/12/25 0500   Weight: 81 kg (178 lb 9.2 oz) 78.7 kg (173 lb 8 oz)     Physical Exam  Vitals and nursing note reviewed.   Constitutional:       General: He is not in acute distress.     Appearance: He is not toxic-appearing.   HENT:      Head: Normocephalic.     Cardiovascular:      Rate and Rhythm: Rhythm irregular.      Heart sounds: No murmur heard.  Pulmonary:      Effort: Pulmonary effort is normal.      Breath sounds: Rales present.     Musculoskeletal:         General: No swelling.     Skin:     General: Skin is warm.      Capillary Refill: Capillary refill takes less than 2 seconds.     Neurological:      General: No focal deficit present.      Mental Status: He is alert. Mental status is at baseline.           Lab Results: I  have reviewed the following results:  Results from last 7 days   Lab Units 06/12/25  0501 06/11/25  0606 06/10/25  0527   WBC Thousand/uL 10.83* 11.16* 9.28   HEMOGLOBIN g/dL 12.8 12.3 12.8   HEMATOCRIT % 40.0 38.1 39.3   PLATELETS Thousands/uL 198 193 209     Results from last 7 days   Lab Units 06/12/25  0501 06/11/25  1405 06/11/25  0606   POTASSIUM mmol/L 4.0 3.9 4.2   CHLORIDE mmol/L 96 93* 96   CO2 mmol/L 33* 33* 28   BUN mg/dL 39* 46* 53*   CREATININE mg/dL 1.74* 1.80* 1.86*   CALCIUM mg/dL 8.5 8.9 8.6     Results from last 7 days   Lab Units 06/12/25  0501 06/11/25  0606 06/10/25  1454 06/06/25  0541 06/05/25  1106   INR  2.33* 2.28* 2.28*   < > 8.98*   PTT seconds  --   --   --   --  69*    < > = values in this interval not displayed.     Lab Results   Component Value Date    HGBA1C 6.9 (H) 06/11/2025     Lab Results   Component Value Date    CKTOTAL 44 06/07/2025    TROPONINI 0.04 10/25/2021       Imaging Results Review: I reviewed radiology reports from this admission including: chest xray.  Other Study Results Review: EKG was reviewed.

## 2025-06-12 NOTE — ASSESSMENT & PLAN NOTE
Patient is known to have atrial fibrillation which has been persistent.    Initially scheduled for DCCV after therapeutic inrs but has had labile inrs   Cost prohibitive issues with DOACs   Remains in afib with rvr during admission  Not candidate for most AADs given cardiomyopathy and renal dysfunction  Cannot be on CCBs given low LVEF   In cardiogenic shock currently limiting use of BB initially  Was using digoxin, now off.  Milrinone discontinued 6/11/2025  Given risks and benefits as above, it is appropriate to consider amiodarone for rate control at this time  Was initially on amiodarone infusion and transitioning to oral for rate control   Currently on Lopressor 100 mg PO BID  Will switch to Lopressor 50 q6h w parameters given concerns w recent cardiogenic shock  Amiodarone infusion currently off  Starting amiodarone 200 mg Po daily for rate control  Will monitor response  Pt states that if he worsens at this point he wants to go home on hospice

## 2025-06-12 NOTE — ASSESSMENT & PLAN NOTE
Palliative care is involved  We had a long discussion about this  He is at this point interested in medications for rate control  Should medications not work and he have a recurrence he has stated he would be interested in going home on hospice. At this point though, he states he wants to be a full code. Ongoing discussions with primary service are happening.

## 2025-06-12 NOTE — ASSESSMENT & PLAN NOTE
Resolved  Tolerating bb well  Will plan Lopressor 50 q6h   Prognosis is guarded  Diuresing appropriately on lasix 40 IV daily but planning to transition to oral   GDMT limited by CKD.

## 2025-06-12 NOTE — PLAN OF CARE
Problem: PHYSICAL THERAPY ADULT  Goal: Performs mobility at highest level of function for planned discharge setting.  See evaluation for individualized goals.  Description: Treatment/Interventions: ADL retraining, Functional transfer training, LE strengthening/ROM, Elevations, Therapeutic exercise, Endurance training, Patient/family training, Equipment eval/education, Bed mobility, Gait training, Compensatory technique education, Spoke to nursing, Spoke to case management, OT  Equipment Recommended: Walker       See flowsheet documentation for full assessment, interventions and recommendations.  6/12/2025 1227 by Ewa Garcia, PT  Note: Prognosis: Good  Problem List: Decreased strength, Decreased endurance, Impaired balance, Decreased mobility, Decreased safety awareness  Pt tolerated session fairly well despite fatigue. He was able to ambulate increased distance with decreased assistance with use of RW compared to no AD and was able to complete transfers with decreased assistance. He requires increased rest between activities and cues for safety and tehcnique with mobility for safe use of RW. He requires cues for proper completion of LE TE. He demonstrated min BLACK post ambulation with SpO2 reading mid 90s when accurate. He is limited by decreased strength, balance, endurance and will continue to benefit from PTs ervices to maximize LOF.     Barriers to Discharge: Inaccessible home environment, Decreased caregiver support  Barriers to Discharge Comments: recent fall, inc fall risk, significant LOB with ambulation, need for assistance with mobility. [place] his apartment, limited support from his brother per patient, however reports grant be able to go to his sisters on discharge and have support from her and her spouse.  Rehab Resource Intensity Level, PT: III (Minimum Resource Intensity)    See flowsheet documentation for full assessment.

## 2025-06-12 NOTE — ASSESSMENT & PLAN NOTE
Lab Results   Component Value Date    EGFR 41 06/12/2025    EGFR 40 06/11/2025    EGFR 38 06/11/2025    CREATININE 1.74 (H) 06/12/2025    CREATININE 1.80 (H) 06/11/2025    CREATININE 1.86 (H) 06/11/2025   CKD stable  Likely CRS but improving

## 2025-06-12 NOTE — PHYSICAL THERAPY NOTE
PHYSICAL THERAPY EVALUATION  NAME:  Britton Batista  DATE: 06/12/25    AGE:   60 y.o.  Mrn:   [id number]  ADMIT DX:  Hypomagnesemia [E83.42]  Pharyngitis [J02.9]  Abnormal laboratory test [R89.9]  Rapid atrial fibrillation (HCC) [I48.91]  IRINEO (acute kidney injury) (HCC) [N17.9]    Past Medical History[1]  Length Of Stay: 7  Performed at least 2 patient identifiers during session: Name and Birthday  PHYSICAL THERAPY EVALUATION :    06/12/25 1115   PT Last Visit   PT Visit Date 06/12/25   Note Type   Note type Evaluation   Pain Assessment   Pain Assessment Tool 0-10   Pain Score No Pain   Restrictions/Precautions   Other Precautions Chair Alarm;Bed Alarm;Fall Risk;Hard of hearing;O2  (2 lpm NC on arrival)   Home Living   Type of Home Apartment  (FF B HRs + 1 [place] apartment)   Home Layout One level;Performs ADLs on one level;Able to live on main level with bedroom/bathroom   Bathroom Shower/Tub Tub/shower unit   Bathroom Toilet Standard   Home Equipment   (has a cane but reports it isn't in condition to use. has an adjustable bed, but likes to sleep on floor, electric bicycle he uses in the community)   Additional Comments Reports not using an AD PTA, uses electric bicyle to access community. Reports he plans to go to his sister's apartment upon discharge where she is on 1st floor and 1 small [place]. Reports grab bars in her tub/shower and can get a shower chair if needed. Reports either his sister or his brother in law will be home with him   Prior Function   Level of Ash Independent with ADLs;Independent with functional mobility   Lives With Other (Comment)  (brother)   Receives Help From Family   IADLs Independent with meal prep;Family/Friend/Other provides transportation;Family/Friend/Other provides medication management   Falls in the last 6 months 1 to 4  (in the bathroom)   Vocational On disability  (textiles)   Comments Reports being independent with mobility, ADLs and meals, has assistance for  "medicaitons and transportation by car from his sister.   General   Additional Pertinent History 7th admission since February 2025 with acute on chronic CHF, Afib with RVR and multiple ED visits since February 2025.   Family/Caregiver Present No   Cognition   Orientation Level Oriented X4   Following Commands Follows one step commands without difficulty   Subjective   Subjective \"Do I have to?\" (education and encouragement provided for participation, importance of mobility)   RLE Assessment   RLE Assessment WFL  (4/5)   LLE Assessment   LLE Assessment WFL  (4/5)   Coordination   Rapid Alternating Movements Intact   Light Touch   RLE Light Touch Grossly intact   LLE Light Touch Grossly intact   Bed Mobility   Supine to Sit 5  Supervision   Additional items Increased time required   Additional Comments HOB flat without bedrail   Transfers   Sit to Stand 5  Supervision   Additional items Increased time required;Verbal cues   Stand to Sit 5  Supervision   Additional items Verbal cues;Impulsive   Stand pivot 4  Minimal assistance   Additional items Assist x 1;Increased time required;Verbal cues   Additional Comments no AD. sit<>stand with supervision with wide TANA, inc time to ahiceve standing, pt is quick to initiate sitting. spt without AD with minAx1 with manual cues for balance and wt shifting.   Ambulation/Elevation   Gait pattern Improper Weight shift;Wide TANA;Narrow TANA;Decreased foot clearance;Short stride   Gait Assistance 3  Moderate assist  (minAx1 however LOB to right requiring modAx1 and stepping strategy to recover LOB.)   Additional items Assist x 1;Verbal cues   Assistive Device None   Distance ambulated 60'x1 without AD with modAx1 (minAx1 with LOB to right requiring modAx1 to recover) with varied TANA, decreased step length, foot clearancem manual cues for balance and safe completion. pt reporting fatigue, min BLACK.   Balance   Static Sitting Normal   Dynamic Sitting Good   Static Standing Fair -   Dynamic " "Standing Poor +   Ambulatory Poor   Endurance Deficit   Endurance Deficit Yes   Endurance Deficit Description SpO2 at rest on 2 lpm NC 95-96%. trialed on room air. SpO2 on room air 95%. pt with inaccurate pleth upon sitting after ambulation with reading in low 80s however increased immediately to 94%. pt wiht min BLACK noted. HR 80s atr rest to 90s with activity.   Activity Tolerance   Activity Tolerance Patient limited by fatigue   Nurse Made Aware RN, Enedelia   Assessment   Prognosis Good   Problem List Decreased strength;Decreased endurance;Impaired balance;Decreased mobility;Decreased safety awareness   Barriers to Discharge Inaccessible home environment;Decreased caregiver support   Barriers to Discharge Comments recent fall, inc fall risk, significant LOB with ambulation, need for assistance with mobility. [place] his apartment, limited support from his brother per patient, however reports grant be able to go to his sisters on discharge and have support from her and her spouse.   Goals   Patient Goals \"Go home\"   [facility] Expiration Date 06/26/25   PT Treatment Day 1   Plan   Treatment/Interventions ADL retraining;Functional transfer training;LE strengthening/ROM;Elevations;Therapeutic exercise;Endurance training;Patient/family training;Equipment eval/education;Bed mobility;Gait training;Compensatory technique education;Spoke to nursing;Spoke to case management;OT   PT Frequency 3-5x/wk   Discharge Recommendation   Rehab Resource Intensity Level, PT III (Minimum Resource Intensity)   Equipment Recommended Walker   Walker Package Recommended Wheeled walker   Change/add to Walker Package? No   Additional Comments recommend increased support and assistance from family for mobility.   AM-PAC Basic Mobility Inpatient   Turning in Flat Bed Without Bedrails 4   Lying on Back to Sitting on Edge of Flat Bed Without Bedrails 3   Moving Bed to Chair 3   Standing Up From Chair Using Arms 3   Walk in Room 3   Climb 3-5 Stairs With Railing " 3   Basic Mobility Inpatient Raw Score 19   Basic Mobility Standardized Score 42.48   [facility] Highest Level Of Mobility   -HL Goal 6: Walk 10 steps or more   -HLM Achieved 7: Walk 25 feet or more   Additional Treatment Session   Start Time 1136   End Time 1200   Treatment Assessment Pt tolerated session fairly well despite fatigue. He was able to ambulate increased distance with decreased assistance with use of RW compared to no AD and was able to complete transfers with decreased assistance. He requires increased rest between activities and cues for safety and tehcnique with mobility for safe use of RW. He requires cues for proper completion of LE TE. He demonstrated min BLACK post ambulation with SpO2 reading mid 90s when accurate. He is limited by decreased strength, balance, endurance and will continue to benefit from PTs ervices to maximize LOF.   Equipment Use initiated use of RW as patient chose RW over spc. min verbal cues for hand placement for safety with RW. pt quick to initiate sitting. sit<>stand with RW with close supervision from recliner chair and standard toilet with cues for hand placement. pt completed posterior pericare sitting on toilet without assistance. spt with RW with supervision. cues for inc TANA, noted scissoring, cues to stay within TANA of RW especially with turning. standing at sink with close supervision, inc TANA reaching anteriorly outside TANA to wash hands. ambulated 100'x1 and 25'x1 with RW with supervision with cues for inc TANA as pt ambulated with NBOS and scisorring with turning, cues ot stay wihtin TANA of RW, inc step length and foot clearance. further ambulation declined by patient due to fatigue. discussed 1 [place] sister's apartment as pt delcined step trial. educated patient on sequence, technique for completion and to have family present to assist. pt completed 2x15 each ankle DF/PF, hip flexion and LAQ. cues for proper completion. completed 1x10 repeated sit<>stand  with use of UEs to assist and 2nd set completed 1x5 without UEs to assist with pt unable to complete 10x due to fatigue. inc sitting rest between all activities. Spoke with RN, Enedelia regarding SpO2 reading. Pt on room air at end of session with SpO2 >/= 94%.    End of Consult   Patient Position at End of Consult Bedside chair;Bed/Chair alarm activated;All needs within reach       (Please find full objective findings from PT assessment regarding body systems outlined above).     Assessment: Pt is a 60 y.o. male who presented to [facility] on 6/5/2025 with Acute on chronic combined systolic (congestive) and diastolic (congestive) heart failure (HCC). Order placed for PT services. Pt seen for PT evaluation on 06/12/25. Refer to comprehensive exam completed above. Upon evaluation: Pt is presenting with functional deficits including decreased strength, decreased endurance, decreased activity tolerance, impaired balance, gait deviations, decreased safety awareness, BLACK, and impulsivity requiring supervision assistance for bed mobility, supervision to minimal assistance for transfers, and minimal to moderate assistance for ambulation with out AD. 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 Pt's PMHx and comorbidities that may impact pt's participation and functional progress include:A fib, anxiety, CHF, CKD, and s/p AICD, depression, emphysema, HTN, neuropathy, scleroderma, Raynaud's disease. Personal factors affecting pt that may cause a barrier to progress and  discharge include need for assistive device, inaccessible home environment, stair(s) to enter home environment, decreased caregiver assistance, need for assistance with ADLs, need for assistance with IADLs, frequent falls/fall history, fall risk, polypharmacy, and substance abuse. Pt's rehab potential is Good as indicated by barriers and findings above. Current Physical Therapy recommendation at time of discharge is Level III (Minimum Resource Intensity), with family and/or caregiver support, and with walker. Pt will benefit from continued skilled PT services to address deficits as defined above with focus on functional transfer training, LE strengthening/ROM, therapeutic exercise, endurance training, patient/family education/training, equipment eval/education, bed mobility, gait training, balance training, compensatory technique education, elevation training, and ADL retraining to maximize level of functional mobility to facilitate return toward PLOF and improved QOL. Recommend trial with cane as appropriate and ther ex next 1-2 sessions.     The patient's AM-PAC Basic Mobility Inpatient Short Form Raw Score is 19. A Raw score of greater than 16 suggests the patient may benefit from discharge to home. Please also refer to the recommendation of the Physical Therapist for safe discharge planning.    Goals: Pt will: Perform bed mobility tasks with modified Independent to reposition in bed and prepare for transfers. Pt will perform transfers with modified Independent to decrease burden of care, decrease risk for falls, and improve activity tolerance and prepare for ambulation. Pt will ambulate with LRAD for >/= 200' with  modified Independent  to decrease burden of care, decrease risk for falls, improve activity tolerance, and improve gait quality and to access home environment. Pt will complete 1 step with LRAD and >/= 12 steps with unilateral handrail with modified Independent to decrease burden of care, return to  home with VIRID[place]NA, decrease risk for falls, and improve activity tolerance. Pt will participate in objective balance assessment to determine baseline fall risk. Pt will participate in SSWS assessment to determine level of mobility. Pt will increase B LE strength >/= 1/2 MMT grade to facilitate functional mobility.      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

## 2025-06-12 NOTE — ACP (ADVANCE CARE PLANNING)
"Critical Care Advanced Care Planning Note  Britton Batista 60 y.o. male MRN: [id number]  Unit/Bed#: -01 Encounter: [id number]    Britton Batista is a 60 y.o. male requiring critical care evaluation and advanced care planning. The patient has chronic comorbidities, including but not limited to methamphetamine cardiomyopathy, systolic and diastolic HF with EF 15%, which is now further complicated by the following acute conditions: CHF exacerbation, afib RVR.  Due to the severity of the patient's acute condition and/or the extent of chronic conditions, additional conversations pertaining to advanced care planning were required. Today's discussion, which was held in a face-to-face meeting, included Sister rAin, and it was established that all stake holders understood the rationale for the advanced care planning. The patient was able to participate in the discussion.    Summary of Discussion:  Patient requested a meeting with  this AM. Provider gave insight of what all options can look like to understand patient's wishes moving forward. Patient aware of poor prognosis and understands that even if CPR was provided, given severity of heart failure, may not bring patient back, at this time he still wants to remain a full code.   Patient's goals are as followed:   -He wants to be able to go home and the plan is to move in with his sister so she can take care of him, with palliative following up as an out patient and with out patient rehab.   -Patient does not want to go to a nursing home.   -Patient notes that if he was to decline he would want treatment again and would come back to the hospital, but after that, he would be willing to go to hospice if this happens a second time. If that was to happen, he would want comfort care at his sisters [place].   -If cardiology offered treatment for his uncontrolled afib (if he failed medical treatment) (ie cardioversion or ablation) he would accept, stating \"I would at least " "want to give it a chance and if I die during the procedure then I die\".       Total time spent, (22) minutes (0838 to 0900).      CODE STATUS: FULL CODE - Level 1  POA:    POLST:      SIGNATURE: LUIS ANGEL Esparza  DATE: June 12, 2025  TIME: 11:22 AM    "

## 2025-06-12 NOTE — PLAN OF CARE
Problem: PAIN - ADULT  Goal: Verbalizes/displays adequate comfort level or baseline comfort level  Description: Interventions:  - Encourage patient to monitor pain and request assistance  - Assess pain using appropriate pain scale0-10  - Administer analgesics as ordered based on type and severity of pain and evaluate response  - Implement non-pharmacological measures as appropriate and evaluate response  - Consider cultural and social influences on pain and pain management  - Notify physician/advanced practitioner if interventions unsuccessful or patient reports new pain  - Educate patient/family on pain management process including their role and importance of  reporting pain   - Provide non-pharmacologic/complimentary pain relief interventions  6/12/2025 1047 by Mary Sofia RN  Outcome: Progressing  6/12/2025 1044 by Mary Sofia RN  Outcome: Progressing     Problem: INFECTION - ADULT  Goal: Absence or prevention of progression during hospitalization  Description: INTERVENTIONS:  - Assess and monitor for signs and symptoms of infection  - Monitor lab/diagnostic results  - Monitor all insertion sites, i.e. indwelling lines, tubes, and drains  - Monitor endotracheal if appropriate and nasal secretions for changes in amount and color  - [place] appropriate cooling/warming therapies per order  - Administer medications as ordered  - Instruct and encourage patient and family to use good hand hygiene technique  - Identify and instruct in appropriate isolation precautions for identified infection/condition  6/12/2025 1047 by Mary Sofia RN  Outcome: Progressing  6/12/2025 1044 by Mary Sofia RN  Outcome: Progressing  Goal: Absence of fever/infection during neutropenic period  Description: INTERVENTIONS:  - Monitor WBC  - Perform strict hand hygiene  - Limit to healthy visitors only  - No plants, dried, fresh or silk flowers with chaidez in patient room  6/12/2025 1047 by Mary Sofia RN  Outcome:  Progressing  6/12/2025 1044 by Mary Sofia RN  Outcome: Progressing     Problem: SAFETY ADULT  Goal: Patient will remain free of falls  Description: INTERVENTIONS:  - Educate patient/family on patient safety including physical limitations  - Instruct patient to call for assistance with activity   - Consider consulting OT/PT to assist with strengthening/mobility based on AM PAC & JH-HLM score  - Consult OT/PT to assist with strengthening/mobility   - Keep Call bell within reach  - Keep bed low and locked with side rails adjusted as appropriate  - Keep care items and personal belongings within reach  - Initiate and maintain comfort rounds  - Make Fall Risk Sign visible to staff  - Offer Toileting every 2 Hours, in advance of need  - Initiate/Maintain bed alarm  - Obtain necessary fall risk management equipment: yellow socks  - Apply yellow socks and bracelet for high fall risk patients  - Consider moving patient to room near nurses station  6/12/2025 1047 by Mary Sofia RN  Outcome: Progressing  6/12/2025 1044 by Mary Sofia RN  Outcome: Progressing  Goal: Maintain or return to baseline ADL function  Description: INTERVENTIONS:  -  Assess patient's ability to carry out ADLs; assess patient's baseline for ADL function and identify physical deficits which impact ability to perform ADLs (bathing, care of mouth/teeth, toileting, grooming, dressing, etc.)  - Assess/evaluate cause of self-care deficits   - Assess range of motion  - Assess patient's mobility; develop plan if impaired  - Assess patient's need for assistive devices and provide as appropriate  - Encourage maximum independence but intervene and supervise when necessary  - Involve family in performance of ADLs  - Assess for home care needs following discharge   - Consider OT consult to assist with ADL evaluation and planning for discharge  - Provide patient education as appropriate  - Monitor functional capacity and physical performance, use of AM  PAC & -HLM   - Monitor gait, balance and fatigue with ambulation    6/12/2025 1047 by Mary Sofia RN  Outcome: Progressing  6/12/2025 1044 by Mary Sofia RN  Outcome: Progressing  Goal: Maintains/Returns to pre admission functional level  Description: INTERVENTIONS:  - Perform AM-PAC 6 Click Basic Mobility/ Daily Activity assessment daily.  - Set and communicate daily mobility goal to care team and patient/family/caregiver.   - Collaborate with rehabilitation services on mobility goals if consulted  - Out of bed for toileting  - Record patient progress and toleration of activity level   6/12/2025 1047 by Mary Sofia RN  Outcome: Progressing  6/12/2025 1044 by Mary Sofia RN  Outcome: Progressing     Problem: DISCHARGE PLANNING  Goal: Discharge to home or other facility with appropriate resources  Description: INTERVENTIONS:  - Identify barriers to discharge w/patient and caregiver  - Arrange for needed discharge resources and transportation as appropriate  - Identify discharge learning needs (meds, wound care, etc.)  - Arrange for interpretive services to assist at discharge as needed  - Refer to Case Management Department for coordinating discharge planning if the patient needs post-hospital services based on physician/advanced practitioner order or complex needs related to functional status, cognitive ability, or social support system  6/12/2025 1047 by Mary Sofia RN  Outcome: Progressing  6/12/2025 1044 by Mary Sofia RN  Outcome: Progressing     Problem: CARDIOVASCULAR - ADULT  Goal: Maintains optimal cardiac output and hemodynamic stability  Description: INTERVENTIONS:  - Monitor I/O, vital signs and rhythm  - Monitor for S/S and trends of decreased cardiac output  - Administer and titrate ordered vasoactive medications to optimize hemodynamic stability  - Assess quality of pulses, skin color and temperature  - Assess for signs of decreased coronary artery perfusion  - Instruct  patient to report change in severity of symptoms  6/12/2025 1047 by Mary Sofia RN  Outcome: Progressing  6/12/2025 1044 by Mary Sofia RN  Outcome: Progressing  Goal: Absence of cardiac dysrhythmias or at baseline rhythm  Description: INTERVENTIONS:  - Continuous cardiac monitoring, vital signs, obtain 12 lead EKG if ordered  - Administer antiarrhythmic and heart rate control medications as ordered  - Monitor electrolytes and administer replacement therapy as ordered  6/12/2025 1047 by Mary Sofia RN  Outcome: Progressing  6/12/2025 1044 by Mary Sofia RN  Outcome: Progressing     Problem: Knowledge Deficit  Goal: Patient/family/caregiver demonstrates understanding of disease process, treatment plan, medications, and discharge instructions  Description: Complete learning assessment and assess knowledge base.  Interventions:  - Provide teaching at level of understanding  - Provide teaching via preferred learning methods  6/12/2025 1047 by Mary Sofia RN  Outcome: Progressing  6/12/2025 1044 by Mary Sofia RN  Outcome: Progressing

## 2025-06-12 NOTE — PLAN OF CARE
Problem: PHYSICAL THERAPY ADULT  Goal: Performs mobility at highest level of function for planned discharge setting.  See evaluation for individualized goals.  Description: Treatment/Interventions: ADL retraining, Functional transfer training, LE strengthening/ROM, Elevations, Therapeutic exercise, Endurance training, Patient/family training, Equipment eval/education, Bed mobility, Gait training, Compensatory technique education, Spoke to nursing, Spoke to case management, OT  Equipment Recommended: Walker       See flowsheet documentation for full assessment, interventions and recommendations.  Note: Prognosis: Good  Problem List: Decreased strength, Decreased endurance, Impaired balance, Decreased mobility, Decreased safety awareness  Assessment: Pt is a 60 y.o. male who presented to [facility] on 6/5/2025 with Acute on chronic combined systolic (congestive) and diastolic (congestive) heart failure (HCC). Order placed for PT services. Pt seen for PT evaluation on 06/12/25. Refer to comprehensive exam completed above. Upon evaluation: Pt is presenting with functional deficits including decreased strength, decreased endurance, decreased activity tolerance, impaired balance, gait deviations, decreased safety awareness, BLACK, and impulsivity requiring  supervision assistance for bed mobility, supervision to minimal assistance for transfers, and minimal to moderate assistance for ambulation with out AD . 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 Pt's  PMHx and comorbidities that may impact pt's participation and functional progress include:A fib, anxiety, CHF, CKD, and s/p AICD, depression, emphysema, HTN, neuropathy, scleroderma, Raynaud's disease . Personal factors affecting pt that may cause a barrier to progress and discharge include need for assistive device, inaccessible home environment, stair(s) to enter home environment, decreased caregiver assistance, need for assistance with ADLs, need for assistance with IADLs, frequent falls/fall history, fall risk, polypharmacy, and substance abuse . Pt's rehab potential is Good as indicated by barriers and findings above. Current Physical Therapy recommendation at time of discharge is Level III (Minimum Resource Intensity), with family and/or caregiver support, and with walker. Pt will benefit from continued skilled PT services to address deficits as defined above with focus on functional transfer training, LE strengthening/ROM, therapeutic exercise, endurance training, patient/family education/training, equipment eval/education, bed mobility, gait training, balance training, compensatory technique education, elevation training, and ADL retraining to maximize level of functional mobility to facilitate return toward PLOF and improved QOL. Recommend trial with can  Barriers to Discharge: Inaccessible home environment, Decreased caregiver support  Barriers to Discharge Comments: recent fall, inc fall risk, significant LOB with ambulation, need for assistance with mobility. VIRIDIANA his apartment, limited support from his brother per patient, however reports grant be able to go to his sisters on discharge and have support from her and her spouse.  Rehab Resource Intensity Level, PT: III (Minimum Resource Intensity)    See flowsheet documentation for full assessment.

## 2025-06-13 LAB
ANION GAP SERPL CALCULATED.3IONS-SCNC: 8 MMOL/L (ref 4–13)
BASE EX.OXY STD BLDV CALC-SCNC: 88.1 % (ref 60–80)
BASE EX.OXY STD BLDV CALC-SCNC: 90.5 % (ref 60–80)
BASE EXCESS BLDV CALC-SCNC: 2.2 MMOL/L
BASE EXCESS BLDV CALC-SCNC: 3 MMOL/L
BUN SERPL-MCNC: 36 MG/DL (ref 5–25)
CALCIUM SERPL-MCNC: 8.5 MG/DL (ref 8.4–10.2)
CHLORIDE SERPL-SCNC: 99 MMOL/L (ref 96–108)
CO2 SERPL-SCNC: 29 MMOL/L (ref 21–32)
CREAT SERPL-MCNC: 1.58 MG/DL (ref 0.6–1.3)
ERYTHROCYTE [DISTWIDTH] IN BLOOD BY AUTOMATED COUNT: 18.2 % (ref 11.6–15.1)
GFR SERPL CREATININE-BSD FRML MDRD: 46 ML/MIN/1.73SQ M
GLUCOSE SERPL-MCNC: 112 MG/DL (ref 65–140)
GLUCOSE SERPL-MCNC: 117 MG/DL (ref 65–140)
GLUCOSE SERPL-MCNC: 141 MG/DL (ref 65–140)
GLUCOSE SERPL-MCNC: 142 MG/DL (ref 65–140)
GLUCOSE SERPL-MCNC: 71 MG/DL (ref 65–140)
HCO3 BLDV-SCNC: 27.3 MMOL/L (ref 24–30)
HCO3 BLDV-SCNC: 27.5 MMOL/L (ref 24–30)
HCT VFR BLD AUTO: 41.2 % (ref 36.5–49.3)
HGB BLD-MCNC: 12.7 G/DL (ref 12–17)
INR PPP: 2.1 (ref 0.85–1.19)
MAGNESIUM SERPL-MCNC: 1.9 MG/DL (ref 1.9–2.7)
MCH RBC QN AUTO: 28.5 PG (ref 26.8–34.3)
MCHC RBC AUTO-ENTMCNC: 30.8 G/DL (ref 31.4–37.4)
MCV RBC AUTO: 92 FL (ref 82–98)
O2 CT BLDV-SCNC: 16.7 ML/DL
O2 CT BLDV-SCNC: 17.6 ML/DL
PCO2 BLDV: 41.8 MM HG (ref 42–50)
PCO2 BLDV: 44.5 MM HG (ref 42–50)
PH BLDV: 7.41 [PH] (ref 7.3–7.4)
PH BLDV: 7.44 [PH] (ref 7.3–7.4)
PHOSPHATE SERPL-MCNC: 2.4 MG/DL (ref 2.3–4.1)
PLATELET # BLD AUTO: 195 THOUSANDS/UL (ref 149–390)
PMV BLD AUTO: 9.7 FL (ref 8.9–12.7)
PO2 BLDV: 58.6 MM HG (ref 35–45)
PO2 BLDV: 67.6 MM HG (ref 35–45)
POTASSIUM SERPL-SCNC: 3.8 MMOL/L (ref 3.5–5.3)
PROTHROMBIN TIME: 23.8 SECONDS (ref 12.3–15)
RBC # BLD AUTO: 4.46 MILLION/UL (ref 3.88–5.62)
SODIUM SERPL-SCNC: 136 MMOL/L (ref 135–147)
WBC # BLD AUTO: 7.81 THOUSAND/UL (ref 4.31–10.16)

## 2025-06-13 PROCEDURE — 94640 AIRWAY INHALATION TREATMENT: CPT

## 2025-06-13 PROCEDURE — 85610 PROTHROMBIN TIME: CPT | Performed by: NURSE PRACTITIONER

## 2025-06-13 PROCEDURE — 99232 SBSQ HOSP IP/OBS MODERATE 35: CPT | Performed by: INTERNAL MEDICINE

## 2025-06-13 PROCEDURE — 99232 SBSQ HOSP IP/OBS MODERATE 35: CPT | Performed by: FAMILY MEDICINE

## 2025-06-13 PROCEDURE — 83735 ASSAY OF MAGNESIUM: CPT

## 2025-06-13 PROCEDURE — 94760 N-INVAS EAR/PLS OXIMETRY 1: CPT

## 2025-06-13 PROCEDURE — 84100 ASSAY OF PHOSPHORUS: CPT

## 2025-06-13 PROCEDURE — 82805 BLOOD GASES W/O2 SATURATION: CPT

## 2025-06-13 PROCEDURE — 82948 REAGENT STRIP/BLOOD GLUCOSE: CPT

## 2025-06-13 PROCEDURE — 80048 BASIC METABOLIC PNL TOTAL CA: CPT

## 2025-06-13 PROCEDURE — 85027 COMPLETE CBC AUTOMATED: CPT

## 2025-06-13 RX ORDER — METOPROLOL SUCCINATE 100 MG/1
100 TABLET, EXTENDED RELEASE ORAL 2 TIMES DAILY
Status: DISCONTINUED | OUTPATIENT
Start: 2025-06-13 | End: 2025-06-14 | Stop reason: HOSPADM

## 2025-06-13 RX ADMIN — MIRTAZAPINE 30 MG: 15 TABLET, FILM COATED ORAL at 21:43

## 2025-06-13 RX ADMIN — WARFARIN SODIUM 3 MG: 3 TABLET ORAL at 16:32

## 2025-06-13 RX ADMIN — IPRATROPIUM BROMIDE AND ALBUTEROL SULFATE 3 ML: 2.5; .5 SOLUTION RESPIRATORY (INHALATION) at 07:34

## 2025-06-13 RX ADMIN — DIPHENHYDRAMINE HYDROCHLORIDE AND LIDOCAINE HYDROCHLORIDE AND ALUMINUM HYDROXIDE AND MAGNESIUM HYDRO 10 ML: KIT at 16:33

## 2025-06-13 RX ADMIN — CHLORHEXIDINE GLUCONATE 0.12% ORAL RINSE 15 ML: 1.2 LIQUID ORAL at 08:46

## 2025-06-13 RX ADMIN — AMIODARONE HYDROCHLORIDE 200 MG: 200 TABLET ORAL at 08:47

## 2025-06-13 RX ADMIN — IPRATROPIUM BROMIDE AND ALBUTEROL SULFATE 3 ML: 2.5; .5 SOLUTION RESPIRATORY (INHALATION) at 19:59

## 2025-06-13 RX ADMIN — METOPROLOL TARTRATE 50 MG: 50 TABLET, FILM COATED ORAL at 08:47

## 2025-06-13 RX ADMIN — Medication 400 MG: at 08:47

## 2025-06-13 RX ADMIN — IPRATROPIUM BROMIDE AND ALBUTEROL SULFATE 3 ML: 2.5; .5 SOLUTION RESPIRATORY (INHALATION) at 13:17

## 2025-06-13 RX ADMIN — PANTOPRAZOLE SODIUM 40 MG: 40 TABLET, DELAYED RELEASE ORAL at 05:54

## 2025-06-13 RX ADMIN — HYDROXYZINE HYDROCHLORIDE 50 MG: 25 TABLET, FILM COATED ORAL at 05:54

## 2025-06-13 RX ADMIN — METOPROLOL SUCCINATE 100 MG: 100 TABLET, EXTENDED RELEASE ORAL at 16:32

## 2025-06-13 RX ADMIN — METOPROLOL TARTRATE 50 MG: 50 TABLET, FILM COATED ORAL at 03:59

## 2025-06-13 RX ADMIN — FUROSEMIDE 60 MG: 40 TABLET ORAL at 08:47

## 2025-06-13 RX ADMIN — NICOTINE 1 PATCH: 14 PATCH, EXTENDED RELEASE TRANSDERMAL at 08:47

## 2025-06-13 RX ADMIN — SENNOSIDES AND DOCUSATE SODIUM 1 TABLET: 50; 8.6 TABLET ORAL at 21:43

## 2025-06-13 NOTE — ASSESSMENT & PLAN NOTE
61yo M with PMH of combined systolic and diastolic heart failure s/p AICD, Afib, CKD who presented to [facility] for dysphagia, and sent by PCP for evaluation of supratherapeutic INR. 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  Pt also being worked up for tonsillitis.  Wt Readings from Last 3 Encounters:   06/13/25 78.6 kg (173 lb 4.5 oz)   06/05/25 81.1 kg (178 lb 12.7 oz)   05/04/25 79.2 kg (174 lb 9.7 oz)   4/25 TTE: LVEF 15-20%. Severe global hypokinesis. Moderate regurgitation of mitral valve. Limited views may underestimate severity of MR. Severe tricuspid regurgitation  Mgmt per primary team and cardiology

## 2025-06-13 NOTE — ASSESSMENT & PLAN NOTE
Lab Results   Component Value Date    EGFR 46 06/13/2025    EGFR 41 06/12/2025    EGFR 40 06/11/2025    CREATININE 1.58 (H) 06/13/2025    CREATININE 1.74 (H) 06/12/2025    CREATININE 1.80 (H) 06/11/2025   Creatinine baseline is 1.5   Currently came down to baseline 1.5 he went into IRINEO secondary to cardiogenic shock requiring milrinone.  Nephrology following.

## 2025-06-13 NOTE — ASSESSMENT & PLAN NOTE
Palliative care has been following.    Administrative Statements   VIRTUAL CARE DOCUMENTATION:     1. This service was provided via Telemedicine using [employer]     2. Parties in the room with patient during teleconsult Patient only    3. Confidentiality My office door was closed     4. Participants No one else was in the room    5. Patient acknowledged consent and understanding of privacy and security of the  Telemedicine consult. I informed the patient that I have reviewed their record in Epic and presented the opportunity for them to ask any questions regarding the visit today.  The patient agreed to participate.    6. I have spent a total time of approximately 30 minutes in caring for this patient on the day of the visit/encounter including documentation of medical decision making, modification of assessment and plan, patient visit, brief communication with the patient's nurse at bedside, not including the time spent for establishing the audio/video connection.

## 2025-06-13 NOTE — ASSESSMENT & PLAN NOTE
59yo M with PMH of combined systolic and diastolic heart failure s/p AICD, Afib, CKD who presented to [facility] for dysphagia, and sent by PCP for evaluation of supratherapeutic INR. 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  Pt also being worked up for tonsillitis.  Wt Readings from Last 3 Encounters:   06/13/25 78.6 kg (173 lb 4.5 oz)   06/05/25 81.1 kg (178 lb 12.7 oz)   05/04/25 79.2 kg (174 lb 9.7 oz)   4/25 TTE: LVEF 15-20%. Severe global hypokinesis. Moderate regurgitation of mitral valve. Limited views may underestimate severity of MR. Severe tricuspid regurgitation  Mgmt per primary team and cardiology

## 2025-06-13 NOTE — PLAN OF CARE
Problem: PAIN - ADULT  Goal: Verbalizes/displays adequate comfort level or baseline comfort level  Description: Interventions:  - Encourage patient to monitor pain and request assistance  - Assess pain using appropriate pain scale0-10  - Administer analgesics as ordered based on type and severity of pain and evaluate response  - Implement non-pharmacological measures as appropriate and evaluate response  - Consider cultural and social influences on pain and pain management  - Notify physician/advanced practitioner if interventions unsuccessful or patient reports new pain  - Educate patient/family on pain management process including their role and importance of  reporting pain   - Provide non-pharmacologic/complimentary pain relief interventions  6/13/2025 0607 by Kim Arnett  Outcome: Progressing  6/13/2025 0334 by Kim Arnett  Outcome: Progressing     Problem: INFECTION - ADULT  Goal: Absence or prevention of progression during hospitalization  Description: INTERVENTIONS:  - Assess and monitor for signs and symptoms of infection  - Monitor lab/diagnostic results  - Monitor all insertion sites, i.e. indwelling lines, tubes, and drains  - Monitor endotracheal if appropriate and nasal secretions for changes in amount and color  - [place] appropriate cooling/warming therapies per order  - Administer medications as ordered  - Instruct and encourage patient and family to use good hand hygiene technique  - Identify and instruct in appropriate isolation precautions for identified infection/condition  6/13/2025 0607 by Kim Arnett  Outcome: Progressing  6/13/2025 0334 by Kim Arnett  Outcome: Progressing     Problem: SAFETY ADULT  Goal: Patient will remain free of falls  Description: INTERVENTIONS:  - Educate patient/family on patient safety including physical limitations  - Instruct patient to call for assistance with activity   - Consider consulting OT/PT to assist with strengthening/mobility  based on AM PAC & JH-HLM score  - Consult OT/PT to assist with strengthening/mobility   - Keep Call bell within reach  - Keep bed low and locked with side rails adjusted as appropriate  - Keep care items and personal belongings within reach  - Initiate and maintain comfort rounds  - Make Fall Risk Sign visible to staff  - Offer Toileting every 2 Hours, in advance of need  - Initiate/Maintain bed alarm  - Obtain necessary fall risk management equipment: yellow socks  - Apply yellow socks and bracelet for high fall risk patients  - Consider moving patient to room near nurses station  6/13/2025 0607 by Kim Arnett  Outcome: Progressing  6/13/2025 0334 by Kim Arnett  Outcome: Progressing     Problem: CARDIOVASCULAR - ADULT  Goal: Maintains optimal cardiac output and hemodynamic stability  Description: INTERVENTIONS:  - Monitor I/O, vital signs and rhythm  - Monitor for S/S and trends of decreased cardiac output  - Administer and titrate ordered vasoactive medications to optimize hemodynamic stability  - Assess quality of pulses, skin color and temperature  - Assess for signs of decreased coronary artery perfusion  - Instruct patient to report change in severity of symptoms  6/13/2025 0607 by Kim Arnett  Outcome: Progressing  6/13/2025 0334 by Kim Arnett  Outcome: Progressing     Problem: METABOLIC, FLUID AND ELECTROLYTES - ADULT  Goal: Electrolytes maintained within normal limits  Description: INTERVENTIONS:  - Monitor labs and assess patient for signs and symptoms of electrolyte imbalances  - Administer electrolyte replacement as ordered  - Monitor response to electrolyte replacements, including repeat lab results as appropriate  - Instruct patient on fluid and nutrition as appropriate  6/13/2025 0607 by Kim Arnett  Outcome: Progressing  6/13/2025 0334 by Kim Arnett  Outcome: Progressing

## 2025-06-13 NOTE — PROGRESS NOTES
Progress Note - Palliative Care   Name: Britton Batista 60 y.o. male I MRN: [id number]  Unit/Bed#: -01 I Date of Admission: 6/5/2025   Date of Service: 6/13/2025 I Hospital Day: 8      VIRTUAL CARE DOCUMENTATION:     1. This service was provided via Telemedicine using EPIC-embedded platform    2. Parties in the room with patient during teleconsult Patient only    3. Confidentiality My office door was closed     4. Participants No one else was in the room    5. Patient acknowledged consent and understanding of privacy and security of the  Telemedicine consult. I informed the patient that I have reviewed their record in Epic and presented the opportunity for them to ask any questions regarding the visit today.  The patient agreed to participate.    6. Time spent 15 min       Assessment & Plan  Goals of care, counseling/discussion  Decisional apparatus: Patient does have capacity on exam today.  If capacity is lost, patient's substitute decision maker would default to adult children per [place] 169. He   Advance Directive / Living Will / POLST / POA Forms: No. Reviewed POLST form. Pt would want his sister, Arin, to be prmairy HCR. He intends to talk with her tonight or tomorrow before completing AD paperwork.     Goals  Level 1 code status.   Full code.   Disease focused care.   No limits placed.   Patient is disease-focused and wants to prolong survivability and reports good QoL  Would like to remain independent in ADLs and be able to return home  Intends to avoid illicit drug use and be compliant with medical recommendations  Would not want to be kept alive by artificial means indefinitely   Would be interested in outpatient [facility] follow up. Placed on hospital follow up list. Has transport challenges. Can attempt virtual follow ups or referral to [facility] palliative agency.   Reviewed CODE status and AD paperowrk. Pt will talk with sister tonight or tomorrow before completing any paperwork. Please assist with  completion of POLST form prior to d/c, if only to name sister as HCR (on back of form).     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  Pt also being worked up for tonsillitis.  Wt Readings from Last 3 Encounters:   06/13/25 78.6 kg (173 lb 4.5 oz)   06/05/25 81.1 kg (178 lb 12.7 oz)   05/04/25 79.2 kg (174 lb 9.7 oz)   4/25 TTE: LVEF 15-20%. Severe global hypokinesis. Moderate regurgitation of mitral valve. Limited views may underestimate severity of MR. Severe tricuspid regurgitation  Mgmt per primary team and cardiology  Acute renal failure superimposed on stage 3 chronic kidney disease (HCC)  Lab Results   Component Value Date    EGFR 46 06/13/2025    EGFR 41 06/12/2025    EGFR 40 06/11/2025    CREATININE 1.58 (H) 06/13/2025    CREATININE 1.74 (H) 06/12/2025    CREATININE 1.80 (H) 06/11/2025   Noted to have elevated Cr on admission from suspected baseline of 1.4-1.6  Nephrology team consulted, see plan    Subjective   Pt reports feeling better today. Reviewed GOC. States no changes to his goals and no questions from our last discussion. He has not made a decision on CODE status. States he wants his sister, Arin, to make medical decisions if he was unable. He plans to discuss this further with his sister when she visits next, tonight or tomorrow. Pt encouraged to complete AD paperwork prior to leaving hospital. No other concerns today.     Objective :  Temp:  [96.6 °F (35.9 °C)-97 °F (36.1 °C)] 96.6 °F (35.9 °C)  HR:   [] 46  BP: (108-119)/(74-89) 118/75  Resp:  [16-23] 16  SpO2:  [89 %-99 %] 89 %  O2 Device: None (Room air)    Physical Exam  Vitals reviewed.   Constitutional:       General: He is not in acute distress.  HENT:      Head: Normocephalic and atraumatic.     Neurological:      Mental Status: He is alert and oriented to person, place, and time.     Psychiatric:         Mood and Affect: Mood normal.         Behavior: Behavior normal.         Lab Results: I have reviewed the following results:  Lab Results   Component Value Date/Time    SODIUM 136 06/13/2025 04:54 AM    K 3.8 06/13/2025 04:54 AM    BUN 36 (H) 06/13/2025 04:54 AM    CREATININE 1.58 (H) 06/13/2025 04:54 AM    GLUC 142 (H) 06/13/2025 04:54 AM    CALCIUM 8.5 06/13/2025 04:54 AM     (H) 06/12/2025 05:01 AM     (H) 06/12/2025 05:01 AM    ALB 3.3 (L) 06/12/2025 05:01 AM    TP 6.3 (L) 06/12/2025 05:01 AM    EGFR 46 06/13/2025 04:54 AM     Lab Results   Component Value Date/Time    HGB 12.7 06/13/2025 04:54 AM    WBC 7.81 06/13/2025 04:54 AM     06/13/2025 04:54 AM    INR 2.10 (H) 06/13/2025 04:54 AM    PTT 69 (H) 06/05/2025 11:06 AM     Lab Results   Component Value Date/Time    DOG6PXAEVEBZ 8.109 (H) 04/22/2025 11:27 PM       Administrative Statements   I have spent a total time of 25 minutes in caring for this patient on the day of the visit/encounter including Prognosis, Risks and benefits of tx options, Instructions for management, Patient and family education, Importance of tx compliance, Risk factor reductions, Counseling / Coordination of care, Documenting in the medical record, Reviewing/placing orders in the medical record (including tests, medications, and/or procedures), Obtaining or reviewing history  , and Communicating with other healthcare professionals . Case discussed with JESSE COOK.

## 2025-06-13 NOTE — ASSESSMENT & PLAN NOTE
Was on milrinone and the ICU finally cleared his lactic and weaned off milrinone cardiogenic shock resolved blood pressures are stable

## 2025-06-13 NOTE — ASSESSMENT & PLAN NOTE
Lab Results   Component Value Date    EGFR 46 06/13/2025    EGFR 41 06/12/2025    EGFR 40 06/11/2025    CREATININE 1.58 (H) 06/13/2025    CREATININE 1.74 (H) 06/12/2025    CREATININE 1.80 (H) 06/11/2025   Noted to have elevated Cr on admission from suspected baseline of 1.4-1.6  Nephrology team consulted, see plan

## 2025-06-13 NOTE — ASSESSMENT & PLAN NOTE
59yo M with PMH of combined systolic and diastolic heart failure s/p AICD, Afib, CKD who presented to [facility] for dysphagia, and sent by PCP for evaluation of supratherapeutic INR. 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  Pt also being worked up for tonsillitis.  Wt Readings from Last 3 Encounters:   06/13/25 78.6 kg (173 lb 4.5 oz)   06/05/25 81.1 kg (178 lb 12.7 oz)   05/04/25 79.2 kg (174 lb 9.7 oz)   4/25 TTE: LVEF 15-20%. Severe global hypokinesis. Moderate regurgitation of mitral valve. Limited views may underestimate severity of MR. Severe tricuspid regurgitation  Mgmt per primary team and cardiology

## 2025-06-13 NOTE — ASSESSMENT & PLAN NOTE
Lab Results   Component Value Date    EGFR 46 06/13/2025    EGFR 41 06/12/2025    EGFR 40 06/11/2025    CREATININE 1.58 (H) 06/13/2025    CREATININE 1.74 (H) 06/12/2025    CREATININE 1.80 (H) 06/11/2025   CKD stable  Likely CRS but improving

## 2025-06-13 NOTE — PLAN OF CARE
Problem: PAIN - ADULT  Goal: Verbalizes/displays adequate comfort level or baseline comfort level  Description: Interventions:  - Encourage patient to monitor pain and request assistance  - Assess pain using appropriate pain scale0-10  - Administer analgesics as ordered based on type and severity of pain and evaluate response  - Implement non-pharmacological measures as appropriate and evaluate response  - Consider cultural and social influences on pain and pain management  - Notify physician/advanced practitioner if interventions unsuccessful or patient reports new pain  - Educate patient/family on pain management process including their role and importance of  reporting pain   - Provide non-pharmacologic/complimentary pain relief interventions  Outcome: Progressing

## 2025-06-13 NOTE — PROGRESS NOTES
Progress Note - Hospitalist   Name: Britton Batista 60 y.o. male I MRN: [id number]  Unit/Bed#: -01 I Date of Admission: 6/5/2025   Date of Service: 6/13/2025 I Hospital Day: 8    Assessment & Plan  Acute on chronic combined systolic (congestive) and diastolic (congestive) heart failure (HCC)  Wt Readings from Last 3 Encounters:   06/13/25 78.6 kg (173 lb 4.5 oz)   06/05/25 81.1 kg (178 lb 12.7 oz)   05/04/25 79.2 kg (174 lb 9.7 oz)       Last EF is 25% moderate MR on 3/2025   Lasix increased today by nephrology to 60 mg daily to keep a more negative  Atrial fibrillation with RVR ([facility])  INR therapeutic continue Coumadin 3 mg daily discussed with cardiology today currently he is on metoprolol tartrate 50 mg every 6 hours day will switch over most likely to Toprol-XL also continue amiodarone 200 mg daily digoxin was discontinued when he was in the ICU.    Rates are controlled in the 80s  Acute renal failure superimposed on stage 3 chronic kidney disease (HCC)  Lab Results   Component Value Date    EGFR 46 06/13/2025    EGFR 41 06/12/2025    EGFR 40 06/11/2025    CREATININE 1.58 (H) 06/13/2025    CREATININE 1.74 (H) 06/12/2025    CREATININE 1.80 (H) 06/11/2025   Creatinine baseline is 1.5   Currently came down to baseline 1.5 he went into IRINEO secondary to cardiogenic shock requiring milrinone.  Nephrology following.  Transaminitis  Recurrent no evidence of heart failure exacerbation.  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    Mobility:   Basic Mobility Inpatient Raw Score: 19  JH-HLM Goal: 6: Walk 10 steps or more  JH-HLM Achieved: 7: Walk 25  feet or more  JH-HLM Goal achieved. Continue to encourage appropriate mobility.    Patient Centered Rounds: I performed bedside rounds with nursing staff today.   Discussions with Specialists or Other Care Team Provider: Discussed with cardiology    Education and Discussions with Family / Patient: Patient will update family    Current Length of Stay: 8 day(s)  Current Patient Status: Inpatient   Certification Statement: The patient will continue to require additional inpatient hospital stay due to secondary to monitoring his kidney function  Discharge Plan: Anticipate discharge in 24-48 hrs to home.    Code Status: Level 1 - Full Code    Subjective   Patient seen and examined feeling much better overall just anxious    Objective :  Temp:  [96.8 °F (36 °C)-97.8 °F (36.6 °C)] 97 °F (36.1 °C)  HR:  [] 78  BP: (108-119)/(74-89) 119/75  Resp:  [16-23] 16  SpO2:  [94 %-99 %] 94 %  O2 Device: None (Room air)  Nasal Cannula O2 Flow Rate (L/min):  [2 L/min] 2 L/min    Body mass index is 25.59 kg/m².     Input and Output Summary (last 24 hours):     Intake/Output Summary (Last 24 hours) at 6/13/2025 1040  Last data filed at 6/13/2025 0605  Gross per 24 hour   Intake 1452 ml   Output 500 ml   Net 952 ml       Physical Exam  Vitals and nursing note reviewed.   Constitutional:       General: He is not in acute distress.     Appearance: He is well-developed.   HENT:      Head: Normocephalic and atraumatic.     Eyes:      Conjunctiva/sclera: Conjunctivae normal.       Cardiovascular:      Rate and Rhythm: Normal rate and regular rhythm.      Heart sounds: No murmur heard.  Pulmonary:      Effort: Pulmonary effort is normal. No respiratory distress.      Breath sounds: Normal breath sounds. No wheezing or rales.   Abdominal:      Palpations: Abdomen is soft.      Tenderness: There is no abdominal tenderness.     Musculoskeletal:         General: No swelling.      Cervical back: Neck supple.     Skin:     General: Skin is warm  and dry.      Capillary Refill: Capillary refill takes less than 2 seconds.     Neurological:      Mental Status: He is alert and oriented to person, place, and time.     Psychiatric:         Mood and Affect: Mood normal.           Lines/Drains:        Telemetry:  Telemetry Orders (From admission, onward)               24 Hour Telemetry Monitoring  Continuous x 24 Hours (Telem)        Expiring   Question:  Reason for 24 Hour Telemetry  Answer:  Arrhythmias requiring acute medical intervention / PPM or ICD malfunction                     Telemetry Reviewed: Atrial fibrillation. HR averaging 80  Indication for Continued Telemetry Use: Arrthymias requiring medical therapy               Lab Results: I have reviewed the following results:   Results from last 7 days   Lab Units 06/13/25  0454 06/11/25  0606 06/10/25  0527   WBC Thousand/uL 7.81   < > 9.28   HEMOGLOBIN g/dL 12.7   < > 12.8   HEMATOCRIT % 41.2   < > 39.3   PLATELETS Thousands/uL 195   < > 209   SEGS PCT %  --   --  88*   LYMPHO PCT %  --   --  2*   MONO PCT %  --   --  10   EOS PCT %  --   --  0    < > = values in this interval not displayed.     Results from last 7 days   Lab Units 06/13/25  0454 06/12/25  0501   SODIUM mmol/L 136 136   POTASSIUM mmol/L 3.8 4.0   CHLORIDE mmol/L 99 96   CO2 mmol/L 29 33*   BUN mg/dL 36* 39*   CREATININE mg/dL 1.58* 1.74*   ANION GAP mmol/L 8 7   CALCIUM mg/dL 8.5 8.5   ALBUMIN g/dL  --  3.3*   TOTAL BILIRUBIN mg/dL  --  1.78*   ALK PHOS U/L  --  137*   ALT U/L  --  243*   AST U/L  --  207*   GLUCOSE RANDOM mg/dL 142* 92     Results from last 7 days   Lab Units 06/13/25  0454   INR  2.10*     Results from last 7 days   Lab Units 06/13/25  0732 06/12/25  2036 06/12/25  1630 06/12/25  1627 06/12/25  1100 06/12/25  0728 06/11/25  2051 06/11/25  1602 06/11/25  1107 06/11/25  0728 06/07/25  1241 06/07/25  0915   POC GLUCOSE mg/dl 117 85 129 <10* 132 109 87 110 142* 139 152* 69     Results from last 7 days   Lab Units  06/11/25  0606   HEMOGLOBIN A1C % 6.9*     Results from last 7 days   Lab Units 06/12/25  0501 06/11/25  2200 06/11/25  1824 06/11/25  1419   LACTIC ACID mmol/L 1.1 1.6 2.3* 2.2*       Recent Cultures (last 7 days):         Imaging Results Review: No pertinent imaging studies reviewed.  Other Study Results Review: No additional pertinent studies reviewed.    Last 24 Hours Medication List:     Current Facility-Administered Medications:     acetaminophen (TYLENOL) tablet 650 mg, Q6H PRN    amiodarone tablet 200 mg, Daily With Breakfast    bisacodyl (DULCOLAX) rectal suppository 10 mg, Daily PRN    chlorhexidine (PERIDEX) 0.12 % oral rinse 15 mL, Q12H JESUS    diphenhydramine, lidocaine, Al/Mg hydroxide, simethicone (Magic Mouthwash) oral solution 10 mL, Q6H PRN    fluticasone (FLONASE) 50 mcg/act nasal spray 1 spray, Daily    furosemide (LASIX) tablet 60 mg, Daily    hydrOXYzine HCL (ATARAX) tablet 50 mg, Q6H PRN    ipratropium-albuterol (DUO-NEB) 0.5-2.5 mg/3 mL inhalation solution 3 mL, Q6H While awake    magnesium Oxide (MAG-OX) tablet 400 mg, Daily    metoprolol tartrate (LOPRESSOR) tablet 50 mg, Q6H    [Held by provider] midodrine (PROAMATINE) tablet 7.5 mg, TID AC    mirtazapine (REMERON) tablet 30 mg, HS    nicotine (NICODERM CQ) 14 mg/24hr TD 24 hr patch 1 patch, Daily    pantoprazole (PROTONIX) EC tablet 40 mg, Early Morning    phenol (CHLORASEPTIC) 1.4 % mucosal liquid 1 spray, Q2H PRN    senna-docusate sodium (SENOKOT S) 8.6-50 mg per tablet 1 tablet, HS    sodium chloride (OCEAN) 0.65 % nasal spray 1 spray, Q1H PRN    trimethobenzamide (TIGAN) IM injection 200 mg, Q6H PRN    warfarin (COUMADIN) tablet 3 mg, Daily (warfarin)    Administrative Statements   Today, Patient Was Seen By: Guadalupe Muñoz MD  I have spent a total time of >35 minutes in caring for this patient on the day of the visit/encounter including Patient and family education, Documenting in the medical record, Reviewing/placing orders in the  medical record (including tests, medications, and/or procedures), and Communicating with other healthcare professionals .    **Please Note: This note may have been constructed using a voice recognition system.**

## 2025-06-13 NOTE — ASSESSMENT & PLAN NOTE
Patient is known to have atrial fibrillation which has been persistent.    Initially scheduled for DCCV after therapeutic inrs but has had labile inrs   Cost prohibitive issues with DOACs   Remains in afib with rvr during admission  Not candidate for most AADs given cardiomyopathy and renal dysfunction  Cannot be on CCBs given low LVEF   In cardiogenic shock currently limiting use of BB initially  Was using digoxin, now off.  Milrinone discontinued 6/11/2025  Given risks and benefits as above, it is appropriate to consider amiodarone for rate control at this time  Was initially on amiodarone infusion and transitioning to oral for rate control   Will transition to Toprol xl 100 mg PO BID   Amiodarone 200 mg Po daily for rate control  Will monitor response  Pt states that if he worsens at this point he wants to go home on hospice

## 2025-06-13 NOTE — ASSESSMENT & PLAN NOTE
The patient is off milrinone.  Patient has been transferred out of the ICU.  Again appreciate cardiac input.

## 2025-06-13 NOTE — ASSESSMENT & PLAN NOTE
Lab Results   Component Value Date    EGFR 46 06/13/2025    EGFR 41 06/12/2025    EGFR 40 06/11/2025    CREATININE 1.58 (H) 06/13/2025    CREATININE 1.74 (H) 06/12/2025    CREATININE 1.80 (H) 06/11/2025   Baseline creatinine 1.4-1.6  Etiology of acute kidney injury is cardiorenal syndrome in the setting of cardiogenic shock    Regarding plan of care today on June 13: The patient is at his baseline creatinine of 1.58 today on June 13 with a baseline creatinine of 1.4-1.6.  Appreciate cardiac input, the patient has been transitioned to oral Lasix.  He had been on 40 mg daily at home I am just going to increase to 60 mg p.o. daily as the patient is just slightly net positive we will try to keep the patient even to net negative each day.  Patient may require twice daily dosing of oral Lasix.  Patient is otherwise hemodynamically stable, appreciate cardiac input.  His electrolytes are stable with a potassium of 3.8 and magnesium of 1.9.  The patient's hemoglobin levels are stable and there is no leukocytosis.  Nephrology service will sign off.  Please call with any questions.

## 2025-06-13 NOTE — ASSESSMENT & PLAN NOTE
Wt Readings from Last 3 Encounters:   06/13/25 78.6 kg (173 lb 4.5 oz)   06/05/25 81.1 kg (178 lb 12.7 oz)   05/04/25 79.2 kg (174 lb 9.7 oz)   Continue management per cardiology colleagues and patient has been transitioned to oral amiodarone dosage appreciate input.

## 2025-06-13 NOTE — ASSESSMENT & PLAN NOTE
Decisional apparatus: Patient does have capacity on exam today.  If capacity is lost, patient's substitute decision maker would default to adult children per [place]. He   Advance Directive / Living Will / POLST / POA Forms: No. Reviewed POLST form. Pt would want his sister, Arin, to be prmairy HCR. He intends to talk with her tonight or tomorrow before completing AD paperwork.     Goals  Level 1 code status.   Full code.   Disease focused care.   No limits placed.   Patient is disease-focused and wants to prolong survivability and reports good QoL  Would like to remain independent in ADLs and be able to return home  Intends to avoid illicit drug use and be compliant with medical recommendations  Would not want to be kept alive by artificial means indefinitely   Would be interested in outpatient [facility] follow up. Placed on hospital follow up list. Has transport challenges. Can attempt virtual follow ups or referral to [facility] palliative agency.   Reviewed CODE status and AD paperowrk. Pt will talk with sister tonight or tomorrow before completing any paperwork. Please assist with completion of POLST form prior to d/c, if only to name sister as HCR (on back of form).

## 2025-06-13 NOTE — ASSESSMENT & PLAN NOTE
59yo M with PMH of combined systolic and diastolic heart failure s/p AICD, Afib, CKD who [place] [facility] [place]ia, and sent by PCP for evaluation of supratherapeutic INR. 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  Pt also being worked up for tonsillitis.  Wt Readings from Last 3 Encounters:   06/13/25 78.6 kg (173 lb 4.5 oz)   06/05/25 81.1 kg (178 lb 12.7 oz)   05/04/25 79.2 kg (174 lb 9.7 oz)   4/25 TTE: LVEF 15-20%. Severe global hypokinesis. Moderate regurgitation of mitral valve. Limited views may underestimate severity of MR. Severe tricuspid regurgitation  Mgmt per primary team and cardiology

## 2025-06-13 NOTE — PROGRESS NOTES
Progress Note - Cardiology   Name: Britton Batista 60 y.o. male I MRN: [id number]  Unit/Bed#: -01 I Date of Admission: 6/5/2025   Date of Service: 6/13/2025 I Hospital Day: 8    Assessment & Plan  Atrial fibrillation with RVR (HCC)  Patient is known to have atrial fibrillation which has been persistent.    Initially scheduled for DCCV after therapeutic inrs but has had labile inrs   Cost prohibitive issues with DOACs   Remains in afib with rvr during admission  Not candidate for most AADs given cardiomyopathy and renal dysfunction  Cannot be on CCBs given low LVEF   In cardiogenic shock currently limiting use of BB initially  Was using digoxin, now off.  Milrinone discontinued 6/11/2025  Given risks and benefits as above, it is appropriate to consider amiodarone for rate control at this time  Was initially on amiodarone infusion and transitioning to oral for rate control   Will transition to Toprol xl 100 mg PO BID   Amiodarone 200 mg Po daily for rate control  Will monitor response  Pt states that if he worsens at this point he wants to go home on hospice   Acute renal failure superimposed on stage 3 chronic kidney disease (HCC)  Lab Results   Component Value Date    EGFR 46 06/13/2025    EGFR 41 06/12/2025    EGFR 40 06/11/2025    CREATININE 1.58 (H) 06/13/2025    CREATININE 1.74 (H) 06/12/2025    CREATININE 1.80 (H) 06/11/2025   CKD stable  Likely CRS but improving   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  Most recent echocardiogram from April 2025 showed severely dilated left ventricle with LVEF of 15 to 20% with global hypokinesis.  Mild to moderate mitral regurgitation and severe tricuspid regurgitation.   Negative cardiac cath 2020   Cardiogenic shock (HCC)  Resolved  Tolerating bb well  Will switch to long active BB   GDMT limited by CKD.   Nephrology agrees to adding losartan 12.5 mg PO daily starting tomorrow   Lasix 60  mg PO daily   Goals of care, counseling/discussion  Palliative care is involved  We had a long discussion about this  He is at this point interested in medications for rate control  Should medications not work and he have a recurrence he has stated he would be interested in going home on hospice. At this point though, he states he wants to be a full code. Ongoing discussions with primary service are happening.     Subjective     No chest pain. Breathing has improved.     Objective :  Temp:  [96.8 °F (36 °C)-97.8 °F (36.6 °C)] 97 °F (36.1 °C)  HR:  [] 78  BP: (108-119)/(74-89) 119/75  Resp:  [16-23] 16  SpO2:  [94 %-99 %] 94 %  O2 Device: None (Room air)  Orthostatic Blood Pressures      Flowsheet Row Most Recent Value   Blood Pressure 119/75 filed at 06/13/2025 0733   Patient Position - Orthostatic VS Lying filed at 06/12/2025 1101          First Weight: Weight - Scale: 80.1 kg (176 lb 9.4 oz) (06/05/25 0855)  Vitals:    06/13/25 0300 06/13/25 0600   Weight: 78.6 kg (173 lb 4.5 oz) 78.6 kg (173 lb 4.5 oz)     Physical Exam  Vitals and nursing note reviewed.   Constitutional:       General: He is not in acute distress.  HENT:      Head: Normocephalic and atraumatic.     Cardiovascular:      Rate and Rhythm: Rhythm irregular.      Heart sounds: No murmur heard.  Pulmonary:      Effort: Pulmonary effort is normal.      Breath sounds: No rales.     Musculoskeletal:         General: No swelling.     Skin:     General: Skin is warm.      Capillary Refill: Capillary refill takes less than 2 seconds.     Neurological:      General: No focal deficit present.      Mental Status: He is alert and oriented to person, place, and time.           Lab Results: I have reviewed the following results:CBC/BMP:   .     06/13/25 0458   WBC 7.81   HGB 12.7   HCT 41.2      SODIUM 136   K 3.8   CL 99   CO2 29   BUN 36*   CREATININE 1.58*   GLUC 142*   MG 1.9   PHOS 2.4      Results from last 7 days   Lab Units 06/13/25  6949  06/12/25  0501 06/11/25  0606   WBC Thousand/uL 7.81 10.83* 11.16*   HEMOGLOBIN g/dL 12.7 12.8 12.3   HEMATOCRIT % 41.2 40.0 38.1   PLATELETS Thousands/uL 195 198 193     Results from last 7 days   Lab Units 06/13/25  0454 06/12/25  0501 06/11/25  1405   POTASSIUM mmol/L 3.8 4.0 3.9   CHLORIDE mmol/L 99 96 93*   CO2 mmol/L 29 33* 33*   BUN mg/dL 36* 39* 46*   CREATININE mg/dL 1.58* 1.74* 1.80*   CALCIUM mg/dL 8.5 8.5 8.9     Results from last 7 days   Lab Units 06/13/25  0454 06/12/25  0501 06/11/25  0606   INR  2.10* 2.33* 2.28*     Lab Results   Component Value Date    HGBA1C 6.9 (H) 06/11/2025     Lab Results   Component Value Date    CKTOTAL 44 06/07/2025    TROPONINI 0.04 10/25/2021       Imaging Results Review: I reviewed radiology reports from this admission including: chest xray.  Other Study Results Review: EKG was reviewed.

## 2025-06-13 NOTE — ASSESSMENT & PLAN NOTE
INR therapeutic continue Coumadin 3 mg daily discussed with cardiology today currently he is on metoprolol tartrate 50 mg every 6 hours day will switch over most likely to Toprol-XL also continue amiodarone 200 mg daily digoxin was discontinued when he was in the ICU.    Rates are controlled in the 80s

## 2025-06-13 NOTE — ASSESSMENT & PLAN NOTE
Resolved  Tolerating bb well  Will switch to long active BB   GDMT limited by CKD.   Nephrology agrees to adding losartan 12.5 mg PO daily starting tomorrow   Lasix 60 mg PO daily

## 2025-06-13 NOTE — PLAN OF CARE
Problem: PAIN - ADULT  Goal: Verbalizes/displays adequate comfort level or baseline comfort level  Description: Interventions:  - Encourage patient to monitor pain and request assistance  - Assess pain using appropriate pain scale0-10  - Administer analgesics as ordered based on type and severity of pain and evaluate response  - Implement non-pharmacological measures as appropriate and evaluate response  - Consider cultural and social influences on pain and pain management  - Notify physician/advanced practitioner if interventions unsuccessful or patient reports new pain  - Educate patient/family on pain management process including their role and importance of  reporting pain   - Provide non-pharmacologic/complimentary pain relief interventions  Outcome: Progressing     Problem: INFECTION - ADULT  Goal: Absence or prevention of progression during hospitalization  Description: INTERVENTIONS:  - Assess and monitor for signs and symptoms of infection  - Monitor lab/diagnostic results  - Monitor all insertion sites, i.e. indwelling lines, tubes, and drains  - Monitor endotracheal if appropriate and nasal secretions for changes in amount and color  - [place] appropriate cooling/warming therapies per order  - Administer medications as ordered  - Instruct and encourage patient and family to use good hand hygiene technique  - Identify and instruct in appropriate isolation precautions for identified infection/condition  Outcome: Progressing     Problem: SAFETY ADULT  Goal: Patient will remain free of falls  Description: INTERVENTIONS:  - Educate patient/family on patient safety including physical limitations  - Instruct patient to call for assistance with activity   - Consider consulting OT/PT to assist with strengthening/mobility based on AM PAC & JH-HLM score  - Consult OT/PT to assist with strengthening/mobility   - Keep Call bell within reach  - Keep bed low and locked with side rails adjusted as appropriate  - Keep  care items and personal belongings within reach  - Initiate and maintain comfort rounds  - Make Fall Risk Sign visible to staff  - Offer Toileting every 2 Hours, in advance of need  - Initiate/Maintain bed alarm  - Obtain necessary fall risk management equipment: yellow socks  - Apply yellow socks and bracelet for high fall risk patients  - Consider moving patient to room near nurses station  Outcome: Progressing     Problem: CARDIOVASCULAR - ADULT  Goal: Maintains optimal cardiac output and hemodynamic stability  Description: INTERVENTIONS:  - Monitor I/O, vital signs and rhythm  - Monitor for S/S and trends of decreased cardiac output  - Administer and titrate ordered vasoactive medications to optimize hemodynamic stability  - Assess quality of pulses, skin color and temperature  - Assess for signs of decreased coronary artery perfusion  - Instruct patient to report change in severity of symptoms  Outcome: Progressing     Problem: METABOLIC, FLUID AND ELECTROLYTES - ADULT  Goal: Electrolytes maintained within normal limits  Description: INTERVENTIONS:  - Monitor labs and assess patient for signs and symptoms of electrolyte imbalances  - Administer electrolyte replacement as ordered  - Monitor response to electrolyte replacements, including repeat lab results as appropriate  - Instruct patient on fluid and nutrition as appropriate  Outcome: Progressing

## 2025-06-13 NOTE — PROGRESS NOTES
NEPHROLOGY HOSPITAL PROGRESS NOTE   Britton Batista 60 y.o. male MRN: [id number]  Unit/Bed#: -01 Encounter: [id number]    Assessment & Plan  Acute renal failure superimposed on stage 3 chronic kidney disease (HCC)  Lab Results   Component Value Date    EGFR 46 06/13/2025    EGFR 41 06/12/2025    EGFR 40 06/11/2025    CREATININE 1.58 (H) 06/13/2025    CREATININE 1.74 (H) 06/12/2025    CREATININE 1.80 (H) 06/11/2025   Baseline creatinine 1.4-1.6  Etiology of acute kidney injury is cardiorenal syndrome in the setting of cardiogenic shock    Regarding plan of care today on June 13: The patient is at his baseline creatinine of 1.58 today on June 13 with a baseline creatinine of 1.4-1.6.  Appreciate cardiac input, the patient has been transitioned to oral Lasix.  He had been on 40 mg daily at home I am just going to increase to 60 mg p.o. daily as the patient is just slightly net positive we will try to keep the patient even to net negative each day.  Patient may require twice daily dosing of oral Lasix.  Patient is otherwise hemodynamically stable, appreciate cardiac input.  His electrolytes are stable with a potassium of 3.8 and magnesium of 1.9.  The patient's hemoglobin levels are stable and there is no leukocytosis.  Nephrology service will sign off.  Please call with any questions.  Atrial fibrillation with RVR (HCC)  Wt Readings from Last 3 Encounters:   06/13/25 78.6 kg (173 lb 4.5 oz)   06/05/25 81.1 kg (178 lb 12.7 oz)   05/04/25 79.2 kg (174 lb 9.7 oz)   Continue management per cardiology colleagues and patient has been transitioned to oral amiodarone dosage appreciate input.  Transaminitis  LFTs have improved with treatment of underlying cardiogenic shock.  Cardiogenic shock (HCC)  The patient is off milrinone.  Patient has been transferred out of the ICU.  Again appreciate cardiac input.  Goals of care, counseling/discussion  Palliative care has been following.    Administrative Statements   VIRTUAL  CARE DOCUMENTATION:     1. This service was provided via Telemedicine using [employer]     2. Parties in the room with patient during teleconsult Patient only    3. Confidentiality My office door was closed     4. Participants No one else was in the room    5. Patient acknowledged consent and understanding of privacy and security of the  Telemedicine consult. I informed the patient that I have reviewed their record in Epic and presented the opportunity for them to ask any questions regarding the visit today.  The patient agreed to participate.    6. I have spent a total time of approximately 30 minutes in caring for this patient on the day of the visit/encounter including documentation of medical decision making, modification of assessment and plan, patient visit, brief communication with the patient's nurse at bedside, not including the time spent for establishing the audio/video connection.          SUBJECTIVE / 24H INTERVAL HISTORY:  Mr. Batista is seen in follow-up for acute kidney injury on chronic kidney disease and fluid management.  Since I had last seen him earlier in the week he had been transferred out of the ICU.  He states he has been starting to work with physical therapy.  He denies any chest pressure or shortness of breath no orthopnea or PND.  I had communicated with his nurse via epic secure chat no acute events noted overnight.  The patient is off inotropes.  His Zazueta catheter is out.    Regarding hemodynamics, systolic blood pressure has been anywhere from 108-150s systolic.  Pulse oximetry is 96% on room air.  His current weight is 173 pounds or 78 kg via standing scale.  He is nonoliguric and urine output for the first 12 hours was approximately 1.4 L.  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    OBJECTIVE:  Current Weight: Weight - Scale:  "78.6 kg (173 lb 4.5 oz)  Vitals:    06/12/25 2215 06/13/25 0300 06/13/25 0358 06/13/25 0600   BP: 108/74  118/89    BP Location:       Pulse: 84  92    Resp:       Temp:       TempSrc:       SpO2: 98%  99%    Weight:  78.6 kg (173 lb 4.5 oz)  78.6 kg (173 lb 4.5 oz)   Height:           Intake/Output Summary (Last 24 hours) at 6/13/2025 0646  Last data filed at 6/13/2025 0605  Gross per 24 hour   Intake 1648.5 ml   Output 1225 ml   Net 423.5 ml   General: The patient was resting comfortably in no acute distress.  HEENT: Normocephalic atraumatic  Neck: Appears to be supple  Pulmonary: No accessory muscle use/no conversational dyspnea  Cardiac: Pulse ranges 80s to 90s.  Extremities: No significant edema seen.  Neurologic: No focal deficits noted.    Medications:  Current Medications[1]    Laboratory Results:  Results from last 7 days   Lab Units 06/13/25  0454 06/12/25  0501 06/11/25  1405 06/11/25  0606 06/10/25  1454 06/10/25  0527 06/09/25  2315 06/09/25  1206 06/09/25  0504 06/08/25  2213   WBC Thousand/uL 7.81 10.83*  --  11.16*  --  9.28  --   --  8.68  --    HEMOGLOBIN g/dL 12.7 12.8  --  12.3  --  12.8  --   --  12.6  --    HEMATOCRIT % 41.2 40.0  --  38.1  --  39.3  --   --  38.9  --    PLATELETS Thousands/uL 195 198  --  193  --  209  --   --  246  --    POTASSIUM mmol/L 3.8 4.0 3.9 4.2 4.4 5.1 4.8   < > 4.8 4.9   CHLORIDE mmol/L 99 96 93* 96 93* 94* 92*   < > 94* 95*   CO2 mmol/L 29 33* 33* 28 28 30 30   < > 28 25   BUN mg/dL 36* 39* 46* 53* 64* 68* 67*   < > 68* 65*   CREATININE mg/dL 1.58* 1.74* 1.80* 1.86* 1.98* 1.95* 2.04*   < > 2.12* 2.13*   CALCIUM mg/dL 8.5 8.5 8.9 8.6 8.9 8.9 9.1   < > 8.9 9.2   MAGNESIUM mg/dL 1.9 1.8*  --  2.0  --  2.3  --   --  2.4 2.4   PHOSPHORUS mg/dL 2.4 2.4  --  3.0  --  3.6  --   --  4.8* 5.7*    < > = values in this interval not displayed.       Portions of the record may have been created with voice recognition software. Occasional wrong word or \"sound a like\" " substitutions may have occurred due to the inherent limitations of voice recognition software. Read the chart carefully and recognize, using context, where substitutions have occurred.If you have any questions, please contact the dictating provider.        [1]   Current Facility-Administered Medications:     acetaminophen (TYLENOL) tablet 650 mg, 650 mg, Oral, Q6H PRN, Bernie Spain, ALLEGRANP, 650 mg at 06/11/25 1452    amiodarone tablet 200 mg, 200 mg, Oral, Daily With Breakfast, Bernie Spain, CRNP, 200 mg at 06/12/25 0808    bisacodyl (DULCOLAX) rectal suppository 10 mg, 10 mg, Rectal, Daily PRN, Bernie Spain CRNP, 10 mg at 06/12/25 0146    chlorhexidine (PERIDEX) 0.12 % oral rinse 15 mL, 15 mL, Mouth/Throat, Q12H JESUS, Bernie Spain, CRNP, 15 mL at 06/12/25 2216    diphenhydramine, lidocaine, Al/Mg hydroxide, simethicone (Magic Mouthwash) oral solution 10 mL, 10 mL, Swish & Swallow, Q6H PRN, Bernie Spain, CRNP, 10 mL at 06/11/25 2108    fluticasone (FLONASE) 50 mcg/act nasal spray 1 spray, 1 spray, Each Nare, Daily, LUIS ANGEL Esparza, 1 spray at 06/11/25 1038    furosemide (LASIX) tablet 40 mg, 40 mg, Oral, Daily, Bernie Spain CRNP    hydrOXYzine HCL (ATARAX) tablet 50 mg, 50 mg, Oral, Q6H PRN, Bernie Spain, CRNP, 50 mg at 06/13/25 0554    ipratropium-albuterol (DUO-NEB) 0.5-2.5 mg/3 mL inhalation solution 3 mL, 3 mL, Nebulization, Q6H While awake, LUIS ANGEL Esparza, 3 mL at 06/12/25 1953    magnesium Oxide (MAG-OX) tablet 400 mg, 400 mg, Oral, Daily, Bernieleslie Gamboane, CRNP, 400 mg at 06/12/25 0807    metoprolol tartrate (LOPRESSOR) tablet 50 mg, 50 mg, Oral, Q6H, LUIS ANGEL Esparza, 50 mg at 06/13/25 0359    [Held by provider] midodrine (PROAMATINE) tablet 7.5 mg, 7.5 mg, Oral, TID AC, Mack Varela PA-C, 7.5 mg at 06/08/25 1144    mirtazapine (REMERON) tablet 30 mg, 30 mg, Oral, HS, LUIS ANGEL Esparza, 30 mg at 06/12/25 2216    nicotine (NICODERM CQ) 14 mg/24hr TD 24 hr patch 1 patch, 1 patch,  Transdermal, Daily, LUIS ANGEL Esparza, 1 patch at 06/12/25 0807    pantoprazole (PROTONIX) EC tablet 40 mg, 40 mg, Oral, Early Morning, LUIS ANGEL Esparza, 40 mg at 06/13/25 0554    phenol (CHLORASEPTIC) 1.4 % mucosal liquid 1 spray, 1 spray, Mouth/Throat, Q2H PRN, LUIS ANGEL Esparza, 1 spray at 06/08/25 0947    senna-docusate sodium (SENOKOT S) 8.6-50 mg per tablet 1 tablet, 1 tablet, Oral, HS, LUIS ANGEL Esparza, 1 tablet at 06/12/25 2216    sodium chloride (OCEAN) 0.65 % nasal spray 1 spray, 1 spray, Each Nare, Q1H PRN, LUIS ANGEL Esparza, 1 spray at 06/09/25 2048    trimethobenzamide (TIGAN) IM injection 200 mg, 200 mg, Intramuscular, Q6H PRN, ALLEGRA EsparzaNP, 200 mg at 06/12/25 0506    warfarin (COUMADIN) tablet 3 mg, 3 mg, Oral, Daily (warfarin), LUIS ANGEL Esparza, 3 mg at 06/12/25 1816

## 2025-06-13 NOTE — ASSESSMENT & PLAN NOTE
Wt Readings from Last 3 Encounters:   06/13/25 78.6 kg (173 lb 4.5 oz)   06/05/25 81.1 kg (178 lb 12.7 oz)   05/04/25 79.2 kg (174 lb 9.7 oz)       Last EF is 25% moderate MR on 3/2025   Lasix increased today by nephrology to 60 mg daily to keep a more negative

## 2025-06-14 VITALS
WEIGHT: 166.89 LBS | RESPIRATION RATE: 14 BRPM | HEART RATE: 59 BPM | DIASTOLIC BLOOD PRESSURE: 92 MMHG | SYSTOLIC BLOOD PRESSURE: 130 MMHG | OXYGEN SATURATION: 99 % | TEMPERATURE: 96.6 F | HEIGHT: 69 IN | BODY MASS INDEX: 24.72 KG/M2

## 2025-06-14 LAB
ANION GAP SERPL CALCULATED.3IONS-SCNC: 10 MMOL/L (ref 4–13)
BASE EX.OXY STD BLDV CALC-SCNC: 91 % (ref 60–80)
BASE EXCESS BLDV CALC-SCNC: 4 MMOL/L
BUN SERPL-MCNC: 35 MG/DL (ref 5–25)
CALCIUM SERPL-MCNC: 9.1 MG/DL (ref 8.4–10.2)
CHLORIDE SERPL-SCNC: 97 MMOL/L (ref 96–108)
CO2 SERPL-SCNC: 28 MMOL/L (ref 21–32)
CREAT SERPL-MCNC: 1.6 MG/DL (ref 0.6–1.3)
GFR SERPL CREATININE-BSD FRML MDRD: 46 ML/MIN/1.73SQ M
GLUCOSE SERPL-MCNC: 116 MG/DL (ref 65–140)
GLUCOSE SERPL-MCNC: 148 MG/DL (ref 65–140)
GLUCOSE SERPL-MCNC: 93 MG/DL (ref 65–140)
HCO3 BLDV-SCNC: 28.2 MMOL/L (ref 24–30)
O2 CT BLDV-SCNC: 17.4 ML/DL
PCO2 BLDV: 41.1 MM HG (ref 42–50)
PH BLDV: 7.46 [PH] (ref 7.3–7.4)
PO2 BLDV: 65.3 MM HG (ref 35–45)
POTASSIUM SERPL-SCNC: 3.9 MMOL/L (ref 3.5–5.3)
SODIUM SERPL-SCNC: 135 MMOL/L (ref 135–147)

## 2025-06-14 PROCEDURE — 99239 HOSP IP/OBS DSCHRG MGMT >30: CPT | Performed by: FAMILY MEDICINE

## 2025-06-14 PROCEDURE — 80048 BASIC METABOLIC PNL TOTAL CA: CPT | Performed by: FAMILY MEDICINE

## 2025-06-14 PROCEDURE — 82948 REAGENT STRIP/BLOOD GLUCOSE: CPT

## 2025-06-14 PROCEDURE — 82805 BLOOD GASES W/O2 SATURATION: CPT

## 2025-06-14 PROCEDURE — 94760 N-INVAS EAR/PLS OXIMETRY 1: CPT

## 2025-06-14 PROCEDURE — 94640 AIRWAY INHALATION TREATMENT: CPT

## 2025-06-14 RX ORDER — FUROSEMIDE 20 MG/1
60 TABLET ORAL DAILY
Qty: 90 TABLET | Refills: 0 | Status: SHIPPED | OUTPATIENT
Start: 2025-06-15

## 2025-06-14 RX ORDER — METOPROLOL SUCCINATE 100 MG/1
100 TABLET, EXTENDED RELEASE ORAL 2 TIMES DAILY
Qty: 60 TABLET | Refills: 0 | Status: SHIPPED | OUTPATIENT
Start: 2025-06-14

## 2025-06-14 RX ORDER — PANTOPRAZOLE SODIUM 40 MG/1
40 TABLET, DELAYED RELEASE ORAL DAILY
Start: 2025-06-14 | End: 2025-09-12

## 2025-06-14 RX ORDER — WARFARIN SODIUM 3 MG/1
3 TABLET ORAL
Start: 2025-06-14

## 2025-06-14 RX ORDER — AMIODARONE HYDROCHLORIDE 200 MG/1
200 TABLET ORAL
Qty: 30 TABLET | Refills: 0 | Status: SHIPPED | OUTPATIENT
Start: 2025-06-15

## 2025-06-14 RX ADMIN — HYDROXYZINE HYDROCHLORIDE 50 MG: 25 TABLET, FILM COATED ORAL at 00:09

## 2025-06-14 RX ADMIN — METOPROLOL SUCCINATE 100 MG: 100 TABLET, EXTENDED RELEASE ORAL at 09:04

## 2025-06-14 RX ADMIN — IPRATROPIUM BROMIDE AND ALBUTEROL SULFATE 3 ML: 2.5; .5 SOLUTION RESPIRATORY (INHALATION) at 07:27

## 2025-06-14 RX ADMIN — AMIODARONE HYDROCHLORIDE 200 MG: 200 TABLET ORAL at 09:04

## 2025-06-14 RX ADMIN — PANTOPRAZOLE SODIUM 40 MG: 40 TABLET, DELAYED RELEASE ORAL at 05:50

## 2025-06-14 RX ADMIN — NICOTINE 1 PATCH: 14 PATCH, EXTENDED RELEASE TRANSDERMAL at 09:06

## 2025-06-14 RX ADMIN — FUROSEMIDE 60 MG: 40 TABLET ORAL at 09:04

## 2025-06-14 RX ADMIN — Medication 400 MG: at 09:04

## 2025-06-14 RX ADMIN — DIPHENHYDRAMINE HYDROCHLORIDE AND LIDOCAINE HYDROCHLORIDE AND ALUMINUM HYDROXIDE AND MAGNESIUM HYDRO 10 ML: KIT at 09:14

## 2025-06-14 RX ADMIN — Medication 6 MG: at 00:09

## 2025-06-14 NOTE — CASE MANAGEMENT
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    Scleroderma (HCC) 2025    Acute renal failure superimposed on stage 3 chronic kidney disease ([facility]) 2025    Cardiogenic shock (HCC) 2025    Atrial fibrillation with RVR ([facility]) 2025    Syncope 2024    Chest pain 2024    Acute on chronic systolic congestive heart failure (HCC) 2024    Primary hypertension 2024    Hypomagnesemia 2023    Methamphetamine abuse ([facility]) 2023    Tobacco abuse 2023    D-dimer, elevated 2023    Cardiomyopathy (HCC) 10/09/2023    Acute kidney injury superimposed on stage 3a chronic kidney disease ([facility]) 10/08/2023    Acute on chronic combined systolic (congestive) and diastolic (congestive) heart failure (HCC) 10/07/2023    Hypertensive emergency 10/07/2023    Anxiety     Depression     Other emphysema (HCC)     GERD (gastroesophageal reflux disease)     Gout     Hypertensive urgency     Neuropathy     Raynaud's disease     Unilateral inguinal hernia, without obstruction or gangrene, not specified as recurrent     PSS (progressive systemic sclerosis) ([facility]) 10/25/2021    Near syncope 10/25/2021    Osteomyelitis ([facility]) 10/25/2021    Elevated troponin 10/25/2021    Acute encephalopathy 10/25/2021      LOS (days): 9  Geometric Mean LOS (GMLOS) (days): 4.9  Days  to GMLOS:-4.1     OBJECTIVE:  Risk of Unplanned Readmission Score: 69.15         Current admission status: Inpatient   Preferred Pharmacy:   [facility] - [place], [place] - [address]  [address] [facility]  [place] [address]  Phone: [phone] Fax: [phone]    [facility] - [place] - [address]  [facility],  [address]  [facility],  [address] [facility] [place] [address]  Phone: [phone] Fax: [phone]    [id number] - [facility] [place] - [address]  [address] [facility] [place] [address]  Phone: [phone] Fax: [phone]    Primary Care Provider: Juan Galindo DO    Primary Insurance: [employer] [facility]  Secondary Insurance:     DISCHARGE DETAILS:    Discharge planning discussed with:: patient and sister Arin Batista  Freedom of Choice: Yes   Pt agreeable to [facility] on discharge. Denilson approved through [employer], delivered to bedside, pt signed delivery ticket.    [facility] aware of discharge to pts sisters Arin Batista home today, address [address] [address] [place]  Order for [facility] uploaded to sergey, d/c summary and AVS faxed to [facility].              Contacts  Patient Contacts: Arin Batista- sister  Relationship to Patient:: Family  Contact Method: In Person  Reason/Outcome: Discharge Planning, Continuity of Care              Other Referral/Resources/Interventions Provided:  Interventions: [facility]         Treatment Team Recommendation: Home with Home Health Care  Expected Discharge Disposition: Home Health Services     Transport at Discharge : Family                             IMM Given (Date):: 06/14/25  IMM Given to:: Patient   CM spoke to patient at the bedside, reviewed DC IMM with patient and informed that patient can stay an additional 4 hours for reconsidering appealing the discharge as the medicare rights were review on the day of discharge. Pt verbalized understanding and feels ready to  go home and does not intend to stay 4 hours to reconsider.

## 2025-06-14 NOTE — ASSESSMENT & PLAN NOTE
INR therapeutic continue Coumadin 3 mg daily continue Toprol- mg p.o. twice daily and amiodarone 200 mg daily  Rates are controlled in the 80s check INR Monday

## 2025-06-14 NOTE — PLAN OF CARE
Problem: PAIN - ADULT  Goal: Verbalizes/displays adequate comfort level or baseline comfort level  Description: Interventions:  - Encourage patient to monitor pain and request assistance  - Assess pain using appropriate pain scale0-10  - Administer analgesics as ordered based on type and severity of pain and evaluate response  - Implement non-pharmacological measures as appropriate and evaluate response  - Consider cultural and social influences on pain and pain management  - Notify physician/advanced practitioner if interventions unsuccessful or patient reports new pain  - Educate patient/family on pain management process including their role and importance of  reporting pain   - Provide non-pharmacologic/complimentary pain relief interventions  Outcome: Progressing     Problem: INFECTION - ADULT  Goal: Absence or prevention of progression during hospitalization  Description: INTERVENTIONS:  - Assess and monitor for signs and symptoms of infection  - Monitor lab/diagnostic results  - Monitor all insertion sites, i.e. indwelling lines, tubes, and drains  - Monitor endotracheal if appropriate and nasal secretions for changes in amount and color  - Fleming appropriate cooling/warming therapies per order  - Administer medications as ordered  - Instruct and encourage patient and family to use good hand hygiene technique  - Identify and instruct in appropriate isolation precautions for identified infection/condition  Outcome: Progressing  Goal: Absence of fever/infection during neutropenic period  Description: INTERVENTIONS:  - Monitor WBC  - Perform strict hand hygiene  - Limit to healthy visitors only  - No plants, dried, fresh or silk flowers with chaidez in patient room  Outcome: Progressing     Problem: SAFETY ADULT  Goal: Patient will remain free of falls  Description: INTERVENTIONS:  - Educate patient/family on patient safety including physical limitations  - Instruct patient to call for assistance with activity    - Consider consulting OT/PT to assist with strengthening/mobility based on AM PAC & JH-HLM score  - Consult OT/PT to assist with strengthening/mobility   - Keep Call bell within reach  - Keep bed low and locked with side rails adjusted as appropriate  - Keep care items and personal belongings within reach  - Initiate and maintain comfort rounds  - Make Fall Risk Sign visible to staff  - Offer Toileting every 2 Hours, in advance of need  - Initiate/Maintain bed alarm  - Obtain necessary fall risk management equipment: yellow socks  - Apply yellow socks and bracelet for high fall risk patients  - Consider moving patient to room near nurses station  Outcome: Progressing  Goal: Maintain or return to baseline ADL function  Description: INTERVENTIONS:  -  Assess patient's ability to carry out ADLs; assess patient's baseline for ADL function and identify physical deficits which impact ability to perform ADLs (bathing, care of mouth/teeth, toileting, grooming, dressing, etc.)  - Assess/evaluate cause of self-care deficits   - Assess range of motion  - Assess patient's mobility; develop plan if impaired  - Assess patient's need for assistive devices and provide as appropriate  - Encourage maximum independence but intervene and supervise when necessary  - Involve family in performance of ADLs  - Assess for home care needs following discharge   - Consider OT consult to assist with ADL evaluation and planning for discharge  - Provide patient education as appropriate  - Monitor functional capacity and physical performance, use of AM PAC & JH-HLM   - Monitor gait, balance and fatigue with ambulation    Outcome: Progressing  Goal: Maintains/Returns to pre admission functional level  Description: INTERVENTIONS:  - Perform AM-PAC 6 Click Basic Mobility/ Daily Activity assessment daily.  - Set and communicate daily mobility goal to care team and patient/family/caregiver.   - Collaborate with rehabilitation services on mobility  goals if consulted  - Out of bed for toileting  - Record patient progress and toleration of activity level   Outcome: Progressing     Problem: DISCHARGE PLANNING  Goal: Discharge to home or other facility with appropriate resources  Description: INTERVENTIONS:  - Identify barriers to discharge w/patient and caregiver  - Arrange for needed discharge resources and transportation as appropriate  - Identify discharge learning needs (meds, wound care, etc.)  - Arrange for interpretive services to assist at discharge as needed  - Refer to Case Management Department for coordinating discharge planning if the patient needs post-hospital services based on physician/advanced practitioner order or complex needs related to functional status, cognitive ability, or social support system  Outcome: Progressing     Problem: Knowledge Deficit  Goal: Patient/family/caregiver demonstrates understanding of disease process, treatment plan, medications, and discharge instructions  Description: Complete learning assessment and assess knowledge base.  Interventions:  - Provide teaching at level of understanding  - Provide teaching via preferred learning methods  Outcome: Progressing     Problem: CARDIOVASCULAR - ADULT  Goal: Maintains optimal cardiac output and hemodynamic stability  Description: INTERVENTIONS:  - Monitor I/O, vital signs and rhythm  - Monitor for S/S and trends of decreased cardiac output  - Administer and titrate ordered vasoactive medications to optimize hemodynamic stability  - Assess quality of pulses, skin color and temperature  - Assess for signs of decreased coronary artery perfusion  - Instruct patient to report change in severity of symptoms  Outcome: Progressing  Goal: Absence of cardiac dysrhythmias or at baseline rhythm  Description: INTERVENTIONS:  - Continuous cardiac monitoring, vital signs, obtain 12 lead EKG if ordered  - Administer antiarrhythmic and heart rate control medications as ordered  - Monitor  electrolytes and administer replacement therapy as ordered  Outcome: Progressing     Problem: METABOLIC, FLUID AND ELECTROLYTES - ADULT  Goal: Electrolytes maintained within normal limits  Description: INTERVENTIONS:  - Monitor labs and assess patient for signs and symptoms of electrolyte imbalances  - Administer electrolyte replacement as ordered  - Monitor response to electrolyte replacements, including repeat lab results as appropriate  - Instruct patient on fluid and nutrition as appropriate  Outcome: Progressing  Goal: Fluid balance maintained  Description: INTERVENTIONS:  - Monitor labs   - Monitor I/O and WT  - Instruct patient on fluid and nutrition as appropriate  - Assess for signs & symptoms of volume excess or deficit  Outcome: Progressing  Goal: Glucose maintained within target range  Description: INTERVENTIONS:  - Monitor Blood Glucose as ordered  - Assess for signs and symptoms of hyperglycemia and hypoglycemia  - Administer ordered medications to maintain glucose within target range  - Assess nutritional intake and initiate nutrition service referral as needed  Outcome: Progressing     Problem: Prexisting or High Potential for Compromised Skin Integrity  Goal: Skin integrity is maintained or improved  Description: INTERVENTIONS:  - Identify patients at risk for skin breakdown  - Assess and monitor skin integrity including under and around medical devices   - Assess and monitor nutrition and hydration status  - Monitor labs  - Assess for incontinence   - Turn and reposition patient  - Assist with mobility/ambulation  - Relieve pressure over dinora prominences   - Avoid friction and shearing  - Provide appropriate hygiene as needed including keeping skin clean and dry  - Evaluate need for skin moisturizer/barrier cream  - Collaborate with interdisciplinary team  - Patient/family teaching  - Consider wound care consult    Assess:  - Review Alex scale daily  - Clean and moisturize skin every assessment  and prn  - Inspect skin when repositioning, toileting, and assisting with ADLS  - Assess under medical devices such as bp cuff, scd's every assessment and prn  - Assess extremities for adequate circulation and sensation     Bed Management:  - Have minimal linens on bed & keep smooth, unwrinkled  - Change linens as needed when moist or perspiring  - Avoid sitting or lying in one position for more than 2 hours while in bed?Keep HOB at 30-45 degrees   - Toileting:  - Offer bedside commode  - Assess for incontinence every assessment and prn  - Use incontinent care products after each incontinent episode such as wipes, creams    Activity:  - Mobilize patient 3 times a day  - Encourage activity and walks on unit  - Encourage or provide ROM exercises   - Turn and reposition patient every 2 Hours  - Use appropriate equipment to lift or move patient in bed  - Instruct/ Assist with weight shifting every 30 minutes  when out of bed in chair  - Consider limitation of chair time 3 hour intervals    Skin Care:  - Avoid use of baby powder, tape, friction and shearing, hot water or constrictive clothing  - Relieve pressure over bony prominences using offloading, waffle cushion, repositioning  - Do not massage red bony areas    Next Steps:  - Consider consults to  interdisciplinary teams such as PT,OT, cardiology  Outcome: Progressing     Problem: Decreased Cardiac Output  Goal: Cardiac output adequate for individual needs  Description: INTERVENTIONS: Monitor for signs and symptoms of decreased cardiac output   - Monitor for dyspnea with exertion and at rest  - Monitor for orthopnea  - Monitor for signs of tachycardia. Place patient on telemetry monitoring.  - Assess patient for jugular vein distention  - Assess patient for lower extremity edema and poor peripheral perfusion   - Auscultate lung sound for Fine bibasilar crackles   - Monitor for cardiac arrythmias   - Administer beta blockers, antiarrhythmic, and blood pressure  medications as ordered    Outcome: Progressing     Problem: Impaired Gas Exchange  Goal: Optimize oxygenation and ensure adequate ventilation  Description: INTERVENTIONS: Monitor for signs and symptoms of respiratory distress                - Elevate HOB or use high fowlers to promote lung expansion                - Administer oxygen as ordered to maintain adequate oxygenation                - Encourage use of IS to promote lung expansion and prevent PN                - Monitor ABGs to assess oxygenation status                - Monitor blood oxygen level to maintain adequate oxygenation                - Encourage cough and deep breathing exercises to promote lung expansion                - Monitor patient's mental status for increased confusion    Outcome: Progressing     Problem: Excess Fluid Volume  Goal: Patient is able to achieve and maintain homeostasis  Description: INTERVENTIONS: Monitor for sign and symptoms of fluid overload  - Evaluate LE edema every shift  - Elevate LE to prevent dependent edema  - Apply FARAZ stockings as ordered   - Monitor ankle circumference daily  - Assess for jugular vein distention  - Evaluate provider orders for the CHF diuretic algorithm. Administer diuretics as ordered  - Weigh the patient daily at 0600 and report a weight gain of five pounds or more   - Strict intake and output  - Monitor fluid intake and adhere to fluid restrictions  - Assess lung sounds every shift and as needed  - Monitor vital signs and lab values (CBC, chem, BUN, BNP)  - Measure and document urine output    Outcome: Progressing     Problem: Activity Intolerance  Goal: Patient is able to perform activities within their limitations  Description: INTERVENTIONS:                       -   Alternate periods of activity with periods of rest                 -   Patients is able to maintain normal vitals heart rhythm during activity                 -   Gradually increase activity and exercise as patient can  tolerate                 -   Monitor blood pressure and heart before and after exercise                  -   Monitor blood oxygen saturation during activity and apply oxygen as needed    Outcome: Progressing     Problem: Knowledge Deficit  Goal: Patient is able to verbalize understanding of Heart Failure after education  Description: INTERVENTIONS:  - Educate the patient and family on signs and symptoms of HF  - Provide the patient with HF education and HF zone tool  - Educate on the importance of daily weight in the AM and reporting a weight gain               of 3 or more pounds to their primary care physician  - Monitor for SOB  - Maintain and sodium and fluid restriction  - Educate the patient on the importance of medications such as: diuretics, betablockers,               antiarrhythmics and their purpose, dose, route, side effects and labs               if they are needed    Outcome: Progressing

## 2025-06-14 NOTE — ASSESSMENT & PLAN NOTE
Recurrent no evidence of heart failure exacerbation.  Could be secondary to sepsis improved   4 = No assist / stand by assistance

## 2025-06-14 NOTE — PLAN OF CARE
Problem: PAIN - ADULT  Goal: Verbalizes/displays adequate comfort level or baseline comfort level  Description: Interventions:  - Encourage patient to monitor pain and request assistance  - Assess pain using appropriate pain scale0-10  - Administer analgesics as ordered based on type and severity of pain and evaluate response  - Implement non-pharmacological measures as appropriate and evaluate response  - Consider cultural and social influences on pain and pain management  - Notify physician/advanced practitioner if interventions unsuccessful or patient reports new pain  - Educate patient/family on pain management process including their role and importance of  reporting pain   - Provide non-pharmacologic/complimentary pain relief interventions  Outcome: Progressing     Problem: INFECTION - ADULT  Goal: Absence or prevention of progression during hospitalization  Description: INTERVENTIONS:  - Assess and monitor for signs and symptoms of infection  - Monitor lab/diagnostic results  - Monitor all insertion sites, i.e. indwelling lines, tubes, and drains  - Monitor endotracheal if appropriate and nasal secretions for changes in amount and color  - [place] appropriate cooling/warming therapies per order  - Administer medications as ordered  - Instruct and encourage patient and family to use good hand hygiene technique  - Identify and instruct in appropriate isolation precautions for identified infection/condition  Outcome: Progressing  Goal: Absence of fever/infection during neutropenic period  Description: INTERVENTIONS:  - Monitor WBC  - Perform strict hand hygiene  - Limit to healthy visitors only  - No plants, dried, fresh or silk flowers with chaidez in patient room  Outcome: Progressing     Problem: SAFETY ADULT  Goal: Patient will remain free of falls  Description: INTERVENTIONS:  - Educate patient/family on patient safety including physical limitations  - Instruct patient to call for assistance with activity    - Consider consulting OT/PT to assist with strengthening/mobility based on AM PAC & JH-HLM score  - Consult OT/PT to assist with strengthening/mobility   - Keep Call bell within reach  - Keep bed low and locked with side rails adjusted as appropriate  - Keep care items and personal belongings within reach  - Initiate and maintain comfort rounds  - Make Fall Risk Sign visible to staff  - Offer Toileting every 2 Hours, in advance of need  - Initiate/Maintain bed alarm  - Obtain necessary fall risk management equipment: yellow socks  - Apply yellow socks and bracelet for high fall risk patients  - Consider moving patient to room near nurses station  Outcome: Progressing  Goal: Maintain or return to baseline ADL function  Description: INTERVENTIONS:  -  Assess patient's ability to carry out ADLs; assess patient's baseline for ADL function and identify physical deficits which impact ability to perform ADLs (bathing, care of mouth/teeth, toileting, grooming, dressing, etc.)  - Assess/evaluate cause of self-care deficits   - Assess range of motion  - Assess patient's mobility; develop plan if impaired  - Assess patient's need for assistive devices and provide as appropriate  - Encourage maximum independence but intervene and supervise when necessary  - Involve family in performance of ADLs  - Assess for home care needs following discharge   - Consider OT consult to assist with ADL evaluation and planning for discharge  - Provide patient education as appropriate  - Monitor functional capacity and physical performance, use of AM PAC & JH-HLM   - Monitor gait, balance and fatigue with ambulation    Outcome: Progressing  Goal: Maintains/Returns to pre admission functional level  Description: INTERVENTIONS:  - Perform AM-PAC 6 Click Basic Mobility/ Daily Activity assessment daily.  - Set and communicate daily mobility goal to care team and patient/family/caregiver.   - Collaborate with rehabilitation services on mobility  goals if consulted  - Out of bed for toileting  - Record patient progress and toleration of activity level   Outcome: Progressing     Problem: DISCHARGE PLANNING  Goal: Discharge to home or other facility with appropriate resources  Description: INTERVENTIONS:  - Identify barriers to discharge w/patient and caregiver  - Arrange for needed discharge resources and transportation as appropriate  - Identify discharge learning needs (meds, wound care, etc.)  - Arrange for interpretive services to assist at discharge as needed  - Refer to Case Management Department for coordinating discharge planning if the patient needs post-hospital services based on physician/advanced practitioner order or complex needs related to functional status, cognitive ability, or social support system  Outcome: Progressing     Problem: Knowledge Deficit  Goal: Patient/family/caregiver demonstrates understanding of disease process, treatment plan, medications, and discharge instructions  Description: Complete learning assessment and assess knowledge base.  Interventions:  - Provide teaching at level of understanding  - Provide teaching via preferred learning methods  Outcome: Progressing     Problem: CARDIOVASCULAR - ADULT  Goal: Maintains optimal cardiac output and hemodynamic stability  Description: INTERVENTIONS:  - Monitor I/O, vital signs and rhythm  - Monitor for S/S and trends of decreased cardiac output  - Administer and titrate ordered vasoactive medications to optimize hemodynamic stability  - Assess quality of pulses, skin color and temperature  - Assess for signs of decreased coronary artery perfusion  - Instruct patient to report change in severity of symptoms  Outcome: Progressing  Goal: Absence of cardiac dysrhythmias or at baseline rhythm  Description: INTERVENTIONS:  - Continuous cardiac monitoring, vital signs, obtain 12 lead EKG if ordered  - Administer antiarrhythmic and heart rate control medications as ordered  - Monitor  electrolytes and administer replacement therapy as ordered  Outcome: Progressing     Problem: METABOLIC, FLUID AND ELECTROLYTES - ADULT  Goal: Electrolytes maintained within normal limits  Description: INTERVENTIONS:  - Monitor labs and assess patient for signs and symptoms of electrolyte imbalances  - Administer electrolyte replacement as ordered  - Monitor response to electrolyte replacements, including repeat lab results as appropriate  - Instruct patient on fluid and nutrition as appropriate  Outcome: Progressing  Goal: Fluid balance maintained  Description: INTERVENTIONS:  - Monitor labs   - Monitor I/O and WT  - Instruct patient on fluid and nutrition as appropriate  - Assess for signs & symptoms of volume excess or deficit  Outcome: Progressing  Goal: Glucose maintained within target range  Description: INTERVENTIONS:  - Monitor Blood Glucose as ordered  - Assess for signs and symptoms of hyperglycemia and hypoglycemia  - Administer ordered medications to maintain glucose within target range  - Assess nutritional intake and initiate nutrition service referral as needed  Outcome: Progressing     Problem: Prexisting or High Potential for Compromised Skin Integrity  Goal: Skin integrity is maintained or improved  Description: INTERVENTIONS:  - Identify patients at risk for skin breakdown  - Assess and monitor skin integrity including under and around medical devices   - Assess and monitor nutrition and hydration status  - Monitor labs  - Assess for incontinence   - Turn and reposition patient  - Assist with mobility/ambulation  - Relieve pressure over dinora prominences   - Avoid friction and shearing  - Provide appropriate hygiene as needed including keeping skin clean and dry  - Evaluate need for skin moisturizer/barrier cream  - Collaborate with interdisciplinary team  - Patient/family teaching  - Consider wound care consult    Assess:  - Review Alex scale daily  - Clean and moisturize skin every assessment  and prn  - Inspect skin when repositioning, toileting, and assisting with ADLS  - Assess under medical devices such as bp cuff, scd's every assessment and prn  - Assess extremities for adequate circulation and sensation     Bed Management:  - Have minimal linens on bed & keep smooth, unwrinkled  - Change linens as needed when moist or perspiring  - Avoid sitting or lying in one position for more than 2 hours while in bed?Keep HOB at 30-45 degrees   - Toileting:  - Offer bedside commode  - Assess for incontinence every assessment and prn  - Use incontinent care products after each incontinent episode such as wipes, creams    Activity:  - Mobilize patient 3 times a day  - Encourage activity and walks on unit  - Encourage or provide ROM exercises   - Turn and reposition patient every 2 Hours  - Use appropriate equipment to lift or move patient in bed  - Instruct/ Assist with weight shifting every 30 minutes  when out of bed in chair  - Consider limitation of chair time 3 hour intervals    Skin Care:  - Avoid use of baby powder, tape, friction and shearing, hot water or constrictive clothing  - Relieve pressure over bony prominences using offloading, waffle cushion, repositioning  - Do not massage red bony areas    Next Steps:  - Consider consults to  interdisciplinary teams such as PT,OT, cardiology  Outcome: Progressing     Problem: Decreased Cardiac Output  Goal: Cardiac output adequate for individual needs  Description: INTERVENTIONS: Monitor for signs and symptoms of decreased cardiac output   - Monitor for dyspnea with exertion and at rest  - Monitor for orthopnea  - Monitor for signs of tachycardia. Place patient on telemetry monitoring.  - Assess patient for jugular vein distention  - Assess patient for lower extremity edema and poor peripheral perfusion   - Auscultate lung sound for Fine bibasilar crackles   - Monitor for cardiac arrythmias   - Administer beta blockers, antiarrhythmic, and blood pressure  medications as ordered    Outcome: Progressing     Problem: Impaired Gas Exchange  Goal: Optimize oxygenation and ensure adequate ventilation  Description: INTERVENTIONS: Monitor for signs and symptoms of respiratory distress                - Elevate HOB or use high fowlers to promote lung expansion                - Administer oxygen as ordered to maintain adequate oxygenation                - Encourage use of IS to promote lung expansion and prevent PN                - Monitor ABGs to assess oxygenation status                - Monitor blood oxygen level to maintain adequate oxygenation                - Encourage cough and deep breathing exercises to promote lung expansion                - Monitor patient's mental status for increased confusion    Outcome: Progressing     Problem: Excess Fluid Volume  Goal: Patient is able to achieve and maintain homeostasis  Description: INTERVENTIONS: Monitor for sign and symptoms of fluid overload  - Evaluate LE edema every shift  - Elevate LE to prevent dependent edema  - Apply FARAZ stockings as ordered   - Monitor ankle circumference daily  - Assess for jugular vein distention  - Evaluate provider orders for the CHF diuretic algorithm. Administer diuretics as ordered  - Weigh the patient daily at 0600 and report a weight gain of five pounds or more   - Strict intake and output  - Monitor fluid intake and adhere to fluid restrictions  - Assess lung sounds every shift and as needed  - Monitor vital signs and lab values (CBC, chem, BUN, BNP)  - Measure and document urine output    Outcome: Progressing     Problem: Activity Intolerance  Goal: Patient is able to perform activities within their limitations  Description: INTERVENTIONS:                       -   Alternate periods of activity with periods of rest                 -   Patients is able to maintain normal vitals heart rhythm during activity                 -   Gradually increase activity and exercise as patient can  tolerate                 -   Monitor blood pressure and heart before and after exercise                  -   Monitor blood oxygen saturation during activity and apply oxygen as needed    Outcome: Progressing     Problem: Knowledge Deficit  Goal: Patient is able to verbalize understanding of Heart Failure after education  Description: INTERVENTIONS:  - Educate the patient and family on signs and symptoms of HF  - Provide the patient with HF education and HF zone tool  - Educate on the importance of daily weight in the AM and reporting a weight gain               of 3 or more pounds to their primary care physician  - Monitor for SOB  - Maintain and sodium and fluid restriction  - Educate the patient on the importance of medications such as: diuretics, betablockers,               antiarrhythmics and their purpose, dose, route, side effects and labs               if they are needed    Outcome: Progressing

## 2025-06-14 NOTE — ASSESSMENT & PLAN NOTE
Lab Results   Component Value Date    EGFR 46 06/14/2025    EGFR 46 06/13/2025    EGFR 41 06/12/2025    CREATININE 1.60 (H) 06/14/2025    CREATININE 1.58 (H) 06/13/2025    CREATININE 1.74 (H) 06/12/2025   Creatinine baseline is 1.5   Creatinine 1.6 stable discussed with nephrology cleared from there and to be discharged

## 2025-06-14 NOTE — DISCHARGE SUMMARY
Discharge Summary - Hospitalist   Name: Britton Batista 60 y.o. male I MRN: [id number]  Unit/Bed#: -01 I Date of Admission: 6/5/2025   Date of Service: 6/14/2025 I Hospital Day: 9     Assessment & Plan  Acute on chronic combined systolic (congestive) and diastolic (congestive) heart failure (HCC)  Wt Readings from Last 3 Encounters:   06/14/25 75.7 kg (166 lb 14.2 oz)   06/05/25 81.1 kg (178 lb 12.7 oz)   05/04/25 79.2 kg (174 lb 9.7 oz)       Last EF is 25% moderate MR on 3/2025   Lasix increased today by nephrology to 60 mg daily to keep a more negative  Atrial fibrillation with RVR (HCC)  INR therapeutic continue Coumadin 3 mg daily continue Toprol- mg p.o. twice daily and amiodarone 200 mg daily  Rates are controlled in the 80s check INR Monday  Acute renal failure superimposed on stage 3 chronic kidney disease (HCC)  Lab Results   Component Value Date    EGFR 46 06/14/2025    EGFR 46 06/13/2025    EGFR 41 06/12/2025    CREATININE 1.60 (H) 06/14/2025    CREATININE 1.58 (H) 06/13/2025    CREATININE 1.74 (H) 06/12/2025   Creatinine baseline is 1.5   Creatinine 1.6 stable discussed with nephrology cleared from there and to be discharged  Transaminitis  Recurrent no evidence of heart failure exacerbation.  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)    Cardiogenic shock (HCC)  Was on milrinone and the ICU finally cleared his lactic and weaned off milrinone cardiogenic shock resolved blood pressures are stable  Goals of care, counseling/discussion         Medical Problems       Resolved Problems  Date Reviewed: 6/13/2025          Resolved    Hyperkalemia 6/9/2025     Resolved by  LUIS ANGEL Lyman    Sepsis (HCC) 6/9/2025     Resolved by  LUIS ANGEL Lyman    Tonsillitis 6/11/2025     Resolved by  LUIS ANGEL Esparza    Supratherapeutic INR 6/10/2025     Resolved by  LUIS ANGEL Lyman        Discharging Physician / Practitioner: Guadalupe Muñoz MD  PCP:  Juan Galindo DO  Admission Date:   Admission Orders (From admission, onward)       Ordered        06/05/25 1150  INPATIENT ADMISSION  Once                          Discharge Date: 06/14/25    Next Steps for Physician/AP Assuming Care:  Follow-up on INR 6/16    Test Results Pending at Discharge (will require follow up):  None    Medication Changes for Discharge & Rationale:   Amiodarone 200 mg daily for atrial fibrillation control  Toprol- mg p.o. twice daily  Lasix 60 mg daily  Midodrine discontinued as blood pressures are stable  See after visit summary for reconciled discharge medications provided to patient and/or family.     Consultations During Hospital Stay:  Critical care  Nephrology  Cardiology  Palliative medicine    Procedures Performed:   None    Significant Findings / Test Results:   Chest PA and lateral 6/5-cardiomegaly no focal airspace consolidation  CT soft tissue neck without contrast- No acute neck abnormality on this noncontrast examination which limits evaluation for abscess. No organized fluid collection to suggest abscess on this noncontrast examination. If continued clinical concern for peritonsillar abscess, consider follow-up   CT soft tissue neck with contrast for further evaluation.     Findings suggestive of chronic tonsillitis. Recommend direct visualization.     Poor dentition with periodontal disease. Recommend follow-up with dentist.  Ultrasound of the kidney and bladder-  Normal kidney size and echogenicity without calculi or hydronephrosis.  2.  Debris in the urinary bladder.  3.  Ascites.  6/8-chest x-rayStable prominent cardiomegaly with mild central congestion.   6/8-Right IJ central venous catheter with tip overlying the proximal SVC.     Stable mild pulmonary vascular congestion.  Incidental Findings:   none    Hospital Course:   Britton Batista is a 60 y.o. male patient who originally presented to the hospital on 6/5/2025 due to acute tonsillitis fortunately biotics  "and steroids were started he did not have any abscess on the imaging.  He is tonsillitis with throat swelling actually improved on the regimen we completed.  Patient actually went into cardiogenic shock with lactic acidosis IRINEO secondary to his EF of 50% had to be transferred to critical care for milrinone.  Also was found to have atrial fibrillation with RVR cardiology consulted with med adjustment eventually switched over to Toprol- mg p.o. twice daily discontinue digoxin and started amiodarone 200 mg daily and rates were controlled.  Lasix was increased by nephrology to 60 mg daily to keep him more dry creatinine decreased down to close baseline and remained stable cleared by nephrology for discharge.  Also will discharge home his INR was supratherapeutic initially then resolved and was therapeutic will need to check his INR on Monday.  Follow-up with cardiology          Please see above list of diagnoses and related plan for additional information.     Discharge Day Visit / Exam:   Subjective: Seen and examined feels great wants to go home  Vitals: Blood Pressure: 130/92 (06/14/25 0723)  Pulse: 59 (06/14/25 0904)  Temperature: (!) 96.6 °F (35.9 °C) (06/14/25 0723)  Temp Source: Temporal (06/13/25 1900)  Respirations: 14 (06/14/25 0723)  Height: 5' 9\" (175.3 cm) (06/09/25 0600)  Weight - Scale: 75.7 kg (166 lb 14.2 oz) (06/14/25 0500)  SpO2: 93 % (06/14/25 0727)  Physical Exam  Vitals and nursing note reviewed.   Constitutional:       General: He is not in acute distress.     Appearance: He is well-developed.   HENT:      Head: Normocephalic and atraumatic.     Eyes:      Conjunctiva/sclera: Conjunctivae normal.       Cardiovascular:      Rate and Rhythm: Normal rate. Rhythm irregular.      Heart sounds: No murmur heard.  Pulmonary:      Effort: Pulmonary effort is normal. No respiratory distress.      Breath sounds: Normal breath sounds.   Abdominal:      Palpations: Abdomen is soft.      Tenderness: There " is no abdominal tenderness.     Musculoskeletal:         General: No swelling.      Cervical back: Neck supple.     Skin:     General: Skin is warm and dry.      Capillary Refill: Capillary refill takes less than 2 seconds.     Neurological:      Mental Status: He is alert and oriented to person, place, and time.     Psychiatric:         Mood and Affect: Mood normal.          Discussion with Family: Updated  (sister) via phone.    Discharge instructions/Information to patient and family:   See after visit summary for information provided to patient and family.      Provisions for Follow-Up Care:  See after visit summary for information related to follow-up care and any pertinent home health orders.      Mobility at time of Discharge:   Basic Mobility Inpatient Raw Score: 19  JH-HLM Goal: 6: Walk 10 steps or more  JH-HLM Achieved: 8: Walk 250 feet ot more  HLM Goal achieved. Continue to encourage appropriate mobility.     Disposition:   Home    Planned Readmission: no    Administrative Statements   Discharge Statement:  I have spent a total time of >35 minutes in caring for this patient on the day of the visit/encounter. >30 minutes of time was spent on: Patient and family education, Documenting in the medical record, and Reviewing / ordering tests, medicine, procedures  .    **Please Note: This note may have been constructed using a voice recognition system**

## 2025-06-14 NOTE — PLAN OF CARE
Problem: PAIN - ADULT  Goal: Verbalizes/displays adequate comfort level or baseline comfort level  Description: Interventions:  - Encourage patient to monitor pain and request assistance  - Assess pain using appropriate pain scale0-10  - Administer analgesics as ordered based on type and severity of pain and evaluate response  - Implement non-pharmacological measures as appropriate and evaluate response  - Consider cultural and social influences on pain and pain management  - Notify physician/advanced practitioner if interventions unsuccessful or patient reports new pain  - Educate patient/family on pain management process including their role and importance of  reporting pain   - Provide non-pharmacologic/complimentary pain relief interventions  6/14/2025 1143 by Raquel Pires RN  Outcome: Adequate for Discharge  6/14/2025 1142 by Raquel Pires RN  Outcome: Progressing     Problem: INFECTION - ADULT  Goal: Absence or prevention of progression during hospitalization  Description: INTERVENTIONS:  - Assess and monitor for signs and symptoms of infection  - Monitor lab/diagnostic results  - Monitor all insertion sites, i.e. indwelling lines, tubes, and drains  - Monitor endotracheal if appropriate and nasal secretions for changes in amount and color  - [place] appropriate cooling/warming therapies per order  - Administer medications as ordered  - Instruct and encourage patient and family to use good hand hygiene technique  - Identify and instruct in appropriate isolation precautions for identified infection/condition  6/14/2025 1143 by Raquel Pires RN  Outcome: Adequate for Discharge  6/14/2025 1142 by Raquel Pires RN  Outcome: Progressing  Goal: Absence of fever/infection during neutropenic period  Description: INTERVENTIONS:  - Monitor WBC  - Perform strict hand hygiene  - Limit to healthy visitors only  - No plants, dried, fresh or silk flowers with chaidez in patient room  6/14/2025 1143 by Raquel Pires RN  Outcome:  Adequate for Discharge  6/14/2025 1142 by Raquel Pires RN  Outcome: Progressing     Problem: SAFETY ADULT  Goal: Patient will remain free of falls  Description: INTERVENTIONS:  - Educate patient/family on patient safety including physical limitations  - Instruct patient to call for assistance with activity   - Consider consulting OT/PT to assist with strengthening/mobility based on AM PAC & JH-HLM score  - Consult OT/PT to assist with strengthening/mobility   - Keep Call bell within reach  - Keep bed low and locked with side rails adjusted as appropriate  - Keep care items and personal belongings within reach  - Initiate and maintain comfort rounds  - Make Fall Risk Sign visible to staff  - Offer Toileting every 2 Hours, in advance of need  - Initiate/Maintain bed alarm  - Obtain necessary fall risk management equipment: yellow socks  - Apply yellow socks and bracelet for high fall risk patients  - Consider moving patient to room near nurses station  6/14/2025 1143 by Raquel Pires RN  Outcome: Adequate for Discharge  6/14/2025 1142 by Raquel Pires RN  Outcome: Progressing  Goal: Maintain or return to baseline ADL function  Description: INTERVENTIONS:  -  Assess patient's ability to carry out ADLs; assess patient's baseline for ADL function and identify physical deficits which impact ability to perform ADLs (bathing, care of mouth/teeth, toileting, grooming, dressing, etc.)  - Assess/evaluate cause of self-care deficits   - Assess range of motion  - Assess patient's mobility; develop plan if impaired  - Assess patient's need for assistive devices and provide as appropriate  - Encourage maximum independence but intervene and supervise when necessary  - Involve family in performance of ADLs  - Assess for home care needs following discharge   - Consider OT consult to assist with ADL evaluation and planning for discharge  - Provide patient education as appropriate  - Monitor functional capacity and physical performance, use  of AM PAC & -HLM   - Monitor gait, balance and fatigue with ambulation    6/14/2025 1143 by Raquel Pires RN  Outcome: Adequate for Discharge  6/14/2025 1142 by Raquel Pires RN  Outcome: Progressing  Goal: Maintains/Returns to pre admission functional level  Description: INTERVENTIONS:  - Perform AM-PAC 6 Click Basic Mobility/ Daily Activity assessment daily.  - Set and communicate daily mobility goal to care team and patient/family/caregiver.   - Collaborate with rehabilitation services on mobility goals if consulted  - Out of bed for toileting  - Record patient progress and toleration of activity level   6/14/2025 1143 by Raquel Pires RN  Outcome: Adequate for Discharge  6/14/2025 1142 by Raquel Pires RN  Outcome: Progressing     Problem: DISCHARGE PLANNING  Goal: Discharge to home or other facility with appropriate resources  Description: INTERVENTIONS:  - Identify barriers to discharge w/patient and caregiver  - Arrange for needed discharge resources and transportation as appropriate  - Identify discharge learning needs (meds, wound care, etc.)  - Arrange for interpretive services to assist at discharge as needed  - Refer to Case Management Department for coordinating discharge planning if the patient needs post-hospital services based on physician/advanced practitioner order or complex needs related to functional status, cognitive ability, or social support system  6/14/2025 1143 by Raquel Pires RN  Outcome: Adequate for Discharge  6/14/2025 1142 by Raquel Pires RN  Outcome: Progressing     Problem: Knowledge Deficit  Goal: Patient/family/caregiver demonstrates understanding of disease process, treatment plan, medications, and discharge instructions  Description: Complete learning assessment and assess knowledge base.  Interventions:  - Provide teaching at level of understanding  - Provide teaching via preferred learning methods  6/14/2025 1143 by Raquel Pires RN  Outcome: Adequate for Discharge  6/14/2025 1142 by  Raquel Pires RN  Outcome: Progressing     Problem: OCCUPATIONAL THERAPY ADULT  Goal: Performs self-care activities at highest level of function for planned discharge setting.  See evaluation for individualized goals.  Description: Treatment Interventions: ADL retraining, Functional transfer training, UE strengthening/ROM, Endurance training, Patient/family training, Equipment evaluation/education, Compensatory technique education, Continued evaluation, Cardiac education, Energy conservation, Activityengagement          See flowsheet documentation for full assessment, interventions and recommendations.   Outcome: Adequate for Discharge     Problem: CARDIOVASCULAR - ADULT  Goal: Maintains optimal cardiac output and hemodynamic stability  Description: INTERVENTIONS:  - Monitor I/O, vital signs and rhythm  - Monitor for S/S and trends of decreased cardiac output  - Administer and titrate ordered vasoactive medications to optimize hemodynamic stability  - Assess quality of pulses, skin color and temperature  - Assess for signs of decreased coronary artery perfusion  - Instruct patient to report change in severity of symptoms  6/14/2025 1143 by Raquel Pires RN  Outcome: Adequate for Discharge  6/14/2025 1142 by Raquel Pires RN  Outcome: Progressing  Goal: Absence of cardiac dysrhythmias or at baseline rhythm  Description: INTERVENTIONS:  - Continuous cardiac monitoring, vital signs, obtain 12 lead EKG if ordered  - Administer antiarrhythmic and heart rate control medications as ordered  - Monitor electrolytes and administer replacement therapy as ordered  6/14/2025 1143 by Raquel Pires RN  Outcome: Adequate for Discharge  6/14/2025 1142 by Raquel Pires RN  Outcome: Progressing     Problem: METABOLIC, FLUID AND ELECTROLYTES - ADULT  Goal: Electrolytes maintained within normal limits  Description: INTERVENTIONS:  - Monitor labs and assess patient for signs and symptoms of electrolyte imbalances  - Administer electrolyte  replacement as ordered  - Monitor response to electrolyte replacements, including repeat lab results as appropriate  - Instruct patient on fluid and nutrition as appropriate  6/14/2025 1143 by Raquel Pires RN  Outcome: Adequate for Discharge  6/14/2025 1142 by Raquel Pires RN  Outcome: Progressing  Goal: Fluid balance maintained  Description: INTERVENTIONS:  - Monitor labs   - Monitor I/O and WT  - Instruct patient on fluid and nutrition as appropriate  - Assess for signs & symptoms of volume excess or deficit  6/14/2025 1143 by Raquel Pires RN  Outcome: Adequate for Discharge  6/14/2025 1142 by Raquel Pires RN  Outcome: Progressing  Goal: Glucose maintained within target range  Description: INTERVENTIONS:  - Monitor Blood Glucose as ordered  - Assess for signs and symptoms of hyperglycemia and hypoglycemia  - Administer ordered medications to maintain glucose within target range  - Assess nutritional intake and initiate nutrition service referral as needed  6/14/2025 1143 by Raquel Pires RN  Outcome: Adequate for Discharge  6/14/2025 1142 by Raquel Pires RN  Outcome: Progressing     Problem: Prexisting or High Potential for Compromised Skin Integrity  Goal: Skin integrity is maintained or improved  Description: INTERVENTIONS:  - Identify patients at risk for skin breakdown  - Assess and monitor skin integrity including under and around medical devices   - Assess and monitor nutrition and hydration status  - Monitor labs  - Assess for incontinence   - Turn and reposition patient  - Assist with mobility/ambulation  - Relieve pressure over dinora prominences   - Avoid friction and shearing  - Provide appropriate hygiene as needed including keeping skin clean and dry  - Evaluate need for skin moisturizer/barrier cream  - Collaborate with interdisciplinary team  - Patient/family teaching  - Consider wound care consult    Assess:  - Review Alex scale daily  - Clean and moisturize skin every assessment and prn  - Inspect skin  when repositioning, toileting, and assisting with ADLS  - Assess under medical devices such as bp cuff, scd's every assessment and prn  - Assess extremities for adequate circulation and sensation     Bed Management:  - Have minimal linens on bed & keep smooth, unwrinkled  - Change linens as needed when moist or perspiring  - Avoid sitting or lying in one position for more than 2 hours while in bed?Keep HOB at 30-45 degrees   - Toileting:  - Offer bedside commode  - Assess for incontinence every assessment and prn  - Use incontinent care products after each incontinent episode such as wipes, creams    Activity:  - Mobilize patient 3 times a day  - Encourage activity and walks on unit  - Encourage or provide ROM exercises   - Turn and reposition patient every 2 Hours  - Use appropriate equipment to lift or move patient in bed  - Instruct/ Assist with weight shifting every 30 minutes  when out of bed in chair  - Consider limitation of chair time 3 hour intervals    Skin Care:  - Avoid use of baby powder, tape, friction and shearing, hot water or constrictive clothing  - Relieve pressure over bony prominences using offloading, waffle cushion, repositioning  - Do not massage red bony areas    Next Steps:  - Consider consults to  interdisciplinary teams such as PT,OT, cardiology  6/14/2025 1143 by Raquel Pires RN  Outcome: Adequate for Discharge  6/14/2025 1142 by Raquel Pires RN  Outcome: Progressing     Problem: PHYSICAL THERAPY ADULT  Goal: Performs mobility at highest level of function for planned discharge setting.  See evaluation for individualized goals.  Description: Treatment/Interventions: ADL retraining, Functional transfer training, LE strengthening/ROM, Elevations, Therapeutic exercise, Endurance training, Patient/family training, Equipment eval/education, Bed mobility, Gait training, Compensatory technique education, Spoke to nursing, Spoke to case management, OT  Equipment Recommended: Walker       See  flowsheet documentation for full assessment, interventions and recommendations.  Outcome: Adequate for Discharge     Problem: Decreased Cardiac Output  Goal: Cardiac output adequate for individual needs  Description: INTERVENTIONS: Monitor for signs and symptoms of decreased cardiac output   - Monitor for dyspnea with exertion and at rest  - Monitor for orthopnea  - Monitor for signs of tachycardia. Place patient on telemetry monitoring.  - Assess patient for jugular vein distention  - Assess patient for lower extremity edema and poor peripheral perfusion   - Auscultate lung sound for Fine bibasilar crackles   - Monitor for cardiac arrythmias   - Administer beta blockers, antiarrhythmic, and blood pressure medications as ordered    6/14/2025 1143 by Raquel Pires RN  Outcome: Adequate for Discharge  6/14/2025 1142 by Raquel Pires RN  Outcome: Progressing     Problem: Impaired Gas Exchange  Goal: Optimize oxygenation and ensure adequate ventilation  Description: INTERVENTIONS: Monitor for signs and symptoms of respiratory distress                - Elevate HOB or use high fowlers to promote lung expansion                - Administer oxygen as ordered to maintain adequate oxygenation                - Encourage use of IS to promote lung expansion and prevent PN                - Monitor ABGs to assess oxygenation status                - Monitor blood oxygen level to maintain adequate oxygenation                - Encourage cough and deep breathing exercises to promote lung expansion                - Monitor patient's mental status for increased confusion    6/14/2025 1143 by Raquel Pires RN  Outcome: Adequate for Discharge  6/14/2025 1142 by Raquel Pires RN  Outcome: Progressing     Problem: Excess Fluid Volume  Goal: Patient is able to achieve and maintain homeostasis  Description: INTERVENTIONS: Monitor for sign and symptoms of fluid overload  - Evaluate LE edema every shift  - Elevate LE to prevent dependent edema  - Apply  FARAZ stockings as ordered   - Monitor ankle circumference daily  - Assess for jugular vein distention  - Evaluate provider orders for the CHF diuretic algorithm. Administer diuretics as ordered  - Weigh the patient daily at 0600 and report a weight gain of five pounds or more   - Strict intake and output  - Monitor fluid intake and adhere to fluid restrictions  - Assess lung sounds every shift and as needed  - Monitor vital signs and lab values (CBC, chem, BUN, BNP)  - Measure and document urine output    6/14/2025 1143 by Raquel Pires RN  Outcome: Adequate for Discharge  6/14/2025 1142 by Raquel Pires RN  Outcome: Progressing     Problem: Activity Intolerance  Goal: Patient is able to perform activities within their limitations  Description: INTERVENTIONS:                       -   Alternate periods of activity with periods of rest                 -   Patients is able to maintain normal vitals heart rhythm during activity                 -   Gradually increase activity and exercise as patient can tolerate                 -   Monitor blood pressure and heart before and after exercise                  -   Monitor blood oxygen saturation during activity and apply oxygen as needed    6/14/2025 1143 by Raquel Pires RN  Outcome: Adequate for Discharge  6/14/2025 1142 by Raquel Pires RN  Outcome: Progressing     Problem: Knowledge Deficit  Goal: Patient is able to verbalize understanding of Heart Failure after education  Description: INTERVENTIONS:  - Educate the patient and family on signs and symptoms of HF  - Provide the patient with HF education and HF zone tool  - Educate on the importance of daily weight in the AM and reporting a weight gain               of 3 or more pounds to their primary care physician  - Monitor for SOB  - Maintain and sodium and fluid restriction  - Educate the patient on the importance of medications such as: diuretics, betablockers,               antiarrhythmics and their purpose, dose, route,  side effects and labs               if they are needed    6/14/2025 1143 by Raquel Pires, RN  Outcome: Adequate for Discharge  6/14/2025 1142 by Raquel Pires RN  Outcome: Progressing

## 2025-06-14 NOTE — ASSESSMENT & PLAN NOTE
Wt Readings from Last 3 Encounters:   06/14/25 75.7 kg (166 lb 14.2 oz)   06/05/25 81.1 kg (178 lb 12.7 oz)   05/04/25 79.2 kg (174 lb 9.7 oz)       Last EF is 25% moderate MR on 3/2025   Lasix increased today by nephrology to 60 mg daily to keep a more negative

## 2025-06-16 ENCOUNTER — TELEPHONE (OUTPATIENT)
Age: 60
End: 2025-06-16

## 2025-06-16 ENCOUNTER — TELEPHONE (OUTPATIENT)
Dept: PALLIATIVE MEDICINE | Facility: CLINIC | Age: 60
End: 2025-06-16

## 2025-06-16 NOTE — TELEPHONE ENCOUNTER
A representative from [facility], called to schedule an asap appt for pt due to swallowing issues. She was told that the practices will be notified. Please, call her to let her know if there's any chance to schedule the pt.  CB# [phone] Antonina Oconnor

## 2025-06-16 NOTE — UTILIZATION REVIEW
NOTIFICATION OF ADMISSION DISCHARGE   This is a Notification of Discharge from [facility]. Please be advised that this patient has been discharge from our facility. Below you will find the admission and discharge date and time including the patient’s disposition.   UTILIZATION REVIEW CONTACT:  Utilization Review Assistants  Network Utilization Review Department  Phone: [phone] x carefully listen to the prompts. All voicemails are confidential.  Email: [email or web address]     ADMISSION INFORMATION  PRESENTATION DATE: 6/5/2025  8:51 AM  OBERVATION ADMISSION DATE: N/A  INPATIENT ADMISSION DATE: 6/5/25 11:50 AM   DISCHARGE DATE: 6/14/2025 12:12 PM   DISPOSITION:Home/Self Care    Network Utilization Review Department  ATTENTION: Please call with any questions or concerns to [phone] and carefully listen to the prompts so that you are directed to the right person. All voicemails are confidential.   For Discharge needs, contact Care Management DC Support Team at [phone] opt. [phone]  Send all requests for admission clinical reviews, approved or denied determinations and any other requests to dedicated fax number below belonging to the campus where the patient is receiving treatment. List of dedicated fax numbers for the Facilities:  FACILITY NAME UR FAX NUMBER   ADMISSION DENIALS (Administrative/Medical Necessity) [phone]   DISCHARGE SUPPORT TEAM ([facility]) [phone]   PARENT CHILD HEALTH (Maternity/NICU/Pediatrics) [phone]   [facility] [phone]   [facility] [phone]   [facility] [phone]   [facility] [phone]   Replaced by [facility] [phone]   [facility] [phone]   [facility] [phone]   [facility] [phone]   [facility]  [facility] [phone]   [facility] [phone]   [facility] [phone]   [facility] [phone]

## 2025-06-17 DIAGNOSIS — R13.14 DYSPHAGIA, PHARYNGOESOPHAGEAL PHASE: ICD-10-CM

## 2025-06-18 NOTE — TELEPHONE ENCOUNTER
Tried to contact Antonina to inform about the appt. I was not able to contact her. Called pt to advise about appt. His sister Arin said the appt is not longer needed.

## 2025-06-20 ENCOUNTER — APPOINTMENT (EMERGENCY)
Dept: RADIOLOGY | Facility: HOSPITAL | Age: 60
End: 2025-06-20
Payer: COMMERCIAL

## 2025-06-20 ENCOUNTER — HOSPITAL ENCOUNTER (EMERGENCY)
Facility: HOSPITAL | Age: 60
Discharge: HOME | End: 2025-06-20
Attending: EMERGENCY MEDICINE
Payer: COMMERCIAL

## 2025-06-20 VITALS
DIASTOLIC BLOOD PRESSURE: 80 MMHG | BODY MASS INDEX: 27.13 KG/M2 | RESPIRATION RATE: 20 BRPM | TEMPERATURE: 98.6 F | WEIGHT: 183.2 LBS | HEIGHT: 69 IN | OXYGEN SATURATION: 99 % | SYSTOLIC BLOOD PRESSURE: 114 MMHG | HEART RATE: 59 BPM

## 2025-06-20 DIAGNOSIS — I50.9 CHRONIC CONGESTIVE HEART FAILURE (HCC): ICD-10-CM

## 2025-06-20 DIAGNOSIS — R79.89 ELEVATED TROPONIN LEVEL NOT DUE MYOCARDIAL INFARCTION: ICD-10-CM

## 2025-06-20 DIAGNOSIS — R06.00 DYSPNEA: Primary | ICD-10-CM

## 2025-06-20 DIAGNOSIS — J44.9 COPD (CHRONIC OBSTRUCTIVE PULMONARY DISEASE) (HCC): ICD-10-CM

## 2025-06-20 LAB
2HR DELTA HS TROPONIN: -9 NG/L
ALBUMIN SERPL BCG-MCNC: 3.6 G/DL (ref 3.5–5)
ALP SERPL-CCNC: 165 U/L (ref 34–104)
ALT SERPL W P-5'-P-CCNC: 60 U/L (ref 7–52)
ANION GAP SERPL CALCULATED.3IONS-SCNC: 8 MMOL/L (ref 4–13)
APTT PPP: 44 SECONDS (ref 23–34)
AST SERPL W P-5'-P-CCNC: 32 U/L (ref 13–39)
ATRIAL RATE: 92 BPM
BASE EX.OXY STD BLDV CALC-SCNC: 84.1 % (ref 60–80)
BASE EXCESS BLDV CALC-SCNC: -0.5 MMOL/L
BASOPHILS # BLD AUTO: 0.02 THOUSANDS/ÂΜL (ref 0–0.1)
BASOPHILS NFR BLD AUTO: 0 % (ref 0–1)
BILIRUB SERPL-MCNC: 1.29 MG/DL (ref 0.2–1)
BNP SERPL-MCNC: 1541 PG/ML (ref 0–100)
BUN SERPL-MCNC: 21 MG/DL (ref 5–25)
CALCIUM SERPL-MCNC: 8.7 MG/DL (ref 8.4–10.2)
CARDIAC TROPONIN I PNL SERPL HS: 105 NG/L (ref ?–50)
CARDIAC TROPONIN I PNL SERPL HS: 96 NG/L (ref ?–50)
CHLORIDE SERPL-SCNC: 98 MMOL/L (ref 96–108)
CO2 SERPL-SCNC: 28 MMOL/L (ref 21–32)
CREAT SERPL-MCNC: 1.21 MG/DL (ref 0.6–1.3)
EOSINOPHIL # BLD AUTO: 0.11 THOUSAND/ÂΜL (ref 0–0.61)
EOSINOPHIL NFR BLD AUTO: 1 % (ref 0–6)
ERYTHROCYTE [DISTWIDTH] IN BLOOD BY AUTOMATED COUNT: 18.7 % (ref 11.6–15.1)
FLUAV AG UPPER RESP QL IA.RAPID: NEGATIVE
FLUBV AG UPPER RESP QL IA.RAPID: NEGATIVE
GFR SERPL CREATININE-BSD FRML MDRD: 64 ML/MIN/1.73SQ M
GLUCOSE SERPL-MCNC: 122 MG/DL (ref 65–140)
HCO3 BLDV-SCNC: 24.1 MMOL/L (ref 24–30)
HCT VFR BLD AUTO: 37.7 % (ref 36.5–49.3)
HGB BLD-MCNC: 11.8 G/DL (ref 12–17)
IMM GRANULOCYTES # BLD AUTO: 0.11 THOUSAND/UL (ref 0–0.2)
IMM GRANULOCYTES NFR BLD AUTO: 1 % (ref 0–2)
INR PPP: 2.23 (ref 0.85–1.19)
LYMPHOCYTES # BLD AUTO: 1.34 THOUSANDS/ÂΜL (ref 0.6–4.47)
LYMPHOCYTES NFR BLD AUTO: 14 % (ref 14–44)
MCH RBC QN AUTO: 28.9 PG (ref 26.8–34.3)
MCHC RBC AUTO-ENTMCNC: 31.3 G/DL (ref 31.4–37.4)
MCV RBC AUTO: 92 FL (ref 82–98)
MONOCYTES # BLD AUTO: 1.27 THOUSAND/ÂΜL (ref 0.17–1.22)
MONOCYTES NFR BLD AUTO: 13 % (ref 4–12)
NEUTROPHILS # BLD AUTO: 7.1 THOUSANDS/ÂΜL (ref 1.85–7.62)
NEUTS SEG NFR BLD AUTO: 71 % (ref 43–75)
NRBC BLD AUTO-RTO: 0 /100 WBCS
O2 CT BLDV-SCNC: 15.1 ML/DL
PCO2 BLDV: 39.6 MM HG (ref 42–50)
PH BLDV: 7.4 [PH] (ref 7.3–7.4)
PLATELET # BLD AUTO: 155 THOUSANDS/UL (ref 149–390)
PMV BLD AUTO: 10.2 FL (ref 8.9–12.7)
PO2 BLDV: 56.7 MM HG (ref 35–45)
POTASSIUM SERPL-SCNC: 3.8 MMOL/L (ref 3.5–5.3)
PROT SERPL-MCNC: 6.9 G/DL (ref 6.4–8.4)
PROTHROMBIN TIME: 24.8 SECONDS (ref 12.3–15)
QRS AXIS: 93 DEGREES
QRSD INTERVAL: 164 MS
QT INTERVAL: 436 MS
QTC INTERVAL: 579 MS
RBC # BLD AUTO: 4.09 MILLION/UL (ref 3.88–5.62)
SARS-COV+SARS-COV-2 AG RESP QL IA.RAPID: NEGATIVE
SODIUM SERPL-SCNC: 134 MMOL/L (ref 135–147)
T WAVE AXIS: -59 DEGREES
VENTRICULAR RATE: 106 BPM
WBC # BLD AUTO: 9.95 THOUSAND/UL (ref 4.31–10.16)

## 2025-06-20 PROCEDURE — 99285 EMERGENCY DEPT VISIT HI MDM: CPT

## 2025-06-20 PROCEDURE — 85025 COMPLETE CBC W/AUTO DIFF WBC: CPT | Performed by: EMERGENCY MEDICINE

## 2025-06-20 PROCEDURE — 83880 ASSAY OF NATRIURETIC PEPTIDE: CPT | Performed by: EMERGENCY MEDICINE

## 2025-06-20 PROCEDURE — 85730 THROMBOPLASTIN TIME PARTIAL: CPT | Performed by: EMERGENCY MEDICINE

## 2025-06-20 PROCEDURE — 85610 PROTHROMBIN TIME: CPT | Performed by: EMERGENCY MEDICINE

## 2025-06-20 PROCEDURE — 84484 ASSAY OF TROPONIN QUANT: CPT | Performed by: EMERGENCY MEDICINE

## 2025-06-20 PROCEDURE — 93005 ELECTROCARDIOGRAM TRACING: CPT

## 2025-06-20 PROCEDURE — 99285 EMERGENCY DEPT VISIT HI MDM: CPT | Performed by: EMERGENCY MEDICINE

## 2025-06-20 PROCEDURE — 87811 SARS-COV-2 COVID19 W/OPTIC: CPT | Performed by: EMERGENCY MEDICINE

## 2025-06-20 PROCEDURE — 71045 X-RAY EXAM CHEST 1 VIEW: CPT

## 2025-06-20 PROCEDURE — 96374 THER/PROPH/DIAG INJ IV PUSH: CPT

## 2025-06-20 PROCEDURE — 36415 COLL VENOUS BLD VENIPUNCTURE: CPT | Performed by: EMERGENCY MEDICINE

## 2025-06-20 PROCEDURE — 87804 INFLUENZA ASSAY W/OPTIC: CPT | Performed by: EMERGENCY MEDICINE

## 2025-06-20 PROCEDURE — 80053 COMPREHEN METABOLIC PANEL: CPT | Performed by: EMERGENCY MEDICINE

## 2025-06-20 PROCEDURE — 93010 ELECTROCARDIOGRAM REPORT: CPT | Performed by: INTERNAL MEDICINE

## 2025-06-20 PROCEDURE — 82805 BLOOD GASES W/O2 SATURATION: CPT | Performed by: EMERGENCY MEDICINE

## 2025-06-20 RX ORDER — FUROSEMIDE 10 MG/ML
80 INJECTION INTRAMUSCULAR; INTRAVENOUS ONCE
Status: COMPLETED | OUTPATIENT
Start: 2025-06-20 | End: 2025-06-20

## 2025-06-20 RX ADMIN — FUROSEMIDE 80 MG: 10 INJECTION, SOLUTION INTRAMUSCULAR; INTRAVENOUS at 06:05

## 2025-06-20 NOTE — ED PROVIDER NOTES
ED Disposition       None          Assessment & Plan       Medical Decision Making  Amount and/or Complexity of Data Reviewed  Labs: ordered.  Radiology: ordered and independent interpretation performed.    Risk  Prescription drug management.        ED Course as of 06/20/25 0605 Fri Jun 20, 2025 0604 Care transferred to Dr. Espinosa pending lab results repeat troponin and BNP and disposition.         Medications   furosemide (LASIX) injection 80 mg (has no administration in time range)       ED Risk Strat Scores                    No data recorded        SBIRT 20yo+      Flowsheet Row Most Recent Value   Initial Alcohol Screen: US AUDIT-C     1. How often do you have a drink containing alcohol? 0 Filed at: 06/20/2025 0513   2. How many drinks containing alcohol do you have on a typical day you are drinking?  0 Filed at: 06/20/2025 0513   3a. Male UNDER 65: How often do you have five or more drinks on one occasion? 0 Filed at: 06/20/2025 0513   Audit-C Score 0 Filed at: 06/20/2025 0513   SILVIA: How many times in the past year have you...    Used an illegal drug or used a prescription medication for non-medical reasons? Never Filed at: 06/20/2025 0513                            History of Present Illness       Chief Complaint   Patient presents with    Shortness of Breath     Pt c/o SOB that started yesterday. Pt has hx of CHF and is unable to get accurate weights w/ home scale, pt currently taking lasix.       Past Medical History[1]   Past Surgical History[2]   Family History[3]   Social History[4]   E-Cigarette/Vaping    E-Cigarette Use Never User       E-Cigarette/Vaping Substances    Nicotine No     THC No     CBD No     Flavoring No     Other No     Unknown No       I have reviewed and agree with the history as documented.     Patient is a 60-year-old male history of congestive heart failure and atrial fibrillation presents to the emergency department due to shortness of breath reports symptoms started  yesterday still present today denies any chest pain no cough or fevers or chills patient takes 60 mg Lasix daily and has been compliant with this medication.        History provided by:  Patient  Shortness of Breath  Associated symptoms: no abdominal pain, no chest pain, no cough, no fever and no vomiting        Review of Systems   Constitutional:  Negative for fever.   HENT:  Negative for congestion.    Respiratory:  Positive for shortness of breath. Negative for cough.    Cardiovascular:  Negative for chest pain.   Gastrointestinal:  Negative for abdominal pain, nausea and vomiting.   All other systems reviewed and are negative.          Objective       ED Triage Vitals [06/20/25 0513]   Temperature Pulse Blood Pressure Respirations SpO2 Patient Position - Orthostatic VS   98.6 °F (37 °C) 59 114/80 20 99 % Lying      Temp Source Heart Rate Source BP Location FiO2 (%) Pain Score    Temporal Monitor Left arm -- No Pain      Vitals      Date and Time Temp Pulse SpO2 Resp BP Pain Score FACES Pain Rating User   06/20/25 0513 98.6 °F (37 °C) 59 99 % 20 114/80 No Pain -- MD            Physical Exam  Vitals and nursing note reviewed.   Constitutional:       General: He is not in acute distress.     Appearance: Normal appearance.   HENT:      Head: Normocephalic and atraumatic.      Nose: Nose normal.     Eyes:      Conjunctiva/sclera: Conjunctivae normal.       Cardiovascular:      Rate and Rhythm: Tachycardia present. Rhythm irregular.   Pulmonary:      Effort: Pulmonary effort is normal. No respiratory distress.      Breath sounds: Examination of the right-lower field reveals decreased breath sounds. Examination of the left-lower field reveals decreased breath sounds. Decreased breath sounds present.     Skin:     General: Skin is dry.     Neurological:      General: No focal deficit present.      Mental Status: He is alert and oriented to person, place, and time.         Results Reviewed       Procedure Component Value  Units Date/Time    HS Troponin I 2hr [[id number]]     Lab Status: No result Specimen: Blood     HS Troponin 0hr (reflex protocol) [[id number]]  (Abnormal) Collected: 06/20/25 0526    Lab Status: Final result Specimen: Blood from Arm, Right Updated: 06/20/25 0559     hs TnI 0hr 105 ng/L     Comprehensive metabolic panel [[id number]]  (Abnormal) Collected: 06/20/25 0526    Lab Status: Final result Specimen: Blood from Arm, Right Updated: 06/20/25 0556     Sodium 134 mmol/L      Potassium 3.8 mmol/L      Chloride 98 mmol/L      CO2 28 mmol/L      ANION GAP 8 mmol/L      BUN 21 mg/dL      Creatinine 1.21 mg/dL      Glucose 122 mg/dL      Calcium 8.7 mg/dL      AST 32 U/L      ALT 60 U/L      Alkaline Phosphatase 165 U/L      Total Protein 6.9 g/dL      Albumin 3.6 g/dL      Total Bilirubin 1.29 mg/dL      eGFR 64 ml/min/1.73sq m     Narrative:      National Kidney Disease Foundation guidelines for Chronic Kidney Disease (CKD):     Stage 1 with normal or high GFR (GFR > 90 mL/min/1.73 square meters)    Stage 2 Mild CKD (GFR = 60-89 mL/min/1.73 square meters)    Stage 3A Moderate CKD (GFR = 45-59 mL/min/1.73 square meters)    Stage 3B Moderate CKD (GFR = 30-44 mL/min/1.73 square meters)    Stage 4 Severe CKD (GFR = 15-29 mL/min/1.73 square meters)    Stage 5 End Stage CKD (GFR <15 mL/min/1.73 square meters)  Note: GFR calculation is accurate only with a steady state creatinine    APTT [[id number]]  (Abnormal) Collected: 06/20/25 0526    Lab Status: Final result Specimen: Blood from Arm, Right Updated: 06/20/25 0552     PTT 44 seconds     Protime-INR [[id number]]  (Abnormal) Collected: 06/20/25 0526    Lab Status: Final result Specimen: Blood from Arm, Right Updated: 06/20/25 0552     Protime 24.8 seconds      INR 2.23    Narrative:      INR Therapeutic Range    Indication                                             INR Range      Atrial Fibrillation                                               2.0-3.0  Hypercoagulable State                                     2.0.2.3  Left Ventricular Asist Device                            2.0-3.0  Mechanical Heart Valve                                  -    Aortic(with afib, MI, embolism, HF, LA enlargement,    and/or coagulopathy)                                     2.0-3.0 (2.5-3.5)     Mitral                                                             2.5-3.5  Prosthetic/Bioprosthetic Heart Valve               2.0-3.0  Venous thromboembolism (VTE: VT, PE        2.0-3.0    FLU/COVID Rapid Antigen (30 min. TAT) - Preferred screening test in ED [[id number]]  (Normal) Collected: 06/20/25 0526    Lab Status: Final result Specimen: Nares from Nose Updated: 06/20/25 0551     SARS COV Rapid Antigen Negative     Influenza A Rapid Antigen Negative     Influenza B Rapid Antigen Negative    Narrative:      This test has been performed using the [employer]in[employer] Inez 2 FLU+SARS Antigen test under the Emergency Use Authorization (EUA). This test has been validated by the  and verified by the performing laboratory. The Inez uses lateral flow immunofluorescent sandwich assay to detect SARS-COV, Influenza A and Influenza B Antigen.     The Quidel Inez 2 SARS Antigen test does not differentiate between SARS-CoV and SARS-CoV-2.     Negative results are presumptive and may be confirmed with a molecular assay, if necessary, for patient management. Negative results do not rule out SARS-CoV-2 or influenza infection and should not be used as the sole basis for treatment or patient management decisions. A negative test result may occur if the level of antigen in a sample is below the limit of detection of this test.     Positive results are indicative of the presence of viral antigens, but do not rule out bacterial infection or co-infection with other viruses.     All test results should be used as an adjunct to clinical observations and other information available to the provider.    FOR PEDIATRIC PATIENTS - copy/paste  COVID Guidelines URL to browser: [email or web address]    Blood gas, venous [[id number]]  (Abnormal) Collected: 06/20/25 0526    Lab Status: Final result Specimen: Blood from Arm, Right Updated: 06/20/25 0544     pH, Chuck 7.402     pCO2, Chuck 39.6 mm Hg      pO2, Chuck 56.7 mm Hg      HCO3, Chuck 24.1 mmol/L      Base Excess, Chuck -0.5 mmol/L      O2 Content, Chuck 15.1 ml/dL      O2 HGB, VENOUS 84.1 %     CBC and differential [[id number]]  (Abnormal) Collected: 06/20/25 0526    Lab Status: Final result Specimen: Blood from Arm, Right Updated: 06/20/25 0536     WBC 9.95 Thousand/uL      RBC 4.09 Million/uL      Hemoglobin 11.8 g/dL      Hematocrit 37.7 %      MCV 92 fL      MCH 28.9 pg      MCHC 31.3 g/dL      RDW 18.7 %      MPV 10.2 fL      Platelets 155 Thousands/uL      nRBC 0 /100 WBCs      Segmented % 71 %      Immature Grans % 1 %      Lymphocytes % 14 %      Monocytes % 13 %      Eosinophils Relative 1 %      Basophils Relative 0 %      Absolute Neutrophils 7.10 Thousands/µL      Absolute Immature Grans 0.11 Thousand/uL      Absolute Lymphocytes 1.34 Thousands/µL      Absolute Monocytes 1.27 Thousand/µL      Eosinophils Absolute 0.11 Thousand/µL      Basophils Absolute 0.02 Thousands/µL     B-Type Natriuretic Peptide(BNP) [[id number]] Collected: 06/20/25 0526    Lab Status: In process Specimen: Blood from Arm, Right Updated: 06/20/25 0532            XR chest 1 view portable   ED Interpretation by Bhargav Antonio DO (06/20 0547)   Borderline cardiomegaly mild pulmonary vascular congestion          ECG 12 Lead Documentation Only    Date/Time: 6/20/2025 5:19 AM    Performed by: Bhargav Antonio DO  Authorized by: Bhargav Antonio DO    ECG reviewed by me, the ED Provider: yes    Patient location:  ED  Previous ECG:     Comparison to cardiac monitor: Yes    Quality:     Tracing quality:  Limited by artifact  Rate:     ECG rate:  106    ECG rate assessment: tachycardic     Rhythm:     Rhythm: atrial fibrillation    QRS:     QRS axis:  Right    QRS intervals:  Wide  Conduction:     Conduction: abnormal      Abnormal conduction: complete RBBB    ST segments:     ST segments:  Non-specific  T waves:     T waves: non-specific        ED Medication and Procedure Management   Prior to Admission Medications   Prescriptions Last Dose Informant Patient Reported? Taking?   albuterol (ProAir HFA) 90 mcg/act inhaler   No No   Sig: Inhale 2 puffs every 6 (six) hours as needed for wheezing   amiodarone 200 mg tablet   No No   Sig: Take 1 tablet (200 mg total) by mouth daily with breakfast   furosemide (LASIX) 20 mg tablet   No No   Sig: Take 3 tablets (60 mg total) by mouth daily   ipratropium-albuterol (DUO-NEB) 0.5-2.5 mg/3 mL nebulizer solution   No No   Sig: Take 3 mL by nebulization every 6 (six) hours while awake   magnesium Oxide (MAG-OX) 400 mg TABS   No No   Sig: Take 1 tablet (400 mg total) by mouth daily   metoprolol succinate (TOPROL-XL) 100 mg 24 hr tablet   No No   Sig: Take 1 tablet (100 mg total) by mouth 2 (two) times a day   mirtazapine (REMERON) 15 mg tablet   Yes No   Sig: Take 15 mg by mouth daily at bedtime   pantoprazole (PROTONIX) 40 mg tablet   No No   Sig: Take 1 tablet (40 mg total) by mouth daily   warfarin (COUMADIN) 3 mg tablet   No No   Sig: Take 1 tablet (3 mg total) by mouth daily      Facility-Administered Medications: None     Patient's Medications   Discharge Prescriptions    No medications on file     No discharge procedures on file.  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    CHF (congestive heart failure) (HCC)     Coagulopathy (HCC) 04/26/2025    Depression     Emphysema of lung (HCC)     GERD (gastroesophageal reflux disease)     Gout     Hyperammonemia  "([facility]) 04/26/2025    Hypertension     Metabolic encephalopathy 03/22/2025    Neuropathy     Raynaud's disease     Right inguinal hernia     Scleroderma (HCC)     Shock ([facility]) 04/26/2025   [2]   Past Surgical History:  Procedure Laterality Date    AMPUTATION Right     pt had tip of \"pointer finger\" d/t scleraderma    CARDIAC PACEMAKER PLACEMENT      FINGER AMPUTATION Left 01/31/2024    Procedure: AMPUTATION FINGER, LEFT INDEX FINGER CPT: 52471;  Surgeon: Pedro Vazquez MD;  Location:  MAIN OR;  Service: Orthopedics    HERNIA REPAIR Left     times 2    AR AMP F/TH 1/2 JT/PHALANX W/NEURECT W/DIR CLSR Right 01/23/2024    Procedure: AMPUTATION FINGER, RIGHT MIDDLE;  Surgeon: Pedro Vazquez MD;  Location: [facility];  Service: Orthopedics    AR RPR 1ST INGUN HRNA AGE 5 YRS/> REDUCIBLE Right 03/03/2023    Procedure: OPEN INGUINAL HERNIA REPAIR WITH MESH;  Surgeon: Temo Jackson DO;  Location:  MAIN OR;  Service: General   [3]   Family History  Problem Relation Name Age of Onset    Hypertension Father     [4]   Social History  Tobacco Use    Smoking status: Every Day     Current packs/day: 0.50     Average packs/day: 1 pack/day for 45.5 years (44.7 ttl pk-yrs)     Types: Cigarettes     Start date: 2000    Smokeless tobacco: Never    Tobacco comments:     Last cigarette 0430   Vaping Use    Vaping status: Never Used   Substance Use Topics    Alcohol use: Not Currently     Comment: occasional    Drug use: Yes     Types: Marijuana, Methamphetamines     Comment: Hx meth use        Bhargav Antonio DO  06/20/25 0605    "

## 2025-06-20 NOTE — ED CARE HANDOFF
Emergency Department Sign Out Note        Sign out and transfer of care from Dr. Antonio. See Separate Emergency Department note.     The patient, Britton Batista, was evaluated by the previous provider for shortness of breath.    Workup Completed:    History and physical  60-year-old male with history of anxiety, depression, GERD, methamphetamine induced cardiomyopathy, pacemaker placement, atrial fibrillation on chronic anticoagulation therapy, tobacco use disorder as well as other medical history documented on the chart presented to the emergency room with episode of shortness of breath.  Review of records and studies as needed  Laboratory studies and imaging as appropriate  Resuscitative measures as required or needed, pain medications and comfort needs as appropriate  Re-evaluation as needed  Signed out to me      ED Course / Workup Pending (followup):  Continued evaluation for patient's chronic shortness of breath.  Please see ED course/workup section for final disposition regarding this patient.          ED Course as of 06/20/25 0615   Fri Jun 20, 2025   0604 Unchanged EKG.  Patient currently a paced rhythm.         Disposition  Final diagnoses:   Dyspnea   Chronic congestive heart failure (HCC)   COPD (chronic obstructive pulmonary disease) (HCC)   Elevated troponin level not due myocardial infarction - Chronic     Time reflects when diagnosis was documented in both MDM as applicable and the Disposition within this note       Time User Action Codes Description Comment    6/20/2025  6:14 AM Sanjiv Espinosa Add [R06.00] Dyspnea     6/20/2025  6:14 AM Sanjiv Espinosa Add [I50.9] Chronic congestive heart failure (HCC)     6/20/2025  6:14 AM Sanjiv Espinosa Add [J44.9] COPD (chronic obstructive pulmonary disease) (HCC)     6/20/2025  6:15 AM Sanjiv Espinosa Add [R79.89] Elevated troponin level not due myocardial infarction     6/20/2025  6:15 AM Sanjiv Espinosa Modify [R79.89] Elevated troponin level not due myocardial  infarction Chronic          ED Disposition       None          Follow-up Information    None       Patient's Medications   Discharge Prescriptions    No medications on file     No discharge procedures on file.       ED Provider  Electronically Signed by     Sanjiv Espinosa DO  06/20/25 0615

## 2025-06-22 ENCOUNTER — APPOINTMENT (EMERGENCY)
Dept: RADIOLOGY | Facility: HOSPITAL | Age: 60
End: 2025-06-22
Payer: COMMERCIAL

## 2025-06-22 ENCOUNTER — HOSPITAL ENCOUNTER (EMERGENCY)
Facility: HOSPITAL | Age: 60
Discharge: HOME | End: 2025-06-22
Attending: EMERGENCY MEDICINE | Admitting: EMERGENCY MEDICINE
Payer: COMMERCIAL

## 2025-06-22 VITALS
RESPIRATION RATE: 20 BRPM | SYSTOLIC BLOOD PRESSURE: 122 MMHG | TEMPERATURE: 98.7 F | OXYGEN SATURATION: 97 % | BODY MASS INDEX: 27.49 KG/M2 | DIASTOLIC BLOOD PRESSURE: 67 MMHG | WEIGHT: 185.63 LBS | HEART RATE: 103 BPM | HEIGHT: 69 IN

## 2025-06-22 DIAGNOSIS — I50.9 CHF (CONGESTIVE HEART FAILURE) (HCC): Primary | ICD-10-CM

## 2025-06-22 DIAGNOSIS — R06.00 DYSPNEA: ICD-10-CM

## 2025-06-22 LAB
ALBUMIN SERPL BCG-MCNC: 3.6 G/DL (ref 3.5–5)
ALP SERPL-CCNC: 172 U/L (ref 34–104)
ALT SERPL W P-5'-P-CCNC: 43 U/L (ref 7–52)
ANION GAP SERPL CALCULATED.3IONS-SCNC: 9 MMOL/L (ref 4–13)
APTT PPP: 37 SECONDS (ref 23–34)
AST SERPL W P-5'-P-CCNC: 33 U/L (ref 13–39)
BASOPHILS # BLD AUTO: 0.09 THOUSANDS/ÂΜL (ref 0–0.1)
BASOPHILS NFR BLD AUTO: 1 % (ref 0–1)
BILIRUB SERPL-MCNC: 0.91 MG/DL (ref 0.2–1)
BNP SERPL-MCNC: 1085 PG/ML (ref 0–100)
BUN SERPL-MCNC: 23 MG/DL (ref 5–25)
CALCIUM SERPL-MCNC: 8.7 MG/DL (ref 8.4–10.2)
CARDIAC TROPONIN I PNL SERPL HS: 60 NG/L (ref ?–50)
CHLORIDE SERPL-SCNC: 101 MMOL/L (ref 96–108)
CO2 SERPL-SCNC: 23 MMOL/L (ref 21–32)
CREAT SERPL-MCNC: 1.34 MG/DL (ref 0.6–1.3)
EOSINOPHIL # BLD AUTO: 0.15 THOUSAND/ÂΜL (ref 0–0.61)
EOSINOPHIL NFR BLD AUTO: 2 % (ref 0–6)
ERYTHROCYTE [DISTWIDTH] IN BLOOD BY AUTOMATED COUNT: 19.7 % (ref 11.6–15.1)
GFR SERPL CREATININE-BSD FRML MDRD: 57 ML/MIN/1.73SQ M
GLUCOSE SERPL-MCNC: 68 MG/DL (ref 65–140)
HCT VFR BLD AUTO: 44.7 % (ref 36.5–49.3)
HGB BLD-MCNC: 12.7 G/DL (ref 12–17)
IMM GRANULOCYTES # BLD AUTO: 0.1 THOUSAND/UL (ref 0–0.2)
IMM GRANULOCYTES NFR BLD AUTO: 1 % (ref 0–2)
INR PPP: 2.29 (ref 0.85–1.19)
LYMPHOCYTES # BLD AUTO: 1.91 THOUSANDS/ÂΜL (ref 0.6–4.47)
LYMPHOCYTES NFR BLD AUTO: 20 % (ref 14–44)
MCH RBC QN AUTO: 28.5 PG (ref 26.8–34.3)
MCHC RBC AUTO-ENTMCNC: 28.4 G/DL (ref 31.4–37.4)
MCV RBC AUTO: 100 FL (ref 82–98)
MONOCYTES # BLD AUTO: 1.3 THOUSAND/ÂΜL (ref 0.17–1.22)
MONOCYTES NFR BLD AUTO: 14 % (ref 4–12)
NEUTROPHILS # BLD AUTO: 6.06 THOUSANDS/ÂΜL (ref 1.85–7.62)
NEUTS SEG NFR BLD AUTO: 62 % (ref 43–75)
NRBC BLD AUTO-RTO: 0 /100 WBCS
PLATELET # BLD AUTO: 215 THOUSANDS/UL (ref 149–390)
PMV BLD AUTO: 10.3 FL (ref 8.9–12.7)
POTASSIUM SERPL-SCNC: 4.4 MMOL/L (ref 3.5–5.3)
PROT SERPL-MCNC: 7.2 G/DL (ref 6.4–8.4)
PROTHROMBIN TIME: 25.4 SECONDS (ref 12.3–15)
RBC # BLD AUTO: 4.45 MILLION/UL (ref 3.88–5.62)
SODIUM SERPL-SCNC: 133 MMOL/L (ref 135–147)
WBC # BLD AUTO: 9.83 THOUSAND/UL (ref 4.31–10.16)

## 2025-06-22 PROCEDURE — 96374 THER/PROPH/DIAG INJ IV PUSH: CPT

## 2025-06-22 PROCEDURE — 93005 ELECTROCARDIOGRAM TRACING: CPT

## 2025-06-22 PROCEDURE — 80053 COMPREHEN METABOLIC PANEL: CPT | Performed by: EMERGENCY MEDICINE

## 2025-06-22 PROCEDURE — 85730 THROMBOPLASTIN TIME PARTIAL: CPT | Performed by: EMERGENCY MEDICINE

## 2025-06-22 PROCEDURE — 71045 X-RAY EXAM CHEST 1 VIEW: CPT

## 2025-06-22 PROCEDURE — 99285 EMERGENCY DEPT VISIT HI MDM: CPT

## 2025-06-22 PROCEDURE — 99285 EMERGENCY DEPT VISIT HI MDM: CPT | Performed by: EMERGENCY MEDICINE

## 2025-06-22 PROCEDURE — 85025 COMPLETE CBC W/AUTO DIFF WBC: CPT | Performed by: EMERGENCY MEDICINE

## 2025-06-22 PROCEDURE — 36415 COLL VENOUS BLD VENIPUNCTURE: CPT | Performed by: EMERGENCY MEDICINE

## 2025-06-22 PROCEDURE — 85610 PROTHROMBIN TIME: CPT | Performed by: EMERGENCY MEDICINE

## 2025-06-22 PROCEDURE — 84484 ASSAY OF TROPONIN QUANT: CPT | Performed by: EMERGENCY MEDICINE

## 2025-06-22 PROCEDURE — 83880 ASSAY OF NATRIURETIC PEPTIDE: CPT | Performed by: EMERGENCY MEDICINE

## 2025-06-22 RX ORDER — FUROSEMIDE 10 MG/ML
60 INJECTION INTRAMUSCULAR; INTRAVENOUS ONCE
Status: COMPLETED | OUTPATIENT
Start: 2025-06-22 | End: 2025-06-22

## 2025-06-22 RX ADMIN — FUROSEMIDE 60 MG: 10 INJECTION, SOLUTION INTRAMUSCULAR; INTRAVENOUS at 07:18

## 2025-06-22 NOTE — DISCHARGE INSTRUCTIONS
Continue lasix as instructed.  Call cardiology tomorrow to arrange close follow-up.  Continue other medications as previously prescribed.  Return to the ED for any new or worsening symptoms or concerns.

## 2025-06-22 NOTE — ED CARE HANDOFF
Emergency Department Sign Out Note        Sign out and transfer of care from Dr. Sampson. See Separate Emergency Department note.     The patient, Britton Batista, was evaluated by the previous provider for shortness of breath.    Workup Completed:  Initial assessment, testing ordered.    ED Course / Workup Pending (followup):  Labs, chest x-ray, reassessment, disposition.        No data recorded                          ED Course as of 06/22/25 0717   [place] Jun 22, 2025   0600 Assumed care of patient from Dr. Sampson pending completion of labs, chest x-ray.  Patient presents with shortness of breath.  Prior cardiac history.  Anticipate need for repeat dose of Lasix.  Vital signs stable.  Admitted approximately 2 weeks ago, seen in the ED 2 days ago.   0643 Creatinine(!): 1.34  1.34 below reported baseline level of 1.5 per recent hospital discharge summary.   0643 hs TnI 0hr(!): 60  Stable compared with priors.   0654 POCT INR(!): 2.29  Therapeutic   0710 BNP(!): 1,085  Elevated but improved compared with last ED visit and markedly improved from last hospital admission.   0715 Test results discussed with patient.  Advised stable for discharge from the emergency department.  Will give dose of Lasix, 60 mg IV, prior to discharge.  Advise close follow-up with cardiology.  Strict return precautions reviewed.  All questions answered.     Procedures  Medical Decision Making  Amount and/or Complexity of Data Reviewed  Labs:  Decision-making details documented in ED Course.  Radiology: independent interpretation performed.    Risk  Prescription drug management.            Disposition  Final diagnoses:   CHF (congestive heart failure) (HCC)   Dyspnea     Time reflects when diagnosis was documented in both MDM as applicable and the Disposition within this note       Time User Action Codes Description Comment    6/22/2025  5:52 AM George Sampson Add [I50.9] CHF (congestive heart failure) (HCC)     6/22/2025  5:52 AM Adrián  George Add [R06.00] Dyspnea           ED Disposition       ED Disposition   Discharge    Condition   Stable    Date/Time   Sun Jun 22, 2025  7:16 AM    Comment   Britton Batista discharge to home/self care.                   Follow-up Information       Follow up With Specialties Details Why Contact Info Additional Information    Veronika Ca PA-C Cardiology, Physician Assistant Schedule an appointment as soon as possible for a visit   [address] [facility] [address]  [phone]       Juan Galindo DO Internal Medicine   [address] [facility] [address]  [phone]       [facility] Emergency Department Emergency Medicine  As needed, If symptoms worsen [address] [facility] [address] [phone] [facility] Emergency Department, [address], [place], [address]          Patient's Medications   Discharge Prescriptions    No medications on file     No discharge procedures on file.       ED Provider  Electronically Signed by     Tj Carter MD  06/22/25 0792

## 2025-06-22 NOTE — ED PROVIDER NOTES
Time reflects when diagnosis was documented in both MDM as applicable and the Disposition within this note       Time User Action Codes Description Comment    6/22/2025  5:52 AM George Sampson Add [I50.9] CHF (congestive heart failure) (HCC)     6/22/2025  5:52 AM George Sampson Add [R06.00] Dyspnea           ED Disposition       ED Disposition   Discharge    Condition   Stable    Date/Time   Sun Jun 22, 2025  7:16 AM    Comment   Britton Lester discharge to home/self care.                   Assessment & Plan       Medical Decision Making  0527: Patient appears well, vital signs reviewed.  Patient is in no acute distress.  Placed on monitor.  Plan to complete basic labs including cardiac enzymes, BNP.  Check chest x-ray and EKG.    0600: Signed out care to oncoming physician.    Amount and/or Complexity of Data Reviewed  Labs: ordered.  Radiology: ordered and independent interpretation performed.     Details: Chest x-ray  ECG/medicine tests: ordered and independent interpretation performed.     Details: V paced rhythm 88 bpm, right bundle branch block, unchanged with comparison to previous EKG.    Risk  Prescription drug management.             Medications   furosemide (LASIX) injection 60 mg (60 mg Intravenous Given 6/22/25 0718)       ED Risk Strat Scores                    No data recorded        SBIRT 20yo+      Flowsheet Row Most Recent Value   Initial Alcohol Screen: US AUDIT-C     1. How often do you have a drink containing alcohol? 0 Filed at: 06/22/2025 0603   2. How many drinks containing alcohol do you have on a typical day you are drinking?  0 Filed at: 06/22/2025 0603   3a. Male UNDER 65: How often do you have five or more drinks on one occasion? 0 Filed at: 06/22/2025 0603   Audit-C Score 0 Filed at: 06/22/2025 0603   SILVIA: How many times in the past year have you...    Used an illegal drug or used a prescription medication for non-medical reasons? Never Filed at: 06/22/2025 0603                             History of Present Illness       Chief Complaint   Patient presents with    Shortness of Breath     + SOB started Tuesday night. Per pt has hx of CHF. Pt verbalized feeling that he is retaining water.       Past Medical History[1]   Past Surgical History[2]   Family History[3]   Social History[4]   E-Cigarette/Vaping    E-Cigarette Use Never User       E-Cigarette/Vaping Substances    Nicotine No     THC No     CBD No     Flavoring No     Other No     Unknown No       I have reviewed and agree with the history as documented.       History provided by:  Medical records and patient  Shortness of Breath  Severity:  Mild  Onset quality:  Gradual  Duration:  1 day  Timing:  Intermittent  Progression:  Waxing and waning  Chronicity:  Chronic  Context comment:  Patient reports intermittent bouts of dyspnea on exertion over the last day.  This is a chronic issue for the patient.  Known to cardiology.  Patient is on warfarin.  Relieved by:  Nothing  Worsened by:  Nothing  Ineffective treatments:  None tried  Associated symptoms: no abdominal pain, no chest pain, no claudication, no cough, no diaphoresis, no ear pain, no fever, no headaches, no hemoptysis, no neck pain, no PND, no rash, no sore throat, no sputum production, no syncope, no swollen glands, no vomiting and no wheezing    Risk factors comment:  Hx of CHF, methamphetamine abuse      Review of Systems   Constitutional:  Negative for appetite change, chills, diaphoresis, fatigue and fever.   HENT:  Negative for ear pain, rhinorrhea, sore throat and trouble swallowing.    Eyes:  Negative for pain, discharge and visual disturbance.   Respiratory:  Positive for shortness of breath. Negative for cough, hemoptysis, sputum production, chest tightness and wheezing.    Cardiovascular:  Negative for chest pain, palpitations, claudication, syncope and PND.   Gastrointestinal:  Negative for abdominal pain, nausea and vomiting.   Endocrine: Negative for polydipsia,  polyphagia and polyuria.   Genitourinary:  Negative for difficulty urinating, dysuria, hematuria and testicular pain.   Musculoskeletal:  Negative for arthralgias, back pain and neck pain.   Skin:  Negative for color change and rash.   Allergic/Immunologic: Negative for immunocompromised state.   Neurological:  Negative for dizziness, seizures, syncope, weakness and headaches.   Hematological:  Negative for adenopathy.   Psychiatric/Behavioral:  Negative for confusion and dysphoric mood.    All other systems reviewed and are negative.          Objective       ED Triage Vitals   Temperature Pulse Blood Pressure Respirations SpO2 Patient Position - Orthostatic VS   06/22/25 0527 06/22/25 0527 06/22/25 0527 06/22/25 0527 06/22/25 0527 06/22/25 0630   98.7 °F (37.1 °C) 71 114/86 16 100 % Lying      Temp Source Heart Rate Source BP Location FiO2 (%) Pain Score    06/22/25 0527 06/22/25 0527 06/22/25 0527 -- 06/22/25 0527    Temporal Monitor Right arm  3      Vitals      Date and Time Temp Pulse SpO2 Resp BP Pain Score FACES Pain Rating User   06/22/25 0715 -- 103 97 % 20 122/67 No Pain -- KK   06/22/25 0645 -- 98 97 % 20 140/61 -- -- KK   06/22/25 0630 -- 96 97 % 20 105/80 -- -- AD   06/22/25 0527 98.7 °F (37.1 °C) 71 100 % 16 114/86 3 -- NM            Physical Exam  Vitals and nursing note reviewed.   Constitutional:       General: He is not in acute distress.     Appearance: Normal appearance. He is not ill-appearing, toxic-appearing or diaphoretic.   HENT:      Head: Normocephalic and atraumatic.      Nose: Nose normal. No congestion or rhinorrhea.      Mouth/Throat:      Mouth: Mucous membranes are moist.      Pharynx: Oropharynx is clear. No oropharyngeal exudate or posterior oropharyngeal erythema.     Eyes:      General:         Right eye: No discharge.         Left eye: No discharge.       Cardiovascular:      Rate and Rhythm: Normal rate and regular rhythm.      Pulses: Normal pulses.      Heart sounds: Normal  heart sounds. No murmur heard.     No gallop.   Pulmonary:      Effort: Pulmonary effort is normal. No respiratory distress.      Breath sounds: Normal breath sounds. No stridor. No wheezing, rhonchi or rales.   Chest:      Chest wall: No tenderness.   Abdominal:      General: Bowel sounds are normal. There is no distension.      Palpations: Abdomen is soft. There is no mass.      Tenderness: There is no abdominal tenderness. There is no right CVA tenderness, left CVA tenderness, guarding or rebound.      Hernia: No hernia is present.     Musculoskeletal:         General: Normal range of motion.      Cervical back: Normal range of motion and neck supple.     Skin:     General: Skin is warm and dry.      Capillary Refill: Capillary refill takes less than 2 seconds.     Neurological:      General: No focal deficit present.      Mental Status: He is alert and oriented to person, place, and time.      Cranial Nerves: No cranial nerve deficit.      Sensory: No sensory deficit.      Motor: No weakness.      Coordination: Coordination normal.      Gait: Gait normal.      Deep Tendon Reflexes: Reflexes normal.     Psychiatric:         Mood and Affect: Mood normal.         Behavior: Behavior normal.         Thought Content: Thought content normal.         Judgment: Judgment normal.         Results Reviewed       Procedure Component Value Units Date/Time    B-Type Natriuretic Peptide(BNP) [[id number]]  (Abnormal) Collected: 06/22/25 0623    Lab Status: Final result Specimen: Blood from Arm, Left Updated: 06/22/25 0706     BNP 1,085 pg/mL     Protime-INR [[id number]]  (Abnormal) Collected: 06/22/25 0527    Lab Status: Final result Specimen: Blood Updated: 06/22/25 0640     Protime 25.4 seconds      INR 2.29    Narrative:      INR Therapeutic Range    Indication                                             INR Range      Atrial Fibrillation                                               2.0-3.0  Hypercoagulable State                                     2.0.2.3  Left Ventricular Asist Device                            2.0-3.0  Mechanical Heart Valve                                  -    Aortic(with afib, MI, embolism, HF, LA enlargement,    and/or coagulopathy)                                     2.0-3.0 (2.5-3.5)     Mitral                                                             2.5-3.5  Prosthetic/Bioprosthetic Heart Valve               2.0-3.0  Venous thromboembolism (VTE: VT, PE        2.0-3.0    APTT [[id number]]  (Abnormal) Collected: 06/22/25 0527    Lab Status: Final result Specimen: Blood Updated: 06/22/25 0640     PTT 37 seconds     CBC and differential [[id number]]  (Abnormal) Collected: 06/22/25 0623    Lab Status: Final result Specimen: Blood from Arm, Left Updated: 06/22/25 0638     WBC 9.83 Thousand/uL      RBC 4.45 Million/uL      Hemoglobin 12.7 g/dL      Hematocrit 44.7 %       fL      MCH 28.5 pg      MCHC 28.4 g/dL      RDW 19.7 %      MPV 10.3 fL      Platelets 215 Thousands/uL      nRBC 0 /100 WBCs      Segmented % 62 %      Immature Grans % 1 %      Lymphocytes % 20 %      Monocytes % 14 %      Eosinophils Relative 2 %      Basophils Relative 1 %      Absolute Neutrophils 6.06 Thousands/µL      Absolute Immature Grans 0.10 Thousand/uL      Absolute Lymphocytes 1.91 Thousands/µL      Absolute Monocytes 1.30 Thousand/µL      Eosinophils Absolute 0.15 Thousand/µL      Basophils Absolute 0.09 Thousands/µL     HS Troponin 0hr (reflex protocol) [[id number]]  (Abnormal) Collected: 06/22/25 0600    Lab Status: Final result Specimen: Blood from Arm, Right Updated: 06/22/25 0637     hs TnI 0hr 60 ng/L     Comprehensive metabolic panel [[id number]]  (Abnormal) Collected: 06/22/25 0600    Lab Status: Final result Specimen: Blood from Arm, Right Updated: 06/22/25 0635     Sodium 133 mmol/L      Potassium 4.4 mmol/L      Chloride 101 mmol/L      CO2 23 mmol/L      ANION GAP 9 mmol/L      BUN 23 mg/dL      Creatinine 1.34 mg/dL       Glucose 68 mg/dL      Calcium 8.7 mg/dL      AST 33 U/L      ALT 43 U/L      Alkaline Phosphatase 172 U/L      Total Protein 7.2 g/dL      Albumin 3.6 g/dL      Total Bilirubin 0.91 mg/dL      eGFR 57 ml/min/1.73sq m     Narrative:      [employer] guidelines for Chronic Kidney Disease (CKD):     Stage 1 with normal or high GFR (GFR > 90 mL/min/1.73 square meters)    Stage 2 Mild CKD (GFR = 60-89 mL/min/1.73 square meters)    Stage 3A Moderate CKD (GFR = 45-59 mL/min/1.73 square meters)    Stage 3B Moderate CKD (GFR = 30-44 mL/min/1.73 square meters)    Stage 4 Severe CKD (GFR = 15-29 mL/min/1.73 square meters)    Stage 5 End Stage CKD (GFR <15 mL/min/1.73 square meters)  Note: GFR calculation is accurate only with a steady state creatinine            XR chest 1 view portable   ED Interpretation by Tj Carter MD (06/22 0623)   Stable cardiomegaly, no acute cardiopulmonary disease noted.          Procedures    ED Medication and Procedure Management   Prior to Admission Medications   Prescriptions Last Dose Informant Patient Reported? Taking?   albuterol (ProAir HFA) 90 mcg/act inhaler   No No   Sig: Inhale 2 puffs every 6 (six) hours as needed for wheezing   amiodarone 200 mg tablet   No No   Sig: Take 1 tablet (200 mg total) by mouth daily with breakfast   furosemide (LASIX) 20 mg tablet   No No   Sig: Take 3 tablets (60 mg total) by mouth daily   ipratropium-albuterol (DUO-NEB) 0.5-2.5 mg/3 mL nebulizer solution   No No   Sig: Take 3 mL by nebulization every 6 (six) hours while awake   magnesium Oxide (MAG-OX) 400 mg TABS   No No   Sig: Take 1 tablet (400 mg total) by mouth daily   metoprolol succinate (TOPROL-XL) 100 mg 24 hr tablet   No No   Sig: Take 1 tablet (100 mg total) by mouth 2 (two) times a day   mirtazapine (REMERON) 15 mg tablet   Yes No   Sig: Take 15 mg by mouth daily at bedtime   pantoprazole (PROTONIX) 40 mg tablet   No No   Sig: Take 1 tablet (40 mg total) by  mouth daily   warfarin (COUMADIN) 3 mg tablet   No No   Sig: Take 1 tablet (3 mg total) by mouth daily      Facility-Administered Medications: None     Discharge Medication List as of 6/22/2025  7:16 AM        CONTINUE these medications which have NOT CHANGED    Details   albuterol (ProAir HFA) 90 mcg/act inhaler Inhale 2 puffs every 6 (six) hours as needed for wheezing, Starting Fri 3/28/2025, Normal      amiodarone 200 mg tablet Take 1 tablet (200 mg total) by mouth daily with breakfast, Starting Sun 6/15/2025, Normal      furosemide (LASIX) 20 mg tablet Take 3 tablets (60 mg total) by mouth daily, Starting Sun 6/15/2025, Normal      ipratropium-albuterol (DUO-NEB) 0.5-2.5 mg/3 mL nebulizer solution Take 3 mL by nebulization every 6 (six) hours while awake, Starting Thu 4/24/2025, Normal      magnesium Oxide (MAG-OX) 400 mg TABS Take 1 tablet (400 mg total) by mouth daily, Starting Fri 3/28/2025, Normal      metoprolol succinate (TOPROL-XL) 100 mg 24 hr tablet Take 1 tablet (100 mg total) by mouth 2 (two) times a day, Starting Sat 6/14/2025, Normal      mirtazapine (REMERON) 15 mg tablet Take 15 mg by mouth daily at bedtime, Starting Thu 5/1/2025, Historical Med      pantoprazole (PROTONIX) 40 mg tablet Take 1 tablet (40 mg total) by mouth daily, Starting Sat 6/14/2025, Until Fri 9/12/2025, No Print      warfarin (COUMADIN) 3 mg tablet Take 1 tablet (3 mg total) by mouth daily, Starting Sat 6/14/2025, No Print           No discharge procedures on file.  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    CHF (congestive heart failure) (HCC)     Coagulopathy (HCC) 04/26/2025    Depression     Emphysema of lung (HCC)     GERD (gastroesophageal reflux disease)     Gout     Hyperammonemia (HCC) 04/26/2025    Hypertension     Metabolic encephalopathy 03/22/2025    Neuropathy     Raynaud's disease     Right inguinal hernia     Scleroderma (HCC)     Shock (HCC) 04/26/2025   [2]   Past Surgical History:  Procedure Laterality Date    AMPUTATION Right     pt had tip of \"pointer finger\" d/t scleraderma    CARDIAC PACEMAKER PLACEMENT      FINGER AMPUTATION Left 01/31/2024    Procedure: AMPUTATION FINGER, LEFT INDEX FINGER CPT: 04313;  Surgeon: Pedro Vazquez MD;  Location: [facility];  Service: Orthopedics    HERNIA REPAIR Left     times 2    [place] AMP F/TH 1/2 JT/PHALANX W/NEURECT W/DIR CLSR Right 01/23/2024    Procedure: AMPUTATION FINGER, RIGHT MIDDLE;  Surgeon: Pedro Vazquez MD;  Location: [facility];  Service: Orthopedics    W[place] RPR 1ST INGUN HRNA AGE 5 YRS/> REDUCIBLE Right 03/03/2023    Procedure: OPEN INGUINAL HERNIA REPAIR WITH MESH;  Surgeon: Temo Jackson DO;  Location: [facility];  Service: General   [3]   Family History  Problem Relation Name Age of Onset    Hypertension Father     [4]   Social History  Tobacco Use    Smoking status: Every Day     Current packs/day: 0.50     Average packs/day: 1 pack/day for 45.5 years (44.7 ttl pk-yrs)     Types: Cigarettes     Start date: 2000    Smokeless tobacco: Never    Tobacco comments:     Last cigarette 0430   Vaping Use    Vaping status: Never Used   Substance Use Topics    Alcohol use: Not Currently    Drug use: Not Currently     Comment: Hx meth use        George Sampson MD  06/22/25 2110    "

## 2025-06-23 ENCOUNTER — HOSPITAL ENCOUNTER (EMERGENCY)
Facility: HOSPITAL | Age: 60
Discharge: HOME | End: 2025-06-23
Attending: EMERGENCY MEDICINE
Payer: COMMERCIAL

## 2025-06-23 VITALS
HEART RATE: 98 BPM | RESPIRATION RATE: 20 BRPM | WEIGHT: 183.86 LBS | BODY MASS INDEX: 27.23 KG/M2 | TEMPERATURE: 97.5 F | HEIGHT: 69 IN | DIASTOLIC BLOOD PRESSURE: 97 MMHG | SYSTOLIC BLOOD PRESSURE: 124 MMHG | OXYGEN SATURATION: 95 %

## 2025-06-23 DIAGNOSIS — I50.9 CHF (CONGESTIVE HEART FAILURE) (HCC): Primary | ICD-10-CM

## 2025-06-23 LAB
ALBUMIN SERPL BCG-MCNC: 3.5 G/DL (ref 3.5–5)
ALP SERPL-CCNC: 158 U/L (ref 34–104)
ALT SERPL W P-5'-P-CCNC: 40 U/L (ref 7–52)
ANION GAP SERPL CALCULATED.3IONS-SCNC: 8 MMOL/L (ref 4–13)
APTT PPP: 45 SECONDS (ref 23–34)
AST SERPL W P-5'-P-CCNC: 26 U/L (ref 13–39)
BASOPHILS # BLD AUTO: 0.06 THOUSANDS/ÂΜL (ref 0–0.1)
BASOPHILS NFR BLD AUTO: 1 % (ref 0–1)
BILIRUB SERPL-MCNC: 0.79 MG/DL (ref 0.2–1)
BNP SERPL-MCNC: 1463 PG/ML (ref 0–100)
BUN SERPL-MCNC: 26 MG/DL (ref 5–25)
CALCIUM SERPL-MCNC: 9.3 MG/DL (ref 8.4–10.2)
CARDIAC TROPONIN I PNL SERPL HS: 87 NG/L (ref ?–50)
CHLORIDE SERPL-SCNC: 98 MMOL/L (ref 96–108)
CO2 SERPL-SCNC: 28 MMOL/L (ref 21–32)
CREAT SERPL-MCNC: 1.74 MG/DL (ref 0.6–1.3)
EOSINOPHIL # BLD AUTO: 0.13 THOUSAND/ÂΜL (ref 0–0.61)
EOSINOPHIL NFR BLD AUTO: 2 % (ref 0–6)
ERYTHROCYTE [DISTWIDTH] IN BLOOD BY AUTOMATED COUNT: 18.9 % (ref 11.6–15.1)
GFR SERPL CREATININE-BSD FRML MDRD: 41 ML/MIN/1.73SQ M
GLUCOSE SERPL-MCNC: 97 MG/DL (ref 65–140)
HCT VFR BLD AUTO: 38.9 % (ref 36.5–49.3)
HGB BLD-MCNC: 11.9 G/DL (ref 12–17)
IMM GRANULOCYTES # BLD AUTO: 0.04 THOUSAND/UL (ref 0–0.2)
IMM GRANULOCYTES NFR BLD AUTO: 1 % (ref 0–2)
INR PPP: 2.39 (ref 0.85–1.19)
LYMPHOCYTES # BLD AUTO: 1.67 THOUSANDS/ÂΜL (ref 0.6–4.47)
LYMPHOCYTES NFR BLD AUTO: 20 % (ref 14–44)
MCH RBC QN AUTO: 28.5 PG (ref 26.8–34.3)
MCHC RBC AUTO-ENTMCNC: 30.6 G/DL (ref 31.4–37.4)
MCV RBC AUTO: 93 FL (ref 82–98)
MONOCYTES # BLD AUTO: 0.87 THOUSAND/ÂΜL (ref 0.17–1.22)
MONOCYTES NFR BLD AUTO: 11 % (ref 4–12)
NEUTROPHILS # BLD AUTO: 5.55 THOUSANDS/ÂΜL (ref 1.85–7.62)
NEUTS SEG NFR BLD AUTO: 65 % (ref 43–75)
NRBC BLD AUTO-RTO: 0 /100 WBCS
PLATELET # BLD AUTO: 204 THOUSANDS/UL (ref 149–390)
PMV BLD AUTO: 9.4 FL (ref 8.9–12.7)
POTASSIUM SERPL-SCNC: 4.2 MMOL/L (ref 3.5–5.3)
PROT SERPL-MCNC: 7 G/DL (ref 6.4–8.4)
PROTHROMBIN TIME: 26.2 SECONDS (ref 12.3–15)
QRS AXIS: 164 DEGREES
QRSD INTERVAL: 168 MS
QT INTERVAL: 456 MS
QTC INTERVAL: 551 MS
RBC # BLD AUTO: 4.17 MILLION/UL (ref 3.88–5.62)
SODIUM SERPL-SCNC: 134 MMOL/L (ref 135–147)
T WAVE AXIS: 10 DEGREES
VENTRICULAR RATE: 88 BPM
WBC # BLD AUTO: 8.32 THOUSAND/UL (ref 4.31–10.16)

## 2025-06-23 PROCEDURE — 83880 ASSAY OF NATRIURETIC PEPTIDE: CPT | Performed by: EMERGENCY MEDICINE

## 2025-06-23 PROCEDURE — 99285 EMERGENCY DEPT VISIT HI MDM: CPT | Performed by: EMERGENCY MEDICINE

## 2025-06-23 PROCEDURE — 36415 COLL VENOUS BLD VENIPUNCTURE: CPT | Performed by: EMERGENCY MEDICINE

## 2025-06-23 PROCEDURE — 96374 THER/PROPH/DIAG INJ IV PUSH: CPT

## 2025-06-23 PROCEDURE — 84484 ASSAY OF TROPONIN QUANT: CPT | Performed by: EMERGENCY MEDICINE

## 2025-06-23 PROCEDURE — 99285 EMERGENCY DEPT VISIT HI MDM: CPT

## 2025-06-23 PROCEDURE — 85730 THROMBOPLASTIN TIME PARTIAL: CPT | Performed by: EMERGENCY MEDICINE

## 2025-06-23 PROCEDURE — 85610 PROTHROMBIN TIME: CPT | Performed by: EMERGENCY MEDICINE

## 2025-06-23 PROCEDURE — 80053 COMPREHEN METABOLIC PANEL: CPT | Performed by: EMERGENCY MEDICINE

## 2025-06-23 PROCEDURE — 93005 ELECTROCARDIOGRAM TRACING: CPT

## 2025-06-23 PROCEDURE — 85025 COMPLETE CBC W/AUTO DIFF WBC: CPT | Performed by: EMERGENCY MEDICINE

## 2025-06-23 RX ORDER — FUROSEMIDE 10 MG/ML
80 INJECTION INTRAMUSCULAR; INTRAVENOUS ONCE
Status: COMPLETED | OUTPATIENT
Start: 2025-06-23 | End: 2025-06-23

## 2025-06-23 RX ADMIN — FUROSEMIDE 80 MG: 10 INJECTION, SOLUTION INTRAMUSCULAR; INTRAVENOUS at 04:25

## 2025-06-23 NOTE — ED PROVIDER NOTES
Time reflects when diagnosis was documented in both MDM as applicable and the Disposition within this note       Time User Action Codes Description Comment    6/23/2025  4:17 AM George Sampson Add [I50.9] CHF (congestive heart failure) ([facility])           ED Disposition       ED Disposition   Discharge    Condition   Stable    Date/Time   Mon Jun 23, 2025  4:17 AM    Comment   Britton Batista discharge to home/self care.                   Assessment & Plan       Medical Decision Making  0309: Patient appears chronically ill but in no acute distress.  The patient has history of CHF on Coumadin and diuretics.  Recent evaluation yesterday reviewed.  Chest x-ray reviewed.  No new symptomatology.  Patient has chronic orthopnea.  Sister is planning to buy a hospital bed that we will incline the head rest.  Patient has follow-up closely with cardiology team.  Reassurance was given to the patient again.  I will check basic labs and EKG.  I have encouraged the patient extensively to stop smoking as this is defeating the purpose of his dyspnea treatment.    0430: Labs reviewed.  The patient has remained stable throughout ED course.  Chronic troponinemia at baseline level.  Stable for discharge.  Close follow-up with cardiology.    Amount and/or Complexity of Data Reviewed  External Data Reviewed: labs, radiology, ECG and notes.     Details: Chest x-ray 6/22/2025--cardiomegaly  Labs: ordered.  ECG/medicine tests: ordered and independent interpretation performed.     Details: V paced rhythm 97 bpm, right bundle branch block    Risk  Prescription drug management.             Medications   furosemide (LASIX) injection 80 mg (80 mg Intravenous Given 6/23/25 0425)       ED Risk Strat Scores                    No data recorded        SBIRT 22yo+      Flowsheet Row Most Recent Value   Initial Alcohol Screen: US AUDIT-C     1. How often do you have a drink containing alcohol? 0 Filed at: 06/23/2025 0313   2. How many drinks containing  alcohol do you have on a typical day you are drinking?  0 Filed at: 06/23/2025 0313   3a. Male UNDER 65: How often do you have five or more drinks on one occasion? 0 Filed at: 06/23/2025 0313   3b. FEMALE Any Age, or MALE 65+: How often do you have 4 or more drinks on one occassion? 0 Filed at: 06/23/2025 0313   Audit-C Score 0 Filed at: 06/23/2025 0313   SILVIA: How many times in the past year have you...    Used an illegal drug or used a prescription medication for non-medical reasons? Never Filed at: 06/23/2025 0313                            History of Present Illness       Chief Complaint   Patient presents with    Shortness of Breath     Patient reports worsening sob. Seen yesterday at this facility. Patient not able to see cardiologist until sept.        Past Medical History[1]   Past Surgical History[2]   Family History[3]   Social History[4]   E-Cigarette/Vaping    E-Cigarette Use Never User       E-Cigarette/Vaping Substances    Nicotine No     THC No     CBD No     Flavoring No     Other No     Unknown No       I have reviewed and agree with the history as documented.       History provided by:  Medical records, patient and relative (Sister)  Shortness of Breath  Severity:  Mild  Onset quality:  Gradual  Duration:  1 week  Timing:  Intermittent  Progression:  Waxing and waning  Chronicity:  Chronic  Context comment:  Patient with CHF, poor EF, reports orthopnea over the past week.  Shortness of breath in the early morning, improves with sitting up  Relieved by:  Nothing  Worsened by:  Smoke exposure (Patient is ongoing smoker)  Ineffective treatments:  Diuretics  Associated symptoms: PND and wheezing    Associated symptoms: no abdominal pain, no chest pain, no claudication, no cough, no diaphoresis, no ear pain, no fever, no headaches, no hemoptysis, no neck pain, no rash, no sore throat, no sputum production, no syncope, no swollen glands and no vomiting        Review of Systems   Constitutional:  Negative  for appetite change, chills, diaphoresis, fatigue and fever.   HENT:  Negative for ear pain, rhinorrhea, sore throat and trouble swallowing.    Eyes:  Negative for pain, discharge and visual disturbance.   Respiratory:  Positive for shortness of breath and wheezing. Negative for cough, hemoptysis, sputum production and chest tightness.    Cardiovascular:  Positive for PND. Negative for chest pain, palpitations, claudication and syncope.   Gastrointestinal:  Negative for abdominal pain, nausea and vomiting.   Endocrine: Negative for polydipsia, polyphagia and polyuria.   Genitourinary:  Negative for difficulty urinating, dysuria, hematuria and testicular pain.   Musculoskeletal:  Negative for arthralgias, back pain and neck pain.   Skin:  Negative for color change and rash.   Allergic/Immunologic: Negative for immunocompromised state.   Neurological:  Negative for dizziness, seizures, syncope, weakness and headaches.   Hematological:  Negative for adenopathy.   Psychiatric/Behavioral:  Negative for confusion and dysphoric mood.    All other systems reviewed and are negative.          Objective       ED Triage Vitals   Temperature Pulse Blood Pressure Respirations SpO2 Patient Position - Orthostatic VS   06/23/25 0307 06/23/25 0310 06/23/25 0310 06/23/25 0310 06/23/25 0310 06/23/25 0310   97.5 °F (36.4 °C) 93 119/98 20 95 % Lying      Temp Source Heart Rate Source BP Location FiO2 (%) Pain Score    06/23/25 0307 06/23/25 0310 06/23/25 0310 -- 06/23/25 0307    Temporal Monitor Left arm  3      Vitals      Date and Time Temp Pulse SpO2 Resp BP Pain Score FACES Pain Rating User   06/23/25 0415 -- 98 95 % 20 124/97 -- -- AD   06/23/25 0315 -- 95 95 % 20 119/98 -- -- AD   06/23/25 0310 -- 93 95 % 20 119/98 -- -- AD   06/23/25 0307 97.5 °F (36.4 °C) -- -- -- -- 3 -- AD            Physical Exam  Vitals and nursing note reviewed.   Constitutional:       General: He is not in acute distress.     Appearance: Normal appearance.  He is not ill-appearing, toxic-appearing or diaphoretic.   HENT:      Head: Normocephalic and atraumatic.      Nose: Nose normal. No congestion or rhinorrhea.      Mouth/Throat:      Mouth: Mucous membranes are moist.      Pharynx: Oropharynx is clear. No oropharyngeal exudate or posterior oropharyngeal erythema.     Eyes:      General:         Right eye: No discharge.         Left eye: No discharge.       Cardiovascular:      Rate and Rhythm: Normal rate and regular rhythm.      Pulses: Normal pulses.      Heart sounds: Normal heart sounds. No murmur heard.     No gallop.   Pulmonary:      Effort: Pulmonary effort is normal. No respiratory distress.      Breath sounds: No stridor. Examination of the right-lower field reveals rales. Rales present. No wheezing or rhonchi.      Comments: Subtle right basilar rales  Chest:      Chest wall: No tenderness.   Abdominal:      General: Bowel sounds are normal. There is no distension.      Palpations: Abdomen is soft. There is no mass.      Tenderness: There is no abdominal tenderness. There is no right CVA tenderness, left CVA tenderness, guarding or rebound.      Hernia: No hernia is present.     Musculoskeletal:         General: Normal range of motion.      Cervical back: Normal range of motion and neck supple.     Skin:     General: Skin is warm and dry.      Capillary Refill: Capillary refill takes less than 2 seconds.     Neurological:      General: No focal deficit present.      Mental Status: He is alert and oriented to person, place, and time.      Cranial Nerves: No cranial nerve deficit.      Sensory: No sensory deficit.      Motor: No weakness.      Coordination: Coordination normal.      Gait: Gait normal.      Deep Tendon Reflexes: Reflexes normal.     Psychiatric:         Mood and Affect: Mood normal.         Behavior: Behavior normal.         Thought Content: Thought content normal.         Judgment: Judgment normal.         Results Reviewed       Procedure  Component Value Units Date/Time    B-Type Natriuretic Peptide(BNP) [[id number]]  (Abnormal) Collected: 06/23/25 0324    Lab Status: Final result Specimen: Blood from Arm, Right Updated: 06/23/25 0419     BNP 1,463 pg/mL     HS Troponin 0hr (reflex protocol) [[id number]]  (Abnormal) Collected: 06/23/25 0324    Lab Status: Final result Specimen: Blood from Arm, Right Updated: 06/23/25 0406     hs TnI 0hr 87 ng/L     Comprehensive metabolic panel [[id number]]  (Abnormal) Collected: 06/23/25 0324    Lab Status: Final result Specimen: Blood from Arm, Right Updated: 06/23/25 0403     Sodium 134 mmol/L      Potassium 4.2 mmol/L      Chloride 98 mmol/L      CO2 28 mmol/L      ANION GAP 8 mmol/L      BUN 26 mg/dL      Creatinine 1.74 mg/dL      Glucose 97 mg/dL      Calcium 9.3 mg/dL      AST 26 U/L      ALT 40 U/L      Alkaline Phosphatase 158 U/L      Total Protein 7.0 g/dL      Albumin 3.5 g/dL      Total Bilirubin 0.79 mg/dL      eGFR 41 ml/min/1.73sq m     Narrative:      National Kid[employer] guidelines for Chronic Kidney Disease (CKD):     Stage 1 with normal or high GFR (GFR > 90 mL/min/1.73 square meters)    Stage 2 Mild CKD (GFR = 60-89 mL/min/1.73 square meters)    Stage 3A Moderate CKD (GFR = 45-59 mL/min/1.73 square meters)    Stage 3B Moderate CKD (GFR = 30-44 mL/min/1.73 square meters)    Stage 4 Severe CKD (GFR = 15-29 mL/min/1.73 square meters)    Stage 5 End Stage CKD (GFR <15 mL/min/1.73 square meters)  Note: GFR calculation is accurate only with a steady state creatinine    Protime-INR [[id number]]  (Abnormal) Collected: 06/23/25 0324    Lab Status: Final result Specimen: Blood from Arm, Right Updated: 06/23/25 0400     Protime 26.2 seconds      INR 2.39    Narrative:      INR Therapeutic Range    Indication                                             INR Range      Atrial Fibrillation                                               2.0-3.0  Hypercoagulable State                                     2.0.2.3  Left Ventricular Asist Device                            2.0-3.0  Mechanical Heart Valve                                  -    Aortic(with afib, MI, embolism, HF, LA enlargement,    and/or coagulopathy)                                     2.0-3.0 (2.5-3.5)     Mitral                                                             2.5-3.5  Prosthetic/Bioprosthetic Heart Valve               2.0-3.0  Venous thromboembolism (VTE: VT, PE        2.0-3.0    APTT [[id number]]  (Abnormal) Collected: 06/23/25 0324    Lab Status: Final result Specimen: Blood from Arm, Right Updated: 06/23/25 0400     PTT 45 seconds     CBC and differential [[id number]]  (Abnormal) Collected: 06/23/25 0324    Lab Status: Final result Specimen: Blood from Arm, Right Updated: 06/23/25 0348     WBC 8.32 Thousand/uL      RBC 4.17 Million/uL      Hemoglobin 11.9 g/dL      Hematocrit 38.9 %      MCV 93 fL      MCH 28.5 pg      MCHC 30.6 g/dL      RDW 18.9 %      MPV 9.4 fL      Platelets 204 Thousands/uL      nRBC 0 /100 WBCs      Segmented % 65 %      Immature Grans % 1 %      Lymphocytes % 20 %      Monocytes % 11 %      Eosinophils Relative 2 %      Basophils Relative 1 %      Absolute Neutrophils 5.55 Thousands/µL      Absolute Immature Grans 0.04 Thousand/uL      Absolute Lymphocytes 1.67 Thousands/µL      Absolute Monocytes 0.87 Thousand/µL      Eosinophils Absolute 0.13 Thousand/µL      Basophils Absolute 0.06 Thousands/µL             No orders to display       Procedures    ED Medication and Procedure Management   Prior to Admission Medications   Prescriptions Last Dose Informant Patient Reported? Taking?   albuterol (ProAir HFA) 90 mcg/act inhaler   No No   Sig: Inhale 2 puffs every 6 (six) hours as needed for wheezing   amiodarone 200 mg tablet   No No   Sig: Take 1 tablet (200 mg total) by mouth daily with breakfast   furosemide (LASIX) 20 mg tablet   No No   Sig: Take 3 tablets (60 mg total) by mouth daily   ipratropium-albuterol  (DUO-NEB) 0.5-2.5 mg/3 mL nebulizer solution   No No   Sig: Take 3 mL by nebulization every 6 (six) hours while awake   magnesium Oxide (MAG-OX) 400 mg TABS   No No   Sig: Take 1 tablet (400 mg total) by mouth daily   metoprolol succinate (TOPROL-XL) 100 mg 24 hr tablet   No No   Sig: Take 1 tablet (100 mg total) by mouth 2 (two) times a day   mirtazapine (REMERON) 15 mg tablet   Yes No   Sig: Take 15 mg by mouth daily at bedtime   pantoprazole (PROTONIX) 40 mg tablet   No No   Sig: Take 1 tablet (40 mg total) by mouth daily   warfarin (COUMADIN) 3 mg tablet   No No   Sig: Take 1 tablet (3 mg total) by mouth daily      Facility-Administered Medications: None     Discharge Medication List as of 6/23/2025  4:17 AM        CONTINUE these medications which have NOT CHANGED    Details   albuterol (ProAir HFA) 90 mcg/act inhaler Inhale 2 puffs every 6 (six) hours as needed for wheezing, Starting Fri 3/28/2025, Normal      amiodarone 200 mg tablet Take 1 tablet (200 mg total) by mouth daily with breakfast, Starting Sun 6/15/2025, Normal      furosemide (LASIX) 20 mg tablet Take 3 tablets (60 mg total) by mouth daily, Starting Sun 6/15/2025, Normal      ipratropium-albuterol (DUO-NEB) 0.5-2.5 mg/3 mL nebulizer solution Take 3 mL by nebulization every 6 (six) hours while awake, Starting Thu 4/24/2025, Normal      magnesium Oxide (MAG-OX) 400 mg TABS Take 1 tablet (400 mg total) by mouth daily, Starting Fri 3/28/2025, Normal      metoprolol succinate (TOPROL-XL) 100 mg 24 hr tablet Take 1 tablet (100 mg total) by mouth 2 (two) times a day, Starting Sat 6/14/2025, Normal      mirtazapine (REMERON) 15 mg tablet Take 15 mg by mouth daily at bedtime, Starting Thu 5/1/2025, Historical Med      pantoprazole (PROTONIX) 40 mg tablet Take 1 tablet (40 mg total) by mouth daily, Starting Sat 6/14/2025, Until Fri 9/12/2025, No Print      warfarin (COUMADIN) 3 mg tablet Take 1 tablet (3 mg total) by mouth daily, Starting Sat 6/14/2025, No  "Print           No discharge procedures on file.  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    CHF (congestive heart failure) (HCC)     Coagulopathy (HCC) 04/26/2025    Depression     Emphysema of lung (HCC)     GERD (gastroesophageal reflux disease)     Gout     Hyperammonemia (HCC) 04/26/2025    Hypertension     Metabolic encephalopathy 03/22/2025    Neuropathy     Raynaud's disease     Right inguinal hernia     Scleroderma (HCC)     Shock (HCC) 04/26/2025   [2]   Past Surgical History:  Procedure Laterality Date    AMPUTATION Right     pt had tip of \"pointer finger\" d/t scleraderma    CARDIAC PACEMAKER PLACEMENT      FINGER AMPUTATION Left 01/31/2024    Procedure: AMPUTATION FINGER, LEFT INDEX FINGER CPT: 51133;  Surgeon: Pedro Vazquez MD;  Location:  MAIN OR;  Service: Orthopedics    HERNIA REPAIR Left     times 2    PA AMP F/TH 1/2 JT/PHALANX W/NEURECT W/DIR CLSR Right 01/23/2024    Procedure: AMPUTATION FINGER, RIGHT MIDDLE;  Surgeon: Pedro Vazquez MD;  Location: [facility];  Service: Orthopedics    PA RPR 1ST INGUN HRNA AGE 5 YRS/> REDUCIBLE Right 03/03/2023    Procedure: OPEN INGUINAL HERNIA REPAIR WITH MESH;  Surgeon: Temo Jackson DO;  Location:  MAIN OR;  Service: General   [3]   Family History  Problem Relation Name Age of Onset    Hypertension Father     [4]   Social History  Tobacco Use    Smoking status: Every Day     Current packs/day: 0.50     Average packs/day: 1 pack/day for 45.5 years (44.7 ttl pk-yrs)     Types: Cigarettes     Start date: 2000    " Smokeless tobacco: Never    Tobacco comments:     Last cigarette 0430   Vaping Use    Vaping status: Never Used   Substance Use Topics    Alcohol use: Not Currently    Drug use: Not Currently     Comment: Hx meth use        George Sampson MD  06/23/25 9890

## 2025-06-24 ENCOUNTER — HOSPITAL ENCOUNTER (OUTPATIENT)
Dept: RADIOLOGY | Facility: HOSPITAL | Age: 60
Discharge: HOME | End: 2025-06-24
Attending: OTOLARYNGOLOGY
Payer: COMMERCIAL

## 2025-06-24 DIAGNOSIS — R13.14 DYSPHAGIA, PHARYNGOESOPHAGEAL PHASE: ICD-10-CM

## 2025-06-24 LAB
ATRIAL RATE: 97 BPM
P AXIS: -32 DEGREES
PR INTERVAL: 282 MS
QRS AXIS: 102 DEGREES
QRSD INTERVAL: 210 MS
QT INTERVAL: 474 MS
QTC INTERVAL: 601 MS
T WAVE AXIS: -36 DEGREES
VENTRICULAR RATE: 97 BPM

## 2025-06-24 PROCEDURE — 74220 X-RAY XM ESOPHAGUS 1CNTRST: CPT

## 2025-06-29 ENCOUNTER — APPOINTMENT (EMERGENCY)
Dept: RADIOLOGY | Facility: HOSPITAL | Age: 60
End: 2025-06-29
Payer: COMMERCIAL

## 2025-06-29 ENCOUNTER — HOSPITAL ENCOUNTER (EMERGENCY)
Facility: HOSPITAL | Age: 60
Discharge: HOME | End: 2025-06-29
Attending: STUDENT IN AN ORGANIZED HEALTH CARE EDUCATION/TRAINING PROGRAM | Admitting: STUDENT IN AN ORGANIZED HEALTH CARE EDUCATION/TRAINING PROGRAM
Payer: COMMERCIAL

## 2025-06-29 VITALS
TEMPERATURE: 98 F | WEIGHT: 189.82 LBS | SYSTOLIC BLOOD PRESSURE: 95 MMHG | HEART RATE: 101 BPM | DIASTOLIC BLOOD PRESSURE: 76 MMHG | BODY MASS INDEX: 28.11 KG/M2 | OXYGEN SATURATION: 96 % | HEIGHT: 69 IN | RESPIRATION RATE: 21 BRPM

## 2025-06-29 DIAGNOSIS — Z86.79 HISTORY OF CHF (CONGESTIVE HEART FAILURE): ICD-10-CM

## 2025-06-29 DIAGNOSIS — R06.00 DYSPNEA, UNSPECIFIED TYPE: Primary | ICD-10-CM

## 2025-06-29 LAB
ALBUMIN SERPL BCG-MCNC: 3.7 G/DL (ref 3.5–5)
ALP SERPL-CCNC: 163 U/L (ref 34–104)
ALT SERPL W P-5'-P-CCNC: 20 U/L (ref 7–52)
ANION GAP SERPL CALCULATED.3IONS-SCNC: 9 MMOL/L (ref 4–13)
AST SERPL W P-5'-P-CCNC: 27 U/L (ref 13–39)
BASOPHILS # BLD AUTO: 0.05 THOUSANDS/ÂΜL (ref 0–0.1)
BASOPHILS NFR BLD AUTO: 1 % (ref 0–1)
BILIRUB SERPL-MCNC: 0.72 MG/DL (ref 0.2–1)
BUN SERPL-MCNC: 27 MG/DL (ref 5–25)
CALCIUM SERPL-MCNC: 9.3 MG/DL (ref 8.4–10.2)
CARDIAC TROPONIN I PNL SERPL HS: 77 NG/L (ref ?–50)
CHLORIDE SERPL-SCNC: 104 MMOL/L (ref 96–108)
CO2 SERPL-SCNC: 26 MMOL/L (ref 21–32)
CREAT SERPL-MCNC: 1.49 MG/DL (ref 0.6–1.3)
EOSINOPHIL # BLD AUTO: 0.2 THOUSAND/ÂΜL (ref 0–0.61)
EOSINOPHIL NFR BLD AUTO: 3 % (ref 0–6)
ERYTHROCYTE [DISTWIDTH] IN BLOOD BY AUTOMATED COUNT: 19.4 % (ref 11.6–15.1)
GFR SERPL CREATININE-BSD FRML MDRD: 50 ML/MIN/1.73SQ M
GLUCOSE SERPL-MCNC: 129 MG/DL (ref 65–140)
HCT VFR BLD AUTO: 40.5 % (ref 36.5–49.3)
HGB BLD-MCNC: 12.3 G/DL (ref 12–17)
IMM GRANULOCYTES # BLD AUTO: 0.07 THOUSAND/UL (ref 0–0.2)
IMM GRANULOCYTES NFR BLD AUTO: 1 % (ref 0–2)
LYMPHOCYTES # BLD AUTO: 1.56 THOUSANDS/ÂΜL (ref 0.6–4.47)
LYMPHOCYTES NFR BLD AUTO: 24 % (ref 14–44)
MAGNESIUM SERPL-MCNC: 1.8 MG/DL (ref 1.9–2.7)
MCH RBC QN AUTO: 28.9 PG (ref 26.8–34.3)
MCHC RBC AUTO-ENTMCNC: 30.4 G/DL (ref 31.4–37.4)
MCV RBC AUTO: 95 FL (ref 82–98)
MONOCYTES # BLD AUTO: 1.03 THOUSAND/ÂΜL (ref 0.17–1.22)
MONOCYTES NFR BLD AUTO: 16 % (ref 4–12)
NEUTROPHILS # BLD AUTO: 3.67 THOUSANDS/ÂΜL (ref 1.85–7.62)
NEUTS SEG NFR BLD AUTO: 55 % (ref 43–75)
NRBC BLD AUTO-RTO: 0 /100 WBCS
PLATELET # BLD AUTO: 344 THOUSANDS/UL (ref 149–390)
PMV BLD AUTO: 9.7 FL (ref 8.9–12.7)
POTASSIUM SERPL-SCNC: 4.3 MMOL/L (ref 3.5–5.3)
PROT SERPL-MCNC: 7.2 G/DL (ref 6.4–8.4)
RBC # BLD AUTO: 4.25 MILLION/UL (ref 3.88–5.62)
SODIUM SERPL-SCNC: 139 MMOL/L (ref 135–147)
WBC # BLD AUTO: 6.58 THOUSAND/UL (ref 4.31–10.16)

## 2025-06-29 PROCEDURE — 36415 COLL VENOUS BLD VENIPUNCTURE: CPT | Performed by: STUDENT IN AN ORGANIZED HEALTH CARE EDUCATION/TRAINING PROGRAM

## 2025-06-29 PROCEDURE — 84484 ASSAY OF TROPONIN QUANT: CPT | Performed by: STUDENT IN AN ORGANIZED HEALTH CARE EDUCATION/TRAINING PROGRAM

## 2025-06-29 PROCEDURE — 99285 EMERGENCY DEPT VISIT HI MDM: CPT

## 2025-06-29 PROCEDURE — 93308 TTE F-UP OR LMTD: CPT | Performed by: STUDENT IN AN ORGANIZED HEALTH CARE EDUCATION/TRAINING PROGRAM

## 2025-06-29 PROCEDURE — 93005 ELECTROCARDIOGRAM TRACING: CPT

## 2025-06-29 PROCEDURE — 80053 COMPREHEN METABOLIC PANEL: CPT | Performed by: STUDENT IN AN ORGANIZED HEALTH CARE EDUCATION/TRAINING PROGRAM

## 2025-06-29 PROCEDURE — 71045 X-RAY EXAM CHEST 1 VIEW: CPT

## 2025-06-29 PROCEDURE — 99285 EMERGENCY DEPT VISIT HI MDM: CPT | Performed by: STUDENT IN AN ORGANIZED HEALTH CARE EDUCATION/TRAINING PROGRAM

## 2025-06-29 PROCEDURE — 85025 COMPLETE CBC W/AUTO DIFF WBC: CPT | Performed by: STUDENT IN AN ORGANIZED HEALTH CARE EDUCATION/TRAINING PROGRAM

## 2025-06-29 PROCEDURE — 83735 ASSAY OF MAGNESIUM: CPT | Performed by: STUDENT IN AN ORGANIZED HEALTH CARE EDUCATION/TRAINING PROGRAM

## 2025-06-29 NOTE — DISCHARGE INSTRUCTIONS
Laboratory/imaging studies that were obtained did not show any significant abnormalities.    Increase your Lasix from 60 mg daily to 80 mg daily x 3 days.    Contact your cardiologist tomorrow to set up a follow-up appointment.  Continue all the prescribed occasions.  Return to the emergency department for any concerning signs or symptoms.

## 2025-06-29 NOTE — ED PROVIDER NOTES
Time reflects when diagnosis was documented in both MDM as applicable and the Disposition within this note       Time User Action Codes Description Comment    6/29/2025 10:06 AM Gary Nguyen [R06.00] Dyspnea, unspecified type     6/29/2025 10:07 AM Gary Nguyen [Z86.79] History of CHF (congestive heart failure)           ED Disposition       ED Disposition   Discharge    Condition   Stable    Date/Time   Sun Jun 29, 2025 10:06 AM    Comment   Britton Batista discharge to home/self care.                   Assessment & Plan       Medical Decision Making  60-year-old male.  Patient presents with dyspnea, weight gain.  Patient is a history of CHF.  Patient presents with mild lower extremity edema.  Weight 189 lbs from 183 lbs.   Patient expressing mild abdominal distension. Abdominal exam is benign. No localized tenderness, rebound/guarding. Not requiring supplemental oxygen.   Laboratory w/u is stable compared to previous results. Chronically elevated troponin. Cr down trending. Does not appear to be in CHF exacerbation, COPD exacerbation. Low suspicion for ACS, PE.   POCUS performed at the bedside. No signs of pericardial effusion.   The case was discussed with cardiology.  Lasix 80 mg daily x 3 days recommended.  The patient was instructed follow-up with his cardiologist.  Return precautions discussed.  Stable for discharge.    Problems Addressed:  Dyspnea, unspecified type: acute illness or injury  History of CHF (congestive heart failure): chronic illness or injury with exacerbation, progression, or side effects of treatment    Amount and/or Complexity of Data Reviewed  External Data Reviewed: labs, radiology, ECG and notes.     Details: ED documentation/ECG, laboratory/imaging workup obtained on 6/23/2025 reviewed.  Labs: ordered.  Radiology: ordered and independent interpretation performed.     Details: Chest x-ray obtained and interpreted by me.  Cardiomegaly.  Pulmonary vascular congestion.  Large  unchanged compared to most recent chest x-ray.  ECG/medicine tests: ordered and independent interpretation performed.     Details: ECG obtained and interpreted by me.  Atrial fibrillation.  Heart rate 97 bpm.  Right bundle branch block.  Rightward axis.  No acute ischemic changes.  Largely unchanged compared to ECG obtained on 6/23/2025.  Discussion of management or test interpretation with external provider(s): The case and treatment plan were discussed with cardiology.  See above.             Medications - No data to display    ED Risk Strat Scores                    No data recorded                            History of Present Illness       Chief Complaint   Patient presents with    Shortness of Breath     Pt presented to this ED from home c/o increased sob w/cough starting last night with lower abdominal pain this morning. Hx chf. Denies travel/fevers/n/v/d       Past Medical History[1]   Past Surgical History[2]   Family History[3]   Social History[4]   E-Cigarette/Vaping    E-Cigarette Use Never User       E-Cigarette/Vaping Substances    Nicotine No     THC No     CBD No     Flavoring No     Other No     Unknown No       I have reviewed and agree with the history as documented.     60 M. Presents with abdominal discomfort, shortness of breath. Hx of CHF, COPD. Symptoms began last night. Has been compliant with all doses of Lasix 60 mg daily. Patient expresses weight gain, abdominal distension over the last few days.       History provided by:  Patient and medical records  Shortness of Breath  Associated symptoms: no abdominal pain, no chest pain, no cough, no diaphoresis, no fever, no hemoptysis, no sore throat, no sputum production, no vomiting and no wheezing      Review of Systems   Constitutional:  Negative for chills, diaphoresis and fever.   HENT:  Negative for congestion, sinus pressure, sinus pain and sore throat.    Respiratory:  Positive for shortness of breath. Negative for cough, hemoptysis,  sputum production, chest tightness and wheezing.    Cardiovascular:  Negative for chest pain.   Gastrointestinal:  Positive for abdominal distention. Negative for abdominal pain, diarrhea, nausea and vomiting.   Musculoskeletal:  Negative for arthralgias and back pain.     Objective       ED Triage Vitals [06/29/25 0820]   Temperature Pulse Blood Pressure Respirations SpO2 Patient Position - Orthostatic VS   98 °F (36.7 °C) 97 117/85 22 93 % Lying      Temp src Heart Rate Source BP Location FiO2 (%) Pain Score    -- Monitor Left arm -- 7      Vitals      Date and Time Temp Pulse SpO2 Resp BP Pain Score FACES Pain Rating User   06/29/25 1000 -- 101 96 % 21 95/76 -- -- SL   06/29/25 0820 98 °F (36.7 °C) 97 93 % 22 117/85 7 -- AC            Physical Exam  Vitals and nursing note reviewed.   Constitutional:       General: He is not in acute distress.     Appearance: He is ill-appearing. He is not toxic-appearing.   HENT:      Head: Normocephalic and atraumatic.     Eyes:      Extraocular Movements: Extraocular movements intact.       Cardiovascular:      Rate and Rhythm: Rhythm irregular.      Pulses: Normal pulses.      Heart sounds: No murmur heard.  Pulmonary:      Effort: No tachypnea, accessory muscle usage or respiratory distress.      Breath sounds: Decreased breath sounds and rales present. No wheezing or rhonchi.   Chest:      Chest wall: No tenderness.   Abdominal:      General: Bowel sounds are normal.      Palpations: Abdomen is soft.      Tenderness: There is no abdominal tenderness. There is no guarding or rebound.     Musculoskeletal:      Right lower leg: Edema present.      Left lower leg: Edema present.     Skin:     General: Skin is warm and dry.      Findings: Rash present.     Neurological:      General: No focal deficit present.      Mental Status: He is alert and oriented to person, place, and time.     Psychiatric:         Mood and Affect: Mood normal.         Behavior: Behavior normal.          Results Reviewed       Procedure Component Value Units Date/Time    HS Troponin I 4hr [[id number]]     Lab Status: No result Specimen: Blood     HS Troponin 0hr (reflex protocol) [[id number]]  (Abnormal) Collected: 06/29/25 0850    Lab Status: Final result Specimen: Blood from Arm, Right Updated: 06/29/25 0921     hs TnI 0hr 77 ng/L     HS Troponin I 2hr [[id number]]     Lab Status: No result Specimen: Blood     Comprehensive metabolic panel [[id number]]  (Abnormal) Collected: 06/29/25 0850    Lab Status: Final result Specimen: Blood from Arm, Right Updated: 06/29/25 0918     Sodium 139 mmol/L      Potassium 4.3 mmol/L      Chloride 104 mmol/L      CO2 26 mmol/L      ANION GAP 9 mmol/L      BUN 27 mg/dL      Creatinine 1.49 mg/dL      Glucose 129 mg/dL      Calcium 9.3 mg/dL      AST 27 U/L      ALT 20 U/L      Alkaline Phosphatase 163 U/L      Total Protein 7.2 g/dL      Albumin 3.7 g/dL      Total Bilirubin 0.72 mg/dL      eGFR 50 ml/min/1.73sq m     Narrative:      National [employer] guidelines for Chronic Kidney Disease (CKD):     Stage 1 with normal or high GFR (GFR > 90 mL/min/1.73 square meters)    Stage 2 Mild CKD (GFR = 60-89 mL/min/1.73 square meters)    Stage 3A Moderate CKD (GFR = 45-59 mL/min/1.73 square meters)    Stage 3B Moderate CKD (GFR = 30-44 mL/min/1.73 square meters)    Stage 4 Severe CKD (GFR = 15-29 mL/min/1.73 square meters)    Stage 5 End Stage CKD (GFR <15 mL/min/1.73 square meters)  Note: GFR calculation is accurate only with a steady state creatinine    Magnesium [[id number]]  (Abnormal) Collected: 06/29/25 0850    Lab Status: Final result Specimen: Blood from Arm, Right Updated: 06/29/25 0918     Magnesium 1.8 mg/dL     CBC and differential [[id number]]  (Abnormal) Collected: 06/29/25 0850    Lab Status: Final result Specimen: Blood from Arm, Right Updated: 06/29/25 0855     WBC 6.58 Thousand/uL      RBC 4.25 Million/uL      Hemoglobin 12.3 g/dL      Hematocrit 40.5 %       MCV 95 fL      MCH 28.9 pg      MCHC 30.4 g/dL      RDW 19.4 %      MPV 9.7 fL      Platelets 344 Thousands/uL      nRBC 0 /100 WBCs      Segmented % 55 %      Immature Grans % 1 %      Lymphocytes % 24 %      Monocytes % 16 %      Eosinophils Relative 3 %      Basophils Relative 1 %      Absolute Neutrophils 3.67 Thousands/µL      Absolute Immature Grans 0.07 Thousand/uL      Absolute Lymphocytes 1.56 Thousands/µL      Absolute Monocytes 1.03 Thousand/µL      Eosinophils Absolute 0.20 Thousand/µL      Basophils Absolute 0.05 Thousands/µL             XR chest 1 view portable   ED Interpretation by Gary Nguyen DO (06/29 1145)   Cardiomegaly.  Pulmonary vascular congestion.  Largely unchanged compared to study obtained on 6/23/2025.          POC Cardiac US    Date/Time: 6/29/2025 10:01 AM    Performed by: Gary Nguyen DO  Authorized by: Gary Nguyen DO    Patient location:  ED  Procedure details:     Exam Type:  Transthoracic Echocardiography (TTE), limited    Purpose of Exam: diagnostic    Indications: dyspnea      Assessment / Evaluation for: cardiac function, pericardial effusion and intravascular volume status      Exam Type: follow-up exam      Image quality: diagnostic      Image availability:  Images available in PACS  Patient Details:     Cardiac Rhythm:  Regular  Cardiac findings:     Echo modes evaluated: limited 2D      Views obtained: parasternal long axis, parasternal short axis and apical 4-chamber      Pericardial effusion: absent      Tamponade physiology: absent      Wall motion: hypodynamic      LV systolic function: depressed      RV dilation: none        ED Medication and Procedure Management   Prior to Admission Medications   Prescriptions Last Dose Informant Patient Reported? Taking?   albuterol (ProAir HFA) 90 mcg/act inhaler   No No   Sig: Inhale 2 puffs every 6 (six) hours as needed for wheezing   amiodarone 200 mg tablet   No No   Sig: Take 1 tablet (200 mg total) by mouth  daily with breakfast   furosemide (LASIX) 20 mg tablet   No No   Sig: Take 3 tablets (60 mg total) by mouth daily   ipratropium-albuterol (DUO-NEB) 0.5-2.5 mg/3 mL nebulizer solution   No No   Sig: Take 3 mL by nebulization every 6 (six) hours while awake   magnesium Oxide (MAG-OX) 400 mg TABS   No No   Sig: Take 1 tablet (400 mg total) by mouth daily   metoprolol succinate (TOPROL-XL) 100 mg 24 hr tablet   No No   Sig: Take 1 tablet (100 mg total) by mouth 2 (two) times a day   mirtazapine (REMERON) 15 mg tablet   Yes No   Sig: Take 15 mg by mouth daily at bedtime   pantoprazole (PROTONIX) 40 mg tablet   No No   Sig: Take 1 tablet (40 mg total) by mouth daily   warfarin (COUMADIN) 3 mg tablet   No No   Sig: Take 1 tablet (3 mg total) by mouth daily      Facility-Administered Medications: None     Discharge Medication List as of 6/29/2025 10:07 AM        CONTINUE these medications which have NOT CHANGED    Details   albuterol (ProAir HFA) 90 mcg/act inhaler Inhale 2 puffs every 6 (six) hours as needed for wheezing, Starting Fri 3/28/2025, Normal      amiodarone 200 mg tablet Take 1 tablet (200 mg total) by mouth daily with breakfast, Starting Sun 6/15/2025, Normal      furosemide (LASIX) 20 mg tablet Take 3 tablets (60 mg total) by mouth daily, Starting Sun 6/15/2025, Normal      ipratropium-albuterol (DUO-NEB) 0.5-2.5 mg/3 mL nebulizer solution Take 3 mL by nebulization every 6 (six) hours while awake, Starting Thu 4/24/2025, Normal      magnesium Oxide (MAG-OX) 400 mg TABS Take 1 tablet (400 mg total) by mouth daily, Starting Fri 3/28/2025, Normal      metoprolol succinate (TOPROL-XL) 100 mg 24 hr tablet Take 1 tablet (100 mg total) by mouth 2 (two) times a day, Starting Sat 6/14/2025, Normal      mirtazapine (REMERON) 15 mg tablet Take 15 mg by mouth daily at bedtime, Starting Thu 5/1/2025, Historical Med      pantoprazole (PROTONIX) 40 mg tablet Take 1 tablet (40 mg total) by mouth daily, Starting Sat 6/14/2025,  "Until Fri 9/12/2025, No Print      warfarin (COUMADIN) 3 mg tablet Take 1 tablet (3 mg total) by mouth daily, Starting Sat 6/14/2025, No Print           No discharge procedures on file.  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    CHF (congestive heart failure) (HCC)     Coagulopathy (HCC) 04/26/2025    Depression     Emphysema of lung (HCC)     GERD (gastroesophageal reflux disease)     Gout     Hyperammonemia (HCC) 04/26/2025    Hypertension     Metabolic encephalopathy 03/22/2025    Neuropathy     Raynaud's disease     Right inguinal hernia     Scleroderma (HCC)     Shock (HCC) 04/26/2025   [2]   Past Surgical History:  Procedure Laterality Date    AMPUTATION Right     pt had tip of \"pointer finger\" d/t scleraderma    CARDIAC PACEMAKER PLACEMENT      FINGER AMPUTATION Left 01/31/2024    Procedure: AMPUTATION FINGER, LEFT INDEX FINGER CPT: 46750;  Surgeon: Pedro Vazquez MD;  Location:  MAIN OR;  Service: Orthopedics    HERNIA REPAIR Left     times 2    HI AMP F/TH 1/2 JT/PHALANX W/NEURECT W/DIR CLSR Right 01/23/2024    Procedure: AMPUTATION FINGER, RIGHT MIDDLE;  Surgeon: Pedro Vazquez MD;  Location: [facility];  Service: Orthopedics    HI RPR 1ST INGUN HRNA AGE 5 YRS/> REDUCIBLE Right 03/03/2023    Procedure: OPEN INGUINAL HERNIA REPAIR WITH MESH;  Surgeon: Temo Jackson DO;  Location: [facility];  Service: General   [3]   Family History  Problem Relation Name Age of Onset    " Hypertension Father     [4]   Social History  Tobacco Use    Smoking status: Every Day     Current packs/day: 0.50     Average packs/day: 1 pack/day for 45.5 years (44.7 ttl pk-yrs)     Types: Cigarettes     Start date: 2000    Smokeless tobacco: Never    Tobacco comments:     Last cigarette 0430   Vaping Use    Vaping status: Never Used   Substance Use Topics    Alcohol use: Not Currently    Drug use: Not Currently     Comment: Hx meth use        Gary Nguyen DO  06/29/25 1146

## 2025-06-30 LAB
ATRIAL RATE: 74 BPM
QRS AXIS: 182 DEGREES
QRSD INTERVAL: 162 MS
QT INTERVAL: 488 MS
QTC INTERVAL: 619 MS
T WAVE AXIS: -5 DEGREES
VENTRICULAR RATE: 97 BPM

## 2025-06-30 PROCEDURE — 93010 ELECTROCARDIOGRAM REPORT: CPT | Performed by: INTERNAL MEDICINE

## 2025-07-09 ENCOUNTER — HOSPITAL ENCOUNTER (EMERGENCY)
Facility: HOSPITAL | Age: 60
Discharge: HOME | End: 2025-07-09
Attending: EMERGENCY MEDICINE
Payer: COMMERCIAL

## 2025-07-09 VITALS
BODY MASS INDEX: 29.16 KG/M2 | WEIGHT: 196.87 LBS | HEART RATE: 84 BPM | HEIGHT: 69 IN | OXYGEN SATURATION: 98 % | DIASTOLIC BLOOD PRESSURE: 83 MMHG | RESPIRATION RATE: 18 BRPM | SYSTOLIC BLOOD PRESSURE: 115 MMHG | TEMPERATURE: 98.6 F

## 2025-07-09 DIAGNOSIS — K42.9 UMBILICAL HERNIA: ICD-10-CM

## 2025-07-09 DIAGNOSIS — R10.9 ABDOMINAL PAIN: Primary | ICD-10-CM

## 2025-07-09 LAB
ALBUMIN SERPL BCG-MCNC: 3.7 G/DL (ref 3.5–5)
ALP SERPL-CCNC: 151 U/L (ref 34–104)
ALT SERPL W P-5'-P-CCNC: 12 U/L (ref 7–52)
ANION GAP SERPL CALCULATED.3IONS-SCNC: 8 MMOL/L (ref 4–13)
AST SERPL W P-5'-P-CCNC: 24 U/L (ref 13–39)
BASOPHILS # BLD AUTO: 0.06 THOUSANDS/ÂΜL (ref 0–0.1)
BASOPHILS NFR BLD AUTO: 1 % (ref 0–1)
BILIRUB SERPL-MCNC: 0.87 MG/DL (ref 0.2–1)
BUN SERPL-MCNC: 27 MG/DL (ref 5–25)
CALCIUM SERPL-MCNC: 9.1 MG/DL (ref 8.4–10.2)
CHLORIDE SERPL-SCNC: 103 MMOL/L (ref 96–108)
CO2 SERPL-SCNC: 26 MMOL/L (ref 21–32)
CREAT SERPL-MCNC: 1.67 MG/DL (ref 0.6–1.3)
EOSINOPHIL # BLD AUTO: 0.09 THOUSAND/ÂΜL (ref 0–0.61)
EOSINOPHIL NFR BLD AUTO: 1 % (ref 0–6)
ERYTHROCYTE [DISTWIDTH] IN BLOOD BY AUTOMATED COUNT: 18.3 % (ref 11.6–15.1)
GFR SERPL CREATININE-BSD FRML MDRD: 43 ML/MIN/1.73SQ M
GLUCOSE SERPL-MCNC: 85 MG/DL (ref 65–140)
HCT VFR BLD AUTO: 39 % (ref 36.5–49.3)
HGB BLD-MCNC: 11.9 G/DL (ref 12–17)
IMM GRANULOCYTES # BLD AUTO: 0.04 THOUSAND/UL (ref 0–0.2)
IMM GRANULOCYTES NFR BLD AUTO: 1 % (ref 0–2)
LACTATE SERPL-SCNC: 1 MMOL/L (ref 0.5–2)
LIPASE SERPL-CCNC: 22 U/L (ref 11–82)
LYMPHOCYTES # BLD AUTO: 1.78 THOUSANDS/ÂΜL (ref 0.6–4.47)
LYMPHOCYTES NFR BLD AUTO: 23 % (ref 14–44)
MCH RBC QN AUTO: 27.7 PG (ref 26.8–34.3)
MCHC RBC AUTO-ENTMCNC: 30.5 G/DL (ref 31.4–37.4)
MCV RBC AUTO: 91 FL (ref 82–98)
MONOCYTES # BLD AUTO: 1.19 THOUSAND/ÂΜL (ref 0.17–1.22)
MONOCYTES NFR BLD AUTO: 15 % (ref 4–12)
NEUTROPHILS # BLD AUTO: 4.64 THOUSANDS/ÂΜL (ref 1.85–7.62)
NEUTS SEG NFR BLD AUTO: 59 % (ref 43–75)
NRBC BLD AUTO-RTO: 0 /100 WBCS
PLATELET # BLD AUTO: 263 THOUSANDS/UL (ref 149–390)
PMV BLD AUTO: 9.4 FL (ref 8.9–12.7)
POTASSIUM SERPL-SCNC: 4 MMOL/L (ref 3.5–5.3)
PROT SERPL-MCNC: 7 G/DL (ref 6.4–8.4)
RBC # BLD AUTO: 4.29 MILLION/UL (ref 3.88–5.62)
SODIUM SERPL-SCNC: 137 MMOL/L (ref 135–147)
WBC # BLD AUTO: 7.8 THOUSAND/UL (ref 4.31–10.16)

## 2025-07-09 PROCEDURE — 36415 COLL VENOUS BLD VENIPUNCTURE: CPT | Performed by: EMERGENCY MEDICINE

## 2025-07-09 PROCEDURE — 83690 ASSAY OF LIPASE: CPT | Performed by: EMERGENCY MEDICINE

## 2025-07-09 PROCEDURE — 80053 COMPREHEN METABOLIC PANEL: CPT | Performed by: EMERGENCY MEDICINE

## 2025-07-09 PROCEDURE — 99284 EMERGENCY DEPT VISIT MOD MDM: CPT | Performed by: EMERGENCY MEDICINE

## 2025-07-09 PROCEDURE — 85025 COMPLETE CBC W/AUTO DIFF WBC: CPT | Performed by: EMERGENCY MEDICINE

## 2025-07-09 PROCEDURE — 96374 THER/PROPH/DIAG INJ IV PUSH: CPT

## 2025-07-09 PROCEDURE — 99284 EMERGENCY DEPT VISIT MOD MDM: CPT

## 2025-07-09 PROCEDURE — 83605 ASSAY OF LACTIC ACID: CPT | Performed by: EMERGENCY MEDICINE

## 2025-07-09 RX ORDER — MORPHINE SULFATE 4 MG/ML
4 INJECTION, SOLUTION INTRAMUSCULAR; INTRAVENOUS ONCE
Status: COMPLETED | OUTPATIENT
Start: 2025-07-09 | End: 2025-07-09

## 2025-07-09 RX ADMIN — MORPHINE SULFATE 4 MG: 4 INJECTION INTRAVENOUS at 19:46

## 2025-07-10 NOTE — DISCHARGE INSTRUCTIONS
We reduced your umbilical hernia.  Please follow-up with your family doctor as needed.  Return with any worsening.  Continue all your normal medications.    Thank you for choosing the emergency department at [facility]. We appreciated the opportunity and privilege to address your healthcare needs. We remain available to you should you require additional evaluation or assistance. We value your feedback and would appreciate the opportunity to address anything you identified as an opportunity to improve or where we excelled. If there are colleagues who deserve special recognition, please let us know! We hope you are feeling better soon!    Please also note that sometimes there are subtle abnormalities in your lab values that you may observe when you access your record online.  These are frequently not worrisome and if they are of concern we will have discussed them with you.  However, we always encourage that you discuss any concerns you may have or observe on your record with your primary care provider.   Please also note that while your visit documentation was reviewed prior to completion, voice transcription will occasionally recognize words or grammar differently than what was spoken.

## 2025-07-10 NOTE — ED PROVIDER NOTES
Time reflects when diagnosis was documented in both MDM as applicable and the Disposition within this note       Time User Action Codes Description Comment    7/9/2025  8:55 PM Sanjiv Espinosa Add [R10.9] Abdominal pain     7/9/2025  8:56 PM Sanjiv Espinosa Add [K42.9] Umbilical hernia           ED Disposition       ED Disposition   Discharge    Condition   Stable    Date/Time   Wed Jul 9, 2025  8:55 PM    Comment   Britton Batista discharge to home/self care.                   Assessment & Plan       Medical Decision Making  Problems Addressed:  Abdominal pain: self-limited or minor problem  Umbilical hernia: self-limited or minor problem    Amount and/or Complexity of Data Reviewed  Labs: ordered.    Risk  Prescription drug management.        ED Course as of 07/10/25 0043   Wed Jul 09, 2025   2037 Renal function unchanged from prior.   u Jul 10, 2025   0042 Patient was provided morphine.  After receiving morphine hernia was easily reduced.    Stable for discharge       Medications   morphine injection 4 mg (4 mg Intravenous Given 7/9/25 1946)       ED Risk Strat Scores                    No data recorded                            History of Present Illness       Chief Complaint   Patient presents with    Medical Problem     Patient has hernia and feels like it popped out. 10/10 pain, no meds taken at home. -n/v/d. Abdomen distended at umbilicus. Denies any activity to cause problem.        Past Medical History[1]   Past Surgical History[2]   Family History[3]   Social History[4]   E-Cigarette/Vaping    E-Cigarette Use Never User       E-Cigarette/Vaping Substances    Nicotine No     THC No     CBD No     Flavoring No     Other No     Unknown No       I have reviewed and agree with the history as documented.     60-year-old male with this is history of umbilical hernia and CHF presenting to the emergency room complaining of umbilical hernia pain.  Patient reports that he is normally able to reduce his umbilical  hernia however now he has been unable to reduce it for several hours.  Patient describing pain as 10 out of 10.  No nausea vomiting or diarrhea.  No fevers or chills.      Medical Problem  Associated symptoms: no chest pain, no diarrhea, no fever, no nausea, no shortness of breath, no vomiting and no wheezing        Review of Systems   Constitutional:  Negative for fever.   Respiratory:  Negative for shortness of breath and wheezing.    Cardiovascular:  Negative for chest pain.   Gastrointestinal:  Positive for abdominal distention. Negative for diarrhea, nausea and vomiting.           Objective       ED Triage Vitals   Temperature Pulse Blood Pressure Respirations SpO2 Patient Position - Orthostatic VS   07/09/25 1929 07/09/25 1927 07/09/25 1927 07/09/25 1927 07/09/25 1927 07/09/25 1927   98.6 °F (37 °C) 84 115/83 18 98 % Lying      Temp Source Heart Rate Source BP Location FiO2 (%) Pain Score    07/09/25 1929 07/09/25 1927 07/09/25 1927 -- 07/09/25 1927    Temporal Monitor Right arm  10 - Worst Possible Pain      Vitals      Date and Time Temp Pulse SpO2 Resp BP Pain Score FACES Pain Rating User   07/09/25 1946 -- -- -- -- -- 10 - Worst Possible Pain -- MR   07/09/25 1929 98.6 °F (37 °C) -- -- -- -- -- -- AD   07/09/25 1927 -- 84 98 % 18 115/83 10 - Worst Possible Pain -- AD            Physical Exam  Vitals and nursing note reviewed.   Constitutional:       General: He is in acute distress.      Appearance: He is normal weight. He is not ill-appearing or toxic-appearing.   HENT:      Head: Normocephalic and atraumatic.      Right Ear: External ear normal.      Left Ear: External ear normal.      Nose: Nose normal.      Mouth/Throat:      Mouth: Mucous membranes are moist.   Pulmonary:      Effort: Pulmonary effort is normal. No respiratory distress.   Abdominal:      General: Abdomen is flat. There is no distension.      Hernia: A hernia is present. Hernia is present in the umbilical area.     Musculoskeletal:          General: No signs of injury.     Skin:     Coloration: Skin is not pale.     Neurological:      General: No focal deficit present.      Mental Status: He is alert.     Psychiatric:         Mood and Affect: Mood is anxious.         Results Reviewed       Procedure Component Value Units Date/Time    Lipase [[id number]]  (Normal) Collected: 07/09/25 1949    Lab Status: Final result Specimen: Blood from Arm, Right Updated: 07/09/25 2011     Lipase 22 u/L     Comprehensive metabolic panel [[id number]]  (Abnormal) Collected: 07/09/25 1949    Lab Status: Final result Specimen: Blood from Arm, Right Updated: 07/09/25 2011     Sodium 137 mmol/L      Potassium 4.0 mmol/L      Chloride 103 mmol/L      CO2 26 mmol/L      ANION GAP 8 mmol/L      BUN 27 mg/dL      Creatinine 1.67 mg/dL      Glucose 85 mg/dL      Calcium 9.1 mg/dL      AST 24 U/L      ALT 12 U/L      Alkaline Phosphatase 151 U/L      Total Protein 7.0 g/dL      Albumin 3.7 g/dL      Total Bilirubin 0.87 mg/dL      eGFR 43 ml/min/1.73sq m     Narrative:      [employer] guidelines for Chronic Kidney Disease (CKD):     Stage 1 with normal or high GFR (GFR > 90 mL/min/1.73 square meters)    Stage 2 Mild CKD (GFR = 60-89 mL/min/1.73 square meters)    Stage 3A Moderate CKD (GFR = 45-59 mL/min/1.73 square meters)    Stage 3B Moderate CKD (GFR = 30-44 mL/min/1.73 square meters)    Stage 4 Severe CKD (GFR = 15-29 mL/min/1.73 square meters)    Stage 5 End Stage CKD (GFR <15 mL/min/1.73 square meters)  Note: GFR calculation is accurate only with a steady state creatinine    Lactic acid, plasma (w/reflex if result > 2.0) [[id number]]  (Normal) Collected: 07/09/25 1949    Lab Status: Final result Specimen: Blood from Arm, Right Updated: 07/09/25 2009     LACTIC ACID 1.0 mmol/L     Narrative:      Result may be elevated if tourniquet was used during collection.    CBC and differential [[id number]]  (Abnormal) Collected: 07/09/25 1949    Lab Status:  Final result Specimen: Blood from Arm, Right Updated: 07/09/25 1955     WBC 7.80 Thousand/uL      RBC 4.29 Million/uL      Hemoglobin 11.9 g/dL      Hematocrit 39.0 %      MCV 91 fL      MCH 27.7 pg      MCHC 30.5 g/dL      RDW 18.3 %      MPV 9.4 fL      Platelets 263 Thousands/uL      nRBC 0 /100 WBCs      Segmented % 59 %      Immature Grans % 1 %      Lymphocytes % 23 %      Monocytes % 15 %      Eosinophils Relative 1 %      Basophils Relative 1 %      Absolute Neutrophils 4.64 Thousands/µL      Absolute Immature Grans 0.04 Thousand/uL      Absolute Lymphocytes 1.78 Thousands/µL      Absolute Monocytes 1.19 Thousand/µL      Eosinophils Absolute 0.09 Thousand/µL      Basophils Absolute 0.06 Thousands/µL             No orders to display       Procedures    ED Medication and Procedure Management   Prior to Admission Medications   Prescriptions Last Dose Informant Patient Reported? Taking?   albuterol (ProAir HFA) 90 mcg/act inhaler   No No   Sig: Inhale 2 puffs every 6 (six) hours as needed for wheezing   amiodarone 200 mg tablet   No No   Sig: Take 1 tablet (200 mg total) by mouth daily with breakfast   furosemide (LASIX) 20 mg tablet   No No   Sig: Take 3 tablets (60 mg total) by mouth daily   ipratropium-albuterol (DUO-NEB) 0.5-2.5 mg/3 mL nebulizer solution   No No   Sig: Take 3 mL by nebulization every 6 (six) hours while awake   magnesium Oxide (MAG-OX) 400 mg TABS   No No   Sig: Take 1 tablet (400 mg total) by mouth daily   metoprolol succinate (TOPROL-XL) 100 mg 24 hr tablet   No No   Sig: Take 1 tablet (100 mg total) by mouth 2 (two) times a day   mirtazapine (REMERON) 15 mg tablet   Yes No   Sig: Take 15 mg by mouth daily at bedtime   pantoprazole (PROTONIX) 40 mg tablet   No No   Sig: Take 1 tablet (40 mg total) by mouth daily   warfarin (COUMADIN) 3 mg tablet   No No   Sig: Take 1 tablet (3 mg total) by mouth daily      Facility-Administered Medications: None     Discharge Medication List as of  7/9/2025  8:56 PM        CONTINUE these medications which have NOT CHANGED    Details   albuterol (ProAir HFA) 90 mcg/act inhaler Inhale 2 puffs every 6 (six) hours as needed for wheezing, Starting Fri 3/28/2025, Normal      amiodarone 200 mg tablet Take 1 tablet (200 mg total) by mouth daily with breakfast, Starting Sun 6/15/2025, Normal      furosemide (LASIX) 20 mg tablet Take 3 tablets (60 mg total) by mouth daily, Starting Sun 6/15/2025, Normal      ipratropium-albuterol (DUO-NEB) 0.5-2.5 mg/3 mL nebulizer solution Take 3 mL by nebulization every 6 (six) hours while awake, Starting Thu 4/24/2025, Normal      magnesium Oxide (MAG-OX) 400 mg TABS Take 1 tablet (400 mg total) by mouth daily, Starting Fri 3/28/2025, Normal      metoprolol succinate (TOPROL-XL) 100 mg 24 hr tablet Take 1 tablet (100 mg total) by mouth 2 (two) times a day, Starting Sat 6/14/2025, Normal      mirtazapine (REMERON) 15 mg tablet Take 15 mg by mouth daily at bedtime, Starting Thu 5/1/2025, Historical Med      pantoprazole (PROTONIX) 40 mg tablet Take 1 tablet (40 mg total) by mouth daily, Starting Sat 6/14/2025, Until Fri 9/12/2025, No Print      warfarin (COUMADIN) 3 mg tablet Take 1 tablet (3 mg total) by mouth daily, Starting Sat 6/14/2025, No Print           No discharge procedures on file.  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    CHF (congestive heart failure) (HCC)     Coagulopathy (HCC) 04/26/2025    Depression     Emphysema of lung (HCC)  "    GERD (gastroesophageal reflux disease)     Gout     Hyperammonemia (HCC) 04/26/2025    Hypertension     Metabolic encephalopathy 03/22/2025    Neuropathy     Raynaud's disease     Right inguinal hernia     Scleroderma (HCC)     Shock (HCC) 04/26/2025   [2]   Past Surgical History:  Procedure Laterality Date    AMPUTATION Right     pt had tip of \"pointer finger\" d/t scleraderma    CARDIAC PACEMAKER PLACEMENT      FINGER AMPUTATION Left 01/31/2024    Procedure: AMPUTATION FINGER, LEFT INDEX FINGER CPT: 57775;  Surgeon: Pedro Vazquez MD;  Location:  MAIN OR;  Service: Orthopedics    HERNIA REPAIR Left     times 2    PA AMP F/TH 1/2 JT/PHALANX W/NEURECT W/DIR CLSR Right 01/23/2024    Procedure: AMPUTATION FINGER, RIGHT MIDDLE;  Surgeon: Pedro Vazquez MD;  Location: [facility];  Service: Orthopedics    PA RPR 1ST INGUN HRNA AGE 5 YRS/> REDUCIBLE Right 03/03/2023    Procedure: OPEN INGUINAL HERNIA REPAIR WITH MESH;  Surgeon: Temo Jackson DO;  Location:  MAIN OR;  Service: General   [3]   Family History  Problem Relation Name Age of Onset    Hypertension Father     [4]   Social History  Tobacco Use    Smoking status: Every Day     Current packs/day: 0.50     Average packs/day: 1 pack/day for 45.5 years (44.8 ttl pk-yrs)     Types: Cigarettes     Start date: 2000    Smokeless tobacco: Never    Tobacco comments:     Last cigarette 0430   Vaping Use    Vaping status: Never Used   Substance Use Topics    Alcohol use: Not Currently    Drug use: Not Currently     Types: Marijuana     Comment: Hx meth use        Sanjiv Espinosa DO  07/10/25 0043    "

## 2025-07-12 ENCOUNTER — HOSPITAL ENCOUNTER (EMERGENCY)
Facility: HOSPITAL | Age: 60
Discharge: HOME | End: 2025-07-12
Attending: EMERGENCY MEDICINE | Admitting: EMERGENCY MEDICINE
Payer: COMMERCIAL

## 2025-07-12 ENCOUNTER — APPOINTMENT (EMERGENCY)
Dept: RADIOLOGY | Facility: HOSPITAL | Age: 60
End: 2025-07-12
Payer: COMMERCIAL

## 2025-07-12 VITALS
OXYGEN SATURATION: 94 % | WEIGHT: 198.41 LBS | SYSTOLIC BLOOD PRESSURE: 129 MMHG | HEART RATE: 86 BPM | TEMPERATURE: 97.8 F | DIASTOLIC BLOOD PRESSURE: 84 MMHG | RESPIRATION RATE: 16 BRPM | BODY MASS INDEX: 29.3 KG/M2

## 2025-07-12 DIAGNOSIS — I50.9 CHF (CONGESTIVE HEART FAILURE) (HCC): ICD-10-CM

## 2025-07-12 DIAGNOSIS — J44.1 COPD EXACERBATION (HCC): Primary | ICD-10-CM

## 2025-07-12 LAB
2HR DELTA HS TROPONIN: 1 NG/L
ALBUMIN SERPL BCG-MCNC: 3.6 G/DL (ref 3.5–5)
ALP SERPL-CCNC: 157 U/L (ref 34–104)
ALT SERPL W P-5'-P-CCNC: 11 U/L (ref 7–52)
ANION GAP SERPL CALCULATED.3IONS-SCNC: 7 MMOL/L (ref 4–13)
AST SERPL W P-5'-P-CCNC: 26 U/L (ref 13–39)
BASOPHILS # BLD AUTO: 0.08 THOUSANDS/ÂΜL (ref 0–0.1)
BASOPHILS NFR BLD AUTO: 1 % (ref 0–1)
BILIRUB SERPL-MCNC: 0.79 MG/DL (ref 0.2–1)
BNP SERPL-MCNC: 1590 PG/ML (ref 0–100)
BUN SERPL-MCNC: 27 MG/DL (ref 5–25)
CALCIUM SERPL-MCNC: 9 MG/DL (ref 8.4–10.2)
CARDIAC TROPONIN I PNL SERPL HS: 57 NG/L (ref ?–50)
CARDIAC TROPONIN I PNL SERPL HS: 58 NG/L (ref ?–50)
CHLORIDE SERPL-SCNC: 103 MMOL/L (ref 96–108)
CO2 SERPL-SCNC: 28 MMOL/L (ref 21–32)
CREAT SERPL-MCNC: 1.61 MG/DL (ref 0.6–1.3)
EOSINOPHIL # BLD AUTO: 0.08 THOUSAND/ÂΜL (ref 0–0.61)
EOSINOPHIL NFR BLD AUTO: 1 % (ref 0–6)
ERYTHROCYTE [DISTWIDTH] IN BLOOD BY AUTOMATED COUNT: 18.6 % (ref 11.6–15.1)
FLUAV AG UPPER RESP QL IA.RAPID: NEGATIVE
FLUBV AG UPPER RESP QL IA.RAPID: NEGATIVE
GFR SERPL CREATININE-BSD FRML MDRD: 45 ML/MIN/1.73SQ M
GLUCOSE SERPL-MCNC: 90 MG/DL (ref 65–140)
HCT VFR BLD AUTO: 39 % (ref 36.5–49.3)
HGB BLD-MCNC: 11.9 G/DL (ref 12–17)
IMM GRANULOCYTES # BLD AUTO: 0.04 THOUSAND/UL (ref 0–0.2)
IMM GRANULOCYTES NFR BLD AUTO: 1 % (ref 0–2)
INR PPP: 3.44 (ref 0.85–1.19)
LACTATE SERPL-SCNC: 1.2 MMOL/L (ref 0.5–2)
LYMPHOCYTES # BLD AUTO: 1.44 THOUSANDS/ÂΜL (ref 0.6–4.47)
LYMPHOCYTES NFR BLD AUTO: 18 % (ref 14–44)
MAGNESIUM SERPL-MCNC: 1.9 MG/DL (ref 1.9–2.7)
MCH RBC QN AUTO: 27.9 PG (ref 26.8–34.3)
MCHC RBC AUTO-ENTMCNC: 30.5 G/DL (ref 31.4–37.4)
MCV RBC AUTO: 92 FL (ref 82–98)
MONOCYTES # BLD AUTO: 1.24 THOUSAND/ÂΜL (ref 0.17–1.22)
MONOCYTES NFR BLD AUTO: 16 % (ref 4–12)
NEUTROPHILS # BLD AUTO: 5.1 THOUSANDS/ÂΜL (ref 1.85–7.62)
NEUTS SEG NFR BLD AUTO: 63 % (ref 43–75)
NRBC BLD AUTO-RTO: 0 /100 WBCS
PLATELET # BLD AUTO: 220 THOUSANDS/UL (ref 149–390)
PMV BLD AUTO: 9.9 FL (ref 8.9–12.7)
POTASSIUM SERPL-SCNC: 4.1 MMOL/L (ref 3.5–5.3)
PROT SERPL-MCNC: 6.8 G/DL (ref 6.4–8.4)
PROTHROMBIN TIME: 34.4 SECONDS (ref 12.3–15)
RBC # BLD AUTO: 4.26 MILLION/UL (ref 3.88–5.62)
SARS-COV+SARS-COV-2 AG RESP QL IA.RAPID: NEGATIVE
SODIUM SERPL-SCNC: 138 MMOL/L (ref 135–147)
WBC # BLD AUTO: 7.98 THOUSAND/UL (ref 4.31–10.16)

## 2025-07-12 PROCEDURE — 71045 X-RAY EXAM CHEST 1 VIEW: CPT

## 2025-07-12 PROCEDURE — 83880 ASSAY OF NATRIURETIC PEPTIDE: CPT | Performed by: EMERGENCY MEDICINE

## 2025-07-12 PROCEDURE — 85610 PROTHROMBIN TIME: CPT | Performed by: EMERGENCY MEDICINE

## 2025-07-12 PROCEDURE — 87811 SARS-COV-2 COVID19 W/OPTIC: CPT | Performed by: EMERGENCY MEDICINE

## 2025-07-12 PROCEDURE — 99285 EMERGENCY DEPT VISIT HI MDM: CPT

## 2025-07-12 PROCEDURE — 84484 ASSAY OF TROPONIN QUANT: CPT | Performed by: EMERGENCY MEDICINE

## 2025-07-12 PROCEDURE — 87040 BLOOD CULTURE FOR BACTERIA: CPT | Performed by: EMERGENCY MEDICINE

## 2025-07-12 PROCEDURE — 87804 INFLUENZA ASSAY W/OPTIC: CPT | Performed by: EMERGENCY MEDICINE

## 2025-07-12 PROCEDURE — 94640 AIRWAY INHALATION TREATMENT: CPT

## 2025-07-12 PROCEDURE — 83605 ASSAY OF LACTIC ACID: CPT | Performed by: EMERGENCY MEDICINE

## 2025-07-12 PROCEDURE — 83735 ASSAY OF MAGNESIUM: CPT | Performed by: EMERGENCY MEDICINE

## 2025-07-12 PROCEDURE — 93005 ELECTROCARDIOGRAM TRACING: CPT

## 2025-07-12 PROCEDURE — 96374 THER/PROPH/DIAG INJ IV PUSH: CPT

## 2025-07-12 PROCEDURE — 80053 COMPREHEN METABOLIC PANEL: CPT | Performed by: EMERGENCY MEDICINE

## 2025-07-12 PROCEDURE — 36415 COLL VENOUS BLD VENIPUNCTURE: CPT | Performed by: EMERGENCY MEDICINE

## 2025-07-12 PROCEDURE — 99285 EMERGENCY DEPT VISIT HI MDM: CPT | Performed by: EMERGENCY MEDICINE

## 2025-07-12 PROCEDURE — 85025 COMPLETE CBC W/AUTO DIFF WBC: CPT | Performed by: EMERGENCY MEDICINE

## 2025-07-12 RX ORDER — FUROSEMIDE 10 MG/ML
40 INJECTION INTRAMUSCULAR; INTRAVENOUS ONCE
Status: COMPLETED | OUTPATIENT
Start: 2025-07-12 | End: 2025-07-12

## 2025-07-12 RX ORDER — IPRATROPIUM BROMIDE AND ALBUTEROL SULFATE 2.5; .5 MG/3ML; MG/3ML
3 SOLUTION RESPIRATORY (INHALATION) ONCE
Status: COMPLETED | OUTPATIENT
Start: 2025-07-12 | End: 2025-07-12

## 2025-07-12 RX ADMIN — FUROSEMIDE 40 MG: 10 INJECTION, SOLUTION INTRAMUSCULAR; INTRAVENOUS at 10:07

## 2025-07-12 RX ADMIN — IPRATROPIUM BROMIDE AND ALBUTEROL SULFATE 3 ML: .5; 3 SOLUTION RESPIRATORY (INHALATION) at 10:07

## 2025-07-12 NOTE — ED PROVIDER NOTES
Time reflects when diagnosis was documented in both MDM as applicable and the Disposition within this note       Time User Action Codes Description Comment    7/12/2025 12:56 PM Matt Arteaga Add [J44.1] COPD exacerbation (HCC)     7/12/2025 12:56 PM Matt Arteaga Add [I50.9] CHF (congestive heart failure) (HCC)           ED Disposition       ED Disposition   Discharge    Condition   Stable    Date/Time   Sat Jul 12, 2025 12:56 PM    Comment   Britton Batista discharge to home/self care.                   Assessment & Plan       Medical Decision Making  Based on the history and medical screening exam performed the patient may be at risk for CHF, COPD, URI, pneumonia.    Based on the work-up performed in the emergency room which includes physical examination, and which may include laboratory studies and imaging as warranted including advanced imaging such as CT scan or ultrasound, the diagnostic considerations are narrowed to exclude limb or life-threatening process.    The patient is stable for discharge.    Patient improved after treatment in the ED.  Ambulates without desaturation.  Serial troponins stable.  Chest x-ray negative.  Symptoms likely represent combination COPD/CHF.  Appropriate for discharge at this time    Amount and/or Complexity of Data Reviewed  Labs: ordered. Decision-making details documented in ED Course.     Details: Stable serial troponins.  At patient baseline 57/58.  Elevated BNP also at patient baseline.  Labs otherwise negative.  Radiology: ordered and independent interpretation performed. Decision-making details documented in ED Course.     Details: Chest x-ray negative.  ECG/medicine tests: ordered and independent interpretation performed. Decision-making details documented in ED Course.     Details: Paced EKG rate 96    Risk  Prescription drug management.        ED Course as of 07/12/25 1257   Sat Jul 12, 2025   1253 Patient feels better after nebulization and Lasix IV.  He has  stable serial troponins.  He is able to ambulate without desaturation.  Appropriate for discharge.       Medications   ipratropium-albuterol (DUO-NEB) 0.5-2.5 mg/3 mL inhalation solution 3 mL (3 mL Nebulization Given 7/12/25 1007)   furosemide (LASIX) injection 40 mg (40 mg Intravenous Given 7/12/25 1007)       ED Risk Strat Scores                    No data recorded        SBIRT 22yo+      Flowsheet Row Most Recent Value   Initial Alcohol Screen: US AUDIT-C     1. How often do you have a drink containing alcohol? 0 Filed at: 07/12/2025 1018   2. How many drinks containing alcohol do you have on a typical day you are drinking?  0 Filed at: 07/12/2025 1018   3a. Male UNDER 65: How often do you have five or more drinks on one occasion? 0 Filed at: 07/12/2025 1018   Audit-C Score 0 Filed at: 07/12/2025 1018   SILVIA: How many times in the past year have you...    Used an illegal drug or used a prescription medication for non-medical reasons? Never Filed at: 07/12/2025 1018                            History of Present Illness       Chief Complaint   Patient presents with    Shortness of Breath     Patient states he feels like he has been retaining fluid. SOB started yesterday. Denies chest pain at this time.       Past Medical History[1]   Past Surgical History[2]   Family History[3]   Social History[4]   E-Cigarette/Vaping    E-Cigarette Use Never User       E-Cigarette/Vaping Substances    Nicotine No     THC No     CBD No     Flavoring No     Other No     Unknown No       I have reviewed and agree with the history as documented.     Patient with history of CHF and COPD, on Lasix, complains of 24 hours of shortness of breath.  He states he cannot catch his breath.  He complains of increased swelling.  He complains of coughing.  He denies fevers or chills.  He denies chest pain or abdominal pain.  No other complaints at this time.      History provided by:  Patient   used: No    Shortness of  Breath  Severity:  Moderate  Onset quality:  Gradual  Duration:  1 day  Timing:  Constant  Progression:  Unchanged  Chronicity:  New  Relieved by:  Nothing  Worsened by:  Nothing  Ineffective treatments:  None tried  Associated symptoms: cough and wheezing    Associated symptoms: no abdominal pain, no chest pain, no ear pain, no fever, no headaches, no hemoptysis, no neck pain, no rash, no sore throat, no sputum production, no syncope and no vomiting        Review of Systems   Constitutional:  Negative for chills and fever.   HENT:  Negative for ear pain, hearing loss, sore throat, trouble swallowing and voice change.    Eyes:  Negative for pain and discharge.   Respiratory:  Positive for cough, shortness of breath and wheezing. Negative for hemoptysis and sputum production.    Cardiovascular:  Negative for chest pain, palpitations and syncope.   Gastrointestinal:  Negative for abdominal pain, blood in stool, constipation, diarrhea, nausea and vomiting.   Genitourinary:  Negative for dysuria, flank pain, frequency and hematuria.   Musculoskeletal:  Negative for joint swelling, neck pain and neck stiffness.   Skin:  Negative for rash and wound.   Neurological:  Negative for dizziness, seizures, syncope, facial asymmetry and headaches.   Psychiatric/Behavioral:  Negative for hallucinations, self-injury and suicidal ideas.    All other systems reviewed and are negative.          Objective       ED Triage Vitals   Temperature Pulse Blood Pressure Respirations SpO2 Patient Position - Orthostatic VS   07/12/25 0933 07/12/25 0934 07/12/25 0934 07/12/25 0934 07/12/25 0934 07/12/25 0934   97.6 °F (36.4 °C) 92 113/75 22 96 % Lying      Temp Source Heart Rate Source BP Location FiO2 (%) Pain Score    07/12/25 0933 07/12/25 0934 07/12/25 0934 -- 07/12/25 1113    Oral Monitor Right arm  No Pain      Vitals      Date and Time Temp Pulse SpO2 Resp BP Pain Score FACES Pain Rating User   07/12/25 1113 97.8 °F (36.6 °C) 93 92 % 16  126/99 No Pain -- KM   07/12/25 0934 -- 92 96 % 22 113/75 -- -- PK   07/12/25 0933 97.6 °F (36.4 °C) -- -- -- -- -- -- PK            Physical Exam  Vitals and nursing note reviewed.   Constitutional:       General: He is not in acute distress.     Appearance: He is well-developed. He is ill-appearing.   HENT:      Head: Normocephalic and atraumatic.      Right Ear: External ear normal.      Left Ear: External ear normal.     Eyes:      General: No scleral icterus.        Right eye: No discharge.         Left eye: No discharge.      Extraocular Movements: Extraocular movements intact.      Conjunctiva/sclera: Conjunctivae normal.       Cardiovascular:      Rate and Rhythm: Normal rate and regular rhythm.      Heart sounds: Normal heart sounds. No murmur heard.  Pulmonary:      Effort: Pulmonary effort is normal.      Breath sounds: Examination of the right-upper field reveals wheezing. Examination of the left-upper field reveals wheezing. Examination of the right-middle field reveals wheezing. Examination of the left-middle field reveals wheezing. Examination of the right-lower field reveals decreased breath sounds, wheezing and rales. Examination of the left-lower field reveals wheezing. Decreased breath sounds, wheezing and rales present.   Abdominal:      General: Bowel sounds are normal. There is no distension.      Palpations: Abdomen is soft.      Tenderness: There is no abdominal tenderness. There is no guarding or rebound.     Musculoskeletal:         General: No deformity. Normal range of motion.      Cervical back: Normal range of motion and neck supple.     Skin:     General: Skin is warm and dry.      Findings: No rash.     Neurological:      General: No focal deficit present.      Mental Status: He is alert and oriented to person, place, and time.      Cranial Nerves: No cranial nerve deficit.     Psychiatric:         Mood and Affect: Mood normal.         Behavior: Behavior normal.         Thought  Content: Thought content normal.         Judgment: Judgment normal.         Results Reviewed       Procedure Component Value Units Date/Time    HS Troponin I 2hr [[id number]]  (Abnormal) Collected: 07/12/25 1212    Lab Status: Final result Specimen: Blood from Arm, Right Updated: 07/12/25 1239     hs TnI 2hr 58 ng/L      Delta 2hr hsTnI 1 ng/L     HS Troponin I 4hr [[id number]]     Lab Status: No result Specimen: Blood     Protime-INR [[id number]]  (Abnormal) Collected: 07/12/25 1013    Lab Status: Final result Specimen: Blood from Arm, Left Updated: 07/12/25 1033     Protime 34.4 seconds      INR 3.44    Narrative:      INR Therapeutic Range    Indication                                             INR Range      Atrial Fibrillation                                               2.0-3.0  Hypercoagulable State                                    2.0.2.3  Left Ventricular Asist Device                            2.0-3.0  Mechanical Heart Valve                                  -    Aortic(with afib, MI, embolism, HF, LA enlargement,    and/or coagulopathy)                                     2.0-3.0 (2.5-3.5)     Mitral                                                             2.5-3.5  Prosthetic/Bioprosthetic Heart Valve               2.0-3.0  Venous thromboembolism (VTE: VT, PE        2.0-3.0    HS Troponin 0hr (reflex protocol) [[id number]]  (Abnormal) Collected: 07/12/25 0951    Lab Status: Final result Specimen: Blood from Arm, Left Updated: 07/12/25 1024     hs TnI 0hr 57 ng/L     B-Type Natriuretic Peptide(BNP) [[id number]]  (Abnormal) Collected: 07/12/25 0951    Lab Status: Final result Specimen: Blood from Arm, Left Updated: 07/12/25 1023     BNP 1,590 pg/mL     Lactic acid, plasma (w/reflex if result > 2.0) [[id number]]  (Normal) Collected: 07/12/25 0951    Lab Status: Final result Specimen: Blood from Arm, Left Updated: 07/12/25 1020     LACTIC ACID 1.2 mmol/L     Narrative:      Result may be elevated if  tourniquet was used during collection.    Comprehensive metabolic panel [[id number]]  (Abnormal) Collected: 07/12/25 0951    Lab Status: Final result Specimen: Blood from Arm, Left Updated: 07/12/25 1020     Sodium 138 mmol/L      Potassium 4.1 mmol/L      Chloride 103 mmol/L      CO2 28 mmol/L      ANION GAP 7 mmol/L      BUN 27 mg/dL      Creatinine 1.61 mg/dL      Glucose 90 mg/dL      Calcium 9.0 mg/dL      AST 26 U/L      ALT 11 U/L      Alkaline Phosphatase 157 U/L      Total Protein 6.8 g/dL      Albumin 3.6 g/dL      Total Bilirubin 0.79 mg/dL      eGFR 45 ml/min/1.73sq m     Narrative:      National Kid[employer] guidelines for Chronic Kidney Disease (CKD):     Stage 1 with normal or high GFR (GFR > 90 mL/min/1.73 square meters)    Stage 2 Mild CKD (GFR = 60-89 mL/min/1.73 square meters)    Stage 3A Moderate CKD (GFR = 45-59 mL/min/1.73 square meters)    Stage 3B Moderate CKD (GFR = 30-44 mL/min/1.73 square meters)    Stage 4 Severe CKD (GFR = 15-29 mL/min/1.73 square meters)    Stage 5 End Stage CKD (GFR <15 mL/min/1.73 square meters)  Note: GFR calculation is accurate only with a steady state creatinine    Magnesium [[id number]]  (Normal) Collected: 07/12/25 0951    Lab Status: Final result Specimen: Blood from Arm, Left Updated: 07/12/25 1020     Magnesium 1.9 mg/dL     COVID-19/ Infleunza A/B Rapid Anitgen(30 min. TAT) [[id number]]  (Normal) Collected: 07/12/25 0951    Lab Status: Final result Specimen: Nares from Nose Updated: 07/12/25 1020     SARS COV Rapid Antigen Negative     Influenza A Rapid Antigen Negative     Influenza B Rapid Antigen Negative    Narrative:      This test has been performed using the [employer] Inez 2 FLU+SARS Antigen test under the Emergency Use Authorization (EUA). This test has been validated by the  and verified by the performing laboratory. The Inez uses lateral flow immunofluorescent sandwich assay to detect SARS-COV, Influenza A and Influenza B  Antigen.     The Quidel Inez 2 SARS Antigen test does not differentiate between SARS-CoV and SARS-CoV-2.     Negative results are presumptive and may be confirmed with a molecular assay, if necessary, for patient management. Negative results do not rule out SARS-CoV-2 or influenza infection and should not be used as the sole basis for treatment or patient management decisions. A negative test result may occur if the level of antigen in a sample is below the limit of detection of this test.     Positive results are indicative of the presence of viral antigens, but do not rule out bacterial infection or co-infection with other viruses.     All test results should be used as an adjunct to clinical observations and other information available to the provider.    FOR PEDIATRIC PATIENTS - copy/paste COVID Guidelines URL to browser: [email or web address]    CBC and differential [[id number]]  (Abnormal) Collected: 07/12/25 0951    Lab Status: Final result Specimen: Blood from Arm, Left Updated: 07/12/25 1002     WBC 7.98 Thousand/uL      RBC 4.26 Million/uL      Hemoglobin 11.9 g/dL      Hematocrit 39.0 %      MCV 92 fL      MCH 27.9 pg      MCHC 30.5 g/dL      RDW 18.6 %      MPV 9.9 fL      Platelets 220 Thousands/uL      nRBC 0 /100 WBCs      Segmented % 63 %      Immature Grans % 1 %      Lymphocytes % 18 %      Monocytes % 16 %      Eosinophils Relative 1 %      Basophils Relative 1 %      Absolute Neutrophils 5.10 Thousands/µL      Absolute Immature Grans 0.04 Thousand/uL      Absolute Lymphocytes 1.44 Thousands/µL      Absolute Monocytes 1.24 Thousand/µL      Eosinophils Absolute 0.08 Thousand/µL      Basophils Absolute 0.08 Thousands/µL     Blood culture #1 [[id number]] Collected: 07/12/25 0955    Lab Status: In process Specimen: Blood from Arm, Right Updated: 07/12/25 0959    Blood culture #2 [[id number]] Collected: 07/12/25 0951    Lab Status: In process Specimen:  Blood from Arm, Left Updated: 07/12/25 0959            XR chest portable   ED Interpretation by Matt Arteaga MD (07/12 1010)   No acute finding          ECG 12 Lead Documentation Only    Date/Time: 7/12/2025 9:33 AM    Performed by: Matt Arteaga MD  Authorized by: Matt Arteaga MD    ECG reviewed by me, the ED Provider: yes    Patient location:  ED  Previous ECG:     Previous ECG:  Unavailable  Interpretation:     Interpretation: non-specific    Rate:     ECG rate:  96    ECG rate assessment: normal    Rhythm:     Rhythm: paced    Ectopy:     Ectopy: none    QRS:     QRS axis:  Normal    QRS intervals:  Normal  Conduction:     Conduction: normal    ST segments:     ST segments:  Normal  T waves:     T waves: normal        ED Medication and Procedure Management   Prior to Admission Medications   Prescriptions Last Dose Informant Patient Reported? Taking?   albuterol (ProAir HFA) 90 mcg/act inhaler 7/12/2025 Morning  No Yes   Sig: Inhale 2 puffs every 6 (six) hours as needed for wheezing   amiodarone 200 mg tablet 7/12/2025 Morning  No Yes   Sig: Take 1 tablet (200 mg total) by mouth daily with breakfast   furosemide (LASIX) 20 mg tablet 7/12/2025 Morning  No Yes   Sig: Take 3 tablets (60 mg total) by mouth daily   ipratropium-albuterol (DUO-NEB) 0.5-2.5 mg/3 mL nebulizer solution 7/12/2025 Morning  No Yes   Sig: Take 3 mL by nebulization every 6 (six) hours while awake   magnesium Oxide (MAG-OX) 400 mg TABS 7/12/2025 Morning  No Yes   Sig: Take 1 tablet (400 mg total) by mouth daily   metoprolol succinate (TOPROL-XL) 100 mg 24 hr tablet 7/12/2025 Morning  No Yes   Sig: Take 1 tablet (100 mg total) by mouth 2 (two) times a day   mirtazapine (REMERON) 15 mg tablet Not Taking  Yes No   Sig: Take 15 mg by mouth daily at bedtime   Patient not taking: Reported on 7/12/2025   pantoprazole (PROTONIX) 40 mg tablet 7/12/2025 Morning  No Yes   Sig: Take 1 tablet (40 mg total) by mouth daily   warfarin (COUMADIN) 3  "mg tablet 7/11/2025  No Yes   Sig: Take 1 tablet (3 mg total) by mouth daily      Facility-Administered Medications: None     Patient's Medications   Discharge Prescriptions    No medications on file     No discharge procedures on file.  ED SEPSIS DOCUMENTATION   Time reflects when diagnosis was documented in both MDM as applicable and the Disposition within this note       Time User Action Codes Description Comment    7/12/2025 12:56 PM Matt Arteaga [J44.1] COPD exacerbation (HCC)     7/12/2025 12:56 PM Matt Arteaga [I50.9] CHF (congestive heart failure) ([facility])                    [1]   Past Medical History:  Diagnosis Date    Abdominal pain 04/25/2025    Acute respiratory failure ([facility]) 04/23/2025    Anxiety     Cardiomyopathy ([facility])     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    CHF (congestive heart failure) ([facility])     Coagulopathy ([facility]) 04/26/2025    Depression     Emphysema of lung ([facility])     GERD (gastroesophageal reflux disease)     Gout     Hyperammonemia ([facility]) 04/26/2025    Hypertension     Metabolic encephalopathy 03/22/2025    Neuropathy     Raynaud's disease     Right inguinal hernia     Scleroderma ([facility])     Shock ([facility]) 04/26/2025   [2]   Past Surgical History:  Procedure Laterality Date    AMPUTATION Right     pt had tip of \"pointer finger\" d/t scleraderma    CARDIAC PACEMAKER PLACEMENT      FINGER AMPUTATION Left 01/31/2024    Procedure: AMPUTATION FINGER, LEFT INDEX FINGER CPT: 51928;  Surgeon: Pedro Vazquze MD;  Location:  MAIN OR;  Service: Orthopedics    HERNIA REPAIR Left     times 2    NH AMP F/TH 1/2 JT/PHALANX W/NEURECT W/DIR CLSR Right 01/23/2024    Procedure: AMPUTATION FINGER, RIGHT MIDDLE;  Surgeon: Pedro Vazquez MD;  Location: [facility];  Service: Orthopedics    NH RPR 1ST INGUN HRNA AGE 5 YRS/> REDUCIBLE Right 03/03/2023    Procedure: OPEN INGUINAL HERNIA REPAIR WITH MESH;  Surgeon: Temo Jackson DO;  Location: [facility];  Service: General   [3]   Family " History  Problem Relation Name Age of Onset    Hypertension Father     [4]   Social History  Tobacco Use    Smoking status: Every Day     Current packs/day: 0.50     Average packs/day: 1 pack/day for 45.5 years (44.8 ttl pk-yrs)     Types: Cigarettes     Start date: 2000    Smokeless tobacco: Never    Tobacco comments:     Last cigarette 0430   Vaping Use    Vaping status: Never Used   Substance Use Topics    Alcohol use: Not Currently    Drug use: Not Currently     Types: Marijuana     Comment: Hx meth use        Matt Arteaga MD  07/12/25 1257

## 2025-07-12 NOTE — ED NOTES
Pt with decreased pulse ox with a good pleth at 87%. O2 at 2 applied. Pt current pulse ox 94-98%.      Maryjane Barron RN  07/12/25 4425

## 2025-07-14 LAB
ATRIAL RATE: 94 BPM
QRS AXIS: 184 DEGREES
QRSD INTERVAL: 172 MS
QT INTERVAL: 478 MS
QTC INTERVAL: 603 MS
T WAVE AXIS: 26 DEGREES
VENTRICULAR RATE: 96 BPM

## 2025-07-14 PROCEDURE — 93010 ELECTROCARDIOGRAM REPORT: CPT | Performed by: INTERNAL MEDICINE

## 2025-07-17 LAB
BACTERIA BLD CULT: NORMAL
BACTERIA BLD CULT: NORMAL

## 2025-07-18 ENCOUNTER — HOSPITAL ENCOUNTER (EMERGENCY)
Facility: HOSPITAL | Age: 60
Discharge: HOME | End: 2025-07-18
Attending: EMERGENCY MEDICINE
Payer: COMMERCIAL

## 2025-07-18 ENCOUNTER — APPOINTMENT (EMERGENCY)
Dept: RADIOLOGY | Facility: HOSPITAL | Age: 60
End: 2025-07-18
Payer: COMMERCIAL

## 2025-07-18 VITALS
HEART RATE: 57 BPM | OXYGEN SATURATION: 95 % | TEMPERATURE: 97.8 F | RESPIRATION RATE: 18 BRPM | HEIGHT: 66 IN | DIASTOLIC BLOOD PRESSURE: 83 MMHG | WEIGHT: 186.29 LBS | SYSTOLIC BLOOD PRESSURE: 134 MMHG | BODY MASS INDEX: 29.94 KG/M2

## 2025-07-18 DIAGNOSIS — I50.9 CHF (CONGESTIVE HEART FAILURE) (HCC): Primary | ICD-10-CM

## 2025-07-18 LAB
ALBUMIN SERPL BCG-MCNC: 3.7 G/DL (ref 3.5–5)
ALP SERPL-CCNC: 149 U/L (ref 34–104)
ALT SERPL W P-5'-P-CCNC: 9 U/L (ref 7–52)
ANION GAP SERPL CALCULATED.3IONS-SCNC: 7 MMOL/L (ref 4–13)
APTT PPP: 51 SECONDS (ref 23–34)
AST SERPL W P-5'-P-CCNC: 33 U/L (ref 13–39)
BASOPHILS # BLD AUTO: 0.06 THOUSANDS/ÂΜL (ref 0–0.1)
BASOPHILS NFR BLD AUTO: 1 % (ref 0–1)
BILIRUB SERPL-MCNC: 0.97 MG/DL (ref 0.2–1)
BNP SERPL-MCNC: 2513 PG/ML (ref 0–100)
BUN SERPL-MCNC: 23 MG/DL (ref 5–25)
CALCIUM SERPL-MCNC: 9.2 MG/DL (ref 8.4–10.2)
CARDIAC TROPONIN I PNL SERPL HS: 49 NG/L (ref ?–50)
CHLORIDE SERPL-SCNC: 102 MMOL/L (ref 96–108)
CO2 SERPL-SCNC: 27 MMOL/L (ref 21–32)
CREAT SERPL-MCNC: 1.4 MG/DL (ref 0.6–1.3)
EOSINOPHIL # BLD AUTO: 0.17 THOUSAND/ÂΜL (ref 0–0.61)
EOSINOPHIL NFR BLD AUTO: 2 % (ref 0–6)
ERYTHROCYTE [DISTWIDTH] IN BLOOD BY AUTOMATED COUNT: 18.4 % (ref 11.6–15.1)
FLUAV AG UPPER RESP QL IA.RAPID: NEGATIVE
FLUBV AG UPPER RESP QL IA.RAPID: NEGATIVE
GFR SERPL CREATININE-BSD FRML MDRD: 54 ML/MIN/1.73SQ M
GLUCOSE SERPL-MCNC: 85 MG/DL (ref 65–140)
HCT VFR BLD AUTO: 41.6 % (ref 36.5–49.3)
HGB BLD-MCNC: 12.7 G/DL (ref 12–17)
IMM GRANULOCYTES # BLD AUTO: 0.03 THOUSAND/UL (ref 0–0.2)
IMM GRANULOCYTES NFR BLD AUTO: 0 % (ref 0–2)
INR PPP: 4.25 (ref 0.85–1.19)
LACTATE SERPL-SCNC: 1.4 MMOL/L (ref 0.5–2)
LIPASE SERPL-CCNC: 9 U/L (ref 11–82)
LYMPHOCYTES # BLD AUTO: 1.72 THOUSANDS/ÂΜL (ref 0.6–4.47)
LYMPHOCYTES NFR BLD AUTO: 23 % (ref 14–44)
MAGNESIUM SERPL-MCNC: 2 MG/DL (ref 1.9–2.7)
MCH RBC QN AUTO: 27.6 PG (ref 26.8–34.3)
MCHC RBC AUTO-ENTMCNC: 30.5 G/DL (ref 31.4–37.4)
MCV RBC AUTO: 90 FL (ref 82–98)
MONOCYTES # BLD AUTO: 1.09 THOUSAND/ÂΜL (ref 0.17–1.22)
MONOCYTES NFR BLD AUTO: 15 % (ref 4–12)
NEUTROPHILS # BLD AUTO: 4.47 THOUSANDS/ÂΜL (ref 1.85–7.62)
NEUTS SEG NFR BLD AUTO: 59 % (ref 43–75)
NRBC BLD AUTO-RTO: 0 /100 WBCS
PLATELET # BLD AUTO: 210 THOUSANDS/UL (ref 149–390)
PMV BLD AUTO: 9.7 FL (ref 8.9–12.7)
POTASSIUM SERPL-SCNC: 4.7 MMOL/L (ref 3.5–5.3)
PROT SERPL-MCNC: 7.2 G/DL (ref 6.4–8.4)
PROTHROMBIN TIME: 42.4 SECONDS (ref 12.3–15)
QRS AXIS: 103 DEGREES
QRSD INTERVAL: 168 MS
QT INTERVAL: 408 MS
QTC INTERVAL: 523 MS
RBC # BLD AUTO: 4.6 MILLION/UL (ref 3.88–5.62)
SARS-COV+SARS-COV-2 AG RESP QL IA.RAPID: NEGATIVE
SODIUM SERPL-SCNC: 136 MMOL/L (ref 135–147)
T WAVE AXIS: -66 DEGREES
VENTRICULAR RATE: 99 BPM
WBC # BLD AUTO: 7.54 THOUSAND/UL (ref 4.31–10.16)

## 2025-07-18 PROCEDURE — 87804 INFLUENZA ASSAY W/OPTIC: CPT | Performed by: EMERGENCY MEDICINE

## 2025-07-18 PROCEDURE — 85730 THROMBOPLASTIN TIME PARTIAL: CPT | Performed by: EMERGENCY MEDICINE

## 2025-07-18 PROCEDURE — 84484 ASSAY OF TROPONIN QUANT: CPT | Performed by: EMERGENCY MEDICINE

## 2025-07-18 PROCEDURE — 36415 COLL VENOUS BLD VENIPUNCTURE: CPT | Performed by: EMERGENCY MEDICINE

## 2025-07-18 PROCEDURE — 96374 THER/PROPH/DIAG INJ IV PUSH: CPT

## 2025-07-18 PROCEDURE — 80053 COMPREHEN METABOLIC PANEL: CPT | Performed by: EMERGENCY MEDICINE

## 2025-07-18 PROCEDURE — 83605 ASSAY OF LACTIC ACID: CPT | Performed by: EMERGENCY MEDICINE

## 2025-07-18 PROCEDURE — 83735 ASSAY OF MAGNESIUM: CPT | Performed by: EMERGENCY MEDICINE

## 2025-07-18 PROCEDURE — 83880 ASSAY OF NATRIURETIC PEPTIDE: CPT | Performed by: EMERGENCY MEDICINE

## 2025-07-18 PROCEDURE — 83690 ASSAY OF LIPASE: CPT | Performed by: EMERGENCY MEDICINE

## 2025-07-18 PROCEDURE — 99285 EMERGENCY DEPT VISIT HI MDM: CPT | Performed by: EMERGENCY MEDICINE

## 2025-07-18 PROCEDURE — 71045 X-RAY EXAM CHEST 1 VIEW: CPT

## 2025-07-18 PROCEDURE — 85610 PROTHROMBIN TIME: CPT | Performed by: EMERGENCY MEDICINE

## 2025-07-18 PROCEDURE — 94640 AIRWAY INHALATION TREATMENT: CPT

## 2025-07-18 PROCEDURE — 99285 EMERGENCY DEPT VISIT HI MDM: CPT

## 2025-07-18 PROCEDURE — 87811 SARS-COV-2 COVID19 W/OPTIC: CPT | Performed by: EMERGENCY MEDICINE

## 2025-07-18 PROCEDURE — 93010 ELECTROCARDIOGRAM REPORT: CPT | Performed by: INTERNAL MEDICINE

## 2025-07-18 PROCEDURE — 85025 COMPLETE CBC W/AUTO DIFF WBC: CPT | Performed by: EMERGENCY MEDICINE

## 2025-07-18 PROCEDURE — 93005 ELECTROCARDIOGRAM TRACING: CPT

## 2025-07-18 RX ORDER — IPRATROPIUM BROMIDE AND ALBUTEROL SULFATE 2.5; .5 MG/3ML; MG/3ML
3 SOLUTION RESPIRATORY (INHALATION) ONCE
Status: COMPLETED | OUTPATIENT
Start: 2025-07-18 | End: 2025-07-18

## 2025-07-18 RX ORDER — FUROSEMIDE 10 MG/ML
40 INJECTION INTRAMUSCULAR; INTRAVENOUS ONCE
Status: COMPLETED | OUTPATIENT
Start: 2025-07-18 | End: 2025-07-18

## 2025-07-18 RX ADMIN — FUROSEMIDE 40 MG: 10 INJECTION, SOLUTION INTRAMUSCULAR; INTRAVENOUS at 12:52

## 2025-07-18 RX ADMIN — IPRATROPIUM BROMIDE AND ALBUTEROL SULFATE 3 ML: .5; 3 SOLUTION RESPIRATORY (INHALATION) at 12:13

## 2025-07-18 NOTE — DISCHARGE INSTRUCTIONS
Patient Education     Heart Failure Discharge Instructions, Adult   About this topic   Heart failure is a condition where your heart does not pump well. Your heart has trouble pumping the right amount of blood throughout your body. Because of this, fluid can back up in your body and your organs may not get as much blood as they need.  Heart failure is a long-term problem and will get worse over time. Your doctor will work hard to treat your heart failure and to keep you as healthy as possible.     What care is needed at home?   Ask your doctor what you need to do when you go home. Make sure you ask questions if you do not understand what the doctor says. This way you will know what you need to do.  Get lots of rest. Sleep when you feel tired. Avoid doing tiring activities which can make you more short of breath.  Ask your doctor how much exercise you should do every day, such as taking a walk.  Limit how much beer, wine, and mixed drinks (alcohol) you drink.  Your doctor may ask you to limit the amount of salt in your diet. You may also be asked to limit how much fluid you drink.  Try to lose weight if you are overweight. Your doctor or nurse can help.  If you smoke, try to quit. Your doctor or nurse can help.  Let your doctor know if you get more tired while you do an activity or you are not able to do as much activity as you did before.  Be careful that you take your drugs each day as ordered by your doctor.  If you cannot afford your drugs, talk to your doctor.  Do not stop your drugs if you have side effects. Talk to your doctor about them.  Take your drugs even if you feel well.  Check your weight each morning and write down your weight in a notebook. This will tell you if you are building up too much fluid. Weigh in the morning after you have passed urine. Weigh yourself with clothes on or off, but do it the same way each day. Make sure your scale is on a hard surface, not on carpet. Your doctor will tell you  when you should call based on how much weight you gain in a day or over a week. Take your notebook to your doctor on your next visit.  What follow-up care is needed?   Your condition needs to be watched closely. Your doctor may ask you to make visits to the office to check on your progress. Be sure to keep these visits. You may need to have other tests. Your doctor's nurse may also call on a regular basis to ask you about your signs and whether your weight has increased.  Your doctor may order a heart rehab program for you after you leave the hospital. This is an important part of your care. Share your discharge information with the rehab staff so they can plan a program to help you recover. Let your doctor know if you need help finding a program.  You may see a dietitian to help you understand your diet. Some doctors and hospitals have classes to help you learn more about your heart failure and how to manage your symptoms.  What drugs may be needed?   Often patients will need to take more than one drug. Together, they will help the heart work as well as it can. Do not take any other prescription drugs, over-the-counter (OTC) drugs, herbals, or diet aids without asking your doctor.  The doctor may order drugs to:  Help relax blood vessels. This makes it easier for the heart to work and may also lower your blood pressure. These are ACE inhibitors, ARBs, and calcium channel blockers.  Slow down the heart rate so that it doesn't have to work as hard. These are beta-blockers.  Help the heart beat stronger and better. This is digoxin.  Get rid of extra salt and water in the body. These are water pills or diuretics.  Will physical activity be limited?   Talk to your doctor about when you may go back to your normal activities like work, driving, or sex.  Unless your doctor tells you to limit your activities, try to be active. Walk, garden, or do something active for 30 minutes or more on most days of the week. Stop  activity if you have symptoms like chest pain, shortness of breath, or feel dizzy.  What changes to diet are needed?   Ask your doctor or dietician what diet is best for you. The doctor may tell you to limit your salt and fat or how much fluid you drink.  The DASH diet may be helpful. The DASH diet helps to lower blood pressure. This diet includes lots of fruits, vegetables, low-fat dairy foods, and foods that are low in saturated fat, total fat, and cholesterol. Using the DASH diet with a low salt diet may lower blood pressure even more.  You will learn to read labels to see how much salt or sodium is in foods. Lowering your salt intake will help control some of your symptoms.  When do I need to call the doctor?   Activate the emergency medical system right away if you have signs of a heart attack. Call 911 in the [place] or [place]. The sooner treatment begins, the better your chances for recovery. Call for emergency help right away if you have:  Signs of heart attack, which may include:  Severe chest pain, pressure, or discomfort with:  Breathing trouble; sweating; upset stomach; or cold, clammy skin.  Pain in your arms, back, or jaw.  Worse pain with activity like walking up stairs.  Fast or irregular heartbeat.  Feeling dizzy, faint, or weak.     Call your doctor if:  You have so much trouble breathing that you can only say one or two words at a time.  You need to sit upright at all times to be able to breathe and/or cannot lie down.  You are very tired from working to catch your breath or you are sweating from trying to breathe.  You have trouble breathing when talking, sitting still, or with activity.  You have wheezing or chest tightness when you are resting.  You wake up at night because you can’t breathe well.  You become very confused or you faint.  You have a very fast or irregular heartbeat.  You cough more than normal or cough up frothy or pink saliva.  You feel very weak, dizzy, or more tired  than normal.  You need more pillows than normal to sleep.  You gain 2 to 3 pounds (0.9 to 1.4 kg) overnight or more than 5 pounds (2.3 kg) in 1 week.  You have more swelling than usual, especially in your feet and ankles or in your belly.  You feel like your heart is beating very fast.  Teach Back: Helping You Understand   The Teach Back Method helps you understand the information we are giving you. After you talk with the staff, tell them in your own words what you learned. This helps to make sure the staff has described each thing clearly. It also helps to explain things that may have been confusing. Before going home, make sure you can do these:  I can tell you about my condition.  I can tell you why I need to weigh myself each day.  I can tell you what I will do if I have signs of a heart attack or stroke.  Last Reviewed Date   2021-06-03  Consumer Information Use and Disclaimer   This generalized information is a limited summary of diagnosis, treatment, and/or medication information. It is not meant to be comprehensive and should be used as a tool to help the user understand and/or assess potential diagnostic and treatment options. It does NOT include all information about conditions, treatments, medications, side effects, or risks that may apply to a specific patient. It is not intended to be medical advice or a substitute for the medical advice, diagnosis, or treatment of a health care provider based on the health care provider's examination and assessment of a patient’s specific and unique circumstances. Patients must speak with a health care provider for complete information about their health, medical questions, and treatment options, including any risks or benefits regarding use of medications. This information does not endorse any treatments or medications as safe, effective, or approved for treating a specific patient. [employer] and its affiliates disclaim any warranty or liability relating to this  information or the use thereof. The use of this information is governed by the Terms of Use, available at https://www.wolterskluwer.com/en/know/clinical-effectiveness-terms   Copyright   Copyright © 2024 [employer] and its affiliates and/or licensors. All rights reserved.  Increase your Lasix to 80 mg for the next 4 days.

## 2025-07-18 NOTE — ED PROVIDER NOTES
Time reflects when diagnosis was documented in both MDM as applicable and the Disposition within this note       Time User Action Codes Description Comment    7/18/2025  1:04 PM Jonh Trimble Add [I50.9] CHF (congestive heart failure) ([facility], located within [facility])           ED Disposition       ED Disposition   Discharge    Condition   Stable    Date/Time   Fri Jul 18, 2025  1:03 PM    Comment   Britton Batista discharge to home/self care.                   Assessment & Plan       Medical Decision Making  Amount and/or Complexity of Data Reviewed  Labs: ordered. Decision-making details documented in ED Course.  Radiology: ordered and independent interpretation performed. Decision-making details documented in ED Course.  ECG/medicine tests: ordered and independent interpretation performed. Decision-making details documented in ED Course.  Discussion of management or test interpretation with external provider(s): At risk for but not limited to pneumonia, bronchitis, COPD, CHF    Risk  Prescription drug management.        ED Course as of 07/18/25 1304   Fri Jul 18, 2025   1241 hs TnI 0hr: 49  Baseline for the patient.   1241 Creatinine(!): 1.40  Near baseline for patient.       Medications   ipratropium-albuterol (DUO-NEB) 0.5-2.5 mg/3 mL inhalation solution 3 mL (3 mL Nebulization Given 7/18/25 1213)   ipratropium-albuterol (DUO-NEB) 0.5-2.5 mg/3 mL inhalation solution 3 mL (0 mL Nebulization Given to EMS 7/18/25 1153)   furosemide (LASIX) injection 40 mg (40 mg Intravenous Given 7/18/25 1252)       ED Risk Strat Scores                    No data recorded        SBIRT 20yo+      Flowsheet Row Most Recent Value   Initial Alcohol Screen: US AUDIT-C     1. How often do you have a drink containing alcohol? 0 Filed at: 07/18/2025 1142   2. How many drinks containing alcohol do you have on a typical day you are drinking?  0 Filed at: 07/18/2025 1142   3a. Male UNDER 65: How often do you have five or more drinks on one occasion? 0 Filed at:  07/18/2025 1142   3b. FEMALE Any Age, or MALE 65+: How often do you have 4 or more drinks on one occassion? 0 Filed at: 07/18/2025 1142   Audit-C Score 0 Filed at: 07/18/2025 1142   SILVIA: How many times in the past year have you...    Used an illegal drug or used a prescription medication for non-medical reasons? Never Filed at: 07/18/2025 1142                            History of Present Illness       Chief Complaint   Patient presents with    Shortness of Breath    Weakness - Generalized     PT states he's been feeling sob and weak since today.        Past Medical History[1]   Past Surgical History[2]   Family History[3]   Social History[4]   E-Cigarette/Vaping    E-Cigarette Use Never User       E-Cigarette/Vaping Substances    Nicotine No     THC No     CBD No     Flavoring No     Other No     Unknown No       I have reviewed and agree with the history as documented.     Patient with cardiomyopathy.  Has COPD.  No home oxygen.  Has chronic CHF.  Complains of shortness of breath since yesterday.  Coughing.  Occasional white phlegm.  Did get a breathing treatment with some relief of his symptoms.  No chest pain.  No abdominal pain or back pain.  No increased leg swelling.  No fevers or chills.  Still smoking      History provided by:  Patient   used: No    Shortness of Breath  Severity:  Mild  Onset quality:  Gradual  Duration:  1 day  Timing:  Constant  Progression:  Worsening  Chronicity:  Recurrent  Context: smoke exposure and URI    Context: not animal exposure and not occupational exposure    Relieved by: Breathing treatment.  Worsened by:  Exertion  Ineffective treatments:  None tried  Associated symptoms: cough, sputum production and wheezing    Associated symptoms: no abdominal pain, no chest pain, no ear pain, no fever, no headaches, no neck pain, no rash, no sore throat and no vomiting        Review of Systems   Constitutional:  Negative for chills and fever.   HENT:  Negative for  ear pain, hearing loss, sore throat, trouble swallowing and voice change.    Eyes:  Negative for pain and discharge.   Respiratory:  Positive for cough, sputum production, shortness of breath and wheezing.    Cardiovascular:  Negative for chest pain and palpitations.   Gastrointestinal:  Negative for abdominal pain, blood in stool, constipation, diarrhea, nausea and vomiting.   Genitourinary:  Negative for dysuria, flank pain, frequency and hematuria.   Musculoskeletal:  Negative for joint swelling, neck pain and neck stiffness.   Skin:  Negative for rash and wound.   Neurological:  Negative for dizziness, seizures, syncope, facial asymmetry and headaches.   Psychiatric/Behavioral:  Negative for hallucinations, self-injury and suicidal ideas.    All other systems reviewed and are negative.          Objective       ED Triage Vitals   Temperature Pulse Blood Pressure Respirations SpO2 Patient Position - Orthostatic VS   07/18/25 1139 07/18/25 1139 07/18/25 1139 07/18/25 1139 07/18/25 1143 07/18/25 1139   97.8 °F (36.6 °C) 55 119/93 18 96 % Lying      Temp Source Heart Rate Source BP Location FiO2 (%) Pain Score    07/18/25 1139 -- 07/18/25 1139 -- 07/18/25 1139    Temporal  Left arm  No Pain      Vitals      Date and Time Temp Pulse SpO2 Resp BP Pain Score FACES Pain Rating User   07/18/25 1143 -- -- 96 % -- -- -- -- MY   07/18/25 1139 97.8 °F (36.6 °C) 55 -- 18 119/93 No Pain -- MY            Physical Exam  Vitals and nursing note reviewed.   Constitutional:       General: He is not in acute distress.     Appearance: He is well-developed.   HENT:      Head: Normocephalic and atraumatic.      Right Ear: External ear normal.      Left Ear: External ear normal.     Eyes:      General: No scleral icterus.        Right eye: No discharge.         Left eye: No discharge.      Extraocular Movements: Extraocular movements intact.      Conjunctiva/sclera: Conjunctivae normal.       Cardiovascular:      Rate and Rhythm: Normal  rate and regular rhythm.      Heart sounds: Normal heart sounds. No murmur heard.  Pulmonary:      Effort: Pulmonary effort is normal.      Breath sounds: Normal breath sounds. No wheezing or rales.   Abdominal:      General: Bowel sounds are normal. There is no distension.      Palpations: Abdomen is soft.      Tenderness: There is no abdominal tenderness. There is no guarding or rebound.     Musculoskeletal:         General: No deformity. Normal range of motion.      Cervical back: Normal range of motion and neck supple.     Skin:     General: Skin is warm and dry.      Findings: No rash.     Neurological:      General: No focal deficit present.      Mental Status: He is alert and oriented to person, place, and time.      Cranial Nerves: No cranial nerve deficit.     Psychiatric:         Mood and Affect: Mood normal.         Behavior: Behavior normal.         Thought Content: Thought content normal.         Judgment: Judgment normal.         Results Reviewed       Procedure Component Value Units Date/Time    B-Type Natriuretic Peptide(BNP) [[id number]]  (Abnormal) Collected: 07/18/25 1200    Lab Status: Final result Specimen: Blood from Arm, Left Updated: 07/18/25 1241     BNP 2,513 pg/mL     HS Troponin 0hr (reflex protocol) [[id number]]  (Normal) Collected: 07/18/25 1200    Lab Status: Final result Specimen: Blood from Arm, Left Updated: 07/18/25 1240     hs TnI 0hr 49 ng/L     HS Troponin I 4hr [[id number]]     Lab Status: No result Specimen: Blood     HS Troponin I 2hr [[id number]]     Lab Status: No result Specimen: Blood     Lactic acid, plasma (w/reflex if result > 2.0) [[id number]]  (Normal) Collected: 07/18/25 1200    Lab Status: Final result Specimen: Blood from Arm, Left Updated: 07/18/25 1237     LACTIC ACID 1.4 mmol/L     Narrative:      Result may be elevated if tourniquet was used during collection.    Comprehensive metabolic panel [[id number]]  (Abnormal) Collected: 07/18/25 1200    Lab Status: Final  result Specimen: Blood from Arm, Left Updated: 07/18/25 1232     Sodium 136 mmol/L      Potassium 4.7 mmol/L      Chloride 102 mmol/L      CO2 27 mmol/L      ANION GAP 7 mmol/L      BUN 23 mg/dL      Creatinine 1.40 mg/dL      Glucose 85 mg/dL      Calcium 9.2 mg/dL      AST 33 U/L      ALT 9 U/L      Alkaline Phosphatase 149 U/L      Total Protein 7.2 g/dL      Albumin 3.7 g/dL      Total Bilirubin 0.97 mg/dL      eGFR 54 ml/min/1.73sq m     Narrative:      National Kid[employer] guidelines for Chronic Kidney Disease (CKD):     Stage 1 with normal or high GFR (GFR > 90 mL/min/1.73 square meters)    Stage 2 Mild CKD (GFR = 60-89 mL/min/1.73 square meters)    Stage 3A Moderate CKD (GFR = 45-59 mL/min/1.73 square meters)    Stage 3B Moderate CKD (GFR = 30-44 mL/min/1.73 square meters)    Stage 4 Severe CKD (GFR = 15-29 mL/min/1.73 square meters)    Stage 5 End Stage CKD (GFR <15 mL/min/1.73 square meters)  Note: GFR calculation is accurate only with a steady state creatinine    Magnesium [[id number]]  (Normal) Collected: 07/18/25 1200    Lab Status: Final result Specimen: Blood from Arm, Left Updated: 07/18/25 1232     Magnesium 2.0 mg/dL     Lipase [[id number]]  (Abnormal) Collected: 07/18/25 1200    Lab Status: Final result Specimen: Blood from Arm, Left Updated: 07/18/25 1232     Lipase 9 u/L     Protime-INR [[id number]]  (Abnormal) Collected: 07/18/25 1200    Lab Status: Final result Specimen: Blood from Arm, Left Updated: 07/18/25 1230     Protime 42.4 seconds      INR 4.25    Narrative:      INR Therapeutic Range    Indication                                             INR Range      Atrial Fibrillation                                               2.0-3.0  Hypercoagulable State                                    2.0.2.3  Left Ventricular Asist Device                            2.0-3.0  Mechanical Heart Valve                                  -    Aortic(with afib, MI, embolism, HF, LA enlargement,     and/or coagulopathy)                                     2.0-3.0 (2.5-3.5)     Mitral                                                             2.5-3.5  Prosthetic/Bioprosthetic Heart Valve               2.0-3.0  Venous thromboembolism (VTE: VT, PE        2.0-3.0    APTT [[id number]]  (Abnormal) Collected: 07/18/25 1200    Lab Status: Final result Specimen: Blood from Arm, Left Updated: 07/18/25 1230     PTT 51 seconds     FLU/COVID Rapid Antigen (30 min. TAT) - Preferred screening test in ED [[id number]]  (Normal) Collected: 07/18/25 1200    Lab Status: Final result Specimen: Nares from Nose Updated: 07/18/25 1224     SARS COV Rapid Antigen Negative     Influenza A Rapid Antigen Negative     Influenza B Rapid Antigen Negative    Narrative:      This test has been performed using the [employer] Inez 2 FLU+SARS Antigen test under the Emergency Use Authorization (EUA). This test has been validated by the  and verified by the performing laboratory. The Inez uses lateral flow immunofluorescent sandwich assay to detect SARS-COV, Influenza A and Influenza B Antigen.     The [employer] Inez 2 SARS Antigen test does not differentiate between SARS-CoV and SARS-CoV-2.     Negative results are presumptive and may be confirmed with a molecular assay, if necessary, for patient management. Negative results do not rule out SARS-CoV-2 or influenza infection and should not be used as the sole basis for treatment or patient management decisions. A negative test result may occur if the level of antigen in a sample is below the limit of detection of this test.     Positive results are indicative of the presence of viral antigens, but do not rule out bacterial infection or co-infection with other viruses.     All test results should be used as an adjunct to clinical observations and other information available to the provider.    FOR PEDIATRIC PATIENTS - copy/paste COVID Guidelines URL to browser:  [email or web address]    CBC and differential [[id number]]  (Abnormal) Collected: 07/18/25 1200    Lab Status: Final result Specimen: Blood from Arm, Left Updated: 07/18/25 1209     WBC 7.54 Thousand/uL      RBC 4.60 Million/uL      Hemoglobin 12.7 g/dL      Hematocrit 41.6 %      MCV 90 fL      MCH 27.6 pg      MCHC 30.5 g/dL      RDW 18.4 %      MPV 9.7 fL      Platelets 210 Thousands/uL      nRBC 0 /100 WBCs      Segmented % 59 %      Immature Grans % 0 %      Lymphocytes % 23 %      Monocytes % 15 %      Eosinophils Relative 2 %      Basophils Relative 1 %      Absolute Neutrophils 4.47 Thousands/µL      Absolute Immature Grans 0.03 Thousand/uL      Absolute Lymphocytes 1.72 Thousands/µL      Absolute Monocytes 1.09 Thousand/µL      Eosinophils Absolute 0.17 Thousand/µL      Basophils Absolute 0.06 Thousands/µL             XR chest 1 view portable   ED Interpretation by Jonh Trimble MD (07/18 1213)   No significant change from chest x-ray done 6 days ago.          ECG 12 Lead Documentation Only    Date/Time: 7/18/2025 11:48 AM    Performed by: Jonh Trimble MD  Authorized by: Jonh Trimble MD    ECG reviewed by me, the ED Provider: yes    Patient location:  ED  Previous ECG:     Previous ECG:  Compared to current    Similarity:  No change  Rate:     ECG rate:  100  Rhythm:     Rhythm: atrial fibrillation and paced    Pacing:     Type of pacing:  Ventricular      ED Medication and Procedure Management   Prior to Admission Medications   Prescriptions Last Dose Informant Patient Reported? Taking?   albuterol (ProAir HFA) 90 mcg/act inhaler   No No   Sig: Inhale 2 puffs every 6 (six) hours as needed for wheezing   amiodarone 200 mg tablet   No No   Sig: Take 1 tablet (200 mg total) by mouth daily with breakfast   furosemide (LASIX) 20 mg tablet   No No   Sig: Take 3 tablets (60 mg total) by mouth daily   ipratropium-albuterol (DUO-NEB) 0.5-2.5 mg/3 mL  "nebulizer solution   No No   Sig: Take 3 mL by nebulization every 6 (six) hours while awake   magnesium Oxide (MAG-OX) 400 mg TABS   No No   Sig: Take 1 tablet (400 mg total) by mouth daily   metoprolol succinate (TOPROL-XL) 100 mg 24 hr tablet   No No   Sig: Take 1 tablet (100 mg total) by mouth 2 (two) times a day   mirtazapine (REMERON) 15 mg tablet   Yes No   Sig: Take 15 mg by mouth daily at bedtime   Patient not taking: Reported on 7/12/2025   pantoprazole (PROTONIX) 40 mg tablet   No No   Sig: Take 1 tablet (40 mg total) by mouth daily   warfarin (COUMADIN) 3 mg tablet   No No   Sig: Take 1 tablet (3 mg total) by mouth daily      Facility-Administered Medications: None     Patient's Medications   Discharge Prescriptions    No medications on file     No discharge procedures on file.  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) 04/26/2025    Hypertension     Metabolic encephalopathy 03/22/2025    Neuropathy     Raynaud's disease     Right inguinal hernia     Scleroderma (HCC)     Shock (HCC) 04/26/2025   [2]   Past Surgical History:  Procedure Laterality Date    AMPUTATION Right     pt had tip of \"pointer finger\" d/t scleraderma    CARDIAC PACEMAKER PLACEMENT      FINGER AMPUTATION Left 01/31/2024    Procedure: AMPUTATION FINGER, LEFT INDEX FINGER CPT: 79133;  Surgeon: Pedro" MD George;  Location:  MAIN OR;  Service: Orthopedics    HERNIA REPAIR Left     times 2    PA AMP F/TH 1/2 JT/PHALANX W/NEURECT W/DIR CLSR Right 01/23/2024    Procedure: AMPUTATION FINGER, RIGHT MIDDLE;  Surgeon: Pedro Vazquez MD;  Location: [facility];  Service: Orthopedics    PA RPR 1ST INGUN HRNA AGE 5 YRS/> REDUCIBLE Right 03/03/2023    Procedure: OPEN INGUINAL HERNIA REPAIR WITH MESH;  Surgeon: Temo Jackson DO;  Location:  MAIN OR;  Service: General   [3]   Family History  Problem Relation Name Age of Onset    Hypertension Father     [4]   Social History  Tobacco Use    Smoking status: Every Day     Current packs/day: 0.50     Average packs/day: 1 pack/day for 45.5 years (44.8 ttl pk-yrs)     Types: Cigarettes     Start date: 2000    Smokeless tobacco: Never    Tobacco comments:     Last cigarette 0430   Vaping Use    Vaping status: Never Used   Substance Use Topics    Alcohol use: Not Currently    Drug use: Not Currently     Types: Marijuana     Comment: Hx meth use        Jonh Trimble MD  07/18/25 2586

## 2025-07-19 ENCOUNTER — APPOINTMENT (EMERGENCY)
Dept: CT IMAGING | Facility: HOSPITAL | Age: 60
End: 2025-07-19
Payer: COMMERCIAL

## 2025-07-19 ENCOUNTER — HOSPITAL ENCOUNTER (EMERGENCY)
Facility: HOSPITAL | Age: 60
Discharge: HOME | End: 2025-07-19
Attending: STUDENT IN AN ORGANIZED HEALTH CARE EDUCATION/TRAINING PROGRAM | Admitting: STUDENT IN AN ORGANIZED HEALTH CARE EDUCATION/TRAINING PROGRAM
Payer: COMMERCIAL

## 2025-07-19 VITALS
TEMPERATURE: 97.9 F | BODY MASS INDEX: 29.37 KG/M2 | WEIGHT: 182.76 LBS | SYSTOLIC BLOOD PRESSURE: 122 MMHG | RESPIRATION RATE: 18 BRPM | HEART RATE: 61 BPM | DIASTOLIC BLOOD PRESSURE: 86 MMHG | HEIGHT: 66 IN | OXYGEN SATURATION: 95 %

## 2025-07-19 DIAGNOSIS — R10.9 ABDOMINAL PAIN: Primary | ICD-10-CM

## 2025-07-19 LAB
ALBUMIN SERPL BCG-MCNC: 3.8 G/DL (ref 3.5–5)
ALP SERPL-CCNC: 149 U/L (ref 34–104)
ALT SERPL W P-5'-P-CCNC: 9 U/L (ref 7–52)
ANION GAP SERPL CALCULATED.3IONS-SCNC: 10 MMOL/L (ref 4–13)
AST SERPL W P-5'-P-CCNC: 39 U/L (ref 13–39)
BASOPHILS # BLD AUTO: 0.05 THOUSANDS/ÂΜL (ref 0–0.1)
BASOPHILS NFR BLD AUTO: 1 % (ref 0–1)
BILIRUB SERPL-MCNC: 0.93 MG/DL (ref 0.2–1)
BILIRUB UR QL STRIP: NEGATIVE
BUN SERPL-MCNC: 25 MG/DL (ref 5–25)
CALCIUM SERPL-MCNC: 9.2 MG/DL (ref 8.4–10.2)
CHLORIDE SERPL-SCNC: 102 MMOL/L (ref 96–108)
CLARITY UR: CLEAR
CO2 SERPL-SCNC: 26 MMOL/L (ref 21–32)
COLOR UR: YELLOW
CREAT SERPL-MCNC: 1.52 MG/DL (ref 0.6–1.3)
EOSINOPHIL # BLD AUTO: 0.19 THOUSAND/ÂΜL (ref 0–0.61)
EOSINOPHIL NFR BLD AUTO: 3 % (ref 0–6)
ERYTHROCYTE [DISTWIDTH] IN BLOOD BY AUTOMATED COUNT: 18.6 % (ref 11.6–15.1)
GFR SERPL CREATININE-BSD FRML MDRD: 49 ML/MIN/1.73SQ M
GLUCOSE SERPL-MCNC: 76 MG/DL (ref 65–140)
GLUCOSE UR STRIP-MCNC: NEGATIVE MG/DL
HCT VFR BLD AUTO: 43 % (ref 36.5–49.3)
HGB BLD-MCNC: 12.8 G/DL (ref 12–17)
HGB UR QL STRIP.AUTO: NEGATIVE
IMM GRANULOCYTES # BLD AUTO: 0.04 THOUSAND/UL (ref 0–0.2)
IMM GRANULOCYTES NFR BLD AUTO: 1 % (ref 0–2)
KETONES UR STRIP-MCNC: NEGATIVE MG/DL
LACTATE SERPL-SCNC: 1.8 MMOL/L (ref 0.5–2)
LEUKOCYTE ESTERASE UR QL STRIP: NEGATIVE
LIPASE SERPL-CCNC: 16 U/L (ref 11–82)
LYMPHOCYTES # BLD AUTO: 1.36 THOUSANDS/ÂΜL (ref 0.6–4.47)
LYMPHOCYTES NFR BLD AUTO: 21 % (ref 14–44)
MCH RBC QN AUTO: 27.5 PG (ref 26.8–34.3)
MCHC RBC AUTO-ENTMCNC: 29.8 G/DL (ref 31.4–37.4)
MCV RBC AUTO: 93 FL (ref 82–98)
MONOCYTES # BLD AUTO: 0.83 THOUSAND/ÂΜL (ref 0.17–1.22)
MONOCYTES NFR BLD AUTO: 13 % (ref 4–12)
NEUTROPHILS # BLD AUTO: 3.89 THOUSANDS/ÂΜL (ref 1.85–7.62)
NEUTS SEG NFR BLD AUTO: 61 % (ref 43–75)
NITRITE UR QL STRIP: NEGATIVE
NRBC BLD AUTO-RTO: 0 /100 WBCS
PH UR STRIP.AUTO: 6.5 [PH]
PLATELET # BLD AUTO: 223 THOUSANDS/UL (ref 149–390)
PMV BLD AUTO: 10.3 FL (ref 8.9–12.7)
POTASSIUM SERPL-SCNC: 4.3 MMOL/L (ref 3.5–5.3)
PROT SERPL-MCNC: 7.5 G/DL (ref 6.4–8.4)
PROT UR STRIP-MCNC: NEGATIVE MG/DL
RBC # BLD AUTO: 4.65 MILLION/UL (ref 3.88–5.62)
SODIUM SERPL-SCNC: 138 MMOL/L (ref 135–147)
SP GR UR STRIP.AUTO: 1.01 (ref 1–1.03)
UROBILINOGEN UR QL STRIP.AUTO: 1 E.U./DL
WBC # BLD AUTO: 6.36 THOUSAND/UL (ref 4.31–10.16)

## 2025-07-19 PROCEDURE — 81003 URINALYSIS AUTO W/O SCOPE: CPT | Performed by: PHYSICIAN ASSISTANT

## 2025-07-19 PROCEDURE — 99285 EMERGENCY DEPT VISIT HI MDM: CPT | Performed by: PHYSICIAN ASSISTANT

## 2025-07-19 PROCEDURE — 83605 ASSAY OF LACTIC ACID: CPT | Performed by: PHYSICIAN ASSISTANT

## 2025-07-19 PROCEDURE — 74177 CT ABD & PELVIS W/CONTRAST: CPT

## 2025-07-19 PROCEDURE — 83690 ASSAY OF LIPASE: CPT | Performed by: PHYSICIAN ASSISTANT

## 2025-07-19 PROCEDURE — 85025 COMPLETE CBC W/AUTO DIFF WBC: CPT | Performed by: PHYSICIAN ASSISTANT

## 2025-07-19 PROCEDURE — 36415 COLL VENOUS BLD VENIPUNCTURE: CPT | Performed by: PHYSICIAN ASSISTANT

## 2025-07-19 PROCEDURE — 99284 EMERGENCY DEPT VISIT MOD MDM: CPT

## 2025-07-19 PROCEDURE — 80053 COMPREHEN METABOLIC PANEL: CPT | Performed by: PHYSICIAN ASSISTANT

## 2025-07-19 PROCEDURE — 96374 THER/PROPH/DIAG INJ IV PUSH: CPT

## 2025-07-19 RX ORDER — ACETAMINOPHEN 10 MG/ML
1000 INJECTION, SOLUTION INTRAVENOUS ONCE
Status: COMPLETED | OUTPATIENT
Start: 2025-07-19 | End: 2025-07-19

## 2025-07-19 RX ADMIN — ACETAMINOPHEN 1000 MG: 10 INJECTION, SOLUTION INTRAVENOUS at 16:36

## 2025-07-19 RX ADMIN — IOHEXOL 100 ML: 350 INJECTION, SOLUTION INTRAVENOUS at 16:27

## 2025-07-19 NOTE — DISCHARGE INSTRUCTIONS
Use Tylenol or Motrin as needed.  Please follow-up with your family doctor or general surgeon.  Please return for new or worsening symptoms.

## 2025-07-19 NOTE — ED PROVIDER NOTES
Time reflects when diagnosis was documented in both MDM as applicable and the Disposition within this note       Time User Action Codes Description Comment    7/19/2025  5:57 PM Yaneth Nice Add [R10.9] Abdominal pain           ED Disposition       ED Disposition   Discharge    Condition   Stable    Date/Time   Sat Jul 19, 2025  5:57 PM    Comment   Britton Batista discharge to home/self care.                   Assessment & Plan       Medical Decision Making  60-year-old male presented to the emergency department for evaluation of abdominal pain.  Vitals and medical record reviewed. Abdominal exam without peritoneal signs.  No evidence of acute abdomen at this time.  Well-appearing.  Given work-up, low suspicion for strangulated hernia (easily reducible on exam), acute hepatobiliary disease (including acute cholecystitis or cholangitis), acute pancreatitis (negative lipase), PUD (including gastric perforation), acute infectious processes (pneumonia, hepatitis, pyelonephritis), acute appendicitis, vascular catastrophe, bowel obstruction, viscus perforation, testicular torsion, or diverticulitis.  Presentation not consistent with other acute emergent causes of abdominal pain at this time. Patient was educated on symptoms that require prompt return to the emergency department for further evaluation and verbalized understanding.  Patient was clinically and hemodynamically stable for discharge.       Problems Addressed:  Abdominal pain: acute illness or injury    Amount and/or Complexity of Data Reviewed  Labs: ordered. Decision-making details documented in ED Course.  Radiology: ordered.    Risk  Prescription drug management.        ED Course as of 07/19/25 2106   Sat Jul 19, 2025   1640 WBC: 6.36   1640 LACTIC ACID: 1.8   1640 LIPASE: 16   1640 Creatinine(!): 1.52  baseline   1744 CT abd: 1.  Small amount of upper abdominal ascites.  2.  Small pericardial effusion. Small right and trace left pleural  "effusions.      Pleural effusion noted to be similar to prior    1801 I discussed results with the patient.  He reports he is aware of ascites and pericardial effusion.  Patient states he is feeling improved.  We did discuss return precautions and follow-up and he verbalized understanding.  He was clinically and hemodynamically stable for discharge       Medications   acetaminophen (Ofirmev) injection 1,000 mg (0 mg Intravenous Stopped 7/19/25 1651)   iohexol (OMNIPAQUE) 350 MG/ML injection (MULTI-DOSE) 100 mL (100 mL Intravenous Given 7/19/25 1627)       ED Risk Strat Scores                    No data recorded        SBIRT 22yo+      Flowsheet Row Most Recent Value   Initial Alcohol Screen: US AUDIT-C     1. How often do you have a drink containing alcohol? 0 Filed at: 07/19/2025 6284   2. How many drinks containing alcohol do you have on a typical day you are drinking?  0 Filed at: 07/19/2025 6268   3a. Male UNDER 65: How often do you have five or more drinks on one occasion? 0 Filed at: 07/19/2025 1605   Audit-C Score 0 Filed at: 07/19/2025 9713   SILVIA: How many times in the past year have you...    Used an illegal drug or used a prescription medication for non-medical reasons? Never Filed at: 07/19/2025 0371                            History of Present Illness       Chief Complaint   Patient presents with    Abdominal Pain     Pt presented to this ED from home stating \"my hernia popped out just al ittle bit ago\".        Past Medical History[1]   Past Surgical History[2]   Family History[3]   Social History[4]   E-Cigarette/Vaping    E-Cigarette Use Never User       E-Cigarette/Vaping Substances    Nicotine No     THC No     CBD No     Flavoring No     Other No     Unknown No       I have reviewed and agree with the history as documented.     60-year-old male presents to the emergency department for evaluation of abdominal pain.  Patient states today his umbilical hernia popped out and he had pain when pressing " it in.  He denies nausea vomiting diarrhea.  Denies constipation.  Denies fevers or chills.  Patient denies dysuria hematuria urinary frequency.  He denies any testicular pain or swelling.        Review of Systems   Constitutional: Negative.    HENT: Negative.     Respiratory: Negative.     Cardiovascular: Negative.    Gastrointestinal:  Positive for abdominal pain. Negative for nausea and vomiting.   Genitourinary: Negative.    Musculoskeletal: Negative.    Skin: Negative.    Neurological: Negative.    All other systems reviewed and are negative.          Objective       ED Triage Vitals [07/19/25 1507]   Temperature Pulse Blood Pressure Respirations SpO2 Patient Position - Orthostatic VS   97.9 °F (36.6 °C) 57 108/92 18 93 % Sitting      Temp src Heart Rate Source BP Location FiO2 (%) Pain Score    -- Monitor Left arm -- 10 - Worst Possible Pain      Vitals      Date and Time Temp Pulse SpO2 Resp BP Pain Score FACES Pain Rating User   07/19/25 1805 -- 61 95 % 18 122/86 -- --    07/19/25 1715 -- 62 95 % 18 114/88 1 --    07/19/25 1600 -- 58 95 % 18 -- -- --    07/19/25 1507 97.9 °F (36.6 °C) 57 93 % 18 108/92 10 - Worst Possible Pain -- AC            Physical Exam  Vitals and nursing note reviewed.   Constitutional:       General: He is not in acute distress.     Appearance: He is well-developed. He is not ill-appearing, toxic-appearing or diaphoretic.   HENT:      Head: Normocephalic.      Mouth/Throat:      Mouth: Mucous membranes are moist.     Eyes:      Extraocular Movements: Extraocular movements intact.       Cardiovascular:      Rate and Rhythm: Normal rate and regular rhythm.   Abdominal:      General: Abdomen is flat.      Tenderness: There is abdominal tenderness in the periumbilical area.      Hernia: A hernia is present. Hernia is present in the umbilical area (Easily reducible).     Skin:     General: Skin is warm and dry.     Neurological:      General: No focal deficit present.      Mental  Status: He is alert and oriented to person, place, and time.     Psychiatric:         Mood and Affect: Mood normal.         Results Reviewed       Procedure Component Value Units Date/Time    UA (URINE) with reflex to Scope [[id number]] Collected: 07/19/25 1636    Lab Status: Final result Specimen: Urine, Other Updated: 07/19/25 1657     Color, UA Yellow     Clarity, UA Clear     Specific Gravity, UA 1.010     pH, UA 6.5     Leukocytes, UA Negative     Nitrite, UA Negative     Protein, UA Negative mg/dl      Glucose, UA Negative mg/dl      Ketones, UA Negative mg/dl      Urobilinogen, UA 1.0 E.U./dl      Bilirubin, UA Negative     Occult Blood, UA Negative    Comprehensive metabolic panel [[id number]]  (Abnormal) Collected: 07/19/25 1601    Lab Status: Final result Specimen: Blood from Arm, Right Updated: 07/19/25 1639     Sodium 138 mmol/L      Potassium 4.3 mmol/L      Chloride 102 mmol/L      CO2 26 mmol/L      ANION GAP 10 mmol/L      BUN 25 mg/dL      Creatinine 1.52 mg/dL      Glucose 76 mg/dL      Calcium 9.2 mg/dL      AST 39 U/L      ALT 9 U/L      Alkaline Phosphatase 149 U/L      Total Protein 7.5 g/dL      Albumin 3.8 g/dL      Total Bilirubin 0.93 mg/dL      eGFR 49 ml/min/1.73sq m     Narrative:      [employer] guidelines for Chronic Kidney Disease (CKD):     Stage 1 with normal or high GFR (GFR > 90 mL/min/1.73 square meters)    Stage 2 Mild CKD (GFR = 60-89 mL/min/1.73 square meters)    Stage 3A Moderate CKD (GFR = 45-59 mL/min/1.73 square meters)    Stage 3B Moderate CKD (GFR = 30-44 mL/min/1.73 square meters)    Stage 4 Severe CKD (GFR = 15-29 mL/min/1.73 square meters)    Stage 5 End Stage CKD (GFR <15 mL/min/1.73 square meters)  Note: GFR calculation is accurate only with a steady state creatinine    Lipase [[id number]]  (Normal) Collected: 07/19/25 1601    Lab Status: Final result Specimen: Blood from Arm, Right Updated: 07/19/25 1639     Lipase 16 u/L     Lactic acid,  plasma (w/reflex if result > 2.0) [[id number]]  (Normal) Collected: 07/19/25 1601    Lab Status: Final result Specimen: Blood from Arm, Right Updated: 07/19/25 1639     LACTIC ACID 1.8 mmol/L     Narrative:      Result may be elevated if tourniquet was used during collection.    CBC and differential [[id number]]  (Abnormal) Collected: 07/19/25 1601    Lab Status: Final result Specimen: Blood from Arm, Right Updated: 07/19/25 1608     WBC 6.36 Thousand/uL      RBC 4.65 Million/uL      Hemoglobin 12.8 g/dL      Hematocrit 43.0 %      MCV 93 fL      MCH 27.5 pg      MCHC 29.8 g/dL      RDW 18.6 %      MPV 10.3 fL      Platelets 223 Thousands/uL      nRBC 0 /100 WBCs      Segmented % 61 %      Immature Grans % 1 %      Lymphocytes % 21 %      Monocytes % 13 %      Eosinophils Relative 3 %      Basophils Relative 1 %      Absolute Neutrophils 3.89 Thousands/µL      Absolute Immature Grans 0.04 Thousand/uL      Absolute Lymphocytes 1.36 Thousands/µL      Absolute Monocytes 0.83 Thousand/µL      Eosinophils Absolute 0.19 Thousand/µL      Basophils Absolute 0.05 Thousands/µL             CT abdomen pelvis with contrast   Final Interpretation by Joseph Blanco MD (07/19 1735)      1.  Small amount of upper abdominal ascites.   2.  Small pericardial effusion. Small right and trace left pleural effusions.         Workstation performed: [id number]             Procedures    ED Medication and Procedure Management   Prior to Admission Medications   Prescriptions Last Dose Informant Patient Reported? Taking?   albuterol (ProAir HFA) 90 mcg/act inhaler   No No   Sig: Inhale 2 puffs every 6 (six) hours as needed for wheezing   amiodarone 200 mg tablet   No No   Sig: Take 1 tablet (200 mg total) by mouth daily with breakfast   furosemide (LASIX) 20 mg tablet   No No   Sig: Take 3 tablets (60 mg total) by mouth daily   ipratropium-albuterol (DUO-NEB) 0.5-2.5 mg/3 mL nebulizer solution   No No   Sig: Take 3 mL by nebulization every 6 (six)  hours while awake   magnesium Oxide (MAG-OX) 400 mg TABS   No No   Sig: Take 1 tablet (400 mg total) by mouth daily   metoprolol succinate (TOPROL-XL) 100 mg 24 hr tablet   No No   Sig: Take 1 tablet (100 mg total) by mouth 2 (two) times a day   mirtazapine (REMERON) 15 mg tablet   Yes No   Sig: Take 15 mg by mouth daily at bedtime   Patient not taking: Reported on 7/12/2025   pantoprazole (PROTONIX) 40 mg tablet   No No   Sig: Take 1 tablet (40 mg total) by mouth daily   warfarin (COUMADIN) 3 mg tablet   No No   Sig: Take 1 tablet (3 mg total) by mouth daily      Facility-Administered Medications: None     Discharge Medication List as of 7/19/2025  5:57 PM        CONTINUE these medications which have NOT CHANGED    Details   albuterol (ProAir HFA) 90 mcg/act inhaler Inhale 2 puffs every 6 (six) hours as needed for wheezing, Starting Fri 3/28/2025, Normal      amiodarone 200 mg tablet Take 1 tablet (200 mg total) by mouth daily with breakfast, Starting Sun 6/15/2025, Normal      furosemide (LASIX) 20 mg tablet Take 3 tablets (60 mg total) by mouth daily, Starting Sun 6/15/2025, Normal      ipratropium-albuterol (DUO-NEB) 0.5-2.5 mg/3 mL nebulizer solution Take 3 mL by nebulization every 6 (six) hours while awake, Starting Thu 4/24/2025, Normal      magnesium Oxide (MAG-OX) 400 mg TABS Take 1 tablet (400 mg total) by mouth daily, Starting Fri 3/28/2025, Normal      metoprolol succinate (TOPROL-XL) 100 mg 24 hr tablet Take 1 tablet (100 mg total) by mouth 2 (two) times a day, Starting Sat 6/14/2025, Normal      mirtazapine (REMERON) 15 mg tablet Take 15 mg by mouth daily at bedtime, Starting Thu 5/1/2025, Historical Med      pantoprazole (PROTONIX) 40 mg tablet Take 1 tablet (40 mg total) by mouth daily, Starting Sat 6/14/2025, Until Fri 9/12/2025, No Print      warfarin (COUMADIN) 3 mg tablet Take 1 tablet (3 mg total) by mouth daily, Starting Sat 6/14/2025, No Print           No discharge procedures on file.  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    CHF (congestive heart failure) (HCC)     Coagulopathy (HCC) 04/26/2025    Depression     Emphysema of lung (HCC)     GERD (gastroesophageal reflux disease)     Gout     Hyperammonemia (HCC) 04/26/2025    Hypertension     Metabolic encephalopathy 03/22/2025    Neuropathy     Raynaud's disease     Right inguinal hernia     Scleroderma (HCC)     Shock (HCC) 04/26/2025   [2]   Past Surgical History:  Procedure Laterality Date    AMPUTATION Right     pt had tip of \"pointer finger\" d/t scleraderma    CARDIAC PACEMAKER PLACEMENT      FINGER AMPUTATION Left 01/31/2024    Procedure: AMPUTATION FINGER, LEFT INDEX FINGER CPT: 67803;  Surgeon: Pedro Vazquez MD;  Location:  MAIN OR;  Service: Orthopedics    HERNIA REPAIR Left     times 2    DC AMP F/TH 1/2 JT/PHALANX W/NEURECT W/DIR CLSR Right 01/23/2024    Procedure: AMPUTATION FINGER, RIGHT MIDDLE;  Surgeon: Pedro Vazquez MD;  Location: [facility];  Service: Orthopedics    DC RPR 1ST INGUN HRNA AGE 5 YRS/> REDUCIBLE Right 03/03/2023    Procedure: OPEN INGUINAL HERNIA REPAIR WITH MESH;  Surgeon: Temo Jackson DO;  Location:  MAIN OR;  Service: General   [3]   Family History  Problem Relation Name Age of Onset    Hypertension Father     [4]   Social History  Tobacco Use    Smoking status: Every Day     Current packs/day: 0.50     Average packs/day: 1 pack/day for 45.5 years (44.8 ttl pk-yrs)     Types: Cigarettes     Start date: 2000    Smokeless tobacco: Never    Tobacco comments:     Last cigarette 0430 "   Vaping Use    Vaping status: Never Used   Substance Use Topics    Alcohol use: Not Currently    Drug use: Not Currently     Types: Marijuana     Comment: Hx meth use        Yaneth Nice PA-C  07/19/25 0527

## 2025-07-20 ENCOUNTER — HOSPITAL ENCOUNTER (EMERGENCY)
Facility: HOSPITAL | Age: 60
Discharge: HOME | End: 2025-07-20
Attending: EMERGENCY MEDICINE | Admitting: EMERGENCY MEDICINE
Payer: COMMERCIAL

## 2025-07-20 VITALS
HEART RATE: 78 BPM | OXYGEN SATURATION: 96 % | DIASTOLIC BLOOD PRESSURE: 85 MMHG | HEIGHT: 66 IN | SYSTOLIC BLOOD PRESSURE: 112 MMHG | WEIGHT: 182 LBS | TEMPERATURE: 97.9 F | BODY MASS INDEX: 29.25 KG/M2 | RESPIRATION RATE: 18 BRPM

## 2025-07-20 DIAGNOSIS — K42.9 UMBILICAL HERNIA WITHOUT OBSTRUCTION AND WITHOUT GANGRENE: Primary | ICD-10-CM

## 2025-07-20 LAB
ALBUMIN SERPL BCG-MCNC: 3.8 G/DL (ref 3.5–5)
ALP SERPL-CCNC: 152 U/L (ref 34–104)
ALT SERPL W P-5'-P-CCNC: 10 U/L (ref 7–52)
ANION GAP SERPL CALCULATED.3IONS-SCNC: 10 MMOL/L (ref 4–13)
AST SERPL W P-5'-P-CCNC: 28 U/L (ref 13–39)
BASOPHILS # BLD AUTO: 0.07 THOUSANDS/ÂΜL (ref 0–0.1)
BASOPHILS NFR BLD AUTO: 1 % (ref 0–1)
BILIRUB SERPL-MCNC: 0.77 MG/DL (ref 0.2–1)
BUN SERPL-MCNC: 22 MG/DL (ref 5–25)
CALCIUM SERPL-MCNC: 8.9 MG/DL (ref 8.4–10.2)
CHLORIDE SERPL-SCNC: 103 MMOL/L (ref 96–108)
CO2 SERPL-SCNC: 25 MMOL/L (ref 21–32)
CREAT SERPL-MCNC: 1.52 MG/DL (ref 0.6–1.3)
EOSINOPHIL # BLD AUTO: 0.21 THOUSAND/ÂΜL (ref 0–0.61)
EOSINOPHIL NFR BLD AUTO: 3 % (ref 0–6)
ERYTHROCYTE [DISTWIDTH] IN BLOOD BY AUTOMATED COUNT: 18.3 % (ref 11.6–15.1)
GFR SERPL CREATININE-BSD FRML MDRD: 49 ML/MIN/1.73SQ M
GLUCOSE SERPL-MCNC: 88 MG/DL (ref 65–140)
HCT VFR BLD AUTO: 39.5 % (ref 36.5–49.3)
HGB BLD-MCNC: 11.9 G/DL (ref 12–17)
IMM GRANULOCYTES # BLD AUTO: 0.04 THOUSAND/UL (ref 0–0.2)
IMM GRANULOCYTES NFR BLD AUTO: 1 % (ref 0–2)
LACTATE SERPL-SCNC: 0.9 MMOL/L (ref 0.5–2)
LYMPHOCYTES # BLD AUTO: 1.53 THOUSANDS/ÂΜL (ref 0.6–4.47)
LYMPHOCYTES NFR BLD AUTO: 19 % (ref 14–44)
MCH RBC QN AUTO: 27.8 PG (ref 26.8–34.3)
MCHC RBC AUTO-ENTMCNC: 30.1 G/DL (ref 31.4–37.4)
MCV RBC AUTO: 92 FL (ref 82–98)
MONOCYTES # BLD AUTO: 0.97 THOUSAND/ÂΜL (ref 0.17–1.22)
MONOCYTES NFR BLD AUTO: 12 % (ref 4–12)
NEUTROPHILS # BLD AUTO: 5.3 THOUSANDS/ÂΜL (ref 1.85–7.62)
NEUTS SEG NFR BLD AUTO: 64 % (ref 43–75)
NRBC BLD AUTO-RTO: 0 /100 WBCS
PLATELET # BLD AUTO: 229 THOUSANDS/UL (ref 149–390)
PMV BLD AUTO: 9.3 FL (ref 8.9–12.7)
POTASSIUM SERPL-SCNC: 3.8 MMOL/L (ref 3.5–5.3)
PROT SERPL-MCNC: 7.1 G/DL (ref 6.4–8.4)
RBC # BLD AUTO: 4.28 MILLION/UL (ref 3.88–5.62)
SODIUM SERPL-SCNC: 138 MMOL/L (ref 135–147)
WBC # BLD AUTO: 8.12 THOUSAND/UL (ref 4.31–10.16)

## 2025-07-20 PROCEDURE — 96374 THER/PROPH/DIAG INJ IV PUSH: CPT

## 2025-07-20 PROCEDURE — 85025 COMPLETE CBC W/AUTO DIFF WBC: CPT | Performed by: EMERGENCY MEDICINE

## 2025-07-20 PROCEDURE — 80053 COMPREHEN METABOLIC PANEL: CPT | Performed by: EMERGENCY MEDICINE

## 2025-07-20 PROCEDURE — 99284 EMERGENCY DEPT VISIT MOD MDM: CPT | Performed by: EMERGENCY MEDICINE

## 2025-07-20 PROCEDURE — 83605 ASSAY OF LACTIC ACID: CPT | Performed by: EMERGENCY MEDICINE

## 2025-07-20 PROCEDURE — 36415 COLL VENOUS BLD VENIPUNCTURE: CPT | Performed by: EMERGENCY MEDICINE

## 2025-07-20 PROCEDURE — 99284 EMERGENCY DEPT VISIT MOD MDM: CPT

## 2025-07-20 RX ORDER — FENTANYL CITRATE 50 UG/ML
100 INJECTION, SOLUTION INTRAMUSCULAR; INTRAVENOUS ONCE
Refills: 0 | Status: COMPLETED | OUTPATIENT
Start: 2025-07-20 | End: 2025-07-20

## 2025-07-20 RX ADMIN — FENTANYL CITRATE 100 MCG: 50 INJECTION INTRAMUSCULAR; INTRAVENOUS at 18:43

## 2025-07-20 NOTE — ED PROVIDER NOTES
Time reflects when diagnosis was documented in both MDM as applicable and the Disposition within this note       Time User Action Codes Description Comment    7/20/2025  6:21 PM Fabian Rogers Add [K42.9] Umbilical hernia without obstruction and without gangrene           ED Disposition       ED Disposition   Discharge    Condition   Stable    Date/Time   Sun Jul 20, 2025  7:11 PM    Comment   Britton Batista discharge to home/self care.                   Assessment & Plan       Medical Decision Making  Patient initially was diagnosed with this hernia on 9 July when it was easily reduced after pain medication.  Patient's old records have been reviewed.  This show instructions provided to patient he was instructed to put his finger over the hernia if he increases abdominal pressure.  Follow-up with surgery tomorrow.    Amount and/or Complexity of Data Reviewed  Labs: ordered.    Risk  Prescription drug management.        ED Course as of 07/20/25 1911   Sun Jul 20, 2025   1847 After fentanyl approximately the umbilical hernia was reduced with pressure patient was minimally symptomatic during the reduction.  He will be observed and we will check his labs.  If he remains asymptomatic we will follow-up with same discharge instructions as previous   1910 Lactic acid is normal.  Patient is stable for discharge.       Medications   fentaNYL injection 100 mcg (100 mcg Intravenous Given 7/20/25 1843)       ED Risk Strat Scores                    No data recorded        SBIRT 20yo+      Flowsheet Row Most Recent Value   Initial Alcohol Screen: US AUDIT-C     1. How often do you have a drink containing alcohol? 0 Filed at: 07/20/2025 1814   2. How many drinks containing alcohol do you have on a typical day you are drinking?  0 Filed at: 07/20/2025 1814   3a. Male UNDER 65: How often do you have five or more drinks on one occasion? 0 Filed at: 07/20/2025 1814   Audit-C Score 0 Filed at: 07/20/2025 1814   SILVIA: How many times in  the past year have you...    Used an illegal drug or used a prescription medication for non-medical reasons? Never Filed at: 07/20/2025 1814                            History of Present Illness       Chief Complaint   Patient presents with    Abdominal Pain     Pt was seen yesterday for umbilical hernia, states while in ER yesterday it corrected itself, reports about 30 mins ago hernia and pain returned        Past Medical History[1]   Past Surgical History[2]   Family History[3]   Social History[4]   E-Cigarette/Vaping    E-Cigarette Use Never User       E-Cigarette/Vaping Substances    Nicotine No     THC No     CBD No     Flavoring No     Other No     Unknown No       I have reviewed and agree with the history as documented.     Patient was evaluated yesterday in the emergency department for abdominal pain.  He states about 1/2-hour ago his umbilical hernia popped out and he is unable to push back in.  He has a history of A-fib and is on Coumadin.  He has a defibrillator and a pacemaker.  He recently was diagnosed with a mass in his chest that has not yet been biopsied.  He has a history of scleroderma and Raynaud's, patient does not recall what he was doing immediately prior to the hernia popping out.      History provided by:  Patient  Abdominal Pain      Review of Systems   Gastrointestinal:  Positive for abdominal pain.   All other systems reviewed and are negative.          Objective       ED Triage Vitals   Temperature Pulse Blood Pressure Respirations SpO2 Patient Position - Orthostatic VS   07/20/25 1845 07/20/25 1807 07/20/25 1807 07/20/25 1807 07/20/25 1807 07/20/25 1807   97.9 °F (36.6 °C) 59 134/69 18 100 % Sitting      Temp Source Heart Rate Source BP Location FiO2 (%) Pain Score    07/20/25 1845 07/20/25 1807 07/20/25 1807 -- 07/20/25 1843    Temporal Monitor Right arm  10 - Worst Possible Pain      Vitals      Date and Time Temp Pulse SpO2 Resp BP Pain Score FACES Pain Rating User   07/20/25 1900  -- 78 96 % 18 112/85 -- --    07/20/25 1845 97.9 °F (36.6 °C) 92 93 % 18 117/79 -- --    07/20/25 1843 -- -- -- -- -- 10 - Worst Possible Pain --    07/20/25 1807 -- 59 100 % 18 134/69 -- -- SA            Physical Exam  Vitals and nursing note reviewed.   Constitutional:       General: He is not in acute distress.     Appearance: Normal appearance. He is well-developed. He is not ill-appearing or toxic-appearing.   HENT:      Head: Normocephalic and atraumatic.      Right Ear: External ear normal.      Left Ear: External ear normal.      Nose: Nose normal.      Mouth/Throat:      Mouth: Mucous membranes are moist.     Eyes:      Conjunctiva/sclera: Conjunctivae normal.       Cardiovascular:      Heart sounds: No murmur heard.  Pulmonary:      Effort: Pulmonary effort is normal. No respiratory distress.   Abdominal:      Tenderness: There is no abdominal tenderness.      Hernia: A hernia is present. Hernia is present in the umbilical area.      Comments: Golf ball sized umbilical hernia palpated in the right inferior aspect of the umbilicus.  Was able to reduce about half of it and the patient was unable to tolerate the pain.  It felt solid there is no skin changes     Musculoskeletal:         General: No swelling.      Cervical back: Normal range of motion and neck supple.     Skin:     General: Skin is warm and dry.      Capillary Refill: Capillary refill takes less than 2 seconds.     Neurological:      General: No focal deficit present.      Mental Status: He is alert and oriented to person, place, and time. Mental status is at baseline.     Psychiatric:         Mood and Affect: Mood normal.         Behavior: Behavior normal.         Results Reviewed       Procedure Component Value Units Date/Time    Lactic acid, plasma (w/reflex if result > 2.0) [[id number]]  (Normal) Collected: 07/20/25 1833    Lab Status: Final result Specimen: Blood from Arm, Right Updated: 07/20/25 1859     LACTIC ACID 0.9 mmol/L      Narrative:      Result may be elevated if tourniquet was used during collection.    Comprehensive metabolic panel [[id number]]  (Abnormal) Collected: 07/20/25 1833    Lab Status: Final result Specimen: Blood from Arm, Right Updated: 07/20/25 1859     Sodium 138 mmol/L      Potassium 3.8 mmol/L      Chloride 103 mmol/L      CO2 25 mmol/L      ANION GAP 10 mmol/L      BUN 22 mg/dL      Creatinine 1.52 mg/dL      Glucose 88 mg/dL      Calcium 8.9 mg/dL      AST 28 U/L      ALT 10 U/L      Alkaline Phosphatase 152 U/L      Total Protein 7.1 g/dL      Albumin 3.8 g/dL      Total Bilirubin 0.77 mg/dL      eGFR 49 ml/min/1.73sq m     Narrative:      [employer] Kid[employer] guidelines for Chronic Kidney Disease (CKD):     Stage 1 with normal or high GFR (GFR > 90 mL/min/1.73 square meters)    Stage 2 Mild CKD (GFR = 60-89 mL/min/1.73 square meters)    Stage 3A Moderate CKD (GFR = 45-59 mL/min/1.73 square meters)    Stage 3B Moderate CKD (GFR = 30-44 mL/min/1.73 square meters)    Stage 4 Severe CKD (GFR = 15-29 mL/min/1.73 square meters)    Stage 5 End Stage CKD (GFR <15 mL/min/1.73 square meters)  Note: GFR calculation is accurate only with a steady state creatinine    CBC and differential [[id number]]  (Abnormal) Collected: 07/20/25 1833    Lab Status: Final result Specimen: Blood from Arm, Right Updated: 07/20/25 1838     WBC 8.12 Thousand/uL      RBC 4.28 Million/uL      Hemoglobin 11.9 g/dL      Hematocrit 39.5 %      MCV 92 fL      MCH 27.8 pg      MCHC 30.1 g/dL      RDW 18.3 %      MPV 9.3 fL      Platelets 229 Thousands/uL      nRBC 0 /100 WBCs      Segmented % 64 %      Immature Grans % 1 %      Lymphocytes % 19 %      Monocytes % 12 %      Eosinophils Relative 3 %      Basophils Relative 1 %      Absolute Neutrophils 5.30 Thousands/µL      Absolute Immature Grans 0.04 Thousand/uL      Absolute Lymphocytes 1.53 Thousands/µL      Absolute Monocytes 0.97 Thousand/µL      Eosinophils Absolute 0.21 Thousand/µL       Basophils Absolute 0.07 Thousands/µL             No orders to display       Procedures    ED Medication and Procedure Management   Prior to Admission Medications   Prescriptions Last Dose Informant Patient Reported? Taking?   albuterol (ProAir HFA) 90 mcg/act inhaler   No No   Sig: Inhale 2 puffs every 6 (six) hours as needed for wheezing   amiodarone 200 mg tablet   No No   Sig: Take 1 tablet (200 mg total) by mouth daily with breakfast   furosemide (LASIX) 20 mg tablet   No No   Sig: Take 3 tablets (60 mg total) by mouth daily   ipratropium-albuterol (DUO-NEB) 0.5-2.5 mg/3 mL nebulizer solution   No No   Sig: Take 3 mL by nebulization every 6 (six) hours while awake   magnesium Oxide (MAG-OX) 400 mg TABS   No No   Sig: Take 1 tablet (400 mg total) by mouth daily   metoprolol succinate (TOPROL-XL) 100 mg 24 hr tablet   No No   Sig: Take 1 tablet (100 mg total) by mouth 2 (two) times a day   mirtazapine (REMERON) 15 mg tablet   Yes No   Sig: Take 15 mg by mouth daily at bedtime   Patient not taking: Reported on 7/12/2025   pantoprazole (PROTONIX) 40 mg tablet   No No   Sig: Take 1 tablet (40 mg total) by mouth daily   warfarin (COUMADIN) 3 mg tablet   No No   Sig: Take 1 tablet (3 mg total) by mouth daily      Facility-Administered Medications: None     Patient's Medications   Discharge Prescriptions    No medications on file     No discharge procedures on file.  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    CHF (congestive heart failure)  "(HCC)     Coagulopathy (HCC) 04/26/2025    Depression     Emphysema of lung (HCC)     GERD (gastroesophageal reflux disease)     Gout     Hyperammonemia (HCC) 04/26/2025    Hypertension     Metabolic encephalopathy 03/22/2025    Neuropathy     Raynaud's disease     Right inguinal hernia     Scleroderma (HCC)     Shock (HCC) 04/26/2025   [2]   Past Surgical History:  Procedure Laterality Date    AMPUTATION Right     pt had tip of \"pointer finger\" d/t scleraderma    CARDIAC PACEMAKER PLACEMENT      FINGER AMPUTATION Left 01/31/2024    Procedure: AMPUTATION FINGER, LEFT INDEX FINGER CPT: 52188;  Surgeon: Pedro Vazquez MD;  Location:  MAIN OR;  Service: Orthopedics    HERNIA REPAIR Left     times 2    ME AMP F/TH 1/2 JT/PHALANX W/NEURECT W/DIR CLSR Right 01/23/2024    Procedure: AMPUTATION FINGER, RIGHT MIDDLE;  Surgeon: Pedro Vazquez MD;  Location: [facility];  Service: Orthopedics    ME RPR 1ST INGUN HRNA AGE 5 YRS/> REDUCIBLE Right 03/03/2023    Procedure: OPEN INGUINAL HERNIA REPAIR WITH MESH;  Surgeon: Temo Jackson DO;  Location:  MAIN OR;  Service: General   [3]   Family History  Problem Relation Name Age of Onset    Hypertension Father     [4]   Social History  Tobacco Use    Smoking status: Every Day     Current packs/day: 0.50     Average packs/day: 1 pack/day for 45.5 years (44.8 ttl pk-yrs)     Types: Cigarettes     Start date: 2000    Smokeless tobacco: Never    Tobacco comments:     Last cigarette 0430   Vaping Use    Vaping status: Never Used   Substance Use Topics    Alcohol use: Not Currently    Drug use: Not Currently     Types: Marijuana     Comment: Hx meth use        Fabian Rogers MD  07/20/25 1911    "

## 2025-07-20 NOTE — DISCHARGE INSTRUCTIONS
If you sneeze, have a bowel movement or cough you should put your finger in your bellybutton to prevent yourself from popping your hernia back out.  Return if worse or new symptoms.

## 2025-07-31 ENCOUNTER — APPOINTMENT (EMERGENCY)
Dept: CT IMAGING | Facility: HOSPITAL | Age: 60
DRG: 683 | End: 2025-07-31
Payer: COMMERCIAL

## 2025-07-31 ENCOUNTER — HOSPITAL ENCOUNTER (INPATIENT)
Facility: HOSPITAL | Age: 60
LOS: 2 days | Discharge: HOME | DRG: 683 | End: 2025-08-03
Attending: EMERGENCY MEDICINE | Admitting: STUDENT IN AN ORGANIZED HEALTH CARE EDUCATION/TRAINING PROGRAM
Payer: COMMERCIAL

## 2025-08-01 PROBLEM — K74.60 LIVER CIRRHOSIS (HCC): Status: ACTIVE | Noted: 2025-08-01

## 2025-08-01 PROBLEM — I48.20 CHRONIC A-FIB (HCC): Status: ACTIVE | Noted: 2025-08-01

## 2025-08-01 RX ORDER — MECLIZINE HCL 12.5 MG 12.5 MG/1
12.5 TABLET ORAL 3 TIMES DAILY
COMMUNITY
Start: 2025-07-01

## 2025-08-01 RX ORDER — FLUTICASONE FUROATE, UMECLIDINIUM BROMIDE AND VILANTEROL TRIFENATATE 200; 62.5; 25 UG/1; UG/1; UG/1
POWDER RESPIRATORY (INHALATION)
COMMUNITY
Start: 2025-07-22

## 2025-08-01 RX ORDER — WARFARIN SODIUM 5 MG/1
5 TABLET ORAL EVERY MORNING
Status: ON HOLD | COMMUNITY
Start: 2025-05-21 | End: 2025-08-03

## 2025-08-01 RX ORDER — PYRIDOXINE HCL (VITAMIN B6) 50 MG
50 TABLET ORAL DAILY
COMMUNITY
Start: 2024-10-25 | End: 2025-10-25

## 2025-08-01 RX ORDER — CLINDAMYCIN HYDROCHLORIDE 300 MG/1
300 CAPSULE ORAL 4 TIMES DAILY
COMMUNITY
Start: 2025-06-16 | End: 2025-08-03

## 2025-08-06 ENCOUNTER — DOCUMENTATION (OUTPATIENT)
Dept: PALLIATIVE MEDICINE | Facility: CLINIC | Age: 60
End: 2025-08-06

## 2025-08-06 ENCOUNTER — OFFICE VISIT (OUTPATIENT)
Dept: PALLIATIVE MEDICINE | Facility: CLINIC | Age: 60
End: 2025-08-06
Payer: COMMERCIAL

## 2025-08-06 VITALS
SYSTOLIC BLOOD PRESSURE: 122 MMHG | WEIGHT: 187.6 LBS | TEMPERATURE: 98 F | HEIGHT: 69 IN | DIASTOLIC BLOOD PRESSURE: 94 MMHG | HEART RATE: 80 BPM | RESPIRATION RATE: 18 BRPM | BODY MASS INDEX: 27.78 KG/M2

## 2025-08-06 DIAGNOSIS — Z51.5 PALLIATIVE CARE BY SPECIALIST: ICD-10-CM

## 2025-08-06 DIAGNOSIS — I50.22 CHRONIC SYSTOLIC CONGESTIVE HEART FAILURE (HCC): Primary | ICD-10-CM

## 2025-08-06 DIAGNOSIS — J44.9 CHRONIC OBSTRUCTIVE PULMONARY DISEASE, UNSPECIFIED COPD TYPE (HCC): ICD-10-CM

## 2025-08-06 DIAGNOSIS — K74.60 HEPATIC CIRRHOSIS, UNSPECIFIED HEPATIC CIRRHOSIS TYPE, UNSPECIFIED WHETHER ASCITES PRESENT (HCC): ICD-10-CM

## 2025-08-06 DIAGNOSIS — Z71.89 COUNSELING REGARDING ADVANCE CARE PLANNING AND GOALS OF CARE: ICD-10-CM

## 2025-08-06 DIAGNOSIS — F51.01 PRIMARY INSOMNIA: ICD-10-CM

## 2025-08-06 DIAGNOSIS — D89.89 AUTOIMMUNE DISORDER (HCC): ICD-10-CM

## 2025-08-06 PROBLEM — I50.84 END STAGE HEART FAILURE (HCC): Status: ACTIVE | Noted: 2025-08-06

## 2025-08-06 PROCEDURE — 99215 OFFICE O/P EST HI 40 MIN: CPT | Performed by: STUDENT IN AN ORGANIZED HEALTH CARE EDUCATION/TRAINING PROGRAM

## 2025-08-06 PROCEDURE — G2211 COMPLEX E/M VISIT ADD ON: HCPCS | Performed by: STUDENT IN AN ORGANIZED HEALTH CARE EDUCATION/TRAINING PROGRAM

## 2025-08-06 PROCEDURE — 99497 ADVNCD CARE PLAN 30 MIN: CPT | Performed by: STUDENT IN AN ORGANIZED HEALTH CARE EDUCATION/TRAINING PROGRAM

## 2025-08-06 RX ORDER — QUETIAPINE FUMARATE 25 MG/1
25 TABLET, FILM COATED ORAL
Qty: 30 TABLET | Refills: 2 | Status: SHIPPED | OUTPATIENT
Start: 2025-08-06 | End: 2025-08-15

## 2025-08-07 PROBLEM — D89.89 AUTOIMMUNE DISORDER (HCC): Status: ACTIVE | Noted: 2025-08-07

## 2025-08-13 ENCOUNTER — HOSPITAL ENCOUNTER (EMERGENCY)
Facility: HOSPITAL | Age: 60
Discharge: HOME | End: 2025-08-13
Attending: STUDENT IN AN ORGANIZED HEALTH CARE EDUCATION/TRAINING PROGRAM | Admitting: STUDENT IN AN ORGANIZED HEALTH CARE EDUCATION/TRAINING PROGRAM
Payer: COMMERCIAL

## 2025-08-16 ENCOUNTER — APPOINTMENT (EMERGENCY)
Dept: RADIOLOGY | Facility: HOSPITAL | Age: 60
End: 2025-08-16
Payer: COMMERCIAL

## 2025-08-16 ENCOUNTER — HOSPITAL ENCOUNTER (EMERGENCY)
Facility: HOSPITAL | Age: 60
Discharge: HOME | End: 2025-08-16
Attending: EMERGENCY MEDICINE | Admitting: EMERGENCY MEDICINE
Payer: COMMERCIAL

## 2025-08-16 VITALS
DIASTOLIC BLOOD PRESSURE: 85 MMHG | HEIGHT: 69 IN | HEART RATE: 72 BPM | SYSTOLIC BLOOD PRESSURE: 117 MMHG | BODY MASS INDEX: 31.84 KG/M2 | RESPIRATION RATE: 18 BRPM | OXYGEN SATURATION: 98 % | TEMPERATURE: 98.2 F | WEIGHT: 215 LBS

## 2025-08-16 DIAGNOSIS — R68.89 FLU-LIKE SYMPTOMS: Primary | ICD-10-CM

## 2025-08-16 LAB
ALBUMIN SERPL BCG-MCNC: 4.4 G/DL (ref 3.5–5)
ALP SERPL-CCNC: 157 U/L (ref 34–104)
ALT SERPL W P-5'-P-CCNC: 11 U/L (ref 7–52)
ANION GAP SERPL CALCULATED.3IONS-SCNC: 10 MMOL/L (ref 4–13)
AST SERPL W P-5'-P-CCNC: 24 U/L (ref 13–39)
BASOPHILS # BLD AUTO: 0.07 THOUSANDS/ÂΜL (ref 0–0.1)
BASOPHILS NFR BLD AUTO: 1 % (ref 0–1)
BILIRUB SERPL-MCNC: 1.47 MG/DL (ref 0.2–1)
BNP SERPL-MCNC: 1686 PG/ML (ref 0–100)
BUN SERPL-MCNC: 18 MG/DL (ref 5–25)
CALCIUM SERPL-MCNC: 9.1 MG/DL (ref 8.4–10.2)
CARDIAC TROPONIN I PNL SERPL HS: 51 NG/L (ref ?–50)
CHLORIDE SERPL-SCNC: 103 MMOL/L (ref 96–108)
CO2 SERPL-SCNC: 27 MMOL/L (ref 21–32)
CREAT SERPL-MCNC: 1.37 MG/DL (ref 0.6–1.3)
EOSINOPHIL # BLD AUTO: 0.13 THOUSAND/ÂΜL (ref 0–0.61)
EOSINOPHIL NFR BLD AUTO: 2 % (ref 0–6)
ERYTHROCYTE [DISTWIDTH] IN BLOOD BY AUTOMATED COUNT: 18.6 % (ref 11.6–15.1)
FLUAV AG UPPER RESP QL IA.RAPID: NEGATIVE
FLUBV AG UPPER RESP QL IA.RAPID: NEGATIVE
GFR SERPL CREATININE-BSD FRML MDRD: 55 ML/MIN/1.73SQ M
GLUCOSE SERPL-MCNC: 92 MG/DL (ref 65–140)
HCT VFR BLD AUTO: 45.7 % (ref 36.5–49.3)
HGB BLD-MCNC: 13.5 G/DL (ref 12–17)
IMM GRANULOCYTES # BLD AUTO: 0.02 THOUSAND/UL (ref 0–0.2)
IMM GRANULOCYTES NFR BLD AUTO: 0 % (ref 0–2)
LYMPHOCYTES # BLD AUTO: 1.16 THOUSANDS/ÂΜL (ref 0.6–4.47)
LYMPHOCYTES NFR BLD AUTO: 15 % (ref 14–44)
MCH RBC QN AUTO: 27.1 PG (ref 26.8–34.3)
MCHC RBC AUTO-ENTMCNC: 29.5 G/DL (ref 31.4–37.4)
MCV RBC AUTO: 92 FL (ref 82–98)
MONOCYTES # BLD AUTO: 0.97 THOUSAND/ÂΜL (ref 0.17–1.22)
MONOCYTES NFR BLD AUTO: 13 % (ref 4–12)
NEUTROPHILS # BLD AUTO: 5.42 THOUSANDS/ÂΜL (ref 1.85–7.62)
NEUTS SEG NFR BLD AUTO: 69 % (ref 43–75)
NRBC BLD AUTO-RTO: 0 /100 WBCS
PLATELET # BLD AUTO: 262 THOUSANDS/UL (ref 149–390)
PMV BLD AUTO: 9.4 FL (ref 8.9–12.7)
POTASSIUM SERPL-SCNC: 4.1 MMOL/L (ref 3.5–5.3)
PROT SERPL-MCNC: 8 G/DL (ref 6.4–8.4)
RBC # BLD AUTO: 4.99 MILLION/UL (ref 3.88–5.62)
SARS-COV+SARS-COV-2 AG RESP QL IA.RAPID: NEGATIVE
SODIUM SERPL-SCNC: 140 MMOL/L (ref 135–147)
WBC # BLD AUTO: 7.77 THOUSAND/UL (ref 4.31–10.16)

## 2025-08-16 PROCEDURE — 99283 EMERGENCY DEPT VISIT LOW MDM: CPT

## 2025-08-16 PROCEDURE — 80053 COMPREHEN METABOLIC PANEL: CPT | Performed by: EMERGENCY MEDICINE

## 2025-08-16 PROCEDURE — 96374 THER/PROPH/DIAG INJ IV PUSH: CPT

## 2025-08-16 PROCEDURE — 85025 COMPLETE CBC W/AUTO DIFF WBC: CPT | Performed by: EMERGENCY MEDICINE

## 2025-08-16 PROCEDURE — 94640 AIRWAY INHALATION TREATMENT: CPT

## 2025-08-16 PROCEDURE — 83880 ASSAY OF NATRIURETIC PEPTIDE: CPT | Performed by: EMERGENCY MEDICINE

## 2025-08-16 PROCEDURE — 99285 EMERGENCY DEPT VISIT HI MDM: CPT | Performed by: EMERGENCY MEDICINE

## 2025-08-16 PROCEDURE — 84484 ASSAY OF TROPONIN QUANT: CPT | Performed by: EMERGENCY MEDICINE

## 2025-08-16 PROCEDURE — 87811 SARS-COV-2 COVID19 W/OPTIC: CPT

## 2025-08-16 PROCEDURE — 87804 INFLUENZA ASSAY W/OPTIC: CPT

## 2025-08-16 PROCEDURE — 71045 X-RAY EXAM CHEST 1 VIEW: CPT

## 2025-08-16 PROCEDURE — 96361 HYDRATE IV INFUSION ADD-ON: CPT

## 2025-08-16 PROCEDURE — 93005 ELECTROCARDIOGRAM TRACING: CPT

## 2025-08-16 PROCEDURE — 36415 COLL VENOUS BLD VENIPUNCTURE: CPT | Performed by: EMERGENCY MEDICINE

## 2025-08-16 RX ORDER — IPRATROPIUM BROMIDE AND ALBUTEROL SULFATE 2.5; .5 MG/3ML; MG/3ML
3 SOLUTION RESPIRATORY (INHALATION) ONCE
Status: COMPLETED | OUTPATIENT
Start: 2025-08-16 | End: 2025-08-16

## 2025-08-16 RX ORDER — KETOROLAC TROMETHAMINE 30 MG/ML
15 INJECTION, SOLUTION INTRAMUSCULAR; INTRAVENOUS ONCE
Status: COMPLETED | OUTPATIENT
Start: 2025-08-16 | End: 2025-08-16

## 2025-08-16 RX ADMIN — KETOROLAC TROMETHAMINE 15 MG: 30 INJECTION, SOLUTION INTRAMUSCULAR at 16:52

## 2025-08-16 RX ADMIN — SODIUM CHLORIDE 1000 ML: 0.9 INJECTION, SOLUTION INTRAVENOUS at 16:50

## 2025-08-16 RX ADMIN — IPRATROPIUM BROMIDE AND ALBUTEROL SULFATE 3 ML: .5; 3 SOLUTION RESPIRATORY (INHALATION) at 16:53

## 2025-08-18 LAB
QRS AXIS: 188 DEGREES
QRSD INTERVAL: 168 MS
QT INTERVAL: 496 MS
QTC INTERVAL: 576 MS
T WAVE AXIS: 10 DEGREES
VENTRICULAR RATE: 81 BPM

## 2025-08-19 ENCOUNTER — HOSPITAL ENCOUNTER (EMERGENCY)
Facility: HOSPITAL | Age: 60
Discharge: HOME | End: 2025-08-19
Attending: EMERGENCY MEDICINE | Admitting: EMERGENCY MEDICINE
Payer: COMMERCIAL

## 2025-08-19 VITALS
RESPIRATION RATE: 17 BRPM | OXYGEN SATURATION: 93 % | TEMPERATURE: 98.2 F | DIASTOLIC BLOOD PRESSURE: 85 MMHG | SYSTOLIC BLOOD PRESSURE: 118 MMHG | HEART RATE: 75 BPM

## 2025-08-19 DIAGNOSIS — K42.0 INCARCERATED UMBILICAL HERNIA: Primary | ICD-10-CM

## 2025-08-19 PROCEDURE — 96374 THER/PROPH/DIAG INJ IV PUSH: CPT

## 2025-08-19 PROCEDURE — 99283 EMERGENCY DEPT VISIT LOW MDM: CPT

## 2025-08-19 PROCEDURE — 99284 EMERGENCY DEPT VISIT MOD MDM: CPT | Performed by: EMERGENCY MEDICINE

## 2025-08-19 RX ORDER — FENTANYL CITRATE 50 UG/ML
50 INJECTION, SOLUTION INTRAMUSCULAR; INTRAVENOUS ONCE
Refills: 0 | Status: COMPLETED | OUTPATIENT
Start: 2025-08-19 | End: 2025-08-19

## 2025-08-19 RX ADMIN — FENTANYL CITRATE 50 MCG: 50 INJECTION INTRAMUSCULAR; INTRAVENOUS at 15:13

## (undated) DEVICE — INTENDED FOR TISSUE SEPARATION, AND OTHER PROCEDURES THAT REQUIRE A SHARP SURGICAL BLADE TO PUNCTURE OR CUT.: Brand: BARD-PARKER SAFETY BLADES SIZE 10, STERILE

## (undated) DEVICE — GAUZE SPONGES,16 PLY: Brand: CURITY

## (undated) DEVICE — GLOVE SRG BIOGEL 7.5

## (undated) DEVICE — CULTURE TUBE AEROBIC

## (undated) DEVICE — CAST PADDING 4 IN SYNTHETIC NON-STRL

## (undated) DEVICE — NEEDLE 25G X 1 1/2

## (undated) DEVICE — CULTURE TUBE ANAEROBIC

## (undated) DEVICE — NEPTUNE E-SEP SMOKE EVACUATION PENCIL, COATED, 70MM BLADE, PUSH BUTTON SWITCH: Brand: NEPTUNE E-SEP

## (undated) DEVICE — STANDARD SURGICAL GOWN, L: Brand: CONVERTORS

## (undated) DEVICE — SKN PRP WNG SPNGE PVP SCRB STR: Brand: MEDLINE INDUSTRIES, INC.

## (undated) DEVICE — SYRINGE 10ML LL

## (undated) DEVICE — SILVER-COATED ANTIMICROBIAL BARRIER DRESSING: Brand: ACTICOAT   4" X 8"

## (undated) DEVICE — SUT MONOCRYL 4-0 PS-2 27 IN Y426H

## (undated) DEVICE — STRETCH BANDAGE: Brand: CURITY

## (undated) DEVICE — STERILE BETHLEHEM PLASTIC HAND: Brand: CARDINAL HEALTH

## (undated) DEVICE — STERILE POLYISOPRENE POWDER-FREE SURGICAL GLOVES: Brand: PROTEXIS

## (undated) DEVICE — GLOVE INDICATOR PI UNDERGLOVE SZ 8 BLUE

## (undated) DEVICE — PAD GROUNDING ADULT

## (undated) DEVICE — NEEDLE 23G X 1 1/2 SAFETY-GLIDE THIN WALL

## (undated) DEVICE — SUT PROLENE 2-0 CT-2 30 IN 8411H

## (undated) DEVICE — STERILE POLYISOPRENE POWDER-FREE SURGICAL GLOVES WITH EMOLLIENT COATING: Brand: PROTEXIS

## (undated) DEVICE — INTENDED FOR TISSUE SEPARATION, AND OTHER PROCEDURES THAT REQUIRE A SHARP SURGICAL BLADE TO PUNCTURE OR CUT.: Brand: BARD-PARKER SAFETY BLADES SIZE 15, STERILE

## (undated) DEVICE — NEEDLE BLUNT 18 G X 1 1/2IN

## (undated) DEVICE — SINGLE PORT MANIFOLD: Brand: NEPTUNE 2

## (undated) DEVICE — SUT VICRYL 3-0 SH 27 IN J416H

## (undated) DEVICE — ADHESIVE SKIN HIGH VISCOSITY EXOFIN 1ML

## (undated) DEVICE — MEDI-VAC YANKAUER SUCTION HANDLE W/STRAIGHT TIP & CONTROL VENT: Brand: CARDINAL HEALTH

## (undated) DEVICE — SCD SEQUENTIAL COMPRESSION COMFORT SLEEVE MEDIUM KNEE LENGTH: Brand: KENDALL SCD

## (undated) DEVICE — ACE WRAP 3 IN UNSTERILE

## (undated) DEVICE — GLOVE INDICATOR PI UNDERGLOVE SZ 8.5 BLUE

## (undated) DEVICE — GLOVE SRG BIOGEL 8

## (undated) DEVICE — SUT SILK 2-0 SH 30 IN K833H

## (undated) DEVICE — PADDING CAST 3IN COTTON STRL

## (undated) DEVICE — CUFF TOURNIQUET 18 X 4 IN QUICK CONNECT DISP 1 BLADDER

## (undated) DEVICE — CHLORAPREP HI-LITE 26ML ORANGE

## (undated) DEVICE — DRAPE EQUIPMENT RF WAND

## (undated) DEVICE — DECANTER: Brand: UNBRANDED

## (undated) DEVICE — SYRINGE 20ML LL

## (undated) DEVICE — DRAPE SHEET THREE QUARTER

## (undated) DEVICE — SUT MONOCRYL 4-0 PS-2 18 IN Y496G

## (undated) DEVICE — ABDOMINAL PAD: Brand: DERMACEA

## (undated) DEVICE — BETHLEHEM UNIVERSAL  MIONR EXT: Brand: CARDINAL HEALTH

## (undated) DEVICE — INTENDED FOR TISSUE SEPARATION, AND OTHER PROCEDURES THAT REQUIRE A SHARP SURGICAL BLADE TO PUNCTURE OR CUT.: Brand: BARD-PARKER ® SAFETYLOCK CARBON RIB-BACK BLADES

## (undated) DEVICE — CABLE BIPOLAR DISP MEGADYNE

## (undated) DEVICE — TIBURON HAND DRAPE: Brand: CONVERTORS

## (undated) DEVICE — TUBING SUCTION 5MM X 12 FT

## (undated) DEVICE — SUT VICRYL 2-0 SH 27 IN UNDYED J417H

## (undated) DEVICE — OCCLUSIVE GAUZE STRIP,3% BISMUTH TRIBROMOPHENATE IN PETROLATUM BLEND: Brand: XEROFORM

## (undated) DEVICE — BETHLEHEM UNIVERSAL MINOR GEN: Brand: CARDINAL HEALTH

## (undated) DEVICE — PENROSE DRAIN, 18 X 3 8: Brand: CARDINAL HEALTH